# Patient Record
Sex: FEMALE | Race: WHITE | NOT HISPANIC OR LATINO | Employment: UNEMPLOYED | URBAN - METROPOLITAN AREA
[De-identification: names, ages, dates, MRNs, and addresses within clinical notes are randomized per-mention and may not be internally consistent; named-entity substitution may affect disease eponyms.]

---

## 2017-01-13 ENCOUNTER — CARE COORDINATION (OUTPATIENT)
Dept: ONCOLOGY | Facility: CLINIC | Age: 42
End: 2017-01-13

## 2017-01-13 NOTE — PROGRESS NOTES
Left message x2 to set up appt for follow up and to check in regarding follow up.   Sanjuana WANG RN, OCN  Care Coordinator   Gynecologic Cancer   Office [phone]  Pager [phone]

## 2017-03-12 ENCOUNTER — RECORDS - HEALTHEAST (OUTPATIENT)
Dept: ADMINISTRATIVE | Facility: OTHER | Age: 42
End: 2017-03-12

## 2017-04-02 ENCOUNTER — HOSPITAL ENCOUNTER (INPATIENT)
Facility: CLINIC | Age: 42
LOS: 1 days | Discharge: LEFT BEFORE DISCHARGE | DRG: 389 | End: 2017-04-03
Attending: EMERGENCY MEDICINE | Admitting: INTERNAL MEDICINE
Payer: COMMERCIAL

## 2017-04-02 DIAGNOSIS — K56.7 ILEUS OF UNSPECIFIED TYPE (H): ICD-10-CM

## 2017-04-02 DIAGNOSIS — C53.1 MALIGNANT NEOPLASM OF EXOCERVIX (H): ICD-10-CM

## 2017-04-02 DIAGNOSIS — K56.609 SBO (SMALL BOWEL OBSTRUCTION) (H): ICD-10-CM

## 2017-04-02 PROCEDURE — 96361 HYDRATE IV INFUSION ADD-ON: CPT | Performed by: EMERGENCY MEDICINE

## 2017-04-02 PROCEDURE — 99285 EMERGENCY DEPT VISIT HI MDM: CPT | Mod: Z6 | Performed by: EMERGENCY MEDICINE

## 2017-04-02 PROCEDURE — 99285 EMERGENCY DEPT VISIT HI MDM: CPT | Mod: 25 | Performed by: EMERGENCY MEDICINE

## 2017-04-02 PROCEDURE — 96376 TX/PRO/DX INJ SAME DRUG ADON: CPT | Performed by: EMERGENCY MEDICINE

## 2017-04-02 PROCEDURE — 96375 TX/PRO/DX INJ NEW DRUG ADDON: CPT | Performed by: EMERGENCY MEDICINE

## 2017-04-02 PROCEDURE — 96374 THER/PROPH/DIAG INJ IV PUSH: CPT | Performed by: EMERGENCY MEDICINE

## 2017-04-03 ENCOUNTER — APPOINTMENT (OUTPATIENT)
Dept: CT IMAGING | Facility: CLINIC | Age: 42
DRG: 389 | End: 2017-04-03
Attending: EMERGENCY MEDICINE
Payer: COMMERCIAL

## 2017-04-03 VITALS
TEMPERATURE: 98.5 F | SYSTOLIC BLOOD PRESSURE: 93 MMHG | OXYGEN SATURATION: 97 % | RESPIRATION RATE: 16 BRPM | HEART RATE: 97 BPM | WEIGHT: 155 LBS | HEIGHT: 69 IN | BODY MASS INDEX: 22.96 KG/M2 | DIASTOLIC BLOOD PRESSURE: 59 MMHG

## 2017-04-03 PROBLEM — K56.600 SMALL BOWEL OBSTRUCTION, PARTIAL (H): Status: ACTIVE | Noted: 2017-04-03

## 2017-04-03 LAB
ALBUMIN SERPL-MCNC: 2 G/DL (ref 3.4–5)
ALBUMIN UR-MCNC: 10 MG/DL
ALP SERPL-CCNC: 102 U/L (ref 40–150)
ALT SERPL W P-5'-P-CCNC: 10 U/L (ref 0–50)
ANION GAP SERPL CALCULATED.3IONS-SCNC: 7 MMOL/L (ref 3–14)
APPEARANCE UR: ABNORMAL
AST SERPL W P-5'-P-CCNC: 9 U/L (ref 0–45)
BACTERIA #/AREA URNS HPF: ABNORMAL /HPF
BASOPHILS # BLD AUTO: 0 10E9/L (ref 0–0.2)
BASOPHILS NFR BLD AUTO: 0.3 %
BILIRUB SERPL-MCNC: 0.5 MG/DL (ref 0.2–1.3)
BILIRUB UR QL STRIP: NEGATIVE
BUN SERPL-MCNC: 14 MG/DL (ref 7–30)
CALCIUM SERPL-MCNC: 7.9 MG/DL (ref 8.5–10.1)
CAOX CRY #/AREA URNS HPF: ABNORMAL /HPF
CHLORIDE SERPL-SCNC: 105 MMOL/L (ref 94–109)
CO2 SERPL-SCNC: 28 MMOL/L (ref 20–32)
COLOR UR AUTO: YELLOW
CREAT SERPL-MCNC: 0.76 MG/DL (ref 0.52–1.04)
DIFFERENTIAL METHOD BLD: NORMAL
EOSINOPHIL # BLD AUTO: 0.1 10E9/L (ref 0–0.7)
EOSINOPHIL NFR BLD AUTO: 0.8 %
ERYTHROCYTE [DISTWIDTH] IN BLOOD BY AUTOMATED COUNT: 14.3 % (ref 10–15)
GFR SERPL CREATININE-BSD FRML MDRD: 84 ML/MIN/1.7M2
GLUCOSE SERPL-MCNC: 86 MG/DL (ref 70–99)
GLUCOSE UR STRIP-MCNC: NEGATIVE MG/DL
HCT VFR BLD AUTO: 41.2 % (ref 35–47)
HGB BLD-MCNC: 13.7 G/DL (ref 11.7–15.7)
HGB UR QL STRIP: NEGATIVE
IMM GRANULOCYTES # BLD: 0 10E9/L (ref 0–0.4)
IMM GRANULOCYTES NFR BLD: 0.4 %
INR PPP: 1.07 (ref 0.86–1.14)
KETONES UR STRIP-MCNC: NEGATIVE MG/DL
LACTATE BLD-SCNC: 1.2 MMOL/L (ref 0.7–2.1)
LEUKOCYTE ESTERASE UR QL STRIP: ABNORMAL
LIPASE SERPL-CCNC: 52 U/L (ref 73–393)
LYMPHOCYTES # BLD AUTO: 1.4 10E9/L (ref 0.8–5.3)
LYMPHOCYTES NFR BLD AUTO: 13.1 %
MCH RBC QN AUTO: 32.9 PG (ref 26.5–33)
MCHC RBC AUTO-ENTMCNC: 33.3 G/DL (ref 31.5–36.5)
MCV RBC AUTO: 99 FL (ref 78–100)
MONOCYTES # BLD AUTO: 0.8 10E9/L (ref 0–1.3)
MONOCYTES NFR BLD AUTO: 7.9 %
MUCOUS THREADS #/AREA URNS LPF: PRESENT /LPF
NEUTROPHILS # BLD AUTO: 8.1 10E9/L (ref 1.6–8.3)
NEUTROPHILS NFR BLD AUTO: 77.5 %
NITRATE UR QL: NEGATIVE
NRBC # BLD AUTO: 0 10*3/UL
NRBC BLD AUTO-RTO: 0 /100
PH UR STRIP: 7 PH (ref 5–7)
PLATELET # BLD AUTO: 321 10E9/L (ref 150–450)
POTASSIUM SERPL-SCNC: 3.8 MMOL/L (ref 3.4–5.3)
PROT SERPL-MCNC: 4.9 G/DL (ref 6.8–8.8)
RBC # BLD AUTO: 4.16 10E12/L (ref 3.8–5.2)
RBC #/AREA URNS AUTO: 4 /HPF (ref 0–2)
SODIUM SERPL-SCNC: 140 MMOL/L (ref 133–144)
SP GR UR STRIP: 1.03 (ref 1–1.03)
SQUAMOUS #/AREA URNS AUTO: 2 /HPF (ref 0–1)
URN SPEC COLLECT METH UR: ABNORMAL
UROBILINOGEN UR STRIP-MCNC: 2 MG/DL (ref 0–2)
WBC # BLD AUTO: 10.4 10E9/L (ref 4–11)
WBC #/AREA URNS AUTO: 13 /HPF (ref 0–2)

## 2017-04-03 PROCEDURE — 81001 URINALYSIS AUTO W/SCOPE: CPT | Performed by: EMERGENCY MEDICINE

## 2017-04-03 PROCEDURE — 93010 ELECTROCARDIOGRAM REPORT: CPT | Performed by: INTERNAL MEDICINE

## 2017-04-03 PROCEDURE — 85025 COMPLETE CBC W/AUTO DIFF WBC: CPT | Performed by: EMERGENCY MEDICINE

## 2017-04-03 PROCEDURE — 83690 ASSAY OF LIPASE: CPT | Performed by: EMERGENCY MEDICINE

## 2017-04-03 PROCEDURE — 12000008 ZZH R&B INTERMEDIATE UMMC

## 2017-04-03 PROCEDURE — 25000128 H RX IP 250 OP 636

## 2017-04-03 PROCEDURE — 85610 PROTHROMBIN TIME: CPT | Performed by: EMERGENCY MEDICINE

## 2017-04-03 PROCEDURE — 25500064 ZZH RX 255 OP 636: Performed by: EMERGENCY MEDICINE

## 2017-04-03 PROCEDURE — 25800025 ZZH RX 258: Performed by: EMERGENCY MEDICINE

## 2017-04-03 PROCEDURE — 25000128 H RX IP 250 OP 636: Performed by: EMERGENCY MEDICINE

## 2017-04-03 PROCEDURE — 74177 CT ABD & PELVIS W/CONTRAST: CPT

## 2017-04-03 PROCEDURE — 25000128 H RX IP 250 OP 636: Performed by: INTERNAL MEDICINE

## 2017-04-03 PROCEDURE — 93005 ELECTROCARDIOGRAM TRACING: CPT

## 2017-04-03 PROCEDURE — 83605 ASSAY OF LACTIC ACID: CPT | Performed by: EMERGENCY MEDICINE

## 2017-04-03 PROCEDURE — 25000125 ZZHC RX 250: Performed by: EMERGENCY MEDICINE

## 2017-04-03 PROCEDURE — 80053 COMPREHEN METABOLIC PANEL: CPT | Performed by: EMERGENCY MEDICINE

## 2017-04-03 RX ORDER — HYDROMORPHONE HYDROCHLORIDE 1 MG/ML
.3-.5 INJECTION, SOLUTION INTRAMUSCULAR; INTRAVENOUS; SUBCUTANEOUS EVERY 4 HOURS PRN
Status: DISCONTINUED | OUTPATIENT
Start: 2017-04-03 | End: 2017-04-03 | Stop reason: HOSPADM

## 2017-04-03 RX ORDER — ONDANSETRON 4 MG/1
8 TABLET, ORALLY DISINTEGRATING ORAL EVERY 8 HOURS PRN
Status: DISCONTINUED | OUTPATIENT
Start: 2017-04-03 | End: 2017-04-03

## 2017-04-03 RX ORDER — ONDANSETRON 2 MG/ML
4 INJECTION INTRAMUSCULAR; INTRAVENOUS EVERY 6 HOURS PRN
Status: DISCONTINUED | OUTPATIENT
Start: 2017-04-03 | End: 2017-04-03 | Stop reason: HOSPADM

## 2017-04-03 RX ORDER — HYDROCODONE BITARTRATE AND ACETAMINOPHEN 5; 325 MG/1; MG/1
1 TABLET ORAL EVERY 6 HOURS PRN
Status: DISCONTINUED | OUTPATIENT
Start: 2017-04-03 | End: 2017-04-03

## 2017-04-03 RX ORDER — HYDROMORPHONE HYDROCHLORIDE 1 MG/ML
0.5 INJECTION, SOLUTION INTRAMUSCULAR; INTRAVENOUS; SUBCUTANEOUS
Status: COMPLETED | OUTPATIENT
Start: 2017-04-03 | End: 2017-04-03

## 2017-04-03 RX ORDER — ONDANSETRON 2 MG/ML
INJECTION INTRAMUSCULAR; INTRAVENOUS
Status: DISCONTINUED
Start: 2017-04-03 | End: 2017-04-03 | Stop reason: HOSPADM

## 2017-04-03 RX ORDER — ONDANSETRON 2 MG/ML
4 INJECTION INTRAMUSCULAR; INTRAVENOUS EVERY 30 MIN PRN
Status: DISCONTINUED | OUTPATIENT
Start: 2017-04-03 | End: 2017-04-03

## 2017-04-03 RX ORDER — LIDOCAINE 40 MG/G
CREAM TOPICAL
Status: DISCONTINUED | OUTPATIENT
Start: 2017-04-03 | End: 2017-04-03 | Stop reason: HOSPADM

## 2017-04-03 RX ORDER — PROMETHAZINE HYDROCHLORIDE 25 MG/ML
INJECTION, SOLUTION INTRAMUSCULAR; INTRAVENOUS
Status: COMPLETED
Start: 2017-04-03 | End: 2017-04-03

## 2017-04-03 RX ORDER — ACETAMINOPHEN 500 MG
500 TABLET ORAL EVERY 8 HOURS
Status: DISCONTINUED | OUTPATIENT
Start: 2017-04-03 | End: 2017-04-03

## 2017-04-03 RX ORDER — LIDOCAINE HYDROCHLORIDE 10 MG/ML
INJECTION, SOLUTION INFILTRATION; PERINEURAL
Status: DISCONTINUED
Start: 2017-04-03 | End: 2017-04-03 | Stop reason: HOSPADM

## 2017-04-03 RX ORDER — HYDROMORPHONE HYDROCHLORIDE 1 MG/ML
INJECTION, SOLUTION INTRAMUSCULAR; INTRAVENOUS; SUBCUTANEOUS
Status: COMPLETED
Start: 2017-04-03 | End: 2017-04-03

## 2017-04-03 RX ORDER — ALBUTEROL SULFATE 90 UG/1
2 AEROSOL, METERED RESPIRATORY (INHALATION) EVERY 6 HOURS PRN
Status: DISCONTINUED | OUTPATIENT
Start: 2017-04-03 | End: 2017-04-03 | Stop reason: HOSPADM

## 2017-04-03 RX ORDER — ONDANSETRON 2 MG/ML
4 INJECTION INTRAMUSCULAR; INTRAVENOUS ONCE
Status: COMPLETED | OUTPATIENT
Start: 2017-04-03 | End: 2017-04-03

## 2017-04-03 RX ORDER — HYDROCODONE BITARTRATE AND ACETAMINOPHEN 5; 325 MG/1; MG/1
1-2 TABLET ORAL EVERY 6 HOURS PRN
Status: DISCONTINUED | OUTPATIENT
Start: 2017-04-03 | End: 2017-04-03 | Stop reason: HOSPADM

## 2017-04-03 RX ORDER — FENTANYL CITRATE 50 UG/ML
INJECTION, SOLUTION INTRAMUSCULAR; INTRAVENOUS
Status: DISCONTINUED
Start: 2017-04-03 | End: 2017-04-03 | Stop reason: HOSPADM

## 2017-04-03 RX ORDER — SODIUM CHLORIDE, SODIUM LACTATE, POTASSIUM CHLORIDE, CALCIUM CHLORIDE 600; 310; 30; 20 MG/100ML; MG/100ML; MG/100ML; MG/100ML
1000 INJECTION, SOLUTION INTRAVENOUS CONTINUOUS
Status: DISCONTINUED | OUTPATIENT
Start: 2017-04-03 | End: 2017-04-03 | Stop reason: HOSPADM

## 2017-04-03 RX ORDER — NALOXONE HYDROCHLORIDE 0.4 MG/ML
.1-.4 INJECTION, SOLUTION INTRAMUSCULAR; INTRAVENOUS; SUBCUTANEOUS
Status: DISCONTINUED | OUTPATIENT
Start: 2017-04-03 | End: 2017-04-03

## 2017-04-03 RX ORDER — FENTANYL CITRATE 50 UG/ML
100 INJECTION, SOLUTION INTRAMUSCULAR; INTRAVENOUS
Status: DISCONTINUED | OUTPATIENT
Start: 2017-04-03 | End: 2017-04-03

## 2017-04-03 RX ORDER — NALOXONE HYDROCHLORIDE 0.4 MG/ML
.1-.4 INJECTION, SOLUTION INTRAMUSCULAR; INTRAVENOUS; SUBCUTANEOUS
Status: DISCONTINUED | OUTPATIENT
Start: 2017-04-03 | End: 2017-04-03 | Stop reason: HOSPADM

## 2017-04-03 RX ORDER — IOPAMIDOL 755 MG/ML
104 INJECTION, SOLUTION INTRAVASCULAR ONCE
Status: COMPLETED | OUTPATIENT
Start: 2017-04-03 | End: 2017-04-03

## 2017-04-03 RX ORDER — FENTANYL CITRATE 50 UG/ML
100 INJECTION, SOLUTION INTRAMUSCULAR; INTRAVENOUS ONCE
Status: COMPLETED | OUTPATIENT
Start: 2017-04-03 | End: 2017-04-03

## 2017-04-03 RX ORDER — PROMETHAZINE HYDROCHLORIDE 25 MG/ML
12.5 INJECTION, SOLUTION INTRAMUSCULAR; INTRAVENOUS ONCE
Status: COMPLETED | OUTPATIENT
Start: 2017-04-03 | End: 2017-04-03

## 2017-04-03 RX ADMIN — PROMETHAZINE HYDROCHLORIDE 12.5 MG: 25 INJECTION INTRAMUSCULAR; INTRAVENOUS at 06:39

## 2017-04-03 RX ADMIN — FENTANYL CITRATE 100 MCG: 50 INJECTION, SOLUTION INTRAMUSCULAR; INTRAVENOUS at 00:32

## 2017-04-03 RX ADMIN — HYDROMORPHONE HYDROCHLORIDE 0.5 MG: 10 INJECTION, SOLUTION INTRAMUSCULAR; INTRAVENOUS; SUBCUTANEOUS at 04:39

## 2017-04-03 RX ADMIN — HYDROMORPHONE HYDROCHLORIDE 0.5 MG: 10 INJECTION, SOLUTION INTRAMUSCULAR; INTRAVENOUS; SUBCUTANEOUS at 06:43

## 2017-04-03 RX ADMIN — SODIUM CHLORIDE 1000 ML: 900 INJECTION, SOLUTION INTRAVENOUS at 06:50

## 2017-04-03 RX ADMIN — ONDANSETRON 4 MG: 2 INJECTION INTRAMUSCULAR; INTRAVENOUS at 12:58

## 2017-04-03 RX ADMIN — SODIUM CHLORIDE, POTASSIUM CHLORIDE, SODIUM LACTATE AND CALCIUM CHLORIDE 1000 ML: 600; 310; 30; 20 INJECTION, SOLUTION INTRAVENOUS at 04:04

## 2017-04-03 RX ADMIN — SODIUM CHLORIDE, PRESERVATIVE FREE 77 ML: 5 INJECTION INTRAVENOUS at 04:52

## 2017-04-03 RX ADMIN — IOPAMIDOL 104 ML: 755 INJECTION, SOLUTION INTRAVENOUS at 03:39

## 2017-04-03 RX ADMIN — PROMETHAZINE HYDROCHLORIDE 12.5 MG: 25 INJECTION, SOLUTION INTRAMUSCULAR; INTRAVENOUS at 06:39

## 2017-04-03 RX ADMIN — ONDANSETRON 4 MG: 2 INJECTION INTRAMUSCULAR; INTRAVENOUS at 00:32

## 2017-04-03 RX ADMIN — HYDROMORPHONE HYDROCHLORIDE 0.5 MG: 10 INJECTION, SOLUTION INTRAMUSCULAR; INTRAVENOUS; SUBCUTANEOUS at 12:58

## 2017-04-03 RX ADMIN — HYDROMORPHONE HYDROCHLORIDE 0.5 MG: 10 INJECTION, SOLUTION INTRAMUSCULAR; INTRAVENOUS; SUBCUTANEOUS at 08:58

## 2017-04-03 RX ADMIN — SODIUM CHLORIDE, POTASSIUM CHLORIDE, SODIUM LACTATE AND CALCIUM CHLORIDE 1000 ML: 600; 310; 30; 20 INJECTION, SOLUTION INTRAVENOUS at 10:35

## 2017-04-03 RX ADMIN — SODIUM CHLORIDE, POTASSIUM CHLORIDE, SODIUM LACTATE AND CALCIUM CHLORIDE 1000 ML: 600; 310; 30; 20 INJECTION, SOLUTION INTRAVENOUS at 00:34

## 2017-04-03 RX ADMIN — FENTANYL CITRATE 100 MCG: 50 INJECTION, SOLUTION INTRAMUSCULAR; INTRAVENOUS at 00:28

## 2017-04-03 ASSESSMENT — ENCOUNTER SYMPTOMS
ABDOMINAL PAIN: 1
DIARRHEA: 1
BLOOD IN STOOL: 0
UNEXPECTED WEIGHT CHANGE: 1
NAUSEA: 1
CONSTIPATION: 0
VOMITING: 1

## 2017-04-03 NOTE — ED PROVIDER NOTES
History     Chief Complaint   Patient presents with     Abdominal Pain     Nausea, Vomiting, & Diarrhea     HPI  Rachel A Gerhardt is a 41 year old female with a history of stage IIB cervical cancer, status-post primary chemoradiation, chronic abdominal pain, and opioid abuse/dependence who presents with abdominal pain, nausea, vomiting and diarrhea. The patient was admitted (12/27/17-12/29/17) for similar complaints, and at that time had CT abdomen/pelvis findings consistent with small bowel obstruction versus adynamic ileus. She was managed conservatively with bowel rest with resolution of the SBO versus ileus. The patient reports that she has had ongoing diffuse abdominal pain, nausea and vomiting over the past few months since she was discharged. She states that she has tried taking a stool softener and laxative, however, she stopped this as she has had diarrhea over the past month. She notes that she tried to progress from a liquid diet to soft foods; however, her symptoms have progressively worsened over the past month, and have now gotten to the point that she cannot tolerate any PO intake, including fluids. Her last bowel movement was last night. She reports 4-5 episodes of diarrhea per day. She also reports that she had an episode of bloody stool about a week ago, but not since then. She denies hematemesis.     Past Medical History:   Diagnosis Date     Asthma      Cancer (H)     Per patient OBGYN, cerivical cancer     Cervical cancer (H)      Other chronic pain      Ovarian cancer (H)      Substance abuse     Outside records indicate past history of narcotics abuse or dependence, but patient denies.       Past Surgical History:   Procedure Laterality Date     ENT SURGERY  2009    mastoid, sinus     EXAM UNDER ANESTHESIA, INSERT ALEX SLEEVE, UTERINE PLACEMENT OF TANDEM AND RING FOR RAD, ULTRASOUND N/A 12/14/2015    Procedure: EXAM UNDER ANESTHESIA, INSERT ALEX SLEEVE, UTERINE PLACEMENT OF TANDEM AND RING  FOR RADIATION, ULTRASOUND GUIDED;  Surgeon: Abby Tony MD;  Location: [facility] OR     INSERT TANDEM AND CESIUM APPLICATOR CERVIX, ULTRASOUND GUIDED N/A 12/17/2015    Procedure: INSERT TANDEM AND CESIUM APPLICATOR CERVIX, ULTRASOUND GUIDED;  Surgeon: Kika Wood MD;  Location: [facility] OR       Family History   Problem Relation Age of Onset     DIABETES Mother      Ovarian Cancer No family hx of      Uterine Cancer No family hx of      Cervical Cancer No family hx of      Breast Cancer No family hx of        Social History   Substance Use Topics     Smoking status: Light Tobacco Smoker     Packs/day: 0.25     Years: 12.00     Types: Cigarettes     Smokeless tobacco: Never Used      Comment: 2/22/16 pt smoking daily 1 cig     Alcohol use No      Comment: None per pt     Current Facility-Administered Medications   Medication     fentaNYL Citrate (PF) (SUBLIMAZE) 100 MCG/2ML injection     fentaNYL Citrate (PF) (SUBLIMAZE) injection 100 mcg     ondansetron (ZOFRAN) 2 MG/ML injection     lidocaine 1 % 1 mL     lidocaine (LMX4) kit     sodium chloride (PF) 0.9% PF flush 3 mL     sodium chloride (PF) 0.9% PF flush 3 mL     lactated ringers infusion     ondansetron (ZOFRAN) injection 4 mg     HYDROmorphone (PF) (DILAUDID) injection 0.5 mg     0.9% sodium chloride BOLUS     phenylephrine (MORIAH-SYNEPHRINE) 0.25 % spray     lidocaine 1 % injection     Current Outpatient Prescriptions   Medication     acetaminophen (TYLENOL) 500 MG tablet     celecoxib (CELEBREX) 50 MG capsule     polyethylene glycol (MIRALAX/GLYCOLAX) Packet     senna-docusate (SENOKOT-S;PERICOLACE) 8.6-50 MG per tablet     simethicone (MYLICON) 80 MG chewable tablet     Dicyclomine HCl (BENTYL PO)     HYDROcodone-acetaminophen (NORCO) 5-325 MG per tablet     ondansetron (ZOFRAN-ODT) 8 MG disintegrating tablet     albuterol (PROVENTIL HFA: VENTOLIN HFA) 108 (90 BASE) MCG/ACT inhaler     Facility-Administered Medications Ordered in Other Encounters  "  Medication     lactated ringers infusion     ondansetron (ZOFRAN) injection        Allergies   Allergen Reactions     No Clinical Screening - See Comments Other (See Comments) and Diarrhea     headache  Carrots cause gastric upset, cramping and diarrhea.     Sulfa Drugs Hives     hives     Amoxicillin Unknown and Other (See Comments)     vomiting  vomiting     Amoxicillin-Pot Clavulanate Other (See Comments)     vomiting     Augmentin GI Disturbance, Nausea and Hives     Avelox [Moxifloxacin] Nausea and Vomiting and Unknown     Ciprofloxacin Hives     Codeine Nausea and Vomiting     Ibuprofen Nausea and Vomiting     Other reaction(s): Nausea And Vomiting     Ibuprofen Sodium Hives and GI Disturbance     Oxycodone-Acetaminophen Unknown     Quinolones      Tramadol Hives, Diarrhea, Nausea and Nausea and Vomiting      I have reviewed the Medications, Allergies, Past Medical and Surgical History, and Social History in the Epic system.    Review of Systems   Constitutional: Positive for unexpected weight change.   Gastrointestinal: Positive for abdominal pain, diarrhea, nausea and vomiting. Negative for blood in stool and constipation.   All other systems reviewed and are negative.    Physical Exam   Height: (P) 175.3 cm (5' 9\")  Weight: (P) 77.1 kg (170 lb) Vitals: BP 97/72  Ht (P) 1.753 m (5' 9\")  Wt (P) 77.1 kg (170 lb)  SpO2 100%  BMI (P) 25.1 kg/m2  BMI= Body mass index is 25.1 kg/(m^2) (pended).   102, blood pressure 130/80, oxygen saturation 97%, and respiratory rate 14  Physical Exam   Constitutional: She appears well-developed and well-nourished. She appears distressed (Secondary to pain and retching).   HENT:   Head: Normocephalic and atraumatic.   Cardiovascular: Normal rate, regular rhythm and normal heart sounds.    Pulmonary/Chest: Effort normal and breath sounds normal. No respiratory distress.   Abdominal: Soft. She exhibits distension (Slightly distended centrally but soft). She exhibits no fluid " wave and no ascites. There is generalized tenderness. There is guarding. There is no rebound and no CVA tenderness. No hernia.   Musculoskeletal: Normal range of motion.   Neurological: She is alert.   Skin: Skin is warm and dry. She is not diaphoretic. No pallor.   Psychiatric: Her mood appears anxious.   Nursing note and vitals reviewed.    ED Course     ED Course     Procedures     12:23 AM  The patient was seen and examined by Dr. Martinez in Room 12.          Critical Care time:  none        Labs Ordered and Resulted from Time of ED Arrival Up to the Time of Departure from the ED   COMPREHENSIVE METABOLIC PANEL - Abnormal; Notable for the following:        Result Value    Calcium 7.9 (*)     Albumin 2.0 (*)     Protein Total 4.9 (*)     All other components within normal limits   LIPASE - Abnormal; Notable for the following:     Lipase 52 (*)     All other components within normal limits   ROUTINE UA WITH MICROSCOPIC - Abnormal; Notable for the following:     Protein Albumin Urine 10 (*)     Leukocyte Esterase Urine Large (*)     WBC Urine 13 (*)     RBC Urine 4 (*)     Bacteria Urine Few (*)     Squamous Epithelial /HPF Urine 2 (*)     Mucous Urine Present (*)     Calcium Oxalate Many (*)     All other components within normal limits   CBC WITH PLATELETS DIFFERENTIAL   INR   LACTIC ACID WHOLE BLOOD   PULSE OXIMETRY NURSING   PERIPHERAL IV CATHETER   NASOGASTRIC TUBE DECOMPRESSION     Ct Abdomen Pelvis W Contrast    Result Date: 4/3/2017   Multiple abnormally dilated loops of small bowel are similar in appearance to 12/27/2016. There is no clear transition point and findings may represent partial small bowel obstruction versus adynamic ileus.     Assessments & Plan (with Medical Decision Making)   I was physically present and have reviewed and verified the accuracy of this note documented by (myself).     Disclaimer: This note consists of symbols derived from keyboarding, dictation, and/or voice recognition  software. As a result, there may be errors in the script that have gone undetected.  Please consider this when interpreting information found in the chart.These sections of the chart were reviewed for accuracy to the best of my knowledge and ability.    Patient was clinically assessed and consented to treatment. After assessment, medical decision making and workup were discussed with the patient. The patient was agreeable to plan for testing, workup, and treatment.  Rachel A Gerhardt is a 41 year old female who presents today for abdominal pain, distention, nausea and vomiting.  Patient with history of cervical cancer and passed radiation.  She's had chronic abdominal pain which she reports has worsened over the last 2 days with persistent nausea and vomiting.  She states that over the last month she's had intermittent vomiting that encompasses about 5 days a week along with loose stools.  Her last stool was yesterday morning and as her abdomen More distended and painful she decided to come back to the ER.  She states it feels similar to what she had in December when they thought she had a small bowel obstruction or adynamic ileus per medical record review.  Patient was distended but soft and tender over the generalized abdomen.  IV was established and labs were ordered.  Patient was given IV Dilaudid 0.5 mg and Zofran for nausea.  After the Zofran nausea did improve shortly and pain didn't improve with the Dilaudid.  Nausea returned and she was given further Zofran.  After discussion with the patient we'll proceed with CT scan with contrast to evaluate for small bowel obstruction versus this ileus.  Patient started the contrast overhead return of nausea and vomiting and was given Phenergan.  Despite the Phenergan improving some of the nausea she could not tolerate the oral contrast.  It was decided to try to give her a concentrated bolus of the contrast after the Phenergan had decreased enough of the nausea to  tolerate this.  Following this CT scan was obtained to evaluate for obstruction and transition point.  CT scan did show some dilated loops concerning for mid to distal small bowel obstruction though resident for preliminary read read as similar to previous with possible adynamic ileus.  Patient returned from CT with return of nausea and Phenergan will be given again along with further pain medication.  At this point patient be admitted to medicine for concern of small bowel stricture versus ileus.  With the continued retching she is unable tolerate oral medications and will need IV antiemetics and pain medication.  Report was given the medicine we discussed the possibility of small bowel section and will try placement of an NG tube.  Patient reports that NG tube had previously been placed by interventional radiology on past visits because of difficulty passage of the NG tube.  Multiple attempts were made with nursing and myself trying to pass the NG tube and after multiple attempts patient refused any further due to inability to tolerate to passage in the posterior pharynx to epiglottis/esophagus.  Patient again will receive some further Zofran as the NG tube placement exacerbated her nausea.  Patient will be admitted to medicine for further care.    I have reviewed the nursing notes.    I have reviewed the findings, diagnosis, plan and need for follow up with the patient.    New Prescriptions    No medications on file       Final diagnoses:   SBO (small bowel obstruction) (H)   Ileus of unspecified type (H)     I, Chiqui White, am serving as a trained medical scribe to document services personally performed by Aneesh Martinez MD, based on the provider's statements to me.   Aneesh MARCANO MD, was physically present and have reviewed and verified the accuracy of this note documented by Chiqui White.     4/2/2017   [facility], EMERGENCY DEPARTMENT     Aneesh Martinez MD  04/03/17  [id number]

## 2017-04-03 NOTE — PLAN OF CARE
"Problem: Goal Outcome Summary  Goal: Goal Outcome Summary  Outcome: No Change  Vitals:     04/03/17 0745 04/03/17 0800 04/03/17 0830 04/03/17 1001   BP:   97/72 93/69 93/59   Pulse:       97   Resp:       16   Temp:       98.5  F (36.9  C)   TempSrc:       Oral   SpO2: 100%     97%   Weight:       70.3 kg (155 lb)   Height:       1.753 m (5' 9\")   Pt has been asking for pain meds  Given as ordered some relief.   MD ordered NG placement by Radiology pt notified and agreeable.   Pt left the room to smoke several times regardless of the smoking cessation education.  Pt c/o pain at 12:50 asked for IV Dilaudid given at 12:58. Stated that she had couple loose stools not witnessed.  Resident on call notified and ordered to keep pt on Enteric  Isolation until we r/o CDiff  Pt  disconnected her IV, IV line in place  and left the room without informing staff for about 2hrs pt is not available.This writer went out to look for pt outside. None.   Dr Nikkie Mario notified verbally.  Waiting to see when she will be back to her room. Continue POC.    At 3pm  Writer looked up the contact information fro the face sheet and called   Mr Gerhardt at  he said he will give her a call and find out. MDs notifed.   Contiue to follow up.         "

## 2017-04-03 NOTE — H&P
[facility]      History and Physicial  Rachel A Gerhardt MRN: [id number]  1975  Date of Admission:4/2/2017  Primary care provider: Brandon Villa Md           Chief Complaint:   Nausea/Vomiting/Diarrhea         History of Present Illness:   Rachel Gerhardt is a 42 yo F with a history of Stage IIIB cervical cancer s/p chemoradiation, chronic abdominal pain, and opioid abuse who presented with nausea, vomiting, and diarrhea.    She was admitted from 12/27- 12/29 with complaint of abdominal pain, found to have SBO versus adynamic ileus. Managed conservatively with bowel rest. She improved and was tolerating clear liquids. However, she later left the hospital with her belongings and did not return. Discharged AMA.    Today, she presents with c/o of nausea, vomiting, abdominal pain, and loose stools that she states have been present since her last admission. She feels that it has been significantly worse over the last few days. However, states that intermittently she will have times where she is able to tolerate po without vomiting. States that over the last few days she has been having up to 3 episodes of emesis a day. Additionally with multiple loose bowel movements, 3-5 times a day and some associated bright red blood per rectum. No melena or hematemesis. Intermittent sharp/crampy abdominal pains. Not taking any medications currently for pain. Reports she did have some Norco from prior hospitalization, but has since run out. No medications currently.          Review of Systems:    10 point ROS negative except as noted above.          Past Medical History:   Medical History reviewed.   Past Medical History:   Diagnosis Date     Asthma      Cancer (H)     Per patient OBGYN, cerivical cancer     Cervical cancer (H)      Other chronic pain      Ovarian cancer (H)      Substance abuse     Outside records indicate past history of narcotics abuse or dependence, but patient denies.             Past Surgical  History:   Surgical History reviewed.   Past Surgical History:   Procedure Laterality Date     ENT SURGERY  2009    mastoid, sinus     EXAM UNDER ANESTHESIA, INSERT ALEX SLEEVE, UTERINE PLACEMENT OF TANDEM AND RING FOR RAD, ULTRASOUND N/A 12/14/2015    Procedure: EXAM UNDER ANESTHESIA, INSERT ALEX SLEEVE, UTERINE PLACEMENT OF TANDEM AND RING FOR RADIATION, ULTRASOUND GUIDED;  Surgeon: Abby Tony MD;  Location: [facility] OR     INSERT TANDEM AND CESIUM APPLICATOR CERVIX, ULTRASOUND GUIDED N/A 12/17/2015    Procedure: INSERT TANDEM AND CESIUM APPLICATOR CERVIX, ULTRASOUND GUIDED;  Surgeon: Kika Wood MD;  Location: [facility] OR             Social History:   Social History reviewed.  Social History   Substance Use Topics     Smoking status: Light Tobacco Smoker     Packs/day: 0.25     Years: 12.00     Types: Cigarettes     Smokeless tobacco: Never Used      Comment: 2/22/16 pt smoking daily 1 cig     Alcohol use No      Comment: None per pt             Family History:   Family History reviewed.   Family History   Problem Relation Age of Onset     DIABETES Mother      Ovarian Cancer No family hx of      Uterine Cancer No family hx of      Cervical Cancer No family hx of      Breast Cancer No family hx of              Allergies:     Allergies   Allergen Reactions     No Clinical Screening - See Comments Other (See Comments) and Diarrhea     headache  Carrots cause gastric upset, cramping and diarrhea.     Sulfa Drugs Hives     hives     Amoxicillin Unknown and Other (See Comments)     vomiting  vomiting     Amoxicillin-Pot Clavulanate Other (See Comments)     vomiting     Augmentin GI Disturbance, Nausea and Hives     Avelox [Moxifloxacin] Nausea and Vomiting and Unknown     Ciprofloxacin Hives     Codeine Nausea and Vomiting     Ibuprofen Nausea and Vomiting     Other reaction(s): Nausea And Vomiting     Ibuprofen Sodium Hives and GI Disturbance     Oxycodone-Acetaminophen Unknown     Quinolones      Tramadol Hives,  Diarrhea, Nausea and Nausea and Vomiting             Medications:   Medications Reviewed.   Current Facility-Administered Medications   Medication     lidocaine 1 % 1 mL     lidocaine (LMX4) kit     sodium chloride (PF) 0.9% PF flush 3 mL     sodium chloride (PF) 0.9% PF flush 3 mL     lactated ringers infusion     albuterol (PROAIR HFA/PROVENTIL HFA/VENTOLIN HFA) Inhaler 2 puff     naloxone (NARCAN) injection 0.1-0.4 mg     HYDROcodone-acetaminophen (NORCO) 5-325 MG per tablet 1-2 tablet     ondansetron (ZOFRAN) injection 4 mg     prochlorperazine (COMPAZINE) injection 5-10 mg     HYDROmorphone (PF) (DILAUDID) injection 0.3-0.5 mg     Facility-Administered Medications Ordered in Other Encounters   Medication     lactated ringers infusion     ondansetron (ZOFRAN) injection             Physical Exam:   Vitals were reviewed.  Temp:  [98.5  F (36.9  C)] 98.5  F (36.9  C)  Pulse:  [97] 97  Resp:  [16] 16  BP: ()/(59-89) 93/59  SpO2:  [97 %-100 %] 97 %    General: No acute distress, well appearing  HEENT: PERRL  CV: RRR, normal S1S2, no murmurs appreciated  Resp: Clear to auscultation bilaterally, no wheezes, rales, or rhonchi  Abd: Soft, generalized tenderness with mild palpation, no rebound or guarding, +bowel sounds  Extremities: warm and well perfused, no lower extremity edema  Neuro: No gross deficits  Skin: No rashes appreciated         Data:   I/O last 3 completed shifts:  In: 1000 [IV Piggyback:1000]  Out: -     ROUTINE LABS    Recent Results (from the past 24 hour(s))   CBC with platelets differential    Collection Time: 04/03/17 12:37 AM   Result Value Ref Range    WBC 10.4 4.0 - 11.0 10e9/L    RBC Count 4.16 3.8 - 5.2 10e12/L    Hemoglobin 13.7 11.7 - 15.7 g/dL    Hematocrit 41.2 35.0 - 47.0 %    MCV 99 78 - 100 fl    MCH 32.9 26.5 - 33.0 pg    MCHC 33.3 31.5 - 36.5 g/dL    RDW 14.3 10.0 - 15.0 %    Platelet Count 321 150 - 450 10e9/L    Diff Method Automated Method     % Neutrophils 77.5 %    %  Lymphocytes 13.1 %    % Monocytes 7.9 %    % Eosinophils 0.8 %    % Basophils 0.3 %    % Immature Granulocytes 0.4 %    Nucleated RBCs 0 0 /100    Absolute Neutrophil 8.1 1.6 - 8.3 10e9/L    Absolute Lymphocytes 1.4 0.8 - 5.3 10e9/L    Absolute Monocytes 0.8 0.0 - 1.3 10e9/L    Absolute Eosinophils 0.1 0.0 - 0.7 10e9/L    Absolute Basophils 0.0 0.0 - 0.2 10e9/L    Abs Immature Granulocytes 0.0 0 - 0.4 10e9/L    Absolute Nucleated RBC 0.0    INR    Collection Time: 04/03/17 12:37 AM   Result Value Ref Range    INR 1.07 0.86 - 1.14   Comprehensive metabolic panel    Collection Time: 04/03/17 12:37 AM   Result Value Ref Range    Sodium 140 133 - 144 mmol/L    Potassium 3.8 3.4 - 5.3 mmol/L    Chloride 105 94 - 109 mmol/L    Carbon Dioxide 28 20 - 32 mmol/L    Anion Gap 7 3 - 14 mmol/L    Glucose 86 70 - 99 mg/dL    Urea Nitrogen 14 7 - 30 mg/dL    Creatinine 0.76 0.52 - 1.04 mg/dL    GFR Estimate 84 >60 mL/min/1.7m2    GFR Estimate If Black >90   GFR Calc   >60 mL/min/1.7m2    Calcium 7.9 (L) 8.5 - 10.1 mg/dL    Bilirubin Total 0.5 0.2 - 1.3 mg/dL    Albumin 2.0 (L) 3.4 - 5.0 g/dL    Protein Total 4.9 (L) 6.8 - 8.8 g/dL    Alkaline Phosphatase 102 40 - 150 U/L    ALT 10 0 - 50 U/L    AST 9 0 - 45 U/L   Lactic acid whole blood    Collection Time: 04/03/17 12:37 AM   Result Value Ref Range    Lactic Acid 1.2 0.7 - 2.1 mmol/L   Lipase    Collection Time: 04/03/17 12:37 AM   Result Value Ref Range    Lipase 52 (L) 73 - 393 U/L   UA with Microscopic    Collection Time: 04/03/17  4:05 AM   Result Value Ref Range    Color Urine Yellow     Appearance Urine Slightly Cloudy     Glucose Urine Negative NEG mg/dL    Bilirubin Urine Negative NEG    Ketones Urine Negative NEG mg/dL    Specific Gravity Urine 1.026 1.003 - 1.035    Blood Urine Negative NEG    pH Urine 7.0 5.0 - 7.0 pH    Protein Albumin Urine 10 (A) NEG mg/dL    Urobilinogen mg/dL 2.0 0.0 - 2.0 mg/dL    Nitrite Urine Negative NEG    Leukocyte Esterase  Urine Large (A) NEG    Source Midstream Urine     WBC Urine 13 (H) 0 - 2 /HPF    RBC Urine 4 (H) 0 - 2 /HPF    Bacteria Urine Few (A) NEG /HPF    Squamous Epithelial /HPF Urine 2 (H) 0 - 1 /HPF    Mucous Urine Present (A) NEG /LPF    Calcium Oxalate Many (A) NEG /HPF   EKG 12-lead, tracing only    Collection Time: 04/03/17 12:31 PM   Result Value Ref Range    Interpretation ECG Click View Image link to view waveform and result      CT Abd/Pelvis  IMPRESSION:   1. Multiple abnormally dilated loops of small bowel are similar in  appearance to 12/27/2016. There is no clear transition point and  findings may represent partial small bowel obstruction versus adynamic  ileus.   2. Segments of narrowing and possible mucosal hyperenhancement may be  infectious or inflammatory in the appropriate clinical setting.  3. Suboptimal exam to evaluate for changes/stability of the patient's  known cervical cancer.    Assessment and Plan:     Rachel Gerhardt is a 42 yo F with a history of Stage IIIB cervical cancer s/p chemoradiation, chronic abdominal pain, and opioid abuse who presented with nausea, vomiting, and diarrhea.    # Partial SBO vs Adynamic Ileus  # Nausea/Vomiting/Abdominal Pain  Presented with N/V/D and abdominal pain. Last hospital admission in December with SBO vs. Adynamic ileus. Reported some improvement with conservative management. However, then left AMA. This admission, repeat CT again with findings of dilated bowel, partial SBO vs. Adynamic ileus. Concern that may be related to underlying malignancy or radiation. Associated mucosal enhancement may also support infection/inflammation.   - Conservative management  - NGT to be placed by XR  - Pain control: Norco 1-2 Q4H PRN and Dilaudid PRN for breakthrough  - Zofran and compazine PRN for nausea  - NPO and mIVF  - Consider further imaging to evaluate state of underlying cervical cancer  - If diarrhea, consider sending c diff     # BRBPR  By history. Declined rectal  "exam on admission.   - Will need evaluation with FOBT and consider colonoscopy if positive (after acute obstruction/ileus resolves)  - Monitor CBC    # Stage IIIB cervical cancer  S/p chemoradiation. Completed treatment 1/2016. Last PET 10/2016 with \"almost complete resolution of previously seen enlargement and hypermetabolic activity of the uterine cervix.\" Reports that she has not had recent Gyn-Onc follow up. Will need to re-establish for surveillance.    # H/o asthma  Denies any recent issues.  -Continue PTA albuterol PRN    Prophylaxis:  DVT: Lovenox   Disposition: Admit to general medicine    Code Status: FULL    ADDENDUM:  In the afternoon after arriving to the medicine floor, the patient left the room without telling nursing staff. She disconnected her IV. Was unable to be found or reached by phone.  was contacted, but states they are no longer together. Per protocol, bed is being held for 8 hours. If patient does not return, will be discharged as AMA.    Patient was  discussed with attending physician Dr. Mario, who agrees with above assessment and plan.    Frances Jeffery MD, MPH  Internal Medicine, PGY-3  Pager [phone]    Pt case was discussed with the housestaff, but I was unable to see the patient before she left AMA.  I agree with the plans documented above but will not bill for this encounter.      Nikkie Mario MD  "

## 2017-04-03 NOTE — PROVIDER NOTIFICATION
Pt have been out of room/ hospital premises since 1330 4/3/2017, days bedside RN notified MD & paged overhead to lazaro the message to the pt to return to the unit. Awaiting pt to return. Per policy 8 hrs given till pt returns to unit. Will notify team for updates.     Shelton Chapman RN

## 2017-04-03 NOTE — PROGRESS NOTES
"Vitals:    04/03/17 0745 04/03/17 0800 04/03/17 0830 04/03/17 1001   BP:  97/72 93/69 93/59   Pulse:    97   Resp:    16   Temp:    98.5  F (36.9  C)   TempSrc:    Oral   SpO2: 100%   97%   Weight:    70.3 kg (155 lb)   Height:    1.753 m (5' 9\")   Admission  A/D  Pt arrived from ED at 10:30 alert and oriented times three denied nausea c/o abd pain .   I.Pain med given as needed and ordered some relief. MIV started  admission assessment done per policy. Sikes pt to room and new environment.   Plan to follow up per plan of care.  "

## 2017-04-04 ENCOUNTER — CARE COORDINATION (OUTPATIENT)
Dept: CARDIOLOGY | Facility: CLINIC | Age: 42
End: 2017-04-04

## 2017-04-04 LAB — INTERPRETATION ECG - MUSE: NORMAL

## 2017-04-04 NOTE — PROGRESS NOTES
Patient left AMA without follow up information so no post discharge follow up call will be done by this department

## 2017-04-04 NOTE — PROGRESS NOTES
Pt has not returned to unit after 8 hours. Will now be discharged per protocol. This note was written at 2130 on 4/3/2017.

## 2017-04-04 NOTE — DISCHARGE SUMMARY
"                                                                  Internal Medicine Discharge Summary  Rachel A Gerhardt   : 1975  MRN: [id number]  Primary care provider: Brandon Villa Md          Date of Admission:  2017  Date of Discharge:  4/3/2017  Attending:   Dr. Fabiano Mckeon  Resident:   Frances Jeffery  Service:    Anthony Braswell    REASON FOR ADMISSION  Nausea/Vomiting/Diarrhea    See admission H&P for further details.    DISCHARGE DIAGNOSIS  AMA Discharge  Partial SBO vs. Adynamic Ileus  H/o cervical cancer    PROCEDURES & SIGNIFICANT FINDINGS  CT Abd/Pelvis 2017  IMPRESSION:   1. Multiple abnormally dilated loops of small bowel are similar in  appearance to 2016. There is no clear transition point and  findings may represent partial small bowel obstruction versus adynamic  ileus.   2. Segments of narrowing and possible mucosal hyperenhancement may be  infectious or inflammatory in the appropriate clinical setting.  3. Suboptimal exam to evaluate for changes/stability of the patient's  known cervical cancer.    CONSULTATIONS  None    HOSPITAL COURSE BY PROBLEM  Rachel Gerhardt is a 40 yo F with a history of Stage IIIB cervical cancer s/p chemoradiation, chronic abdominal pain, and opioid abuse who presented with c/o nausea, vomiting, and diarrhea. She underwent CT in the ED which demonstrated partial SBO vs. Adynamic ileus, similar to last hospitalization in December. She was admitted to the medicine floor for further management. The patient was to be managed conservatively with pain medications, antiemetics,and NGT placement. However, on the same day, the patient left the medicine floor without informing nursing staff. Her IV was found disconnected. The patient was unable to be reached and subsequently discharged as AMA.    PHYSICAL EXAM  Blood pressure 93/59, pulse 97, temperature 98.5  F (36.9  C), temperature source Oral, resp. rate 16, height 1.753 m (5' 9\"), weight 70.3 kg (155 lb), " SpO2 97 %, not currently breastfeeding.    Unable to perform discharge exam. Patient left AMA.   See H&P for physical.     IV ACCESS   None      PENDING RESULTS  Unresulted Labs Ordered in the Past 30 Days of this Admission     No orders found from 2/1/2017 to 4/3/2017.          DISCHARGE MEDICATIONS  Discharge Medication List as of 4/3/2017  9:31 PM      CONTINUE these medications which have NOT CHANGED    Details   acetaminophen (TYLENOL) 500 MG tablet Take 1 tablet (500 mg) by mouth every 8 hours, OTC      celecoxib (CELEBREX) 50 MG capsule Take 1 capsule (50 mg) by mouth 2 times daily for 14 days, Disp-28 capsule, R-0, E-Prescribe      polyethylene glycol (MIRALAX/GLYCOLAX) Packet Take 17 g by mouth daily as needed for constipation, Disp-60 packet, R-0, E-Prescribe      senna-docusate (SENOKOT-S;PERICOLACE) 8.6-50 MG per tablet Take 1-2 tablets by mouth 2 times daily, Disp-100 tablet, R-0, E-Prescribe      simethicone (MYLICON) 80 MG chewable tablet Take 1 tablet (80 mg) by mouth every 6 hours as needed for flatulence or cramping, Disp-180 tablet, R-0, E-Prescribe      Dicyclomine HCl (BENTYL PO) Take 10 mg by mouth 4 times daily, Historical      HYDROcodone-acetaminophen (NORCO) 5-325 MG per tablet Take 1 tablet by mouth every 6 hours as needed for moderate to severe pain, Disp-30 tablet, R-0, Local Print      ondansetron (ZOFRAN-ODT) 8 MG disintegrating tablet Take 1 tablet (8 mg) by mouth every 8 hours as needed for nausea, Disp-30 tablet, R-0, E-Prescribe      albuterol (PROVENTIL HFA: VENTOLIN HFA) 108 (90 BASE) MCG/ACT inhaler Inhale 2 puffs into the lungs every 6 hours as needed.  , Historical             DISCHARGE INSTRUCTIONS  N/A    DISCHARGE DISPOSITION  AMA Discharge    CONDITION ON DISCHARGE  Code Status: Full Code    Date of service: 4/3/2017    The patient was discussed with Dr. Fabiano Mckeon.     Frances Jeffery MD, MPH  Internal Medicine, PGY-3  Pager [phone]    Pt was seen and examined by me  on the day of discharge.  I agree with the documentation above    Nikkie Mario MD

## 2017-04-17 ENCOUNTER — OFFICE VISIT (OUTPATIENT)
Dept: INTERNAL MEDICINE | Facility: CLINIC | Age: 42
End: 2017-04-17

## 2017-04-17 VITALS
HEIGHT: 68 IN | HEART RATE: 101 BPM | DIASTOLIC BLOOD PRESSURE: 75 MMHG | RESPIRATION RATE: 18 BRPM | OXYGEN SATURATION: 98 % | SYSTOLIC BLOOD PRESSURE: 105 MMHG | BODY MASS INDEX: 23.04 KG/M2 | WEIGHT: 152 LBS | TEMPERATURE: 97.9 F

## 2017-04-17 DIAGNOSIS — R10.84 ABDOMINAL PAIN, GENERALIZED: Primary | ICD-10-CM

## 2017-04-17 DIAGNOSIS — R11.2 NON-INTRACTABLE VOMITING WITH NAUSEA, UNSPECIFIED VOMITING TYPE: ICD-10-CM

## 2017-04-17 DIAGNOSIS — C53.9 MALIGNANT NEOPLASM OF CERVIX, UNSPECIFIED SITE (H): ICD-10-CM

## 2017-04-17 DIAGNOSIS — C53.1 MALIGNANT NEOPLASM OF EXOCERVIX (H): ICD-10-CM

## 2017-04-17 DIAGNOSIS — R10.30 LOWER ABDOMINAL PAIN: ICD-10-CM

## 2017-04-17 DIAGNOSIS — F19.10 POLYSUBSTANCE ABUSE (H): ICD-10-CM

## 2017-04-17 DIAGNOSIS — R10.84 ABDOMINAL PAIN, GENERALIZED: ICD-10-CM

## 2017-04-17 DIAGNOSIS — Z79.899 ENCOUNTER FOR LONG-TERM CURRENT USE OF MEDICATION: ICD-10-CM

## 2017-04-17 DIAGNOSIS — K56.609 SBO (SMALL BOWEL OBSTRUCTION) (H): ICD-10-CM

## 2017-04-17 LAB
ALBUMIN SERPL-MCNC: 2.5 G/DL (ref 3.4–5)
ALP SERPL-CCNC: 92 U/L (ref 40–150)
ALT SERPL W P-5'-P-CCNC: 15 U/L (ref 0–50)
ANION GAP SERPL CALCULATED.3IONS-SCNC: 6 MMOL/L (ref 3–14)
AST SERPL W P-5'-P-CCNC: 10 U/L (ref 0–45)
BASOPHILS # BLD AUTO: 0 10E9/L (ref 0–0.2)
BASOPHILS NFR BLD AUTO: 0.4 %
BILIRUB SERPL-MCNC: 0.3 MG/DL (ref 0.2–1.3)
BUN SERPL-MCNC: 15 MG/DL (ref 7–30)
CALCIUM SERPL-MCNC: 7.6 MG/DL (ref 8.5–10.1)
CHLORIDE SERPL-SCNC: 108 MMOL/L (ref 94–109)
CO2 SERPL-SCNC: 27 MMOL/L (ref 20–32)
CREAT SERPL-MCNC: 0.87 MG/DL (ref 0.52–1.04)
DIFFERENTIAL METHOD BLD: ABNORMAL
EOSINOPHIL # BLD AUTO: 0.1 10E9/L (ref 0–0.7)
EOSINOPHIL NFR BLD AUTO: 1.2 %
ERYTHROCYTE [DISTWIDTH] IN BLOOD BY AUTOMATED COUNT: 14.1 % (ref 10–15)
ERYTHROCYTE [SEDIMENTATION RATE] IN BLOOD BY WESTERGREN METHOD: 6 MM/H (ref 0–20)
GFR SERPL CREATININE-BSD FRML MDRD: 71 ML/MIN/1.7M2
GLUCOSE SERPL-MCNC: 74 MG/DL (ref 70–99)
HCT VFR BLD AUTO: 39.9 % (ref 35–47)
HGB BLD-MCNC: 12.9 G/DL (ref 11.7–15.7)
IMM GRANULOCYTES # BLD: 0.1 10E9/L (ref 0–0.4)
IMM GRANULOCYTES NFR BLD: 0.8 %
LIPASE SERPL-CCNC: 187 U/L (ref 73–393)
LYMPHOCYTES # BLD AUTO: 2.2 10E9/L (ref 0.8–5.3)
LYMPHOCYTES NFR BLD AUTO: 23.5 %
MCH RBC QN AUTO: 32.9 PG (ref 26.5–33)
MCHC RBC AUTO-ENTMCNC: 32.3 G/DL (ref 31.5–36.5)
MCV RBC AUTO: 102 FL (ref 78–100)
MONOCYTES # BLD AUTO: 0.5 10E9/L (ref 0–1.3)
MONOCYTES NFR BLD AUTO: 5 %
NEUTROPHILS # BLD AUTO: 6.3 10E9/L (ref 1.6–8.3)
NEUTROPHILS NFR BLD AUTO: 69.1 %
NRBC # BLD AUTO: 0 10*3/UL
NRBC BLD AUTO-RTO: 0 /100
PLATELET # BLD AUTO: 345 10E9/L (ref 150–450)
POTASSIUM SERPL-SCNC: 3.9 MMOL/L (ref 3.4–5.3)
PREALB SERPL IA-MCNC: 18 MG/DL (ref 15–45)
PROT SERPL-MCNC: 5.6 G/DL (ref 6.8–8.8)
RBC # BLD AUTO: 3.92 10E12/L (ref 3.8–5.2)
SODIUM SERPL-SCNC: 140 MMOL/L (ref 133–144)
TSH SERPL DL<=0.005 MIU/L-ACNC: 1.07 MU/L (ref 0.4–4)
VIT B12 SERPL-MCNC: 983 PG/ML (ref 193–986)
WBC # BLD AUTO: 9.1 10E9/L (ref 4–11)

## 2017-04-17 RX ORDER — ONDANSETRON 8 MG/1
4 TABLET, ORALLY DISINTEGRATING ORAL EVERY 8 HOURS PRN
Qty: 60 TABLET | Status: CANCELLED | OUTPATIENT
Start: 2017-04-17

## 2017-04-17 RX ORDER — HYOSCYAMINE SULFATE 0.125 MG
0.125-0.25 TABLET ORAL EVERY 4 HOURS PRN
Qty: 40 TABLET | Refills: 1 | Status: SHIPPED | OUTPATIENT
Start: 2017-04-17 | End: 2017-07-17

## 2017-04-17 RX ORDER — ONDANSETRON 4 MG/1
4-8 TABLET, ORALLY DISINTEGRATING ORAL EVERY 8 HOURS PRN
Qty: 30 TABLET | Refills: 0 | Status: SHIPPED | OUTPATIENT
Start: 2017-04-17 | End: 2017-04-28

## 2017-04-17 RX ORDER — ONDANSETRON 4 MG/1
4-8 TABLET, FILM COATED ORAL EVERY 8 HOURS PRN
Qty: 30 TABLET | Refills: 0 | Status: ON HOLD | OUTPATIENT
Start: 2017-04-17 | End: 2017-05-01

## 2017-04-17 RX ORDER — HYOSCYAMINE SULFATE 0.12 MG/5ML
LIQUID ORAL
Status: CANCELLED | OUTPATIENT
Start: 2017-04-17

## 2017-04-17 ASSESSMENT — PAIN SCALES - GENERAL: PAINLEVEL: NO PAIN (0)

## 2017-04-17 NOTE — NURSING NOTE
Chief Complaint   Patient presents with     Establish Care     Here to establish care for new PCP     Nausea     Here for pain, nausea, and vomitting. Patient was seen at hospital. Patient states not getting better. Patient is always nausea, fatigue, and patient has loss weight.      Zeb Hollins CMA at 11:14 AM on 4/17/2017

## 2017-04-17 NOTE — PATIENT INSTRUCTIONS
[facility] Medication Refill Request Information:  * Please contact your pharmacy regarding ANY request for medication refills.  ** [facility] Prescription Fax = [phone]  * Please allow 3 business days for routine medication refills.  * Please allow 5 business days for controlled substance medication refills.     [facility] Test Result notification information:  *You will be notified with in 7-10 days of your appointment day regarding the results of your test.  If you are on MyChart you will be notified as soon as the provider has reviewed the results and signed off on them.    [facility] [phone]

## 2017-04-17 NOTE — MR AVS SNAPSHOT
After Visit Summary   4/17/2017    Rachel A Gerhardt    MRN: [id number]           Patient Information     Date Of Birth          1975        Visit Information        Provider Department      4/17/2017 11:00 AM Jennifer Garza MD [facility]        Today's Diagnoses     Abdominal pain, generalized    -  1    Non-intractable vomiting with nausea, unspecified vomiting type          Care Instructions    Primary Care Center Medication Refill Request Information:  * Please contact your pharmacy regarding ANY request for medication refills.  ** [facility] Prescription Fax = [phone]  * Please allow 3 business days for routine medication refills.  * Please allow 5 business days for controlled substance medication refills.     Primary Care Center Test Result notification information:  *You will be notified with in 7-10 days of your appointment day regarding the results of your test.  If you are on MyChart you will be notified as soon as the provider has reviewed the results and signed off on them.    [facility] [phone]           Follow-ups after your visit        Additional Services     GASTROENTEROLOGY ADULT REFERRAL       Preferred Location: [facility] [phone]      Please be aware that coverage of these services is subject to the terms and limitations of your health insurance plan.  Call member services at your health plan with any benefit or coverage questions.  Any procedures must be performed at a [facility] facility OR coordinated by your clinic's referral office.    Please bring the following with you to your appointment:    (1) Any X-Rays, CTs or MRIs which have been performed.  Contact the facility where they were done to arrange for  prior to your scheduled appointment.    (2) List of current medications   (3) This referral request   (4) Any documents/labs given to you for this referral                  Follow-up notes from your care team      Return in about 2 weeks (around 5/1/2017) for Routine Visit, 60 min if possible.      Your next 10 appointments already scheduled     Apr 17, 2017  1:15 PM CDT   LAB with [facility] LAB   [facility] ([facility])    [address] [place] [id number] [phone]           Patient must bring picture ID.  Patient should be prepared to give a urine specimen  Please do not eat 10-12 hours before your appointment if you are coming in fasting for labs on lipids, cholesterol, or glucose (sugar).  Pregnant women should follow their Care Team instructions. Water with medications is okay. Do not drink coffee or other fluids.   If you have concerns about taking  your medications, please ask at office or if scheduling via [employer], send a message by clicking on Secure Messaging, Message Your Care Team.            May 08, 2017  9:00 AM CDT   (Arrive by 8:45 AM)   Return Visit with Jennifer Garza MD   [facility] ([facility])    [address] [place] [id number] [phone]              Future tests that were ordered for you today     Open Future Orders        Priority Expected Expires Ordered    TSH with free T4 reflex Routine 4/17/2017 5/1/2017 4/17/2017    Vitamin D Deficiency Routine 4/17/2017 4/17/2018 4/17/2017    Vitamin B12 Routine 4/17/2017 4/17/2018 4/17/2017    Prealbumin Routine 4/17/2017 4/17/2018 4/17/2017    Comprehensive metabolic panel Routine 4/17/2017 5/1/2017 4/17/2017    CBC with platelets differential Routine 4/17/2017 5/1/2017 4/17/2017    Erythrocyte sedimentation rate Routine 4/17/2017 4/17/2018 4/17/2017    Lipase Routine 4/17/2017 5/1/2017 4/17/2017            Who to contact     Please call your clinic at [phone] to:    Ask questions about your health    Make or cancel appointments    Discuss your medicines    Learn about your test results    Speak to your doctor   If you have  "compliments or concerns about an experience at your clinic, or if you wish to file a complaint, please contact [facility] Patient Relations at [phone] or email us at [email or web address]         Additional Information About Your Visit        MyChart Information     [employer] gives you secure access to your electronic health record. If you see a primary care provider, you can also send messages to your care team and make appointments. If you have questions, please call your primary care clinic.  If you do not have a primary care provider, please call [phone] and they will assist you.      [employer] is an electronic gateway that provides easy, online access to your medical records. With [employer], you can request a clinic appointment, read your test results, renew a prescription or communicate with your care team.     To access your existing account, please contact your [facility] or call [phone] for assistance.        Care EveryWhere ID     This is your Care EveryWhere ID. This could be used by other organizations to access your [place] medical records  [id number]        Your Vitals Were     Pulse Temperature Respirations Height Pulse Oximetry Breastfeeding?    101 97.9  F (36.6  C) (Oral) 18 1.727 m (5' 8\") 98% No    BMI (Body Mass Index)                   23.11 kg/m2            Blood Pressure from Last 3 Encounters:   04/17/17 105/75   04/03/17 93/59   12/29/16 122/75    Weight from Last 3 Encounters:   04/17/17 68.9 kg (152 lb)   04/03/17 70.3 kg (155 lb)   12/28/16 78.3 kg (172 lb 9.6 oz)              We Performed the Following     GASTROENTEROLOGY ADULT REFERRAL          Today's Medication Changes          These changes are accurate as of: 4/17/17 12:46 PM.  If you have any questions, ask your nurse or doctor.               Start taking these medicines.        Dose/Directions    hyoscyamine 0.125 MG tablet   Commonly known as:  " ANASPAZ/LEVSIN   Used for:  Abdominal pain, generalized   Started by:  Jennifer Garza MD        Dose:  0.125-0.25 mg   Take 1-2 tablets (125-250 mcg) by mouth every 4 hours as needed for cramping   Quantity:  40 tablet   Refills:  1       ondansetron 4 MG tablet   Commonly known as:  ZOFRAN   Used for:  Non-intractable vomiting with nausea, unspecified vomiting type   Started by:  Jennifer Garza MD        Dose:  4-8 mg   Take 1-2 tablets (4-8 mg) by mouth every 8 hours as needed for nausea (not to exceed 24 mg per day)   Quantity:  30 tablet   Refills:  0         These medicines have changed or have updated prescriptions.        Dose/Directions    * ondansetron 8 MG ODT tab   Commonly known as:  ZOFRAN-ODT   This may have changed:  Another medication with the same name was added. Make sure you understand how and when to take each.   Used for:  Nausea   Changed by:  Gavino Novak MD        Dose:  8 mg   Take 1 tablet (8 mg) by mouth every 8 hours as needed for nausea   Quantity:  30 tablet   Refills:  0       * ondansetron 4 MG ODT tab   Commonly known as:  ZOFRAN-ODT   This may have changed:  You were already taking a medication with the same name, and this prescription was added. Make sure you understand how and when to take each.   Used for:  Non-intractable vomiting with nausea, unspecified vomiting type   Changed by:  Jennifer Garza MD        Dose:  4-8 mg   Take 1-2 tablets (4-8 mg) by mouth every 8 hours as needed for nausea (not to exceed 24 mg per day)   Quantity:  30 tablet   Refills:  0       * Notice:  This list has 2 medication(s) that are the same as other medications prescribed for you. Read the directions carefully, and ask your doctor or other care provider to review them with you.      Stop taking these medicines if you haven't already. Please contact your care team if you have questions.     acetaminophen 500 MG tablet   Commonly known as:  TYLENOL   Stopped by:  Greg  Jennifer Lincoln MD           BENTYL PO   Stopped by:  Jennifer Garza MD           celecoxib 50 MG capsule   Commonly known as:  celeBREX   Stopped by:  Jennifer Garza MD           HYDROcodone-acetaminophen 5-325 MG per tablet   Commonly known as:  NORCO   Stopped by:  Jennifer Garza MD           polyethylene glycol Packet   Commonly known as:  MIRALAX/GLYCOLAX   Stopped by:  Jennifer Garza MD           senna-docusate 8.6-50 MG per tablet   Commonly known as:  SENOKOT-S;PERICOLACE   Stopped by:  Jennifer Garza MD           simethicone 80 MG chewable tablet   Commonly known as:  MYLICON   Stopped by:  Jennifer Garza MD                Where to get your medicines      These medications were sent to [facility] [address] [place] [address] [address] [place] AT [place] [address] [address], [facility] [address]     Phone:  [phone]     hyoscyamine 0.125 MG tablet    ondansetron 4 MG ODT tab    ondansetron 4 MG tablet                Primary Care Provider Office Phone # Fax #    Jennifer Garza [phone]       [address] [place] [id number]        Thank you!     Thank you for choosing [facility]  for your care. Our goal is always to provide you with excellent care. Hearing back from our patients is one way we can continue to improve our services. Please take a few minutes to complete the written survey that you may receive in the mail after your visit with us. Thank you!             Your Updated Medication List - Protect others around you: Learn how to safely use, store and throw away your medicines at www.disposemymeds.org.          This list is accurate as of: 4/17/17 12:46 PM.  Always use your most recent med list.                   Brand Name Dispense Instructions for use    albuterol 108 (90 BASE) MCG/ACT Inhaler    PROAIR HFA/PROVENTIL HFA/VENTOLIN HFA     Inhale 2 puffs into the  lungs every 6 hours as needed.       hyoscyamine 0.125 MG tablet    ANASPAZ/LEVSIN    40 tablet    Take 1-2 tablets (125-250 mcg) by mouth every 4 hours as needed for cramping       ondansetron 4 MG tablet    ZOFRAN    30 tablet    Take 1-2 tablets (4-8 mg) by mouth every 8 hours as needed for nausea (not to exceed 24 mg per day)       * ondansetron 8 MG ODT tab    ZOFRAN-ODT    30 tablet    Take 1 tablet (8 mg) by mouth every 8 hours as needed for nausea       * ondansetron 4 MG ODT tab    ZOFRAN-ODT    30 tablet    Take 1-2 tablets (4-8 mg) by mouth every 8 hours as needed for nausea (not to exceed 24 mg per day)       * Notice:  This list has 2 medication(s) that are the same as other medications prescribed for you. Read the directions carefully, and ask your doctor or other care provider to review them with you.

## 2017-04-17 NOTE — PROGRESS NOTES
Ms. Gerhardt is a 41 year old female here to establish care, discuss chronic abdominal pain and weight loss.    History of Present Illness:  Jenni scheduled this appointment today at the urging of her mother to get an evaluation for chronic abdominal pain, weight loss, nausea and vomiting.  In brief, she was diagnosed with stage IIB cervical cancer fall 2015 with extension to pelvic nodes, possibly adnexa and upper vagina, treated with chemo radiation.  States she did okay after cancer treatment, but then developed recurrence of pain fall 2016.  PET showed no clear signs of cancer.  She had persistent pain over the winter and 2 hospitalizations winter 2016 for ileus/SBO.  Left AMA both times.  Currently, she states she had ongoing intermittent issues with abdominal pain.  Has a weeks of no symptoms, then flares up several times per month.  Lost 50 lbs in last year.  Even drinking water hurts her, has bloating and distention, feels better with emesis.  Has diarrhea daily, sometimes liquid with urgency, sometimes soft, mostly in AMs.  Vomiting starts 3 hours after ingestion.  Has nausea, pain with immediate eating.  Has a lot of gurgling.    ROS notable for no fevers, no night sweats, but weakness  No history of abd surgery.  Spends a lot of time in bed.    She wants to know about referrals today, as well as anti-emetics and pain medications.    A full 10-pt Review of Systems was performed, verified and is negative except as documented in the HPI.  All health questionnaires were reviewed, verified and relevant information documented above.    Past Medical History:  Past Medical History:   Diagnosis Date     Asthma      Cancer (H)     Per patient OBGYN, cerivical cancer     Cervical cancer (H)      Other chronic pain      Ovarian cancer (H)      Substance abuse     Outside records indicate past history of narcotics abuse or dependence, but patient denies.       Past Surgical History:  Past Surgical History:    Procedure Laterality Date     ENT SURGERY  2009    mastoid, sinus     EXAM UNDER ANESTHESIA, INSERT ALEX SLEEVE, UTERINE PLACEMENT OF TANDEM AND RING FOR RAD, ULTRASOUND N/A 12/14/2015    Procedure: EXAM UNDER ANESTHESIA, INSERT ALEX SLEEVE, UTERINE PLACEMENT OF TANDEM AND RING FOR RADIATION, ULTRASOUND GUIDED;  Surgeon: Abby Tony MD;  Location: [facility] OR     INSERT TANDEM AND CESIUM APPLICATOR CERVIX, ULTRASOUND GUIDED N/A 12/17/2015    Procedure: INSERT TANDEM AND CESIUM APPLICATOR CERVIX, ULTRASOUND GUIDED;  Surgeon: Kika Wood MD;  Location: [facility] OR     KNEE SURGERY         Active Meds:  Current Outpatient Prescriptions   Medication     hyoscyamine (ANASPAZ/LEVSIN) 0.125 MG tablet     ondansetron (ZOFRAN-ODT) 4 MG ODT tab     ondansetron (ZOFRAN) 4 MG tablet     ondansetron (ZOFRAN-ODT) 8 MG disintegrating tablet     albuterol (PROVENTIL HFA: VENTOLIN HFA) 108 (90 BASE) MCG/ACT inhaler     No current facility-administered medications for this visit.      Facility-Administered Medications Ordered in Other Visits   Medication     lactated ringers infusion     ondansetron (ZOFRAN) injection        Allergies:  No clinical screening - see comments; Sulfa drugs; Amoxicillin; Amoxicillin-pot clavulanate; Augmentin; Avelox [moxifloxacin]; Ciprofloxacin; Codeine; Ibuprofen; Ibuprofen sodium; Oxycodone-acetaminophen; Quinolones; and Tramadol    Family History:  family history includes DIABETES in her mother. There is no history of Ovarian Cancer, Uterine Cancer, Cervical Cancer, or Breast Cancer.    Social History:  Social History   Substance Use Topics     Smoking status: Light Tobacco Smoker     Packs/day: 0.25     Years: 12.00     Types: Cigarettes     Smokeless tobacco: Never Used      Comment: 2/22/16 pt smoking daily 1 cig     Alcohol use No      Comment: None per pt       Physical Exam:  Vitals: /75 (BP Location: Right arm, Patient Position: Chair, Cuff Size: Adult Regular)  Pulse 101  Temp  "97.9  F (36.6  C) (Oral)  Resp 18  Ht 1.727 m (5' 8\")  Wt 68.9 kg (152 lb)  SpO2 98%  Breastfeeding? No  BMI 23.11 kg/m2  Constitutional: Alert, oriented, pleasant, no acute distress, sitting comfortably  Head: Normocephalic, atraumatic  Eyes: Extra-ocular movements intact, pupils equally round and reactive bilaterally, no scleral icterus  ENT: Oropharynx clear, moist mucus membranes  Neck: Supple, no lymphadenopathy  Cardiovascular: Regular rate and rhythm, no murmurs, rubs or gallops, peripheral pulses full/symmetric  Respiratory: Good air movement bilaterally, lungs clear, no wheezes/rales/rhonchi  GI: Abdomen soft, scaphoid, bowel sounds normo-hyperactive, nondistended, mildly tender in all quadrants, no organomegaly or masses, no rebound/guarding  Musculoskeletal: No edema, normal muscle tone, normal gait  Neurologic: Alert and oriented, nonfocal      Recent Labs:  Lab Results   Component Value Date     04/17/2017      Lab Results   Component Value Date    POTASSIUM 3.9 04/17/2017     Lab Results   Component Value Date    CHLORIDE 108 04/17/2017     Lab Results   Component Value Date    JONATAN 7.6 04/17/2017     Lab Results   Component Value Date    CO2 27 04/17/2017     Lab Results   Component Value Date    BUN 15 04/17/2017     Lab Results   Component Value Date    CR 0.87 04/17/2017     Lab Results   Component Value Date    GLC 74 04/17/2017     Lab Results   Component Value Date    AST 10 04/17/2017     Lab Results   Component Value Date    ALT 15 04/17/2017     Lab Results   Component Value Date    BILICONJ 0.0 09/10/2013      Lab Results   Component Value Date    BILITOTAL 0.3 04/17/2017     Lab Results   Component Value Date    ALBUMIN 2.5 04/17/2017     Lab Results   Component Value Date    PROTTOTAL 5.6 04/17/2017      Lab Results   Component Value Date    ALKPHOS 92 04/17/2017       Lab Results   Component Value Date    WBC 9.1 04/17/2017     Lab Results   Component Value Date    HGB 12.9 " 04/17/2017     Lab Results   Component Value Date    HCT 39.9 04/17/2017     Lab Results   Component Value Date     04/17/2017     Lab Results   Component Value Date     04/17/2017         Assessment and Plan:    Jenni was seen today for establish care and nausea.  We had a detailed conversation about the potential diagnoses and evaluation.  Given her history of radiation therapy and associated CT findings c/w recurrent ileus/SBO, I think there is a good likelihood her symptoms do represent ongoing obstruction/motility issues 2/2 adhesions.  Other possibilities include radiation colitis, recurrent malignancy, delayed gastric emptying, malabsorption 2/2 chronic pancreatitis.  I think she needs labs for malabsorption or other clues, possible EGD/colonoscopy, GI referral and to reestablish care with Gyn Onc for ongoing surveillance since she has missed her appointments.  Perhaps she will need laparoscopy.  I would like to discuss her case with GI.  However, it does seem like she made an appt for [facility] rather than [facility].  We also had a conversation about symptoms and pain control.  I explained I do not prescribe controlled substances on initial visits and that I would need a better understanding of symptoms and would not recommend narcotics in the face of bowel/motility issues.  She may benefit from seeing Pain Clinic or other subs treatment pending her evaluation.      Diagnoses and all orders for this visit:    Abdominal pain, generalized  -     TSH with free T4 reflex; Future  -     GASTROENTEROLOGY ADULT REFERRAL  -     Vitamin D Deficiency; Future  -     Vitamin B12; Future  -     Prealbumin; Future  -     Comprehensive metabolic panel; Future  -     CBC with platelets differential; Future  -     Erythrocyte sedimentation rate; Future  -     Lipase; Future  -     hyoscyamine (ANASPAZ/LEVSIN) 0.125 MG tablet; Take 1-2 tablets (125-250 mcg) by mouth every 4 hours as needed for  cramping    Non-intractable vomiting with nausea, unspecified vomiting type  -     ondansetron (ZOFRAN-ODT) 4 MG ODT tab; Take 1-2 tablets (4-8 mg) by mouth every 8 hours as needed for nausea (not to exceed 24 mg per day)  -     ondansetron (ZOFRAN) 4 MG tablet; Take 1-2 tablets (4-8 mg) by mouth every 8 hours as needed for nausea (not to exceed 24 mg per day)    Malignant neoplasm of cervix, unspecified site (H)    SBO (small bowel obstruction) (H)        #Routine Health Maintenence: **Not discussed today  Immunizations (zoster, pneumovax, flu, Tdap, Hep A/B):   Most Recent Immunizations   Administered Date(s) Administered     Influenza (IIV3) 10/08/2015     Tdap (Adacel,Boostrix) 01/01/2008     Lipids: No results for input(s): CHOL, HDL, LDL, TRIG, CHOLHDLRATIO in the last 23183 hours.  PSA (50-75 yrs): No results found for: PSA   AAA Screening (65-75 yrs):  Lung Ca Screening (>30 pk age 55-79 or >20 py age 50-79 + RF):  Colonoscopy (50-75 yrs):  Dexa (>65W or 70M yrs):  Mammogram (40-75 yrs):  Pap (21-65 yrs):  Pelvic/Breast:  GC/Chlam (<25 yrs):  HIV/HCV if risk factors:  Safety/Lifestyle:  Tob/EtOH:  Depression:  Advanced Directive:    Return to clinic: 2-3 weeks    Jennifer Garza MD  Internal Medicine    30 min spent face to face, of which >50% time spent on counseling/coordinating care exclusive of any procedure time

## 2017-04-18 LAB — DEPRECATED CALCIDIOL+CALCIFEROL SERPL-MC: 8 UG/L (ref 20–75)

## 2017-04-19 ENCOUNTER — TELEPHONE (OUTPATIENT)
Dept: INTERNAL MEDICINE | Facility: CLINIC | Age: 42
End: 2017-04-19

## 2017-04-19 DIAGNOSIS — E55.9 VITAMIN D DEFICIENCY: Primary | ICD-10-CM

## 2017-04-19 RX ORDER — ERGOCALCIFEROL 1.25 MG/1
50000 CAPSULE, LIQUID FILLED ORAL WEEKLY
Qty: 12 CAPSULE | Refills: 0 | Status: SHIPPED | OUTPATIENT
Start: 2017-04-19 | End: 2017-07-11

## 2017-04-19 NOTE — TELEPHONE ENCOUNTER
Please call patient (recently seen for abdominal pain/weight loss):    Her labs showed low protein levels and very low vitamin D levels.  There were no other alarming findings.  I would recommend we start high dose vitamin D supplementation (will send to pharm).    Additionally, I wanted to check about her plan for GI follow up.  Is she going to see [facility]?  If so, can she follow up with me or let me know what they recommend so I can help direct her care as needed?    Thanks,  Jennifer Garza MD  Internal Medicine    Left message for pt to call back.  Deanna Degroot RN 3:36 PM on 4/19/2017.

## 2017-04-21 NOTE — TELEPHONE ENCOUNTER
Spoke with pt, informed the result and recommendations. She has an appt with KHADIJAH RAMOS on 4/24 and will f/u with Dr. Jiménez on 5/8.

## 2017-04-24 ENCOUNTER — TRANSFERRED RECORDS (OUTPATIENT)
Dept: HEALTH INFORMATION MANAGEMENT | Facility: CLINIC | Age: 42
End: 2017-04-24

## 2017-04-24 ENCOUNTER — RECORDS - HEALTHEAST (OUTPATIENT)
Dept: ADMINISTRATIVE | Facility: OTHER | Age: 42
End: 2017-04-24

## 2017-04-25 ENCOUNTER — TELEPHONE (OUTPATIENT)
Dept: INTERNAL MEDICINE | Facility: CLINIC | Age: 42
End: 2017-04-25

## 2017-04-25 DIAGNOSIS — K59.00 CONSTIPATION, UNSPECIFIED CONSTIPATION TYPE: Primary | ICD-10-CM

## 2017-04-25 NOTE — LETTER
[facility]  [address]  [address] [place] [address]          April 25, 2017    RE:  Rachel A Gerhardt                                                                                                                                                       [address] [address] [address]            To whom it may concern:    Rachel A Gerhardt is under my professional care  She is needs an extension on a court appearance. She needs to seek treatment for various medical issues.         Sincerely,        Deanna Garza M.D.

## 2017-04-25 NOTE — TELEPHONE ENCOUNTER
Patient is requesting a stooler softner and also needs a letter for court duty stating she is too sick to attend court today and would like to get an extension to not attend court until she is better.      Pt states she is usually constipated 4-5 days a month-requesting stool softner during this time. Needs extension for court appearance via letter.  Letter written. RX request sent to Dr Garza.  Deanna Degroot RN 9:10 AM on 4/25/2017.

## 2017-04-26 ENCOUNTER — HOSPITAL ENCOUNTER (OUTPATIENT)
Dept: MRI IMAGING | Facility: CLINIC | Age: 42
Discharge: HOME | End: 2017-04-26
Attending: INTERNAL MEDICINE

## 2017-04-26 DIAGNOSIS — R19.7 DIARRHEA: ICD-10-CM

## 2017-04-26 DIAGNOSIS — R10.9 ABDOMINAL PAIN: ICD-10-CM

## 2017-04-26 DIAGNOSIS — R11.2 NAUSEA & VOMITING: ICD-10-CM

## 2017-04-26 RX ORDER — AMOXICILLIN 250 MG
1 CAPSULE ORAL 2 TIMES DAILY
Qty: 60 TABLET | Refills: 1 | Status: ON HOLD | OUTPATIENT
Start: 2017-04-26 | End: 2017-05-01

## 2017-04-28 ENCOUNTER — HOSPITAL (OUTPATIENT)
Dept: SURGERY | Facility: CLINIC | Age: 42
End: 2017-04-28

## 2017-04-28 ENCOUNTER — OFFICE VISIT (OUTPATIENT)
Dept: SURGERY | Facility: CLINIC | Age: 42
End: 2017-04-28

## 2017-04-28 ENCOUNTER — TELEPHONE (OUTPATIENT)
Dept: INTERNAL MEDICINE | Facility: CLINIC | Age: 42
End: 2017-04-28

## 2017-04-28 VITALS
SYSTOLIC BLOOD PRESSURE: 108 MMHG | HEART RATE: 64 BPM | OXYGEN SATURATION: 97 % | TEMPERATURE: 98 F | WEIGHT: 150.8 LBS | BODY MASS INDEX: 22.85 KG/M2 | HEIGHT: 68 IN | DIASTOLIC BLOOD PRESSURE: 79 MMHG

## 2017-04-28 DIAGNOSIS — K56.609 SMALL BOWEL OBSTRUCTION (H): Primary | ICD-10-CM

## 2017-04-28 DIAGNOSIS — R11.0 NAUSEA: ICD-10-CM

## 2017-04-28 DIAGNOSIS — K56.609 SMALL BOWEL OBSTRUCTION (H): ICD-10-CM

## 2017-04-28 LAB
ALBUMIN SERPL-MCNC: 2.9 G/DL (ref 3.4–5)
ALP SERPL-CCNC: 102 U/L (ref 40–150)
ALT SERPL W P-5'-P-CCNC: 27 U/L (ref 0–50)
ANION GAP SERPL CALCULATED.3IONS-SCNC: 6 MMOL/L (ref 3–14)
AST SERPL W P-5'-P-CCNC: 19 U/L (ref 0–45)
BILIRUB SERPL-MCNC: 0.4 MG/DL (ref 0.2–1.3)
BUN SERPL-MCNC: 13 MG/DL (ref 7–30)
CALCIUM SERPL-MCNC: 8 MG/DL (ref 8.5–10.1)
CHLORIDE SERPL-SCNC: 105 MMOL/L (ref 94–109)
CO2 SERPL-SCNC: 27 MMOL/L (ref 20–32)
CREAT SERPL-MCNC: 0.68 MG/DL (ref 0.52–1.04)
ERYTHROCYTE [DISTWIDTH] IN BLOOD BY AUTOMATED COUNT: 13.4 % (ref 10–15)
GFR SERPL CREATININE-BSD FRML MDRD: ABNORMAL ML/MIN/1.7M2
GLUCOSE SERPL-MCNC: 94 MG/DL (ref 70–99)
HCT VFR BLD AUTO: 40.3 % (ref 35–47)
HGB BLD-MCNC: 13.3 G/DL (ref 11.7–15.7)
MAGNESIUM SERPL-MCNC: 1.6 MG/DL (ref 1.6–2.3)
MCH RBC QN AUTO: 33.2 PG (ref 26.5–33)
MCHC RBC AUTO-ENTMCNC: 33 G/DL (ref 31.5–36.5)
MCV RBC AUTO: 101 FL (ref 78–100)
PHOSPHATE SERPL-MCNC: 3.2 MG/DL (ref 2.5–4.5)
PLATELET # BLD AUTO: 363 10E9/L (ref 150–450)
POTASSIUM SERPL-SCNC: 3.9 MMOL/L (ref 3.4–5.3)
PROT SERPL-MCNC: 5.9 G/DL (ref 6.8–8.8)
RBC # BLD AUTO: 4.01 10E12/L (ref 3.8–5.2)
SODIUM SERPL-SCNC: 139 MMOL/L (ref 133–144)
WBC # BLD AUTO: 6.4 10E9/L (ref 4–11)

## 2017-04-28 RX ORDER — CALCIUM CARBONATE 500 MG/1
500 TABLET, CHEWABLE ORAL
Status: ON HOLD | COMMUNITY
End: 2018-03-29

## 2017-04-28 RX ORDER — SIMETHICONE 80 MG
80 TABLET,CHEWABLE ORAL
COMMUNITY
End: 2018-02-13

## 2017-04-28 RX ORDER — ONDANSETRON 8 MG/1
8 TABLET, ORALLY DISINTEGRATING ORAL EVERY 8 HOURS PRN
Qty: 30 TABLET | Refills: 0 | Status: ON HOLD | OUTPATIENT
Start: 2017-04-28 | End: 2017-05-01

## 2017-04-28 ASSESSMENT — PAIN SCALES - GENERAL: PAINLEVEL: SEVERE PAIN (7)

## 2017-04-28 NOTE — MR AVS SNAPSHOT
After Visit Summary   4/28/2017    Rachel A Gerhardt    MRN: [id number]           Patient Information     Date Of Birth          1975        Visit Information        Provider Department      4/28/2017 4:00 PM Jennifer Goodwin MD [facility] Colon and Rectal Surgery        Today's Diagnoses     Small bowel obstruction (H)    -  1    Nausea           Follow-ups after your visit        Your next 10 appointments already scheduled     May 08, 2017  9:00 AM CDT   (Arrive by 8:45 AM)   Return Visit with Jennifer Garza MD   [facility] ([facility])    [address] [place] [id number] [phone]              Future tests that were ordered for you today     Open Standing Orders        Priority Remaining Interval Expires Ordered    XR Chest 1 View STAT 2/2 CONDITIONAL X 2  4/29/2017    XR Chest Port 1 View STAT 2/2 CONDITIONAL X 2  4/29/2017    IR PICC Vascular STAT 1/1 CONDITIONAL X 1 STAT  4/29/2017    Potassium Routine 100/100 CONDITIONAL (SPECIFY)  4/29/2017    Magnesium Routine 100/100 CONDITIONAL (SPECIFY)  4/29/2017    Retention sutures Routine [id number] PRN  4/29/2017    Activity: Up ad dania Routine [id number] PRN  4/29/2017            Who to contact     Please call your clinic at [phone] to:    Ask questions about your health    Make or cancel appointments    Discuss your medicines    Learn about your test results    Speak to your doctor   If you have compliments or concerns about an experience at your clinic, or if you wish to file a complaint, please contact [facility] Patient Relations at [phone] or email us at [email or web address]         Additional Information About Your Visit        MyChart Information     [employer] gives you secure access to your electronic health record. If you see a primary care provider, you can also send messages to your care team and make  "appointments. If you have questions, please call your primary care clinic.  If you do not have a primary care provider, please call [phone] and they will assist you.      [employer] is an electronic gateway that provides easy, online access to your medical records. With [employer], you can request a clinic appointment, read your test results, renew a prescription or communicate with your care team.     To access your existing account, please contact your [facility] or call [phone] for assistance.        Care EveryWhere ID     This is your Care EveryWhere ID. This could be used by other organizations to access your [place] medical records  [id number]        Your Vitals Were     Pulse Temperature Height Pulse Oximetry BMI (Body Mass Index)       64 98  F (36.7  C) (Oral) 5' 8\" 97% 22.93 kg/m2        Blood Pressure from Last 3 Encounters:   04/28/17 108/79   04/17/17 105/75   04/03/17 93/59    Weight from Last 3 Encounters:   04/28/17 150 lb 12.8 oz   04/17/17 152 lb   04/03/17 155 lb                 Where to get your medicines      These medications were sent to [facility] [address] [place] [address] [address] [place] [facility] [place] [address] [facility] [address]     Phone:  [phone]     ondansetron 8 MG ODT tab          Primary Care Provider Office Phone # Fax #    Jennifer Garza [phone]       [address] [address]        Thank you!     Thank you for choosing [facility] COLON AND RECTAL SURGERY  for your care. Our goal is always to provide you with excellent care. Hearing back from our patients is one way we can continue to improve our services. Please take a few minutes to complete the written survey that you may receive in the mail after your visit with us. Thank you!             Your Updated Medication List - Protect others around you: Learn how to safely use, store and throw " away your medicines at www.disposemymeds.org.          This list is accurate as of: 4/28/17 11:59 PM.  Always use your most recent med list.                   Brand Name Dispense Instructions for use    albuterol 108 (90 BASE) MCG/ACT Inhaler    PROAIR HFA/PROVENTIL HFA/VENTOLIN HFA     Inhale 2 puffs into the lungs every 6 hours as needed.       hyoscyamine 0.125 MG tablet    ANASPAZ/LEVSIN    40 tablet    Take 1-2 tablets (125-250 mcg) by mouth every 4 hours as needed for cramping       ondansetron 4 MG tablet    ZOFRAN    30 tablet    Take 1-2 tablets (4-8 mg) by mouth every 8 hours as needed for nausea (not to exceed 24 mg per day)       ondansetron 8 MG ODT tab    ZOFRAN-ODT    30 tablet    Take 1 tablet (8 mg) by mouth every 8 hours as needed for nausea       senna-docusate 8.6-50 MG per tablet    SENOKOT-S;PERICOLACE    60 tablet    Take 1 tablet by mouth 2 times daily       simethicone 80 MG chewable tablet    MYLICON     Take 80 mg by mouth       TUMS 500 MG chewable tablet   Generic drug:  calcium carbonate      Take 500 mg by mouth       vitamin D 81233 UNIT capsule    ERGOCALCIFEROL    12 capsule    Take 1 capsule (50,000 Units) by mouth once a week

## 2017-04-28 NOTE — TELEPHONE ENCOUNTER
"Spoke with physician from [facility], reporting patient was admitted to [facility] this week for MR enterography showing \"high grade distal small bowel obstruction.\"  Apparently, pt instructed to be evaluated by [facility] however apparently insurance didn't cover hospital transfer.  She was discharged and now [facility] wondering if we can facilitate further care.    Can we please call patient to see how she is feeling?  Eating, drinking, Nausea, vomiting?  Abd pain?  BMs? Fevers?  Rec going to ER for evaluation ASAP.    Jennifer Garza MD  Internal Medicine    "

## 2017-04-28 NOTE — NURSING NOTE
"No chief complaint on file.      Vitals:    04/28/17 1616   BP: 108/79   BP Location: Left arm   Patient Position: Chair   Cuff Size: Adult Regular   Pulse: 64   Temp: 98  F (36.7  C)   TempSrc: Oral   SpO2: 97%   Weight: 68.4 kg (150 lb 12.8 oz)   Height: 1.727 m (5' 8\")       Body mass index is 22.93 kg/(m^2).      Marianela CANCHOLA LPN                          "

## 2017-04-28 NOTE — PROGRESS NOTES
"Colon and Rectal Surgery Consult Clinic Note    Chief Complaint:  Bowel obstruction    History of Present Illness:    Jenni is a 41-year-old female I am seeing today with her father at the request of Dr. Jennifer Garza for small bowel obstruction.  She has a history of cervical cancer, stage IIB.  She was treated with radiation here at our institution, completing in 02/2016.  She has never had any abdominal surgery.  She has now had 3 admissions to the hospital for partial bowel obstruction/ileus.  She states that for her cervical cancer she had no abdominal pain, no problems eating, no vomiting.  She has a bowel movement about once every 3 days.  After radiation, 4 months later, she developed significant abdominal pain, but this resolved after a week, then 1 month later in September to 10/2016, she had vomiting, pain and this slowly progressed to 01/2017 when she felt that she would have 1 good day a week and then 6 days of intermittent emesis with severe abdominal pain.  She had also had constant diarrhea.  This went on with repeated admissions to the hospital.  She eventually found herself losing 60 pounds of weight from all of these problems.  Her last \"good days\" were about 4 days after Easter (about 1.5 wks ago).  She recently was admitted on 04/03/2017 but left against medical advice.    She was seen by Dr. Garza in the clinic, who was very concerned about her status.  As the patient was not able to be seen by Gastroenterology immediately, Dr. Garza referred her to [facility] where she saw Dr. Clemons.  Dr. Clemons saw her and ordered an MR enterography.  I do not have notes from Dr. Clemons, but I do have the MRI enterography report from 04/26/2017.  This shows high-grade distal small-bowel obstruction, small bowel measuring 4 cm in diameter, transition to normal caliber decompressed distal ileum and colon in the right lower quadrant, etiology is not obvious.  This was considered to be a new " finding compared to imaging studies that she had 12/01/2016.  The patient was called that day by Dr. Clemons and asked to go immediately to the emergency room at [facility].  Patient was admitted to [facility] 2 days ago on Wednesday.  She was seen by a surgeon who thought she needed surgery, but after some discussion, they decided patient needs to have surgery here at [facility].  An in-patient transfer was not possible given, as I understand it, some insurance problems.  The patient therefore ended up discharging to home and is now here in my clinic for further management of her radiographic high-grade small-bowel obstruction.      As far as what she has been taking in this week, on Monday she had some soup and milk; this stayed down.  On Tuesday (4 days ago), she had soup and had half of a ham sandwich.  On Wednesday she did not feel good at all and she had the MRI scheduled.  She drank the oral contrast.  She got some glucagon for this and felt much better, but then right after the MRI all of the oral contrast was vomited.  On Thursday, the patient was admitted to the hospital where she did have some scramble eggs.  On Friday, today, she was at home and had emesis of all of the scrambled eggs that she ate yesterday, including further emesis of some broccoli that she ate.  She had 1 bowel movement today that was watery tan.  She has been having ongoing problem with diarrhea, no blood in the diarrhea.  She does not feel distended.  Her abdomen does intermittently feel distended.      The patient has never had a colonoscopy.      The patient never has had nasogastric tube during her obstructions, but this was attempted at some point in our hospital and, as they were not able to place a tube and they caused significant trauma, nasogastric drainage was not achieved.  She has never had TPN for her obstructions.      Narcotic use.  The patient is listed in our medical record as having narcotic  "addiction.  She indicates that after her radiation for cervical cancer, she had severe pain and did become addicted to narcotics and required chemical dependency treatment for this and is now off narcotics.  She also reports having some narcotics after knee surgery in 08/2016.      Pain.  She reports constant ongoing pain in her pelvis and then intermittent bloating and cramping which she attributes to bowel obstruction pain.  Her pain is somewhat relieved with a bowel movement.      Urine symptoms.  She has never had hydronephrosis or any urinary problems that she is aware of.  She currently has a lighter-colored urine than previously, which she attributes to the IV fluids she recently had during inpatient admission.        No fevers.     Physical Exam  /79 (BP Location: Left arm, Patient Position: Chair, Cuff Size: Adult Regular)  Pulse 64  Temp 98  F (36.7  C) (Oral)  Ht 5' 8\"  Wt 150 lb 12.8 oz  SpO2 97%  BMI 22.93 kg/m2  Awake alert, moves slowly to bedside, in some distress  Abd: soft, slightly distended, tender mildly to deep palpation, no rebound, no hernias.     IMPRESSION AND PLAN:  Jenni is a 41-year-old female who has clinical signs of at least a partial bowel obstruction and then a recent imaging study 2 days ago showing a high-grade distal small-bowel obstruction.  I recommended she be admitted to the hospital.  Patient and I had a long discussion about what the point of hospital admission right now would be, and after deliberation she feels well enough to go home and be admitted to the hospital tomorrow.  I think this is completely reasonable.  For her high-grade bowel obstruction but normal vital signs, afebrile and looking reasonably well, I do not think she needs emergency surgery.  I do think she would benefit from surgery.  I think the etiology is probably related to radiation, as she has had no prior abdominal surgery.      Urgent procedure is not necessary as her exam is benign, I " think she would benefit from some IV nutrition and I would like her to be admitted to the hospital for PICC line placement and TPN as soon as possible.  We will arrange for this to be scheduled this weekend.  Patient is aware that she can potentially have home TPN, although this would be dependent on insurance coverage.  If she is not able to have home TPN, she may need to be in facility for this.  For now, I think I would plan to do an operation on her in about 2 weeks, and I will think further about this.  She is also aware that for worsening symptoms or any acute problems such as fever, concerning exam, patient may require emergency surgery.  I will order some labs for this evening.         Medical history:  Past Medical History:   Diagnosis Date     Asthma      Cancer (H)     Per patient OBGYN, cerivical cancer     Cervical cancer (H)      Other chronic pain      Ovarian cancer (H)      Substance abuse     Outside records indicate past history of narcotics abuse or dependence, but patient denies.     Surgical history:  Past Surgical History:   Procedure Laterality Date     ENT SURGERY  2009    mastoid, sinus     EXAM UNDER ANESTHESIA, INSERT ALEX SLEEVE, UTERINE PLACEMENT OF TANDEM AND RING FOR RAD, ULTRASOUND N/A 12/14/2015    Procedure: EXAM UNDER ANESTHESIA, INSERT ALEX SLEEVE, UTERINE PLACEMENT OF TANDEM AND RING FOR RADIATION, ULTRASOUND GUIDED;  Surgeon: Abby Tony MD;  Location: [facility] OR     INSERT TANDEM AND CESIUM APPLICATOR CERVIX, ULTRASOUND GUIDED N/A 12/17/2015    Procedure: INSERT TANDEM AND CESIUM APPLICATOR CERVIX, ULTRASOUND GUIDED;  Surgeon: Kika Wood MD;  Location: [facility] OR     KNEE SURGERY       Family history:  Family History   Problem Relation Age of Onset     DIABETES Mother      Ovarian Cancer No family hx of      Uterine Cancer No family hx of      Cervical Cancer No family hx of      Breast Cancer No family hx of      Medications:  Current Outpatient Prescriptions    Medication Sig Dispense Refill     ondansetron (ZOFRAN-ODT) 8 MG ODT tab Take 1 tablet (8 mg) by mouth every 8 hours as needed for nausea 30 tablet 0     calcium carbonate (TUMS) 500 MG chewable tablet Take 500 mg by mouth       simethicone (MYLICON) 80 MG chewable tablet Take 80 mg by mouth       senna-docusate (SENOKOT-S;PERICOLACE) 8.6-50 MG per tablet Take 1 tablet by mouth 2 times daily 60 tablet 1     vitamin D (ERGOCALCIFEROL) 34878 UNIT capsule Take 1 capsule (50,000 Units) by mouth once a week 12 capsule 0     hyoscyamine (ANASPAZ/LEVSIN) 0.125 MG tablet Take 1-2 tablets (125-250 mcg) by mouth every 4 hours as needed for cramping 40 tablet 1     ondansetron (ZOFRAN) 4 MG tablet Take 1-2 tablets (4-8 mg) by mouth every 8 hours as needed for nausea (not to exceed 24 mg per day) 30 tablet 0     albuterol (PROVENTIL HFA: VENTOLIN HFA) 108 (90 BASE) MCG/ACT inhaler Inhale 2 puffs into the lungs every 6 hours as needed.         Allergies:  The patientis allergic to no clinical screening - see comments; sulfa drugs; amoxicillin; amoxicillin-pot clavulanate; augmentin; avelox [moxifloxacin]; ciprofloxacin; codeine; ibuprofen; ibuprofen sodium; oxycodone-acetaminophen; quinolones; tramadol; and daucus carota.  Social history:  Social History   Substance Use Topics     Smoking status: Light Tobacco Smoker     Packs/day: 0.25     Years: 12.00     Types: Cigarettes     Smokeless tobacco: Never Used      Comment: 2/22/16 pt smoking daily 1 cig     Alcohol use No      Comment: None per pt     Marital status: .Review of Systems:    Lab Results   Component Value Date    WBC 6.4 04/28/2017     Lab Results   Component Value Date    RBC 4.01 04/28/2017     Lab Results   Component Value Date    HGB 13.3 04/28/2017     Lab Results   Component Value Date    HCT 40.3 04/28/2017     Lab Results   Component Value Date     04/28/2017     Lab Results   Component Value Date    MCH 33.2 04/28/2017     Lab Results    Component Value Date    MCHC 33.0 04/28/2017     Lab Results   Component Value Date    RDW 13.4 04/28/2017     Lab Results   Component Value Date     04/28/2017   Last Basic Metabolic Panel:  Lab Results   Component Value Date     04/28/2017      Lab Results   Component Value Date    POTASSIUM 3.9 04/28/2017     Lab Results   Component Value Date    CHLORIDE 105 04/28/2017     Lab Results   Component Value Date    JONATAN 8.0 04/28/2017     Lab Results   Component Value Date    CO2 27 04/28/2017     Lab Results   Component Value Date    BUN 13 04/28/2017     Lab Results   Component Value Date    CR 0.68 04/28/2017     Lab Results   Component Value Date    GLC 94 04/28/2017     Liver Function Studies -   Recent Labs   Lab Test  04/28/17   1759   PROTTOTAL  5.9*   ALBUMIN  2.9*   BILITOTAL  0.4   ALKPHOS  102   AST  19   ALT  27       Time spent: 60 min  >50% spent in discussing, counseling and coordinating care    Jennifer Goodwin MD, MPH, FACS  Colon and Rectal Surgery Staff  [facility]      Referring Provider:  Jennifer Garza MD  [address] [place] [id number]     Primary Care Provider:  Jennifer Garza

## 2017-04-28 NOTE — LETTER
"4/28/2017       RE: Rachel A Gerhardt  [address] [facility] [address]     Dear Colleague,    Thank you for referring your patient, Rachel A Gerhardt, to the [facility] COLON AND RECTAL SURGERY at [facility]. Please see a copy of my visit note below.    Colon and Rectal Surgery Consult Clinic Note    Chief Complaint:  Bowel obstruction    History of Present Illness:    Jenni is a 41-year-old female I am seeing today with her father at the request of Dr. Jennifer Garza for small bowel obstruction.  She has a history of cervical cancer, stage IIB.  She was treated with radiation here at our institution, completing in 02/2016.  She has never had any abdominal surgery.  She has now had 3 admissions to the hospital for partial bowel obstruction/ileus.  She states that for her cervical cancer she had no abdominal pain, no problems eating, no vomiting.  She has a bowel movement about once every 3 days.  After radiation, 4 months later, she developed significant abdominal pain, but this resolved after a week, then 1 month later in September to 10/2016, she had vomiting, pain and this slowly progressed to 01/2017 when she felt that she would have 1 good day a week and then 6 days of intermittent emesis with severe abdominal pain.  She had also had constant diarrhea.  This went on with repeated admissions to the hospital.  She eventually found herself losing 60 pounds of weight from all of these problems.  Her last \"good days\" were about 4 days after Easter (about 1.5 wks ago).  She recently was admitted on 04/03/2017 but left against medical advice.    She was seen by Dr. Garza in the clinic, who was very concerned about her status.  As the patient was not able to be seen by Gastroenterology immediately, Dr. Garza referred her to [facility] where she saw Dr. Clemons.  Dr. Clemons saw her and ordered an MR enterography.  I do not have notes from Dr. Clemons, but I " do have the MRI enterography report from 04/26/2017.  This shows high-grade distal small-bowel obstruction, small bowel measuring 4 cm in diameter, transition to normal caliber decompressed distal ileum and colon in the right lower quadrant, etiology is not obvious.  This was considered to be a new finding compared to imaging studies that she had 12/01/2016.  The patient was called that day by Dr. Clemons and asked to go immediately to the emergency room at [facility].  Patient was admitted to [facility] 2 days ago on Wednesday.  She was seen by a surgeon who thought she needed surgery, but after some discussion, they decided patient needs to have surgery here at [facility].  An in-patient transfer was not possible given, as I understand it, some insurance problems.  The patient therefore ended up discharging to home and is now here in my clinic for further management of her radiographic high-grade small-bowel obstruction.      As far as what she has been taking in this week, on Monday she had some soup and milk; this stayed down.  On Tuesday (4 days ago), she had soup and had half of a ham sandwich.  On Wednesday she did not feel good at all and she had the MRI scheduled.  She drank the oral contrast.  She got some glucagon for this and felt much better, but then right after the MRI all of the oral contrast was vomited.  On Thursday, the patient was admitted to the hospital where she did have some scramble eggs.  On Friday, today, she was at home and had emesis of all of the scrambled eggs that she ate yesterday, including further emesis of some broccoli that she ate.  She had 1 bowel movement today that was watery tan.  She has been having ongoing problem with diarrhea, no blood in the diarrhea.  She does not feel distended.  Her abdomen does intermittently feel distended.      The patient has never had a colonoscopy.      The patient never has had nasogastric tube during her obstructions,  "but this was attempted at some point in our hospital and, as they were not able to place a tube and they caused significant trauma, nasogastric drainage was not achieved.  She has never had TPN for her obstructions.      Narcotic use.  The patient is listed in our medical record as having narcotic addiction.  She indicates that after her radiation for cervical cancer, she had severe pain and did become addicted to narcotics and required chemical dependency treatment for this and is now off narcotics.  She also reports having some narcotics after knee surgery in 08/2016.      Pain.  She reports constant ongoing pain in her pelvis and then intermittent bloating and cramping which she attributes to bowel obstruction pain.  Her pain is somewhat relieved with a bowel movement.      Urine symptoms.  She has never had hydronephrosis or any urinary problems that she is aware of.  She currently has a lighter-colored urine than previously, which she attributes to the IV fluids she recently had during inpatient admission.        No fevers.     Physical Exam  /79 (BP Location: Left arm, Patient Position: Chair, Cuff Size: Adult Regular)  Pulse 64  Temp 98  F (36.7  C) (Oral)  Ht 5' 8\"  Wt 150 lb 12.8 oz  SpO2 97%  BMI 22.93 kg/m2  Awake alert, moves slowly to bedside, in some distress  Abd: soft, slightly distended, tender mildly to deep palpation, no rebound, no hernias.     IMPRESSION AND PLAN:  Jenni is a 41-year-old female who has clinical signs of at least a partial bowel obstruction and then a recent imaging study 2 days ago showing a high-grade distal small-bowel obstruction.  I recommended she be admitted to the hospital.  Patient and I had a long discussion about what the point of hospital admission right now would be, and after deliberation she feels well enough to go home and be admitted to the hospital tomorrow.  I think this is completely reasonable.  For her high-grade bowel obstruction but normal " vital signs, afebrile and looking reasonably well, I do not think she needs emergency surgery.  I do think she would benefit from surgery.  I think the etiology is probably related to radiation, as she has had no prior abdominal surgery.      Urgent procedure is not necessary as her exam is benign, I think she would benefit from some IV nutrition and I would like her to be admitted to the hospital for PICC line placement and TPN as soon as possible.  We will arrange for this to be scheduled this weekend.  Patient is aware that she can potentially have home TPN, although this would be dependent on insurance coverage.  If she is not able to have home TPN, she may need to be in facility for this.  For now, I think I would plan to do an operation on her in about 2 weeks, and I will think further about this.  She is also aware that for worsening symptoms or any acute problems such as fever, concerning exam, patient may require emergency surgery.  I will order some labs for this evening.         Medical history:  Past Medical History:   Diagnosis Date     Asthma      Cancer (H)     Per patient OBGYN, cerivical cancer     Cervical cancer (H)      Other chronic pain      Ovarian cancer (H)      Substance abuse     Outside records indicate past history of narcotics abuse or dependence, but patient denies.     Surgical history:  Past Surgical History:   Procedure Laterality Date     ENT SURGERY  2009    mastoid, sinus     EXAM UNDER ANESTHESIA, INSERT ALEX SLEEVE, UTERINE PLACEMENT OF TANDEM AND RING FOR RAD, ULTRASOUND N/A 12/14/2015    Procedure: EXAM UNDER ANESTHESIA, INSERT ALEX SLEEVE, UTERINE PLACEMENT OF TANDEM AND RING FOR RADIATION, ULTRASOUND GUIDED;  Surgeon: Abby Tony MD;  Location: [facility] OR     INSERT TANDEM AND CESIUM APPLICATOR CERVIX, ULTRASOUND GUIDED N/A 12/17/2015    Procedure: INSERT TANDEM AND CESIUM APPLICATOR CERVIX, ULTRASOUND GUIDED;  Surgeon: Kika Wood MD;  Location: [facility] OR     KNEE  SURGERY       Family history:  Family History   Problem Relation Age of Onset     DIABETES Mother      Ovarian Cancer No family hx of      Uterine Cancer No family hx of      Cervical Cancer No family hx of      Breast Cancer No family hx of      Medications:  Current Outpatient Prescriptions   Medication Sig Dispense Refill     ondansetron (ZOFRAN-ODT) 8 MG ODT tab Take 1 tablet (8 mg) by mouth every 8 hours as needed for nausea 30 tablet 0     calcium carbonate (TUMS) 500 MG chewable tablet Take 500 mg by mouth       simethicone (MYLICON) 80 MG chewable tablet Take 80 mg by mouth       senna-docusate (SENOKOT-S;PERICOLACE) 8.6-50 MG per tablet Take 1 tablet by mouth 2 times daily 60 tablet 1     vitamin D (ERGOCALCIFEROL) 38970 UNIT capsule Take 1 capsule (50,000 Units) by mouth once a week 12 capsule 0     hyoscyamine (ANASPAZ/LEVSIN) 0.125 MG tablet Take 1-2 tablets (125-250 mcg) by mouth every 4 hours as needed for cramping 40 tablet 1     ondansetron (ZOFRAN) 4 MG tablet Take 1-2 tablets (4-8 mg) by mouth every 8 hours as needed for nausea (not to exceed 24 mg per day) 30 tablet 0     albuterol (PROVENTIL HFA: VENTOLIN HFA) 108 (90 BASE) MCG/ACT inhaler Inhale 2 puffs into the lungs every 6 hours as needed.         Allergies:  The patientis allergic to no clinical screening - see comments; sulfa drugs; amoxicillin; amoxicillin-pot clavulanate; augmentin; avelox [moxifloxacin]; ciprofloxacin; codeine; ibuprofen; ibuprofen sodium; oxycodone-acetaminophen; quinolones; tramadol; and daucus carota.  Social history:  Social History   Substance Use Topics     Smoking status: Light Tobacco Smoker     Packs/day: 0.25     Years: 12.00     Types: Cigarettes     Smokeless tobacco: Never Used      Comment: 2/22/16 pt smoking daily 1 cig     Alcohol use No      Comment: None per pt     Marital status: .Review of Systems:    Lab Results   Component Value Date    WBC 6.4 04/28/2017     Lab Results   Component Value  Date    RBC 4.01 04/28/2017     Lab Results   Component Value Date    HGB 13.3 04/28/2017     Lab Results   Component Value Date    HCT 40.3 04/28/2017     Lab Results   Component Value Date     04/28/2017     Lab Results   Component Value Date    MCH 33.2 04/28/2017     Lab Results   Component Value Date    MCHC 33.0 04/28/2017     Lab Results   Component Value Date    RDW 13.4 04/28/2017     Lab Results   Component Value Date     04/28/2017   Last Basic Metabolic Panel:  Lab Results   Component Value Date     04/28/2017      Lab Results   Component Value Date    POTASSIUM 3.9 04/28/2017     Lab Results   Component Value Date    CHLORIDE 105 04/28/2017     Lab Results   Component Value Date    JONATAN 8.0 04/28/2017     Lab Results   Component Value Date    CO2 27 04/28/2017     Lab Results   Component Value Date    BUN 13 04/28/2017     Lab Results   Component Value Date    CR 0.68 04/28/2017     Lab Results   Component Value Date    GLC 94 04/28/2017     Liver Function Studies -   Recent Labs   Lab Test  04/28/17   1759   PROTTOTAL  5.9*   ALBUMIN  2.9*   BILITOTAL  0.4   ALKPHOS  102   AST  19   ALT  27       Time spent: 60 min  >50% spent in discussing, counseling and coordinating care    Jennifer Goodwin MD, MPH, FACS  Colon and Rectal Surgery Staff  [facility]    Referring Provider:  Jennifer Garza MD  [address]  [place] [address]     Primary Care Provider:  Jennifer Garza

## 2017-04-28 NOTE — TELEPHONE ENCOUNTER
Discussed with CR surgery. Pt has clinic appt this afternoon.   ER precautions given.  We were not able to upload imaging, so asked pt to bring copies.  Jennifer Garza MD  Internal Medicine

## 2017-04-29 ENCOUNTER — HOSPITAL ENCOUNTER (INPATIENT)
Facility: CLINIC | Age: 42
LOS: 2 days | Discharge: HOME HEALTH | DRG: 389 | End: 2017-05-01
Attending: COLON & RECTAL SURGERY | Admitting: COLON & RECTAL SURGERY
Payer: COMMERCIAL

## 2017-04-29 ENCOUNTER — HOSPITAL ENCOUNTER (INPATIENT)
Dept: VASCULAR ULTRASOUND | Facility: CLINIC | Age: 42
DRG: 389 | End: 2017-04-29
Attending: COLON & RECTAL SURGERY | Admitting: COLON & RECTAL SURGERY
Payer: COMMERCIAL

## 2017-04-29 DIAGNOSIS — K56.609 SMALL INTESTINE OBSTRUCTION (H): Primary | ICD-10-CM

## 2017-04-29 DIAGNOSIS — C53.9 MALIGNANT NEOPLASM OF CERVIX, UNSPECIFIED SITE (H): ICD-10-CM

## 2017-04-29 PROCEDURE — 36569 INSJ PICC 5 YR+ W/O IMAGING: CPT

## 2017-04-29 PROCEDURE — 25000128 H RX IP 250 OP 636: Performed by: STUDENT IN AN ORGANIZED HEALTH CARE EDUCATION/TRAINING PROGRAM

## 2017-04-29 PROCEDURE — 25000128 H RX IP 250 OP 636: Performed by: COLON & RECTAL SURGERY

## 2017-04-29 PROCEDURE — C1751 CATH, INF, PER/CENT/MIDLINE: HCPCS

## 2017-04-29 PROCEDURE — 25800025 ZZH RX 258: Performed by: STUDENT IN AN ORGANIZED HEALTH CARE EDUCATION/TRAINING PROGRAM

## 2017-04-29 PROCEDURE — 12000001 ZZH R&B MED SURG/OB UMMC

## 2017-04-29 PROCEDURE — 25000125 ZZHC RX 250: Performed by: COLON & RECTAL SURGERY

## 2017-04-29 PROCEDURE — 25800025 ZZH RX 258: Performed by: COLON & RECTAL SURGERY

## 2017-04-29 PROCEDURE — 3E0436Z INTRODUCTION OF NUTRITIONAL SUBSTANCE INTO CENTRAL VEIN, PERCUTANEOUS APPROACH: ICD-10-PCS | Performed by: COLON & RECTAL SURGERY

## 2017-04-29 RX ORDER — DEXTROSE MONOHYDRATE, SODIUM CHLORIDE, AND POTASSIUM CHLORIDE 50; 1.49; 9 G/1000ML; G/1000ML; G/1000ML
INJECTION, SOLUTION INTRAVENOUS CONTINUOUS
Status: DISCONTINUED | OUTPATIENT
Start: 2017-04-29 | End: 2017-05-01

## 2017-04-29 RX ORDER — HYDROMORPHONE HYDROCHLORIDE 1 MG/ML
.3-.5 INJECTION, SOLUTION INTRAMUSCULAR; INTRAVENOUS; SUBCUTANEOUS
Status: DISCONTINUED | OUTPATIENT
Start: 2017-04-29 | End: 2017-05-01 | Stop reason: HOSPADM

## 2017-04-29 RX ORDER — ONDANSETRON 8 MG/1
8 TABLET, ORALLY DISINTEGRATING ORAL EVERY 6 HOURS PRN
Status: DISCONTINUED | OUTPATIENT
Start: 2017-04-29 | End: 2017-04-30

## 2017-04-29 RX ORDER — POTASSIUM CHLORIDE 7.45 MG/ML
10 INJECTION INTRAVENOUS
Status: DISCONTINUED | OUTPATIENT
Start: 2017-04-29 | End: 2017-05-01 | Stop reason: HOSPADM

## 2017-04-29 RX ORDER — PROCHLORPERAZINE MALEATE 5 MG
5-10 TABLET ORAL EVERY 6 HOURS PRN
Status: DISCONTINUED | OUTPATIENT
Start: 2017-04-29 | End: 2017-05-01 | Stop reason: HOSPADM

## 2017-04-29 RX ORDER — POTASSIUM CHLORIDE 1.5 G/1.58G
20-40 POWDER, FOR SOLUTION ORAL
Status: DISCONTINUED | OUTPATIENT
Start: 2017-04-29 | End: 2017-05-01 | Stop reason: HOSPADM

## 2017-04-29 RX ORDER — ONDANSETRON 2 MG/ML
4 INJECTION INTRAMUSCULAR; INTRAVENOUS EVERY 6 HOURS PRN
Status: DISCONTINUED | OUTPATIENT
Start: 2017-04-29 | End: 2017-04-30

## 2017-04-29 RX ORDER — POTASSIUM CHLORIDE 29.8 MG/ML
20 INJECTION INTRAVENOUS
Status: DISCONTINUED | OUTPATIENT
Start: 2017-04-29 | End: 2017-05-01 | Stop reason: HOSPADM

## 2017-04-29 RX ORDER — PROCHLORPERAZINE 25 MG
25 SUPPOSITORY, RECTAL RECTAL EVERY 12 HOURS PRN
Status: DISCONTINUED | OUTPATIENT
Start: 2017-04-29 | End: 2017-05-01 | Stop reason: HOSPADM

## 2017-04-29 RX ORDER — MAGNESIUM SULFATE HEPTAHYDRATE 40 MG/ML
4 INJECTION, SOLUTION INTRAVENOUS EVERY 4 HOURS PRN
Status: DISCONTINUED | OUTPATIENT
Start: 2017-04-29 | End: 2017-05-01 | Stop reason: HOSPADM

## 2017-04-29 RX ORDER — LIDOCAINE 40 MG/G
CREAM TOPICAL
Status: DISCONTINUED | OUTPATIENT
Start: 2017-04-29 | End: 2017-05-01 | Stop reason: HOSPADM

## 2017-04-29 RX ORDER — NALOXONE HYDROCHLORIDE 0.4 MG/ML
.1-.4 INJECTION, SOLUTION INTRAMUSCULAR; INTRAVENOUS; SUBCUTANEOUS
Status: DISCONTINUED | OUTPATIENT
Start: 2017-04-29 | End: 2017-05-01 | Stop reason: HOSPADM

## 2017-04-29 RX ORDER — HYDROMORPHONE HCL/0.9% NACL/PF 0.2MG/0.2
0.2 SYRINGE (ML) INTRAVENOUS
Status: DISCONTINUED | OUTPATIENT
Start: 2017-04-29 | End: 2017-04-29

## 2017-04-29 RX ORDER — POTASSIUM CL/LIDO/0.9 % NACL 10MEQ/0.1L
10 INTRAVENOUS SOLUTION, PIGGYBACK (ML) INTRAVENOUS
Status: DISCONTINUED | OUTPATIENT
Start: 2017-04-29 | End: 2017-05-01 | Stop reason: HOSPADM

## 2017-04-29 RX ORDER — HEPARIN SODIUM,PORCINE 10 UNIT/ML
2-5 VIAL (ML) INTRAVENOUS
Status: DISCONTINUED | OUTPATIENT
Start: 2017-04-29 | End: 2017-05-01 | Stop reason: HOSPADM

## 2017-04-29 RX ORDER — POTASSIUM CHLORIDE 750 MG/1
20-40 TABLET, EXTENDED RELEASE ORAL
Status: DISCONTINUED | OUTPATIENT
Start: 2017-04-29 | End: 2017-05-01 | Stop reason: HOSPADM

## 2017-04-29 RX ADMIN — ONDANSETRON 4 MG: 2 INJECTION INTRAMUSCULAR; INTRAVENOUS at 10:02

## 2017-04-29 RX ADMIN — HYDROMORPHONE HYDROCHLORIDE 0.5 MG: 1 INJECTION, SOLUTION INTRAMUSCULAR; INTRAVENOUS; SUBCUTANEOUS at 20:52

## 2017-04-29 RX ADMIN — HYDROMORPHONE HYDROCHLORIDE 0.2 MG: 10 INJECTION, SOLUTION INTRAMUSCULAR; INTRAVENOUS; SUBCUTANEOUS at 16:44

## 2017-04-29 RX ADMIN — ONDANSETRON 4 MG: 2 INJECTION INTRAMUSCULAR; INTRAVENOUS at 22:08

## 2017-04-29 RX ADMIN — HYDROMORPHONE HYDROCHLORIDE 0.2 MG: 10 INJECTION, SOLUTION INTRAMUSCULAR; INTRAVENOUS; SUBCUTANEOUS at 14:45

## 2017-04-29 RX ADMIN — POTASSIUM CHLORIDE: 2 INJECTION, SOLUTION, CONCENTRATE INTRAVENOUS at 20:25

## 2017-04-29 RX ADMIN — HYDROMORPHONE HYDROCHLORIDE 0.2 MG: 10 INJECTION, SOLUTION INTRAMUSCULAR; INTRAVENOUS; SUBCUTANEOUS at 10:02

## 2017-04-29 RX ADMIN — HYDROMORPHONE HYDROCHLORIDE 0.5 MG: 1 INJECTION, SOLUTION INTRAMUSCULAR; INTRAVENOUS; SUBCUTANEOUS at 18:44

## 2017-04-29 RX ADMIN — HYDROMORPHONE HYDROCHLORIDE 0.2 MG: 10 INJECTION, SOLUTION INTRAMUSCULAR; INTRAVENOUS; SUBCUTANEOUS at 11:57

## 2017-04-29 RX ADMIN — LIDOCAINE HYDROCHLORIDE 2 ML: 10 INJECTION, SOLUTION INFILTRATION; PERINEURAL at 14:01

## 2017-04-29 RX ADMIN — POTASSIUM CHLORIDE, DEXTROSE MONOHYDRATE AND SODIUM CHLORIDE: 150; 5; 900 INJECTION, SOLUTION INTRAVENOUS at 11:38

## 2017-04-29 RX ADMIN — ONDANSETRON 4 MG: 2 INJECTION INTRAMUSCULAR; INTRAVENOUS at 16:05

## 2017-04-29 RX ADMIN — HYDROMORPHONE HYDROCHLORIDE 0.5 MG: 1 INJECTION, SOLUTION INTRAMUSCULAR; INTRAVENOUS; SUBCUTANEOUS at 22:55

## 2017-04-29 RX ADMIN — I.V. FAT EMULSION 250 ML: 20 EMULSION INTRAVENOUS at 20:25

## 2017-04-29 RX ADMIN — POTASSIUM CHLORIDE, DEXTROSE MONOHYDRATE AND SODIUM CHLORIDE: 150; 5; 900 INJECTION, SOLUTION INTRAVENOUS at 22:13

## 2017-04-29 ASSESSMENT — PAIN DESCRIPTION - DESCRIPTORS
DESCRIPTORS: STABBING
DESCRIPTORS: ACHING
DESCRIPTORS: STABBING
DESCRIPTORS: STABBING
DESCRIPTORS: ACHING;SORE
DESCRIPTORS: ACHING
DESCRIPTORS: ACHING

## 2017-04-29 ASSESSMENT — ACTIVITIES OF DAILY LIVING (ADL)
SWALLOWING: 0-->SWALLOWS FOODS/LIQUIDS WITHOUT DIFFICULTY
BATHING: 0-->INDEPENDENT
DRESS: 0-->INDEPENDENT
COGNITION: 0 - NO COGNITION ISSUES REPORTED
RETIRED_COMMUNICATION: 0-->UNDERSTANDS/COMMUNICATES WITHOUT DIFFICULTY
TRANSFERRING: 0-->INDEPENDENT
FALL_HISTORY_WITHIN_LAST_SIX_MONTHS: NO
AMBULATION: 0-->INDEPENDENT
RETIRED_EATING: 0-->INDEPENDENT
TOILETING: 0-->INDEPENDENT

## 2017-04-29 NOTE — IP AVS SNAPSHOT
[address] [address]    Phone:  [phone]                                       After Visit Summary   4/29/2017    Rachel A Gerhardt    MRN: [id number]           After Visit Summary Signature Page     I have received my discharge instructions, and my questions have been answered. I have discussed any challenges I see with this plan with the nurse or doctor.    ..........................................................................................................................................  Patient/Patient Representative Signature      ..........................................................................................................................................  Patient Representative Print Name and Relationship to Patient    ..................................................               ................................................  Date                                            Time    ..........................................................................................................................................  Reviewed by Signature/Title    ...................................................              ..............................................  Date                                                            Time

## 2017-04-29 NOTE — IP AVS SNAPSHOT
MRN:[id number]                      After Visit Summary   4/29/2017    Rachel A Gerhardt    MRN: [id number]           Thank you!     Thank you for choosing [facility] for your care. Our goal is always to provide you with excellent care. Hearing back from our patients is one way we can continue to improve our services. Please take a few minutes to complete the written survey that you may receive in the mail after you visit with us. Thank you!        Patient Information     Date Of Birth          1975        Designated Caregiver       Most Recent Value    Caregiver    Will someone help with your care after discharge? yes    Name of designated caregiver Staci     Phone number of caregiver [phone]    Caregiver address [address] [place] [address]      About your hospital stay     You were admitted on:  April 29, 2017 You last received care in the:  [address] [place] [address]    You were discharged on:  May 1, 2017        Reason for your hospital stay       You were admitted for a partial small bowel obstruction.            Reason for your hospital stay       You were admitted for a partial small bowel obstruction requiring a PICC line and IV nutrition.                  Who to Call     For medical emergencies, please call 911.  For non-urgent questions about your medical care, please call your primary care provider or clinic, [phone]          Attending Provider     Provider Specialty    Jennifer Goodwin MD Colon and Rectal Surgery       Primary Care Provider Office Phone # Fax #    Jennifer Garza [phone]       [facility] [address]  [facility] [address]        After Care Instructions     Activity       Your activity upon discharge:   -As tolerated  -protect PICC line from getting wet.  Avoid strenuous activity with PICC line in place.            Diet       Follow this diet upon discharge:  -Clear Liquids for comfort.    -Main source of  nutrition is from IV TPN.                  Follow-up Appointments     Adult [facility] Follow-up and recommended labs and tests       A PET/CT scan has been ordered for you and is in the process of being authorized by your insurance. You will be contacted about a date and time for this test.     Dr Fatima gynecologic cancer clinic follow up on 5/18/17. Please call prior to then for questions or concerns [phone] or [phone]    Appointments on [place] and/or [facility] (with [facility] or [facility] provider or service). Call [phone] if you haven't heard regarding these appointments within 7 days of discharge.            Adult [facility] Follow-up and recommended labs and tests       DIET  -Clear Liquids for comfort.    -Main source of nutrition is from IV TPN.     ACTIVITY  -As tolerated  -protect PICC line from getting wet.  Avoid strenuous activity with PICC line in place.     NOTIFY  Please contact Kanchan Martinez RN or Yolanda Herzog RN at [phone] for problems after discharge such as:  -Temperature > 101F, chills, rigors, dizziness  -Inability to tolerate diet, nausea or vomiting  -You stop passing gas, develop significant bloating, abdominal pain  -Have blood in stools/vomit  -Have severe diarrhea/constipation  -Any other questions or concerns.  - At nights (after 4:30pm), on weekends, or if urgent, call [phone] and ask the  to speak with the on-call Colorectal Surgery resident or fellow    Medication Instructions  Some of your medications may have changed. Please take only prescribed and resumed medications     FOLLOW-UP  1.  You will need to follow-up with Umm Kaufman NP in the Colon and Rectal Surgery clinic in 1 week(s) and then with Dr. Goodwin in 2-3 weeks after.  Please contact our clinic scheduler Bianca Pavon (phone # [phone]) or Margot Woods (phone # [phone]) if you have not heard from our clinic in 3 business days afer discharge to schedule a follow-up  appointment. Please bring your log of daily ileostomy outputs to your follow up clinic appointment.   2.  Follow up with your primary care provider in 1-2 weeks after discharge from the hospital to review this hospitalization.   3.  Use minimal amounts of opiates as possible and avoid all together if possible.  Follow up with Dr. Jones as needed.  4.  You have been scheduled to see Dr. Fatima.  You will also need a PET scan to re-evaluate the status of your cervical cancer.  Both the PET scan and your appointment with Dr. Fatima is needed prior to being able to have surgery to address your small bowel obstruction.       *For other appointments on [place] and/or [facility] (with [facility] or [facility] provider or service): Call [phone] if you have not heard regarding these appointments within 7 days of discharge.[facility]      Appointments on [place] and/or [facility] (with [facility] or [facility] provider or service). Call [phone] if you haven't heard regarding these appointments within 7 days of discharge.                  Your next 10 appointments already scheduled     May 08, 2017  9:00 AM CDT   (Arrive by 8:45 AM)   Return Visit with Jennifer Garza MD   [facility] ([facility])    [address]  4th [address]  [facility] [address] [phone]            May 18, 2017  3:00 PM CDT   (Arrive by 2:45 PM)   Return Visit with Franny Fatima MD   [facility] ([facility])    [address]  [address]nd [address]  [facility] [address] [phone]              Additional Services     Home infusion referral       Please fax discharge orders to [facility]    Ph:  [phone]    Fax: [phone]    Skilled home care RN for initial home safety evaluation and to assist with management and education reinforcement with home TPN via picc line.  Picc line cares per home care agency routine.  TPN labs per home  care agency routine.    Skilled home cared RN to evaluate medication management, nutrition and hydration evaluation, endurance evaluation, and general status evaluation after discharge from the acute care hospital setting.                  Future tests that were ordered for you     PET Oncology (Eyes to Thighs)       If your scan is being scheduled at the hospital, call [phone] for Pre-authorization.    If your scan is being scheduled at the [facility], call [phone] for scheduling and prior authorization.     Patient Preparation  1.  NPO (except water and medications) for 6 hours prior to exam  2.  No physical exercise for 24 hours prior to exam (for example: running)  3.  If possible, please schedule known diabetic patients for early AM time slots.  4.  Patient should arrive 1/2 hours prior to exam scheduled start   5.  Patient should expect the exam to last 2-3 hours  6.  Patient may need to sign ABN or waiver prior to exam.      Technical Questions if scheduled at the [facility]?   [phone]  Technical Questions if scheduled at [facility]?  [phone]     Call to schedule:    Monday through Friday [facility] ([facility]) [phone]  Monday through Saturday [facility] [phone]  Monday [facility] [phone]  Tuesday [facility] (AM) [phone]  Tuesday [facility] (PM) [phone]  Wednesday [place] [phone]  Thursday [place] [phone]  Friday [facility] (AM) [phone]  Friday [facility] (PM) [phone]                      Pending Results     Date and Time Order Name Status Description    4/29/2017 1214  PICC Vascular In process             Statement of Approval     Ordered          05/01/17 1403  I have reviewed and agree with all the recommendations and orders detailed in this document.  EFFECTIVE NOW     Approved and electronically signed by:  Buddy  Jennifer Gracia MD             Admission Information     Date & Time Provider Department Dept. Phone    4/29/2017 Jennifer Goodwin MD [address] [facility] [phone]      Your Vitals Were     Blood Pressure Pulse Temperature Respirations Pulse Oximetry       99/65 (BP Location: Left arm) 79 97.9  F (36.6  C) (Oral) 16 97%       MyChart Information     [employer] gives you secure access to your electronic health record. If you see a primary care provider, you can also send messages to your care team and make appointments. If you have questions, please call your primary care clinic.  If you do not have a primary care provider, please call [phone] and they will assist you.        Care EveryWhere ID     This is your Care EveryWhere ID. This could be used by other organizations to access your [facility] medical records  [id number]           Review of your medicines      START taking        Dose / Directions    HYDROmorphone 2 MG tablet   Commonly known as:  DILAUDID        Dose:  2 mg   Take 1 tablet (2 mg) by mouth every 6 hours as needed for moderate to severe pain   Quantity:  28 tablet   Refills:  0         CONTINUE these medicines which may have CHANGED, or have new prescriptions. If we are uncertain of the size of tablets/capsules you have at home, strength may be listed as something that might have changed.        Dose / Directions    ondansetron 4 MG tablet   Commonly known as:  ZOFRAN   This may have changed:    - when to take this  - reasons to take this        Dose:  4-8 mg   Take 1-2 tablets (4-8 mg) by mouth every 6 hours as needed for nausea or vomiting   Quantity:  28 tablet   Refills:  0         CONTINUE these medicines which have NOT CHANGED        Dose / Directions    albuterol 108 (90 BASE) MCG/ACT Inhaler   Commonly known as:  PROAIR HFA/PROVENTIL HFA/VENTOLIN HFA        Dose:  2 puff   Inhale 2 puffs into the lungs every 6 hours as needed.   Refills:  0       hyoscyamine 0.125 MG tablet   Commonly  known as:  ANASPAZ/LEVSIN   Used for:  Abdominal pain, generalized        Dose:  0.125-0.25 mg   Take 1-2 tablets (125-250 mcg) by mouth every 4 hours as needed for cramping   Quantity:  40 tablet   Refills:  1       simethicone 80 MG chewable tablet   Commonly known as:  MYLICON        Dose:  80 mg   Take 80 mg by mouth   Refills:  0       TUMS 500 MG chewable tablet   Generic drug:  calcium carbonate        Dose:  500 mg   Take 500 mg by mouth   Refills:  0       vitamin D 19405 UNIT capsule   Commonly known as:  ERGOCALCIFEROL   Used for:  Vitamin D deficiency        Dose:  90663 Units   Take 1 capsule (50,000 Units) by mouth once a week   Quantity:  12 capsule   Refills:  0         STOP taking     ondansetron 8 MG ODT tab   Commonly known as:  ZOFRAN-ODT           senna-docusate 8.6-50 MG per tablet   Commonly known as:  SENOKOT-S;PERICOLACE                Where to get your medicines      These medications were sent to [facility] [address] [address]     Phone:  [phone]     ondansetron 4 MG tablet         Some of these will need a paper prescription and others can be bought over the counter. Ask your nurse if you have questions.     Bring a paper prescription for each of these medications     HYDROmorphone 2 MG tablet                Protect others around you: Learn how to safely use, store and throw away your medicines at www.disposemymeds.org.             Medication List: This is a list of all your medications and when to take them. Check marks below indicate your daily home schedule. Keep this list as a reference.      Medications           Morning Afternoon Evening Bedtime As Needed    albuterol 108 (90 BASE) MCG/ACT Inhaler   Commonly known as:  PROAIR HFA/PROVENTIL HFA/VENTOLIN HFA   Inhale 2 puffs into the lungs every 6 hours as needed.                                HYDROmorphone 2 MG tablet   Commonly known as:  DILAUDID    Take 1 tablet (2 mg) by mouth every 6 hours as needed for moderate to severe pain   Last time this was given:  2 mg on 5/1/2017  4:29 PM                                hyoscyamine 0.125 MG tablet   Commonly known as:  ANASPAZ/LEVSIN   Take 1-2 tablets (125-250 mcg) by mouth every 4 hours as needed for cramping                                ondansetron 4 MG tablet   Commonly known as:  ZOFRAN   Take 1-2 tablets (4-8 mg) by mouth every 6 hours as needed for nausea or vomiting   Last time this was given:  8 mg on 5/1/2017  3:20 PM                                simethicone 80 MG chewable tablet   Commonly known as:  MYLICON   Take 80 mg by mouth                                TUMS 500 MG chewable tablet   Take 500 mg by mouth   Generic drug:  calcium carbonate                                vitamin D 13946 UNIT capsule   Commonly known as:  ERGOCALCIFEROL   Take 1 capsule (50,000 Units) by mouth once a week

## 2017-04-29 NOTE — IP AVS SNAPSHOT
"    [address] [facility]: [phone]                                              INTERAGENCY TRANSFER FORM - PHYSICIAN ORDERS   2017                    Hospital Admission Date: 2017  RACHEL A GERHARDT   : 1975  Sex: Female        Attending Provider: Jennifer Goodwin MD     Allergies:  No Clinical Screening - See Comments, Sulfa Drugs, Amoxicillin, Amoxicillin-pot Clavulanate, Augmentin, Avelox [Moxifloxacin], Ciprofloxacin, Codeine, Ibuprofen, Ibuprofen Sodium, Quinolones, Tramadol, Daucus Carota    Infection:  None   Service:  COLORECTAL S    Ht:  1.727 m (5' 7.99\")   Wt:  --   Admission Wt:  --    BMI:  --   BSA:  --            Patient PCP Information     Provider PCP Type    Jennifer Garza MD General      ED Clinical Impression     Diagnosis Description Comment Added By Time Added    Small intestine obstruction (H) [K56.69] Small intestine obstruction (H) [K56.69]  Tracey Barnhart RN 2017  9:30 AM    Malignant neoplasm of cervix, unspecified site (H) [C53.9] Malignant neoplasm of cervix, unspecified site (H) [C53.9]  Iris Dias, APRN CNP 2017 12:48 PM      Hospital Problems as of 2017              Priority Class Noted POA    Small intestine obstruction (H) Medium  2017 Yes      Non-Hospital Problems as of 2017              Priority Class Noted    (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   2012    CARDIOVASCULAR SCREENING; LDL GOAL LESS THAN 160   2012    Pregnancy complicated by chemical dependency, antepartum (H)   2012    Opiate dependence (H)   2013    Abdominal pain Medium  9/15/2015    History of polysubstance abuse, +cocaine UDS in  Medium  2015    Cervix cancer (H) Medium  2015    Encounter for long-term current use of medication Medium  2015    SBO (small bowel obstruction) (H) Medium  2016    Small bowel obstruction, partial (H) Medium  4/3/2017      Code Status History     " Date Active Date Inactive Code Status Order ID Comments User Context    5/1/2017 12:59 PM  Full Code [id number]  Julieta Rob PA-C Outpatient    5/1/2017 12:29 PM 5/1/2017 12:59 PM Full Code [id number]  Julieta Rob PA-C Outpatient    4/29/2017  9:32 AM 5/1/2017 12:29 PM Full Code [id number]  Jennifer Goodwin MD Inpatient    4/3/2017 10:28 AM 4/3/2017 11:31 PM Full Code [id number]  Luiz Gordon MD Inpatient    12/29/2016  9:09 AM 4/3/2017 10:28 AM Full Code [id number]  Iris Dias APRN CNP Outpatient    12/27/2016 10:17 PM 12/29/2016  9:09 AM Full Code [id number]  Myah Morejon MD Inpatient    9/16/2015 10:07 AM 12/27/2016 10:17 PM Full Code [id number]  Melyssa Ram MD Outpatient    9/15/2015 10:18 PM 9/16/2015 10:07 AM Full Code [id number]  Laquita Miranda MD Inpatient    7/14/2012  6:21 PM 7/17/2012  5:03 PM Full Code [id number]  Fabricio Florence RN Inpatient         Medication Review      START taking        Dose / Directions Comments    HYDROmorphone 2 MG tablet   Commonly known as:  DILAUDID        Dose:  2 mg   Take 1 tablet (2 mg) by mouth every 6 hours as needed for moderate to severe pain   Quantity:  28 tablet   Refills:  0          CONTINUE these medications which have NOT CHANGED        Dose / Directions Comments    albuterol 108 (90 BASE) MCG/ACT Inhaler   Commonly known as:  PROAIR HFA/PROVENTIL HFA/VENTOLIN HFA        Dose:  2 puff   Inhale 2 puffs into the lungs every 6 hours as needed.   Refills:  0        hyoscyamine 0.125 MG tablet   Commonly known as:  ANASPAZ/LEVSIN   Used for:  Abdominal pain, generalized        Dose:  0.125-0.25 mg   Take 1-2 tablets (125-250 mcg) by mouth every 4 hours as needed for cramping   Quantity:  40 tablet   Refills:  1        ondansetron 8 MG ODT tab   Commonly known as:  ZOFRAN-ODT   Used for:  Nausea        Dose:  8 mg   Take 1 tablet (8 mg) by mouth every 8 hours as needed for nausea   Quantity:  30 tablet   Refills:  0         simethicone 80 MG chewable tablet   Commonly known as:  MYLICON        Dose:  80 mg   Take 80 mg by mouth   Refills:  0        TUMS 500 MG chewable tablet   Generic drug:  calcium carbonate        Dose:  500 mg   Take 500 mg by mouth   Refills:  0        vitamin D 85331 UNIT capsule   Commonly known as:  ERGOCALCIFEROL   Used for:  Vitamin D deficiency        Dose:  65225 Units   Take 1 capsule (50,000 Units) by mouth once a week   Quantity:  12 capsule   Refills:  0          STOP taking     ondansetron 4 MG tablet   Commonly known as:  ZOFRAN           senna-docusate 8.6-50 MG per tablet   Commonly known as:  SENOKOT-S;PERICOLACE                   Summary of Visit     Reason for your hospital stay       You were admitted for a partial small bowel obstruction.       Reason for your hospital stay       You were admitted for a partial small bowel obstruction requiring a PICC line and IV nutrition.             After Care     Activity       Your activity upon discharge:   -As tolerated  -protect PICC line from getting wet.  Avoid strenuous activity with PICC line in place.       Diet       Follow this diet upon discharge:  -Clear Liquids for comfort.    -Main source of nutrition is from IV TPN.             Referrals     Home infusion referral       Please fax discharge orders to [facility]    Ph:  [phone]    Fax: [phone]    Skilled home care RN for initial home safety evaluation and to assist with management and education reinforcement with home TPN via picc line.  Picc line cares per home care agency routine.  TPN labs per home care agency routine.    Skilled home cared RN to evaluate medication management, nutrition and hydration evaluation, endurance evaluation, and general status evaluation after discharge from the acute care hospital setting.             Radiology & Cardiology Orders     Future Labs/Procedures Complete By Mikala    PET Oncology (Eyes to Thighs)  As directed 5/1/2018     Comments:    If your scan is being scheduled at the [facility], call [phone] for Pre-authorization.    If your scan is being scheduled at the [facility], call [phone] for scheduling and prior authorization.     Patient Preparation  1.  NPO (except water and medications) for 6 hours prior to exam  2.  No physical exercise for 24 hours prior to exam (for example: running)  3.  If possible, please schedule known diabetic patients for early AM time slots.  4.  Patient should arrive 1/2 hours prior to exam scheduled start   5.  Patient should expect the exam to last 2-3 hours  6.  Patient may need to sign ABN or waiver prior to exam.      Technical Questions if scheduled at the [facility]?   [phone]  Technical Questions if scheduled at [facility]?  [phone]     Call to schedule:    Monday through Friday [facility] ([facility]) [phone]  Monday through Saturday [facility] [phone]  Monday [place] [phone]  Tuesday [place] (AM) [phone]  Tuesday [facility] (PM) [phone]  Wednesday [place] [phone]  Thursday [place] [phone]  Friday [facility] (AM) [phone]  Friday [facility] (PM) [phone]          Radiology & Cardiology Orders     PET Oncology (Eyes to Thighs)       If your scan is being scheduled at the [facility], call [phone] for Pre-authorization.    If your scan is being scheduled at the [facility], call [phone] for scheduling and prior authorization.     Patient Preparation  1.  NPO (except water and medications) for 6 hours prior to exam  2.  No physical exercise for 24 hours prior to exam (for example: running)  3.  If possible, please schedule known diabetic patients for early AM time slots.  4.  Patient should arrive 1/2 hours prior to exam scheduled start   5.  Patient should expect the exam to last  2-3 hours  6.  Patient may need to sign ABN or waiver prior to exam.      Technical Questions if scheduled at the hospital?   [phone]  Technical Questions if scheduled at [facility]?  [phone]     Call to schedule:    Monday through Friday [facility] ([facility]) [phone]  Monday through Saturday [facility] [phone]  Monday [place] [phone]  Tuesday [place] (AM) [phone]  Tuesday [facility] (PM) [phone]  Wednesday [place] [phone]  Thursday [place] [phone]  Friday [facility] (AM) [phone]  Friday [facility] (PM) [phone]                 Your next 10 appointments already scheduled     May 01, 2017  3:30 PM CDT   Total Parenteral Nutrition - [address] with Christine M Klisch, RN   [facility], [place] ([facility])    3rd Floor  [address]  [place] [address]              Appointment is located at [address] [place] [address]            May 08, 2017  9:00 AM CDT   (Arrive by 8:45 AM)   Return Visit with Jennifer Garza MD   [facility] ([facility] [place])    [address]  4th Floor  [place] [address] [phone]            May 18, 2017  3:00 PM CDT   (Arrive by 2:45 PM)   Return Visit with Franny Fatima MD   [facility] ([facility] [place])    [address]  2nd Floor  [place] [address] [phone]              Follow-Up Appointment Instructions     Future Labs/Procedures    Adult [facility] Follow-up and recommended labs and tests     Comments:    A PET/CT scan has been ordered for you and is in the process of being authorized by your insurance. You will be contacted about a date and time for this test.     Dr Fatima gynecologic cancer  clinic follow up on 5/18/17. Please call prior to then for questions or concerns [phone] or [phone]    Appointments on [place] and/or [facility] (with [facility] or [facility] provider or service). Call [phone] if you haven't heard regarding these appointments within 7 days of discharge.    Adult [facility] Follow-up and recommended labs and tests     Comments:    DIET  -Clear Liquids for comfort.    -Main source of nutrition is from IV TPN.     ACTIVITY  -As tolerated  -protect PICC line from getting wet.  Avoid strenuous activity with PICC line in place.     NOTIFY  Please contact Kanchan Martinez RN or Yolanda Herzog RN at [phone] for problems after discharge such as:  -Temperature > 101F, chills, rigors, dizziness  -Inability to tolerate diet, nausea or vomiting  -You stop passing gas, develop significant bloating, abdominal pain  -Have blood in stools/vomit  -Have severe diarrhea/constipation  -Any other questions or concerns.  - At nights (after 4:30pm), on weekends, or if urgent, call [phone] and ask the  to speak with the on-call Colorectal Surgery resident or fellow    Medication Instructions  Some of your medications may have changed. Please take only prescribed and resumed medications     FOLLOW-UP  1.  You will need to follow-up with Umm Kaufman NP in the Colon and Rectal Surgery clinic in 1 week(s) and then with Dr. Goodwin in 2-3 weeks after.  Please contact our clinic scheduler Bianca Pavon (phone # [phone]) or Margot Woods (phone # [phone]) if you have not heard from our clinic in 3 business days afer discharge to schedule a follow-up appointment. Please bring your log of daily ileostomy outputs to your follow up clinic appointment.   2.  Follow up with your primary care provider in 1-2 weeks after discharge from the hospital to review this hospitalization.   3.  Use minimal amounts of opiates as possible and avoid all together if possible.  Follow up  with Dr. Jones as needed.  4.  You have been scheduled to see Dr. Fatima.  You will also need a PET scan to re-evaluate the status of your cervical cancer.  Both the PET scan and your appointment with Dr. Fatima is needed prior to being able to have surgery to address your small bowel obstruction.       *For other appointments on [place] and/or [facility] (with [facility] or [facility] provider or service): Call [phone] if you have not heard regarding these appointments within 7 days of discharge.[facility]      Appointments on [place] and/or [facility] (with [facility] or [facility] provider or service). Call [phone] if you haven't heard regarding these appointments within 7 days of discharge.      Follow-Up Appointment Instructions     Adult [facility] Follow-up and recommended labs and tests       A PET/CT scan has been ordered for you and is in the process of being authorized by your insurance. You will be contacted about a date and time for this test.     Dr Fatima gynecologic cancer clinic follow up on 5/18/17. Please call prior to then for questions or concerns [phone] or [phone]    Appointments on [facility]/or [facility] (with [facility] or [facility] provider or service). Call [phone] if you haven't heard regarding these appointments within 7 days of discharge.       Adult [facility] Follow-up and recommended labs and tests       DIET  -Clear Liquids for comfort.    -Main source of nutrition is from IV TPN.     ACTIVITY  -As tolerated  -protect PICC line from getting wet.  Avoid strenuous activity with PICC line in place.     NOTIFY  Please contact Kanchan Martinez RN or Yolanda Herzog RN at [phone] for problems after discharge such as:  -Temperature > 101F, chills, rigors, dizziness  -Inability to tolerate diet, nausea or vomiting  -You stop passing gas, develop significant bloating, abdominal pain  -Have blood in stools/vomit  -Have severe diarrhea/constipation  -Any other questions or concerns.  -  At nights (after 4:30pm), on weekends, or if urgent, call [phone] and ask the  to speak with the on-call Colorectal Surgery resident or fellow    Medication Instructions  Some of your medications may have changed. Please take only prescribed and resumed medications     FOLLOW-UP  1.  You will need to follow-up with Umm Kaufman NP in the Colon and Rectal Surgery clinic in 1 week(s) and then with Dr. Goodwin in 2-3 weeks after.  Please contact our clinic scheduler Bianca Librado (phone # [phone]) or Margot Woods (phone # [phone]) if you have not heard from our clinic in 3 business days afer discharge to schedule a follow-up appointment. Please bring your log of daily ileostomy outputs to your follow up clinic appointment.   2.  Follow up with your primary care provider in 1-2 weeks after discharge from the hospital to review this hospitalization.   3.  Use minimal amounts of opiates as possible and avoid all together if possible.  Follow up with Dr. Jones as needed.  4.  You have been scheduled to see Dr. Fatima.  You will also need a PET scan to re-evaluate the status of your cervical cancer.  Both the PET scan and your appointment with Dr. Fatima is needed prior to being able to have surgery to address your small bowel obstruction.       *For other appointments on [place] and/or [facility] (with [facility] or [facility] provider or service): Call [phone] if you have not heard regarding these appointments within 7 days of discharge.[facility]      Appointments on [place] and/or [facility] (with [facility] or [facility] provider or service). Call [phone] if you haven't heard regarding these appointments within 7 days of discharge.             Statement of Approval     Ordered          05/01/17 1403  I have reviewed and agree with all the recommendations and orders detailed in this document.  EFFECTIVE NOW     Approved and electronically signed by:  Jennifer Goodwin MD

## 2017-04-29 NOTE — LETTER
Transition Communication Hand-off for Care Transitions to Next Level of Care Provider    Name: Rachel A Gerhardt  MRN #: [id number]  Primary Care Provider: Jennifer Garza     Primary Clinic: [address] [place] [id number]     Reason for Hospitalization:  SBO  malnutrition  Small intestine obstruction (H)  Small intestine obstruction (H)  Admit Date/Time: 4/29/2017  8:37 AM  Discharge Date: May 1, 2027 with home TPN via picc line.  Plan to optimize nutrition for upcoming surgery.    Discharge Needs Assessment:  Needs       Most Recent Value    Transportation Available family or friend will provide    Home Infusion Provider [facility] [phone], Fax: [phone] [Home TPN]          Follow-up plan:  Future Appointments  Date Time Provider Department Center   5/1/2017 3:30 PM Klisch, Christine M, RN [facility]   5/8/2017 9:00 AM Jennifer Garza MD [facility]       Any outstanding tests or procedures:        Referrals     Future Labs/Procedures    Home infusion referral     Comments:    Please fax discharge orders to [facility]    Ph:  [phone]    Fax: [phone]    Skilled home care RN for initial home safety evaluation and to assist with management and education reinforcement with home TPN via picc line.  Picc line cares per home care agency routine.  TPN labs per home care agency routine.    Skilled home cared RN to evaluate medication management, nutrition and hydration evaluation, endurance evaluation, and general status evaluation after discharge from the acute care hospital setting.         Tracey Barnhart, B.S.N., P.H.N.,R.N.         Pager

## 2017-04-29 NOTE — H&P
"Colon and Rectal Surgery Admission note  [facility]    Name: Rachel A Gerhardt    MRN: [id number]  YOB: 1975    Age: 41 year old  Date of admission: 4/29/2017  Primary care provider: Jennifer Garza             History of Present Illness:   Jenni is a 41-year-old female admitted directly to [facility] for small bowel obstruction. She has a history of cervical cancer, stage IIB. She was treated with radiation here at our institution, completing in 02/2016. She has never had any abdominal surgery. She has now had 3 admissions to the hospital for partial bowel obstruction/ileus.     She has been dealign with chronic partial bowel obsturction symptoms for months. She had also had constant diarrhea. She has lost about 60 pounds due to difficulty with eating. Her last \"good days\" were about 4 days after Easter (about 1.5 wks ago).    She was recently seen by [facility] -Dr. Clemons, who ordered a MR enterography, which demonstrated high-grade distal small-bowel obstruction, small bowel measuring 4 cm in diameter, transition to normal caliber decompressed distal ileum and colon in the right lower quadrant. This is worse compared to images taken in Dec 2016. The patient was admitted to [facility]  2 days ago but the surgeon she saw in consultation there felt she would be better served being treated at the [facility] given her complex history. A inpatient direct transfer could not be arranged due to insurance issues, she was seen in clinic with Dr. Goodwin yesterday and admitted to the [facility] today for ongoing care.    Currently her pain is mild, no vomiting but feeling nauseated. She attempted to eat last night and resulted in emesis of the food consumed. She is still passing gas and liquid BMs, last one this am. She says she does not vomit when she doesn't eat. No fever/chills                     Past Medical History:     Past Medical History:   Diagnosis Date     Asthma      " Cancer (H)     Per patient OBGYN, cerivical cancer     Cervical cancer (H)      Other chronic pain      Ovarian cancer (H)      Substance abuse     Outside records indicate past history of narcotics abuse or dependence, but patient denies.             Past Surgical History:     Past Surgical History:   Procedure Laterality Date     ENT SURGERY  2009    mastoid, sinus     EXAM UNDER ANESTHESIA, INSERT ALEX SLEEVE, UTERINE PLACEMENT OF TANDEM AND RING FOR RAD, ULTRASOUND N/A 12/14/2015    Procedure: EXAM UNDER ANESTHESIA, INSERT ALEX SLEEVE, UTERINE PLACEMENT OF TANDEM AND RING FOR RADIATION, ULTRASOUND GUIDED;  Surgeon: Abby Tony MD;  Location: [facility] OR     INSERT TANDEM AND CESIUM APPLICATOR CERVIX, ULTRASOUND GUIDED N/A 12/17/2015    Procedure: INSERT TANDEM AND CESIUM APPLICATOR CERVIX, ULTRASOUND GUIDED;  Surgeon: Kika Wood MD;  Location: [facility] OR     KNEE SURGERY                 Social History:     Social History   Substance Use Topics     Smoking status: Light Tobacco Smoker     Packs/day: 0.25     Years: 12.00     Types: Cigarettes     Smokeless tobacco: Never Used      Comment: 2/22/16 pt smoking daily 1 cig     Alcohol use No      Comment: None per pt             Family History:     Family History   Problem Relation Age of Onset     DIABETES Mother      Ovarian Cancer No family hx of      Uterine Cancer No family hx of      Cervical Cancer No family hx of      Breast Cancer No family hx of              Allergies:     Allergies   Allergen Reactions     No Clinical Screening - See Comments Other (See Comments) and Diarrhea     headache  Carrots cause gastric upset, cramping and diarrhea.     Sulfa Drugs Hives     hives     Amoxicillin Unknown and Other (See Comments)     vomiting  vomiting     Amoxicillin-Pot Clavulanate Other (See Comments) and Nausea     vomiting     Augmentin GI Disturbance, Nausea and Hives     Avelox [Moxifloxacin] Nausea and Vomiting, Unknown and Nausea     Ciprofloxacin  Hives and Nausea     Codeine Nausea and Vomiting and Nausea     Ibuprofen Nausea and Vomiting     Other reaction(s): Nausea And Vomiting     Ibuprofen Sodium Hives and GI Disturbance     Quinolones      Tramadol Hives, Diarrhea, Nausea and Nausea and Vomiting     Daucus Carota      Other reaction(s): GI Upset  Other reaction(s): Abdominal pain, Diarrhea  Carrots cause gastric upset, cramping and diarrhea.             Medications:   zofran  Calcium  Simethicone  Senna  Vit D  Hyoscyamine  Albuterol         Review of Systems:   A comprehensive greater than 10 system review of systems was carried out.  Pertinent positives and negatives are noted above.  Otherwise negative for contributory info.            Physical Exam:     Blood pressure 103/61, pulse 84, temperature 98.4  F (36.9  C), temperature source Oral, resp. rate 16, SpO2 95 %, not currently breastfeeding.  No intake or output data in the 24 hours ending 04/29/17 1032  Exam:  General - Awake alert and oriented, appears stated age  Pulm - Non-labored breathing with normal respiratory effort  CVS - reg rate and rhythm, no peripheral edema  Abd - soft, mildly distended, non tender. No guarding /rebound., Rectal exam was deferred  Neuro - CN II-XII grossly intact  Musculoskeletal - extremities with no clubbing, cyanosis or edema  Psych - responsive, alert, cooperative; oriented x3; appropriate mood and affect  External/skin - inspection reveals no rashes, lesions or ulcers, normal coloring             Data Reviewed:   n/a      Assessment and Plan:     Ms. Gerhardt is a 41-year-old female with ongoing small bowel obstruction secondary to radiation for cervical cancer. Her symptoms have worsened over the past few weeks and MRE 2 days ago showing a high-grade distal small-bowel obstruction.    She is currently table, normal vital signs, afebrile and looking reasonably well, with no concerning signs on exam. She does not require emergency operation, and given her 60  lb weight loss, plan is to admit for bowel obstruction, PICC with TPN, NPO (no NG unless ongoing vomiting occurs). As discussed with attending staff Dr. Jennifer Goodwin, once her nutritional status improves the plan would likely be for surgery in a few weeks. She may potentially have home TPN, although this would be dependent on insurance coverage. If she is not able to have home TPN, she may need to be in facility for this.     Admission was also reviewed with attending staff on call Dr. Soe.        Lisa Simmons MD  Fellow- Colon & Rectal Surgery        Staff Addendum:  Agree with the admission H&P as documented by the housestaff. I was personally involved with the recommendations made by our service for this patient.  Renetta Seo MD  Colon and Rectal Surgery Staff  [facility]

## 2017-04-29 NOTE — PLAN OF CARE
Problem: Goal Outcome Summary  Goal: Goal Outcome Summary  Outcome: Improving  Patient reports sub-optimal pain control with IV Dilaudid Q 2 hrs. MD notified and has increased the dose (have yet to assess efficacy). Nausea well controlled with Zofran Q6 hrs and NPO status. Reports passing flatus and having a couple of watery stools today. Voiding with adequate output. Up ad dania in halls. Plan to start TPN this evening. Continue with POC.

## 2017-04-29 NOTE — PLAN OF CARE
Problem: Goal Outcome Summary  Goal: Goal Outcome Summary  Outcome: Therapy, unable to show any progress toward functional goals  Direct admit from home arrived to 7C at 0830. A&O x4. AVSS. Abdominal pain managed with prn dilaudid 0.2mg q2h. NPO with ice chips and sips. Zofran given for nausea x1. PICC line placed for TPN to start at 2000. Loose BM x2 and passing flatus. IVF at 100 ml/hr. Up with SBA and ambulating hallway frequently. PLAN: pain management, TPN, monitor VS and electrolytes, continue plan of care.

## 2017-04-29 NOTE — PROGRESS NOTES
"CLINICAL NUTRITION SERVICES - ASSESSMENT NOTE     Nutrition Prescription    RECOMMENDATIONS FOR MDs/PROVIDERS TO ORDER:  - none additional; ADAT as soon as medically able. Consider transition to EN or oral diet once able to use gut.     Malnutrition Status:    - Unable to complete all parameters of NFPA     Recommendations already ordered by Registered Dietitian (RD):  - Recommended PN: 1440 ml (60 ml/hr) 150 gm dex, 85 gm AA with 250 ml 20% lipids daily. Advance to GOAL dex 225 gm as tolerated/pending labs.   - Total daily fluids per MD; pharmacist may adjust fluids as needed  - Total provisions provide: 1605 kcals (24 kcals/kg) and 85 gm PRO (1.3 gm/kg) per dosing weight 68 kg.      Future/Additional Recommendations:  - PN initiation/lytes (advancement)  - diet advancement?  - ongoing PN vs ability to transition to EN vs diet     REASON FOR ASSESSMENT  Rachel A Gerhardt is a/an 41 year old female assessed by the dietitian for Pharmacy/Nutrition to Start and Manage PN    Medical history: pt admit for SBO with h/o chemical dependence, abd pain, cervical CA, SBO. Consult received to initiate TPN.     NUTRITION HISTORY  Assess as able. Pt was not in room upon visit.     CURRENT NUTRITION ORDERS  Diet: NPO    LABS  Labs reviewed  Vitamin D: 8 (L) 4/17/2017    MEDICATIONS  Medications reviewed  IVF @ 100 ml/hr     ANTHROPOMETRICS  Height: 0 cm (Data Unavailable)   Ht Readings from Last 2 Encounters:   04/28/17 1.727 m (5' 8\")   04/17/17 1.727 m (5' 8\")   68\"  Most Recent Weight:   Wt Readings from Last 4 Encounters:   04/28/17 68.4 kg (150 lb 12.8 oz)   04/17/17 68.9 kg (152 lb)   04/03/17 70.3 kg (155 lb)   12/28/16 78.3 kg (172 lb 9.6 oz)   68 kg; 13% weight los over ~ 4 months   IBW: 64 kg  BMI: Normal BMI  Weight History: see above     Dosing Weight: 68 kg (most recent weight)    ASSESSED NUTRITION NEEDS  Estimated Energy Needs: 4210-9232 kcals/day (25 - 30 kcals/kg)  Justification: Maintenance  Estimated Protein " Needs:  grams protein/day (1.2 - 1.5 grams of pro/kg)  Justification: Increased needs  Estimated Fluid Needs: 4287-4796 mL/day (1 mL/kcal)   Justification: Maintenance and Per provider pending fluid status    PHYSICAL FINDINGS  See malnutrition section below.  Assess as able       MALNUTRITION  % Intake: Unable to assess  % Weight Loss: > 7.5% in 3 months (severe)  Subcutaneous Fat Loss: Unable to assess  Muscle Loss: Unable to assess  Fluid Accumulation/Edema: Unable to assess  Malnutrition Diagnosis: Unable to determine due to pt not available upon visit     NUTRITION DIAGNOSIS  Inadequate protein-energy intake related to GI status/ inability to take oral PO as evidenced by pt meeting 0% kcal/PRO needs.       INTERVENTIONS  Implementation  Nutrition Education: No education needs assessed at this time   Parenteral Nutrition/IV Fluids - Initiate: TPN change orders sent to pharmacist per MD consult.     Goals  Total avg nutritional intake to meet a minimum of 25 kcal/kg and 1.2 g PRO/kg daily (per dosing wt 68 kg).     Monitoring/Evaluation  Progress toward goals will be monitored and evaluated per protocol.     Karly Shankar RD, LD  Pager:  [phone]

## 2017-04-30 LAB
ALBUMIN SERPL-MCNC: 2.3 G/DL (ref 3.4–5)
ALP SERPL-CCNC: 83 U/L (ref 40–150)
ALT SERPL W P-5'-P-CCNC: 22 U/L (ref 0–50)
ANION GAP SERPL CALCULATED.3IONS-SCNC: 7 MMOL/L (ref 3–14)
AST SERPL W P-5'-P-CCNC: 12 U/L (ref 0–45)
BILIRUB SERPL-MCNC: 0.2 MG/DL (ref 0.2–1.3)
BUN SERPL-MCNC: 13 MG/DL (ref 7–30)
CALCIUM SERPL-MCNC: 7.5 MG/DL (ref 8.5–10.1)
CHLORIDE SERPL-SCNC: 106 MMOL/L (ref 94–109)
CO2 SERPL-SCNC: 26 MMOL/L (ref 20–32)
CREAT SERPL-MCNC: 0.58 MG/DL (ref 0.52–1.04)
GFR SERPL CREATININE-BSD FRML MDRD: ABNORMAL ML/MIN/1.7M2
GLUCOSE BLDC GLUCOMTR-MCNC: 118 MG/DL (ref 70–99)
GLUCOSE BLDC GLUCOMTR-MCNC: 122 MG/DL (ref 70–99)
GLUCOSE BLDC GLUCOMTR-MCNC: 145 MG/DL (ref 70–99)
GLUCOSE SERPL-MCNC: 98 MG/DL (ref 70–99)
INR PPP: 1.05 (ref 0.86–1.14)
MAGNESIUM SERPL-MCNC: 1.8 MG/DL (ref 1.6–2.3)
PHOSPHATE SERPL-MCNC: 3.1 MG/DL (ref 2.5–4.5)
POTASSIUM SERPL-SCNC: 4.7 MMOL/L (ref 3.4–5.3)
PROT SERPL-MCNC: 4.9 G/DL (ref 6.8–8.8)
SODIUM SERPL-SCNC: 139 MMOL/L (ref 133–144)

## 2017-04-30 PROCEDURE — 40000802 ZZH SITE CHECK

## 2017-04-30 PROCEDURE — 12000001 ZZH R&B MED SURG/OB UMMC

## 2017-04-30 PROCEDURE — 25000128 H RX IP 250 OP 636: Performed by: COLON & RECTAL SURGERY

## 2017-04-30 PROCEDURE — 84134 ASSAY OF PREALBUMIN: CPT | Performed by: COLON & RECTAL SURGERY

## 2017-04-30 PROCEDURE — 83735 ASSAY OF MAGNESIUM: CPT | Performed by: COLON & RECTAL SURGERY

## 2017-04-30 PROCEDURE — 84100 ASSAY OF PHOSPHORUS: CPT | Performed by: COLON & RECTAL SURGERY

## 2017-04-30 PROCEDURE — 25000128 H RX IP 250 OP 636: Performed by: STUDENT IN AN ORGANIZED HEALTH CARE EDUCATION/TRAINING PROGRAM

## 2017-04-30 PROCEDURE — 25000125 ZZHC RX 250: Performed by: COLON & RECTAL SURGERY

## 2017-04-30 PROCEDURE — 85610 PROTHROMBIN TIME: CPT | Performed by: COLON & RECTAL SURGERY

## 2017-04-30 PROCEDURE — 25000132 ZZH RX MED GY IP 250 OP 250 PS 637: Performed by: STUDENT IN AN ORGANIZED HEALTH CARE EDUCATION/TRAINING PROGRAM

## 2017-04-30 PROCEDURE — 80053 COMPREHEN METABOLIC PANEL: CPT | Performed by: COLON & RECTAL SURGERY

## 2017-04-30 PROCEDURE — 00000146 ZZHCL STATISTIC GLUCOSE BY METER IP

## 2017-04-30 RX ORDER — HYDROMORPHONE HYDROCHLORIDE 2 MG/1
2 TABLET ORAL EVERY 4 HOURS PRN
Status: DISCONTINUED | OUTPATIENT
Start: 2017-04-30 | End: 2017-05-01 | Stop reason: HOSPADM

## 2017-04-30 RX ORDER — HYDROMORPHONE HYDROCHLORIDE 1 MG/ML
2 SOLUTION ORAL EVERY 4 HOURS PRN
Status: DISCONTINUED | OUTPATIENT
Start: 2017-04-30 | End: 2017-04-30

## 2017-04-30 RX ORDER — ONDANSETRON 8 MG/1
8 TABLET, ORALLY DISINTEGRATING ORAL EVERY 6 HOURS PRN
Status: DISCONTINUED | OUTPATIENT
Start: 2017-04-30 | End: 2017-05-01 | Stop reason: HOSPADM

## 2017-04-30 RX ORDER — ONDANSETRON 2 MG/ML
8 INJECTION INTRAMUSCULAR; INTRAVENOUS EVERY 6 HOURS PRN
Status: DISCONTINUED | OUTPATIENT
Start: 2017-04-30 | End: 2017-05-01 | Stop reason: HOSPADM

## 2017-04-30 RX ADMIN — HYDROMORPHONE HYDROCHLORIDE 2 MG: 1 SOLUTION ORAL at 10:40

## 2017-04-30 RX ADMIN — ONDANSETRON 4 MG: 2 INJECTION INTRAMUSCULAR; INTRAVENOUS at 04:11

## 2017-04-30 RX ADMIN — HYDROMORPHONE HYDROCHLORIDE 2 MG: 2 TABLET ORAL at 22:39

## 2017-04-30 RX ADMIN — ONDANSETRON 8 MG: 2 INJECTION INTRAMUSCULAR; INTRAVENOUS at 16:59

## 2017-04-30 RX ADMIN — HYDROMORPHONE HYDROCHLORIDE 0.5 MG: 1 INJECTION, SOLUTION INTRAMUSCULAR; INTRAVENOUS; SUBCUTANEOUS at 07:00

## 2017-04-30 RX ADMIN — HYDROMORPHONE HYDROCHLORIDE 2 MG: 2 TABLET ORAL at 18:22

## 2017-04-30 RX ADMIN — HYDROMORPHONE HYDROCHLORIDE 0.5 MG: 1 INJECTION, SOLUTION INTRAMUSCULAR; INTRAVENOUS; SUBCUTANEOUS at 09:09

## 2017-04-30 RX ADMIN — HYDROMORPHONE HYDROCHLORIDE 0.5 MG: 1 INJECTION, SOLUTION INTRAMUSCULAR; INTRAVENOUS; SUBCUTANEOUS at 04:11

## 2017-04-30 RX ADMIN — HYDROMORPHONE HYDROCHLORIDE 0.5 MG: 1 INJECTION, SOLUTION INTRAMUSCULAR; INTRAVENOUS; SUBCUTANEOUS at 23:48

## 2017-04-30 RX ADMIN — HYDROMORPHONE HYDROCHLORIDE 0.5 MG: 1 INJECTION, SOLUTION INTRAMUSCULAR; INTRAVENOUS; SUBCUTANEOUS at 01:43

## 2017-04-30 RX ADMIN — POTASSIUM CHLORIDE: 2 INJECTION, SOLUTION, CONCENTRATE INTRAVENOUS at 20:40

## 2017-04-30 RX ADMIN — HYDROMORPHONE HYDROCHLORIDE 0.5 MG: 1 INJECTION, SOLUTION INTRAMUSCULAR; INTRAVENOUS; SUBCUTANEOUS at 19:43

## 2017-04-30 RX ADMIN — I.V. FAT EMULSION 250 ML: 20 EMULSION INTRAVENOUS at 20:40

## 2017-04-30 RX ADMIN — HYDROMORPHONE HYDROCHLORIDE 2 MG: 2 TABLET ORAL at 14:31

## 2017-04-30 RX ADMIN — ONDANSETRON 8 MG: 2 INJECTION INTRAMUSCULAR; INTRAVENOUS at 10:41

## 2017-04-30 ASSESSMENT — PAIN DESCRIPTION - DESCRIPTORS
DESCRIPTORS: STABBING
DESCRIPTORS: STABBING
DESCRIPTORS: ACHING;SORE;SHARP
DESCRIPTORS: SHARP
DESCRIPTORS: ACHING
DESCRIPTORS: STABBING
DESCRIPTORS: ACHING;SORE

## 2017-04-30 NOTE — PROGRESS NOTES
Care Coordinator- Discharge Planning     Admission Date/Time:  4/29/2017  Attending MD:  Jennifer Goodwin MD     Data  Date of initial CC assessment:  Day of admission and update from the interdisciplinary team about the need for home TPN  Chart reviewed, discussed with interdisciplinary team.   Patient was admitted for:   1. Small intestine obstruction (H)         Assessment    An initial insurance inquiry has been placed with [facility] for the following MD team plans of care once patient is stable for discharge:    Please fax discharge orders to [facility]     Ph:  [phone]     Fax: [phone]     Skilled home care RN for initial home safety evaluation and to assist with management and education reinforcement with home TPN via picc line.  Picc line cares per home care agency routine.  TPN labs per home care agency routine.     Skilled home cared RN to evaluate medication management, nutrition and hydration evaluation, endurance evaluation, and general status evaluation after discharge from the acute care hospital setting.     Ms. Gerhardt after a chart review has a history of chemical dependency which may be of concern with sending patient home with a picc line.  [facility] will follow up with this writer on Monday to update the MD team about policies that home care agencies follow when there is a history of chemical dependency.      Plan  Anticipated Discharge Date:  Pending insurance inquiry and stabilization for discharge by the MD team.  Also, dependent on policies of home care agency providing supplies for patient in her home setting.  Anticipated Discharge Plan:  Inpatient cares continue per MD team plans of care and the inpatient Care Management team will continue to follow to assist with transition needs at time of discharge.    Tracey Barnhart, B.S.N., P.H.N.,R.N.         Pager

## 2017-04-30 NOTE — PLAN OF CARE
"Problem: Discharge Planning  Goal: Discharge Planning (Adult, OB, Behavioral, Peds)  04/30/17 6129     Rachel Cardoso-Registered Nurse (Nursing)  Patient seen at bedside for Valved PICC teaching. States she has \"chemo brain\" and is concerned she wont remember all information. RD correctly on model with cares including saline flushing and cap change. Received written material related to all content taught. Has TPN class. 5/1 @ 3:30          "
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
## 2017-04-30 NOTE — PLAN OF CARE
Problem: Goal Outcome Summary  Goal: Goal Outcome Summary  Outcome: Improving  Pt reports feeling better today. Liquid PO Dilaudid given for pain. 45 min later pt had a moderate emesis and believes it may have been triggered by the taste of the liquids although reports much better pain control. Switched now to PO tabs and has tolerated well. Reports passing some flatus and having a watery BM this AM. Voiding with good output. TPN/Lipids via PICC. PICC teaching initiated, will have the TPN teaching in [facility] tomorrow at 3:30PM. BP 94/46 this afternoon, MD notified. Up ad dania in halls. Continue with POC.

## 2017-04-30 NOTE — PROGRESS NOTES
COLON & RECTAL SURGERY PROGRESS NOTE    April 30, 2017    SUBJECTIVE:    Feeling ok  Would like to try oral pain meds today for more steady long lasting relief  No NV when not eating  Passing gas and stools  No fever/chills    OBJECTIVE:  Temp:  [97.1  F (36.2  C)-98.4  F (36.9  C)] 97.1  F (36.2  C)  Pulse:  [77-92] 81  Resp:  [16] 16  BP: ()/(61-77) 109/77  SpO2:  [95 %-98 %] 95 %    Intake/Output Summary (Last 24 hours) at 04/30/17 0858  Last data filed at 04/30/17 0659   Gross per 24 hour   Intake          2260.46 ml   Output             1050 ml   Net          1210.46 ml       GENERAL:  Awake, alert, no acute distress.  ABDOMEN:  Soft, NT, non-distended.    LABS:  Lab Results   Component Value Date    WBC 6.4 04/28/2017     Lab Results   Component Value Date    HGB 13.3 04/28/2017     Lab Results   Component Value Date    HCT 40.3 04/28/2017     Lab Results   Component Value Date     04/28/2017     Last Basic Metabolic Panel:  Lab Results   Component Value Date     04/28/2017      Lab Results   Component Value Date    POTASSIUM 3.9 04/28/2017     Lab Results   Component Value Date    CHLORIDE 105 04/28/2017     Lab Results   Component Value Date    JONATAN 8.0 04/28/2017     Lab Results   Component Value Date    CO2 27 04/28/2017     Lab Results   Component Value Date    BUN 13 04/28/2017     Lab Results   Component Value Date    CR 0.68 04/28/2017     Lab Results   Component Value Date    GLC 94 04/28/2017       ASSESSMENT/PLAN:  Ms. Gerhardt is a 41-year-old female with ongoing small bowel obstruction secondary to radiation for cervical cancer. Her symptoms have worsened over the past few weeks and MRE 2 days ago showing a high-grade distal small-bowel obstruction. Admitted for TPN, bowel rest, and once her nutritional status improves the plan would likely be for surgery in a few weeks.     -SW consult for home TPN  - will try oral pain medications today, IV available for breakthrough  -  possible dc home with home TPN in 1-2 days if able to work this out with insurance.      Reviewed with attending staff on call Dr. Seo.    Lisa Simmons MD  Fellow- Colon & Rectal Surgery     Attestation:  This patient has been seen and evaluated by me, Renetta Seo.  Discussed with the house staff team or resident(s) and agree with the findings and plan in this note.  Renetta Seo MD  Colon and Rectal Surgery Attending  [phone]

## 2017-05-01 ENCOUNTER — TRANSFERRED RECORDS (OUTPATIENT)
Dept: HEALTH INFORMATION MANAGEMENT | Facility: CLINIC | Age: 42
End: 2017-05-01

## 2017-05-01 VITALS
HEART RATE: 79 BPM | RESPIRATION RATE: 16 BRPM | TEMPERATURE: 97.9 F | OXYGEN SATURATION: 97 % | SYSTOLIC BLOOD PRESSURE: 99 MMHG | DIASTOLIC BLOOD PRESSURE: 65 MMHG

## 2017-05-01 DIAGNOSIS — K56.609 SMALL BOWEL OBSTRUCTION (H): Primary | ICD-10-CM

## 2017-05-01 LAB
ALBUMIN SERPL-MCNC: 2.5 G/DL (ref 3.4–5)
ALP SERPL-CCNC: 89 U/L (ref 40–150)
ALT SERPL W P-5'-P-CCNC: 19 U/L (ref 0–50)
ANION GAP SERPL CALCULATED.3IONS-SCNC: 6 MMOL/L (ref 3–14)
AST SERPL W P-5'-P-CCNC: 11 U/L (ref 0–45)
BILIRUB SERPL-MCNC: 1.2 MG/DL (ref 0.2–1.3)
BUN SERPL-MCNC: 12 MG/DL (ref 7–30)
CALCIUM SERPL-MCNC: 8.2 MG/DL (ref 8.5–10.1)
CHLORIDE SERPL-SCNC: 103 MMOL/L (ref 94–109)
CO2 SERPL-SCNC: 27 MMOL/L (ref 20–32)
CREAT SERPL-MCNC: 0.54 MG/DL (ref 0.52–1.04)
GFR SERPL CREATININE-BSD FRML MDRD: ABNORMAL ML/MIN/1.7M2
GLUCOSE SERPL-MCNC: 103 MG/DL (ref 70–99)
INR PPP: 0.96 (ref 0.86–1.14)
MAGNESIUM SERPL-MCNC: 2 MG/DL (ref 1.6–2.3)
PHOSPHATE SERPL-MCNC: 3.1 MG/DL (ref 2.5–4.5)
POTASSIUM SERPL-SCNC: 4.7 MMOL/L (ref 3.4–5.3)
PREALB SERPL IA-MCNC: 15 MG/DL (ref 15–45)
PREALB SERPL IA-MCNC: 20 MG/DL (ref 15–45)
PROT SERPL-MCNC: 5.6 G/DL (ref 6.8–8.8)
SODIUM SERPL-SCNC: 136 MMOL/L (ref 133–144)

## 2017-05-01 PROCEDURE — 40000802 ZZH SITE CHECK

## 2017-05-01 PROCEDURE — 85610 PROTHROMBIN TIME: CPT | Performed by: COLON & RECTAL SURGERY

## 2017-05-01 PROCEDURE — 25000132 ZZH RX MED GY IP 250 OP 250 PS 637: Performed by: STUDENT IN AN ORGANIZED HEALTH CARE EDUCATION/TRAINING PROGRAM

## 2017-05-01 PROCEDURE — 25000125 ZZHC RX 250: Performed by: PHYSICIAN ASSISTANT

## 2017-05-01 PROCEDURE — 84100 ASSAY OF PHOSPHORUS: CPT | Performed by: COLON & RECTAL SURGERY

## 2017-05-01 PROCEDURE — 25000128 H RX IP 250 OP 636: Performed by: STUDENT IN AN ORGANIZED HEALTH CARE EDUCATION/TRAINING PROGRAM

## 2017-05-01 PROCEDURE — 80053 COMPREHEN METABOLIC PANEL: CPT | Performed by: COLON & RECTAL SURGERY

## 2017-05-01 PROCEDURE — 83735 ASSAY OF MAGNESIUM: CPT | Performed by: COLON & RECTAL SURGERY

## 2017-05-01 PROCEDURE — 36592 COLLECT BLOOD FROM PICC: CPT | Performed by: COLON & RECTAL SURGERY

## 2017-05-01 PROCEDURE — 84134 ASSAY OF PREALBUMIN: CPT | Performed by: COLON & RECTAL SURGERY

## 2017-05-01 RX ORDER — HYDROMORPHONE HYDROCHLORIDE 2 MG/1
2 TABLET ORAL EVERY 6 HOURS PRN
Qty: 28 TABLET | Refills: 0 | Status: SHIPPED | OUTPATIENT
Start: 2017-05-01 | End: 2017-05-15 | Stop reason: DRUGHIGH

## 2017-05-01 RX ORDER — HYDROMORPHONE HYDROCHLORIDE 2 MG/1
2 TABLET ORAL EVERY 4 HOURS PRN
Qty: 28 TABLET | Refills: 0 | Status: SHIPPED | OUTPATIENT
Start: 2017-05-01 | End: 2017-05-01

## 2017-05-01 RX ORDER — CEFOTETAN DISODIUM 1 G/10ML
1 INJECTION, POWDER, FOR SOLUTION INTRAMUSCULAR; INTRAVENOUS
Status: CANCELLED | OUTPATIENT
Start: 2017-05-01

## 2017-05-01 RX ORDER — CEFOTETAN DISODIUM 1 G/10ML
1 INJECTION, POWDER, FOR SOLUTION INTRAMUSCULAR; INTRAVENOUS SEE ADMIN INSTRUCTIONS
Status: CANCELLED | OUTPATIENT
Start: 2017-05-01

## 2017-05-01 RX ORDER — ONDANSETRON 4 MG/1
4-8 TABLET, FILM COATED ORAL EVERY 6 HOURS PRN
Qty: 28 TABLET | Refills: 0 | Status: SHIPPED | OUTPATIENT
Start: 2017-05-01 | End: 2017-05-15

## 2017-05-01 RX ORDER — ONDANSETRON 4 MG/1
4-8 TABLET, FILM COATED ORAL EVERY 6 HOURS PRN
Status: DISCONTINUED | OUTPATIENT
Start: 2017-05-01 | End: 2017-05-01 | Stop reason: HOSPADM

## 2017-05-01 RX ORDER — ACETAMINOPHEN 325 MG/1
975 TABLET ORAL ONCE
Status: CANCELLED | OUTPATIENT
Start: 2017-05-01 | End: 2017-05-01

## 2017-05-01 RX ORDER — HEPARIN SODIUM 5000 [USP'U]/.5ML
5000 INJECTION, SOLUTION INTRAVENOUS; SUBCUTANEOUS
Status: CANCELLED | OUTPATIENT
Start: 2017-05-01

## 2017-05-01 RX ORDER — GRANISETRON HYDROCHLORIDE 1 MG/ML
1 INJECTION INTRAVENOUS ONCE
Status: CANCELLED | OUTPATIENT
Start: 2017-05-01 | End: 2017-05-01

## 2017-05-01 RX ADMIN — HYDROMORPHONE HYDROCHLORIDE 2 MG: 2 TABLET ORAL at 12:03

## 2017-05-01 RX ADMIN — ONDANSETRON 8 MG: 2 INJECTION INTRAMUSCULAR; INTRAVENOUS at 09:00

## 2017-05-01 RX ADMIN — HYDROMORPHONE HYDROCHLORIDE 2 MG: 2 TABLET ORAL at 06:40

## 2017-05-01 RX ADMIN — HYDROMORPHONE HYDROCHLORIDE 0.5 MG: 1 INJECTION, SOLUTION INTRAMUSCULAR; INTRAVENOUS; SUBCUTANEOUS at 09:37

## 2017-05-01 RX ADMIN — ONDANSETRON HYDROCHLORIDE 8 MG: 4 TABLET, FILM COATED ORAL at 15:20

## 2017-05-01 RX ADMIN — HYDROMORPHONE HYDROCHLORIDE 2 MG: 2 TABLET ORAL at 16:29

## 2017-05-01 ASSESSMENT — PAIN DESCRIPTION - DESCRIPTORS
DESCRIPTORS: SHARP
DESCRIPTORS: STABBING
DESCRIPTORS: CRAMPING
DESCRIPTORS: SHARP;ACHING
DESCRIPTORS: SHARP;STABBING
DESCRIPTORS: SHARP

## 2017-05-01 NOTE — PLAN OF CARE
Problem: Goal Outcome Summary  Goal: Goal Outcome Summary  Outcome: Adequate for Discharge Date Met:  05/01/17  Returned from [facility] at 1615, up ad dania, continues to c/o persistent nausea and has vomited twice since ingesting some broth and juice around 1500 today.  Says she is feeling better since vomiting and was able to tolerate PO dilaudid at 1630.  Continuous TPN infusing, home infusion to follow-up tomorrow to convert to cycled TPN.  Discharge instructions, PICC cares, home TPN schedule, pain and nausea management and follow-up appointments discussed, questions answered.  Meds to discharge pharmacy.  Wheelchair to door, home with parents at 1745.

## 2017-05-01 NOTE — PLAN OF CARE
Problem: Individualization  Goal: Patient Preferences  Outcome: No Change  BP 97/65 (BP Location: Left arm)  Pulse 77  Temp 98.2  F (36.8  C) (Oral)  Resp 18  SpO2 95% on RA.  Abdominal pain treated with prn po Dilaudid x1 & prn IV Dilaudid x1. Nausea controlled with IV Zofran x1.  Voiding adequate amounts, no stools this shift.   TPN at 60cc/hour, Lipids at 20.8cc/hour, MIVF at 50cc/hour.  Pt. up ad dania in room. [facility] tomorrow at 1530 for TPN teaching.  Continue to follow POC.

## 2017-05-01 NOTE — DISCHARGE SUMMARY
[facility]  Discharge Summary  Colon and Rectal Surgery     Rachel A Gerhardt MRN# [id number]   YOB: 1975 Age: 41 year old     Date of Admission:  4/29/2017  Date of Discharge::  5/1/2017  Admitting Physician:  Jennifer Goodwin MD  Discharge Physician:  Jennifer Goodwin MD  Primary Care Physician:        Jennifer Garza          Admission Diagnoses:   malnutrition  Small intestine obstruction (H)         Discharge Diagnosis:   Malnutrition  Small intestine obstruction         Procedures:   PICC line placed this hospitalization         Consultations:   VASCULAR ACCESS ADULT IP CONSULT  PHARMACY/NUTRITION TO START AND MANAGE TPN  SOCIAL WORK IP CONSULT  PATIENT LEARNING CENTER IP CONSULT  GYNECOLOGIC ONCOLOGY IP CONSULT         Imaging Studies:     Results for orders placed or performed during the hospital encounter of 04/02/17   CT Abdomen Pelvis w Contrast    Narrative    EXAMINATION: CT ABDOMEN PELVIS W CONTRAST, 4/3/2017 5:01 AM    TECHNIQUE:  Helical CT images from the lung bases through the  symphysis pubis were obtained with IV contrast. Contrast dose: 104 ml  isovue 370     COMPARISON: CT 12/27/2016    HISTORY: h/o SBO, eval for recurrent distention.  Stage IIB cervical  cancer with minimal extension to upper vagina. She received primary  radiation and chemotherapy.    FINDINGS:    Abdomen and pelvis: Multiple abnormally dilated loops of air and  fluid-filled small bowel similar in appearance to 12/27/2016. This  primarily involves distal jejunum and proximal ileum. The proximal  small bowel and distal ileum are decompressed. There is fluid within  the colon which is not abnormally dilated. Short segment of  questionable small bowel narrowing with associated mucosal  hyperenhancement in the left midabdomen on coronal images 37-33.     The liver, spleen, pancreas, gallbladder, right kidney, and adrenals  are unremarkable. Subcentimeter hypodensity in the left kidney is  too  small to characterize with CT, unchanged. Small amount of free fluid  in the pelvis. The bladder is decompressed. Appendix not confidently  identified. Normal abdominal aorta. No abnormal retroperitoneal,  pelvic, or mesenteric lymphadenopathy.    Suboptimal examination to evaluate for changes/stability of the  patient's known cervical cancer.    Lung bases: Clear.    Bones and soft tissues: No aggressive appearing bony lesion.      Impression    IMPRESSION:   1.  Multiple abnormally dilated loops of small bowel are similar in  appearance to 12/27/2016. There is no clear transition point and  findings may represent partial small bowel obstruction versus adynamic  ileus.   2.  Segments of narrowing and possible mucosal hyperenhancement may be  infectious or inflammatory in the appropriate clinical setting.  3.  Suboptimal exam to evaluate for changes/stability of the patient's  known cervical cancer.    I have personally reviewed the examination and initial interpretation  and I agree with the findings.    VIRGILIO BECKETT MD          Medications Prior to Admission:     Prescriptions Prior to Admission   Medication Sig Dispense Refill Last Dose     calcium carbonate (TUMS) 500 MG chewable tablet Take 500 mg by mouth   Past Week at Unknown time     simethicone (MYLICON) 80 MG chewable tablet Take 80 mg by mouth   Past Week at Unknown time     vitamin D (ERGOCALCIFEROL) 42641 UNIT capsule Take 1 capsule (50,000 Units) by mouth once a week 12 capsule 0 Past Week at Unknown time     albuterol (PROVENTIL HFA: VENTOLIN HFA) 108 (90 BASE) MCG/ACT inhaler Inhale 2 puffs into the lungs every 6 hours as needed.     Past Month at Unknown time     ondansetron (ZOFRAN-ODT) 8 MG ODT tab Take 1 tablet (8 mg) by mouth every 8 hours as needed for nausea 30 tablet 0 Unknown at Unknown time     [DISCONTINUED] senna-docusate (SENOKOT-S;PERICOLACE) 8.6-50 MG per tablet Take 1 tablet by mouth 2 times daily 60 tablet 1 Unknown at Unknown  time     hyoscyamine (ANASPAZ/LEVSIN) 0.125 MG tablet Take 1-2 tablets (125-250 mcg) by mouth every 4 hours as needed for cramping 40 tablet 1 Unknown at Unknown time     [DISCONTINUED] ondansetron (ZOFRAN) 4 MG tablet Take 1-2 tablets (4-8 mg) by mouth every 8 hours as needed for nausea (not to exceed 24 mg per day) 30 tablet 0 4/29/2017 at Unknown time          Discharge Medications:     Current Discharge Medication List      START taking these medications    Details   HYDROmorphone (DILAUDID) 2 MG tablet Take 1 tablet (2 mg) by mouth every 6 hours as needed for moderate to severe pain  Qty: 28 tablet, Refills: 0    Associated Diagnoses: Small intestine obstruction (H)         CONTINUE these medications which have CHANGED    Details   ondansetron (ZOFRAN) 4 MG tablet Take 1-2 tablets (4-8 mg) by mouth every 6 hours as needed for nausea or vomiting  Qty: 28 tablet, Refills: 0    Associated Diagnoses: Small intestine obstruction (H)         CONTINUE these medications which have NOT CHANGED    Details   calcium carbonate (TUMS) 500 MG chewable tablet Take 500 mg by mouth      simethicone (MYLICON) 80 MG chewable tablet Take 80 mg by mouth      vitamin D (ERGOCALCIFEROL) 08242 UNIT capsule Take 1 capsule (50,000 Units) by mouth once a week  Qty: 12 capsule, Refills: 0    Associated Diagnoses: Vitamin D deficiency      albuterol (PROVENTIL HFA: VENTOLIN HFA) 108 (90 BASE) MCG/ACT inhaler Inhale 2 puffs into the lungs every 6 hours as needed.        hyoscyamine (ANASPAZ/LEVSIN) 0.125 MG tablet Take 1-2 tablets (125-250 mcg) by mouth every 4 hours as needed for cramping  Qty: 40 tablet, Refills: 1    Associated Diagnoses: Abdominal pain, generalized         STOP taking these medications       ondansetron (ZOFRAN-ODT) 8 MG ODT tab Comments:   Reason for Stopping:         senna-docusate (SENOKOT-S;PERICOLACE) 8.6-50 MG per tablet Comments:   Reason for Stopping:                     Brief History of Illness:   This is a 41  year old female with h/o cervical cancer 2b s/p radiation and chemo, h/o opiate dependence previously on suboxone, has had recurrent partial SBO. Admitted with continued pSBO, associated with 60 pound weight loss. Recent MRE with [facility] shows high grade distal SBO.            Hospital Course:   Pt was admitted and a PICC line was placed and pt was initiated on TPN.  It was determined that pt has coverage for home PICC and TPN.  Pt and mother attended PLC class for PICC and TPN.  Case was briefly discussed with the Gyn-Onc team and they will help expedite outpatient follow up and PET scan to assess for any potential cervical cancer recurrence.  In the mean time, TPN to improve nutrition with ultimate plan for exploratory surgery with Dr. Goodwin to address ongoing obstruction in the upcoming 2-4 weeks pending pre-operative appointments.      Patient is to follow up in the Colon and Rectal Surgery Clinic in 1 week with Umm Kaufman NP and then with Dr. Goodwni in 2-3 weeks after.          Day of Discharge Physical Exam:   Blood pressure 105/63, pulse 73, temperature 96.6  F (35.9  C), temperature source Oral, resp. rate 16, SpO2 95 %, not currently breastfeeding.    Gen: AAOx3, NAD  Pulm: Non-labored breathing  Abd: Soft, mildly tender, no guarding/rebound  Ext:  Warm and well-perfused         Discharge Instructions and Follow-Up:       Discharge Procedure Orders  PET Oncology (Eyes to Thighs)   Standing Status: Future  Standing Exp. Date: 05/01/18   Order Comments: If your scan is being scheduled at the hospital, call [phone] for Pre-authorization.    If your scan is being scheduled at the [facility], call [phone] for scheduling and prior authorization.    Patient Preparation  1.  NPO (except water and medications) for 6 hours prior to exam  2.  No physical exercise for 24 hours prior to exam (for example: running)  3.  If possible, please schedule known diabetic patients  for early AM time slots.  4.  Patient should arrive 1/2 hours prior to exam scheduled start   5.  Patient should expect the exam to last 2-3 hours  6.  Patient may need to sign ABN or waiver prior to exam.     Technical Questions if scheduled at the hospital?   [phone]  Technical Questions if scheduled at [facility]?  [phone]    Call to schedule:    Monday through Friday [facility] ([facility]) [phone]  Monday through Saturday [facility] [phone]  Monday [facility] [place] [phone]  Tuesday [facility] [place] (AM) [phone]  Tuesday [facility] (PM) [phone]  Wednesday [place] [phone]  Thursday [facility] [place] [phone]  Friday [facility] (AM) [phone]  Friday [facility] (PM) [phone]       Order Specific Question Answer Comments   Has PET Scan been pre-authorized? NO - Must be pre-authorized before ordered.  Call [phone]    Has Cancer Diagnosis been pathologically proven? YES - ICD-10 required.  Please enter in comment box . cervical cancer   Is patient pregnant? Not Pregnant    Is the patient breast feeding? No    PET/CT with Diagnostic CT? (Contrast is used, reading provided) Chest/Abd/Pelvis CT    Is patient Diabetic? Not Diabetic    Do you think the patient will require radiation? NO      Home infusion referral     Reason for your hospital stay   Order Comments: You were admitted for a partial small bowel obstruction.     Reason for your hospital stay   Order Comments: You were admitted for a partial small bowel obstruction requiring a PICC line and IV nutrition.     Activity   Order Comments: Your activity upon discharge:   -As tolerated  -protect PICC line from getting wet.  Avoid strenuous activity with PICC line in place.   Order Specific Question Answer Comments   Is discharge order? Yes      [facility] Follow-up and recommended labs and tests   Order Comments: A PET/CT scan has  been ordered for you and is in the process of being authorized by your insurance. You will be contacted about a date and time for this test.     Dr Fatima gynecologic cancer clinic follow up on 5/18/17. Please call prior to then for questions or concerns [phone] or [phone]    Appointments on [place] and/or [facility] (with [facility] or [facility] provider or service). Call [phone] if you haven't heard regarding these appointments within 7 days of discharge.     Adult [facility] Follow-up and recommended labs and tests   Order Comments: DIET  -Clear Liquids for comfort.    -Main source of nutrition is from IV TPN.     ACTIVITY  -As tolerated  -protect PICC line from getting wet.  Avoid strenuous activity with PICC line in place.     NOTIFY  Please contact Kanchan Martinez RN or Yolanda Herzog RN at [phone] for problems after discharge such as:  -Temperature > 101F, chills, rigors, dizziness  -Inability to tolerate diet, nausea or vomiting  -You stop passing gas, develop significant bloating, abdominal pain  -Have blood in stools/vomit  -Have severe diarrhea/constipation  -Any other questions or concerns.  - At nights (after 4:30pm), on weekends, or if urgent, call [phone] and ask the  to speak with the on-call Colorectal Surgery resident or fellow    Medication Instructions  Some of your medications may have changed. Please take only prescribed and resumed medications     FOLLOW-UP  1.  You will need to follow-up with Umm Kaufman NP in the Colon and Rectal Surgery clinic in 1 week(s) and then with Dr. Goodwin in 2-3 weeks after.  Please contact our clinic scheduler Bianca Pavon (phone # [phone]) or Margot Woods (phone # [phone]) if you have not heard from our clinic in 3 business days afer discharge to schedule a follow-up appointment. Please bring your log of daily ileostomy outputs to your follow up clinic appointment.   2.  Follow up with your primary care provider in  1-2 weeks after discharge from the hospital to review this hospitalization.   3.  Use minimal amounts of opiates as possible and avoid all together if possible.  Follow up with Dr. Jones as needed.  4.  You have been scheduled to see Dr. Fatima.  You will also need a PET scan to re-evaluate the status of your cervical cancer.  Both the PET scan and your appointment with Dr. Fatima is needed prior to being able to have surgery to address your small bowel obstruction.       *For other appointments on [place] and/or [facility] (with [facility] or [facility] provider or service): Call [phone] if you have not heard regarding these appointments within 7 days of discharge.[facility]      Appointments on [place] and/or [facility] (with [facility] or [facility] provider or service). Call [phone] if you haven't heard regarding these appointments within 7 days of discharge.     Full Code     Full Code     Diet   Order Comments: Follow this diet upon discharge:  -Clear Liquids for comfort.    -Main source of nutrition is from IV TPN.   Order Specific Question Answer Comments   Is discharge order? Yes               Home Health Care:   Arranged           Discharge Disposition:   Discharged to home      Condition at discharge: Stable      Julieta Rob PA-C  Colon and Rectal Surgery     Pt was seen and discussed with Dr. Goodwin on 5/1/2017.

## 2017-05-01 NOTE — PLAN OF CARE
Problem: Goal Outcome Summary  Goal: Goal Outcome Summary  Outcome: No Change  AVSS, pt states she has continuous nausea-zofran given to help manage. No emesis. Pain managed by PO dilaudid X1 and IV Dilaudid X1. Pt stated she would like to take IV dilaudid as long as she has it available here, she mine as well take it, writer explained to patient that she will be discharging, so the idea is to keep her on PO medication, she complied. Pt up ad dania. R PICC is WDL, c/d/i, infusing TPN. R PIV removed. Pt states she is passing flatus and last BM yesterday, voiding spontaneously. Awaiting mother to come and will be going to [facility] for PICC teaching, and then will be getting hooked up to home TPN, and will be discharged after that. Will continue to monitor and follow POC.

## 2017-05-01 NOTE — PROGRESS NOTES
Home Infusion  Jenni is discharging today and will be going home on continuous TPN.    She has never done home IV therapy with [facility] before however she and her mother will be attending  the [facility] for initial teaching prior to discharge.  Met with Jenni at bedside and provided her with information about [facility] services.  Explained about administration method and that [facility] will teach her or a CG to prep and self-administerher TPN over the course of the next few days.  Informed her about supplies and delivery of supplies, storage of medication, cycle schedule (currently continuous but plan for cycled in near future), plan for SNV and 24/7 availability of I staff while on IV therapy.   TPN and supplies were delivered to Jenni's hospital room and I prepped and transitioned her to her home TPN.  [facility] nurse  will contact Jenni regarding her home nursing visit for tomorrow.  Her home pump is up and running and Jenni is ready for discharge from [facility] perspective.  She has [facility] contact phone # for any questions or concerns that may come up.  Jenni verbalized understanding of all information given.   She is willing and able to learn and manage home IV therapy.  Questions answered.  Natalie PIERSON  [facility] Liaison  [phone] pager

## 2017-05-01 NOTE — PLAN OF CARE
05/01/17 0727     Klisch, Christine M-Registered Nurse (Nursing)  Patient and mom seen in [facility] for TPN. Jenni was not feeling well but paid attention as best as she could. I demonstrated TPN setup and takedown but she did not feel up to practicing. Home care nurse had already set her up and did some teaching and will she her tomorrow for additional teaching

## 2017-05-01 NOTE — PLAN OF CARE
Problem: Goal Outcome Summary  Goal: Goal Outcome Summary  Outcome: Improving  BP 96/63, OVSS. Pt declines nausea. Pain managed by IV Dilaudid. Pt up ad dania. R PICC is WDL, c/d/i, infusing. R PIV is WDL, infusing. Pt states she is passing flatus and stool, voiding spontaneously. Pt NPO. Abdomen has old burn from heating pack. Plan of care: manage pain and bowel obstruction.

## 2017-05-01 NOTE — PROGRESS NOTES
Colorectal Surgery Progress Note    Subjective:  Doing ok. Awaiting PLC training today for PICC line.  Has been passing gas and stools intermittently. None today yet but feels like something may pass thru soon.  Mild pain. States the pain meds help her bear down to pass stool.     Vitals:  Vitals:    04/30/17 1410 04/30/17 1700 04/30/17 2237 05/01/17 0733   BP: 91/46 97/65 97/63 105/63   BP Location: Right arm Left arm Left arm Left arm   Pulse: 73 77 85 73   Resp: 16 18 18 16   Temp: 98.2  F (36.8  C)  97.8  F (36.6  C) 96.6  F (35.9  C)   TempSrc: Oral  Oral Oral   SpO2: 95%  94% 95%     I/O:  I/O last 3 completed shifts:  In: 1948.34 [P.O.:60; I.V.:1200]  Out: 3000 [Urine:2700; Emesis/NG output:300]    Physical Exam:  Gen: AAOx3, NAD, ambulating without difficulties  Pulm: Non-labored breathing  Abd: Soft, non-distended, mildly tender, no guarding/rebound  Ext:  Warm and well-perfused    BMP  Recent Labs  Lab 05/01/17  0852 04/30/17  0843 04/28/17  1759    139 139   POTASSIUM 4.7 4.7 3.9   CHLORIDE 103 106 105   CO2 27 26 27   BUN 12 13 13   CR 0.54 0.58 0.68   * 98 94   MAG 2.0 1.8 1.6   PHOS 3.1 3.1 3.2     CBC  Recent Labs  Lab 04/28/17  1759   WBC 6.4   HGB 13.3   HCT 40.3            ASSESSMENT: This is a 41 year old female with h/o cervical cancer 2b s/p radiation and chemo, h/o opiate dependence previously on suboxone, has had recurrent partial SBO.  Admitted with continued pSBO, weight loss.  Recent MRE with [facility] shows high grade distal SBO.      - CLD for comfort, discussed should keep low volume  - TPN  - f/u with Gyn Onc as outpt for PET scan and f/u with Dr. Fatima. This should be done prior to surgery  - plan to improve nutritional status and then plan surgery  - DC home today with PICC line     Julieta Rob PA-C   Colon and Rectal Surgery  9409     Patient was seen and plan was discussed with Dr. Goodwin    Attending addendum;  Seen and examined. Agree with above documentation.  DC home when TPN teaching and outpatient arrangments have been secured.  Abdomen is benign- no indications for exploratory laparotomy today.

## 2017-05-02 ENCOUNTER — TELEPHONE (OUTPATIENT)
Dept: SURGERY | Facility: CLINIC | Age: 42
End: 2017-05-02

## 2017-05-02 ENCOUNTER — CARE COORDINATION (OUTPATIENT)
Dept: CARE COORDINATION | Facility: CLINIC | Age: 42
End: 2017-05-02

## 2017-05-02 ENCOUNTER — TELEPHONE (OUTPATIENT)
Dept: INTERNAL MEDICINE | Facility: CLINIC | Age: 42
End: 2017-05-02

## 2017-05-02 DIAGNOSIS — K56.609 SMALL BOWEL OBSTRUCTION (H): Primary | ICD-10-CM

## 2017-05-02 LAB
ALBUMIN SERPL-MCNC: 2.5 G/DL (ref 3.4–5)
ALP SERPL-CCNC: 74 U/L (ref 40–150)
ALT SERPL W P-5'-P-CCNC: 18 U/L (ref 0–50)
ANION GAP SERPL CALCULATED.3IONS-SCNC: 5 MMOL/L (ref 3–14)
AST SERPL W P-5'-P-CCNC: 13 U/L (ref 0–45)
BASOPHILS # BLD AUTO: 0 10E9/L (ref 0–0.2)
BASOPHILS NFR BLD AUTO: 0.2 %
BILIRUB DIRECT SERPL-MCNC: <0.1 MG/DL (ref 0–0.2)
BILIRUB SERPL-MCNC: 0.1 MG/DL (ref 0.2–1.3)
BUN SERPL-MCNC: 17 MG/DL (ref 7–30)
CALCIUM SERPL-MCNC: 8.3 MG/DL (ref 8.5–10.1)
CHLORIDE SERPL-SCNC: 109 MMOL/L (ref 94–109)
CO2 SERPL-SCNC: 29 MMOL/L (ref 20–32)
CREAT SERPL-MCNC: 0.64 MG/DL (ref 0.52–1.04)
DIFFERENTIAL METHOD BLD: ABNORMAL
EOSINOPHIL # BLD AUTO: 0.1 10E9/L (ref 0–0.7)
EOSINOPHIL NFR BLD AUTO: 1.2 %
ERYTHROCYTE [DISTWIDTH] IN BLOOD BY AUTOMATED COUNT: 13.5 % (ref 10–15)
GFR SERPL CREATININE-BSD FRML MDRD: ABNORMAL ML/MIN/1.7M2
GLUCOSE SERPL-MCNC: 68 MG/DL (ref 70–99)
HCT VFR BLD AUTO: 38 % (ref 35–47)
HGB BLD-MCNC: 12.6 G/DL (ref 11.7–15.7)
IMM GRANULOCYTES # BLD: 0.1 10E9/L (ref 0–0.4)
IMM GRANULOCYTES NFR BLD: 0.7 %
LYMPHOCYTES # BLD AUTO: 2 10E9/L (ref 0.8–5.3)
LYMPHOCYTES NFR BLD AUTO: 24.2 %
MAGNESIUM SERPL-MCNC: 1.9 MG/DL (ref 1.6–2.3)
MCH RBC QN AUTO: 33.7 PG (ref 26.5–33)
MCHC RBC AUTO-ENTMCNC: 33.2 G/DL (ref 31.5–36.5)
MCV RBC AUTO: 102 FL (ref 78–100)
MONOCYTES # BLD AUTO: 0.6 10E9/L (ref 0–1.3)
MONOCYTES NFR BLD AUTO: 6.8 %
NEUTROPHILS # BLD AUTO: 5.5 10E9/L (ref 1.6–8.3)
NEUTROPHILS NFR BLD AUTO: 66.9 %
NRBC # BLD AUTO: 0 10*3/UL
NRBC BLD AUTO-RTO: 0 /100
PHOSPHATE SERPL-MCNC: 3.8 MG/DL (ref 2.5–4.5)
PLATELET # BLD AUTO: 344 10E9/L (ref 150–450)
POTASSIUM SERPL-SCNC: 4.5 MMOL/L (ref 3.4–5.3)
PREALB SERPL IA-MCNC: 19 MG/DL (ref 15–45)
PROT SERPL-MCNC: 5.6 G/DL (ref 6.8–8.8)
RBC # BLD AUTO: 3.74 10E12/L (ref 3.8–5.2)
SODIUM SERPL-SCNC: 143 MMOL/L (ref 133–144)
TRIGL SERPL-MCNC: 119 MG/DL
WBC # BLD AUTO: 8.2 10E9/L (ref 4–11)

## 2017-05-02 PROCEDURE — 84478 ASSAY OF TRIGLYCERIDES: CPT | Performed by: COLON & RECTAL SURGERY

## 2017-05-02 PROCEDURE — 84100 ASSAY OF PHOSPHORUS: CPT | Performed by: COLON & RECTAL SURGERY

## 2017-05-02 PROCEDURE — 85025 COMPLETE CBC W/AUTO DIFF WBC: CPT | Performed by: COLON & RECTAL SURGERY

## 2017-05-02 PROCEDURE — 84134 ASSAY OF PREALBUMIN: CPT | Performed by: COLON & RECTAL SURGERY

## 2017-05-02 PROCEDURE — 83735 ASSAY OF MAGNESIUM: CPT | Performed by: COLON & RECTAL SURGERY

## 2017-05-02 PROCEDURE — 80053 COMPREHEN METABOLIC PANEL: CPT | Performed by: COLON & RECTAL SURGERY

## 2017-05-02 PROCEDURE — 82248 BILIRUBIN DIRECT: CPT | Performed by: COLON & RECTAL SURGERY

## 2017-05-02 RX ORDER — ONDANSETRON 4 MG/1
4-8 TABLET, FILM COATED ORAL EVERY 8 HOURS PRN
Qty: 60 TABLET | Refills: 0 | Status: SHIPPED | OUTPATIENT
Start: 2017-05-02 | End: 2017-05-15

## 2017-05-02 NOTE — TELEPHONE ENCOUNTER
Jenni is calling for Dr. Goodwin.  She reports she was discharged from the hospital yesterday on Zofran 4 mg 1-2 tabs every 6 hours #28.  She is needing 2 tabs Q 6.  INSURANCE WILL ONLY PAY FOR #18 IN 21 DAYS. Needs quantity override. Now only has 2 days worth.      I requested she call discharge pharmacy and have them fax the paperwork to us.  Will route to Dr. Buddy Guerrero's clinic nurse.

## 2017-05-02 NOTE — TELEPHONE ENCOUNTER
Spoke to pt--she states outpt pharmacy is doing authorization for more zofran.  She c/o pain despite dilaudid at prescribed dose.  Suggested to her she begin taking acetaminophen on a scheduled basis up to 4,000 mg/24 hrs. And hopefully spacing out the narcotic doses.  She stated she will try this.

## 2017-05-02 NOTE — TELEPHONE ENCOUNTER
Hello,  Pt recently seen in Colorectal clinic for abdominal pain, obstructions. I believe started on TPN?  Just want to check on her and see how she is doing?  What are plans for her care?  Thanks,  Jennifer Garza MD  Internal Medicine    Pt states she is doing fine. Has several appointments in the next few weeks. Somewhat anxious about what may be found and DX.  Deanna Degroot RN 8:55 AM on 5/3/2017.

## 2017-05-02 NOTE — TELEPHONE ENCOUNTER
Per discharge summary, I called Jenni and scheduled post ed appointments for her with Umm Kaufman NP and Dr. Goodwin. I confirmed times, dates, location and providers.

## 2017-05-02 NOTE — PROGRESS NOTES
Patient was Discharged from Colon and Rectal Surgery so their Care Coordinators will handle patient's Post Discharge Follow up              [facility]  Discharge Summary  Colon and Rectal Surgery      Rachel A Gerhardt MRN# [id number]   YOB: 1975 Age: 41 year old      Date of Admission:  4/29/2017  Date of Discharge::  5/1/2017  Admitting Physician:  Jennifer Goodwin MD  Discharge Physician:  Jennifer Goodwin MD  Primary Care Physician: Jennifer Garza

## 2017-05-03 ENCOUNTER — CARE COORDINATION (OUTPATIENT)
Dept: SURGERY | Facility: CLINIC | Age: 42
End: 2017-05-03

## 2017-05-03 ENCOUNTER — MEDICAL CORRESPONDENCE (OUTPATIENT)
Dept: HEALTH INFORMATION MANAGEMENT | Facility: CLINIC | Age: 42
End: 2017-05-03

## 2017-05-03 ENCOUNTER — TELEPHONE (OUTPATIENT)
Dept: SURGERY | Facility: CLINIC | Age: 42
End: 2017-05-03

## 2017-05-03 NOTE — TELEPHONE ENCOUNTER
Per task, I called Jenni and let her know that Dr. Goodwin would like to see her after her appointment with Dr. Fatima. I rescheduled that appointment and she mentioned that she would be out of dilaudid by the time the appointment with Dr. Goodwin. I told her that I would let Dr. Goodwin know that. I confirmed time, date, location and provider.

## 2017-05-03 NOTE — PROGRESS NOTES
Pt states now that f/u with Dr. COMBS has been moved to 5/22/17 she is afraid she will run out of Dilaudid.  Pt states she bought some Tylenol last night and has begun taking that on a scheduled basis and will supplement that with the Dilaudid.  Let her know this is too soon to really see a difference but to keep this regimen up.  She has f/u with PCP on 5/8 and can discuss further if this is not adequate.  Pt ok with this plan.

## 2017-05-03 NOTE — TELEPHONE ENCOUNTER
----- Message from Jennifer Goodwin MD sent at 5/2/2017 12:35 PM CDT -----  Yep, just let her know that i won't have much to say until after the visit with rossana.  If she wants to see me again in the office then I will make it happen  Otherwise she is pretty savvy and some of this stuff can be taken care of on the phone while I figure out how to plan her procedure.    Thanks,  mrk  ----- Message -----     From: Margot Woods     Sent: 5/2/2017  12:27 PM       To: Jennifer Goodwin MD    I was trying to get her in before the surgery and you didn't have anything else open that wouldn't be an overbook. I can call her back and offer another date. I wasn't given the parameters of the PET scan to go off of when I called to schedule an appt for her to see you.    ----- Message -----     From: Jennifer Goodwin MD     Sent: 5/2/2017  12:14 PM       To: Margot Woods    There won't be much to talk about at that date.  Did she request? I need to talk to her after her PET and visit with Rossana. i can do that by phone or in the office.     If it is too difficult to rearrange, that's fine.    ----- Message -----     From: Margot Woods     Sent: 5/2/2017  12:05 PM       To: Jennifer Goodwin MD    Okay. Thanks. Please let me know if you need anything else. She is coming in to see you on Friday 05/12/2017 at 3:30pm.    ----- Message -----     From: Jennifer Goodwin MD     Sent: 5/2/2017  12:02 PM       To: Margot Woods    Good information to have.  Why don't you wait on this until i figure out more about the other 2 cases that appear to be on the schedule that day and how i will juggle all of this.      ----- Message -----     From: Margot Woods     Sent: 5/2/2017  10:48 AM       To: Jennifer Goodwin MD    It sounds like Rossana is on call that week and will be around Tuesday,05/30 and  Wednesday 05/31. I talked to urology and they said [place] could do stents on Wednesday 05/31. Would you like me to schedule anything now? Or do you want  me to wait until Thursday, 05/18 since the patient is seeing Kushal that day?    Thanks,  Margot   ----- Message -----     From: Jennifer Goodwin MD     Sent: 5/1/2017   2:33 PM       To: Margot Woods    Perioperative orders are in for this patient.    Try to get it in on Memorial day 5/29, or Tuesday 5/30 or Wednesday 5/31 (for Wednesday, it would have to be a 1pm start or later).    Try to get this on when Dr. Fatima is around as she might have to do something (but probably not).  If kushal is not around on those days, that's OK, just let me know. Patient is seeing kushal on 5/18 so we might have to change plans after that visit, unfortunately for you.    I need cystoscopy and stents with any urologist who would be available.    Thanks!

## 2017-05-04 ENCOUNTER — OFFICE VISIT (OUTPATIENT)
Dept: SURGERY | Facility: CLINIC | Age: 42
End: 2017-05-04

## 2017-05-04 VITALS
SYSTOLIC BLOOD PRESSURE: 117 MMHG | DIASTOLIC BLOOD PRESSURE: 78 MMHG | HEIGHT: 68 IN | OXYGEN SATURATION: 95 % | WEIGHT: 154.1 LBS | HEART RATE: 117 BPM | BODY MASS INDEX: 23.36 KG/M2 | TEMPERATURE: 98.2 F

## 2017-05-04 DIAGNOSIS — K56.609 SMALL BOWEL OBSTRUCTION (H): Primary | ICD-10-CM

## 2017-05-04 ASSESSMENT — ENCOUNTER SYMPTOMS
HEARTBURN: 1
NAUSEA: 1
MEMORY LOSS: 0
CHILLS: 1
POLYDIPSIA: 1
POLYPHAGIA: 1
SKIN CHANGES: 0
JAUNDICE: 0
MYALGIAS: 1
LOSS OF CONSCIOUSNESS: 0
MUSCLE WEAKNESS: 1
DIZZINESS: 1
HALLUCINATIONS: 0
WEAKNESS: 1
STIFFNESS: 0
DIARRHEA: 1
BACK PAIN: 1
BLOATING: 1
CONSTIPATION: 1
DISTURBANCES IN COORDINATION: 0
HEADACHES: 1
TINGLING: 0
NIGHT SWEATS: 1
INCREASED ENERGY: 1
ARTHRALGIAS: 0
ABDOMINAL PAIN: 1
RECTAL BLEEDING: 0
RECTAL PAIN: 0
BOWEL INCONTINENCE: 1
SPEECH CHANGE: 0
NAIL CHANGES: 0
MUSCLE CRAMPS: 1
NUMBNESS: 0
FATIGUE: 1
POOR WOUND HEALING: 0
DECREASED APPETITE: 1
ALTERED TEMPERATURE REGULATION: 1
SEIZURES: 0
TREMORS: 0
VOMITING: 1
BLOOD IN STOOL: 1
WEIGHT LOSS: 1
JOINT SWELLING: 0
WEIGHT GAIN: 0
FEVER: 0
PARALYSIS: 0
NECK PAIN: 0

## 2017-05-04 ASSESSMENT — PAIN SCALES - GENERAL: PAINLEVEL: SEVERE PAIN (6)

## 2017-05-04 NOTE — PROGRESS NOTES
Colon and Rectal Surgery Follow-Up Clinic Note    RE: Rachel A Gerhardt  : 1975  JAKE: 2017    Rachel A Gerhardt is a very pleasant 41 year old female with h/o cervical cancer 2b s/p radiation and chemo, h/o opiate dependence previously on suboxone, has had recurrent partial SBO. Admitted with continued pSBO, associated with 60 pound weight loss. Recent MRE with [facility] showed high grade distal SBO. She was started on TPN and managed conservatively. She discharged to home on 17 with plan for outpatient PET scan to assess for any potential cervical cancer recurrence and follow up with Dr. Fatima and Dr. Goodwin with possible exploratory surgery once nutrition is improved.    Interval history: Jenni  has been doing well since going home. She continues to have nausea but this is controlled with Zofran. She has been tolerating some soft foods without any vomiting. She continues on Dilaudid for pain but is concerned that she will run out of pain medications before she is seen in our clinic again. She is having several loose stools a day. She denies any fevers or chills. She remains on TPN. She has gained 4 pounds and is very pleased with this.    Assessment/Plan:   Jenni is currently doing well. Her nausea is controlled with Zofran and she has been able to tolerate some soft foods. She continues on TPN to improve nutrition and has gained 4 pounds. The PET scan has not yet set up and we will try to help facilitate this with Dr. Tuttle's office. We will then have her follow up with Dr. Goodwin after the PET scan and meeting with Dr. Fatima. Advised her to meet with her primary care provider to discuss ongoing pain medications. Notify the clinic for any worsening pain, vomiting, or with any questions or concerns.   Patient's questions were answered to her stated satisfaction and she is in agreement with this plan.    Medical history:  Past Medical History:   Diagnosis Date     Asthma      Cancer (H)     Per  patient OBGYN, cerivical cancer     Cervical cancer (H)      Other chronic pain      Ovarian cancer (H)      Substance abuse     Outside records indicate past history of narcotics abuse or dependence, but patient denies.       Surgical history:  Past Surgical History:   Procedure Laterality Date     ENT SURGERY  2009    mastoid, sinus     EXAM UNDER ANESTHESIA, INSERT ALEX SLEEVE, UTERINE PLACEMENT OF TANDEM AND RING FOR RAD, ULTRASOUND N/A 12/14/2015    Procedure: EXAM UNDER ANESTHESIA, INSERT ALEX SLEEVE, UTERINE PLACEMENT OF TANDEM AND RING FOR RADIATION, ULTRASOUND GUIDED;  Surgeon: Abby Tony MD;  Location: [facility]     INSERT TANDEM AND CESIUM APPLICATOR CERVIX, ULTRASOUND GUIDED N/A 12/17/2015    Procedure: INSERT TANDEM AND CESIUM APPLICATOR CERVIX, ULTRASOUND GUIDED;  Surgeon: Kika Wood MD;  Location: [facility] OR     KNEE SURGERY       PICC INSERTION Right 04/29/2017    4fr SL BioFlo PICC, 37cm (3cm external) in the R basilic vein w/ tip in the mid SVC.       Problem list:  Patient Active Problem List    Diagnosis Date Noted     Small intestine obstruction (H) 04/29/2017     Priority: Medium     Small bowel obstruction, partial (H) 04/03/2017     Priority: Medium     SBO (small bowel obstruction) (H) 12/27/2016     Priority: Medium     Cervix cancer (H) 09/29/2015     Priority: Medium     Encounter for long-term current use of medication 09/29/2015     Priority: Medium     updating diagnosis code for icd10 cutover       History of polysubstance abuse, +cocaine UDS in 2013 09/16/2015     Priority: Medium     Abdominal pain 09/15/2015     Priority: Medium     Opiate dependence (H) 02/01/2013     Priority: Medium     CARDIOVASCULAR SCREENING; LDL GOAL LESS THAN 160 07/24/2012     Priority: Medium     Pregnancy complicated by chemical dependency, antepartum (H) 07/24/2012     Priority: Medium     (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 07/14/2012     Priority: Medium        Medications:  Current Outpatient Prescriptions   Medication Sig Dispense Refill     ondansetron (ZOFRAN) 4 MG tablet Take 1-2 tablets (4-8 mg) by mouth every 8 hours as needed for nausea 60 tablet 0     HYDROmorphone (DILAUDID) 2 MG tablet Take 1 tablet (2 mg) by mouth every 6 hours as needed for moderate to severe pain 28 tablet 0     ondansetron (ZOFRAN) 4 MG tablet Take 1-2 tablets (4-8 mg) by mouth every 6 hours as needed for nausea or vomiting 28 tablet 0     calcium carbonate (TUMS) 500 MG chewable tablet Take 500 mg by mouth       simethicone (MYLICON) 80 MG chewable tablet Take 80 mg by mouth       vitamin D (ERGOCALCIFEROL) 90734 UNIT capsule Take 1 capsule (50,000 Units) by mouth once a week 12 capsule 0     hyoscyamine (ANASPAZ/LEVSIN) 0.125 MG tablet Take 1-2 tablets (125-250 mcg) by mouth every 4 hours as needed for cramping 40 tablet 1     albuterol (PROVENTIL HFA: VENTOLIN HFA) 108 (90 BASE) MCG/ACT inhaler Inhale 2 puffs into the lungs every 6 hours as needed.           Allergies:  Allergies   Allergen Reactions     No Clinical Screening - See Comments Other (See Comments) and Diarrhea     headache  Carrots cause gastric upset, cramping and diarrhea.     Sulfa Drugs Hives     hives     Amoxicillin Unknown and Other (See Comments)     vomiting  vomiting     Amoxicillin-Pot Clavulanate Other (See Comments) and Nausea     vomiting     Augmentin GI Disturbance, Nausea and Hives     Avelox [Moxifloxacin] Nausea and Vomiting, Unknown and Nausea     Ciprofloxacin Hives and Nausea     Codeine Nausea and Vomiting and Nausea     Ibuprofen Nausea and Vomiting     Other reaction(s): Nausea And Vomiting     Ibuprofen Sodium Hives and GI Disturbance     Quinolones      Tramadol Hives, Diarrhea, Nausea and Nausea and Vomiting     Daucus Carota      Other reaction(s): GI Upset  Other reaction(s): Abdominal pain, Diarrhea  Carrots cause gastric upset, cramping and diarrhea.       Family history:  Family History  "  Problem Relation Age of Onset     DIABETES Mother      Ovarian Cancer No family hx of      Uterine Cancer No family hx of      Cervical Cancer No family hx of      Breast Cancer No family hx of        Social history:  Social History   Substance Use Topics     Smoking status: Light Tobacco Smoker     Packs/day: 0.25     Years: 12.00     Types: Cigarettes     Smokeless tobacco: Never Used      Comment: 2/22/16 pt smoking daily 1 cig     Alcohol use No      Comment: None per pt     Marital status: .    Nursing Notes:   Jennifer Moreno LPN  5/4/2017  1:30 PM  Signed  Chief Complaint   Patient presents with     Clinic Care Coordination - Initial     new       Vitals:    05/04/17 1328   BP: 117/78   Pulse: 117   Temp: 98.2  F (36.8  C)   TempSrc: Oral   SpO2: 95%   Weight: 154 lb 1.6 oz   Height: 5' 8\"       Body mass index is 23.43 kg/(m^2).      Jennifer BHATIA LPN                            Physical Examination:  /78  Pulse 117  Temp 98.2  F (36.8  C) (Oral)  Ht 5' 8\"  Wt 154 lb 1.6 oz  SpO2 95%  BMI 23.43 kg/m2  General: alert, oriented, in no acute distress, sitting comfortably  HEENT: mucous membranes moist   Extremities: PICC line in place  Remainder of the exam was deferred today.    Total face to face time was 10 minutes, >50% counseling.    Umm Kaufman, NP-C  Colon and Rectal Surgery  [facility]    "

## 2017-05-04 NOTE — MR AVS SNAPSHOT
After Visit Summary   5/4/2017    Rachel A Gerhardt    MRN: [id number]           Patient Information     Date Of Birth          1975        Visit Information        Provider Department      5/4/2017 1:00 PM Umm Hutson APRN [facility] Colon and Rectal Surgery        Today's Diagnoses     Small bowel obstruction (H)    -  1       Follow-ups after your visit        Your next 10 appointments already scheduled     May 08, 2017  9:00 AM CDT   (Arrive by 8:45 AM)   Return Visit with Jennifer Garza MD   [facility] ([facility])    [address] [place] [address]  4th [facility] [id number] [phone]            May 18, 2017  3:00 PM CDT   (Arrive by 2:45 PM)   Return Visit with Franny Fatima MD   [facility] ([facility])    [address] [place] [id number] [phone]            May 22, 2017  6:00 PM CDT   (Arrive by 5:45 PM)   Return Visit with Jennifer Goodwin MD   [facility] Colon and Rectal Surgery ([facility])    [address] [place] [id number] [phone]              Who to contact     Please call your clinic at [phone] to:    Ask questions about your health    Make or cancel appointments    Discuss your medicines    Learn about your test results    Speak to your doctor   If you have compliments or concerns about an experience at your clinic, or if you wish to file a complaint, please contact [facility] Patient Relations at [phone] or email us at [email or web address]         Additional Information About Your Visit        MyChart Information     [employer] gives you secure access to your electronic health record. If you see a primary care provider, you can also send messages to your care team and make appointments. If you have questions, please call  "your primary care clinic.  If you do not have a primary care provider, please call [phone] and they will assist you.      [employer] is an electronic gateway that provides easy, online access to your medical records. With [employer], you can request a clinic appointment, read your test results, renew a prescription or communicate with your care team.     To access your existing account, please contact your [facility] or call [phone] for assistance.        Care EveryWhere ID     This is your Care EveryWhere ID. This could be used by other organizations to access your [facility] medical records  [id number]        Your Vitals Were     Pulse Temperature Height Pulse Oximetry BMI (Body Mass Index)       117 98.2  F (36.8  C) (Oral) 5' 8\" 95% 23.43 kg/m2        Blood Pressure from Last 3 Encounters:   05/04/17 117/78   05/01/17 99/65   04/28/17 108/79    Weight from Last 3 Encounters:   05/04/17 154 lb 1.6 oz   04/28/17 150 lb 12.8 oz   04/17/17 152 lb              Today, you had the following     No orders found for display       Primary Care Provider Office Phone # Fax #    Jennifer Garza [phone]       [address] [address]        Thank you!     Thank you for choosing [facility] COLON AND RECTAL SURGERY  for your care. Our goal is always to provide you with excellent care. Hearing back from our patients is one way we can continue to improve our services. Please take a few minutes to complete the written survey that you may receive in the mail after your visit with us. Thank you!             Your Updated Medication List - Protect others around you: Learn how to safely use, store and throw away your medicines at www.disposemymeds.org.          This list is accurate as of: 5/4/17  2:40 PM.  Always use your most recent med list.                   Brand Name Dispense Instructions for use    albuterol 108 (90 BASE) MCG/ACT " Inhaler    PROAIR HFA/PROVENTIL HFA/VENTOLIN HFA     Inhale 2 puffs into the lungs every 6 hours as needed.       HYDROmorphone 2 MG tablet    DILAUDID    28 tablet    Take 1 tablet (2 mg) by mouth every 6 hours as needed for moderate to severe pain       hyoscyamine 0.125 MG tablet    ANASPAZ/LEVSIN    40 tablet    Take 1-2 tablets (125-250 mcg) by mouth every 4 hours as needed for cramping       * ondansetron 4 MG tablet    ZOFRAN    28 tablet    Take 1-2 tablets (4-8 mg) by mouth every 6 hours as needed for nausea or vomiting       * ondansetron 4 MG tablet    ZOFRAN    60 tablet    Take 1-2 tablets (4-8 mg) by mouth every 8 hours as needed for nausea       simethicone 80 MG chewable tablet    MYLICON     Take 80 mg by mouth       TUMS 500 MG chewable tablet   Generic drug:  calcium carbonate      Take 500 mg by mouth       vitamin D 26730 UNIT capsule    ERGOCALCIFEROL    12 capsule    Take 1 capsule (50,000 Units) by mouth once a week       * Notice:  This list has 2 medication(s) that are the same as other medications prescribed for you. Read the directions carefully, and ask your doctor or other care provider to review them with you.

## 2017-05-04 NOTE — LETTER
2017       RE: Rachel A Gerhardt  [address] [facility] [address]     Dear Colleague,    Thank you for referring your patient, Rachel A Gerhardt, to the [facility] COLON AND RECTAL SURGERY at [facility]. Please see a copy of my visit note below.    Colon and Rectal Surgery Follow-Up Clinic Note    RE: Rachel A Gerhardt  : 1975  JAKE: 2017    Rachel A Gerhardt is a very pleasant 41 year old female with h/o cervical cancer 2b s/p radiation and chemo, h/o opiate dependence previously on suboxone, has had recurrent partial SBO. Admitted with continued pSBO, associated with 60 pound weight loss. Recent MRE with [facility] showed high grade distal SBO. She was started on TPN and managed conservatively. She discharged to home on 17 with plan for outpatient PET scan to assess for any potential cervical cancer recurrence and follow up with Dr. Fatima and Dr. Goodwin with possible exploratory surgery once nutrition is improved.    Interval history: Jenni  has been doing well since going home. She continues to have nausea but this is controlled with Zofran. She has been tolerating some soft foods without any vomiting. She continues on Dilaudid for pain but is concerned that she will run out of pain medications before she is seen in our clinic again. She is having several loose stools a day. She denies any fevers or chills. She remains on TPN. She has gained 4 pounds and is very pleased with this.    Assessment/Plan:   Jenni is currently doing well. Her nausea is controlled with Zofran and she has been able to tolerate some soft foods. She continues on TPN to improve nutrition and has gained 4 pounds. The PET scan has not yet set up and we will try to help facilitate this with Dr. Tuttle's office. We will then have her follow up with Dr. Goodwin after the PET scan and meeting with Dr. Fatima. Advised her to meet with her primary care provider to discuss ongoing pain  medications. Notify the clinic for any worsening pain, vomiting, or with any questions or concerns.   Patient's questions were answered to her stated satisfaction and she is in agreement with this plan.    Medical history:  Past Medical History:   Diagnosis Date     Asthma      Cancer (H)     Per patient OBGYN, cerivical cancer     Cervical cancer (H)      Other chronic pain      Ovarian cancer (H)      Substance abuse     Outside records indicate past history of narcotics abuse or dependence, but patient denies.       Surgical history:  Past Surgical History:   Procedure Laterality Date     ENT SURGERY  2009    mastoid, sinus     EXAM UNDER ANESTHESIA, INSERT ALEX SLEEVE, UTERINE PLACEMENT OF TANDEM AND RING FOR RAD, ULTRASOUND N/A 12/14/2015    Procedure: EXAM UNDER ANESTHESIA, INSERT ALEX SLEEVE, UTERINE PLACEMENT OF TANDEM AND RING FOR RADIATION, ULTRASOUND GUIDED;  Surgeon: Abby Tony MD;  Location: [facility] OR     INSERT TANDEM AND CESIUM APPLICATOR CERVIX, ULTRASOUND GUIDED N/A 12/17/2015    Procedure: INSERT TANDEM AND CESIUM APPLICATOR CERVIX, ULTRASOUND GUIDED;  Surgeon: Kika Wood MD;  Location: [facility] OR     KNEE SURGERY       PICC INSERTION Right 04/29/2017    4fr SL BioFlo PICC, 37cm (3cm external) in the R basilic vein w/ tip in the mid SVC.       Problem list:  Patient Active Problem List    Diagnosis Date Noted     Small intestine obstruction (H) 04/29/2017     Priority: Medium     Small bowel obstruction, partial (H) 04/03/2017     Priority: Medium     SBO (small bowel obstruction) (H) 12/27/2016     Priority: Medium     Cervix cancer (H) 09/29/2015     Priority: Medium     Encounter for long-term current use of medication 09/29/2015     Priority: Medium     updating diagnosis code for icd10 cutover       History of polysubstance abuse, +cocaine UDS in 2013 09/16/2015     Priority: Medium     Abdominal pain 09/15/2015     Priority: Medium     Opiate dependence (H) 02/01/2013     Priority:  Medium     CARDIOVASCULAR SCREENING; LDL GOAL LESS THAN 160 07/24/2012     Priority: Medium     Pregnancy complicated by chemical dependency, antepartum (H) 07/24/2012     Priority: Medium     (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 07/14/2012     Priority: Medium       Medications:  Current Outpatient Prescriptions   Medication Sig Dispense Refill     ondansetron (ZOFRAN) 4 MG tablet Take 1-2 tablets (4-8 mg) by mouth every 8 hours as needed for nausea 60 tablet 0     HYDROmorphone (DILAUDID) 2 MG tablet Take 1 tablet (2 mg) by mouth every 6 hours as needed for moderate to severe pain 28 tablet 0     ondansetron (ZOFRAN) 4 MG tablet Take 1-2 tablets (4-8 mg) by mouth every 6 hours as needed for nausea or vomiting 28 tablet 0     calcium carbonate (TUMS) 500 MG chewable tablet Take 500 mg by mouth       simethicone (MYLICON) 80 MG chewable tablet Take 80 mg by mouth       vitamin D (ERGOCALCIFEROL) 97527 UNIT capsule Take 1 capsule (50,000 Units) by mouth once a week 12 capsule 0     hyoscyamine (ANASPAZ/LEVSIN) 0.125 MG tablet Take 1-2 tablets (125-250 mcg) by mouth every 4 hours as needed for cramping 40 tablet 1     albuterol (PROVENTIL HFA: VENTOLIN HFA) 108 (90 BASE) MCG/ACT inhaler Inhale 2 puffs into the lungs every 6 hours as needed.           Allergies:  Allergies   Allergen Reactions     No Clinical Screening - See Comments Other (See Comments) and Diarrhea     headache  Carrots cause gastric upset, cramping and diarrhea.     Sulfa Drugs Hives     hives     Amoxicillin Unknown and Other (See Comments)     vomiting  vomiting     Amoxicillin-Pot Clavulanate Other (See Comments) and Nausea     vomiting     Augmentin GI Disturbance, Nausea and Hives     Avelox [Moxifloxacin] Nausea and Vomiting, Unknown and Nausea     Ciprofloxacin Hives and Nausea     Codeine Nausea and Vomiting and Nausea     Ibuprofen Nausea and Vomiting     Other reaction(s): Nausea And Vomiting     Ibuprofen Sodium  "Hives and GI Disturbance     Quinolones      Tramadol Hives, Diarrhea, Nausea and Nausea and Vomiting     Daucus Carota      Other reaction(s): GI Upset  Other reaction(s): Abdominal pain, Diarrhea  Carrots cause gastric upset, cramping and diarrhea.       Family history:  Family History   Problem Relation Age of Onset     DIABETES Mother      Ovarian Cancer No family hx of      Uterine Cancer No family hx of      Cervical Cancer No family hx of      Breast Cancer No family hx of        Social history:  Social History   Substance Use Topics     Smoking status: Light Tobacco Smoker     Packs/day: 0.25     Years: 12.00     Types: Cigarettes     Smokeless tobacco: Never Used      Comment: 2/22/16 pt smoking daily 1 cig     Alcohol use No      Comment: None per pt     Marital status: .    Nursing Notes:   Jennifer Moreno LPN  5/4/2017  1:30 PM  Signed  Chief Complaint   Patient presents with     Clinic Care Coordination - Initial     new       Vitals:    05/04/17 1328   BP: 117/78   Pulse: 117   Temp: 98.2  F (36.8  C)   TempSrc: Oral   SpO2: 95%   Weight: 154 lb 1.6 oz   Height: 5' 8\"       Body mass index is 23.43 kg/(m^2).      Jennifer BHATIA LPN                            Physical Examination:  /78  Pulse 117  Temp 98.2  F (36.8  C) (Oral)  Ht 5' 8\"  Wt 154 lb 1.6 oz  SpO2 95%  BMI 23.43 kg/m2  General: alert, oriented, in no acute distress, sitting comfortably  HEENT: mucous membranes moist   Extremities: PICC line in place  Remainder of the exam was deferred today.    Total face to face time was 10 minutes, >50% counseling.    Umm Dorsey NP-C  Colon and Rectal Surgery  [facility]      Again, thank you for allowing me to participate in the care of your patient.      Sincerely,    Umm Dorsey, APRN CNP      "

## 2017-05-04 NOTE — NURSING NOTE
"Chief Complaint   Patient presents with     Clinic Care Coordination - Initial     new       Vitals:    05/04/17 1328   BP: 117/78   Pulse: 117   Temp: 98.2  F (36.8  C)   TempSrc: Oral   SpO2: 95%   Weight: 154 lb 1.6 oz   Height: 5' 8\"       Body mass index is 23.43 kg/(m^2).      Jennifer BHATIA LPN                          "

## 2017-05-08 ENCOUNTER — OFFICE VISIT (OUTPATIENT)
Dept: INTERNAL MEDICINE | Facility: CLINIC | Age: 42
End: 2017-05-08

## 2017-05-08 VITALS
WEIGHT: 150 LBS | SYSTOLIC BLOOD PRESSURE: 104 MMHG | TEMPERATURE: 98.1 F | OXYGEN SATURATION: 98 % | RESPIRATION RATE: 20 BRPM | DIASTOLIC BLOOD PRESSURE: 71 MMHG | HEART RATE: 105 BPM | BODY MASS INDEX: 22.81 KG/M2

## 2017-05-08 DIAGNOSIS — R10.13 ABDOMINAL PAIN, EPIGASTRIC: ICD-10-CM

## 2017-05-08 DIAGNOSIS — Z85.41 HISTORY OF CERVICAL CANCER: ICD-10-CM

## 2017-05-08 DIAGNOSIS — K56.609 SBO (SMALL BOWEL OBSTRUCTION) (H): Primary | ICD-10-CM

## 2017-05-08 RX ORDER — OXYCODONE AND ACETAMINOPHEN 5; 325 MG/1; MG/1
1 TABLET ORAL EVERY 6 HOURS PRN
Qty: 50 TABLET | Refills: 0 | Status: SHIPPED | OUTPATIENT
Start: 2017-05-08 | End: 2017-05-08

## 2017-05-08 RX ORDER — OXYCODONE AND ACETAMINOPHEN 5; 325 MG/1; MG/1
1 TABLET ORAL EVERY 6 HOURS PRN
Qty: 40 TABLET | Refills: 0 | Status: SHIPPED | OUTPATIENT
Start: 2017-05-08 | End: 2017-05-15

## 2017-05-08 ASSESSMENT — PAIN SCALES - GENERAL: PAINLEVEL: SEVERE PAIN (6)

## 2017-05-08 NOTE — PATIENT INSTRUCTIONS
[facility] Medication Refill Request Information:  * Please contact your pharmacy regarding ANY request for medication refills.  ** [facility] Prescription Fax = [phone]  * Please allow 3 business days for routine medication refills.  * Please allow 5 business days for controlled substance medication refills.     [facility] Test Result notification information:  *You will be notified with in 7-10 days of your appointment day regarding the results of your test.  If you are on MyChart you will be notified as soon as the provider has reviewed the results and signed off on them.    [facility] [phone]

## 2017-05-08 NOTE — PROGRESS NOTES
Ms. Gerhardt is a 41 year old female here to follow up after recent hospitalization.    History of Present Illness:  Jenni has a history of stage IIB cervical cancer in fall 2015 with extension to pelvic nodes, possibly adnexa and upper vagina, treated with chemo radiation.  She began to develop worsening pelvic pain in fall 2016.  Then admitted several times [id number] for apparent SBO.  Left AMA both times.  She apparently lost 50 lbs in the past year d/t abd pain, inability to eat, chronic bloating, diarrhea. She was seen in my clinic recently fort he first time and we referred her to [facility]. and then had an MR enterography which apparently showed high grade distal SBO, admitted to [facility] but they would not operate. She went home and then we had her see Colorectal Surgery here.  She was admitted to [facility] 4/29 - 5/1 for pain control, fluids and started on TPN through PICC.  She is still struggling with pain. Even drinking water is bothering her, taking dilaudid every 4-6 which helps, watery stools in AM.  She is on TPN 24 hours per day, managed by home infusion.  She is concerned today about pain control, a court date today and scheduling PET and f/u cares.      A full 10-pt Review of Systems was performed, verified and is negative except as documented in the HPI.  All health questionnaires were reviewed, verified and relevant information documented above.    Past Medical History:  Past Medical History:   Diagnosis Date     Asthma      Cancer (H)     Per patient OBGYN, cerivical cancer     Cervical cancer (H)      Other chronic pain      Ovarian cancer (H)      Substance abuse     Outside records indicate past history of narcotics abuse or dependence, but patient denies.       Past Surgical History:  Past Surgical History:   Procedure Laterality Date     ENT SURGERY  2009    mastoid, sinus     EXAM UNDER ANESTHESIA, INSERT ALEX SLEEVE, UTERINE PLACEMENT OF TANDEM AND RING FOR RAD, ULTRASOUND N/A 12/14/2015     Procedure: EXAM UNDER ANESTHESIA, INSERT ALEX SLEEVE, UTERINE PLACEMENT OF TANDEM AND RING FOR RADIATION, ULTRASOUND GUIDED;  Surgeon: Abby Tony MD;  Location: [facility] OR     INSERT TANDEM AND CESIUM APPLICATOR CERVIX, ULTRASOUND GUIDED N/A 12/17/2015    Procedure: INSERT TANDEM AND CESIUM APPLICATOR CERVIX, ULTRASOUND GUIDED;  Surgeon: Kika Wood MD;  Location: [facility] OR     KNEE SURGERY       PICC INSERTION Right 04/29/2017    4fr SL BioFlo PICC, 37cm (3cm external) in the R basilic vein w/ tip in the mid SVC.       Active Meds:  Current Outpatient Prescriptions   Medication     ondansetron (ZOFRAN) 4 MG tablet     HYDROmorphone (DILAUDID) 2 MG tablet     ondansetron (ZOFRAN) 4 MG tablet     hyoscyamine (ANASPAZ/LEVSIN) 0.125 MG tablet     albuterol (PROVENTIL HFA: VENTOLIN HFA) 108 (90 BASE) MCG/ACT inhaler     calcium carbonate (TUMS) 500 MG chewable tablet     simethicone (MYLICON) 80 MG chewable tablet     vitamin D (ERGOCALCIFEROL) 48070 UNIT capsule     No current facility-administered medications for this visit.      Facility-Administered Medications Ordered in Other Visits   Medication     lactated ringers infusion     ondansetron (ZOFRAN) injection        Allergies:  No clinical screening - see comments; Sulfa drugs; Amoxicillin; Amoxicillin-pot clavulanate; Augmentin; Avelox [moxifloxacin]; Ciprofloxacin; Codeine; Ibuprofen; Ibuprofen sodium; Quinolones; Tramadol; and Daucus carota    Family History:  family history includes DIABETES in her mother. There is no history of Ovarian Cancer, Uterine Cancer, Cervical Cancer, or Breast Cancer.    Social History:  Social History   Substance Use Topics     Smoking status: Light Tobacco Smoker     Packs/day: 0.25     Years: 12.00     Types: Cigarettes     Smokeless tobacco: Never Used      Comment: 2/22/16 pt smoking daily 1 cig     Alcohol use No      Comment: None per pt       Physical Exam:  Vitals: /71 (BP Location: Left arm, Patient Position:  Chair, Cuff Size: Adult Regular)  Pulse 105  Temp 98.1  F (36.7  C) (Oral)  Resp 20  Wt 68 kg (150 lb)  SpO2 98%  Breastfeeding? No  BMI 22.81 kg/m2  Constitutional: Alert, oriented, pleasant, teary, mildly distressed  Head: Normocephalic, atraumatic  ENT: Oropharynx clear, moist mucus membranes  Cardiovascular: Regular rate and rhythm, no murmurs, rubs or gallops, peripheral pulses full/symmetric  Respiratory: Good air movement bilaterally, lungs clear, no wheezes/rales/rhonchi  GI: Abdomen soft, scaphoid, bowel sounds normo-hyperactive, louder in RLQ, nondistended, tender in all quadrants, difficult for patient to sit still, voluntary guarding  Musculoskeletal: No edema, normal muscle tone, normal gait  Neurologic: Alert and oriented, nonfocal      Assessment and Plan:  Jenni was seen today for hospital f/u.    Jenni was seen for care coordination.  We will attempt to schedule her PET today prior to Oncology visit.  Obviously, this evaluation for recurrent malignancy needs to be completed prior to any surgery.    Will continue TPN with home infusion.  Discussed pain control and that narcotic use should be minimized given risk of worsening motility issues.  No driving on narcotics.  She has appts scheduled with Gyn Onc and Colorectal.    History of cervical cancer  SBO (small bowel obstruction) (H)  Abdominal pain, epigastric  -     oxyCODONE-acetaminophen (PERCOCET) 5-325 MG per tablet; Take 1 tablet by mouth every 6 hours as needed for moderate to severe pain (every 6-8 hours as needed, no more than 4 per day)        -      Letter given at her request      Return to clinic: 3 weeks    Jennifer Garza MD  Internal Medicine    25 min spent face to face, of which >50% time spent on counseling/coordinating care exclusive of any procedure time

## 2017-05-08 NOTE — LETTER
Rachel A Gerhardt  [address] [place] [address]  : 1975  MRN:  [id number]      May 8, 2017        To Whom It May Concern:      Ms. Gerhardt was seen in our clinic today for medical issues.  She is acutely ill at this time and not in a condition to attend a court hearing.          Sincerely,      Jennifer Garza MD  Internal Medicine

## 2017-05-08 NOTE — NURSING NOTE
Chief Complaint   Patient presents with     Hospital F/U     Here for hospital follow, [facility]     Zeb Hollins CMA at 9:27 AM on 5/8/2017

## 2017-05-08 NOTE — LETTER
"    June 1, 2017       TO: Rachel A Gerhardt  [address] [facility] [address]         Dear Ms. Gerhardt,    We missed you at your last appointment at [facility]. We have several options to help you reschedule your appointment:      Call [phone]    Visit our website at [email or web address] and click \"I Want To\" (In Upper Right) and select Request an Appointment    Request an appointment via [email or web address]    We are committed to providing you with exceptional care and service. It's important that our patients can get appointments when they need them, so we ask that you make every effort to consistently attend scheduled appointments and give us notice when you need to cancel an appointment.    If financial concerns have kept you from your appointment, we want to help. Please call a financial counselor at [phone] to assist you.      Sincerely,      [facility]  "

## 2017-05-08 NOTE — MR AVS SNAPSHOT
After Visit Summary   5/8/2017    Rachel A Gerhardt    MRN: [id number]           Patient Information     Date Of Birth          1975        Visit Information        Provider Department      5/8/2017 9:00 AM Jennifer Garza MD [facility]        Today's Diagnoses     SBO (small bowel obstruction) (H)    -  1    Abdominal pain, epigastric        History of cervical cancer          Care Instructions    Primary Care Center Medication Refill Request Information:  * Please contact your pharmacy regarding ANY request for medication refills.  ** [facility] Prescription Fax = [phone]  * Please allow 3 business days for routine medication refills.  * Please allow 5 business days for controlled substance medication refills.     Primary Care Center Test Result notification information:  *You will be notified with in 7-10 days of your appointment day regarding the results of your test.  If you are on MyChart you will be notified as soon as the provider has reviewed the results and signed off on them.    [facility] [phone]           Follow-ups after your visit        Follow-up notes from your care team     Return in about 3 weeks (around 5/29/2017) for Routine Visit.      Your next 10 appointments already scheduled     May 11, 2017 12:00 PM CDT   PE NPET ONCOLOGY (EYES TO THIGHS) with UUPET1   [facility] [place] PET CT ([facility])    [address] [address] [phone]           Tell your doctor:   If there is any chance you may be pregnant or if you are breastfeeding.   If you have problems lying in small spaces (claustrophobia). If you do, your doctor may give you medicine to help you relax. If you have diabetes:   Have your exam early in the morning. Your blood glucose will go up as the day goes by.   Your glucose level must be 180 or less at the start of the exam. Please take any medicines you need to  ensure this blood glucose level. 24 hours before your scan: Don t do any heavy exercise. (No jogging, aerobics or other workouts.) Exercise will make your pictures less accurate. 6 hours before your scan:   Stop all food and liquids (except water).   Do not chew gum or suck on mints.   If you need to take medicine with food, you may take it with a few crackers.  Please call your Imaging Department at your exam site with any questions.            May 18, 2017  3:00 PM CDT   (Arrive by 2:45 PM)   Return Visit with Franny Fatima MD   [facility] ([facility])    [address] [place] [address]  [address] [facility] [id number] [phone]            May 22, 2017  6:00 PM CDT   (Arrive by 5:45 PM)   Return Visit with Jennifer Goodwin MD   [facility] ([facility])    [address] [place] [address]  [address] [facility] [id number] [phone]            Jun 01, 2017  8:25 AM CDT   (Arrive by 8:10 AM)   Return Visit with Jennifer Garza MD   [facility] ([facility])    [address] [place] [address]  [address] [facility] [id number] [phone]              Who to contact     Please call your clinic at [phone] to:    Ask questions about your health    Make or cancel appointments    Discuss your medicines    Learn about your test results    Speak to your doctor   If you have compliments or concerns about an experience at your clinic, or if you wish to file a complaint, please contact [facility] Patient Relations at [phone] or email us at [email or web address]         Additional Information About Your Visit        MyChart Information     [employer] gives you secure access to your electronic health record. If you see a primary care provider, you can also send messages to your care team and make appointments. If you have questions, please call your  primary care clinic.  If you do not have a primary care provider, please call [phone] and they will assist you.      [email or web address] is an electronic gateway that provides easy, online access to your medical records. With [email or web address], you can request a clinic appointment, read your test results, renew a prescription or communicate with your care team.     To access your existing account, please contact your [facility] or call [phone] for assistance.        Care EveryWhere ID     This is your Care EveryWhere ID. This could be used by other organizations to access your [place] medical records  [id number]        Your Vitals Were     Pulse Temperature Respirations Pulse Oximetry Breastfeeding? BMI (Body Mass Index)    105 98.1  F (36.7  C) (Oral) 20 98% No 22.81 kg/m2       Blood Pressure from Last 3 Encounters:   05/08/17 104/71   05/04/17 117/78   05/01/17 99/65    Weight from Last 3 Encounters:   05/08/17 68 kg (150 lb)   05/04/17 69.9 kg (154 lb 1.6 oz)   04/28/17 68.4 kg (150 lb 12.8 oz)              Today, you had the following     No orders found for display         Today's Medication Changes          These changes are accurate as of: 5/8/17 10:15 AM.  If you have any questions, ask your nurse or doctor.               Start taking these medicines.        Dose/Directions    oxyCODONE-acetaminophen 5-325 MG per tablet   Commonly known as:  PERCOCET   Used for:  SBO (small bowel obstruction) (H), Abdominal pain, epigastric, History of cervical cancer   Started by:  Jennifer Garza MD        Dose:  1 tablet   Take 1 tablet by mouth every 6 hours as needed for moderate to severe pain (every 6-8 hours as needed, no more than 4 per day)   Quantity:  40 tablet   Refills:  0            Where to get your medicines      Some of these will need a paper prescription and others can be bought over the counter.  Ask your nurse if you have questions.     Bring a paper prescription for each of  these medications     oxyCODONE-acetaminophen 5-325 MG per tablet                Primary Care Provider Office Phone # Fax #    Jennifer Garza [phone]       [address]  [facility] [address]        Thank you!     Thank you for choosing [facility]  for your care. Our goal is always to provide you with excellent care. Hearing back from our patients is one way we can continue to improve our services. Please take a few minutes to complete the written survey that you may receive in the mail after your visit with us. Thank you!             Your Updated Medication List - Protect others around you: Learn how to safely use, store and throw away your medicines at www.disposemymeds.org.          This list is accurate as of: 5/8/17 10:15 AM.  Always use your most recent med list.                   Brand Name Dispense Instructions for use    albuterol 108 (90 BASE) MCG/ACT Inhaler    PROAIR HFA/PROVENTIL HFA/VENTOLIN HFA     Inhale 2 puffs into the lungs every 6 hours as needed.       HYDROmorphone 2 MG tablet    DILAUDID    28 tablet    Take 1 tablet (2 mg) by mouth every 6 hours as needed for moderate to severe pain       hyoscyamine 0.125 MG tablet    ANASPAZ/LEVSIN    40 tablet    Take 1-2 tablets (125-250 mcg) by mouth every 4 hours as needed for cramping       * ondansetron 4 MG tablet    ZOFRAN    28 tablet    Take 1-2 tablets (4-8 mg) by mouth every 6 hours as needed for nausea or vomiting       * ondansetron 4 MG tablet    ZOFRAN    60 tablet    Take 1-2 tablets (4-8 mg) by mouth every 8 hours as needed for nausea       oxyCODONE-acetaminophen 5-325 MG per tablet    PERCOCET    40 tablet    Take 1 tablet by mouth every 6 hours as needed for moderate to severe pain (every 6-8 hours as needed, no more than 4 per day)       simethicone 80 MG chewable tablet    MYLICON     Take 80 mg by mouth Reported on 5/8/2017       TUMS 500 MG chewable tablet    Generic drug:  calcium carbonate      Take 500 mg by mouth Reported on 5/8/2017       vitamin D 67804 UNIT capsule    ERGOCALCIFEROL    12 capsule    Take 1 capsule (50,000 Units) by mouth once a week       * Notice:  This list has 2 medication(s) that are the same as other medications prescribed for you. Read the directions carefully, and ask your doctor or other care provider to review them with you.

## 2017-05-10 LAB
ALBUMIN SERPL-MCNC: 3 G/DL (ref 3.4–5)
ALP SERPL-CCNC: 70 U/L (ref 40–150)
ALT SERPL W P-5'-P-CCNC: 19 U/L (ref 0–50)
ANION GAP SERPL CALCULATED.3IONS-SCNC: 7 MMOL/L (ref 3–14)
AST SERPL W P-5'-P-CCNC: 11 U/L (ref 0–45)
BASOPHILS # BLD AUTO: 0 10E9/L (ref 0–0.2)
BASOPHILS NFR BLD AUTO: 0.4 %
BILIRUB DIRECT SERPL-MCNC: <0.1 MG/DL (ref 0–0.2)
BILIRUB SERPL-MCNC: 0.2 MG/DL (ref 0.2–1.3)
BUN SERPL-MCNC: 21 MG/DL (ref 7–30)
CALCIUM SERPL-MCNC: 8.5 MG/DL (ref 8.5–10.1)
CHLORIDE SERPL-SCNC: 109 MMOL/L (ref 94–109)
CO2 SERPL-SCNC: 27 MMOL/L (ref 20–32)
CREAT SERPL-MCNC: 0.56 MG/DL (ref 0.52–1.04)
DIFFERENTIAL METHOD BLD: ABNORMAL
EOSINOPHIL # BLD AUTO: 0.1 10E9/L (ref 0–0.7)
EOSINOPHIL NFR BLD AUTO: 1.2 %
ERYTHROCYTE [DISTWIDTH] IN BLOOD BY AUTOMATED COUNT: 13 % (ref 10–15)
GFR SERPL CREATININE-BSD FRML MDRD: ABNORMAL ML/MIN/1.7M2
GLUCOSE SERPL-MCNC: 90 MG/DL (ref 70–99)
HCT VFR BLD AUTO: 35.5 % (ref 35–47)
HGB BLD-MCNC: 11.8 G/DL (ref 11.7–15.7)
IMM GRANULOCYTES # BLD: 0.1 10E9/L (ref 0–0.4)
IMM GRANULOCYTES NFR BLD: 0.7 %
LYMPHOCYTES # BLD AUTO: 1.9 10E9/L (ref 0.8–5.3)
LYMPHOCYTES NFR BLD AUTO: 26.6 %
MAGNESIUM SERPL-MCNC: 1.8 MG/DL (ref 1.6–2.3)
MCH RBC QN AUTO: 33.5 PG (ref 26.5–33)
MCHC RBC AUTO-ENTMCNC: 33.2 G/DL (ref 31.5–36.5)
MCV RBC AUTO: 101 FL (ref 78–100)
MONOCYTES # BLD AUTO: 0.7 10E9/L (ref 0–1.3)
MONOCYTES NFR BLD AUTO: 9.8 %
NEUTROPHILS # BLD AUTO: 4.4 10E9/L (ref 1.6–8.3)
NEUTROPHILS NFR BLD AUTO: 61.3 %
NRBC # BLD AUTO: 0 10*3/UL
NRBC BLD AUTO-RTO: 0 /100
PHOSPHATE SERPL-MCNC: 3.6 MG/DL (ref 2.5–4.5)
PLATELET # BLD AUTO: 339 10E9/L (ref 150–450)
POTASSIUM SERPL-SCNC: 3.6 MMOL/L (ref 3.4–5.3)
PREALB SERPL IA-MCNC: 25 MG/DL (ref 15–45)
PROT SERPL-MCNC: 6.5 G/DL (ref 6.8–8.8)
RBC # BLD AUTO: 3.52 10E12/L (ref 3.8–5.2)
SODIUM SERPL-SCNC: 143 MMOL/L (ref 133–144)
TRIGL SERPL-MCNC: 96 MG/DL
WBC # BLD AUTO: 7.2 10E9/L (ref 4–11)

## 2017-05-10 PROCEDURE — 84100 ASSAY OF PHOSPHORUS: CPT | Performed by: COLON & RECTAL SURGERY

## 2017-05-10 PROCEDURE — 84478 ASSAY OF TRIGLYCERIDES: CPT | Performed by: COLON & RECTAL SURGERY

## 2017-05-10 PROCEDURE — 80053 COMPREHEN METABOLIC PANEL: CPT | Performed by: COLON & RECTAL SURGERY

## 2017-05-10 PROCEDURE — 85025 COMPLETE CBC W/AUTO DIFF WBC: CPT | Performed by: COLON & RECTAL SURGERY

## 2017-05-10 PROCEDURE — 83735 ASSAY OF MAGNESIUM: CPT | Performed by: COLON & RECTAL SURGERY

## 2017-05-10 PROCEDURE — 82248 BILIRUBIN DIRECT: CPT | Performed by: COLON & RECTAL SURGERY

## 2017-05-10 PROCEDURE — 84134 ASSAY OF PREALBUMIN: CPT | Performed by: COLON & RECTAL SURGERY

## 2017-05-11 ENCOUNTER — TELEPHONE (OUTPATIENT)
Dept: INTERNAL MEDICINE | Facility: CLINIC | Age: 42
End: 2017-05-11

## 2017-05-11 DIAGNOSIS — K56.609 SBO (SMALL BOWEL OBSTRUCTION) (H): ICD-10-CM

## 2017-05-11 DIAGNOSIS — R10.13 ABDOMINAL PAIN, EPIGASTRIC: ICD-10-CM

## 2017-05-11 DIAGNOSIS — K56.609 SMALL BOWEL OBSTRUCTION (H): ICD-10-CM

## 2017-05-11 DIAGNOSIS — K56.609 SMALL INTESTINE OBSTRUCTION (H): ICD-10-CM

## 2017-05-11 DIAGNOSIS — Z85.41 HISTORY OF CERVICAL CANCER: ICD-10-CM

## 2017-05-11 NOTE — TELEPHONE ENCOUNTER
Please call patient:  I will authorize a maximum of 5 tablets per day. The dose is already an increase from the dilaudid she was on before.  The narcotics are not good for her colon function and can make her obstruction/ileus worse.  If this is not satisfactory, I recommend she schedule an office appointment to review worsening pain.  Thanks,  Jennifer Garza MD  Internal Medicine

## 2017-05-11 NOTE — TELEPHONE ENCOUNTER
Pt taking Percocet every 6 hours. It is not holding her pain-requesting to take every 4 hours.  Will need new RX  Deanna Degroot RN 3:05 PM on 5/11/2017.

## 2017-05-11 NOTE — TELEPHONE ENCOUNTER
----- Message from Rita Bolanos RN sent at 5/11/2017 12:34 PM CDT -----  Pt was about to have PET scan today, but the radiology could not perform due to the TPN. Pt should fast for 6 hours prior to PET scan.  Pt needs to reschedule and wondering what to do with TPN sonbenson she is having TPN for 24 hours.      Pt called and informed that she can stop TPN for 6 hours in order to have the PET scan done. Clinic and imaging numbers given to reschedule.  Deanna Degroot RN 12:54 PM on 5/11/2017.

## 2017-05-12 ENCOUNTER — HOSPITAL ENCOUNTER (OUTPATIENT)
Dept: PET IMAGING | Facility: CLINIC | Age: 42
Discharge: HOME | End: 2017-05-12
Attending: NURSE PRACTITIONER | Admitting: NURSE PRACTITIONER
Payer: COMMERCIAL

## 2017-05-12 DIAGNOSIS — C53.9 MALIGNANT NEOPLASM OF CERVIX, UNSPECIFIED SITE (H): ICD-10-CM

## 2017-05-12 LAB — GLUCOSE BLDC GLUCOMTR-MCNC: 190 MG/DL (ref 70–99)

## 2017-05-12 PROCEDURE — 71260 CT THORAX DX C+: CPT

## 2017-05-12 PROCEDURE — A9552 F18 FDG: HCPCS | Performed by: NURSE PRACTITIONER

## 2017-05-12 PROCEDURE — 25500064 ZZH RX 255 OP 636: Performed by: NURSE PRACTITIONER

## 2017-05-12 PROCEDURE — 74177 CT ABD & PELVIS W/CONTRAST: CPT

## 2017-05-12 PROCEDURE — 34300033 ZZH RX 343: Performed by: NURSE PRACTITIONER

## 2017-05-12 PROCEDURE — 82962 GLUCOSE BLOOD TEST: CPT

## 2017-05-12 RX ORDER — IOPAMIDOL 755 MG/ML
70-135 INJECTION, SOLUTION INTRAVASCULAR ONCE
Status: COMPLETED | OUTPATIENT
Start: 2017-05-12 | End: 2017-05-12

## 2017-05-12 RX ADMIN — FLUDEOXYGLUCOSE F-18 10.2 MCI.: 500 INJECTION, SOLUTION INTRAVENOUS at 12:45

## 2017-05-12 RX ADMIN — IOPAMIDOL 81 ML: 755 INJECTION, SOLUTION INTRAVENOUS at 13:36

## 2017-05-15 ENCOUNTER — CARE COORDINATION (OUTPATIENT)
Dept: SURGERY | Facility: CLINIC | Age: 42
End: 2017-05-15

## 2017-05-15 RX ORDER — ONDANSETRON 4 MG/1
4-8 TABLET, FILM COATED ORAL EVERY 6 HOURS PRN
Qty: 28 TABLET | Refills: 2 | Status: ON HOLD | OUTPATIENT
Start: 2017-05-15 | End: 2017-05-24

## 2017-05-15 RX ORDER — ONDANSETRON 4 MG/1
4-8 TABLET, FILM COATED ORAL EVERY 8 HOURS PRN
Qty: 60 TABLET | Refills: 0 | Status: ON HOLD | OUTPATIENT
Start: 2017-05-15 | End: 2017-05-24

## 2017-05-15 RX ORDER — OXYCODONE AND ACETAMINOPHEN 5; 325 MG/1; MG/1
1 TABLET ORAL EVERY 4 HOURS PRN
Qty: 20 TABLET | Refills: 0 | Status: ON HOLD | OUTPATIENT
Start: 2017-05-16 | End: 2017-05-24

## 2017-05-15 NOTE — PROGRESS NOTES
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  Discussed with Dr. Goodwin and she recommends patient be scheduled as soon as possible.  Contacted patient and she agrees.  Informed that perioperative  will call her to schedule.

## 2017-05-15 NOTE — TELEPHONE ENCOUNTER
----- Message from Deisy Mullins sent at 5/15/2017  7:43 AM CDT -----  Regarding: Sooner appt- Dr. Garza  Contact: [phone]  Pt is requesting a call back to discuss being seen sooner by Dr. Garza. She stated she is having some medication issues and feels like she needs to be seen sooner than 06/01/2017. Please call back to discuss. Thanks.      Pt calling and wanting to see DR Garza today. Pain is 5-10 lower abd. Last BM this morning. Wants to discuss her pain medication.

## 2017-05-15 NOTE — TELEPHONE ENCOUNTER
Please call patient:  Refill of pain medication provided. She should have enough to last until tomorrow.  She is to follow instructions on prescription.  No early refills.  Dr. Tinoco is trying to get her scheduled for surgery earlier--possibly this week.  I advise her to call Colorectal Surgery if she doesn't hear back from them soon.  Thanks,  Jennifer Garza MD  Internal Medicine  Pt called and RX for Percocet to lock box for .  RX for Zofran sent to pt's pharmacy. Pt aware  Deanna Degroot RN 9:11 AM on 5/15/2017.

## 2017-05-16 ENCOUNTER — CARE COORDINATION (OUTPATIENT)
Dept: SURGERY | Facility: CLINIC | Age: 42
End: 2017-05-16

## 2017-05-16 ENCOUNTER — TELEPHONE (OUTPATIENT)
Dept: SURGERY | Facility: CLINIC | Age: 42
End: 2017-05-16

## 2017-05-16 LAB
ALBUMIN SERPL-MCNC: 3.3 G/DL (ref 3.4–5)
ALP SERPL-CCNC: 80 U/L (ref 40–150)
ALT SERPL W P-5'-P-CCNC: 14 U/L (ref 0–50)
ANION GAP SERPL CALCULATED.3IONS-SCNC: 7 MMOL/L (ref 3–14)
AST SERPL W P-5'-P-CCNC: 11 U/L (ref 0–45)
BASOPHILS # BLD AUTO: 0 10E9/L (ref 0–0.2)
BASOPHILS NFR BLD AUTO: 0.1 %
BILIRUB DIRECT SERPL-MCNC: <0.1 MG/DL (ref 0–0.2)
BILIRUB SERPL-MCNC: 0.3 MG/DL (ref 0.2–1.3)
BUN SERPL-MCNC: 9 MG/DL (ref 7–30)
CALCIUM SERPL-MCNC: 9 MG/DL (ref 8.5–10.1)
CHLORIDE SERPL-SCNC: 110 MMOL/L (ref 94–109)
CO2 SERPL-SCNC: 25 MMOL/L (ref 20–32)
CREAT SERPL-MCNC: 0.62 MG/DL (ref 0.52–1.04)
DIFFERENTIAL METHOD BLD: ABNORMAL
EOSINOPHIL # BLD AUTO: 0.1 10E9/L (ref 0–0.7)
EOSINOPHIL NFR BLD AUTO: 1.2 %
ERYTHROCYTE [DISTWIDTH] IN BLOOD BY AUTOMATED COUNT: 12.7 % (ref 10–15)
GFR SERPL CREATININE-BSD FRML MDRD: ABNORMAL ML/MIN/1.7M2
GLUCOSE SERPL-MCNC: 93 MG/DL (ref 70–99)
HCT VFR BLD AUTO: 38.5 % (ref 35–47)
HGB BLD-MCNC: 12.9 G/DL (ref 11.7–15.7)
IMM GRANULOCYTES # BLD: 0.1 10E9/L (ref 0–0.4)
IMM GRANULOCYTES NFR BLD: 0.5 %
LYMPHOCYTES # BLD AUTO: 2 10E9/L (ref 0.8–5.3)
LYMPHOCYTES NFR BLD AUTO: 19.8 %
MAGNESIUM SERPL-MCNC: 1.7 MG/DL (ref 1.6–2.3)
MCH RBC QN AUTO: 33.6 PG (ref 26.5–33)
MCHC RBC AUTO-ENTMCNC: 33.5 G/DL (ref 31.5–36.5)
MCV RBC AUTO: 100 FL (ref 78–100)
MONOCYTES # BLD AUTO: 0.6 10E9/L (ref 0–1.3)
MONOCYTES NFR BLD AUTO: 6.1 %
NEUTROPHILS # BLD AUTO: 7.5 10E9/L (ref 1.6–8.3)
NEUTROPHILS NFR BLD AUTO: 72.3 %
NRBC # BLD AUTO: 0 10*3/UL
NRBC BLD AUTO-RTO: 0 /100
PHOSPHATE SERPL-MCNC: 3.8 MG/DL (ref 2.5–4.5)
PLATELET # BLD AUTO: 393 10E9/L (ref 150–450)
POTASSIUM SERPL-SCNC: 3.8 MMOL/L (ref 3.4–5.3)
PREALB SERPL IA-MCNC: 24 MG/DL (ref 15–45)
PROT SERPL-MCNC: 6.9 G/DL (ref 6.8–8.8)
RBC # BLD AUTO: 3.84 10E12/L (ref 3.8–5.2)
SODIUM SERPL-SCNC: 142 MMOL/L (ref 133–144)
TRIGL SERPL-MCNC: 105 MG/DL
WBC # BLD AUTO: 10.3 10E9/L (ref 4–11)

## 2017-05-16 PROCEDURE — 84134 ASSAY OF PREALBUMIN: CPT | Performed by: COLON & RECTAL SURGERY

## 2017-05-16 PROCEDURE — 82248 BILIRUBIN DIRECT: CPT | Performed by: COLON & RECTAL SURGERY

## 2017-05-16 PROCEDURE — 80053 COMPREHEN METABOLIC PANEL: CPT | Performed by: COLON & RECTAL SURGERY

## 2017-05-16 PROCEDURE — 85025 COMPLETE CBC W/AUTO DIFF WBC: CPT | Performed by: COLON & RECTAL SURGERY

## 2017-05-16 PROCEDURE — 84100 ASSAY OF PHOSPHORUS: CPT | Performed by: COLON & RECTAL SURGERY

## 2017-05-16 PROCEDURE — 83735 ASSAY OF MAGNESIUM: CPT | Performed by: COLON & RECTAL SURGERY

## 2017-05-16 PROCEDURE — 84478 ASSAY OF TRIGLYCERIDES: CPT | Performed by: COLON & RECTAL SURGERY

## 2017-05-16 NOTE — TELEPHONE ENCOUNTER
I called Jenni to schedule a PAC appointment discuss moving her surgery up a day. I confirmed dates and times for both appointment and surgery. I told her that I would ask PAC to include a UA tomorrow as part of their visit. I will find her tomorrow to give her the surgery packet.

## 2017-05-16 NOTE — PROGRESS NOTES
Informed pt that her surgery is this Fri 5/19/17 at noon, arrive at 10 AM.  She requests that preop be done here at our PAC--let her know I would ask Margot to call her with that information.  Pt also asks if she needs f/u appt with Dr. Fatima as she already has results of PET.  Let her know I would ask Dr. Goodwin & get back to her.

## 2017-05-16 NOTE — PROGRESS NOTES
Per Dr. Goodwin, f/u appt with Dr. Fatima not necessary at this point.  Spoke with pt and relayed this information.

## 2017-05-17 ENCOUNTER — ANESTHESIA EVENT (OUTPATIENT)
Dept: SURGERY | Facility: CLINIC | Age: 42
DRG: 331 | End: 2017-05-17
Payer: COMMERCIAL

## 2017-05-17 ENCOUNTER — HOSPITAL ENCOUNTER (INPATIENT)
Facility: CLINIC | Age: 42
LOS: 7 days | Discharge: HOME | DRG: 331 | End: 2017-05-24
Attending: COLON & RECTAL SURGERY | Admitting: COLON & RECTAL SURGERY
Payer: COMMERCIAL

## 2017-05-17 ENCOUNTER — CARE COORDINATION (OUTPATIENT)
Dept: SURGERY | Facility: CLINIC | Age: 42
End: 2017-05-17

## 2017-05-17 ENCOUNTER — TELEPHONE (OUTPATIENT)
Dept: SURGERY | Facility: CLINIC | Age: 42
End: 2017-05-17

## 2017-05-17 DIAGNOSIS — K56.609 SMALL BOWEL OBSTRUCTION (H): ICD-10-CM

## 2017-05-17 DIAGNOSIS — E83.42 HYPOMAGNESEMIA: ICD-10-CM

## 2017-05-17 DIAGNOSIS — K56.609 SMALL INTESTINE OBSTRUCTION (H): Primary | ICD-10-CM

## 2017-05-17 LAB
ANION GAP SERPL CALCULATED.3IONS-SCNC: 6 MMOL/L (ref 3–14)
APTT PPP: 27 SEC (ref 22–37)
BASOPHILS # BLD AUTO: 0 10E9/L (ref 0–0.2)
BASOPHILS NFR BLD AUTO: 0.3 %
BUN SERPL-MCNC: 10 MG/DL (ref 7–30)
CALCIUM SERPL-MCNC: 8.8 MG/DL (ref 8.5–10.1)
CHLORIDE SERPL-SCNC: 107 MMOL/L (ref 94–109)
CO2 SERPL-SCNC: 30 MMOL/L (ref 20–32)
CREAT SERPL-MCNC: 0.78 MG/DL (ref 0.52–1.04)
DIFFERENTIAL METHOD BLD: ABNORMAL
EOSINOPHIL # BLD AUTO: 0.3 10E9/L (ref 0–0.7)
EOSINOPHIL NFR BLD AUTO: 4.3 %
ERYTHROCYTE [DISTWIDTH] IN BLOOD BY AUTOMATED COUNT: 12.9 % (ref 10–15)
GFR SERPL CREATININE-BSD FRML MDRD: 80 ML/MIN/1.7M2
GLUCOSE SERPL-MCNC: 98 MG/DL (ref 70–99)
HCT VFR BLD AUTO: 37.4 % (ref 35–47)
HGB BLD-MCNC: 12 G/DL (ref 11.7–15.7)
IMM GRANULOCYTES # BLD: 0 10E9/L (ref 0–0.4)
IMM GRANULOCYTES NFR BLD: 0.4 %
INR PPP: 1.06 (ref 0.86–1.14)
LYMPHOCYTES # BLD AUTO: 1.8 10E9/L (ref 0.8–5.3)
LYMPHOCYTES NFR BLD AUTO: 27.3 %
MAGNESIUM SERPL-MCNC: 1.8 MG/DL (ref 1.6–2.3)
MCH RBC QN AUTO: 32.7 PG (ref 26.5–33)
MCHC RBC AUTO-ENTMCNC: 32.1 G/DL (ref 31.5–36.5)
MCV RBC AUTO: 102 FL (ref 78–100)
MONOCYTES # BLD AUTO: 0.7 10E9/L (ref 0–1.3)
MONOCYTES NFR BLD AUTO: 9.9 %
NEUTROPHILS # BLD AUTO: 3.9 10E9/L (ref 1.6–8.3)
NEUTROPHILS NFR BLD AUTO: 57.8 %
NRBC # BLD AUTO: 0 10*3/UL
NRBC BLD AUTO-RTO: 0 /100
PHOSPHATE SERPL-MCNC: 4.8 MG/DL (ref 2.5–4.5)
PLATELET # BLD AUTO: 326 10E9/L (ref 150–450)
POTASSIUM SERPL-SCNC: 3.7 MMOL/L (ref 3.4–5.3)
RBC # BLD AUTO: 3.67 10E12/L (ref 3.8–5.2)
SODIUM SERPL-SCNC: 143 MMOL/L (ref 133–144)
WBC # BLD AUTO: 6.7 10E9/L (ref 4–11)

## 2017-05-17 PROCEDURE — S5010 5% DEXTROSE AND 0.45% SALINE: HCPCS | Performed by: STUDENT IN AN ORGANIZED HEALTH CARE EDUCATION/TRAINING PROGRAM

## 2017-05-17 PROCEDURE — 84100 ASSAY OF PHOSPHORUS: CPT | Performed by: STUDENT IN AN ORGANIZED HEALTH CARE EDUCATION/TRAINING PROGRAM

## 2017-05-17 PROCEDURE — 83735 ASSAY OF MAGNESIUM: CPT | Performed by: STUDENT IN AN ORGANIZED HEALTH CARE EDUCATION/TRAINING PROGRAM

## 2017-05-17 PROCEDURE — 85610 PROTHROMBIN TIME: CPT | Performed by: STUDENT IN AN ORGANIZED HEALTH CARE EDUCATION/TRAINING PROGRAM

## 2017-05-17 PROCEDURE — 3E0336Z INTRODUCTION OF NUTRITIONAL SUBSTANCE INTO PERIPHERAL VEIN, PERCUTANEOUS APPROACH: ICD-10-PCS | Performed by: COLON & RECTAL SURGERY

## 2017-05-17 PROCEDURE — 25000128 H RX IP 250 OP 636: Performed by: STUDENT IN AN ORGANIZED HEALTH CARE EDUCATION/TRAINING PROGRAM

## 2017-05-17 PROCEDURE — 25000128 H RX IP 250 OP 636

## 2017-05-17 PROCEDURE — 12000008 ZZH R&B INTERMEDIATE UMMC

## 2017-05-17 PROCEDURE — 25800025 ZZH RX 258: Performed by: STUDENT IN AN ORGANIZED HEALTH CARE EDUCATION/TRAINING PROGRAM

## 2017-05-17 PROCEDURE — 86900 BLOOD TYPING SEROLOGIC ABO: CPT | Performed by: STUDENT IN AN ORGANIZED HEALTH CARE EDUCATION/TRAINING PROGRAM

## 2017-05-17 PROCEDURE — 85730 THROMBOPLASTIN TIME PARTIAL: CPT | Performed by: STUDENT IN AN ORGANIZED HEALTH CARE EDUCATION/TRAINING PROGRAM

## 2017-05-17 PROCEDURE — 85025 COMPLETE CBC W/AUTO DIFF WBC: CPT | Performed by: STUDENT IN AN ORGANIZED HEALTH CARE EDUCATION/TRAINING PROGRAM

## 2017-05-17 PROCEDURE — 86850 RBC ANTIBODY SCREEN: CPT | Performed by: STUDENT IN AN ORGANIZED HEALTH CARE EDUCATION/TRAINING PROGRAM

## 2017-05-17 PROCEDURE — 86901 BLOOD TYPING SEROLOGIC RH(D): CPT | Performed by: STUDENT IN AN ORGANIZED HEALTH CARE EDUCATION/TRAINING PROGRAM

## 2017-05-17 PROCEDURE — 80048 BASIC METABOLIC PNL TOTAL CA: CPT | Performed by: STUDENT IN AN ORGANIZED HEALTH CARE EDUCATION/TRAINING PROGRAM

## 2017-05-17 RX ORDER — ONDANSETRON 2 MG/ML
4-8 INJECTION INTRAMUSCULAR; INTRAVENOUS EVERY 6 HOURS PRN
Status: DISCONTINUED | OUTPATIENT
Start: 2017-05-17 | End: 2017-05-24 | Stop reason: HOSPADM

## 2017-05-17 RX ORDER — HYDROMORPHONE HYDROCHLORIDE 1 MG/ML
INJECTION, SOLUTION INTRAMUSCULAR; INTRAVENOUS; SUBCUTANEOUS
Status: COMPLETED
Start: 2017-05-17 | End: 2017-05-17

## 2017-05-17 RX ORDER — ONDANSETRON 4 MG/1
4-8 TABLET, ORALLY DISINTEGRATING ORAL EVERY 6 HOURS PRN
Status: DISCONTINUED | OUTPATIENT
Start: 2017-05-17 | End: 2017-05-20

## 2017-05-17 RX ORDER — HYDROMORPHONE HYDROCHLORIDE 1 MG/ML
.3-.5 INJECTION, SOLUTION INTRAMUSCULAR; INTRAVENOUS; SUBCUTANEOUS
Status: DISCONTINUED | OUTPATIENT
Start: 2017-05-17 | End: 2017-05-19

## 2017-05-17 RX ORDER — ONDANSETRON 4 MG/1
4 TABLET, ORALLY DISINTEGRATING ORAL EVERY 6 HOURS PRN
Status: DISCONTINUED | OUTPATIENT
Start: 2017-05-17 | End: 2017-05-17

## 2017-05-17 RX ORDER — HYDROMORPHONE HYDROCHLORIDE 1 MG/ML
0.5 INJECTION, SOLUTION INTRAMUSCULAR; INTRAVENOUS; SUBCUTANEOUS ONCE
Status: COMPLETED | OUTPATIENT
Start: 2017-05-17 | End: 2017-05-17

## 2017-05-17 RX ORDER — ACETAMINOPHEN 325 MG/1
650 TABLET ORAL EVERY 4 HOURS PRN
Status: DISCONTINUED | OUTPATIENT
Start: 2017-05-17 | End: 2017-05-19

## 2017-05-17 RX ORDER — HEPARIN SODIUM 5000 [USP'U]/.5ML
5000 INJECTION, SOLUTION INTRAVENOUS; SUBCUTANEOUS
Status: DISCONTINUED | OUTPATIENT
Start: 2017-05-18 | End: 2017-05-18 | Stop reason: HOSPADM

## 2017-05-17 RX ORDER — OXYCODONE AND ACETAMINOPHEN 5; 325 MG/1; MG/1
1 TABLET ORAL EVERY 4 HOURS PRN
Status: DISCONTINUED | OUTPATIENT
Start: 2017-05-17 | End: 2017-05-18

## 2017-05-17 RX ORDER — ONDANSETRON 2 MG/ML
4 INJECTION INTRAMUSCULAR; INTRAVENOUS EVERY 6 HOURS PRN
Status: DISCONTINUED | OUTPATIENT
Start: 2017-05-17 | End: 2017-05-17

## 2017-05-17 RX ORDER — LIDOCAINE 40 MG/G
CREAM TOPICAL
Status: DISCONTINUED | OUTPATIENT
Start: 2017-05-17 | End: 2017-05-21

## 2017-05-17 RX ORDER — NALOXONE HYDROCHLORIDE 0.4 MG/ML
.1-.4 INJECTION, SOLUTION INTRAMUSCULAR; INTRAVENOUS; SUBCUTANEOUS
Status: DISCONTINUED | OUTPATIENT
Start: 2017-05-17 | End: 2017-05-24 | Stop reason: HOSPADM

## 2017-05-17 RX ADMIN — DEXTROSE AND SODIUM CHLORIDE: 5; 450 INJECTION, SOLUTION INTRAVENOUS at 22:19

## 2017-05-17 RX ADMIN — HYDROMORPHONE HYDROCHLORIDE 0.5 MG: 1 INJECTION, SOLUTION INTRAMUSCULAR; INTRAVENOUS; SUBCUTANEOUS at 21:49

## 2017-05-17 RX ADMIN — HYDROMORPHONE HYDROCHLORIDE 0.5 MG: 1 INJECTION, SOLUTION INTRAMUSCULAR; INTRAVENOUS; SUBCUTANEOUS at 22:18

## 2017-05-17 RX ADMIN — ONDANSETRON 4 MG: 2 INJECTION INTRAMUSCULAR; INTRAVENOUS at 22:18

## 2017-05-17 ASSESSMENT — ACTIVITIES OF DAILY LIVING (ADL)
AMBULATION: 0-->INDEPENDENT
TRANSFERRING: 0-->INDEPENDENT
DRESS: 0-->INDEPENDENT
TOILETING: 0-->INDEPENDENT
RETIRED_EATING: 1-->ASSISTIVE EQUIPMENT
FALL_HISTORY_WITHIN_LAST_SIX_MONTHS: NO
SWALLOWING: 2-->DIFFICULTY SWALLOWING LIQUIDS/FOODS
RETIRED_COMMUNICATION: 0-->UNDERSTANDS/COMMUNICATES WITHOUT DIFFICULTY
BATHING: 0-->INDEPENDENT
COGNITION: 0 - NO COGNITION ISSUES REPORTED

## 2017-05-17 NOTE — TELEPHONE ENCOUNTER
PAC clinic told me that Jenni did not arrive for her 8:30 am appointment. I called her to see if she could reschedule. She told me that she was very sick this morning and did not have a ride to come to her appointment. She told me she had been vomiting all morning. I asked if she would like a call from a nurse and she declined. I confirmed new time with her and let PAC know that she would be in and had been sick.

## 2017-05-17 NOTE — PROGRESS NOTES
Have contacted patient numerous times over the past two hours to inform her of admission to  at [facility].   I have left a message for her to go to  for admission to hospital and to call back to confirm.  Have not heard back from patient.

## 2017-05-17 NOTE — IP AVS SNAPSHOT
MRN:[id number]                      After Visit Summary   5/17/2017    Rachel A Gerhardt    MRN: [id number]           Thank you!     Thank you for choosing [facility] for your care. Our goal is always to provide you with excellent care. Hearing back from our patients is one way we can continue to improve our services. Please take a few minutes to complete the written survey that you may receive in the mail after you visit with us. Thank you!        Patient Information     Date Of Birth          1975        Designated Caregiver       Most Recent Value    Caregiver    Will someone help with your care after discharge? yes    Name of designated caregiver josue    Phone number of caregiver [phone]    Caregiver address [facility]      About your hospital stay     You were admitted on:  May 17, 2017 You last received care in the:  [address] [facility]    You were discharged on:  May 24, 2017        Reason for your hospital stay       You had a small bowel resection                  Who to Call     For medical emergencies, please call 911.  For non-urgent questions about your medical care, please call your primary care provider or clinic, [phone]  For questions related to your surgery, please call your surgery clinic        Attending Provider     Provider Specialty    Jennifer Goodwin MD Colon and Rectal Surgery       Primary Care Provider Office Phone # Fax #    Jennifer Garza [phone]       [address] [place] [address]        After Care Instructions     Diet       Follow this diet upon discharge:   Low Residue Diet with Olmitz Breakfast shakes                  Follow-up Appointments     Adult [facility] Follow-up and recommended labs and tests       DIET  -Low Residue Diet for at least 4-6 weeks unless cleared by Colorectal surgery.  No raw vegetables, fruit skins, fibrous foods that require a lot of chewing, nuts, seeds,  corn, popcorn.   -We recommend eating slowly, chewing thoroughly, eating small frequent meals throughout the day  -Stay well hydrated.    - recommend nutritional supplements such as Ensure, or Ketchum Breakfast shakes     ACTIVITY  -No lifting, pushing, pulling greater than 10 lbs and no strenuous exercise for 6 weeks   -No driving while on narcotic analgesics (i.e. Percocet, oxycodone, Vicodin)  -No driving until you are able to fully twist to both sides or slam on brakes quickly and without any pain  -wear abdominal binder with activity    WOUND CLINIC  -Inspect your wounds daily for signs of infection (increased redness, drainage, pain)  -Keep your wound clean and dry  -You may shower, but do not soak in tub or pool    NOTIFY  Please contact Kanchan Martinez RN or Yolanda Herzog RN at [phone] for problems after discharge such as:  -Temperature > 101F, chills, rigors, dizziness  -Redness around or purulent drainage from wound  -Inability to tolerate diet, nausea or vomiting  -You stop passing gas, develop significant bloating, abdominal pain  -Have blood in stools/vomit  -Have severe diarrhea/constipation  -Any other questions or concerns.  - At nights (after 4:30pm), on weekends, or if urgent, call [phone] and ask the  to speak with the on-call Colorectal Surgery resident or fellow    Medication Instructions  Some of your medications may have changed. Please take only prescribed and resumed medications     FOLLOW-UP  1.  You will need to follow-up with Umm Kaufman NP in the Colon and Rectal Surgery clinic in 1 week(s) and then with Dr. Goodwin in 2-3 weeks after.  Please contact our clinic scheduler Bianca Pavon (phone # [phone]) or Margot Woods (phone # [phone]) if you have not heard from our clinic in 3 business days afer discharge to schedule a follow-up appointment.   2.  Follow up with your primary care provider in 1-2 weeks after discharge from the hospital to review  "this hospitalization.         *For other appointments on [place] and/or [facility] (with [facility] or [facility] provider or service): Call [phone] if you have not heard regarding these appointments within 7 days of discharge.[email or web address]      Appointments on [place] and/or [facility] (with [facility] or [facility] provider or service). Call [phone] if you haven't heard regarding these appointments within 7 days of discharge.                  Your next 10 appointments already scheduled     Jun 01, 2017  8:25 AM CDT   (Arrive by 8:10 AM)   Return Visit with Jennifer Garza MD   [facility] ([facility])    [address] [place] [address] [phone]            Jun 22, 2017  7:40 AM CDT   (Arrive by 7:25 AM)   New Patient Visit with Duane Mcmahan MD   [facility] ([facility])    [address] [place] [address] [phone]              Additional Services     Home infusion referral       No home care services indicated at this time of discharge. Home TPN discontinued.                  Pending Results     Date and Time Order Name Status Description    5/24/2017 0100 Prealbumin In process             Statement of Approval     Ordered          05/24/17 1231  I have reviewed and agree with all the recommendations and orders detailed in this document.  EFFECTIVE NOW     Approved and electronically signed by:  Julieta Rob PA-C             Admission Information     Date & Time Provider Department Dept. Phone    5/17/2017 Jennifer Goodwin MD [address] [facility] [place] [phone]      Your Vitals Were     Blood Pressure Pulse Temperature Respirations Height Weight    106/64 (BP Location: Right arm) 110 98.2  F (36.8  C) (Oral) 18 1.727 m (5' 8\") 69.7 kg (153 lb 9.6 oz)    Pulse Oximetry BMI (Body Mass Index)                95% 23.35 kg/m2        "   [employer] Information     [employer] gives you secure access to your electronic health record. If you see a primary care provider, you can also send messages to your care team and make appointments. If you have questions, please call your primary care clinic.  If you do not have a primary care provider, please call [phone] and they will assist you.        Care EveryWhere ID     This is your Care EveryWhere ID. This could be used by other organizations to access your [facility] medical records  [id number]           Review of your medicines      START taking        Dose / Directions    acetaminophen 500 MG tablet   Commonly known as:  TYLENOL   Used for:  Small intestine obstruction (H)        Dose:  1000 mg   Take 2 tablets (1,000 mg) by mouth 4 times daily   Quantity:  80 tablet   Refills:  0       cholecalciferol 1000 UNITS Tabs   Used for:  Small intestine obstruction (H)        Dose:  1000 Units   Take 1,000 Units by mouth daily   Quantity:  30 tablet   Refills:  0       magnesium oxide 400 MG tablet   Commonly known as:  MAG-OX   Used for:  Hypomagnesemia        Dose:  400 mg   Take 1 tablet (400 mg) by mouth daily for 14 days   Quantity:  14 tablet   Refills:  0       methocarbamol 500 MG tablet   Commonly known as:  ROBAXIN   Used for:  Small intestine obstruction (H)        Dose:  500 mg   Take 1 tablet (500 mg) by mouth 4 times daily for 14 days   Quantity:  56 tablet   Refills:  0       oxyCODONE 5 MG IR tablet   Commonly known as:  ROXICODONE   Used for:  Small intestine obstruction (H)        Dose:  10-15 mg   Take 2-3 tablets (10-15 mg) by mouth every 4 hours as needed for pain Take 2-3 tablets every 4 hours as needed for 1-2 days, then 2 tablets every 4 hrs as needed for 1-2 days, then 2 tablets every 6 hrs as needed for 1-2 days, then 2 tablets every 8 hrs as needed for 1-2 days, then 1 tablet every 8 hrs as need for 1-2 days,  1 tablet every 12 hrs as need for 1-2 days,  1 tablet daily as need for  1-2 days, Off   Quantity:  70 tablet   Refills:  0       polyethylene glycol Packet   Commonly known as:  MIRALAX/GLYCOLAX   Used for:  Small intestine obstruction (H)        Dose:  17 g   Take 17 g by mouth daily   Quantity:  30 packet   Refills:  0       saccharomyces boulardii 250 MG capsule   Commonly known as:  FLORASTOR   Used for:  Small intestine obstruction (H)        Dose:  250 mg   Take 1 capsule (250 mg) by mouth 2 times daily   Quantity:  60 capsule   Refills:  0         CONTINUE these medicines which may have CHANGED, or have new prescriptions. If we are uncertain of the size of tablets/capsules you have at home, strength may be listed as something that might have changed.        Dose / Directions    ondansetron 4 MG tablet   Commonly known as:  ZOFRAN   This may have changed:  Another medication with the same name was removed. Continue taking this medication, and follow the directions you see here.        Dose:  4-8 mg   Take 1-2 tablets (4-8 mg) by mouth every 8 hours as needed for nausea   Quantity:  30 tablet   Refills:  0         CONTINUE these medicines which have NOT CHANGED        Dose / Directions    albuterol 108 (90 BASE) MCG/ACT Inhaler   Commonly known as:  PROAIR HFA/PROVENTIL HFA/VENTOLIN HFA        Dose:  2 puff   Inhale 2 puffs into the lungs every 6 hours as needed.   Refills:  0       hyoscyamine 0.125 MG tablet   Commonly known as:  ANASPAZ/LEVSIN   Used for:  Abdominal pain, generalized        Dose:  0.125-0.25 mg   Take 1-2 tablets (125-250 mcg) by mouth every 4 hours as needed for cramping   Quantity:  40 tablet   Refills:  1       simethicone 80 MG chewable tablet   Commonly known as:  MYLICON        Dose:  80 mg   Take 80 mg by mouth Reported on 5/8/2017   Refills:  0       TUMS 500 MG chewable tablet   Generic drug:  calcium carbonate        Dose:  500 mg   Take 500 mg by mouth Reported on 5/8/2017   Refills:  0       vitamin D 33030 UNIT capsule   Commonly known as:   ERGOCALCIFEROL   Used for:  Vitamin D deficiency        Dose:  99869 Units   Take 1 capsule (50,000 Units) by mouth once a week   Quantity:  12 capsule   Refills:  0         STOP taking     oxyCODONE-acetaminophen 5-325 MG per tablet   Commonly known as:  PERCOCET                Where to get your medicines      These medications were sent to [facility] - [place], [place] - [address]  [address] [address]     Phone:  [phone]     acetaminophen 500 MG tablet    cholecalciferol 1000 UNITS Tabs    magnesium oxide 400 MG tablet    methocarbamol 500 MG tablet    ondansetron 4 MG tablet    polyethylene glycol Packet    saccharomyces boulardii 250 MG capsule         Some of these will need a paper prescription and others can be bought over the counter. Ask your nurse if you have questions.     Bring a paper prescription for each of these medications     oxyCODONE 5 MG IR tablet                Protect others around you: Learn how to safely use, store and throw away your medicines at www.disposemymeds.org.             Medication List: This is a list of all your medications and when to take them. Check marks below indicate your daily home schedule. Keep this list as a reference.      Medications           Morning Afternoon Evening Bedtime As Needed    acetaminophen 500 MG tablet   Commonly known as:  TYLENOL   Take 2 tablets (1,000 mg) by mouth 4 times daily   Last time this was given:  1,000 mg on 5/24/2017  8:45 AM                                albuterol 108 (90 BASE) MCG/ACT Inhaler   Commonly known as:  PROAIR HFA/PROVENTIL HFA/VENTOLIN HFA   Inhale 2 puffs into the lungs every 6 hours as needed.                                cholecalciferol 1000 UNITS Tabs   Take 1,000 Units by mouth daily   Last time this was given:  1,000 Units on 5/24/2017  8:45 AM                                hyoscyamine 0.125 MG tablet   Commonly known as:  ANASPAZ/LEVSIN   Take 1-2 tablets  (125-250 mcg) by mouth every 4 hours as needed for cramping                                magnesium oxide 400 MG tablet   Commonly known as:  MAG-OX   Take 1 tablet (400 mg) by mouth daily for 14 days   Last time this was given:  400 mg on 5/24/2017  8:45 AM                                methocarbamol 500 MG tablet   Commonly known as:  ROBAXIN   Take 1 tablet (500 mg) by mouth 4 times daily for 14 days   Last time this was given:  500 mg on 5/24/2017  8:45 AM                                ondansetron 4 MG tablet   Commonly known as:  ZOFRAN   Take 1-2 tablets (4-8 mg) by mouth every 8 hours as needed for nausea                                oxyCODONE 5 MG IR tablet   Commonly known as:  ROXICODONE   Take 2-3 tablets (10-15 mg) by mouth every 4 hours as needed for pain Take 2-3 tablets every 4 hours as needed for 1-2 days, then 2 tablets every 4 hrs as needed for 1-2 days, then 2 tablets every 6 hrs as needed for 1-2 days, then 2 tablets every 8 hrs as needed for 1-2 days, then 1 tablet every 8 hrs as need for 1-2 days,  1 tablet every 12 hrs as need for 1-2 days,  1 tablet daily as need for 1-2 days, Off   Last time this was given:  15 mg on 5/24/2017  9:33 AM                                polyethylene glycol Packet   Commonly known as:  MIRALAX/GLYCOLAX   Take 17 g by mouth daily   Last time this was given:  17 g on 5/22/2017  7:59 AM                                saccharomyces boulardii 250 MG capsule   Commonly known as:  FLORASTOR   Take 1 capsule (250 mg) by mouth 2 times daily   Last time this was given:  250 mg on 5/24/2017  8:45 AM                                simethicone 80 MG chewable tablet   Commonly known as:  MYLICON   Take 80 mg by mouth Reported on 5/8/2017   Last time this was given:  80 mg on 5/21/2017  2:38 AM                                TUMS 500 MG chewable tablet   Take 500 mg by mouth Reported on 5/8/2017   Generic drug:  calcium carbonate                                vitamin D  67272 UNIT capsule   Commonly known as:  ERGOCALCIFEROL   Take 1 capsule (50,000 Units) by mouth once a week   Last time this was given:  50,000 Units on 5/22/2017  7:58 AM

## 2017-05-17 NOTE — IP AVS SNAPSHOT
[address] [address]    Phone:  [phone]                                       After Visit Summary   5/17/2017    Rachel A Gerhardt    MRN: [id number]           After Visit Summary Signature Page     I have received my discharge instructions, and my questions have been answered. I have discussed any challenges I see with this plan with the nurse or doctor.    ..........................................................................................................................................  Patient/Patient Representative Signature      ..........................................................................................................................................  Patient Representative Print Name and Relationship to Patient    ..................................................               ................................................  Date                                            Time    ..........................................................................................................................................  Reviewed by Signature/Title    ...................................................              ..............................................  Date                                                            Time

## 2017-05-17 NOTE — PROGRESS NOTES
Rec'd msg that pt missed PAC appt d/t vomiting at home (surgery is currently scheduled for tomorrow).  Per Dr. Goodwin, if pt ok with being admitted today, she should come in.  Spoke to pt who agrees to being admitted today.  Let her know we would call Admitting and then call her with the time.

## 2017-05-18 ENCOUNTER — ANESTHESIA (OUTPATIENT)
Dept: SURGERY | Facility: CLINIC | Age: 42
DRG: 331 | End: 2017-05-18
Payer: COMMERCIAL

## 2017-05-18 LAB
ABO + RH BLD: NORMAL
ABO + RH BLD: NORMAL
ANION GAP SERPL CALCULATED.3IONS-SCNC: 6 MMOL/L (ref 3–14)
BASOPHILS # BLD AUTO: 0 10E9/L (ref 0–0.2)
BASOPHILS NFR BLD AUTO: 0.1 %
BLD GP AB SCN SERPL QL: NORMAL
BLOOD BANK CMNT PATIENT-IMP: NORMAL
BUN SERPL-MCNC: 9 MG/DL (ref 7–30)
CALCIUM SERPL-MCNC: 8.7 MG/DL (ref 8.5–10.1)
CHLORIDE SERPL-SCNC: 105 MMOL/L (ref 94–109)
CO2 SERPL-SCNC: 26 MMOL/L (ref 20–32)
CREAT SERPL-MCNC: 0.6 MG/DL (ref 0.52–1.04)
DIFFERENTIAL METHOD BLD: ABNORMAL
EOSINOPHIL # BLD AUTO: 0 10E9/L (ref 0–0.7)
EOSINOPHIL NFR BLD AUTO: 0 %
ERYTHROCYTE [DISTWIDTH] IN BLOOD BY AUTOMATED COUNT: 12.9 % (ref 10–15)
GFR SERPL CREATININE-BSD FRML MDRD: ABNORMAL ML/MIN/1.7M2
GLUCOSE SERPL-MCNC: 101 MG/DL (ref 70–99)
HCG UR QL: NEGATIVE
HCT VFR BLD AUTO: 39.3 % (ref 35–47)
HGB BLD-MCNC: 12.7 G/DL (ref 11.7–15.7)
IMM GRANULOCYTES # BLD: 0.1 10E9/L (ref 0–0.4)
IMM GRANULOCYTES NFR BLD: 0.3 %
LYMPHOCYTES # BLD AUTO: 0.7 10E9/L (ref 0.8–5.3)
LYMPHOCYTES NFR BLD AUTO: 3.6 %
MAGNESIUM SERPL-MCNC: 1.6 MG/DL (ref 1.6–2.3)
MCH RBC QN AUTO: 32.7 PG (ref 26.5–33)
MCHC RBC AUTO-ENTMCNC: 32.3 G/DL (ref 31.5–36.5)
MCV RBC AUTO: 101 FL (ref 78–100)
MONOCYTES # BLD AUTO: 0.6 10E9/L (ref 0–1.3)
MONOCYTES NFR BLD AUTO: 3.4 %
NEUTROPHILS # BLD AUTO: 17.2 10E9/L (ref 1.6–8.3)
NEUTROPHILS NFR BLD AUTO: 92.6 %
NRBC # BLD AUTO: 0 10*3/UL
NRBC BLD AUTO-RTO: 0 /100
PHOSPHATE SERPL-MCNC: 3.6 MG/DL (ref 2.5–4.5)
PLATELET # BLD AUTO: 303 10E9/L (ref 150–450)
POTASSIUM SERPL-SCNC: 4.5 MMOL/L (ref 3.4–5.3)
RBC # BLD AUTO: 3.88 10E12/L (ref 3.8–5.2)
SODIUM SERPL-SCNC: 137 MMOL/L (ref 133–144)
SPECIMEN EXP DATE BLD: NORMAL
WBC # BLD AUTO: 18.5 10E9/L (ref 4–11)

## 2017-05-18 PROCEDURE — P9041 ALBUMIN (HUMAN),5%, 50ML: HCPCS | Performed by: ANESTHESIOLOGY

## 2017-05-18 PROCEDURE — 25000565 ZZH ISOFLURANE, EA 15 MIN: Performed by: UROLOGY

## 2017-05-18 PROCEDURE — C1769 GUIDE WIRE: HCPCS | Performed by: UROLOGY

## 2017-05-18 PROCEDURE — 25000128 H RX IP 250 OP 636: Performed by: STUDENT IN AN ORGANIZED HEALTH CARE EDUCATION/TRAINING PROGRAM

## 2017-05-18 PROCEDURE — 84100 ASSAY OF PHOSPHORUS: CPT | Performed by: STUDENT IN AN ORGANIZED HEALTH CARE EDUCATION/TRAINING PROGRAM

## 2017-05-18 PROCEDURE — 85025 COMPLETE CBC W/AUTO DIFF WBC: CPT | Performed by: STUDENT IN AN ORGANIZED HEALTH CARE EDUCATION/TRAINING PROGRAM

## 2017-05-18 PROCEDURE — 25000125 ZZHC RX 250: Performed by: ANESTHESIOLOGY

## 2017-05-18 PROCEDURE — 71000015 ZZH RECOVERY PHASE 1 LEVEL 2 EA ADDTL HR: Performed by: UROLOGY

## 2017-05-18 PROCEDURE — 25000128 H RX IP 250 OP 636: Performed by: COLON & RECTAL SURGERY

## 2017-05-18 PROCEDURE — 25000128 H RX IP 250 OP 636: Performed by: NURSE ANESTHETIST, CERTIFIED REGISTERED

## 2017-05-18 PROCEDURE — 25000132 ZZH RX MED GY IP 250 OP 250 PS 637: Performed by: SURGERY

## 2017-05-18 PROCEDURE — 71000014 ZZH RECOVERY PHASE 1 LEVEL 2 FIRST HR: Performed by: UROLOGY

## 2017-05-18 PROCEDURE — 25000128 H RX IP 250 OP 636: Performed by: ANESTHESIOLOGY

## 2017-05-18 PROCEDURE — S0020 INJECTION, BUPIVICAINE HYDRO: HCPCS | Performed by: ANESTHESIOLOGY

## 2017-05-18 PROCEDURE — 37000009 ZZH ANESTHESIA TECHNICAL FEE, EACH ADDTL 15 MIN: Performed by: UROLOGY

## 2017-05-18 PROCEDURE — 40000171 ZZH STATISTIC PRE-PROCEDURE ASSESSMENT III: Performed by: UROLOGY

## 2017-05-18 PROCEDURE — 81025 URINE PREGNANCY TEST: CPT | Performed by: ANESTHESIOLOGY

## 2017-05-18 PROCEDURE — 36592 COLLECT BLOOD FROM PICC: CPT | Performed by: STUDENT IN AN ORGANIZED HEALTH CARE EDUCATION/TRAINING PROGRAM

## 2017-05-18 PROCEDURE — S0074 INJECTION, CEFOTETAN DISODIU: HCPCS | Performed by: COLON & RECTAL SURGERY

## 2017-05-18 PROCEDURE — 88307 TISSUE EXAM BY PATHOLOGIST: CPT | Performed by: COLON & RECTAL SURGERY

## 2017-05-18 PROCEDURE — 37000008 ZZH ANESTHESIA TECHNICAL FEE, 1ST 30 MIN: Performed by: UROLOGY

## 2017-05-18 PROCEDURE — 25000125 ZZHC RX 250: Performed by: STUDENT IN AN ORGANIZED HEALTH CARE EDUCATION/TRAINING PROGRAM

## 2017-05-18 PROCEDURE — 0DTB0ZZ RESECTION OF ILEUM, OPEN APPROACH: ICD-10-PCS | Performed by: COLON & RECTAL SURGERY

## 2017-05-18 PROCEDURE — 12000001 ZZH R&B MED SURG/OB UMMC

## 2017-05-18 PROCEDURE — 80048 BASIC METABOLIC PNL TOTAL CA: CPT | Performed by: STUDENT IN AN ORGANIZED HEALTH CARE EDUCATION/TRAINING PROGRAM

## 2017-05-18 PROCEDURE — 25000125 ZZHC RX 250: Performed by: COLON & RECTAL SURGERY

## 2017-05-18 PROCEDURE — 27210794 ZZH OR GENERAL SUPPLY STERILE: Performed by: UROLOGY

## 2017-05-18 PROCEDURE — 0DJD8ZZ INSPECTION OF LOWER INTESTINAL TRACT, VIA NATURAL OR ARTIFICIAL OPENING ENDOSCOPIC: ICD-10-PCS | Performed by: COLON & RECTAL SURGERY

## 2017-05-18 PROCEDURE — 0T788DZ DILATION OF BILATERAL URETERS WITH INTRALUMINAL DEVICE, VIA NATURAL OR ARTIFICIAL OPENING ENDOSCOPIC: ICD-10-PCS | Performed by: UROLOGY

## 2017-05-18 PROCEDURE — 83735 ASSAY OF MAGNESIUM: CPT | Performed by: STUDENT IN AN ORGANIZED HEALTH CARE EDUCATION/TRAINING PROGRAM

## 2017-05-18 PROCEDURE — 36000064 ZZH SURGERY LEVEL 4 EA 15 ADDTL MIN - UMMC: Performed by: UROLOGY

## 2017-05-18 PROCEDURE — 25000132 ZZH RX MED GY IP 250 OP 250 PS 637: Performed by: COLON & RECTAL SURGERY

## 2017-05-18 PROCEDURE — 36000066 ZZH SURGERY LEVEL 4 W FLUORO 1ST 30 MIN - UMMC: Performed by: UROLOGY

## 2017-05-18 RX ORDER — POTASSIUM CHLORIDE 7.45 MG/ML
10 INJECTION INTRAVENOUS
Status: DISCONTINUED | OUTPATIENT
Start: 2017-05-18 | End: 2017-05-20

## 2017-05-18 RX ORDER — NALOXONE HYDROCHLORIDE 0.4 MG/ML
.1-.4 INJECTION, SOLUTION INTRAMUSCULAR; INTRAVENOUS; SUBCUTANEOUS
Status: DISCONTINUED | OUTPATIENT
Start: 2017-05-18 | End: 2017-05-20

## 2017-05-18 RX ORDER — NALOXONE HYDROCHLORIDE 0.4 MG/ML
.1-.4 INJECTION, SOLUTION INTRAMUSCULAR; INTRAVENOUS; SUBCUTANEOUS
Status: DISCONTINUED | OUTPATIENT
Start: 2017-05-18 | End: 2017-05-18 | Stop reason: HOSPADM

## 2017-05-18 RX ORDER — ONDANSETRON 4 MG/1
4 TABLET, ORALLY DISINTEGRATING ORAL EVERY 30 MIN PRN
Status: DISCONTINUED | OUTPATIENT
Start: 2017-05-18 | End: 2017-05-18 | Stop reason: HOSPADM

## 2017-05-18 RX ORDER — SODIUM CHLORIDE, SODIUM LACTATE, POTASSIUM CHLORIDE, CALCIUM CHLORIDE 600; 310; 30; 20 MG/100ML; MG/100ML; MG/100ML; MG/100ML
INJECTION, SOLUTION INTRAVENOUS CONTINUOUS
Status: DISCONTINUED | OUTPATIENT
Start: 2017-05-18 | End: 2017-05-18 | Stop reason: HOSPADM

## 2017-05-18 RX ORDER — FENTANYL CITRATE 50 UG/ML
25-50 INJECTION, SOLUTION INTRAMUSCULAR; INTRAVENOUS
Status: DISCONTINUED | OUTPATIENT
Start: 2017-05-18 | End: 2017-05-18 | Stop reason: HOSPADM

## 2017-05-18 RX ORDER — CEFOTETAN DISODIUM 1 G/10ML
1 INJECTION, POWDER, FOR SOLUTION INTRAMUSCULAR; INTRAVENOUS SEE ADMIN INSTRUCTIONS
Status: DISCONTINUED | OUTPATIENT
Start: 2017-05-18 | End: 2017-05-18 | Stop reason: HOSPADM

## 2017-05-18 RX ORDER — METHOCARBAMOL 100 MG/ML
1000 INJECTION, SOLUTION INTRAMUSCULAR; INTRAVENOUS ONCE
Status: DISCONTINUED | OUTPATIENT
Start: 2017-05-18 | End: 2017-05-18

## 2017-05-18 RX ORDER — GRANISETRON HYDROCHLORIDE 1 MG/ML
1 INJECTION INTRAVENOUS ONCE
Status: COMPLETED | OUTPATIENT
Start: 2017-05-18 | End: 2017-05-18

## 2017-05-18 RX ORDER — ACETAMINOPHEN 10 MG/ML
1000 INJECTION, SOLUTION INTRAVENOUS EVERY 6 HOURS
Status: DISCONTINUED | OUTPATIENT
Start: 2017-05-18 | End: 2017-05-19

## 2017-05-18 RX ORDER — FENTANYL CITRATE 50 UG/ML
INJECTION, SOLUTION INTRAMUSCULAR; INTRAVENOUS PRN
Status: DISCONTINUED | OUTPATIENT
Start: 2017-05-18 | End: 2017-05-18

## 2017-05-18 RX ORDER — POTASSIUM CHLORIDE 1.5 G/1.58G
20-40 POWDER, FOR SOLUTION ORAL
Status: DISCONTINUED | OUTPATIENT
Start: 2017-05-18 | End: 2017-05-20

## 2017-05-18 RX ORDER — LIDOCAINE HYDROCHLORIDE 20 MG/ML
INJECTION, SOLUTION INFILTRATION; PERINEURAL PRN
Status: DISCONTINUED | OUTPATIENT
Start: 2017-05-18 | End: 2017-05-18

## 2017-05-18 RX ORDER — DEXAMETHASONE SODIUM PHOSPHATE 4 MG/ML
INJECTION, SOLUTION INTRA-ARTICULAR; INTRALESIONAL; INTRAMUSCULAR; INTRAVENOUS; SOFT TISSUE PRN
Status: DISCONTINUED | OUTPATIENT
Start: 2017-05-18 | End: 2017-05-18

## 2017-05-18 RX ORDER — HYDROMORPHONE HYDROCHLORIDE 1 MG/ML
.3-.5 INJECTION, SOLUTION INTRAMUSCULAR; INTRAVENOUS; SUBCUTANEOUS EVERY 5 MIN PRN
Status: DISCONTINUED | OUTPATIENT
Start: 2017-05-18 | End: 2017-05-18 | Stop reason: HOSPADM

## 2017-05-18 RX ORDER — LIDOCAINE 40 MG/G
CREAM TOPICAL
Status: DISCONTINUED | OUTPATIENT
Start: 2017-05-18 | End: 2017-05-24 | Stop reason: HOSPADM

## 2017-05-18 RX ORDER — FLUMAZENIL 0.1 MG/ML
0.2 INJECTION, SOLUTION INTRAVENOUS
Status: DISCONTINUED | OUTPATIENT
Start: 2017-05-18 | End: 2017-05-18 | Stop reason: HOSPADM

## 2017-05-18 RX ORDER — LIDOCAINE HYDROCHLORIDE AND EPINEPHRINE 15; 5 MG/ML; UG/ML
INJECTION, SOLUTION EPIDURAL PRN
Status: DISCONTINUED | OUTPATIENT
Start: 2017-05-18 | End: 2017-05-18

## 2017-05-18 RX ORDER — CEFOTETAN DISODIUM 1 G/10ML
1 INJECTION, POWDER, FOR SOLUTION INTRAMUSCULAR; INTRAVENOUS
Status: COMPLETED | OUTPATIENT
Start: 2017-05-18 | End: 2017-05-18

## 2017-05-18 RX ORDER — NALBUPHINE HYDROCHLORIDE 10 MG/ML
2.5-5 INJECTION, SOLUTION INTRAMUSCULAR; INTRAVENOUS; SUBCUTANEOUS EVERY 6 HOURS PRN
Status: DISCONTINUED | OUTPATIENT
Start: 2017-05-18 | End: 2017-05-18 | Stop reason: HOSPADM

## 2017-05-18 RX ORDER — DIPHENHYDRAMINE HYDROCHLORIDE 50 MG/ML
25 INJECTION INTRAMUSCULAR; INTRAVENOUS EVERY 6 HOURS PRN
Status: DISCONTINUED | OUTPATIENT
Start: 2017-05-18 | End: 2017-05-24 | Stop reason: HOSPADM

## 2017-05-18 RX ORDER — NALBUPHINE HYDROCHLORIDE 10 MG/ML
2.5-5 INJECTION, SOLUTION INTRAMUSCULAR; INTRAVENOUS; SUBCUTANEOUS EVERY 6 HOURS PRN
Status: DISCONTINUED | OUTPATIENT
Start: 2017-05-18 | End: 2017-05-19

## 2017-05-18 RX ORDER — MAGNESIUM SULFATE HEPTAHYDRATE 40 MG/ML
4 INJECTION, SOLUTION INTRAVENOUS EVERY 4 HOURS PRN
Status: DISCONTINUED | OUTPATIENT
Start: 2017-05-18 | End: 2017-05-24 | Stop reason: HOSPADM

## 2017-05-18 RX ORDER — NALBUPHINE HYDROCHLORIDE 10 MG/ML
2.5-5 INJECTION, SOLUTION INTRAMUSCULAR; INTRAVENOUS; SUBCUTANEOUS EVERY 6 HOURS PRN
Status: DISCONTINUED | OUTPATIENT
Start: 2017-05-18 | End: 2017-05-18

## 2017-05-18 RX ORDER — HYDRALAZINE HYDROCHLORIDE 20 MG/ML
10 INJECTION INTRAMUSCULAR; INTRAVENOUS EVERY 4 HOURS PRN
Status: DISCONTINUED | OUTPATIENT
Start: 2017-05-18 | End: 2017-05-24 | Stop reason: HOSPADM

## 2017-05-18 RX ORDER — ONDANSETRON 2 MG/ML
4 INJECTION INTRAMUSCULAR; INTRAVENOUS EVERY 30 MIN PRN
Status: DISCONTINUED | OUTPATIENT
Start: 2017-05-18 | End: 2017-05-18 | Stop reason: HOSPADM

## 2017-05-18 RX ORDER — ACETAMINOPHEN 325 MG/1
975 TABLET ORAL ONCE
Status: COMPLETED | OUTPATIENT
Start: 2017-05-18 | End: 2017-05-18

## 2017-05-18 RX ORDER — LIDOCAINE 40 MG/G
CREAM TOPICAL
Status: DISCONTINUED | OUTPATIENT
Start: 2017-05-18 | End: 2017-05-18 | Stop reason: HOSPADM

## 2017-05-18 RX ORDER — LABETALOL HYDROCHLORIDE 5 MG/ML
10 INJECTION, SOLUTION INTRAVENOUS
Status: DISCONTINUED | OUTPATIENT
Start: 2017-05-18 | End: 2017-05-18 | Stop reason: HOSPADM

## 2017-05-18 RX ORDER — ALBUMIN, HUMAN INJ 5% 5 %
SOLUTION INTRAVENOUS CONTINUOUS PRN
Status: DISCONTINUED | OUTPATIENT
Start: 2017-05-18 | End: 2017-05-18

## 2017-05-18 RX ORDER — OXYCODONE HYDROCHLORIDE 5 MG/1
5-10 TABLET ORAL EVERY 4 HOURS PRN
Status: DISCONTINUED | OUTPATIENT
Start: 2017-05-18 | End: 2017-05-19

## 2017-05-18 RX ORDER — POTASSIUM CL/LIDO/0.9 % NACL 10MEQ/0.1L
10 INTRAVENOUS SOLUTION, PIGGYBACK (ML) INTRAVENOUS
Status: DISCONTINUED | OUTPATIENT
Start: 2017-05-18 | End: 2017-05-20

## 2017-05-18 RX ORDER — SODIUM CHLORIDE, SODIUM LACTATE, POTASSIUM CHLORIDE, CALCIUM CHLORIDE 600; 310; 30; 20 MG/100ML; MG/100ML; MG/100ML; MG/100ML
INJECTION, SOLUTION INTRAVENOUS CONTINUOUS
Status: DISCONTINUED | OUTPATIENT
Start: 2017-05-18 | End: 2017-05-20

## 2017-05-18 RX ORDER — POTASSIUM CHLORIDE 29.8 MG/ML
20 INJECTION INTRAVENOUS
Status: DISCONTINUED | OUTPATIENT
Start: 2017-05-18 | End: 2017-05-20

## 2017-05-18 RX ORDER — POTASSIUM CHLORIDE 750 MG/1
20-40 TABLET, EXTENDED RELEASE ORAL
Status: DISCONTINUED | OUTPATIENT
Start: 2017-05-18 | End: 2017-05-20

## 2017-05-18 RX ORDER — ONDANSETRON 2 MG/ML
4 INJECTION INTRAMUSCULAR; INTRAVENOUS EVERY 6 HOURS PRN
Status: DISCONTINUED | OUTPATIENT
Start: 2017-05-18 | End: 2017-05-20

## 2017-05-18 RX ORDER — PROPOFOL 10 MG/ML
INJECTION, EMULSION INTRAVENOUS PRN
Status: DISCONTINUED | OUTPATIENT
Start: 2017-05-18 | End: 2017-05-18

## 2017-05-18 RX ADMIN — FENTANYL CITRATE 50 MCG: 50 INJECTION, SOLUTION INTRAMUSCULAR; INTRAVENOUS at 09:18

## 2017-05-18 RX ADMIN — FENTANYL CITRATE 50 MCG: 50 INJECTION, SOLUTION INTRAMUSCULAR; INTRAVENOUS at 10:06

## 2017-05-18 RX ADMIN — HYDROMORPHONE HYDROCHLORIDE 0.5 MG: 1 INJECTION, SOLUTION INTRAMUSCULAR; INTRAVENOUS; SUBCUTANEOUS at 06:17

## 2017-05-18 RX ADMIN — HYDROMORPHONE HYDROCHLORIDE 0.5 MG: 1 INJECTION, SOLUTION INTRAMUSCULAR; INTRAVENOUS; SUBCUTANEOUS at 10:37

## 2017-05-18 RX ADMIN — FENTANYL CITRATE 50 MCG: 50 INJECTION, SOLUTION INTRAMUSCULAR; INTRAVENOUS at 10:03

## 2017-05-18 RX ADMIN — LIDOCAINE HYDROCHLORIDE 100 MG: 20 INJECTION, SOLUTION INFILTRATION; PERINEURAL at 07:50

## 2017-05-18 RX ADMIN — ACETAMINOPHEN 1000 MG: 10 INJECTION, SOLUTION INTRAVENOUS at 15:25

## 2017-05-18 RX ADMIN — ROCURONIUM BROMIDE 50 MG: 10 INJECTION INTRAVENOUS at 07:59

## 2017-05-18 RX ADMIN — FENTANYL CITRATE 50 MCG: 50 INJECTION INTRAMUSCULAR; INTRAVENOUS at 11:18

## 2017-05-18 RX ADMIN — HYDROMORPHONE HYDROCHLORIDE 0.5 MG: 1 INJECTION, SOLUTION INTRAMUSCULAR; INTRAVENOUS; SUBCUTANEOUS at 18:10

## 2017-05-18 RX ADMIN — HYDROMORPHONE HYDROCHLORIDE 0.3 MG: 1 INJECTION, SOLUTION INTRAMUSCULAR; INTRAVENOUS; SUBCUTANEOUS at 14:08

## 2017-05-18 RX ADMIN — FENTANYL CITRATE 50 MCG: 50 INJECTION, SOLUTION INTRAMUSCULAR; INTRAVENOUS at 08:38

## 2017-05-18 RX ADMIN — FENTANYL CITRATE 25 MCG: 50 INJECTION INTRAMUSCULAR; INTRAVENOUS at 11:10

## 2017-05-18 RX ADMIN — HYDROMORPHONE HYDROCHLORIDE 0.5 MG: 1 INJECTION, SOLUTION INTRAMUSCULAR; INTRAVENOUS; SUBCUTANEOUS at 12:02

## 2017-05-18 RX ADMIN — OXYCODONE HYDROCHLORIDE 5 MG: 5 TABLET ORAL at 21:20

## 2017-05-18 RX ADMIN — MIDAZOLAM HYDROCHLORIDE 1 MG: 1 INJECTION, SOLUTION INTRAMUSCULAR; INTRAVENOUS at 07:07

## 2017-05-18 RX ADMIN — PHENYLEPHRINE HYDROCHLORIDE 100 MCG: 10 INJECTION, SOLUTION INTRAMUSCULAR; INTRAVENOUS; SUBCUTANEOUS at 08:25

## 2017-05-18 RX ADMIN — ALBUMIN (HUMAN): 12.5 SOLUTION INTRAVENOUS at 08:27

## 2017-05-18 RX ADMIN — SODIUM CHLORIDE, POTASSIUM CHLORIDE, SODIUM LACTATE AND CALCIUM CHLORIDE: 600; 310; 30; 20 INJECTION, SOLUTION INTRAVENOUS at 07:34

## 2017-05-18 RX ADMIN — FENTANYL CITRATE 150 MCG: 50 INJECTION, SOLUTION INTRAMUSCULAR; INTRAVENOUS at 07:50

## 2017-05-18 RX ADMIN — PROPOFOL 170 MG: 10 INJECTION, EMULSION INTRAVENOUS at 07:50

## 2017-05-18 RX ADMIN — HYDROMORPHONE HYDROCHLORIDE 0.4 MG: 1 INJECTION, SOLUTION INTRAMUSCULAR; INTRAVENOUS; SUBCUTANEOUS at 11:37

## 2017-05-18 RX ADMIN — SUCCINYLCHOLINE CHLORIDE 100 MG: 20 INJECTION, SOLUTION INTRAMUSCULAR; INTRAVENOUS at 07:50

## 2017-05-18 RX ADMIN — HYDROMORPHONE HYDROCHLORIDE 0.5 MG: 1 INJECTION, SOLUTION INTRAMUSCULAR; INTRAVENOUS; SUBCUTANEOUS at 04:35

## 2017-05-18 RX ADMIN — FENTANYL CITRATE 50 MCG: 50 INJECTION, SOLUTION INTRAMUSCULAR; INTRAVENOUS at 10:18

## 2017-05-18 RX ADMIN — FENTANYL CITRATE 50 MCG: 50 INJECTION, SOLUTION INTRAMUSCULAR; INTRAVENOUS at 08:49

## 2017-05-18 RX ADMIN — METHOCARBAMOL 1000 MG: 1000 INJECTION, SOLUTION INTRAMUSCULAR; INTRAVENOUS at 12:41

## 2017-05-18 RX ADMIN — POTASSIUM CHLORIDE 10 MEQ: 7.46 INJECTION, SOLUTION INTRAVENOUS at 04:35

## 2017-05-18 RX ADMIN — HYDROMORPHONE HYDROCHLORIDE 0.5 MG: 1 INJECTION, SOLUTION INTRAMUSCULAR; INTRAVENOUS; SUBCUTANEOUS at 15:39

## 2017-05-18 RX ADMIN — PHENYLEPHRINE HYDROCHLORIDE 100 MCG: 10 INJECTION, SOLUTION INTRAMUSCULAR; INTRAVENOUS; SUBCUTANEOUS at 07:59

## 2017-05-18 RX ADMIN — CEFOTETAN DISODIUM 1 G: 1 INJECTION, POWDER, FOR SOLUTION INTRAMUSCULAR; INTRAVENOUS at 08:00

## 2017-05-18 RX ADMIN — FENTANYL CITRATE 50 MCG: 50 INJECTION, SOLUTION INTRAMUSCULAR; INTRAVENOUS at 08:42

## 2017-05-18 RX ADMIN — FENTANYL CITRATE 50 MCG: 50 INJECTION, SOLUTION INTRAMUSCULAR; INTRAVENOUS at 07:07

## 2017-05-18 RX ADMIN — LIDOCAINE HYDROCHLORIDE,EPINEPHRINE BITARTRATE 3 ML: 15; .005 INJECTION, SOLUTION EPIDURAL; INFILTRATION; INTRACAUDAL; PERINEURAL at 07:20

## 2017-05-18 RX ADMIN — ACETAMINOPHEN 975 MG: 325 TABLET ORAL at 06:51

## 2017-05-18 RX ADMIN — HYDROMORPHONE HYDROCHLORIDE 0.5 MG: 1 INJECTION, SOLUTION INTRAMUSCULAR; INTRAVENOUS; SUBCUTANEOUS at 22:29

## 2017-05-18 RX ADMIN — DEXAMETHASONE SODIUM PHOSPHATE 4 MG: 4 INJECTION, SOLUTION INTRA-ARTICULAR; INTRALESIONAL; INTRAMUSCULAR; INTRAVENOUS; SOFT TISSUE at 08:15

## 2017-05-18 RX ADMIN — FENTANYL CITRATE 50 MCG: 50 INJECTION, SOLUTION INTRAMUSCULAR; INTRAVENOUS at 09:09

## 2017-05-18 RX ADMIN — Medication 2 G: at 18:10

## 2017-05-18 RX ADMIN — PHENYLEPHRINE HYDROCHLORIDE 100 MCG: 10 INJECTION, SOLUTION INTRAMUSCULAR; INTRAVENOUS; SUBCUTANEOUS at 08:04

## 2017-05-18 RX ADMIN — MIDAZOLAM HYDROCHLORIDE 2 MG: 1 INJECTION, SOLUTION INTRAMUSCULAR; INTRAVENOUS at 07:34

## 2017-05-18 RX ADMIN — FENTANYL CITRATE 100 MCG: 50 INJECTION, SOLUTION INTRAMUSCULAR; INTRAVENOUS at 08:34

## 2017-05-18 RX ADMIN — HYDROMORPHONE HYDROCHLORIDE 0.5 MG: 1 INJECTION, SOLUTION INTRAMUSCULAR; INTRAVENOUS; SUBCUTANEOUS at 20:12

## 2017-05-18 RX ADMIN — ROCURONIUM BROMIDE 10 MG: 10 INJECTION INTRAVENOUS at 09:25

## 2017-05-18 RX ADMIN — BUPIVACAINE HYDROCHLORIDE 8 ML/HR: 7.5 INJECTION, SOLUTION EPIDURAL; RETROBULBAR at 09:40

## 2017-05-18 RX ADMIN — ROCURONIUM BROMIDE 20 MG: 10 INJECTION INTRAVENOUS at 08:32

## 2017-05-18 RX ADMIN — HYDROMORPHONE HYDROCHLORIDE 0.3 MG: 1 INJECTION, SOLUTION INTRAMUSCULAR; INTRAVENOUS; SUBCUTANEOUS at 11:50

## 2017-05-18 RX ADMIN — HYDROMORPHONE HYDROCHLORIDE 0.3 MG: 1 INJECTION, SOLUTION INTRAMUSCULAR; INTRAVENOUS; SUBCUTANEOUS at 11:26

## 2017-05-18 RX ADMIN — ACETAMINOPHEN 1000 MG: 10 INJECTION, SOLUTION INTRAVENOUS at 21:16

## 2017-05-18 RX ADMIN — POTASSIUM CHLORIDE 10 MEQ: 7.46 INJECTION, SOLUTION INTRAVENOUS at 05:56

## 2017-05-18 RX ADMIN — ROCURONIUM BROMIDE 20 MG: 10 INJECTION INTRAVENOUS at 08:29

## 2017-05-18 RX ADMIN — SODIUM CHLORIDE, POTASSIUM CHLORIDE, SODIUM LACTATE AND CALCIUM CHLORIDE: 600; 310; 30; 20 INJECTION, SOLUTION INTRAVENOUS at 11:33

## 2017-05-18 RX ADMIN — GRANISETRON HYDROCHLORIDE 1 MG: 1 INJECTION INTRAVENOUS at 06:52

## 2017-05-18 RX ADMIN — FENTANYL CITRATE 25 MCG: 50 INJECTION INTRAMUSCULAR; INTRAVENOUS at 11:05

## 2017-05-18 RX ADMIN — HYDROMORPHONE HYDROCHLORIDE 0.5 MG: 1 INJECTION, SOLUTION INTRAMUSCULAR; INTRAVENOUS; SUBCUTANEOUS at 00:00

## 2017-05-18 RX ADMIN — HYDROMORPHONE HYDROCHLORIDE 0.5 MG: 1 INJECTION, SOLUTION INTRAMUSCULAR; INTRAVENOUS; SUBCUTANEOUS at 01:59

## 2017-05-18 RX ADMIN — HYDROMORPHONE HYDROCHLORIDE 0.5 MG: 1 INJECTION, SOLUTION INTRAMUSCULAR; INTRAVENOUS; SUBCUTANEOUS at 10:41

## 2017-05-18 RX ADMIN — HYDROMORPHONE HYDROCHLORIDE 0.5 MG: 1 INJECTION, SOLUTION INTRAMUSCULAR; INTRAVENOUS; SUBCUTANEOUS at 12:27

## 2017-05-18 RX ADMIN — LIDOCAINE HYDROCHLORIDE 3 ML: 20 INJECTION, SOLUTION INFILTRATION; PERINEURAL at 10:50

## 2017-05-18 ASSESSMENT — PAIN DESCRIPTION - DESCRIPTORS: DESCRIPTORS: CONSTANT;ACHING;SORE

## 2017-05-18 ASSESSMENT — LIFESTYLE VARIABLES: TOBACCO_USE: 1

## 2017-05-18 NOTE — PROGRESS NOTES
Anesthesiology Cross Cover Note      Called to bedside to assess epidural level. Noted to have better right sided coverage with sensation of cold noted at T10-11 on the right and T8-9. Epidural catheter at 10mL/hr 0.125% bupivacaine. Given bolus of 3mL 2% lidocaine per epidural with some noted improvement of pain. Will continue to monitor for pain throughout time in PACU.    Jorge Ulloa MD  05/18/17

## 2017-05-18 NOTE — PLAN OF CARE
"Vitals:    05/18/17 1330 05/18/17 1359 05/18/17 1418 05/18/17 1445   BP: 115/83 (!) 128/94 (!) 125/95 128/87   BP Location:  Left arm Left arm Left arm   Cuff Size:       Pulse:       Resp:  14 14 14   Temp:  98.6  F (37  C) 97.4  F (36.3  C) 99.1  F (37.3  C)   TempSrc:  Axillary Axillary Axillary   SpO2:  96% 100% 96%   Weight:       Height:         Pt arrived from PACU at 1400. Afebrile, % on 2 L NC, other VSS. EtCO2 39, IPI 10. Pt c/o of abdominal pain. 0.3mg dilaudid administered. Pt still c/o pain, MD paged & came to see pt, awaiting new orders for pain meds. Epidural running 10ml/hr from PACU transfer, pt states \"epidural is not doing anything\". Anesthesia paged to come assess effectiveness of epidural. Upper LS clear. Godwin intact with light pink urine. Midline incision CDI. Unable to complete head to toe, pt refused to be moved or touched. PICC infusing LR 75ml/hr. Continue to monitor.       "

## 2017-05-18 NOTE — ANESTHESIA POST-OP FOLLOW-UP NOTE
Called to bedside to eval epidural.   Patient stated she is in the worst pain in her life and would like something to fix it.   After eval of the epidural, it appeared that the epidural was covering ~60% of the superior portion of the incision.   This was explained to the patient and how a bolus of the epidural may help and the machine was checked.   I left to room to talk to nursing and came back to the patient doing much better and that her pain was almost gone.   I then proceeded to bolus the epidural, because I do feel like the incision could be cover better with more volume.   No s/s of LAST.  Can bolus again later if the patient needs more control.   John Perez, DO  Regional and Acute pain service

## 2017-05-18 NOTE — OP NOTE
DATE OF SERVICE: 05/18/17     PREOPERATIVE DIAGNOSIS: History of cervical cancer s/p chemoradiation and recurrent small bowel obstructions    POSTOPERATIVE DIAGNOSIS: History of cervical cancer s/p chemoradiation and recurrent small bowel obstructions    PROCEDURES PERFORMED:   1. Cystourethroscopy.   2. Intraoperative placement of bilateral ureteral stents     STAFF SURGEON: Rene Calero MD, present for all critical portions of the case.     ASSISTANT SURGEON: Maximino Barnes (Fellow) and Cortez Alfred (PGY-4)    ANESTHESIA: GETA    ESTIMATED BLOOD LOSS: 0 mL.     SPECIMENS:  None    DRAINS:   1.  Bilateral 5-English open ended ureteral catheters, to urethral catheter  2.  16-English urethral catheter    SIGNIFICANT FINDINGS:  1.  Normal cystourethroscopy    BRIEF OPERATIVE INDICATIONS: Rachel A Gerhardt is a 41 year old female with a history of cervical cancer s/p chemoradiation and recurrent small bowel obstructions.  She is scheduled to undergo bowel resection and exploratory laparotomy with Dr. Goodwin. We are asked to perform intra-operative ureteral stent placement for identification of the ureters. The patient understands the risks, benefits, and alternatives and elects to proceed.    DESCRIPTION OF PROCEDURE:  After full informed voluntary consent was obtained, the patient was transported to the operating room, placed supine on the table. After adequate anesthesia was induced, the patient was repositioned in dorsal lithotomy position and prepped and draped in the usual sterile fashion. A timeout was taken to confirm correct patient, procedure and laterality.     A 22-English rigid cystoscope with a 30 degree lens was inserted into a well-lubricated urethra.  The urethra and bladder were surveyed and appeared normal without evidence of lesions or tumors. The right ureteral orifice was identified and cannulated with a 5-English open-ended catheter.  A Sensor guidewire was advanced through the open-ended catheter until  resistance was felt.  The 5-Yakut open-ended catheter was advanced over the guidewire until resistance was felt, and the guidewire removed. The left ureter was catheterized in a similar fashion. The cystoscope was then removed and a 16 Fr kemp catheter was placed in the bladder and the balloon inflated with 10cc of sterile water. The base of the urethral catheter was punctured with a 14-gauge angiocatheter and each ureteral stent was passed through the kemp. Each stent was secured to the catheter with 2-0 silk ties. The kemp bag was then placed to gravity drainage with the ureteral catheters within.    PLAN:  The ureteral catheters can be removed at the end of the procedure if no ureteral injuries identified.  This can be done by cutting the silk ties and pulling the catheters.  Management of the urethral catheter at the discretion of  the primary service. The patient will remain in the operating room to undergo the scheduled procedure by Dr Goodwin.    As the attending surgeon I, Rene Calero, was present for ortiz portions of the procedure and available throughout the procedure.

## 2017-05-18 NOTE — PLAN OF CARE
Problem: Goal Outcome Summary  Goal: Goal Outcome Summary  Outcome: No Change  Direct admission with abdominal pain & vomiting. Pt reported to have scheduled surgery figueroa. Pre- op orders in. IV dilaudid given twice for pain & zofran for nausea. TPN at home. Came in with PICC. IV infusing. Continue POC. Pre- op check list done. Consent is in chart.

## 2017-05-18 NOTE — OP NOTE
DATE OF SERVICE:  05/18/2017.      PREOPERATIVE DIAGNOSIS:  Small bowel obstruction, history of cervical cancer, status post pelvic radiation.      POSTOPERATIVE DIAGNOSIS:  Small bowel obstruction, history of cervical cancer, status post pelvic radiation with small bowel strictures in the ilium.      SURGEON:  Jennifer Goodwin MD      ASSISTANT:  Horace Maloney MD, [facility] Colorectal Surgery fellow.      SECOND ASSISTANT:  Derick Morales, medical student.      ANESTHESIA:  General endotracheal anesthesia with epidural.      INDICATIONS:  Rachel Gerhardt is a 41-year-old female with a recent history of cervical cancer status post pelvic radiation.  She recently had a PET CT that showed no evidence of recurrent disease.  She has had multiple admissions for small-bowel obstruction over the past several months.  She has had an MR enterography demonstrating high-grade obstruction.  She has been on TPN for a couple of weeks and presents here for elective exploratory laparotomy.     FINDINGS: radiation injury to ileum in a 60 cm segment that contained 8 focal short strictures, the narrowest of which was the transition point for her bowel obstruction.  290 cm of small intestine remains  Flex sig with poor prep, no strictures or stenoses.     DESCRIPTION OF PROCEDURE:  She was brought to the operating room, placed in lithotomy position.  I did request that the urologist place bilateral ureteral stents given her history of radiation and my concern about dissection in the pelvic sidewall as it was not clear to me what the etiology of her obstruction was.  Please see their note for details.      After they concluded, we prepped her with chlorhexidine and draped.  We performed a timeout confirming the name of the patient and planned procedure.  We entered the abdominal cavity via a 7 cm incision in the periumbilical area and were able to get in without any injury to underlying structures.  There was a small  The Rx has been placed up front for .   amount of ascites.  Palpation in the pelvis did not reveal any evidence of a mass lesion.  There were no adhesions in the pelvis.  We placed a medium Javad wound protector and proceeded to gently eviscerate the entire small intestine.  In the distal ileum, there were changes on the serosa showing pearly and pale look to the serosa as well as some of the mesentery nearby.  This had the appearance of postradiation intestine and did not look healthy; however, there was no evidence of any ischemia.  On careful evaluation of the terminal and distal ileum, we saw that the terminal ileum seemed spared of this process for 60 cm from the ileocecal valve proximally.  We carefully examined over the time span of about half an hour the entire small intestine.  The ileum showed multiple strictured areas.  Some of these were subtle and not completely obstructing.  There was one that seemed more dense and it appeared to be the transition point.  We marked all of the perceived strictures with a pen and carefully evaluated these.  They were associated with some telangiectasias over the serosa.  In total, we marked out 8 strictures.  All of these strictures were very short segment no more than 0.5 cm in length.  Of note, the proximal small intestine was dilated.      Because of the amount of small intestine that appeared to be just grossly abnormal and my concern about the long-term prognosis from presumed radiation injury to the small intestine diffusely, I then turned my attention to the proximal jejunum.  The jejunum was normal in appearance.  We then ran this distally from the ligament of Treitz to an area 230 cm from the ligament of Treitz where we noted that the small bowel began to take on the serosal changes that included a pale color glistening appearance and also slightly thickened texture.  It was in this area that our first stricture was also identified.  While this stricture did not appear to be completely tight, after  close observation this was clearly an abnormality.  The options included multiple segmental resections versus some stricturoplasties associated with resection, but because of the patient's young age and my expectation she will have ongoing problems with this area of injury I elected for an aggressive single small bowel resection to remove all 8 strictures.  This encompassed approximately 60 cm of small intestine.  We defined the proximal and distal areas, which again were 230 cm from the ligament of Treitz proximally and then 60 cm from the ileocecal valve distally.  We then scored out the peritoneum, used the LigaSure impact device to dissect the mesentery, and then we lined up the small intestine in a side-to-side fashion and fired a ELMER 80 stapler across this.  We then examined the lumen with empty ring forceps.  The small intestinal mucosa in this area did appear to be healthy and perfused as well as pink and was without any lesions.  We saw no active bleeding at our ELMER staple line.  We used a Godwin catheter and inserted 2 mL in the balloon to demonstrate this was 1 cm diameter.  We then passed the catheter proximally and found that there was no evidence of any strictures as far as the catheter could go.  We passed the catheter into the specimen to demonstrate the magnitude of her stricturing disease, and we found that one of these strictures that we had identified as the likely transition point did not let the 1 cm balloon through.      We then used a TA 60 stapler to complete our small bowel anastomosis and the specimen was delivered off the field for open and return.  There was no evidence of any suspicion for malignancy on the open specimen, but stricturing was noted.  We proceeded to oversew the TA line of our anastomosis with interrupted Lembert stitches.  We then proceeded to close the apex of the anastomosis with Lembert stitches and 4 of these were placed.  We carefully examined the anastomosis.  It  was completely tension free and well perfused.  I was very satisfied with it.  We then closed the mesenteric defect with a running 3-0 Vicryl and this looked excellent without any evidence of bleeding.  We proceeded to irrigate the abdomen and pelvis with 1 liter of saline.  I did palpate in the pelvis once again to ensure there were no adhesions or masses felt.  I could feel the uterus and the sigmoid colon which appeared normal.      Because the patient had a history of pelvic radiation with stricturing disease, I thought it would be prudent to perform at least a flexible sigmoidoscopy.  This was performed with ease and advanced to the descending colon by manual palpation.  The prep really was inadequate to consider this high yield for mucosal inspection; however, we did see that there was no evidence of any stricturing lesions within the entire sigmoid colon, rectum and rectosigmoid.      We then changed our gowns and gloves and removed the Javad wound protector and brought the closing tray into the field.  The fascia was closed with a running #1 PDS.  Subcutaneous fat was copiously irrigated.  Skin was closed with Monocryl and Dermabond.      ESTIMATED BLOOD LOSS:  10 mL.         TARAH COLBERT MD             D: 2017 10:07   T: 2017 11:12   MT: REBECCA      Name:     GERHARDT, RACHEL   MRN:      [id number]        Account:        [id number]   :      1975           Procedure Date: 2017      Document: [id number]

## 2017-05-18 NOTE — ANESTHESIA PREPROCEDURE EVALUATION
Anesthesia Evaluation     . Pt has had prior anesthetic. Type: General    No history of anesthetic complications          ROS/MED HX    ENT/Pulmonary:     (+)tobacco use, Current use 1/4 to 1/2 packs/day  Mild Persistent asthma Treatment: Inhaler daily,  , . Other pulmonary disease Hx of + PPD, But Negative for TB.    Neurologic:     (+)neuropathy - Hx of Shingles, Post-Herpetic Neuralgia,     Cardiovascular:  - neg cardiovascular ROS   (+) ----. : . . . :. . No previous cardiac testing       METS/Exercise Tolerance:     Hematologic: Comments: Hgb= 11.7    (+) Anemia, -      Musculoskeletal:         GI/Hepatic:  - neg GI/hepatic ROS       Renal/Genitourinary:  - ROS Renal section negative       Endo:  - neg endo ROS   (+) Obesity, .      Psychiatric: Comment: Hx of Polysubstance Abuse    (+) psychiatric history other (comment)      Infectious Disease:  - neg infectious disease ROS       Malignancy:   (+) Malignancy History of Other  Other CA Cervical and Ovarian Cancer status post         Other:    (+) No chance of pregnancy C-spine cleared: N/A, H/O Chronic Pain,H/O chronic opiod use ,                    Physical Exam  Normal systems: dental    Airway   Mallampati: III  TM distance: >3 FB  Neck ROM: full    Dental     Cardiovascular   Rhythm and rate: regular and normal      Pulmonary    breath sounds clear to auscultation                    Anesthesia Plan      History & Physical Review  History and physical reviewed and following examination; no interval change.    ASA Status:  2 .    NPO Status:  > 8 hours    Plan for General, RSI and ETT with Intravenous induction. Maintenance will be Balanced.    PONV prophylaxis:  Ondansetron (or other 5HT-3) and Dexamethasone or Solumedrol  Additional equipment: 2nd IV      Postoperative Care  Postoperative pain management:  IV analgesics and Oral pain medications.      Consents  Anesthetic plan, risks, benefits and alternatives discussed with:  Patient.  Use of blood  products discussed: Yes.   Use of blood products discussed with Patient.  Consented to blood products.  .                          .

## 2017-05-18 NOTE — ANESTHESIA POSTPROCEDURE EVALUATION
Patient: Rachel A Gerhardt    Procedure(s):  Cystoscopy with Bilateral Stent, - Wound Class: II-Clean Contaminated   Exploratry Laparotomy, Small Bowel Resection with anastomosis, Flexable Sigmoidoscopy - Wound Class: I-Clean   - Wound Class: II-Clean Contaminated   - Wound Class: II-Clean Contaminated    Diagnosis:Bowel Obstruction, Cervical cancer   Diagnosis Additional Information: No value filed.    Anesthesia Type:  General, RSI, Epidural    Note:  Anesthesia Post Evaluation    Patient location during evaluation: PACU  Patient participation: Able to participate in evaluation but full recovery from regional anesthesia has not yet occurred and is not anticipated to occur within 48 hours  Level of consciousness: sleepy but conscious  Pain management: adequate  Airway patency: patent  Cardiovascular status: acceptable  Respiratory status: acceptable  Hydration status: acceptable  PONV: none     Anesthetic complications: None          Last vitals:  Vitals:    05/18/17 1100 05/18/17 1115 05/18/17 1130   BP: 121/84 118/85 121/88   Pulse:      Resp:  16 12   Temp: 36.3  C (97.4  F)  36.6  C (97.9  F)   SpO2: 98% 98% 98%         Electronically Signed By: Adarsh Cross DO  May 18, 2017  11:40 AM
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
## 2017-05-18 NOTE — H&P
COLORECTAL SURGERY HISTORY & PHYSICAL    Patient: Rachel A Gerhardt   MRN: [id number]     Date of Admission: 5/17/2017  Attending Physician: Dr. Goodwin    CC: Abdominal pain    HPI: Rachel A Gerhardt is a 41 year old female with a history of cervical cancer stage IIB (08/2015) s/p chemo-radiation therapy c/b recurrent small bowel obstruction who presents for pre-op admission prior to surgery tomorrow. Patient states that she finished chemo-radiation therapy in late 2015 and was declared cancer free in January 2016. She felt well for several months afterwards but developed worsening pelvic pain in late 2016. She had several hospital admissions for nausea and vomiting during that time, and she has lost 60 pounds secondary to poor oral intake. She was recently started on TPN after hospital admission 4/29/17. Patient says she is hardly consuming any PO diet secondary to nausea and vomiting.    Abdominal CT on 4/2/17 was significant for multiple abnormally dilated loops of small bowel with no clear transition point, possibly representing partial small bowel obstruction vs adynamic ileus. Patient was scheduled for small bowel resection with Dr. Goodwin, and was to have her pre-op anesthesia clinic appointment today. She felt nauseated and had several episodes of emesis this morning after having drank too much water, so she missed her appointment. It was decided that she should be admitted for pre-op control of pain, nausea, and vomiting.    Of note, PET scan on 5/12/17 showed no evidence of recurrent or metastatic disease in the chest, abdomen, or pelvis. Patient has a history of opiate dependence and was previously on suboxone.      PMH:  Past Medical History:   Diagnosis Date     Asthma      Cancer (H)     Per patient OBGYN, cerivical cancer     Cervical cancer (H)      Other chronic pain      Ovarian cancer (H)      Substance abuse     Outside records indicate past history of narcotics abuse or dependence, but patient  denies.       PSH:  Past Surgical History:   Procedure Laterality Date     ENT SURGERY  2009    mastoid, sinus     EXAM UNDER ANESTHESIA, INSERT ALEX SLEEVE, UTERINE PLACEMENT OF TANDEM AND RING FOR RAD, ULTRASOUND N/A 12/14/2015    Procedure: EXAM UNDER ANESTHESIA, INSERT ALEX SLEEVE, UTERINE PLACEMENT OF TANDEM AND RING FOR RADIATION, ULTRASOUND GUIDED;  Surgeon: Abby Tony MD;  Location: [facility] OR     INSERT TANDEM AND CESIUM APPLICATOR CERVIX, ULTRASOUND GUIDED N/A 12/17/2015    Procedure: INSERT TANDEM AND CESIUM APPLICATOR CERVIX, ULTRASOUND GUIDED;  Surgeon: Kika Wood MD;  Location: [facility] OR     KNEE SURGERY       PICC INSERTION Right 04/29/2017    4fr SL BioFlo PICC, 37cm (3cm external) in the R basilic vein w/ tip in the mid SVC.       FH:  family history includes DIABETES in her mother. There is no history of Ovarian Cancer, Uterine Cancer, Cervical Cancer, or Breast Cancer.    SH:  Tobacco use: ~5 cigarettes per day  EtOH use: no  Illicit drug use: no  - Currently living with mother in [place] for assistance with ADL  - Two young children living with their father (patient's ) and his mother    Allergies:  Allergies   Allergen Reactions     No Clinical Screening - See Comments Other (See Comments) and Diarrhea     headache  Carrots cause gastric upset, cramping and diarrhea.     Sulfa Drugs Hives     hives     Amoxicillin Unknown and Other (See Comments)     vomiting  vomiting     Amoxicillin-Pot Clavulanate Other (See Comments) and Nausea     vomiting     Augmentin GI Disturbance, Nausea and Hives     Avelox [Moxifloxacin] Nausea and Vomiting, Unknown and Nausea     Ciprofloxacin Hives and Nausea     Codeine Nausea and Vomiting and Nausea     Ibuprofen Nausea and Vomiting     Other reaction(s): Nausea And Vomiting     Ibuprofen Sodium Hives and GI Disturbance     Quinolones      Tramadol Hives, Diarrhea, Nausea and Nausea and Vomiting     Daucus Carota      Other reaction(s): GI  Upset  Other reaction(s): Abdominal pain, Diarrhea  Carrots cause gastric upset, cramping and diarrhea.       Home Meds:  Prescriptions Prior to Admission   Medication Sig Dispense Refill Last Dose     oxyCODONE-acetaminophen (PERCOCET) 5-325 MG per tablet Take 1 tablet by mouth every 4 hours as needed for moderate to severe pain (no more than 5 per day, no early refills) 20 tablet 0 5/17/2017 at Unknown time     ondansetron (ZOFRAN) 4 MG tablet Take 1-2 tablets (4-8 mg) by mouth every 6 hours as needed for nausea or vomiting 28 tablet 2 5/17/2017 at 1900     ondansetron (ZOFRAN) 4 MG tablet Take 1-2 tablets (4-8 mg) by mouth every 8 hours as needed for nausea 60 tablet 0 5/17/2017 at 1900     calcium carbonate (TUMS) 500 MG chewable tablet Take 500 mg by mouth Reported on 5/8/2017   Not Taking     simethicone (MYLICON) 80 MG chewable tablet Take 80 mg by mouth Reported on 5/8/2017   Not Taking     vitamin D (ERGOCALCIFEROL) 16028 UNIT capsule Take 1 capsule (50,000 Units) by mouth once a week (Patient not taking: Reported on 5/8/2017) 12 capsule 0 Not Taking     hyoscyamine (ANASPAZ/LEVSIN) 0.125 MG tablet Take 1-2 tablets (125-250 mcg) by mouth every 4 hours as needed for cramping 40 tablet 1 Taking     albuterol (PROVENTIL HFA: VENTOLIN HFA) 108 (90 BASE) MCG/ACT inhaler Inhale 2 puffs into the lungs every 6 hours as needed.     Taking       ROS:   A 10-point review of systems was performed and otherwise negative except as reported in HPI.    Physical Exam:  Temp:  [97.5  F (36.4  C)-97.7  F (36.5  C)] 97.5  F (36.4  C)  Pulse:  [71-95] 71  Resp:  [18-20] 18  BP: (102-105)/(72-79) 102/72  SpO2:  [96 %-98 %] 96 %    General: AAOx4, NAD, lying uncomfortably in bed  CV: regular rate, warm, well-perfused  Pulm: no dyspnea, satting well on RA  Abd: soft, non-distended, diffusely tender; non-peritonitic  Extremities: no edema  Skin: no rashes  Neuro: moving all extremities spontaneously without apparent  deficit    Labs:  CBC     Recent Labs  Lab 05/17/17 2217 05/16/17  1040   WBC 6.7 10.3   HGB 12.0 12.9    393     BMP     Recent Labs  Lab 05/17/17 2217 05/16/17  1040    142   POTASSIUM 3.7 3.8   CHLORIDE 107 110*   CO2 30 25   BUN 10 9   CR 0.78 0.62   GLC 98 93     LFTs     Recent Labs  Lab 05/17/17 2217 05/16/17  1040   AST  --  11   ALT  --  14   ALKPHOS  --  80   BILITOTAL  --  0.3   ALBUMIN  --  3.3*   INR 1.06  --      Imaging:  No orders to display       ASSESSMENT/PLAN:  Rachel A Gerhardt is a 41 year old female with a h/o cervical cancer s/p chemo-rads therapy c/b recurrent SBO, here as pre-op admit for small bowel resection in the morning with Dr. Goodwin.    Neuro:      - Pain: PRN dilaudid and home Percocet  CV: HDS, no issues  Pulm: satting on well on RA  FEN/GI:      - IVF: d5 + 1/2 NS @ 100 ml/hr     - Diet: NPO     - Nausea control: PRN Zofran     - Consider NGT if vomiting tonight     - Labs: BMP, Ph, Mg     - Replace K > 4, Ph > 3, Mg > 2  Renal: UOPA, voiding spontaneously  ID: afebrile, no leukocytosis     - Georgette-op antibiotics  Heme: CBC, type and screen, INR  PPx: OOB, IS, SCDs    Dispo: Admit to colorectal surgery; OR in the morning, consent and pre-op orders completed    Patient and plan discussed with fellow Dr. Maloney and staff Dr. Goodwin.      - - - - - - - - - - - -  David Reid MD  PGY-1, General Surgery  [facility]  Pager: [phone]  Please contact primary team after 6am.    Physician Attestation   I, Jennifer Goodwin, personally examined and evaluated this patient.  I discussed the patient with the resident and care team, and agree with the assessment and plan of care as documented in the resident s note of today [date].      I personally reviewed vital signs, medications and labs.    Key findings: agree with above plan  Josseline Kwaan  Date of Service (when I saw the patient): 05/18/17

## 2017-05-18 NOTE — PROVIDER NOTIFICATION
Pt reported to be in a lot of pain post surgery since arrival & have been crying/ sobbing. Writer encountered this at 1525. Tylenol & dilaudid given prn for abdominal pain. Anesthesia arrived & gave epidural bolus at 1540 & pt feeling much better. Epidural infusing at 10 ml/hr. Continue with capnography monitoring & pain management.     Shelton Chapman RN

## 2017-05-18 NOTE — ANESTHESIA PROCEDURE NOTES
Epidural Procedure Note    Staff:     Anesthesiologist:  RAULITO VALADEZ    Resident/CRNA:  DOTTIE BELL    Procedure performed by resident/CRNA in the presence of a teaching physician    Location: Pre-op     Procedure start time:  5/18/2017 6:40 AM     Procedure end time:  5/18/2017 7:25 AM   Pre-procedure checklist:   patient identified, IV checked, site marked, risks and benefits discussed, informed consent, monitors and equipment checked, pre-op evaluation, at physician/surgeon's request and post-op pain management      Correct Patient: Yes      Correct Position: Yes      Correct Site: Yes      Correct Procedure: Yes      Correct Laterality:  Yes    Site Marked:  Yes  Procedure:     Procedure:  Epidural catheter    ASA:  2    Diagnosis:  Post op pain    Position:  Sitting    Sterile Prep: chloraprep, mask, sterile gloves and patient draped      Insertion site:  T8-9    Local skin infiltration:  1% lidocaine    amount (mL):  5    Approach:  Right paramedian    Needle gauge (G):  17    Block Needle Type:  Touhy    Injection Technique:  LORT saline    VLADIMIR at (cm):  5    Attempts:  2    Redirects:  1    Catheter gauge (G):  19    Catheter threaded easily: Yes      Threaded to cm at skin:  10    Threaded in epidural space (cm):  5    Paresthesias:  No    Aspiration negative for Heme or CSF: Yes      Test dose (mL):  3     Local anesthetic:  Lidocaine 1.5% w/ 1:200,000 epinephrine    Test dose time:  07:22    Test dose negative for signs of intravascular, subdural or intrathecal injection: Yes    Assessment/Narrative:      Informed consent obtained.  All risks and benefits of the nerve block discussed with the patient.  All questions answered and all parties agreed with the plan.

## 2017-05-18 NOTE — ANESTHESIA CARE TRANSFER NOTE
Patient: Rachel A Gerhardt    Procedure(s):  Cystoscopy with Bilateral Stent, - Wound Class: II-Clean Contaminated   Exploratry Laparotomy, Small Bowel Resection with anastomosis, Flexable Sigmoidoscopy - Wound Class: I-Clean   - Wound Class: II-Clean Contaminated   - Wound Class: II-Clean Contaminated    Diagnosis: Bowel Obstruction, Cervical cancer   Diagnosis Additional Information: No value filed.    Anesthesia Type:   General, RSI, Epidural     Note:  Airway :Face Mask  Patient transferred to:PACU  Comments: Anesthesia Care Transfer Note      Transfer to:  PACU    Patient Vital Signs:  Stable    Airway:  None    Patient transported to PACU with supplemental O2.  Patient alert and breathing comfortably.  VSS.  Care transferred with report to PACU RN.    Kahlil Amado CRNA      Vitals: (Last set prior to Anesthesia Care Transfer)    CRNA VITALS  5/18/2017 1004 - 5/18/2017 1039      5/18/2017             Pulse: 106    SpO2: 100 %    Resp Rate (observed): 23    Resp Rate (set): 8                Electronically Signed By: CECY Aguila CRNA  May 18, 2017  10:39 AM

## 2017-05-18 NOTE — BRIEF OP NOTE
General acute hospital, [place]    Brief Operative Note    Pre-operative diagnosis: 1) Recurrent Small Bowel Obstruction, 2) History of Cervical Cancer S/P Radiation  Post-operative diagnosis Same    Procedure: Procedure(s):  Cystoscopy with Bilateral Stent, - Wound Class: II-Clean Contaminated   Exploratry Laparotomy, Small Bowel Resection with anastomosis, Flexable Sigmoidoscopy - Wound Class: I-Clean   - Wound Class: II-Clean Contaminated   - Wound Class: II-Clean Contaminated     Surgeon: Surgeon(s) and Role:  Panel 1:     * Rene Calero MD - Primary     * Cortez Alfred - Resident - Assisting     * Maximino Barnes DO - Assisting    Panel 2:     * Jennifer Goodwin MD - Primary     * Horace Maloney MD - Assisting    Anesthesia: Combined General with Block     Estimated blood loss: 10mL    Drains: None    Specimens:   ID Type Source Tests Collected by Time Destination   A : Small Bowel Resection Tissue Small Intestine, Ileum SURGICAL PATHOLOGY EXAM Jennifer Goodwin MD 5/18/2017  9:20 AM      Findings:  ~350cm of small bowel from ligament of Treitz to ileocecal valve, multiple strictures noted in distal ileum, ~60cm of ileum removed, remaining bowel included 230cm proximal and 60cm distal including ileocecal valve, stapled side to side functional end to end small bowel anastomosis, no lesions or strictures noted on flexible sigmoidoscopy    Complications: None.    Implants: None.

## 2017-05-18 NOTE — PROGRESS NOTES
"POST OP CHECK    Pt seen at bedside. Had some increased pain that improved after epidural bolus. No n/v. Kemp in place       /79  Pulse 71  Temp 97.7  F (36.5  C) (Axillary)  Resp 18  Ht 1.727 m (5' 8\")  Wt 66.2 kg (145 lb 15.1 oz)  SpO2 96%  BMI 22.19 kg/m2  A&Ox3, NAD  Breathing non-labored  Soft, ND, diffuse ttp, incision c/d/i    Distal extremities warm.     Kemp with dark yellow output     A/P: Rachel A Gerhardt is a 41 year old female with hx of cervical cancer s/p radiation with recurrent SBOs and opiate dependency now POD# 1 s/p Exploratry Laparotomy, Small Bowel Resection with anastomosis, Flexable Sigmoidoscopy   Neuro:  Epidural, oxycodone and dilaudid IV PRN for pain. Pain team to see patient in the morning.   Resp: Aggressive pulmonary toilet. Wean of O2 as able   CV:  No issues continue monitoring  GI/FEN: CLD, IVF   Renal: Monitor UOP, kemp in place. Will remove catheter in the morning   Endo: No issues  ID: no further abx  Heme: monitor AM Hgb    Prophylaxis: SCD, lovenox      Abel Ignacio MD  PGY-1, General Surgery  [phone]      "

## 2017-05-19 LAB
ANION GAP SERPL CALCULATED.3IONS-SCNC: 9 MMOL/L (ref 3–14)
BASOPHILS # BLD AUTO: 0 10E9/L (ref 0–0.2)
BASOPHILS NFR BLD AUTO: 0.1 %
BUN SERPL-MCNC: 11 MG/DL (ref 7–30)
C DIFF TOX B STL QL: NORMAL
CALCIUM SERPL-MCNC: 8.5 MG/DL (ref 8.5–10.1)
CHLORIDE SERPL-SCNC: 100 MMOL/L (ref 94–109)
CO2 SERPL-SCNC: 26 MMOL/L (ref 20–32)
CREAT SERPL-MCNC: 0.59 MG/DL (ref 0.52–1.04)
DIFFERENTIAL METHOD BLD: ABNORMAL
EOSINOPHIL # BLD AUTO: 0.1 10E9/L (ref 0–0.7)
EOSINOPHIL NFR BLD AUTO: 0.5 %
ERYTHROCYTE [DISTWIDTH] IN BLOOD BY AUTOMATED COUNT: 12.8 % (ref 10–15)
GFR SERPL CREATININE-BSD FRML MDRD: NORMAL ML/MIN/1.7M2
GLUCOSE SERPL-MCNC: 95 MG/DL (ref 70–99)
HCT VFR BLD AUTO: 35.5 % (ref 35–47)
HGB BLD-MCNC: 11.6 G/DL (ref 11.7–15.7)
IMM GRANULOCYTES # BLD: 0.1 10E9/L (ref 0–0.4)
IMM GRANULOCYTES NFR BLD: 0.4 %
LACTATE BLD-SCNC: 1 MMOL/L (ref 0.7–2.1)
LYMPHOCYTES # BLD AUTO: 1.6 10E9/L (ref 0.8–5.3)
LYMPHOCYTES NFR BLD AUTO: 11.5 %
MAGNESIUM SERPL-MCNC: 1.9 MG/DL (ref 1.6–2.3)
MCH RBC QN AUTO: 32.3 PG (ref 26.5–33)
MCHC RBC AUTO-ENTMCNC: 32.7 G/DL (ref 31.5–36.5)
MCV RBC AUTO: 99 FL (ref 78–100)
MONOCYTES # BLD AUTO: 0.9 10E9/L (ref 0–1.3)
MONOCYTES NFR BLD AUTO: 6.9 %
NEUTROPHILS # BLD AUTO: 11.1 10E9/L (ref 1.6–8.3)
NEUTROPHILS NFR BLD AUTO: 80.6 %
NRBC # BLD AUTO: 0 10*3/UL
NRBC BLD AUTO-RTO: 0 /100
PHOSPHATE SERPL-MCNC: 3.2 MG/DL (ref 2.5–4.5)
PLATELET # BLD AUTO: 292 10E9/L (ref 150–450)
POTASSIUM SERPL-SCNC: 4.2 MMOL/L (ref 3.4–5.3)
RBC # BLD AUTO: 3.59 10E12/L (ref 3.8–5.2)
SODIUM SERPL-SCNC: 135 MMOL/L (ref 133–144)
SPECIMEN SOURCE: NORMAL
WBC # BLD AUTO: 13.7 10E9/L (ref 4–11)

## 2017-05-19 PROCEDURE — S0020 INJECTION, BUPIVICAINE HYDRO: HCPCS | Performed by: ANESTHESIOLOGY

## 2017-05-19 PROCEDURE — 40000802 ZZH SITE CHECK

## 2017-05-19 PROCEDURE — 99252 IP/OBS CONSLTJ NEW/EST SF 35: CPT | Performed by: NURSE PRACTITIONER

## 2017-05-19 PROCEDURE — 83735 ASSAY OF MAGNESIUM: CPT | Performed by: COLON & RECTAL SURGERY

## 2017-05-19 PROCEDURE — 25000132 ZZH RX MED GY IP 250 OP 250 PS 637: Performed by: SURGERY

## 2017-05-19 PROCEDURE — 25000128 H RX IP 250 OP 636: Performed by: SURGERY

## 2017-05-19 PROCEDURE — 25000125 ZZHC RX 250: Performed by: COLON & RECTAL SURGERY

## 2017-05-19 PROCEDURE — 25000132 ZZH RX MED GY IP 250 OP 250 PS 637: Performed by: COLON & RECTAL SURGERY

## 2017-05-19 PROCEDURE — 25000128 H RX IP 250 OP 636: Performed by: STUDENT IN AN ORGANIZED HEALTH CARE EDUCATION/TRAINING PROGRAM

## 2017-05-19 PROCEDURE — 25000125 ZZHC RX 250: Performed by: ANESTHESIOLOGY

## 2017-05-19 PROCEDURE — 25000128 H RX IP 250 OP 636: Performed by: ANESTHESIOLOGY

## 2017-05-19 PROCEDURE — 25000128 H RX IP 250 OP 636: Performed by: COLON & RECTAL SURGERY

## 2017-05-19 PROCEDURE — 36592 COLLECT BLOOD FROM PICC: CPT | Performed by: COLON & RECTAL SURGERY

## 2017-05-19 PROCEDURE — 84100 ASSAY OF PHOSPHORUS: CPT | Performed by: COLON & RECTAL SURGERY

## 2017-05-19 PROCEDURE — 80048 BASIC METABOLIC PNL TOTAL CA: CPT | Performed by: COLON & RECTAL SURGERY

## 2017-05-19 PROCEDURE — 12000008 ZZH R&B INTERMEDIATE UMMC

## 2017-05-19 PROCEDURE — 85025 COMPLETE CBC W/AUTO DIFF WBC: CPT | Performed by: COLON & RECTAL SURGERY

## 2017-05-19 PROCEDURE — 25000125 ZZHC RX 250: Performed by: STUDENT IN AN ORGANIZED HEALTH CARE EDUCATION/TRAINING PROGRAM

## 2017-05-19 PROCEDURE — 83605 ASSAY OF LACTIC ACID: CPT | Performed by: COLON & RECTAL SURGERY

## 2017-05-19 PROCEDURE — 87493 C DIFF AMPLIFIED PROBE: CPT | Performed by: COLON & RECTAL SURGERY

## 2017-05-19 PROCEDURE — 99207 ZZC CDG-CODE INCORRECT PER BILLING BASED ON TIME: CPT | Performed by: NURSE PRACTITIONER

## 2017-05-19 RX ORDER — HYDROMORPHONE HYDROCHLORIDE 1 MG/ML
.3-.5 INJECTION, SOLUTION INTRAMUSCULAR; INTRAVENOUS; SUBCUTANEOUS
Status: DISCONTINUED | OUTPATIENT
Start: 2017-05-19 | End: 2017-05-21

## 2017-05-19 RX ORDER — ERGOCALCIFEROL 1.25 MG/1
50000 CAPSULE, LIQUID FILLED ORAL DAILY
Status: COMPLETED | OUTPATIENT
Start: 2017-05-20 | End: 2017-05-22

## 2017-05-19 RX ORDER — OXYCODONE HYDROCHLORIDE 5 MG/1
5-10 TABLET ORAL
Status: DISCONTINUED | OUTPATIENT
Start: 2017-05-19 | End: 2017-05-21

## 2017-05-19 RX ORDER — ACETAMINOPHEN 325 MG/1
975 TABLET ORAL EVERY 8 HOURS
Status: DISCONTINUED | OUTPATIENT
Start: 2017-05-19 | End: 2017-05-20

## 2017-05-19 RX ORDER — NICOTINE 21 MG/24HR
1 PATCH, TRANSDERMAL 24 HOURS TRANSDERMAL DAILY
Status: DISCONTINUED | OUTPATIENT
Start: 2017-05-19 | End: 2017-05-24 | Stop reason: HOSPADM

## 2017-05-19 RX ADMIN — SODIUM CHLORIDE 500 ML: 9 INJECTION, SOLUTION INTRAVENOUS at 14:00

## 2017-05-19 RX ADMIN — OXYCODONE HYDROCHLORIDE 10 MG: 5 TABLET ORAL at 22:55

## 2017-05-19 RX ADMIN — ACETAMINOPHEN 1000 MG: 10 INJECTION, SOLUTION INTRAVENOUS at 09:35

## 2017-05-19 RX ADMIN — HYDROMORPHONE HYDROCHLORIDE 0.3 MG: 1 INJECTION, SOLUTION INTRAMUSCULAR; INTRAVENOUS; SUBCUTANEOUS at 21:44

## 2017-05-19 RX ADMIN — HYDROMORPHONE HYDROCHLORIDE 0.5 MG: 1 INJECTION, SOLUTION INTRAMUSCULAR; INTRAVENOUS; SUBCUTANEOUS at 15:18

## 2017-05-19 RX ADMIN — HYDROMORPHONE HYDROCHLORIDE 0.5 MG: 1 INJECTION, SOLUTION INTRAMUSCULAR; INTRAVENOUS; SUBCUTANEOUS at 08:17

## 2017-05-19 RX ADMIN — ACETAMINOPHEN 1000 MG: 10 INJECTION, SOLUTION INTRAVENOUS at 03:09

## 2017-05-19 RX ADMIN — HYDROMORPHONE HYDROCHLORIDE 0.5 MG: 1 INJECTION, SOLUTION INTRAMUSCULAR; INTRAVENOUS; SUBCUTANEOUS at 12:02

## 2017-05-19 RX ADMIN — ENOXAPARIN SODIUM 40 MG: 40 INJECTION SUBCUTANEOUS at 09:35

## 2017-05-19 RX ADMIN — SODIUM CHLORIDE, POTASSIUM CHLORIDE, SODIUM LACTATE AND CALCIUM CHLORIDE 500 ML: 600; 310; 30; 20 INJECTION, SOLUTION INTRAVENOUS at 22:17

## 2017-05-19 RX ADMIN — OXYCODONE HYDROCHLORIDE 10 MG: 5 TABLET ORAL at 01:39

## 2017-05-19 RX ADMIN — OXYCODONE HYDROCHLORIDE 10 MG: 5 TABLET ORAL at 13:38

## 2017-05-19 RX ADMIN — BUPIVACAINE HYDROCHLORIDE: 7.5 INJECTION, SOLUTION EPIDURAL; RETROBULBAR at 06:45

## 2017-05-19 RX ADMIN — OXYCODONE HYDROCHLORIDE 10 MG: 5 TABLET ORAL at 16:51

## 2017-05-19 RX ADMIN — HYDROMORPHONE HYDROCHLORIDE 0.5 MG: 1 INJECTION, SOLUTION INTRAMUSCULAR; INTRAVENOUS; SUBCUTANEOUS at 23:44

## 2017-05-19 RX ADMIN — HYDROMORPHONE HYDROCHLORIDE 0.5 MG: 1 INJECTION, SOLUTION INTRAMUSCULAR; INTRAVENOUS; SUBCUTANEOUS at 03:09

## 2017-05-19 RX ADMIN — SODIUM CHLORIDE, POTASSIUM CHLORIDE, SODIUM LACTATE AND CALCIUM CHLORIDE 500 ML: 600; 310; 30; 20 INJECTION, SOLUTION INTRAVENOUS at 08:06

## 2017-05-19 RX ADMIN — HYDROMORPHONE HYDROCHLORIDE 0.5 MG: 1 INJECTION, SOLUTION INTRAMUSCULAR; INTRAVENOUS; SUBCUTANEOUS at 00:49

## 2017-05-19 RX ADMIN — NICOTINE 1 PATCH: 14 PATCH, EXTENDED RELEASE TRANSDERMAL at 15:07

## 2017-05-19 RX ADMIN — ACETAMINOPHEN 975 MG: 325 TABLET, FILM COATED ORAL at 16:51

## 2017-05-19 RX ADMIN — SODIUM CHLORIDE, POTASSIUM CHLORIDE, SODIUM LACTATE AND CALCIUM CHLORIDE: 600; 310; 30; 20 INJECTION, SOLUTION INTRAVENOUS at 00:52

## 2017-05-19 RX ADMIN — OXYCODONE HYDROCHLORIDE 10 MG: 5 TABLET ORAL at 10:18

## 2017-05-19 RX ADMIN — ACETAMINOPHEN 975 MG: 325 TABLET, FILM COATED ORAL at 23:46

## 2017-05-19 RX ADMIN — Medication 2 G: at 13:32

## 2017-05-19 RX ADMIN — OXYCODONE HYDROCHLORIDE 10 MG: 5 TABLET ORAL at 20:03

## 2017-05-19 RX ADMIN — HYDROMORPHONE HYDROCHLORIDE 0.3 MG: 1 INJECTION, SOLUTION INTRAMUSCULAR; INTRAVENOUS; SUBCUTANEOUS at 18:31

## 2017-05-19 RX ADMIN — OXYCODONE HYDROCHLORIDE 10 MG: 5 TABLET ORAL at 06:21

## 2017-05-19 RX ADMIN — SODIUM CHLORIDE, POTASSIUM CHLORIDE, SODIUM LACTATE AND CALCIUM CHLORIDE: 600; 310; 30; 20 INJECTION, SOLUTION INTRAVENOUS at 16:53

## 2017-05-19 ASSESSMENT — PAIN DESCRIPTION - DESCRIPTORS: DESCRIPTORS: ACHING

## 2017-05-19 NOTE — PROVIDER NOTIFICATION
Spoke with Abel Ignacio regarding low blood pressures, 89/57 at 0700. Pt. Reports weakness/dizziness and had second episode of stool incontinence. Received order for 500 cc bolus Lactated Ringers.

## 2017-05-19 NOTE — CONSULTS
Inpatient Pain Management Service: Consultation      DATE OF CONSULT: May 19, 2017      REASON FOR PAIN CONSULTATION:  Rachel A Gerhardt is a 41 year old female I am seeing in consultation at the request of Abel Ignacio MD for evaluation and recommendations for her acute postoperative pain.       CHIEF PAIN COMPLAINT: abdominal pain      ASSESSMENT:   1. Acute postoperative pain status post cystoscopy with bilateral stent placement, exploratory laparotomy small bowel resection with anastomosis and flex sigmoidoscopy on 05/18/17 for treatment of small bowel obstruction.  2. History of stage II ovarian and cervical cancer diagnosed in 08/19/2015 status post chemo and pelvic radiation. There was concern for recurrence in December of 2016, but per patient workup negative for recurrence.   3. History of poor compliance with follow-up visits regarding treatment of her cervical cancer.  4. History of chronic abdominal pain. Patient was being seen by Dr. Clovis De León, from October 2015- October 2016 ([facility]), patient had signed an opioid agreement with Dr. De León, However, patient has missed follow-up appointments with him and has not been seen since 10/1/16. Patient reports after her last visit with Dr. De León (10/11/16) She was feeling much better with regards to her abdominal pain, and decided she did not need to follow up with him.  5. History of headaches  6. History of left knee pain underwent laparoscopic knee surgery in August of 2016 that failed to alleviate her pain.  7. History of substance use disorder: Percocet and Vicodin, patient underwent chemical dependency treatment in 2012. See Psychiatry notes from 07/14/2012 admission for details.    Outpatient medications related to pain prior to admission:   --oxycodone-acetaminophen 5-325mg (maximum of 5 tablets per day)  --per review of the [place] Prescription Monitoring Program report the patient was not receiving  opioids from December 2016 till May 2017 (patient reports during this period she did not have any chronic pain). Patient started receiving opioid prescriptions from Jennifer Garza MD at the beginning of May 2017 prior to this admission.     Outpatient opioids prescribed by: Jennifer Garza MD  PRIMARY CARE PROVIDER: Jennifer Garza  PAIN SPECIALIST: NONE    [place] [facility] database reviewed: reviewed,       TREATMENT RECOMMENDATIONS/PLAN:   1. Change oxycodone to 5-10 mg PO Q 3 hours PRN, Use orals first.    Upon discharge would recommend a quick taper over 3-5 days, only prescribe enough opioid for what is typical for this type of procedure.     Patient to follow up with Dr. Jennifer Garza. Per my discussion with the patient today, the patient hopes to taper off all opioids with postoperative healing, and does not want to be on chronic opioids as she reports she does not have persistent daily chronic pain.  2. Continue hydromorphone 0.3-0.5mg IV Q 2 hours PRN for rescue only x 24 hours     then change to hydromorphone 0.3-0.5mg IV TID PRN, link with therapy x 24 hours     Then discontinue  3. If ok with Surgery consider starting ketorolac 15 mg IV Q 6 hours x 24-48 hours    Patient has received ketorolac before during admission to [facility] 12/22/15, patient denies today remembering any adverse reaction to this medication.     Patients with NSAID allergies have been shown to tolerate other NSAIDs of dissimilar molecular structure. https://www.[employer].com/contents/nsaids-including-aspirin-allergic-and-pseudoallergic-reactions?source=search_result&search=NSAID%20allergy&selectedTitle=1~11  4. Continue acetaminophen 1,000mg PO Q 6 hours  5. Discontinue PRN acetaminophen  6. Patient denies muscle spasms at this time, if spasms become an issue can start methocarbamol 500mg PO Q 6 hours PRN  7. Patient declines gabapentin, and reports it causes dizziness after taking a 300mg one time dose at bedtime.  8. Patient may benefit from  pregabalin, however it may not be covered by her insurance. IF pregablain is covered by her insurance start pregabalin 50 mg Q 12 hours  9. If having gas pain start simethicone 40 mg PO Q 6 hours PRN  10. Start menthol patch, 1 patch TD Q 8 hours, apply around incision not over incision.   11. Avoid lidocaine patches while on epidural catheter infusion.  12. Epidural per [facility]  13. Bowel regimen per primary team.     ASSESSMENT AND RECOMMENDATIONS DISCUSSED WITH: Abel Ignacio MD, plan discussed with Dr. Clovis De León MD from the pain service.       Thank you for consulting the Inpatient Pain Management Service.   The above recommendations are to be acted upon at the primary team s discretion.    To reach us:  Mon - Friday 8 AM - 3 PM: Pager [phone]   After hours, weekends and holidays: Primary service should call [phone] for the on-call pain specialist    HISTORY OF PRESENT ILLNESS: Rachel A Gerhardt is a 41 year old female with history of   of stage II cervical cancer in 2015, with chemoradiation treatment, asthma, multiple missed appointments with providers regarding her pain management and cancer treatment, opioid dependence, chemical dependency treatment 2012. Patient is status post cystoscopy with bilateral stent placement, exploratory laparotomy small bowel resection with anastomosis and flex sigmoidoscopy on 05/18/17 for treatment of small bowel obstruction.   Today patient is resting comfortably in bed, she reports her pain is tolerable with discomfort. Located in her abdomen and radiates up to her right thoracic back. She reports the pain is sharp, stabbing, constant, worse with coughing, deep breathing, and movement. She reports positioning helps and she gets relief from laying on her left side. She reports pain medications are helping, and that the epidural is helping. She reports sleeping poorly last night due to pain and being too hot, and reports sleeping off and on  for 30 minutes at any given time. She reports ambulating in the room to the bathroom. She is tolerating a liquid diet with oral medications. She reports her last bowel movement was on 05/18/17. She reports smoking on average 1/2-1 pack per day prior to admission. She denies ETOH abuse and denies illicit drug use. She denies overuse of her opioid pain medication, and admits to undergoing chemical dependency treatment in 2012 for opioids.     PAIN HISTORY:   Location: abdomen, right thoracic back pain.  Duration: constant  Quality of the pain: sharp, stabbing  Aggravating factors: activity  Relieving factors : positioning, pain medication, epidural    CAPA (Clinically Aligned Pain Assessment)  Comfort (How is your pain?): Tolerable with discomfort  Change in Pain (Since your last medication/intervention?): Getting better  Pain Control (How are your pain treatments working?): Partially effective pain control  Functioning (Are you able to do activities to get better?) : Pain keeps me from doing most of what I need to do  Sleep (Does your pain management allow you to sleep or rest?): Awake with pain most of the night       FUNCTIONAL STATUS:  Change:      improving  Oral intake:     Liquid diet  Bowel function:    No bowel movements since 05/18/17  Activity level:     Up with assistance ambulating in room  Sleep:      Reports poor sleep last night 30 minutes at any given time.   Mood:      adjusting to situation      REVIEW OF 10 BODY SYSTEMS: 10 point ROS of systems including Constitutional, Eyes, Respiratory, Cardiovascular, Gastroenterology, Genitourinary, Integumentary, Musculoskeletal, Psychiatric were all negative except for pertinent positives noted in my HPI.       INPATIENT MEDICATIONS PERTINENT TO PAIN CONSULT:   --oxycodone 5-10 mg PO Q 4 hours PRN (used 25 mg fro s3NR-2AK)  --hydromorphone 0.3-0.5mg IV Q 2 hours PRN (used 3.3mg from 7AM-7AM)  --acetaminophen 1,000mg PO Q 6hours  --acetaminophen 650 mg Po Q 4  hours PRN  --epidural infusion  bupivacaine 0.125%      PAST PAIN TREATMENTS:   --gabapentin- drowsy  --ibuprofen- nausea/vomiting  --tramadol-hives  --codeine- nausea/vomiting      CURRENT MEDICATIONS:   Current Facility-Administered Medications Ordered in Epic   Medication Dose Route Frequency Provider Last Rate Last Dose     potassium chloride SA (K-DUR/KLOR-CON M) CR tablet 20-40 mEq  20-40 mEq Oral Q2H PRDavid Nunn MD         potassium chloride (KLOR-CON) Packet 20-40 mEq  20-40 mEq Oral or Feeding Tube Q2H PRDavid Nunn MD         potassium chloride 10 mEq in 100 mL intermittent infusion  10 mEq Intravenous Q1H PRDavid Nunn  mL/hr at 05/18/17 0556 10 mEq at 05/18/17 0556     potassium chloride 10 mEq in 100 mL intermittent infusion with 10 mg lidocaine  10 mEq Intravenous Q1H PRDavid Nunn MD         potassium chloride 20 mEq in 50 mL intermittent infusion  20 mEq Intravenous Q1H PRDavid Nunn MD         magnesium sulfate 2 g in NS intermittent infusion (PharMEDium or FV Cmpd)  2 g Intravenous Daily PRN David Reid MD   2 g at 05/18/17 1810     magnesium sulfate 4 g in 100 mL sterile water (premade)  4 g Intravenous Q4H PRDavid Nunn MD         sodium phosphate 10 mmol in D5W intermittent infusion  10 mmol Intravenous Daily PRDavid Nunn MD         sodium phosphate 15 mmol in D5W intermittent infusion  15 mmol Intravenous Daily PRDavid Nunn MD         sodium phosphate 20 mmol in D5W intermittent infusion  20 mmol Intravenous Q6H PRDavid Nunn MD         sodium phosphate 25 mmol in D5W intermittent infusion  25 mmol Intravenous Q8H PRDavid Nunn MD         lidocaine 1 % 1 mL  1 mL Other Q1H PRN Horace Maloney MD         lidocaine (LMX4) kit   Topical Q1H PRN Horace Maloney MD         sodium chloride (PF) 0.9% PF  flush 3 mL  3 mL Intracatheter Q1H PRN Horace Maloney MD   20 mL at 05/19/17 0752     sodium chloride (PF) 0.9% PF flush 3 mL  3 mL Intracatheter Q8H Horace Maloney MD   3 mL at 05/19/17 0557     lactated ringers BOLUS 500 mL  500 mL Intravenous Q4H PRN Horace Maloney MD         naloxone (NARCAN) injection 0.1-0.4 mg  0.1-0.4 mg Intravenous Q2 Min PRN Horace Maloney MD         enoxaparin (LOVENOX) injection 40 mg  40 mg Subcutaneous Q24H Horace Maloney MD         lactated ringers infusion   Intravenous Continuous Abel Ignacio MD 50 mL/hr at 05/19/17 0624       acetaminophen (OFIRMEV) infusion 1,000 mg  1,000 mg Intravenous Q6H Horace Maloney  mL/hr at 05/19/17 0309 1,000 mg at 05/19/17 0309     diphenhydrAMINE (BENADRYL) injection 25 mg  25 mg Intravenous Q6H PRN Horace Maloney MD         ondansetron (ZOFRAN) injection 4 mg  4 mg Intravenous Q6H PRN Horace Maloney MD         hydrALAZINE (APRESOLINE) injection 10 mg  10 mg Intravenous Q4H PRN Horace Maloney MD         medication instruction   Does not apply Continuous PRN John Perez,          naloxone (NARCAN) injection 0.1-0.4 mg  0.1-0.4 mg Intravenous Q2 Min PRN John Perez DO         medication instruction   Does not apply Continuous PRN John Perez DO         nalbuphine (NUBAIN) injection 2.5-5 mg  2.5-5 mg Intravenous Q6H PRN John Perez DO         naloxone (NARCAN) injection 0.1-0.4 mg  0.1-0.4 mg Intravenous Q2 Min PRN John Perez DO         bupivacaine (MARCAINE) 0.125 % in NaCl 0.9 % 250 mL EPIDURAL Drip   EPIDURAL Continuous John Perez DO 10 mL/hr at 05/19/17 0645       oxyCODONE (ROXICODONE) IR tablet 5-10 mg  5-10 mg Oral Q4H PRN Abel Ignacio MD   10 mg at 05/19/17 0621     lidocaine 1 % 1 mL  1 mL Other Q1H PRN David Reid MD         lidocaine (LMX4) kit   Topical Q1H PRN David Reid MD         sodium  chloride (PF) 0.9% PF flush 3 mL  3 mL Intracatheter Q1H PRN David Reid MD   20 mL at 05/18/17 1459     sodium chloride (PF) 0.9% PF flush 3 mL  3 mL Intracatheter Q8H David Reid MD   3 mL at 05/19/17 0557     naloxone (NARCAN) injection 0.1-0.4 mg  0.1-0.4 mg Intravenous Q2 Min PRN David Reid MD         acetaminophen (TYLENOL) tablet 650 mg  650 mg Oral Q4H PRN David Reid MD         HYDROmorphone (PF) (DILAUDID) injection 0.3-0.5 mg  0.3-0.5 mg Intravenous Q2H PRN David Reid MD   0.5 mg at 05/19/17 0817     ondansetron (ZOFRAN-ODT) ODT tab 4-8 mg  4-8 mg Oral Q6H PRN David Reid MD        Or     ondansetron (ZOFRAN) injection 4-8 mg  4-8 mg Intravenous Q6H PRN David Reid MD         lactated ringers infusion   Intravenous Continuous PRN Nelda Sena APRN CRNA 0 mL/hr at 12/22/15 0907       ondansetron (ZOFRAN) injection   Intravenous PRN Nelda Sena APRN CRNA   4 mg at 12/22/15 0856     No current [facility]-ordered outpatient prescriptions on file.           HOME/PREVIOUS MEDICATIONS:   Prior to Admission medications    Medication Sig Start Date End Date Taking? Authorizing Provider   oxyCODONE-acetaminophen (PERCOCET) 5-325 MG per tablet Take 1 tablet by mouth every 4 hours as needed for moderate to severe pain (no more than 5 per day, no early refills) 5/16/17  Yes Jennifer Garza MD   ondansetron (ZOFRAN) 4 MG tablet Take 1-2 tablets (4-8 mg) by mouth every 6 hours as needed for nausea or vomiting 5/15/17  Yes Jennifer Garza MD   ondansetron (ZOFRAN) 4 MG tablet Take 1-2 tablets (4-8 mg) by mouth every 8 hours as needed for nausea 5/15/17  Yes Jennifer Garza MD   calcium carbonate (TUMS) 500 MG chewable tablet Take 500 mg by mouth Reported on 5/8/2017    Reported, Patient   simethicone (MYLICON) 80 MG chewable tablet Take 80 mg by mouth Reported on 5/8/2017    Reported, Patient    vitamin D (ERGOCALCIFEROL) 39547 UNIT capsule Take 1 capsule (50,000 Units) by mouth once a week  Patient not taking: Reported on 5/8/2017 4/19/17   Jennifer Garza MD   hyoscyamine (ANASPAZ/LEVSIN) 0.125 MG tablet Take 1-2 tablets (125-250 mcg) by mouth every 4 hours as needed for cramping 4/17/17   Jennifer Garza MD   albuterol (PROVENTIL HFA: VENTOLIN HFA) 108 (90 BASE) MCG/ACT inhaler Inhale 2 puffs into the lungs every 6 hours as needed.      Reported, Patient         ALLERGIES:    Allergies   Allergen Reactions     No Clinical Screening - See Comments Other (See Comments) and Diarrhea     headache  Carrots cause gastric upset, cramping and diarrhea.     Sulfa Drugs Hives     hives     Amoxicillin Unknown and Other (See Comments)     vomiting  vomiting     Amoxicillin-Pot Clavulanate Other (See Comments) and Nausea     vomiting     Augmentin GI Disturbance, Nausea and Hives     Avelox [Moxifloxacin] Nausea and Vomiting, Unknown and Nausea     Ciprofloxacin Hives and Nausea     Codeine Nausea and Vomiting and Nausea     Ibuprofen Nausea and Vomiting     Other reaction(s): Nausea And Vomiting     Ibuprofen Sodium Hives and GI Disturbance     Quinolones      Tramadol Hives, Diarrhea, Nausea and Nausea and Vomiting     Daucus Carota      Other reaction(s): GI Upset  Other reaction(s): Abdominal pain, Diarrhea  Carrots cause gastric upset, cramping and diarrhea.            PAST MEDICAL AND PSYCHIATRIC HISTORY:    Past Medical History:   Diagnosis Date     Asthma      Cancer (H)     Per patient OBGYN, cerivical cancer     Cervical cancer (H)      Other chronic pain      Ovarian cancer (H)      Substance abuse     Outside records indicate past history of narcotics abuse or dependence, but patient denies.           PAST SURGICAL HISTORY:   Past Surgical History:   Procedure Laterality Date     ENT SURGERY  2009    mastoid, sinus     EXAM UNDER ANESTHESIA, INSERT ALEX SLEEVE, UTERINE PLACEMENT OF TANDEM  AND RING FOR RAD, ULTRASOUND N/A 12/14/2015    Procedure: EXAM UNDER ANESTHESIA, INSERT ALEX SLEEVE, UTERINE PLACEMENT OF TANDEM AND RING FOR RADIATION, ULTRASOUND GUIDED;  Surgeon: Abby Tony MD;  Location: [facility] OR     INSERT TANDEM AND CESIUM APPLICATOR CERVIX, ULTRASOUND GUIDED N/A 12/17/2015    Procedure: INSERT TANDEM AND CESIUM APPLICATOR CERVIX, ULTRASOUND GUIDED;  Surgeon: Kika Wood MD;  Location: [facility] OR     KNEE SURGERY       PICC INSERTION Right 04/29/2017    4fr SL BioFlo PICC, 37cm (3cm external) in the R basilic vein w/ tip in the mid SVC.           FAMILY HISTORY: family history includes DIABETES in her mother. There is no history of Ovarian Cancer, Uterine Cancer, Cervical Cancer, or Breast Cancer.      HEALTH & LIFESTYLE PRACTICES:   Tobacco:  reports that she has been smoking Cigarettes.  She has a 3.00 pack-year smoking history. She has never used smokeless tobacco.  Alcohol:  reports that she does not drink alcohol.  Illicit drugs:  reports that she does not use illicit drugs.       SOCIAL HISTORY: Lives in [place], [place]      LABORATORY VALUES:   Last Basic Metabolic Panel:  Lab Results   Component Value Date     05/19/2017      Lab Results   Component Value Date    POTASSIUM 4.2 05/19/2017     Lab Results   Component Value Date    CHLORIDE 100 05/19/2017     Lab Results   Component Value Date    JONATAN 8.5 05/19/2017     Lab Results   Component Value Date    CO2 26 05/19/2017     Lab Results   Component Value Date    BUN 11 05/19/2017     Lab Results   Component Value Date    CR 0.59 05/19/2017     Lab Results   Component Value Date    GLC 95 05/19/2017       CBC results:  Lab Results   Component Value Date    WBC 13.7 05/19/2017     Lab Results   Component Value Date    HGB 11.6 05/19/2017     Lab Results   Component Value Date    HCT 35.5 05/19/2017     Lab Results   Component Value Date     05/19/2017       LFT results:  Lab Results   Component Value Date    AST 11  05/16/2017     Lab Results   Component Value Date    ALT 14 05/16/2017     Lab Results   Component Value Date    ALKPHOS 80 05/16/2017         Lab Results   Component Value Date    ALBUMIN 3.3 05/16/2017         Lab Results   Component Value Date    INR 1.06 05/17/2017       Labs above reviewed as well as additional relevant diagnostic studies from the EPIC record.       PHYSICAL EXAMINATION:  VITAL SIGNS:  B/P: 84/54, T: 98.5, P: 76, R: 16    CONSTITUTIONAL/GENERAL APPEARANCE: Alert and Oriented x3  HEAD:Normocephalic  NECK: free range of motion  ENT: moist mucous membranes  EYES: PERRLA and Pupils 4mm  PULMONARY:non-labored and clear to auscultation  CHEST WALL:symetrical chest wall expansion and chest wall non-tender to palpation  CARDIOVASCULAR:Regular rate and rhythm, acyanotic and peripheral pulses +2 all extremities  ABDOMINAL:bowel hypoactive all quadrants and round, tender to light palpation around anterior midline incision.   MUSCULOSKELETAL/BACK/SPINE/EXTREMITIES: gross motor function intact all extremities. Able to perform straight leg raises with minimal pain increase   GAIT: not assessed, patient resting in bed  NEURO:  No allodynia and SILT all extremities, patient with baseline paraesthesias (decreased sensation to light touch, patient describes as numbness to bilateral hands), pressure and touch sensation intact.  SKIN:  normal, warm, dry, except baseline abdominal burn scar from heating pad.   PSYCHIATRIC/BEHAVIORAL/OBSERVATIONS:     Judgment/Insight - intact   Orientation - oriented x 3   Memory - intact   Mood and affect - appropriate      TIME SPENT: 50 minutes including 35 minutes of face-to-face time counseling her  about her pain management treatment options, and coordinating care with the primary team.    Basim Tatum CNP  May 19, 2017, 9:29 AM  Inpatient Pain Management Service

## 2017-05-19 NOTE — PLAN OF CARE
Problem: Goal Outcome Summary  Goal: Goal Outcome Summary  Outcome: Improving  Afebrile, VSS, abdominal pain better with prn dilaudid & epidural at 10cc/hr. Pt denies any nausea & trying to order some clear liquid for this evening. Log rolled & pt reported pain with change in position. Godwin with cloudy/reddish output/ adequate. Midline incision with derma bonded, keira & cdi. A&O times 4. Continue poc.

## 2017-05-19 NOTE — PROGRESS NOTES
Colorectal Surgery Progress Note  POD#2      Subjective:    Patient feeling well. Pain controlled with PCA and IV Tylenol in addition to epidural at 10ml/hr. Patient had pain last evening at post-op check and required bolus, no further boluses needed. Positive flatulence. Positive loos BM. Has ambulated. Tolerating clear liquids. Godwin removed last night by night float due to discomfort which she says has improved. Patient denies any fever, chills, chest pain, shortness of breath, leg pain/swelling, vomiting. Patient has no other concerns.     Vitals:  Vitals:    05/19/17 0654 05/19/17 0750 05/19/17 0800 05/19/17 0920   BP: (!) 89/61 (!) 89/57  (!) 84/54   BP Location: Left arm Left arm     Cuff Size:       Pulse:  76     Resp:  16     Temp:   98.5  F (36.9  C)    TempSrc:       SpO2:       Weight:       Height:         I/O:  I/O last 3 completed shifts:  In: 1862.92 [I.V.:1612.92]  Out: 2610 [Urine:2600; Blood:10]    Physical Exam:  Gen: AAOx3, NAD  Pulm: Non-labored breathing  Abd: Soft, non-distended, appropriately tender, no guarding   Incision C/D/I with dissolvable sutures in place  Ext:  Warm and well-perfused    BMP  Recent Labs  Lab 05/19/17  0802 05/18/17  1459 05/17/17 2217 05/16/17  1040    137 143 142   POTASSIUM 4.2 4.5 3.7 3.8   CHLORIDE 100 105 107 110*   CO2 26 26 30 25   BUN 11 9 10 9   CR 0.59 0.60 0.78 0.62   GLC 95 101* 98 93   MAG 1.9 1.6 1.8 1.7   PHOS 3.2 3.6 4.8* 3.8     CBC  Recent Labs  Lab 05/19/17  0802 05/18/17  1459 05/17/17 2217 05/16/17  1040   WBC 13.7* 18.5* 6.7 10.3   HGB 11.6* 12.7 12.0 12.9   HCT 35.5 39.3 37.4 38.5    303 326 393         A/P: Rachel A Gerhardt is a 41 year old female with hx of cervical cancer s/p radiation with recurrent SBOs and opiate dependency now s/p Exploratry Laparotomy, Small Bowel Resection with anastomosis, Flexable Sigmoidoscopy on 5/18.  Neuro: Epidural, oxycodone and dilaudid IV PRN for pain. Pain consult in place, awaiting recs.    Resp: Aggressive pulmonary toilet. Wean of O2 as able   CV: Patient has borderline hypotension as low as 88/63, this will be monitored closely and may have to decrease epidural catheter if persistently hypotensive.  GI/FEN: Advance to full liquids, MIVF will be lowered to 50cc/hr.  Renal: Adequate UOP. Godwin out, is urinating freely without difficulty.  Endo: No issues  ID: no further abx  Heme: monitor AM Hgb    Prophylaxis: SCD, lovenox        Hi Morales, MS4    Patient was seen and discussed with fellow Dr. Aquiles Maloney      Addendum    Patient seen at bedside resting comfortably. Pain well controlled. Had issues with pain and discomfort. Godwin removed. Patient had one incontinent BM and passing flatus. BP low most likely due to epidural. Received 500cc LR bolus.     NLB   Abd soft, ND, miguel ttp, incision c/d/i  Ext wwp    Agree with plan as above.   Monitor UOP and BP.   Pain consult to assist with pain management     Abel Ignacio MD  PGY-1, General Surgery  [phone]    Attending addendum:  Seen and examined, agree with above documentaiton. Incontinent of loose stools will start low fiber diet. IVF Bolus for hypotension. Check C. Diff. Nicotine patch/

## 2017-05-19 NOTE — PLAN OF CARE
Problem: Goal Outcome Summary  Goal: Goal Outcome Summary  Outcome: No Change  Blood pressures low, 84/54, 89/51, two 500 cc fluid boluses given. Triggered sepsis protocol, LA 1.0. All other VSS on 2L NC. Epidural at 10 ml/hr. Pain managed with tylenol, epidural, oxycodone and IV dilaudid for breakthrough.  Incontinent of stool x 6. Enteric Iso to r/o C.diff. Stool sample needed. Low urine volumes.Tolerating full liquid diet. Magnesium replaced. Ambulated halls x 2, became dizzy and had to return to room in wheelchair. Abdominal incision open to air, CDI.

## 2017-05-19 NOTE — PROGRESS NOTES
CLINICAL NUTRITION SERVICES  Reason for Assessment:  Nutrition education regarding low fiber diet education  Diet History:  Patient has no history of low fiber diet education.  Nutrition Diagnosis:  Food- and nutrition-related knowledge deficit r/t no previous knowledge of low fiber diet as evidenced by patient report  Interventions:  Provided instruction on low fiber diet. Discussed each food group and foods to eat and avoid. Answered patient's questions regarding diet.   Provided handouts : Low Fiber Nutrition Therapy and Low Fiber Diet Hospital Menu  Goals:   Patient will verbalize at least 5 low fiber foods acceptable on diet and 5 high fiber foods to avoid.  Follow-up:    Patient to ask any further nutrition-related questions before discharge.  In addition, pt may request outpatient RD appointment.      Nemo Ann RD, MS, LD  6B- Pager: [phone]

## 2017-05-19 NOTE — PROGRESS NOTES
CLINICAL NUTRITION SERVICES - ASSESSMENT NOTE     Nutrition Prescription    RECOMMENDATIONS FOR MDs/PROVIDERS TO ORDER:  None at this time    Recommendations already ordered by Registered Dietitian (RD):  Scheduled apple Ensure Clear at HS  Kcal counts    Future/Additional Recommendations:  1. Continue diet as per team. Encourage oral intake and nutrition adequacy. If consuming less than 1319 kcals and 53 g protein daily, then consider nutrition support. RD available for enteral recs if needed.  2. If parenteral nutrition (PN) is needed this admission, see nutrition history below to start home regimen and modify lipids to 250 mL of 20% IV lipids daily. Consider initiating dextrose at 150 g/day and advance dextrose by 70 g/day until goal dextrose of 275 g/day is reached. Adjust IVFs if PN is started.  3. Consider checking folate lab.  4. When appropriate, rec vitamin D supplementation as pt's vitamin D deficiency lab was 8 on 4/17/17.   5. Order a multivitamin with minerals if inadequate nutrition intake this admission.  6. Consider scheduling antiemetics/antinauseants ~20 minutes prior to meals to help optimize nausea control.        REASON FOR ASSESSMENT  Rachel A Gerhardt is a/an 41 year old female assessed by the dietitian for Admission Nutrition Risk Screen for unintentional loss of 10# or more in the past two months and tube feeding or parenteral nutrition    NUTRITION HISTORY  Per H & P, pt with history of cervical cancer stage IIB (08/2015) s/p chemo-radiation therapy c/b recurrent small bowel obstruction who presents for surgery (small bowel resection). Patient finished chemo-radiation therapy in late 2015 and was declared cancer free in January 2016 per pt. Chart indicates carrots cause gastric upset, cramping and diarrhea. Also, chart indicates she has had several hospital admissions for N/V in 2627-5015 and she lost 60 pounds due to poor oral intake. EMR indicates pt was ordered to take calcium and  "vitamin D PTA. Pt was started on parenteral nutrition after admission on 4/29/2017 (SBO noted per RD note) and was hardly consuming oral intake.  Per [facility], home parenteral nutrition regimen was the following: Wt  68 kg; Formula: aa TrAVAsol 10% 102 gm/day, dex 275 gm/day, Volume 2000 ml.  Lipid 20 % 280 ml. Lytes per DAY: na: 50 meq\d, k 45 meq\d, ca 15 meq\d, magnesium 12 meq\d, po4 20 mm\d, Cl to oac = 50% Cl. Infuvite 10 ml/day,  MTE-5  3 ml/day, zinc 5 mg/day. 24 hours, 200 ml overfill. If lipids are provided daily and if dosing wt of 68 kg is used, this provides 1903 kcals (28 kcals/kg), 102 g protein (1.5 g protein/kg), 275 g CHO, GIR 2.8, and 29% of kcals from fat on average daily.    Per RD earlier today, pt has not received nutrition education on low fiber diet in the past. Attempts x2 to see pt this afternoon regarding nutrition hx and assessment. Pt was in the restroom during first attempt and then busy with RN. Unclear when she last received parenteral nutrition but suspect this was stopped just PTA.      CURRENT NUTRITION ORDERS  Diet: Low Fiber diet as of 5/19 pm. Diet progressed from NPO on 5/17 to clear liquids on 5/18, and then to full liquids 5/19. She has a prn supplement order as well.   Intake/Tolerance: No flowsheet nutrition intake documented so far this admission. Per RN on 5/19, tolerated full liquid diet. Note, chart indicates pt did have some intermittent nausea and abdominal pain. Per [facility], no meals ordered this admission until today. She requested breakfast and lunch which totaled 760 kcals and 8 g protein so far today (5/19).    LABS  Labs reviewed    MEDICATIONS  Medications reviewed  IVFs: Note, LR at 50 mL/hr.  D5: Received 12 kcals from D5 on 5/17    ANTHROPOMETRICS  Height: 172.7 cm (5' 8\")  Most Recent Weight: 68.9 kg (151 lb 12.8 oz)    IBW: 63.6 kg   BMI: Normal BMI  Weight History: 92 kg (2/22/16), 87.9 kg (7/28/16), 82.7 kg (10/11/16), 78.3 kg " (12/28/16), 68.9 kg (4/17/17) - She has lost 22% of her body wt in less than the past 11 months.  Dosing Weight: 66 kg (based on admission wt of 66.2 kg on 5/17)    ASSESSED NUTRITION NEEDS  Estimated Energy Needs: 4167-4113 kcals/day (30 - 35 kcals/kg)  Justification: Increased needs with wt loss PTA and post-op status  *if needing parenteral nutrition, then rec 25-30 kcals/kg or 6844-6018 kcals/day  Estimated Protein Needs: 79-99+ grams protein/day (1.2 - 1.5+ grams of pro/kg)  Justification: Increased needs with wt loss PTA and post-op status  Estimated Fluid Needs: 2759-1497 mL/day (30 - 35 mL/kg)   Justification: Maintenance needs or per team, pending fluid status    PHYSICAL FINDINGS  See malnutrition section below.  5/18: Exploratory Laparotomy, Small Bowel Resection with anastomosis, Flexable Sigmoidoscopy. Radiation injury to ileum in a 60 cm segment that contained 8 focal short strictures, the narrowest of which was the transition point for her bowel obstruction. 290 cm of small intestine remains.     MALNUTRITION  % Intake: Decreased intake does not meet criteria  % Weight Loss: > 20% in 1 year (severe)  Subcutaneous Fat Loss: Unable to assess. Pt in the restroom and then busy with RN.  Muscle Loss: Unable to assess. Pt in the restroom and then busy with RN.  Fluid Accumulation/Edema: None noted  Malnutrition Diagnosis: Patient does not meet two of the above criteria necessary for diagnosing malnutrition but is at risk for malnutrition    NUTRITION DIAGNOSIS  Inadequate oral intake related to recent diet advancement, intermittent nausea, and abdominal pain as evidenced by no nutrition intake 5/17-5/18 except received 12 kcals from D5 on 5/17.    INTERVENTIONS  Implementation  1. Nutrition education for nutrition relationship to health/disease: Pt received low fiber nutrition education 5/19.  2. Collaboration with other providers: Discussed pt with MD. Per MD, no plans at this time to start PN. MD aware of  recs in nutrition note if this is started. Notified PharmD of recs.   3. Medical food supplement therapy: Ordered apple Ensure Clear at HS.    4. Kcal counts 5/20-5/22.    Goals  Patient to consume % of nutritionally adequate meal trays TID, or the equivalent with supplements/snacks.     Monitoring/Evaluation  Progress toward goals will be monitored and evaluated per protocol.     Amy Walters, MS, RD, LD, [facility]  Pgr: [phone]     Nutrition will continue to follow. 7C/5A RD Pgr:  [phone]

## 2017-05-19 NOTE — PROGRESS NOTES
"Surgery Cross Cover Brief Note  3:53 AM 5/19/2017     I was paged by nursing regarding abdominal discomfort. Went to see patient at bedside.    Subjective  Ms. Gerhardt is a 41 year-old female POD #0-1 after small bowel resection for chronic obstruction. She complains of lower abdominal discomfort tonight, exacerbated by changing position and coughing. States she has had moderate discomfort from Godwin catheters in the past and that she thinks this is the source of her pain tonight. No nausea, vomiting, chest pain, shortness of breath.      Objective  /59 (BP Location: Left arm)  Pulse 92  Temp 99.8  F (37.7  C) (Oral)  Resp 18  Ht 1.727 m (5' 8\")  Wt 66.2 kg (145 lb 15.1 oz)  SpO2 98%  BMI 22.19 kg/m2    Exam:  Gen: NAD, A&O x3  Cardiac: RRR  Resp: NLB on 2 LPM  Abd: soft, ATTP, ND; bruising around incision, dermabond in place    UOP: ~350 cc/hr    Assessment / Plan  41F POD #0-1 after SBR for chronic SBO. Abdominal discomfort tonight consistent with previous experiences with Godwin catheter. VSS and WNL. Abdomen is non-toxic on exam.      Per sign-out, colorectal team to remove Godwin POD #1. Will remove now.    Monitor for continued pain; consider discussing increasing epidural rate with anesthesiolgy      - - - - - - - - - - - -  David Reid MD  PGY-1, General Surgery  [facility]  Pager: [phone]  Please contact primary team after 6am.  "

## 2017-05-19 NOTE — PROGRESS NOTES
REGIONAL ANESTHESIA PAIN SERVICE EPIDURAL NOTE  SUBJECTIVE:   Interval History: Pt reports adequate pain control via epidural.  Denies any weakness, paresthesias, circumoral numbness, metallic taste or tinnitus.  Pt is ambulating with assistance.  Patient is currently without nausea; without pruritis.  Currently tolerating a FL diet.       Clinically Aligned Pain Assessment (CAPA):  Comfort (How is your pain?): Tolerable with discomfort  Change in Pain (Since your last medication/intervention?): About the same  Pain Control (How are your pain treatments working?):  Partially effective pain control  Functioning (Are you able to do activities to get better?) : Can do most things, but pain gets in the way of some   Sleep (Does your pain management allow you to sleep or rest?): Awake with occasional pain     Numerical Rating Scale:    Reports pain 4/10 at rest and 8/10 with movement.        Anticoagulation:    enoxaparin (LOVENOX) injection 40 mg 40 mg, SC, Q24H Ordered           OBJECTIVE:  Diagnostics:  Lab Results   Component Value Date    WBC 18.5 05/18/2017     Lab Results   Component Value Date    RBC 3.88 05/18/2017     Lab Results   Component Value Date    HGB 12.7 05/18/2017     Lab Results   Component Value Date    HCT 39.3 05/18/2017     Lab Results   Component Value Date     05/18/2017       Lab Results   Component Value Date    INR 1.06 05/17/2017    INR 0.96 05/01/2017    INR 1.05 04/30/2017       Vitals:    Temp:  [96.9  F (36.1  C)-100  F (37.8  C)] 97.3  F (36.3  C)  Pulse:  [] 88  Heart Rate:  [] 87  Resp:  [12-20] 17  BP: ()/(55-97) 89/61  SpO2:  [93 %-100 %] 98 %    Exam:    Strength 5/5 and symmetric grossly in bilateral LE      Epidural site with dressing c/d/i, no tenderness, erythema, heme, edema      ASSESSMENT/PLAN:    Rachel A Gerhardt is a 41 year old female POD #1 s/p COMBINED CYSTOSCOPY, INSERT STENT URETER(S) COMBINED LAPAROTOMY, LYSIS ADHESIONS RESECT SMALL BOWEL  WITHOUT OSTOMY COLONOSCOPY WITH CO2 INSUFFLATION and placement of T8-9 epidural for analgesia.  Pt receiving adequate analgesia with epidural infusion Bupivacaine 0.125% at 10mL/hour.  Pt is ambulating without difficulty.  No weakness or paresthesias.  No evidence of adverse side effects related to local anesthetic.      - continue current epidural infusion at 10mL/hour  - will continue to follow and adjust as needed  - discussed plan with attending anesthesiologist        CECY Monteiro CNP  Regional Anesthesia Pain Service  5/19/2017 7:34 AM    24 hour Job Code Pager.  For in-house use only.     Dial * * [phone] and  [place]:  [phone]  [place]: [phone]  Peds:  [phone]  Then enter call-back number  May text page using [employer], but NOT [employer].

## 2017-05-19 NOTE — PLAN OF CARE
Problem: Goal Outcome Summary  Goal: Goal Outcome Summary  Outcome: No Change  VSS. C/o increasing pain throughout the beginning of the shift. MD paged, orders to remove kemp. Kemp removed @0400, pain has since improved. Pain now managed with scheduled Tylenol, PRN Oxy, Dilaudid and Epidural @10cc/hr. Cartridge changed. Incision CDI. Incontinent of BM x1. Due to void by 1000. Pt somewhat sleepy but easy to arouse. Clear liquid diet. Continue to monitor.     BP 84/55 with recheck of 89/61. Paged Abel Ignacio, waiting response.

## 2017-05-19 NOTE — PLAN OF CARE
Problem: Goal Outcome Summary  Goal: Goal Outcome Summary  Outcome: No Change  Transfer from  at 2030, POD # 0 s/p Exploratry Laparotomy, Small Bowel Resection with anastomosis, Flexable Sigmoidoscopy. Epidural at 10 cc/ hour, pt denies LAST symptoms, kemp patent, report intermittent nausea, declined intervention.Patient very somnolent since admission, resting comfortable in bed. However, patient reports poor pain control, reports no relief after Oxycodone, Tylenol or IV Dilaudid. MD and Anesthesia notified via page, waiting for response, please follow up. Pt on continuous Capnography monitoring, PCDs in place, needs a lot of encouragement to use the IS, call light in reach.     Epidural cartridge  tomorrow  at 1530. MD called back and will assess the patient..

## 2017-05-20 LAB
ANION GAP SERPL CALCULATED.3IONS-SCNC: 7 MMOL/L (ref 3–14)
BASOPHILS # BLD AUTO: 0 10E9/L (ref 0–0.2)
BASOPHILS NFR BLD AUTO: 0.2 %
BUN SERPL-MCNC: 6 MG/DL (ref 7–30)
CALCIUM SERPL-MCNC: 8.5 MG/DL (ref 8.5–10.1)
CHLORIDE SERPL-SCNC: 105 MMOL/L (ref 94–109)
CO2 SERPL-SCNC: 28 MMOL/L (ref 20–32)
CREAT SERPL-MCNC: 0.55 MG/DL (ref 0.52–1.04)
DIFFERENTIAL METHOD BLD: ABNORMAL
EOSINOPHIL # BLD AUTO: 0.3 10E9/L (ref 0–0.7)
EOSINOPHIL NFR BLD AUTO: 3.7 %
ERYTHROCYTE [DISTWIDTH] IN BLOOD BY AUTOMATED COUNT: 12.8 % (ref 10–15)
GFR SERPL CREATININE-BSD FRML MDRD: ABNORMAL ML/MIN/1.7M2
GLUCOSE SERPL-MCNC: 94 MG/DL (ref 70–99)
HCT VFR BLD AUTO: 32.5 % (ref 35–47)
HGB BLD-MCNC: 10.4 G/DL (ref 11.7–15.7)
IMM GRANULOCYTES # BLD: 0 10E9/L (ref 0–0.4)
IMM GRANULOCYTES NFR BLD: 0.4 %
LACTATE BLD-SCNC: 1.1 MMOL/L (ref 0.7–2.1)
LYMPHOCYTES # BLD AUTO: 1.4 10E9/L (ref 0.8–5.3)
LYMPHOCYTES NFR BLD AUTO: 15.6 %
MAGNESIUM SERPL-MCNC: 1.9 MG/DL (ref 1.6–2.3)
MCH RBC QN AUTO: 32.4 PG (ref 26.5–33)
MCHC RBC AUTO-ENTMCNC: 32 G/DL (ref 31.5–36.5)
MCV RBC AUTO: 101 FL (ref 78–100)
MONOCYTES # BLD AUTO: 1 10E9/L (ref 0–1.3)
MONOCYTES NFR BLD AUTO: 10.9 %
NEUTROPHILS # BLD AUTO: 6.2 10E9/L (ref 1.6–8.3)
NEUTROPHILS NFR BLD AUTO: 69.2 %
NRBC # BLD AUTO: 0 10*3/UL
NRBC BLD AUTO-RTO: 0 /100
PHOSPHATE SERPL-MCNC: 2.8 MG/DL (ref 2.5–4.5)
PLATELET # BLD AUTO: 273 10E9/L (ref 150–450)
POTASSIUM SERPL-SCNC: 3.7 MMOL/L (ref 3.4–5.3)
RBC # BLD AUTO: 3.21 10E12/L (ref 3.8–5.2)
SODIUM SERPL-SCNC: 139 MMOL/L (ref 133–144)
WBC # BLD AUTO: 9 10E9/L (ref 4–11)

## 2017-05-20 PROCEDURE — 25000128 H RX IP 250 OP 636: Performed by: SURGERY

## 2017-05-20 PROCEDURE — 80048 BASIC METABOLIC PNL TOTAL CA: CPT | Performed by: COLON & RECTAL SURGERY

## 2017-05-20 PROCEDURE — 83605 ASSAY OF LACTIC ACID: CPT | Performed by: COLON & RECTAL SURGERY

## 2017-05-20 PROCEDURE — 25000128 H RX IP 250 OP 636: Performed by: COLON & RECTAL SURGERY

## 2017-05-20 PROCEDURE — 25000132 ZZH RX MED GY IP 250 OP 250 PS 637: Performed by: COLON & RECTAL SURGERY

## 2017-05-20 PROCEDURE — S0020 INJECTION, BUPIVICAINE HYDRO: HCPCS

## 2017-05-20 PROCEDURE — 25000128 H RX IP 250 OP 636

## 2017-05-20 PROCEDURE — 25000125 ZZHC RX 250

## 2017-05-20 PROCEDURE — 36592 COLLECT BLOOD FROM PICC: CPT | Performed by: COLON & RECTAL SURGERY

## 2017-05-20 PROCEDURE — 25000132 ZZH RX MED GY IP 250 OP 250 PS 637: Performed by: SURGERY

## 2017-05-20 PROCEDURE — 25000132 ZZH RX MED GY IP 250 OP 250 PS 637: Performed by: STUDENT IN AN ORGANIZED HEALTH CARE EDUCATION/TRAINING PROGRAM

## 2017-05-20 PROCEDURE — 25000125 ZZHC RX 250: Performed by: STUDENT IN AN ORGANIZED HEALTH CARE EDUCATION/TRAINING PROGRAM

## 2017-05-20 PROCEDURE — 85025 COMPLETE CBC W/AUTO DIFF WBC: CPT | Performed by: COLON & RECTAL SURGERY

## 2017-05-20 PROCEDURE — 84100 ASSAY OF PHOSPHORUS: CPT | Performed by: COLON & RECTAL SURGERY

## 2017-05-20 PROCEDURE — 12000008 ZZH R&B INTERMEDIATE UMMC

## 2017-05-20 PROCEDURE — 83735 ASSAY OF MAGNESIUM: CPT | Performed by: COLON & RECTAL SURGERY

## 2017-05-20 RX ORDER — ACETAMINOPHEN 500 MG
1000 TABLET ORAL 4 TIMES DAILY
Status: DISCONTINUED | OUTPATIENT
Start: 2017-05-20 | End: 2017-05-24 | Stop reason: HOSPADM

## 2017-05-20 RX ORDER — POTASSIUM CHLORIDE 7.45 MG/ML
10 INJECTION INTRAVENOUS
Status: DISCONTINUED | OUTPATIENT
Start: 2017-05-20 | End: 2017-05-24 | Stop reason: HOSPADM

## 2017-05-20 RX ORDER — POTASSIUM CHLORIDE 1.5 G/1.58G
20-40 POWDER, FOR SOLUTION ORAL
Status: DISCONTINUED | OUTPATIENT
Start: 2017-05-20 | End: 2017-05-24 | Stop reason: HOSPADM

## 2017-05-20 RX ORDER — SODIUM CHLORIDE, SODIUM LACTATE, POTASSIUM CHLORIDE, CALCIUM CHLORIDE 600; 310; 30; 20 MG/100ML; MG/100ML; MG/100ML; MG/100ML
INJECTION, SOLUTION INTRAVENOUS CONTINUOUS
Status: DISCONTINUED | OUTPATIENT
Start: 2017-05-20 | End: 2017-05-24 | Stop reason: HOSPADM

## 2017-05-20 RX ORDER — POTASSIUM CHLORIDE 29.8 MG/ML
20 INJECTION INTRAVENOUS
Status: DISCONTINUED | OUTPATIENT
Start: 2017-05-20 | End: 2017-05-24 | Stop reason: HOSPADM

## 2017-05-20 RX ORDER — POTASSIUM CL/LIDO/0.9 % NACL 10MEQ/0.1L
10 INTRAVENOUS SOLUTION, PIGGYBACK (ML) INTRAVENOUS
Status: DISCONTINUED | OUTPATIENT
Start: 2017-05-20 | End: 2017-05-20 | Stop reason: CLARIF

## 2017-05-20 RX ORDER — POTASSIUM CHLORIDE 750 MG/1
20-40 TABLET, EXTENDED RELEASE ORAL
Status: DISCONTINUED | OUTPATIENT
Start: 2017-05-20 | End: 2017-05-24 | Stop reason: HOSPADM

## 2017-05-20 RX ORDER — SACCHAROMYCES BOULARDII 250 MG
250 CAPSULE ORAL 2 TIMES DAILY
Status: DISCONTINUED | OUTPATIENT
Start: 2017-05-20 | End: 2017-05-24 | Stop reason: HOSPADM

## 2017-05-20 RX ADMIN — OXYCODONE HYDROCHLORIDE 10 MG: 5 TABLET ORAL at 22:47

## 2017-05-20 RX ADMIN — ENOXAPARIN SODIUM 40 MG: 40 INJECTION SUBCUTANEOUS at 07:54

## 2017-05-20 RX ADMIN — ACETAMINOPHEN 1000 MG: 500 TABLET, FILM COATED ORAL at 19:03

## 2017-05-20 RX ADMIN — OXYCODONE HYDROCHLORIDE 10 MG: 5 TABLET ORAL at 02:30

## 2017-05-20 RX ADMIN — HYDROMORPHONE HYDROCHLORIDE 0.5 MG: 1 INJECTION, SOLUTION INTRAMUSCULAR; INTRAVENOUS; SUBCUTANEOUS at 14:23

## 2017-05-20 RX ADMIN — Medication 250 MG: at 19:03

## 2017-05-20 RX ADMIN — HYDROMORPHONE HYDROCHLORIDE 0.5 MG: 1 INJECTION, SOLUTION INTRAMUSCULAR; INTRAVENOUS; SUBCUTANEOUS at 10:58

## 2017-05-20 RX ADMIN — OXYCODONE HYDROCHLORIDE 10 MG: 5 TABLET ORAL at 16:01

## 2017-05-20 RX ADMIN — Medication 2 G: at 10:17

## 2017-05-20 RX ADMIN — BUPIVACAINE HYDROCHLORIDE: 7.5 INJECTION, SOLUTION EPIDURAL; RETROBULBAR at 00:03

## 2017-05-20 RX ADMIN — POTASSIUM CHLORIDE 20 MEQ: 750 TABLET, EXTENDED RELEASE ORAL at 10:17

## 2017-05-20 RX ADMIN — HYDROMORPHONE HYDROCHLORIDE 0.5 MG: 1 INJECTION, SOLUTION INTRAMUSCULAR; INTRAVENOUS; SUBCUTANEOUS at 21:59

## 2017-05-20 RX ADMIN — OXYCODONE HYDROCHLORIDE 10 MG: 5 TABLET ORAL at 10:01

## 2017-05-20 RX ADMIN — ACETAMINOPHEN 1000 MG: 500 TABLET, FILM COATED ORAL at 13:06

## 2017-05-20 RX ADMIN — HYDROMORPHONE HYDROCHLORIDE 0.5 MG: 1 INJECTION, SOLUTION INTRAMUSCULAR; INTRAVENOUS; SUBCUTANEOUS at 07:54

## 2017-05-20 RX ADMIN — OXYCODONE HYDROCHLORIDE 10 MG: 5 TABLET ORAL at 06:53

## 2017-05-20 RX ADMIN — HYDROMORPHONE HYDROCHLORIDE 0.5 MG: 1 INJECTION, SOLUTION INTRAMUSCULAR; INTRAVENOUS; SUBCUTANEOUS at 19:53

## 2017-05-20 RX ADMIN — OXYCODONE HYDROCHLORIDE 10 MG: 5 TABLET ORAL at 19:03

## 2017-05-20 RX ADMIN — OXYCODONE HYDROCHLORIDE 10 MG: 5 TABLET ORAL at 13:06

## 2017-05-20 RX ADMIN — HYDROMORPHONE HYDROCHLORIDE 0.5 MG: 1 INJECTION, SOLUTION INTRAMUSCULAR; INTRAVENOUS; SUBCUTANEOUS at 04:21

## 2017-05-20 RX ADMIN — Medication 250 MG: at 10:58

## 2017-05-20 RX ADMIN — NICOTINE 1 PATCH: 14 PATCH, EXTENDED RELEASE TRANSDERMAL at 07:55

## 2017-05-20 RX ADMIN — ACETAMINOPHEN 975 MG: 325 TABLET, FILM COATED ORAL at 07:55

## 2017-05-20 RX ADMIN — HYDROMORPHONE HYDROCHLORIDE 0.5 MG: 1 INJECTION, SOLUTION INTRAMUSCULAR; INTRAVENOUS; SUBCUTANEOUS at 16:43

## 2017-05-20 RX ADMIN — ERGOCALCIFEROL 50000 UNITS: 1.25 CAPSULE, LIQUID FILLED ORAL at 07:55

## 2017-05-20 RX ADMIN — SODIUM CHLORIDE, POTASSIUM CHLORIDE, SODIUM LACTATE AND CALCIUM CHLORIDE 500 ML: 600; 310; 30; 20 INJECTION, SOLUTION INTRAVENOUS at 13:25

## 2017-05-20 RX ADMIN — BUPIVACAINE HYDROCHLORIDE: 7.5 INJECTION, SOLUTION EPIDURAL; RETROBULBAR at 19:09

## 2017-05-20 ASSESSMENT — PAIN DESCRIPTION - DESCRIPTORS
DESCRIPTORS: ACHING;SHARP;SORE
DESCRIPTORS: ACHING;SORE

## 2017-05-20 NOTE — PROGRESS NOTES
I came to evaluate patient 2/2 pain in her belly. I bolused her epidural catheter with .125% bupivacaine PF 5mL. SBP is chronically below 90, however I recommended to team to start  mL bolus to avoid any hypotension from hypovolemia combined with a bolus from the epidural. I also increased the rate of the epidural to 12mL/hr. Patient denies sx of LAST. VS are stable.      Sofya Phillips MD  CA-2/PGY-3  5/19/2017  9:56 PM      24 hour Job Code Pager.  For in-house use only.     [place]:  * * [phone]  Enter call-back number and #      May text page using [employer], but NOT [employer].

## 2017-05-20 NOTE — PROVIDER NOTIFICATION
Notified Resident at 2015 regarding elevated temperature and urine output.      Spoke with: Kvng Reid    Orders were not obtained.    Comments: Notified cross-cover of temp 100.3F and 2 occurrences of blood-tinged urine this evening. Pt had kemp catheter removed at 0600 this AM. Continue to monitor temp and urine per MD.

## 2017-05-20 NOTE — PLAN OF CARE
Pt with soft BP's 90's/50's. LR infusing into PICC @ 50 mL/hr. Tolerating low fiber diet - start andi count tomorrow. Tried giving 10 mg oxycodone when available and giving 0.3 mg IV dilaudid for breakthrough pain. Pt c/o more abdominal pain at HS - Anesthesia gave epidural bolus and increased rate to 12mL/hr. Denies LAST symptoms. BP decreased to 80s/50s after getting epidural bolus - given 500 mL IV fluid bolus. Reports no dizziness when ambulating. Enteric precautions - stool sample sent to test for C. Diff. Had 2-3 watery BM's. Adequate UOP - urine blood-tinged, MD aware. On room air, lung sounds diminished. T max 100.3F, taking scheduled tylenol. Incision KILEY. Continue with POC.

## 2017-05-20 NOTE — PROGRESS NOTES
Colorectal Surgery Progress Note  POD#2    Subjective:    Increased pain this AM - good pain relief on the right, lots of pain in the left abdomen.  Two 500cc IVF boluses over the last 24 hours.  BMx8, c diff negative.  Tolerating low fiber diet.    Vitals:  Vitals:    05/19/17 2324 05/20/17 0556 05/20/17 0953 05/20/17 1000   BP: 90/53 90/64 105/69 101/63   BP Location: Left arm Left arm Left arm Left arm   Cuff Size:       Pulse:       Resp: 18 18     Temp: 100  F (37.8  C) 97.9  F (36.6  C)     TempSrc: Oral Oral     SpO2: 94% 94%     Weight:       Height:         I/O:  I/O last 3 completed shifts:  In: 2780 [P.O.:590; I.V.:1190; IV Piggyback:1000]  Out: 2550 [Urine:2475; Stool:75]   Bowel movement x8    Physical Exam:  Gen: AAOx3, NAD  Pulm: Non-labored breathing  Abd: Soft, non-distended, appropriately tender, no guarding   Incision C/D/I, no erythema  Ext:  Warm and well-perfused    BMP    Recent Labs  Lab 05/20/17  0808 05/19/17  0802 05/18/17  1459 05/17/17  2217    135 137 143   POTASSIUM 3.7 4.2 4.5 3.7   CHLORIDE 105 100 105 107   CO2 28 26 26 30   BUN 6* 11 9 10   CR 0.55 0.59 0.60 0.78   GLC 94 95 101* 98   MAG 1.9 1.9 1.6 1.8   PHOS 2.8 3.2 3.6 4.8*     CBC    Recent Labs  Lab 05/20/17  0808 05/19/17  0802 05/18/17  1459 05/17/17  2217   WBC 9.0 13.7* 18.5* 6.7   HGB 10.4* 11.6* 12.7 12.0   HCT 32.5* 35.5 39.3 37.4    292 303 326         A/P: Rachel A Gerhardt is a 41 year old female with hx of cervical cancer s/p radiation with recurrent SBOs and opiate dependency now POD#2 S/P exploratory laparotomy, small bowel resection, flexible sigmoidoscopy for radiation induced small bowel strictures  Neuro: Epidural, oxycodone and dilaudid IV PRN for pain, appreciate anesthesia pain service  Resp: Aggressive pulmonary toilet. Wean of O2 as able   CV: Soft BP, likely related to epidural, monitor Hgb - dilutional vs slow ooze, will recheck tomorrow  GI/FEN: Continue low residual diet, calorie counts  to determine whether she requires TPN (was on this preop), check prealbumin, hep lock IV, bolus PRN  Renal: Adequate UOP. Godwin out, is urinating freely without difficulty.  Endo: No issues  ID: no further abx  Heme: monitor Hgb    Prophylaxis: THEA, kyara Maloney MD  Colon and Rectal Surgery Fellow  [phone]

## 2017-05-20 NOTE — PLAN OF CARE
Problem: Goal Outcome Summary  Goal: Goal Outcome Summary  Outcome: No Change  BP remains low, AOVSS on 2L NC. Pain controlled with scheduled Tylenol and PRN Oxy and Dilaudid. Epidural infusing at 12cc/hr, cartridge replaced last night. Abd incision CDI. Denies nausea. Low fiber diet. CDiff negative. Adequate urine output. Continue to monitor.

## 2017-05-20 NOTE — PLAN OF CARE
Problem: Goal Outcome Summary  Goal: Goal Outcome Summary  Outcome: Improving  Hypotensive, LR bolus given x1 with mild increase, AOVSS on RA. A&O x4, able to make needs known. Up with SBA. Ambulated in hallway twice. Abdominal pain controlled with bupivicane epidural at 12 mL/hr, oxycodone and dilaudid, see MAR. Denies nausea or emesis. Tolerating low fiber diet, andi count initiated today, pt fills out sheet herself. Voiding adequate amounts ella urine with small amounts of blood. Passing gas, denies BM today. Single lumen R PICC infusing IVMF. Potassium and magnesium replaced, re-check ordered for AM. Plan of care discussed with pt, continue plan of care.

## 2017-05-21 LAB
ANION GAP SERPL CALCULATED.3IONS-SCNC: 8 MMOL/L (ref 3–14)
BUN SERPL-MCNC: 4 MG/DL (ref 7–30)
CALCIUM SERPL-MCNC: 8.9 MG/DL (ref 8.5–10.1)
CHLORIDE SERPL-SCNC: 103 MMOL/L (ref 94–109)
CO2 SERPL-SCNC: 27 MMOL/L (ref 20–32)
CREAT SERPL-MCNC: 0.53 MG/DL (ref 0.52–1.04)
ERYTHROCYTE [DISTWIDTH] IN BLOOD BY AUTOMATED COUNT: 12.6 % (ref 10–15)
GFR SERPL CREATININE-BSD FRML MDRD: ABNORMAL ML/MIN/1.7M2
GLUCOSE SERPL-MCNC: 87 MG/DL (ref 70–99)
HCT VFR BLD AUTO: 33.9 % (ref 35–47)
HGB BLD-MCNC: 11 G/DL (ref 11.7–15.7)
MAGNESIUM SERPL-MCNC: 1.6 MG/DL (ref 1.6–2.3)
MCH RBC QN AUTO: 32.5 PG (ref 26.5–33)
MCHC RBC AUTO-ENTMCNC: 32.4 G/DL (ref 31.5–36.5)
MCV RBC AUTO: 100 FL (ref 78–100)
PLATELET # BLD AUTO: 256 10E9/L (ref 150–450)
POTASSIUM SERPL-SCNC: 3.7 MMOL/L (ref 3.4–5.3)
RBC # BLD AUTO: 3.38 10E12/L (ref 3.8–5.2)
SODIUM SERPL-SCNC: 139 MMOL/L (ref 133–144)
WBC # BLD AUTO: 7.8 10E9/L (ref 4–11)

## 2017-05-21 PROCEDURE — 84134 ASSAY OF PREALBUMIN: CPT | Performed by: STUDENT IN AN ORGANIZED HEALTH CARE EDUCATION/TRAINING PROGRAM

## 2017-05-21 PROCEDURE — 83735 ASSAY OF MAGNESIUM: CPT | Performed by: STUDENT IN AN ORGANIZED HEALTH CARE EDUCATION/TRAINING PROGRAM

## 2017-05-21 PROCEDURE — 12000008 ZZH R&B INTERMEDIATE UMMC

## 2017-05-21 PROCEDURE — S0020 INJECTION, BUPIVICAINE HYDRO: HCPCS | Performed by: ANESTHESIOLOGY

## 2017-05-21 PROCEDURE — 25000132 ZZH RX MED GY IP 250 OP 250 PS 637: Performed by: COLON & RECTAL SURGERY

## 2017-05-21 PROCEDURE — 25000128 H RX IP 250 OP 636: Performed by: SURGERY

## 2017-05-21 PROCEDURE — 25000128 H RX IP 250 OP 636: Performed by: ANESTHESIOLOGY

## 2017-05-21 PROCEDURE — 80048 BASIC METABOLIC PNL TOTAL CA: CPT | Performed by: STUDENT IN AN ORGANIZED HEALTH CARE EDUCATION/TRAINING PROGRAM

## 2017-05-21 PROCEDURE — 25000132 ZZH RX MED GY IP 250 OP 250 PS 637: Performed by: STUDENT IN AN ORGANIZED HEALTH CARE EDUCATION/TRAINING PROGRAM

## 2017-05-21 PROCEDURE — 85027 COMPLETE CBC AUTOMATED: CPT | Performed by: STUDENT IN AN ORGANIZED HEALTH CARE EDUCATION/TRAINING PROGRAM

## 2017-05-21 PROCEDURE — 25000125 ZZHC RX 250: Performed by: STUDENT IN AN ORGANIZED HEALTH CARE EDUCATION/TRAINING PROGRAM

## 2017-05-21 PROCEDURE — 25000132 ZZH RX MED GY IP 250 OP 250 PS 637: Performed by: SURGERY

## 2017-05-21 PROCEDURE — 36592 COLLECT BLOOD FROM PICC: CPT | Performed by: STUDENT IN AN ORGANIZED HEALTH CARE EDUCATION/TRAINING PROGRAM

## 2017-05-21 PROCEDURE — 25000125 ZZHC RX 250: Performed by: ANESTHESIOLOGY

## 2017-05-21 PROCEDURE — 25000128 H RX IP 250 OP 636: Performed by: COLON & RECTAL SURGERY

## 2017-05-21 RX ORDER — CYCLOBENZAPRINE HCL 10 MG
10 TABLET ORAL 3 TIMES DAILY
Status: DISCONTINUED | OUTPATIENT
Start: 2017-05-21 | End: 2017-05-23

## 2017-05-21 RX ORDER — HYDROMORPHONE HYDROCHLORIDE 1 MG/ML
.3-.5 INJECTION, SOLUTION INTRAMUSCULAR; INTRAVENOUS; SUBCUTANEOUS 3 TIMES DAILY PRN
Status: DISCONTINUED | OUTPATIENT
Start: 2017-05-21 | End: 2017-05-22

## 2017-05-21 RX ORDER — POLYETHYLENE GLYCOL 3350 17 G/17G
17 POWDER, FOR SOLUTION ORAL DAILY
Status: DISCONTINUED | OUTPATIENT
Start: 2017-05-21 | End: 2017-05-24 | Stop reason: HOSPADM

## 2017-05-21 RX ORDER — SIMETHICONE 80 MG
80 TABLET,CHEWABLE ORAL EVERY 6 HOURS PRN
Status: DISCONTINUED | OUTPATIENT
Start: 2017-05-21 | End: 2017-05-24 | Stop reason: HOSPADM

## 2017-05-21 RX ORDER — OXYCODONE HYDROCHLORIDE 10 MG/1
10 TABLET ORAL EVERY 4 HOURS PRN
Status: DISCONTINUED | OUTPATIENT
Start: 2017-05-21 | End: 2017-05-24 | Stop reason: HOSPADM

## 2017-05-21 RX ADMIN — HYDROMORPHONE HYDROCHLORIDE 0.5 MG: 1 INJECTION, SOLUTION INTRAMUSCULAR; INTRAVENOUS; SUBCUTANEOUS at 00:06

## 2017-05-21 RX ADMIN — HYDROMORPHONE HYDROCHLORIDE 0.5 MG: 1 INJECTION, SOLUTION INTRAMUSCULAR; INTRAVENOUS; SUBCUTANEOUS at 06:23

## 2017-05-21 RX ADMIN — OXYCODONE HYDROCHLORIDE 15 MG: 10 TABLET ORAL at 12:19

## 2017-05-21 RX ADMIN — Medication 250 MG: at 08:25

## 2017-05-21 RX ADMIN — ACETAMINOPHEN 1000 MG: 500 TABLET, FILM COATED ORAL at 08:25

## 2017-05-21 RX ADMIN — HYDROMORPHONE HYDROCHLORIDE 0.5 MG: 1 INJECTION, SOLUTION INTRAMUSCULAR; INTRAVENOUS; SUBCUTANEOUS at 04:20

## 2017-05-21 RX ADMIN — HYDROMORPHONE HYDROCHLORIDE 0.5 MG: 1 INJECTION, SOLUTION INTRAMUSCULAR; INTRAVENOUS; SUBCUTANEOUS at 14:00

## 2017-05-21 RX ADMIN — CYCLOBENZAPRINE HYDROCHLORIDE 10 MG: 10 TABLET, FILM COATED ORAL at 20:57

## 2017-05-21 RX ADMIN — OXYCODONE HYDROCHLORIDE 15 MG: 10 TABLET ORAL at 16:26

## 2017-05-21 RX ADMIN — HYDROMORPHONE HYDROCHLORIDE 0.5 MG: 1 INJECTION, SOLUTION INTRAMUSCULAR; INTRAVENOUS; SUBCUTANEOUS at 02:27

## 2017-05-21 RX ADMIN — OXYCODONE HYDROCHLORIDE 15 MG: 10 TABLET ORAL at 20:57

## 2017-05-21 RX ADMIN — OXYCODONE HYDROCHLORIDE 10 MG: 5 TABLET ORAL at 01:48

## 2017-05-21 RX ADMIN — SIMETHICONE CHEW TAB 80 MG 80 MG: 80 TABLET ORAL at 02:38

## 2017-05-21 RX ADMIN — HYDROMORPHONE HYDROCHLORIDE 0.5 MG: 1 INJECTION, SOLUTION INTRAMUSCULAR; INTRAVENOUS; SUBCUTANEOUS at 10:23

## 2017-05-21 RX ADMIN — ACETAMINOPHEN 1000 MG: 500 TABLET, FILM COATED ORAL at 22:06

## 2017-05-21 RX ADMIN — CYCLOBENZAPRINE HYDROCHLORIDE 10 MG: 10 TABLET, FILM COATED ORAL at 14:32

## 2017-05-21 RX ADMIN — POLYETHYLENE GLYCOL 3350 17 G: 17 POWDER, FOR SOLUTION ORAL at 14:31

## 2017-05-21 RX ADMIN — OXYCODONE HYDROCHLORIDE 10 MG: 5 TABLET ORAL at 04:55

## 2017-05-21 RX ADMIN — ERGOCALCIFEROL 50000 UNITS: 1.25 CAPSULE, LIQUID FILLED ORAL at 08:25

## 2017-05-21 RX ADMIN — ACETAMINOPHEN 1000 MG: 500 TABLET, FILM COATED ORAL at 12:19

## 2017-05-21 RX ADMIN — Medication 250 MG: at 20:57

## 2017-05-21 RX ADMIN — Medication 2 G: at 11:08

## 2017-05-21 RX ADMIN — OXYCODONE HYDROCHLORIDE 10 MG: 5 TABLET ORAL at 08:25

## 2017-05-21 RX ADMIN — ENOXAPARIN SODIUM 40 MG: 40 INJECTION SUBCUTANEOUS at 08:25

## 2017-05-21 RX ADMIN — BUPIVACAINE HYDROCHLORIDE: 7.5 INJECTION, SOLUTION EPIDURAL; RETROBULBAR at 18:10

## 2017-05-21 RX ADMIN — NICOTINE 1 PATCH: 14 PATCH, EXTENDED RELEASE TRANSDERMAL at 08:25

## 2017-05-21 RX ADMIN — ACETAMINOPHEN 1000 MG: 500 TABLET, FILM COATED ORAL at 18:03

## 2017-05-21 ASSESSMENT — PAIN DESCRIPTION - DESCRIPTORS
DESCRIPTORS: ACHING;SORE
DESCRIPTORS: CRAMPING
DESCRIPTORS: ACHING;SORE
DESCRIPTORS: ACHING;SORE
DESCRIPTORS: CRAMPING
DESCRIPTORS: CRAMPING
DESCRIPTORS: ACHING;CRAMPING;DISCOMFORT
DESCRIPTORS: CRAMPING

## 2017-05-21 NOTE — PROGRESS NOTES
"Regional Anesthesia Note 5/21/17 1:45 PM    S:  Epidural rate was decreased from 12 mL/hr to 10 mL/hr at around 11 AM per request per primary team to try an wean epidural; upon follow up patient with inadequate pain control and in discomfort    O:    Vital signs:  Temp: 37.2  C (98.9  F) Temp src: Oral BP: 94/64 Pulse: 101 Heart Rate: 101 Resp: 16 SpO2: 92 % O2 Device: None (Room air) Oxygen Delivery: 2 LPM Height: 172.7 cm (5' 8\") Weight: 69.9 kg (154 lb 3.2 oz)  Estimated body mass index is 23.45 kg/(m^2) as calculated from the following:    Height as of this encounter: 1.727 m (5' 8\").    Weight as of this encounter: 69.9 kg (154 lb 3.2 oz).      A&P:  Patient is a 41 year old Female with a T8-9 epidural with a Bupivacaine infusion that was decreased from 12 mL/hr to 10 mL/hr this am at around 11 AM now with inadequate pain control.     - 5 cc bolus of 0.0125% Bupivacaine for inadequate pain control  - will continue infusion rate at 10 mL/hr  - primary team made aware of inadequate pain control upon trying to wean epidural down; recommend adjusting of oral pain medications to increase the frequency or to increase the dosage in order to achieve adequate pain control in order to wean down epidural  - will reevalute patient regarding pain in PM and adjust rate as needed  - call regarding any questions or concerns    Tara Simpson MD   CA 2 Anesthesia Resident  Pager [phone]    "

## 2017-05-21 NOTE — PLAN OF CARE
Problem: Goal Outcome Summary  Goal: Goal Outcome Summary  Outcome: Declining  VSS. Pt c/o intolerable pain since last evening not relieved by current analgesics PRN or scheduled.Epidural continues at 12/hr and pt bolused last night around 2045 which was not effective.  Was a cramping pain so Simethicone started overnight with little relief. Pain seems to be moving in abdomen so pt encouraged to walk to stimulate bowels. Pt did ambulate in halls x1 during 30 minute window of pain relief provided by IV Dilaudid. Pt up ad dania.PICC running MIVF at 30 mL/hr. UOP adequate. PLAN: pain and symptom management.

## 2017-05-21 NOTE — PROGRESS NOTES
"REGIONAL ANESTHESIA PAIN SERVICE EPIDURAL NOTE  SUBJECTIVE:   Interval History: Pt reports adequate pain control via epidural (4/10 this am).  However, she reports bowel/gas pain overnight that was 10/10.  Denies any weakness, paresthesias, circumoral numbness, metallic taste or tinnitus. Pt is ambulating with assistance. Patient is currently without nausea; without pruritis.      Clinically Aligned Pain Assessment (CAPA):  Comfort (How is your pain?): Tolerable with discomfort  Change in Pain (Since your last medication/intervention?): About the same  Pain Control (How are your pain treatments working?): Partially effective pain control  Functioning (Are you able to do activities to get better?) : Can do most things, but pain gets in the way of some   Sleep (Does your pain management allow you to sleep or rest?): Awake with occasional pain     Numerical Rating Scale:   Reports pain 4/10 at rest        Anticoagulation:   Lovenox 40 mg q 24 hours     OBJECTIVE:     Vital signs:  Temp: 36.7  C (98.1  F) Temp src: Oral BP: 101/70 Pulse: 96 Heart Rate: 88 Resp: 16 SpO2: 94 % O2 Device: None (Room air) Oxygen Delivery: 2 LPM Height: 172.7 cm (5' 8\") Weight: 69.9 kg (154 lb 3.2 oz)  Estimated body mass index is 23.45 kg/(m^2) as calculated from the following:    Height as of this encounter: 1.727 m (5' 8\").    Weight as of this encounter: 69.9 kg (154 lb 3.2 oz).       Exam:   Strength 5/5 and symmetric grossly in bilateral LE      Epidural site with dressing c/d/i, no tenderness, erythema, heme, edema         Lab Results   Component Value Date    WBC 7.8 05/21/2017    WBC 9.0 05/20/2017    WBC 13.7 (H) 05/19/2017    HGB 11.0 (L) 05/21/2017    HGB 10.4 (L) 05/20/2017    HGB 11.6 (L) 05/19/2017    HCT 33.9 (L) 05/21/2017    HCT 32.5 (L) 05/20/2017    HCT 35.5 05/19/2017     05/21/2017     05/20/2017     05/19/2017     05/21/2017     05/20/2017     05/19/2017    POTASSIUM 3.7 " 05/21/2017    POTASSIUM 3.7 05/20/2017    POTASSIUM 4.2 05/19/2017    CHLORIDE 103 05/21/2017    CHLORIDE 105 05/20/2017    CHLORIDE 100 05/19/2017    CO2 27 05/21/2017    CO2 28 05/20/2017    CO2 26 05/19/2017    BUN 4 (L) 05/21/2017    BUN 6 (L) 05/20/2017    BUN 11 05/19/2017    CR 0.53 05/21/2017    CR 0.55 05/20/2017    CR 0.59 05/19/2017    GLC 87 05/21/2017    GLC 94 05/20/2017    GLC 95 05/19/2017    SED 6 04/17/2017    AST 11 05/16/2017    AST 11 05/10/2017    AST 13 05/02/2017    ALT 14 05/16/2017    ALT 19 05/10/2017    ALT 18 05/02/2017    ALKPHOS 80 05/16/2017    ALKPHOS 70 05/10/2017    ALKPHOS 74 05/02/2017    BILITOTAL 0.3 05/16/2017    BILITOTAL 0.2 05/10/2017    BILITOTAL 0.1 (L) 05/02/2017    INR 1.06 05/17/2017    INR 0.96 05/01/2017    INR 1.05 04/30/2017            ASSESSMENT/PLAN:   Rachel A Gerhardt is a 41 year old female POD #3 s/p COMBINED CYSTOSCOPY, INSERT STENT URETER(S) COMBINED LAPAROTOMY, LYSIS ADHESIONS RESECT SMALL BOWEL WITHOUT OSTOMY COLONOSCOPY WITH CO2 INSUFFLATION and placement of T8-9 epidural for analgesia. Pt receiving adequate analgesia with epidural infusion Bupivacaine 0.125% at 12mL/hour.Pt is ambulating without difficulty. No weakness or paresthesias. No evidence of adverse side effects related to local anesthetic.     - decrease to 10 mL/hour; primary team wants to wean off epidural; primary team increased pain meds today; 4/10 pain at rest so rate decreased to 10 mL/hour; likely will decrease further this afternoon; plan discussed with patient and she agrees      - will continue to follow and adjust as needed     Tara Simpson MD  [facility]  5/21/17 11:00 AM      24 hour Job Code Pager. For in-house use only.   Dial * * [phone] and  [place]: [phone]  [place]: [phone]  Peds: [phone]  Then enter call-back number  May text page using [employer], but NOT [employer].                     Admission (Current) on 5/17/2017              Revision History          I have personally seen and evaluated patient.  She was not tolerating a decrease in her epidural rate from 12mL/hr to 10mL/hr today.  She was having an increase in her pain and was not able to get up out of bed.  We discussed a need for an increase in her PO pain medications because without the epidural running at a high rate she had a significant increase in pain.  She did receive a bolus due to this acute increase in pain. Will wean epidural as tolerated and adjust PO pain medications as necessary.     Maria Antonia Grove MD  May 21, 2017

## 2017-05-21 NOTE — PROGRESS NOTES
Calorie Counts    Intake recorded for: 5/20/17  Kcals: 1332  Protein: 70g    # Meals recorded: 100% applesauce, crumbed cod, grilled cheese, mashed potato with gravy, apple juice, 75% 2 scrambled eggs, 1 piece white toast, chocolate milk, 50% brownie, chocolate pudding    # Supplements recorded: 0

## 2017-05-21 NOTE — PLAN OF CARE
BP's improved this evening and no longer tachycardic, temp max 100F. Ambulated in young with SBA. LR infusing @ 30mL/hr into PICC. Low fiber diet/calorie count. Pt asking for pain meds when available. Epidural inusing @ 12 mL/hr. Denies LAST symptoms. Pt reported increased pain/cramping after eating meal. Refuses to try icy hot patches or heat/cold packs. Bolus given through epidural by Anesthesia at 2045, BP checks WNL. Pt rated pain 9/10 an hour and a half after receiving epidural bolus - sitting at edge of bed and moaning. Paged cross-cover regarding pain control, have not heard back from MD yet. Abdominal incision KILEY. Voiding adequately - urine ella. Denies having a BM today. Continue with POC.    2230 - patient now lying in bed, seems more comfortable

## 2017-05-21 NOTE — PLAN OF CARE
Problem: Goal Outcome Summary  Goal: Goal Outcome Summary  Outcome: Improving  Hypotensive and Tachy, AOVSS on RA. Up ad dania, ambulated in hallway x3 this shift. Abdominal pain mildly controlled with bupivicane at 10 mL/hr, dilaudid x2 and oxycodone x3, scheduled tylenol and flexeril, see MAR. Denies nausea or emesis. Not tolerating regular diet d/t increased abdominal pain with oral intake. Calorie count continued through 5/23, pt filling out sheet herself. Mag replaced, re-check in AM. Abdominal incision CDI, open to air. Plan of care discussed with pt, continue plan of care.  Monitor and treat pain, monitor VS, and increase activity and PO intake.

## 2017-05-21 NOTE — PROGRESS NOTES
[facility]    Pt c/o pain and requesting repeat bolus. Epidural catheter bolused with 0.125% bupi 5mL at 2045. Pt to remain with head of bed up for 20-30min to try to have local anesthesia reach lower dermatomes and cover incision site more effectively. BP being checked q 5min for 30 min. Pt denying sx of LAST. RN to page if BPs drop and pt symptomatic.     Jennifer Ribeiro MD  Job Code * * * [phone]    May 20, 2017  9:55 PM

## 2017-05-21 NOTE — PROGRESS NOTES
"Colon & Rectal Surgery Progress Note    Subjective:   Major pain overnight. Felt like she ate too much. No n/v. +BM's. Very difficult night because of pain. Ambulating. Good UOP.    Objective: /70 (BP Location: Left arm)  Pulse 96  Temp 98.1  F (36.7  C) (Oral)  Resp 16  Ht 5' 8\"  Wt 154 lb 3.2 oz  SpO2 94%  BMI 23.45 kg/m2     Recent Labs   Lab Test  05/21/17   0947   WBC  7.8   RBC  3.38*   HGB  11.0*   HCT  33.9*   MCV  100   MCH  32.5   MCHC  32.4   RDW  12.6   PLT  256       Intake/Output Summary (Last 24 hours) at 05/21/17 1037  Last data filed at 05/21/17 1009   Gross per 24 hour   Intake             3125 ml   Output             1775 ml   Net             1350 ml       Abdomen: soft, appr tender. Incision clean.    Assessment:  POD#3.    Plan:  - LR diet + suppl. CC are adequate.  - Wean epidural as able but increase PO Oxy [id number] PRN. Continue scheduled Tylenol. Allergic to Advil so holding off on Toradol.  - Up ad dania.  - DVT prophylaxis.  - Wound cares.    Sukhdev Aguilar M.D., M.Sc.    Colon and Rectal Surgery  Pager: [phone].    "

## 2017-05-22 ENCOUNTER — APPOINTMENT (OUTPATIENT)
Dept: GENERAL RADIOLOGY | Facility: CLINIC | Age: 42
DRG: 331 | End: 2017-05-22
Attending: PHYSICIAN ASSISTANT
Payer: COMMERCIAL

## 2017-05-22 LAB — PREALB SERPL IA-MCNC: 11 MG/DL (ref 15–45)

## 2017-05-22 PROCEDURE — 99207 ZZC NO CHARGE FIRST FOLLOW UP PS: CPT

## 2017-05-22 PROCEDURE — 25000132 ZZH RX MED GY IP 250 OP 250 PS 637: Performed by: COLON & RECTAL SURGERY

## 2017-05-22 PROCEDURE — 25000132 ZZH RX MED GY IP 250 OP 250 PS 637: Performed by: SURGERY

## 2017-05-22 PROCEDURE — 25000128 H RX IP 250 OP 636: Performed by: STUDENT IN AN ORGANIZED HEALTH CARE EDUCATION/TRAINING PROGRAM

## 2017-05-22 PROCEDURE — S0020 INJECTION, BUPIVICAINE HYDRO: HCPCS | Performed by: ANESTHESIOLOGY

## 2017-05-22 PROCEDURE — 12000008 ZZH R&B INTERMEDIATE UMMC

## 2017-05-22 PROCEDURE — 25000128 H RX IP 250 OP 636: Performed by: COLON & RECTAL SURGERY

## 2017-05-22 PROCEDURE — 25000128 H RX IP 250 OP 636: Performed by: SURGERY

## 2017-05-22 PROCEDURE — 25000125 ZZHC RX 250: Performed by: ANESTHESIOLOGY

## 2017-05-22 PROCEDURE — 74000 XR ABDOMEN 1 VW: CPT

## 2017-05-22 PROCEDURE — 25000128 H RX IP 250 OP 636: Performed by: ANESTHESIOLOGY

## 2017-05-22 RX ORDER — HYDROXYZINE HYDROCHLORIDE 10 MG/1
10 TABLET, FILM COATED ORAL 3 TIMES DAILY PRN
Status: DISCONTINUED | OUTPATIENT
Start: 2017-05-22 | End: 2017-05-23

## 2017-05-22 RX ORDER — KETOROLAC TROMETHAMINE 30 MG/ML
15 INJECTION, SOLUTION INTRAMUSCULAR; INTRAVENOUS EVERY 8 HOURS
Status: DISCONTINUED | OUTPATIENT
Start: 2017-05-22 | End: 2017-05-22

## 2017-05-22 RX ORDER — HYDROMORPHONE HYDROCHLORIDE 1 MG/ML
.3-.5 INJECTION, SOLUTION INTRAMUSCULAR; INTRAVENOUS; SUBCUTANEOUS
Status: COMPLETED | OUTPATIENT
Start: 2017-05-22 | End: 2017-05-22

## 2017-05-22 RX ADMIN — OXYCODONE HYDROCHLORIDE 15 MG: 10 TABLET ORAL at 21:59

## 2017-05-22 RX ADMIN — HYDROMORPHONE HYDROCHLORIDE 0.5 MG: 1 INJECTION, SOLUTION INTRAMUSCULAR; INTRAVENOUS; SUBCUTANEOUS at 15:12

## 2017-05-22 RX ADMIN — NICOTINE 1 PATCH: 14 PATCH, EXTENDED RELEASE TRANSDERMAL at 08:00

## 2017-05-22 RX ADMIN — HYDROMORPHONE HYDROCHLORIDE 0.5 MG: 1 INJECTION, SOLUTION INTRAMUSCULAR; INTRAVENOUS; SUBCUTANEOUS at 00:19

## 2017-05-22 RX ADMIN — BUPIVACAINE HYDROCHLORIDE: 7.5 INJECTION, SOLUTION EPIDURAL; RETROBULBAR at 15:08

## 2017-05-22 RX ADMIN — ACETAMINOPHEN 1000 MG: 500 TABLET, FILM COATED ORAL at 09:06

## 2017-05-22 RX ADMIN — OXYCODONE HYDROCHLORIDE 15 MG: 10 TABLET ORAL at 17:37

## 2017-05-22 RX ADMIN — ACETAMINOPHEN 1000 MG: 500 TABLET, FILM COATED ORAL at 22:03

## 2017-05-22 RX ADMIN — POLYETHYLENE GLYCOL 3350 17 G: 17 POWDER, FOR SOLUTION ORAL at 07:59

## 2017-05-22 RX ADMIN — HYDROMORPHONE HYDROCHLORIDE 0.5 MG: 1 INJECTION, SOLUTION INTRAMUSCULAR; INTRAVENOUS; SUBCUTANEOUS at 20:13

## 2017-05-22 RX ADMIN — ACETAMINOPHEN 1000 MG: 500 TABLET, FILM COATED ORAL at 13:31

## 2017-05-22 RX ADMIN — ACETAMINOPHEN 1000 MG: 500 TABLET, FILM COATED ORAL at 18:13

## 2017-05-22 RX ADMIN — OXYCODONE HYDROCHLORIDE 15 MG: 10 TABLET ORAL at 01:02

## 2017-05-22 RX ADMIN — CYCLOBENZAPRINE HYDROCHLORIDE 10 MG: 10 TABLET, FILM COATED ORAL at 09:06

## 2017-05-22 RX ADMIN — CYCLOBENZAPRINE HYDROCHLORIDE 10 MG: 10 TABLET, FILM COATED ORAL at 19:54

## 2017-05-22 RX ADMIN — HYDROMORPHONE HYDROCHLORIDE 0.5 MG: 1 INJECTION, SOLUTION INTRAMUSCULAR; INTRAVENOUS; SUBCUTANEOUS at 07:55

## 2017-05-22 RX ADMIN — CYCLOBENZAPRINE HYDROCHLORIDE 10 MG: 10 TABLET, FILM COATED ORAL at 16:14

## 2017-05-22 RX ADMIN — ERGOCALCIFEROL 50000 UNITS: 1.25 CAPSULE, LIQUID FILLED ORAL at 07:58

## 2017-05-22 RX ADMIN — OXYCODONE HYDROCHLORIDE 15 MG: 10 TABLET ORAL at 13:31

## 2017-05-22 RX ADMIN — Medication 250 MG: at 19:54

## 2017-05-22 RX ADMIN — ENOXAPARIN SODIUM 40 MG: 40 INJECTION SUBCUTANEOUS at 09:36

## 2017-05-22 RX ADMIN — OXYCODONE HYDROCHLORIDE 15 MG: 10 TABLET ORAL at 09:06

## 2017-05-22 RX ADMIN — OXYCODONE HYDROCHLORIDE 15 MG: 10 TABLET ORAL at 05:04

## 2017-05-22 RX ADMIN — SODIUM CHLORIDE, POTASSIUM CHLORIDE, SODIUM LACTATE AND CALCIUM CHLORIDE 500 ML: 600; 310; 30; 20 INJECTION, SOLUTION INTRAVENOUS at 19:47

## 2017-05-22 RX ADMIN — Medication 250 MG: at 07:58

## 2017-05-22 ASSESSMENT — PAIN DESCRIPTION - DESCRIPTORS
DESCRIPTORS: SORE;SHARP
DESCRIPTORS: ACHING;SORE
DESCRIPTORS: ACHING
DESCRIPTORS: ACHING
DESCRIPTORS: ACHING;SORE
DESCRIPTORS: ACHING
DESCRIPTORS: ACHING;SORE

## 2017-05-22 NOTE — PROGRESS NOTES
"REGIONAL ANESTHESIA PAIN SERVICE EPIDURAL NOTE  Subjective and Interval History: Pt reports much better pain control since bolus at 0315 and increase in epidural rate.  Patient state she was concerned her pain wasn't well controlled because although oxycodone-immediate release dose was increased, the time interval was changed and her Dilaudid IV was discontinued so she wasn't getting adequate pain relief.  Denies any weakness, paresthesias, circumoral numbness, metallic taste or tinnitus.  Pt ambulating with assistance.  Patient tolerating clear liquids, states she didn't advance her diet \"because eating hurts\"         Clinically Aligned Pain Assessment (CAPA):  Comfort (How is your pain?): Comfortably manageable  Change in Pain (Since your last medication/intervention?): Getting better  Pain Control (How are your pain treatments working?):  Partially effective pain control  Functioning (Are you able to do activities to get better?) : Can do most things, but pain gets in the way of some      Numerical Rating Scale:    Reports pain 5/10 at rest          Anticoagulation:    enoxaparin (LOVENOX) injection 40 mg 40 mg, SC, Q24H Given: 05/22 0936       OBJECTIVE:  Diagnostics:  CBC RESULTS:   Recent Labs   Lab Test  05/21/17   0947   WBC  7.8   RBC  3.38*   HGB  11.0*   HCT  33.9*   MCV  100   MCH  32.5   MCHC  32.4   RDW  12.6   PLT  256       Lab Results   Component Value Date    INR 1.06 05/17/2017    INR 0.96 05/01/2017    INR 1.05 04/30/2017       Vitals:    Temp:  [97.7  F (36.5  C)-99.7  F (37.6  C)] 99.7  F (37.6  C)  Pulse:  [] 113  Heart Rate:  [] 94  Resp:  [16-20] 20  BP: ()/(60-84) 93/62  SpO2:  [91 %-93 %] 91 %    Exam:    Strength 5/5 and symmetric grossly in bilateral LE   Epidural site with dressing c/d/i, no tenderness, erythema, heme, edema      ASSESSMENT/PLAN:    Rachel A Gerhardt is a 41 year old female POD #4 s/p COMBINED CYSTOSCOPY, INSERT STENT URETER(S)  COMBINED LAPAROTOMY, " LYSIS ADHESIONS RESECT SMALL BOWEL WITHOUT OSTOMY COLONOSCOPY WITH CO2 INSUFFLATION and placement of T8-9 epidural for analgesia.  Pt receiving adequate analgesia with epidural infusion Bupivacaine 0.125% at 12mL/hour and local anesthetic boluses Q 12 hrs.  Pt ambulating without difficulty.  No weakness or paresthesias, adequate sensory block.  No evidence of adverse side effects related to local anesthetic.    - continue current epidural infusion Bupivacaine 0.125% at 12mL/hour  - plan to remove epidural POD #5 tomorrow (5/23).  Once oral medications adjusted by Colorectal Surgery Service, will titrate rate down epidural rate to 10 mL/hr, then 8 mL/hr tomorrow at 0400, then remove epidural catheter at 0800, - wait at least 12 hrs after last dose of enoxaparin to remove epidural catheter  - okay to resume enoxaparin 2 hrs after epidural catheter removed  - will continue to follow and adjust as needed  - discussed plan with attending anesthesiologist        [facility]  Regional Anesthesia Pain Service  5/22/2017 11:06 AM    24 hour Job Code Pager.  For in-house use only.     Dial * * *[phone] and  [place]:  [phone]  [place]: [phone]  Peds:  [phone]  Then enter call-back number  May text page using [employer], but NOT [employer].

## 2017-05-22 NOTE — PROGRESS NOTES
Calorie Counts  Intake recorded for: 5/21  Kcals: 477  Protein: 25g  # Meals Recorded: 100% cream cheese, scrambled eggs, 50% hamburger vinod  # Supplements Recorded: 0

## 2017-05-22 NOTE — PLAN OF CARE
Problem: Goal Outcome Summary  Goal: Goal Outcome Summary  Outcome: No Change  VSS. Pt c/o poor pain control overnight. Epidural increased back to 12/hr and pt bolused last night around 0325 which was effective. Pain is now in lower abdomen and pt thinks may be a UTI; also small amount of blood noted in urine on PM's. Tolerating low fiber diet but did not eat much today because eating makes her pain increase and pain is already not well controlled; denies N/V. Pt did ambulate in halls x1. Pt up ad dania. PICC running MIVF at 10 mL/hr. UOP adequate. PLAN: pain and symptom management.

## 2017-05-22 NOTE — PROGRESS NOTES
Inpatient Pain Management Service: Follow Up Note    The inpatient pain service is continuing to follow Rachel A Gerhardt for her post op pain at the request of her hospital team.    ASSESSMENT:   1. Acute postoperative pain status post cystoscopy with bilateral stent placement, exploratory laparotomy small bowel resection with anastomosis and flex sigmoidoscopy on 05/18/17 for treatment of small bowel obstruction.  2. History of stage II ovarian and cervical cancer diagnosed in 08/19/2015 status post chemo and pelvic radiation. There was concern for recurrence in December of 2016, but per patient workup negative for recurrence.   3. History of poor compliance with follow-up visits regarding treatment of her cervical cancer.  4. History of chronic abdominal pain. Patient was being seen by Dr. Clovis De León, from October 2015- October 2016 ([facility]), patient had signed an opioid agreement with Dr. De León, However, patient has missed follow-up appointments with him and has not been seen since 10/1/16. Patient reports after her last visit with Dr. De León (10/11/16) She was feeling much better with regards to her abdominal pain, and decided she did not need to follow up with him.  5. History of headaches  6. History of left knee pain underwent laparoscopic knee surgery in August of 2016 that failed to alleviate her pain.  7. History of substance use disorder: Percocet and Vicodin, patient underwent chemical dependency treatment in 2012. See Psychiatry notes from 07/14/2012 admission for details.    -- Outpatient opioid requirements prior to admission:  --oxycodone-acetaminophen 5-325mg (maximum of 5 tablets per day)  --per review of the [place] Prescription Monitoring Program report the patient was not receiving opioids from December 2016 till May 2017 (patient reports during this period she did not have any chronic pain). Patient started receiving opioid prescriptions from  Jennifer Garza MD at the beginning of May 2017 prior to this admission.       RECOMMENDATIONS/PLAN:   1. Continue oxycodone to 10-15mg po q4h prn moderate to severe pain.  2. Continue hydromorphone 0.3-0.5mg IV tid prn, link to activity such as PT, walking.  3. When epidural is discontinued, defer to primary team for ketorolac 15mg IV q6h scheduled for 24-48hrs.    Could use ibuprofen 800mg po tid with meals if no IV access.  4. Continue acetaminophen 1000mg po q6h.  5. Continue cyclobenzaprine 10mg po tid.  6. Continue nicotine patch 14mg daily.  7. Continue simethicone 80mg po q6h prn.  8. Bupivacaine 0.125% epidural infusion at 12 ml/hr.  Appears plan from the [facility] ([facility]) is to discontinue tomorrow AM.  9. Bowel regimen per primary team to prevent opioid induced constipation.       Recommendations were discussed with Andria Rob  Plan was reviewed by the Pain Service consisting of Dr. Adarsh Ge    Thank you for consulting the Inpatient Pain Management Service.   The above recommendations are to be acted upon at the primary team s discretion.     To reach us:  Mon - Friday 8 AM - 3 PM: Pager [phone]   After hours, weekends and holidays: Primary service should call [phone] for the on-call pain specialist    CHIEF PAIN COMPLAINT:  Lower abdominal pain    CAPA (Clinically Aligned Pain Assessment):    Comfort (How is your pain?): Tolerable with discomfort  Change in Pain (Since your last medication/intervention?): About the same  Pain Control (How are your pain treatments working?):  Partially effective control  Functioning (Are you able to do activities to get better?) : Can do most things, but pain gets in the way of some   Sleep (Does your pain management allow you to sleep or rest?): Awake with pain most of night         INTERVAL HISTORY: Patient sitting up in bed eating breakfast.  A.O x 4, appears comfortable.  No N/V.  Feels the epidural and the bolus doses help with  "pain relief, can \"feel it\" when the rate is decreased.  States she had a bad night Saturday night due to changes in her oxycodone and IV hydromorphone regimen.  Feels more comfortable today.  Has been up with therapies, walking in young.  Reports pain is constant, feels like muscular pain.  Has not had a BM since 5/18/17, reports not getting great sleep.      FUNCTIONAL STATUS:  Change:      Improving  Oral intake:     Regular  Bowel function:    No bowel movements since the day after surgery  Activity level:     Ambulating in young  Sleep:      Awake with pain overnight.  Mood:      Stable        CURRENT MEDICATIONS:   Current Facility-Administered Medications Ordered in Epic   Medication Dose Route Frequency Provider Last Rate Last Dose     simethicone (MYLICON) chewable tablet 80 mg  80 mg Oral Q6H PRN Rene Almanza MD   80 mg at 05/21/17 0238     HYDROmorphone (PF) (DILAUDID) injection 0.3-0.5 mg  0.3-0.5 mg Intravenous TID PRN Abel Ignacio MD   0.5 mg at 05/22/17 0755     oxyCODONE (ROXICODONE) IR tablet 10 mg  10 mg Oral Q4H PRN Abel Ignacio MD        Or     oxyCODONE (ROXICODONE) IR tablet 15 mg  15 mg Oral Q4H PRN Abel Ignacio MD   15 mg at 05/22/17 0504     cyclobenzaprine (FLEXERIL) tablet 10 mg  10 mg Oral TID Abel Ignacio MD   10 mg at 05/21/17 2057     polyethylene glycol (MIRALAX/GLYCOLAX) Packet 17 g  17 g Oral Daily Abel Ignacio MD   17 g at 05/22/17 0759     acetaminophen (TYLENOL) tablet 1,000 mg  1,000 mg Oral 4x Daily Sukhdev Aguilar MD   1,000 mg at 05/21/17 2206     saccharomyces boulardii (FLORASTOR) capsule 250 mg  250 mg Oral BID Sukhdev Aguilar MD   250 mg at 05/22/17 0758     lactated ringers infusion   Intravenous Continuous Sukhdev Aguilar MD 10 mL/hr at 05/21/17 1056       potassium chloride SA (K-DUR/KLOR-CON M) CR tablet 20-40 mEq  20-40 mEq Oral Q2H PRN Horace Maloney MD         potassium chloride (KLOR-CON) Packet 20-40 mEq  20-40 mEq Oral or " Feeding Tube Q2H PRN Horace Maloney MD         potassium chloride 10 mEq in 100 mL intermittent infusion  10 mEq Intravenous Q1H PRHorace Macias MD         potassium chloride 20 mEq in 50 mL intermittent infusion  20 mEq Intravenous Q1H PRN Horace Maloney MD         nicotine Patch in Place   Transdermal Q8H Jennifer Goodwin MD         nicotine patch REMOVAL   Transdermal Daily Jennifer Goodwin MD         nicotine (NICODERM CQ) 14 MG/24HR 24 hr patch 1 patch  1 patch Transdermal Daily Jennifer Goodwin MD   1 patch at 05/21/17 0825     magnesium sulfate 2 g in NS intermittent infusion ([employer] or [facility] Cmp[facility])  2 g Intravenous Daily PRN David Reid MD 50 mL/hr at 05/19/17 1332 2 g at 05/21/17 1108     magnesium sulfate 4 g in 100 mL sterile water (premade)  4 g Intravenous Q4H PRDavid Nunn MD         sodium phosphate 10 mmol in D5W intermittent infusion  10 mmol Intravenous Daily PRDavid Nunn MD         sodium phosphate 15 mmol in D5W intermittent infusion  15 mmol Intravenous Daily PRDavid Nunn MD         sodium phosphate 20 mmol in D5W intermittent infusion  20 mmol Intravenous Q6H PRDavid Nunn MD         sodium phosphate 25 mmol in D5W intermittent infusion  25 mmol Intravenous Q8H PRDavid Nunn MD         lidocaine 1 % 1 mL  1 mL Other Q1H PRN Horace Maloney MD         lidocaine (LMX4) kit   Topical Q1H PRN Horace Maloney MD         sodium chloride (PF) 0.9% PF flush 3 mL  3 mL Intracatheter Q1H PRN Hroace Maloney MD   20 mL at 05/20/17 1438     sodium chloride (PF) 0.9% PF flush 3 mL  3 mL Intracatheter Q8H Horace Maloney MD   3 mL at 05/22/17 0020     lactated ringers BOLUS 500 mL  500 mL Intravenous Q4H PRN Horace Maloney MD   500 mL at 05/20/17 1325     enoxaparin (LOVENOX) injection 40 mg  40 mg Subcutaneous Q24H Horace Maloney MD   40 mg  at 05/21/17 0825     diphenhydrAMINE (BENADRYL) injection 25 mg  25 mg Intravenous Q6H PRN Horace Maloney MD         hydrALAZINE (APRESOLINE) injection 10 mg  10 mg Intravenous Q4H PRN Horace Maloney MD         medication instruction   Does not apply Continuous PRN John Perez DO         bupivacaine (MARCAINE) 0.125 % in NaCl 0.9 % 250 mL EPIDURAL Drip   EPIDURAL Continuous Tara Simpson MD 12 mL/hr at 05/22/17 0105       naloxone (NARCAN) injection 0.1-0.4 mg  0.1-0.4 mg Intravenous Q2 Min PRN David Reid MD         ondansetron (ZOFRAN) injection 4-8 mg  4-8 mg Intravenous Q6H PRN David Reid MD         No current Epic-ordered outpatient prescriptions on file.         PHYSICAL EXAM:   Vitals:   Temp:  [97.7  F (36.5  C)-99.7  F (37.6  C)] 99.7  F (37.6  C)  Pulse:  [] 113  Heart Rate:  [] 94  Resp:  [16-20] 20  BP: ()/(60-84) 93/62  SpO2:  [91 %-93 %] 91 %  Patient Vitals for the past 8 hrs:   BP Temp Temp src Pulse Resp SpO2   05/22/17 0707 93/62 99.7  F (37.6  C) Oral 113 20 91 %   05/22/17 0420 99/60 - - 104 18 92 %   05/22/17 0340 99/66 - - 103 - -   05/22/17 0335 90/62 - - 91 - -   05/22/17 0330 (!) 89/63 - - 99 - -   05/22/17 0325 (!) 89/64 - - 87 - -   05/22/17 0322 94/63 - - 90 - -         LAB DATA:  Results for orders placed or performed during the hospital encounter of 05/17/17 (from the past 24 hour(s))   Basic metabolic panel   Result Value Ref Range    Sodium 139 133 - 144 mmol/L    Potassium 3.7 3.4 - 5.3 mmol/L    Chloride 103 94 - 109 mmol/L    Carbon Dioxide 27 20 - 32 mmol/L    Anion Gap 8 3 - 14 mmol/L    Glucose 87 70 - 99 mg/dL    Urea Nitrogen 4 (L) 7 - 30 mg/dL    Creatinine 0.53 0.52 - 1.04 mg/dL    GFR Estimate >90  Non  GFR Calc   >60 mL/min/1.7m2    GFR Estimate If Black >90   GFR Calc   >60 mL/min/1.7m2    Calcium 8.9 8.5 - 10.1 mg/dL   CBC with platelets   Result Value Ref Range     WBC 7.8 4.0 - 11.0 10e9/L    RBC Count 3.38 (L) 3.8 - 5.2 10e12/L    Hemoglobin 11.0 (L) 11.7 - 15.7 g/dL    Hematocrit 33.9 (L) 35.0 - 47.0 %     78 - 100 fl    MCH 32.5 26.5 - 33.0 pg    MCHC 32.4 31.5 - 36.5 g/dL    RDW 12.6 10.0 - 15.0 %    Platelet Count 256 150 - 450 10e9/L   Magnesium   Result Value Ref Range    Magnesium 1.6 1.6 - 2.3 mg/dL     Ariadna Heath, PharmD, MS  May 22, 2017 8:00 AM

## 2017-05-22 NOTE — PLAN OF CARE
Problem: Goal Outcome Summary  Goal: Goal Outcome Summary  Outcome: Improving  Assumed care of pt at 1900. POD #4. Slightly hypotensive. Pain controlled with PRN oxycodone, PRN IV dilaudid and scheduled tylenol as well as flexeril for muscle spasms. Epidural intact and infusing 10mL/hr and site c/d/i. Denies nausea. Voiding spontaneously with good urine outputs. Midline dermabond. PICC infusing IVMF. On low fiber diet but not tolerating well due to increased discomfort and pain with intake. Pt on andi counts on the 22nd and pt filling out sheet herself. Monitor and treat pain, monitor VS, and increase activity and PO intake.

## 2017-05-22 NOTE — PROGRESS NOTES
Colorectal Surgery Progress Note  POD#2    Subjective:    Increased pain this AM - good pain relief on the right, lots of pain in the left abdomen.  Two 500cc IVF boluses over the last 24 hours.  BMx8, c diff negative.  Tolerating low fiber diet.    Vitals:  Vitals:    05/22/17 0335 05/22/17 0340 05/22/17 0420 05/22/17 0707   BP: 90/62 99/66 99/60 93/62   BP Location:  Right arm  Left arm   Cuff Size:       Pulse: 91 103 104 113   Resp:   18 20   Temp:    99.7  F (37.6  C)   TempSrc:    Oral   SpO2:   92% 91%   Weight:       Height:         I/O:  I/O last 3 completed shifts:  In: 1619 [P.O.:1340; I.V.:279]  Out: 1850 [Urine:1850]   Bowel movement x8    Physical Exam:  Gen: AAOx3, NAD  Pulm: Non-labored breathing  Abd: Soft, non-distended, appropriately tender, no guarding   Incision C/D/I, no erythema  Ext:  Warm and well-perfused    BMP    Recent Labs  Lab 05/21/17  0947 05/20/17  0808 05/19/17  0802 05/18/17  1459 05/17/17  2217    139 135 137 143   POTASSIUM 3.7 3.7 4.2 4.5 3.7   CHLORIDE 103 105 100 105 107   CO2 27 28 26 26 30   BUN 4* 6* 11 9 10   CR 0.53 0.55 0.59 0.60 0.78   GLC 87 94 95 101* 98   MAG 1.6 1.9 1.9 1.6 1.8   PHOS  --  2.8 3.2 3.6 4.8*     CBC    Recent Labs  Lab 05/21/17  0947 05/20/17  0808 05/19/17  0802 05/18/17  1459   WBC 7.8 9.0 13.7* 18.5*   HGB 11.0* 10.4* 11.6* 12.7   HCT 33.9* 32.5* 35.5 39.3    273 292 303         A/P: Rachel A Gerhardt is a 41 year old female with hx of cervical cancer s/p radiation with recurrent SBOs and opiate dependency now POD#4 S/P exploratory laparotomy, small bowel resection, flexible sigmoidoscopy for radiation induced small bowel strictures  Neuro: Epidural, oxycodone and dilaudid IV PRN for pain, appreciate anesthesia pain service. Difficulty with pain control and weaning the epidural. Patient reporting severe pain. Pain consult placed with recs but patient continuing to have pain. May need to contact pain again for further recs. Consider  starting Toradol but history of reaction to Advil so holding off at this time. Per chart has tolerated Toradol in past but patient does not recall this. Also on Flexeril 10 mg TID.  Resp: Aggressive pulmonary toilet. Wean of O2 as able   CV: Soft BP, likely related to epidural, trying to wean epidural. LR Bolus PRN.  GI/FEN: Continue low fiber diet, calorie counts to determine whether she requires TPN (was on this preop),  Renal: Adequate UOP. Godwin out, is urinating freely without difficulty.  Endo: No issues  ID: no further abx  Heme:  Hgb stable  Prophylaxis: SCD, lovenox      Patient seen and discussed with fellow Luis Figueroa and colorectal team.  Hi Morales, MS4           Addendum:   Patient was seen and examined independently.  Pt sleeping.  Resting comfortably. Having difficulties with pain control.  Having pain with eating and therefore is trying to not eat as much.  Feels that pain is diffuse and denies any localization.  Last BM was the day after surgery and has not had any stool since.  Has been passing flatus daily.  Denies significant bloating.  Denies nausea and emesis.  Tried some scrambled eggs this morning.     NAD, resting comfortably  abd soft.  Mildly distended.  Moderately tender to touch diffusely.  Evolving bruising around incision sites  Walking in the halls    A/P: 42 y/o female, with h/o cervical cancer s/p radiation, h/o opiate dependence and prior suboxone use, more recently with with recurrent SBOs.  Now POD# 5 exploratory laparotomy, small bowel resection with anastomosis for recurrent SBO and small bowel strictures.       - continue LRD as liberty  - apprec pain team and anesthesia team recs.  Plan to remove epidural tomorrow.  Then can start toradol after epidural removal.   - AXR today for continued abd pain with eating  - miralax ordered  - ambulate  - lovenox ppx    Julieta Rob PA-C  Colon and Rectal Surgery   Seen and discussed with Fellow, Dr. Figueroa.     Attending  addendum:  Agree with above. Abdomen is benign. I reviewed the AXR- unremarkable.  Plan:  i adjusted her narcotics. Continue the same Oral Oxycodone dose- discussed with her.  Abdominal binder  Check UA  IV bolus of fluids now for symptoms of dark urine.  She will likely need to continue TPN when she discharges home.

## 2017-05-22 NOTE — PROGRESS NOTES
Regional Anesthesia Note 5/22/17     -Inadequate pain control  -5 cc bolus of 0.125% Bupivacaine via epidural at 3:15 AM  -No signs / symptoms of LAST  -Nurse to monitor vitals q 5 minutes for 30 minutes    Tara Simpson MD  [place] 2 Anesthesia Resident  Pager [phone]

## 2017-05-22 NOTE — PLAN OF CARE
Problem: Goal Outcome Summary  Goal: Goal Outcome Summary  Outcome: Therapy, progress toward functional goals is gradual  Slightly hypotensive, OVSS. Pain manageable with Epidural 12/hr, PRN oxycodone, scheduled tylenol, flexeril and PRN dilaudid second of 3; dose was given. On low fiber diet but taking in minimal PO intake due to increased pain with eating and denies nausea/vomiting; pt also on andi counts until (5/23). PICC infusing MIV. Pt up ambulating in hallway x2 over shift. Continue with POC.

## 2017-05-22 NOTE — PROGRESS NOTES
Anesthesia Regional Note 5/22/17:    -Inadequate pain control; decreased epidural rate from 12 to 10 mL/hr this am  -Increase rate back to 12 mL/hr due to inadequate pain control  -Continue PRN bolus every 12 hours if needed for adequate pain control  -Primary wanted to try and wean off epidural; however, pain still uncontrolled with IV/oral pain medications; to readdress with primary team in am    Tara Simpson MD  [place] 2 Anesthesia Resident  Pager [phone]

## 2017-05-23 DIAGNOSIS — R10.84 ABDOMINAL PAIN, GENERALIZED: Primary | ICD-10-CM

## 2017-05-23 DIAGNOSIS — T66.XXXA: ICD-10-CM

## 2017-05-23 LAB
ALBUMIN UR-MCNC: NEGATIVE MG/DL
ANION GAP SERPL CALCULATED.3IONS-SCNC: 7 MMOL/L (ref 3–14)
APPEARANCE UR: CLEAR
BILIRUB UR QL STRIP: NEGATIVE
BUN SERPL-MCNC: 8 MG/DL (ref 7–30)
CALCIUM SERPL-MCNC: 9 MG/DL (ref 8.5–10.1)
CHLORIDE SERPL-SCNC: 103 MMOL/L (ref 94–109)
CO2 SERPL-SCNC: 28 MMOL/L (ref 20–32)
COLOR UR AUTO: YELLOW
COPATH REPORT: NORMAL
CREAT SERPL-MCNC: 0.49 MG/DL (ref 0.52–1.04)
GFR SERPL CREATININE-BSD FRML MDRD: ABNORMAL ML/MIN/1.7M2
GLUCOSE SERPL-MCNC: 101 MG/DL (ref 70–99)
GLUCOSE UR STRIP-MCNC: NEGATIVE MG/DL
HGB UR QL STRIP: ABNORMAL
KETONES UR STRIP-MCNC: NEGATIVE MG/DL
LACTATE BLD-SCNC: 0.8 MMOL/L (ref 0.7–2.1)
LEUKOCYTE ESTERASE UR QL STRIP: ABNORMAL
MAGNESIUM SERPL-MCNC: 1.3 MG/DL (ref 1.6–2.3)
MAGNESIUM SERPL-MCNC: 2.6 MG/DL (ref 1.6–2.3)
MUCOUS THREADS #/AREA URNS LPF: PRESENT /LPF
NITRATE UR QL: NEGATIVE
PH UR STRIP: 6.5 PH (ref 5–7)
PHOSPHATE SERPL-MCNC: 4.1 MG/DL (ref 2.5–4.5)
POTASSIUM SERPL-SCNC: 4 MMOL/L (ref 3.4–5.3)
RBC #/AREA URNS AUTO: 54 /HPF (ref 0–2)
SODIUM SERPL-SCNC: 138 MMOL/L (ref 133–144)
SP GR UR STRIP: 1.02 (ref 1–1.03)
SQUAMOUS #/AREA URNS AUTO: 1 /HPF (ref 0–1)
URN SPEC COLLECT METH UR: ABNORMAL
UROBILINOGEN UR STRIP-MCNC: 4 MG/DL (ref 0–2)
WBC #/AREA URNS AUTO: 3 /HPF (ref 0–2)

## 2017-05-23 PROCEDURE — 81001 URINALYSIS AUTO W/SCOPE: CPT | Performed by: STUDENT IN AN ORGANIZED HEALTH CARE EDUCATION/TRAINING PROGRAM

## 2017-05-23 PROCEDURE — 25000128 H RX IP 250 OP 636: Performed by: COLON & RECTAL SURGERY

## 2017-05-23 PROCEDURE — 25000132 ZZH RX MED GY IP 250 OP 250 PS 637: Performed by: COLON & RECTAL SURGERY

## 2017-05-23 PROCEDURE — 12000008 ZZH R&B INTERMEDIATE UMMC

## 2017-05-23 PROCEDURE — 83735 ASSAY OF MAGNESIUM: CPT | Performed by: COLON & RECTAL SURGERY

## 2017-05-23 PROCEDURE — 80048 BASIC METABOLIC PNL TOTAL CA: CPT | Performed by: COLON & RECTAL SURGERY

## 2017-05-23 PROCEDURE — 40000558 ZZH STATISTIC CVC DRESSING CHANGE

## 2017-05-23 PROCEDURE — 36592 COLLECT BLOOD FROM PICC: CPT | Performed by: COLON & RECTAL SURGERY

## 2017-05-23 PROCEDURE — 25000132 ZZH RX MED GY IP 250 OP 250 PS 637: Performed by: STUDENT IN AN ORGANIZED HEALTH CARE EDUCATION/TRAINING PROGRAM

## 2017-05-23 PROCEDURE — 25000132 ZZH RX MED GY IP 250 OP 250 PS 637: Performed by: SURGERY

## 2017-05-23 PROCEDURE — 84100 ASSAY OF PHOSPHORUS: CPT | Performed by: COLON & RECTAL SURGERY

## 2017-05-23 PROCEDURE — 25000128 H RX IP 250 OP 636: Performed by: STUDENT IN AN ORGANIZED HEALTH CARE EDUCATION/TRAINING PROGRAM

## 2017-05-23 PROCEDURE — 83605 ASSAY OF LACTIC ACID: CPT | Performed by: COLON & RECTAL SURGERY

## 2017-05-23 PROCEDURE — 99207 ZZC NO CHARGE FOLLOW UP PS: CPT

## 2017-05-23 PROCEDURE — 87086 URINE CULTURE/COLONY COUNT: CPT | Performed by: STUDENT IN AN ORGANIZED HEALTH CARE EDUCATION/TRAINING PROGRAM

## 2017-05-23 PROCEDURE — 25000128 H RX IP 250 OP 636: Performed by: PHYSICIAN ASSISTANT

## 2017-05-23 PROCEDURE — 25000125 ZZHC RX 250: Performed by: COLON & RECTAL SURGERY

## 2017-05-23 RX ORDER — METHOCARBAMOL 500 MG/1
500 TABLET, FILM COATED ORAL 4 TIMES DAILY
Status: DISCONTINUED | OUTPATIENT
Start: 2017-05-23 | End: 2017-05-24 | Stop reason: HOSPADM

## 2017-05-23 RX ORDER — HYOSCYAMINE SULFATE 0.125 MG
0.12 TABLET,DISINTEGRATING ORAL EVERY 4 HOURS PRN
Status: DISCONTINUED | OUTPATIENT
Start: 2017-05-23 | End: 2017-05-24 | Stop reason: HOSPADM

## 2017-05-23 RX ORDER — HYDROMORPHONE HYDROCHLORIDE 1 MG/ML
.3-.5 INJECTION, SOLUTION INTRAMUSCULAR; INTRAVENOUS; SUBCUTANEOUS
Status: DISCONTINUED | OUTPATIENT
Start: 2017-05-23 | End: 2017-05-24 | Stop reason: HOSPADM

## 2017-05-23 RX ORDER — KETOROLAC TROMETHAMINE 30 MG/ML
15-30 INJECTION, SOLUTION INTRAMUSCULAR; INTRAVENOUS EVERY 6 HOURS PRN
Status: DISCONTINUED | OUTPATIENT
Start: 2017-05-23 | End: 2017-05-24 | Stop reason: HOSPADM

## 2017-05-23 RX ADMIN — POTASSIUM CHLORIDE: 2 INJECTION, SOLUTION, CONCENTRATE INTRAVENOUS at 21:51

## 2017-05-23 RX ADMIN — HYDROXYZINE HYDROCHLORIDE 10 MG: 10 TABLET ORAL at 09:03

## 2017-05-23 RX ADMIN — HYDROMORPHONE HYDROCHLORIDE 0.5 MG: 1 INJECTION, SOLUTION INTRAMUSCULAR; INTRAVENOUS; SUBCUTANEOUS at 22:24

## 2017-05-23 RX ADMIN — CYCLOBENZAPRINE HYDROCHLORIDE 10 MG: 10 TABLET, FILM COATED ORAL at 08:27

## 2017-05-23 RX ADMIN — HYDROMORPHONE HYDROCHLORIDE 0.5 MG: 1 INJECTION, SOLUTION INTRAMUSCULAR; INTRAVENOUS; SUBCUTANEOUS at 13:36

## 2017-05-23 RX ADMIN — HYDROMORPHONE HYDROCHLORIDE 0.5 MG: 1 INJECTION, SOLUTION INTRAMUSCULAR; INTRAVENOUS; SUBCUTANEOUS at 17:47

## 2017-05-23 RX ADMIN — ENOXAPARIN SODIUM 40 MG: 40 INJECTION SUBCUTANEOUS at 15:05

## 2017-05-23 RX ADMIN — OXYCODONE HYDROCHLORIDE 15 MG: 10 TABLET ORAL at 12:31

## 2017-05-23 RX ADMIN — NICOTINE 1 PATCH: 14 PATCH, EXTENDED RELEASE TRANSDERMAL at 08:27

## 2017-05-23 RX ADMIN — I.V. FAT EMULSION 250 ML: 20 EMULSION INTRAVENOUS at 21:50

## 2017-05-23 RX ADMIN — Medication 250 MG: at 08:27

## 2017-05-23 RX ADMIN — ACETAMINOPHEN 1000 MG: 500 TABLET, FILM COATED ORAL at 15:46

## 2017-05-23 RX ADMIN — KETOROLAC TROMETHAMINE 30 MG: 30 INJECTION, SOLUTION INTRAMUSCULAR at 10:34

## 2017-05-23 RX ADMIN — ACETAMINOPHEN 1000 MG: 500 TABLET, FILM COATED ORAL at 10:34

## 2017-05-23 RX ADMIN — OXYCODONE HYDROCHLORIDE 15 MG: 10 TABLET ORAL at 03:55

## 2017-05-23 RX ADMIN — HYDROXYZINE HYDROCHLORIDE 10 MG: 10 TABLET ORAL at 01:29

## 2017-05-23 RX ADMIN — Medication 250 MG: at 21:55

## 2017-05-23 RX ADMIN — METHOCARBAMOL 500 MG: 500 TABLET ORAL at 16:45

## 2017-05-23 RX ADMIN — MAGNESIUM SULFATE HEPTAHYDRATE 4 G: 40 INJECTION, SOLUTION INTRAVENOUS at 11:40

## 2017-05-23 RX ADMIN — ONDANSETRON 4 MG: 2 INJECTION INTRAMUSCULAR; INTRAVENOUS at 10:04

## 2017-05-23 RX ADMIN — OXYCODONE HYDROCHLORIDE 15 MG: 10 TABLET ORAL at 08:27

## 2017-05-23 RX ADMIN — METHOCARBAMOL 500 MG: 500 TABLET ORAL at 21:55

## 2017-05-23 RX ADMIN — OXYCODONE HYDROCHLORIDE 15 MG: 10 TABLET ORAL at 20:59

## 2017-05-23 RX ADMIN — OXYCODONE HYDROCHLORIDE 15 MG: 10 TABLET ORAL at 16:45

## 2017-05-23 ASSESSMENT — PAIN DESCRIPTION - DESCRIPTORS
DESCRIPTORS: ACHING;SORE

## 2017-05-23 NOTE — PROGRESS NOTES
CLINICAL NUTRITION SERVICES - Nutrition Note Brief     Nutrition Prescription    RECOMMENDATIONS FOR MDs/PROVIDERS TO ORDER:  - None at this time    Recommendations already ordered by Registered Dietitian (RD):  - Per consult received will initiate change PN request order for pharmacy per following recs:  Per DW: 66 kg, initiate PN with 2000 ml volume (further adjustments per MD/Pharmacy pending pt's fluid status) with 150 gm Dextrose (GIR=1.6 mg/kg/min), 102 gm AA and 20% 250 ml lipids x 5 days a week (Mon-Fri). If pt is tolerating this initial volumes without any signs or symptoms of refeeding (K+, Mg++ wnl and Phos >1.9), can increase Dextrose 30 -50 gm a day until final goal of 275 gm (GIR=  2.9 mg/kg/min). Goal PN to provide: 1700 kcal/day (26 kcal/kg), 1.5 gm/kg, 29% of total kcal from fat on days when lipids are infusing.      Future/Additional Recommendations:  - Monitor PO intake and adjust PN as appropriate.       EVALUATION OF THE PROGRESS TOWARD GOALS   Diet: Low Fiber diet with Ensure Plus TID in between meals  Nutrition Support:None at this time. Pt was on PN support PTA (PN received via [facility]). Pt's Home Regimen: Wt  68 kg; Formula: aa TrAVAsol 10% 102 gm/day, dex 275 gm/day, Volume 2000 ml.  Lipid 20 % 280 ml. Lytes per DAY: na: 50 meq\d, k 45 meq\d, ca 15 meq\d, magnesium 12 meq\d, po4 20 mm\d, Cl to oac = 50% Cl. Infuvite 10 ml/day,  MTE-5  3 ml/day, zinc 5 mg/day. 24 hours, 200 ml overfill. If lipids are provided daily and if dosing wt of 68 kg is used, this provides 1903 kcals (28 kcals/kg), 102 g protein (1.5 g protein/kg), 275 g CHO, GIR 2.8, and 29% of kcals from fat on average daily.    Intake: Pt on 3 day Calorie Counts and results as follows:  5/20/17: 1332 kcal with 70 gm protein  5/21/17: 477 kcal with 25 gm protein  5/22/17: 224 kcal with 14 gm protein    Per above data, Pt consumed on average: 678 kcal (10 kcal/kg) with  36 gm protein (0.55 gm protein/kg) per day. Pt's PO is declining  due to increased abdominal pain especially with eating. PN to start today per Home Regimen. Will adjust PN to reflect the new DW and to avoid overfeeding with PN.     INTERVENTIONS  Implementation  Parenteral Nutrition/IV Fluids - Initiate    Monitoring/Evaluation  Progress toward goals will be monitored and evaluated per protocol.    Aleksandra Feliciano RD LD  Weekday pager: [phone]  Weekend pager - [phone]

## 2017-05-23 NOTE — PLAN OF CARE
Problem: Goal Outcome Summary  Goal: Goal Outcome Summary  Outcome: Improving  Hypotensive, tachy, AOVSS on RA. A&O x4, able to make needs known. Up ad dania, ambulated in hallway x2, abdominal binder on for walking. Bupiviciane epidural removed this AM. Pain poorly controlled after removal but better controlled after pt had a soft formed BM and IV dilaudid. Pain now controlled with prn oxycodone and dilaudid and scheduled tylenol, see MAR. Pt didn't like how she felt after the atarax and doesn't want any more. Midline CDI, open to air. Voiding adequate amounts clear yellow urine, passing gas, soft formed brown BM x1 this shift. R arm PICC infusing IVMF, brisk blood return noted. Mag replaced re-check adequate, re-check again tomorrow AM. Plan of care discussed with pt, continue plan of care.

## 2017-05-23 NOTE — PROVIDER NOTIFICATION
Notified Resident at 0530 AM regarding low urine output.      Spoke with: Han Lomas    Orders were not obtained.    Comments: Pt has voided 200cc since 8pm-4am. Pt refusing bladder scan. No orders obtained. Continue to monitor.      Pt voided 400cc @ 0645.

## 2017-05-23 NOTE — PROGRESS NOTES
Calorie Counts  Intake recorded for: 5/22 Kcals: 224  Protein: 14g  # Meals Recorded: 100% apple juice, scrambled eggs, less than 25% peaches   # Supplements Recorded: 0

## 2017-05-23 NOTE — PROGRESS NOTES
REGIONAL ANESTHESIA PAIN SERVICE EPIDURAL NOTE  SUBJECTIVE:   Interval History: Pt reports postop pain was well controlled via epidural, and she is upset that it has to be removed today because her oral medications have not been reordered for more frequent dosing.  Denies any weakness, paresthesias, circumoral numbness, metallic taste or tinnitus.  Pt ambulating using IV pole for support.  Patient is currently without nausea and tolerating diet as ordered.     Clinically Aligned Pain Assessment (CAPA):  Comfort (How is your pain?): Tolerable with discomfort  Change in Pain (Since your last medication/intervention?): About the same  Pain Control (How are your pain treatments working?):  Inadequate pain control  Functioning (Are you able to do activities to get better?) : Can do most things, but pain gets in the way of some   Sleep (Does your pain management allow you to sleep or rest?): Normal sleep        Anticoagulation:     enoxaparin (LOVENOX) injection 40 mg 40 mg, SC, Q24H Given: 05/22 0936       OBJECTIVE:  Diagnostics:  CBC RESULTS:   Recent Labs   Lab Test  05/21/17   0947   WBC  7.8   RBC  3.38*   HGB  11.0*   HCT  33.9*   MCV  100   MCH  32.5   MCHC  32.4   RDW  12.6   PLT  256         Lab Results   Component Value Date    INR 1.06 05/17/2017    INR 0.96 05/01/2017    INR 1.05 04/30/2017       Vitals:    Temp:  [96  F (35.6  C)-98.8  F (37.1  C)] 98.8  F (37.1  C)  Pulse:  [] 112  Resp:  [16-22] 22  BP: ()/(63-90) 91/63  SpO2:  [90 %-99 %] 93 %    Exam:    Strength 5/5 and symmetric grossly in bilateral LE   Epidural catheter removed at 0930 without complication, dark tip intact.  Insertion site c/d/i, no tenderness, erythema, heme, edema. Bandaid applied      ASSESSMENT/PLAN:    Rachel A Gerhardt is a 41 year old female POD #5 s/p COMBINED CYSTOSCOPY, INSERT STENT URETER(S)  COMBINED LAPAROTOMY, LYSIS ADHESIONS RESECT SMALL BOWEL WITHOUT OSTOMY COLONOSCOPY WITH CO2 INSUFFLATION and placement  of T8-9 epidural for analgesia.  Pt receiving adequate analgesia with epidural infusion Bupivacaine 0.125% at 12 mL/hour.  Today is POD #% so epidural was removed.  Pt ambulating without difficulty.  No weakness or paresthesias.  No evidence of adverse side effects related to local anesthetic.    - epidural catheter removed at 0930  - may resume enoxaparin in 2 hrs - bedside nurse aware  - will sign off  - discussed plan with attending anesthesiologist        CECY [facility]  Regional Anesthesia Pain Service  5/23/2017 10:21 AM    24 hour Job Code Pager.  For in-house use only.     Dial * * [phone] and  [place]:  [phone]  [place]: [phone]  Peds:  [phone]  Then enter call-back number  May text page using [employer], but NOT [employer].

## 2017-05-23 NOTE — PLAN OF CARE
"Problem: Goal Outcome Summary  Goal: Goal Outcome Summary  Outcome: Therapy, progress toward functional goals as expected  /90 (BP Location: Left arm)  Pulse 80  Temp 97.7  F (36.5  C) (Oral)  Resp 16  Ht 1.727 m (5' 8\")  Wt 69.1 kg (152 lb 6.4 oz)  SpO2 90%  BMI 23.17 kg/m2     Pain managed with prn oxycodone and scheduled tylenol. Pt stated that Buddy saw her this evening and said she could get a higher dose of dilaudid but then ended up discontinuing dilaudid. Resident ordered a one time dose of dilaudid for abdominal pain and added atrax. Pt and nurse agreed to stager oxycodone and atrax. Epidural running at 12cc/hr. No LAST symptoms. PICC infusing TKO. Received a 500 cc bolus. Abd inc intact. Low fiber diet but didn't eat dinner. Faustino counts. States she has more pain with eating. Up ad dania.     Orders for UA, waiting for patient to void       "

## 2017-05-23 NOTE — PROGRESS NOTES
Inpatient Pain Management Service: Follow Up Note    The inpatient pain service is continuing to follow Rachel A Gerhardt for her post op pain at the request of her hospital team.    ASSESSMENT:   1. Acute postoperative pain status post cystoscopy with bilateral stent placement, exploratory laparotomy small bowel resection with anastomosis and flex sigmoidoscopy on 05/18/17 for treatment of small bowel obstruction.  2. History of stage II ovarian and cervical cancer diagnosed in 08/19/2015 status post chemo and pelvic radiation. There was concern for recurrence in December of 2016, but per patient workup negative for recurrence.   3. History of poor compliance with follow-up visits regarding treatment of her cervical cancer.  4. History of chronic abdominal pain. Patient was being seen by Dr. Clovis De León, from October 2015- October 2016 ([facility] patient had signed an opioid agreement with Dr. De León, However, patient has missed follow-up appointments with him and has not been seen since 10/1/16. Patient reports after her last visit with Dr. De León (10/11/16) She was feeling much better with regards to her abdominal pain, and decided she did not need to follow up with him.  5. History of headaches  6. History of left knee pain underwent laparoscopic knee surgery in August of 2016 that failed to alleviate her pain.  7. History of substance use disorder: Percocet and Vicodin, patient underwent chemical dependency treatment in 2012. See Psychiatry notes from 07/14/2012 admission for details.    -- Outpatient opioid requirements prior to admission:   --oxycodone-acetaminophen 5-325mg (maximum of 5 tablets per day)  --per review of the [place] Prescription Monitoring Program report the patient was not receiving opioids from December 2016 till May 2017 (patient reports during this period she did not have any chronic pain). Patient started receiving opioid prescriptions from  Jennifer Garza MD at the beginning of May 2017 prior to this admission.     RECOMMENDATIONS/PLAN:   1. Continue oxycodone 10-15mg po q4h prn moderate to severe pain.  2. Start ketorolac 15mg IV q6h x 24 hours.  3. Discontinue cyclobenzaprine.  4. Start methocarbamol 500mg po q6h for muscle spasm.  5. Start hyoscyamine 0.125mg SL q4h prn GI spasm.  6. Continue acetaminophen 1000mg po q6h.  7. Continue menthol patch 5% 1 patch topically q8h prn.  8. Continue nicotine patch 14mg daily.  9. Continue simethicone 80mg qid prn.  10. Bowel regimen per primary team to prevent opioid induced constipation.      Pain Service will sign off at this time.     Recommendations were discussed with Andria Rob PA-C (Colorectal)  Plan was reviewed by the Pain Service consisting of Dr. Adarsh Ge.    Thank you for consulting the Inpatient Pain Management Service.   The above recommendations are to be acted upon at the primary team s discretion.     To reach us:  Mon - Friday 8 AM - 3 PM: Pager [phone]   After hours, weekends and holidays: Primary service should call [phone] for the on-call pain specialist    CHIEF PAIN COMPLAINT: Abdominal pain    CAPA (Clinically Aligned Pain Assessment):    Comfort (How is your pain?): Tolerable with discomfort  Change in Pain (Since your last medication/intervention?): About the same  Pain Control (How are your pain treatments working?):  Partially effective control  Functioning (Are you able to do activities to get better?) : Pain keeps me from doing most of what I need to do  Sleep (Does your pain management allow you to sleep or rest?): Awake with pain most of night         INTERVAL HISTORY: Patient sitting up in bed with eyes closed, easily arousable.  Reports epidural was removed a short while ago, having abdominal pain, spasm.  Tried to ask patient if pain was musculoskeletal or GI, patient unable to differentiate but states food / eating makes the pain worse, feels like labor  pains.  Even on the pain medications, still feels the pain.  States she won't be getting out of bed or walking any more today due to pain.  Does not feel cyclobenzaprine provides any relief for muscle spasm.  Did not sleep well last night due to pain.      FUNCTIONAL STATUS:  Change:      Getting worse  Oral intake:     Regular  Bowel function:    No bowel movements since the day after surgery  Activity level:     Ambulating in young, states she is not getting up today.  Sleep:      Awake with pain  Mood:      Tired, poor energy        CURRENT MEDICATIONS:   Current Facility-Administered Medications Ordered in Epic   Medication Dose Route Frequency Provider Last Rate Last Dose     ketorolac (TORADOL) injection 15-30 mg  15-30 mg Intravenous Q6H PRN Julieta Rob PA-C   30 mg at 05/23/17 1034     menthol (ICY HOT) 5 % patch 1 patch  1 patch Topical Q8H PRN Jennifer Goodwin MD        And     menthol (ICY HOT) Patch in Place   Transdermal Q8H Jennifer Goodwin MD        And     menthol (ICY HOT) patch REMOVAL   Transdermal Q8H PRN Jennifer Goodwin MD         hydrOXYzine (ATARAX) tablet 10 mg  10 mg Oral TID PRN Rene Almanza MD   10 mg at 05/23/17 0903     simethicone (MYLICON) chewable tablet 80 mg  80 mg Oral Q6H PRN Rene Almanza MD   80 mg at 05/21/17 0238     oxyCODONE (ROXICODONE) IR tablet 10 mg  10 mg Oral Q4H PRN Abel Ignacio MD        Or     oxyCODONE (ROXICODONE) IR tablet 15 mg  15 mg Oral Q4H PRN Abel Ignacio MD   15 mg at 05/23/17 0827     cyclobenzaprine (FLEXERIL) tablet 10 mg  10 mg Oral TID Abel Ignacio MD   10 mg at 05/23/17 0827     polyethylene glycol (MIRALAX/GLYCOLAX) Packet 17 g  17 g Oral Daily Abel Ignacio MD   17 g at 05/22/17 0759     acetaminophen (TYLENOL) tablet 1,000 mg  1,000 mg Oral 4x Daily Sukhdev Aguilar MD   1,000 mg at 05/23/17 1034     saccharomyces boulardii (FLORASTOR) capsule 250 mg  250 mg Oral BID Sukhdev Aguilar MD   250 mg at  05/23/17 0827     lactated ringers infusion   Intravenous Continuous Sukhdev Aguilar MD 10 mL/hr at 05/21/17 1056       potassium chloride SA (K-DUR/KLOR-CON M) CR tablet 20-40 mEq  20-40 mEq Oral Q2H PRN Horace Maloney MD         potassium chloride (KLOR-CON) Packet 20-40 mEq  20-40 mEq Oral or Feeding Tube Q2H PRN Horace Maloney MD         potassium chloride 10 mEq in 100 mL intermittent infusion  10 mEq Intravenous Q1H PRN Horace Maloney MD         potassium chloride 20 mEq in 50 mL intermittent infusion  20 mEq Intravenous Q1H PRN Horace Maloney MD         nicotine Patch in Place   Transdermal Q8H Jennifer Goodwin MD         nicotine patch REMOVAL   Transdermal Daily Jennifer Goodwin MD         nicotine (NICODERM CQ) 14 MG/24HR 24 hr patch 1 patch  1 patch Transdermal Daily Jennifer Goodwin MD   1 patch at 05/23/17 0827     magnesium sulfate 2 g in NS intermittent infusion ([employer]ar[employer] or [facility] Cmpd)  2 g Intravenous Daily PRN David Reid MD 50 mL/hr at 05/19/17 1332 2 g at 05/21/17 1108     magnesium sulfate 4 g in 100 mL sterile water (premade)  4 g Intravenous Q4H PRDavid Nunn MD         sodium phosphate 10 mmol in D5W intermittent infusion  10 mmol Intravenous Daily PRDavid Nunn MD         sodium phosphate 15 mmol in D5W intermittent infusion  15 mmol Intravenous Daily PRDavid Nunn MD         sodium phosphate 20 mmol in D5W intermittent infusion  20 mmol Intravenous Q6H PRDavid Nunn MD         sodium phosphate 25 mmol in D5W intermittent infusion  25 mmol Intravenous Q8H PRDavid Nunn MD         lidocaine 1 % 1 mL  1 mL Other Q1H PRHorace Macias MD         lidocaine (LMX4) kit   Topical Q1H PRN Horace Maloney MD         sodium chloride (PF) 0.9% PF flush 3 mL  3 mL Intracatheter Q1H PRN Horace Maloney MD   20 mL at 05/23/17 1044     sodium  chloride (PF) 0.9% PF flush 3 mL  3 mL Intracatheter Q8H Horace Maloney MD   3 mL at 05/23/17 0828     lactated ringers BOLUS 500 mL  500 mL Intravenous Q4H PRN Horace Maloney MD   500 mL at 05/20/17 1325     enoxaparin (LOVENOX) injection 40 mg  40 mg Subcutaneous Q24H Horace Maloney MD   40 mg at 05/22/17 0936     diphenhydrAMINE (BENADRYL) injection 25 mg  25 mg Intravenous Q6H PRN Horace Maloney MD         hydrALAZINE (APRESOLINE) injection 10 mg  10 mg Intravenous Q4H PRN Horace Maloney MD         naloxone (NARCAN) injection 0.1-0.4 mg  0.1-0.4 mg Intravenous Q2 Min PRN David Reid MD         ondansetron (ZOFRAN) injection 4-8 mg  4-8 mg Intravenous Q6H PRN David Reid MD   4 mg at 05/23/17 1004     No current [place]-ordered outpatient prescriptions on file.       PHYSICAL EXAM:   Vitals:   Temp:  [96  F (35.6  C)-98.8  F (37.1  C)] 98.8  F (37.1  C)  Pulse:  [] 112  Resp:  [16-22] 22  BP: ()/(63-90) 91/63  SpO2:  [90 %-99 %] 93 %  Patient Vitals for the past 8 hrs:   BP Temp Temp src Pulse Resp SpO2   05/23/17 0759 91/63 98.8  F (37.1  C) Oral 112 22 93 %   05/23/17 0717 106/64 96  F (35.6  C) Axillary 113 22 94 %       LAB DATA:  Results for orders placed or performed during the hospital encounter of 05/17/17 (from the past 24 hour(s))   XR Abdomen 1 View    Narrative    Exam: XR ABDOMEN 1 VW, 5/22/2017 11:42 AM    Indication: abdominal pain, s/p recent Small bowel resection on  5/18/2017.    Comparison: PET/CT of 5/12/2017.    Findings:   Nondistended air-filled loops of colon and rectum. Loops of bowel in  the midabdomen are felt to represent small bowel. The amount of  distention of the small bowel appears less than 5/12/2017. Evaluation  for free air is limited given supine technique. No abnormal  calcifications. Osseous structures are unremarkable.      Impression    Impression: Prominent small bowel loops with air have been  present  prior CTs dating back to 2016. Slightly less distended on today's  study. Recent laparotomy with small bowel resection on 5/18/2017.    I have personally reviewed the examination and initial interpretation  and I agree with the findings.    BHARGAV ESQUIVEL MD   UA with Microscopic reflex to Culture   Result Value Ref Range    Color Urine Yellow     Appearance Urine Clear     Glucose Urine Negative NEG mg/dL    Bilirubin Urine Negative NEG    Ketones Urine Negative NEG mg/dL    Specific Gravity Urine 1.019 1.003 - 1.035    Blood Urine Small (A) NEG    pH Urine 6.5 5.0 - 7.0 pH    Protein Albumin Urine Negative NEG mg/dL    Urobilinogen mg/dL 4.0 (H) 0.0 - 2.0 mg/dL    Nitrite Urine Negative NEG    Leukocyte Esterase Urine Moderate (A) NEG    Source Midstream Urine     WBC Urine 3 (H) 0 - 2 /HPF    RBC Urine 54 (H) 0 - 2 /HPF    Squamous Epithelial /HPF Urine 1 0 - 1 /HPF    Mucous Urine Present (A) NEG /LPF   Urine Culture Aerobic Bacterial   Result Value Ref Range    Specimen Description Midstream Urine     Special Requests Specimen received in preservative     Culture Micro Pending     Micro Report Status Pending    Lactic acid level STAT   Result Value Ref Range    Lactic Acid 0.8 0.7 - 2.1 mmol/L       Ariadna Heath PharmD, MS  May 23, 2017 11:14 AM

## 2017-05-23 NOTE — PLAN OF CARE
Problem: Goal Outcome Summary  Goal: Goal Outcome Summary  Outcome: No Change  VSS. Pain controlled with PRN Atarax, Oxy and Epidural @12cc/hr. Denies LAST symptoms. Denies nausea. Low fiber diet, calorie counts. UA sent. Up ad dania. Midline CDI. Continue to monitor.     New Epidural cartridge ordered, needs to be replaced ~0930.

## 2017-05-23 NOTE — PROGRESS NOTES
Colorectal Surgery Progress Note  POD#6      Subjective:  Nauseated this AM.  Last time taken PO was yesterday morning with breakfast.  Having a lot of stomach cramps. Atarax is not doing anything for her.     Vitals:  Vitals:    05/22/17 1201 05/22/17 1421 05/22/17 2253 05/23/17 0717   BP:  107/90 99/64 106/64   BP Location:  Left arm Left arm Left arm   Cuff Size:       Pulse:  80 100 113   Resp:  16 16 22   Temp:  97.7  F (36.5  C) 98.1  F (36.7  C) 96  F (35.6  C)   TempSrc:  Oral Oral Axillary   SpO2:  90% 99% 94%   Weight: 69.1 kg (152 lb 6.4 oz)      Height:         I/O:  I/O last 3 completed shifts:  In: 1690 [P.O.:810; I.V.:380; IV Piggyback:500]  Out: 1200 [Urine:1200]    Physical Exam:  Gen: AAOx3, NAD  Pulm: Non-labored breathing  Abd: Soft, mildly distended.  Moderately tender to palpation diffusely.  Abdominal binder in place.    Incision C/D/I with evolving bruising  Ext:  Warm and well-perfused    BMP  Recent Labs  Lab 05/21/17  0947 05/20/17  0808 05/19/17  0802 05/18/17  1459 05/17/17  2217    139 135 137 143   POTASSIUM 3.7 3.7 4.2 4.5 3.7   CHLORIDE 103 105 100 105 107   CO2 27 28 26 26 30   BUN 4* 6* 11 9 10   CR 0.53 0.55 0.59 0.60 0.78   GLC 87 94 95 101* 98   MAG 1.6 1.9 1.9 1.6 1.8   PHOS  --  2.8 3.2 3.6 4.8*     CBC  Recent Labs  Lab 05/21/17  0947 05/20/17  0808 05/19/17  0802 05/18/17  1459   WBC 7.8 9.0 13.7* 18.5*   HGB 11.0* 10.4* 11.6* 12.7   HCT 33.9* 32.5* 35.5 39.3    273 292 303         ASSESSMENT: 42 y/o female, with h/o cervical cancer s/p radiation, h/o opiate dependence and prior suboxone use, more recently with with recurrent SBOs. Now POD# 6 exploratory laparotomy, small bowel resection with anastomosis for recurrent SBO and small bowel strictures.     - appreciate Pain and Anesthesia recs.  Plan to remove epidural today.   - start toradol after epidural removed  - continue LRD as tolerated  - restart home TPN and will need to discharge home with TPN   - IVF  "prn  - UC pending  - miralax continued  - is to wear abdominal binder when up with activity  - ambulate  - lovenox ppx    Julieta Rob PA-C   Colon and Rectal Surgery  [id number]     Patient was seen and discussed with Fellow, Dr. Figueroa.     Attending addendum:  Patient was very disappointed because i did not successfully order IV dilaudid last night as I had anticipated.  She took a dose of atarax which gave her nausea.  Formed BM today with ++ flatus. Feels better.  BP 93/62 (BP Location: Left arm, Cuff Size: Adult Regular)  Pulse 102  Temp 96.4  F (35.8  C) (Oral)  Resp 18  Ht 5' 8\"  Wt 154 lb 4.8 oz  SpO2 97%  BMI 23.46 kg/m2    Abd: benign, binder in place    A/P: post op doing well. Pain control is what keeps her here.  We discussed smoking cessation and caution with narcotics. Patient may benefit from outpatient pain management given her difficulty with narcotics in the past.  I doubt she will do well with her diet and I think that she would benefit from continue TPN as an outpatient.  "

## 2017-05-23 NOTE — PROGRESS NOTES
Care Coordinator- Discharge Planning     Admission Date/Time:  5/17/2017  Attending MD:  Jennifer Goodwin MD     Data  Date of initial CC assessment:  Daily after surgical procedure/in interdisciplinary rounds.  Chart reviewed, discussed with interdisciplinary team.   Patient was admitted for:   1. Small intestine obstruction (H)         Assessment  The following home care services have been resumed on behalf of Ms. Gerhardt per MD team plans of care:    Please fax discharge orders to [facility]     Ph:  [phone]     Fax: [phone]     Skilled home care RN for resumption of home care services at time of discharge and to assist with the MD team updated discharge plans as outlined in discharge orders.     Skilled home care RN to assist with resumption of home TPN via picc line.  [facility] line cares per home care agency routine.     Skilled home care RN to evaluate medication management, nutrition and hydration evaluation, endurance evaluation, and general status evaluation after discharge from the acute care hospital setting.     Coordination of Care and Referrals: Provided patient/family with options for home care agency of choice that can assist with post op needs.  Plan  Anticipated Discharge Date:  To be determined.  Anticipated Discharge Plan:  As above and per updated MD team plans of care.  Inpatient Care Mgmt.Team will continue to follow for updated transition needs prn.    CTS Handoff completed: (Clinic Letter)  To be sent.    Tracey Barnhart, ALCIRA.S.N., P.H.N.,R.N.         Pager

## 2017-05-24 ENCOUNTER — CARE COORDINATION (OUTPATIENT)
Dept: CARE COORDINATION | Facility: CLINIC | Age: 42
End: 2017-05-24

## 2017-05-24 VITALS
SYSTOLIC BLOOD PRESSURE: 106 MMHG | HEART RATE: 110 BPM | DIASTOLIC BLOOD PRESSURE: 64 MMHG | WEIGHT: 153.6 LBS | BODY MASS INDEX: 23.28 KG/M2 | HEIGHT: 68 IN | RESPIRATION RATE: 18 BRPM | TEMPERATURE: 98.2 F | OXYGEN SATURATION: 95 %

## 2017-05-24 LAB
ALBUMIN SERPL-MCNC: 2.5 G/DL (ref 3.4–5)
ALP SERPL-CCNC: 53 U/L (ref 40–150)
ALT SERPL W P-5'-P-CCNC: 10 U/L (ref 0–50)
ANION GAP SERPL CALCULATED.3IONS-SCNC: 7 MMOL/L (ref 3–14)
AST SERPL W P-5'-P-CCNC: 6 U/L (ref 0–45)
BACTERIA SPEC CULT: NO GROWTH
BILIRUB SERPL-MCNC: 0.9 MG/DL (ref 0.2–1.3)
BUN SERPL-MCNC: 13 MG/DL (ref 7–30)
CALCIUM SERPL-MCNC: 8.9 MG/DL (ref 8.5–10.1)
CHLORIDE SERPL-SCNC: 102 MMOL/L (ref 94–109)
CO2 SERPL-SCNC: 28 MMOL/L (ref 20–32)
CREAT SERPL-MCNC: 0.54 MG/DL (ref 0.52–1.04)
GFR SERPL CREATININE-BSD FRML MDRD: ABNORMAL ML/MIN/1.7M2
GLUCOSE SERPL-MCNC: 94 MG/DL (ref 70–99)
INR PPP: 1.01 (ref 0.86–1.14)
Lab: NORMAL
MAGNESIUM SERPL-MCNC: 1.8 MG/DL (ref 1.6–2.3)
MICRO REPORT STATUS: NORMAL
PHOSPHATE SERPL-MCNC: 4.1 MG/DL (ref 2.5–4.5)
PLATELET # BLD AUTO: 313 10E9/L (ref 150–450)
POTASSIUM SERPL-SCNC: 3.9 MMOL/L (ref 3.4–5.3)
PREALB SERPL IA-MCNC: 14 MG/DL (ref 15–45)
PROT SERPL-MCNC: 6.6 G/DL (ref 6.8–8.8)
SODIUM SERPL-SCNC: 137 MMOL/L (ref 133–144)
SPECIMEN SOURCE: NORMAL

## 2017-05-24 PROCEDURE — 25000128 H RX IP 250 OP 636: Performed by: PHYSICIAN ASSISTANT

## 2017-05-24 PROCEDURE — 80048 BASIC METABOLIC PNL TOTAL CA: CPT | Performed by: COLON & RECTAL SURGERY

## 2017-05-24 PROCEDURE — 80053 COMPREHEN METABOLIC PANEL: CPT | Performed by: COLON & RECTAL SURGERY

## 2017-05-24 PROCEDURE — 85610 PROTHROMBIN TIME: CPT | Performed by: COLON & RECTAL SURGERY

## 2017-05-24 PROCEDURE — 83735 ASSAY OF MAGNESIUM: CPT | Performed by: COLON & RECTAL SURGERY

## 2017-05-24 PROCEDURE — 25000132 ZZH RX MED GY IP 250 OP 250 PS 637: Performed by: PHYSICIAN ASSISTANT

## 2017-05-24 PROCEDURE — 25000132 ZZH RX MED GY IP 250 OP 250 PS 637: Performed by: STUDENT IN AN ORGANIZED HEALTH CARE EDUCATION/TRAINING PROGRAM

## 2017-05-24 PROCEDURE — 99207 ZZC NO CHARGE SIGN-OFF PS: CPT

## 2017-05-24 PROCEDURE — 40000802 ZZH SITE CHECK

## 2017-05-24 PROCEDURE — 85049 AUTOMATED PLATELET COUNT: CPT | Performed by: COLON & RECTAL SURGERY

## 2017-05-24 PROCEDURE — 25000132 ZZH RX MED GY IP 250 OP 250 PS 637: Performed by: COLON & RECTAL SURGERY

## 2017-05-24 PROCEDURE — 25000132 ZZH RX MED GY IP 250 OP 250 PS 637: Performed by: SURGERY

## 2017-05-24 PROCEDURE — 84100 ASSAY OF PHOSPHORUS: CPT | Performed by: COLON & RECTAL SURGERY

## 2017-05-24 PROCEDURE — 84134 ASSAY OF PREALBUMIN: CPT | Performed by: COLON & RECTAL SURGERY

## 2017-05-24 RX ORDER — OXYCODONE HYDROCHLORIDE 5 MG/1
10-15 TABLET ORAL EVERY 4 HOURS PRN
Qty: 70 TABLET | Refills: 0 | Status: SHIPPED | OUTPATIENT
Start: 2017-05-24 | End: 2017-06-06

## 2017-05-24 RX ORDER — MAGNESIUM OXIDE 400 MG/1
400 TABLET ORAL DAILY
Status: DISCONTINUED | OUTPATIENT
Start: 2017-05-24 | End: 2017-05-24 | Stop reason: HOSPADM

## 2017-05-24 RX ORDER — METHOCARBAMOL 500 MG/1
500 TABLET, FILM COATED ORAL 4 TIMES DAILY
Qty: 56 TABLET | Refills: 0 | Status: SHIPPED | OUTPATIENT
Start: 2017-05-24 | End: 2017-06-07

## 2017-05-24 RX ORDER — ONDANSETRON 4 MG/1
4-8 TABLET, FILM COATED ORAL EVERY 8 HOURS PRN
Qty: 30 TABLET | Refills: 0 | Status: SHIPPED | OUTPATIENT
Start: 2017-05-24 | End: 2017-07-17

## 2017-05-24 RX ORDER — MAGNESIUM OXIDE 400 MG/1
400 TABLET ORAL DAILY
Qty: 14 TABLET | Refills: 0 | Status: SHIPPED | OUTPATIENT
Start: 2017-05-24 | End: 2017-06-07

## 2017-05-24 RX ORDER — SACCHAROMYCES BOULARDII 250 MG
250 CAPSULE ORAL 2 TIMES DAILY
Qty: 60 CAPSULE | Refills: 0 | Status: SHIPPED | OUTPATIENT
Start: 2017-05-24 | End: 2018-02-13

## 2017-05-24 RX ORDER — ACETAMINOPHEN 500 MG
1000 TABLET ORAL 4 TIMES DAILY
Qty: 80 TABLET | Refills: 0 | Status: SHIPPED | OUTPATIENT
Start: 2017-05-24 | End: 2018-05-11

## 2017-05-24 RX ORDER — POLYETHYLENE GLYCOL 3350 17 G/17G
17 POWDER, FOR SOLUTION ORAL DAILY
Qty: 30 PACKET | Refills: 0 | Status: SHIPPED | OUTPATIENT
Start: 2017-05-24 | End: 2018-02-13

## 2017-05-24 RX ORDER — OXYCODONE HYDROCHLORIDE 5 MG/1
10-15 TABLET ORAL EVERY 4 HOURS PRN
Qty: 70 TABLET | Refills: 0 | Status: SHIPPED | OUTPATIENT
Start: 2017-05-24 | End: 2017-05-24

## 2017-05-24 RX ADMIN — Medication 250 MG: at 08:45

## 2017-05-24 RX ADMIN — OXYCODONE HYDROCHLORIDE 15 MG: 10 TABLET ORAL at 05:20

## 2017-05-24 RX ADMIN — VITAMIN D, TAB 1000IU (100/BT) 1000 UNITS: 25 TAB at 08:45

## 2017-05-24 RX ADMIN — HYDROMORPHONE HYDROCHLORIDE 0.5 MG: 1 INJECTION, SOLUTION INTRAMUSCULAR; INTRAVENOUS; SUBCUTANEOUS at 03:56

## 2017-05-24 RX ADMIN — OXYCODONE HYDROCHLORIDE 15 MG: 10 TABLET ORAL at 09:33

## 2017-05-24 RX ADMIN — HYDROMORPHONE HYDROCHLORIDE 0.5 MG: 1 INJECTION, SOLUTION INTRAMUSCULAR; INTRAVENOUS; SUBCUTANEOUS at 07:06

## 2017-05-24 RX ADMIN — ACETAMINOPHEN 1000 MG: 500 TABLET, FILM COATED ORAL at 00:54

## 2017-05-24 RX ADMIN — OXYCODONE HYDROCHLORIDE 15 MG: 10 TABLET ORAL at 00:53

## 2017-05-24 RX ADMIN — METHOCARBAMOL 500 MG: 500 TABLET ORAL at 08:45

## 2017-05-24 RX ADMIN — MAGNESIUM OXIDE TAB 400 MG (241.3 MG ELEMENTAL MG) 400 MG: 400 (241.3 MG) TAB at 08:45

## 2017-05-24 RX ADMIN — ACETAMINOPHEN 1000 MG: 500 TABLET, FILM COATED ORAL at 08:45

## 2017-05-24 RX ADMIN — NICOTINE 1 PATCH: 14 PATCH, EXTENDED RELEASE TRANSDERMAL at 08:54

## 2017-05-24 ASSESSMENT — PAIN DESCRIPTION - DESCRIPTORS: DESCRIPTORS: ACHING;SORE

## 2017-05-24 NOTE — DOWNTIME EVENT NOTE
The EMR was down for 3H 40min on 5/24/2017.    Rachel HENDRICKSON was responsible for completing the paper charting during this time period.     The following information was re-entered into the system by Rachel Webster: Intake and output and MAR    Rachel Webster  5/24/2017

## 2017-05-24 NOTE — PROGRESS NOTES
Patient was Discharged from Colon and Rectal Surgery so their Care Coordinators will handle patient's Post Discharge Follow up          [facility]  Discharge Summary  Colon and Rectal Surgery      Rachel A Gerhardt MRN# [id number]   YOB: 1975 Age: 41 year old      Date of Admission:                                          5/17/2017  Date of Discharge::                                          5/24/2017  Admitting Physician:                                        Jennifer Goodwin MD  Discharge Physician:                                       Jennifer Goodwin MD  Primary Care Physician:        Jennifer Garza

## 2017-05-24 NOTE — DISCHARGE SUMMARY
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  Small bowel stricture         Procedures:   5/18/2017:  Exploratory Laparotomy, Small Bowel Resection with Anastomosis, Cystoscopy with bilateral stent placement and removal          Consultations:   NUTRITION SERVICES ADULT IP CONSULT  PAIN MANAGEMENT IP CONSULT  PHARMACY/NUTRITION TO START AND MANAGE TPN  PHARMACY IP CONSULT  VASCULAR ACCESS CARE ADULT IP CONSULT         Imaging Studies:     Results for orders placed or performed during the hospital encounter of 05/17/17   XR Abdomen 1 View    Narrative    Exam: XR ABDOMEN 1 VW, 5/22/2017 11:42 AM    Indication: abdominal pain, s/p recent Small bowel resection on  5/18/2017.    Comparison: PET/CT of 5/12/2017.    Findings:   Nondistended air-filled loops of colon and rectum. Loops of bowel in  the midabdomen are felt to represent small bowel. The amount of  distention of the small bowel appears less than 5/12/2017. Evaluation  for free air is limited given supine technique. No abnormal  calcifications. Osseous structures are unremarkable.      Impression    Impression: Prominent small bowel loops with air have been present  prior CTs dating back to 2016. Slightly less distended on today's  study. Recent laparotomy with small bowel resection on 5/18/2017.    I have personally reviewed the examination and initial interpretation  and I agree with  the findings.    BHARGAV ESQUIVEL MD          Medications Prior to Admission:     Prescriptions Prior to Admission   Medication Sig Dispense Refill Last Dose     [DISCONTINUED] oxyCODONE-acetaminophen (PERCOCET) 5-325 MG per tablet Take 1 tablet by mouth every 4 hours as needed for moderate to severe pain (no more than 5 per day, no early refills) 20 tablet 0 5/17/2017 at Unknown time     [DISCONTINUED] ondansetron (ZOFRAN) 4 MG tablet Take 1-2 tablets (4-8 mg) by mouth every 6 hours as needed for nausea or vomiting 28 tablet 2 5/17/2017 at 1900     calcium carbonate (TUMS) 500 MG chewable tablet Take 500 mg by mouth Reported on 5/8/2017   Not Taking     simethicone (MYLICON) 80 MG chewable tablet Take 80 mg by mouth Reported on 5/8/2017   Not Taking     vitamin D (ERGOCALCIFEROL) 90231 UNIT capsule Take 1 capsule (50,000 Units) by mouth once a week (Patient not taking: Reported on 5/8/2017) 12 capsule 0 Not Taking     hyoscyamine (ANASPAZ/LEVSIN) 0.125 MG tablet Take 1-2 tablets (125-250 mcg) by mouth every 4 hours as needed for cramping 40 tablet 1 Taking     albuterol (PROVENTIL HFA: VENTOLIN HFA) 108 (90 BASE) MCG/ACT inhaler Inhale 2 puffs into the lungs every 6 hours as needed.     Taking          Discharge Medications:     Current Discharge Medication List      START taking these medications    Details   acetaminophen (TYLENOL) 500 MG tablet Take 2 tablets (1,000 mg) by mouth 4 times daily  Qty: 80 tablet, Refills: 0    Associated Diagnoses: Small intestine obstruction (H)      magnesium oxide (MAG-OX) 400 MG tablet Take 1 tablet (400 mg) by mouth daily for 14 days  Qty: 14 tablet, Refills: 0    Associated Diagnoses: Hypomagnesemia      saccharomyces boulardii (FLORASTOR) 250 MG capsule Take 1 capsule (250 mg) by mouth 2 times daily  Qty: 60 capsule, Refills: 0    Associated Diagnoses: Small intestine obstruction (H)      polyethylene glycol (MIRALAX/GLYCOLAX) Packet Take 17 g by mouth daily  Qty: 30 packet,  Refills: 0    Associated Diagnoses: Small intestine obstruction (H)      methocarbamol (ROBAXIN) 500 MG tablet Take 1 tablet (500 mg) by mouth 4 times daily for 14 days  Qty: 56 tablet, Refills: 0    Associated Diagnoses: Small intestine obstruction (H)      cholecalciferol 1000 UNITS TABS Take 1,000 Units by mouth daily  Qty: 30 tablet, Refills: 0    Associated Diagnoses: Small intestine obstruction (H)      oxyCODONE (ROXICODONE) 5 MG IR tablet Take 2-3 tablets (10-15 mg) by mouth every 4 hours as needed for pain Take 2-3 tablets every 4 hours as needed for 1-2 days, then  2 tablets every 4 hrs as needed for 1-2 days, then  2 tablets every 6 hrs as needed for 1-2 days, then  2 tablets every 8 hrs as needed for 1-2 days, then  1 tablet every 8 hrs as need for 1-2 days,   1 tablet every 12 hrs as need for 1-2 days,   1 tablet daily as need for 1-2 days,  Off  Qty: 70 tablet, Refills: 0    Associated Diagnoses: Small intestine obstruction (H)         CONTINUE these medications which have CHANGED    Details   ondansetron (ZOFRAN) 4 MG tablet Take 1-2 tablets (4-8 mg) by mouth every 8 hours as needed for nausea  Qty: 30 tablet, Refills: 0    Associated Diagnoses: Small bowel obstruction (H)         CONTINUE these medications which have NOT CHANGED    Details   calcium carbonate (TUMS) 500 MG chewable tablet Take 500 mg by mouth Reported on 5/8/2017      simethicone (MYLICON) 80 MG chewable tablet Take 80 mg by mouth Reported on 5/8/2017      vitamin D (ERGOCALCIFEROL) 97957 UNIT capsule Take 1 capsule (50,000 Units) by mouth once a week  Qty: 12 capsule, Refills: 0    Associated Diagnoses: Vitamin D deficiency      hyoscyamine (ANASPAZ/LEVSIN) 0.125 MG tablet Take 1-2 tablets (125-250 mcg) by mouth every 4 hours as needed for cramping  Qty: 40 tablet, Refills: 1    Associated Diagnoses: Abdominal pain, generalized      albuterol (PROVENTIL HFA: VENTOLIN HFA) 108 (90 BASE) MCG/ACT inhaler Inhale 2 puffs into the lungs  "every 6 hours as needed.           STOP taking these medications       oxyCODONE-acetaminophen (PERCOCET) 5-325 MG per tablet Comments:   Reason for Stopping:                     Brief History of Illness:   40 y/o female, with h/o cervical cancer s/p radiation, h/o opiate dependence and prior suboxone use, more recently with with recurrent SBOs, malnutrition due to recurrent SBO on TPN via a PICC line. Pt was admitted on 5/18/2017 for an exploratory laparotomy, small bowel resection with anastomosis for recurrent SBO and small bowel strictures.            Hospital Course:   Post-operatively pt was fluid resuscitated.  Pain team was consulted regarding pain control, pt was placed on a dilaudid IV prn, oxycodone prn, with various adjunctives and an epidural.  Pt's pain was difficult to control and modifications were made to the recommended pain medication plan.  Pt had slow return of bowel function but subsequently did pass large amounts of stool.  Was consistently passing flatus.  Intermittently tolerated small amounts of food.  Pt was able to ambulate and void independently.  Pt was started on miralax daily.  Pt was given seventy tablets of oxycodone 5mg tabs and given an tapering plan.  It was recommended that pt consume nutritional supplements such as Ensure or [place] Breakfast shakes. TPN was stopped and PICC line was removed prior to discharge.     Patient is to follow up in the Colon and Rectal Surgery Clinic in 1 week with Umm Kaufman NP and then with Dr. Goodwin in 2-3 weeks after.          Day of Discharge Physical Exam:   Blood pressure 106/64, pulse 110, temperature 98.2  F (36.8  C), temperature source Oral, resp. rate 18, height 1.727 m (5' 8\"), weight 69.7 kg (153 lb 9.6 oz), SpO2 95 %, not currently breastfeeding.    Gen: AAOx3, NAD  Pulm: Non-labored breathing  Abd: Soft, appropriately tender, no guarding/rebound   Incision C/D/I   Ext:  Warm and well-perfused         Final Pathology " Result:   Patient Name: GERHARDT, RACHEL A   MR#: [id number]   Specimen #: [id number]   Collected: 5/18/2017   Received: 5/19/2017   Reported: 5/23/2017 09:13   Ordering Phy(s): TARAH COLBERT     For improved result formatting, select 'View Enhanced Report Format'   under Linked Documents section.     SPECIMEN(S):   Small bowel resection     FINAL DIAGNOSIS:   SMALL INTESTINE, ILEUM, RESECTION:   - Benign small intestinal mucosa with focal mucosal ulcerations   - Viable resection margins   - Serosal adhesions, luminal stenosis secondary to fibrosing stricture   formation     I have personally reviewed all specimens and or slides, including the   listed special stains, and used them with my medical judgement to   determine the final diagnosis.     Electronically signed out by:     Francis Bryant M.D            Discharge Instructions and Follow-Up:       Discharge Procedure Orders  Home infusion referral     Reason for your hospital stay   Order Comments: You had a small bowel resection     [facility] Follow-up and recommended labs and tests   Order Comments: DIET  -Low Residue Diet for at least 4-6 weeks unless cleared by Colorectal surgery.  No raw vegetables, fruit skins, fibrous foods that require a lot of chewing, nuts, seeds, corn, popcorn.   -We recommend eating slowly, chewing thoroughly, eating small frequent meals throughout the day  -Stay well hydrated.    - recommend nutritional supplements such as Ensure, or [place] Breakfast shakes     ACTIVITY  -No lifting, pushing, pulling greater than 10 lbs and no strenuous exercise for 6 weeks   -No driving while on narcotic analgesics (i.e. Percocet, oxycodone, Vicodin)  -No driving until you are able to fully twist to both sides or slam on brakes quickly and without any pain  -wear abdominal binder with activity    WOUND CLINIC  -Inspect your wounds daily for signs of infection (increased redness, drainage, pain)  -Keep your wound clean and dry  -You may  shower, but do not soak in tub or pool    NOTIFY  Please contact Kanchan Martinez RN or Yolanda Herzog RN at [phone] for problems after discharge such as:  -Temperature > 101F, chills, rigors, dizziness  -Redness around or purulent drainage from wound  -Inability to tolerate diet, nausea or vomiting  -You stop passing gas, develop significant bloating, abdominal pain  -Have blood in stools/vomit  -Have severe diarrhea/constipation  -Any other questions or concerns.  - At nights (after 4:30pm), on weekends, or if urgent, call [phone] and ask the  to speak with the on-call Colorectal Surgery resident or fellow    Medication Instructions  Some of your medications may have changed. Please take only prescribed and resumed medications     FOLLOW-UP  1.  You will need to follow-up with Umm Kaufman NP in the Colon and Rectal Surgery clinic in 1 week(s) and then with Dr. Goodwin in 2-3 weeks after.  Please contact our clinic scheduler Bianca Pavon (phone # [phone]) or Margot Woods (phone # [phone]) if you have not heard from our clinic in 3 business days afer discharge to schedule a follow-up appointment.   2.  Follow up with your primary care provider in 1-2 weeks after discharge from the hospital to review this hospitalization.         *For other appointments on [place] and/or [facility] (with [facility] or [facility] provider or service): Call [phone] if you have not heard regarding these appointments within 7 days of discharge.[facility]      Appointments on [place] and/or [facility] (with [facility] or [facility] provider or service). Call [phone] if you haven't heard regarding these appointments within 7 days of discharge.     Full Code     Diet   Order Comments: Follow this diet upon discharge:   Low Residue Diet with [place] Breakfast shakes   Order Specific Question Answer Comments   Is discharge order? Yes               Home Health Care:   Not needed           Discharge  Disposition:   Discharged to home      Condition at discharge: Stable    Julieta Rob PA-C  Colon and Rectal Surgery     Pt was seen and discussed with Dr. Figueroa on 5/24/2017.    Attending addendum:  Seen and examined. Agree with above documentation.

## 2017-05-24 NOTE — PROGRESS NOTES
Transition Planning Update    No home care services indicated at this time of discharge. Home TPN discontinued per report in rounds.  Patient will follow up in clinic per MD team.  [facility] was updated.    ALCIRA Brooke.S.EDELMIRA., P.H.N.,R.N.         Pager

## 2017-05-24 NOTE — PROGRESS NOTES
"Colorectal Surgery Progress Note  POD#7      Subjective:  Feel better this AM.  Had a very large bowel movement x2.  \"Felt like I pooped for an hour.\"  And is feeling better since this.  Was going to ask for more pain meds but since passing stool does think she needs pain meds right now.  In good spirits this morning.  Pt ate 100% of her meal yesterday and is getting ready to order more food.     Vitals:  Vitals:    05/23/17 1415 05/23/17 1506 05/23/17 2320 05/24/17 0707   BP: 102/72 93/62 100/75 106/64   BP Location: Left arm Left arm Left arm Right arm   Cuff Size:  Adult Regular     Pulse: 82 102  110   Resp: 20 18 18 18   Temp: 98  F (36.7  C) 96.4  F (35.8  C) 97.5  F (36.4  C) 98.2  F (36.8  C)   TempSrc: Oral Oral Oral Oral   SpO2: 96% 97% 96% 95%   Weight: 70 kg (154 lb 4.8 oz)      Height:         I/O:  I/O last 3 completed shifts:  In: 2771.4 [P.O.:1680; I.V.:140]  Out: 1950 [Urine:1950]    Physical Exam:  Gen: AAOx3, NAD  Pulm: Non-labored breathing  Abd: Soft, mildly distended.  Moderately tender to palpation diffusely.  Abdominal binder in place.    Incision C/D/I with evolving bruising  Ext:  Warm and well-perfused    BMP    Recent Labs  Lab 05/23/17  1602 05/23/17  1048 05/21/17  0947 05/20/17  0808 05/19/17  0802 05/18/17  1459   NA  --  138 139 139 135 137   POTASSIUM  --  4.0 3.7 3.7 4.2 4.5   CHLORIDE  --  103 103 105 100 105   CO2  --  28 27 28 26 26   BUN  --  8 4* 6* 11 9   CR  --  0.49* 0.53 0.55 0.59 0.60   GLC  --  101* 87 94 95 101*   MAG 2.6* 1.3* 1.6 1.9 1.9 1.6   PHOS  --  4.1  --  2.8 3.2 3.6     CBC    Recent Labs  Lab 05/21/17  0947 05/20/17  0808 05/19/17  0802 05/18/17  1459   WBC 7.8 9.0 13.7* 18.5*   HGB 11.0* 10.4* 11.6* 12.7   HCT 33.9* 32.5* 35.5 39.3    273 292 303         ASSESSMENT: 40 y/o female, with h/o cervical cancer s/p radiation, h/o opiate dependence and prior suboxone use, more recently with with recurrent SBOs. Now POD# 7 exploratory laparotomy, small bowel " resection with anastomosis for recurrent SBO and small bowel strictures.     - continue LRD as tolerated  - will stop TPN and remove PICC line  - UC prelim, but  No growth  - miralax daily  - is to wear abdominal binder when up with activity  - ambulate  - lovenox ppx  - DC home later today    Julieta Rob PA-C   Colon and Rectal Surgery  [id number]     Patient was seen and discussed with Fellow, Dr. Figueroa.

## 2017-05-24 NOTE — PLAN OF CARE
Problem: Goal Outcome Summary  Goal: Goal Outcome Summary  Outcome: Adequate for Discharge Date Met:  05/24/17  Discharge  D: Orders for discharge and outpatient medications written.   I: Home medications and return to clinic schedule reviewed with patient. Discharge instructions and parameters for calling Health Care Provider reviewed with teach back; this was done over the phone while the pt was in d/c pharmacy waiting for her prescription medication. Patient left around 1:15PM accompanied by her family and did not return to the unit after picking up the medication from the pharmacy; therefore she did not sign the d/c papers.  A: Patient/family verbalized understanding and was ready for discharge.   P: Follow up as scheduled. Discharge papers will be mailed to the patient.

## 2017-05-24 NOTE — PLAN OF CARE
Problem: Goal Outcome Summary  Goal: Goal Outcome Summary  Outcome: No Change  VSS. Pain controlled with scheduled Tylenol PRN Oxy and Dilaudid. Denies nausea. PICC infusing TPN/lipids. Midline CDI. Up ad dania. Adequate urine output. Passing gas. Low fiber diet. Possible DC today. Continue to monitor.   Pt states she has had 3 BMs this AM that are accompanied by increased pain.

## 2017-05-28 ENCOUNTER — HOSPITAL ENCOUNTER (EMERGENCY)
Facility: CLINIC | Age: 42
Discharge: HOME | End: 2017-05-28
Attending: EMERGENCY MEDICINE | Admitting: EMERGENCY MEDICINE
Payer: COMMERCIAL

## 2017-05-28 ENCOUNTER — TELEPHONE (OUTPATIENT)
Dept: SURGERY | Facility: CLINIC | Age: 42
End: 2017-05-28

## 2017-05-28 VITALS
HEART RATE: 93 BPM | BODY MASS INDEX: 23.1 KG/M2 | SYSTOLIC BLOOD PRESSURE: 97 MMHG | WEIGHT: 151.9 LBS | OXYGEN SATURATION: 100 % | DIASTOLIC BLOOD PRESSURE: 79 MMHG | RESPIRATION RATE: 16 BRPM | TEMPERATURE: 98.1 F

## 2017-05-28 DIAGNOSIS — G89.18 ACUTE POST-OPERATIVE PAIN: ICD-10-CM

## 2017-05-28 LAB
ALBUMIN SERPL-MCNC: 2.7 G/DL (ref 3.4–5)
ALP SERPL-CCNC: 61 U/L (ref 40–150)
ALT SERPL W P-5'-P-CCNC: 11 U/L (ref 0–50)
ANION GAP SERPL CALCULATED.3IONS-SCNC: 4 MMOL/L (ref 3–14)
AST SERPL W P-5'-P-CCNC: 8 U/L (ref 0–45)
BASOPHILS # BLD AUTO: 0 10E9/L (ref 0–0.2)
BASOPHILS NFR BLD AUTO: 0.3 %
BILIRUB SERPL-MCNC: 0.1 MG/DL (ref 0.2–1.3)
BUN SERPL-MCNC: 11 MG/DL (ref 7–30)
CALCIUM SERPL-MCNC: 8.9 MG/DL (ref 8.5–10.1)
CHLORIDE SERPL-SCNC: 105 MMOL/L (ref 94–109)
CO2 SERPL-SCNC: 29 MMOL/L (ref 20–32)
CREAT SERPL-MCNC: 0.6 MG/DL (ref 0.52–1.04)
DIFFERENTIAL METHOD BLD: ABNORMAL
EOSINOPHIL # BLD AUTO: 0.2 10E9/L (ref 0–0.7)
EOSINOPHIL NFR BLD AUTO: 2.6 %
ERYTHROCYTE [DISTWIDTH] IN BLOOD BY AUTOMATED COUNT: 12.8 % (ref 10–15)
GFR SERPL CREATININE-BSD FRML MDRD: ABNORMAL ML/MIN/1.7M2
GLUCOSE SERPL-MCNC: 101 MG/DL (ref 70–99)
HCT VFR BLD AUTO: 35.9 % (ref 35–47)
HGB BLD-MCNC: 11.7 G/DL (ref 11.7–15.7)
IMM GRANULOCYTES # BLD: 0.1 10E9/L (ref 0–0.4)
IMM GRANULOCYTES NFR BLD: 0.6 %
LYMPHOCYTES # BLD AUTO: 1.7 10E9/L (ref 0.8–5.3)
LYMPHOCYTES NFR BLD AUTO: 19.6 %
MCH RBC QN AUTO: 32.4 PG (ref 26.5–33)
MCHC RBC AUTO-ENTMCNC: 32.6 G/DL (ref 31.5–36.5)
MCV RBC AUTO: 99 FL (ref 78–100)
MONOCYTES # BLD AUTO: 0.5 10E9/L (ref 0–1.3)
MONOCYTES NFR BLD AUTO: 6 %
NEUTROPHILS # BLD AUTO: 6.1 10E9/L (ref 1.6–8.3)
NEUTROPHILS NFR BLD AUTO: 70.9 %
NRBC # BLD AUTO: 0 10*3/UL
NRBC BLD AUTO-RTO: 0 /100
PLATELET # BLD AUTO: 397 10E9/L (ref 150–450)
POTASSIUM SERPL-SCNC: 3.8 MMOL/L (ref 3.4–5.3)
PROT SERPL-MCNC: 6.6 G/DL (ref 6.8–8.8)
RBC # BLD AUTO: 3.61 10E12/L (ref 3.8–5.2)
SODIUM SERPL-SCNC: 139 MMOL/L (ref 133–144)
WBC # BLD AUTO: 8.6 10E9/L (ref 4–11)

## 2017-05-28 PROCEDURE — 99283 EMERGENCY DEPT VISIT LOW MDM: CPT | Performed by: EMERGENCY MEDICINE

## 2017-05-28 PROCEDURE — 36415 COLL VENOUS BLD VENIPUNCTURE: CPT | Performed by: EMERGENCY MEDICINE

## 2017-05-28 PROCEDURE — 85025 COMPLETE CBC W/AUTO DIFF WBC: CPT | Performed by: EMERGENCY MEDICINE

## 2017-05-28 PROCEDURE — 80053 COMPREHEN METABOLIC PANEL: CPT | Performed by: EMERGENCY MEDICINE

## 2017-05-28 PROCEDURE — 99284 EMERGENCY DEPT VISIT MOD MDM: CPT | Mod: Z6 | Performed by: EMERGENCY MEDICINE

## 2017-05-28 RX ORDER — OXYCODONE HYDROCHLORIDE 5 MG/1
TABLET ORAL
Qty: 32 TABLET | Refills: 0 | Status: SHIPPED | OUTPATIENT
Start: 2017-05-28 | End: 2017-06-06

## 2017-05-28 ASSESSMENT — ENCOUNTER SYMPTOMS
NAUSEA: 1
APPETITE CHANGE: 1
SORE THROAT: 0
VOMITING: 0
ABDOMINAL PAIN: 1
SHORTNESS OF BREATH: 0
DIARRHEA: 1
FEVER: 0

## 2017-05-28 NOTE — ED NOTES
abdominal pain, recent surgery removing 2 feet of small bowel, has history of cervical and ovarian cancer, was not given enough pain medication

## 2017-05-28 NOTE — DISCHARGE INSTRUCTIONS
Continue your current post-surgical cares. Continue your oxycodone taper. Follow up as previously planned. Return with worsening or concerns.

## 2017-05-28 NOTE — ED AVS SNAPSHOT
[facility], Emergency Department    [address] [facility] [address]    Phone:  [phone]                                       Rachel A Gerhardt   MRN: [id number]    Department:  [facility], Emergency Department   Date of Visit:  5/28/2017           Patient Information     Date Of Birth          1975        Your diagnoses for this visit were:     Acute post-operative pain        You were seen by Franny Jordan MD.        Discharge Instructions       Continue your current post-surgical cares. Continue your oxycodone taper. Follow up as previously planned. Return with worsening or concerns.    Future Appointments        Provider Department Dept Phone Center    6/1/2017 8:25 AM Jennifer Garza MD [facility] [phone] [facility]    6/22/2017 7:40 AM Duane Mcmahan MD [facility] [phone] [facility]      24 Hour Appointment Hotline       To make an appointment at any [facility], call [phone] ([phone]). If you don't have a family doctor or clinic, we will help you find one. [facility] clinics are conveniently located to serve the needs of you and your family.             Review of your medicines      CONTINUE these medicines which may have CHANGED, or have new prescriptions. If we are uncertain of the size of tablets/capsules you have at home, strength may be listed as something that might have changed.        Dose / Directions Last dose taken    * oxyCODONE 5 MG IR tablet   Commonly known as:  ROXICODONE   Dose:  10-15 mg   What changed:  Another medication with the same name was added. Make sure you understand how and when to take each.   Quantity:  70 tablet        Take 2-3 tablets (10-15 mg) by mouth every 4 hours as needed for pain Take 2-3 tablets every 4 hours as needed for 1-2 days, then 2 tablets every 4 hrs as needed for 1-2 days, then 2 tablets every 6 hrs as needed for 1-2 days, then 2 tablets every 8  hrs as needed for 1-2 days, then 1 tablet every 8 hrs as need for 1-2 days,  1 tablet every 12 hrs as need for 1-2 days,  1 tablet daily as need for 1-2 days, Off   Refills:  0        * oxyCODONE 5 MG IR tablet   Commonly known as:  ROXICODONE   What changed:  You were already taking a medication with the same name, and this prescription was added. Make sure you understand how and when to take each.   Quantity:  32 tablet        Use per your previously directed taper schedule   Refills:  0        * Notice:  This list has 2 medication(s) that are the same as other medications prescribed for you. Read the directions carefully, and ask your doctor or other care provider to review them with you.      Our records show that you are taking the medicines listed below. If these are incorrect, please call your family doctor or clinic.        Dose / Directions Last dose taken    acetaminophen 500 MG tablet   Commonly known as:  TYLENOL   Dose:  1000 mg   Quantity:  80 tablet        Take 2 tablets (1,000 mg) by mouth 4 times daily   Refills:  0        albuterol 108 (90 BASE) MCG/ACT Inhaler   Commonly known as:  PROAIR HFA/PROVENTIL HFA/VENTOLIN HFA   Dose:  2 puff        Inhale 2 puffs into the lungs every 6 hours as needed.   Refills:  0        cholecalciferol 1000 UNITS Tabs   Dose:  1000 Units   Quantity:  30 tablet        Take 1,000 Units by mouth daily   Refills:  0        hyoscyamine 0.125 MG tablet   Commonly known as:  ANASPAZ/LEVSIN   Dose:  0.125-0.25 mg   Quantity:  40 tablet        Take 1-2 tablets (125-250 mcg) by mouth every 4 hours as needed for cramping   Refills:  1        magnesium oxide 400 MG tablet   Commonly known as:  MAG-OX   Dose:  400 mg   Quantity:  14 tablet        Take 1 tablet (400 mg) by mouth daily for 14 days   Refills:  0        methocarbamol 500 MG tablet   Commonly known as:  ROBAXIN   Dose:  500 mg   Quantity:  56 tablet        Take 1 tablet (500 mg) by mouth 4 times daily for 14 days    Refills:  0        ondansetron 4 MG tablet   Commonly known as:  ZOFRAN   Dose:  4-8 mg   Quantity:  30 tablet        Take 1-2 tablets (4-8 mg) by mouth every 8 hours as needed for nausea   Refills:  0        polyethylene glycol Packet   Commonly known as:  MIRALAX/GLYCOLAX   Dose:  17 g   Quantity:  30 packet        Take 17 g by mouth daily   Refills:  0        saccharomyces boulardii 250 MG capsule   Commonly known as:  FLORASTOR   Dose:  250 mg   Quantity:  60 capsule        Take 1 capsule (250 mg) by mouth 2 times daily   Refills:  0        simethicone 80 MG chewable tablet   Commonly known as:  MYLICON   Dose:  80 mg        Take 80 mg by mouth Reported on 5/8/2017   Refills:  0        TUMS 500 MG chewable tablet   Dose:  500 mg   Generic drug:  calcium carbonate        Take 500 mg by mouth Reported on 5/8/2017   Refills:  0        vitamin D 32813 UNIT capsule   Commonly known as:  ERGOCALCIFEROL   Dose:  42292 Units   Quantity:  12 capsule        Take 1 capsule (50,000 Units) by mouth once a week   Refills:  0                Prescriptions were sent or printed at these locations (1 Prescription)                   Other Prescriptions                Printed at Department/Unit printer (1 of 1)         oxyCODONE (ROXICODONE) 5 MG IR tablet                Procedures and tests performed during your visit     CBC with platelets differential    Comprehensive metabolic panel      Orders Needing Specimen Collection     None      Pending Results     No orders found from 5/26/2017 to 5/29/2017.            Pending Culture Results     No orders found from 5/26/2017 to 5/29/2017.            Pending Results Instructions     If you had any lab results that were not finalized at the time of your Discharge, you can call the ED Lab Result RN at [phone]. You will be contacted by this team for any positive Lab results or changes in treatment. The nurses are available 7 days a week from 10A to 6:30P.  You can leave a message 24  hours per day and they will return your call.        Thank you for choosing [facility]       Thank you for choosing [facility] for your care. Our goal is always to provide you with excellent care. Hearing back from our patients is one way we can continue to improve our services. Please take a few minutes to complete the written survey that you may receive in the mail after you visit with us. Thank you!        [facility] Information     [facility] gives you secure access to your electronic health record. If you see a primary care provider, you can also send messages to your care team and make appointments. If you have questions, please call your primary care clinic.  If you do not have a primary care provider, please call [phone] and they will assist you.        Care EveryWhere ID     This is your Care EveryWhere ID. This could be used by other organizations to access your [facility] medical records  [id number]        After Visit Summary       This is your record. Keep this with you and show to your community pharmacist(s) and doctor(s) at your next visit.

## 2017-05-28 NOTE — ED AVS SNAPSHOT
[facility], [place], Emergency Department    [address] [place] [address]    Phone:  [phone]                                       Rachel A Gerhardt   MRN: [id number]    Department:  [facility], Emergency Department   Date of Visit:  5/28/2017           After Visit Summary Signature Page     I have received my discharge instructions, and my questions have been answered. I have discussed any challenges I see with this plan with the nurse or doctor.    ..........................................................................................................................................  Patient/Patient Representative Signature      ..........................................................................................................................................  Patient Representative Print Name and Relationship to Patient    ..................................................               ................................................  Date                                            Time    ..........................................................................................................................................  Reviewed by Signature/Title    ...................................................              ..............................................  Date                                                            Time

## 2017-05-28 NOTE — TELEPHONE ENCOUNTER
Pt POD10 from ex lap and SBR for obstruction. She is upset and reports that she followed taper instructions for oxycodone, but is now running out. Her pain is not acutely worse than before. Apart from pain management, she has been doing ok post discharge. She has been taking acetaminophen as well.   Explained that narcotics cannot be prescribed over the phone, and if she is running out, she would have to be seen either by primary care doctor or another provider for a prescription.

## 2017-05-28 NOTE — ED PROVIDER NOTES
History     Chief Complaint   Patient presents with     Post-op Problem     HPI  Rachel A Gerhardt is a 41 year old female with a history of cervical cancer status post radiation, history of opiate dependence and prior Suboxone use, more recently with recurrent SBOs, malnutrition due to recurrent SBOs previously on TPN via PICC line. The patient was admitted 5/17-5/24/2017 (discharged 4 days ago) for an exploratory laparotomy, small bowel resection with anastomosis for recurrent SBO and small bowel strictures. Her PICC line was removed at that time with the hope that she'd be able to maintain nutrition following this recent procedure. The patient was placed on an oxycodone taper but was not prescribed enough tablets to complete the taper. She reports she has been tapering as prescribed but will run out of tablets tonight in the middle of her taper. She called clinic and reports that they acknowledged the mistake but referred her to the Emergency Department for further management. She is currently taking 2 tablets every 8 hours and will need 24 tablets to complete her taper.     The patient does report she is still having abdominal pain around the surgical site as well as in her right lower abdomen. This pain is provoked by eating or drinking anything and she has not been able to return to eating as she had hoped. She is using an abdominal binder which does provide some relief. She's had an ongoing cough which worsens her abdominal pain but her cough is unchanged. She has ongoing diarrhea as well which is rather uncomfortable for her but also unchanged. She reports she's had nausea without vomiting. No other complaints at this time.       I have reviewed the Medications, Allergies, Past Medical and Surgical History, and Social History in the [facility] system.  Past Medical History:   Diagnosis Date     Asthma      Cancer (H)     Per patient OBGYN, cerivical cancer     Cervical cancer (H)      Other chronic pain       Ovarian cancer (H)      Substance abuse     Outside records indicate past history of narcotics abuse or dependence, but patient denies.       Past Surgical History:   Procedure Laterality Date     COMBINED CYSTOSCOPY, INSERT STENT URETER(S) Bilateral 5/18/2017    Procedure: COMBINED CYSTOSCOPY, INSERT STENT URETER(S);  Cystoscopy with Bilateral Stent,;  Surgeon: Rene Calero MD;  Location: [facility] OR     ENT SURGERY  2009    mastoid, sinus     EXAM UNDER ANESTHESIA, INSERT ALEX SLEEVE, UTERINE PLACEMENT OF TANDEM AND RING FOR RAD, ULTRASOUND N/A 12/14/2015    Procedure: EXAM UNDER ANESTHESIA, INSERT ALEX SLEEVE, UTERINE PLACEMENT OF TANDEM AND RING FOR RADIATION, ULTRASOUND GUIDED;  Surgeon: Abby Tony MD;  Location: [facility] OR     INSERT TANDEM AND CESIUM APPLICATOR CERVIX, ULTRASOUND GUIDED N/A 12/17/2015    Procedure: INSERT TANDEM AND CESIUM APPLICATOR CERVIX, ULTRASOUND GUIDED;  Surgeon: Kika Wood MD;  Location: [facility] OR     KNEE SURGERY       LAPAROTOMY EXPLORATORY N/A 5/18/2017    Procedure: LAPAROTOMY EXPLORATORY;   Exploratry Laparotomy, Small Bowel Resection with anastomosis, Flexible Sigmoidoscopy;  Surgeon: Jennifer Goodwin MD;  Location: [facility] OR     PICC INSERTION Right 04/29/2017    4fr SL BioFlo PICC, 37cm (3cm external) in the R basilic vein w/ tip in the mid SVC.     RESECT SMALL BOWEL WITHOUT OSTOMY N/A 5/18/2017    Procedure: RESECT SMALL BOWEL WITHOUT OSTOMY;;  Surgeon: Jennifer Goodwin MD;  Location: [facility] OR     SIGMOIDOSCOPY FLEXIBLE N/A 5/18/2017    Procedure: SIGMOIDOSCOPY FLEXIBLE;;  Surgeon: Jennifer Goodwin MD;  Location: [facility] OR       Family History   Problem Relation Age of Onset     DIABETES Mother      Ovarian Cancer No family hx of      Uterine Cancer No family hx of      Cervical Cancer No family hx of      Breast Cancer No family hx of        Social History   Substance Use Topics     Smoking status: Light Tobacco Smoker     Packs/day: 0.25     Years: 12.00     Types:  Cigarettes     Smokeless tobacco: Never Used      Comment: 2/22/16 pt smoking daily 1 cig     Alcohol use No      Comment: None per pt       No current facility-administered medications for this encounter.      Current Outpatient Prescriptions   Medication     oxyCODONE (ROXICODONE) 5 MG IR tablet     acetaminophen (TYLENOL) 500 MG tablet     magnesium oxide (MAG-OX) 400 MG tablet     saccharomyces boulardii (FLORASTOR) 250 MG capsule     ondansetron (ZOFRAN) 4 MG tablet     polyethylene glycol (MIRALAX/GLYCOLAX) Packet     methocarbamol (ROBAXIN) 500 MG tablet     cholecalciferol 1000 UNITS TABS     oxyCODONE (ROXICODONE) 5 MG IR tablet     calcium carbonate (TUMS) 500 MG chewable tablet     simethicone (MYLICON) 80 MG chewable tablet     vitamin D (ERGOCALCIFEROL) 42056 UNIT capsule     hyoscyamine (ANASPAZ/LEVSIN) 0.125 MG tablet     albuterol (PROVENTIL HFA: VENTOLIN HFA) 108 (90 BASE) MCG/ACT inhaler          Allergies   Allergen Reactions     No Clinical Screening - See Comments Other (See Comments) and Diarrhea     headache  Carrots cause gastric upset, cramping and diarrhea.     Sulfa Drugs Hives     hives     Amoxicillin Unknown and Other (See Comments)     vomiting  vomiting     Amoxicillin-Pot Clavulanate Other (See Comments) and Nausea     vomiting     Augmentin GI Disturbance, Nausea and Hives     Avelox [Moxifloxacin] Nausea and Vomiting, Unknown and Nausea     Ciprofloxacin Hives and Nausea     Codeine Nausea and Vomiting and Nausea     Ibuprofen Nausea and Vomiting     Other reaction(s): Nausea And Vomiting     Ibuprofen Sodium Hives and GI Disturbance     Quinolones      Tramadol Hives, Diarrhea, Nausea and Nausea and Vomiting     Daucus Carota      Other reaction(s): GI Upset  Other reaction(s): Abdominal pain, Diarrhea  Carrots cause gastric upset, cramping and diarrhea.      Review of Systems   Constitutional: Positive for appetite change (decreased). Negative for fever.   HENT: Negative for sore  throat.    Respiratory: Negative for shortness of breath.    Cardiovascular: Negative for chest pain.   Gastrointestinal: Positive for abdominal pain, diarrhea and nausea. Negative for vomiting.   All other systems reviewed and are negative.      Physical Exam   BP: 99/74  Pulse: 103  Heart Rate: 103  Temp: 98.7  F (37.1  C)  Resp: 18  Weight: 68.9 kg (151 lb 14.4 oz)  SpO2: 96 %  Physical Exam   Constitutional: No distress.   HENT:   Head: Atraumatic.   Mouth/Throat: Oropharynx is clear and moist. No oropharyngeal exudate.   Eyes: Pupils are equal, round, and reactive to light. No scleral icterus.   Cardiovascular: Normal heart sounds and intact distal pulses.    Pulmonary/Chest: Breath sounds normal. No respiratory distress.   Abdominal: Soft. There is tenderness.       Musculoskeletal: She exhibits no edema or tenderness.   Skin: Skin is warm. No rash noted. She is not diaphoretic.       ED Course     ED Course     Procedures             Critical Care time:  none               Labs Ordered and Resulted from Time of ED Arrival Up to the Time of Departure from the ED   CBC WITH PLATELETS DIFFERENTIAL - Abnormal; Notable for the following:        Result Value    RBC Count 3.61 (*)     All other components within normal limits   COMPREHENSIVE METABOLIC PANEL - Abnormal; Notable for the following:     Glucose 101 (*)     Bilirubin Total 0.1 (*)     Albumin 2.7 (*)     Protein Total 6.6 (*)     All other components within normal limits            Assessments & Plan (with Medical Decision Making)   I did review the patient s recommended taper schedule.  I did add up all of the pills that she was recommended to take based on the taper schedule.  It does look like she was given fewer tablets then would be consistent with her following her taper schedule.  I think part of the problem is that she was given variables in the taper schedule, such as, take X number of pills for 1-2 days.  She has always taken the higher number  of pills for the longer period of days.  As such, she should have run out today, as she will this evening.  Doesn t appear that she has been overtaking, per the taper scheduled.  To complete the medications per her taper schedule, she is 32 tablets short.  She does not have a follow-up visit scheduled for the next several days, per her report.  I am comfortable prescribing this medication, given that she has been using her medications appropriately, per the taper schedule.  However, I did ask colorectal surgery to see her, as I was concerned of her reported that she has increased pain with any PO intake.  They did see her, did review her labs (which were not notably remarkable), I do feel she can be discharged home with the appropriate number of Oxycodone to fill her on her taper schedule.  She is instructed to continue with her normal post-operative cares, and to follow-up this week as previously planned.    I have reviewed the nursing notes.  I have reviewed the findings, diagnosis, plan and need for follow up with the patient.  This part of the document was transcribed by Yocasta Aguilera, Medical Scribe.  New Prescriptions    OXYCODONE (ROXICODONE) 5 MG IR TABLET    Use per your previously directed taper schedule       Final diagnoses:   Acute post-operative pain     Deb MARCANO, am serving as a trained medical scribe to document services personally performed by Franny Jordan MD, based on the provider's statements to me. This document has been checked and approved by the attending provider.    Franny MARCANO MD was physically present and have reviewed and verified the accuracy of this note documented by Deb Santamaria.    5/28/2017   [facility], [place], EMERGENCY DEPARTMENT     Franny Jordan MD  05/28/17 [id number]

## 2017-05-29 NOTE — CONSULTS
SURGERY HISTORY AND PHYSICAL  5/29/2017    HISTORY PRESENTING ILLNESS:   This is a 41 year old female with a past history of cervical cancer s/p radiation, chronic pain, and SBO s/p ex lap, SBR with primary anastomosis on 5/18 returns to the ED with poor pain control. She was discharge 4 days ago and reports that the number of oxycodone tabs that were prescribed are not going to last through the entire taper schedule. She reports that once she takes her pain meds she is able to tolerate food and stay active and out of bed. She reports adequate PO intake of food and fluids. She has been having normal bowel movements. There is not acute increase in level of pain. No fevers, N/V.    Review of systems:   A 10 point review of systems was performed and was negative except for the items in the HPI.     PAST MEDICAL HISTORY:  Past Medical History:   Diagnosis Date     Asthma      Cancer (H)     Per patient OBGYN, cerivical cancer     Cervical cancer (H)      Other chronic pain      Ovarian cancer (H)      Substance abuse     Outside records indicate past history of narcotics abuse or dependence, but patient denies.       PAST SURGICAL HISTORY:  Past Surgical History:   Procedure Laterality Date     COMBINED CYSTOSCOPY, INSERT STENT URETER(S) Bilateral 5/18/2017    Procedure: COMBINED CYSTOSCOPY, INSERT STENT URETER(S);  Cystoscopy with Bilateral Stent,;  Surgeon: Rene Calero MD;  Location: [facility]     ENT SURGERY  2009    mastoid, sinus     EXAM UNDER ANESTHESIA, INSERT ALEX SLEEVE, UTERINE PLACEMENT OF TANDEM AND RING FOR RAD, ULTRASOUND N/A 12/14/2015    Procedure: EXAM UNDER ANESTHESIA, INSERT ALEX SLEEVE, UTERINE PLACEMENT OF TANDEM AND RING FOR RADIATION, ULTRASOUND GUIDED;  Surgeon: Abby Tony MD;  Location: [facility] OR     INSERT TANDEM AND CESIUM APPLICATOR CERVIX, ULTRASOUND GUIDED N/A 12/17/2015    Procedure: INSERT TANDEM AND CESIUM APPLICATOR CERVIX, ULTRASOUND GUIDED;  Surgeon: Kika Wood  MD;  Location: [facility] OR     KNEE SURGERY       LAPAROTOMY EXPLORATORY N/A 5/18/2017    Procedure: LAPAROTOMY EXPLORATORY;   Exploratry Laparotomy, Small Bowel Resection with anastomosis, Flexible Sigmoidoscopy;  Surgeon: Jennifer Goodwin MD;  Location: [facility]     PICC INSERTION Right 04/29/2017    4fr SL BioFlo PICC, 37cm (3cm external) in the R basilic vein w/ tip in the mid SVC.     RESECT SMALL BOWEL WITHOUT OSTOMY N/A 5/18/2017    Procedure: RESECT SMALL BOWEL WITHOUT OSTOMY;;  Surgeon: Jennifer Goodwin MD;  Location: [facility]     SIGMOIDOSCOPY FLEXIBLE N/A 5/18/2017    Procedure: SIGMOIDOSCOPY FLEXIBLE;;  Surgeon: Jennifer Goodwin MD;  Location: [facility]       FAMILY HISTORY:  family history includes DIABETES in her mother. There is no history of Ovarian Cancer, Uterine Cancer, Cervical Cancer, or Breast Cancer.    SOCIAL HISTORY:   reports that she has been smoking Cigarettes.  She has a 3.00 pack-year smoking history. She has never used smokeless tobacco. She reports that she does not drink alcohol or use illicit drugs.    ALLERGIES:  Allergies   Allergen Reactions     No Clinical Screening - See Comments Other (See Comments) and Diarrhea     headache  Carrots cause gastric upset, cramping and diarrhea.     Sulfa Drugs Hives     hives     Amoxicillin Unknown and Other (See Comments)     vomiting  vomiting     Amoxicillin-Pot Clavulanate Other (See Comments) and Nausea     vomiting     Augmentin GI Disturbance, Nausea and Hives     Avelox [Moxifloxacin] Nausea and Vomiting, Unknown and Nausea     Ciprofloxacin Hives and Nausea     Codeine Nausea and Vomiting and Nausea     Ibuprofen Nausea and Vomiting     Other reaction(s): Nausea And Vomiting     Ibuprofen Sodium Hives and GI Disturbance     Quinolones      Tramadol Hives, Diarrhea, Nausea and Nausea and Vomiting     Daucus Carota      Other reaction(s): GI Upset  Other reaction(s): Abdominal pain, Diarrhea  Carrots cause gastric upset, cramping and diarrhea.        MEDICATIONS:  Reviewed in the chart    PHYSICAL EXAMINATION:  Temp:  [98.1  F (36.7  C)-98.7  F (37.1  C)] 98.1  F (36.7  C)  Pulse:  [] 93  Heart Rate:  [] 94  Resp:  [16-18] 16  BP: (97-99)/(74-79) 97/79  SpO2:  [96 %-100 %] 100 %    General appearance: in mild distress from abd pain  Neuro: No gross deficits noted, moving extremities spontaneously.  Pulm: Non-labored breathing  CV: hemodynamically stable  Abd: soft; non-distended, midline incision intact, no signs of wound infection, abd TTP, especially in RLQ.   Extremities: no edema  Skin: warm and well-perfused.     LABS:  Lab Results   Component Value Date    WBC 8.6 05/28/2017    HGB 11.7 05/28/2017    HCT 35.9 05/28/2017     05/28/2017     05/28/2017    POTASSIUM 3.8 05/28/2017    CHLORIDE 105 05/28/2017    CO2 29 05/28/2017    BUN 11 05/28/2017    CR 0.60 05/28/2017     (H) 05/28/2017    SED 6 04/17/2017    AST 8 05/28/2017    ALT 11 05/28/2017    ALKPHOS 61 05/28/2017    BILITOTAL 0.1 (L) 05/28/2017    INR 1.01 05/24/2017       ASSESSMENT/PLAN:   Rachel A Gerhardt is a 41 year old female with a past history of cervical cancer s/p radiation, chronic pain, and SBO s/p ex lap, SBR with primary anastomosis on 5/18 returns to the ED with poor pain control. She has been doing ok otherwise, tolerating PO intake and having normal BMs. No lab abnormalities detected. On exam, she has abdominal tenderness. She reports that the number of pain meds prescribed is not adequate to complete her taper. There has not been an acute increase in level of pain.     - Ok to discharge pt home on an extra pain med prescription to complete her taper.  - Return to clinic for post op follow up this week. .     Pt discussed with Colorectal fellow on call.     Adma See, PGY2  [phone]

## 2017-05-30 ENCOUNTER — TELEPHONE (OUTPATIENT)
Dept: SURGERY | Facility: CLINIC | Age: 42
End: 2017-05-30

## 2017-05-30 NOTE — TELEPHONE ENCOUNTER
----- Message from Julieta Rob PA-C sent at 5/24/2017 12:55 PM CDT -----  Regarding: discharge home 5/24  Hi Everyone,    Please schedule Jenni A Gerhardt for follow-up in clinic with Umm Kaufman NP in 1 week(s) and then [facility] in 2-3 weeks after.     Date of discharge:  5/24.  S/p SB resection for stricture    Discharged to:  home   Home Cares:  none    -Antibiotics:  none  -Pain Medications:  Oxycodone - seventy tablets with tapering plan.   -Ostomy:  none  -Special Concerns:    1.  Pt able to consume adequate calories.  Recommended Brag[employer] Breakfast shakes.  Didn't like taste of Ensures.      Thanks!    Andria  Pgr: [phone]

## 2017-05-31 ENCOUNTER — OFFICE VISIT (OUTPATIENT)
Dept: SURGERY | Facility: CLINIC | Age: 42
End: 2017-05-31

## 2017-05-31 ENCOUNTER — TELEPHONE (OUTPATIENT)
Dept: SURGERY | Facility: CLINIC | Age: 42
End: 2017-05-31

## 2017-05-31 VITALS
HEIGHT: 68 IN | WEIGHT: 150.4 LBS | BODY MASS INDEX: 22.79 KG/M2 | HEART RATE: 107 BPM | SYSTOLIC BLOOD PRESSURE: 112 MMHG | DIASTOLIC BLOOD PRESSURE: 83 MMHG | TEMPERATURE: 97.6 F | OXYGEN SATURATION: 96 %

## 2017-05-31 DIAGNOSIS — Z09 FOLLOW-UP EXAMINATION FOLLOWING SURGERY: Primary | ICD-10-CM

## 2017-05-31 DIAGNOSIS — R00.0 TACHYCARDIA: ICD-10-CM

## 2017-05-31 DIAGNOSIS — K56.609 SMALL BOWEL OBSTRUCTION (H): ICD-10-CM

## 2017-05-31 LAB
ANION GAP SERPL CALCULATED.3IONS-SCNC: 7 MMOL/L (ref 3–14)
BUN SERPL-MCNC: 11 MG/DL (ref 7–30)
CALCIUM SERPL-MCNC: 9 MG/DL (ref 8.5–10.1)
CHLORIDE SERPL-SCNC: 101 MMOL/L (ref 94–109)
CO2 SERPL-SCNC: 31 MMOL/L (ref 20–32)
CREAT SERPL-MCNC: 0.6 MG/DL (ref 0.52–1.04)
ERYTHROCYTE [DISTWIDTH] IN BLOOD BY AUTOMATED COUNT: 12.6 % (ref 10–15)
GFR SERPL CREATININE-BSD FRML MDRD: ABNORMAL ML/MIN/1.7M2
GLUCOSE SERPL-MCNC: 112 MG/DL (ref 70–99)
HCT VFR BLD AUTO: 39.4 % (ref 35–47)
HGB BLD-MCNC: 12.6 G/DL (ref 11.7–15.7)
MCH RBC QN AUTO: 32.1 PG (ref 26.5–33)
MCHC RBC AUTO-ENTMCNC: 32 G/DL (ref 31.5–36.5)
MCV RBC AUTO: 100 FL (ref 78–100)
PLATELET # BLD AUTO: 553 10E9/L (ref 150–450)
POTASSIUM SERPL-SCNC: 3.6 MMOL/L (ref 3.4–5.3)
RBC # BLD AUTO: 3.93 10E12/L (ref 3.8–5.2)
SODIUM SERPL-SCNC: 139 MMOL/L (ref 133–144)
WBC # BLD AUTO: 8.2 10E9/L (ref 4–11)

## 2017-05-31 ASSESSMENT — PAIN SCALES - GENERAL: PAINLEVEL: SEVERE PAIN (6)

## 2017-05-31 NOTE — PROGRESS NOTES
"Colon and Rectal Surgery Postoperative Clinic Note    RE: Rachel A Gerhardt  : 1975  JAKE: 2017    Rachel A Gerhardt is a very pleasant 41 year old female with h/o cervical cancer s/p radiation, h/o opiate dependence and prior suboxone use, more recently with with recurrent SBOs, malnutrition due to recurrent SBO on TPN via a PICC line. She is now status post exploratory laparotomy, small bowel resection with anastomosis, cystoscopy with bilateral stent placement and removal on 2017 with Dr. Goodwin.   Postoperative course was significant for difficulty with pain control and inpatient pain management was consulted with oxycodone taper. She was discharged to home on 17.  She was seen in the ER on 17 for pain control and was given an additional prescription for 32 tablets of oxycodone.  She presents today for follow up.    Interval history: Jenni reports that she is having continued pain and is concerned that the taper will not be enough pain control. She reports taking one oxycodone every 12 hours in addition to scheduled Tylenol. She reports that she is on her last day of the taper. However, when we reviewed her taper together, she should have at least 3 days more and should have approximately 16 tablets of the recent 32 tablets left. She denies any nausea or vomiting. She has a decreased appetite and just started taking nutrition supplements today. She has not lost weight but also has not gained any weight. She is eating small, frequent meals. She denies any fevers or chills. She had a small amount of drainage from her wound yesterday but this has since stopped. She's been wearing her abdominal binder and has been avoiding any heavy lifting. She had some diarrhea when she initially went home a but since has become constipated. She is now taking \"3 forceps of a dose of MiraLAX\" a day and this is keeping her bowel movements soft.     Assessment/Plan:  41 year old female status post " exploratory laparotomy, small bowel resection with anastomosis, cystoscopy with bilateral stent placement and removal on 5/18/2017 with Dr. Goodwin. She is recovering well. Continued pain that is mostly with movement. Advised her to continue on the taper as previously advised. She drove today and I advised her that she should not be driving while on narcotics. Recommended nutrition supplements. She is eating and drinking without difficulty and reports that her urine is light in color and she is voiding normal amounts. Tachycardia today and denies any chest pain or shortness of breath, dizziness or lightheadedness. Will check labs today to ensure she is not dehydrated. Recommended continued Miralax use to keep stools soft. Incision healing well without signs of infection. Continue to use binder. No lifting more than 10 pounds for a full 6 weeks from surgery.   Follow up with pain service on 6/22 and with Dr. Goodwin on 6/26 as planned. She additionally has a follow up visit with he PCP on 6/1/17.   Encouraged the patient to contact the clinic in the meantime with any questions or concerns.      Medical history:  Past Medical History:   Diagnosis Date     Asthma      Cancer (H)     Per patient OBGYN, cerivical cancer     Cervical cancer (H)      Other chronic pain      Ovarian cancer (H)      Substance abuse     Outside records indicate past history of narcotics abuse or dependence, but patient denies.       Surgical history:  Past Surgical History:   Procedure Laterality Date     COMBINED CYSTOSCOPY, INSERT STENT URETER(S) Bilateral 5/18/2017    Procedure: COMBINED CYSTOSCOPY, INSERT STENT URETER(S);  Cystoscopy with Bilateral Stent,;  Surgeon: Rene Calero MD;  Location: [facility]     ENT SURGERY  2009    mastoid, sinus     EXAM UNDER ANESTHESIA, INSERT ALEX SLEEVE, UTERINE PLACEMENT OF TANDEM AND RING FOR RAD, ULTRASOUND N/A 12/14/2015    Procedure: EXAM UNDER ANESTHESIA, INSERT ALEX SLEEVE, UTERINE PLACEMENT OF  TANDEM AND RING FOR RADIATION, ULTRASOUND GUIDED;  Surgeon: Abby Tony MD;  Location: [facility]     INSERT TANDEM AND CESIUM APPLICATOR CERVIX, ULTRASOUND GUIDED N/A 12/17/2015    Procedure: INSERT TANDEM AND CESIUM APPLICATOR CERVIX, ULTRASOUND GUIDED;  Surgeon: Kika Wood MD;  Location: [facility]     KNEE SURGERY       LAPAROTOMY EXPLORATORY N/A 5/18/2017    Procedure: LAPAROTOMY EXPLORATORY;   Exploratry Laparotomy, Small Bowel Resection with anastomosis, Flexible Sigmoidoscopy;  Surgeon: Jennifer Goodwin MD;  Location: [facility]     PICC INSERTION Right 04/29/2017    4fr SL BioFlo PICC, 37cm (3cm external) in the R basilic vein w/ tip in the mid SVC.     RESECT SMALL BOWEL WITHOUT OSTOMY N/A 5/18/2017    Procedure: RESECT SMALL BOWEL WITHOUT OSTOMY;;  Surgeon: Jennifer Goodwin MD;  Location: [facility]     SIGMOIDOSCOPY FLEXIBLE N/A 5/18/2017    Procedure: SIGMOIDOSCOPY FLEXIBLE;;  Surgeon: Jennifer Goodwin MD;  Location: [facility]       Problem list:  Patient Active Problem List    Diagnosis Date Noted     Small bowel obstruction (H) 05/17/2017     Priority: Medium     Small intestine obstruction (H) 04/29/2017     Priority: Medium     Small bowel obstruction, partial (H) 04/03/2017     Priority: Medium     SBO (small bowel obstruction) (H) 12/27/2016     Priority: Medium     Cervix cancer (H) 09/29/2015     Priority: Medium     Encounter for long-term current use of medication 09/29/2015     Priority: Medium     updating diagnosis code for icd10 cutover       History of polysubstance abuse, +cocaine UDS in 2013 09/16/2015     Priority: Medium     Abdominal pain 09/15/2015     Priority: Medium     Opiate dependence (H) 02/01/2013     Priority: Medium     CARDIOVASCULAR SCREENING; LDL GOAL LESS THAN 160 07/24/2012     Priority: Medium     Pregnancy complicated by chemical dependency, antepartum (H) 07/24/2012     Priority: Medium     (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 07/14/2012      Priority: Medium       Medications:  Current Outpatient Prescriptions   Medication Sig Dispense Refill     oxyCODONE (ROXICODONE) 5 MG IR tablet Use per your previously directed taper schedule 32 tablet 0     acetaminophen (TYLENOL) 500 MG tablet Take 2 tablets (1,000 mg) by mouth 4 times daily 80 tablet 0     magnesium oxide (MAG-OX) 400 MG tablet Take 1 tablet (400 mg) by mouth daily for 14 days 14 tablet 0     saccharomyces boulardii (FLORASTOR) 250 MG capsule Take 1 capsule (250 mg) by mouth 2 times daily 60 capsule 0     ondansetron (ZOFRAN) 4 MG tablet Take 1-2 tablets (4-8 mg) by mouth every 8 hours as needed for nausea 30 tablet 0     polyethylene glycol (MIRALAX/GLYCOLAX) Packet Take 17 g by mouth daily 30 packet 0     methocarbamol (ROBAXIN) 500 MG tablet Take 1 tablet (500 mg) by mouth 4 times daily for 14 days 56 tablet 0     cholecalciferol 1000 UNITS TABS Take 1,000 Units by mouth daily 30 tablet 0     oxyCODONE (ROXICODONE) 5 MG IR tablet Take 2-3 tablets (10-15 mg) by mouth every 4 hours as needed for pain Take 2-3 tablets every 4 hours as needed for 1-2 days, then  2 tablets every 4 hrs as needed for 1-2 days, then  2 tablets every 6 hrs as needed for 1-2 days, then  2 tablets every 8 hrs as needed for 1-2 days, then  1 tablet every 8 hrs as need for 1-2 days,   1 tablet every 12 hrs as need for 1-2 days,   1 tablet daily as need for 1-2 days,  Off 70 tablet 0     calcium carbonate (TUMS) 500 MG chewable tablet Take 500 mg by mouth Reported on 5/8/2017       simethicone (MYLICON) 80 MG chewable tablet Take 80 mg by mouth Reported on 5/8/2017       vitamin D (ERGOCALCIFEROL) 48240 UNIT capsule Take 1 capsule (50,000 Units) by mouth once a week 12 capsule 0     hyoscyamine (ANASPAZ/LEVSIN) 0.125 MG tablet Take 1-2 tablets (125-250 mcg) by mouth every 4 hours as needed for cramping 40 tablet 1     albuterol (PROVENTIL HFA: VENTOLIN HFA) 108 (90 BASE) MCG/ACT inhaler Inhale 2 puffs into the lungs every  "6 hours as needed.           Allergies:  Allergies   Allergen Reactions     No Clinical Screening - See Comments Other (See Comments) and Diarrhea     headache  Carrots cause gastric upset, cramping and diarrhea.     Sulfa Drugs Hives     hives     Amoxicillin Unknown and Other (See Comments)     vomiting  vomiting     Amoxicillin-Pot Clavulanate Other (See Comments) and Nausea     vomiting     Augmentin GI Disturbance, Nausea and Hives     Avelox [Moxifloxacin] Nausea and Vomiting, Unknown and Nausea     Ciprofloxacin Hives and Nausea     Codeine Nausea and Vomiting and Nausea     Ibuprofen Nausea and Vomiting     Other reaction(s): Nausea And Vomiting     Ibuprofen Sodium Hives and GI Disturbance     Quinolones      Tramadol Hives, Diarrhea, Nausea and Nausea and Vomiting     Daucus Carota      Other reaction(s): GI Upset  Other reaction(s): Abdominal pain, Diarrhea  Carrots cause gastric upset, cramping and diarrhea.       Family history:  Family History   Problem Relation Age of Onset     DIABETES Mother      Ovarian Cancer No family hx of      Uterine Cancer No family hx of      Cervical Cancer No family hx of      Breast Cancer No family hx of        Social history:  Social History   Substance Use Topics     Smoking status: Light Tobacco Smoker     Packs/day: 0.25     Years: 12.00     Types: Cigarettes     Smokeless tobacco: Never Used      Comment: 2/22/16 pt smoking daily 1 cig     Alcohol use No      Comment: None per pt     Marital status: .    Nursing Notes:   Jennifer Moreno LPN  5/31/2017  2:30 PM  Signed  Chief Complaint   Patient presents with     Surgical Followup     post op       Vitals:    05/31/17 1428   BP: 112/83   Pulse: 128   Temp: 97.6  F (36.4  C)   TempSrc: Oral   SpO2: 96%   Weight: 150 lb 6.4 oz   Height: 5' 8\"       Body mass index is 22.87 kg/(m^2).      Jennifer BHATIA LPN                             Physical Examination:  /83  Pulse 107  Temp 97.6  F (36.4  C) (Oral)  Ht 5' " "8\"  Wt 150 lb 6.4 oz  SpO2 96%  BMI 22.87 kg/m2  General: alert, oriented, in no acute distress, sitting comfortably  HEENT: mucous membranes moist  Abdomen: soft, non distended, non tender on palpation; incision well approximated with surgical glue in place. No erythema, drainage, or induration  Extremities: warm, dry, no edema    Total face to face time was 20 minutes, >50% counseling.  This is a postop visit.    CECY Lunsford, NP-C  Colon and Rectal Surgery  [facility]    This note was created using speech recognition software and may contain unintended word substitutions.    "

## 2017-05-31 NOTE — LETTER
"2017      RE: Rachel A Gerhardt  [id number] [facility] [address]       Colon and Rectal Surgery Postoperative Clinic Note    RE: Rachel A Gerhardt  : 1975  JAKE: 2017    Rachel A Gerhardt is a very pleasant 41 year old female with h/o cervical cancer s/p radiation, h/o opiate dependence and prior suboxone use, more recently with with recurrent SBOs, malnutrition due to recurrent SBO on TPN via a PICC line. She is now status post exploratory laparotomy, small bowel resection with anastomosis, cystoscopy with bilateral stent placement and removal on 2017 with Dr. Goodwin.   Postoperative course was significant for difficulty with pain control and inpatient pain management was consulted with oxycodone taper. She was discharged to home on 17.  She was seen in the ER on 17 for pain control and was given an additional prescription for 32 tablets of oxycodone.  She presents today for follow up.    Interval history: Jenni reports that she is having continued pain and is concerned that the taper will not be enough pain control. She reports taking one oxycodone every 12 hours in addition to scheduled Tylenol. She reports that she is on her last day of the taper. However, when we reviewed her taper together, she should have at least 3 days more and should have approximately 16 tablets of the recent 32 tablets left. She denies any nausea or vomiting. She has a decreased appetite and just started taking nutrition supplements today. She has not lost weight but also has not gained any weight. She is eating small, frequent meals. She denies any fevers or chills. She had a small amount of drainage from her wound yesterday but this has since stopped. She's been wearing her abdominal binder and has been avoiding any heavy lifting. She had some diarrhea when she initially went home a but since has become constipated. She is now taking \"3 forceps of a dose of MiraLAX\" a day and this is keeping " her bowel movements soft.     Assessment/Plan:  41 year old female status post exploratory laparotomy, small bowel resection with anastomosis, cystoscopy with bilateral stent placement and removal on 5/18/2017 with Dr. Goodwin. She is recovering well. Continued pain that is mostly with movement. Advised her to continue on the taper as previously advised. She drove today and I advised her that she should not be driving while on narcotics. Recommended nutrition supplements. She is eating and drinking without difficulty and reports that her urine is light in color and she is voiding normal amounts. Tachycardia today and denies any chest pain or shortness of breath, dizziness or lightheadedness. Will check labs today to ensure she is not dehydrated. Recommended continued Miralax use to keep stools soft. Incision healing well without signs of infection. Continue to use binder. No lifting more than 10 pounds for a full 6 weeks from surgery.   Follow up with pain service on 6/22 and with Dr. Goodwin on 6/26 as planned. She additionally has a follow up visit with he PCP on 6/1/17.   Encouraged the patient to contact the clinic in the meantime with any questions or concerns.      Medical history:  Past Medical History:   Diagnosis Date     Asthma      Cancer (H)     Per patient OBGYN, cerivical cancer     Cervical cancer (H)      Other chronic pain      Ovarian cancer (H)      Substance abuse     Outside records indicate past history of narcotics abuse or dependence, but patient denies.       Surgical history:  Past Surgical History:   Procedure Laterality Date     COMBINED CYSTOSCOPY, INSERT STENT URETER(S) Bilateral 5/18/2017    Procedure: COMBINED CYSTOSCOPY, INSERT STENT URETER(S);  Cystoscopy with Bilateral Stent,;  Surgeon: Rene Calero MD;  Location: [facility] OR     ENT SURGERY  2009    mastoid, sinus     EXAM UNDER ANESTHESIA, INSERT ALEX SLEEVE, UTERINE PLACEMENT OF TANDEM AND RING FOR RAD, ULTRASOUND N/A 12/14/2015     Procedure: EXAM UNDER ANESTHESIA, INSERT ALEX SLEEVE, UTERINE PLACEMENT OF TANDEM AND RING FOR RADIATION, ULTRASOUND GUIDED;  Surgeon: Abby Tony MD;  Location: [facility] OR     INSERT TANDEM AND CESIUM APPLICATOR CERVIX, ULTRASOUND GUIDED N/A 12/17/2015    Procedure: INSERT TANDEM AND CESIUM APPLICATOR CERVIX, ULTRASOUND GUIDED;  Surgeon: Kika Wood MD;  Location: [facility] OR     KNEE SURGERY       LAPAROTOMY EXPLORATORY N/A 5/18/2017    Procedure: LAPAROTOMY EXPLORATORY;   Exploratry Laparotomy, Small Bowel Resection with anastomosis, Flexible Sigmoidoscopy;  Surgeon: Jennifer Goodwni MD;  Location: [facility] OR     PICC INSERTION Right 04/29/2017    4fr SL BioFlo PICC, 37cm (3cm external) in the R basilic vein w/ tip in the mid SVC.     RESECT SMALL BOWEL WITHOUT OSTOMY N/A 5/18/2017    Procedure: RESECT SMALL BOWEL WITHOUT OSTOMY;;  Surgeon: Jennifer Goodwin MD;  Location: [facility] OR     SIGMOIDOSCOPY FLEXIBLE N/A 5/18/2017    Procedure: SIGMOIDOSCOPY FLEXIBLE;;  Surgeon: Jennifer Goodwin MD;  Location: [facility] OR       Problem list:  Patient Active Problem List    Diagnosis Date Noted     Small bowel obstruction (H) 05/17/2017     Priority: Medium     Small intestine obstruction (H) 04/29/2017     Priority: Medium     Small bowel obstruction, partial (H) 04/03/2017     Priority: Medium     SBO (small bowel obstruction) (H) 12/27/2016     Priority: Medium     Cervix cancer (H) 09/29/2015     Priority: Medium     Encounter for long-term current use of medication 09/29/2015     Priority: Medium     updating diagnosis code for icd10 cutover       History of polysubstance abuse, +cocaine UDS in 2013 09/16/2015     Priority: Medium     Abdominal pain 09/15/2015     Priority: Medium     Opiate dependence (H) 02/01/2013     Priority: Medium     CARDIOVASCULAR SCREENING; LDL GOAL LESS THAN 160 07/24/2012     Priority: Medium     Pregnancy complicated by chemical dependency, antepartum (H) 07/24/2012     Priority: Medium      (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 07/14/2012     Priority: Medium       Medications:  Current Outpatient Prescriptions   Medication Sig Dispense Refill     oxyCODONE (ROXICODONE) 5 MG IR tablet Use per your previously directed taper schedule 32 tablet 0     acetaminophen (TYLENOL) 500 MG tablet Take 2 tablets (1,000 mg) by mouth 4 times daily 80 tablet 0     magnesium oxide (MAG-OX) 400 MG tablet Take 1 tablet (400 mg) by mouth daily for 14 days 14 tablet 0     saccharomyces boulardii (FLORASTOR) 250 MG capsule Take 1 capsule (250 mg) by mouth 2 times daily 60 capsule 0     ondansetron (ZOFRAN) 4 MG tablet Take 1-2 tablets (4-8 mg) by mouth every 8 hours as needed for nausea 30 tablet 0     polyethylene glycol (MIRALAX/GLYCOLAX) Packet Take 17 g by mouth daily 30 packet 0     methocarbamol (ROBAXIN) 500 MG tablet Take 1 tablet (500 mg) by mouth 4 times daily for 14 days 56 tablet 0     cholecalciferol 1000 UNITS TABS Take 1,000 Units by mouth daily 30 tablet 0     oxyCODONE (ROXICODONE) 5 MG IR tablet Take 2-3 tablets (10-15 mg) by mouth every 4 hours as needed for pain Take 2-3 tablets every 4 hours as needed for 1-2 days, then  2 tablets every 4 hrs as needed for 1-2 days, then  2 tablets every 6 hrs as needed for 1-2 days, then  2 tablets every 8 hrs as needed for 1-2 days, then  1 tablet every 8 hrs as need for 1-2 days,   1 tablet every 12 hrs as need for 1-2 days,   1 tablet daily as need for 1-2 days,  Off 70 tablet 0     calcium carbonate (TUMS) 500 MG chewable tablet Take 500 mg by mouth Reported on 5/8/2017       simethicone (MYLICON) 80 MG chewable tablet Take 80 mg by mouth Reported on 5/8/2017       vitamin D (ERGOCALCIFEROL) 41769 UNIT capsule Take 1 capsule (50,000 Units) by mouth once a week 12 capsule 0     hyoscyamine (ANASPAZ/LEVSIN) 0.125 MG tablet Take 1-2 tablets (125-250 mcg) by mouth every 4 hours as needed for cramping 40 tablet 1     albuterol (PROVENTIL HFA:  "VENTOLIN HFA) 108 (90 BASE) MCG/ACT inhaler Inhale 2 puffs into the lungs every 6 hours as needed.           Allergies:  Allergies   Allergen Reactions     No Clinical Screening - See Comments Other (See Comments) and Diarrhea     headache  Carrots cause gastric upset, cramping and diarrhea.     Sulfa Drugs Hives     hives     Amoxicillin Unknown and Other (See Comments)     vomiting  vomiting     Amoxicillin-Pot Clavulanate Other (See Comments) and Nausea     vomiting     Augmentin GI Disturbance, Nausea and Hives     Avelox [Moxifloxacin] Nausea and Vomiting, Unknown and Nausea     Ciprofloxacin Hives and Nausea     Codeine Nausea and Vomiting and Nausea     Ibuprofen Nausea and Vomiting     Other reaction(s): Nausea And Vomiting     Ibuprofen Sodium Hives and GI Disturbance     Quinolones      Tramadol Hives, Diarrhea, Nausea and Nausea and Vomiting     Daucus Carota      Other reaction(s): GI Upset  Other reaction(s): Abdominal pain, Diarrhea  Carrots cause gastric upset, cramping and diarrhea.       Family history:  Family History   Problem Relation Age of Onset     DIABETES Mother      Ovarian Cancer No family hx of      Uterine Cancer No family hx of      Cervical Cancer No family hx of      Breast Cancer No family hx of        Social history:  Social History   Substance Use Topics     Smoking status: Light Tobacco Smoker     Packs/day: 0.25     Years: 12.00     Types: Cigarettes     Smokeless tobacco: Never Used      Comment: 2/22/16 pt smoking daily 1 cig     Alcohol use No      Comment: None per pt     Marital status: .    Nursing Notes:   Jennifer Moreno LPN  5/31/2017  2:30 PM  Signed  Chief Complaint   Patient presents with     Surgical Followup     post op       Vitals:    05/31/17 1428   BP: 112/83   Pulse: 128   Temp: 97.6  F (36.4  C)   TempSrc: Oral   SpO2: 96%   Weight: 150 lb 6.4 oz   Height: 5' 8\"       Body mass index is 22.87 kg/(m^2).      Jennifer BHATIA LPN                         " "    Physical Examination:  /83  Pulse 107  Temp 97.6  F (36.4  C) (Oral)  Ht 5' 8\"  Wt 150 lb 6.4 oz  SpO2 96%  BMI 22.87 kg/m2  General: alert, oriented, in no acute distress, sitting comfortably  HEENT: mucous membranes moist  Abdomen: soft, non distended, non tender on palpation; incision well approximated with surgical glue in place. No erythema, drainage, or induration  Extremities: warm, dry, no edema    Total face to face time was 20 minutes, >50% counseling.  This is a postop visit.    CECY Lunsford, NP-C  Colon and Rectal Surgery  [facility]    This note was created using speech recognition software and may contain unintended word substitutions.      CECY Lunsford CNP      "
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
## 2017-05-31 NOTE — MR AVS SNAPSHOT
After Visit Summary   5/31/2017    Rachel A Gerhardt    MRN: [id number]           Patient Information     Date Of Birth          1975        Visit Information        Provider Department      5/31/2017 2:15 PM Umm Hutson APRN CNP [facility] Colon and Rectal Surgery        Today's Diagnoses     Follow-up examination following surgery    -  1    Tachycardia        Small bowel obstruction (H)           Follow-ups after your visit        Your next 10 appointments already scheduled     Jun 01, 2017  8:25 AM CDT   (Arrive by 8:10 AM)   Return Visit with Jennifer Garza MD   [facility] ([facility])    [address] [place] [address] [id number] [phone]            Jun 22, 2017  7:40 AM CDT   (Arrive by 7:25 AM)   New Patient Visit with Duane Mcmahan MD   [facility] ([facility])    [address] [place] [address] [id number] [phone]            Jun 26, 2017  4:45 PM CDT   (Arrive by 4:30 PM)   Post-Op with Jennifer Goodwin MD   [facility] Colon and Rectal Surgery ([facility])    [address] [place] [id number] [phone]              Who to contact     Please call your clinic at [phone] to:    Ask questions about your health    Make or cancel appointments    Discuss your medicines    Learn about your test results    Speak to your doctor   If you have compliments or concerns about an experience at your clinic, or if you wish to file a complaint, please contact [facility] Patient Relations at [phone] or email us at [email or web address]         Additional Information About Your Visit        MyChart Information     [employer] gives you secure access to your electronic health record. If you see a primary care provider, you can also send  "messages to your care team and make appointments. If you have questions, please call your primary care clinic.  If you do not have a primary care provider, please call [phone] and they will assist you.      [employer] is an electronic gateway that provides easy, online access to your medical records. With [employer], you can request a clinic appointment, read your test results, renew a prescription or communicate with your care team.     To access your existing account, please contact your [facility] or call [phone] for assistance.        Care EveryWhere ID     This is your Care EveryWhere ID. This could be used by other organizations to access your [place] medical records  [id number]        Your Vitals Were     Pulse Temperature Height Pulse Oximetry BMI (Body Mass Index)       107 97.6  F (36.4  C) (Oral) 5' 8\" 96% 22.87 kg/m2        Blood Pressure from Last 3 Encounters:   05/31/17 112/83   05/28/17 97/79   05/24/17 106/64    Weight from Last 3 Encounters:   05/31/17 150 lb 6.4 oz   05/28/17 151 lb 14.4 oz   05/24/17 153 lb 9.6 oz               Primary Care Provider Office Phone # Fax #    Jennifer Garza [phone]       [address]  [facility] [address]        Thank you!     Thank you for choosing [facility] COLON AND RECTAL SURGERY  for your care. Our goal is always to provide you with excellent care. Hearing back from our patients is one way we can continue to improve our services. Please take a few minutes to complete the written survey that you may receive in the mail after your visit with us. Thank you!             Your Updated Medication List - Protect others around you: Learn how to safely use, store and throw away your medicines at www.disposemymeds.org.          This list is accurate as of: 5/31/17  3:37 PM.  Always use your most recent med list.                   Brand Name Dispense Instructions for use    " acetaminophen 500 MG tablet    TYLENOL    80 tablet    Take 2 tablets (1,000 mg) by mouth 4 times daily       albuterol 108 (90 BASE) MCG/ACT Inhaler    PROAIR HFA/PROVENTIL HFA/VENTOLIN HFA     Inhale 2 puffs into the lungs every 6 hours as needed.       cholecalciferol 1000 UNITS Tabs     30 tablet    Take 1,000 Units by mouth daily       hyoscyamine 0.125 MG tablet    ANASPAZ/LEVSIN    40 tablet    Take 1-2 tablets (125-250 mcg) by mouth every 4 hours as needed for cramping       magnesium oxide 400 MG tablet    MAG-OX    14 tablet    Take 1 tablet (400 mg) by mouth daily for 14 days       methocarbamol 500 MG tablet    ROBAXIN    56 tablet    Take 1 tablet (500 mg) by mouth 4 times daily for 14 days       ondansetron 4 MG tablet    ZOFRAN    30 tablet    Take 1-2 tablets (4-8 mg) by mouth every 8 hours as needed for nausea       * oxyCODONE 5 MG IR tablet    ROXICODONE    70 tablet    Take 2-3 tablets (10-15 mg) by mouth every 4 hours as needed for pain Take 2-3 tablets every 4 hours as needed for 1-2 days, then 2 tablets every 4 hrs as needed for 1-2 days, then 2 tablets every 6 hrs as needed for 1-2 days, then 2 tablets every 8 hrs as needed for 1-2 days, then 1 tablet every 8 hrs as need for 1-2 days,  1 tablet every 12 hrs as need for 1-2 days,  1 tablet daily as need for 1-2 days, Off       * oxyCODONE 5 MG IR tablet    ROXICODONE    32 tablet    Use per your previously directed taper schedule       polyethylene glycol Packet    MIRALAX/GLYCOLAX    30 packet    Take 17 g by mouth daily       saccharomyces boulardii 250 MG capsule    FLORASTOR    60 capsule    Take 1 capsule (250 mg) by mouth 2 times daily       simethicone 80 MG chewable tablet    MYLICON     Take 80 mg by mouth Reported on 5/8/2017       TUMS 500 MG chewable tablet   Generic drug:  calcium carbonate      Take 500 mg by mouth Reported on 5/8/2017       vitamin D 63501 UNIT capsule    ERGOCALCIFEROL    12 capsule    Take 1 capsule (50,000  Units) by mouth once a week       * Notice:  This list has 2 medication(s) that are the same as other medications prescribed for you. Read the directions carefully, and ask your doctor or other care provider to review them with you.

## 2017-05-31 NOTE — NURSING NOTE
"Chief Complaint   Patient presents with     Surgical Followup     post op       Vitals:    05/31/17 1428   BP: 112/83   Pulse: 128   Temp: 97.6  F (36.4  C)   TempSrc: Oral   SpO2: 96%   Weight: 150 lb 6.4 oz   Height: 5' 8\"       Body mass index is 22.87 kg/(m^2).      Jennifer BHATIA LPN                          "

## 2017-06-06 ENCOUNTER — OFFICE VISIT (OUTPATIENT)
Dept: INTERNAL MEDICINE | Facility: CLINIC | Age: 42
End: 2017-06-06

## 2017-06-06 VITALS
HEART RATE: 109 BPM | DIASTOLIC BLOOD PRESSURE: 84 MMHG | SYSTOLIC BLOOD PRESSURE: 121 MMHG | WEIGHT: 147.9 LBS | OXYGEN SATURATION: 97 % | BODY MASS INDEX: 22.49 KG/M2 | RESPIRATION RATE: 20 BRPM

## 2017-06-06 DIAGNOSIS — R10.31 ABDOMINAL PAIN, RIGHT LOWER QUADRANT: Primary | ICD-10-CM

## 2017-06-06 RX ORDER — OXYCODONE HYDROCHLORIDE 5 MG/1
TABLET ORAL
Qty: 24 TABLET | Refills: 0 | Status: SHIPPED | OUTPATIENT
Start: 2017-06-06 | End: 2017-06-22

## 2017-06-06 ASSESSMENT — PAIN SCALES - GENERAL: PAINLEVEL: MODERATE PAIN (5)

## 2017-06-06 NOTE — PROGRESS NOTES
PRIMARY CARE CENTER      Rachel A Gerhardt MRN# [id number]   YOB: 1975 Age: 41 year old     Date of Visit: 06/06/2017  Reason for Visit: Post-operative pain issues.    SUBJECTIVE:  History of Present Illness:   Rachel A Gerhardt is a 41 year old woman with history of cervical cancer (s/p radiation), prior opiate dependence (with previous suboxone use), and more recently recurrent SBOs and subsequent malnutrition with TPN dependence via PICC line. She is now s/p exploratory laparotomy, small bowel resection with anastamosis, cystoscopy with bilateral stent placement and removal on 5/18/17 with Dr. Goodwin. She was admitted to the hospital from 5/18/17-5/24/17. Her hospital course was complicated by post-operative pain control issues, and she was discharged to home with an oxycodone taper, but then seen again in the ED for pain on 5/28/17 and prescribed more oxycodone tablets to complete the taper. She was then seen by colorectal surgery on 5/31/17, again with post-operative pain issues, and was instructed to follow-up with the pain service on 6/22/17 and Dr. Goodwin on 6/26/17 as previously planned. She is here in P[facility] clinic today for evaluation of continued post-operative pain. She describes the same stabbing pain in the muscles of the RLQ, which has been present since surgery, and is now most bothersome at night. States she lies in the fetal position, in tears at night. It can wake her from sleep. She is following the recommended low fiber diet and is having regular soft BMs daily in the AM. Pain is really bad during a BM (especially with the straining), though she specifically denies any constipation. Pain does seem to be better after the BM. Working really hard to stay hydrated. She has been taking oxycodone and tylenol for pain.    Her intended oxycodone taper with 5 mg tablets (per discharge summary from colorectal surgery):    Take 2-3 tablets (10-15 mg) by mouth every 4 hours  as needed for pain, then     2 tablets every 4 hrs as needed for 1-2 days, then     2 tablets every 6 hrs as needed for 1-2 days, then     2 tablets every 8 hrs as needed for 1-2 days, then     1 tablet every 8 hrs as need for 1-2 days,     1 tablet every 12 hrs as need for 1-2 days,     1 tablet daily as need for 1-2 days,     Then stop.    She started to notice the pain becoming out of control about half-way through the taper (at 1-2 tabs every 8 hours). Now has been out of oxycodone for the last 2 days. States she can tolerate the pain during the daytime with tylenol, though needs something stronger in order to get some sleep.    Review of Systems:   A four point review of systems was performed with the patient and was found to be negative or non-contributory with the exception of that noted in the HPI above.    PAST MEDICAL HISTORY:  Past Medical History:   Diagnosis Date     Asthma      Cancer (H)     Per patient OBGYN, cerivical cancer     Cervical cancer (H)      Other chronic pain      Ovarian cancer (H)      Substance abuse     Outside records indicate past history of narcotics abuse or dependence, but patient denies.     Past Surgical History:   Procedure Laterality Date     COMBINED CYSTOSCOPY, INSERT STENT URETER(S) Bilateral 5/18/2017    Procedure: COMBINED CYSTOSCOPY, INSERT STENT URETER(S);  Cystoscopy with Bilateral Stent,;  Surgeon: Rene Calero MD;  Location: [facility]     ENT SURGERY  2009    mastoid, sinus     EXAM UNDER ANESTHESIA, INSERT ALEX SLEEVE, UTERINE PLACEMENT OF TANDEM AND RING FOR RAD, ULTRASOUND N/A 12/14/2015    Procedure: EXAM UNDER ANESTHESIA, INSERT ALEX SLEEVE, UTERINE PLACEMENT OF TANDEM AND RING FOR RADIATION, ULTRASOUND GUIDED;  Surgeon: Abby Tony MD;  Location: [facility] OR     INSERT TANDEM AND CESIUM APPLICATOR CERVIX, ULTRASOUND GUIDED N/A 12/17/2015    Procedure: INSERT TANDEM AND CESIUM APPLICATOR CERVIX, ULTRASOUND GUIDED;  Surgeon: Kika Wood MD;   Location: [facility]     KNEE SURGERY       LAPAROTOMY EXPLORATORY N/A 5/18/2017    Procedure: LAPAROTOMY EXPLORATORY;   Exploratry Laparotomy, Small Bowel Resection with anastomosis, Flexible Sigmoidoscopy;  Surgeon: Jennifer Goodwin MD;  Location: [facility]     PICC INSERTION Right 04/29/2017    4fr SL BioFlo PICC, 37cm (3cm external) in the R basilic vein w/ tip in the mid SVC.     RESECT SMALL BOWEL WITHOUT OSTOMY N/A 5/18/2017    Procedure: RESECT SMALL BOWEL WITHOUT OSTOMY;;  Surgeon: Jennifer Goodwin MD;  Location: [facility]     SIGMOIDOSCOPY FLEXIBLE N/A 5/18/2017    Procedure: SIGMOIDOSCOPY FLEXIBLE;;  Surgeon: Jennifer Goodwin MD;  Location: [facility]     Family History   Problem Relation Age of Onset     DIABETES Mother      Ovarian Cancer No family hx of      Uterine Cancer No family hx of      Cervical Cancer No family hx of      Breast Cancer No family hx of      Social History     Social History     Marital status:      Spouse name: N/A     Number of children: N/A     Years of education: N/A     Occupational History     Not on file.     Social History Main Topics     Smoking status: Light Tobacco Smoker     Packs/day: 0.25     Years: 12.00     Types: Cigarettes     Smokeless tobacco: Never Used      Comment: 2/22/16 pt smoking daily 1 cig     Alcohol use No      Comment: None per pt     Drug use: No      Comment: Patient denies.  Outside records indicate possible Opiate abuse.     Sexual activity: Yes     Partners: Male     Birth control/ protection: None     Other Topics Concern     Parent/Sibling W/ Cabg, Mi Or Angioplasty Before 65f 55m? No     Social History Narrative    Lives alone       CURRENT MEDICATIONS & ALLERGIES:  Current Outpatient Prescriptions   Medication Sig Dispense Refill     acetaminophen (TYLENOL) 500 MG tablet Take 2 tablets (1,000 mg) by mouth 4 times daily 80 tablet 0     magnesium oxide (MAG-OX) 400 MG tablet Take 1 tablet (400 mg) by mouth daily for 14 days 14 tablet 0      saccharomyces boulardii (FLORASTOR) 250 MG capsule Take 1 capsule (250 mg) by mouth 2 times daily 60 capsule 0     ondansetron (ZOFRAN) 4 MG tablet Take 1-2 tablets (4-8 mg) by mouth every 8 hours as needed for nausea 30 tablet 0     polyethylene glycol (MIRALAX/GLYCOLAX) Packet Take 17 g by mouth daily 30 packet 0     methocarbamol (ROBAXIN) 500 MG tablet Take 1 tablet (500 mg) by mouth 4 times daily for 14 days 56 tablet 0     cholecalciferol 1000 UNITS TABS Take 1,000 Units by mouth daily 30 tablet 0     calcium carbonate (TUMS) 500 MG chewable tablet Take 500 mg by mouth Reported on 5/8/2017       simethicone (MYLICON) 80 MG chewable tablet Take 80 mg by mouth Reported on 5/8/2017       vitamin D (ERGOCALCIFEROL) 11678 UNIT capsule Take 1 capsule (50,000 Units) by mouth once a week 12 capsule 0     hyoscyamine (ANASPAZ/LEVSIN) 0.125 MG tablet Take 1-2 tablets (125-250 mcg) by mouth every 4 hours as needed for cramping 40 tablet 1     albuterol (PROVENTIL HFA: VENTOLIN HFA) 108 (90 BASE) MCG/ACT inhaler Inhale 2 puffs into the lungs every 6 hours as needed.            Allergies   Allergen Reactions     No Clinical Screening - See Comments Other (See Comments) and Diarrhea     headache  Carrots cause gastric upset, cramping and diarrhea.     Sulfa Drugs Hives     hives     Amoxicillin Unknown and Other (See Comments)     vomiting  vomiting     Amoxicillin-Pot Clavulanate Other (See Comments) and Nausea     vomiting     Augmentin GI Disturbance, Nausea and Hives     Avelox [Moxifloxacin] Nausea and Vomiting, Unknown and Nausea     Ciprofloxacin Hives and Nausea     Codeine Nausea and Vomiting and Nausea     Ibuprofen Nausea and Vomiting     Other reaction(s): Nausea And Vomiting     Ibuprofen Sodium Hives and GI Disturbance     Quinolones      Tramadol Hives, Diarrhea, Nausea and Nausea and Vomiting     Daucus Carota      Other reaction(s): GI Upset  Other reaction(s): Abdominal pain, Diarrhea  Carrots cause  gastric upset, cramping and diarrhea.       OBJECTIVE:  /84  Pulse 109  Resp 20  Wt 67.1 kg (147 lb 14.4 oz)  SpO2 97%  Breastfeeding? No  BMI 22.49 kg/m2     Physical Exam   Constitutional: She is oriented to person, place, and time and well-developed, well-nourished, and in no distress.   HENT:   Head: Normocephalic and atraumatic.   Nose: Nose normal.   Eyes: EOM are normal. No scleral icterus.   Neck: Normal range of motion. Neck supple.   Cardiovascular: Normal rate, regular rhythm and normal heart sounds.    No murmur heard.  Pulmonary/Chest: Effort normal and breath sounds normal.   Abdominal: Soft. Bowel sounds are normal. She exhibits no distension and no mass. There is tenderness. There is no rebound and no guarding.   Tender to deep palpation in the RLQ. Midline vertical surgical scar around the umbilicus, currently undressed and appears to be healing appropriately.   Musculoskeletal: Normal range of motion. She exhibits no edema.   Neurological: She is alert and oriented to person, place, and time. Gait normal.   Skin: Skin is warm and dry. No rash noted.   Psychiatric: Mood and affect normal.   Vitals reviewed.      ASSESSMENT/PLAN:  Rachel A Gerhardt is a 41 year old woman with history of cervical cancer (s/p chemo/radiation), prior opiate dependence (with previous suboxone use), and more recently recurrent SBOs and subsequent malnutrition with TPN dependence via PICC line. She is now s/p exploratory laparotomy, small bowel resection with anastamosis, cystoscopy with bilateral stent placement and removal on 5/18/17. She is here in clinic today for evaluation of continued post-operative pain, which is not controlled now that the oxycodone taper has been completed.    Abdominal pain, right lower quadrant (suspected post-operative pain).  Exam reveals some tenderness to deep palpation in the right lower quadrant, though no masses or hernias are appreciated. Suspect this is post-operative pain  related to the recent exploratory laparotomy, not currently well-controlled in this patient with history of chronic pain and prior opiate dependence. No red flags to warrant imaging or labs at this time, so will give her a few more days of an oxycodone taper to get her through to her pain clinic appointment on 6/22/17.  -     Refilled oxyCODONE (ROXICODONE) 5 MG IR tablet; Take 3 tablets per night for 3 nights,  Then 2 tablets per night for 5 nights, then 1 tablet per night for 5 nights, then stop.  - Follow-up in pain clinic as scheduled.      Patient was seen and plan was discussed with Dr. Erick Darden.      Riya Allen MD/PhD  Internal Medicine Resident, PGY3  Dr Allen and I reviewed Ms Gerhardt past and recent histories including her tendency to use opiates for pain. After this discussion we decided to continue her gradual reduction with the schedule describe in the plan of care.I agree ,The patient was made aware that additional opiate would not be provided and understood this contract .  Erick rodriguez MD

## 2017-06-06 NOTE — NURSING NOTE
Chief Complaint   Patient presents with     Pain     pt is here to discuss pain since having surgery 3 weeks ago.        Kristy Alejandro CMA at 9:53 AM on 6/6/2017

## 2017-06-06 NOTE — MR AVS SNAPSHOT
After Visit Summary   6/6/2017    Rachel A Gerhardt    MRN: [id number]           Patient Information     Date Of Birth          1975        Visit Information        Provider Department      6/6/2017 9:35 AM Riya Allen MD [facility]        Today's Diagnoses     Abdominal pain, right lower quadrant    -  1      Care Instructions    Primary Care Center Phone Number [phone]  Primary Care Center Medication Refill Request Information:  * Please contact your pharmacy regarding ANY request for medication refills.  ** [facility] Prescription Fax = [phone]  * Please allow 3 business days for routine medication refills.  * Please allow 5 business days for controlled substance medication refills.     Primary Care Center Test Result notification information:  *You will be notified with in 7-10 days of your appointment day regarding the results of your test.  If you are on MyChart you will be notified as soon as the provider has reviewed the results and signed off on them.        HOW TO QUIT SMOKING  Smoking is one of the hardest habits to break. About half of all those who have ever smoked have been able to quit, and most of those (about 70%) who still smoke want to quit. Here are some of the best ways to stop smoking.     KEEP TRYING:  It takes most smokers about 8 tries before they are finally able to fully quit. So, the more often you try and fail, the better your chance of quitting the next time! So, don't give up!    GO COLD TURKEY:  Most ex-smokers quit cold turkey. Trying to cut back gradually doesn't seem to work as well, perhaps because it continues the smoking habit. Also, it is possible to fool yourself by inhaling more while smoking fewer cigarettes. This results in the same amount of nicotine in your body!    GET SUPPORT:  Support programs can make an important difference, especially for the heavy smoker. These groups offer lectures, methods to change your  behavior and peer support. Call the free national Quitline for more information. 800-QUIT-NOW (233-696-4095). Low-cost or free programs are offered by many hospitals, local chapters of the American Lung Association (700-904-8145) and the American Cancer Society (780-420-1182). Support at home is important too. Non-smokers can help by offering praise and encouragement. If the smoker fails to quit, encourage them to try again!  OVER-THE-COUNTER MEDICINES:  For those who can't quit on their own, Nicotine Replacement Therapy (NRT) may make quitting much easier. Certain aids such as the nicotine patch, gum and lozenge are available without a prescription. However, it is best to use these under the guidance of your doctor. The skin patch provides a steady supply of nicotine to the body. Nicotine gum and lozenge gives temporary bursts of low levels of nicotine. Both methods take the edge off the craving for cigarettes. WARNING: If you feel symptoms of nicotine overdose, such as nausea, vomiting, dizziness, weakness, or fast heartbeat, stop using these and see your doctor.    PRESCRIPTION MEDICINES:  After evaluating your smoking patterns and prior attempts at quitting, your doctor may offer a prescription medicine such as bupropion (Zyban, Wellbutrin), varenicline (Chantix, Champix), a niocotine inhaler or nasal spray. Each has its unique advantage and side effects which your doctor can review with you.    HEALTH BENEFITS OF QUITTING:  The benefits of quitting start right away and keep improving the longer you go without smokin minutes: blood pressure and pulse return to normal    8 hours: oxygen levels return to normal    2 days: ability to smell and taste begins to improve as damaged nerves start to regrow    2-3 weeks: circulation and lung function improves    1-9 months: decreased cough, congestion and shortness of breath; less tired    1 year: risk of heart attack decreases by half    5 years: risk of lung  cancer decreases by half; risk of stroke becomes the same as a non-smoker  For information about how to quit smoking, visit the following links:    National Cancer [place] ,   Clearing the Air, Quit Smoking Today   - an online booklet. http://www.smokefree.gov/pubs/clearing_the_air.pdf    Smokefree.gov http://smokefree.gov/    QuitNet http://www.quitnet.com/    8964-6192 [employer], [address] [place] [address], [place] [address]. All rights reserved. This information is not intended as a substitute for professional medical care. Always follow your healthcare professional's instructions.               Follow-ups after your visit        Your next 10 appointments already scheduled     Jun 22, 2017  7:40 AM CDT   (Arrive by 7:25 AM)   New Patient Visit with Duane Mcmahan MD   [facility] ([facility])    [address] [place] [id number] [phone]            Jun 26, 2017  4:45 PM CDT   (Arrive by 4:30 PM)   Post-Op with Jennifer Goodwin MD   [facility] ([facility])    [address] [place] [id number] [phone]              Who to contact     Please call your clinic at [phone] to:    Ask questions about your health    Make or cancel appointments    Discuss your medicines    Learn about your test results    Speak to your doctor   If you have compliments or concerns about an experience at your clinic, or if you wish to file a complaint, please contact [facility] Patient Relations at [phone] or email us at [email or web address]         Additional Information About Your Visit        MyChart Information     [employer] gives you secure access to your electronic health record. If you see a primary care provider, you can also send messages to your care team and make appointments. If you have questions, please  call your primary care clinic.  If you do not have a primary care provider, please call [phone] and they will assist you.      [employer] is an electronic gateway that provides easy, online access to your medical records. With [employer], you can request a clinic appointment, read your test results, renew a prescription or communicate with your care team.     To access your existing account, please contact your [facility] or call [phone] for assistance.        Care EveryWhere ID     This is your Care EveryWhere ID. This could be used by other organizations to access your [place] medical records  [id number]        Your Vitals Were     Pulse Respirations Pulse Oximetry Breastfeeding? BMI (Body Mass Index)       109 20 97% No 22.49 kg/m2        Blood Pressure from Last 3 Encounters:   06/06/17 121/84   05/31/17 112/83   05/28/17 97/79    Weight from Last 3 Encounters:   06/06/17 67.1 kg (147 lb 14.4 oz)   05/31/17 68.2 kg (150 lb 6.4 oz)   05/28/17 68.9 kg (151 lb 14.4 oz)              Today, you had the following     No orders found for display         Today's Medication Changes          These changes are accurate as of: 6/6/17 10:28 AM.  If you have any questions, ask your nurse or doctor.               These medicines have changed or have updated prescriptions.        Dose/Directions    oxyCODONE 5 MG IR tablet   Commonly known as:  ROXICODONE   This may have changed:    - how much to take  - how to take this  - when to take this  - reasons to take this  - additional instructions  - Another medication with the same name was removed. Continue taking this medication, and follow the directions you see here.   Used for:  Abdominal pain, right lower quadrant   Changed by:  Riya Allen MD        Take 3 tablets per night for 3 nights, Then 2 tablets per night for 5 nights, Then 1 tablet per night for 5 nights, Then stop.   Quantity:  24 tablet   Refills:  0            Where  to get your medicines      Some of these will need a paper prescription and others can be bought over the counter.  Ask your nurse if you have questions.     Bring a paper prescription for each of these medications     oxyCODONE 5 MG IR tablet                Primary Care Provider Office Phone # Fax #    Jennifer Garza [phone]       [address]  [facility] [address]        Thank you!     Thank you for choosing [facility]  for your care. Our goal is always to provide you with excellent care. Hearing back from our patients is one way we can continue to improve our services. Please take a few minutes to complete the written survey that you may receive in the mail after your visit with us. Thank you!             Your Updated Medication List - Protect others around you: Learn how to safely use, store and throw away your medicines at www.disposemymeds.org.          This list is accurate as of: 6/6/17 10:28 AM.  Always use your most recent med list.                   Brand Name Dispense Instructions for use    acetaminophen 500 MG tablet    TYLENOL    80 tablet    Take 2 tablets (1,000 mg) by mouth 4 times daily       albuterol 108 (90 BASE) MCG/ACT Inhaler    PROAIR HFA/PROVENTIL HFA/VENTOLIN HFA     Inhale 2 puffs into the lungs every 6 hours as needed.       cholecalciferol 1000 UNITS Tabs     30 tablet    Take 1,000 Units by mouth daily       hyoscyamine 0.125 MG tablet    ANASPAZ/LEVSIN    40 tablet    Take 1-2 tablets (125-250 mcg) by mouth every 4 hours as needed for cramping       magnesium oxide 400 MG tablet    MAG-OX    14 tablet    Take 1 tablet (400 mg) by mouth daily for 14 days       methocarbamol 500 MG tablet    ROBAXIN    56 tablet    Take 1 tablet (500 mg) by mouth 4 times daily for 14 days       ondansetron 4 MG tablet    ZOFRAN    30 tablet    Take 1-2 tablets (4-8 mg) by mouth every 8 hours as needed for nausea       oxyCODONE 5 MG  IR tablet    ROXICODONE    24 tablet    Take 3 tablets per night for 3 nights, Then 2 tablets per night for 5 nights, Then 1 tablet per night for 5 nights, Then stop.       polyethylene glycol Packet    MIRALAX/GLYCOLAX    30 packet    Take 17 g by mouth daily       saccharomyces boulardii 250 MG capsule    FLORASTOR    60 capsule    Take 1 capsule (250 mg) by mouth 2 times daily       simethicone 80 MG chewable tablet    MYLICON     Take 80 mg by mouth Reported on 5/8/2017       TUMS 500 MG chewable tablet   Generic drug:  calcium carbonate      Take 500 mg by mouth Reported on 5/8/2017       vitamin D 86042 UNIT capsule    ERGOCALCIFEROL    12 capsule    Take 1 capsule (50,000 Units) by mouth once a week

## 2017-06-06 NOTE — PATIENT INSTRUCTIONS
Primary Care Center Phone Number [phone]  Primary Care Center Medication Refill Request Information:  * Please contact your pharmacy regarding ANY request for medication refills.  ** [facility] Prescription Fax = [phone]  * Please allow 3 business days for routine medication refills.  * Please allow 5 business days for controlled substance medication refills.     Primary Care Center Test Result notification information:  *You will be notified with in 7-10 days of your appointment day regarding the results of your test.  If you are on MyChart you will be notified as soon as the provider has reviewed the results and signed off on them.        HOW TO QUIT SMOKING  Smoking is one of the hardest habits to break. About half of all those who have ever smoked have been able to quit, and most of those (about 70%) who still smoke want to quit. Here are some of the best ways to stop smoking.     KEEP TRYING:  It takes most smokers about 8 tries before they are finally able to fully quit. So, the more often you try and fail, the better your chance of quitting the next time! So, don't give up!    GO COLD TURKEY:  Most ex-smokers quit cold turkey. Trying to cut back gradually doesn't seem to work as well, perhaps because it continues the smoking habit. Also, it is possible to fool yourself by inhaling more while smoking fewer cigarettes. This results in the same amount of nicotine in your body!    GET SUPPORT:  Support programs can make an important difference, especially for the heavy smoker. These groups offer lectures, methods to change your behavior and peer support. Call the free national Quitline for more information. 800-QUIT-NOW (531-008-9923). Low-cost or free programs are offered by many hospitals, local chapters of the American Lung Association (547-038-0751) and the American Cancer Society (165-588-5367). Support at home is important too. Non-smokers can help by offering praise and encouragement. If the smoker  fails to quit, encourage them to try again!  OVER-THE-COUNTER MEDICINES:  For those who can't quit on their own, Nicotine Replacement Therapy (NRT) may make quitting much easier. Certain aids such as the nicotine patch, gum and lozenge are available without a prescription. However, it is best to use these under the guidance of your doctor. The skin patch provides a steady supply of nicotine to the body. Nicotine gum and lozenge gives temporary bursts of low levels of nicotine. Both methods take the edge off the craving for cigarettes. WARNING: If you feel symptoms of nicotine overdose, such as nausea, vomiting, dizziness, weakness, or fast heartbeat, stop using these and see your doctor.    PRESCRIPTION MEDICINES:  After evaluating your smoking patterns and prior attempts at quitting, your doctor may offer a prescription medicine such as bupropion (Zyban, Wellbutrin), varenicline (Chantix, Champix), a niocotine inhaler or nasal spray. Each has its unique advantage and side effects which your doctor can review with you.    HEALTH BENEFITS OF QUITTING:  The benefits of quitting start right away and keep improving the longer you go without smokin minutes: blood pressure and pulse return to normal    8 hours: oxygen levels return to normal    2 days: ability to smell and taste begins to improve as damaged nerves start to regrow    2-3 weeks: circulation and lung function improves    1-9 months: decreased cough, congestion and shortness of breath; less tired    1 year: risk of heart attack decreases by half    5 years: risk of lung cancer decreases by half; risk of stroke becomes the same as a non-smoker  For information about how to quit smoking, visit the following links:    [employer] [place] ,   Clearing the Air, Quit Smoking Today   - an online booklet. http://www.smokefree.gov/pubs/clearing_the_air.pdf    Smokefree.gov http://smokefree.gov/    QuitNet http://www.quitnet.com/    1289-4536 [employer]  [employer], [address] [place] [address], [place] [address]. All rights reserved. This information is not intended as a substitute for professional medical care. Always follow your healthcare professional's instructions.

## 2017-06-21 ENCOUNTER — TELEPHONE (OUTPATIENT)
Dept: ANESTHESIOLOGY | Facility: CLINIC | Age: 42
End: 2017-06-21

## 2017-06-21 NOTE — TELEPHONE ENCOUNTER
Reminder call placed to pt.   Pt reminded of Provider, date and time of the appointment.   Pt was asked to arrive 15 minutes early to fill out the questionnaires.   Clinic phone number provided if pt needed to reschedule.     Mirela Deleon, CMA

## 2017-06-22 ENCOUNTER — OFFICE VISIT (OUTPATIENT)
Dept: ANESTHESIOLOGY | Facility: CLINIC | Age: 42
End: 2017-06-22
Attending: COLON & RECTAL SURGERY

## 2017-06-22 VITALS
DIASTOLIC BLOOD PRESSURE: 85 MMHG | HEART RATE: 88 BPM | BODY MASS INDEX: 23.02 KG/M2 | HEIGHT: 69 IN | SYSTOLIC BLOOD PRESSURE: 110 MMHG | WEIGHT: 155.4 LBS | OXYGEN SATURATION: 95 %

## 2017-06-22 DIAGNOSIS — G89.18 POSTOPERATIVE PAIN: Primary | ICD-10-CM

## 2017-06-22 DIAGNOSIS — R10.31 ABDOMINAL PAIN, RIGHT LOWER QUADRANT: ICD-10-CM

## 2017-06-22 DIAGNOSIS — G89.3 CANCER ASSOCIATED PAIN: ICD-10-CM

## 2017-06-22 RX ORDER — OXYCODONE HYDROCHLORIDE 5 MG/1
5 TABLET ORAL DAILY PRN
Qty: 15 TABLET | Refills: 0 | Status: SHIPPED | OUTPATIENT
Start: 2017-06-22 | End: 2017-07-16

## 2017-06-22 ASSESSMENT — ENCOUNTER SYMPTOMS
RECTAL BLEEDING: 0
FEVER: 0
POLYPHAGIA: 0
HALLUCINATIONS: 0
CHILLS: 0
NECK PAIN: 1
BLOATING: 0
ARTHRALGIAS: 1
STIFFNESS: 1
PANIC: 0
DIFFICULTY URINATING: 0
WEIGHT LOSS: 1
DYSURIA: 0
INCREASED ENERGY: 1
JAUNDICE: 0
POLYDIPSIA: 1
ABDOMINAL PAIN: 1
DECREASED LIBIDO: 0
DIARRHEA: 1
HOT FLASHES: 0
JOINT SWELLING: 1
BACK PAIN: 1
BOWEL INCONTINENCE: 0
DEPRESSION: 0
VOMITING: 1
HEARTBURN: 0
FATIGUE: 1
DECREASED APPETITE: 1
MUSCLE WEAKNESS: 1
BLOOD IN STOOL: 0
NERVOUS/ANXIOUS: 0
FLANK PAIN: 1
MYALGIAS: 1
WEIGHT GAIN: 0
NAUSEA: 1
MUSCLE CRAMPS: 0
DECREASED CONCENTRATION: 1
RECTAL PAIN: 1
CONSTIPATION: 0
HEMATURIA: 0
NIGHT SWEATS: 0
ALTERED TEMPERATURE REGULATION: 0
INSOMNIA: 1

## 2017-06-22 ASSESSMENT — ANXIETY QUESTIONNAIRES
GAD7 TOTAL SCORE: 2
5. BEING SO RESTLESS THAT IT IS HARD TO SIT STILL: NOT AT ALL
GAD7 TOTAL SCORE: 2
7. FEELING AFRAID AS IF SOMETHING AWFUL MIGHT HAPPEN: NOT AT ALL
2. NOT BEING ABLE TO STOP OR CONTROL WORRYING: NOT AT ALL
3. WORRYING TOO MUCH ABOUT DIFFERENT THINGS: NOT AT ALL
4. TROUBLE RELAXING: SEVERAL DAYS
GAD7 TOTAL SCORE: 2
7. FEELING AFRAID AS IF SOMETHING AWFUL MIGHT HAPPEN: NOT AT ALL
1. FEELING NERVOUS, ANXIOUS, OR ON EDGE: NOT AT ALL
6. BECOMING EASILY ANNOYED OR IRRITABLE: SEVERAL DAYS

## 2017-06-22 ASSESSMENT — PATIENT HEALTH QUESTIONNAIRE - PHQ9
SUM OF ALL RESPONSES TO PHQ QUESTIONS 1-9: 11
SUM OF ALL RESPONSES TO PHQ QUESTIONS 1-9: 11

## 2017-06-22 ASSESSMENT — PAIN SCALES - GENERAL: PAINLEVEL: SEVERE PAIN (6)

## 2017-06-22 NOTE — PROGRESS NOTES
"[facility] : Progress Note    Date of visit: 6/22/2017    Interval history:  Rachel A Gerhardt is a 41 year old female  is known to partner Dr. De León, who recently left our practice. She last saw Dr. De León on 10/11/16.  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 She was placed on opioids. She was taking oxycodone 10mg q4hrs, alternating with 2 tabs 5/325 percocet for every dose.  She had chemo/radiation therapy for cance; he was then deemed to be cancer free. She had a very good summer, but then started to notice increased pain.   her pain at the posterior pelvis around her \"tailbone\" and describes it as if she has to defecate but can't.  When she does defecate, she experiences excruciating pain, although stools have been soft. Since then she has been having abdominal pain which aggravated during defecation;  exploratory laparotomy, small bowel resection with anastomosis, cystoscopy with bilateral stent placement and removal on 5/18/2017 with Dr. Goodwin. She is now recovering well. Continued pain that is mostly with movement. She is currently taking oxycodone 5 mg anywhere between 1-3 tablets a day. She has prior history opioid dependence, h/o suboxone use. She states that oxycodone has been relatively ineffective - it \"takes the edge off\" but does not significantly decrease her pain.  Not taking any other current medications for pain.  Has had a poor appetite due to early satiety but she states she lost significant weight since January 2017.      Most recent PET scan show No evidence of recurrent disease or metastatic disease in the chest, abdomen, or pelvis. Previously seen hypermetabolism in the cervix, left pelvis, and ovaries is completely resolved. Prominent nondilated small bowel in the upper abdomen significantly  decreased in comparison with " "4/3/2017.       Recommendations/plan at the last visit included:  1. Patient education: I went over the above diagnoses and treatment plan with her and answered all of her questions. At this point, we do not know whether she has had recurrence of her cancer so we will await results from CT PET before we make any further decisions on how to manage her pain.   2. Medications: Has a history of opoid pain medication dependence, although has been using opoids appropriately over the last year.  She has been given a prescription for oxycodone 5 mg to be taken Q4H PRN and gabapentin 300 mg to be titrated up to TID. - titration schedule given to patient.  Also given a prescription for Pyridium for bladder urgency, as this has been helping. Opioid pain medication contract signed again by patient and urine drug screen obtained today.  Will re-visit pain management options once Oncology has weighed in on current diagnosis.  She is in agreement with this plan.    3. Follow up: Follow-up in clinic once CT PET results. Call with concerns.    Since the last visit, Rachel A Gerhardt reports:  - abdominal pain:  the patient feels she is not getting adequate pain medication.   - medication response: The patient had been taking oxycodone. The patient states that the medications are helpful.         [place] Prescription Monitoring Program:   Reviewed. No concerns    Review of Systems:  The 14 system ROS was reviewed and was negative except what is documented above and as follows.  Any bowel or bladder problems: none  Mood: okay    Physical Exam:  Vitals:    06/22/17 0720   BP: 110/85   Pulse: 88   SpO2: 95%   Weight: 70.5 kg (155 lb 6.4 oz)   Height: 1.753 m (5' 9\")       General: Awake in no apparent distress. This patient is unaccompanied in the office.  Eyes: Sclerae are anicteric. PERRLA, EOMI   Neck: supple, no masses.   Lungs: unlabored.   Heart: regular rate and rhythm   Abdomen: soft, tender to palpation. Scar well healed. "   Extremities: Pulses are well palpable, no peripheral edema.   Musculoskeletal: All muscle groups are normal in bulk and tone. The patient changes position without pain behavior. The patient walks with a normal gait. Posture is normal. Muscle strength was rated at 5/5 in all groups in the extremities. Examination of the joints reveals preserved range of motion.  Neurologic exam:Strength 5/5 for bilateral grasp, finger abduction, wrist extension, elbow flexion, elbow extension, shoulder abduction.  Strength 5/5 for bilateral dorsiflexion, plantarflexion, great toe extension, knee extension, hip flexion. Sensation intact throughout all dermatomes bilateral upper extremities and lower extremities  Psychiatric; Normal affect.   Skin: Warm and Dry.    Medications:  Current Outpatient Prescriptions   Medication Sig Dispense Refill     oxyCODONE (ROXICODONE) 5 MG IR tablet Take 3 tablets per night for 3 nights,  Then 2 tablets per night for 5 nights,  Then 1 tablet per night for 5 nights,  Then stop. 24 tablet 0     acetaminophen (TYLENOL) 500 MG tablet Take 2 tablets (1,000 mg) by mouth 4 times daily 80 tablet 0     saccharomyces boulardii (FLORASTOR) 250 MG capsule Take 1 capsule (250 mg) by mouth 2 times daily 60 capsule 0     ondansetron (ZOFRAN) 4 MG tablet Take 1-2 tablets (4-8 mg) by mouth every 8 hours as needed for nausea 30 tablet 0     cholecalciferol 1000 UNITS TABS Take 1,000 Units by mouth daily 30 tablet 0     vitamin D (ERGOCALCIFEROL) 16091 UNIT capsule Take 1 capsule (50,000 Units) by mouth once a week 12 capsule 0     polyethylene glycol (MIRALAX/GLYCOLAX) Packet Take 17 g by mouth daily 30 packet 0     calcium carbonate (TUMS) 500 MG chewable tablet Take 500 mg by mouth Reported on 5/8/2017       simethicone (MYLICON) 80 MG chewable tablet Take 80 mg by mouth Reported on 5/8/2017       hyoscyamine (ANASPAZ/LEVSIN) 0.125 MG tablet Take 1-2 tablets (125-250 mcg) by mouth every 4 hours as needed for  cramping (Patient not taking: Reported on 6/22/2017) 40 tablet 1     albuterol (PROVENTIL HFA: VENTOLIN HFA) 108 (90 BASE) MCG/ACT inhaler Inhale 2 puffs into the lungs every 6 hours as needed.             LABORATORY VALUES:   Recent Labs   Lab Test  05/31/17   1525  05/28/17   1409   NA  139  139   POTASSIUM  3.6  3.8   CHLORIDE  101  105   CO2  31  29   ANIONGAP  7  4   GLC  112*  101*   BUN  11  11   CR  0.60  0.60   JONATAN  9.0  8.9       CBC RESULTS:   Recent Labs   Lab Test  05/31/17   1525   WBC  8.2   RBC  3.93   HGB  12.6   HCT  39.4   MCV  100   MCH  32.1   MCHC  32.0   RDW  12.6   PLT  553*       Most Recent 3 INR's:  Recent Labs   Lab Test  05/24/17   0840  05/17/17   2217  05/01/17   0852   INR  1.01  1.06  0.96       Diagnostic tests:    No images are attached to the encounter.      ASSESSMENT:       - Postoperative pain  - Cancer associated pain      PLAN:    1. Medications.     a. Tylenol 1 gm po qid  B. Oxycodone 5 mg po qd. # 15 tablet dispensed. The patient is advised to completely off of opioid during this time.     2. Interventional procedures:     None      3. Labs and imaging: None needed for pain management.     4. Rehab: None indicated. The patient is encouraged to stay active and to perform exercise as tolerated.      5. Psychology: No current needs.    6. Integrated medicine: We discussed acupuncture therapy, but the patient is not interested. We will refer the patient for acupuncture therapy    7. Opioid Safety. The patient received full information regarding chronic pain management guidelines, appropriate opioid and adjuvant pain medication usage, potential side effects, adverse reactions, and risks of possible withdrawal and dependency. The patient has signed opioid contract and agreed to follow the established guidelines and the goals of treatment plan.    8. Disposition:  The patient will follow up in our outpatient clinic in  weeks or earlier if clinically indicated.       Assessment  will be ongoing with changes in treatment as indicated.  Benefits/risks/alternatives to treatment have been reviewed and the patient has been instructed to contact this office if they have any questions or concerns.  This plan of care has been discussed with the patient and the patient is in agreement.     Pamela Guevara MD, PHD

## 2017-06-22 NOTE — MR AVS SNAPSHOT
After Visit Summary   6/22/2017    Rachel A Gerhardt    MRN: [id number]           Patient Information     Date Of Birth          1975        Visit Information        Provider Department      6/22/2017 7:20 AM Pamela Guevara MD [facility]        Today's Diagnoses     Postoperative pain    -  1    Cancer associated pain        Abdominal pain, right lower quadrant          Care Instructions    1. One time prescription given for Oxycodone 5 mg- take 1 tablet by mouth daily as needed for moderate to severe pain.   - Please follow up with you Primary care doctor for further refills.     To speak with a nurse, schedule/reschedule/cancel a clinic appointment, or request a medication refill call: [phone]     You can also reach us by [employer]: [email or web address]    For refills, please call on Monday, 1 week before your medication runs out. The doctors are not always in clinic, so this gives us time to get your prescriptions ready.  Please let us know the name of the medication you are requesting a refill of.                                     Follow-ups after your visit        Your next 10 appointments already scheduled     Jun 26, 2017  4:45 PM CDT   (Arrive by 4:30 PM)   Post-Op with Jennifer Goodwin MD   [facility] ([facility])    [address] [place] [id number] [phone]              Who to contact     Please call your clinic at [phone] to:    Ask questions about your health    Make or cancel appointments    Discuss your medicines    Learn about your test results    Speak to your doctor   If you have compliments or concerns about an experience at your clinic, or if you wish to file a complaint, please contact [facility] Patient Relations at [phone] or email us at [email or web address]         Additional  "Information About Your Visit        [employer] Information     [employer] gives you secure access to your electronic health record. If you see a primary care provider, you can also send messages to your care team and make appointments. If you have questions, please call your primary care clinic.  If you do not have a primary care provider, please call [phone] and they will assist you.      [employer] is an electronic gateway that provides easy, online access to your medical records. With [employer], you can request a clinic appointment, read your test results, renew a prescription or communicate with your care team.     To access your existing account, please contact your [facility] or call [phone] for assistance.        Care EveryWhere ID     This is your Care EveryWhere ID. This could be used by other organizations to access your [facility] medical records  [id number]        Your Vitals Were     Pulse Height Pulse Oximetry BMI (Body Mass Index)          88 1.753 m (5' 9\") 95% 22.95 kg/m2         Blood Pressure from Last 3 Encounters:   06/22/17 110/85   06/06/17 121/84   05/31/17 112/83    Weight from Last 3 Encounters:   06/22/17 70.5 kg (155 lb 6.4 oz)   06/06/17 67.1 kg (147 lb 14.4 oz)   05/31/17 68.2 kg (150 lb 6.4 oz)              Today, you had the following     No orders found for display         Today's Medication Changes          These changes are accurate as of: 6/22/17  8:29 AM.  If you have any questions, ask your nurse or doctor.               These medicines have changed or have updated prescriptions.        Dose/Directions    oxyCODONE 5 MG IR tablet   Commonly known as:  ROXICODONE   This may have changed:    - how much to take  - how to take this  - when to take this  - reasons to take this  - additional instructions   Used for:  Abdominal pain, right lower quadrant   Changed by:  Pamela Guevara MD        Dose:  5 mg   Take 1 tablet (5 mg) by mouth daily as needed " for moderate to severe pain   Quantity:  15 tablet   Refills:  0            Where to get your medicines      Some of these will need a paper prescription and others can be bought over the counter.  Ask your nurse if you have questions.     Bring a paper prescription for each of these medications     oxyCODONE 5 MG IR tablet                Primary Care Provider Office Phone # Fax #    RowenaLatonia Garza [phone]       [address] [place] [address]        Equal Access to Services     MARISOL GAN : Hadii aad ku hadasho Soomaali, waaxda luqadaha, qaybta kaalmada adeegyada, waxay idiin hayaan adeeg bora atkins. So [facility] [phone].    ATENCIÓN: Si habla español, tiene a epps disposición servicios gratuitos de asistencia lingüística. [facility] [phone].    We comply with applicable federal civil rights laws and [place] laws. We do not discriminate on the basis of race, color, national origin, age, disability sex, sexual orientation or gender identity.            Thank you!     Thank you for choosing [facility]  for your care. Our goal is always to provide you with excellent care. Hearing back from our patients is one way we can continue to improve our services. Please take a few minutes to complete the written survey that you may receive in the mail after your visit with us. Thank you!             Your Updated Medication List - Protect others around you: Learn how to safely use, store and throw away your medicines at www.disposemymeds.org.          This list is accurate as of: 6/22/17  8:29 AM.  Always use your most recent med list.                   Brand Name Dispense Instructions for use Diagnosis    acetaminophen 500 MG tablet    TYLENOL    80 tablet    Take 2 tablets (1,000 mg) by mouth 4 times daily    Small intestine obstruction (H)       albuterol 108 (90 BASE) MCG/ACT Inhaler    PROAIR HFA/PROVENTIL HFA/VENTOLIN  HFA     Inhale 2 puffs into the lungs every 6 hours as needed.        cholecalciferol 1000 UNITS Tabs     30 tablet    Take 1,000 Units by mouth daily    Small intestine obstruction (H)       hyoscyamine 0.125 MG tablet    ANASPAZ/LEVSIN    40 tablet    Take 1-2 tablets (125-250 mcg) by mouth every 4 hours as needed for cramping    Abdominal pain, generalized       ondansetron 4 MG tablet    ZOFRAN    30 tablet    Take 1-2 tablets (4-8 mg) by mouth every 8 hours as needed for nausea    Small bowel obstruction (H)       oxyCODONE 5 MG IR tablet    ROXICODONE    15 tablet    Take 1 tablet (5 mg) by mouth daily as needed for moderate to severe pain    Abdominal pain, right lower quadrant       polyethylene glycol Packet    MIRALAX/GLYCOLAX    30 packet    Take 17 g by mouth daily    Small intestine obstruction (H)       saccharomyces boulardii 250 MG capsule    FLORASTOR    60 capsule    Take 1 capsule (250 mg) by mouth 2 times daily    Small intestine obstruction (H)       simethicone 80 MG chewable tablet    MYLICON     Take 80 mg by mouth Reported on 5/8/2017        TUMS 500 MG chewable tablet   Generic drug:  calcium carbonate      Take 500 mg by mouth Reported on 5/8/2017        vitamin D 34890 UNIT capsule    ERGOCALCIFEROL    12 capsule    Take 1 capsule (50,000 Units) by mouth once a week    Vitamin D deficiency

## 2017-06-22 NOTE — NURSING NOTE
LPN reviewed AVS with Pt includin. One time prescription given for Oxycodone 5 mg- take 1 tablet by mouth daily as needed for moderate to severe pain.   - Please follow up with you Primary care doctor for further refills.     Pt verbalized an understanding of information, and was asked to contact clinic with questions.    Christin Jeronimo LPN

## 2017-06-22 NOTE — PATIENT INSTRUCTIONS
1. One time prescription given for Oxycodone 5 mg- take 1 tablet by mouth daily as needed for moderate to severe pain.   - Please follow up with you Primary care doctor for further refills.     To speak with a nurse, schedule/reschedule/cancel a clinic appointment, or request a medication refill call: [phone]     You can also reach us by [employer]: [email or web address]    For refills, please call on Monday, 1 week before your medication runs out. The doctors are not always in clinic, so this gives us time to get your prescriptions ready.  Please let us know the name of the medication you are requesting a refill of.

## 2017-06-22 NOTE — LETTER
"6/22/2017       RE: Rachel A Gerhardt  [id number] \[facility] [place] [facility]\""[address]ZZI [facility] [address]     Dear Colleague,    Thank you for referring your patient, Rachel A Gerhardt, to the [facility] at [facility]. Please see a copy of my visit note below.    [facility] for Comprehensive Chronic Pain Management : Progress Note    Date of visit: 6/22/2017    Interval history:  Rachel A Gerhardt is a 41 year old female  is known to partner Dr. De León, who recently left our practice. She last saw Dr. De León on 10/11/16.  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 She was placed on opioids. She was taking oxycodone 10mg q4hrs, alternating with 2 tabs 5/325 percocet for every dose.  She had chemo/radiation therapy for cance; he was then deemed to be cancer free. She had a very good summer, but then started to notice increased pain.   her pain at the posterior pelvis around her \"tailbone\" and describes it as if she has to defecate but can't.  When she does defecate, she experiences excruciating pain, although stools have been soft. Since then she has been having abdominal pain which aggravated during defecation;  exploratory laparotomy, small bowel resection with anastomosis, cystoscopy with bilateral stent placement and removal on 5/18/2017 with Dr. Goodwin. She is now recovering well. Continued pain that is mostly with movement. She is currently taking oxycodone 5 mg anywhere between 1-3 tablets a day. She has prior history opioid dependence, h/o suboxone use. She states that oxycodone has been relatively ineffective - it \"takes the edge off\" but does not significantly decrease her pain.  Not taking any other current medications for pain.  Has had a poor appetite due to early satiety but she states she lost significant weight since January 2017.      Most recent PET scan " show No evidence of recurrent disease or metastatic disease in the chest, abdomen, or pelvis. Previously seen hypermetabolism in the cervix, left pelvis, and ovaries is completely resolved. Prominent nondilated small bowel in the upper abdomen significantly  decreased in comparison with 4/3/2017.    Recommendations/plan at the last visit included:  1. Patient education: I went over the above diagnoses and treatment plan with her and answered all of her questions. At this point, we do not know whether she has had recurrence of her cancer so we will await results from CT PET before we make any further decisions on how to manage her pain.   2. Medications: Has a history of opoid pain medication dependence, although has been using opoids appropriately over the last year.  She has been given a prescription for oxycodone 5 mg to be taken Q4H PRN and gabapentin 300 mg to be titrated up to TID. - titration schedule given to patient.  Also given a prescription for Pyridium for bladder urgency, as this has been helping. Opioid pain medication contract signed again by patient and urine drug screen obtained today.  Will re-visit pain management options once Oncology has weighed in on current diagnosis.  She is in agreement with this plan.    3. Follow up: Follow-up in clinic once CT PET results. Call with concerns.    Since the last visit, Rachel A Gerhardt reports:  - abdominal pain:  the patient feels she is not getting adequate pain medication.   - medication response: The patient had been taking oxycodone. The patient states that the medications are helpful.       [place] Prescription Monitoring Program:   Reviewed. No concerns    Review of Systems:  The 14 system ROS was reviewed and was negative except what is documented above and as follows.  Any bowel or bladder problems: none  Mood: okay    Physical Exam:  Vitals:    06/22/17 0720   BP: 110/85   Pulse: 88   SpO2: 95%   Weight: 70.5 kg (155 lb 6.4 oz)   Height: 1.753  "m (5' 9\")     General: Awake in no apparent distress. This patient is unaccompanied in the office.  Eyes: Sclerae are anicteric. PERRLA, EOMI   Neck: supple, no masses.   Lungs: unlabored.   Heart: regular rate and rhythm   Abdomen: soft, tender to palpation. Scar well healed.   Extremities: Pulses are well palpable, no peripheral edema.   Musculoskeletal: All muscle groups are normal in bulk and tone. The patient changes position without pain behavior. The patient walks with a normal gait. Posture is normal. Muscle strength was rated at 5/5 in all groups in the extremities. Examination of the joints reveals preserved range of motion.  Neurologic exam:Strength 5/5 for bilateral grasp, finger abduction, wrist extension, elbow flexion, elbow extension, shoulder abduction.  Strength 5/5 for bilateral dorsiflexion, plantarflexion, great toe extension, knee extension, hip flexion. Sensation intact throughout all dermatomes bilateral upper extremities and lower extremities  Psychiatric; Normal affect.   Skin: Warm and Dry.    Medications:  Current Outpatient Prescriptions   Medication Sig Dispense Refill     oxyCODONE (ROXICODONE) 5 MG IR tablet Take 3 tablets per night for 3 nights,  Then 2 tablets per night for 5 nights,  Then 1 tablet per night for 5 nights,  Then stop. 24 tablet 0     acetaminophen (TYLENOL) 500 MG tablet Take 2 tablets (1,000 mg) by mouth 4 times daily 80 tablet 0     saccharomyces boulardii (FLORASTOR) 250 MG capsule Take 1 capsule (250 mg) by mouth 2 times daily 60 capsule 0     ondansetron (ZOFRAN) 4 MG tablet Take 1-2 tablets (4-8 mg) by mouth every 8 hours as needed for nausea 30 tablet 0     cholecalciferol 1000 UNITS TABS Take 1,000 Units by mouth daily 30 tablet 0     vitamin D (ERGOCALCIFEROL) 26389 UNIT capsule Take 1 capsule (50,000 Units) by mouth once a week 12 capsule 0     polyethylene glycol (MIRALAX/GLYCOLAX) Packet Take 17 g by mouth daily 30 packet 0     calcium carbonate (TUMS) " 500 MG chewable tablet Take 500 mg by mouth Reported on 5/8/2017       simethicone (MYLICON) 80 MG chewable tablet Take 80 mg by mouth Reported on 5/8/2017       hyoscyamine (ANASPAZ/LEVSIN) 0.125 MG tablet Take 1-2 tablets (125-250 mcg) by mouth every 4 hours as needed for cramping (Patient not taking: Reported on 6/22/2017) 40 tablet 1     albuterol (PROVENTIL HFA: VENTOLIN HFA) 108 (90 BASE) MCG/ACT inhaler Inhale 2 puffs into the lungs every 6 hours as needed.           LABORATORY VALUES:   Recent Labs   Lab Test  05/31/17   1525  05/28/17   1409   NA  139  139   POTASSIUM  3.6  3.8   CHLORIDE  101  105   CO2  31  29   ANIONGAP  7  4   GLC  112*  101*   BUN  11  11   CR  0.60  0.60   JONAATN  9.0  8.9       CBC RESULTS:   Recent Labs   Lab Test  05/31/17   1525   WBC  8.2   RBC  3.93   HGB  12.6   HCT  39.4   MCV  100   MCH  32.1   MCHC  32.0   RDW  12.6   PLT  553*       Most Recent 3 INR's:  Recent Labs   Lab Test  05/24/17   0840  05/17/17   2217  05/01/17   0852   INR  1.01  1.06  0.96   Diagnostic tests:  No images are attached to the encounter.      ASSESSMENT:  - Postoperative pain  - Cancer associated pain      PLAN:  1. Medications.     a. Tylenol 1 gm po qid  B. Oxycodone 5 mg po qd. # 15 tablet dispensed. The patient is advised to completely off of opioid during this time.     2. Interventional procedures:     None      3. Labs and imaging: None needed for pain management.     4. Rehab: None indicated. The patient is encouraged to stay active and to perform exercise as tolerated.      5. Psychology: No current needs.    6. Integrated medicine: We discussed acupuncture therapy, but the patient is not interested. We will refer the patient for acupuncture therapy    7. Opioid Safety. The patient received full information regarding chronic pain management guidelines, appropriate opioid and adjuvant pain medication usage, potential side effects, adverse reactions, and risks of possible withdrawal and  dependency. The patient has signed opioid contract and agreed to follow the established guidelines and the goals of treatment plan.    8. Disposition:  The patient will follow up in our outpatient clinic in  weeks or earlier if clinically indicated.     Assessment will be ongoing with changes in treatment as indicated.  Benefits/risks/alternatives to treatment have been reviewed and the patient has been instructed to contact this office if they have any questions or concerns.  This plan of care has been discussed with the patient and the patient is in agreement.     Again, thank you for allowing me to participate in the care of your patient.    Sincerely,    Pamela Guevara MD

## 2017-06-23 ASSESSMENT — PATIENT HEALTH QUESTIONNAIRE - PHQ9: SUM OF ALL RESPONSES TO PHQ QUESTIONS 1-9: 11

## 2017-06-23 ASSESSMENT — ANXIETY QUESTIONNAIRES: GAD7 TOTAL SCORE: 2

## 2017-07-01 ENCOUNTER — HEALTH MAINTENANCE LETTER (OUTPATIENT)
Age: 42
End: 2017-07-01

## 2017-07-03 DIAGNOSIS — R06.2 WHEEZING: Primary | ICD-10-CM

## 2017-07-03 RX ORDER — ALBUTEROL SULFATE 90 UG/1
2 AEROSOL, METERED RESPIRATORY (INHALATION) EVERY 6 HOURS PRN
Qty: 1 INHALER | Refills: 0 | Status: SHIPPED | OUTPATIENT
Start: 2017-07-03 | End: 2018-05-11

## 2017-07-03 NOTE — TELEPHONE ENCOUNTER
I received a call from [facility]. [employer] stated that pt has been calling to receive albuterol inhaler, tech wondered if refill request was received from them. I informed them we have not received a refill request. She stated they sent the request over a week ago, she is hoping we are able to sign off on inhaler today if possible. Will route to provider to see if inhaler refill possible.    Joellen Flores RN

## 2017-07-10 ENCOUNTER — TELEPHONE (OUTPATIENT)
Dept: SURGERY | Facility: CLINIC | Age: 42
End: 2017-07-10

## 2017-07-10 ENCOUNTER — HOSPITAL ENCOUNTER (EMERGENCY)
Facility: CLINIC | Age: 42
Discharge: LEFT BEFORE DISCHARGE | End: 2017-07-10
Attending: EMERGENCY MEDICINE | Admitting: EMERGENCY MEDICINE
Payer: COMMERCIAL

## 2017-07-10 ENCOUNTER — APPOINTMENT (OUTPATIENT)
Dept: CT IMAGING | Facility: CLINIC | Age: 42
End: 2017-07-10
Attending: EMERGENCY MEDICINE
Payer: COMMERCIAL

## 2017-07-10 ENCOUNTER — NURSE TRIAGE (OUTPATIENT)
Dept: NURSING | Facility: CLINIC | Age: 42
End: 2017-07-10

## 2017-07-10 VITALS
DIASTOLIC BLOOD PRESSURE: 75 MMHG | RESPIRATION RATE: 16 BRPM | OXYGEN SATURATION: 98 % | TEMPERATURE: 98.6 F | SYSTOLIC BLOOD PRESSURE: 105 MMHG | HEART RATE: 85 BPM

## 2017-07-10 DIAGNOSIS — R10.9 ABDOMINAL PAIN, UNSPECIFIED LOCATION: ICD-10-CM

## 2017-07-10 DIAGNOSIS — R10.9 ABDOMINAL PAIN: ICD-10-CM

## 2017-07-10 LAB
ALBUMIN SERPL-MCNC: 3.2 G/DL (ref 3.4–5)
ALP SERPL-CCNC: 74 U/L (ref 40–150)
ALT SERPL W P-5'-P-CCNC: 12 U/L (ref 0–50)
ANION GAP SERPL CALCULATED.3IONS-SCNC: 7 MMOL/L (ref 3–14)
AST SERPL W P-5'-P-CCNC: 10 U/L (ref 0–45)
BASOPHILS # BLD AUTO: 0 10E9/L (ref 0–0.2)
BASOPHILS NFR BLD AUTO: 0.3 %
BILIRUB SERPL-MCNC: 0.4 MG/DL (ref 0.2–1.3)
BUN SERPL-MCNC: 10 MG/DL (ref 7–30)
CALCIUM SERPL-MCNC: 9 MG/DL (ref 8.5–10.1)
CHLORIDE SERPL-SCNC: 101 MMOL/L (ref 94–109)
CO2 SERPL-SCNC: 30 MMOL/L (ref 20–32)
CREAT SERPL-MCNC: 0.74 MG/DL (ref 0.52–1.04)
DIFFERENTIAL METHOD BLD: NORMAL
EOSINOPHIL # BLD AUTO: 0.1 10E9/L (ref 0–0.7)
EOSINOPHIL NFR BLD AUTO: 0.9 %
ERYTHROCYTE [DISTWIDTH] IN BLOOD BY AUTOMATED COUNT: 13.8 % (ref 10–15)
GFR SERPL CREATININE-BSD FRML MDRD: 86 ML/MIN/1.7M2
GLUCOSE SERPL-MCNC: 94 MG/DL (ref 70–99)
HCT VFR BLD AUTO: 41.1 % (ref 35–47)
HGB BLD-MCNC: 13.1 G/DL (ref 11.7–15.7)
IMM GRANULOCYTES # BLD: 0 10E9/L (ref 0–0.4)
IMM GRANULOCYTES NFR BLD: 0.3 %
LACTATE BLD-SCNC: 0.8 MMOL/L (ref 0.7–2.1)
LIPASE SERPL-CCNC: 98 U/L (ref 73–393)
LYMPHOCYTES # BLD AUTO: 2.2 10E9/L (ref 0.8–5.3)
LYMPHOCYTES NFR BLD AUTO: 29.4 %
MCH RBC QN AUTO: 30.3 PG (ref 26.5–33)
MCHC RBC AUTO-ENTMCNC: 31.9 G/DL (ref 31.5–36.5)
MCV RBC AUTO: 95 FL (ref 78–100)
MONOCYTES # BLD AUTO: 0.8 10E9/L (ref 0–1.3)
MONOCYTES NFR BLD AUTO: 10 %
NEUTROPHILS # BLD AUTO: 4.5 10E9/L (ref 1.6–8.3)
NEUTROPHILS NFR BLD AUTO: 59.1 %
NRBC # BLD AUTO: 0 10*3/UL
NRBC BLD AUTO-RTO: 0 /100
PLATELET # BLD AUTO: 401 10E9/L (ref 150–450)
POTASSIUM SERPL-SCNC: 3.3 MMOL/L (ref 3.4–5.3)
PROT SERPL-MCNC: 6.8 G/DL (ref 6.8–8.8)
RBC # BLD AUTO: 4.32 10E12/L (ref 3.8–5.2)
SODIUM SERPL-SCNC: 138 MMOL/L (ref 133–144)
WBC # BLD AUTO: 7.6 10E9/L (ref 4–11)

## 2017-07-10 PROCEDURE — 96374 THER/PROPH/DIAG INJ IV PUSH: CPT | Performed by: EMERGENCY MEDICINE

## 2017-07-10 PROCEDURE — 96361 HYDRATE IV INFUSION ADD-ON: CPT | Performed by: EMERGENCY MEDICINE

## 2017-07-10 PROCEDURE — 74177 CT ABD & PELVIS W/CONTRAST: CPT

## 2017-07-10 PROCEDURE — 25000125 ZZHC RX 250

## 2017-07-10 PROCEDURE — 83605 ASSAY OF LACTIC ACID: CPT | Performed by: EMERGENCY MEDICINE

## 2017-07-10 PROCEDURE — 96375 TX/PRO/DX INJ NEW DRUG ADDON: CPT | Performed by: EMERGENCY MEDICINE

## 2017-07-10 PROCEDURE — 85025 COMPLETE CBC W/AUTO DIFF WBC: CPT | Performed by: EMERGENCY MEDICINE

## 2017-07-10 PROCEDURE — 83690 ASSAY OF LIPASE: CPT | Performed by: EMERGENCY MEDICINE

## 2017-07-10 PROCEDURE — 25000128 H RX IP 250 OP 636

## 2017-07-10 PROCEDURE — 80053 COMPREHEN METABOLIC PANEL: CPT | Performed by: EMERGENCY MEDICINE

## 2017-07-10 PROCEDURE — 25000132 ZZH RX MED GY IP 250 OP 250 PS 637: Performed by: EMERGENCY MEDICINE

## 2017-07-10 PROCEDURE — 25000128 H RX IP 250 OP 636: Performed by: EMERGENCY MEDICINE

## 2017-07-10 PROCEDURE — 96376 TX/PRO/DX INJ SAME DRUG ADON: CPT | Performed by: EMERGENCY MEDICINE

## 2017-07-10 PROCEDURE — 99285 EMERGENCY DEPT VISIT HI MDM: CPT | Mod: 25 | Performed by: EMERGENCY MEDICINE

## 2017-07-10 PROCEDURE — 99285 EMERGENCY DEPT VISIT HI MDM: CPT | Mod: Z6 | Performed by: EMERGENCY MEDICINE

## 2017-07-10 RX ORDER — OXYCODONE HYDROCHLORIDE 5 MG/1
5 TABLET ORAL ONCE
Status: COMPLETED | OUTPATIENT
Start: 2017-07-10 | End: 2017-07-10

## 2017-07-10 RX ORDER — POLYETHYLENE GLYCOL 3350 17 G/17G
17 POWDER, FOR SOLUTION ORAL DAILY
Status: CANCELLED | OUTPATIENT
Start: 2017-07-10

## 2017-07-10 RX ORDER — ONDANSETRON 4 MG/1
4-8 TABLET, FILM COATED ORAL EVERY 8 HOURS PRN
Status: CANCELLED | OUTPATIENT
Start: 2017-07-10

## 2017-07-10 RX ORDER — SACCHAROMYCES BOULARDII 250 MG
250 CAPSULE ORAL 2 TIMES DAILY
Status: CANCELLED | OUTPATIENT
Start: 2017-07-10

## 2017-07-10 RX ORDER — NALOXONE HYDROCHLORIDE 0.4 MG/ML
.1-.4 INJECTION, SOLUTION INTRAMUSCULAR; INTRAVENOUS; SUBCUTANEOUS
Status: CANCELLED | OUTPATIENT
Start: 2017-07-10

## 2017-07-10 RX ORDER — CALCIUM CARBONATE 500 MG/1
500 TABLET, CHEWABLE ORAL 2 TIMES DAILY PRN
Status: CANCELLED | OUTPATIENT
Start: 2017-07-10

## 2017-07-10 RX ORDER — ACETAMINOPHEN 10 MG/ML
650 INJECTION, SOLUTION INTRAVENOUS EVERY 6 HOURS
Status: CANCELLED | OUTPATIENT
Start: 2017-07-10

## 2017-07-10 RX ORDER — OXYCODONE HYDROCHLORIDE 5 MG/1
5 TABLET ORAL DAILY PRN
Status: CANCELLED | OUTPATIENT
Start: 2017-07-10

## 2017-07-10 RX ORDER — MORPHINE SULFATE 4 MG/ML
4 INJECTION, SOLUTION INTRAMUSCULAR; INTRAVENOUS
Status: COMPLETED | OUTPATIENT
Start: 2017-07-10 | End: 2017-07-10

## 2017-07-10 RX ORDER — IOPAMIDOL 755 MG/ML
95 INJECTION, SOLUTION INTRAVASCULAR ONCE
Status: COMPLETED | OUTPATIENT
Start: 2017-07-10 | End: 2017-07-10

## 2017-07-10 RX ORDER — ONDANSETRON 2 MG/ML
4 INJECTION INTRAMUSCULAR; INTRAVENOUS EVERY 30 MIN PRN
Status: DISCONTINUED | OUTPATIENT
Start: 2017-07-10 | End: 2017-07-10 | Stop reason: HOSPADM

## 2017-07-10 RX ORDER — ALBUTEROL SULFATE 90 UG/1
2 AEROSOL, METERED RESPIRATORY (INHALATION) EVERY 6 HOURS PRN
Status: CANCELLED | OUTPATIENT
Start: 2017-07-10

## 2017-07-10 RX ORDER — SODIUM CHLORIDE 9 MG/ML
INJECTION, SOLUTION INTRAVENOUS CONTINUOUS
Status: DISCONTINUED | OUTPATIENT
Start: 2017-07-10 | End: 2017-07-10 | Stop reason: HOSPADM

## 2017-07-10 RX ADMIN — MORPHINE SULFATE 4 MG: 4 INJECTION, SOLUTION INTRAMUSCULAR; INTRAVENOUS at 15:12

## 2017-07-10 RX ADMIN — OXYCODONE HYDROCHLORIDE 5 MG: 5 TABLET ORAL at 16:56

## 2017-07-10 RX ADMIN — MORPHINE SULFATE 4 MG: 4 INJECTION, SOLUTION INTRAMUSCULAR; INTRAVENOUS at 12:45

## 2017-07-10 RX ADMIN — ONDANSETRON 4 MG: 2 INJECTION INTRAMUSCULAR; INTRAVENOUS at 12:45

## 2017-07-10 RX ADMIN — SODIUM CHLORIDE: 9 INJECTION, SOLUTION INTRAVENOUS at 14:00

## 2017-07-10 RX ADMIN — SODIUM CHLORIDE, PRESERVATIVE FREE 74 ML: 5 INJECTION INTRAVENOUS at 13:51

## 2017-07-10 RX ADMIN — MORPHINE SULFATE 4 MG: 4 INJECTION, SOLUTION INTRAMUSCULAR; INTRAVENOUS at 14:10

## 2017-07-10 RX ADMIN — IOPAMIDOL 95 ML: 755 INJECTION, SOLUTION INTRAVENOUS at 13:51

## 2017-07-10 ASSESSMENT — ENCOUNTER SYMPTOMS
FEVER: 0
NAUSEA: 1
CARDIOVASCULAR NEGATIVE: 1
RESPIRATORY NEGATIVE: 1
ACTIVITY CHANGE: 1
APPETITE CHANGE: 1
ABDOMINAL PAIN: 1
VOMITING: 1

## 2017-07-10 NOTE — TELEPHONE ENCOUNTER
Jenni is calling for Dr. Goodwin.  She had surgery 5/18 (exploratory lap and small bowel resection), she reports she was doing OK until this weekend.  Saturday night pain started under her incision and her lower back.  Sunday night started throwing up which continued thru the night. Last emesis 5 AM.  No PO since then. Pain worse now.  Pain under her old incision is described as stabbing and rated at =9/10.  Relief 30 seconds out of 5 minutes. Had a BM yesterday and today.  Ate more over the weekend than her usual, unsure it that's related or not?  Unable to stand up straight.  Denies fever or chills.    Spoke with Dr. Buddy Guerrero's clinic nurse.  Advise ED evaluation.  I gave this information to Jenni she states she can get a  or may call an ambulance.  She is very frustrated with need to go to the ED. She states they will probably admit her.

## 2017-07-10 NOTE — DISCHARGE INSTRUCTIONS
The Colorectal Services recommends that you be admitted to the hospital  You are declining this and will be discharged against medical advice  You are welcome to return to the Emergency Department anytime

## 2017-07-10 NOTE — TELEPHONE ENCOUNTER
"  Reason for Disposition    [1] SEVERE pain (e.g., excruciating) AND [2] present > 1 hour    Additional Information    Negative: Shock suspected (e.g., cold/pale/clammy skin, too weak to stand, low BP, rapid pulse)    Negative: Difficult to awaken or acting confused  (e.g., disoriented, slurred speech)    Negative: Passed out (i.e., lost consciousness, collapsed and was not responding)    Negative: Sounds like a life-threatening emergency to the triager    Negative: Chest pain    Negative: Pain is mainly in upper abdomen  (if needed ask: \"is it mainly above the belly button?\")    Negative: Followed an abdomen (stomach) injury    Negative: [1] Abdominal pain AND [2] pregnant < 20 weeks    Negative: [1] Abdominal pain AND [2] pregnant > 20 weeks    Negative: [1] Abdominal pain AND [2] postpartum < 1 month since delivery    Protocols used: ABDOMINAL PAIN - FEMALE-ADULT-  Patient complaining of severe abdominal pain at navel and vomiting for 2 days.  Advised to go to ED and have someone drive her per guidelines.  "

## 2017-07-10 NOTE — ED AVS SNAPSHOT
[facility], Emergency Department    [address] [address]    Phone:  [phone]                                       Rachel A Gerhardt   MRN: [id number]    Department:  [facility], Emergency Department   Date of Visit:  7/10/2017           Patient Information     Date Of Birth          1975        Your diagnoses for this visit were:     Abdominal pain        You were seen by Herman Patel MD.        Discharge Instructions       The Colorectal Services recommends that you be admitted to the hospital  You are declining this and will be discharged against medical advice  You are welcome to return to the Emergency Department anytime    24 Hour Appointment Hotline       To make an appointment at any [facility] clinic, call [phone] ([phone]). If you don't have a family doctor or clinic, we will help you find one. [facility] clinics are conveniently located to serve the needs of you and your family.             Review of your medicines      Our records show that you are taking the medicines listed below. If these are incorrect, please call your family doctor or clinic.        Dose / Directions Last dose taken    acetaminophen 500 MG tablet   Commonly known as:  TYLENOL   Dose:  1000 mg   Quantity:  80 tablet        Take 2 tablets (1,000 mg) by mouth 4 times daily   Refills:  0        albuterol 108 (90 BASE) MCG/ACT Inhaler   Commonly known as:  PROAIR HFA/PROVENTIL HFA/VENTOLIN HFA   Dose:  2 puff   Quantity:  1 Inhaler        Inhale 2 puffs into the lungs every 6 hours as needed   Refills:  0        cholecalciferol 1000 UNITS Tabs   Dose:  1000 Units   Quantity:  30 tablet        Take 1,000 Units by mouth daily   Refills:  0        hyoscyamine 0.125 MG tablet   Commonly known as:  ANASPAZ/LEVSIN   Dose:  0.125-0.25 mg   Quantity:  40 tablet        Take 1-2 tablets (125-250 mcg) by mouth every 4 hours as needed for cramping   Refills:  1        ondansetron 4 MG tablet    Commonly known as:  ZOFRAN   Dose:  4-8 mg   Quantity:  30 tablet        Take 1-2 tablets (4-8 mg) by mouth every 8 hours as needed for nausea   Refills:  0        oxyCODONE 5 MG IR tablet   Commonly known as:  ROXICODONE   Dose:  5 mg   Quantity:  15 tablet        Take 1 tablet (5 mg) by mouth daily as needed for moderate to severe pain   Refills:  0        polyethylene glycol Packet   Commonly known as:  MIRALAX/GLYCOLAX   Dose:  17 g   Quantity:  30 packet        Take 17 g by mouth daily   Refills:  0        saccharomyces boulardii 250 MG capsule   Commonly known as:  FLORASTOR   Dose:  250 mg   Quantity:  60 capsule        Take 1 capsule (250 mg) by mouth 2 times daily   Refills:  0        simethicone 80 MG chewable tablet   Commonly known as:  MYLICON   Dose:  80 mg        Take 80 mg by mouth Reported on 5/8/2017   Refills:  0        TUMS 500 MG chewable tablet   Dose:  500 mg   Generic drug:  calcium carbonate        Take 500 mg by mouth Reported on 5/8/2017   Refills:  0        vitamin D 10780 UNIT capsule   Commonly known as:  ERGOCALCIFEROL   Dose:  63090 Units   Quantity:  12 capsule        Take 1 capsule (50,000 Units) by mouth once a week   Refills:  0                Procedures and tests performed during your visit     CBC with platelets differential    CT Abdomen Pelvis w Contrast    Comprehensive metabolic panel    ED Bed Request    Lactic acid whole blood    Lipase      Orders Needing Specimen Collection     None      Pending Results     No orders found from 7/8/2017 to 7/11/2017.            Pending Culture Results     No orders found from 7/8/2017 to 7/11/2017.            Pending Results Instructions     If you had any lab results that were not finalized at the time of your Discharge, you can call the ED Lab Result RN at [phone]. You will be contacted by this team for any positive Lab results or changes in treatment. The nurses are available 7 days a week from 10A to 6:30P.  You can leave a  message 24 hours per day and they will return your call.        Thank you for choosing [facility]       Thank you for choosing [facility] for your care. Our goal is always to provide you with excellent care. Hearing back from our patients is one way we can continue to improve our services. Please take a few minutes to complete the written survey that you may receive in the mail after you visit with us. Thank you!        [employer] Information     [employer] gives you secure access to your electronic health record. If you see a primary care provider, you can also send messages to your care team and make appointments. If you have questions, please call your primary care clinic.  If you do not have a primary care provider, please call [phone] and they will assist you.        Care EveryWhere ID     This is your Care EveryWhere ID. This could be used by other organizations to access your [facility] medical records  [id number]        Equal Access to Services     MARISOL GAN : Yifan Gan, mariah lynch, wendy dewitt. So [facility] [phone].    ATENCIÓN: Si habla español, tiene a epps disposición servicios gratuitos de asistencia lingüística. Llmirella al [phone].    We comply with applicable federal civil rights laws and [place] laws. We do not discriminate on the basis of race, color, national origin, age, disability sex, sexual orientation or gender identity.            After Visit Summary       This is your record. Keep this with you and show to your community pharmacist(s) and doctor(s) at your next visit.

## 2017-07-10 NOTE — ED AVS SNAPSHOT
[facility], [place], Emergency Department    [address] [place] [address]    Phone:  [phone]                                       Rachel A Gerhardt   MRN: [id number]    Department:  [facility], Emergency Department   Date of Visit:  7/10/2017           After Visit Summary Signature Page     I have received my discharge instructions, and my questions have been answered. I have discussed any challenges I see with this plan with the nurse or doctor.    ..........................................................................................................................................  Patient/Patient Representative Signature      ..........................................................................................................................................  Patient Representative Print Name and Relationship to Patient    ..................................................               ................................................  Date                                            Time    ..........................................................................................................................................  Reviewed by Signature/Title    ...................................................              ..............................................  Date                                                            Time

## 2017-07-10 NOTE — ED PROVIDER NOTES
[facility] ([facility])  July 10, 2017  Replaced by [facility]   History     Chief Complaint   Patient presents with     Abdominal Pain     The history is provided by the patient.     Rachel A Gerhardt is a 41 year old female with a history of cervical cancer status post radiation and history of recurrent small bowel obstructions and malnutrition because of above. She s been on TPN and she was admitted to the hospital less than a month ago for exploratory laparotomy by Colorectal Surgery. She had small bowel resection with anastomosis for recurrent SBO and small bowel strictures.  Patient apparently was doing well until Saturday when she developed mid abdominal discomfort that has been progressively worse. She has had some emesis. She still having bowel movements.  She is here now reporting a 10 out of 10 abdominal pain.  Colorectal is aware of her. They asked her to go to the Emergency Department to be evaluated. She s here in considerable distress.    This part of the document was transcribed by Remberto Ramirez Scribenmanuel.      I have reviewed the Medications, Allergies, Past Medical and Surgical History, and Social History in the [employer] system.  PAST MEDICAL HISTORY:   Past Medical History:   Diagnosis Date     Asthma      Cancer (H)     Per patient OBGYN, cerivical cancer     Cervical cancer (H)      Other chronic pain      Ovarian cancer (H)      Substance abuse     Outside records indicate past history of narcotics abuse or dependence, but patient denies.       PAST SURGICAL HISTORY:   Past Surgical History:   Procedure Laterality Date     COMBINED CYSTOSCOPY, INSERT STENT URETER(S) Bilateral 5/18/2017    Procedure: COMBINED CYSTOSCOPY, INSERT STENT URETER(S);  Cystoscopy with Bilateral Stent,;  Surgeon: Rene Calero MD;  Location: [facility] OR     ENT SURGERY  2009    mastoid, sinus     EXAM UNDER ANESTHESIA, INSERT ALEX SLEEVE, UTERINE PLACEMENT OF TANDEM AND RING FOR RAD, ULTRASOUND N/A  12/14/2015    Procedure: EXAM UNDER ANESTHESIA, INSERT ALEX SLEEVE, UTERINE PLACEMENT OF TANDEM AND RING FOR RADIATION, ULTRASOUND GUIDED;  Surgeon: Abby Tony MD;  Location: [facility] OR     INSERT TANDEM AND CESIUM APPLICATOR CERVIX, ULTRASOUND GUIDED N/A 12/17/2015    Procedure: INSERT TANDEM AND CESIUM APPLICATOR CERVIX, ULTRASOUND GUIDED;  Surgeon: Kika Wood MD;  Location: [facility] OR     KNEE SURGERY       LAPAROTOMY EXPLORATORY N/A 5/18/2017    Procedure: LAPAROTOMY EXPLORATORY;   Exploratry Laparotomy, Small Bowel Resection with anastomosis, Flexible Sigmoidoscopy;  Surgeon: Jennifer Goodwin MD;  Location: [facility] OR     PICC INSERTION Right 04/29/2017    4fr SL BioFlo PICC, 37cm (3cm external) in the R basilic vein w/ tip in the mid SVC.     RESECT SMALL BOWEL WITHOUT OSTOMY N/A 5/18/2017    Procedure: RESECT SMALL BOWEL WITHOUT OSTOMY;;  Surgeon: Jennifer Goodwin MD;  Location: [facility] OR     SIGMOIDOSCOPY FLEXIBLE N/A 5/18/2017    Procedure: SIGMOIDOSCOPY FLEXIBLE;;  Surgeon: Jennifer Goodwin MD;  Location: [facility] OR       FAMILY HISTORY:   Family History   Problem Relation Age of Onset     DIABETES Mother      Ovarian Cancer No family hx of      Uterine Cancer No family hx of      Cervical Cancer No family hx of      Breast Cancer No family hx of        SOCIAL HISTORY:   Social History   Substance Use Topics     Smoking status: Light Tobacco Smoker     Packs/day: 0.25     Years: 12.00     Types: Cigarettes     Smokeless tobacco: Never Used      Comment: 2/22/16 pt smoking daily 1 cig     Alcohol use No      Comment: None per pt       Patient's Medications   New Prescriptions    No medications on file   Previous Medications    ACETAMINOPHEN (TYLENOL) 500 MG TABLET    Take 2 tablets (1,000 mg) by mouth 4 times daily    ALBUTEROL (PROAIR HFA/PROVENTIL HFA/VENTOLIN HFA) 108 (90 BASE) MCG/ACT INHALER    Inhale 2 puffs into the lungs every 6 hours as needed    CALCIUM CARBONATE (TUMS) 500 MG CHEWABLE TABLET    Take  500 mg by mouth Reported on 5/8/2017    CHOLECALCIFEROL 1000 UNITS TABS    Take 1,000 Units by mouth daily    HYOSCYAMINE (ANASPAZ/LEVSIN) 0.125 MG TABLET    Take 1-2 tablets (125-250 mcg) by mouth every 4 hours as needed for cramping    ONDANSETRON (ZOFRAN) 4 MG TABLET    Take 1-2 tablets (4-8 mg) by mouth every 8 hours as needed for nausea    OXYCODONE (ROXICODONE) 5 MG IR TABLET    Take 1 tablet (5 mg) by mouth daily as needed for moderate to severe pain    POLYETHYLENE GLYCOL (MIRALAX/GLYCOLAX) PACKET    Take 17 g by mouth daily    SACCHAROMYCES BOULARDII (FLORASTOR) 250 MG CAPSULE    Take 1 capsule (250 mg) by mouth 2 times daily    SIMETHICONE (MYLICON) 80 MG CHEWABLE TABLET    Take 80 mg by mouth Reported on 5/8/2017    VITAMIN D (ERGOCALCIFEROL) 53273 UNIT CAPSULE    Take 1 capsule (50,000 Units) by mouth once a week   Modified Medications    No medications on file   Discontinued Medications    No medications on file          Allergies   Allergen Reactions     No Clinical Screening - See Comments Other (See Comments) and Diarrhea     headache  Carrots cause gastric upset, cramping and diarrhea.     Sulfa Drugs Hives     hives     Amoxicillin Unknown and Other (See Comments)     vomiting  vomiting     Amoxicillin-Pot Clavulanate Other (See Comments) and Nausea     vomiting     Augmentin GI Disturbance, Nausea and Hives     Avelox [Moxifloxacin] Nausea and Vomiting, Unknown and Nausea     Ciprofloxacin Hives and Nausea     Codeine Nausea and Vomiting and Nausea     Ibuprofen Nausea and Vomiting     Other reaction(s): Nausea And Vomiting     Ibuprofen Sodium Hives and GI Disturbance     Quinolones      Tramadol Hives, Diarrhea, Nausea and Nausea and Vomiting     Daucus Carota      Other reaction(s): GI Upset  Other reaction(s): Abdominal pain, Diarrhea  Carrots cause gastric upset, cramping and diarrhea.      Review of Systems   Constitutional: Positive for activity change and appetite change. Negative for  fever.   Respiratory: Negative.    Cardiovascular: Negative.    Gastrointestinal: Positive for abdominal pain, nausea and vomiting.   Genitourinary: Negative.    All other systems reviewed and are negative.      Physical Exam    /71  Pulse 88  Temp 98.6  F (37  C) (Oral)  Resp 18  SpO2 98%   Physical Exam   Constitutional: She is oriented to person, place, and time. She appears well-developed and well-nourished. She appears distressed.   HENT:   Head: Normocephalic and atraumatic.   Mouth/Throat: Oropharynx is clear and moist.   Eyes: Conjunctivae and EOM are normal. Pupils are equal, round, and reactive to light.   Neck: Normal range of motion. Neck supple. No JVD present.   Cardiovascular: Normal rate, regular rhythm, normal heart sounds and intact distal pulses.    Pulmonary/Chest: Effort normal and breath sounds normal. No respiratory distress. She has no wheezes. She has no rales.   Abdominal: Soft. She exhibits no mass. There is tenderness. There is no guarding.   Musculoskeletal: Normal range of motion.        Right lower leg: Normal.        Left lower leg: Normal.   Neurological: She is alert and oriented to person, place, and time. No cranial nerve deficit.   Skin: Skin is warm and dry.   Psychiatric: Her mood appears anxious.   Nursing note and vitals reviewed.      ED Course     ED Course     Procedures    Results for orders placed or performed during the hospital encounter of 07/10/17 (from the past 24 hour(s))   CBC with platelets differential   Result Value Ref Range    WBC 7.6 4.0 - 11.0 10e9/L    RBC Count 4.32 3.8 - 5.2 10e12/L    Hemoglobin 13.1 11.7 - 15.7 g/dL    Hematocrit 41.1 35.0 - 47.0 %    MCV 95 78 - 100 fl    MCH 30.3 26.5 - 33.0 pg    MCHC 31.9 31.5 - 36.5 g/dL    RDW 13.8 10.0 - 15.0 %    Platelet Count 401 150 - 450 10e9/L    Diff Method Automated Method     % Neutrophils 59.1 %    % Lymphocytes 29.4 %    % Monocytes 10.0 %    % Eosinophils 0.9 %    % Basophils 0.3 %    %  Immature Granulocytes 0.3 %    Nucleated RBCs 0 0 /100    Absolute Neutrophil 4.5 1.6 - 8.3 10e9/L    Absolute Lymphocytes 2.2 0.8 - 5.3 10e9/L    Absolute Monocytes 0.8 0.0 - 1.3 10e9/L    Absolute Eosinophils 0.1 0.0 - 0.7 10e9/L    Absolute Basophils 0.0 0.0 - 0.2 10e9/L    Abs Immature Granulocytes 0.0 0 - 0.4 10e9/L    Absolute Nucleated RBC 0.0    Comprehensive metabolic panel   Result Value Ref Range    Sodium 138 133 - 144 mmol/L    Potassium 3.3 (L) 3.4 - 5.3 mmol/L    Chloride 101 94 - 109 mmol/L    Carbon Dioxide 30 20 - 32 mmol/L    Anion Gap 7 3 - 14 mmol/L    Glucose 94 70 - 99 mg/dL    Urea Nitrogen 10 7 - 30 mg/dL    Creatinine 0.74 0.52 - 1.04 mg/dL    GFR Estimate 86 >60 mL/min/1.7m2    GFR Estimate If Black >90   GFR Calc   >60 mL/min/1.7m2    Calcium 9.0 8.5 - 10.1 mg/dL    Bilirubin Total 0.4 0.2 - 1.3 mg/dL    Albumin 3.2 (L) 3.4 - 5.0 g/dL    Protein Total 6.8 6.8 - 8.8 g/dL    Alkaline Phosphatase 74 40 - 150 U/L    ALT 12 0 - 50 U/L    AST 10 0 - 45 U/L   Lipase   Result Value Ref Range    Lipase 98 73 - 393 U/L   CT Abdomen Pelvis w Contrast    Narrative    EXAMINATION: CT ABDOMEN PELVIS W CONTRAST, 7/10/2017 2:17 PM    TECHNIQUE:  Helical CT images from the lung bases through the  symphysis pubis were obtained with IV contrast. Contrast dose:  iopamidol (ISOVUE-370) solution 95 mL    COMPARISON: PET/CT 5/12/2017.     HISTORY: abd pain. Cervical cancer, status post pelvic radiation.  Small bowel obstruction status post resection with anastomosis on  5/18/2017.    FINDINGS:    Abdomen and pelvis:   New postoperative changes of small bowel resection and re-anastomosis  in the left lower quadrant of the abdomen. There is a short segment of  distal jejunum/proximal ileum in the midabdomen (series 5 image 245),  which demonstrates moderate wall thickening. Distal to this, there are  a few mildly distended loops leading up to the left lower quadrant  small bowel anastomosis.  Distal to the anastomosis, ileum is  decompressed, although appears to demonstrate mild wall thickening and  hyperenhancement. No pneumatosis, portal venous gas or free air in the  abdomen.    The liver, gallbladder, pancreas, spleen, and adrenals are within  normal limits. Small hypoattenuating focus in the left kidney is too  small to characterize. No hydronephrosis or hydroureter. Mild wall  thickening of the urinary bladder is not significantly changed,  presumably related to prior pelvic radiation.  No adnexal mass. The  colon is within normal limits. Mild layering free fluid in the abdomen  and pelvis. Major vasculature is patent. Aorta is normal in caliber.    Lung bases: Clear.    Bones and soft tissues: No acute fracture or suspicious bone lesion.      Impression    IMPRESSION:   1. Postoperative changes of small bowel resection with anastomosis.  Nonspecific segment of distal jejunum/proximal ileum with moderate  wall may represent infectious or inflammatory etiology, although bowel  ischemia is in the differential. Of note, the major mesenteric  vasculature appears patent.  2. Mildly distended small bowel loops immediately upstream from the  anastomosis without wall thickening may represent partial small bowel  obstruction. Distal small bowel loops are decompressed.   3. No extraluminal air or portal venous gas.  4. Mild free fluid in the abdomen and pelvis.    I have personally reviewed the examination and initial interpretation  and I agree with the findings.    VIRGILIO BECKETT MD   Lactic acid whole blood   Result Value Ref Range    Lactic Acid 0.8 0.7 - 2.1 mmol/L     Medications   0.9% sodium chloride infusion ( Intravenous Stopped 7/10/17 1530)   ondansetron (ZOFRAN) injection 4 mg (4 mg Intravenous Given 7/10/17 1245)   morphine (PF) injection 4 mg (4 mg Intravenous Given 7/10/17 1512)   iopamidol (ISOVUE-370) solution 95 mL (95 mLs Intravenous Given 7/10/17 1351)   sodium chloride 0.9 % for CT scan  "flush dose 74 mL (74 mLs Intravenous Given 7/10/17 0895)        2:58 PM Colorectal fellow Ericka here to see patient. Patient's pain is managed by pain clinic.     Assessments & Plan (with Medical Decision Making)   45-year-old female with complex history with recent exploratory laparotomy with small bowel resection and anastomosis secondary to bowel obstruction from adhesions.  She presents now with 2-3 days of what she describes as severe mid abdominal discomfort.  Colorectal was aware of the patient. They saw her and CT scan was done, please see the final report. Ultimately she does not seem to be obstructed. She does have some thickened loop of small bowel in the mid abdomen. Her lactate is normal and I doubt this is ischemic. I will admit her to their service as an observation patient for IV fluids and continuation of her chronic pain medications.    5:18 PM The patient is requesting to be discharged. I spoke to her and the CR Fellow, she will be signed out AMA, she says she \"can't spend another day in the hospital\"  She was told this could be dangerous for her and that she can return to the ED at anytime.      This part of the document was transcribed by Clari Miramontes Medical Scribe.      I have reviewed the nursing notes.    I have reviewed the findings, diagnosis, plan and need for follow up with the patient.    New Prescriptions    No medications on file       Final diagnoses:   Abdominal pain       7/10/2017   [facility], [place], EMERGENCY DEPARTMENT     Herman Patel MD  07/10/17 9638       Herman Patel MD  07/10/17 4798    "

## 2017-07-11 ENCOUNTER — CARE COORDINATION (OUTPATIENT)
Dept: SURGERY | Facility: CLINIC | Age: 42
End: 2017-07-11

## 2017-07-11 DIAGNOSIS — E55.9 VITAMIN D DEFICIENCY: ICD-10-CM

## 2017-07-11 NOTE — PROGRESS NOTES
"(see ED note of yesterday--pt is aware she left AMA, states \"I know it wasn't the smartest thing in the world but I've been in that hospital for the better part of the last 3 years & I don't know if I can stay another day\").    She states she is better today--had a large BM this AM; She says when she has pain it is by her belly button.  She staying with LRD.  Advised her to eat deliberately--chew well, eat smaller amts more frequently, stay well hydrated.  She is scheduled for next available postop appt with [facility] on 8/7/17 & she is aware of this appt.  Pt is aware that if she develops severe pain and/or vomiting to return to ED  "

## 2017-07-13 RX ORDER — ERGOCALCIFEROL 1.25 MG/1
50000 CAPSULE, LIQUID FILLED ORAL WEEKLY
Qty: 8 CAPSULE | Refills: 0 | Status: SHIPPED | OUTPATIENT
Start: 2017-07-13 | End: 2018-05-11

## 2017-07-13 NOTE — TELEPHONE ENCOUNTER
vitamin D 50,000 U       Last Written Prescription Date:  4/19/17  Last Fill Quantity: 12,   # refills: 0  Last Office Visit : 6/6/17  Future Office visit:  7/13/17  *ROUTE TO MD

## 2017-07-14 ENCOUNTER — HOSPITAL ENCOUNTER (OUTPATIENT)
Facility: CLINIC | Age: 42
Setting detail: SPECIMEN
Discharge: HOME | End: 2017-07-14
Attending: PHYSICIAN ASSISTANT | Admitting: PHYSICIAN ASSISTANT
Payer: COMMERCIAL

## 2017-07-14 ENCOUNTER — TELEPHONE (OUTPATIENT)
Dept: SURGERY | Facility: CLINIC | Age: 42
End: 2017-07-14

## 2017-07-14 ENCOUNTER — TELEPHONE (OUTPATIENT)
Dept: ANESTHESIOLOGY | Facility: CLINIC | Age: 42
End: 2017-07-14

## 2017-07-14 ENCOUNTER — CARE COORDINATION (OUTPATIENT)
Dept: SURGERY | Facility: CLINIC | Age: 42
End: 2017-07-14

## 2017-07-14 DIAGNOSIS — Z01.818 PRE-OP EXAM: ICD-10-CM

## 2017-07-14 DIAGNOSIS — R10.32 ABDOMINAL PAIN, LEFT LOWER QUADRANT: Primary | ICD-10-CM

## 2017-07-14 DIAGNOSIS — R10.31 ABDOMINAL PAIN, RIGHT LOWER QUADRANT: ICD-10-CM

## 2017-07-14 LAB
ABO + RH BLD: NORMAL
ABO + RH BLD: NORMAL
ANION GAP SERPL CALCULATED.3IONS-SCNC: 5 MMOL/L (ref 3–14)
BLD GP AB SCN SERPL QL: NORMAL
BLOOD BANK CMNT PATIENT-IMP: NORMAL
BUN SERPL-MCNC: 7 MG/DL (ref 7–30)
CALCIUM SERPL-MCNC: 9.4 MG/DL (ref 8.5–10.1)
CHLORIDE SERPL-SCNC: 101 MMOL/L (ref 94–109)
CO2 SERPL-SCNC: 31 MMOL/L (ref 20–32)
CREAT SERPL-MCNC: 0.68 MG/DL (ref 0.52–1.04)
ERYTHROCYTE [DISTWIDTH] IN BLOOD BY AUTOMATED COUNT: 13.7 % (ref 10–15)
GFR SERPL CREATININE-BSD FRML MDRD: NORMAL ML/MIN/1.7M2
GLUCOSE SERPL-MCNC: 88 MG/DL (ref 70–99)
HCT VFR BLD AUTO: 43.6 % (ref 35–47)
HGB BLD-MCNC: 14.1 G/DL (ref 11.7–15.7)
INR PPP: 1.06 (ref 0.86–1.14)
MAGNESIUM SERPL-MCNC: 2 MG/DL (ref 1.6–2.3)
MCH RBC QN AUTO: 30.7 PG (ref 26.5–33)
MCHC RBC AUTO-ENTMCNC: 32.3 G/DL (ref 31.5–36.5)
MCV RBC AUTO: 95 FL (ref 78–100)
PLATELET # BLD AUTO: 428 10E9/L (ref 150–450)
POTASSIUM SERPL-SCNC: 4.2 MMOL/L (ref 3.4–5.3)
RBC # BLD AUTO: 4.59 10E12/L (ref 3.8–5.2)
SODIUM SERPL-SCNC: 137 MMOL/L (ref 133–144)
SPECIMEN EXP DATE BLD: NORMAL
WBC # BLD AUTO: 8.9 10E9/L (ref 4–11)

## 2017-07-14 PROCEDURE — 83735 ASSAY OF MAGNESIUM: CPT | Performed by: PHYSICIAN ASSISTANT

## 2017-07-14 PROCEDURE — 80048 BASIC METABOLIC PNL TOTAL CA: CPT | Performed by: PHYSICIAN ASSISTANT

## 2017-07-14 PROCEDURE — 36415 COLL VENOUS BLD VENIPUNCTURE: CPT | Performed by: PHYSICIAN ASSISTANT

## 2017-07-14 NOTE — PROGRESS NOTES
Per Dr Harris who spoke with Dr. Goodwin re today's labs and xr, pt should take Miralax daily for stool burden seen on xr.  Relayed this to pt who states she already takes Miralax.  Advised her to take BID.  She became upset and said she's not sure whose patient she is, what is she to do about the pain?    Explained to pt that after recent examinations, imaging, labs that the recommendation is for her to move her bowel more regularly.  Suggested to her she speak to her pain mgmt provider--she states they ask her to talk to PCP.  I explained to pt that I can not get between those two providers, and our MD is recommending Miralax.    Instructed her to come to ED if she is experiencing severe pain, nausea, vomiting, inability to pass stool, or fever.  She hung up before I was finished talking.

## 2017-07-14 NOTE — TELEPHONE ENCOUNTER
Attempted to call MsDaniela Gerhardt to follow up after on call surgery resident received a call from PA during an urgent care visit yesterday.  There was no answer.

## 2017-07-16 ENCOUNTER — HOSPITAL ENCOUNTER (EMERGENCY)
Facility: CLINIC | Age: 42
Discharge: HOME | End: 2017-07-16
Attending: EMERGENCY MEDICINE | Admitting: EMERGENCY MEDICINE
Payer: COMMERCIAL

## 2017-07-16 ENCOUNTER — APPOINTMENT (OUTPATIENT)
Dept: GENERAL RADIOLOGY | Facility: CLINIC | Age: 42
End: 2017-07-16
Attending: EMERGENCY MEDICINE
Payer: COMMERCIAL

## 2017-07-16 VITALS
TEMPERATURE: 98.5 F | SYSTOLIC BLOOD PRESSURE: 100 MMHG | DIASTOLIC BLOOD PRESSURE: 68 MMHG | BODY MASS INDEX: 22.92 KG/M2 | WEIGHT: 154.76 LBS | OXYGEN SATURATION: 98 % | RESPIRATION RATE: 16 BRPM | HEIGHT: 69 IN | HEART RATE: 94 BPM

## 2017-07-16 VITALS
HEART RATE: 99 BPM | OXYGEN SATURATION: 98 % | DIASTOLIC BLOOD PRESSURE: 68 MMHG | TEMPERATURE: 98.5 F | BODY MASS INDEX: 22.92 KG/M2 | WEIGHT: 154.76 LBS | RESPIRATION RATE: 16 BRPM | HEIGHT: 69 IN | SYSTOLIC BLOOD PRESSURE: 100 MMHG

## 2017-07-16 DIAGNOSIS — R19.7 DIARRHEA, UNSPECIFIED TYPE: ICD-10-CM

## 2017-07-16 DIAGNOSIS — R10.9 ABDOMINAL PAIN, UNSPECIFIED LOCATION: ICD-10-CM

## 2017-07-16 DIAGNOSIS — G89.29 CHRONIC ABDOMINAL PAIN: ICD-10-CM

## 2017-07-16 DIAGNOSIS — R10.9 CHRONIC ABDOMINAL PAIN: ICD-10-CM

## 2017-07-16 LAB
ALBUMIN UR-MCNC: NEGATIVE MG/DL
ALBUMIN UR-MCNC: NEGATIVE MG/DL
APPEARANCE UR: CLEAR
APPEARANCE UR: CLEAR
BACTERIA #/AREA URNS HPF: ABNORMAL /HPF
BASOPHILS # BLD AUTO: 0 10E9/L (ref 0–0.2)
BASOPHILS NFR BLD AUTO: 0.4 %
BILIRUB UR QL STRIP: NEGATIVE
BILIRUB UR QL STRIP: NEGATIVE
COLOR UR AUTO: ABNORMAL
COLOR UR AUTO: ABNORMAL
DIFFERENTIAL METHOD BLD: NORMAL
EOSINOPHIL # BLD AUTO: 0.3 10E9/L (ref 0–0.7)
EOSINOPHIL NFR BLD AUTO: 3.5 %
ERYTHROCYTE [DISTWIDTH] IN BLOOD BY AUTOMATED COUNT: 13.9 % (ref 10–15)
GLUCOSE UR STRIP-MCNC: NEGATIVE MG/DL
GLUCOSE UR STRIP-MCNC: NEGATIVE MG/DL
HCT VFR BLD AUTO: 40 % (ref 35–47)
HGB BLD-MCNC: 12.6 G/DL (ref 11.7–15.7)
HGB UR QL STRIP: NEGATIVE
HGB UR QL STRIP: NEGATIVE
IMM GRANULOCYTES # BLD: 0 10E9/L (ref 0–0.4)
IMM GRANULOCYTES NFR BLD: 0.2 %
KETONES UR STRIP-MCNC: NEGATIVE MG/DL
KETONES UR STRIP-MCNC: NEGATIVE MG/DL
LACTATE BLD-SCNC: 0.8 MMOL/L (ref 0.7–2.1)
LEUKOCYTE ESTERASE UR QL STRIP: ABNORMAL
LEUKOCYTE ESTERASE UR QL STRIP: ABNORMAL
LYMPHOCYTES # BLD AUTO: 1.8 10E9/L (ref 0.8–5.3)
LYMPHOCYTES NFR BLD AUTO: 21.5 %
MCH RBC QN AUTO: 30.4 PG (ref 26.5–33)
MCHC RBC AUTO-ENTMCNC: 31.5 G/DL (ref 31.5–36.5)
MCV RBC AUTO: 97 FL (ref 78–100)
MONOCYTES # BLD AUTO: 0.5 10E9/L (ref 0–1.3)
MONOCYTES NFR BLD AUTO: 5.9 %
MUCOUS THREADS #/AREA URNS LPF: PRESENT /LPF
MUCOUS THREADS #/AREA URNS LPF: PRESENT /LPF
NEUTROPHILS # BLD AUTO: 5.7 10E9/L (ref 1.6–8.3)
NEUTROPHILS NFR BLD AUTO: 68.5 %
NITRATE UR QL: NEGATIVE
NITRATE UR QL: NEGATIVE
NRBC # BLD AUTO: 0 10*3/UL
NRBC BLD AUTO-RTO: 0 /100
PH UR STRIP: 5 PH (ref 5–7)
PH UR STRIP: 5 PH (ref 5–7)
PLATELET # BLD AUTO: 353 10E9/L (ref 150–450)
RBC # BLD AUTO: 4.14 10E12/L (ref 3.8–5.2)
RBC #/AREA URNS AUTO: 2 /HPF (ref 0–2)
RBC #/AREA URNS AUTO: <1 /HPF (ref 0–2)
SP GR UR STRIP: 1.01 (ref 1–1.03)
SP GR UR STRIP: 1.01 (ref 1–1.03)
SQUAMOUS #/AREA URNS AUTO: 1 /HPF (ref 0–1)
SQUAMOUS #/AREA URNS AUTO: 3 /HPF (ref 0–1)
TRANS CELLS #/AREA URNS HPF: <1 /HPF (ref 0–1)
URN SPEC COLLECT METH UR: ABNORMAL
URN SPEC COLLECT METH UR: ABNORMAL
UROBILINOGEN UR STRIP-MCNC: NORMAL MG/DL (ref 0–2)
UROBILINOGEN UR STRIP-MCNC: NORMAL MG/DL (ref 0–2)
WBC # BLD AUTO: 8.3 10E9/L (ref 4–11)
WBC #/AREA URNS AUTO: 1 /HPF (ref 0–2)
WBC #/AREA URNS AUTO: 13 /HPF (ref 0–2)

## 2017-07-16 PROCEDURE — 83605 ASSAY OF LACTIC ACID: CPT | Performed by: EMERGENCY MEDICINE

## 2017-07-16 PROCEDURE — 81001 URINALYSIS AUTO W/SCOPE: CPT | Performed by: EMERGENCY MEDICINE

## 2017-07-16 PROCEDURE — 25000128 H RX IP 250 OP 636: Performed by: EMERGENCY MEDICINE

## 2017-07-16 PROCEDURE — 99284 EMERGENCY DEPT VISIT MOD MDM: CPT | Performed by: EMERGENCY MEDICINE

## 2017-07-16 PROCEDURE — 74020 XR ABDOMEN 2 VW: CPT

## 2017-07-16 PROCEDURE — 99284 EMERGENCY DEPT VISIT MOD MDM: CPT | Mod: Z6 | Performed by: EMERGENCY MEDICINE

## 2017-07-16 PROCEDURE — 85025 COMPLETE CBC W/AUTO DIFF WBC: CPT | Performed by: EMERGENCY MEDICINE

## 2017-07-16 PROCEDURE — 87086 URINE CULTURE/COLONY COUNT: CPT | Performed by: EMERGENCY MEDICINE

## 2017-07-16 PROCEDURE — 99283 EMERGENCY DEPT VISIT LOW MDM: CPT

## 2017-07-16 PROCEDURE — 25000132 ZZH RX MED GY IP 250 OP 250 PS 637: Performed by: EMERGENCY MEDICINE

## 2017-07-16 RX ORDER — OXYCODONE HYDROCHLORIDE 5 MG/1
5 TABLET ORAL ONCE
Status: COMPLETED | OUTPATIENT
Start: 2017-07-16 | End: 2017-07-16

## 2017-07-16 RX ADMIN — SODIUM CHLORIDE 1000 ML: 9 INJECTION, SOLUTION INTRAVENOUS at 08:42

## 2017-07-16 RX ADMIN — OXYCODONE HYDROCHLORIDE 5 MG: 5 TABLET ORAL at 09:03

## 2017-07-16 ASSESSMENT — ENCOUNTER SYMPTOMS
FLANK PAIN: 0
VOMITING: 0
DIARRHEA: 0
SHORTNESS OF BREATH: 0
SHORTNESS OF BREATH: 0
NAUSEA: 0
HEADACHES: 0
FEVER: 0
COUGH: 0
ABDOMINAL PAIN: 1
FEVER: 0
VOMITING: 0
ABDOMINAL PAIN: 1
NAUSEA: 0

## 2017-07-16 NOTE — ED AVS SNAPSHOT
[facility], Emergency Department    [address] [facility] [address]    Phone:  [phone]                                       Rachel A Gerhardt   MRN: [id number]    Department:  [facility], Emergency Department   Date of Visit:  7/16/2017           Patient Information     Date Of Birth          1975        Your diagnoses for this visit were:     Abdominal pain, unspecified location        You were seen by Ray Morrison MD.        Discharge Instructions         *Abdominal Pain, Unknown Cause (Female)    The exact cause of your abdominal (stomach) pain is not certain. This does not mean that this is something to worry about, or the right tests were not done. Everyone likes to know the exact cause of the problem, but sometimes with abdominal pain, there is no clear-cut cause, and this could be a good thing. The good news is that your symptoms can be treated, and you will feel better.   Your condition does not seem serious now; however, sometimes the signs of a serious problem may take more time to appear. For this reason, it is important for you to watch for any new symptoms, problems, or worsening of your condition.  Over the next few days, the abdominal pain may come and go, or be continuous. Other common symptoms can include nausea and vomiting. Sometimes it can be difficult to tell if you feel nauseous, you may just feel bad and not associate that feeling with nausea. Constipation, diarrhea, and a fever may go along with the pain.  The pain may continue even if treated correctly over the following days. Depending on how things go, sometimes the cause can become clear and may require further or different treatment. Additional evaluations, medications, or tests may be needed.  Home care  Your health care provider may prescribe medications for pain, symptoms, or an infection.  Follow the health care provider's instructions for taking these medications.  General care    Rest until your  next exam. No strenuous activities.    Try to find positions that ease discomfort. A small pillow placed on the abdomen may help relieve pain.    Something warm on your abdomen (such as a heating pad) may help, but be careful not to burn yourself.  Diet    Do not force yourself to eat, especially if having cramps, vomiting, or diarrhea.    Water is important so you do not get dehydrated. Soup may also be good. Sports drinks may also help, especially if they are not too acidic. Make sure you don't drink sugary drinks as this can make things worse. Take liquids in small amounts. Do not guzzle them.    Caffeine sometimes makes the pain and cramping worse.    Avoid dairy products if you have vomiting or diarrhea.    Don't eat large amounts at a time. Wait a few minutes between bites.    Eat a diet low in fiber (called a low-residue diet). Foods allowed include refined breads, white rice, fruit and vegetable juices without pulp, tender meats. These foods will pass more easily through the intestine.    Avoid fried or fatty foods, dairy, alcohol and spicy foods until your symptoms go away.  Follow-up care  Follow up with your health care provider as instructed, or if your pain does not begin to improve in the next 24 hours.  When to seek medical care  Seek prompt medical care if any of the following occur:    Pain gets worse or moves to the right lower abdomen    New or worsening vomiting or diarrhea    Swelling of the abdomen    Unable to pass stool for more than three days    New fever over 101  F (38.3 C), or rising fever    Blood in vomit or bowel movements (dark red or black color)    Jaundice (yellow color of eyes and skin)    Weakness, dizziness    Chest, arm, back, neck or jaw pain    Unexpected vaginal bleeding or missed period  Call 911  Call emergency services if any of the following occur:    Trouble breathing    Confusion    Fainting or loss of consciousness    Rapid heart rate    Seizure    [id number] [facility], [address] [place] [address]. All rights reserved. This information is not intended as a substitute for professional medical care. Always follow your healthcare professional's instructions.          Future Appointments        Provider Department Dept Phone Center    8/7/2017 6:15 PM Jennifer Goodwin MD [facility] Colon and Rectal Surgery [phone] [facility]      24 Hour Appointment Hotline       To make an appointment at any [facility], call [phone] ([phone]). If you don't have a family doctor or clinic, we will help you find one. [place] clinics are conveniently located to serve the needs of you and your family.             Review of your medicines      Our records show that you are taking the medicines listed below. If these are incorrect, please call your family doctor or clinic.        Dose / Directions Last dose taken    acetaminophen 500 MG tablet   Commonly known as:  TYLENOL   Dose:  1000 mg   Quantity:  80 tablet        Take 2 tablets (1,000 mg) by mouth 4 times daily   Refills:  0        albuterol 108 (90 BASE) MCG/ACT Inhaler   Commonly known as:  PROAIR HFA/PROVENTIL HFA/VENTOLIN HFA   Dose:  2 puff   Quantity:  1 Inhaler        Inhale 2 puffs into the lungs every 6 hours as needed   Refills:  0        cholecalciferol 1000 UNITS Tabs   Dose:  1000 Units   Quantity:  30 tablet        Take 1,000 Units by mouth daily   Refills:  0        hyoscyamine 0.125 MG tablet   Commonly known as:  ANASPAZ/LEVSIN   Dose:  0.125-0.25 mg   Quantity:  40 tablet        Take 1-2 tablets (125-250 mcg) by mouth every 4 hours as needed for cramping   Refills:  1        ondansetron 4 MG tablet   Commonly known as:  ZOFRAN   Dose:  4-8 mg   Quantity:  30 tablet        Take 1-2 tablets (4-8 mg) by mouth every 8 hours as needed for nausea   Refills:  0        polyethylene glycol Packet   Commonly known as:  MIRALAX/GLYCOLAX   Dose:  17 g   Quantity:  30 packet        Take 17 g by mouth  daily   Refills:  0        saccharomyces boulardii 250 MG capsule   Commonly known as:  FLORASTOR   Dose:  250 mg   Quantity:  60 capsule        Take 1 capsule (250 mg) by mouth 2 times daily   Refills:  0        simethicone 80 MG chewable tablet   Commonly known as:  MYLICON   Dose:  80 mg        Take 80 mg by mouth Reported on 5/8/2017   Refills:  0        TUMS 500 MG chewable tablet   Dose:  500 mg   Generic drug:  calcium carbonate        Take 500 mg by mouth Reported on 5/8/2017   Refills:  0        vitamin D 10761 UNIT capsule   Commonly known as:  ERGOCALCIFEROL   Dose:  80986 Units   Quantity:  8 capsule        Take 1 capsule (50,000 Units) by mouth once a week Needs labs checked prior to additional refills   Refills:  0                Procedures and tests performed during your visit     UA reflex to Microscopic and Culture      Orders Needing Specimen Collection     None      Pending Results     Date and Time Order Name Status Description    7/16/2017 0843 Urine Culture Aerobic Bacterial Preliminary             Pending Culture Results     Date and Time Order Name Status Description    7/16/2017 0843 Urine Culture Aerobic Bacterial Preliminary             Pending Results Instructions     If you had any lab results that were not finalized at the time of your Discharge, you can call the ED Lab Result RN at [phone]. You will be contacted by this team for any positive Lab results or changes in treatment. The nurses are available 7 days a week from 10A to 6:30P.  You can leave a message 24 hours per day and they will return your call.        Thank you for choosing [facility]       Thank you for choosing [facility] for your care. Our goal is always to provide you with excellent care. Hearing back from our patients is one way we can continue to improve our services. Please take a few minutes to complete the written survey that you may receive in the mail after you visit with us. Thank you!        [facility]  Information     [facility] gives you secure access to your electronic health record. If you see a primary care provider, you can also send messages to your care team and make appointments. If you have questions, please call your primary care clinic.  If you do not have a primary care provider, please call [phone] and they will assist you.        Care EveryWhere ID     This is your Care EveryWhere ID. This could be used by other organizations to access your [place] medical records  [id number]        Equal Access to Services     MARISOL GAN : Hadii aad ku hadasho Soomaali, waaxda luqadaha, qaybta kaalmada adeegyada, wendy alonso . So [facility] [phone].    ATENCIÓN: Si habla español, tiene a epps disposición servicios gratuitos de asistencia lingüística. Llame al [phone].    We comply with applicable federal civil rights laws and [place] laws. We do not discriminate on the basis of race, color, national origin, age, disability sex, sexual orientation or gender identity.            After Visit Summary       This is your record. Keep this with you and show to your community pharmacist(s) and doctor(s) at your next visit.

## 2017-07-16 NOTE — ED PROVIDER NOTES
"  History     Chief Complaint   Patient presents with     Abdominal Pain     HPI  Rachel A Gerhardt is a 41 year old female with a history of cervical cancer s/p radiation, history of opiate dependence, more recently with recurrent SBOs c/b malnutrition (previously on TPN via PICC) who presents for evaluation of abdominal pain. The patient was was hospitalized here recently (5/17/17-5/24/17) for laparoscopic small bowel resection with anastomosis for recurrent SBO and small bowel strictures. She was seen in this ED on 7/10 (6 days ago) with complaint of acute on chronic abdominal pain. CT was obtained (see below) with normal labs. Plan was to admit the patient to observation, but she declined. Patient returned earlier today describing progressively worsening abdominal pain since then. She had abdominal x-ray showing \"mild distension of single left-sided small bowel loops without air-fluid levels.\" She left AMA due to a family emergency prior to complete workup. She returns now stating her abdominal pain persists. She denies fevers, nausea, vomiting, chest pain, or shortness of breath.     No current facility-administered medications for this encounter.      Current Outpatient Prescriptions   Medication     cholecalciferol 1000 UNITS TABS     ondansetron (ZOFRAN) 4 MG tablet     HYDROcodone-acetaminophen (NORCO) 5-325 MG per tablet     vitamin D (ERGOCALCIFEROL) 00626 UNIT capsule     albuterol (PROAIR HFA/PROVENTIL HFA/VENTOLIN HFA) 108 (90 BASE) MCG/ACT Inhaler     acetaminophen (TYLENOL) 500 MG tablet     saccharomyces boulardii (FLORASTOR) 250 MG capsule     polyethylene glycol (MIRALAX/GLYCOLAX) Packet     calcium carbonate (TUMS) 500 MG chewable tablet     simethicone (MYLICON) 80 MG chewable tablet     Past Medical History:   Diagnosis Date     Asthma      Cancer (H)     Per patient OBGYN, cerivical cancer     Cervical cancer (H)      Other chronic pain      Ovarian cancer (H)      Substance abuse     Outside " records indicate past history of narcotics abuse or dependence, but patient denies.       Past Surgical History:   Procedure Laterality Date     COMBINED CYSTOSCOPY, INSERT STENT URETER(S) Bilateral 5/18/2017    Procedure: COMBINED CYSTOSCOPY, INSERT STENT URETER(S);  Cystoscopy with Bilateral Stent,;  Surgeon: Rene Calero MD;  Location: [facility] OR     ENT SURGERY  2009    mastoid, sinus     EXAM UNDER ANESTHESIA, INSERT ALEX SLEEVE, UTERINE PLACEMENT OF TANDEM AND RING FOR RAD, ULTRASOUND N/A 12/14/2015    Procedure: EXAM UNDER ANESTHESIA, INSERT ALEX SLEEVE, UTERINE PLACEMENT OF TANDEM AND RING FOR RADIATION, ULTRASOUND GUIDED;  Surgeon: Abby Tony MD;  Location: [facility] OR     INSERT TANDEM AND CESIUM APPLICATOR CERVIX, ULTRASOUND GUIDED N/A 12/17/2015    Procedure: INSERT TANDEM AND CESIUM APPLICATOR CERVIX, ULTRASOUND GUIDED;  Surgeon: Kika Wood MD;  Location: [facility] OR     KNEE SURGERY       LAPAROTOMY EXPLORATORY N/A 5/18/2017    Procedure: LAPAROTOMY EXPLORATORY;   Exploratry Laparotomy, Small Bowel Resection with anastomosis, Flexible Sigmoidoscopy;  Surgeon: Jennifer Goodwin MD;  Location: [facility] OR     PICC INSERTION Right 04/29/2017    4fr SL BioFlo PICC, 37cm (3cm external) in the R basilic vein w/ tip in the mid SVC.     RESECT SMALL BOWEL WITHOUT OSTOMY N/A 5/18/2017    Procedure: RESECT SMALL BOWEL WITHOUT OSTOMY;;  Surgeon: Jennifer Goodwin MD;  Location: [facility] OR     SIGMOIDOSCOPY FLEXIBLE N/A 5/18/2017    Procedure: SIGMOIDOSCOPY FLEXIBLE;;  Surgeon: Jennifer Goodwin MD;  Location: [facility] OR       Family History   Problem Relation Age of Onset     DIABETES Mother      Ovarian Cancer No family hx of      Uterine Cancer No family hx of      Cervical Cancer No family hx of      Breast Cancer No family hx of        Social History   Substance Use Topics     Smoking status: Light Tobacco Smoker     Packs/day: 0.25     Years: 12.00     Types: Cigarettes     Smokeless tobacco: Never Used       "Comment: 2/22/16 pt smoking daily 1 cig     Alcohol use No      Comment: None per pt        Allergies   Allergen Reactions     No Clinical Screening - See Comments Other (See Comments) and Diarrhea     headache  Carrots cause gastric upset, cramping and diarrhea.     Sulfa Drugs Hives     hives     Amoxicillin Unknown and Other (See Comments)     vomiting  vomiting     Amoxicillin-Pot Clavulanate Other (See Comments) and Nausea     vomiting     Augmentin GI Disturbance, Nausea and Hives     Avelox [Moxifloxacin] Nausea and Vomiting, Unknown and Nausea     Ciprofloxacin Hives and Nausea     Codeine Nausea and Vomiting and Nausea     Ibuprofen Nausea and Vomiting     Other reaction(s): Nausea And Vomiting     Ibuprofen Sodium Hives and GI Disturbance     Quinolones      Tramadol Hives, Diarrhea, Nausea and Nausea and Vomiting     Daucus Carota      Other reaction(s): GI Upset  Other reaction(s): Abdominal pain, Diarrhea  Carrots cause gastric upset, cramping and diarrhea.       I have reviewed the Medications, Allergies, Past Medical and Surgical History, and Social History in the Epic system.    Review of Systems   Constitutional: Negative for fever.   Respiratory: Negative for cough and shortness of breath.    Cardiovascular: Negative for chest pain.   Gastrointestinal: Positive for abdominal pain. Negative for diarrhea, nausea and vomiting.   All other systems reviewed and are negative.      Physical Exam   BP: 100/68  Pulse: 99  Temp: 98.5  F (36.9  C)  Resp: 18  Height: 175.3 cm (5' 9\")  Weight: 70.2 kg (154 lb 12.2 oz)  SpO2: 96 %  Physical Exam Exam:  Constitutional: healthy, alert and no distress  Head: Normocephalic. No masses, lesions, tenderness or abnormalities  Neck: Neck supple. No adenopathy. Thyroid symmetric, normal size,, Carotids without bruits.  ENT: ENT exam normal, no neck nodes or sinus tenderness  Cardiovascular: negative, PMI normal. No lifts, heaves, or thrills. RRR. No murmurs, clicks " gallops or rub  Respiratory: negative, Percussion normal. Good diaphragmatic excursion. Lungs clear  Gastrointestinal: Abdomen diffusely tender  : Deferred  Musculoskeletal: extremities normal- no gross deformities noted, gait normal and normal muscle tone  Skin: no suspicious lesions or rashes  Neurologic: Gait normal. Reflexes normal and symmetric. Sensation grossly WNL.  Psychiatric: mentation appears normal and affect normal/bright  Hematologic/Lymphatic/Immunologic: Normal cervical lymph nodes      ED Course     ED Course     CT Abdomen/Pelvis, 7/10/17:  1. Postoperative changes of small bowel resection with anastomosis. Nonspecific segment of distal jejunum/proximal ileum with moderate wall may represent infectious or inflammatory etiology, although bowel  ischemia is in the differential. Of note, the major mesenteric vasculature appears patent.  2. Mildly distended small bowel loops immediately upstream from the anastomosis without wall thickening may represent partial small bowel obstruction. Distal small bowel loops are decompressed.   3. No extraluminal air or portal venous gas.  4. Mild free fluid in the abdomen and pelvis.       Procedures                 Labs Ordered and Resulted from Time of ED Arrival Up to the Time of Departure from the ED   UA MACROSCOPIC WITH REFLEX TO MICRO AND CULTURE - Abnormal; Notable for the following:        Result Value    Leukocyte Esterase Urine Trace (*)     Bacteria Urine Few (*)     Mucous Urine Present (*)     All other components within normal limits            Assessments & Plan (with Medical Decision Making)   This is a 41-year-old female who was seen here recently this morning for abdominal pain that is chronic.  Patient states that she's having more of an acute on chronic type of abdominal pain.  Patient had plain film x-ray as well as labs done which was basically normal.  Patient was awaiting colorectal surgery involvement this morning when she stated that  she had a family emergency and had to leave.  Patient is back since her family emergency is over and would like a further evaluation, colorectal surgery consultation.    The lab work was reviewed from this morning as well as plain film x-ray.  Urinalysis showed possible UTI but it was also contaminated.  We will reorder a urinalysis with a super clean-catch this time.  I have also spoken with colorectal surgery team who some point will come down and see her.    No evidence of acute abd and seen by colorectal surgery who agreed that the patient can f/uwith them as outpatient.    I have reviewed the nursing notes.    I have reviewed the findings, diagnosis, plan and need for follow up with the patient.    Discharge Medication List as of 7/16/2017  3:51 PM          Final diagnoses:   Abdominal pain, unspecified location     IRomel, am serving as a trained medical scribe to document services personally performed by Ray Morrison MD, based on the provider's statements to me.      Ray MARCANO MD, was physically present and have reviewed and verified the accuracy of this note documented by Romel Reid.    7/16/2017   [facility], [place], EMERGENCY DEPARTMENT     Ray Morrison MD  07/24/17 4685

## 2017-07-16 NOTE — ED AVS SNAPSHOT
[facility], [place], Emergency Department    [address] [place] [address]    Phone:  [phone]                                       Rachel A Gerhardt   MRN: [id number]    Department:  [facility], Emergency Department   Date of Visit:  7/16/2017           After Visit Summary Signature Page     I have received my discharge instructions, and my questions have been answered. I have discussed any challenges I see with this plan with the nurse or doctor.    ..........................................................................................................................................  Patient/Patient Representative Signature      ..........................................................................................................................................  Patient Representative Print Name and Relationship to Patient    ..................................................               ................................................  Date                                            Time    ..........................................................................................................................................  Reviewed by Signature/Title    ...................................................              ..............................................  Date                                                            Time

## 2017-07-16 NOTE — DISCHARGE INSTRUCTIONS
*Abdominal Pain, Unknown Cause (Female)    The exact cause of your abdominal (stomach) pain is not certain. This does not mean that this is something to worry about, or the right tests were not done. Everyone likes to know the exact cause of the problem, but sometimes with abdominal pain, there is no clear-cut cause, and this could be a good thing. The good news is that your symptoms can be treated, and you will feel better.   Your condition does not seem serious now; however, sometimes the signs of a serious problem may take more time to appear. For this reason, it is important for you to watch for any new symptoms, problems, or worsening of your condition.  Over the next few days, the abdominal pain may come and go, or be continuous. Other common symptoms can include nausea and vomiting. Sometimes it can be difficult to tell if you feel nauseous, you may just feel bad and not associate that feeling with nausea. Constipation, diarrhea, and a fever may go along with the pain.  The pain may continue even if treated correctly over the following days. Depending on how things go, sometimes the cause can become clear and may require further or different treatment. Additional evaluations, medications, or tests may be needed.  Home care  Your health care provider may prescribe medications for pain, symptoms, or an infection.  Follow the health care provider's instructions for taking these medications.  General care    Rest until your next exam. No strenuous activities.    Try to find positions that ease discomfort. A small pillow placed on the abdomen may help relieve pain.    Something warm on your abdomen (such as a heating pad) may help, but be careful not to burn yourself.  Diet    Do not force yourself to eat, especially if having cramps, vomiting, or diarrhea.    Water is important so you do not get dehydrated. Soup may also be good. Sports drinks may also help, especially if they are not too acidic. Make sure you  don't drink sugary drinks as this can make things worse. Take liquids in small amounts. Do not guzzle them.    Caffeine sometimes makes the pain and cramping worse.    Avoid dairy products if you have vomiting or diarrhea.    Don't eat large amounts at a time. Wait a few minutes between bites.    Eat a diet low in fiber (called a low-residue diet). Foods allowed include refined breads, white rice, fruit and vegetable juices without pulp, tender meats. These foods will pass more easily through the intestine.    Avoid fried or fatty foods, dairy, alcohol and spicy foods until your symptoms go away.  Follow-up care  Follow up with your health care provider as instructed, or if your pain does not begin to improve in the next 24 hours.  When to seek medical care  Seek prompt medical care if any of the following occur:    Pain gets worse or moves to the right lower abdomen    New or worsening vomiting or diarrhea    Swelling of the abdomen    Unable to pass stool for more than three days    New fever over 101  F (38.3 C), or rising fever    Blood in vomit or bowel movements (dark red or black color)    Jaundice (yellow color of eyes and skin)    Weakness, dizziness    Chest, arm, back, neck or jaw pain    Unexpected vaginal bleeding or missed period  Call 911  Call emergency services if any of the following occur:    Trouble breathing    Confusion    Fainting or loss of consciousness    Rapid heart rate    Seizure    [id number] [facility], [address] [place] [address], [place] [address]. All rights reserved. This information is not intended as a substitute for professional medical care. Always follow your healthcare professional's instructions.

## 2017-07-16 NOTE — ED NOTES
Pt presents ambulatory to triage from home via [employer]. Pt states for past week has had increasing abd pain. Denies nausea, emesis, fevers and changes in urination. Was seen here last week advised to be admitted but declined. Pt has hx colon cancer. Has intestinal surgery in may. Pain near incision site.

## 2017-07-16 NOTE — ED NOTES
"Patient was found in the lobby in street clothes. When asked to return to her room patient stated \" I am just waiting for my mom.\" Charge nurse present during this interaction and per charge nurse pt reported she was not a patient when asked prior to leaving the unit. Pt was standing in the lobby ands was asked to not leave the lobby. Security notified. Other staff members then witnessed pt walk out to an empty tan SUV and digging through the front passenger seat. Patient was watched by security and charge nurse during this time. She then returned to the ED  crying and stated \"my mom came and told me my 5 year old son is having a seizure\" pt then stated \"they would not tell me on the phone what was happening to my son.\" Patient then signed the AMA paperwork and the IV was removed. Patient was then witnessed walking out to the empty SUV and sat in the passenger seat. The HUC then witnessed the patient waited a few minutes and then moved from the passenger seat to the drivers seat and drove the car out of the parking lot through the ambulance entrance instead of the car entrance alone.   "

## 2017-07-16 NOTE — ED PROVIDER NOTES
"  History     Chief Complaint   Patient presents with     Abdominal Pain     HPI  Rachel A Gerhardt is a 41 year old female with a history of cervical cancer status post radiation, history of opiate dependence and prior Suboxone use, more recently with recurrent SBOs, malnutrition due to recurrent SBOs previously on TPN via PICC line. Hospitalized (5/17/17-5/24/17) for exploratory laparotomy, small bowel resection with anastomosis for recurrent SBO and small bowel strictures. She was seen in the ED on 7/10 (6 days ago) due to worsening abdominal pain. CT was obtained (see below) with normal labs. Plan was to admit the patient to observation, which she declined. Since discharge 6 days ago, the patient states her abdominal pain has progressively worsened. She was seen Friday (2 days ago) for follow up. Abdominal x-ray showed significant stool burden and was instructed to take Miralax daily. The patient states she has been taking Miralax with significant results stating, \"my stools are so watery it almost falls out of me when I walk\". Regardless of moving her bowels, she continues to experience abdominal pain.  She reports she is not eating secondary to the pain. She denies chest pain, nausea, vomiting, fevers , or shortness of breath.     The patient does note that she feels like she is \"nobody's patient.\"  Her primary care physician and referred her to surgery, surgery referred her to the pain clinic, and the pain clinic referred her back to her primary care clinic.  She feels very frustrated and does not know with whom she can follow up.    CT Abdomen Pelvis (7/10/17):  1. Postoperative changes of small bowel resection with anastomosis. Nonspecific segment of distal jejunum/proximal ileum with moderate wall may represent infectious or inflammatory etiology, although bowel  ischemia is in the differential. Of note, the major mesenteric vasculature appears patent.  2. Mildly distended small bowel loops immediately " upstream from the anastomosis without wall thickening may represent partial small bowel obstruction. Distal small bowel loops are decompressed.   3. No extraluminal air or portal venous gas.  4. Mild free fluid in the abdomen and pelvis.    Past Medical History:   Diagnosis Date     Asthma      Cancer (H)     Per patient OBGYN, cerivical cancer     Cervical cancer (H)      Other chronic pain      Ovarian cancer (H)      Substance abuse     Outside records indicate past history of narcotics abuse or dependence, but patient denies.       Past Surgical History:   Procedure Laterality Date     COMBINED CYSTOSCOPY, INSERT STENT URETER(S) Bilateral 5/18/2017    Procedure: COMBINED CYSTOSCOPY, INSERT STENT URETER(S);  Cystoscopy with Bilateral Stent,;  Surgeon: Rene Calero MD;  Location: [facility] OR     ENT SURGERY  2009    mastoid, sinus     EXAM UNDER ANESTHESIA, INSERT ALEX SLEEVE, UTERINE PLACEMENT OF TANDEM AND RING FOR RAD, ULTRASOUND N/A 12/14/2015    Procedure: EXAM UNDER ANESTHESIA, INSERT ALEX SLEEVE, UTERINE PLACEMENT OF TANDEM AND RING FOR RADIATION, ULTRASOUND GUIDED;  Surgeon: Abby Tony MD;  Location: [facility] OR     INSERT TANDEM AND CESIUM APPLICATOR CERVIX, ULTRASOUND GUIDED N/A 12/17/2015    Procedure: INSERT TANDEM AND CESIUM APPLICATOR CERVIX, ULTRASOUND GUIDED;  Surgeon: Kika Wood MD;  Location: [facility] OR     KNEE SURGERY       LAPAROTOMY EXPLORATORY N/A 5/18/2017    Procedure: LAPAROTOMY EXPLORATORY;   Exploratry Laparotomy, Small Bowel Resection with anastomosis, Flexible Sigmoidoscopy;  Surgeon: Jennifer Goodwin MD;  Location: [facility] OR     PICC INSERTION Right 04/29/2017    4fr SL BioFlo PICC, 37cm (3cm external) in the R basilic vein w/ tip in the mid SVC.     RESECT SMALL BOWEL WITHOUT OSTOMY N/A 5/18/2017    Procedure: RESECT SMALL BOWEL WITHOUT OSTOMY;;  Surgeon: Jennifer Goodwin MD;  Location: [facility] OR     SIGMOIDOSCOPY FLEXIBLE N/A 5/18/2017    Procedure: SIGMOIDOSCOPY FLEXIBLE;;   Surgeon: Jennifer Goodwin MD;  Location:  OR       Family History   Problem Relation Age of Onset     DIABETES Mother      Ovarian Cancer No family hx of      Uterine Cancer No family hx of      Cervical Cancer No family hx of      Breast Cancer No family hx of        Social History   Substance Use Topics     Smoking status: Light Tobacco Smoker     Packs/day: 0.25     Years: 12.00     Types: Cigarettes     Smokeless tobacco: Never Used      Comment: 2/22/16 pt smoking daily 1 cig     Alcohol use No      Comment: None per pt       No current facility-administered medications for this encounter.      Current Outpatient Prescriptions   Medication     vitamin D (ERGOCALCIFEROL) 25547 UNIT capsule     acetaminophen (TYLENOL) 500 MG tablet     saccharomyces boulardii (FLORASTOR) 250 MG capsule     polyethylene glycol (MIRALAX/GLYCOLAX) Packet     cholecalciferol 1000 UNITS TABS     simethicone (MYLICON) 80 MG chewable tablet     albuterol (PROAIR HFA/PROVENTIL HFA/VENTOLIN HFA) 108 (90 BASE) MCG/ACT Inhaler     ondansetron (ZOFRAN) 4 MG tablet     calcium carbonate (TUMS) 500 MG chewable tablet     hyoscyamine (ANASPAZ/LEVSIN) 0.125 MG tablet        Allergies   Allergen Reactions     No Clinical Screening - See Comments Other (See Comments) and Diarrhea     headache  Carrots cause gastric upset, cramping and diarrhea.     Sulfa Drugs Hives     hives     Amoxicillin Unknown and Other (See Comments)     vomiting  vomiting     Amoxicillin-Pot Clavulanate Other (See Comments) and Nausea     vomiting     Augmentin GI Disturbance, Nausea and Hives     Avelox [Moxifloxacin] Nausea and Vomiting, Unknown and Nausea     Ciprofloxacin Hives and Nausea     Codeine Nausea and Vomiting and Nausea     Ibuprofen Nausea and Vomiting     Other reaction(s): Nausea And Vomiting     Ibuprofen Sodium Hives and GI Disturbance     Quinolones      Tramadol Hives, Diarrhea, Nausea and Nausea and Vomiting     Daucus Carota      Other  "reaction(s): GI Upset  Other reaction(s): Abdominal pain, Diarrhea  Carrots cause gastric upset, cramping and diarrhea.     I have reviewed the Medications, Allergies, Past Medical and Surgical History, and Social History in the Epic system.    Review of Systems   Constitutional: Negative for fever.   Respiratory: Negative for shortness of breath.    Cardiovascular: Negative for chest pain.   Gastrointestinal: Positive for abdominal pain. Negative for nausea and vomiting.   Genitourinary: Negative for flank pain.   Neurological: Negative for headaches.   All other systems reviewed and are negative.      Physical Exam   BP: 99/88  Pulse: 94  Temp: 98.5  F (36.9  C)  Resp: 16  Height: 175.3 cm (5' 9\")  Weight: 70.2 kg (154 lb 12.2 oz)  Physical Exam  Physical Exam   Constitutional:   well nourished, well developed, appears uncomfortable  HENT:   Head: Normocephalic and atraumatic.   Eyes: Conjunctivae are normal. Pupils are equal, round, and reactive to light.    pharynx has no erythema or exudate, mucous membranes are dry  Neck:   no adenopathy, no bony tenderness  Cardiovascular: regular rate and rhythm without murmurs or gallops  Pulmonary/Chest: Clear to auscultation bilaterally, with no wheezes or retractions. No respiratory distress.  GI: Soft with good bowel sounds.  Tender below surgical scar (below umbilicus) , non-distended, with no guarding, no rebound, no peritoneal signs.   Back:  No bony or CVA tenderness   Musculoskeletal:  no edema or clubbing   Skin: Skin is warm and dry. No rash noted.   Neurological: alert and oriented to person, place, and time. Nonfocal exam  Psychiatric:  flat mood and affect.   ED Course     8:12 AM  The patient was seen and examined by Dr. Laurent in Room 11.     ED Course     Procedures             Critical Care time:  none            Results for GERHARDT, RACHEL A (MRN [id number]) as of 7/16/2017 08:19   Ref. Range 7/14/2017 12:50 7/14/2017 12:52   Sodium Latest Ref Range: " 133 - 144 mmol/L 137    Potassium Latest Ref Range: 3.4 - 5.3 mmol/L 4.2    Chloride Latest Ref Range: 94 - 109 mmol/L 101    Carbon Dioxide Latest Ref Range: 20 - 32 mmol/L 31    Urea Nitrogen Latest Ref Range: 7 - 30 mg/dL 7    Creatinine Latest Ref Range: 0.52 - 1.04 mg/dL 0.68    GFR Estimate Latest Ref Range: >60 mL/min/1.7m2 >90...    GFR Estimate If Black Latest Ref Range: >60 mL/min/1.7m2 >90...    Calcium Latest Ref Range: 8.5 - 10.1 mg/dL 9.4    Anion Gap Latest Ref Range: 3 - 14 mmol/L 5    Magnesium Latest Ref Range: 1.6 - 2.3 mg/dL 2.0    Glucose Latest Ref Range: 70 - 99 mg/dL 88    WBC Latest Ref Range: 4.0 - 11.0 10e9/L  8.9   Hemoglobin Latest Ref Range: 11.7 - 15.7 g/dL  14.1   Hematocrit Latest Ref Range: 35.0 - 47.0 %  43.6   Platelet Count Latest Ref Range: 150 - 450 10e9/L  428   RBC Count Latest Ref Range: 3.8 - 5.2 10e12/L  4.59   MCV Latest Ref Range: 78 - 100 fl  95   MCH Latest Ref Range: 26.5 - 33.0 pg  30.7   MCHC Latest Ref Range: 31.5 - 36.5 g/dL  32.3   RDW Latest Ref Range: 10.0 - 15.0 %  13.7   INR Latest Ref Range: 0.86 - 1.14   1.06   ABO Unknown  A   RH(D) Unknown  Pos   Antibody Screen Unknown  Neg     Results for orders placed or performed during the hospital encounter of 07/16/17 (from the past 24 hour(s))   CBC with platelets differential   Result Value Ref Range    WBC 8.3 4.0 - 11.0 10e9/L    RBC Count 4.14 3.8 - 5.2 10e12/L    Hemoglobin 12.6 11.7 - 15.7 g/dL    Hematocrit 40.0 35.0 - 47.0 %    MCV 97 78 - 100 fl    MCH 30.4 26.5 - 33.0 pg    MCHC 31.5 31.5 - 36.5 g/dL    RDW 13.9 10.0 - 15.0 %    Platelet Count 353 150 - 450 10e9/L    Diff Method Automated Method     % Neutrophils 68.5 %    % Lymphocytes 21.5 %    % Monocytes 5.9 %    % Eosinophils 3.5 %    % Basophils 0.4 %    % Immature Granulocytes 0.2 %    Nucleated RBCs 0 0 /100    Absolute Neutrophil 5.7 1.6 - 8.3 10e9/L    Absolute Lymphocytes 1.8 0.8 - 5.3 10e9/L    Absolute Monocytes 0.5 0.0 - 1.3 10e9/L     Absolute Eosinophils 0.3 0.0 - 0.7 10e9/L    Absolute Basophils 0.0 0.0 - 0.2 10e9/L    Abs Immature Granulocytes 0.0 0 - 0.4 10e9/L    Absolute Nucleated RBC 0.0    Lactic acid whole blood   Result Value Ref Range    Lactic Acid 0.8 0.7 - 2.1 mmol/L   UA reflex to Microscopic and Culture   Result Value Ref Range    Color Urine Light Yellow     Appearance Urine Clear     Glucose Urine Negative NEG mg/dL    Bilirubin Urine Negative NEG    Ketones Urine Negative NEG mg/dL    Specific Gravity Urine 1.011 1.003 - 1.035    Blood Urine Negative NEG    pH Urine 5.0 5.0 - 7.0 pH    Protein Albumin Urine Negative NEG mg/dL    Urobilinogen mg/dL Normal 0.0 - 2.0 mg/dL    Nitrite Urine Negative NEG    Leukocyte Esterase Urine Large (A) NEG    Source Clean catch urine     RBC Urine 2 0 - 2 /HPF    WBC Urine 13 (H) 0 - 2 /HPF    Squamous Epithelial /HPF Urine 3 (H) 0 - 1 /HPF    Transitional Epi <1 0 - 1 /HPF    Mucous Urine Present (A) NEG /LPF   XR Abdomen 2 Views    Narrative    EXAM: Abdominal radiograph  7/16/2017 8:53 AM     HISTORY:  History of SBO. Abdominal pain    COMPARISON: 7/14/2017       FINDINGS: Upright and supine abdominal radiograph. Prominent gaseous  left sided small bowel loop, to 3.5 cm. No air-fluid levels in upright  projection. Gaseous nondilated colon. No free air in upright  projection.      Impression    IMPRESSION: Mild distention of single left-sided small bowel loops  without air-fluid levels.         Labs Ordered and Resulted from Time of ED Arrival Up to the Time of Departure from the ED   UA MACROSCOPIC WITH REFLEX TO MICRO AND CULTURE - Abnormal; Notable for the following:        Result Value    Leukocyte Esterase Urine Large (*)     WBC Urine 13 (*)     Squamous Epithelial /HPF Urine 3 (*)     Mucous Urine Present (*)     All other components within normal limits   CBC WITH PLATELETS DIFFERENTIAL   LACTIC ACID WHOLE BLOOD   CLOSTRIDIUM DIFFICILE TOXIN B   ENTERIC BACTERIA AND VIRUS PANEL BY  MIRANDA STOOL       Consults  Surgery: Responded (colon Retal) (07/16/17 2857)    Assessments & Plan (with Medical Decision Making)       I have reviewed the nursing notes.  Emergency Department course:  The patient was seen and examined at 0811 am.   I treated her with a normal saline bolus IV as well as her home dose of Roxicodone po  Chart review shows laboratory studies done on 07/14/2017, 2 days ago, are as noted above and are essentially unremarkable.    Abdominal x-ray done on 7/14 shows IMPRESSION: Mild prominence of small bowel loops, improved from the  prior studies. Nonobstructive bowel gas pattern. Moderate amount of  stool throughout the colon, particularly in the ascending colon.     Abdominal CT scan done on July 10, 6 days ago,  shows IMPRESSION:   1. Postoperative changes of small bowel resection with anastomosis.  Nonspecific segment of distal jejunum/proximal ileum with moderate  wall may represent infectious or inflammatory etiology, although bowel  ischemia is in the differential. Of note, the major mesenteric  vasculature appears patent.  2. Mildly distended small bowel loops immediately upstream from the  anastomosis without wall thickening may represent partial small bowel  obstruction. Distal small bowel loops are decompressed.   3. No extraluminal air or portal venous gas.  4. Mild free fluid in the abdomen and pelvis.  Laboratory studies show an unremarkable CBC, with a WBC of 8.3.  Lactate is within normal limits at 0.8.  UA shows large LCE. but is contaminated with squamous epithelial cells and is likely not a urinary tract infection.  I had ordered C. diff and enteric stool studies.  Apparently the patient left prior to giving us a stool sample.   Abdominal x-rays today showIMPRESSION: Mild distention of single left-sided small bowel loops  without air-fluid levels.  I consulted with colorectal surgery for recommendations, as they had apparently planned to admit her on an observation basis  6 days ago for IV fluids, and she left AMA.  Her pain had worsened since that time.  At about 0950 am, I was informed by the nurses that the patient left without being finished.  She did not tell me she was leaving.  She apparently dressed herself and told the ED staff that she was not a patient and left the ED.  In the lobby, she was told by the nurse and security that she could not leave with an IV in place.  She came back into the ED and her IV was removed.  She told the nurse that her son was having a seizure and her mom was coming to pick her up and she had to leave.  (She had initially told the triage nurse that she came by [employer]).  She was seen by our HUC getting into the passenger side of a tan SUV.  She was then witnessed moving over to the  side and driving away from the ED around the ambulance.  She was not seen by the colorectal service.  The patient is a 41-year-old female with a history of cervical cancer, strictures and small bowel obstructions and small bowel resection, with chronic abdominal pain and opiate dependence.  This is the 2nd time in 6 days at the patient left without being finished.  This time the patient appeared to have lied to the staff .  I am unclear as to what the patient wanted when she came to the ED this Sunday morning.     I have reviewed the findings, diagnosis, plan and need for follow up with the patient.    New Prescriptions    No medications on file       Final diagnoses:   Chronic abdominal pain   Diarrhea, unspecified type   ELOPED  Yocasta MARCANO, am serving as a trained medical scribe to document services personally performed by Thi Laurent MD, based on the provider's statements to me.      Thi MARCANO MD, was physically present and have reviewed and verified the accuracy of this note documented by Yocasta Aguilera.   This note was created in part by the use of Dragon voice recognition dictation system. Inadvertent grammatical errors and typographical errors  may still exist.  Thi Laurent MD      7/16/2017   [facility], [place], EMERGENCY DEPARTMENT     Thi Laurent MD  07/16/17 1013

## 2017-07-16 NOTE — ED NOTES
Pt presents ambulatory to triage from home by driving herself in. PT states has had abd pain for past 1 week that has gotten worse past 2 days. Has had diarrhea, no fevers. Hx colon resection in may. Hx colon cancer. Seen here earlier today and left AMA.

## 2017-07-17 ENCOUNTER — TELEPHONE (OUTPATIENT)
Dept: GASTROENTEROLOGY | Facility: OUTPATIENT CENTER | Age: 42
End: 2017-07-17

## 2017-07-17 ENCOUNTER — OFFICE VISIT (OUTPATIENT)
Dept: INTERNAL MEDICINE | Facility: CLINIC | Age: 42
End: 2017-07-17

## 2017-07-17 VITALS
DIASTOLIC BLOOD PRESSURE: 78 MMHG | OXYGEN SATURATION: 98 % | WEIGHT: 153.2 LBS | BODY MASS INDEX: 22.62 KG/M2 | TEMPERATURE: 98 F | SYSTOLIC BLOOD PRESSURE: 109 MMHG | HEART RATE: 91 BPM

## 2017-07-17 DIAGNOSIS — R19.7 NOCTURNAL DIARRHEA: Primary | ICD-10-CM

## 2017-07-17 DIAGNOSIS — K56.609 SMALL BOWEL OBSTRUCTION (H): ICD-10-CM

## 2017-07-17 DIAGNOSIS — R10.84 ABDOMINAL PAIN, GENERALIZED: ICD-10-CM

## 2017-07-17 LAB
BACTERIA SPEC CULT: NO GROWTH
Lab: NORMAL
MICRO REPORT STATUS: NORMAL
SPECIMEN SOURCE: NORMAL

## 2017-07-17 RX ORDER — ONDANSETRON 4 MG/1
4-8 TABLET, FILM COATED ORAL EVERY 8 HOURS PRN
Qty: 30 TABLET | Refills: 0 | Status: ON HOLD | OUTPATIENT
Start: 2017-07-17 | End: 2018-05-23

## 2017-07-17 RX ORDER — HYDROCODONE BITARTRATE AND ACETAMINOPHEN 5; 325 MG/1; MG/1
1 TABLET ORAL EVERY 4 HOURS PRN
Qty: 30 TABLET | Refills: 0 | Status: ON HOLD | OUTPATIENT
Start: 2017-07-17 | End: 2018-04-01

## 2017-07-17 ASSESSMENT — PAIN SCALES - GENERAL: PAINLEVEL: SEVERE PAIN (6)

## 2017-07-17 NOTE — MR AVS SNAPSHOT
After Visit Summary   7/17/2017    Rachel A Gerhardt    MRN: [id number]           Patient Information     Date Of Birth          1975        Visit Information        Provider Department      7/17/2017 9:00 AM Negro Daley MD [facility]        Today's Diagnoses     Nocturnal diarrhea    -  1    Small bowel obstruction (H)        Abdominal pain, generalized           Follow-ups after your visit        Additional Services     GI Procedure Referral       Last Lab Result: Creatinine (mg/dL)       Date                     Value                 07/14/2017               0.68             ----------  Body mass index is 22.62 kg/(m^2).     Needed:  No  Language:  English    Patient will be contacted to schedule procedure.     Please be aware that coverage of these services is subject to the terms and limitations of your health insurance plan.  Call member services at your health plan with any benefit or coverage questions.  Any procedures must be performed at a [place] facility OR coordinated by your clinic's referral office.    Please bring the following with you to your appointment:    (1) Any X-Rays, CTs or MRIs which have been performed.  Contact the facility where they were done to arrange for  prior to your scheduled appointment.    (2) List of current medications   (3) This referral request   (4) Any documents/labs given to you for this referral                  Your next 10 appointments already scheduled     Jul 17, 2017 10:15 AM CDT   LAB with [facility] ([facility])    [address] [place] [id number] [phone]           Patient must bring picture ID.  Patient should be prepared to give a urine specimen  Please do not eat 10-12 hours before your appointment if you are coming in fasting for labs on lipids, cholesterol, or glucose (sugar).  Pregnant women should follow their Care Team  instructions. Water with medications is okay. Do not drink coffee or other fluids.   If you have concerns about taking  your medications, please ask at office or if scheduling via ScriptPad, send a message by clicking on Secure Messaging, Message Your Care Team.            Jul 31, 2017 12:40 PM CDT   (Arrive by 12:25 PM)   Return Visit with Jennifer Garza MD   [facility] ([facility])    [address] [place] [id number] [phone]            Aug 07, 2017  6:15 PM CDT   (Arrive by 6:00 PM)   Post-Op with Jennifer Goodwin MD   [facility] ([facility])    [address] [place] [id number] [phone]              Future tests that were ordered for you today     Open Future Orders        Priority Expected Expires Ordered    Ova and Parasite Exam Routine Routine  7/17/2018 7/17/2017    Enteric Bacteria and Virus Panel by MIRANDA Stool Routine  7/17/2018 7/17/2017    Fecal colorectal cancer screen FIT Routine 8/7/2017 10/9/2017 7/17/2017    Fecal Lactoferrin Routine  7/17/2018 7/17/2017            Who to contact     Please call your clinic at [phone] to:    Ask questions about your health    Make or cancel appointments    Discuss your medicines    Learn about your test results    Speak to your doctor   If you have compliments or concerns about an experience at your clinic, or if you wish to file a complaint, please contact [facility] Patient Relations at [phone] or email us at [email or web address]         Additional Information About Your Visit        [employer] Information     ScriptPad gives you secure access to your electronic health record. If you see a primary care provider, you can also send messages to your care team and make appointments. If you have questions, please call your primary care clinic.  If you do not have a primary  care provider, please call [phone] and they will assist you.      [facility] is an electronic gateway that provides easy, online access to your medical records. With [facility] [facility], you can request a clinic appointment, read your test results, renew a prescription or communicate with your care team.     To access your existing account, please contact your [facility] or call [phone] for assistance.        Care EveryWhere ID     This is your Care EveryWhere ID. This could be used by other organizations to access your [place] medical records  [id number]        Your Vitals Were     Pulse Temperature Last Period Pulse Oximetry Breastfeeding? BMI (Body Mass Index)    91 98  F (36.7  C) (Oral) 09/09/2015 98% No 22.62 kg/m2       Blood Pressure from Last 3 Encounters:   07/17/17 109/78   07/16/17 100/68   07/16/17 100/68    Weight from Last 3 Encounters:   07/17/17 69.5 kg (153 lb 3.2 oz)   07/16/17 70.2 kg (154 lb 12.2 oz)   07/16/17 70.2 kg (154 lb 12.2 oz)              We Performed the Following     GI Procedure Referral     IBD 7: Laboratory Miscellaneous Order          Today's Medication Changes          These changes are accurate as of: 7/17/17 10:11 AM.  If you have any questions, ask your nurse or doctor.               Start taking these medicines.        Dose/Directions    HYDROcodone-acetaminophen 5-325 MG per tablet   Commonly known as:  NORCO   Used for:  Nocturnal diarrhea, Small bowel obstruction (H), Abdominal pain, generalized   Started by:  Negro Daley MD        Dose:  1 tablet   Take 1 tablet by mouth every 4 hours as needed for pain maximum 1 tablet(s) per day   Quantity:  30 tablet   Refills:  0         Stop taking these medicines if you haven't already. Please contact your care team if you have questions.     hyoscyamine 0.125 MG tablet   Commonly known as:  ANASPAZ/LEVSIN   Stopped by:  Negro Daley MD                Where to get your  medicines      These medications were sent to [facility] [address] [place] [address] [address] AT [facility]  [address], [facility] [address]     Phone:  [phone]     ondansetron 4 MG tablet         Some of these will need a paper prescription and others can be bought over the counter.  Ask your nurse if you have questions.     Bring a paper prescription for each of these medications     HYDROcodone-acetaminophen 5-325 MG per tablet                Primary Care Provider Office Phone # Fax #    Jennifer Garza [phone]       [address] [place] [address]        Equal Access to Services     MARISOL GAN : Hadii reynold Gan, waaxda luqadaha, qaybta kaalmada adeegyada, wendy atkins. So [facility] [phone].    ATENCIÓN: Si habla español, tiene a epps disposición servicios gratuitos de asistencia lingüística. [facility] [phone].    We comply with applicable federal civil rights laws and [place] laws. We do not discriminate on the basis of race, color, national origin, age, disability sex, sexual orientation or gender identity.            Thank you!     Thank you for choosing [facility]  for your care. Our goal is always to provide you with excellent care. Hearing back from our patients is one way we can continue to improve our services. Please take a few minutes to complete the written survey that you may receive in the mail after your visit with us. Thank you!             Your Updated Medication List - Protect others around you: Learn how to safely use, store and throw away your medicines at www.disposemymeds.org.          This list is accurate as of: 7/17/17 10:11 AM.  Always use your most recent med list.                   Brand Name Dispense Instructions for use Diagnosis    acetaminophen 500 MG tablet    TYLENOL    80 tablet    Take 2 tablets (1,000 mg) by mouth  4 times daily    Small intestine obstruction (H)       albuterol 108 (90 BASE) MCG/ACT Inhaler    PROAIR HFA/PROVENTIL HFA/VENTOLIN HFA    1 Inhaler    Inhale 2 puffs into the lungs every 6 hours as needed    Wheezing       cholecalciferol 1000 UNITS Tabs     30 tablet    Take 1,000 Units by mouth daily    Small intestine obstruction (H)       HYDROcodone-acetaminophen 5-325 MG per tablet    NORCO    30 tablet    Take 1 tablet by mouth every 4 hours as needed for pain maximum 1 tablet(s) per day    Nocturnal diarrhea, Small bowel obstruction (H), Abdominal pain, generalized       ondansetron 4 MG tablet    ZOFRAN    30 tablet    Take 1-2 tablets (4-8 mg) by mouth every 8 hours as needed for nausea    Small bowel obstruction (H), Nocturnal diarrhea, Abdominal pain, generalized       polyethylene glycol Packet    MIRALAX/GLYCOLAX    30 packet    Take 17 g by mouth daily    Small intestine obstruction (H)       saccharomyces boulardii 250 MG capsule    FLORASTOR    60 capsule    Take 1 capsule (250 mg) by mouth 2 times daily    Small intestine obstruction (H)       simethicone 80 MG chewable tablet    MYLICON     Take 80 mg by mouth Reported on 5/8/2017        TUMS 500 MG chewable tablet   Generic drug:  calcium carbonate      Take 500 mg by mouth Reported on 5/8/2017        vitamin D 27669 UNIT capsule    ERGOCALCIFEROL    8 capsule    Take 1 capsule (50,000 Units) by mouth once a week Needs labs checked prior to additional refills    Vitamin D deficiency

## 2017-07-17 NOTE — PROGRESS NOTES
HPI  41-year-old woman with a significant past medical history opioid abuse cervical and ovarian cancer treated with chemotherapy bowel obstruction status post recent resection of 2 feet of small intestine by her report presents with increasing abdominal pain over the last 2 weeks. She reports that she had extensive period of abdominal pain which was initially incorrectly labeled as ileus subsequently found to have the area obstruction related to adhesions and scarring requiring the surgical resection of her small bowel. Initially she did well over the ensuing month to 6 weeks but then subsequently developed increasing pain and discomfort over the last 2 weeks this is typically waking her up at night typically between 4 and 7 in the morning and is associated with loose stools and diarrhea type stools for several hours this resolves and the pain resolves she's been using oxycodone at bedtime to help with this. She's had 3 emergency room visits in the past 2 weeks a CT scan and several x-rays which have not shown any evidence of obstruction or other abnormality. She denies any blood in the stool she's not had any fever chills or sweats she's not had any recent travel she's not had diarrhea or stool issues during the day. She has been taking MiraLAX because of her history of obstruction in the past. She is here today looking for both an explanation of her symptoms and requesting pain medication. She indicates that in the past she had no issues with the pain medication related to her problem.  Past Medical History:   Diagnosis Date     Asthma      Cancer (H)     Per patient OBGYN, cerivical cancer     Cervical cancer (H)      Other chronic pain      Ovarian cancer (H)      Substance abuse     Outside records indicate past history of narcotics abuse or dependence, but patient denies.     Past Surgical History:   Procedure Laterality Date     COMBINED CYSTOSCOPY, INSERT STENT URETER(S) Bilateral 5/18/2017    Procedure:  COMBINED CYSTOSCOPY, INSERT STENT URETER(S);  Cystoscopy with Bilateral Stent,;  Surgeon: Rene Calero MD;  Location: [facility] OR     ENT SURGERY  2009    mastoid, sinus     EXAM UNDER ANESTHESIA, INSERT ALEX SLEEVE, UTERINE PLACEMENT OF TANDEM AND RING FOR RAD, ULTRASOUND N/A 12/14/2015    Procedure: EXAM UNDER ANESTHESIA, INSERT ALEX SLEEVE, UTERINE PLACEMENT OF TANDEM AND RING FOR RADIATION, ULTRASOUND GUIDED;  Surgeon: Abby Tony MD;  Location: [facility] OR     INSERT TANDEM AND CESIUM APPLICATOR CERVIX, ULTRASOUND GUIDED N/A 12/17/2015    Procedure: INSERT TANDEM AND CESIUM APPLICATOR CERVIX, ULTRASOUND GUIDED;  Surgeon: Kika Wood MD;  Location: [facility] OR     KNEE SURGERY       LAPAROTOMY EXPLORATORY N/A 5/18/2017    Procedure: LAPAROTOMY EXPLORATORY;   Exploratry Laparotomy, Small Bowel Resection with anastomosis, Flexible Sigmoidoscopy;  Surgeon: Jennifer Goodwin MD;  Location: [facility]     PICC INSERTION Right 04/29/2017    4fr SL BioFlo PICC, 37cm (3cm external) in the R basilic vein w/ tip in the mid SVC.     RESECT SMALL BOWEL WITHOUT OSTOMY N/A 5/18/2017    Procedure: RESECT SMALL BOWEL WITHOUT OSTOMY;;  Surgeon: Jennifer Goodwin MD;  Location: [facility] OR     SIGMOIDOSCOPY FLEXIBLE N/A 5/18/2017    Procedure: SIGMOIDOSCOPY FLEXIBLE;;  Surgeon: Jennifer Goodwin MD;  Location: [facility] OR     Family History   Problem Relation Age of Onset     DIABETES Mother      Ovarian Cancer No family hx of      Uterine Cancer No family hx of      Cervical Cancer No family hx of      Breast Cancer No family hx of      Social History     Social History     Marital status:      Spouse name: N/A     Number of children: N/A     Years of education: N/A     Social History Main Topics     Smoking status: Light Tobacco Smoker     Packs/day: 0.25     Years: 12.00     Types: Cigarettes     Smokeless tobacco: Never Used      Comment: 2/22/16 pt smoking daily 1 cig     Alcohol use No      Comment: None per pt     Drug use: No       Comment: Patient denies.  Outside records indicate possible Opiate abuse.     Sexual activity: Yes     Partners: Male     Birth control/ protection: None     Other Topics Concern     Parent/Sibling W/ Cabg, Mi Or Angioplasty Before 65f 55m? No     Social History Narrative    Lives alone       Exam:  /78  Pulse 91  Temp 98  F (36.7  C) (Oral)  Wt 69.5 kg (153 lb 3.2 oz)  LMP 09/09/2015  SpO2 98%  Breastfeeding? No  BMI 22.62 kg/m2  153 lbs 3.2 oz  PHYSICAL EXAMINATION:   The patient is alert, oriented with a clear sensorium.   Skin shows large raised mole on back, no other lesions or rashes and good turgor.   Head is normocephalic and atraumatic.   Neck shows no nodes, thyromegaly or bruits.   Back is nontender.   Lungs are clear to percussion and auscultation.   Heart shows normal S1 and S2 without murmur or gallop.   Abdomen is soft, diffuse mild tenderness, well healed dry incision without masses or organomegaly.   Extremities show no edema and no evidence of active synovitis.   Neurologic examination non-focal    ASSESSMENT  1 nocturnal stools and abdominal pain of uncertain etiology  2 history of chronic pain  3 history of opiate abuse  4 status post surgical resection partial small bowel    Plan I reviewed the state monitoring website and she has been given small amounts of oxycodone plan number providers over the last several months she has been bounced between primary care the pain clinic and her surgeon. I told her I would give her 30 hydrocodone to last for a month and during that time she needs to be further evaluated with stool studies and IBD 7 and a colonoscopy. She is instructed to follow-up with her primary care doctor for future medications and refills  Over 25 minutes spent with patient the majority in counseling and coordinating care.      Negro Daley MD  General Internal Medicine  Primary Care Center  [phone]

## 2017-07-17 NOTE — NURSING NOTE
Chief Complaint   Patient presents with     Pain     Patient here for pain under incision site and nausea.     Amarilis Acosta LPN at 9:20 AM on 7/17/2017.

## 2017-07-18 LAB — MISCELLANEOUS TEST: NORMAL

## 2017-07-19 ENCOUNTER — TELEPHONE (OUTPATIENT)
Dept: GASTROENTEROLOGY | Facility: OUTPATIENT CENTER | Age: 42
End: 2017-07-19

## 2017-07-19 ENCOUNTER — TELEPHONE (OUTPATIENT)
Dept: SURGERY | Facility: CLINIC | Age: 42
End: 2017-07-19

## 2017-07-19 NOTE — TELEPHONE ENCOUNTER
Per charles Newsome to supply letter.  This is done & fax'd to Judge Marino's office today, ATTN:  Celia.  Fax'd to:  [phone].  Pt is aware that we are faxing this letter.

## 2017-07-19 NOTE — TELEPHONE ENCOUNTER
"Jenni is calling for Dr. Goodwin.  She reports she had surgery in May, recently has been having issues.  She reports last week she had a court date and missed it.  She is requesting a note regarding her recent illness and request to reschedule court date because she was too sick to appear last week.  She states she \"forgot and was too sick\". She reports all last week had issues related to surgery. Was in the ED 7/16. Having a colonoscopy tomorrow. Needs letter sent directly to the court from the physicians office.  Fax [phone] ATT: Celia.  With  Jamila's office.    ALSO: legal name is Jenni Hamlin (her maiden name) Please use both names in letter or they won't know it's her.    Will route to Dr. Buddy Guerrero's clinic nurse.  "

## 2017-07-20 LAB
RESULT: NORMAL
SEND OUTS MISC TEST CODE: NORMAL
SEND OUTS MISC TEST SPECIMEN: NORMAL
TEST NAME: NORMAL

## 2017-07-24 ENCOUNTER — TELEPHONE (OUTPATIENT)
Dept: GASTROENTEROLOGY | Facility: OUTPATIENT CENTER | Age: 42
End: 2017-07-24

## 2017-11-14 NOTE — PROGRESS NOTES
REGIONAL ANESTHESIA PAIN SERVICE EPIDURAL NOTE  SUBJECTIVE:   Interval History: Pt reports adequate pain control via epidural. Fell asleep on right side and now has noticeable sensory block on right with inadequate pain control on left. Denies any weakness, paresthesias, circumoral numbness, metallic taste or tinnitus.  Pt is ambulating with assistance.  Patient is currently without nausea; without pruritis.       Clinically Aligned Pain Assessment (CAPA):  Comfort (How is your pain?): Tolerable with discomfort  Change in Pain (Since your last medication/intervention?): About the same  Pain Control (How are your pain treatments working?):  Partially effective pain control  Functioning (Are you able to do activities to get better?) : Can do most things, but pain gets in the way of some   Sleep (Does your pain management allow you to sleep or rest?): Awake with occasional pain     Numerical Rating Scale:    Reports pain 0/10 on right, 8/10 on left      Anticoagulation:    Lovenox q day    OBJECTIVE:    Vitals:    Temp:  [36.6  C (97.9  F)-37.9  C (100.3  F)] 37  C (98.6  F)  Pulse:  [] 111  Heart Rate:  [] 103  Resp:  [18-20] 18  BP: ()/(46-72) 87/63  SpO2:  [94 %-96 %] 95 %    Exam:    Strength 5/5 and symmetric grossly in bilateral LE      Epidural site with dressing c/d/i, no tenderness, erythema, heme, edema    Lab Results   Component Value Date    WBC 9.0 05/20/2017     Lab Results   Component Value Date    RBC 3.21 05/20/2017     Lab Results   Component Value Date    HGB 10.4 05/20/2017     Lab Results   Component Value Date    HCT 32.5 05/20/2017     No components found for: MCT  Lab Results   Component Value Date     05/20/2017     Lab Results   Component Value Date    MCH 32.4 05/20/2017     Lab Results   Component Value Date    MCHC 32.0 05/20/2017     Lab Results   Component Value Date    RDW 12.8 05/20/2017     Lab Results   Component Value Date     05/20/2017          ASSESSMENT/PLAN:    Rachel A Gerhardt is a 41 year old female POD #2 s/p COMBINED CYSTOSCOPY, INSERT STENT URETER(S) COMBINED LAPAROTOMY, LYSIS ADHESIONS RESECT SMALL BOWEL WITHOUT OSTOMY COLONOSCOPY WITH CO2 INSUFFLATION and placement of T8-9 epidural for analgesia.  Pt receiving adequate analgesia with epidural infusion Bupivacaine 0.125% at 12mL/hour with boluses. Noticing laterality of block when she remains lying on one side for too long. Pt is ambulating without difficulty.  No weakness or paresthesias.  No evidence of adverse side effects related to local anesthetic.  Catheter to be bolused to improve pain control.    - catheter bolused with 5mL 0.125% bupi at 1000 while receiving IV LR 500mL bolus given her lower pressures SBP 80-90; tolerated bolus well last night with concomitant IV fluid bolus    -- BP being checked q 5min for 30 min    -- improvement in pain control  - continue current epidural infusion at 12mL/hour  - will continue to follow and adjust as needed  - discussed plan with attending anesthesiologist        Jennifer Ribeiro MD  Regional Anesthesia Pain Service  May 20, 2017  1:50 PM      24 hour Job Code Pager.  For in-house use only.     Dial * * *[phone] and  [place]:  [phone]  [place]: [phone]  Peds:  [phone]  Then enter call-back number  May text page using [employer], but NOT [employer].     I have personally seen and evaluated patient.  She reports the the combination of the epidural bolus and the Dilaudid worked well for her pain. She was going to get up to walk in the hallway.  She was having an increase in pain in her left side this am, she thinks because she was lying on her right side throughout the night.  It is improved now.  She is tolerating a low fiber diet, has had a BM, and is tolerating PO pain medication.  Will continue epidural infusion and continue to follow.     Maria Antonia Grove MD  May 20, 2017     no

## 2018-01-31 ENCOUNTER — TELEPHONE (OUTPATIENT)
Dept: SURGERY | Facility: CLINIC | Age: 43
End: 2018-01-31

## 2018-01-31 NOTE — TELEPHONE ENCOUNTER
Jenni is calling for Dr. Moore.  She reports she is a previous Dr. Goodwin patient, but now Buddy is no longer here at the .  She's been getting care at [facility].  She reports she's lost 24# since Dec 23rd.  Throws up everything she eats and pain =8/10 currently.  She reports tests done at [facility] showed small bowel obstruction in same area where previous surgery done. They advised she return to the [facility].  She reports Dr. Goodwin had alerted her they could reoccur after all the radiation therapy she had gotten.  She just didn't expect it to be so soon.    First available clinic appointment a few weeks out. Discussed with colorectal nurses. Advised ED evaluation here at the [facility].  Caller is in agreement with the plan.

## 2018-02-04 ENCOUNTER — HOSPITAL ENCOUNTER (EMERGENCY)
Facility: CLINIC | Age: 43
Discharge: HOME | End: 2018-02-04
Attending: EMERGENCY MEDICINE | Admitting: EMERGENCY MEDICINE
Payer: COMMERCIAL

## 2018-02-04 VITALS
WEIGHT: 148 LBS | TEMPERATURE: 97.9 F | HEART RATE: 99 BPM | RESPIRATION RATE: 18 BRPM | OXYGEN SATURATION: 97 % | DIASTOLIC BLOOD PRESSURE: 80 MMHG | HEIGHT: 69 IN | BODY MASS INDEX: 21.92 KG/M2 | SYSTOLIC BLOOD PRESSURE: 112 MMHG

## 2018-02-04 DIAGNOSIS — R10.84 ABDOMINAL PAIN, GENERALIZED: ICD-10-CM

## 2018-02-04 LAB
ABO + RH BLD: NORMAL
ABO + RH BLD: NORMAL
ALBUMIN UR-MCNC: 10 MG/DL
ANION GAP SERPL CALCULATED.3IONS-SCNC: 9 MMOL/L (ref 3–14)
APPEARANCE UR: ABNORMAL
APTT PPP: 28 SEC (ref 22–37)
BASOPHILS # BLD AUTO: 0 10E9/L (ref 0–0.2)
BASOPHILS NFR BLD AUTO: 0.2 %
BILIRUB UR QL STRIP: NEGATIVE
BLD GP AB SCN SERPL QL: NORMAL
BLOOD BANK CMNT PATIENT-IMP: NORMAL
BUN SERPL-MCNC: 16 MG/DL (ref 7–30)
CALCIUM SERPL-MCNC: 8.2 MG/DL (ref 8.5–10.1)
CHLORIDE SERPL-SCNC: 108 MMOL/L (ref 94–109)
CO2 BLDCOV-SCNC: 24 MMOL/L (ref 21–28)
CO2 SERPL-SCNC: 26 MMOL/L (ref 20–32)
COLOR UR AUTO: YELLOW
CREAT SERPL-MCNC: 0.66 MG/DL (ref 0.52–1.04)
DIFFERENTIAL METHOD BLD: NORMAL
EOSINOPHIL # BLD AUTO: 0.1 10E9/L (ref 0–0.7)
EOSINOPHIL NFR BLD AUTO: 0.7 %
ERYTHROCYTE [DISTWIDTH] IN BLOOD BY AUTOMATED COUNT: 13.7 % (ref 10–15)
GFR SERPL CREATININE-BSD FRML MDRD: >90 ML/MIN/1.7M2
GLUCOSE SERPL-MCNC: 101 MG/DL (ref 70–99)
GLUCOSE UR STRIP-MCNC: NEGATIVE MG/DL
HCG UR QL: NEGATIVE
HCT VFR BLD AUTO: 45.3 % (ref 35–47)
HGB BLD-MCNC: 14.4 G/DL (ref 11.7–15.7)
HGB UR QL STRIP: NEGATIVE
IMM GRANULOCYTES # BLD: 0 10E9/L (ref 0–0.4)
IMM GRANULOCYTES NFR BLD: 0.1 %
INR PPP: 1.09 (ref 0.86–1.14)
INTERNAL QC OK POCT: YES
KETONES UR STRIP-MCNC: NEGATIVE MG/DL
LACTATE BLD-SCNC: 0.5 MMOL/L (ref 0.7–2.1)
LEUKOCYTE ESTERASE UR QL STRIP: ABNORMAL
LYMPHOCYTES # BLD AUTO: 1.3 10E9/L (ref 0.8–5.3)
LYMPHOCYTES NFR BLD AUTO: 15.7 %
MCH RBC QN AUTO: 31.4 PG (ref 26.5–33)
MCHC RBC AUTO-ENTMCNC: 31.8 G/DL (ref 31.5–36.5)
MCV RBC AUTO: 99 FL (ref 78–100)
MONOCYTES # BLD AUTO: 0.6 10E9/L (ref 0–1.3)
MONOCYTES NFR BLD AUTO: 6.6 %
MUCOUS THREADS #/AREA URNS LPF: PRESENT /LPF
NEUTROPHILS # BLD AUTO: 6.4 10E9/L (ref 1.6–8.3)
NEUTROPHILS NFR BLD AUTO: 76.7 %
NITRATE UR QL: NEGATIVE
NRBC # BLD AUTO: 0 10*3/UL
NRBC BLD AUTO-RTO: 0 /100
PCO2 BLDV: 40 MM HG (ref 40–50)
PH BLDV: 7.38 PH (ref 7.32–7.43)
PH UR STRIP: 6 PH (ref 5–7)
PLATELET # BLD AUTO: 288 10E9/L (ref 150–450)
PO2 BLDV: 32 MM HG (ref 25–47)
POTASSIUM SERPL-SCNC: 3.5 MMOL/L (ref 3.4–5.3)
RBC # BLD AUTO: 4.59 10E12/L (ref 3.8–5.2)
RBC #/AREA URNS AUTO: 5 /HPF (ref 0–2)
SAO2 % BLDV FROM PO2: 61 %
SODIUM SERPL-SCNC: 142 MMOL/L (ref 133–144)
SOURCE: ABNORMAL
SP GR UR STRIP: 1.02 (ref 1–1.03)
SPECIMEN EXP DATE BLD: NORMAL
SQUAMOUS #/AREA URNS AUTO: 9 /HPF (ref 0–1)
UROBILINOGEN UR STRIP-MCNC: 2 MG/DL (ref 0–2)
WBC # BLD AUTO: 8.4 10E9/L (ref 4–11)
WBC #/AREA URNS AUTO: 15 /HPF (ref 0–2)

## 2018-02-04 PROCEDURE — 25000128 H RX IP 250 OP 636: Performed by: EMERGENCY MEDICINE

## 2018-02-04 PROCEDURE — 86900 BLOOD TYPING SEROLOGIC ABO: CPT | Performed by: EMERGENCY MEDICINE

## 2018-02-04 PROCEDURE — 96374 THER/PROPH/DIAG INJ IV PUSH: CPT | Performed by: EMERGENCY MEDICINE

## 2018-02-04 PROCEDURE — 99285 EMERGENCY DEPT VISIT HI MDM: CPT | Mod: Z6 | Performed by: EMERGENCY MEDICINE

## 2018-02-04 PROCEDURE — 83605 ASSAY OF LACTIC ACID: CPT

## 2018-02-04 PROCEDURE — 85730 THROMBOPLASTIN TIME PARTIAL: CPT | Performed by: EMERGENCY MEDICINE

## 2018-02-04 PROCEDURE — 86850 RBC ANTIBODY SCREEN: CPT | Performed by: EMERGENCY MEDICINE

## 2018-02-04 PROCEDURE — 85610 PROTHROMBIN TIME: CPT | Performed by: EMERGENCY MEDICINE

## 2018-02-04 PROCEDURE — 86901 BLOOD TYPING SEROLOGIC RH(D): CPT | Performed by: EMERGENCY MEDICINE

## 2018-02-04 PROCEDURE — 85025 COMPLETE CBC W/AUTO DIFF WBC: CPT | Performed by: EMERGENCY MEDICINE

## 2018-02-04 PROCEDURE — 25000132 ZZH RX MED GY IP 250 OP 250 PS 637: Performed by: EMERGENCY MEDICINE

## 2018-02-04 PROCEDURE — 80048 BASIC METABOLIC PNL TOTAL CA: CPT | Performed by: EMERGENCY MEDICINE

## 2018-02-04 PROCEDURE — 81001 URINALYSIS AUTO W/SCOPE: CPT | Performed by: EMERGENCY MEDICINE

## 2018-02-04 PROCEDURE — 25000125 ZZHC RX 250: Performed by: EMERGENCY MEDICINE

## 2018-02-04 PROCEDURE — 99285 EMERGENCY DEPT VISIT HI MDM: CPT | Mod: 25 | Performed by: EMERGENCY MEDICINE

## 2018-02-04 PROCEDURE — 82803 BLOOD GASES ANY COMBINATION: CPT

## 2018-02-04 PROCEDURE — 96361 HYDRATE IV INFUSION ADD-ON: CPT | Performed by: EMERGENCY MEDICINE

## 2018-02-04 PROCEDURE — 96376 TX/PRO/DX INJ SAME DRUG ADON: CPT | Performed by: EMERGENCY MEDICINE

## 2018-02-04 PROCEDURE — 96375 TX/PRO/DX INJ NEW DRUG ADDON: CPT | Performed by: EMERGENCY MEDICINE

## 2018-02-04 PROCEDURE — 81025 URINE PREGNANCY TEST: CPT | Performed by: EMERGENCY MEDICINE

## 2018-02-04 RX ORDER — ALUMINA, MAGNESIA, AND SIMETHICONE 2400; 2400; 240 MG/30ML; MG/30ML; MG/30ML
30 SUSPENSION ORAL ONCE
Status: COMPLETED | OUTPATIENT
Start: 2018-02-04 | End: 2018-02-04

## 2018-02-04 RX ORDER — HYDROMORPHONE HYDROCHLORIDE 1 MG/ML
0.5 INJECTION, SOLUTION INTRAMUSCULAR; INTRAVENOUS; SUBCUTANEOUS ONCE
Status: COMPLETED | OUTPATIENT
Start: 2018-02-04 | End: 2018-02-04

## 2018-02-04 RX ORDER — LORAZEPAM 2 MG/ML
1 INJECTION INTRAMUSCULAR ONCE
Status: DISCONTINUED | OUTPATIENT
Start: 2018-02-04 | End: 2018-02-04 | Stop reason: HOSPADM

## 2018-02-04 RX ADMIN — SODIUM CHLORIDE 1000 ML: 9 INJECTION, SOLUTION INTRAVENOUS at 11:37

## 2018-02-04 RX ADMIN — FAMOTIDINE 20 MG: 10 INJECTION, SOLUTION INTRAVENOUS at 11:41

## 2018-02-04 RX ADMIN — Medication 0.5 MG: at 13:06

## 2018-02-04 RX ADMIN — Medication 0.5 MG: at 11:37

## 2018-02-04 RX ADMIN — SODIUM CHLORIDE 1000 ML: 9 INJECTION, SOLUTION INTRAVENOUS at 13:06

## 2018-02-04 RX ADMIN — ALUMINUM HYDROXIDE, MAGNESIUM HYDROXIDE, AND DIMETHICONE 30 ML: 400; 400; 40 SUSPENSION ORAL at 11:39

## 2018-02-04 ASSESSMENT — ENCOUNTER SYMPTOMS
ABDOMINAL PAIN: 1
NAUSEA: 1
CONSTIPATION: 0
VOMITING: 1
DIARRHEA: 0

## 2018-02-04 NOTE — ED NOTES
Pt left against medical advice, risks and benefits discussed with MD. IV ativan offered, pt declined.

## 2018-02-04 NOTE — ED PROVIDER NOTES
History     Chief Complaint   Patient presents with     Abdominal Pain     Nausea, Vomiting, & Diarrhea     HPI  Rachel A Gerhardt is a 42 year old female with a history of cervical cancer, status-post radiation, previous opioid dependence, and recurrent SBOs complicated by malnutrition (previously on TPN via PICC) status-post laparoscopic small bowel resection with anastomosis (5/2017) who presents with abdominal pain nausea and vomiting.  Patient reports that she has had ongoing abdominal pain, nausea and vomiting since 11/2017 and reports that she was diagnosed with a partial small bowel obstruction in 12/2017 which was seen again on a CT at [facility] last month.  Patient states that she underwent an endoscopy and colonoscopy on 1/16/2018 and notes that these did not reveal any etiology to explain her symptoms; patient reports she does have post radiation changes to her intestine.  She notes that she saw her gastroenterologist for follow-up on 1/29/2018 (6 days ago) and was referred to [facility] to see Dr. Moore in order to determine if she needs another bowel resection.  However, she notes that she cannot get an appointment until 2/20/2018.  She reports that she contacted her primary care physician and was recommended to come to the ED on Friday; however, the patient had to make arrangements for her kids before she could come in so she waited until today.  Currently, the patient complains of continued abdominal pain.  She complains of associated nausea.  Patient reports that she has had vomiting least once per day past week.  She last vomited yesterday.  She reports that she has not eaten since yesterday.  She complains of some weight loss recently.  She denies any changes in her bowel movements, last bowel movement was this morning.  Denies diarrhea or constipation.    Review of patient's [facility] records via [facility] revealed that patient was found to have a partial small bowel  obstruction on CT abdomen/pelvis on 1/22/2018 at which time she had an NG tube placed and was admitted to .  However, patient left the hospital AMA due to lack of childcare.  She was seen by her primary physician on 1/23/2018 at which time she was recommended to return to the ED for management of her partial small bowel obstruction.  She did not go to the ED at that time.  She has since seen her GI physician on 1/29/2018 and has been referred to [facility] with Dr. Moore for evaluation for potential repeat small bowel resection.    Results the patient's recent imaging and procedural studies done at [facility] are as follows:   CT abdomen/pelvis (1/22/2018)  CONCLUSION:  1.  Small loops are dilated proximally consistent with small bowel   obstruction. Transition point is seen in the left abdomen.    Endoscopy (1/16/18)  Impression:            - Duodenal deformity.  - Normal mucosa was found in the second portion of the duodenum. Biopsied.  - Chronic gastritis. Biopsied.  - Normal esophagus.  - The examination was otherwise normal.    Colonoscopy (1/16/18)  Impresssion:  - Congested mucosa in the terminal ileum. Biopsied.  - Congested mucosa in the entire examined colon. Biopsied.  - Internal hemorrhoids.                   - The examination was otherwise normal.  - The quality of the bowel preparation was 40 percent obscured.    Past Medical History:   Diagnosis Date     Asthma      Cancer (H)     Per patient OBGYN, cerivical cancer     Cervical cancer (H)      Other chronic pain      Ovarian cancer (H)      Substance abuse     Outside records indicate past history of narcotics abuse or dependence, but patient denies.       Past Surgical History:   Procedure Laterality Date     COMBINED CYSTOSCOPY, INSERT STENT URETER(S) Bilateral 5/18/2017    Procedure: COMBINED CYSTOSCOPY, INSERT STENT URETER(S);  Cystoscopy with Bilateral Stent,;  Surgeon: Rene Calero MD;  Location:  OR      ENT SURGERY  2009    mastoid, sinus     EXAM UNDER ANESTHESIA, INSERT ALEX SLEEVE, UTERINE PLACEMENT OF TANDEM AND RING FOR RAD, ULTRASOUND N/A 12/14/2015    Procedure: EXAM UNDER ANESTHESIA, INSERT ALEX SLEEVE, UTERINE PLACEMENT OF TANDEM AND RING FOR RADIATION, ULTRASOUND GUIDED;  Surgeon: Abby Tony MD;  Location[facility] OR     INSERT TANDEM AND CESIUM APPLICATOR CERVIX, ULTRASOUND GUIDED N/A 12/17/2015    Procedure: INSERT TANDEM AND CESIUM APPLICATOR CERVIX, ULTRASOUND GUIDED;  Surgeon: Kika Wood MD;  Location: [facility] OR     KNEE SURGERY       LAPAROTOMY EXPLORATORY N/A 5/18/2017    Procedure: LAPAROTOMY EXPLORATORY;   Exploratry Laparotomy, Small Bowel Resection with anastomosis, Flexible Sigmoidoscopy;  Surgeon: Jennifer Goodwin MD;  Location: [facility] OR     PICC INSERTION Right 04/29/2017    4fr SL BioFlo PICC, 37cm (3cm external) in the R basilic vein w/ tip in the mid SVC.     RESECT SMALL BOWEL WITHOUT OSTOMY N/A 5/18/2017    Procedure: RESECT SMALL BOWEL WITHOUT OSTOMY;;  Surgeon: Jennifer Goodwin MD;  [facility] OR     SIGMOIDOSCOPY FLEXIBLE N/A 5/18/2017    Procedure: SIGMOIDOSCOPY FLEXIBLE;;  Surgeon: Jennifer Goodwin MD;  [facility] OR       Family History   Problem Relation Age of Onset     DIABETES Mother      Ovarian Cancer No family hx of      Uterine Cancer No family hx of      Cervical Cancer No family hx of      Breast Cancer No family hx of        Social History   Substance Use Topics     Smoking status: Light Tobacco Smoker     Packs/day: 0.25     Years: 12.00     Types: Cigarettes     Smokeless tobacco: Never Used      Comment: pt smoking about 4 cigs daily     Alcohol use No      Comment: None per pt     Current Facility-Administered Medications   Medication     0.9% sodium chloride BOLUS     Current Outpatient Prescriptions   Medication     ondansetron (ZOFRAN) 4 MG tablet     HYDROcodone-acetaminophen (NORCO) 5-325 MG per tablet     vitamin D (ERGOCALCIFEROL) 36464 UNIT  "capsule     albuterol (PROAIR HFA/PROVENTIL HFA/VENTOLIN HFA) 108 (90 BASE) MCG/ACT Inhaler     acetaminophen (TYLENOL) 500 MG tablet     saccharomyces boulardii (FLORASTOR) 250 MG capsule     polyethylene glycol (MIRALAX/GLYCOLAX) Packet     cholecalciferol 1000 UNITS TABS     calcium carbonate (TUMS) 500 MG chewable tablet     simethicone (MYLICON) 80 MG chewable tablet        Allergies   Allergen Reactions     No Clinical Screening - See Comments Other (See Comments) and Diarrhea     headache  Carrots cause gastric upset, cramping and diarrhea.     Sulfa Drugs Hives     hives     Amoxicillin Unknown and Other (See Comments)     vomiting  vomiting     Amoxicillin-Pot Clavulanate Other (See Comments) and Nausea     vomiting     Augmentin GI Disturbance, Nausea and Hives     Avelox [Moxifloxacin] Nausea and Vomiting, Unknown and Nausea     Ciprofloxacin Hives and Nausea     Codeine Nausea and Vomiting and Nausea     Ibuprofen Nausea and Vomiting     Other reaction(s): Nausea And Vomiting     Ibuprofen Sodium Hives and GI Disturbance     Quinolones      Tramadol Hives, Diarrhea, Nausea and Nausea and Vomiting     Daucus Carota      Other reaction(s): GI Upset  Other reaction(s): Abdominal pain, Diarrhea  Carrots cause gastric upset, cramping and diarrhea.        I have reviewed the Medications, Allergies, Past Medical and Surgical History, and Social History in the Epic system.    Review of Systems   Constitutional: Negative for fever.   Respiratory: Negative for shortness of breath.    Cardiovascular: Negative for chest pain.   Gastrointestinal: Positive for abdominal pain, nausea and vomiting. Negative for constipation and diarrhea.   All other systems reviewed and are negative.      Physical Exam   BP: 105/81  Pulse: 99  Temp: 97.9  F (36.6  C)  Resp: 18  Height: 175.3 cm (5' 9\")  Weight: 67.1 kg (148 lb)  SpO2: 98 %      Physical Exam   Constitutional: No distress.   HENT:   Head: Atraumatic.   Mouth/Throat: " Oropharynx is clear and moist. No oropharyngeal exudate.   Eyes: Pupils are equal, round, and reactive to light. No scleral icterus.   Cardiovascular: Normal heart sounds and intact distal pulses.    Pulmonary/Chest: Breath sounds normal. No respiratory distress.   Abdominal: Soft. Bowel sounds are normal. There is no tenderness.   Musculoskeletal: She exhibits no edema or tenderness.   Skin: Skin is warm. No rash noted. She is not diaphoretic.   Nursing note and vitals reviewed.      ED Course     ED Course     Procedures     10:58 AM  The patient was seen and examined by Dr. Rios in Room 12.               Critical Care time:  none             Labs Ordered and Resulted from Time of ED Arrival Up to the Time of Departure from the ED   CBC WITH PLATELETS DIFFERENTIAL   BASIC METABOLIC PANEL   INR   PARTIAL THROMBOPLASTIN TIME   ROUTINE UA WITH MICROSCOPIC   PERIPHERAL IV CATHETER   ISTAT CG4 GASES LACTATE ANGEL LUIS NURSING POCT   HCG QUAL URINE POCT   ABO/RH TYPE AND SCREEN            Assessments & Plan (with Medical Decision Making)     42 year old female with a history of cervical cancer, status-post radiation, previous opioid dependence, and recurrent SBOs complicated by malnutrition (previously on TPN via PICC) status-post laparoscopic small bowel resection with anastomosis (5/2017) who presents with abdominal pain nausea and vomiting. Evaluation at outside hospitals suggestive of partial SBOs vs radiation induced GI tract changes. IV established and labs sent unremarkable. Patient medicated with maalox, pepcid and dilaudid 0.5 mg IV x 2. 2 L IV NSS boluses given. Plan for outpatient follow up with Dr Moore as planned.      I have reviewed the nursing notes.    I have reviewed the findings, diagnosis, plan and need for follow up with the patient.    New Prescriptions    No medications on file       Final diagnoses:   Abdominal pain, generalized     IChiqui, shiva serving as a trained medical scribe to  document services personally performed by Des Rios MD, based on the provider's statements to me.   I, Des Rios MD, was physically present and have reviewed and verified the accuracy of this note documented by Chiqui White.     2/4/2018   [facility], [place], EMERGENCY DEPARTMENT     Des Rios MD  02/11/18 194

## 2018-02-04 NOTE — ED NOTES
Pt presents with c/o abdominal pain, N/V/D. She states she was told that she had a SBO by her PCP and she needed to come check herself in to the ED for treatment on Fri but d/t  issues she had to wait until today. Pt has hx of SBO and bowel resection. Pt claims to have lost over 25lbs in 30 days d/t decreased appetite.

## 2018-02-05 ENCOUNTER — TELEPHONE (OUTPATIENT)
Dept: SURGERY | Facility: CLINIC | Age: 43
End: 2018-02-05

## 2018-02-05 NOTE — TELEPHONE ENCOUNTER
----- Message from Cherrie Donaldson sent at 2/5/2018  9:34 AM CST -----  Regarding: Teresa/Buddy pt, req appt-bowel obstruction  Good morning,    I had Farhana call in from the [facility] GI clinic, stating their Dr. Simmons and Teresa had been personally discussing this pt. Pt has a possible bowel obstruction, and the providers had been discussing a possible bowel resection, per Farhana. Dr. Simmons had been recommending pt see Teresa within a week, and that Teresa had wanted additional labs to be drawn.   Farhana states the pt had informed them she was at the ER yesterday, and was there for 5 hours without being seen. She states pt was informed the on call surgeon was to be paged, but never was, and pt gave up and went home. Pt had contacted Iris's office this morning to update them of this and to ask what was to be done next.   Would you be able to f/u on this? I don't have any documentation aside from pt calling in on 1/31. Their hotline, per Farhana, is [phone].     Thank you!    Cherrie    Please DO NOT send this message and/or reply back to sender. Call Center Representatives DO NOT respond to messages.

## 2018-02-05 NOTE — TELEPHONE ENCOUNTER
Per task, discussed with Dr. Moore regarding patient.  Dr. Moore states he does not recall instructing patient to come to clinic or have labs this week.  Dr. Moore recommends patient come through the ED, as a bowel obstruction needs to be treated on an inpatient basis.    Discussed Dr. Moore's recommendation with Farhana at [facility] GI clinic.  Farhana states that patient left the ED after 5 hours of waiting without being seen, and that patient was upset she was not able to get pain medication.   Informed Farhana per our ED notes, that patient left A.M.A. after being offered I.V. Ativan.  Informed Farhana that from a scheduling perspective, I cannot get this patient scheduled within a timely fashion, and that this patient has multiple cancellations/no-shows to our clinic.  I re-inerated Dr. Moore's recommendation of coming to our ED, and that I can discuss further with Dr. Moore in clinic tomorrow.  Farhana verbalized understanding, and will contact the patient directly.

## 2018-02-08 ENCOUNTER — TELEPHONE (OUTPATIENT)
Dept: SURGERY | Facility: CLINIC | Age: 43
End: 2018-02-08

## 2018-02-08 DIAGNOSIS — K56.699 COLONIC STRICTURE (H): Primary | ICD-10-CM

## 2018-02-08 NOTE — PROGRESS NOTES
After Dr. Moore spoke with a Dr. Bianca Esteban it was determined that the patient appointment should be moved up to Tuesday 2/13 at 12:00 with a lab appointment at 11:15. Patient was called with the changed in appointment times and patient verbalized understanding.

## 2018-02-08 NOTE — TELEPHONE ENCOUNTER
Farhana called back requesting to schedule clinic appointment for patient.  Reinerated Dr. Moore's recommendation of going to the ED for a bowel obstruction.  Farhana states that patient is no longer having an active bowel obstruction, and would like to schedule patient for a stricture (which is causing her obstructions).  Patient is scheduled 2/27/18 at 3:30 pm.  Informed Farhana if there is a cancellation, I can move patient up.  Farhana states she will contact patient with appointment details.

## 2018-02-11 ASSESSMENT — ENCOUNTER SYMPTOMS
SHORTNESS OF BREATH: 0
FEVER: 0

## 2018-02-12 NOTE — TELEPHONE ENCOUNTER
APPT INFO    Date /Time: 2/13/18   Reason for Appt: Bowel Obstruction-Stricture; Surgery Consult   Ref Provider/Clinic: [facility]   Are there internal records? Yes/No?  IF YES, list clinic names: Yes  Previous Dr Goodwin patient  [facility] ED   Are there outside records? Yes/No? Yes  [facility]   Patient Contact (Y/N) & Call Details: No referred   Action: Reviewed records; requested imaging be pushed     OUTSIDE RECORDS CHECKLIST     CLINIC NAME COMMENTS REC (x) IMG (x)   [facility] OFFICE NOTES: 12/1/17, 12/4/17, 12/7/17, 1/29/18, 1/31/18  RADIOLOGY:   8/26/17 xray Abd,   10/6/17 CT Abd/Pelv,   12/2/17 CT Abd/Pelv,   1/22/18 CT Abd/Pelv,& xray Abd,   1/23/18 xray Abd  ER/HOSP: 12/01/17, 12/02/17, 1/22/18, 1/23/18   PROCEDURES: 1/16/18 Colonoscopy & EGD  PATH/CULTURE: 1/16/18  LABS: 2017-18     x

## 2018-02-13 ENCOUNTER — OFFICE VISIT (OUTPATIENT)
Dept: SURGERY | Facility: CLINIC | Age: 43
End: 2018-02-13
Payer: COMMERCIAL

## 2018-02-13 ENCOUNTER — PRE VISIT (OUTPATIENT)
Dept: SURGERY | Facility: CLINIC | Age: 43
End: 2018-02-13

## 2018-02-13 VITALS
TEMPERATURE: 98.4 F | HEART RATE: 92 BPM | HEIGHT: 69 IN | WEIGHT: 143 LBS | DIASTOLIC BLOOD PRESSURE: 73 MMHG | BODY MASS INDEX: 21.18 KG/M2 | SYSTOLIC BLOOD PRESSURE: 105 MMHG | OXYGEN SATURATION: 100 %

## 2018-02-13 DIAGNOSIS — R10.84 ABDOMINAL PAIN, GENERALIZED: ICD-10-CM

## 2018-02-13 DIAGNOSIS — K56.699 COLON STRICTURE (H): ICD-10-CM

## 2018-02-13 DIAGNOSIS — K56.699 COLON STRICTURE (H): Primary | ICD-10-CM

## 2018-02-13 DIAGNOSIS — K56.699 COLONIC STRICTURE (H): ICD-10-CM

## 2018-02-13 LAB
ALBUMIN SERPL-MCNC: 2.9 G/DL (ref 3.4–5)
ALP SERPL-CCNC: 118 U/L (ref 40–150)
ALT SERPL W P-5'-P-CCNC: 14 U/L (ref 0–50)
ANION GAP SERPL CALCULATED.3IONS-SCNC: 5 MMOL/L (ref 3–14)
AST SERPL W P-5'-P-CCNC: 7 U/L (ref 0–45)
BASOPHILS # BLD AUTO: 0 10E9/L (ref 0–0.2)
BASOPHILS NFR BLD AUTO: 0.3 %
BILIRUB SERPL-MCNC: 0.3 MG/DL (ref 0.2–1.3)
BUN SERPL-MCNC: 8 MG/DL (ref 7–30)
CALCIUM SERPL-MCNC: 8.3 MG/DL (ref 8.5–10.1)
CHLORIDE SERPL-SCNC: 103 MMOL/L (ref 94–109)
CO2 SERPL-SCNC: 30 MMOL/L (ref 20–32)
CREAT SERPL-MCNC: 0.6 MG/DL (ref 0.52–1.04)
CRP SERPL-MCNC: 62.7 MG/L (ref 0–8)
DIFFERENTIAL METHOD BLD: ABNORMAL
EOSINOPHIL # BLD AUTO: 0.1 10E9/L (ref 0–0.7)
EOSINOPHIL NFR BLD AUTO: 0.5 %
ERYTHROCYTE [DISTWIDTH] IN BLOOD BY AUTOMATED COUNT: 13.3 % (ref 10–15)
ERYTHROCYTE [SEDIMENTATION RATE] IN BLOOD BY WESTERGREN METHOD: 18 MM/H (ref 0–20)
GFR SERPL CREATININE-BSD FRML MDRD: >90 ML/MIN/1.7M2
GLUCOSE SERPL-MCNC: 101 MG/DL (ref 70–99)
HCT VFR BLD AUTO: 41.6 % (ref 35–47)
HGB BLD-MCNC: 13.6 G/DL (ref 11.7–15.7)
IMM GRANULOCYTES # BLD: 0.1 10E9/L (ref 0–0.4)
IMM GRANULOCYTES NFR BLD: 0.7 %
INR PPP: 1.03 (ref 0.86–1.14)
LYMPHOCYTES # BLD AUTO: 1.5 10E9/L (ref 0.8–5.3)
LYMPHOCYTES NFR BLD AUTO: 13.8 %
MCH RBC QN AUTO: 31.2 PG (ref 26.5–33)
MCHC RBC AUTO-ENTMCNC: 32.7 G/DL (ref 31.5–36.5)
MCV RBC AUTO: 95 FL (ref 78–100)
MONOCYTES # BLD AUTO: 0.7 10E9/L (ref 0–1.3)
MONOCYTES NFR BLD AUTO: 6.6 %
NEUTROPHILS # BLD AUTO: 8.7 10E9/L (ref 1.6–8.3)
NEUTROPHILS NFR BLD AUTO: 78.1 %
NRBC # BLD AUTO: 0 10*3/UL
NRBC BLD AUTO-RTO: 0 /100
PLATELET # BLD AUTO: 375 10E9/L (ref 150–450)
POTASSIUM SERPL-SCNC: 3.3 MMOL/L (ref 3.4–5.3)
PREALB SERPL IA-MCNC: 10 MG/DL (ref 15–45)
PROT SERPL-MCNC: 7.2 G/DL (ref 6.8–8.8)
RBC # BLD AUTO: 4.36 10E12/L (ref 3.8–5.2)
SODIUM SERPL-SCNC: 139 MMOL/L (ref 133–144)
WBC # BLD AUTO: 11.1 10E9/L (ref 4–11)

## 2018-02-13 ASSESSMENT — ENCOUNTER SYMPTOMS
DISTURBANCES IN COORDINATION: 0
INCREASED ENERGY: 1
POLYPHAGIA: 1
ABDOMINAL PAIN: 1
NIGHT SWEATS: 1
NAUSEA: 1
HALLUCINATIONS: 0
RECTAL PAIN: 0
DECREASED APPETITE: 1
DIZZINESS: 1
PALPITATIONS: 0
EXERCISE INTOLERANCE: 1
BLOATING: 0
SLEEP DISTURBANCES DUE TO BREATHING: 0
BLOOD IN STOOL: 0
FATIGUE: 1
ORTHOPNEA: 0
TREMORS: 0
NERVOUS/ANXIOUS: 1
PANIC: 0
CONSTIPATION: 0
DOUBLE VISION: 0
SPEECH CHANGE: 0
HYPERTENSION: 0
MEMORY LOSS: 0
WEAKNESS: 1
HEARTBURN: 1
EYE PAIN: 0
LOSS OF CONSCIOUSNESS: 0
WEIGHT LOSS: 1
NUMBNESS: 0
HEADACHES: 1
EYE WATERING: 0
DECREASED CONCENTRATION: 1
DEPRESSION: 1
ALTERED TEMPERATURE REGULATION: 1
FEVER: 0
POLYDIPSIA: 1
PARALYSIS: 0
JAUNDICE: 0
INSOMNIA: 1
WEIGHT GAIN: 0
VOMITING: 1
BOWEL INCONTINENCE: 1
CHILLS: 1
SYNCOPE: 0
EYE IRRITATION: 0
LEG PAIN: 1
LIGHT-HEADEDNESS: 1
HYPOTENSION: 0
SEIZURES: 0
TINGLING: 0
DIARRHEA: 1
EYE REDNESS: 0

## 2018-02-13 ASSESSMENT — PAIN SCALES - GENERAL: PAINLEVEL: MILD PAIN (3)

## 2018-02-13 NOTE — NURSING NOTE
"Chief Complaint   Patient presents with     Clinic Care Coordination - Initial     New pt consult, stricture.        Vitals:    02/13/18 1201   BP: 105/73   BP Location: Left arm   Patient Position: Chair   Cuff Size: Adult Regular   Pulse: 92   Temp: 98.4  F (36.9  C)   TempSrc: Oral   SpO2: 100%   Weight: 143 lb   Height: 5' 9\"       Body mass index is 21.12 kg/(m^2).    Marianela CANCHOLA, MARIA ANTONIA                  "

## 2018-02-13 NOTE — LETTER
"2018       RE: Rachel A Gerhardt  [address] [address] [facility] [id number]     Dear Colleague,    Thank you for referring your patient, Rachel A Gerhardt, to the [facility] COLON AND RECTAL SURGERY at [facility]. Please see a copy of my visit note below.    Colon and Rectal Surgery Clinic Note      RE: Rachel A Gerhardt  : 1975  JAKE: 2018    Consult of recurrent abdominal pain      41 year old woman with a history of cervical cancer s/p chemotherapy and radiation who had recurrent small bowel obstructions thought to be due to radiation induced changes in the pelvis, who underwent ex lap with Dr Goodwin on 17 where multiple small bowel strictures were noted and a 60cm small bowel resection was performed with 290cm of small bowel left behind.  Path showed fibrosing stricture.     Only 5 months after her surgery, she started having same symptoms again;  Since then she complains that her symptoms have gotten worse  She continues to loose weight, has lost about 30lbs in the last few months  She states that she eats 2-3 times a week as she worries to overwhelm her body and it takes her a day or two to digest food. She starts feeling epigastric pain as well as generalized pain and it takes her a day where she feels the food \"  She also has multiple vomiting episodes which give her some relief  And she has daily diarrhea and has incontinence to stool when she sleeps.     She recently had a colonoscopy which showed terminal ileitis  Upper endoscopy did not show any abnormalies    For details of past medical history, surgical history, family history, medications, allergies, and review of systems, please see details below.    Medical history:  Past Medical History:   Diagnosis Date     Asthma      Cancer (H)     Per patient OBGYN, cerivical cancer     Cervical cancer (H)      Other chronic pain      Ovarian cancer (H)      Substance abuse     Outside records indicate " past history of narcotics abuse or dependence, but patient denies.       Surgical history:  Past Surgical History:   Procedure Laterality Date     COMBINED CYSTOSCOPY, INSERT STENT URETER(S) Bilateral 5/18/2017    Procedure: COMBINED CYSTOSCOPY, INSERT STENT URETER(S);  Cystoscopy with Bilateral Stent,;  Surgeon: Rene Calero MD;  [facility] OR     ENT SURGERY  2009    mastoid, sinus     EXAM UNDER ANESTHESIA, INSERT ALEX SLEEVE, UTERINE PLACEMENT OF TANDEM AND RING FOR RAD, ULTRASOUND N/A 12/14/2015    Procedure: EXAM UNDER ANESTHESIA, INSERT ALEX SLEEVE, UTERINE PLACEMENT OF TANDEM AND RING FOR RADIATION, ULTRASOUND GUIDED;  Surgeon: Abby Tony MD;  [facility] OR     INSERT TANDEM AND CESIUM APPLICATOR CERVIX, ULTRASOUND GUIDED N/A 12/17/2015    Procedure: INSERT TANDEM AND CESIUM APPLICATOR CERVIX, ULTRASOUND GUIDED;  Surgeon: Kika Wood MD;  [facility] OR     KNEE SURGERY       LAPAROTOMY EXPLORATORY N/A 5/18/2017    Procedure: LAPAROTOMY EXPLORATORY;   Exploratry Laparotomy, Small Bowel Resection with anastomosis, Flexible Sigmoidoscopy;  Surgeon: Jennifer Goodwin MD;  [facility]     PICC INSERTION Right 04/29/2017    4fr SL BioFlo PICC, 37cm (3cm external) in the R basilic vein w/ tip in the mid SVC.     RESECT SMALL BOWEL WITHOUT OSTOMY N/A 5/18/2017    Procedure: RESECT SMALL BOWEL WITHOUT OSTOMY;;  Surgeon: Jennifer Goodwin MD;  [facility]     SIGMOIDOSCOPY FLEXIBLE N/A 5/18/2017    Procedure: SIGMOIDOSCOPY FLEXIBLE;;  Surgeon: Jennifer Goodwin MD;  [facility] OR       Family history:  Family History   Problem Relation Age of Onset     DIABETES Mother      Ovarian Cancer No family hx of      Uterine Cancer No family hx of      Cervical Cancer No family hx of      Breast Cancer No family hx of        Medications:  Current Outpatient Prescriptions   Medication Sig Dispense Refill     ondansetron (ZOFRAN) 4 MG tablet Take 1-2 tablets (4-8 mg) by mouth every 8  "hours as needed for nausea 30 tablet 0     HYDROcodone-acetaminophen (NORCO) 5-325 MG per tablet Take 1 tablet by mouth every 4 hours as needed for pain maximum 1 tablet(s) per day 30 tablet 0     vitamin D (ERGOCALCIFEROL) 52365 UNIT capsule Take 1 capsule (50,000 Units) by mouth once a week Needs labs checked prior to additional refills 8 capsule 0     albuterol (PROAIR HFA/PROVENTIL HFA/VENTOLIN HFA) 108 (90 BASE) MCG/ACT Inhaler Inhale 2 puffs into the lungs every 6 hours as needed 1 Inhaler 0     acetaminophen (TYLENOL) 500 MG tablet Take 2 tablets (1,000 mg) by mouth 4 times daily 80 tablet 0     cholecalciferol 1000 UNITS TABS Take 1,000 Units by mouth daily 30 tablet 0     calcium carbonate (TUMS) 500 MG chewable tablet Take 500 mg by mouth Reported on 5/8/2017       Allergies:  The patientis allergic to no clinical screening - see comments; sulfa drugs; amoxicillin; amoxicillin-pot clavulanate; augmentin; avelox [moxifloxacin]; ciprofloxacin; codeine; ibuprofen; ibuprofen sodium; quinolones; tramadol; and daucus carota.    Social history:  Social History   Substance Use Topics     Smoking status: Light Tobacco Smoker     Packs/day: 0.25     Years: 12.00     Types: Cigarettes     Smokeless tobacco: Never Used      Comment: pt smoking about 4 cigs daily     Alcohol use No      Comment: None per pt     Marital status: .    Review of Systems:  Nursing Notes:   Darryl Diaz LPN  2/13/2018 12:05 PM  Signed  Chief Complaint   Patient presents with     Clinic Care Coordination - Initial     New pt consult, stricture.        Vitals:    02/13/18 1201   BP: 105/73   BP Location: Left arm   Patient Position: Chair   Cuff Size: Adult Regular   Pulse: 92   Temp: 98.4  F (36.9  C)   TempSrc: Oral   SpO2: 100%   Weight: 143 lb   Height: 5' 9\"       Body mass index is 21.12 kg/(m^2).    Marianela CANCHOLA LPN                 PE:  General: feels uncomfortable, in tears  Chest: unlabored breathing  Abdomen: soft, but tender on " palpation.  She is minimally if at all distended.    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  It could be that she has vasculitis.  We will order a vasculitis panel.  We will also order MRE to take a better look at her bowels.  We will get nutrition labs since she has lost significant weight and we might need to consider tpn if she is not tolerating oral intake.    Once this workup is performed we will follow up with her on the plan of how to proceed.    Isaias Kirkland MD  CRS fellow  [phone]    I saw and examined the patient, led the discussion and edited the resident note.  I agree with the assessment and plan as outlined.   The patient has marked abdominal pain that occurs almost immediately following eating, even quite small quantities of food or drink.  Nausea is not a prominent feature but she does have occasional vomiting.  Overall I am struck that the symptoms did not sound like our patients with typical recurrent obstruction from strictures, and to me sounds more reminiscent of a vascular insufficiency problem.  I reviewed her CT imaging from July 2017 with Dr. Emery.  There is clearly an abnormal segment of mid jejunum that appears inflamed but does not have the typical appearance of Crohn's disease or radiation stricture.  The ileocolic anastomosis is remote from this area and looks quite normal.  The large vessels to the intestine all look normal and Dr. Emery does not think that a CT angiogram would add considerable information.  We will go ahead and assess nutritional parameters and order an MR angiogram as well as a vasculitis panel.  At the moment, there is no clear evidence that  this is a surgical problem.  I reviewed all of the foregoing in detail with the patient.  She is symptomatic and frustrated, but she is expressed her understanding and I answered all of her questions to her stated satisfaction.    Total time 45 minutes.  Greater than half the time was spent counseling.    Referring Provider:  Bianca Simmons MD   [facility]  [address] [place] [address]     Primary Care Provider:  Reji Avery      Again, thank you for allowing me to participate in the care of your patient.      Sincerely,    Herman Moore MD

## 2018-02-13 NOTE — MR AVS SNAPSHOT
"              After Visit Summary   2/13/2018    Rachel A Gerhardt    MRN: [id number]           Patient Information     Date Of Birth          1975        Visit Information        Provider Department      2/13/2018 12:00 PM Herman Moore MD [facility] Colon and Rectal Surgery        Today's Diagnoses     Colon stricture    -  1       Follow-ups after your visit        Future tests that were ordered for you today     Open Future Orders        Priority Expected Expires Ordered    MR Enterography Routine  2/13/2019 2/13/2018    Vasculitis panel Routine  2/14/2019 2/13/2018            Who to contact     Please call your clinic at [phone] to:    Ask questions about your health    Make or cancel appointments    Discuss your medicines    Learn about your test results    Speak to your doctor            Additional Information About Your Visit        [employer] Information     [employer] gives you secure access to your electronic health record. If you see a primary care provider, you can also send messages to your care team and make appointments. If you have questions, please call your primary care clinic.  If you do not have a primary care provider, please call [phone] and they will assist you.      Seer is an electronic gateway that provides easy, online access to your medical records. With [employer]er, you can request a clinic appointment, read your test results, renew a prescription or communicate with your care team.     To access your existing account, please contact your [facility] or call [phone] for assistance.        Care EveryWhere ID     This is your Care EveryWhere ID. This could be used by other organizations to access your [facility] medical records  [id number]        Your Vitals Were     Pulse Temperature Height Pulse Oximetry BMI (Body Mass Index)       92 98.4  F (36.9  C) (Oral) 1.753 m (5' 9\") 100% 21.12 kg/m2        Blood Pressure from Last 3 Encounters: "   02/13/18 105/73   02/04/18 112/80   07/17/17 109/78    Weight from Last 3 Encounters:   02/13/18 64.9 kg (143 lb)   02/04/18 67.1 kg (148 lb)   07/17/17 69.5 kg (153 lb 3.2 oz)               Primary Care Provider Office Phone # Fax #    Reji Avery [phone]       [facility] [address] [place] [address]        Equal Access to Services     MARISOL GAN : Hadii aad ku hadasho Soomaali, waaxda luqadaha, qaybta kaalmada adeegyada, waxay idiin hayaan johnathan alonso . So [facility] [phone].    ATENCIÓN: Si habla español, tiene a epps disposición servicios gratuitos de asistencia lingüística. [facility] [phone].    We comply with applicable federal civil rights laws and [place] laws. We do not discriminate on the basis of race, color, national origin, age, disability, sex, sexual orientation, or gender identity.            Thank you!     Thank you for choosing [facility] COLON AND RECTAL SURGERY  for your care. Our goal is always to provide you with excellent care. Hearing back from our patients is one way we can continue to improve our services. Please take a few minutes to complete the written survey that you may receive in the mail after your visit with us. Thank you!             Your Updated Medication List - Protect others around you: Learn how to safely use, store and throw away your medicines at www.disposemymeds.org.          This list is accurate as of 2/13/18  1:27 PM.  Always use your most recent med list.                   Brand Name Dispense Instructions for use Diagnosis    acetaminophen 500 MG tablet    TYLENOL    80 tablet    Take 2 tablets (1,000 mg) by mouth 4 times daily    Small intestine obstruction       albuterol 108 (90 BASE) MCG/ACT Inhaler    PROAIR HFA/PROVENTIL HFA/VENTOLIN HFA    1 Inhaler    Inhale 2 puffs into the lungs every 6 hours as needed    Wheezing       cholecalciferol 1000 UNITS Tabs     30 tablet    Take 1,000 Units by mouth  daily    Small intestine obstruction       HYDROcodone-acetaminophen 5-325 MG per tablet    NORCO    30 tablet    Take 1 tablet by mouth every 4 hours as needed for pain maximum 1 tablet(s) per day    Nocturnal diarrhea, Small bowel obstruction, Abdominal pain, generalized       ondansetron 4 MG tablet    ZOFRAN    30 tablet    Take 1-2 tablets (4-8 mg) by mouth every 8 hours as needed for nausea    Small bowel obstruction, Nocturnal diarrhea, Abdominal pain, generalized       TUMS 500 MG chewable tablet   Generic drug:  calcium carbonate      Take 500 mg by mouth Reported on 5/8/2017        vitamin D 41028 UNIT capsule    ERGOCALCIFEROL    8 capsule    Take 1 capsule (50,000 Units) by mouth once a week Needs labs checked prior to additional refills    Vitamin D deficiency

## 2018-02-13 NOTE — PROGRESS NOTES
"Colon and Rectal Surgery Clinic Note      RE: Rachel A Gerhardt  : 1975  JAKE: 2018    Consult of recurrent abdominal pain      41 year old woman with a history of cervical cancer s/p chemotherapy and radiation who had recurrent small bowel obstructions thought to be due to radiation induced changes in the pelvis, who underwent ex lap with Dr Goodwin on 17 where multiple small bowel strictures were noted and a 60cm small bowel resection was performed with 290cm of small bowel left behind.  Path showed fibrosing stricture.     Only 5 months after her surgery, she started having same symptoms again;  Since then she complains that her symptoms have gotten worse  She continues to loose weight, has lost about 30lbs in the last few months  She states that she eats 2-3 times a week as she worries to overwhelm her body and it takes her a day or two to digest food. She starts feeling epigastric pain as well as generalized pain and it takes her a day where she feels the food \"  She also has multiple vomiting episodes which give her some relief  And she has daily diarrhea and has incontinence to stool when she sleeps.     She recently had a colonoscopy which showed terminal ileitis  Upper endoscopy did not show any abnormalies    For details of past medical history, surgical history, family history, medications, allergies, and review of systems, please see details below.    Medical history:  Past Medical History:   Diagnosis Date     Asthma      Cancer (H)     Per patient OBGYN, cerivical cancer     Cervical cancer (H)      Other chronic pain      Ovarian cancer (H)      Substance abuse     Outside records indicate past history of narcotics abuse or dependence, but patient denies.       Surgical history:  Past Surgical History:   Procedure Laterality Date     COMBINED CYSTOSCOPY, INSERT STENT URETER(S) Bilateral 2017    Procedure: COMBINED CYSTOSCOPY, INSERT STENT URETER(S);  Cystoscopy with Bilateral " Stent,;  Surgeon: Rene Calero MD;  Location: [facility] OR     ENT SURGERY  2009    mastoid, sinus     EXAM UNDER ANESTHESIA, INSERT ALEX SLEEVE, UTERINE PLACEMENT OF TANDEM AND RING FOR RAD, ULTRASOUND N/A 12/14/2015    Procedure: EXAM UNDER ANESTHESIA, INSERT ALEX SLEEVE, UTERINE PLACEMENT OF TANDEM AND RING FOR RADIATION, ULTRASOUND GUIDED;  Surgeon: Abby Tony MD;  Location: [facility] OR     INSERT TANDEM AND CESIUM APPLICATOR CERVIX, ULTRASOUND GUIDED N/A 12/17/2015    Procedure: INSERT TANDEM AND CESIUM APPLICATOR CERVIX, ULTRASOUND GUIDED;  Surgeon: Kika Wood MD;  Location: [facility] OR     KNEE SURGERY       LAPAROTOMY EXPLORATORY N/A 5/18/2017    Procedure: LAPAROTOMY EXPLORATORY;   Exploratry Laparotomy, Small Bowel Resection with anastomosis, Flexible Sigmoidoscopy;  Surgeon: Jennifer Goodwin MD;  Location: [facility] OR     PICC INSERTION Right 04/29/2017    4fr SL BioFlo PICC, 37cm (3cm external) in the R basilic vein w/ tip in the mid SVC.     RESECT SMALL BOWEL WITHOUT OSTOMY N/A 5/18/2017    Procedure: RESECT SMALL BOWEL WITHOUT OSTOMY;;  Surgeon: Jennifer Goodwin MD;  Location: [facility] OR     SIGMOIDOSCOPY FLEXIBLE N/A 5/18/2017    Procedure: SIGMOIDOSCOPY FLEXIBLE;;  Surgeon: Jennifer Goodwin MD;  Location: [facility] OR       Family history:  Family History   Problem Relation Age of Onset     DIABETES Mother      Ovarian Cancer No family hx of      Uterine Cancer No family hx of      Cervical Cancer No family hx of      Breast Cancer No family hx of        Medications:  Current Outpatient Prescriptions   Medication Sig Dispense Refill     ondansetron (ZOFRAN) 4 MG tablet Take 1-2 tablets (4-8 mg) by mouth every 8 hours as needed for nausea 30 tablet 0     HYDROcodone-acetaminophen (NORCO) 5-325 MG per tablet Take 1 tablet by mouth every 4 hours as needed for pain maximum 1 tablet(s) per day 30 tablet 0     vitamin D (ERGOCALCIFEROL) 29946 UNIT capsule Take 1 capsule (50,000 Units) by mouth once a week Needs  "labs checked prior to additional refills 8 capsule 0     albuterol (PROAIR HFA/PROVENTIL HFA/VENTOLIN HFA) 108 (90 BASE) MCG/ACT Inhaler Inhale 2 puffs into the lungs every 6 hours as needed 1 Inhaler 0     acetaminophen (TYLENOL) 500 MG tablet Take 2 tablets (1,000 mg) by mouth 4 times daily 80 tablet 0     cholecalciferol 1000 UNITS TABS Take 1,000 Units by mouth daily 30 tablet 0     calcium carbonate (TUMS) 500 MG chewable tablet Take 500 mg by mouth Reported on 5/8/2017       Allergies:  The patientis allergic to no clinical screening - see comments; sulfa drugs; amoxicillin; amoxicillin-pot clavulanate; augmentin; avelox [moxifloxacin]; ciprofloxacin; codeine; ibuprofen; ibuprofen sodium; quinolones; tramadol; and daucus carota.    Social history:  Social History   Substance Use Topics     Smoking status: Light Tobacco Smoker     Packs/day: 0.25     Years: 12.00     Types: Cigarettes     Smokeless tobacco: Never Used      Comment: pt smoking about 4 cigs daily     Alcohol use No      Comment: None per pt     Marital status: .    Review of Systems:  Nursing Notes:   Darryl Diaz LPN  2/13/2018 12:05 PM  Signed  Chief Complaint   Patient presents with     Clinic Care Coordination - Initial     New pt consult, stricture.        Vitals:    02/13/18 1201   BP: 105/73   BP Location: Left arm   Patient Position: Chair   Cuff Size: Adult Regular   Pulse: 92   Temp: 98.4  F (36.9  C)   TempSrc: Oral   SpO2: 100%   Weight: 143 lb   Height: 5' 9\"       Body mass index is 21.12 kg/(m^2).    Marianela ACNCHOLA LPN                 PE:  General: feels uncomfortable, in tears  Chest: unlabored breathing  Abdomen: soft, but tender on palpation.  She is minimally if at all distended.    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  It could be that she has vasculitis.  We will order a vasculitis panel.  We will also order MRE to take a better look at her bowels.  We will get nutrition labs since she has lost significant weight and we might need to consider tpn if she is not tolerating oral intake.    Once this workup is performed we will follow up with her on the plan of how to proceed.    Isaias Kirkland MD  CRS fellow  [phone]    I saw and examined the patient, led the discussion and edited the resident note.  I agree with the assessment and plan as outlined.   The patient has marked abdominal pain that occurs almost immediately following eating, even quite small quantities of food or drink.  Nausea is not a prominent feature but she does have occasional vomiting.  Overall I am struck that the symptoms did not sound like our patients with typical recurrent obstruction from strictures, and to me sounds more reminiscent of a vascular insufficiency problem.  I reviewed her CT imaging from July 2017 with Dr. Emery.  There is clearly an abnormal segment of mid jejunum that appears inflamed but does not have the typical appearance of Crohn's disease or radiation stricture.  The ileocolic anastomosis is remote from this area and looks quite normal.  The large vessels to the intestine all look normal and Dr. Emery does not think that a CT angiogram would add considerable information.  We will go ahead and assess nutritional parameters and order an MR angiogram as well as a vasculitis panel.  At the moment, there is no clear evidence that this is a surgical problem.  I reviewed all of the foregoing in detail with the patient.  She is symptomatic and frustrated, but she is expressed her understanding and I answered all of her questions to her stated satisfaction.    Total time 45 minutes.  Greater than half the time was spent  counseling.      Herman Moore MD   Professor and Chief  Division of Colon and Rectal Surgery  [facility]      Referring Provider:  Bianca Simmons MD   [facility]  [address] [place] [address]     Primary Care Provider:  Reji Avery  Answers for HPI/ROS submitted by the patient on 2/13/2018   General Symptoms: Yes  Skin Symptoms: No  HENT Symptoms: No  EYE SYMPTOMS: Yes  HEART SYMPTOMS: Yes  LUNG SYMPTOMS: No  INTESTINAL SYMPTOMS: Yes  URINARY SYMPTOMS: No  GYNECOLOGIC SYMPTOMS: No  BREAST SYMPTOMS: No  SKELETAL SYMPTOMS: No  BLOOD SYMPTOMS: No  NERVOUS SYSTEM SYMPTOMS: Yes  MENTAL HEALTH SYMPTOMS: Yes  Fever: No  Loss of appetite: Yes  Weight loss: Yes  Weight gain: No  Fatigue: Yes  Night sweats: Yes  Chills: Yes  Increased stress: Yes  Excessive hunger: Yes  Excessive thirst: Yes  Feeling hot or cold when others believe the temperature is normal: Yes  Loss of height: No  Post-operative complications: No  Surgical site pain: Yes  Hallucinations: No  Change in or Loss of Energy: Yes  Hyperactivity: No  Confusion: No  Eye pain: No  Vision loss: No  Dry eyes: No  Watery eyes: No  Eye bulging: No  Double vision: No  Flashing of lights: No  Spots: No  Floaters: No  Redness: No  Crossed eyes: No  Tunnel Vision: No  Yellowing of eyes: No  Eye irritation: No  Chest pain or pressure: No  Fast or irregular heartbeat: No  Pain in legs with walking: Yes  Trouble breathing while lying down: No  Fingers or toes appear blue: No  High blood pressure: No  Low blood pressure: No  Fainting: No  Murmurs: No  Pacemaker: No  Varicose veins: No  Edema or swelling: No  Wake up at night with shortness of breath: No  Light-headedness: Yes  Exercise intolerance: Yes  Heart burn or indigestion: Yes  Nausea: Yes  Vomiting: Yes  Abdominal pain: Yes  Bloating: No  Constipation: No  Diarrhea: Yes  Blood in stool: No  Black stools: No  Rectal or Anal pain: No  Fecal incontinence:  Yes  Yellowing of skin or eyes: No  Vomit with blood: No  Change in stools: Yes  Trouble with coordination: No  Dizziness or trouble with balance: Yes  Fainting or black-out spells: No  Memory loss: No  Headache: Yes  Seizures: No  Speech problems: No  Tingling: No  Tremor: No  Weakness: Yes  Difficulty walking: Yes  Paralysis: No  Numbness: No  Nervous or Anxious: Yes  Depression: Yes  Trouble sleeping: Yes  Trouble thinking or concentrating: Yes  Mood changes: Yes  Panic attacks: No

## 2018-02-14 LAB
MYELOPEROXIDASE AB SER-ACNC: <0.2 AI (ref 0–0.9)
PROTEINASE3 IGG SER-ACNC: <0.2 AI (ref 0–0.9)

## 2018-02-15 ENCOUNTER — TELEPHONE (OUTPATIENT)
Dept: SURGERY | Facility: CLINIC | Age: 43
End: 2018-02-15

## 2018-02-15 NOTE — TELEPHONE ENCOUNTER
This RNCC returned the patient's phone call in regard to the below message. Patient states her primary care provider in the past prescribed her Norco 5-325 mg taking one tab every six hours which was helping her manage her pain at the time. However, her primary care was only comfortable prescribing two months worth of this, where he then referred the patient to a pain clinic. Patient states the pain clinic was unable to prescribe her anything because they did not feel comfortable treating her pain given it's cause. This RNCC stated that the patient should again return to her primary care for management of her pain. This RNCC also encouraged the patient to follow up with the [facility] where her primary care provider referred her to. Patient was also advised to advocate for herself at this clinic to get on a wait list for cancellations. Patient was also informed our team was trying to get to the bottom of what was causing her pain to help better understand her pain management needs. This RNCC again advised the patient to get her primary care to manage her pain until she can see a pain specialist as we do not treat chronic pain. This RNCC gave the patient the surgery triage line if the patient's primary care needed to discuss her situation with someone from our team.

## 2018-02-15 NOTE — TELEPHONE ENCOUNTER
----- Message from Mini García RN sent at 2/15/2018 10:51 AM CST -----  Regarding: Triage call  Hi,    Patient called triage. Patient of Dr. Moore with abdominal pain. She called to discuss pain management concerns. Per patient, pain is severe- followed up with PCP for pain medication and they referred back to CR.     I discussed we typically refer to pain management as pain medication is usually only prescribed acutely for surgical pain but would send a message to the CR team.     Patient can be reached at [phone]

## 2018-02-16 ENCOUNTER — TELEPHONE (OUTPATIENT)
Dept: SURGERY | Facility: CLINIC | Age: 43
End: 2018-02-16

## 2018-02-16 NOTE — TELEPHONE ENCOUNTER
Patient PCP provider  Dr. Avery, nurse JELENA,  called this RN through triage  And gave a return number of  [phone].   The PCP provider would like to speak with Dr. Moore and develop a plan of care for the patient. This RN explained that Dr. Moore is unavailable until Monday but This RN will have him call on Monday when her returns. Nurse verbalized understanding.

## 2018-03-20 ENCOUNTER — TELEPHONE (OUTPATIENT)
Dept: SURGERY | Facility: CLINIC | Age: 43
End: 2018-03-20

## 2018-03-20 NOTE — TELEPHONE ENCOUNTER
Jenni is calling for Dr. Moore.  She reports she's been waiting 9 weeks for MRE which is scheduled for TOMORROW 3/21.  She reports she's been loosing 5 pound per week and throwing up many times per day.  Radiology just called her and instructed her to come early to drink contrast.  She's certain she will throw it up.  They wanted to cancel the test and she refused.  She plans to come.    Spoke with Joellen, colorectal clinic nurse.  She will call her back.  I let her know she should try to drink as much as she can and if she throws up, at least she tried.  Caller verbalizes last time she threw up at the test.    Will route to Joellen.

## 2018-03-20 NOTE — TELEPHONE ENCOUNTER
This RNCC called patient back in regard to the below message. This RNCC again advised the patient to drink as much as she can. Patient states she has lost another forty pounds since last seeing Dr. Moore. Patient also states she didn't think she would be alive for this appointment today because she has been throwing up and loosing so much weight. This RNCC advised the patient to go to the ER if she was feeling this sick and like she wouldn't live much longer. Patient states she has been in and out of the ER and all they tell her is that she should follow up with a GI specialist which she is currently doing.

## 2018-03-21 ENCOUNTER — RADIANT APPOINTMENT (OUTPATIENT)
Dept: MRI IMAGING | Facility: CLINIC | Age: 43
End: 2018-03-21
Attending: COLON & RECTAL SURGERY
Payer: COMMERCIAL

## 2018-03-21 DIAGNOSIS — K56.699 COLON STRICTURE (H): ICD-10-CM

## 2018-03-21 RX ORDER — GADOBUTROL 604.72 MG/ML
7.5 INJECTION INTRAVENOUS ONCE
Status: COMPLETED | OUTPATIENT
Start: 2018-03-21 | End: 2018-03-21

## 2018-03-21 RX ADMIN — GADOBUTROL 6.5 ML: 604.72 INJECTION INTRAVENOUS at 14:06

## 2018-03-21 NOTE — DISCHARGE INSTRUCTIONS

## 2018-03-27 ENCOUNTER — TELEPHONE (OUTPATIENT)
Dept: SURGERY | Facility: CLINIC | Age: 43
End: 2018-03-27

## 2018-03-27 DIAGNOSIS — K56.699 COLONIC STRICTURE (H): Primary | ICD-10-CM

## 2018-03-27 NOTE — TELEPHONE ENCOUNTER
Per Maria L, they already discussed that if she feels she can not wait until clinic appointment she should be seen in our ED.  Otherwise next week at appointment they will check labs and if appropriate set her up for outpatient line placement and TPN.    Called Jenni back at [phone] her mother's land line.  She is concerned her cell phone may be loosing charge.    I called Jenni back and gave her the above information.  She verbalizes understanding and agreement.  Encouraged her to call and let us know if she is going to the ED so we can alert Dr. Moore's team.

## 2018-03-27 NOTE — TELEPHONE ENCOUNTER
Jenni is calling back for Dr. Moore.  She is wondering if it would be possible to be admitted to the hospital briefly and start TPN while she is awaiting clinic appointment next week?  She recalls a year ago TPN helped her situation immensely.    Will route to [facility] and call her back.

## 2018-03-27 NOTE — TELEPHONE ENCOUNTER
"This RN called Jenni and discussed her MRE results per Dr. Moore request. This RN informed patient that there was a stricture shown on the scan and Dr. Moore would like her to be seen in clinic next week and have labs drawn before clinic to make and discuss the plan of care for the patient. Patient was given a scheduled time for a clinic appointment of 12:00 with a lab appointment at 11:30. Patient verbalizes understanding of plan of care.    Patient states that she feels as though her PCP and and GI physician are not doing \"anything to help her\", and she states she has lost over 40 lbs since she has last been in clinic. Patient states that she has not been able to eat without experiencing nausea and vomiting. She states that she is even vomiting with water. Patient states she may not be able to make it to her appointment time on Tuesday. Patient was informed that if she feels that her condition requires to be seen sooner than Tuesday she can always report to [facility] ED for evaluation. Patient verbalizes understanding and has agreed to call this RN if she decides to report to the ED.   "

## 2018-03-27 NOTE — TELEPHONE ENCOUNTER
Patient is calling Dr. Moore again this AM for MRE results from 3/21.  She is crying on the phone, states she's lost 55 pounds.  Weighs as much as she did in 5th grade. Throwing up even water.  Suffering while waiting several weeks to get the test and now for the results. Her Mother got on the phone and yelled at me for her delay in care. Had a list of places she was going to report us to. Jenni got back on phone and apologized for Mom's behavior.  Will await Dr. Moore's call.  Offered patient relations phone number to Jenni, she states she already has it.    Called back later to see if any room in clinic today to be seen.  Unfortunately no.  Will await Dr. Moore's call.    Discussed with Dr. Teresa Lisa's clinic nurse.  They are in clinic today.  Will discuss with Dr. Moore and have him call her.

## 2018-03-28 ENCOUNTER — HOSPITAL ENCOUNTER (INPATIENT)
Facility: CLINIC | Age: 43
LOS: 3 days | Discharge: HOME | DRG: 393 | End: 2018-04-01
Attending: EMERGENCY MEDICINE | Admitting: COLON & RECTAL SURGERY
Payer: COMMERCIAL

## 2018-03-28 ENCOUNTER — APPOINTMENT (OUTPATIENT)
Dept: CT IMAGING | Facility: CLINIC | Age: 43
DRG: 393 | End: 2018-03-28
Attending: EMERGENCY MEDICINE
Payer: COMMERCIAL

## 2018-03-28 ENCOUNTER — APPOINTMENT (OUTPATIENT)
Dept: GENERAL RADIOLOGY | Facility: CLINIC | Age: 43
DRG: 393 | End: 2018-03-28
Attending: EMERGENCY MEDICINE
Payer: COMMERCIAL

## 2018-03-28 DIAGNOSIS — K56.609 SMALL INTESTINE OBSTRUCTION (H): Primary | ICD-10-CM

## 2018-03-28 DIAGNOSIS — R10.84 ABDOMINAL PAIN, GENERALIZED: ICD-10-CM

## 2018-03-28 DIAGNOSIS — R62.7 FAILURE TO THRIVE IN ADULT: ICD-10-CM

## 2018-03-28 LAB
ALBUMIN SERPL-MCNC: 2.7 G/DL (ref 3.4–5)
ALP SERPL-CCNC: 94 U/L (ref 40–150)
ALT SERPL W P-5'-P-CCNC: 22 U/L (ref 0–50)
ANION GAP SERPL CALCULATED.3IONS-SCNC: 8 MMOL/L (ref 3–14)
AST SERPL W P-5'-P-CCNC: 19 U/L (ref 0–45)
BASOPHILS # BLD AUTO: 0 10E9/L (ref 0–0.2)
BASOPHILS NFR BLD AUTO: 0.3 %
BILIRUB SERPL-MCNC: 0.6 MG/DL (ref 0.2–1.3)
BUN SERPL-MCNC: 18 MG/DL (ref 7–30)
CALCIUM SERPL-MCNC: 8.2 MG/DL (ref 8.5–10.1)
CHLORIDE SERPL-SCNC: 102 MMOL/L (ref 94–109)
CO2 SERPL-SCNC: 32 MMOL/L (ref 20–32)
CREAT SERPL-MCNC: 0.93 MG/DL (ref 0.52–1.04)
DIFFERENTIAL METHOD BLD: ABNORMAL
EOSINOPHIL # BLD AUTO: 0.1 10E9/L (ref 0–0.7)
EOSINOPHIL NFR BLD AUTO: 0.9 %
ERYTHROCYTE [DISTWIDTH] IN BLOOD BY AUTOMATED COUNT: 16 % (ref 10–15)
GFR SERPL CREATININE-BSD FRML MDRD: 66 ML/MIN/1.7M2
GLUCOSE SERPL-MCNC: 111 MG/DL (ref 70–99)
HCG SERPL QL: NEGATIVE
HCT VFR BLD AUTO: 42.4 % (ref 35–47)
HGB BLD-MCNC: 13.9 G/DL (ref 11.7–15.7)
IMM GRANULOCYTES # BLD: 0 10E9/L (ref 0–0.4)
IMM GRANULOCYTES NFR BLD: 0.3 %
INR PPP: 1.27 (ref 0.86–1.14)
LACTATE BLD-SCNC: 1.6 MMOL/L (ref 0.7–2)
LIPASE SERPL-CCNC: 64 U/L (ref 73–393)
LYMPHOCYTES # BLD AUTO: 2.3 10E9/L (ref 0.8–5.3)
LYMPHOCYTES NFR BLD AUTO: 27 %
MCH RBC QN AUTO: 32.7 PG (ref 26.5–33)
MCHC RBC AUTO-ENTMCNC: 32.8 G/DL (ref 31.5–36.5)
MCV RBC AUTO: 100 FL (ref 78–100)
MONOCYTES # BLD AUTO: 0.7 10E9/L (ref 0–1.3)
MONOCYTES NFR BLD AUTO: 8.2 %
NEUTROPHILS # BLD AUTO: 5.5 10E9/L (ref 1.6–8.3)
NEUTROPHILS NFR BLD AUTO: 63.3 %
NRBC # BLD AUTO: 0 10*3/UL
NRBC BLD AUTO-RTO: 0 /100
PLATELET # BLD AUTO: 291 10E9/L (ref 150–450)
PLATELET # BLD EST: ABNORMAL 10*3/UL
POTASSIUM SERPL-SCNC: 3.4 MMOL/L (ref 3.4–5.3)
PROT SERPL-MCNC: 5.7 G/DL (ref 6.8–8.8)
RBC # BLD AUTO: 4.25 10E12/L (ref 3.8–5.2)
SODIUM SERPL-SCNC: 142 MMOL/L (ref 133–144)
WBC # BLD AUTO: 8.6 10E9/L (ref 4–11)

## 2018-03-28 PROCEDURE — 83690 ASSAY OF LIPASE: CPT | Performed by: EMERGENCY MEDICINE

## 2018-03-28 PROCEDURE — 96361 HYDRATE IV INFUSION ADD-ON: CPT | Mod: 59

## 2018-03-28 PROCEDURE — 85025 COMPLETE CBC W/AUTO DIFF WBC: CPT | Performed by: EMERGENCY MEDICINE

## 2018-03-28 PROCEDURE — 96376 TX/PRO/DX INJ SAME DRUG ADON: CPT

## 2018-03-28 PROCEDURE — 74177 CT ABD & PELVIS W/CONTRAST: CPT

## 2018-03-28 PROCEDURE — 99285 EMERGENCY DEPT VISIT HI MDM: CPT | Mod: 25

## 2018-03-28 PROCEDURE — 84134 ASSAY OF PREALBUMIN: CPT | Performed by: EMERGENCY MEDICINE

## 2018-03-28 PROCEDURE — 84703 CHORIONIC GONADOTROPIN ASSAY: CPT | Performed by: EMERGENCY MEDICINE

## 2018-03-28 PROCEDURE — 96375 TX/PRO/DX INJ NEW DRUG ADDON: CPT

## 2018-03-28 PROCEDURE — 74019 RADEX ABDOMEN 2 VIEWS: CPT

## 2018-03-28 PROCEDURE — 85610 PROTHROMBIN TIME: CPT | Performed by: EMERGENCY MEDICINE

## 2018-03-28 PROCEDURE — 99285 EMERGENCY DEPT VISIT HI MDM: CPT | Mod: Z6 | Performed by: EMERGENCY MEDICINE

## 2018-03-28 PROCEDURE — 25000128 H RX IP 250 OP 636: Performed by: EMERGENCY MEDICINE

## 2018-03-28 PROCEDURE — 80053 COMPREHEN METABOLIC PANEL: CPT | Performed by: EMERGENCY MEDICINE

## 2018-03-28 PROCEDURE — 96374 THER/PROPH/DIAG INJ IV PUSH: CPT

## 2018-03-28 PROCEDURE — 83605 ASSAY OF LACTIC ACID: CPT | Performed by: EMERGENCY MEDICINE

## 2018-03-28 RX ORDER — HYDROMORPHONE HYDROCHLORIDE 1 MG/ML
0.5 INJECTION, SOLUTION INTRAMUSCULAR; INTRAVENOUS; SUBCUTANEOUS
Status: COMPLETED | OUTPATIENT
Start: 2018-03-28 | End: 2018-03-28

## 2018-03-28 RX ORDER — ONDANSETRON 2 MG/ML
4 INJECTION INTRAMUSCULAR; INTRAVENOUS ONCE
Status: COMPLETED | OUTPATIENT
Start: 2018-03-28 | End: 2018-03-28

## 2018-03-28 RX ORDER — SODIUM CHLORIDE 9 MG/ML
1000 INJECTION, SOLUTION INTRAVENOUS CONTINUOUS
Status: DISCONTINUED | OUTPATIENT
Start: 2018-03-28 | End: 2018-04-01 | Stop reason: HOSPADM

## 2018-03-28 RX ORDER — IOPAMIDOL 755 MG/ML
77 INJECTION, SOLUTION INTRAVASCULAR ONCE
Status: COMPLETED | OUTPATIENT
Start: 2018-03-28 | End: 2018-03-29

## 2018-03-28 RX ADMIN — HYDROMORPHONE HYDROCHLORIDE 0.5 MG: 1 INJECTION, SOLUTION INTRAMUSCULAR; INTRAVENOUS; SUBCUTANEOUS at 23:58

## 2018-03-28 RX ADMIN — ONDANSETRON 4 MG: 2 INJECTION INTRAMUSCULAR; INTRAVENOUS at 21:34

## 2018-03-28 RX ADMIN — HYDROMORPHONE HYDROCHLORIDE 0.5 MG: 1 INJECTION, SOLUTION INTRAMUSCULAR; INTRAVENOUS; SUBCUTANEOUS at 20:57

## 2018-03-28 RX ADMIN — SODIUM CHLORIDE 1000 ML: 9 INJECTION, SOLUTION INTRAVENOUS at 20:57

## 2018-03-28 RX ADMIN — SODIUM CHLORIDE 1000 ML: 9 INJECTION, SOLUTION INTRAVENOUS at 21:34

## 2018-03-28 RX ADMIN — HYDROMORPHONE HYDROCHLORIDE 0.5 MG: 1 INJECTION, SOLUTION INTRAMUSCULAR; INTRAVENOUS; SUBCUTANEOUS at 22:44

## 2018-03-28 ASSESSMENT — ENCOUNTER SYMPTOMS
BRUISES/BLEEDS EASILY: 0
HEADACHES: 0
CONSTIPATION: 0
FEVER: 0
NERVOUS/ANXIOUS: 0
UNEXPECTED WEIGHT CHANGE: 1
TROUBLE SWALLOWING: 0
ABDOMINAL PAIN: 1
DIARRHEA: 0
FREQUENCY: 0
FATIGUE: 1
NECK PAIN: 0
COUGH: 0
DYSPHORIC MOOD: 0
VOICE CHANGE: 0
BLOOD IN STOOL: 0
PALPITATIONS: 0
NUMBNESS: 0
COLOR CHANGE: 0
WEAKNESS: 0
LIGHT-HEADEDNESS: 0
BACK PAIN: 0
VOMITING: 1
SHORTNESS OF BREATH: 0
DIZZINESS: 0
DYSURIA: 0
EYE PAIN: 0
CHILLS: 0
DIAPHORESIS: 0
HEMATURIA: 0
ADENOPATHY: 0
POLYDIPSIA: 0
NAUSEA: 1
SORE THROAT: 0

## 2018-03-28 NOTE — IP AVS SNAPSHOT
MRN:[id number]                      After Visit Summary   3/28/2018    Rachel A Gerhardt    MRN: [id number]           Thank you!     Thank you for choosing [facility] for your care. Our goal is always to provide you with excellent care. Hearing back from our patients is one way we can continue to improve our services. Please take a few minutes to complete the written survey that you may receive in the mail after you visit with us. Thank you!        Patient Information     Date Of Birth          1975        Designated Caregiver       Most Recent Value    Caregiver    Will someone help with your care after discharge? no      About your hospital stay     You were admitted on:  March 29, 2018 You last received care in the:  [address] [place] [address]    You were discharged on:  April 1, 2018        Reason for your hospital stay       You were admitted for abdominal pain and malnutrition.  You were started on TPN.                  Who to Call     For medical emergencies, please call 911.  For non-urgent questions about your medical care, please call your primary care provider or clinic, [phone]          Attending Provider     Provider Specialty    Brit Brown MD Emergency Medicine    [facility]       Primary Care Provider Office Phone # Fax #    Reji Avery [phone]      After Care Instructions     Activity       Your activity upon discharge:   -Activity as tolerated  -No driving while on narcotic analgesics (i.e. Percocet, oxycodone, Vicodin)            Diet       Follow this diet upon discharge:  -Liquid diet as tolerated  -TPN as main source of nutrition  -We recommend eating slowly, chewing thoroughly, eating small frequent meals throughout the day  -Stay well hydrated.                  Follow-up Appointments     Adult [facility] [[email or web address] Follow-up and recommended labs and tests       DIET  -Liquid diet as tolerated  -TPN as main source of  nutrition  -We recommend eating slowly, chewing thoroughly, eating small frequent meals throughout the day  -Stay well hydrated.      ACTIVITY  -Activity as tolerated  -No driving while on narcotic analgesics (i.e. Percocet, oxycodone, Vicodin)    NOTIFY  Please contact Marianela Diaz LPN, Maria L Sidhu, RN or Joellen Parish RN at [phone] for problems after discharge such as:  -Temperature > 101F, chills, rigors, dizziness  -Inability to tolerate diet, nausea or vomiting  -You stop passing gas, develop significant bloating, abdominal pain  -Have blood in stools/vomit  -Have severe diarrhea/constipation  -Any other questions or concerns.  - At nights (after 4:30pm), on weekends, or if urgent, call [phone] and ask the  to speak with the on-call Colorectal Surgery resident or fellow      Medication Instructions  Some of your medications may have changed. Please take only prescribed and resumed medications     FOLLOW-UP  1.  You can follow up with CRS Staff: Dr. Moore in 3-4 weeks after.  Please contact our clinic scheduler Bianca Pavon (phone # [phone]) if you have not heard from our clinic in 3 business days afer discharge to schedule a follow-up appointment.     2.  Follow up with your primary care provider in 1-2 weeks after discharge from the hospital to review this hospitalization.     3.  Continue TPN blood work as per Home Infusion.       Appointments on [place] and/or [facility] (with [facility] or [facility] provider or service). Call [phone] if you haven't heard regarding these appointments within 7 days of discharge.                  Your next 10 appointments already scheduled     Apr 03, 2018 12:00 PM CDT   (Arrive by 11:45 AM)   Return Visit with Herman Moore MD   [facility]al [facility] ([facility])    [address] [facility] [address] [phone]              Additional Services     Home infusion referral       Your  "provider has referred you to:  [facility]  Phone  [phone]  Fax  [phone]  ______________________________      Local Address (if different from home address):    Patient will be discharging to:  [address]  [place] [address]  Mothers phone #: [phone]  Patients cell: [phone]    Skilled home care RN for initial home safety evaluation and to assist with management and education reinforcement with home TPN via picc line.  Picc line cares per home care agency routine.  TPN labs per home care agency routine.      Skilled home cared RN to evaluate medication management, nutrition and hydration evaluation, endurance evaluation, and general status evaluation after discharge from the acute care hospital setting.      Anticipated Length of Therapy: Per MD orders    Access Device Management:  IV Access Type: PICC  Flush with Heparin and Normal Saline IVP PRN and routine site care (per agency protocol) to maintain access device? Yes                  Pending Results     No orders found from 3/26/2018 to 3/29/2018.            Statement of Approval     Ordered          04/01/18 0851  I have reviewed and agree with all the recommendations and orders detailed in this document.  EFFECTIVE NOW     Approved and electronically signed by:  Maryam Hernandez MD             Admission Information     Date & Time Provider Department Dept. Phone    3/28/2018 Herman Moore MD [address] [facility] [phone]      Your Vitals Were     Blood Pressure Pulse Temperature Respirations Height Weight    95/69 (BP Location: Left arm) 72 96.8  F (36  C) (Oral) 16 1.753 m (5' 9\") 60.3 kg (132 lb 14.4 oz)    Pulse Oximetry BMI (Body Mass Index)                96% 19.63 kg/m2          MyChart Information     IfOnly gives you secure access to your electronic health record. If you see a primary care provider, you can also send messages to your care team and make appointments. If you have questions, please " call your primary care clinic.  If you do not have a primary care provider, please call [phone] and they will assist you.        Care EveryWhere ID     This is your Care EveryWhere ID. This could be used by other organizations to access your [facility] medical records  [id number]        Equal Access to Services     MARISOL GAN : Hadii aad ku hadmarklaurie Soteresaali, waaxda luqadaha, qaybta kaalmada adelelandvega, wendy fariasloredvin atkins. So [facility] [phone].    ATENCIÓN: Si habla español, tiene a epps disposición servicios gratuitos de asistencia lingüística. Minna al [phone].    We comply with applicable federal civil rights laws and [place] laws. We do not discriminate on the basis of race, color, national origin, age, disability, sex, sexual orientation, or gender identity.               Review of your medicines      START taking        Dose / Directions    hydrOXYzine 25 MG tablet   Commonly known as:  ATARAX   Used for:  Abdominal pain, generalized        Dose:  25 mg   Take 1 tablet (25 mg) by mouth every 6 hours as needed for other (adjuvant pain)   Quantity:  8 tablet   Refills:  0       oxyCODONE IR 5 MG tablet   Commonly known as:  ROXICODONE   Used for:  Abdominal pain, generalized        Dose:  5-10 mg   Take 1-2 tablets (5-10 mg) by mouth every 4 hours as needed for moderate to severe pain   Quantity:  20 tablet   Refills:  0       parenteral nutrition - PTA/DISCHARGE ORDER        The TPN formula will print on the After Visit Summary Report.   Quantity:  1 each   Refills:  0         CONTINUE these medicines which may have CHANGED, or have new prescriptions. If we are uncertain of the size of tablets/capsules you have at home, strength may be listed as something that might have changed.        Dose / Directions    acetaminophen 500 MG tablet   Commonly known as:  TYLENOL   This may have changed:    - when to take this  - reasons to take this   Used for:  Small intestine obstruction         Dose:  1000 mg   Take 2 tablets (1,000 mg) by mouth 4 times daily   Quantity:  80 tablet   Refills:  0       vitamin D 76102 UNIT capsule   Commonly known as:  ERGOCALCIFEROL   This may have changed:  additional instructions   Used for:  Vitamin D deficiency        Dose:  04944 Units   Take 1 capsule (50,000 Units) by mouth once a week Needs labs checked prior to additional refills   Quantity:  8 capsule   Refills:  0         CONTINUE these medicines which have NOT CHANGED        Dose / Directions    albuterol 108 (90 BASE) MCG/ACT Inhaler   Commonly known as:  PROAIR HFA/PROVENTIL HFA/VENTOLIN HFA   Used for:  Wheezing        Dose:  2 puff   Inhale 2 puffs into the lungs every 6 hours as needed   Quantity:  1 Inhaler   Refills:  0       ondansetron 4 MG tablet   Commonly known as:  ZOFRAN   Used for:  Small bowel obstruction, Nocturnal diarrhea, Abdominal pain, generalized        Dose:  4-8 mg   Take 1-2 tablets (4-8 mg) by mouth every 8 hours as needed for nausea   Quantity:  30 tablet   Refills:  0         STOP taking     HYDROcodone-acetaminophen 5-325 MG per tablet   Commonly known as:  NORCO                Where to get your medicines      These medications were sent to [facility] [place], [place] - [address] [facility] [address]     Phone:  [phone]     hydrOXYzine 25 MG tablet         Some of these will need a paper prescription and others can be bought over the counter. Ask your nurse if you have questions.     Bring a paper prescription for each of these medications     oxyCODONE IR 5 MG tablet    parenteral nutrition - PTA/DISCHARGE ORDER                Protect others around you: Learn how to safely use, store and throw away your medicines at www.disposemymeds.org.        Information about OPIOIDS     PRESCRIPTION OPIOIDS: WHAT YOU NEED TO KNOW    Prescription opioids can be used to help relieve moderate to severe pain and are often prescribed  following a surgery or injury, or for certain health conditions. These medications can be an important part of treatment but also come with serious risks. It is important to work with your health care provider to make sure you are getting the safest, most effective care.    WHAT ARE THE RISKS AND SIDE EFFECTS OF OPIOID USE?  Prescription opioids carry serious risks of addiction and overdose, especially with prolonged use. An opioid overdose, often marked by slowed breathing can cause sudden death. The use of prescription opioids can have a number of side effects as well, even when taken as directed:      Tolerance - meaning you might need to take more of a medication for the same pain relief    Physical dependence - meaning you have symptoms of withdrawal when a medication is stopped    Increased sensitivity to pain    Constipation    Nausea, vomiting, and dry mouth    Sleepiness and dizziness    Confusion    Depression    Low levels of testosterone that can result in lower sex drive, energy, and strength    Itching and sweating    RISKS ARE GREATER WITH:    History of drug misuse, substance use disorder, or overdose    Mental health conditions (such as depression or anxiety)    Sleep apnea    Older age (65 years or older)    Pregnancy    Avoid alcohol while taking prescription opioids.   Also, unless specifically advised by your health care provider, medications to avoid include:    Benzodiazepines (such as Xanax or Valium)    Muscle relaxants (such as Soma or Flexeril)    Hypnotics (such as Ambien or Lunesta)    Other prescription opioids    KNOW YOUR OPTIONS:  Talk to your health care provider about ways to manage your pain that do not involve prescription opioids. Some of these options may actually work better and have fewer risks and side effects:    Pain relievers such as acetaminophen, ibuprofen, and naproxen    Some medications that are also used for depression or seizures    Physical therapy and  exercise    Cognitive behavioral therapy, a psychological, goal-directed approach, in which patients learn how to modify physical, behavioral, and emotional triggers of pain and stress    IF YOU ARE PRESCRIBED OPIOIDS FOR PAIN:    Never take opioids in greater amounts or more often than prescribed    Follow up with your primary health care provider and work together to create a plan on how to manage your pain.    Talk about ways to help manage your pain that do not involve prescription opioids    Talk about all concerns and side effects    Help prevent misuse and abuse    Never sell or share prescription opioids    Never use another person's prescription opioids    Store prescription opioids in a secure place and out of reach of others (this may include visitors, children, friends, and family)    Visit www.cdc.gov/drugoverdose to learn about risks of opioid abuse and overdose    If you believe you may be struggling with addiction, tell your health care provider and ask for guidance or call [facility]'s National Helpline at 0-477-951-HELP    LEARN MORE / www.cdc.gov/drugoverdose/prescribing/guideline.html    Safely dispose of unused prescription opioids: Find your local drug take-back programs and more information about the importance of safe disposal at www.doseofreality.[place].gov        PARENTERAL NUTRITION FORMULA      (Show up to 1 orders; newest on the left.)     Start date and time   03/31/2018 2000      parenteral nutrition - ADULT compounded formula [[id number]]    Order Status  Active    Last Given  03/31/2018 2100       Macro Ingredients    TRAVASOL 10 %  90 g    dextrose  235 g       Electrolytes    sodium chloride  10 mEq    sodium phosphate  30 mmol    potassium chloride  46.6 mEq    potassium acetate  13.4 mEq    calcium gluconate  2.15 g    magnesium sulfate  2.46 g       Additives    INFUVITE  10 mL    trace elements (Multitrace-5 Conc)  1 mL       QS Base    sterile water  484.35 mL       Calorie  Contribution    Proteins  360 kcal    Dextrose  805.71 kcal    Lipids  --    Total  1,165.71 kcal       Electrolyte Ion Ordered Amount    Sodium  50 mEq    Potassium  60 mEq    Calcium  10 mEq    Magnesium  20 mEq    Phosphate  30 mmol    Acetate  92.6 mEq       Electrolyte Ion Calculated Amount    Sodium  50 mEq    Potassium  60 mEq    Calcium  10 mEq    Magnesium  20 mEq    Aluminum  --    Phosphate  30 mmol    Chloride  92.6 mEq    Acetate  92.6 mEq       Other    Total Protein  90 g    Total Protein/kg  1.5 g/kg    Glucose Infusion Rate  2.72 mg/kg/min    Osmolarity  1,308.33    Volume  1,800 mL    Rate  75 mL/hr    Dosing Weight  60 kg (Order-Specific)    Infusion Site  [facility]       Admin Instructions       Infuse using a 0.22 micron filter.  Nurse Instructions: When TPN is discontinued, decrease rate to   of current rate for 1 hour. May continue at   rate until bag runs out or for 24h.                     Medication List: This is a list of all your medications and when to take them. Check marks below indicate your daily home schedule. Keep this list as a reference.      Medications           Morning Afternoon Evening Bedtime As Needed    acetaminophen 500 MG tablet   Commonly known as:  TYLENOL   Take 2 tablets (1,000 mg) by mouth 4 times daily   Last time this was given:  650 mg on 4/1/2018  8:08 AM                                albuterol 108 (90 BASE) MCG/ACT Inhaler   Commonly known as:  PROAIR HFA/PROVENTIL HFA/VENTOLIN HFA   Inhale 2 puffs into the lungs every 6 hours as needed                                hydrOXYzine 25 MG tablet   Commonly known as:  ATARAX   Take 1 tablet (25 mg) by mouth every 6 hours as needed for other (adjuvant pain)   Last time this was given:  50 mg on 4/1/2018 12:03 AM                                ondansetron 4 MG tablet   Commonly known as:  ZOFRAN   Take 1-2 tablets (4-8 mg) by mouth every 8 hours as needed for nausea                                oxyCODONE IR 5 MG  tablet   Commonly known as:  ROXICODONE   Take 1-2 tablets (5-10 mg) by mouth every 4 hours as needed for moderate to severe pain   Last time this was given:  10 mg on 4/1/2018  8:08 AM                                parenteral nutrition - PTA/DISCHARGE ORDER   The TPN formula will print on the After Visit Summary Report.                                vitamin D 73399 UNIT capsule   Commonly known as:  ERGOCALCIFEROL   Take 1 capsule (50,000 Units) by mouth once a week Needs labs checked prior to additional refills

## 2018-03-28 NOTE — IP AVS SNAPSHOT
[address] [address]    Phone:  [phone]                                       After Visit Summary   3/28/2018    Rachel A Gerhardt    MRN: [id number]           After Visit Summary Signature Page     I have received my discharge instructions, and my questions have been answered. I have discussed any challenges I see with this plan with the nurse or doctor.    ..........................................................................................................................................  Patient/Patient Representative Signature      ..........................................................................................................................................  Patient Representative Print Name and Relationship to Patient    ..................................................               ................................................  Date                                            Time    ..........................................................................................................................................  Reviewed by Signature/Title    ...................................................              ..............................................  Date                                                            Time

## 2018-03-29 ENCOUNTER — APPOINTMENT (OUTPATIENT)
Dept: GENERAL RADIOLOGY | Facility: CLINIC | Age: 43
DRG: 393 | End: 2018-03-29
Attending: PHYSICIAN ASSISTANT
Payer: COMMERCIAL

## 2018-03-29 ENCOUNTER — HOME INFUSION (PRE-WILLOW HOME INFUSION) (OUTPATIENT)
Dept: PHARMACY | Facility: CLINIC | Age: 43
End: 2018-03-29

## 2018-03-29 PROBLEM — R62.7 FAILURE TO THRIVE IN ADULT: Status: ACTIVE | Noted: 2018-03-29

## 2018-03-29 LAB
ALBUMIN UR-MCNC: 30 MG/DL
APPEARANCE UR: CLEAR
BILIRUB UR QL STRIP: ABNORMAL
COLOR UR AUTO: YELLOW
GLUCOSE UR STRIP-MCNC: NEGATIVE MG/DL
HGB UR QL STRIP: NEGATIVE
KETONES UR STRIP-MCNC: 5 MG/DL
LEUKOCYTE ESTERASE UR QL STRIP: ABNORMAL
MAGNESIUM SERPL-MCNC: 1.7 MG/DL (ref 1.6–2.3)
MUCOUS THREADS #/AREA URNS LPF: PRESENT /LPF
NITRATE UR QL: NEGATIVE
PH UR STRIP: 7 PH (ref 5–7)
PHOSPHATE SERPL-MCNC: 3.4 MG/DL (ref 2.5–4.5)
PREALB SERPL IA-MCNC: 11 MG/DL (ref 15–45)
RADIOLOGIST FLAGS: ABNORMAL
RBC #/AREA URNS AUTO: 2 /HPF (ref 0–2)
SOURCE: ABNORMAL
SP GR UR STRIP: 1.01 (ref 1–1.03)
SQUAMOUS #/AREA URNS AUTO: 3 /HPF (ref 0–1)
TRIGL SERPL-MCNC: 83 MG/DL
UROBILINOGEN UR STRIP-MCNC: 8 MG/DL (ref 0–2)
WBC #/AREA URNS AUTO: 8 /HPF (ref 0–5)

## 2018-03-29 PROCEDURE — 25000125 ZZHC RX 250: Performed by: PHYSICIAN ASSISTANT

## 2018-03-29 PROCEDURE — 40000986 XR CHEST 1 VW

## 2018-03-29 PROCEDURE — 25000128 H RX IP 250 OP 636: Performed by: EMERGENCY MEDICINE

## 2018-03-29 PROCEDURE — 36569 INSJ PICC 5 YR+ W/O IMAGING: CPT

## 2018-03-29 PROCEDURE — 25000132 ZZH RX MED GY IP 250 OP 250 PS 637: Performed by: PHYSICIAN ASSISTANT

## 2018-03-29 PROCEDURE — 25000128 H RX IP 250 OP 636: Performed by: STUDENT IN AN ORGANIZED HEALTH CARE EDUCATION/TRAINING PROGRAM

## 2018-03-29 PROCEDURE — 25000125 ZZHC RX 250: Performed by: EMERGENCY MEDICINE

## 2018-03-29 PROCEDURE — 25000128 H RX IP 250 OP 636: Performed by: SURGERY

## 2018-03-29 PROCEDURE — 12000008 ZZH R&B INTERMEDIATE UMMC

## 2018-03-29 PROCEDURE — 84478 ASSAY OF TRIGLYCERIDES: CPT | Performed by: PHYSICIAN ASSISTANT

## 2018-03-29 PROCEDURE — 25000125 ZZHC RX 250: Performed by: COLON & RECTAL SURGERY

## 2018-03-29 PROCEDURE — 25000132 ZZH RX MED GY IP 250 OP 250 PS 637: Performed by: STUDENT IN AN ORGANIZED HEALTH CARE EDUCATION/TRAINING PROGRAM

## 2018-03-29 PROCEDURE — C1751 CATH, INF, PER/CENT/MIDLINE: HCPCS

## 2018-03-29 PROCEDURE — 36415 COLL VENOUS BLD VENIPUNCTURE: CPT | Performed by: PHYSICIAN ASSISTANT

## 2018-03-29 PROCEDURE — 81001 URINALYSIS AUTO W/SCOPE: CPT | Performed by: EMERGENCY MEDICINE

## 2018-03-29 PROCEDURE — 84100 ASSAY OF PHOSPHORUS: CPT | Performed by: PHYSICIAN ASSISTANT

## 2018-03-29 PROCEDURE — 25000132 ZZH RX MED GY IP 250 OP 250 PS 637: Performed by: SURGERY

## 2018-03-29 PROCEDURE — 00000146 ZZHCL STATISTIC GLUCOSE BY METER IP

## 2018-03-29 PROCEDURE — 3E0436Z INTRODUCTION OF NUTRITIONAL SUBSTANCE INTO CENTRAL VEIN, PERCUTANEOUS APPROACH: ICD-10-PCS | Performed by: COLON & RECTAL SURGERY

## 2018-03-29 PROCEDURE — 83735 ASSAY OF MAGNESIUM: CPT | Performed by: PHYSICIAN ASSISTANT

## 2018-03-29 RX ORDER — OXYCODONE HYDROCHLORIDE 5 MG/1
5-10 TABLET ORAL
Status: DISCONTINUED | OUTPATIENT
Start: 2018-03-29 | End: 2018-03-29

## 2018-03-29 RX ORDER — LIDOCAINE 40 MG/G
CREAM TOPICAL
Status: DISCONTINUED | OUTPATIENT
Start: 2018-03-29 | End: 2018-04-01 | Stop reason: HOSPADM

## 2018-03-29 RX ORDER — NALOXONE HYDROCHLORIDE 0.4 MG/ML
.1-.4 INJECTION, SOLUTION INTRAMUSCULAR; INTRAVENOUS; SUBCUTANEOUS
Status: DISCONTINUED | OUTPATIENT
Start: 2018-03-29 | End: 2018-04-01 | Stop reason: HOSPADM

## 2018-03-29 RX ORDER — SODIUM CHLORIDE, SODIUM LACTATE, POTASSIUM CHLORIDE, CALCIUM CHLORIDE 600; 310; 30; 20 MG/100ML; MG/100ML; MG/100ML; MG/100ML
1000 INJECTION, SOLUTION INTRAVENOUS CONTINUOUS
Status: DISCONTINUED | OUTPATIENT
Start: 2018-03-29 | End: 2018-04-01 | Stop reason: HOSPADM

## 2018-03-29 RX ORDER — PROCHLORPERAZINE 25 MG
25 SUPPOSITORY, RECTAL RECTAL EVERY 12 HOURS PRN
Status: DISCONTINUED | OUTPATIENT
Start: 2018-03-29 | End: 2018-04-01 | Stop reason: HOSPADM

## 2018-03-29 RX ORDER — ACETAMINOPHEN 325 MG/1
650 TABLET ORAL EVERY 4 HOURS PRN
Status: DISCONTINUED | OUTPATIENT
Start: 2018-03-29 | End: 2018-03-29

## 2018-03-29 RX ORDER — PROCHLORPERAZINE MALEATE 5 MG
10 TABLET ORAL EVERY 6 HOURS PRN
Status: DISCONTINUED | OUTPATIENT
Start: 2018-03-29 | End: 2018-04-01 | Stop reason: HOSPADM

## 2018-03-29 RX ORDER — HEPARIN SODIUM,PORCINE 10 UNIT/ML
2-5 VIAL (ML) INTRAVENOUS
Status: DISCONTINUED | OUTPATIENT
Start: 2018-03-29 | End: 2018-04-01 | Stop reason: HOSPADM

## 2018-03-29 RX ORDER — OXYCODONE HYDROCHLORIDE 5 MG/1
5 TABLET ORAL
Status: DISCONTINUED | OUTPATIENT
Start: 2018-03-29 | End: 2018-03-31

## 2018-03-29 RX ORDER — ACETAMINOPHEN 10 MG/ML
1000 INJECTION, SOLUTION INTRAVENOUS EVERY 8 HOURS
Status: DISCONTINUED | OUTPATIENT
Start: 2018-03-29 | End: 2018-03-31 | Stop reason: ALTCHOICE

## 2018-03-29 RX ORDER — ONDANSETRON 2 MG/ML
4 INJECTION INTRAMUSCULAR; INTRAVENOUS EVERY 6 HOURS PRN
Status: DISCONTINUED | OUTPATIENT
Start: 2018-03-29 | End: 2018-04-01 | Stop reason: HOSPADM

## 2018-03-29 RX ORDER — ONDANSETRON 4 MG/1
4 TABLET, ORALLY DISINTEGRATING ORAL EVERY 6 HOURS PRN
Status: DISCONTINUED | OUTPATIENT
Start: 2018-03-29 | End: 2018-04-01 | Stop reason: HOSPADM

## 2018-03-29 RX ORDER — ALBUTEROL SULFATE 90 UG/1
2 AEROSOL, METERED RESPIRATORY (INHALATION) EVERY 6 HOURS PRN
Status: DISCONTINUED | OUTPATIENT
Start: 2018-03-29 | End: 2018-04-01 | Stop reason: HOSPADM

## 2018-03-29 RX ORDER — HYDROMORPHONE HCL/0.9% NACL/PF 0.2MG/0.2
0.2 SYRINGE (ML) INTRAVENOUS
Status: DISCONTINUED | OUTPATIENT
Start: 2018-03-29 | End: 2018-03-31 | Stop reason: ALTCHOICE

## 2018-03-29 RX ADMIN — Medication 0.2 MG: at 20:39

## 2018-03-29 RX ADMIN — POTASSIUM CHLORIDE: 2 INJECTION, SOLUTION, CONCENTRATE INTRAVENOUS at 20:44

## 2018-03-29 RX ADMIN — LIDOCAINE HYDROCHLORIDE 2 ML: 10 INJECTION, SOLUTION INFILTRATION; PERINEURAL at 14:51

## 2018-03-29 RX ADMIN — ACETAMINOPHEN 1000 MG: 10 INJECTION, SOLUTION INTRAVENOUS at 20:39

## 2018-03-29 RX ADMIN — IOPAMIDOL 77 ML: 755 INJECTION, SOLUTION INTRAVENOUS at 00:15

## 2018-03-29 RX ADMIN — Medication 0.2 MG: at 10:39

## 2018-03-29 RX ADMIN — Medication 0.2 MG: at 01:25

## 2018-03-29 RX ADMIN — Medication 0.2 MG: at 05:46

## 2018-03-29 RX ADMIN — Medication 0.2 MG: at 18:11

## 2018-03-29 RX ADMIN — Medication 0.2 MG: at 23:19

## 2018-03-29 RX ADMIN — OXYCODONE HYDROCHLORIDE 5 MG: 5 TABLET ORAL at 11:34

## 2018-03-29 RX ADMIN — ONDANSETRON 4 MG: 2 INJECTION INTRAMUSCULAR; INTRAVENOUS at 16:20

## 2018-03-29 RX ADMIN — OXYCODONE HYDROCHLORIDE 5 MG: 5 TABLET ORAL at 19:35

## 2018-03-29 RX ADMIN — ACETAMINOPHEN 650 MG: 325 TABLET, FILM COATED ORAL at 15:19

## 2018-03-29 RX ADMIN — SODIUM CHLORIDE, POTASSIUM CHLORIDE, SODIUM LACTATE AND CALCIUM CHLORIDE 1000 ML: 600; 310; 30; 20 INJECTION, SOLUTION INTRAVENOUS at 01:52

## 2018-03-29 RX ADMIN — I.V. FAT EMULSION 250 ML: 20 EMULSION INTRAVENOUS at 20:45

## 2018-03-29 RX ADMIN — PROCHLORPERAZINE EDISYLATE 10 MG: 5 INJECTION INTRAMUSCULAR; INTRAVENOUS at 01:36

## 2018-03-29 RX ADMIN — Medication 0.2 MG: at 13:51

## 2018-03-29 RX ADMIN — ONDANSETRON 4 MG: 2 INJECTION INTRAMUSCULAR; INTRAVENOUS at 23:09

## 2018-03-29 RX ADMIN — Medication 0.2 MG: at 08:31

## 2018-03-29 RX ADMIN — OXYCODONE HYDROCHLORIDE 5 MG: 5 TABLET ORAL at 15:19

## 2018-03-29 RX ADMIN — Medication 0.2 MG: at 16:20

## 2018-03-29 RX ADMIN — HYDROMORPHONE HYDROCHLORIDE 3 MG: 2 TABLET ORAL at 21:57

## 2018-03-29 RX ADMIN — SODIUM CHLORIDE, POTASSIUM CHLORIDE, SODIUM LACTATE AND CALCIUM CHLORIDE 1000 ML: 600; 310; 30; 20 INJECTION, SOLUTION INTRAVENOUS at 11:38

## 2018-03-29 RX ADMIN — SODIUM CHLORIDE, PRESERVATIVE FREE 69 ML: 5 INJECTION INTRAVENOUS at 00:15

## 2018-03-29 RX ADMIN — ACETAMINOPHEN 650 MG: 325 TABLET, FILM COATED ORAL at 11:34

## 2018-03-29 ASSESSMENT — PAIN DESCRIPTION - DESCRIPTORS
DESCRIPTORS: ACHING
DESCRIPTORS: ACHING
DESCRIPTORS: CRAMPING;SHARP
DESCRIPTORS: ACHING

## 2018-03-29 ASSESSMENT — ACTIVITIES OF DAILY LIVING (ADL)
RETIRED_EATING: 0-->INDEPENDENT
COGNITION: 0 - NO COGNITION ISSUES REPORTED
AMBULATION: 0-->INDEPENDENT
TOILETING: 0-->INDEPENDENT
TRANSFERRING: 0-->INDEPENDENT
BATHING: 0-->INDEPENDENT
SWALLOWING: 0-->SWALLOWS FOODS/LIQUIDS WITHOUT DIFFICULTY
FALL_HISTORY_WITHIN_LAST_SIX_MONTHS: NO
RETIRED_COMMUNICATION: 0-->UNDERSTANDS/COMMUNICATES WITHOUT DIFFICULTY
DRESS: 0-->INDEPENDENT

## 2018-03-29 NOTE — ED NOTES
[facility] [place]   ED Nurse to Floor Handoff     Rachel A Gerhardt is a 42 year old female who speaks English and lives with family members,  in a home  They arrived in the ED by car from home    ED Chief Complaint: Nausea, Vomiting, & Diarrhea and Abdominal Pain    ED Dx;   Final diagnoses:   Abdominal pain, generalized         Needed?: No    Allergies:   Allergies   Allergen Reactions     No Clinical Screening - See Comments Other (See Comments) and Diarrhea     headache  Carrots cause gastric upset, cramping and diarrhea.     Sulfa Drugs Hives     hives     Amoxicillin Unknown and Other (See Comments)     vomiting  vomiting     Amoxicillin-Pot Clavulanate Other (See Comments) and Nausea     vomiting     Augmentin GI Disturbance, Nausea and Hives     Avelox [Moxifloxacin] Nausea and Vomiting, Unknown and Nausea     Ciprofloxacin Hives and Nausea     Codeine Nausea and Vomiting and Nausea     Ibuprofen Nausea and Vomiting     Other reaction(s): Nausea And Vomiting     Ibuprofen Sodium Hives and GI Disturbance     Quinolones      Tramadol Hives, Diarrhea, Nausea and Nausea and Vomiting     Daucus Carota      Other reaction(s): GI Upset  Other reaction(s): Abdominal pain, Diarrhea  Carrots cause gastric upset, cramping and diarrhea.   .  Past Medical Hx:   Past Medical History:   Diagnosis Date     Asthma      Cancer (H)     Per patient OBGYN, cerivical cancer     Cervical cancer (H)      Other chronic pain      Ovarian cancer (H)      Substance abuse     Outside records indicate past history of narcotics abuse or dependence, but patient denies.      Baseline Mental status: WDL  Current Mental Status changes: WDL    Infection present or suspected this encounter: no  Sepsis suspected: No  Isolation type: No active isolations     Activity level - Baseline/Home:  Independent  Activity Level - Current:   Independent    Bariatric equipment needed?: No    In the ED these meds were  given:   Medications   0.9% sodium chloride BOLUS (0 mLs Intravenous Stopped 3/28/18 2133)     Followed by   sodium chloride 0.9% infusion (1,000 mLs Intravenous New Bag 3/28/18 2134)   iopamidol (ISOVUE-370) solution 77 mL (not administered)   sodium chloride 0.9 % bag 500mL for CT scan flush use (not administered)   HYDROmorphone (PF) (DILAUDID) injection 0.5 mg (0.5 mg Intravenous Given 3/28/18 2057)   ondansetron (ZOFRAN) injection 4 mg (4 mg Intravenous Given 3/28/18 2134)   HYDROmorphone (PF) (DILAUDID) injection 0.5 mg (0.5 mg Intravenous Given 3/28/18 2244)       Drips running?  Yes  Fluids  Home pump  No    Current LDAs  Peripheral IV 02/04/18 Right (Active)   Number of days:52       Peripheral IV 03/21/18 Right Upper forearm (Active)   Number of days:7       Peripheral IV 03/28/18 Right Upper forearm (Active)   Number of days:0       Incision/Surgical Site 05/18/17 Abdomen (Active)   Number of days:314       Labs results:   Labs Ordered and Resulted from Time of ED Arrival Up to the Time of Departure from the ED   CBC WITH PLATELETS DIFFERENTIAL - Abnormal; Notable for the following:        Result Value    RDW 16.0 (*)     All other components within normal limits   INR - Abnormal; Notable for the following:     INR 1.27 (*)     All other components within normal limits   COMPREHENSIVE METABOLIC PANEL - Abnormal; Notable for the following:     Glucose 111 (*)     Calcium 8.2 (*)     Albumin 2.7 (*)     Protein Total 5.7 (*)     All other components within normal limits   LIPASE - Abnormal; Notable for the following:     Lipase 64 (*)     All other components within normal limits   LACTIC ACID WHOLE BLOOD   HCG QUALITATIVE   PREALBUMIN   ROUTINE UA WITH MICROSCOPIC       Imaging Studies:   Recent Results (from the past 24 hour(s))   XR Abdomen 2 Views    Impression    IMPRESSION:   1. Nonspecific bowel gas pattern with significantly, diffusely dilated  loops of small bowel with air throughout the colon. No  "pneumatosis or  portal venous gas. Adynamic ileus versus partial small bowel  obstruction. If clinically concerned, recommend CT abdomen/pelvis with  IV contrast.  2. Possible thumbprinting in the rectum, which could represent  colitis.       Recent vital signs:   /87  Temp 99.1  F (37.3  C) (Oral)  Resp 16  Ht 1.753 m (5' 9\")  Wt 56.7 kg (125 lb)  SpO2 99%  BMI 18.46 kg/m2    Cardiac Rhythm: Other  Pt needs tele? No  Skin/wound Issues: None    Code Status: Full Code    Pain control: fair    Nausea control: fair    Abnormal labs/tests/findings requiring intervention: GI Strictures    Family present during ED course? No   Family Comments/Social Situation comments: Lives with mother currently because she was unable to care for herself    Tasks needing completion: /    Joellen Ray RN  ascom-- [phone] [facility]  [phone] [facility] ED      "

## 2018-03-29 NOTE — PROGRESS NOTES
Therapy: TPN  Insurance: [employer]   Ded: $3000  Met: $3000    Co-Insurance: 100%    In reference to admission on 3/28/18 to check TPN coverage    Please contact Intake with any questions, [phone] or In Basket pool, [facility] ([phone]).

## 2018-03-29 NOTE — ED PROVIDER NOTES
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  The patient reports that her abdominal pain is been ongoing for some time now, but it has now been gradually worsening over the past 2 weeks to the point where it is \"unbearable\". With this, the patient states that she has had a considerable amount of nausea/vomiting, and she is not able to keep food or fluid down. She estimates that the last time she was able to keep anything down was about one week ago, and she is now feeling nauseated and lightheaded. She has been attempting to use Vicodin and Zofran at home, and though this will slightly alleviate her symptoms, she will eventually throw them up after about an hour.  She states that the abdominal pain radiates in waves from her upper abdomen to her lower abdomen and feels \"like labor\".  She states that she is still having bowel movements and passing gas, no melena or hematochezia in her bowel movement this morning.  She denies any dysuria or hematuria, but the patient states that she has been urinating less and noted her urine to be particularly dark this morning.  She denies any syncope or falls.  No shortness of breath, cough, or skin changes.  No palpitations.  Otherwise, the patient is feeling generally fatigued and has been feeling such for the past few weeks.  She states that over the last year, she has lost about 100 pounds, 60 of which she lost after undergoing the aforementioned small bowel resection.  No other new symptoms or complaints at this time.  " Please see ROS for further details.    Of note, the patient was seen on 2/13/18 by her Colorectal Surgeon, Dr. Moore, who recommended a MRE. This was performed on 3/21/18, 7 days ago, and the results can be viewed below. Prior to this on 2/4/2018, the patient was seen here in the ED with similar complaints after an evaluation at outside hospital was suggestive of partial SBO versus radiation-induced GI tract changes.  Labs were unremarkable, patient was treated with Maalox, Pepcid, and 0.5 mg IV Dilaudid ×2 with 2 L IVF.        ----------------------------------------------------------------------------------------------------------------------------------------  MR Enterography, 3/21/18:    IMPRESSION:   1. Dilated mid abdominal small bowel with apparent transition near the  small bowel anastomosis in the left lower quadrant. This is suspicious  for anastomotic or perianastomotic stricture.  2. Small amount of free fluid in pelvis.  3. Twisting vasculature in the mid abdominal mesentery. This is a  nonspecific finding though it can be seen with internal hernia or  volvulus. Correlate with clinical symptoms and if there is high  concern for internal hernia or volvulus follow-up CT could be  considered.  I have personally reviewed the examination and initial interpretation  and I agree with the findings.    ----------------------------------------------------------------------------------------------------------------------------------------      I have reviewed the Medications, Allergies, Past Medical and Surgical History, and Social History in the [employer] system.  Past Medical History:   Diagnosis Date     Asthma      Cancer (H)     Per patient OBGYN, cerivical cancer     Cervical cancer (H)      Other chronic pain      Ovarian cancer (H)      Substance abuse     Outside records indicate past history of narcotics abuse or dependence, but patient denies.       Past Surgical History:   Procedure Laterality Date      COMBINED CYSTOSCOPY, INSERT STENT URETER(S) Bilateral 5/18/2017    Procedure: COMBINED CYSTOSCOPY, INSERT STENT URETER(S);  Cystoscopy with Bilateral Stent,;  Surgeon: Rene Calero MD;  Location: [facility] OR     ENT SURGERY  2009    mastoid, sinus     EXAM UNDER ANESTHESIA, INSERT ALEX SLEEVE, UTERINE PLACEMENT OF TANDEM AND RING FOR RAD, ULTRASOUND N/A 12/14/2015    Procedure: EXAM UNDER ANESTHESIA, INSERT ALEX SLEEVE, UTERINE PLACEMENT OF TANDEM AND RING FOR RADIATION, ULTRASOUND GUIDED;  Surgeon: Abby Tony MD;  Location: [facility] OR     INSERT TANDEM AND CESIUM APPLICATOR CERVIX, ULTRASOUND GUIDED N/A 12/17/2015    Procedure: INSERT TANDEM AND CESIUM APPLICATOR CERVIX, ULTRASOUND GUIDED;  Surgeon: Kika Wood MD;  Location: [facility] OR     KNEE SURGERY       LAPAROTOMY EXPLORATORY N/A 5/18/2017    Procedure: LAPAROTOMY EXPLORATORY;   Exploratry Laparotomy, Small Bowel Resection with anastomosis, Flexible Sigmoidoscopy;  Surgeon: Jennifer Goodwin MD;  Location: [facility] OR     PICC INSERTION Right 04/29/2017    4fr SL BioFlo PICC, 37cm (3cm external) in the R basilic vein w/ tip in the mid SVC.     RESECT SMALL BOWEL WITHOUT OSTOMY N/A 5/18/2017    Procedure: RESECT SMALL BOWEL WITHOUT OSTOMY;;  Surgeon: Jennifer Goodwin MD;  Location: [facility] OR     SIGMOIDOSCOPY FLEXIBLE N/A 5/18/2017    Procedure: SIGMOIDOSCOPY FLEXIBLE;;  Surgeon: Jennifer Goodwin MD;  Location: [facility] OR       Family History   Problem Relation Age of Onset     DIABETES Mother      Ovarian Cancer No family hx of      Uterine Cancer No family hx of      Cervical Cancer No family hx of      Breast Cancer No family hx of        Social History   Substance Use Topics     Smoking status: Light Tobacco Smoker     Packs/day: 0.25     Years: 12.00     Types: Cigarettes     Smokeless tobacco: Never Used      Comment: pt smoking about 4 cigs daily     Alcohol use No      Comment: None per pt       No current facility-administered medications for this  encounter.      Current Outpatient Prescriptions   Medication     ondansetron (ZOFRAN) 4 MG tablet     HYDROcodone-acetaminophen (NORCO) 5-325 MG per tablet     vitamin D (ERGOCALCIFEROL) 49466 UNIT capsule     albuterol (PROAIR HFA/PROVENTIL HFA/VENTOLIN HFA) 108 (90 BASE) MCG/ACT Inhaler     acetaminophen (TYLENOL) 500 MG tablet     cholecalciferol 1000 UNITS TABS     calcium carbonate (TUMS) 500 MG chewable tablet        Allergies   Allergen Reactions     No Clinical Screening - See Comments Other (See Comments) and Diarrhea     headache  Carrots cause gastric upset, cramping and diarrhea.     Sulfa Drugs Hives     hives     Amoxicillin Unknown and Other (See Comments)     vomiting  vomiting     Amoxicillin-Pot Clavulanate Other (See Comments) and Nausea     vomiting     Augmentin GI Disturbance, Nausea and Hives     Avelox [Moxifloxacin] Nausea and Vomiting, Unknown and Nausea     Ciprofloxacin Hives and Nausea     Codeine Nausea and Vomiting and Nausea     Ibuprofen Nausea and Vomiting     Other reaction(s): Nausea And Vomiting     Ibuprofen Sodium Hives and GI Disturbance     Quinolones      Tramadol Hives, Diarrhea, Nausea and Nausea and Vomiting     Daucus Carota      Other reaction(s): GI Upset  Other reaction(s): Abdominal pain, Diarrhea  Carrots cause gastric upset, cramping and diarrhea.       Review of Systems   Constitutional: Positive for fatigue and unexpected weight change (100 pounds over last year). Negative for chills, diaphoresis and fever.   HENT: Negative for ear pain, sore throat, tinnitus, trouble swallowing and voice change.    Eyes: Negative for pain and visual disturbance.   Respiratory: Negative for cough and shortness of breath.    Cardiovascular: Negative for chest pain, palpitations and leg swelling.   Gastrointestinal: Positive for abdominal pain, nausea and vomiting. Negative for blood in stool, constipation and diarrhea.   Endocrine: Negative for polydipsia and polyuria.  "  Genitourinary: Positive for decreased urine volume. Negative for dysuria, frequency, hematuria and urgency.   Musculoskeletal: Negative for back pain and neck pain.   Skin: Negative for color change and rash.   Allergic/Immunologic: Negative for immunocompromised state.   Neurological: Negative for dizziness, syncope, weakness, light-headedness, numbness and headaches.   Hematological: Negative for adenopathy. Does not bruise/bleed easily.   Psychiatric/Behavioral: Negative for dysphoric mood. The patient is not nervous/anxious.        Physical Exam   BP: 112/87  Heart Rate: 98  Temp: 99.1  F (37.3  C)  Resp: 16  Height: 175.3 cm (5' 9\")  Weight: 56.7 kg (125 lb)  SpO2: 97 %      Physical Exam  CONSTITUTIONAL: Well-developed and well-nourished. Awake and alert. Non-toxic appearance. No acute distress.   HENT:   - Head: Normocephalic and atraumatic.   - Ears: Hearing and external ear grossly normal.   - Nose: Nose normal. No rhinorrhea. No epistaxis.   - Mouth/Throat: MMM  EYES: Conjunctivae and lids are normal. No scleral icterus.   NECK: Normal range of motion and phonation normal. Neck supple.  No tracheal deviation, no stridor. No edema or erythema noted.  CARDIOVASCULAR: Normal rate, regular rhythm and no appreciable abnormal heart sounds.  PULMONARY/CHEST: Normal work of breathing. No accessory muscle usage or stridor. No respiratory distress.  No appreciable abnormal breath sounds.  ABDOMEN: Soft, somewhat distended but not frankly peritonitic. Diffusely tender to palpation.  Hyperactive bowel sounds. No rigidity, rebound or guarding.   MUSCULOSKELETAL: Extremities warm and seemingly well perfused. No edema or calf tenderness.  NEUROLOGIC: Awake, alert. Not disoriented.  Normal tone. No seizure activity. GCS 15  SKIN: Skin is warm and dry. No rash noted. No diaphoresis. No pallor.   PSYCHIATRIC: Normal mood and affect. Speech and behavior normal. Thought processes linear. Cognition and memory are normal. "     ED Course     ED Course   Comment Time   Discussed case with Surgery.  We are holding off on ordering the imaging until they evaluated the patient help was better decide which imaging modality to use. 03/28 2058     Procedures               Labs Ordered and Resulted from Time of ED Arrival Up to the Time of Departure from the ED - No data to display         Assessments & Plan (with Medical Decision Making)   IMPRESSION: 42-year-old female with PMH notable for previous bowel obstructions, postoperative pain, opiate dependence and a history of polysubstance abuse (positive cocaine on UDS in 2013), previous cervical cancer, who is undergone previous cystoscopy with ureteral stents, exploratory laparotomy is, bowel resections without ostomy with anastomoses who follows with Dr. Moore in the [facility] group, presenting now with acute on chronic abdominal pain significantly worsening in severity to the point that the patient is not able to keep any food or drink down no her usual medications with MR SESAY recently showing either an anastomotic or alex-anastomotic stricture as described further above in the HPI/ROS.  Clinically, patient appears nontoxic though she is quite uncomfortable and crying in discomfort.  She has quite hyperactive bowel sounds, though the bili is still relatively soft.  There are no palpable masses or latoya peritoneal findings.  No other obvious acute findings on exam.  Does not look significantly clinically dehydrated.    DDX includes but is not limited to acute on chronic pain, worsening obstructive process with known stricture, latoya bowel obstruction, gastritis/PUD, other hepatobiliary cause, pancreatitis, etc.    PLAN: Laboratory studies, urine studies.  Will discuss with the Surgical team to see if we should get a new acute CT given her recent MRE findings or perhaps an abdominal plain film.  Will hold off on ordering imaging until we have a chance for the Surgical team to see the  patient.    RESULTS:  See ED Course section above for particular pertinent findings and comments  - Labs: No leukocytosis, Hgb up from previous, no significant electrolyte abnormalities for does have an elevated BUN to creatinine ratio and well CR is within normal limits, it is up from 0.6-0.93.  Albumin and total protein levels are a bit low and slightly lower than previous, though the LFTs are normal.  Lipase normal.  Lactate normal.  Negative UPT.  (Pre-albumin pending)  - Urine: No sample yet provided  - Imaging: AXR pending at time of admission discussion    INTERVENTIONS:   - IVF  - IV Dilaudid  - IV Zofran  - Surgical Consult    RE-EVALUATION:  See ED Course section above for particular pertinent findings and comments  - The patient's symptoms were somewhat improved here during the ED stay.  - Pt continues to do relatively well here in the ED, no acute issues or apparent concerning changes in vitals or clinical appearance.     DISCUSSIONS:  - w/ Surgical Team: The Surgical team promptly presented to the ED to evaluate the patient and our question about imaging modalities.  After their assessment in talking with the team, they would like a plain film of the abdomen to look for any obstructive pattern.  If this looks concerning for such, then we will escalate to CT but we are hoping to hold off from full CT given her recent imaging and gradual worsening of symptoms and the patient still having the ability to have BMs, etc.  They would like to admit to their service under Dr. Moore for further evaluation and management.  No additional requests were made here in the ED.  - w/ Patient: I have reviewed the available findings, plan with the patient. She expressed understanding and agreement with this plan. All questions answered to the best of our ability at this time.     DISPOSITION/PLANNING:  - IMPRESSION: Abdominal pain with recent finding of alex-anastomotic/anastomotic stricture  - DISPOSITION: Admit to [facility]  (Dr. Moore service) for further evaluation and management  --- Pending: AXR, CT if needed    ______________________________________________________________________________    - I have reviewed the available nursing notes.      UPDATE:  - Xray was concerning for SBO, so CT ordered, which was consistent w/ such.  - Patient adamantly refused placement of NGT. Surgery team updated and acknowledged.         New Prescriptions    No medications on file       Final diagnoses:   None   I, Gregg Gray, am serving as a trained medical scribe to document services personally performed by Brit Brown MD, based on the provider's statements to me.      IBrit MD, was physically present and have reviewed and verified the accuracy of this note documented by Gregg Gray.        3/28/2018   [facility], [place], EMERGENCY DEPARTMENT     Brit Brown MD  03/30/18 [id number]

## 2018-03-29 NOTE — ED NOTES
Pt. Presents to ED with c/o N/V/D and abdominal pain that has worsened today. Unable to keep anything down. Pt. Has been dealing with this for several months. AVSS on RA. A & O x 4, ambulatory. Pt. Reports that Dr. Caceres with GI surgery is expecting her.

## 2018-03-29 NOTE — CONSULTS
GASTROENTEROLOGY CONSULTATION      Date of Admission:  3/28/2018          ASSESSMENT AND RECOMMENDATIONS:   Assessment:  42 year old female with a history of cervical cancer treated with chemotherapy and radiation with subsequent recurrent SBO s/p ex lap in May 2017 with 60cm of small bowel resection with side to side functional end to end small bowel anastomosis (remaining bowel included 230cm proximal and 60cm distal including ileocecal valve) who now presents with obstructive symptoms. CT 3/29 showed transition point at the anastomosis in the distal jejunum, proximal ileum with findings suspicious of small bowel obstruction. Patient passing gas and having bowel movements. GI consulted for possible endoscopic dilation, which we will arrange.      Recommendations  --Continue ongoing supportive cares   --Consider NG if ongoing nausea and vomiting   --Pain management per primary team   --Optimize electrolytes   --Endoscopic dilation will be done potentially after reviewing the case with Dr. Vigil on Monday.    Gastroenterology follow up recommendations: TBD    Thank you for involving us in this patient's care. Please do not hesitate to contact the GI service with any questions or concerns.     Pt care plan discussed with Dr. Woodard, GI staff physician.    Ace Blue  -------------------------------------------------------------------------------------------------------------------          Chief Complaint:   We were asked by Dr. Moore of Colorectal to evaluate this patient with SBO anastomotic stricture    History is obtained from the patient and the medical record.          History of Present Illness:   Rachel A Gerhardt is a 42 year old female with a history of cervical cancer treated with chemotherapy and radiation with subsequent recurrent SBO s/p ex lap in May 2017 with 60cm of small bowel resection with side to side functional end to end small bowel anastomosis (remaining bowel included 230cm  proximal and 60cm distal including ileocecal valve) who now presents with obstructive symptoms. CT 3/29 showed transition point at the anastomosis in the distal jejunum, proximal ileum with findings suspicious of small bowel obstruction. Patient passing gas and having bowel movements. She does reports worsening symptoms for months although in the last days it has been progressing. Patient with no fevers, chills or other complaints. She does feel very nauseated. She is passing gas and having BM.            Past Medical History:   Reviewed and edited as appropriate  Past Medical History:   Diagnosis Date     Asthma      Cancer (H)     Per patient OBGYN, cerivical cancer     Cervical cancer (H)      Other chronic pain      Ovarian cancer (H)      Substance abuse     Outside records indicate past history of narcotics abuse or dependence, but patient denies.            Past Surgical History:   Reviewed and edited as appropriate   Past Surgical History:   Procedure Laterality Date     COMBINED CYSTOSCOPY, INSERT STENT URETER(S) Bilateral 5/18/2017    Procedure: COMBINED CYSTOSCOPY, INSERT STENT URETER(S);  Cystoscopy with Bilateral Stent,;  Surgeon: Rene Calero MD;  Location: [facility] OR     ENT SURGERY  2009    mastoid, sinus     EXAM UNDER ANESTHESIA, INSERT ALEX SLEEVE, UTERINE PLACEMENT OF TANDEM AND RING FOR RAD, ULTRASOUND N/A 12/14/2015    Procedure: EXAM UNDER ANESTHESIA, INSERT ALEX SLEEVE, UTERINE PLACEMENT OF TANDEM AND RING FOR RADIATION, ULTRASOUND GUIDED;  Surgeon: Abby Tony MD;  Location: [facility] OR     INSERT TANDEM AND CESIUM APPLICATOR CERVIX, ULTRASOUND GUIDED N/A 12/17/2015    Procedure: INSERT TANDEM AND CESIUM APPLICATOR CERVIX, ULTRASOUND GUIDED;  Surgeon: Kika Wood MD;  Location: [facility] OR     KNEE SURGERY       LAPAROTOMY EXPLORATORY N/A 5/18/2017    Procedure: LAPAROTOMY EXPLORATORY;   Exploratry Laparotomy, Small Bowel Resection with anastomosis, Flexible Sigmoidoscopy;  Surgeon:  Jennifer Goodwin MD;  Location: [facility] OR     PICC INSERTION Right 04/29/2017    4fr SL BioFlo PICC, 37cm (3cm external) in the R basilic vein w/ tip in the mid SVC.     RESECT SMALL BOWEL WITHOUT OSTOMY N/A 5/18/2017    Procedure: RESECT SMALL BOWEL WITHOUT OSTOMY;;  Surgeon: Jennifer Goodwin MD;  Location: [facility] OR     SIGMOIDOSCOPY FLEXIBLE N/A 5/18/2017    Procedure: SIGMOIDOSCOPY FLEXIBLE;;  Surgeon: Jennifer Goodwin MD;  Location: [facility] OR            Previous Endoscopy:   No results found for this or any previous visit.         Social History:   Reviewed and edited as appropriate  Social History     Social History     Marital status:      Spouse name: N/A     Number of children: N/A     Years of education: N/A     Occupational History     Not on file.     Social History Main Topics     Smoking status: Light Tobacco Smoker     Packs/day: 0.25     Years: 12.00     Types: Cigarettes     Smokeless tobacco: Never Used      Comment: pt smoking about 4 cigs daily     Alcohol use No      Comment: None per pt     Drug use: No      Comment: Patient denies.  Outside records indicate possible Opiate abuse.     Sexual activity: Yes     Partners: Male     Birth control/ protection: None     Other Topics Concern     Parent/Sibling W/ Cabg, Mi Or Angioplasty Before 65f 55m? No     Social History Narrative    Lives alone            Family History:   Reviewed and edited as appropriate  No known history of gastrointestinal/liver disease or  gastrointestinal malignancies       Allergies:   Reviewed and edited as appropriate     Allergies   Allergen Reactions     No Clinical Screening - See Comments Other (See Comments) and Diarrhea     headache  Carrots cause gastric upset, cramping and diarrhea.     Sulfa Drugs Hives     hives     Amoxicillin Unknown and Other (See Comments)     vomiting  vomiting     Amoxicillin-Pot Clavulanate Other (See Comments) and Nausea     vomiting     Augmentin GI Disturbance, Nausea and Hives      "Avelox [Moxifloxacin] Nausea and Vomiting, Unknown and Nausea     Ciprofloxacin Hives and Nausea     Codeine Nausea and Vomiting and Nausea     Ibuprofen Nausea and Vomiting     Other reaction(s): Nausea And Vomiting     Ibuprofen Sodium Hives and GI Disturbance     Quinolones      Tramadol Hives, Diarrhea, Nausea and Nausea and Vomiting     Daucus Carota      Other reaction(s): GI Upset  Other reaction(s): Abdominal pain, Diarrhea  Carrots cause gastric upset, cramping and diarrhea.            Medications:     Current Facility-Administered Medications   Medication     albuterol (PROAIR HFA/PROVENTIL HFA/VENTOLIN HFA) Inhaler 2 puff     naloxone (NARCAN) injection 0.1-0.4 mg     lidocaine 1 % 1 mL     lidocaine (LMX4) kit     sodium chloride (PF) 0.9% PF flush 3 mL     sodium chloride (PF) 0.9% PF flush 3 mL     acetaminophen (TYLENOL) tablet 650 mg     HYDROmorphone (DILAUDID) injection 0.2 mg     ondansetron (ZOFRAN-ODT) ODT tab 4 mg    Or     ondansetron (ZOFRAN) injection 4 mg     prochlorperazine (COMPAZINE) injection 10 mg    Or     prochlorperazine (COMPAZINE) tablet 10 mg    Or     prochlorperazine (COMPAZINE) Suppository 25 mg     lactated ringers infusion     oxyCODONE IR (ROXICODONE) tablet 5 mg     lidocaine (LMX4) kit     heparin lock flush 10 UNIT/ML injection 2-5 mL     lipids (INTRALIPID) 20 % infusion 250 mL     dextrose 10 % 1,000 mL infusion     parenteral nutrition - ADULT compounded formula     sodium chloride (PF) 0.9% PF flush 10-20 mL     sodium chloride (PF) 0.9% PF flush 10 mL     sodium chloride 0.9% infusion             Review of Systems:   A complete review of systems was performed and is negative except as noted in the HPI           Physical Exam:   BP 98/67 (BP Location: Left arm)  Pulse 59  Temp 97.2  F (36.2  C) (Oral)  Resp 18  Ht 1.753 m (5' 9\")  Wt 60.3 kg (132 lb 14.4 oz)  SpO2 97%  BMI 19.63 kg/m2  Wt:   Wt Readings from Last 2 Encounters:   03/29/18 60.3 kg (132 lb 14.4 " oz)   02/13/18 64.9 kg (143 lb)      Constitutional: cooperative, pleasant, not dyspneic/diaphoretic, no acute distress  Eyes: Sclera anicteric/injected  Ears/nose/mouth/throat: Normal oropharynx without ulcers or exudate, mucus membranes moist, hearing intact  Neck: supple, thyroid normal size  CV: No edema  Respiratory: Unlabored breathing  Lymph: No axillary, submandibular, supraclavicular or inguinal lymphadenopathy  Abd: distended, +bs, no hepatosplenomegaly, tender diffusely, no peritoneal signs  Skin: warm, perfused, no jaundice  Neuro: AAO x 3, No asterixis  Psych: Normal affect  MSK: No gross deformities         Data:   Labs and imaging below were independently reviewed and interpreted    BMP  Recent Labs  Lab 03/28/18 2031      POTASSIUM 3.4   CHLORIDE 102   JONATAN 8.2*   CO2 32   BUN 18   CR 0.93   *     CBC  Recent Labs  Lab 03/28/18 2031   WBC 8.6   RBC 4.25   HGB 13.9   HCT 42.4      MCH 32.7   MCHC 32.8   RDW 16.0*        INR  Recent Labs  Lab 03/28/18 2031   INR 1.27*     LFTs  Recent Labs  Lab 03/28/18 2031   ALKPHOS 94   AST 19   ALT 22   BILITOTAL 0.6   PROTTOTAL 5.7*   ALBUMIN 2.7*      PANC  Recent Labs  Lab 03/28/18 2031   LIPASE 64*       Imaging:  CT A/P 3/29/18  IMPRESSION:   Dilated loops of small bowel measuring up to 4.8 cm with transition  point at the anastomosis in the distal jejunum, proximal ileum, with  fecalization of the small bowel immediately upstream, and  decompression of the ileum. Findings are concerning for small bowel  obstruction. No pneumatosis or free air.    MRE 3/21/18  IMPRESSION:   1. Dilated mid abdominal small bowel with apparent transition near the  small bowel anastomosis in the left lower quadrant. This is suspicious  for anastomotic or perianastomotic stricture.  2. Small amount of free fluid in pelvis.  3. Twisting vasculature in the mid abdominal mesentery. This is a  nonspecific finding though it can be seen with internal hernia  or  volvulus. Correlate with clinical symptoms and if there is high  concern for internal hernia or volvulus follow-up CT could be  considered.

## 2018-03-29 NOTE — PHARMACY-ADMISSION MEDICATION HISTORY
Admission medication history interview status for the 3/28/2018 admission is complete. See Epic admission navigator for allergy information, pharmacy, prior to admission medications and immunization status.     Medication history interview sources:  patient    Changes made to PTA medication list (reason)  Added:   -added the day of the week patient takes vitamin D 50,000 units (Thursday)    Deleted:   -calcium carbonate (TUMS) 500 mg chewable tablet (patient reports she does not take these)  -cholecalciferol 1000 units daily (patient now taking the 50,000 unit capsule weekly)    Changed:  -hydrocodone-acetaminophen (changed from every 4 hours to every 6 hours per patient)      Additional medication history information (including reliability of information, actions taken by pharmacist):  -patient reports getting flu shot  -patient reports she does not take acetaminophen 500mg tablets when she takes her norco.    Prior to Admission medications    Medication Sig Last Dose Taking? Auth Provider   ondansetron (ZOFRAN) 4 MG tablet Take 1-2 tablets (4-8 mg) by mouth every 8 hours as needed for nausea Past Week at Unknown time Yes Negro Daley MD   HYDROcodone-acetaminophen (NORCO) 5-325 MG per tablet Take 1 tablet by mouth every 4 hours as needed for pain maximum 1 tablet(s) per day  Patient taking differently: Take 1 tablet by mouth every 6 hours as needed for pain maximum 1 tablet(s) per day 3/28/2018 at Unknown time Yes Negro Daley MD   vitamin D (ERGOCALCIFEROL) 76298 UNIT capsule Take 1 capsule (50,000 Units) by mouth once a week Needs labs checked prior to additional refills  Patient taking differently: Take 50,000 Units by mouth once a week Every Thursday. Needs labs checked prior to additional refills 3/22/2018 at Unknown time Yes Jennifer Garza MD   albuterol (PROAIR HFA/PROVENTIL HFA/VENTOLIN HFA) 108 (90 BASE) MCG/ACT Inhaler Inhale 2 puffs into the lungs every 6 hours as needed  Past Month at Unknown time Yes Nasra Phillips MD   acetaminophen (TYLENOL) 500 MG tablet Take 2 tablets (1,000 mg) by mouth 4 times daily  Patient taking differently: Take 1,000 mg by mouth every 6 hours as needed  More than a month at Unknown time  Julieta Rob PA-C       Medication history completed by: Meghan Reynolds, P4 Student Pharmacist

## 2018-03-29 NOTE — PROGRESS NOTES
Care Coordinator Progress Note     Admission Date/Time:  3/28/2018  Attending MD:  Herman Moore MD     Data  Chart reviewed, discussed with interdisciplinary team.   Patient was admitted for: Abdominal pain, generalized.    Concerns with insurance coverage for discharge needs: None.  Current Living Situation: Patient lives with spouse.  Support System: Supportive  Services Involved: No services in place prior to this admission  Transportation: Family or Friend will provide  Barriers to Discharge: medical Clearance    Coordination of Care and Referrals: Provided patient/family with options for Home Infusion.       Current Plan:  Poor nutritional intake so not a candidate for surgical intervention at this time. Will start TPN and discharge to home and bring back for surgery when medically stable.     Assessment  This RNCC met with patient to discuss discharge planning; patient reports that she has used [facility] previously and that they were wonderful and would like to use then again. Patient reports that she resides with her spouse in [place] (address on face sheet is for mail only) however she will go to stay with her mother at time of discharge until she is stronger as she states she has been so ill. Patient states that her spouse or father will provide transport when discharge is imminent.    Insurance inquiry has been placed with [facility] to verify patient continues to be covered under current insurance plan for the following MD team plans of care once patient is stable for discharge:     Please fax discharge orders to [facility]     Ph:  [phone]   Fax: [phone]     Patient will be discharging to:  [address] [place] [address]  Mothers phone #: [phone]    Skilled home care RN for initial home safety evaluation and to assist with management and education reinforcement with home TPN via picc line.  Picc line cares per home care agency routine.  TPN labs per home  care agency routine.     Skilled home cared RN to evaluate medication management, nutrition and hydration evaluation, endurance evaluation, and general status evaluation after discharge from the acute care hospital setting.     Plan  Anticipated Discharge Date:  TBD  Anticipated Discharge Plan:  Home with TPN    Raiza Florence RN BSN CCM  Float RN Care Coordinator covering 7C  Email: [email or web address]  Pager   Phone: [phone]

## 2018-03-29 NOTE — PROGRESS NOTES
"Colorectal Surgery Progress Note      Subjective:  Continue with abdominal discomfort.      Vitals:  Vitals:    03/29/18 0100 03/29/18 0300 03/29/18 0755 03/29/18 0759   BP: 108/72 100/62 (!) 89/63 98/67   BP Location: Left arm Left arm Left arm Left arm   Pulse:  56 59    Resp: 18 16 18    Temp: 97.9  F (36.6  C) 96.5  F (35.8  C) 97.2  F (36.2  C)    TempSrc: Oral Axillary Oral    SpO2: 99% 97% 97%    Weight: 60.3 kg (132 lb 14.4 oz)      Height: 1.753 m (5' 9\")        I/O:  I/O last 3 completed shifts:  In: 511.67 [I.V.:511.67]  Out: -     Physical Exam:  Gen: AAOx3, NAD  Pulm: Non-labored breathing  Abd: Soft, non-distended, moderately tender to palpation, no guarding/rebound  Ext:  Warm and well-perfused    BMP  Recent Labs  Lab 03/28/18 2031      POTASSIUM 3.4   CHLORIDE 102   CO2 32   BUN 18   CR 0.93   *     CBC  Recent Labs  Lab 03/28/18 2031   WBC 8.6   HGB 13.9   HCT 42.4            ASSESSMENT: This is a 42 year old female with PMH cervical cancer s/p chemo-rads, h/o opiate dependence with prior suboxone use.  Subsequently developed SBO and underwent ex-lap with 60cm SB resected in 5/2017 for small bowel strictures.  Previously required TPN for malnutrition.  Admitted 3/28 for abdominal pain and inability to tolerate orals.  CT shows dilated loops of SB with transition point at the anastomosis with fecalization proximal which is consistent with MRE in 3/2018.  Albumun 2.7.  Last prealbumin in 2/2018 was 10.0    Neuro/Pain: dilaudid IV, oxy prn, tylenol prn.   CV: KULDIP  PULM: encourage IS.  GI/FEN:   - CLD as tolerated  - PICC and TPN for nutrition  - prealbumin pending   - LR @ 100  - apprec GI to eval.  Possible to scope and dilate anastomotic stricture as temporizing measure?  : KULDIP  Heme: KULDIP  ID: [id number]  Endocrine: KULDIP    Activity: as tolerated.  Ppx: hold off on chemo ppx until GI evaluates.  ambulate  Dispo: TBD.  Will dc with TPN and PICC line      ROMAN Stevens " and Rectal Surgery  1545     Patient was seen and discussed with Dr. Moore

## 2018-03-29 NOTE — PROGRESS NOTES
CLINICAL NUTRITION SERVICES - ASSESSMENT NOTE     Nutrition Prescription    RECOMMENDATIONS FOR MDs/PROVIDERS TO ORDER:  - Fluids per MD discretion, Rec decreasing LR, TPN to provide 1800mL/day-100% of pt's estimated fluid needs    Malnutrition Status:    Severe malnutrition in the context of chronic illness    Recommendations already ordered by Registered Dietitian (RD):  - Ordered Mg & Phos daily + to be taken prior to the start of TPN   - Ordered triglyceride lab to be taken prior to the start of TPN     - Per consult received for PN initiation, ordered custom PN (using 15% AA solution) per DW 60 kg @ 75 mL/hr (goal volume 1800 ml/day) with initial 130 g Dex daily (442 kcal/day. GIR: 1.5), 90 g AA daily (360 kcals, 1.5 g/kg/day), and 250 ml 20% IV lipids daily (500 kcal/day) + MVI/trace elements  - If pt is tolerating this initial volume without any signs or symptoms of refeeding (K+, Mg++ wnl and Phos >1.9), can increase Dextrose by 50-60 gm a day until final goal of 235 gm (GIR= 2.7 mg/kg/min). Goal PN to provide: 1659 kcal/day (28 kcal/kg), 1.5 gm PRO/kg, 30% of total kcal from fat on days when lipids are infusing.  Dosing wt 60kg      Future/Additional Recommendations:  - Monitor lytes, LFT's, BGM and advance PN as appropriate  - After meeting goal, cycle as able (patient will likely be DC'ing with TPN).  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  She will likely need a revision of her anastomosis; however, her nutrition status at this time precludes any elective  "operative intervention at this time.     NUTRITION HISTORY  - Per patient, intake ability has progressively gotten worse over the past few months.  Estimates to over recent weeks, only being able to consume 500 kcals a week.   - Per H&P: Patient presents with abdominal pain and vomiting - reports to not being able to keep anything down. \"She is only able to eat a few bites of food each week.  She has a liquid bowel movement every day.\"    CURRENT NUTRITION ORDERS  Diet: Clear Liquid  Intake/Tolerance:   - Plan for PICC placement today. Patient has not had any intake since admission - is currently receiving 0% of assessed energy and protein needs.    LABS  Labs reviewed  - No phos, Mg, TG labs this admission  - Na (142), K+ (3.4): WNL  - LFT's WNL (Bili: 0.6, Alk Phos: 94, ALT: 22, AST: 19)  - 3/28 B    MEDICATIONS  Medications reviewed  - IV Fluids: 100 mL/hr  - Zofran PRN (PO or IV)    ANTHROPOMETRICS  Height: 175.3 cm (5' 9\")  Most Recent Weight: 60.3 kg (132 lb 14.4 oz)    IBW: 66 kg +/- 10%  BMI: 19.63 Normal BMI  Weight History: Patient has lost 16% in the past ~6 months    Wt Readings from Last 20 Encounters:   18 60.3 kg (132 lb 14.4 oz) - Scale   19 56.7 (125 lb) - Pt stated weight   18 64.9 kg (143 lb)   18 67.1 kg (148 lb)   10/07/17 71.7 kg (158 lb)   17 70.2 kg (154 lb 12.8 oz) -  OSH   17 69.5 kg (153 lb 3.2 oz)   17 70.2 kg (154 lb 12.2 oz)   17 70.2 kg (154 lb 12.2 oz)   17 70.5 kg (155 lb 6.4 oz)   17 67.1 kg (147 lb 14.4 oz)   17 68.2 kg (150 lb 6.4 oz)   17 68.9 kg (151 lb 14.4 oz)   17 69.7 kg (153 lb 9.6 oz)   17 68 kg (150 lb)   17 69.9 kg (154 lb 1.6 oz)   17 68.4 kg (150 lb 12.8 oz)   17 68.9 kg (152 lb)   17 70.3 kg (155 lb)     Dosing Weight: 60 kg - Admit wt     ASSESSED NUTRITION NEEDS  Estimated Energy Needs: 9682-6350+ kcals/day (30 - 35+ kcals/kg )  Justification: Increased " needs, Repletion, and Pre-op needs/Underweight for surgery (per team rounds)  Estimated Energy Needs for PN: 3296-6627 kcals/day (25 - 30 kcals/kg )  Justification: Modest needs with PN  Estimated Protein Needs:  grams protein/day (1.5 - 2 grams of pro/kg)  Justification: Hypercatabolism with acute illness, Repletion, and Pre-op needs/Underweight for surgery (per team rounds)  Estimated Fluid Needs: 1 mL/kcal or per provider order   Justification: Maintenance    PHYSICAL FINDINGS  See malnutrition section below.  Per Chart Review - Mild Abdomen distention    MALNUTRITION  % Intake: </=50% for >/= 1 month (severe)  % Weight Loss: > 10% in 6 months (severe)  Subcutaneous Fat Loss: Facial region, and Upper arm:  Mild-Moderate (difficult to assess as patient was wearing layers)  Muscle Loss: Temporal, Thoracic region (clavicle), Upper arm (bicep, tricep), and Patellar region: Moderate   Fluid Accumulation/Edema: None noted  Malnutrition Diagnosis: Severe malnutrition in the context of chronic illness    NUTRITION DIAGNOSIS  Inadequate oral intake related to experiencing recurrent obstructive symptoms (nausea, vomiting) as evidenced by pt report of consuming 500 kcals/week over the past diego, wt loss of 16% in the past ~6 months, and currently receiving 0% of assessed energy and protein needs.     INTERVENTIONS  Implementation  Collaboration with other providers: discussed at am rounds: Cannot tolerate oral intake.  Patient needs to be more nutritionally stable before doing surgery.  Likely to DC w/TPN & PICC.  Parenteral Nutrition/IV Fluids - Initiate     Goals  - Total avg nutritional intake to meet a minimum of 25 kcal/kg and 1.5 g PRO/kg daily (per dosing wt 60 kg).     Monitoring/Evaluation  Progress toward goals will be monitored and evaluated per protocol.    Jenni Wang, Dietetic Intern    I agree with the assessment, interventions, and recommendations.    Janelle Horta RD, LD  Unit pager: [phone]

## 2018-03-29 NOTE — PLAN OF CARE
Problem: Pain, Acute (Adult)  Goal: Identify Related Risk Factors and Signs and Symptoms  Related risk factors and signs and symptoms are identified upon initiation of Human Response Clinical Practice Guideline (CPG).   Outcome: No Change  Slightly bradycardic this am,other vss for pt.UAL,ambulating in halls independently.Voiding,UA/UC sent.C/O abdominal pain,decreased with oxycodone,tylenol and IV dilaudid.Currently having PICC line placed,will start TPN tonight.Passing flatus.Taking small amounts of water,has not ordered any clear liquids today.Denies nausea.

## 2018-03-29 NOTE — H&P
History and Physical     Rachel A Gerhardt MRN# [id number]   YOB: 1975 Age: 42 year old      Date of Admission:  3/28/2018        Chief Complaint:   Abdominal pain, nausea, vomiting         History of Present Illness:   42 year old woman with several month history of failure to thrive presenting with ongoing symptoms of abdominal pain and vomiting.  Patient had cervical cancer and was treated with chemo/radiation a few years ago.  She eventually developed recurrent SBO and Dr. Goodwin performed an ex lap and 60 cm small bowel resection last May.  She did well for about 5 months but since that time, her symptoms have slowly returned.  She saw Dr. Moore back in February for further evaluation and an MRE was ordered which was only just performed 1 week ago.  MRE revealed an anastomotic stricture.  She has continued to have persistent symptoms and despite having an appointment next week, she felt that she needed more immediate attention as she is not able to keep anything down.  She is only able to eat a few bites of food each week.  She has a liquid bowel movement every day.  She has been urinating but has noticed that it has been more concentrated and infrequent over the last few days.  She denies any fevers or chills.     Past Medical History:  Past Medical History:   Diagnosis Date     Asthma      Cancer (H)     Per patient OBGYN, cerivical cancer     Cervical cancer (H)      Other chronic pain      Ovarian cancer (H)      Substance abuse     Outside records indicate past history of narcotics abuse or dependence, but patient denies.       Past Surgical History:  Past Surgical History:   Procedure Laterality Date     COMBINED CYSTOSCOPY, INSERT STENT URETER(S) Bilateral 5/18/2017    Procedure: COMBINED CYSTOSCOPY, INSERT STENT URETER(S);  Cystoscopy with Bilateral Stent,;  Surgeon: Rene Calero MD;  Location: [facility] OR     ENT SURGERY  2009    mastoid, sinus     EXAM  UNDER ANESTHESIA, INSERT ALEX SLEEVE, UTERINE PLACEMENT OF TANDEM AND RING FOR RAD, ULTRASOUND N/A 12/14/2015    Procedure: EXAM UNDER ANESTHESIA, INSERT ALEX SLEEVE, UTERINE PLACEMENT OF TANDEM AND RING FOR RADIATION, ULTRASOUND GUIDED;  Surgeon: Abby Tony MD;  Location: [facility] OR     INSERT TANDEM AND CESIUM APPLICATOR CERVIX, ULTRASOUND GUIDED N/A 12/17/2015    Procedure: INSERT TANDEM AND CESIUM APPLICATOR CERVIX, ULTRASOUND GUIDED;  Surgeon: Kika Wood MD;  Location: [facility] OR     KNEE SURGERY       LAPAROTOMY EXPLORATORY N/A 5/18/2017    Procedure: LAPAROTOMY EXPLORATORY;   Exploratry Laparotomy, Small Bowel Resection with anastomosis, Flexible Sigmoidoscopy;  Surgeon: Jennifer Goodwin MD;  Location: [facility]     PICC INSERTION Right 04/29/2017    4fr SL BioFlo PICC, 37cm (3cm external) in the R basilic vein w/ tip in the mid SVC.     RESECT SMALL BOWEL WITHOUT OSTOMY N/A 5/18/2017    Procedure: RESECT SMALL BOWEL WITHOUT OSTOMY;;  Surgeon: Jennifer Goodwin MD;  Location: [facility]     SIGMOIDOSCOPY FLEXIBLE N/A 5/18/2017    Procedure: SIGMOIDOSCOPY FLEXIBLE;;  Surgeon: Jennifer Goodwin MD;  Location: [facility]       Allergies:     Allergies   Allergen Reactions     No Clinical Screening - See Comments Other (See Comments) and Diarrhea     headache  Carrots cause gastric upset, cramping and diarrhea.     Sulfa Drugs Hives     hives     Amoxicillin Unknown and Other (See Comments)     vomiting  vomiting     Amoxicillin-Pot Clavulanate Other (See Comments) and Nausea     vomiting     Augmentin GI Disturbance, Nausea and Hives     Avelox [Moxifloxacin] Nausea and Vomiting, Unknown and Nausea     Ciprofloxacin Hives and Nausea     Codeine Nausea and Vomiting and Nausea     Ibuprofen Nausea and Vomiting     Other reaction(s): Nausea And Vomiting     Ibuprofen Sodium Hives and GI Disturbance     Quinolones      Tramadol Hives, Diarrhea, Nausea and Nausea and Vomiting     Daucus Carota      Other reaction(s): GI  "Upset  Other reaction(s): Abdominal pain, Diarrhea  Carrots cause gastric upset, cramping and diarrhea.       Medications:  Zofran  Vicoden  Vitamin D  Occasional albuterol    Social History:  Current smoker (1 pack every 4 days or so)  No alcohol or other drugs  Lives alone with her kids (15 and 7)  Has not worked in 3 years.     Family History:  Family History   Problem Relation Age of Onset     DIABETES Mother      Ovarian Cancer No family hx of      Uterine Cancer No family hx of      Cervical Cancer No family hx of      Breast Cancer No family hx of        ROS:  The remainder of the complete ROS was negative unless noted in the HPI.    Exam:  /87  Temp 99.1  F (37.3  C) (Oral)  Resp 16  Ht 1.753 m (5' 9\")  Wt 56.7 kg (125 lb)  SpO2 98%  BMI 18.46 kg/m2  General: Tired and weak appearing woman laying in bed with emesis bag in NAD  Resp: non-labored breathing on RA  Cardiac: regular rate and rhythm  Abdomen: Mild to moderate tenderness diffusely.  No peritoneal signs.  Mild distention. Borborygmi audible     Labs:  WBC: 8.6  Cr: 0.9 from 0.6  Albumin: 2.7  Prealbumin pending  Lactate: 1.6  INR: 1.27    Imaging:  MRE from 3/21  IMPRESSION:   1. Dilated mid abdominal small bowel with apparent transition near the  small bowel anastomosis in the left lower quadrant. This is suspicious  for anastomotic or perianastomotic stricture.  2. Small amount of free fluid in pelvis.  3. Twisting vasculature in the mid abdominal mesentery. This is a  nonspecific finding though it can be seen with internal hernia or  volvulus. Correlate with clinical symptoms and if there is high  concern for internal hernia or volvulus follow-up CT could be  considered.    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  She will likely " need a revision of her anastomosis; however, her nutrition status at this time precludes any elective operative intervention at this time.     - Admit to colorectal surgery service  - IV fluids tonight  - Plan for PICC and TPN tomorrow  - Clear liquid diet as tolerated  - Zofran and pain meds for symptom control   - Low concern for new intraabdominal pathology and consequently do not think CT is needed.  Will order abdominal XR simply to rule out more urgent pathology.     Discussed with Dr. Jett Tolliver  General Surgery PGY-3  [phone]

## 2018-03-30 LAB
ANION GAP SERPL CALCULATED.3IONS-SCNC: 6 MMOL/L (ref 3–14)
BUN SERPL-MCNC: 12 MG/DL (ref 7–30)
CALCIUM SERPL-MCNC: 7.5 MG/DL (ref 8.5–10.1)
CHLORIDE SERPL-SCNC: 110 MMOL/L (ref 94–109)
CO2 SERPL-SCNC: 25 MMOL/L (ref 20–32)
CREAT SERPL-MCNC: 0.66 MG/DL (ref 0.52–1.04)
GFR SERPL CREATININE-BSD FRML MDRD: >90 ML/MIN/1.7M2
GLUCOSE BLDC GLUCOMTR-MCNC: 111 MG/DL (ref 70–99)
GLUCOSE BLDC GLUCOMTR-MCNC: 125 MG/DL (ref 70–99)
GLUCOSE BLDC GLUCOMTR-MCNC: 87 MG/DL (ref 70–99)
GLUCOSE SERPL-MCNC: 104 MG/DL (ref 70–99)
MAGNESIUM SERPL-MCNC: 1.9 MG/DL (ref 1.6–2.3)
PHOSPHATE SERPL-MCNC: 2.6 MG/DL (ref 2.5–4.5)
POTASSIUM SERPL-SCNC: 3.8 MMOL/L (ref 3.4–5.3)
SODIUM SERPL-SCNC: 141 MMOL/L (ref 133–144)

## 2018-03-30 PROCEDURE — 25000132 ZZH RX MED GY IP 250 OP 250 PS 637: Performed by: PHYSICIAN ASSISTANT

## 2018-03-30 PROCEDURE — 83735 ASSAY OF MAGNESIUM: CPT | Performed by: COLON & RECTAL SURGERY

## 2018-03-30 PROCEDURE — 25000128 H RX IP 250 OP 636: Performed by: SURGERY

## 2018-03-30 PROCEDURE — 84100 ASSAY OF PHOSPHORUS: CPT | Performed by: COLON & RECTAL SURGERY

## 2018-03-30 PROCEDURE — 25000128 H RX IP 250 OP 636: Performed by: COLON & RECTAL SURGERY

## 2018-03-30 PROCEDURE — 25000132 ZZH RX MED GY IP 250 OP 250 PS 637: Performed by: STUDENT IN AN ORGANIZED HEALTH CARE EDUCATION/TRAINING PROGRAM

## 2018-03-30 PROCEDURE — 80048 BASIC METABOLIC PNL TOTAL CA: CPT | Performed by: COLON & RECTAL SURGERY

## 2018-03-30 PROCEDURE — 12000008 ZZH R&B INTERMEDIATE UMMC

## 2018-03-30 PROCEDURE — 00000146 ZZHCL STATISTIC GLUCOSE BY METER IP

## 2018-03-30 PROCEDURE — 25000128 H RX IP 250 OP 636: Performed by: STUDENT IN AN ORGANIZED HEALTH CARE EDUCATION/TRAINING PROGRAM

## 2018-03-30 PROCEDURE — 40000556 ZZH STATISTIC PERIPHERAL IV START W US GUIDANCE

## 2018-03-30 PROCEDURE — 36592 COLLECT BLOOD FROM PICC: CPT | Performed by: COLON & RECTAL SURGERY

## 2018-03-30 PROCEDURE — 25000125 ZZHC RX 250: Performed by: COLON & RECTAL SURGERY

## 2018-03-30 RX ADMIN — HYDROMORPHONE HYDROCHLORIDE 3 MG: 2 TABLET ORAL at 11:44

## 2018-03-30 RX ADMIN — I.V. FAT EMULSION 250 ML: 20 EMULSION INTRAVENOUS at 20:42

## 2018-03-30 RX ADMIN — Medication 0.2 MG: at 03:49

## 2018-03-30 RX ADMIN — Medication 0.2 MG: at 16:41

## 2018-03-30 RX ADMIN — Medication 0.2 MG: at 12:29

## 2018-03-30 RX ADMIN — Medication 0.2 MG: at 10:19

## 2018-03-30 RX ADMIN — Medication 0.2 MG: at 07:59

## 2018-03-30 RX ADMIN — HYDROMORPHONE HYDROCHLORIDE 3 MG: 2 TABLET ORAL at 02:37

## 2018-03-30 RX ADMIN — OXYCODONE HYDROCHLORIDE 5 MG: 5 TABLET ORAL at 08:45

## 2018-03-30 RX ADMIN — HYDROMORPHONE HYDROCHLORIDE 3 MG: 2 TABLET ORAL at 09:03

## 2018-03-30 RX ADMIN — Medication 0.2 MG: at 14:28

## 2018-03-30 RX ADMIN — ACETAMINOPHEN 1000 MG: 10 INJECTION, SOLUTION INTRAVENOUS at 06:46

## 2018-03-30 RX ADMIN — HYDROMORPHONE HYDROCHLORIDE 6 MG: 2 TABLET ORAL at 15:25

## 2018-03-30 RX ADMIN — OXYCODONE HYDROCHLORIDE 5 MG: 5 TABLET ORAL at 20:30

## 2018-03-30 RX ADMIN — SODIUM CHLORIDE, POTASSIUM CHLORIDE, SODIUM LACTATE AND CALCIUM CHLORIDE 1000 ML: 600; 310; 30; 20 INJECTION, SOLUTION INTRAVENOUS at 01:37

## 2018-03-30 RX ADMIN — ACETAMINOPHEN 1000 MG: 10 INJECTION, SOLUTION INTRAVENOUS at 22:48

## 2018-03-30 RX ADMIN — ONDANSETRON 4 MG: 2 INJECTION INTRAMUSCULAR; INTRAVENOUS at 09:03

## 2018-03-30 RX ADMIN — Medication 0.2 MG: at 06:07

## 2018-03-30 RX ADMIN — Medication 0.2 MG: at 01:37

## 2018-03-30 RX ADMIN — Medication 0.2 MG: at 22:44

## 2018-03-30 RX ADMIN — ACETAMINOPHEN 1000 MG: 10 INJECTION, SOLUTION INTRAVENOUS at 15:25

## 2018-03-30 RX ADMIN — POTASSIUM CHLORIDE: 2 INJECTION, SOLUTION, CONCENTRATE INTRAVENOUS at 20:43

## 2018-03-30 RX ADMIN — HYDROMORPHONE HYDROCHLORIDE 5 MG: 2 TABLET ORAL at 21:31

## 2018-03-30 RX ADMIN — Medication 0.2 MG: at 19:59

## 2018-03-30 RX ADMIN — HYDROMORPHONE HYDROCHLORIDE 6 MG: 2 TABLET ORAL at 18:25

## 2018-03-30 ASSESSMENT — PAIN DESCRIPTION - DESCRIPTORS
DESCRIPTORS: CRAMPING

## 2018-03-30 NOTE — PLAN OF CARE
Problem: Patient Care Overview  Goal: Plan of Care/Patient Progress Review  Outcome: No Change  VSS. Ambulating frequently in the young.  Passing flatus.  No BM. Abdomen soft, non distended.  NPO with TPN.  Pain controlled with IV/PO dilaudid.  No nausea or emesis today. Seen by GI.  PLAN: monitor for nausea, work towards pain control with only PO meds.

## 2018-03-30 NOTE — DISCHARGE SUMMARY
[facility]  Discharge Summary  Colon and Rectal Surgery     Rachel A Gerhardt MRN# [id number]   YOB: 1975 Age: 42 year old     Date of Admission:  3/28/2018  Date of Discharge::  4/1/2018   Admitting Physician:  Herman Moore MD  Discharge Physician:  Herman Moore MD  Primary Care Physician:        Reji Avery          Admission Diagnoses:   Abdominal pain, generalized [R10.84]  Nausea  Vomiting  Inability to tolerate oral nutrition  Partial small bowel obstruction          Discharge Diagnosis:   Abdominal pain, generalized [R10.84]  Nausea  Vomiting  Inability to tolerate oral nutrition  Partial small bowel obstruction  Severe malnutrition in the context of chronic illness         Procedures:   PICC line placement 3/29/2018.         Consultations:   VASCULAR ACCESS ADULT IP CONSULT  PHARMACY/NUTRITION TO START AND MANAGE TPN  GI LUMINAL ADULT IP CONSULT  PHARMACY IP CONSULT  MEDICATION HISTORY IP PHARMACY CONSULT  VASCULAR ACCESS CARE ADULT IP CONSULT         Imaging Studies:     Results for orders placed or performed during the hospital encounter of 03/28/18   XR Abdomen 2 Views    Narrative    XR ABDOMEN 2 VW  3/28/2018 11:16 PM      HISTORY: ? obstructive pattern;     COMPARISON: X-ray 7/16/2017, CT 7/10/2017    FINDINGS: Significantly, diffusely dilated loops of small bowel  measuring up to 6.1 cm. There is air throughout the colon. This bowel  gas pattern is nonspecific. There is no pneumatosis or portal venous  gas. There is possible thumbprinting in the rectum which could  represent colitis.      Impression    IMPRESSION:   1. Nonspecific bowel gas pattern with significantly, diffusely dilated  loops of small bowel with air throughout the colon. No pneumatosis or  portal venous gas. Adynamic ileus versus partial small bowel  obstruction. If clinically concerned, recommend CT abdomen/pelvis with  IV contrast.  2. Possible thumbprinting in the rectum, which could  represent  colitis.    I have personally reviewed the examination and initial interpretation  and I agree with the findings.    BHARGAV ESQUIVEL MD   CT Abdomen Pelvis w Contrast     Value    Radiologist flags Small bowel obstruction (Urgent)    Narrative    EXAMINATION: CT abdomen pelvis with contrast, 3/29/2018 12:16 AM    TECHNIQUE:  Helical CT images from the lung bases through the  symphysis pubis were obtained with contrast.  Coronal reformatted  images were generated at a workstation for further assessment.    CONTRAST:  77 cc Isovue 370 IV.    COMPARISON: 7/10/2017.    HISTORY: Small bowel obstruction or ileus    FINDINGS:    Abdomen and pelvis:   Liver: No suspicious liver lesions. Portal veins appear patent.  Gallbladder: No gallstones. No evidence of acute cholecystitis.  Spleen: Normal size.  Pancreas: No suspicious pancreatic lesions. The pancreatic duct is not  dilated.  Adrenal glands: No adrenal nodules.  Kidneys: Bilateral subcentimeter hypodensities, too small to  characterize. No kidney masses. No hydronephrosis or obstructing renal  stones.  Bladder / Pelvic organs: Unremarkable.  Bowel: Postsurgical changes of small bowel resection with  re-anastomosis in the distal jejunum, proximal ileum. Dilated loops of  small bowel measuring up to 4.8 cm with transition point at the  anastomosis, with fecalization of the small bowel immediately  upstream, and decompression of the ileum. Findings are concerning for  small bowel obstruction. No pneumatosis or free air.  Lymph nodes: No retroperitoneal, mesenteric, or pelvic  lymphadenopathy.  Fluid: Small amount of free fluid within the pelvis.  Vessels: No infrarenal aortic aneurysm. Patent major abdominal  vasculature.    Lung bases: No consolidation or pleural effusion.    Bones and soft tissues: No suspicious osseous lesions.      Impression    IMPRESSION:   Dilated loops of small bowel measuring up to 4.8 cm with transition  point at the anastomosis in the  distal jejunum, proximal ileum, with  fecalization of the small bowel immediately upstream, and  decompression of the ileum. Findings are concerning for small bowel  obstruction. No pneumatosis or free air.    [Urgent Result: Small bowel obstruction]    Finding was identified on 3/29/2018 12:16 AM.     Dr. Brown was contacted by Dr. Verdin at 3/29/2018 12:32 AM and  verbalized understanding of the urgent finding.     I have personally reviewed the examination and initial interpretation  and I agree with the findings.    BHARGAV ESQUIVEL MD   XR Chest 1 View    Narrative    Study: XR CHEST 1 VW 3/29/2018 3:00 PM    Comparison: 3/20/2018 CT.    History: RN placed PICC - verify tip placement;     Findings:   PA chest radiograph. Right upper extremity PICC with tip at the low  SVC. No pneumothorax. No pleural effusions. Bibasilar streaky  opacities, right greater than left, likely atelectasis. Cardiac and  mediastinal silhouettes are within normal limits. Redemonstration of  dilated loops of small bowel. No acute osseous abnormalities.      Impression    Impression:   1. Right upper extremity PICC with tip at the low SVC. No acute  cardiopulmonary abnormalities.  2. Redemonstration of dilated loops of small bowel.    I have personally reviewed the examination and initial interpretation  and I agree with the findings.    BHARGAV ESQUIVEL MD          Medications Prior to Admission:     Prescriptions Prior to Admission   Medication Sig Dispense Refill Last Dose     ondansetron (ZOFRAN) 4 MG tablet Take 1-2 tablets (4-8 mg) by mouth every 8 hours as needed for nausea 30 tablet 0 Past Week at Unknown time     vitamin D (ERGOCALCIFEROL) 53976 UNIT capsule Take 1 capsule (50,000 Units) by mouth once a week Needs labs checked prior to additional refills (Patient taking differently: Take 50,000 Units by mouth once a week Every Thursday. Needs labs checked prior to additional refills) 8 capsule 0 3/22/2018 at Unknown time      albuterol (PROAIR HFA/PROVENTIL HFA/VENTOLIN HFA) 108 (90 BASE) MCG/ACT Inhaler Inhale 2 puffs into the lungs every 6 hours as needed 1 Inhaler 0 Past Month at Unknown time     [DISCONTINUED] HYDROcodone-acetaminophen (NORCO) 5-325 MG per tablet Take 1 tablet by mouth every 4 hours as needed for pain maximum 1 tablet(s) per day (Patient taking differently: Take 1 tablet by mouth every 6 hours as needed for pain maximum 1 tablet(s) per day) 30 tablet 0 3/28/2018 at Unknown time     acetaminophen (TYLENOL) 500 MG tablet Take 2 tablets (1,000 mg) by mouth 4 times daily (Patient taking differently: Take 1,000 mg by mouth every 6 hours as needed ) 80 tablet 0 More than a month at Unknown time          Discharge Medications:     Current Discharge Medication List      START taking these medications    Details   hydrOXYzine (ATARAX) 25 MG tablet Take 1 tablet (25 mg) by mouth every 6 hours as needed for other (adjuvant pain)  Qty: 8 tablet, Refills: 0    Associated Diagnoses: Abdominal pain, generalized      oxyCODONE IR (ROXICODONE) 5 MG tablet Take 1-2 tablets (5-10 mg) by mouth every 4 hours as needed for moderate to severe pain  Qty: 20 tablet, Refills: 0    Comments: Patient will be sent home with a short-term supply of pain medication until she follows up with her pain provider ([facility]) on 4/2/2018 (tomorrow).  Associated Diagnoses: Abdominal pain, generalized      parenteral nutrition - PTA/DISCHARGE ORDER The TPN formula will print on the After Visit Summary Report.  Qty: 1 each, Refills: 0    Associated Diagnoses: Failure to thrive in adult         CONTINUE these medications which have NOT CHANGED    Details   ondansetron (ZOFRAN) 4 MG tablet Take 1-2 tablets (4-8 mg) by mouth every 8 hours as needed for nausea  Qty: 30 tablet, Refills: 0    Associated Diagnoses: Small bowel obstruction; Nocturnal diarrhea; Abdominal pain, generalized      vitamin D (ERGOCALCIFEROL) 83761 UNIT capsule Take 1  capsule (50,000 Units) by mouth once a week Needs labs checked prior to additional refills  Qty: 8 capsule, Refills: 0    Associated Diagnoses: Vitamin D deficiency      albuterol (PROAIR HFA/PROVENTIL HFA/VENTOLIN HFA) 108 (90 BASE) MCG/ACT Inhaler Inhale 2 puffs into the lungs every 6 hours as needed  Qty: 1 Inhaler, Refills: 0    Associated Diagnoses: Wheezing      acetaminophen (TYLENOL) 500 MG tablet Take 2 tablets (1,000 mg) by mouth 4 times daily  Qty: 80 tablet, Refills: 0    Associated Diagnoses: Small intestine obstruction         STOP taking these medications       HYDROcodone-acetaminophen (NORCO) 5-325 MG per tablet Comments:   Reason for Stopping:                     Brief History of Illness:   This is a 42 year old female with PMH cervical cancer s/p chemo-rads, h/o opiate dependence with prior suboxone use.  Subsequently developed SBO and underwent ex-lap with 60cm SB resected in 5/2017 for small bowel strictures.  Previously required TPN for malnutrition.  Admitted 3/28 for acute on chronic abdominal pain and inability to tolerate orals.  CT shows dilated loops of SB with transition point at the anastomosis with fecalization proximal which is consistent with MRE in 3/2018.  Albumun 2.7, prealbumin 11.           Hospital Course:   Pt was admitted and had PICC line placed on 3/29/2018 and TPN was initiated.  Gastroenterology was consulted to evaluate for possibility of endoscopic dilatation of the anastomotic site.  This is currently being discussed.  TPN was brought to goal.  Electrolytes were repleated.  Pt tolerated this well.  Pt was discharge with Home Infusion services.  Pt declined additional inpatient teaching as she remembers how to manage TPN from prior use.  Pt is to f/u in the [facility] clinic in 3-4 weeks with Dr. Moore to re-evaluate.  We will be in contact regarding final Gastroenterology recommendations.   Patient will be sent home with a short-term supply of narcotic pain medication until  "she follows up with her pain provider ([facility]) on 4/2/2018 (tomorrow).           Day of Discharge Physical Exam:   Blood pressure 95/69, pulse 72, temperature 96.8  F (36  C), temperature source Oral, resp. rate 16, height 1.753 m (5' 9\"), weight 60.3 kg (132 lb 14.4 oz), SpO2 96 %, not currently breastfeeding.    Gen: AAOx3, NAD.  Ambulated a lot thru out the hospital  Pulm: Non-labored breathing  Abd: Soft, non-distended, mild to moderately tender, no guarding/rebound  Ext:  Warm and well-perfused         Discharge Instructions and Follow-Up:       Discharge Procedure Orders  Home infusion referral     MD face to face encounter   Order Comments: Documentation of Face to Face and Certification for Home Health Services    I certify that patient: Rachel A Gerhardt is under my care and that I, or a nurse practitioner or physician's assistant working with me, had a face-to-face encounter that meets the physician face-to-face encounter requirements with this patient on: 3/29/2018.    This encounter with the patient was in whole, or in part, for the following medical condition, which is the primary reason for home health care: s/p acute hospital stay for anamostic stricture.    I certify that, based on my findings, the following services are medically necessary home health services: Nursing.    My clinical findings support the need for the above services because: Nurse is needed: To provide assessment and oversight required in the home to assure adherence to the medical plan due to: need for TPN..    Further, I certify that my clinical findings support that this patient is homebound (i.e. absences from home require considerable and taxing effort and are for medical reasons or [facility] services or infrequently or of short duration when for other reasons) because: Leaving home is medically contraindicated for the following reason(s): Infection risk / immunocompromised state where it is safer for them " to receive services in the home...    Based on the above findings. I certify that this patient is confined to the home and needs intermittent skilled nursing care, physical therapy and/or speech therapy.  The patient is under my care, and I have initiated the establishment of the plan of care.  This patient will be followed by a physician who will periodically review the plan of care.  Physician/Provider to provide follow up care: Reji Avery    Attending [facility] physician (the Medicare certified [place] provider): Herman Moore MD  Physician Signature: See electronic signature associated with these discharge orders.  Date: 3/29/2018     Reason for your hospital stay   Order Comments: You were admitted for abdominal pain and malnutrition.  You were started on TPN.     Adult [facility] Follow-up and recommended labs and tests   Order Comments: DIET  -Liquid diet as tolerated  -TPN as main source of nutrition  -We recommend eating slowly, chewing thoroughly, eating small frequent meals throughout the day  -Stay well hydrated.      ACTIVITY  -Activity as tolerated  -No driving while on narcotic analgesics (i.e. Percocet, oxycodone, Vicodin)    NOTIFY  Please contact Marianela Diaz LPN, Maria L Sidhu RN or Joellen Parish RN at [phone] for problems after discharge such as:  -Temperature > 101F, chills, rigors, dizziness  -Inability to tolerate diet, nausea or vomiting  -You stop passing gas, develop significant bloating, abdominal pain  -Have blood in stools/vomit  -Have severe diarrhea/constipation  -Any other questions or concerns.  - At nights (after 4:30pm), on weekends, or if urgent, call [phone] and ask the  to speak with the on-call Colorectal Surgery resident or fellow      Medication Instructions  Some of your medications may have changed. Please take only prescribed and resumed medications     FOLLOW-UP  1.  You can follow up with CRS Staff: Dr. Moore in 3-4 weeks after.  Please contact  our clinic scheduler Bianca Pavon (phone # [phone]) if you have not heard from our clinic in 3 business days afer discharge to schedule a follow-up appointment.     2.  Follow up with your primary care provider in 1-2 weeks after discharge from the hospital to review this hospitalization.     3.  Continue TPN blood work as per Home Infusion.       Appointments on [place] and/or [facility] (with [facility] or [facility] provider or service). Call [phone] if you haven't heard regarding these appointments within 7 days of discharge.     Activity   Order Comments: Your activity upon discharge:   -Activity as tolerated  -No driving while on narcotic analgesics (i.e. Percocet, oxycodone, Vicodin)   Order Specific Question Answer Comments   Is discharge order? Yes      Full Code     Diet   Order Comments: Follow this diet upon discharge:  -Liquid diet as tolerated  -TPN as main source of nutrition  -We recommend eating slowly, chewing thoroughly, eating small frequent meals throughout the day  -Stay well hydrated.   Order Specific Question Answer Comments   Is discharge order? Yes               Home Health Care:   Arranged           Discharge Disposition:   Discharged to home      Condition at discharge: Stable

## 2018-03-30 NOTE — PLAN OF CARE
Problem: Patient Care Overview  Goal: Plan of Care/Patient Progress Review  Temp max 99.5,other vitals stable.Patient had intermittent nausea and vomiting,had 800 ml of emesis,complained of severe abdominal pain,Prn Zofran 4mg IV given x 2,prn dilaudid .2mg and oxycodone 5mg x 1 given,pt reported pain not adequately controlled,said prn dilaudid very 2 hrs helped her pain but not for long and oxycodone not helping.On call MD notified,pt was kept npo except for med,oral dilaudid was ordered,scheduled IV tylenol was ordered,scheduled tylenol given at 2040,po prn oral dilaudid 3 mg given  at 2200 ,pt getting prn IV dilaudid every 2 hrs in between oral pain med,IV dilaudid was last given at 2319.Difficulty controlling patient's pain this shift .Pt not restful,pt said she could feel fluid moving in her abdomen and this causes her discomfort,bowel sound hyperactive all quadrants,has smear of greenish liquid stool.Report given to relieving night nurse,night nurse would call MD  for pain med review and  possible NGT placement for comfort.Patient started on TPN and lipid infusion tonight.Continue per plan of care.

## 2018-03-30 NOTE — PLAN OF CARE
Problem: Patient Care Overview  Goal: Plan of Care/Patient Progress Review  Outcome: No Change  Pt AVSS. Pt sleeping between cares. Pt c/o abd pain rated 6-7. Pain managed with dilaudid po and iv as well as IV tylenol. Pt abd dist, SF with hyper bs, +gas. No stool noted tonite. Pt denieed any nausea. Lungs clear. TPN/IL infusing via R picc. BS checks q 6hr WNL. Cont to maintain pain control. Monitor bowel fxn closely.

## 2018-03-30 NOTE — PROGRESS NOTES
Surgery Progress Note  3/30/2018     Subjective:  - SAURABH overnight.  - Up walking in hallway.    Objective:  Temp:  [97.8  F (36.6  C)-99.5  F (37.5  C)] 97.8  F (36.6  C)  Pulse:  [67-73] 67  Heart Rate:  [74-77] 74  Resp:  [16-18] 16  BP: ()/(66-81) 108/74  SpO2:  [95 %-96 %] 96 %  I/O last 3 completed shifts:  In: 1953.87 [I.V.:901.67]  Out: 900 [Urine:100; Emesis/NG output:800]    Gen: Awake, alert, NAD; walking in hallway  Resp: NLB on RA  Ext: WWP      A/P: Rachel A Gerhardt is a 42 year old female with PMH cervical cancer s/p chemo-rads, h/o opiate dependence with prior suboxone use. Subsequently developed SBO and underwent ex-lap with 60cm SB resected in 5/2017 for small bowel strictures. Previously required TPN for malnutrition. Admitted 3/28 for abdominal pain and inability to tolerate orals. CT shows dilated loops of SB with transition point at the anastomosis with fecalization proximal which is consistent with MRE in 3/2018. Albumun 2.7 (3/28). Last prealbumin in 2/2018 was 10.0.  - Continue NPO for now - will need NGT if continues to c/o N/V  - Appreciate GI cares - will discuss endoscopic dilation on Monday  - Continue TPN  - Dispo: will need home infusion for TPN      D/w chief resident +/- staff.    Maryam Hernandez MD/MPH  Plastic Surgery PGY1

## 2018-03-30 NOTE — PROGRESS NOTES
GASTROENTEROLOGY PROGRESS NOTE    ASSESSMENT:  42 year old female with a history of cervical cancer treated with chemotherapy and radiation with subsequent recurrent SBO s/p ex lap in May 2017 with 60cm of small bowel resection with side to side functional end to end small bowel anastomosis (remaining bowel included 230cm proximal and 60cm distal including ileocecal valve) who now presents with obstructive symptoms. CT 3/29 showed transition point at the anastomosis in the distal jejunum, proximal ileum with findings suspicious of small bowel obstruction. Patient passing gas and having bowel movements. GI consulted for possible endoscopic dilation, which we will arrange once discussed with Dr. Vigil next week. Patient currently on TPN.        Recommendations  --Continue ongoing supportive cares   --Consider NG if ongoing nausea and vomiting   --Pain management per primary team   --Optimize electrolytes  --Please encourage patient to ambulate   --Endoscopic dilation will be done potentially after reviewing the case with Dr. Vigil on Monday, discussed with patient and she is in agreement.   --If discharged home we will discuss and call patient if dilation still needed.      The patient was discussed and plan agreed upon with GI staff.    Ace Blue MD  GI Fellow    ATTENDING ATTESTATION:    REFERRING PROVIDER: Teresa  DATE SEEN: 3/30/18    Patient was discussed, seen, and examined by me, Negro Akins. The plan of care and pertinent data/imaging were also reviewed with the GI Consult team and GI Fellow as well. Agree with the joint assessment and plan as delineated above.    Please contact me with any further questions.    Negro Akins MD    [facility] - Department of Medicine  Division of Gastroenterology  [phone]    _______________________________________________________________  S: Having BM and passing gas. Pain improving also     O:  Blood pressure  "108/74, pulse 67, temperature 97.8  F (36.6  C), temperature source Oral, resp. rate 16, height 1.753 m (5' 9\"), weight 60.3 kg (132 lb 14.4 oz), SpO2 96 %, not currently breastfeeding.    Gen:Alert ad oriented  HEENT: no scleral icterus  CV: RRR  Lungs: Clear  Abd: increased bowel sounds, tender diffusely to palpation   Skin: No jaundice  MS: Moving all 4 ext   Neuro: No focal deficits      LABS:  BMP  Recent Labs  Lab 03/30/18  1042 03/28/18 2031    142   POTASSIUM 3.8 3.4   CHLORIDE 110* 102   JONATAN 7.5* 8.2*   CO2 25 32   BUN 12 18   CR 0.66 0.93   * 111*     CBC  Recent Labs  Lab 03/28/18 2031   WBC 8.6   RBC 4.25   HGB 13.9   HCT 42.4      MCH 32.7   MCHC 32.8   RDW 16.0*        INR  Recent Labs  Lab 03/28/18 2031   INR 1.27*     LFTs  Recent Labs  Lab 03/28/18 2031   ALKPHOS 94   AST 19   ALT 22   BILITOTAL 0.6   PROTTOTAL 5.7*   ALBUMIN 2.7*      PANC  Recent Labs  Lab 03/28/18 2031   LIPASE 64*       "

## 2018-03-31 LAB
ANION GAP SERPL CALCULATED.3IONS-SCNC: 6 MMOL/L (ref 3–14)
BUN SERPL-MCNC: 13 MG/DL (ref 7–30)
CALCIUM SERPL-MCNC: 7.6 MG/DL (ref 8.5–10.1)
CHLORIDE SERPL-SCNC: 110 MMOL/L (ref 94–109)
CO2 SERPL-SCNC: 23 MMOL/L (ref 20–32)
CREAT SERPL-MCNC: 0.57 MG/DL (ref 0.52–1.04)
GFR SERPL CREATININE-BSD FRML MDRD: >90 ML/MIN/1.7M2
GLUCOSE BLDC GLUCOMTR-MCNC: 116 MG/DL (ref 70–99)
GLUCOSE BLDC GLUCOMTR-MCNC: 120 MG/DL (ref 70–99)
GLUCOSE BLDC GLUCOMTR-MCNC: 123 MG/DL (ref 70–99)
GLUCOSE SERPL-MCNC: 113 MG/DL (ref 70–99)
MAGNESIUM SERPL-MCNC: 2 MG/DL (ref 1.6–2.3)
PHOSPHATE SERPL-MCNC: 2.7 MG/DL (ref 2.5–4.5)
POTASSIUM SERPL-SCNC: 4.1 MMOL/L (ref 3.4–5.3)
SODIUM SERPL-SCNC: 139 MMOL/L (ref 133–144)

## 2018-03-31 PROCEDURE — 25000132 ZZH RX MED GY IP 250 OP 250 PS 637: Performed by: PHYSICIAN ASSISTANT

## 2018-03-31 PROCEDURE — 25000128 H RX IP 250 OP 636: Performed by: STUDENT IN AN ORGANIZED HEALTH CARE EDUCATION/TRAINING PROGRAM

## 2018-03-31 PROCEDURE — 25000132 ZZH RX MED GY IP 250 OP 250 PS 637: Performed by: COLON & RECTAL SURGERY

## 2018-03-31 PROCEDURE — 25000125 ZZHC RX 250: Performed by: COLON & RECTAL SURGERY

## 2018-03-31 PROCEDURE — 36592 COLLECT BLOOD FROM PICC: CPT | Performed by: COLON & RECTAL SURGERY

## 2018-03-31 PROCEDURE — 84100 ASSAY OF PHOSPHORUS: CPT | Performed by: COLON & RECTAL SURGERY

## 2018-03-31 PROCEDURE — 80048 BASIC METABOLIC PNL TOTAL CA: CPT | Performed by: COLON & RECTAL SURGERY

## 2018-03-31 PROCEDURE — 25000132 ZZH RX MED GY IP 250 OP 250 PS 637: Performed by: STUDENT IN AN ORGANIZED HEALTH CARE EDUCATION/TRAINING PROGRAM

## 2018-03-31 PROCEDURE — 83735 ASSAY OF MAGNESIUM: CPT | Performed by: COLON & RECTAL SURGERY

## 2018-03-31 PROCEDURE — 12000008 ZZH R&B INTERMEDIATE UMMC

## 2018-03-31 PROCEDURE — 00000146 ZZHCL STATISTIC GLUCOSE BY METER IP

## 2018-03-31 PROCEDURE — 25000128 H RX IP 250 OP 636: Performed by: COLON & RECTAL SURGERY

## 2018-03-31 PROCEDURE — 25000128 H RX IP 250 OP 636: Performed by: SURGERY

## 2018-03-31 RX ORDER — HYDROXYZINE HYDROCHLORIDE 25 MG/1
50 TABLET, FILM COATED ORAL EVERY 6 HOURS PRN
Status: DISCONTINUED | OUTPATIENT
Start: 2018-03-31 | End: 2018-04-01 | Stop reason: HOSPADM

## 2018-03-31 RX ORDER — GABAPENTIN 100 MG/1
100 CAPSULE ORAL 3 TIMES DAILY
Status: DISCONTINUED | OUTPATIENT
Start: 2018-03-31 | End: 2018-04-01 | Stop reason: HOSPADM

## 2018-03-31 RX ORDER — HYDROXYZINE HYDROCHLORIDE 25 MG/1
25 TABLET, FILM COATED ORAL EVERY 6 HOURS PRN
Status: DISCONTINUED | OUTPATIENT
Start: 2018-03-31 | End: 2018-04-01 | Stop reason: HOSPADM

## 2018-03-31 RX ORDER — ACETAMINOPHEN 325 MG/1
650 TABLET ORAL 4 TIMES DAILY
Status: DISCONTINUED | OUTPATIENT
Start: 2018-03-31 | End: 2018-04-01 | Stop reason: HOSPADM

## 2018-03-31 RX ORDER — OXYCODONE HYDROCHLORIDE 5 MG/1
5-10 TABLET ORAL
Status: DISCONTINUED | OUTPATIENT
Start: 2018-03-31 | End: 2018-04-01

## 2018-03-31 RX ADMIN — OXYCODONE HYDROCHLORIDE 5 MG: 5 TABLET ORAL at 08:19

## 2018-03-31 RX ADMIN — Medication 0.2 MG: at 06:59

## 2018-03-31 RX ADMIN — OXYCODONE HYDROCHLORIDE 5 MG: 5 TABLET ORAL at 18:37

## 2018-03-31 RX ADMIN — ACETAMINOPHEN 650 MG: 325 TABLET, FILM COATED ORAL at 17:16

## 2018-03-31 RX ADMIN — OXYCODONE HYDROCHLORIDE 5 MG: 5 TABLET ORAL at 15:37

## 2018-03-31 RX ADMIN — ONDANSETRON 4 MG: 2 INJECTION INTRAMUSCULAR; INTRAVENOUS at 17:21

## 2018-03-31 RX ADMIN — OXYCODONE HYDROCHLORIDE 5 MG: 5 TABLET ORAL at 12:32

## 2018-03-31 RX ADMIN — POTASSIUM CHLORIDE: 2 INJECTION, SOLUTION, CONCENTRATE INTRAVENOUS at 21:00

## 2018-03-31 RX ADMIN — OXYCODONE HYDROCHLORIDE 10 MG: 5 TABLET ORAL at 21:07

## 2018-03-31 RX ADMIN — ACETAMINOPHEN 1000 MG: 10 INJECTION, SOLUTION INTRAVENOUS at 06:59

## 2018-03-31 RX ADMIN — ONDANSETRON 4 MG: 2 INJECTION INTRAMUSCULAR; INTRAVENOUS at 01:19

## 2018-03-31 RX ADMIN — HYDROXYZINE HYDROCHLORIDE 25 MG: 25 TABLET ORAL at 20:56

## 2018-03-31 RX ADMIN — OXYCODONE HYDROCHLORIDE 5 MG: 5 TABLET ORAL at 04:34

## 2018-03-31 RX ADMIN — Medication 0.2 MG: at 10:52

## 2018-03-31 RX ADMIN — HYDROMORPHONE HYDROCHLORIDE 6 MG: 2 TABLET ORAL at 09:18

## 2018-03-31 RX ADMIN — Medication 0.2 MG: at 01:13

## 2018-03-31 RX ADMIN — SODIUM CHLORIDE, POTASSIUM CHLORIDE, SODIUM LACTATE AND CALCIUM CHLORIDE 1000 ML: 600; 310; 30; 20 INJECTION, SOLUTION INTRAVENOUS at 12:35

## 2018-03-31 RX ADMIN — I.V. FAT EMULSION 250 ML: 20 EMULSION INTRAVENOUS at 21:00

## 2018-03-31 RX ADMIN — ACETAMINOPHEN 650 MG: 325 TABLET, FILM COATED ORAL at 12:32

## 2018-03-31 RX ADMIN — ACETAMINOPHEN 650 MG: 325 TABLET, FILM COATED ORAL at 20:56

## 2018-03-31 RX ADMIN — HYDROMORPHONE HYDROCHLORIDE 6 MG: 2 TABLET ORAL at 05:42

## 2018-03-31 RX ADMIN — HYDROXYZINE HYDROCHLORIDE 25 MG: 25 TABLET ORAL at 17:16

## 2018-03-31 ASSESSMENT — PAIN DESCRIPTION - DESCRIPTORS
DESCRIPTORS: CRAMPING
DESCRIPTORS: CRAMPING
DESCRIPTORS: DISCOMFORT
DESCRIPTORS: DISCOMFORT
DESCRIPTORS: CRAMPING
DESCRIPTORS: DISCOMFORT

## 2018-03-31 NOTE — PLAN OF CARE
Problem: Patient Care Overview  Goal: Plan of Care/Patient Progress Review  Admitted 3/28 for abdominal pain and inability to tolerate orals. Currently managing pain is PO dilaudid 3-6mg q3h, Oxycodone 5mg q3h, Dilaudid IV .2mg q2h and aqua k pad. Attempting to wean pt off of IV dilaudid. NPO. Up ad dania. Ambulating frequently. Voiding but not saving. +PG/+BM (diarrhea, chronic). TPN running @ 75 mL/hr continuous and Lipids @ 20.8 mL/hr. PIV infusing TKO. Denies nausea currently. Plan---- monitor nausea, pain, NPO status, possible dilation on Monday.    Pt would NOT like to be awoken for bedside shift report.

## 2018-03-31 NOTE — PROGRESS NOTES
Surgery Progress Note  3/31/2018    Subjective:  - SAURABH overnight.  - Pain under control but is worried about how to manage this at home. Pt states that she goes to [facility] for her pain mgmt - next appointment is for 4/2. Denies N/V. Ambulating w/o issues.    Objective:  Temp:  [97.2  F (36.2  C)-98  F (36.7  C)] (P) 97.8  F (36.6  C)  Pulse:  [62-72] (P) 72  Heart Rate:  [62-72] 62  Resp:  [15-18] (P) 18  BP: ()/(59-72) (P) 101/80  SpO2:  [93 %-99 %] (P) 99 %  I/O last 3 completed shifts:  In: 2350.84 [I.V.:295]  Out: 1400 [Urine:1400]    Gen: Awake, alert, NAD, conversant  Resp: NLB on RA  Abd: Soft, ND, similar TTP to prior exams  Ext: WWP    BMP  Recent Labs  Lab 03/30/18  1042 03/29/18  1228 03/28/18  2031     --  142   POTASSIUM 3.8  --  3.4   CHLORIDE 110*  --  102   JONATAN 7.5*  --  8.2*   CO2 25  --  32   BUN 12  --  18   CR 0.66  --  0.93   *  --  111*   MAG 1.9 1.7  --    PHOS 2.6 3.4  --        A/P: Rachel A Gerhardt is a 42 year old female with PMH cervical cancer s/p chemo-rads, h/o opiate dependence with prior suboxone use. Subsequently developed SBO and underwent ex-lap with 60cm SB resected in 5/2017 for small bowel strictures. Previously required TPN for malnutrition. Admitted 3/28 for abdominal pain and inability to tolerate orals. CT shows dilated loops of SB with transition point at the anastomosis with fecalization proximal which is consistent with MRE in 3/2018. Albumun 2.7 (3/28). Last prealbumin in 2/2018 was 10.0.  - OK to d/c once at goal for TPN (will reach goal at 8pm)  - D/c all Dilaudid - oxy & Tylenol for pain control  - Continue NPO for now - will need NGT if continues to c/o N/V  - Appreciate GI - will discuss endoscopic dilation on Monday  - Continue TPN  - Dispo: will need home infusion for TPN      D/w chief resident +/- staff.    Maryam Hernandez MD/MPH  Plastic Surgery PGY1

## 2018-03-31 NOTE — PLAN OF CARE
Problem: Patient Care Overview  Goal: Plan of Care/Patient Progress Review  Outcome: No Change  VSS. Up ad dania. Pain managed with po oxycodone and scheduled tylenol. NPO.  Denies nausea. Patient reports having chronic diarrhea. Patient stated she has had several bm's and this is her baseline. Voiding spontaneously. Ambulating halls frequently. TPN infusing via midline. TPN + MIVF= 100 mL/hr. Resting in-between cares. Will continue with current plan of care.

## 2018-03-31 NOTE — PLAN OF CARE
Problem: Patient Care Overview  Goal: Plan of Care/Patient Progress Review  Outcome: No Change  AVSS.  Pt with adequate pain control, sleeping between cares.    0115 - Pt refused PO pain meds (was due anytime), would only take dilaudid IV, even when educated about taking oxycodone first and dilaudid PO for breakthru.  0430 oxycodone x1, 0540 dilaudid PO x1 then patient up in halls.    Aqua-K pad also with relief.  Some nausea, zofran x1.  NPO x meds.  PICC infusing TPN/Lip, PIV at TKO for meds.  Up independently.  Voiding good vols, still with loose stools (chronic diarrhea).  Skin intact.  Cont with POC.  Clarify pain med orders - wean dilaudid IV?  Possible dilation on Monday.  Plan for bedside report.

## 2018-04-01 ENCOUNTER — HOME INFUSION (PRE-WILLOW HOME INFUSION) (OUTPATIENT)
Dept: PHARMACY | Facility: CLINIC | Age: 43
End: 2018-04-01

## 2018-04-01 VITALS
HEIGHT: 69 IN | BODY MASS INDEX: 19.68 KG/M2 | SYSTOLIC BLOOD PRESSURE: 95 MMHG | RESPIRATION RATE: 16 BRPM | WEIGHT: 132.9 LBS | HEART RATE: 72 BPM | TEMPERATURE: 96.8 F | OXYGEN SATURATION: 96 % | DIASTOLIC BLOOD PRESSURE: 69 MMHG

## 2018-04-01 LAB
ANION GAP SERPL CALCULATED.3IONS-SCNC: 9 MMOL/L (ref 3–14)
BUN SERPL-MCNC: 12 MG/DL (ref 7–30)
CALCIUM SERPL-MCNC: 7.9 MG/DL (ref 8.5–10.1)
CHLORIDE SERPL-SCNC: 111 MMOL/L (ref 94–109)
CO2 SERPL-SCNC: 23 MMOL/L (ref 20–32)
CREAT SERPL-MCNC: 0.58 MG/DL (ref 0.52–1.04)
GFR SERPL CREATININE-BSD FRML MDRD: >90 ML/MIN/1.7M2
GLUCOSE BLDC GLUCOMTR-MCNC: 110 MG/DL (ref 70–99)
GLUCOSE SERPL-MCNC: 117 MG/DL (ref 70–99)
MAGNESIUM SERPL-MCNC: 1.8 MG/DL (ref 1.6–2.3)
PHOSPHATE SERPL-MCNC: 2.4 MG/DL (ref 2.5–4.5)
POTASSIUM SERPL-SCNC: 4.2 MMOL/L (ref 3.4–5.3)
SODIUM SERPL-SCNC: 143 MMOL/L (ref 133–144)

## 2018-04-01 PROCEDURE — 84100 ASSAY OF PHOSPHORUS: CPT | Performed by: COLON & RECTAL SURGERY

## 2018-04-01 PROCEDURE — 00000146 ZZHCL STATISTIC GLUCOSE BY METER IP

## 2018-04-01 PROCEDURE — 80048 BASIC METABOLIC PNL TOTAL CA: CPT | Performed by: COLON & RECTAL SURGERY

## 2018-04-01 PROCEDURE — 36592 COLLECT BLOOD FROM PICC: CPT | Performed by: COLON & RECTAL SURGERY

## 2018-04-01 PROCEDURE — 83735 ASSAY OF MAGNESIUM: CPT | Performed by: COLON & RECTAL SURGERY

## 2018-04-01 PROCEDURE — 25000132 ZZH RX MED GY IP 250 OP 250 PS 637: Performed by: STUDENT IN AN ORGANIZED HEALTH CARE EDUCATION/TRAINING PROGRAM

## 2018-04-01 PROCEDURE — 25000132 ZZH RX MED GY IP 250 OP 250 PS 637: Performed by: COLON & RECTAL SURGERY

## 2018-04-01 RX ORDER — OXYCODONE HYDROCHLORIDE 5 MG/1
5-10 TABLET ORAL EVERY 4 HOURS PRN
Qty: 20 TABLET | Refills: 0 | Status: SHIPPED | OUTPATIENT
Start: 2018-04-01 | End: 2018-05-11

## 2018-04-01 RX ORDER — HYDROXYZINE HYDROCHLORIDE 25 MG/1
25 TABLET, FILM COATED ORAL EVERY 6 HOURS PRN
Qty: 8 TABLET | Refills: 0 | Status: SHIPPED | OUTPATIENT
Start: 2018-04-01 | End: 2018-04-03

## 2018-04-01 RX ORDER — OXYCODONE HYDROCHLORIDE 5 MG/1
5-10 TABLET ORAL EVERY 4 HOURS PRN
Status: DISCONTINUED | OUTPATIENT
Start: 2018-04-01 | End: 2018-04-01 | Stop reason: HOSPADM

## 2018-04-01 RX ADMIN — OXYCODONE HYDROCHLORIDE 10 MG: 5 TABLET ORAL at 00:03

## 2018-04-01 RX ADMIN — OXYCODONE HYDROCHLORIDE 10 MG: 5 TABLET ORAL at 08:08

## 2018-04-01 RX ADMIN — OXYCODONE HYDROCHLORIDE 10 MG: 5 TABLET ORAL at 03:50

## 2018-04-01 RX ADMIN — ACETAMINOPHEN 650 MG: 325 TABLET, FILM COATED ORAL at 08:08

## 2018-04-01 RX ADMIN — HYDROXYZINE HYDROCHLORIDE 50 MG: 25 TABLET ORAL at 00:03

## 2018-04-01 ASSESSMENT — PAIN DESCRIPTION - DESCRIPTORS
DESCRIPTORS: CRAMPING
DESCRIPTORS: CRAMPING

## 2018-04-01 NOTE — PLAN OF CARE
Problem: Patient Care Overview  Goal: Plan of Care/Patient Progress Review  Outcome: Adequate for Discharge Date Met: 04/01/18  All Vitals stable. Pain managed with prn oxycodone with desired effect. TPN and lipids infusing. Confirmed with care coordinator on call that all [facility] home services have been set up. According to patient jannie home infusion is scheduled to come to her home this afternoon after discharge.  Required lab draws obtained before discharge. Results in epic. Discharge instructions reviewed and signed with no questions or concerns. Pt Discharged at 1010 hours, and went down to pharmacy to  medications. Father picked her up as her patient. Adequate for discharge.

## 2018-04-01 NOTE — PLAN OF CARE
"Problem: Patient Care Overview  Goal: Plan of Care/Patient Progress Review  Outcome: No Change  VSS. Pain medication initially changed today to Oxycodone 5mg q3h. Due to intolerable pain dose increased to 5-10mg q3h. Refused Gabapentin due to \"room spinning\" in the past with administration. Aqua k pad in use and Tylenol scheduled.  Nausea x 1, relived with Zofran. Up ind. Voiding but not saving. Chronic diarrhea from strictures. NPO. TPN and lipids are infusing into the port. PIV infusing TKO.   Continue POC.      Pt would NOT like to be awoken for bedside shift report.      "

## 2018-04-01 NOTE — PLAN OF CARE
Problem: Patient Care Overview  Goal: Plan of Care/Patient Progress Review  Outcome: No Change  VSS. Pain controlled with PRN Oxy and Atarax. TPN/lipids infusing via PICC. BG checks q6h, last at 0330. NPO. Denies nausea. Up ad dania. Continue to monitor.

## 2018-04-02 ENCOUNTER — HOME INFUSION (PRE-WILLOW HOME INFUSION) (OUTPATIENT)
Dept: PHARMACY | Facility: CLINIC | Age: 43
End: 2018-04-02

## 2018-04-02 LAB
ALBUMIN SERPL-MCNC: 2.8 G/DL (ref 3.4–5)
ALP SERPL-CCNC: 94 U/L (ref 40–150)
ALT SERPL W P-5'-P-CCNC: 43 U/L (ref 0–50)
ANION GAP SERPL CALCULATED.3IONS-SCNC: 11 MMOL/L (ref 3–14)
AST SERPL W P-5'-P-CCNC: 65 U/L (ref 0–45)
BASOPHILS # BLD AUTO: 0 10E9/L (ref 0–0.2)
BASOPHILS NFR BLD AUTO: 0.1 %
BILIRUB DIRECT SERPL-MCNC: <0.1 MG/DL (ref 0–0.2)
BILIRUB SERPL-MCNC: 0.3 MG/DL (ref 0.2–1.3)
BUN SERPL-MCNC: 19 MG/DL (ref 7–30)
CALCIUM SERPL-MCNC: 8.7 MG/DL (ref 8.5–10.1)
CHLORIDE SERPL-SCNC: 104 MMOL/L (ref 94–109)
CO2 SERPL-SCNC: 25 MMOL/L (ref 20–32)
CREAT SERPL-MCNC: 0.56 MG/DL (ref 0.52–1.04)
DIFFERENTIAL METHOD BLD: ABNORMAL
EOSINOPHIL # BLD AUTO: 0 10E9/L (ref 0–0.7)
EOSINOPHIL NFR BLD AUTO: 0.4 %
ERYTHROCYTE [DISTWIDTH] IN BLOOD BY AUTOMATED COUNT: 15.9 % (ref 10–15)
GFR SERPL CREATININE-BSD FRML MDRD: >90 ML/MIN/1.7M2
GLUCOSE SERPL-MCNC: 129 MG/DL (ref 70–99)
HCT VFR BLD AUTO: 40.3 % (ref 35–47)
HGB BLD-MCNC: 13.1 G/DL (ref 11.7–15.7)
IMM GRANULOCYTES # BLD: 0 10E9/L (ref 0–0.4)
IMM GRANULOCYTES NFR BLD: 0.6 %
LYMPHOCYTES # BLD AUTO: 1.6 10E9/L (ref 0.8–5.3)
LYMPHOCYTES NFR BLD AUTO: 22.9 %
MAGNESIUM SERPL-MCNC: 2.2 MG/DL (ref 1.6–2.3)
MCH RBC QN AUTO: 32.2 PG (ref 26.5–33)
MCHC RBC AUTO-ENTMCNC: 32.5 G/DL (ref 31.5–36.5)
MCV RBC AUTO: 99 FL (ref 78–100)
MONOCYTES # BLD AUTO: 0.5 10E9/L (ref 0–1.3)
MONOCYTES NFR BLD AUTO: 7.6 %
NEUTROPHILS # BLD AUTO: 4.7 10E9/L (ref 1.6–8.3)
NEUTROPHILS NFR BLD AUTO: 68.4 %
NRBC # BLD AUTO: 0 10*3/UL
NRBC BLD AUTO-RTO: 0 /100
PHOSPHATE SERPL-MCNC: 3.8 MG/DL (ref 2.5–4.5)
PLATELET # BLD AUTO: 302 10E9/L (ref 150–450)
POTASSIUM SERPL-SCNC: 4.7 MMOL/L (ref 3.4–5.3)
PREALB SERPL IA-MCNC: 19 MG/DL (ref 15–45)
PROT SERPL-MCNC: 6.8 G/DL (ref 6.8–8.8)
RBC # BLD AUTO: 4.07 10E12/L (ref 3.8–5.2)
SODIUM SERPL-SCNC: 140 MMOL/L (ref 133–144)
TRIGL SERPL-MCNC: 161 MG/DL
WBC # BLD AUTO: 6.8 10E9/L (ref 4–11)

## 2018-04-02 PROCEDURE — 83735 ASSAY OF MAGNESIUM: CPT | Performed by: COLON & RECTAL SURGERY

## 2018-04-02 PROCEDURE — 85025 COMPLETE CBC W/AUTO DIFF WBC: CPT | Performed by: COLON & RECTAL SURGERY

## 2018-04-02 PROCEDURE — 82248 BILIRUBIN DIRECT: CPT | Performed by: COLON & RECTAL SURGERY

## 2018-04-02 PROCEDURE — 80053 COMPREHEN METABOLIC PANEL: CPT | Performed by: COLON & RECTAL SURGERY

## 2018-04-02 PROCEDURE — 84478 ASSAY OF TRIGLYCERIDES: CPT | Performed by: COLON & RECTAL SURGERY

## 2018-04-02 PROCEDURE — 84100 ASSAY OF PHOSPHORUS: CPT | Performed by: COLON & RECTAL SURGERY

## 2018-04-02 PROCEDURE — 84134 ASSAY OF PREALBUMIN: CPT | Performed by: COLON & RECTAL SURGERY

## 2018-04-02 NOTE — PROGRESS NOTES
This is a recent snapshot of the patient's [facility] medical record.  For current drug dose and complete information and questions, call [phone] or In Basket pool, fv home infusion ([id number])  CSN Number:  [id number]

## 2018-04-03 ENCOUNTER — HOME INFUSION (PRE-WILLOW HOME INFUSION) (OUTPATIENT)
Dept: PHARMACY | Facility: CLINIC | Age: 43
End: 2018-04-03

## 2018-04-03 NOTE — PROGRESS NOTES
This is a recent snapshot of the patient's [facility] medical record.  For current drug dose and complete information and questions, call [phone] or In Basket pool, fv home infusion ([id number])  CSN Number:  [id number]

## 2018-04-04 ENCOUNTER — HOME INFUSION (PRE-WILLOW HOME INFUSION) (OUTPATIENT)
Dept: PHARMACY | Facility: CLINIC | Age: 43
End: 2018-04-04

## 2018-04-04 ENCOUNTER — TELEPHONE (OUTPATIENT)
Dept: GASTROENTEROLOGY | Facility: CLINIC | Age: 43
End: 2018-04-04

## 2018-04-04 NOTE — TELEPHONE ENCOUNTER
eJnni called Surgery clinic for Dr. Moore.  She reports she thought she was going to hear from Dr. Moore earlier this week regarding dilation procedure.  She reports Dr. Nix just spoke with her and Jenni reports she informed Dr. Nix that she still believes she needs the dilation procedure.  Her symptoms were occurring prior to hospitalization and narcotic use.  She believes Dr. Nix with present this to the team and get back to her regarding procedure.

## 2018-04-04 NOTE — PROGRESS NOTES
This is a recent snapshot of the patient's [facility] medical record.  For current drug dose and complete information and questions, call [phone] or In Basket pool, fv home infusion ([id number])  CSN Number:  [id number]

## 2018-04-04 NOTE — TELEPHONE ENCOUNTER
Called patient back at [phone] and talked to patient.     Patient reports continue having abdominal pain with nausea/vomting and PO intolerance. Symptoms has unchanged since discharge.     Discussed patient's case with Dr. Vigil,  recommend to proceed with dilation.     Message was sent to GI staffs to schedule dilation with Dr. Vigil as soon as possible.    Called patient back again and left VM to update her that she will expect a call from GI clinic regarding outpatient procedure scheduling.     Sandro Nix  GI fellow  p [phone]

## 2018-04-04 NOTE — TELEPHONE ENCOUNTER
Called patient at [phone]      Patient is a 43 yo F with h/o cervical cancer s/p chemo-radiation, h/o opiate dependence with prior suboxone use, developed SBP s/p ex lap with 60 cm SB resected in 5/2017 for small bowel stricture, previously required TPN for malnutrition, admitted 3/28/18 for acute on chronic abdominal pain and PO intolerance. CT showed dilated loops of SB with transition point at the anastomosis with fecalization proximal which is consistent with MRE in 3/2018.  Patient improved with supportive care and NG suction and discharged home.     Discussed patient's case with Dr. Vigil for possible dilation, and recommended to monitor patient's symptoms, as patient's symptoms could relate to narcotic use if improve.     Called patient today at  [phone]. Patient did not answer the phone. Message and call back number left to voice message.     Sandro Nix  GI fellow  p [phone]

## 2018-04-05 ENCOUNTER — HOME INFUSION (PRE-WILLOW HOME INFUSION) (OUTPATIENT)
Dept: PHARMACY | Facility: CLINIC | Age: 43
End: 2018-04-05

## 2018-04-05 NOTE — PROGRESS NOTES
This is a recent snapshot of the patient's [facility] medical record.  For current drug dose and complete information and questions, call [phone] or In Basket pool, fv home infusion ([id number])  CSN Number:  [id number]

## 2018-04-06 DIAGNOSIS — K56.699 COLONIC STRICTURE (H): Primary | ICD-10-CM

## 2018-04-06 NOTE — PROGRESS NOTES
This is a recent snapshot of the patient's [facility] medical record.  For current drug dose and complete information and questions, call [phone] or In Basket pool, fv home infusion ([id number])  CSN Number:  [id number]

## 2018-04-09 ENCOUNTER — HOME INFUSION (PRE-WILLOW HOME INFUSION) (OUTPATIENT)
Dept: PHARMACY | Facility: CLINIC | Age: 43
End: 2018-04-09

## 2018-04-10 ENCOUNTER — HOME INFUSION (PRE-WILLOW HOME INFUSION) (OUTPATIENT)
Dept: PHARMACY | Facility: CLINIC | Age: 43
End: 2018-04-10

## 2018-04-10 NOTE — PROGRESS NOTES
This is a recent snapshot of the patient's [facility] medical record.  For current drug dose and complete information and questions, call [phone] or In [facility] pool, fv home infusion ([id number])  CSN Number:  [id number]

## 2018-04-11 NOTE — PROGRESS NOTES
This is a recent snapshot of the patient's [facility] medical record.  For current drug dose and complete information and questions, call [phone] or In [facility] pool, fv home infusion ([id number])  CSN Number:  [id number]

## 2018-04-12 ENCOUNTER — HOME INFUSION (PRE-WILLOW HOME INFUSION) (OUTPATIENT)
Dept: PHARMACY | Facility: CLINIC | Age: 43
End: 2018-04-12

## 2018-04-13 ENCOUNTER — TELEPHONE (OUTPATIENT)
Dept: SURGERY | Facility: CLINIC | Age: 43
End: 2018-04-13

## 2018-04-13 ENCOUNTER — HOME INFUSION (PRE-WILLOW HOME INFUSION) (OUTPATIENT)
Dept: PHARMACY | Facility: CLINIC | Age: 43
End: 2018-04-13

## 2018-04-13 NOTE — TELEPHONE ENCOUNTER
This RN received a call from Jennifer Reagan, a pharmacist from [facility] concerning this patient. [facility] has been trying to contact the patient for follow up labs to continue the home TPN and the patient has not returned any of the calls. Jennifer reports that messages have been left on the patients phone, the mothers phone and the fathers phone stating that she needs to have follow up labs otherwise the TPN will not continue. There has been no response from the patient nor the emergency family contacts. Jennifer Reagan was calling to inform the clinic that the patient has enough TPN for tonight and tomorrow but if [facility] does not receive contact from the patient today she will no longer be able to to receive TPN. This writer will inform Dr. Moore.    fall precautions

## 2018-04-13 NOTE — PROGRESS NOTES
This is a recent snapshot of the patient's [facility] medical record.  For current drug dose and complete information and questions, call [phone] or In Basket pool, fv home infusion ([id number])  CSN Number:  [id number]

## 2018-04-16 ENCOUNTER — HOME INFUSION (PRE-WILLOW HOME INFUSION) (OUTPATIENT)
Dept: PHARMACY | Facility: CLINIC | Age: 43
End: 2018-04-16

## 2018-04-16 LAB
ALBUMIN SERPL-MCNC: 3.3 G/DL (ref 3.4–5)
ALP SERPL-CCNC: 77 U/L (ref 40–150)
ALT SERPL W P-5'-P-CCNC: 17 U/L (ref 0–50)
ANION GAP SERPL CALCULATED.3IONS-SCNC: 11 MMOL/L (ref 3–14)
AST SERPL W P-5'-P-CCNC: 13 U/L (ref 0–45)
BASOPHILS # BLD AUTO: 0 10E9/L (ref 0–0.2)
BASOPHILS NFR BLD AUTO: 0.3 %
BILIRUB DIRECT SERPL-MCNC: 0.1 MG/DL (ref 0–0.2)
BILIRUB SERPL-MCNC: 0.5 MG/DL (ref 0.2–1.3)
BUN SERPL-MCNC: 20 MG/DL (ref 7–30)
CALCIUM SERPL-MCNC: 8.6 MG/DL (ref 8.5–10.1)
CHLORIDE SERPL-SCNC: 96 MMOL/L (ref 94–109)
CO2 SERPL-SCNC: 33 MMOL/L (ref 20–32)
CREAT SERPL-MCNC: 0.77 MG/DL (ref 0.52–1.04)
DIFFERENTIAL METHOD BLD: ABNORMAL
EOSINOPHIL # BLD AUTO: 0.1 10E9/L (ref 0–0.7)
EOSINOPHIL NFR BLD AUTO: 1.2 %
ERYTHROCYTE [DISTWIDTH] IN BLOOD BY AUTOMATED COUNT: 13.6 % (ref 10–15)
GFR SERPL CREATININE-BSD FRML MDRD: 81 ML/MIN/1.7M2
GLUCOSE SERPL-MCNC: 96 MG/DL (ref 70–99)
HCT VFR BLD AUTO: 37.2 % (ref 35–47)
HGB BLD-MCNC: 12.5 G/DL (ref 11.7–15.7)
IMM GRANULOCYTES # BLD: 0.1 10E9/L (ref 0–0.4)
IMM GRANULOCYTES NFR BLD: 0.9 %
LYMPHOCYTES # BLD AUTO: 1.9 10E9/L (ref 0.8–5.3)
LYMPHOCYTES NFR BLD AUTO: 29 %
MAGNESIUM SERPL-MCNC: 1.7 MG/DL (ref 1.6–2.3)
MCH RBC QN AUTO: 33.3 PG (ref 26.5–33)
MCHC RBC AUTO-ENTMCNC: 33.6 G/DL (ref 31.5–36.5)
MCV RBC AUTO: 99 FL (ref 78–100)
MONOCYTES # BLD AUTO: 0.6 10E9/L (ref 0–1.3)
MONOCYTES NFR BLD AUTO: 9.2 %
NEUTROPHILS # BLD AUTO: 3.8 10E9/L (ref 1.6–8.3)
NEUTROPHILS NFR BLD AUTO: 59.4 %
NRBC # BLD AUTO: 0 10*3/UL
NRBC BLD AUTO-RTO: 0 /100
PHOSPHATE SERPL-MCNC: 3.8 MG/DL (ref 2.5–4.5)
PLATELET # BLD AUTO: 321 10E9/L (ref 150–450)
POTASSIUM SERPL-SCNC: 3.2 MMOL/L (ref 3.4–5.3)
PREALB SERPL IA-MCNC: 16 MG/DL (ref 15–45)
PROT SERPL-MCNC: 7.1 G/DL (ref 6.8–8.8)
RBC # BLD AUTO: 3.75 10E12/L (ref 3.8–5.2)
SODIUM SERPL-SCNC: 140 MMOL/L (ref 133–144)
TRIGL SERPL-MCNC: 137 MG/DL
WBC # BLD AUTO: 6.4 10E9/L (ref 4–11)

## 2018-04-16 PROCEDURE — 82248 BILIRUBIN DIRECT: CPT | Performed by: COLON & RECTAL SURGERY

## 2018-04-16 PROCEDURE — 84478 ASSAY OF TRIGLYCERIDES: CPT | Performed by: COLON & RECTAL SURGERY

## 2018-04-16 PROCEDURE — 83735 ASSAY OF MAGNESIUM: CPT | Performed by: COLON & RECTAL SURGERY

## 2018-04-16 PROCEDURE — 84134 ASSAY OF PREALBUMIN: CPT | Performed by: COLON & RECTAL SURGERY

## 2018-04-16 PROCEDURE — 84100 ASSAY OF PHOSPHORUS: CPT | Performed by: COLON & RECTAL SURGERY

## 2018-04-16 PROCEDURE — 85025 COMPLETE CBC W/AUTO DIFF WBC: CPT | Performed by: COLON & RECTAL SURGERY

## 2018-04-16 PROCEDURE — 80053 COMPREHEN METABOLIC PANEL: CPT | Performed by: COLON & RECTAL SURGERY

## 2018-04-17 ENCOUNTER — HOME INFUSION (PRE-WILLOW HOME INFUSION) (OUTPATIENT)
Dept: PHARMACY | Facility: CLINIC | Age: 43
End: 2018-04-17

## 2018-04-17 NOTE — PROGRESS NOTES
This is a recent snapshot of the patient's [facility] medical record.  For current drug dose and complete information and questions, call [phone] or In Basket pool, fv home infusion ([id number])  CSN Number:  [id number]

## 2018-04-18 ENCOUNTER — HOME INFUSION (PRE-WILLOW HOME INFUSION) (OUTPATIENT)
Dept: PHARMACY | Facility: CLINIC | Age: 43
End: 2018-04-18

## 2018-04-18 NOTE — PROGRESS NOTES
This is a recent snapshot of the patient's [facility] medical record.  For current drug dose and complete information and questions, call [phone] or In Basket pool, fv home infusion ([id number])  CSN Number:  [id number]

## 2018-04-19 ENCOUNTER — HOME INFUSION (PRE-WILLOW HOME INFUSION) (OUTPATIENT)
Dept: PHARMACY | Facility: CLINIC | Age: 43
End: 2018-04-19

## 2018-04-19 NOTE — PROGRESS NOTES
This is a recent snapshot of the patient's [facility] medical record.  For current drug dose and complete information and questions, call [phone] or In [facility] pool, fv home infusion ([id number])  CSN Number:  [id number]

## 2018-04-19 NOTE — PROGRESS NOTES
This is a recent snapshot of the patient's [facility] medical record.  For current drug dose and complete information and questions, call [phone] or In Basket pool, fv home infusion ([id number])  CSN Number:  [id number]

## 2018-04-20 NOTE — PROGRESS NOTES
This is a recent snapshot of the patient's [facility] medical record.  For current drug dose and complete information and questions, call [phone] or In Basket pool, fv home infusion ([id number])  CSN Number:  [id number]

## 2018-04-23 ENCOUNTER — HOME INFUSION (PRE-WILLOW HOME INFUSION) (OUTPATIENT)
Dept: PHARMACY | Facility: CLINIC | Age: 43
End: 2018-04-23

## 2018-04-23 LAB
ALBUMIN SERPL-MCNC: 2.9 G/DL (ref 3.4–5)
ALP SERPL-CCNC: 87 U/L (ref 40–150)
ALT SERPL W P-5'-P-CCNC: 26 U/L (ref 0–50)
ANION GAP SERPL CALCULATED.3IONS-SCNC: 12 MMOL/L (ref 3–14)
AST SERPL W P-5'-P-CCNC: 19 U/L (ref 0–45)
BASOPHILS # BLD AUTO: 0 10E9/L (ref 0–0.2)
BASOPHILS NFR BLD AUTO: 0.6 %
BILIRUB DIRECT SERPL-MCNC: 0.2 MG/DL (ref 0–0.2)
BILIRUB SERPL-MCNC: 0.7 MG/DL (ref 0.2–1.3)
BUN SERPL-MCNC: 16 MG/DL (ref 7–30)
CALCIUM SERPL-MCNC: 8.7 MG/DL (ref 8.5–10.1)
CHLORIDE SERPL-SCNC: 94 MMOL/L (ref 94–109)
CO2 SERPL-SCNC: 32 MMOL/L (ref 20–32)
CREAT SERPL-MCNC: 0.71 MG/DL (ref 0.52–1.04)
DIFFERENTIAL METHOD BLD: NORMAL
EOSINOPHIL # BLD AUTO: 0 10E9/L (ref 0–0.7)
EOSINOPHIL NFR BLD AUTO: 0.8 %
ERYTHROCYTE [DISTWIDTH] IN BLOOD BY AUTOMATED COUNT: 13.1 % (ref 10–15)
GFR SERPL CREATININE-BSD FRML MDRD: 90 ML/MIN/1.7M2
GLUCOSE SERPL-MCNC: 67 MG/DL (ref 70–99)
HCT VFR BLD AUTO: 40.9 % (ref 35–47)
HGB BLD-MCNC: 13.5 G/DL (ref 11.7–15.7)
IMM GRANULOCYTES # BLD: 0 10E9/L (ref 0–0.4)
IMM GRANULOCYTES NFR BLD: 0.4 %
LYMPHOCYTES # BLD AUTO: 2.1 10E9/L (ref 0.8–5.3)
LYMPHOCYTES NFR BLD AUTO: 41.6 %
MAGNESIUM SERPL-MCNC: 2.1 MG/DL (ref 1.6–2.3)
MCH RBC QN AUTO: 32.3 PG (ref 26.5–33)
MCHC RBC AUTO-ENTMCNC: 33 G/DL (ref 31.5–36.5)
MCV RBC AUTO: 98 FL (ref 78–100)
MONOCYTES # BLD AUTO: 0.5 10E9/L (ref 0–1.3)
MONOCYTES NFR BLD AUTO: 11 %
NEUTROPHILS # BLD AUTO: 2.3 10E9/L (ref 1.6–8.3)
NEUTROPHILS NFR BLD AUTO: 45.6 %
NRBC # BLD AUTO: 0 10*3/UL
NRBC BLD AUTO-RTO: 0 /100
PHOSPHATE SERPL-MCNC: 3.1 MG/DL (ref 2.5–4.5)
PLATELET # BLD AUTO: 346 10E9/L (ref 150–450)
POTASSIUM SERPL-SCNC: 3.2 MMOL/L (ref 3.4–5.3)
PREALB SERPL IA-MCNC: 14 MG/DL (ref 15–45)
PROT SERPL-MCNC: 6.7 G/DL (ref 6.8–8.8)
RBC # BLD AUTO: 4.18 10E12/L (ref 3.8–5.2)
SODIUM SERPL-SCNC: 138 MMOL/L (ref 133–144)
TRIGL SERPL-MCNC: 135 MG/DL
WBC # BLD AUTO: 4.9 10E9/L (ref 4–11)

## 2018-04-23 PROCEDURE — 85025 COMPLETE CBC W/AUTO DIFF WBC: CPT | Performed by: COLON & RECTAL SURGERY

## 2018-04-23 PROCEDURE — 80053 COMPREHEN METABOLIC PANEL: CPT | Performed by: COLON & RECTAL SURGERY

## 2018-04-23 PROCEDURE — 83735 ASSAY OF MAGNESIUM: CPT | Performed by: COLON & RECTAL SURGERY

## 2018-04-23 PROCEDURE — 82248 BILIRUBIN DIRECT: CPT | Performed by: COLON & RECTAL SURGERY

## 2018-04-23 PROCEDURE — 84134 ASSAY OF PREALBUMIN: CPT | Performed by: COLON & RECTAL SURGERY

## 2018-04-23 PROCEDURE — 84100 ASSAY OF PHOSPHORUS: CPT | Performed by: COLON & RECTAL SURGERY

## 2018-04-23 PROCEDURE — 84478 ASSAY OF TRIGLYCERIDES: CPT | Performed by: COLON & RECTAL SURGERY

## 2018-04-24 ENCOUNTER — HOSPITAL ENCOUNTER (OUTPATIENT)
Facility: CLINIC | Age: 43
Discharge: HOME | End: 2018-04-24
Attending: INTERNAL MEDICINE | Admitting: INTERNAL MEDICINE
Payer: COMMERCIAL

## 2018-04-24 ENCOUNTER — SURGERY (OUTPATIENT)
Age: 43
End: 2018-04-24

## 2018-04-24 ENCOUNTER — ANESTHESIA EVENT (OUTPATIENT)
Dept: SURGERY | Facility: CLINIC | Age: 43
End: 2018-04-24
Payer: COMMERCIAL

## 2018-04-24 ENCOUNTER — ANESTHESIA (OUTPATIENT)
Dept: SURGERY | Facility: CLINIC | Age: 43
End: 2018-04-24
Payer: COMMERCIAL

## 2018-04-24 ENCOUNTER — HOME INFUSION (PRE-WILLOW HOME INFUSION) (OUTPATIENT)
Dept: PHARMACY | Facility: CLINIC | Age: 43
End: 2018-04-24

## 2018-04-24 VITALS
WEIGHT: 122.8 LBS | SYSTOLIC BLOOD PRESSURE: 99 MMHG | OXYGEN SATURATION: 97 % | DIASTOLIC BLOOD PRESSURE: 78 MMHG | BODY MASS INDEX: 18.19 KG/M2 | TEMPERATURE: 98.3 F | HEIGHT: 69 IN

## 2018-04-24 LAB
GLUCOSE BLDC GLUCOMTR-MCNC: 89 MG/DL (ref 70–99)
HCG UR QL: NEGATIVE

## 2018-04-24 PROCEDURE — 40000882 ZZH CANCELLED SURGERY UP TO 46-60 MINS: Performed by: INTERNAL MEDICINE

## 2018-04-24 PROCEDURE — 81025 URINE PREGNANCY TEST: CPT | Performed by: ANESTHESIOLOGY

## 2018-04-24 PROCEDURE — 82962 GLUCOSE BLOOD TEST: CPT

## 2018-04-24 RX ORDER — SODIUM CHLORIDE, SODIUM LACTATE, POTASSIUM CHLORIDE, CALCIUM CHLORIDE 600; 310; 30; 20 MG/100ML; MG/100ML; MG/100ML; MG/100ML
INJECTION, SOLUTION INTRAVENOUS CONTINUOUS
Status: CANCELLED | OUTPATIENT
Start: 2018-04-24

## 2018-04-24 RX ORDER — ONDANSETRON 4 MG/1
4 TABLET, ORALLY DISINTEGRATING ORAL EVERY 30 MIN PRN
Status: CANCELLED | OUTPATIENT
Start: 2018-04-24

## 2018-04-24 RX ORDER — LIDOCAINE 40 MG/G
CREAM TOPICAL
Status: DISCONTINUED | OUTPATIENT
Start: 2018-04-24 | End: 2018-04-24 | Stop reason: HOSPADM

## 2018-04-24 RX ORDER — MEPERIDINE HYDROCHLORIDE 25 MG/ML
12.5 INJECTION INTRAMUSCULAR; INTRAVENOUS; SUBCUTANEOUS
Status: CANCELLED | OUTPATIENT
Start: 2018-04-24

## 2018-04-24 RX ORDER — ONDANSETRON 2 MG/ML
4 INJECTION INTRAMUSCULAR; INTRAVENOUS EVERY 30 MIN PRN
Status: CANCELLED | OUTPATIENT
Start: 2018-04-24

## 2018-04-24 RX ORDER — FENTANYL CITRATE 50 UG/ML
25-50 INJECTION, SOLUTION INTRAMUSCULAR; INTRAVENOUS
Status: CANCELLED | OUTPATIENT
Start: 2018-04-24

## 2018-04-24 RX ORDER — ONDANSETRON 2 MG/ML
4 INJECTION INTRAMUSCULAR; INTRAVENOUS
Status: DISCONTINUED | OUTPATIENT
Start: 2018-04-24 | End: 2018-04-24 | Stop reason: HOSPADM

## 2018-04-24 RX ORDER — NALOXONE HYDROCHLORIDE 0.4 MG/ML
.1-.4 INJECTION, SOLUTION INTRAMUSCULAR; INTRAVENOUS; SUBCUTANEOUS
Status: CANCELLED | OUTPATIENT
Start: 2018-04-24 | End: 2018-04-25

## 2018-04-24 ASSESSMENT — LIFESTYLE VARIABLES: TOBACCO_USE: 1

## 2018-04-24 NOTE — OR NURSING
Dr. CHAITANYA Vigil here to see Pt. Informed that Pt has reported she did not have any instructions to do a bowel prep.

## 2018-04-24 NOTE — ANESTHESIA PREPROCEDURE EVALUATION
Anesthesia Evaluation     . Pt has had prior anesthetic. Type: General and MAC    No history of anesthetic complications          ROS/MED HX    ENT/Pulmonary:     (+)tobacco use, Current use Intermittent asthma , . .    Neurologic:  - neg neurologic ROS     Cardiovascular:  - neg cardiovascular ROS   (+) ----. : . . . :. . Previous cardiac testing date:results:date: results:ECG reviewed date: results:NSR date: results:          METS/Exercise Tolerance:  >4 METS   Hematologic:  - neg hematologic  ROS       Musculoskeletal:  - neg musculoskeletal ROS       GI/Hepatic:     (+) Other GI/Hepatic Hx abd XRT for cerv ca & subsequent SBO s/p rsx now w/ n/v FTT d/t recurrent anastamotic stricture      Renal/Genitourinary:  - ROS Renal section negative       Endo:  - neg endo ROS       Psychiatric:     (+) psychiatric history other (comment) (History of drug abuse)      Infectious Disease:  - neg infectious disease ROS       Malignancy:   (+) Malignancy History of Other  Other CA cervical ca Remission status post Surgery, Chemo and Radiation         Other:                     Physical Exam  Normal systems: cardiovascular, pulmonary and dental    Airway   Mallampati: II  TM distance: >3 FB  Neck ROM: full    Dental     Cardiovascular   Rhythm and rate: regular and normal      Pulmonary    breath sounds clear to auscultation(-) no wheezes and no rales                    Anesthesia Plan      History & Physical Review  History and physical reviewed and following examination; no interval change.    ASA Status:  3 .    NPO Status:  > 8 hours    Plan for MAC (GA as back up) with Intravenous induction. Maintenance will be TIVA.  Reason for MAC:  Deep or markedly invasive procedure (G8), Extreme anxiety (QS) and Difficulty with conscious sedation (QS)  PONV prophylaxis:  Ondansetron (or other 5HT-3), Dexamethasone or Solumedrol, Scopolamine patch and Other (See comment) (propofol on induction. Opioid-sparing technique.)  No emesis  in the past 24 hrs and s/p bowel prep. Okay for MAC with GA as back up.       Postoperative Care  Postoperative pain management:  Multi-modal analgesia, IV analgesics and Oral pain medications.      Consents  Anesthetic plan, risks, benefits and alternatives discussed with:  Patient..                          .

## 2018-04-24 NOTE — OR NURSING
OR procedure cancelled by Dr. Vigil.  Dr. Vigil's schedule will be contacting Pt. Later today.  Face to face time 60 minutes.

## 2018-04-24 NOTE — PROGRESS NOTES
This is a recent snapshot of the patient's [facility] medical record.  For current drug dose and complete information and questions, call [phone] or In Basket pool, fv home infusion ([id number])  CSN Number:  [id number]

## 2018-04-26 ENCOUNTER — TELEPHONE (OUTPATIENT)
Dept: SURGERY | Facility: CLINIC | Age: 43
End: 2018-04-26

## 2018-04-26 ENCOUNTER — TELEPHONE (OUTPATIENT)
Dept: GASTROENTEROLOGY | Facility: CLINIC | Age: 43
End: 2018-04-26

## 2018-04-26 NOTE — PROGRESS NOTES
This is a recent snapshot of the patient's [facility] medical record.  For current drug dose and complete information and questions, call [phone] or In Basket pool, fv home infusion ([id number])  CSN Number:  [id number]

## 2018-04-26 NOTE — TELEPHONE ENCOUNTER
Patient called upset about her procedure having to be canceled and rescheduled. She is very concerned that she has to wait at least one month to get on the schedule again because this was not her mistake, That she did not know about the fleets enemas. This RN spoke to the endoscopy  and she informed this RN that she was going to call the patient later today and inform her that she will be able to have her procedure next week. A date and time will be given to the patient.  Later today. This RN did go over her bowel preporation for the procedure. Patient is suppose to give herself two fleets enemas  two hours before the procedure. Instructions were given to the patient on the proper technique to give a fleets enema. Patient verbalized understanding.

## 2018-04-26 NOTE — TELEPHONE ENCOUNTER
Jenni is informed that she is scheduled on 04/30/2018 at 3 PM with an arrival time of 1 PM for a Colonoscopy procedure.   Patient went over her prep with me, advised that I am not a nurse but I consulted with one of the nurses from the Colon-Rectal team, Joellen and she confirmed that pt had the correct prep.   Location of procedure confirmed with pt.    SR 04/26/2018 @ 332p

## 2018-04-28 ENCOUNTER — HOME INFUSION (PRE-WILLOW HOME INFUSION) (OUTPATIENT)
Dept: PHARMACY | Facility: CLINIC | Age: 43
End: 2018-04-28

## 2018-04-30 ENCOUNTER — SURGERY (OUTPATIENT)
Age: 43
End: 2018-04-30

## 2018-04-30 ENCOUNTER — HOSPITAL ENCOUNTER (OUTPATIENT)
Facility: CLINIC | Age: 43
Discharge: HOME | End: 2018-04-30
Attending: INTERNAL MEDICINE | Admitting: INTERNAL MEDICINE
Payer: COMMERCIAL

## 2018-04-30 ENCOUNTER — APPOINTMENT (OUTPATIENT)
Dept: GENERAL RADIOLOGY | Facility: CLINIC | Age: 43
End: 2018-04-30
Attending: INTERNAL MEDICINE
Payer: COMMERCIAL

## 2018-04-30 VITALS
RESPIRATION RATE: 16 BRPM | TEMPERATURE: 97.8 F | HEIGHT: 69 IN | SYSTOLIC BLOOD PRESSURE: 95 MMHG | WEIGHT: 122.14 LBS | BODY MASS INDEX: 18.09 KG/M2 | OXYGEN SATURATION: 100 % | DIASTOLIC BLOOD PRESSURE: 78 MMHG

## 2018-04-30 DIAGNOSIS — K56.699 SMALL BOWEL STRICTURE (H): Primary | ICD-10-CM

## 2018-04-30 LAB
COLONOSCOPY: NORMAL
GLUCOSE BLDC GLUCOMTR-MCNC: 112 MG/DL (ref 70–99)
HCG UR QL: NEGATIVE

## 2018-04-30 PROCEDURE — 25000132 ZZH RX MED GY IP 250 OP 250 PS 637: Performed by: ANESTHESIOLOGY

## 2018-04-30 PROCEDURE — 25000125 ZZHC RX 250: Performed by: INTERNAL MEDICINE

## 2018-04-30 PROCEDURE — 81025 URINE PREGNANCY TEST: CPT | Performed by: ANESTHESIOLOGY

## 2018-04-30 PROCEDURE — 25000128 H RX IP 250 OP 636: Performed by: ANESTHESIOLOGY

## 2018-04-30 PROCEDURE — 71000027 ZZH RECOVERY PHASE 2 EACH 15 MINS: Performed by: INTERNAL MEDICINE

## 2018-04-30 PROCEDURE — 40000277 XR SURGERY CARM FLUORO LESS THAN 5 MIN W STILLS: Mod: TC

## 2018-04-30 PROCEDURE — 37000009 ZZH ANESTHESIA TECHNICAL FEE, EACH ADDTL 15 MIN: Performed by: INTERNAL MEDICINE

## 2018-04-30 PROCEDURE — 36000059 ZZH SURGERY LEVEL 3 EA 15 ADDTL MIN UMMC: Performed by: INTERNAL MEDICINE

## 2018-04-30 PROCEDURE — 27210794 ZZH OR GENERAL SUPPLY STERILE: Performed by: INTERNAL MEDICINE

## 2018-04-30 PROCEDURE — 25000125 ZZHC RX 250: Performed by: NURSE ANESTHETIST, CERTIFIED REGISTERED

## 2018-04-30 PROCEDURE — 37000008 ZZH ANESTHESIA TECHNICAL FEE, 1ST 30 MIN: Performed by: INTERNAL MEDICINE

## 2018-04-30 PROCEDURE — 36000057 ZZH SURGERY LEVEL 3 1ST 30 MIN - UMMC: Performed by: INTERNAL MEDICINE

## 2018-04-30 PROCEDURE — 40000170 ZZH STATISTIC PRE-PROCEDURE ASSESSMENT II: Performed by: INTERNAL MEDICINE

## 2018-04-30 PROCEDURE — 25000125 ZZHC RX 250: Performed by: ANESTHESIOLOGY

## 2018-04-30 PROCEDURE — 27210995 ZZH RX 272: Performed by: INTERNAL MEDICINE

## 2018-04-30 PROCEDURE — 25000128 H RX IP 250 OP 636: Performed by: NURSE ANESTHETIST, CERTIFIED REGISTERED

## 2018-04-30 PROCEDURE — 82962 GLUCOSE BLOOD TEST: CPT

## 2018-04-30 RX ORDER — ONDANSETRON 2 MG/ML
4 INJECTION INTRAMUSCULAR; INTRAVENOUS EVERY 30 MIN PRN
Status: DISCONTINUED | OUTPATIENT
Start: 2018-04-30 | End: 2018-04-30 | Stop reason: HOSPADM

## 2018-04-30 RX ORDER — ACETAMINOPHEN 650 MG/1
650 SUPPOSITORY RECTAL EVERY 4 HOURS PRN
Status: DISCONTINUED | OUTPATIENT
Start: 2018-04-30 | End: 2018-04-30 | Stop reason: HOSPADM

## 2018-04-30 RX ORDER — GABAPENTIN 300 MG/1
300 CAPSULE ORAL ONCE
Status: DISCONTINUED | OUTPATIENT
Start: 2018-04-30 | End: 2018-04-30 | Stop reason: HOSPADM

## 2018-04-30 RX ORDER — SODIUM CHLORIDE, SODIUM LACTATE, POTASSIUM CHLORIDE, CALCIUM CHLORIDE 600; 310; 30; 20 MG/100ML; MG/100ML; MG/100ML; MG/100ML
INJECTION, SOLUTION INTRAVENOUS CONTINUOUS PRN
Status: DISCONTINUED | OUTPATIENT
Start: 2018-04-30 | End: 2018-04-30

## 2018-04-30 RX ORDER — NALOXONE HYDROCHLORIDE 0.4 MG/ML
.1-.4 INJECTION, SOLUTION INTRAMUSCULAR; INTRAVENOUS; SUBCUTANEOUS
Status: DISCONTINUED | OUTPATIENT
Start: 2018-04-30 | End: 2018-04-30 | Stop reason: HOSPADM

## 2018-04-30 RX ORDER — FENTANYL CITRATE 50 UG/ML
25-50 INJECTION, SOLUTION INTRAMUSCULAR; INTRAVENOUS EVERY 5 MIN PRN
Status: DISCONTINUED | OUTPATIENT
Start: 2018-04-30 | End: 2018-04-30 | Stop reason: HOSPADM

## 2018-04-30 RX ORDER — LIDOCAINE 40 MG/G
CREAM TOPICAL
Status: DISCONTINUED | OUTPATIENT
Start: 2018-04-30 | End: 2018-04-30 | Stop reason: HOSPADM

## 2018-04-30 RX ORDER — FLUMAZENIL 0.1 MG/ML
0.2 INJECTION, SOLUTION INTRAVENOUS
Status: DISCONTINUED | OUTPATIENT
Start: 2018-04-30 | End: 2018-04-30 | Stop reason: HOSPADM

## 2018-04-30 RX ORDER — ONDANSETRON 2 MG/ML
4 INJECTION INTRAMUSCULAR; INTRAVENOUS EVERY 6 HOURS PRN
Status: DISCONTINUED | OUTPATIENT
Start: 2018-04-30 | End: 2018-04-30 | Stop reason: HOSPADM

## 2018-04-30 RX ORDER — SODIUM CHLORIDE, SODIUM LACTATE, POTASSIUM CHLORIDE, CALCIUM CHLORIDE 600; 310; 30; 20 MG/100ML; MG/100ML; MG/100ML; MG/100ML
INJECTION, SOLUTION INTRAVENOUS CONTINUOUS
Status: DISCONTINUED | OUTPATIENT
Start: 2018-04-30 | End: 2018-04-30 | Stop reason: HOSPADM

## 2018-04-30 RX ORDER — HYDRALAZINE HYDROCHLORIDE 20 MG/ML
2.5-5 INJECTION INTRAMUSCULAR; INTRAVENOUS EVERY 10 MIN PRN
Status: DISCONTINUED | OUTPATIENT
Start: 2018-04-30 | End: 2018-04-30 | Stop reason: HOSPADM

## 2018-04-30 RX ORDER — SODIUM CHLORIDE, SODIUM LACTATE, POTASSIUM CHLORIDE, CALCIUM CHLORIDE 600; 310; 30; 20 MG/100ML; MG/100ML; MG/100ML; MG/100ML
INJECTION, SOLUTION INTRAVENOUS CONTINUOUS
Status: DISCONTINUED | OUTPATIENT
Start: 2018-04-30 | End: 2018-04-30

## 2018-04-30 RX ORDER — ONDANSETRON 4 MG/1
4 TABLET, ORALLY DISINTEGRATING ORAL EVERY 30 MIN PRN
Status: DISCONTINUED | OUTPATIENT
Start: 2018-04-30 | End: 2018-04-30 | Stop reason: HOSPADM

## 2018-04-30 RX ORDER — FENTANYL CITRATE 50 UG/ML
INJECTION, SOLUTION INTRAMUSCULAR; INTRAVENOUS PRN
Status: DISCONTINUED | OUTPATIENT
Start: 2018-04-30 | End: 2018-04-30

## 2018-04-30 RX ORDER — FENTANYL CITRATE 50 UG/ML
25-50 INJECTION, SOLUTION INTRAMUSCULAR; INTRAVENOUS
Status: DISCONTINUED | OUTPATIENT
Start: 2018-04-30 | End: 2018-04-30 | Stop reason: HOSPADM

## 2018-04-30 RX ORDER — ONDANSETRON 4 MG/1
4 TABLET, ORALLY DISINTEGRATING ORAL EVERY 6 HOURS PRN
Status: DISCONTINUED | OUTPATIENT
Start: 2018-04-30 | End: 2018-04-30 | Stop reason: HOSPADM

## 2018-04-30 RX ORDER — PROPOFOL 10 MG/ML
INJECTION, EMULSION INTRAVENOUS CONTINUOUS PRN
Status: DISCONTINUED | OUTPATIENT
Start: 2018-04-30 | End: 2018-04-30

## 2018-04-30 RX ORDER — ACETAMINOPHEN 325 MG/1
650 TABLET ORAL ONCE
Status: COMPLETED | OUTPATIENT
Start: 2018-04-30 | End: 2018-04-30

## 2018-04-30 RX ORDER — SCOLOPAMINE TRANSDERMAL SYSTEM 1 MG/1
1 PATCH, EXTENDED RELEASE TRANSDERMAL ONCE
Status: COMPLETED | OUTPATIENT
Start: 2018-04-30 | End: 2018-04-30

## 2018-04-30 RX ORDER — BISACODYL 5 MG/1
15 TABLET, DELAYED RELEASE ORAL ONCE
Qty: 4 TABLET | Refills: 0 | Status: SHIPPED | OUTPATIENT
Start: 2018-04-30 | End: 2018-05-03

## 2018-04-30 RX ORDER — ONDANSETRON 2 MG/ML
4 INJECTION INTRAMUSCULAR; INTRAVENOUS
Status: DISCONTINUED | OUTPATIENT
Start: 2018-04-30 | End: 2018-04-30 | Stop reason: HOSPADM

## 2018-04-30 RX ADMIN — FENTANYL CITRATE 25 MCG: 50 INJECTION INTRAMUSCULAR; INTRAVENOUS at 16:52

## 2018-04-30 RX ADMIN — FENTANYL CITRATE 25 MCG: 50 INJECTION, SOLUTION INTRAMUSCULAR; INTRAVENOUS at 16:09

## 2018-04-30 RX ADMIN — MIDAZOLAM 2 MG: 1 INJECTION INTRAMUSCULAR; INTRAVENOUS at 15:45

## 2018-04-30 RX ADMIN — FENTANYL CITRATE 25 MCG: 50 INJECTION INTRAMUSCULAR; INTRAVENOUS at 17:00

## 2018-04-30 RX ADMIN — ACETAMINOPHEN 650 MG: 325 TABLET, FILM COATED ORAL at 14:54

## 2018-04-30 RX ADMIN — SCOPALAMINE 1 PATCH: 1 PATCH, EXTENDED RELEASE TRANSDERMAL at 14:54

## 2018-04-30 RX ADMIN — FENTANYL CITRATE 50 MCG: 50 INJECTION, SOLUTION INTRAMUSCULAR; INTRAVENOUS at 15:52

## 2018-04-30 RX ADMIN — PROPOFOL 120 MCG/KG/MIN: 10 INJECTION, EMULSION INTRAVENOUS at 15:52

## 2018-04-30 RX ADMIN — SIMETHICONE 2 ML: 63.3; 3.7 SOLUTION/ DROPS ORAL at 15:38

## 2018-04-30 RX ADMIN — SODIUM CHLORIDE, POTASSIUM CHLORIDE, SODIUM LACTATE AND CALCIUM CHLORIDE: 600; 310; 30; 20 INJECTION, SOLUTION INTRAVENOUS at 15:35

## 2018-04-30 RX ADMIN — WATER 300 ML: 100 IRRIGANT IRRIGATION at 16:25

## 2018-04-30 NOTE — IP AVS SNAPSHOT
Same Day Surgery [address] [address]    Phone:  [phone]                                       After Visit Summary   4/30/2018    Rachel A Gerhardt    MRN: [id number]           After Visit Summary Signature Page     I have received my discharge instructions, and my questions have been answered. I have discussed any challenges I see with this plan with the nurse or doctor.    ..........................................................................................................................................  Patient/Patient Representative Signature      ..........................................................................................................................................  Patient Representative Print Name and Relationship to Patient    ..................................................               ................................................  Date                                            Time    ..........................................................................................................................................  Reviewed by Signature/Title    ...................................................              ..............................................  Date                                                            Time

## 2018-04-30 NOTE — ANESTHESIA CARE TRANSFER NOTE
Patient: Rachel A Gerhardt    Procedure(s):  Colonoscopy - Wound Class: II-Clean Contaminated    Diagnosis: Anastomotic Stricture   Diagnosis Additional Information: No value filed.    Anesthesia Type:   General, RSI, ETT     Note:  Airway :Room Air  Patient transferred to:Phase II  Comments: Patient transferred to PACU spontaneously breathing.  VSS.  Report to RN. Handoff Report: Identifed the Patient, Identified the Reponsible Provider, Reviewed the pertinent medical history, Discussed the surgical course, Reviewed Intra-OP anesthesia mangement and issues during anesthesia, Set expectations for post-procedure period and Allowed opportunity for questions and acknowledgement of understanding      Vitals: (Last set prior to Anesthesia Care Transfer)    CRNA VITALS  4/30/2018 1603 - 4/30/2018 1637      4/30/2018             Pulse: 86    SpO2: 98 %                Electronically Signed By: CECY Collins CRNA  April 30, 2018  4:37 PM

## 2018-04-30 NOTE — DISCHARGE INSTRUCTIONS
[facility]  Same-Day Surgery   Adult Discharge Orders & Instructions     For 24 hours after surgery    1. Get plenty of rest.  A responsible adult must stay with you for at least 24 hours after you leave the hospital.   2. Do not drive or use heavy equipment.  If you have weakness or tingling, don't drive or use heavy equipment until this feeling goes away.  3. Do not drink alcohol.  4. Avoid strenuous or risky activities.  Ask for help when climbing stairs.   5. You may feel lightheaded.  IF so, sit for a few minutes before standing.  Have someone help you get up.   6. If you have nausea (feel sick to your stomach): Drink only clear liquids such as apple juice, ginger ale, broth or 7-Up.  Rest may also help.  Be sure to drink enough fluids.  Move to a regular diet as you feel able.  7. You may have a slight fever. Call the doctor if your fever is over 100 F (37.7 C) (taken under the tongue) or lasts longer than 24 hours.  8. You may have a dry mouth, a sore throat, muscle aches or trouble sleeping.  These should go away after 24 hours.  9. Do not make important or legal decisions.   Call your doctor for any of the followin.  Signs of infection (fever, growing tenderness at the surgery site, a large amount of drainage or bleeding, severe pain, foul-smelling drainage, redness, swelling).    2. It has been over 8 to 10 hours since surgery and you are still not able to urinate (pass water).    3.  Headache for over 24 hours.    To contact a doctor, call Dr. Vigil's clinic at #[phone] or:        [phone] and ask for the resident on call for surgery/gastroenterology (answered 24 hours a day)      Emergency Department:    [facility]: [phone]       (TTY for hearing impaired: [phone])

## 2018-04-30 NOTE — IP AVS SNAPSHOT
MRN:[id number]                      After Visit Summary   4/30/2018    Rachel A Gerhardt    MRN: [id number]           Thank you!     Thank you for choosing [facility] for your care. Our goal is always to provide you with excellent care. Hearing back from our patients is one way we can continue to improve our services. Please take a few minutes to complete the written survey that you may receive in the mail after you visit with us. Thank you!        Patient Information     Date Of Birth          1975        About your hospital stay     You were admitted on:  April 30, 2018 You last received care in the:  Same Day Surgery [facility]    You were discharged on:  April 30, 2018       Who to Call     For medical emergencies, please call 911.  For non-urgent questions about your medical care, please call your primary care provider or clinic, [phone]  For questions related to your surgery, please call your surgery clinic        Attending Provider     Provider Omero Baker MD Gastroenterology       Primary Care Provider Office Phone # Fax #    Reji Avery [phone]      Further instructions from your care team       [facility]  Same-Day Surgery   Adult Discharge Orders & Instructions     For 24 hours after surgery    1. Get plenty of rest.  A responsible adult must stay with you for at least 24 hours after you leave the hospital.   2. Do not drive or use heavy equipment.  If you have weakness or tingling, don't drive or use heavy equipment until this feeling goes away.  3. Do not drink alcohol.  4. Avoid strenuous or risky activities.  Ask for help when climbing stairs.   5. You may feel lightheaded.  IF so, sit for a few minutes before standing.  Have someone help you get up.   6. If you have nausea (feel sick to your stomach): Drink only clear liquids such as apple juice, ginger ale, broth or 7-Up.  Rest  "may also help.  Be sure to drink enough fluids.  Move to a regular diet as you feel able.  7. You may have a slight fever. Call the doctor if your fever is over 100 F (37.7 C) (taken under the tongue) or lasts longer than 24 hours.  8. You may have a dry mouth, a sore throat, muscle aches or trouble sleeping.  These should go away after 24 hours.  9. Do not make important or legal decisions.   Call your doctor for any of the followin.  Signs of infection (fever, growing tenderness at the surgery site, a large amount of drainage or bleeding, severe pain, foul-smelling drainage, redness, swelling).    2. It has been over 8 to 10 hours since surgery and you are still not able to urinate (pass water).    3.  Headache for over 24 hours.    To contact a doctor, call Dr. Vigil's clinic at #[phone] or:        [phone] and ask for the resident on call for surgery/gastroenterology (answered 24 hours a day)      Emergency Department:    [facility]: [phone]       (TTY for hearing impaired: [phone])            Pending Results     No orders found from 2018 to 2018.            Admission Information     Date & Time Provider Department Dept. Phone    2018 Omero Vigil MD Same Day Surgery [facility] [phone]      Your Vitals Were     Blood Pressure Temperature Respirations Height Weight Pulse Oximetry    95/78 97.8  F (36.6  C) (Oral) 16 1.753 m (5' 9\") 55.4 kg (122 lb 2.2 oz) 100%    BMI (Body Mass Index)                   18.04 kg/m2           MyChart Information     [facility] gives you secure access to your electronic health record. If you see a primary care provider, you can also send messages to your care team and make appointments. If you have questions, please call your primary care clinic.  If you do not have a primary care provider, please call [phone] and they will assist you.        Care EveryWhere ID     This is your Care EveryWhere ID. This could be " "used by other organizations to access your [place] medical records  [id number]        Equal Access to Services     [facility] ELVIA : Hadii reynold Gan, waaylinda lusamanthaadaha, qaybta kaalcides zenlelandvega, wendy krishnamurthy ivetterose zaldivarkeke dinoraloredvin atkins. So [facility] [place] [phone].    ATENCIÓN: Si habla español, tiene a epps disposición servicios gratuitos de asistencia lingüística. Llame al [phone].    We comply with applicable federal civil rights laws and [place] laws. We do not discriminate on the basis of race, color, national origin, age, disability, sex, sexual orientation, or gender identity.               Review of your medicines      UNREVIEWED medicines. Ask your doctor about these medicines        Dose / Directions    bisacodyl 5 MG EC tablet   Commonly known as:  DULCOLAX   Used for:  Small bowel stricture        Dose:  15 mg   Take 3 tablets (15 mg) by mouth once for 1 dose Refer to \"Getting Ready for a Colonoscopy\" instruction handout   Quantity:  4 tablet   Refills:  0       polyethylene glycol 236 g suspension   Commonly known as:  GoLYTELY/NuLYTELY   Used for:  Small bowel stricture        Dose:  4 L   Take 4,000 mLs (4 L) by mouth once for 1 dose Refer to \"Getting Ready for a Colonoscopy\" instruction handout   Quantity:  4000 mL   Refills:  0         CONTINUE these medicines which may have CHANGED, or have new prescriptions. If we are uncertain of the size of tablets/capsules you have at home, strength may be listed as something that might have changed.        Dose / Directions    acetaminophen 500 MG tablet   Commonly known as:  TYLENOL   This may have changed:    - when to take this  - reasons to take this   Used for:  Small intestine obstruction        Dose:  1000 mg   Take 2 tablets (1,000 mg) by mouth 4 times daily   Quantity:  80 tablet   Refills:  0       vitamin D 23408 UNIT capsule   Commonly known as:  ERGOCALCIFEROL   This may have changed:  additional instructions   Used for:  Vitamin D " deficiency        Dose:  34731 Units   Take 1 capsule (50,000 Units) by mouth once a week Needs labs checked prior to additional refills   Quantity:  8 capsule   Refills:  0         CONTINUE these medicines which have NOT CHANGED        Dose / Directions    albuterol 108 (90 Base) MCG/ACT Inhaler   Commonly known as:  PROAIR HFA/PROVENTIL HFA/VENTOLIN HFA   Used for:  Wheezing        Dose:  2 puff   Inhale 2 puffs into the lungs every 6 hours as needed   Quantity:  1 Inhaler   Refills:  0       fentaNYL   Commonly known as:  DURAGESIC        Place onto the skin every 8 hours   Refills:  0       ondansetron 4 MG tablet   Commonly known as:  ZOFRAN   Used for:  Small bowel obstruction, Nocturnal diarrhea, Abdominal pain, generalized        Dose:  4-8 mg   Take 1-2 tablets (4-8 mg) by mouth every 8 hours as needed for nausea   Quantity:  30 tablet   Refills:  0       oxyCODONE IR 5 MG tablet   Commonly known as:  ROXICODONE   Used for:  Abdominal pain, generalized        Dose:  5-10 mg   Take 1-2 tablets (5-10 mg) by mouth every 4 hours as needed for moderate to severe pain   Quantity:  20 tablet   Refills:  0       parenteral nutrition - PTA/DISCHARGE ORDER   Used for:  Failure to thrive in adult        The TPN formula will print on the After Visit Summary Report.   Quantity:  1 each   Refills:  0            Where to get your medicines      [facility] [address] [place] [address] [address] AT [place] [address] [address], [facility] [id number]     Phone:  [phone]     bisacodyl 5 MG EC tablet         Some of these will need a paper prescription and others can be bought over the counter. Ask your nurse if you have questions.     Bring a paper prescription for each of these medications     polyethylene glycol 236 g suspension                Protect others around you: Learn how to safely use, store and throw away your medicines at www.disposemymeds.org.    "          Medication List: This is a list of all your medications and when to take them. Check marks below indicate your daily home schedule. Keep this list as a reference.      Medications           Morning Afternoon Evening Bedtime As Needed    acetaminophen 500 MG tablet   Commonly known as:  TYLENOL   Take 2 tablets (1,000 mg) by mouth 4 times daily   Last time this was given:  650 mg on 4/30/2018  2:54 PM                                albuterol 108 (90 Base) MCG/ACT Inhaler   Commonly known as:  PROAIR HFA/PROVENTIL HFA/VENTOLIN HFA   Inhale 2 puffs into the lungs every 6 hours as needed                                bisacodyl 5 MG EC tablet   Commonly known as:  DULCOLAX   Take 3 tablets (15 mg) by mouth once for 1 dose Refer to \"Getting Ready for a Colonoscopy\" instruction handout                                fentaNYL   Commonly known as:  DURAGESIC   Place onto the skin every 8 hours                                ondansetron 4 MG tablet   Commonly known as:  ZOFRAN   Take 1-2 tablets (4-8 mg) by mouth every 8 hours as needed for nausea                                oxyCODONE IR 5 MG tablet   Commonly known as:  ROXICODONE   Take 1-2 tablets (5-10 mg) by mouth every 4 hours as needed for moderate to severe pain                                parenteral nutrition - PTA/DISCHARGE ORDER   The TPN formula will print on the After Visit Summary Report.                                polyethylene glycol 236 g suspension   Commonly known as:  GoLYTELY/NuLYTELY   Take 4,000 mLs (4 L) by mouth once for 1 dose Refer to \"Getting Ready for a Colonoscopy\" instruction handout                                vitamin D 89943 UNIT capsule   Commonly known as:  ERGOCALCIFEROL   Take 1 capsule (50,000 Units) by mouth once a week Needs labs checked prior to additional refills                                  "

## 2018-04-30 NOTE — ANESTHESIA POSTPROCEDURE EVALUATION
Patient: Rachel A Gerhardt    Procedure(s):  Colonoscopy - Wound Class: II-Clean Contaminated    Diagnosis:Anastomotic Stricture   Diagnosis Additional Information: No value filed.    Anesthesia Type:  General, RSI, ETT    Note:  Anesthesia Post Evaluation    Patient location during evaluation: PACU  Patient participation: Able to fully participate in evaluation  Level of consciousness: awake and alert  Pain management: adequate  Airway patency: patent  Cardiovascular status: blood pressure returned to baseline  Respiratory status: acceptable  Hydration status: acceptable  PONV: none             Last vitals:  Vitals:    04/30/18 1636 04/30/18 1652 04/30/18 1700   BP: 94/64 92/72 95/78   Resp: 16 16 16   Temp:      SpO2: 91% 95% 100%         Electronically Signed By: Pamela Guevara MD  April 30, 2018  6:37 PM

## 2018-04-30 NOTE — PROGRESS NOTES
This is a recent snapshot of the patient's [facility] medical record.  For current drug dose and complete information and questions, call [phone] or In Basket pool, [facility] home infusion ([id number])  CSN Number:  [id number]

## 2018-04-30 NOTE — OR NURSING
Dr. Guevara notified that Jenni was leaving and he was OK with this. Dr. Vigil in ph II and saw Jenni. He reordered bowel cleansers for a near future procedure. She was upset and has now left. She plans to fill the Golytely and Bisacodyl at our pharmacy before she departs. Her father is accompanying her. BUBBA

## 2018-05-01 ENCOUNTER — APPOINTMENT (OUTPATIENT)
Dept: GENERAL RADIOLOGY | Facility: CLINIC | Age: 43
DRG: 640 | End: 2018-05-01
Attending: EMERGENCY MEDICINE
Payer: COMMERCIAL

## 2018-05-01 ENCOUNTER — APPOINTMENT (OUTPATIENT)
Dept: LAB | Facility: CLINIC | Age: 43
End: 2018-05-01
Attending: COLON & RECTAL SURGERY
Payer: COMMERCIAL

## 2018-05-01 ENCOUNTER — TELEPHONE (OUTPATIENT)
Dept: GASTROENTEROLOGY | Facility: CLINIC | Age: 43
End: 2018-05-01

## 2018-05-01 ENCOUNTER — HOSPITAL ENCOUNTER (INPATIENT)
Facility: CLINIC | Age: 43
LOS: 1 days | Discharge: LEFT BEFORE DISCHARGE | DRG: 640 | End: 2018-05-03
Attending: EMERGENCY MEDICINE | Admitting: INTERNAL MEDICINE
Payer: COMMERCIAL

## 2018-05-01 ENCOUNTER — CARE COORDINATION (OUTPATIENT)
Dept: GASTROENTEROLOGY | Facility: CLINIC | Age: 43
End: 2018-05-01

## 2018-05-01 ENCOUNTER — HOME INFUSION (PRE-WILLOW HOME INFUSION) (OUTPATIENT)
Dept: PHARMACY | Facility: CLINIC | Age: 43
End: 2018-05-01

## 2018-05-01 DIAGNOSIS — E87.6 HYPOKALEMIA: ICD-10-CM

## 2018-05-01 DIAGNOSIS — R62.7 FAILURE TO THRIVE IN ADULT: Primary | ICD-10-CM

## 2018-05-01 DIAGNOSIS — K56.609 SMALL BOWEL OBSTRUCTION (H): Primary | ICD-10-CM

## 2018-05-01 DIAGNOSIS — M62.81 GENERALIZED MUSCLE WEAKNESS: ICD-10-CM

## 2018-05-01 LAB
ALBUMIN SERPL-MCNC: 3.3 G/DL (ref 3.4–5)
ALBUMIN SERPL-MCNC: 3.3 G/DL (ref 3.4–5)
ALP SERPL-CCNC: 77 U/L (ref 40–150)
ALP SERPL-CCNC: 83 U/L (ref 40–150)
ALT SERPL W P-5'-P-CCNC: 18 U/L (ref 0–50)
ALT SERPL W P-5'-P-CCNC: 24 U/L (ref 0–50)
ANION GAP SERPL CALCULATED.3IONS-SCNC: 8 MMOL/L (ref 3–14)
ANION GAP SERPL CALCULATED.3IONS-SCNC: 9 MMOL/L (ref 3–14)
APTT PPP: 29 SEC (ref 22–37)
AST SERPL W P-5'-P-CCNC: 15 U/L (ref 0–45)
AST SERPL W P-5'-P-CCNC: 31 U/L (ref 0–45)
BASOPHILS # BLD AUTO: 0 10E9/L (ref 0–0.2)
BASOPHILS # BLD AUTO: 0 10E9/L (ref 0–0.2)
BASOPHILS NFR BLD AUTO: 0.3 %
BASOPHILS NFR BLD AUTO: 0.4 %
BILIRUB DIRECT SERPL-MCNC: 0.2 MG/DL (ref 0–0.2)
BILIRUB SERPL-MCNC: 0.6 MG/DL (ref 0.2–1.3)
BILIRUB SERPL-MCNC: 0.7 MG/DL (ref 0.2–1.3)
BUN SERPL-MCNC: 25 MG/DL (ref 7–30)
BUN SERPL-MCNC: 27 MG/DL (ref 7–30)
CA-I BLD-SCNC: 4.2 MG/DL (ref 4.4–5.2)
CALCIUM SERPL-MCNC: 8.5 MG/DL (ref 8.5–10.1)
CALCIUM SERPL-MCNC: 8.9 MG/DL (ref 8.5–10.1)
CHLORIDE SERPL-SCNC: 86 MMOL/L (ref 94–109)
CHLORIDE SERPL-SCNC: 90 MMOL/L (ref 94–109)
CO2 BLDCOV-SCNC: 43 MMOL/L (ref 21–28)
CO2 SERPL-SCNC: 35 MMOL/L (ref 20–32)
CO2 SERPL-SCNC: 38 MMOL/L (ref 20–32)
CREAT SERPL-MCNC: 0.85 MG/DL (ref 0.52–1.04)
CREAT SERPL-MCNC: 0.89 MG/DL (ref 0.52–1.04)
DIFFERENTIAL METHOD BLD: ABNORMAL
DIFFERENTIAL METHOD BLD: NORMAL
EOSINOPHIL # BLD AUTO: 0 10E9/L (ref 0–0.7)
EOSINOPHIL # BLD AUTO: 0.1 10E9/L (ref 0–0.7)
EOSINOPHIL NFR BLD AUTO: 0.6 %
EOSINOPHIL NFR BLD AUTO: 1.3 %
ERYTHROCYTE [DISTWIDTH] IN BLOOD BY AUTOMATED COUNT: 13.4 % (ref 10–15)
ERYTHROCYTE [DISTWIDTH] IN BLOOD BY AUTOMATED COUNT: 13.5 % (ref 10–15)
GFR SERPL CREATININE-BSD FRML MDRD: 69 ML/MIN/1.7M2
GFR SERPL CREATININE-BSD FRML MDRD: 73 ML/MIN/1.7M2
GLUCOSE BLD-MCNC: 106 MG/DL (ref 70–99)
GLUCOSE SERPL-MCNC: 89 MG/DL (ref 70–99)
GLUCOSE SERPL-MCNC: 98 MG/DL (ref 70–99)
HCT VFR BLD AUTO: 40.2 % (ref 35–47)
HCT VFR BLD AUTO: 41.6 % (ref 35–47)
HCT VFR BLD CALC: 44 %PCV (ref 35–47)
HGB BLD CALC-MCNC: 15 G/DL (ref 11.7–15.7)
HGB BLD-MCNC: 13.7 G/DL (ref 11.7–15.7)
HGB BLD-MCNC: 13.9 G/DL (ref 11.7–15.7)
IMM GRANULOCYTES # BLD: 0 10E9/L (ref 0–0.4)
IMM GRANULOCYTES # BLD: 0 10E9/L (ref 0–0.4)
IMM GRANULOCYTES NFR BLD: 0.4 %
IMM GRANULOCYTES NFR BLD: 0.6 %
INR PPP: 1.03 (ref 0.86–1.14)
LYMPHOCYTES # BLD AUTO: 2.2 10E9/L (ref 0.8–5.3)
LYMPHOCYTES # BLD AUTO: 2.5 10E9/L (ref 0.8–5.3)
LYMPHOCYTES NFR BLD AUTO: 31.7 %
LYMPHOCYTES NFR BLD AUTO: 33.8 %
MAGNESIUM SERPL-MCNC: 2 MG/DL (ref 1.6–2.3)
MAGNESIUM SERPL-MCNC: 2.3 MG/DL (ref 1.6–2.3)
MCH RBC QN AUTO: 32.6 PG (ref 26.5–33)
MCH RBC QN AUTO: 33.5 PG (ref 26.5–33)
MCHC RBC AUTO-ENTMCNC: 33.4 G/DL (ref 31.5–36.5)
MCHC RBC AUTO-ENTMCNC: 34.1 G/DL (ref 31.5–36.5)
MCV RBC AUTO: 97 FL (ref 78–100)
MCV RBC AUTO: 98 FL (ref 78–100)
MONOCYTES # BLD AUTO: 0.6 10E9/L (ref 0–1.3)
MONOCYTES # BLD AUTO: 0.6 10E9/L (ref 0–1.3)
MONOCYTES NFR BLD AUTO: 8.4 %
MONOCYTES NFR BLD AUTO: 8.5 %
NEUTROPHILS # BLD AUTO: 4.1 10E9/L (ref 1.6–8.3)
NEUTROPHILS # BLD AUTO: 4.2 10E9/L (ref 1.6–8.3)
NEUTROPHILS NFR BLD AUTO: 55.6 %
NEUTROPHILS NFR BLD AUTO: 58.4 %
NRBC # BLD AUTO: 0 10*3/UL
NRBC # BLD AUTO: 0 10*3/UL
NRBC BLD AUTO-RTO: 0 /100
NRBC BLD AUTO-RTO: 0 /100
PCO2 BLDV: 50 MM HG (ref 40–50)
PH BLDV: 7.54 PH (ref 7.32–7.43)
PHOSPHATE SERPL-MCNC: 3.3 MG/DL (ref 2.5–4.5)
PLATELET # BLD AUTO: 336 10E9/L (ref 150–450)
PLATELET # BLD AUTO: 373 10E9/L (ref 150–450)
PLATELET # BLD EST: ABNORMAL 10*3/UL
PO2 BLDV: 23 MM HG (ref 25–47)
POTASSIUM BLD-SCNC: 2.9 MMOL/L (ref 3.4–5.3)
POTASSIUM SERPL-SCNC: 2.5 MMOL/L (ref 3.4–5.3)
POTASSIUM SERPL-SCNC: 2.8 MMOL/L (ref 3.4–5.3)
PREALB SERPL IA-MCNC: 20 MG/DL (ref 15–45)
PROT SERPL-MCNC: 7 G/DL (ref 6.8–8.8)
PROT SERPL-MCNC: 7.2 G/DL (ref 6.8–8.8)
RBC # BLD AUTO: 4.09 10E12/L (ref 3.8–5.2)
RBC # BLD AUTO: 4.27 10E12/L (ref 3.8–5.2)
SAO2 % BLDV FROM PO2: 47 %
SODIUM BLD-SCNC: 130 MMOL/L (ref 133–144)
SODIUM SERPL-SCNC: 131 MMOL/L (ref 133–144)
SODIUM SERPL-SCNC: 134 MMOL/L (ref 133–144)
TRIGL SERPL-MCNC: 126 MG/DL
TROPONIN I BLD-MCNC: 0 UG/L (ref 0–0.1)
TROPONIN I SERPL-MCNC: <0.015 UG/L (ref 0–0.04)
WBC # BLD AUTO: 6.9 10E9/L (ref 4–11)
WBC # BLD AUTO: 7.5 10E9/L (ref 4–11)

## 2018-05-01 PROCEDURE — 3E0436Z INTRODUCTION OF NUTRITIONAL SUBSTANCE INTO CENTRAL VEIN, PERCUTANEOUS APPROACH: ICD-10-PCS | Performed by: INTERNAL MEDICINE

## 2018-05-01 PROCEDURE — 93005 ELECTROCARDIOGRAM TRACING: CPT

## 2018-05-01 PROCEDURE — 99285 EMERGENCY DEPT VISIT HI MDM: CPT | Mod: Z6 | Performed by: EMERGENCY MEDICINE

## 2018-05-01 PROCEDURE — 82803 BLOOD GASES ANY COMBINATION: CPT

## 2018-05-01 PROCEDURE — 40000497 ZZHCL STATISTIC SODIUM ED POCT

## 2018-05-01 PROCEDURE — 96361 HYDRATE IV INFUSION ADD-ON: CPT | Mod: 59

## 2018-05-01 PROCEDURE — 40000502 ZZHCL STATISTIC GLUCOSE ED POCT

## 2018-05-01 PROCEDURE — 96375 TX/PRO/DX INJ NEW DRUG ADDON: CPT

## 2018-05-01 PROCEDURE — 99285 EMERGENCY DEPT VISIT HI MDM: CPT | Mod: 25

## 2018-05-01 PROCEDURE — 71045 X-RAY EXAM CHEST 1 VIEW: CPT

## 2018-05-01 PROCEDURE — 85025 COMPLETE CBC W/AUTO DIFF WBC: CPT | Performed by: EMERGENCY MEDICINE

## 2018-05-01 PROCEDURE — 83735 ASSAY OF MAGNESIUM: CPT | Performed by: COLON & RECTAL SURGERY

## 2018-05-01 PROCEDURE — 83735 ASSAY OF MAGNESIUM: CPT | Performed by: EMERGENCY MEDICINE

## 2018-05-01 PROCEDURE — 85610 PROTHROMBIN TIME: CPT | Performed by: EMERGENCY MEDICINE

## 2018-05-01 PROCEDURE — 82248 BILIRUBIN DIRECT: CPT | Performed by: COLON & RECTAL SURGERY

## 2018-05-01 PROCEDURE — 80053 COMPREHEN METABOLIC PANEL: CPT | Performed by: COLON & RECTAL SURGERY

## 2018-05-01 PROCEDURE — 25000132 ZZH RX MED GY IP 250 OP 250 PS 637: Performed by: EMERGENCY MEDICINE

## 2018-05-01 PROCEDURE — 85025 COMPLETE CBC W/AUTO DIFF WBC: CPT | Performed by: COLON & RECTAL SURGERY

## 2018-05-01 PROCEDURE — 84134 ASSAY OF PREALBUMIN: CPT | Performed by: COLON & RECTAL SURGERY

## 2018-05-01 PROCEDURE — 84484 ASSAY OF TROPONIN QUANT: CPT | Performed by: EMERGENCY MEDICINE

## 2018-05-01 PROCEDURE — 96376 TX/PRO/DX INJ SAME DRUG ADON: CPT

## 2018-05-01 PROCEDURE — 82330 ASSAY OF CALCIUM: CPT

## 2018-05-01 PROCEDURE — 96374 THER/PROPH/DIAG INJ IV PUSH: CPT

## 2018-05-01 PROCEDURE — 84478 ASSAY OF TRIGLYCERIDES: CPT | Performed by: COLON & RECTAL SURGERY

## 2018-05-01 PROCEDURE — 85730 THROMBOPLASTIN TIME PARTIAL: CPT | Performed by: EMERGENCY MEDICINE

## 2018-05-01 PROCEDURE — 40000501 ZZHCL STATISTIC HEMATOCRIT ED POCT

## 2018-05-01 PROCEDURE — 25000128 H RX IP 250 OP 636: Performed by: EMERGENCY MEDICINE

## 2018-05-01 PROCEDURE — 84484 ASSAY OF TROPONIN QUANT: CPT

## 2018-05-01 PROCEDURE — 84100 ASSAY OF PHOSPHORUS: CPT | Performed by: COLON & RECTAL SURGERY

## 2018-05-01 PROCEDURE — 40000498 ZZHCL STATISTIC POTASSIUM ED POCT

## 2018-05-01 PROCEDURE — 99219 ZZC INITIAL OBSERVATION CARE,LEVL II: CPT | Mod: AI | Performed by: INTERNAL MEDICINE

## 2018-05-01 PROCEDURE — 74019 RADEX ABDOMEN 2 VIEWS: CPT

## 2018-05-01 PROCEDURE — 80053 COMPREHEN METABOLIC PANEL: CPT | Performed by: EMERGENCY MEDICINE

## 2018-05-01 RX ORDER — FENTANYL CITRATE 50 UG/ML
50 INJECTION, SOLUTION INTRAMUSCULAR; INTRAVENOUS ONCE
Status: COMPLETED | OUTPATIENT
Start: 2018-05-01 | End: 2018-05-01

## 2018-05-01 RX ORDER — ONDANSETRON 2 MG/ML
4 INJECTION INTRAMUSCULAR; INTRAVENOUS ONCE
Status: COMPLETED | OUTPATIENT
Start: 2018-05-01 | End: 2018-05-01

## 2018-05-01 RX ORDER — POTASSIUM CHLORIDE 750 MG/1
40 TABLET, EXTENDED RELEASE ORAL ONCE
Status: COMPLETED | OUTPATIENT
Start: 2018-05-01 | End: 2018-05-01

## 2018-05-01 RX ORDER — POTASSIUM CHLORIDE 7.45 MG/ML
10 INJECTION INTRAVENOUS CONTINUOUS
Status: DISCONTINUED | OUTPATIENT
Start: 2018-05-01 | End: 2018-05-02

## 2018-05-01 RX ADMIN — ONDANSETRON 4 MG: 2 INJECTION INTRAMUSCULAR; INTRAVENOUS at 23:48

## 2018-05-01 RX ADMIN — SODIUM CHLORIDE 1000 ML: 9 INJECTION, SOLUTION INTRAVENOUS at 22:22

## 2018-05-01 RX ADMIN — FENTANYL CITRATE 50 MCG: 50 INJECTION INTRAMUSCULAR; INTRAVENOUS at 21:13

## 2018-05-01 RX ADMIN — FENTANYL CITRATE 50 MCG: 50 INJECTION INTRAMUSCULAR; INTRAVENOUS at 23:29

## 2018-05-01 RX ADMIN — POTASSIUM CHLORIDE 10 MEQ: 10 INJECTION, SOLUTION INTRAVENOUS at 21:13

## 2018-05-01 RX ADMIN — POTASSIUM CHLORIDE 40 MEQ: 750 TABLET, EXTENDED RELEASE ORAL at 21:13

## 2018-05-01 NOTE — TELEPHONE ENCOUNTER
Jenni is informed that she is scheduled on 05/03/2018 at 11 AM with an arrival time of 9 AM for a Colonoscopy procedure with Dr. Vigil.  Patient instructed to arrange for a  to pick her up post procedure and also to arrange for someone to monitor her post procedure.  My direct line was given to pt for any questions or concerns.    SR 0501/2018 @ 1147 A

## 2018-05-01 NOTE — PROGRESS NOTES
5/1/2018 1043: Message left for Jenni to call back to go over her prep instructions. Per Dr. Vigil's message she was instructed to be on clear liquids today and tomorrow for procedure on Thursday.     Contact information left for her to call back to discuss.     11:32 Jneni called back and reviewed the prep instructions given to her. She was advised of the two day prep instructions which is our standard prep, she states she was instructed by Dr. Vigil to take drink Wednesday and drink all of it. She states she will take it the exact way Dr. Vigil wrote on the prescription so there is no misunderstanding that she did not follow his instructions. She states she has no other questions at this time and call was transferred to scheduling.     Aisha YE RN Coordinator  Dr. Prescott, Dr. Vigil & Dr. Benedict   Advanced Endoscopy  [phone]

## 2018-05-02 ENCOUNTER — HOME INFUSION (PRE-WILLOW HOME INFUSION) (OUTPATIENT)
Dept: PHARMACY | Facility: CLINIC | Age: 43
End: 2018-05-02

## 2018-05-02 PROBLEM — E87.6 HYPOKALEMIA: Status: ACTIVE | Noted: 2018-05-02

## 2018-05-02 LAB
ANION GAP SERPL CALCULATED.3IONS-SCNC: 6 MMOL/L (ref 3–14)
BUN SERPL-MCNC: 17 MG/DL (ref 7–30)
CALCIUM SERPL-MCNC: 8.5 MG/DL (ref 8.5–10.1)
CHLORIDE SERPL-SCNC: 95 MMOL/L (ref 94–109)
CO2 SERPL-SCNC: 35 MMOL/L (ref 20–32)
CREAT SERPL-MCNC: 0.73 MG/DL (ref 0.52–1.04)
GFR SERPL CREATININE-BSD FRML MDRD: 87 ML/MIN/1.7M2
GLUCOSE SERPL-MCNC: 82 MG/DL (ref 70–99)
INTERPRETATION ECG - MUSE: NORMAL
POTASSIUM SERPL-SCNC: 3.2 MMOL/L (ref 3.4–5.3)
POTASSIUM SERPL-SCNC: 3.6 MMOL/L (ref 3.4–5.3)
POTASSIUM SERPL-SCNC: 3.8 MMOL/L (ref 3.4–5.3)
SODIUM SERPL-SCNC: 136 MMOL/L (ref 133–144)

## 2018-05-02 PROCEDURE — 25000128 H RX IP 250 OP 636: Performed by: PHYSICIAN ASSISTANT

## 2018-05-02 PROCEDURE — 25000128 H RX IP 250 OP 636: Performed by: INTERNAL MEDICINE

## 2018-05-02 PROCEDURE — 84132 ASSAY OF SERUM POTASSIUM: CPT | Performed by: STUDENT IN AN ORGANIZED HEALTH CARE EDUCATION/TRAINING PROGRAM

## 2018-05-02 PROCEDURE — 25000125 ZZHC RX 250: Performed by: INTERNAL MEDICINE

## 2018-05-02 PROCEDURE — 25000128 H RX IP 250 OP 636: Performed by: EMERGENCY MEDICINE

## 2018-05-02 PROCEDURE — 25000132 ZZH RX MED GY IP 250 OP 250 PS 637: Performed by: INTERNAL MEDICINE

## 2018-05-02 PROCEDURE — 84132 ASSAY OF SERUM POTASSIUM: CPT | Performed by: PHYSICIAN ASSISTANT

## 2018-05-02 PROCEDURE — 99207 ZZC APP CREDIT; MD BILLING SHARED VISIT: CPT | Performed by: PHYSICIAN ASSISTANT

## 2018-05-02 PROCEDURE — 80048 BASIC METABOLIC PNL TOTAL CA: CPT | Performed by: INTERNAL MEDICINE

## 2018-05-02 PROCEDURE — 20000002 ZZH R&B BMT INTERMEDIATE

## 2018-05-02 PROCEDURE — 99232 SBSQ HOSP IP/OBS MODERATE 35: CPT | Performed by: INTERNAL MEDICINE

## 2018-05-02 RX ORDER — POTASSIUM CL/LIDO/0.9 % NACL 10MEQ/0.1L
10 INTRAVENOUS SOLUTION, PIGGYBACK (ML) INTRAVENOUS
Status: DISCONTINUED | OUTPATIENT
Start: 2018-05-02 | End: 2018-05-02 | Stop reason: RX

## 2018-05-02 RX ORDER — POTASSIUM CHLORIDE 1.5 G/1.58G
20-40 POWDER, FOR SOLUTION ORAL
Status: DISCONTINUED | OUTPATIENT
Start: 2018-05-02 | End: 2018-05-04 | Stop reason: HOSPADM

## 2018-05-02 RX ORDER — PROCHLORPERAZINE 25 MG
25 SUPPOSITORY, RECTAL RECTAL EVERY 12 HOURS PRN
Status: DISCONTINUED | OUTPATIENT
Start: 2018-05-02 | End: 2018-05-04 | Stop reason: HOSPADM

## 2018-05-02 RX ORDER — SODIUM CHLORIDE 9 MG/ML
INJECTION, SOLUTION INTRAVENOUS CONTINUOUS
Status: ACTIVE | OUTPATIENT
Start: 2018-05-02 | End: 2018-05-02

## 2018-05-02 RX ORDER — POTASSIUM CHLORIDE 7.45 MG/ML
10 INJECTION INTRAVENOUS ONCE
Status: COMPLETED | OUTPATIENT
Start: 2018-05-02 | End: 2018-05-02

## 2018-05-02 RX ORDER — HYDROMORPHONE HYDROCHLORIDE 1 MG/ML
.3-.5 INJECTION, SOLUTION INTRAMUSCULAR; INTRAVENOUS; SUBCUTANEOUS
Status: DISCONTINUED | OUTPATIENT
Start: 2018-05-02 | End: 2018-05-04 | Stop reason: HOSPADM

## 2018-05-02 RX ORDER — ACETAMINOPHEN 325 MG/1
650 TABLET ORAL EVERY 4 HOURS PRN
Status: DISCONTINUED | OUTPATIENT
Start: 2018-05-02 | End: 2018-05-04 | Stop reason: HOSPADM

## 2018-05-02 RX ORDER — POTASSIUM CHLORIDE 750 MG/1
40 TABLET, EXTENDED RELEASE ORAL DAILY
Status: DISCONTINUED | OUTPATIENT
Start: 2018-05-02 | End: 2018-05-04 | Stop reason: HOSPADM

## 2018-05-02 RX ORDER — ONDANSETRON 2 MG/ML
4 INJECTION INTRAMUSCULAR; INTRAVENOUS EVERY 6 HOURS PRN
Status: DISCONTINUED | OUTPATIENT
Start: 2018-05-02 | End: 2018-05-04 | Stop reason: HOSPADM

## 2018-05-02 RX ORDER — PROCHLORPERAZINE MALEATE 5 MG
10 TABLET ORAL EVERY 6 HOURS PRN
Status: DISCONTINUED | OUTPATIENT
Start: 2018-05-02 | End: 2018-05-04 | Stop reason: HOSPADM

## 2018-05-02 RX ORDER — ONDANSETRON 4 MG/1
4 TABLET, ORALLY DISINTEGRATING ORAL EVERY 6 HOURS PRN
Status: DISCONTINUED | OUTPATIENT
Start: 2018-05-02 | End: 2018-05-04 | Stop reason: HOSPADM

## 2018-05-02 RX ORDER — NALOXONE HYDROCHLORIDE 0.4 MG/ML
.1-.4 INJECTION, SOLUTION INTRAMUSCULAR; INTRAVENOUS; SUBCUTANEOUS
Status: DISCONTINUED | OUTPATIENT
Start: 2018-05-02 | End: 2018-05-03

## 2018-05-02 RX ORDER — POTASSIUM CHLORIDE 7.45 MG/ML
10 INJECTION INTRAVENOUS
Status: DISCONTINUED | OUTPATIENT
Start: 2018-05-02 | End: 2018-05-04 | Stop reason: HOSPADM

## 2018-05-02 RX ORDER — POTASSIUM CHLORIDE 750 MG/1
20-40 TABLET, EXTENDED RELEASE ORAL
Status: DISCONTINUED | OUTPATIENT
Start: 2018-05-02 | End: 2018-05-04 | Stop reason: HOSPADM

## 2018-05-02 RX ORDER — POTASSIUM CHLORIDE 29.8 MG/ML
20 INJECTION INTRAVENOUS
Status: DISCONTINUED | OUTPATIENT
Start: 2018-05-02 | End: 2018-05-04 | Stop reason: HOSPADM

## 2018-05-02 RX ADMIN — HYDROMORPHONE HYDROCHLORIDE 0.5 MG: 1 INJECTION, SOLUTION INTRAMUSCULAR; INTRAVENOUS; SUBCUTANEOUS at 11:29

## 2018-05-02 RX ADMIN — PROCHLORPERAZINE EDISYLATE 5 MG: 5 INJECTION INTRAMUSCULAR; INTRAVENOUS at 22:45

## 2018-05-02 RX ADMIN — I.V. FAT EMULSION 250 ML: 20 EMULSION INTRAVENOUS at 20:45

## 2018-05-02 RX ADMIN — POTASSIUM CHLORIDE 10 MEQ: 7.46 INJECTION, SOLUTION INTRAVENOUS at 02:34

## 2018-05-02 RX ADMIN — POTASSIUM CHLORIDE 10 MEQ: 7.46 INJECTION, SOLUTION INTRAVENOUS at 01:32

## 2018-05-02 RX ADMIN — HYDROMORPHONE HYDROCHLORIDE 0.5 MG: 1 INJECTION, SOLUTION INTRAMUSCULAR; INTRAVENOUS; SUBCUTANEOUS at 18:33

## 2018-05-02 RX ADMIN — HYDROMORPHONE HYDROCHLORIDE 0.5 MG: 1 INJECTION, SOLUTION INTRAMUSCULAR; INTRAVENOUS; SUBCUTANEOUS at 20:46

## 2018-05-02 RX ADMIN — POLYETHYLENE GLYCOL 3350, SODIUM SULFATE ANHYDROUS, SODIUM BICARBONATE, SODIUM CHLORIDE, POTASSIUM CHLORIDE 4000 ML: 236; 22.74; 6.74; 5.86; 2.97 POWDER, FOR SOLUTION ORAL at 09:36

## 2018-05-02 RX ADMIN — HYDROMORPHONE HYDROCHLORIDE 0.5 MG: 1 INJECTION, SOLUTION INTRAMUSCULAR; INTRAVENOUS; SUBCUTANEOUS at 13:30

## 2018-05-02 RX ADMIN — HYDROMORPHONE HYDROCHLORIDE 0.5 MG: 1 INJECTION, SOLUTION INTRAMUSCULAR; INTRAVENOUS; SUBCUTANEOUS at 22:45

## 2018-05-02 RX ADMIN — ONDANSETRON 4 MG: 2 INJECTION INTRAMUSCULAR; INTRAVENOUS at 11:27

## 2018-05-02 RX ADMIN — SODIUM CHLORIDE: 234 INJECTION INTRAMUSCULAR; INTRAVENOUS; SUBCUTANEOUS at 20:45

## 2018-05-02 RX ADMIN — ONDANSETRON 4 MG: 2 INJECTION INTRAMUSCULAR; INTRAVENOUS at 18:33

## 2018-05-02 RX ADMIN — POTASSIUM CHLORIDE 10 MEQ: 7.46 INJECTION, SOLUTION INTRAVENOUS at 04:08

## 2018-05-02 RX ADMIN — POTASSIUM CHLORIDE 40 MEQ: 750 TABLET, EXTENDED RELEASE ORAL at 09:36

## 2018-05-02 RX ADMIN — HYDROMORPHONE HYDROCHLORIDE 0.5 MG: 1 INJECTION, SOLUTION INTRAMUSCULAR; INTRAVENOUS; SUBCUTANEOUS at 15:49

## 2018-05-02 ASSESSMENT — ENCOUNTER SYMPTOMS
FEVER: 0
DIARRHEA: 0
ABDOMINAL PAIN: 1
CHILLS: 0
COUGH: 0
VOMITING: 0
ARTHRALGIAS: 0
ABDOMINAL DISTENTION: 0
NAUSEA: 0
SHORTNESS OF BREATH: 0

## 2018-05-02 ASSESSMENT — PAIN DESCRIPTION - DESCRIPTORS: DESCRIPTORS: ACHING

## 2018-05-02 NOTE — PROGRESS NOTES
[facility], [place]    Internal Medicine Progress Note - Gold Service      Assessment & Plan   Rachel A Gerhardt is a 42 year old female admitted on 5/1/2018. 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    Hypokalemia. Initial potassium significantly low at 2.5 in setting of bowel prep for colonoscopy. Patient with mild chest pain as well. EKG revealed ST/T-wave abnormalities. Received aggressive replacement with IV and PO potassium, normalized this AM at 3.8. Suspect hypokalemia related to GI losses; poor PO intake at baseline and is TPN dependent. Currently no chest pain, myalgias.  - Continue potassium chloride 40 mEq PO QD  - Potassium replacement protocol  - Continuous telemetry  - Trend K q6h    Hx of Recurrent SBO. Hx of recurrent SBO's s/p 60-cm small bowel resection 5/2017. Initially did well for ~5 months, although symptoms gradually returned. Underwent MRE 3/21/18 which revealed anastomotic stricture. Admitted to [facility] 3/28-4/1/18 with progressive symptoms, discussed possibility of endoscopic dilatation of anastomotic site. Planned for colonoscopy with Dr. Vigil for attempted dilatation; unfortunately due to miscommunication on prep instructions, this has been unsuccessful x 2. Now presenting with hypokalemia and severe abdominal pain while attempting 3rd prep. Discussed with GI, plan for GoLytely prep today and colonoscopy tomorrow. Recommended NG placement although patient adamantly refuses at this time; would like to trial oral prep first. Colorectal surgery consulted as uncertain if GI will be able to successfully dilate.  - GoLytely prep today - will need to place NG tube if not tolerating PO prep  - IV NS @ 75 cc/hour  - Colonoscopy tomorrow  - CRS consulted, appreciate  assistance    Acute on Chronic Abdominal Pain. Related to recurrent SBO's. Maintained on oxycodone PTA. Abdominal pain acutely worsened on presentation, received IV fentanyl x 2 in ED. Pain much improved today after having several BM's. Likely related to constipation/SBO's.  - IV dilaudid PRN while NPO, plan to transition back to PTA regimen prior to discharge  - Would recommend establishing with pain clinic as OP if has not already done so    TPN Dependence. Progressive symptoms related to SBO's despite small bowel resection. Largely unable to tolerate oral nutrition, started on TPN 3/2018. Has PICC line in place. Potassium low on admission (see above), Mg and Phos WNL.  - Nutrition consulted to restart home TPN  - Trend Mg, Phos    # Pain Assessment:  Current Pain Score 5/2/2018   Patient currently in pain? yes   Pain score (0-10) -   Pain location Abdomen   Pain descriptors Aching   Some encounter information is confidential and restricted. Go to Review Flowsheets activity to see all data.   - Jenni is experiencing acute on chronic abdominal pain. Pain management was discussed and the plan was created in a collaborative fashion.  Jenni's response to the current recommendations: engaged  - Please see the plan for pain management as documented above    Diet: Clear Liquid Diet  Fluids: IV NS @ 75 cc/hour  DVT Prophylaxis: Pneumatic Compression Devices  Code Status: Full Code    Disposition Plan   Expected discharge: 1-2 days, recommended to prior living arrangement once colonoscopy completed, pain controlled on PO medications.     Entered: Sangeetha Bravo 05/02/2018, 8:05 AM   Information in the above section will display in the discharge planner report.      The patient's care was discussed with the Attending Physician, Dr. Caraballo.    Sangeetha Bravo  Internal Medicine Staff Hospitalist Service  [facility]  Pager: [phone]  Please see sticky note for cross cover  information    Interval History   Jenni is feeling much better this morning. Reports having 2 BM's overnight with significant improvement in her abdominal pain. Currently has no nausea. Notes she does not tolerate much PO intake at baseline and may have a scoop of mashed potatoes 1-2 days of the week. Anxious to have her colonoscopy completed as she notes 2 prior failed attempts related to inaccurate prep instructions.    Data reviewed today: I reviewed all medications, new labs and imaging results over the last 24 hours. I personally reviewed no images or EKG's today.    Physical Exam   Vital Signs: Temp: 97.9  F (36.6  C) Temp src: Axillary BP: 97/77 Pulse: 101 Heart Rate: 77 Resp: 16 SpO2: 100 % O2 Device: None (Room air)    Weight: 121 lbs 3.2 oz  General Appearance: Well-appearing  female resting comfortably in bed, NAD.  Respiratory: Respiratory effort normal on RA. Lungs CTAB without rales, rhonchi, or wheezing.  Cardiovascular: RRR, S1/S2. No murmurs, rubs, or gallops.  GI: Abdomen soft, non-distended, mild epigastric tenderness. Bowel sounds normoactive.  Skin: No jaundice, rashes, or lesions evident on exposed skin.  Extremities: No peripheral edema.    Data   Data     Recent Labs  Lab 05/02/18  0614 05/01/18  2057 05/01/18  1530   WBC  --  7.5 6.9   HGB  --  13.7  15.0 13.9   MCV  --  98 97   PLT  --  336 373   INR  --  1.03  --     131*  130* 134   POTASSIUM 3.8 2.8*  2.9* 2.5*   CHLORIDE 95 86* 90*   CO2 35* 38* 35*   BUN 17 25 27   CR 0.73 0.85 0.89   ANIONGAP 6 8 9   JONATAN 8.5 8.5 8.9   GLC 82 98  106* 89   ALBUMIN  --  3.3* 3.3*   PROTTOTAL  --  7.0 7.2   BILITOTAL  --  0.7 0.6   ALKPHOS  --  77 83   ALT  --  24 18   AST  --  31 15   TROPI  --  <0.015  --    TROPONIN  --  0.00  --

## 2018-05-02 NOTE — CONSULTS
COLORECTAL SURGERY HISTORY AND PHYSICAL    ASSESSMENT/PLAN: Rachel A Gerhardt is a 42-year-old female with history of small bowel resection for radiation stricture (s/p radiation for cervical cancer) and anastomotic stricture and secondary severe malnutrition now TPN dependent who presents with hypokalemia. She does not have any increasing abdominal pain, nausea, vomiting, or other obstructive symptoms and is non-toxic appearing. At this time GI is planning to do an inpatient prep and colonoscopy with another attempt at dilation. Nutritional parameters are improving on TPN.     -- No indication for acute surgical intervention  -- Agree with GI consult and attempt at stricture dilation as previously planned  -- Continue TPN, NPO except for bowel prep  -- Replete electrolytes PRN  -- Colorectal surgery is available if dilation fails; patient should follow up in Dr. Moore's clinic as an outpatient for further discussion  -- Colorectal surgery will sign off, please call with questions    Discussed with Dr. Moore.    Juan Luis Washington MD  PGY-3 General Surgery  Pager: [phone]    - - - - - - - - - -    REASON FOR CONSULT: Patient with recurrent SBO    CHIEF COMPLAINT: hypokalemia    HISTORY OF PRESENT ILLNESS: Rachel A Gerhardt is a 42-year-old female with PMH significant for cervical cancer s/p radiation, polysubstance abuse, recurrent small bowel obstruction secondary to stricture, and TPN dependence who is admitted with hypokalemia. Ms. Gerhardt previously underwent exploratory laparotomy with Dr. Goodwin on 5/18/2017 which identified radiation injury to the ileum and 8 focal strictures in a 60 cm segment which was resected, leaving ~290 cm of small intestine. Starting in September 2017 she developed progressive intolerance of oral intake with nausea, vomiting, and intermittent obstruction. MR enterography on 3/21/2018 demonstrated an apparent anastomotic stricture in the distal small bowel. She was admitted to the  hospital on 3/29/2018 for weakness and severe malnutrition (albumin 2.7, prealbumin 11) and was placed on TPN which was continued at discharge. She has since been TPN dependent and taking small amounts of PO with variable tolerance. She was scheduled to undergo colonoscopy with attempt at stricture dilation with Dr. Vigil on 4/24 and 4/30; however due to miscommunication she did not undergo the correct prep. On 5/1/2018 she was noted to have hypokalemia on her TPN labs and was admitted to the hospital. She denies any symptoms beyond baseline abdominal pain. At this time she is planning to drink a GoLytely prep in the hospital and undergo colonoscopy with Dr. Vigil as an inpatient tomorrow.     REVIEW OF SYSTEMS: As noted above. No fever, chills. No chest pain or shortness of breath. No current nausea, vomiting. No bowel or bladder difficulties. No joint pain or muscle weakness. No headache or dizziness.     PAST MEDICAL HISTORY:   Past Medical History:   Diagnosis Date     Asthma      Cancer (H)     Per patient OBGYN, cerivical cancer     Cervical cancer (H)      Other chronic pain      Ovarian cancer (H)      Substance abuse     Outside records indicate past history of narcotics abuse or dependence, but patient denies.       SURGICAL HISTORY:   Past Surgical History:   Procedure Laterality Date     COLONOSCOPY WITH CO2 INSUFFLATION N/A 4/30/2018    Procedure: COLONOSCOPY WITH CO2 INSUFFLATION;  Colonoscopy;  Surgeon: Omero Vigil MD;  Location: [facility]     COMBINED CYSTOSCOPY, INSERT STENT URETER(S) Bilateral 5/18/2017    Procedure: COMBINED CYSTOSCOPY, INSERT STENT URETER(S);  Cystoscopy with Bilateral Stent,;  Surgeon: Rene Calero MD;  Location: [facility] OR     ENT SURGERY  2009    mastoid, sinus     EXAM UNDER ANESTHESIA, INSERT ALEX SLEEVE, UTERINE PLACEMENT OF TANDEM AND RING FOR RAD, ULTRASOUND N/A 12/14/2015    Procedure: EXAM UNDER ANESTHESIA, INSERT ALEX SLEEVE, UTERINE PLACEMENT OF TANDEM  AND RING FOR RADIATION, ULTRASOUND GUIDED;  Surgeon: Abby Tony MD;  Location: [facility] OR     INSERT TANDEM AND CESIUM APPLICATOR CERVIX, ULTRASOUND GUIDED N/A 12/17/2015    Procedure: INSERT TANDEM AND CESIUM APPLICATOR CERVIX, ULTRASOUND GUIDED;  Surgeon: Kika Wood MD;  Location: [facility] OR     KNEE SURGERY       LAPAROTOMY EXPLORATORY N/A 5/18/2017    Procedure: LAPAROTOMY EXPLORATORY;   Exploratry Laparotomy, Small Bowel Resection with anastomosis, Flexible Sigmoidoscopy;  Surgeon: Jennifer Goodwin MD;  Location: [facility]     PICC INSERTION Right 04/29/2017    4fr SL BioFlo PICC, 37cm (3cm external) in the R basilic vein w/ tip in the mid SVC.     PICC INSERTION Right 03/29/2018    4Fr - 40cm (4cm external), R lateral brachial vein, Low SVC     RESECT SMALL BOWEL WITHOUT OSTOMY N/A 5/18/2017    Procedure: RESECT SMALL BOWEL WITHOUT OSTOMY;;  Surgeon: Jennifer Goodwin MD;  [facility]     SIGMOIDOSCOPY FLEXIBLE N/A 5/18/2017    Procedure: SIGMOIDOSCOPY FLEXIBLE;;  Surgeon: Jennifer Goodwin MD;  Location: [facility]       SOCIAL HISTORY:   Social History     Social History     Marital status:      Spouse name: N/A     Number of children: N/A     Years of education: N/A     Social History Main Topics     Smoking status: Light Tobacco Smoker     Packs/day: 0.25     Years: 12.00     Types: Cigarettes     Smokeless tobacco: Never Used      Comment: pt smoking about 4 cigs daily     Alcohol use No      Comment: None per pt     Drug use: No      Comment: Patient denies.  Outside records indicate possible Opiate abuse.     Sexual activity: Yes     Partners: Male     Birth control/ protection: None     Other Topics Concern     Parent/Sibling W/ Cabg, Mi Or Angioplasty Before 65f 55m? No     Social History Narrative    Lives alone     FAMILY HISTORY:   Family History   Problem Relation Age of Onset     DIABETES Mother      Ovarian Cancer No family hx of      Uterine Cancer No family hx of      Cervical Cancer  No family hx of      Breast Cancer No family hx of        ALLERGIES:      Allergies   Allergen Reactions     No Clinical Screening - See Comments Other (See Comments) and Diarrhea     headache  Carrots cause gastric upset, cramping and diarrhea.     Sulfa Drugs Hives     hives     Amoxicillin Unknown and Other (See Comments)     vomiting  vomiting     Amoxicillin-Pot Clavulanate Other (See Comments) and Nausea     vomiting     Augmentin GI Disturbance, Nausea and Hives     Avelox [Moxifloxacin] Nausea and Vomiting, Unknown and Nausea     Ciprofloxacin Hives and Nausea     Codeine Nausea and Vomiting and Nausea     Ibuprofen Nausea and Vomiting     Other reaction(s): Nausea And Vomiting     Ibuprofen Sodium Hives and GI Disturbance     Quinolones      Tramadol Hives, Diarrhea, Nausea and Nausea and Vomiting     Daucus Carota      Other reaction(s): GI Upset  Other reaction(s): Abdominal pain, Diarrhea  Carrots cause gastric upset, cramping and diarrhea.       MEDICATIONS:    No current facility-administered medications on file prior to encounter.   Current Outpatient Prescriptions on File Prior to Encounter:  acetaminophen (TYLENOL) 500 MG tablet Take 2 tablets (1,000 mg) by mouth 4 times daily (Patient taking differently: Take 1,000 mg by mouth every 6 hours as needed )   ondansetron (ZOFRAN) 4 MG tablet Take 1-2 tablets (4-8 mg) by mouth every 8 hours as needed for nausea   oxyCODONE IR (ROXICODONE) 5 MG tablet Take 1-2 tablets (5-10 mg) by mouth every 4 hours as needed for moderate to severe pain   parenteral nutrition - PTA/DISCHARGE ORDER The TPN formula will print on the After Visit Summary Report.   albuterol (PROAIR HFA/PROVENTIL HFA/VENTOLIN HFA) 108 (90 BASE) MCG/ACT Inhaler Inhale 2 puffs into the lungs every 6 hours as needed   vitamin D (ERGOCALCIFEROL) 80907 UNIT capsule Take 1 capsule (50,000 Units) by mouth once a week Needs labs checked prior to additional refills (Patient taking differently: Take  "50,000 Units by mouth once a week Every Thursday. Needs labs checked prior to additional refills)       PHYSICAL EXAM:   BP (!) 78/64 (BP Location: Left arm)  Pulse 101  Temp 98.6  F (37  C) (Oral)  Resp 18  Ht 1.753 m (5' 9\")  Wt 55.5 kg (122 lb 4.8 oz)  SpO2 97%  BMI 18.06 kg/m2  General: Alert female in no acute distress.  HEENT: Normocephalic, atraumatic. Patent nares. EOMI. PERRLA. No scleral icterus.  Chest wall: Symmetric thorax.   Respiratory: Non-labored breathing on room air.  Cardiovascular: Regular rate and rhythm.  Gastrointestinal: Abdomen soft, non-distended, mild diffuse tenderness to palpation. No rebound or guarding.  Extremities: Moving all four extremities. No limb deformities. No pedal edema. Peripheral pulses palpable in all distal extremities.  Skin: No rashes or lesions appreciated.    LAB DATA: Reviewed.   Na 136  K 3.8 (2.8)  Cl 95  CO2 35  BUN 17  Cr 0.73  Ca 8.5  Mg 2.0  Albumin 3.3  Prealbumin 20  WBC 7.5  Hgb 13.7  Plt 336  INR 1.03    IMAGING:   XR abdomen 5/2/2018:  Findings:  Supine and upright radiographs of the abdomen.  Redemonstration of multiple loops of predominantly air-filled small  bowel throughout the abdomen. No pneumatosis or portal venous gas. The  colon is decompressed. Visualized lung bases are clear.     Impression:  Interval increase in the distention of the multiple loops  of dilated and air-filled small bowel consistent with known small  bowel obstruction and less likely an adynamic ileus. Findings are not  significantly changed compared to CT 3/20/2018, given differences in   modality. No pneumatosis or free air.    "

## 2018-05-02 NOTE — PLAN OF CARE
Problem: Patient Care Overview  Goal: Plan of Care/Patient Progress Review  AVSS. NSR. Pt started go-lytely at 1000. Mixed with Gatorade. Pt tolerating well. Has drank half of jug. Intermittent nausea and pain noted. Dilaudid given x 2, along with zofran x 1. Pt up ad dania. Voiding spont. Loose bm x 1. Will have colonoscopy tomorrow. K 3.6 at 1330. Next draw scheduled for 1800. Continue with poc.

## 2018-05-02 NOTE — PROGRESS NOTES
Pt admitted to  room 5410. All belongings with patient. Walked into room. VSS. Telemetry placed. Pt declined need for pain medication or nausea meds. Emesis bag within reach. MD updated. Will monitor.

## 2018-05-02 NOTE — H&P
[facility], [place]    Internal Medicine History and Physical - [facility] Service       Date of Admission:  5/1/2018    Chief Complaint   hypokalemia    History is obtained from the patient    History of Present Illness   Rachel A Gerhardt is a 42-year-old F with a history of cervical cancer s/p chemotherapy and XRT complicated by recurrent SBOs requiring exploratory laparotomy (5/18/17) and small bowel resection, chronic TPN needs (since 3/2018), polysubstance abuse who presents after preop labs showed hypokalemia to 2.5 and the patient reported some midsternal chest discomfort radiating to the back when on the phone with RN line. On arrival, patient did not have chest pain, was afebrile and alert. Troponin was negative, CBC was unremarkable, and CMP was remarkable for a potassium of 2.8. Patient received 40mEq of KCl PO in the ED, which she says that she was able to tolerate very little of due to chronic epigastric pain, nausea/vomiting resulting from her SBO. Ms. Gerhardt was subsequently admitted to medicine for observation over night to ensure positive trajectory of potassium.     Of note, Ms. Gerhardt did start a bowel prep yesterday in anticipation for a routine colonoscopy that is scheduled with Dr. Vigil on 5/2.     Review of Systems    The 10 point Review of Systems is negative other than noted in the HPI or here.   CONSTITUTIONAL: NEGATIVE for fever, chills,  RESP: NEGATIVE for significant cough or SOB  CV: NEGATIVE for chest pain, palpitations  GI: Positive for chronic abdominal pain, nausea, vomiting, diarrhea  : negative for and dysuria    Past Medical History    I have reviewed this patient's medical history and updated it with pertinent information if needed.   Past Medical History:   Diagnosis Date     Asthma      Cancer (H)     Per patient OBGYN, cerivical cancer     Cervical cancer (H)      Other chronic pain      Ovarian cancer (H)      Substance abuse     Outside  records indicate past history of narcotics abuse or dependence, but patient denies.      Past Surgical History   I have reviewed this patient's surgical history and updated it with pertinent information if needed.  Past Surgical History:   Procedure Laterality Date     COLONOSCOPY WITH CO2 INSUFFLATION N/A 4/30/2018    Procedure: COLONOSCOPY WITH CO2 INSUFFLATION;  Colonoscopy;  Surgeon: Omero Vigil MD;  [facility]     COMBINED CYSTOSCOPY, INSERT STENT URETER(S) Bilateral 5/18/2017    Procedure: COMBINED CYSTOSCOPY, INSERT STENT URETER(S);  Cystoscopy with Bilateral Stent,;  Surgeon: Rene Calero MD;  Location: [facility]     ENT SURGERY  2009    mastoid, sinus     EXAM UNDER ANESTHESIA, INSERT ALEX SLEEVE, UTERINE PLACEMENT OF TANDEM AND RING FOR RAD, ULTRASOUND N/A 12/14/2015    Procedure: EXAM UNDER ANESTHESIA, INSERT ALEX SLEEVE, UTERINE PLACEMENT OF TANDEM AND RING FOR RADIATION, ULTRASOUND GUIDED;  Surgeon: Abby Tony MD;  [facility]     INSERT TANDEM AND CESIUM APPLICATOR CERVIX, ULTRASOUND GUIDED N/A 12/17/2015    Procedure: INSERT TANDEM AND CESIUM APPLICATOR CERVIX, ULTRASOUND GUIDED;  Surgeon: Kika Wood MD;  Location: [facility] OR     KNEE SURGERY       LAPAROTOMY EXPLORATORY N/A 5/18/2017    Procedure: LAPAROTOMY EXPLORATORY;   Exploratry Laparotomy, Small Bowel Resection with anastomosis, Flexible Sigmoidoscopy;  Surgeon: Jennifer Goodwin MD;  [facility]     PICC INSERTION Right 04/29/2017    4fr SL BioFlo PICC, 37cm (3cm external) in the R basilic vein w/ tip in the mid SVC.     PICC INSERTION Right 03/29/2018    4Fr - 40cm (4cm external), R lateral brachial vein, Low SVC     RESECT SMALL BOWEL WITHOUT OSTOMY N/A 5/18/2017    Procedure: RESECT SMALL BOWEL WITHOUT OSTOMY;;  Surgeon: Jennifer Goodwin MD;  [facility]     SIGMOIDOSCOPY FLEXIBLE N/A 5/18/2017    Procedure: SIGMOIDOSCOPY FLEXIBLE;;  Surgeon: Jennifer Goodwin MD;  [facility]      Social  "History   Social History   Substance Use Topics     Smoking status: Light Tobacco Smoker     Packs/day: 0.25     Years: 12.00     Types: Cigarettes     Smokeless tobacco: Never Used      Comment: pt smoking about 4 cigs daily     Alcohol use No      Comment: None per pt     Family History   I have reviewed this patient's family history and updated it with pertinent information if needed.   Family History   Problem Relation Age of Onset     DIABETES Mother      Ovarian Cancer No family hx of      Uterine Cancer No family hx of      Cervical Cancer No family hx of      Breast Cancer No family hx of      Prior to Admission Medications   Prior to Admission Medications   Prescriptions Last Dose Informant Patient Reported? Taking?   acetaminophen (TYLENOL) 500 MG tablet Past Month at Unknown time  No Yes   Sig: Take 2 tablets (1,000 mg) by mouth 4 times daily   Patient taking differently: Take 1,000 mg by mouth every 6 hours as needed    albuterol (PROAIR HFA/PROVENTIL HFA/VENTOLIN HFA) 108 (90 BASE) MCG/ACT Inhaler More than a month at Unknown time  No No   Sig: Inhale 2 puffs into the lungs every 6 hours as needed   bisacodyl (DULCOLAX) 5 MG EC tablet   No No   Sig: Take 3 tablets (15 mg) by mouth once for 1 dose Refer to \"Getting Ready for a Colonoscopy\" instruction handout   ondansetron (ZOFRAN) 4 MG tablet 5/1/2018 at Unknown time  No Yes   Sig: Take 1-2 tablets (4-8 mg) by mouth every 8 hours as needed for nausea   oxyCODONE IR (ROXICODONE) 5 MG tablet 5/1/2018 at Unknown time  No Yes   Sig: Take 1-2 tablets (5-10 mg) by mouth every 4 hours as needed for moderate to severe pain   parenteral nutrition - PTA/DISCHARGE ORDER 5/1/2018 at Unknown time  No Yes   Sig: The TPN formula will print on the After Visit Summary Report.   vitamin D (ERGOCALCIFEROL) 38493 UNIT capsule Unknown at Unknown time  No No   Sig: Take 1 capsule (50,000 Units) by mouth once a week Needs labs checked prior to additional refills   Patient " taking differently: Take 50,000 Units by mouth once a week Every Thursday. Needs labs checked prior to additional refills      Facility-Administered Medications: None     Allergies   Allergies   Allergen Reactions     No Clinical Screening - See Comments Other (See Comments) and Diarrhea     headache  Carrots cause gastric upset, cramping and diarrhea.     Sulfa Drugs Hives     hives     Amoxicillin Unknown and Other (See Comments)     vomiting  vomiting     Amoxicillin-Pot Clavulanate Other (See Comments) and Nausea     vomiting     Augmentin GI Disturbance, Nausea and Hives     Avelox [Moxifloxacin] Nausea and Vomiting, Unknown and Nausea     Ciprofloxacin Hives and Nausea     Codeine Nausea and Vomiting and Nausea     Ibuprofen Nausea and Vomiting     Other reaction(s): Nausea And Vomiting     Ibuprofen Sodium Hives and GI Disturbance     Quinolones      Tramadol Hives, Diarrhea, Nausea and Nausea and Vomiting     Daucus Carota      Other reaction(s): GI Upset  Other reaction(s): Abdominal pain, Diarrhea  Carrots cause gastric upset, cramping and diarrhea.       Physical Exam   Vital Signs: Temp: 99.3  F (37.4  C) Temp src: Oral BP: 98/68 Pulse: 101 Heart Rate: 91 Resp: 16 SpO2: 95 % O2 Device: None (Room air)    Weight: 121 lbs 3.2 oz    GENERAL: Patient lies on the L side, groggy and sleeping during encounter. Well kempt, pleasant, appropriate affect.  HEAD: Atraumatic, normocephalic.  NOSE: Without deformity, bleeding or discharge.   ORAL CAVITY: No swelling or abnormality to the lip or teeth. Moist mucus membranes.  CHEST: Symmetrical with equal breath sounds. Equal excursion. There is no tenderness on palpation of the chest.  LUNGS: Clear to auscultation bilaterally. No rales, rhonchi or wheezes are appreciated. Good air movement is auscultated in all 4 lung fields.  HEART: Regular rate and rhythm. No murmur, rubs, or gallops noted. No S3, S4 or rub is auscultated.   ABDOMEN: Soft, nontender and  nondistended. Normal bowel sounds are auscultated.  EXTREMITIES: No clubbing, cyanosis or edema.   SKIN: No rashes. No jaundice. Pink and warm with good turgor.   PSYCHIATRIC: The patient is oriented x4. Mood and affect are appropriate. No dysarthria is noted.     Assessment & Plan   Rachel A Gerhardt is a 42-year-old F with a history of cervical cancer s/p chemotherapy and XRT complicated by recurrent SBOs requiring exploratory laparotomy (5/18/17) and small bowel resection, chronic TPN needs (since 3/2018), polysubstance abuse who presents after colonoscopy preop labs found a potassium of 2.5, likely secondary to bowel prep in the setting of bowel structure.    # Hypokalemia  Likely secondary to bowel prep that the patient started taking yesterday in anticipation of colonoscopy scheduled with Dr. Vigil on 5/2-- she has expensive history of poor PO and requires chronic TPN.  - KCl 30mEq IV  - KCl tabs 40mEq PO (with the understanding that patient may have poor absorption)  - Repeat BMP in AM, after KCl has finished running  - Telemetry    # History of small bowel obstruction  # Intestinal stricture  # Chronic abdominal pain  # Failed outpatient colonoscopy due  to inadequate preparation   - Hydromorphone 0.3-0.5mg q2h PRN for pain  - Ondansetron PRN for nausea  - Will prep patient for colonoscopy.  NPO     # Poor PO intake  # Chronic nausea/vomiting  # TPN dependence  - Nutrition consulted, appreciate recs    # Pain Assessment:  Current Pain Score 5/2/2018   Patient currently in pain? yes   Pain score (0-10) -   Pain location Abdomen   Pain descriptors -   Some encounter information is confidential and restricted. Go to Review Flowsheets activity to see all data.   - Jenni is experiencing pain due to intestinal strictures. Pain management was discussed and the plan was created in a collaborative fashion.  Jenni's response to the current recommendations: compliant  - Please see the plan for pain management as  documented above      Diet: NPO for Medical/Clinical Reasons Except for: NPO but receiving PN, No Exceptions  Fluids: 75mL/hr of NS (expires after 12 hours, will have to d/c or renew)  DVT Prophylaxis: Anti-embolisim stockings (TEDs)  Code Status: Full Code    Disposition Plan   Expected discharge: Tomorrow; recommended to prior living arrangement once adequate pain management/ tolerating PO medications and electrolytes are stable.     Entered: Cait Simmons 05/02/2018, 3:57 AM   Information in the above section will display in the discharge planner report.    The patient was discussed with Dr. Fleming.    Cait Simmons  [facility]   Pager: [phone]  Please see sticky note for cross cover information    Physician Attestation   Gene MARCANO saw this patient with the resident and agree with the resident s findings and plan of care as documented in the resident s note    I personally reviewed vital signs, medications, labs and imaging.    Gene Fleming  Date of Service (when I saw the patient): 5/1/18

## 2018-05-02 NOTE — PROGRESS NOTES
CLINICAL NUTRITION SERVICES - ASSESSMENT NOTE     Nutrition Prescription    RECOMMENDATIONS FOR MDs/PROVIDERS TO ORDER:  CPN recommendation (resume home CPN regimen): use dosing wt 55 kg, 1800 mL x 12 hrs with 235g Dex, 90g AA, and lipids daily = 1659 kcals (30 kcal/kg), 1.6 g PRO/kg, peak GIR 7.1, 30% kcals from fat.    Malnutrition Status:    Severe malnutrition in the context of chronic illness    Recommendations already ordered by Registered Dietitian (RD):  Johnny.     Future/Additional Recommendations:  Diet resumption per providers pending GI status and appropriate timing to restart. If eating/tolerating po intake well, offer oral nutrition supplements and start calorie counts.    Adjust CPN pending lab/wt trends, changes in PO intake, and/or medical course. If continues to lose wt, then may need to consider increasing CPN calories towards ~35 kcal/kg.         REASON FOR ASSESSMENT  Jenni Rodrigueshardt is a/an 42 year old female assessed by the dietitian for Admission Nutrition Risk Screen for tube feeding or parenteral nutrition and Provider Orders - patient with chronic abdominal pain/nausea/vomiting from intestinal strictures and SBO, is normally TPN dependent and Pharmacy/Nutrition to start & manage TPN    NUTRITION HISTORY  Pt is chronically on TPN d/t hx of SBO and limited po intakes associated with abdominal pain. Per Jenni, only been able to consume 1/2 cup mashed potatoes or ice on a good day. Otherwise, really eating minimally per her report. Stated that TPN has been going well at home.     Per [facility], home parenteral nutrition regimen is the following: Wt 60 kg; Formula: AA Travasol 10% 90 gm/day, Dex 235 gm/day, Volume 1800 mL, and Lipids 20 % 250 mL over 12 hours = 1659 kcals (28 kcals/kg), 1.5 g protein/kg, peak GIR 6.5, and 30% of kcals from fat on average daily.       CURRENT NUTRITION ORDERS  Diet: Clear Liquid  Intake/Tolerance: drinking golytely prep.     LABS  Labs  "reviewed  LFTs and bilirubin WNL  TGs 126 (5/1/18)    MEDICATIONS  Medications reviewed    ANTHROPOMETRICS  Height: 175.3 cm (5' 9\")  Most Recent Weight: 55.5 kg (122 lb 4.8 oz)    IBW: 65.9 kg  BMI: Underweight BMI <18.5  Weight History: down 26 lbs (18%) x 3 months.   Wt Readings from Last 15 Encounters:   05/02/18 55.5 kg (122 lb 4.8 oz)   04/30/18 55.4 kg (122 lb 2.2 oz)   04/24/18 55.7 kg (122 lb 12.7 oz)   03/29/18 60.3 kg (132 lb 14.4 oz)   02/13/18 64.9 kg (143 lb)   02/04/18 67.1 kg (148 lb)   07/17/17 69.5 kg (153 lb 3.2 oz)   07/16/17 70.2 kg (154 lb 12.2 oz)   07/16/17 70.2 kg (154 lb 12.2 oz)   06/22/17 70.5 kg (155 lb 6.4 oz)   06/06/17 67.1 kg (147 lb 14.4 oz)   05/31/17 68.2 kg (150 lb 6.4 oz)   05/28/17 68.9 kg (151 lb 14.4 oz)   05/24/17 69.7 kg (153 lb 9.6 oz)   05/08/17 68 kg (150 lb)       Dosing Weight: 55 kg (actual) - driest wt this admission of 55 kg (5/2/18)    ASSESSED NUTRITION NEEDS  Estimated Energy Needs: 0094-0381 kcals/day (30 - 35 kcals/kg)  Justification: Repletion (min 25 kcal/kg for CPN)  Estimated Protein Needs:  grams protein/day (1.5 - 2 grams of pro/kg)  Justification: Repletion  Estimated Fluid Needs: 0557-7663 mL/day (1 mL/kcal)   Justification: Maintenance    PHYSICAL FINDINGS  See malnutrition section below.  +abdominal distension   +reduced subcutaneous and muscle mass throughout most of body    MALNUTRITION  % Intake: Decreased intake does not meet criteria  % Weight Loss: > 7.5% in 3 months (severe)  Subcutaneous Fat Loss: Facial region, Upper arm, Lower arm, and Thoracic/intercostal:  severe  Muscle Loss: Temporal, Facial & jaw region, Thoracic region (clavicle, acromium bone, deltoid, trapezius, pectoral), Upper arm (bicep, tricep), and Lower arm  (forearm):  Severe  Fluid Accumulation/Edema: None noted  Malnutrition Diagnosis: Severe malnutrition in the context of chronic illness    NUTRITION DIAGNOSIS  Inadequate oral intake related to decreased intake d/t " abdominal pain as evidenced by per verbal report from pt, has been eating <300 kcal/day for many week (i.e. 1/2 cup mashed potatoes) and reliant on CPN to support majority of nutrition      INTERVENTIONS  Implementation  Nutrition Education: RD role and CPN restart   Collaboration with other providers: PharmD on POC to resume CPN. Discussed above recommendations and also emailed [facility] to obtain nutrition support regimen.    Goals  Total avg nutritional intake to meet a minimum of 25 kcal/kg and 1.5 g PRO/kg daily (per dosing wt 55 kg).     Monitoring/Evaluation  Progress toward goals will be monitored and evaluated per protocol.    Adarsh Guo RD, LD  5C/BMT Dietitian  Pager: [phone]

## 2018-05-02 NOTE — PROGRESS NOTES
This is a recent snapshot of the patient's [facility] medical record.  For current drug dose and complete information and questions, call [phone] or In Basket pool, [facility] ([id number])  CSN Number:  [id number]

## 2018-05-02 NOTE — ED PROVIDER NOTES
"  History     Chief Complaint   Patient presents with     Abnormal Labs     hypokalemia     HPI  42-year-old female with complex past history significant for colonic stenosis, recurrent partial bowel obstruction, severe malnutrition requiring TPN.  The patient states she has ongoing nausea and has a hard time tolerating p.o.  Patient does add that she had a colonoscopy yesterday which went well.  She does report some persistent upper epigastric abdominal discomfort which is been unchanged for 24 hours.  She reports no trauma to this region.  She reports no associated chest pain or shortness of breath.  She denies similar symptoms in the past.  Patient denies fever or chills.  The patient states he is followed by outpatient nutrition for assistance with TPN.  Today she discussed her case with her operative surgeon with recommendation to proceed to the ER.    I have reviewed the Medications, Allergies, Past Medical and Surgical History, and Social History in the Epic system.    Review of Systems   Constitutional: Negative for chills and fever.   Respiratory: Negative for cough and shortness of breath.    Cardiovascular: Negative for chest pain.   Gastrointestinal: Positive for abdominal pain. Negative for abdominal distention, diarrhea, nausea and vomiting.   Musculoskeletal: Negative for arthralgias.   All other systems reviewed and are negative.      Physical Exam   BP: 109/76  Pulse: 101  Heart Rate: 91  Temp: 98.5  F (36.9  C)  Resp: 16  Height: 175.3 cm (5' 9\")  Weight: 55.3 kg (122 lb)  SpO2: 96 %      Physical Exam   Constitutional: She appears well-developed. No distress.   HENT:   Head: Normocephalic and atraumatic.   Mouth/Throat: Oropharynx is clear and moist.   Eyes: Pupils are equal, round, and reactive to light.   Cardiovascular: Tachycardia present.    Pulmonary/Chest: Effort normal. No respiratory distress. She has no wheezes.   Abdominal: Soft. She exhibits no distension. There is no tenderness. There " is no rebound and no guarding.   Musculoskeletal: She exhibits no edema or tenderness.   Neurological: She is alert. No cranial nerve deficit.   Skin: Skin is warm. No rash noted.   Psychiatric: She has a normal mood and affect.       ED Course     ED Course     Procedures       Labs Ordered and Resulted from Time of ED Arrival Up to the Time of Departure from the ED   CBC WITH PLATELETS DIFFERENTIAL - Abnormal; Notable for the following:        Result Value    MCH 33.5 (*)     All other components within normal limits   COMPREHENSIVE METABOLIC PANEL - Abnormal; Notable for the following:     Sodium 131 (*)     Potassium 2.8 (*)     Chloride 86 (*)     Carbon Dioxide 38 (*)     Albumin 3.3 (*)     All other components within normal limits   ISTAT GASES ELEC ICA GLUC ANGEL LUIS POCT - Abnormal; Notable for the following:     Ph Venous 7.54 (*)     PO2 Venous 23 (*)     Bicarbonate Venous 43 (*)     Sodium 130 (*)     Potassium 2.9 (*)     Glucose 106 (*)     Calcium Ionized 4.2 (*)     All other components within normal limits   INR   PARTIAL THROMBOPLASTIN TIME   TROPONIN I   MAGNESIUM   ISTAT TROPONIN NURSING POCT   ISTAT CG8 GAS ELEC ICA GLUC ANGEL LUIS NURSE POCT   TROPONIN POCT            Assessments & Plan (with Medical Decision Making)     42-year-old female complex past medical history arriving to the emergency department primarily for evaluation of hypokalemia.  Upon arrival this patient is noted to be alert and afebrile.  She is hemodynamically stable with soft blood pressures in mild tachycardia.  The patient did have upper epigastric abdominal discomfort and upon arrival did have a EKG and troponin with auto read of ST and T-wave abnormality.  The patient at this time is no chest pain or shortness of breath.  Would favor close observation and do not believe she would benefit from emergent cardiac workup at this time.  A low suspicion for aortic pathology or pulmonary embolus.  Patient is speaking in full sentences  without evidence of increased work of breathing or cardiovascular compromise.  Potassium confirmed to be low in the emergency department.  We will plan to replete via central access and oral route.  The patient does have stricture in removal of bowel segment in the past do believe she benefit from observation overnight to ensure positive trajectory of potassium.  Etiology of loss is unclear however possible with bowel prep preceding colonoscopy.  Case discussed with internal medicine with plan for observation overnight and continued management of hypokalemia while monitored on telemetry.    I have reviewed the nursing notes.    I have reviewed the findings, diagnosis, plan and need for follow up with the patient.    New Prescriptions    No medications on file       Final diagnoses:   Hypokalemia   Generalized muscle weakness       5/1/2018   [facility], [place], EMERGENCY DEPARTMENT     Brandt Powell MD  05/02/18 0005

## 2018-05-02 NOTE — PLAN OF CARE
"Problem: Patient Care Overview  Goal: Plan of Care/Patient Progress Review  Outcome: No Change   05/02/18 0555   OTHER   Plan Of Care Reviewed With patient   Plan of Care Review   Progress no change     AVSS. A/O x4. Independent. Voiding normally. Patient admitted due to c/o of \"chest pain\" and low potassium. Placed on tele for monitoring. Replaced potassium 10ml in 100 x3. Patient is NPO, self administers TPN in evenings at home. Nutrition consult placed for TPN. Denies pain and nausea since arrival. Will draw am labs for K+ recheck. Continue to monitor.       "

## 2018-05-02 NOTE — PROGRESS NOTES
Care Coordinator Progress Note    Admission Date/Time:  5/1/2018  Attending MD:  Gene Fleming MD    Data  Chart reviewed, discussed with interdisciplinary team.   Patient was admitted for:    Hypokalemia  Generalized muscle weakness  Failure to thrive in adult.    Concerns with insurance coverage for discharge needs: .  Current Living Situation: Patient lives with spouse.  Support System: Supportive  Services Involved: Home Infusion  Transportation at Discharge:   Transportation to Medical Appointments:    Barriers to Discharge: chronically ill and pending medical clearance    Coordination of Care and Referrals: Provided patient/family with options for Home Infusion.        Assessment  Rachel A Gerhardt is a 42-year-old F with a history of cervical cancer s/p chemotherapy and XRT complicated by recurrent SBOs requiring exploratory laparotomy (5/18/17) and small bowel resection, chronic TPN needs (since 3/2018), polysubstance abuse who presents after preop labs showed hypokalemia to 2.5 and the patient reported some midsternal chest discomfort radiating to the back.    Attempted to see patient, but she was not in room.  Chart reviewed.  Patient lives with her .  She has stayed at her mother's in the past immediately after discharge.  [facility] for [facility] TPN.  Resumption order placed.  She has a SL PICC.  Patient is currently up independently.  Will continue to follow for discharge needs.    [facility]  Phone  [phone]  Fax  [phone]     Plan  Anticipated Discharge Date:  Couple days  Anticipated Discharge Plan:  Home with TPN    Seema Gaspar RN, BSN  Care Coordinator [facility] [address]  Pager: [phone]  Phone: [phone]

## 2018-05-02 NOTE — ED TRIAGE NOTES
Pt was told by Dr Moore to come to the ED d/t low K+ 2.5. Pt is having some midsternal chest discomfort that radiates to her back.

## 2018-05-02 NOTE — ED NOTES
[facility], [place]   ED Nurse to Floor Handoff     Rachel A Gerhardt is a 42 year old female who speaks English and lives alone,  in a home  They arrived in the ED by car from home    ED Chief Complaint: Abnormal Labs (hypokalemia)    ED Dx;   Final diagnoses:   Hypokalemia   Generalized muscle weakness         Needed?: No    Allergies:   Allergies   Allergen Reactions     No Clinical Screening - See Comments Other (See Comments) and Diarrhea     headache  Carrots cause gastric upset, cramping and diarrhea.     Sulfa Drugs Hives     hives     Amoxicillin Unknown and Other (See Comments)     vomiting  vomiting     Amoxicillin-Pot Clavulanate Other (See Comments) and Nausea     vomiting     Augmentin GI Disturbance, Nausea and Hives     Avelox [Moxifloxacin] Nausea and Vomiting, Unknown and Nausea     Ciprofloxacin Hives and Nausea     Codeine Nausea and Vomiting and Nausea     Ibuprofen Nausea and Vomiting     Other reaction(s): Nausea And Vomiting     Ibuprofen Sodium Hives and GI Disturbance     Quinolones      Tramadol Hives, Diarrhea, Nausea and Nausea and Vomiting     Daucus Carota      Other reaction(s): GI Upset  Other reaction(s): Abdominal pain, Diarrhea  Carrots cause gastric upset, cramping and diarrhea.   .  Past Medical Hx:   Past Medical History:   Diagnosis Date     Asthma      Cancer (H)     Per patient OBGYN, cerivical cancer     Cervical cancer (H)      Other chronic pain      Ovarian cancer (H)      Substance abuse     Outside records indicate past history of narcotics abuse or dependence, but patient denies.      Baseline Mental status: WDL  Current Mental Status changes: at basesline    Infection present or suspected this encounter: no  Sepsis suspected: No  Isolation type: No active isolations     Activity level - Baseline/Home:  Independent  Activity Level - Current:   Independent    Bariatric equipment needed?: No    In the ED these meds were given:    Medications   potassium chloride 10 mEq in 100 mL sterile water intermittent infusion (premix) (0 mEq Intravenous Stopped 5/1/18 2215)   0.9% sodium chloride BOLUS (1,000 mLs Intravenous New Bag 5/1/18 2222)   fentaNYL (PF) (SUBLIMAZE) injection 50 mcg (50 mcg Intravenous Given 5/1/18 2113)   potassium chloride SA (K-DUR/KLOR-CON M) CR tablet 40 mEq (40 mEq Oral Given 5/1/18 2113)       Drips running?  yes    Home pump  No    Current LDAs  Peripheral IV 03/21/18 Right Upper forearm (Active)   Number of days:41       PICC Single Lumen 03/29/18 Right Brachial vein medial (Active)   Number of days:33       Incision/Surgical Site 05/18/17 Abdomen (Active)   Number of days:348       Labs results:   Labs Ordered and Resulted from Time of ED Arrival Up to the Time of Departure from the ED   CBC WITH PLATELETS DIFFERENTIAL - Abnormal; Notable for the following:        Result Value    MCH 33.5 (*)     All other components within normal limits   COMPREHENSIVE METABOLIC PANEL - Abnormal; Notable for the following:     Sodium 131 (*)     Potassium 2.8 (*)     Chloride 86 (*)     Carbon Dioxide 38 (*)     Albumin 3.3 (*)     All other components within normal limits   ISTAT GASES ELEC ICA GLUC ANGEL LUIS POCT - Abnormal; Notable for the following:     Ph Venous 7.54 (*)     PO2 Venous 23 (*)     Bicarbonate Venous 43 (*)     Sodium 130 (*)     Potassium 2.9 (*)     Glucose 106 (*)     Calcium Ionized 4.2 (*)     All other components within normal limits   INR   PARTIAL THROMBOPLASTIN TIME   TROPONIN I   MAGNESIUM   ISTAT TROPONIN NURSING POCT   ISTAT CG8 GAS ELEC ICA GLUC ANGEL LUIS NURSE POCT   TROPONIN POCT       Imaging Studies:   Recent Results (from the past 24 hour(s))   XR Chest Port 1 View    Narrative    Examination: XR CHEST PORT 1 VW, 5/1/2018 9:08 PM    Comparison: 3/29/2018    History: chest pain;     Findings: Single AP view of the chest. Right upper history PICC tip  projects over the atrial caval junction. Cardiac  "mediastinal  silhouette and pulmonary vascular structures are within normal limits.  Lungs are somewhat hyperexpanded. No focal pulmonary opacity, pleural  effusion, or pneumothorax. There are a few prominent loops of  gas-filled small bowel in the visualized portions of the abdomen. No  acute bony normality or displaced rib fracture.      Impression    Impression:   1. No acute airspace opacity.  2. Mildly prominent loops of gas-filled small bowel, partially  visualized. Abdominal plain film could be considered if indicated.    I have personally reviewed the examination and initial interpretation  and I agree with the findings.    GELY WASHINGTON MD       Recent vital signs:   /71  Pulse 101  Temp 98.5  F (36.9  C) (Oral)  Resp 19  Ht 1.753 m (5' 9\")  Wt 55.3 kg (122 lb)  SpO2 97%  BMI 18.02 kg/m2    Cardiac Rhythm: Normal Sinus  Pt needs tele? Yes  Skin/wound Issues: None    Code Status: Full Code    Pain control: good    Nausea control: pt had none    Abnormal labs/tests/findings requiring intervention: K 2.8    Family present during ED course? No   Family Comments/Social Situation comments: n/a    Tasks needing completion: None      3-2330 [facility] ED      "

## 2018-05-03 ENCOUNTER — APPOINTMENT (OUTPATIENT)
Dept: GENERAL RADIOLOGY | Facility: CLINIC | Age: 43
DRG: 640 | End: 2018-05-03
Attending: INTERNAL MEDICINE
Payer: COMMERCIAL

## 2018-05-03 ENCOUNTER — HOME INFUSION (PRE-WILLOW HOME INFUSION) (OUTPATIENT)
Dept: PHARMACY | Facility: CLINIC | Age: 43
End: 2018-05-03

## 2018-05-03 ENCOUNTER — ANESTHESIA (OUTPATIENT)
Dept: SURGERY | Facility: CLINIC | Age: 43
DRG: 640 | End: 2018-05-03
Payer: COMMERCIAL

## 2018-05-03 ENCOUNTER — ANESTHESIA EVENT (OUTPATIENT)
Dept: SURGERY | Facility: CLINIC | Age: 43
DRG: 640 | End: 2018-05-03
Payer: COMMERCIAL

## 2018-05-03 VITALS
HEIGHT: 69 IN | TEMPERATURE: 98.1 F | HEART RATE: 80 BPM | SYSTOLIC BLOOD PRESSURE: 89 MMHG | WEIGHT: 122.3 LBS | OXYGEN SATURATION: 97 % | BODY MASS INDEX: 18.11 KG/M2 | DIASTOLIC BLOOD PRESSURE: 67 MMHG | RESPIRATION RATE: 14 BRPM

## 2018-05-03 LAB
ANION GAP SERPL CALCULATED.3IONS-SCNC: 5 MMOL/L (ref 3–14)
BUN SERPL-MCNC: 20 MG/DL (ref 7–30)
CALCIUM SERPL-MCNC: 8 MG/DL (ref 8.5–10.1)
CHLORIDE SERPL-SCNC: 100 MMOL/L (ref 94–109)
CO2 SERPL-SCNC: 34 MMOL/L (ref 20–32)
CREAT SERPL-MCNC: 0.71 MG/DL (ref 0.52–1.04)
ERYTHROCYTE [DISTWIDTH] IN BLOOD BY AUTOMATED COUNT: 13.6 % (ref 10–15)
FLEXIBLE SIGMOIDOSCOPY: NORMAL
GFR SERPL CREATININE-BSD FRML MDRD: 90 ML/MIN/1.7M2
GLUCOSE BLDC GLUCOMTR-MCNC: 132 MG/DL (ref 70–99)
GLUCOSE BLDC GLUCOMTR-MCNC: 89 MG/DL (ref 70–99)
GLUCOSE SERPL-MCNC: 117 MG/DL (ref 70–99)
HCT VFR BLD AUTO: 34.8 % (ref 35–47)
HGB BLD-MCNC: 11.2 G/DL (ref 11.7–15.7)
MAGNESIUM SERPL-MCNC: 2.2 MG/DL (ref 1.6–2.3)
MCH RBC QN AUTO: 32.7 PG (ref 26.5–33)
MCHC RBC AUTO-ENTMCNC: 32.2 G/DL (ref 31.5–36.5)
MCV RBC AUTO: 102 FL (ref 78–100)
PHOSPHATE SERPL-MCNC: 3.2 MG/DL (ref 2.5–4.5)
PLATELET # BLD AUTO: 288 10E9/L (ref 150–450)
POTASSIUM SERPL-SCNC: 4.5 MMOL/L (ref 3.4–5.3)
RBC # BLD AUTO: 3.43 10E12/L (ref 3.8–5.2)
SODIUM SERPL-SCNC: 139 MMOL/L (ref 133–144)
WBC # BLD AUTO: 8.6 10E9/L (ref 4–11)

## 2018-05-03 PROCEDURE — 71000014 ZZH RECOVERY PHASE 1 LEVEL 2 FIRST HR: Performed by: INTERNAL MEDICINE

## 2018-05-03 PROCEDURE — 25000125 ZZHC RX 250: Performed by: ANESTHESIOLOGY

## 2018-05-03 PROCEDURE — 37000008 ZZH ANESTHESIA TECHNICAL FEE, 1ST 30 MIN: Performed by: INTERNAL MEDICINE

## 2018-05-03 PROCEDURE — 83735 ASSAY OF MAGNESIUM: CPT | Performed by: PHYSICIAN ASSISTANT

## 2018-05-03 PROCEDURE — 25000128 H RX IP 250 OP 636: Performed by: EMERGENCY MEDICINE

## 2018-05-03 PROCEDURE — 80048 BASIC METABOLIC PNL TOTAL CA: CPT | Performed by: PHYSICIAN ASSISTANT

## 2018-05-03 PROCEDURE — 84100 ASSAY OF PHOSPHORUS: CPT | Performed by: PHYSICIAN ASSISTANT

## 2018-05-03 PROCEDURE — 36000057 ZZH SURGERY LEVEL 3 1ST 30 MIN - UMMC: Performed by: INTERNAL MEDICINE

## 2018-05-03 PROCEDURE — 99233 SBSQ HOSP IP/OBS HIGH 50: CPT | Performed by: PHYSICIAN ASSISTANT

## 2018-05-03 PROCEDURE — 40000277 XR SURGERY CARM FLUORO LESS THAN 5 MIN W STILLS: Mod: TC

## 2018-05-03 PROCEDURE — 25000128 H RX IP 250 OP 636: Performed by: PHYSICIAN ASSISTANT

## 2018-05-03 PROCEDURE — 00000146 ZZHCL STATISTIC GLUCOSE BY METER IP

## 2018-05-03 PROCEDURE — 20000002 ZZH R&B BMT INTERMEDIATE

## 2018-05-03 PROCEDURE — 25000125 ZZHC RX 250: Performed by: INTERNAL MEDICINE

## 2018-05-03 PROCEDURE — 27210995 ZZH RX 272: Performed by: INTERNAL MEDICINE

## 2018-05-03 PROCEDURE — 27210794 ZZH OR GENERAL SUPPLY STERILE: Performed by: INTERNAL MEDICINE

## 2018-05-03 PROCEDURE — 36000059 ZZH SURGERY LEVEL 3 EA 15 ADDTL MIN UMMC: Performed by: INTERNAL MEDICINE

## 2018-05-03 PROCEDURE — 25000128 H RX IP 250 OP 636: Performed by: NURSE ANESTHETIST, CERTIFIED REGISTERED

## 2018-05-03 PROCEDURE — 37000009 ZZH ANESTHESIA TECHNICAL FEE, EACH ADDTL 15 MIN: Performed by: INTERNAL MEDICINE

## 2018-05-03 PROCEDURE — 40000170 ZZH STATISTIC PRE-PROCEDURE ASSESSMENT II: Performed by: INTERNAL MEDICINE

## 2018-05-03 PROCEDURE — 25000128 H RX IP 250 OP 636: Performed by: INTERNAL MEDICINE

## 2018-05-03 PROCEDURE — 85027 COMPLETE CBC AUTOMATED: CPT | Performed by: PHYSICIAN ASSISTANT

## 2018-05-03 PROCEDURE — 99207 ZZC CDG-MDM COMPONENT: MEETS HIGH - UP CODED: CPT | Performed by: PHYSICIAN ASSISTANT

## 2018-05-03 PROCEDURE — 25000565 ZZH ISOFLURANE, EA 15 MIN: Performed by: INTERNAL MEDICINE

## 2018-05-03 PROCEDURE — 25000128 H RX IP 250 OP 636: Performed by: ANESTHESIOLOGY

## 2018-05-03 PROCEDURE — 0DJD8ZZ INSPECTION OF LOWER INTESTINAL TRACT, VIA NATURAL OR ARTIFICIAL OPENING ENDOSCOPIC: ICD-10-PCS | Performed by: INTERNAL MEDICINE

## 2018-05-03 RX ORDER — SCOLOPAMINE TRANSDERMAL SYSTEM 1 MG/1
1 PATCH, EXTENDED RELEASE TRANSDERMAL ONCE
Status: COMPLETED | OUTPATIENT
Start: 2018-05-03 | End: 2018-05-03

## 2018-05-03 RX ORDER — MEPERIDINE HYDROCHLORIDE 50 MG/ML
12.5 INJECTION INTRAMUSCULAR; INTRAVENOUS; SUBCUTANEOUS EVERY 5 MIN PRN
Status: DISCONTINUED | OUTPATIENT
Start: 2018-05-03 | End: 2018-05-03 | Stop reason: HOSPADM

## 2018-05-03 RX ORDER — LIDOCAINE 40 MG/G
CREAM TOPICAL
Status: CANCELLED | OUTPATIENT
Start: 2018-05-03

## 2018-05-03 RX ORDER — FLUMAZENIL 0.1 MG/ML
0.2 INJECTION, SOLUTION INTRAVENOUS
Status: DISCONTINUED | OUTPATIENT
Start: 2018-05-03 | End: 2018-05-04 | Stop reason: HOSPADM

## 2018-05-03 RX ORDER — LIDOCAINE 40 MG/G
CREAM TOPICAL
Status: DISCONTINUED | OUTPATIENT
Start: 2018-05-03 | End: 2018-05-04 | Stop reason: HOSPADM

## 2018-05-03 RX ORDER — NALOXONE HYDROCHLORIDE 0.4 MG/ML
.1-.4 INJECTION, SOLUTION INTRAMUSCULAR; INTRAVENOUS; SUBCUTANEOUS
Status: DISCONTINUED | OUTPATIENT
Start: 2018-05-03 | End: 2018-05-03

## 2018-05-03 RX ORDER — LORAZEPAM 2 MG/ML
.5-1 INJECTION INTRAMUSCULAR EVERY 4 HOURS PRN
Status: DISCONTINUED | OUTPATIENT
Start: 2018-05-03 | End: 2018-05-04 | Stop reason: HOSPADM

## 2018-05-03 RX ORDER — SODIUM CHLORIDE, SODIUM LACTATE, POTASSIUM CHLORIDE, CALCIUM CHLORIDE 600; 310; 30; 20 MG/100ML; MG/100ML; MG/100ML; MG/100ML
INJECTION, SOLUTION INTRAVENOUS CONTINUOUS
Status: DISCONTINUED | OUTPATIENT
Start: 2018-05-03 | End: 2018-05-04 | Stop reason: HOSPADM

## 2018-05-03 RX ORDER — FENTANYL CITRATE 50 UG/ML
INJECTION, SOLUTION INTRAMUSCULAR; INTRAVENOUS PRN
Status: DISCONTINUED | OUTPATIENT
Start: 2018-05-03 | End: 2018-05-03

## 2018-05-03 RX ORDER — HYDROXYZINE HYDROCHLORIDE 25 MG/1
25 TABLET, FILM COATED ORAL 3 TIMES DAILY PRN
Status: DISCONTINUED | OUTPATIENT
Start: 2018-05-03 | End: 2018-05-04 | Stop reason: HOSPADM

## 2018-05-03 RX ORDER — NALOXONE HYDROCHLORIDE 0.4 MG/ML
.1-.4 INJECTION, SOLUTION INTRAMUSCULAR; INTRAVENOUS; SUBCUTANEOUS
Status: DISCONTINUED | OUTPATIENT
Start: 2018-05-03 | End: 2018-05-04 | Stop reason: HOSPADM

## 2018-05-03 RX ORDER — FENTANYL CITRATE 50 UG/ML
25-50 INJECTION, SOLUTION INTRAMUSCULAR; INTRAVENOUS EVERY 5 MIN PRN
Status: DISCONTINUED | OUTPATIENT
Start: 2018-05-03 | End: 2018-05-03 | Stop reason: HOSPADM

## 2018-05-03 RX ORDER — ONDANSETRON 2 MG/ML
4 INJECTION INTRAMUSCULAR; INTRAVENOUS EVERY 30 MIN PRN
Status: DISCONTINUED | OUTPATIENT
Start: 2018-05-03 | End: 2018-05-03 | Stop reason: HOSPADM

## 2018-05-03 RX ORDER — HYDROMORPHONE HYDROCHLORIDE 1 MG/ML
.3-.5 INJECTION, SOLUTION INTRAMUSCULAR; INTRAVENOUS; SUBCUTANEOUS EVERY 10 MIN PRN
Status: DISCONTINUED | OUTPATIENT
Start: 2018-05-03 | End: 2018-05-03 | Stop reason: HOSPADM

## 2018-05-03 RX ORDER — ONDANSETRON 2 MG/ML
4 INJECTION INTRAMUSCULAR; INTRAVENOUS
Status: DISCONTINUED | OUTPATIENT
Start: 2018-05-03 | End: 2018-05-03

## 2018-05-03 RX ORDER — PROPOFOL 10 MG/ML
INJECTION, EMULSION INTRAVENOUS PRN
Status: DISCONTINUED | OUTPATIENT
Start: 2018-05-03 | End: 2018-05-03

## 2018-05-03 RX ORDER — LORAZEPAM 0.5 MG/1
.5-1 TABLET ORAL EVERY 4 HOURS PRN
Status: DISCONTINUED | OUTPATIENT
Start: 2018-05-03 | End: 2018-05-03

## 2018-05-03 RX ORDER — ONDANSETRON 4 MG/1
4 TABLET, ORALLY DISINTEGRATING ORAL EVERY 30 MIN PRN
Status: DISCONTINUED | OUTPATIENT
Start: 2018-05-03 | End: 2018-05-03 | Stop reason: HOSPADM

## 2018-05-03 RX ADMIN — Medication 80 MG: at 12:20

## 2018-05-03 RX ADMIN — POTASSIUM CHLORIDE 20 MEQ: 29.8 INJECTION, SOLUTION INTRAVENOUS at 04:31

## 2018-05-03 RX ADMIN — POTASSIUM CHLORIDE 20 MEQ: 29.8 INJECTION, SOLUTION INTRAVENOUS at 00:44

## 2018-05-03 RX ADMIN — HYDROMORPHONE HYDROCHLORIDE 0.5 MG: 1 INJECTION, SOLUTION INTRAMUSCULAR; INTRAVENOUS; SUBCUTANEOUS at 11:35

## 2018-05-03 RX ADMIN — FENTANYL CITRATE 50 MCG: 50 INJECTION, SOLUTION INTRAMUSCULAR; INTRAVENOUS at 12:20

## 2018-05-03 RX ADMIN — ONDANSETRON 4 MG: 2 INJECTION INTRAMUSCULAR; INTRAVENOUS at 06:23

## 2018-05-03 RX ADMIN — ONDANSETRON 4 MG: 2 INJECTION INTRAMUSCULAR; INTRAVENOUS at 12:46

## 2018-05-03 RX ADMIN — FENTANYL CITRATE 50 MCG: 50 INJECTION INTRAMUSCULAR; INTRAVENOUS at 13:20

## 2018-05-03 RX ADMIN — HYDROMORPHONE HYDROCHLORIDE 0.5 MG: 1 INJECTION, SOLUTION INTRAMUSCULAR; INTRAVENOUS; SUBCUTANEOUS at 05:13

## 2018-05-03 RX ADMIN — PROPOFOL 50 MG: 10 INJECTION, EMULSION INTRAVENOUS at 12:30

## 2018-05-03 RX ADMIN — MIDAZOLAM 2 MG: 1 INJECTION INTRAMUSCULAR; INTRAVENOUS at 12:04

## 2018-05-03 RX ADMIN — FENTANYL CITRATE 50 MCG: 50 INJECTION, SOLUTION INTRAMUSCULAR; INTRAVENOUS at 12:19

## 2018-05-03 RX ADMIN — SCOPALAMINE 1 PATCH: 1 PATCH, EXTENDED RELEASE TRANSDERMAL at 10:13

## 2018-05-03 RX ADMIN — SODIUM CHLORIDE, POTASSIUM CHLORIDE, SODIUM LACTATE AND CALCIUM CHLORIDE: 600; 310; 30; 20 INJECTION, SOLUTION INTRAVENOUS at 11:55

## 2018-05-03 RX ADMIN — PROPOFOL 100 MG: 10 INJECTION, EMULSION INTRAVENOUS at 12:20

## 2018-05-03 ASSESSMENT — COPD QUESTIONNAIRES: COPD: 0

## 2018-05-03 ASSESSMENT — PAIN DESCRIPTION - DESCRIPTORS: DESCRIPTORS: ACHING

## 2018-05-03 ASSESSMENT — LIFESTYLE VARIABLES: TOBACCO_USE: 1

## 2018-05-03 ASSESSMENT — ENCOUNTER SYMPTOMS
DYSRHYTHMIAS: 0
SEIZURES: 0

## 2018-05-03 NOTE — PROGRESS NOTES
[facility] Cover Note    Called by bedside RN.  Per RN, patient told staff she was walking her dad out of the hospital around 1500 and has not yet returned despite RNs calling her cell phone and over-head paging the patient.  RN subsequently called  who states he has been in touch with the patient and that the patient was frustrated with the plan of care and left the hospital.      Day team will be notified tomorrow AM.     Kiesha Stallworth, [facility]  Hospitalist Service  Pager [phone]

## 2018-05-03 NOTE — PROGRESS NOTES
"BP (!) 89/58 (BP Location: Left arm)  Pulse 101  Temp 98.2  F (36.8  C) (Oral)  Resp 18  Ht 1.753 m (5' 9\")  Wt 55.5 kg (122 lb 4.8 oz)  SpO2 96%  BMI 18.06 kg/m2    VSS on RA, soft BP @ baseline. C/o abdominal pain, managed with PRN dilaudid x1. C/o nausea, Zofran given x1. Pt finishing up the golytely prep for the colonoscopy today. K+ 3.2 and replaced. TPN and lipids infusing. Up ad dania. A/Ox4. Will cont to monitor and follow POC.   "

## 2018-05-03 NOTE — PLAN OF CARE
Problem: Patient Care Overview  Goal: Plan of Care/Patient Progress Review   05/02/18 2315   OTHER   Plan Of Care Reviewed With patient   Plan of Care Review   Progress no change   AVSS, pt's BP low but is baseline for pt. Pt requesting pain medication q2h for abdominal pain. Has had intermittent nausea all evening; received zofran x1 and compazine x1. Had 2 emesis totaling 1500 ml. Still trying to drink golytely prep for colonoscopy tomorrow. K+ recheck 3.2; will need K+ replaced. TPN and lipids started tonight. No other concerns, continue plan of care.

## 2018-05-03 NOTE — ANESTHESIA CARE TRANSFER NOTE
Patient: Rachel A Gerhardt    Procedure(s):  sigmoidoscopy - Wound Class: II-Clean Contaminated    Diagnosis: Anastomotic Stricture   Diagnosis Additional Information: No value filed.    Anesthesia Type:   ETT, General, RSI     Note:  Airway :Face Mask  Patient transferred to:PACU  Comments: VSS, report to RN  Handoff Report: Identifed the Patient, Identified the Reponsible Provider, Reviewed the pertinent medical history, Discussed the surgical course, Reviewed Intra-OP anesthesia mangement and issues during anesthesia, Set expectations for post-procedure period and Allowed opportunity for questions and acknowledgement of understanding      Vitals: (Last set prior to Anesthesia Care Transfer)    CRNA VITALS  5/3/2018 1228 - 5/3/2018 1305      5/3/2018             Pulse: 92    Ht Rate: 91    SpO2: 93 %    Resp Rate (observed): 8                Electronically Signed By: CECY Ramsay CRNA  May 3, 2018  1:05 PM

## 2018-05-03 NOTE — PROGRESS NOTES
Pt off unit at shift change when report given and continues to be off unit.  Attempted to reach pt via cell phone and overhead page asking pt to return to unit.  Will continue attempts to contact patient.

## 2018-05-03 NOTE — DISCHARGE SUMMARY
[facility], [place]    Internal Medicine Discharge Summary- Gold Service    Date of Admission:  5/1/2018  Date of Discharge:  5/3/2018  6:00 PM  Discharging Provider: Sangeetha Bravo PA-C/Dr. Joe Caraballo  Discharge Team: Gold 2    Discharge Diagnoses    Hypokalemia  Hx of Recurrent SBO  Acute on Chronic Abdominal Pain  TPN Dependence  Follow-ups Needed After Discharge   Follow up with colorectal surgery in clinic.  Hospital Course   Rachel A Gerhardt was admitted on 5/1/2018 for hypokalemia in setting of colonoscopy prepping. She was prepped for another colonoscopy which was unsuccessful and ultimately left the hospital AMA. The following problems were addressed during her hospitalization:    Hypokalemia. Initial potassium significantly low at 2.5 in setting of bowel prep for colonoscopy. Patient with mild chest pain as well. EKG revealed ST/T-wave abnormalities. Received aggressive replacement with IV and PO potassium, subsequently normalized at 4.5. Suspect hypokalemia related to GI losses; poor PO intake at baseline and is TPN dependent. Had started on daily potassium supplementation although no prescription given on discharge as patient left AMA.     Hx of Recurrent SBO. Hx of recurrent SBO's s/p 60-cm small bowel resection 5/2017. Initially did well for ~5 months, although symptoms gradually returned. Underwent MRE 3/21/18 which revealed anastomotic stricture. Admitted to [facility]S 3/28-4/1/18 with progressive symptoms, discussed possibility of endoscopic dilatation of anastomotic site. Planned for colonoscopy with Dr. Vigil for attempted dilatation; unfortunately due to miscommunication on prep instructions, this has been unsuccessful x 2. Presented with hypokalemia and severe abdominal pain while attempting 3rd prep. Completed GoLytely prep orally (adamantly refused NG tube placement), colonoscopy 5/3 unsuccessful due to inadequate prep. GI recommended NG placement with  "GoLytely administration vs OP follow up with colorectal surgery; patient elected for repeat attempt at colonoscopy although ultimately left the hospital 5/3 PM and was unable to be located. Patient's  contacted the hospital and stated patient had left because \"they screwed up again and I freaked out and left\".      Acute on Chronic Abdominal Pain. Related to recurrent SBO's. Maintained on oxycodone PTA. Abdominal pain acutely worsened on presentation, received IV fentanyl x 2 in ED. Pain much improved after having regular BM's. Likely related to constipation/SBO's. No opioids given on discharge as patient left AMA.    TPN Dependence. Progressive symptoms related to SBO's despite small bowel resection. Largely unable to tolerate oral nutrition, started on TPN 3/2018. Has PICC line in place. Potassium low on admission (see above), Mg and Phos remained WNL. Resumed home TPN.    # Discharge Pain Plan:  - Patient left without discussion of pain regimen.    Consultations This Hospital Stay   NUTRITION SERVICES ADULT IP CONSULT  GI LUMINAL ADULT IP CONSULT  COLORECTAL SURGERY ADULT IP CONSULT  PHARMACY/NUTRITION TO START AND MANAGE TPN    Code Status   Full Code    Time Spent on this Encounter   I, Sangeetha Bravo, discharged this patient today but I did not personally see the patient today and will not be billing for the patient's discharge.       Sangeetha Bravo  Internal Medicine Staff Hospitalist Service  [facility]  Pager: [phone]  ______________________________________________________________________    Physical Exam   Vital Signs: Temp: 98.1  F (36.7  C) Temp src: Oral BP: (!) 89/67 Pulse: 80 Heart Rate: 81 Resp: 14 SpO2: 97 % O2 Device: None (Room air)    Weight: 122 lbs 4.8 oz    No physical exam obtained at time of discharge as patient left the hospital without formal discharge.    Significant Results and Procedures   Most Recent 3 CBC's:  Recent Labs   Lab Test  " 05/03/18   0433  05/01/18 2057 05/01/18   1530   WBC  8.6  7.5  6.9   HGB  11.2*  13.7  15.0  13.9   MCV  102*  98  97   PLT  288  336  373     Most Recent 3 BMP's:  Recent Labs   Lab Test  05/03/18   0433  05/02/18   2225  05/02/18   1325  05/02/18   0614  05/01/18 2057   NA  139   --    --   136  131*  130*   POTASSIUM  4.5  3.2*  3.6  3.8  2.8*  2.9*   CHLORIDE  100   --    --   95  86*   CO2  34*   --    --   35*  38*   BUN  20   --    --   17  25   CR  0.71   --    --   0.73  0.85   ANIONGAP  5   --    --   6  8   JONATAN  8.0*   --    --   8.5  8.5   GLC  117*   --    --   82  98  106*   ,   Results for orders placed or performed during the hospital encounter of 05/01/18   XR Chest Port 1 View    Narrative    Examination: XR CHEST PORT 1 VW, 5/1/2018 9:08 PM    Comparison: 3/29/2018    History: chest pain;     Findings: Single AP view of the chest. Right upper history PICC tip  projects over the atrial caval junction. Cardiac mediastinal  silhouette and pulmonary vascular structures are within normal limits.  Lungs are somewhat hyperexpanded. No focal pulmonary opacity, pleural  effusion, or pneumothorax. There are a few prominent loops of  gas-filled small bowel in the visualized portions of the abdomen. No  acute bony normality or displaced rib fracture.      Impression    Impression:   1. No acute airspace opacity.  2. Mildly prominent loops of gas-filled small bowel, partially  visualized. Abdominal plain film could be considered if indicated.    I have personally reviewed the examination and initial interpretation  and I agree with the findings.    GELY WASHINGTON MD   Abdomen XR, 2 vw, flat and upright    Narrative    Exam:  XR ABDOMEN 2 VW, 5/2/2018 12:19 AM    History: abd distention;     Comparison:  CT abdomen/pelvis 3/28/2018    Findings:  Supine and upright radiographs of the abdomen.  Redemonstration of multiple loops of predominantly air-filled small  bowel throughout the abdomen. No  pneumatosis or portal venous gas. The  colon is decompressed. Visualized lung bases are clear.      Impression    Impression:  Interval increase in the distention of the multiple loops  of dilated and air-filled small bowel consistent with known small  bowel obstruction and less likely an adynamic ileus. Findings are not  significantly changed compared to CT 3/20/2018, given differences in  modality. No pneumatosis or free air.    I have personally reviewed the examination and initial interpretation  and I agree with the findings.    CARLIN ALCALA MD   XR Surgery MIKEL Fluoro L/T 5 Min w Stills    Narrative    This exam was marked as non-reportable because it will not be read by a   radiologist or a [place] non-radiologist provider.                   Pending Results   N/A       Primary Care Physician   Reji Avery    Discharge Disposition   Discharged to home  Condition at discharge: Stable    Discharge Orders     Home infusion referral     Reason for your hospital stay   You were hospitalized with low potassium while prepping for your colonoscopy. You also had severe abdominal pain/nausea which was treated with anti-emetics and pain medication and significantly improved at time of discharge. You had your colonoscopy prep done while IP and underwent the procedure without issue.     Adult [facility] Follow-up and recommended labs and tests   Follow up with primary care provider, Reji Avery, within 7 days for hospital follow- up.  The following labs/tests are recommended: CBC, BMP.      Appointments on [place] and/or [facility] (with [facility] or [facility] provider or service). Call [phone] if you haven't heard regarding these appointments within 7 days of discharge.     Activity   Your activity upon discharge: activity as tolerated     When to contact your care team   Call your primary doctor if you have any of the following: temperature > 101F, chest pain, increasing SOB, intractable  nausea/vomiting, severe abdominal pain, vomiting up blood, blood in your stool, or lower extremity edema.     IV access   **Ordering Provider MUST call/page Care Coordinator/ to discuss arranging this service**    You are going home with the following vascular access device: PICC.     Full Code     Diet   Follow this diet upon discharge: Orders Placed This Encounter     Regular diet       Discharge Medications   Discharge Medication List as of 5/4/2018 12:36 AM      CONTINUE these medications which have NOT CHANGED    Details   acetaminophen (TYLENOL) 500 MG tablet Take 2 tablets (1,000 mg) by mouth 4 times daily, Disp-80 tablet, R-0, E-Prescribe      ondansetron (ZOFRAN) 4 MG tablet Take 1-2 tablets (4-8 mg) by mouth every 8 hours as needed for nausea, Disp-30 tablet, R-0, E-Prescribe      oxyCODONE IR (ROXICODONE) 5 MG tablet Take 1-2 tablets (5-10 mg) by mouth every 4 hours as needed for moderate to severe pain, Disp-20 tablet, R-0, Local PrintPatient will be sent home with a short-term supply of pain medication until she follows up with her pain provider ([facility]) on 4/2/2018 (tomorrow).      parenteral nutrition - PTA/DISCHARGE ORDER The TPN formula will print on the After Visit Summary Report., Disp-1 each, R-0, Local Print      albuterol (PROAIR HFA/PROVENTIL HFA/VENTOLIN HFA) 108 (90 BASE) MCG/ACT Inhaler Inhale 2 puffs into the lungs every 6 hours as needed, Disp-1 Inhaler, R-0, E-Prescribe      vitamin D (ERGOCALCIFEROL) 99895 UNIT capsule Take 1 capsule (50,000 Units) by mouth once a week Needs labs checked prior to additional refills, Disp-8 capsule, R-0, E-Prescribe         STOP taking these medications       bisacodyl (DULCOLAX) 5 MG EC tablet Comments:   Reason for Stopping:             Allergies   Allergies   Allergen Reactions     No Clinical Screening - See Comments Other (See Comments) and Diarrhea     headache  Carrots cause gastric upset, cramping and diarrhea.      Sulfa Drugs Hives     hives     Amoxicillin Unknown and Other (See Comments)     vomiting  vomiting     Amoxicillin-Pot Clavulanate Other (See Comments) and Nausea     vomiting     Augmentin GI Disturbance, Nausea and Hives     Avelox [Moxifloxacin] Nausea and Vomiting, Unknown and Nausea     Ciprofloxacin Hives and Nausea     Codeine Nausea and Vomiting and Nausea     Ibuprofen Nausea and Vomiting     Other reaction(s): Nausea And Vomiting     Ibuprofen Sodium Hives and GI Disturbance     Quinolones      Tramadol Hives, Diarrhea, Nausea and Nausea and Vomiting     Daucus Carota      Other reaction(s): GI Upset  Other reaction(s): Abdominal pain, Diarrhea  Carrots cause gastric upset, cramping and diarrhea.

## 2018-05-03 NOTE — OP NOTE
Flex Sig 05/03/2018 12:10 PM [facility], [address] [place] [address] [phone]     Endoscopy Department   _______________________________________________________________________________   Patient Name: Rachel Gerhardt         Procedure Date: 5/3/2018 12:10 PM   MRN: [id number]                       Account Number: [id number]   YOB: 1975             Admit Type: Inpatient   Age: 42                               Room: OR   Gender: Female                        Note Status: Finalized   Attending MD: Omero Vigil MD  Pause for the Cause: pause for cause    completed   Total Sedation Time:                     _______________________________________________________________________________       Procedure:           Flexible Sigmoidoscopy   Indications:         Abnormal CT of the GI tract, Failure to thrive   Providers:           Omero Vigil MD, Alla Salazar   Patient Profile:     Ms Gerhardt is a 43yo woman with a history of small                        bowel resection in the setting of a radiation stricture                        used for therapy of cervical cancer who returns for                        repeat attempt at endoscopic managament of a suspected                        small bowel anastomotic stenosis by enteroscopy. She was                        cancelled once before due to complete lack of bowel                        preparation and again due to inadequate bowel                        preparation.   Referring MD:        Herman Moore MD   Requesting Provider: Jennifer Goodwin MD, Negro Akins MD   Medicines:           General Anesthesia   Complications:       No immediate complications.   _______________________________________________________________________________   Procedure:           Pre-Anesthesia Assessment:                        - Prior to the procedure, a History and Physical was                        performed, and  patient medications and allergies were                        reviewed. The patient is competent. The risks and                        benefits of the procedure and the sedation options and                        risks were discussed with the patient. All questions                        were answered and informed consent was obtained. Patient                        identification and proposed procedure were verified by                        the nurse in the pre-procedure area. Mental Status                        Examination: alert and oriented. Airway Examination:                        Mallampati Class II (the uvula but not tonsillar pillars                        visualized). Respiratory Examination: clear to                        auscultation. CV Examination: normal. ASA Grade                        Assessment: II - A patient with mild systemic disease.                        After reviewing the risks and benefits, the patient was                        deemed in satisfactory condition to undergo the                        procedure. The anesthesia plan was to use general                        anesthesia. Immediately prior to administration of                        medications, the patient was re-assessed for adequacy to                        receive sedatives. The heart rate, respiratory rate,                        oxygen saturations, blood pressure, adequacy of                        pulmonary ventilation, and response to care were                        monitored throughout the procedure. The physical status                        of the patient was re-assessed after the procedure.                        After obtaining informed consent, the scope was passed                        under direct vision. The enteroscope was introduced                        through the anus and advanced to the sigmoid colon.                        After obtaining informed consent, the scope was passed                         under direct vision.The flexible sigmoidoscopy was                        accomplished without difficulty. The patient tolerated                        the procedure well. The quality of the bowel preparation                        was inadequate.                                                                                     Findings:        Digital examination demonstrated a large amount of solid stool in the        rectum. The endoscopic was advanced around the stool to the sigmoid        which stool was found occluding the lumen. Irrigation was not successful        in clearing the stool. Further advancement was deemd unsafe and the        procedure was aborted.                                                                                     Impression:          - Inadequate bowel preparation to allow safe advancement                        of the enteroscope beyond the sigmoid   Recommendation:      - Standard general anesthesia recovery with return to                        the floor when appropriate                        - This represents the third prematurely aborted attempt                        at enteroscopy due to poor bowel preparation; it remains                        unclear why an NG tube was not utilized when the patient                        became nauseous with bowel preparation; our options                        include repeat bowel preparation at a slow rate per NG                        tube as an inpatient or consideration for surgical                        manipulation                        - Repeat attempt at enterosocpy will not occur until                        there is documented clear output per anus                        - The findings and recommendations were discussed with                        the patient and their family                                                                                       electronically signed by CHAITANYA Vigil

## 2018-05-03 NOTE — PROGRESS NOTES
"Pt off unit from 1500 to 1800.  Nursing staff attempted to reach pt via cell phone and overhead paging.  Nursing staff also walked on and off unit and in hospital lobby to look for patient.  Contacted pt's emergency contacts- father and spouse, spouse answered phone call and was unaware of patients whereabouts.   Nurse asked spouse to let staff know if he is able to reach pt.  Spouse called back stating that he received a text from pt, and pt stated in her text to him that \"they screwed up again and I freaked out and left\".  Spouse did not have further information on where patient was going.  No patient belongings noted in room.  Kiesha Clay from [facility] Team called back and acknowledged the above info verbally from nurse.  Charge nurse notified.  Nursing supervisor notified, nurse spoke to supervisor who stated they will come to the unit to discuss next steps further.    "

## 2018-05-03 NOTE — ANESTHESIA POSTPROCEDURE EVALUATION
Patient: Rachel A Gerhardt    Procedure(s):  sigmoidoscopy - Wound Class: II-Clean Contaminated    Diagnosis:Anastomotic Stricture   Diagnosis Additional Information: No value filed.    Anesthesia Type:  ETT, General, RSI    Note:  Anesthesia Post Evaluation    Patient location during evaluation: PACU  Patient participation: Able to fully participate in evaluation  Level of consciousness: awake and alert  Pain management: adequate  Airway patency: patent  Cardiovascular status: acceptable  Respiratory status: acceptable  Hydration status: acceptable  PONV: none     Anesthetic complications: None          Last vitals:  Vitals:    05/03/18 1345 05/03/18 1354 05/03/18 1413   BP: 96/74 96/75 (!) 89/67   Pulse:   80   Resp: 14 14    Temp:  37.1  C (98.7  F) 36.7  C (98.1  F)   SpO2: 95% 95% 97%         Electronically Signed By: Lauro Patton MD  May 3, 2018  2:45 PM

## 2018-05-03 NOTE — PROGRESS NOTES
[facility], [place]    Internal Medicine Progress Note - Gold Service      Assessment & Plan   Rachel A Gerhardt is a 42 year old female admitted on 5/1/2018. 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    Hypokalemia. Initial potassium significantly low at 2.5 in setting of bowel prep for colonoscopy. Patient with mild chest pain as well. EKG revealed ST/T-wave abnormalities. Received aggressive replacement with IV and PO potassium, subsequently normalized and stable at 4.5 this AM. Suspect hypokalemia related to GI losses; poor PO intake at baseline and is TPN dependent. Currently no chest pain, myalgias.  - Continue potassium chloride 40 mEq PO QD  - Potassium replacement protocol  - Continuous telemetry  - Trend BMP    Hx of Recurrent SBO. Hx of recurrent SBO's s/p 60-cm small bowel resection 5/2017. Initially did well for ~5 months, although symptoms gradually returned. Underwent MRE 3/21/18 which revealed anastomotic stricture. Admitted to CRS 3/28-4/1/18 with progressive symptoms, discussed possibility of endoscopic dilatation of anastomotic site. Planned for colonoscopy with Dr. Vigil for attempted dilatation; unfortunately due to miscommunication on prep instructions, this has been unsuccessful x 2. Presented with hypokalemia and severe abdominal pain while attempting 3rd prep. Completed GoLytely prep orally (adamantly refused NG tube placement), colonoscopy today unsuccessful due to inadequate prep. GI recommending NG placement with GoLytely administration, plan to re-attempt colonoscopy tomorrow pending clear output per anus documented.  - NPO  - NG placement with slow GoLytely Prep infusion  - Plan to re-attempt colonoscopy tomorrow, 5/4  - PRN hydroxyzine, ativan for anxiety  (avoid concurrent administration of ativan/dilaudid)  - Scopolamine patch for nausea, PRN Zofran/compazine  - Re-consult CRS vs OP follow up if colonoscopy unsuccessful    Acute on Chronic Abdominal Pain. Related to recurrent SBO's. Maintained on oxycodone PTA. Abdominal pain acutely worsened on presentation, received IV fentanyl x 2 in ED. Pain much improved today after having several BM's. Likely related to constipation/SBO's.  - IV dilaudid PRN while NPO, plan to transition back to PTA regimen prior to discharge  - Would recommend establishing with pain clinic as OP if has not already done so    TPN Dependence. Progressive symptoms related to SBO's despite small bowel resection. Largely unable to tolerate oral nutrition, started on TPN 3/2018. Has PICC line in place. Potassium low on admission (see above), Mg and Phos WNL.  - Nutrition consulted to for home TPN  - Trend Mg, Phos    # Pain Assessment:  Current Pain Score 5/3/2018   Patient currently in pain? yes   Pain score (0-10) -   Pain location Abdomen   Pain descriptors Aching   Some encounter information is confidential and restricted. Go to Review Flowsheets activity to see all data.   - Jenni is experiencing acute on chronic abdominal pain. Pain management was discussed and the plan was created in a collaborative fashion.  Jenni's response to the current recommendations: engaged  - Please see the plan for pain management as documented above    Diet: parenteral nutrition - ADULT compounded formula CYCLE  NPO per Anesthesia Guidelines for Procedure/Surgery Except for: Meds  Diet  Fluids: IV NS @ 25 cc/hour  DVT Prophylaxis: Pneumatic Compression Devices  Code Status: Prior    Disposition Plan   Expected discharge: 1-2 days, recommended to prior living arrangement once colonoscopy vs surgical intervention completed.     Entered: Sangeetha Bravo 05/03/2018, 1:28 PM   Information in the above section will display in the discharge planner report.       The patient's care was discussed with the Attending Physician, Dr. Caraballo.    Sangeetha SantanaFormerly [facility], [facility]  Internal Medicine Staff Hospitalist Service  [facility]  Pager: [phone]  Please see sticky note for cross cover information    Interval History   Jenni is feeling okay this afternoon. She is disappointed that her colonoscopy was unsuccessful. Notes she had several clear bowel movements this morning. Endorses persistent abdominal pain which has largely been tolerable. Agreeable to placing an NG tube for golytely administration although notes she does not tolerate NG's and will often pull them out. Requesting something to assist in anxiety. No chest pain, SOB, fevers/chills.    Data reviewed today: I reviewed all medications, new labs and imaging results over the last 24 hours. I personally reviewed no images or EKG's today.    Physical Exam   Vital Signs: Temp: 98.3  F (36.8  C) Temp src: Oral BP: 90/45   Heart Rate: 80 Resp: 18 SpO2: 98 % O2 Device: None (Room air)    Weight: 122 lbs 4.8 oz  General Appearance: Well-appearing  female ambulating around room, NAD.  Respiratory: Respiratory effort normal on RA. Lungs CTAB without rales, rhonchi, or wheezing.  Cardiovascular: RRR, S1/S2. No murmurs, rubs, or gallops.  GI: Abdomen soft, non-distended, non-tender. Bowel sounds normoactive.  Skin: No jaundice, rashes, or lesions evident on exposed skin.  Extremities: No peripheral edema.    Data   Data     Recent Labs  Lab 05/03/18  0433 05/02/18  2225 05/02/18  1325 05/02/18  0614 05/01/18  2057 05/01/18  1530   WBC 8.6  --   --   --  7.5 6.9   HGB 11.2*  --   --   --  13.7  15.0 13.9   *  --   --   --  98 97     --   --   --  336 373   INR  --   --   --   --  1.03  --      --   --  136 131*  130* 134   POTASSIUM 4.5 3.2* 3.6 3.8 2.8*  2.9* 2.5*   CHLORIDE 100  --   --  95 86* 90*   CO2 34*  --   --  35* 38* 35*   BUN 20  --   --  17 25 27   CR 0.71  --   --   0.73 0.85 0.89   ANIONGAP 5  --   --  6 8 9   JONATAN 8.0*  --   --  8.5 8.5 8.9   *  --   --  82 98  106* 89   ALBUMIN  --   --   --   --  3.3* 3.3*   PROTTOTAL  --   --   --   --  7.0 7.2   BILITOTAL  --   --   --   --  0.7 0.6   ALKPHOS  --   --   --   --  77 83   ALT  --   --   --   --  24 18   AST  --   --   --   --  31 15   TROPI  --   --   --   --  <0.015  --    TROPONIN  --   --   --   --  0.00  --

## 2018-05-03 NOTE — PLAN OF CARE
Problem: Patient Care Overview  Goal: Plan of Care/Patient Progress Review  Outcome: Declining  Pt A&Ox4.  AVSS other than soft BP, but pt denies dizziness or lightheadedness.  Pt taken down for colonoscopy right away this morning and was gone from room before being assessed by RN.  Pt returned from colonoscopy around 2:30pm and was up walking in room and hallway.  Plan is to place an NG tube tonight and slowly give Golytely through NG for the rest of the evening/night until 2 hr before planned colonoscopy tomorrow.  PRN Ativan and Atarax available for anxiety.  RN staff to check residual through the Golytely prep to help reduce pt getting nauseous and throwing up.  Pt aware and agreeable to plan.       05/03/18 1511   OTHER   Plan Of Care Reviewed With patient   Plan of Care Review   Progress declining       Problem: Bowel Obstruction (Adult)  Goal: Signs and Symptoms of Listed Potential Problems Will be Absent, Minimized or Managed (Bowel Obstruction)  Signs and symptoms of listed potential problems will be absent, minimized or managed by discharge/transition of care (reference Bowel Obstruction (Adult) CPG).  Outcome: Declining   05/03/18 1511   Bowel Obstruction   Problems Assessed (Bowel Obstruction) all   Problems Present (Bowel Obstruction) pain;situational response;undernutrition

## 2018-05-05 ENCOUNTER — HOME INFUSION (PRE-WILLOW HOME INFUSION) (OUTPATIENT)
Dept: PHARMACY | Facility: CLINIC | Age: 43
End: 2018-05-05

## 2018-05-06 ENCOUNTER — HOME INFUSION (PRE-WILLOW HOME INFUSION) (OUTPATIENT)
Dept: PHARMACY | Facility: CLINIC | Age: 43
End: 2018-05-06

## 2018-05-07 ENCOUNTER — TELEPHONE (OUTPATIENT)
Dept: SURGERY | Facility: CLINIC | Age: 43
End: 2018-05-07

## 2018-05-07 ENCOUNTER — HOME INFUSION (PRE-WILLOW HOME INFUSION) (OUTPATIENT)
Dept: PHARMACY | Facility: CLINIC | Age: 43
End: 2018-05-07

## 2018-05-07 NOTE — PROGRESS NOTES
This is a recent snapshot of the patient's [facility] medical record.  For current drug dose and complete information and questions, call [phone] or In Basket pool, [facility] home infusion ([id number])  CSN Number:  [id number]

## 2018-05-07 NOTE — PROGRESS NOTES
This is a recent snapshot of the patient's [facility] medical record.  For current drug dose and complete information and questions, call [phone] or In Basket pool, [facility]v home infusion ([id number])  CSN Number:  [id number]

## 2018-05-07 NOTE — TELEPHONE ENCOUNTER
Health Call Center    Phone Message    May a detailed message be left on voicemail: yes    Reason for Call: Other: Lisandra from  [facility] is calling about orders and getting them signed since pt left AMA, Lisandra would like a call back at [phone], pt of Dr. Moore, Dr. Aguilar signed over the weekend but will not sign any longer, Lisandra did say if they di not get orders today patient will not get her TPN for today. please call her back ASAP      Action Taken: Message routed to:  [facility] Colon Rectal

## 2018-05-08 ENCOUNTER — HOME INFUSION (PRE-WILLOW HOME INFUSION) (OUTPATIENT)
Dept: PHARMACY | Facility: CLINIC | Age: 43
End: 2018-05-08

## 2018-05-09 ENCOUNTER — HOSPITAL ENCOUNTER (EMERGENCY)
Facility: CLINIC | Age: 43
Discharge: HOME | End: 2018-05-09
Attending: EMERGENCY MEDICINE
Payer: COMMERCIAL

## 2018-05-09 VITALS
BODY MASS INDEX: 18.07 KG/M2 | RESPIRATION RATE: 16 BRPM | OXYGEN SATURATION: 92 % | TEMPERATURE: 96.8 F | DIASTOLIC BLOOD PRESSURE: 63 MMHG | HEIGHT: 69 IN | SYSTOLIC BLOOD PRESSURE: 85 MMHG | WEIGHT: 122 LBS

## 2018-05-09 NOTE — ED NOTES
"Patient observed by ERT attempting to leave the ED.  Patient is requesting to leave.  Escorted back to Room #10.  Attempted to explore reason patient requesting to leave.  \"I have a taxi waiting so I need to leave.\"  AMA form signed.  Patient provided paper scrubs.  Ambulatory upon discharge with all belongings.  "

## 2018-05-09 NOTE — ED NOTES
Bed: ED10  Expected date: 5/9/18  Expected time: 9:28 AM  Means of arrival: Ambulance  Comments:  [id number]  42 female  abd pain, hx of abd issues  Weak  Hypotensive SBP 70's  Getting fluids  A+O  Yellow

## 2018-05-09 NOTE — PROGRESS NOTES
This is a recent snapshot of the patient's [facility] medical record.  For current drug dose and complete information and questions, call [phone] or In Basket pool, [facility] home infusion ([id number])  CSN Number:  [id number]

## 2018-05-09 NOTE — ED TRIAGE NOTES
Jenni was BIBA today for evaluation of abdominal pain.  She initially experienced nausea and went to the store.  While sitting in her truck she experienced severe abdominal pain with fecal incontinence.  Upon EMS arrival patient was noted to be anxious and hyperventilating.  Patient states she feels weak.  Discharged on Friday after an admission for similar complaints.

## 2018-05-10 ENCOUNTER — TELEPHONE (OUTPATIENT)
Dept: SURGERY | Facility: CLINIC | Age: 43
End: 2018-05-10

## 2018-05-10 NOTE — PROGRESS NOTES
This is a recent snapshot of the patient's [facility] medical record.  For current drug dose and complete information and questions, call [phone] or In Basket pool, [facility] home infusion ([id number])  CSN Number:  [id number]

## 2018-05-10 NOTE — TELEPHONE ENCOUNTER
[facility] Call Center    Phone Message    May a detailed message be left on voicemail: yes    Reason for Call: Other: Magi with [facility] called and stated that they have an order for pick line removal however they are having difficulty with reaching the patient please call [phone] to advise.       Action Taken: Message routed to:  [facility] Colon Rectal

## 2018-05-11 ENCOUNTER — APPOINTMENT (OUTPATIENT)
Dept: GENERAL RADIOLOGY | Facility: CLINIC | Age: 43
DRG: 388 | End: 2018-05-11
Attending: EMERGENCY MEDICINE
Payer: COMMERCIAL

## 2018-05-11 ENCOUNTER — HOME INFUSION (PRE-WILLOW HOME INFUSION) (OUTPATIENT)
Dept: PHARMACY | Facility: CLINIC | Age: 43
End: 2018-05-11

## 2018-05-11 ENCOUNTER — TELEPHONE (OUTPATIENT)
Dept: SURGERY | Facility: CLINIC | Age: 43
End: 2018-05-11

## 2018-05-11 ENCOUNTER — APPOINTMENT (OUTPATIENT)
Dept: CT IMAGING | Facility: CLINIC | Age: 43
DRG: 388 | End: 2018-05-11
Attending: EMERGENCY MEDICINE
Payer: COMMERCIAL

## 2018-05-11 ENCOUNTER — HOSPITAL ENCOUNTER (INPATIENT)
Facility: CLINIC | Age: 43
LOS: 12 days | Discharge: HOME HEALTH | DRG: 388 | End: 2018-05-23
Attending: EMERGENCY MEDICINE | Admitting: INTERNAL MEDICINE
Payer: COMMERCIAL

## 2018-05-11 DIAGNOSIS — R62.7 FAILURE TO THRIVE IN ADULT: Primary | ICD-10-CM

## 2018-05-11 DIAGNOSIS — K56.609 SMALL BOWEL OBSTRUCTION (H): ICD-10-CM

## 2018-05-11 DIAGNOSIS — E87.6 HYPOKALEMIA: ICD-10-CM

## 2018-05-11 DIAGNOSIS — R10.84 ABDOMINAL PAIN, GENERALIZED: ICD-10-CM

## 2018-05-11 LAB
ALBUMIN SERPL-MCNC: 3.2 G/DL (ref 3.4–5)
ALBUMIN UR-MCNC: NEGATIVE MG/DL
ALP SERPL-CCNC: 90 U/L (ref 40–150)
ALT SERPL W P-5'-P-CCNC: 20 U/L (ref 0–50)
ANION GAP SERPL CALCULATED.3IONS-SCNC: 11 MMOL/L (ref 3–14)
APPEARANCE UR: CLEAR
AST SERPL W P-5'-P-CCNC: 26 U/L (ref 0–45)
BASE EXCESS BLDV CALC-SCNC: 12 MMOL/L
BASOPHILS # BLD AUTO: 0 10E9/L (ref 0–0.2)
BASOPHILS NFR BLD AUTO: 0.1 %
BILIRUB SERPL-MCNC: 0.7 MG/DL (ref 0.2–1.3)
BILIRUB UR QL STRIP: NEGATIVE
BUN SERPL-MCNC: 25 MG/DL (ref 7–30)
CALCIUM SERPL-MCNC: 8.4 MG/DL (ref 8.5–10.1)
CHLORIDE SERPL-SCNC: 81 MMOL/L (ref 94–109)
CO2 BLDCOV-SCNC: 48 MMOL/L (ref 21–28)
CO2 SERPL-SCNC: 38 MMOL/L (ref 20–32)
COLOR UR AUTO: YELLOW
CREAT SERPL-MCNC: 0.74 MG/DL (ref 0.52–1.04)
DIFFERENTIAL METHOD BLD: ABNORMAL
EOSINOPHIL # BLD AUTO: 0 10E9/L (ref 0–0.7)
EOSINOPHIL NFR BLD AUTO: 0.3 %
ERYTHROCYTE [DISTWIDTH] IN BLOOD BY AUTOMATED COUNT: 14 % (ref 10–15)
GFR SERPL CREATININE-BSD FRML MDRD: 85 ML/MIN/1.7M2
GLUCOSE SERPL-MCNC: 85 MG/DL (ref 70–99)
GLUCOSE UR STRIP-MCNC: NEGATIVE MG/DL
HCO3 BLDV-SCNC: 39 MMOL/L (ref 21–28)
HCT VFR BLD AUTO: 44 % (ref 35–47)
HGB BLD-MCNC: 15.2 G/DL (ref 11.7–15.7)
HGB UR QL STRIP: NEGATIVE
IMM GRANULOCYTES # BLD: 0 10E9/L (ref 0–0.4)
IMM GRANULOCYTES NFR BLD: 0.4 %
INR PPP: 0.95 (ref 0.86–1.14)
KETONES UR STRIP-MCNC: 10 MG/DL
LACTATE BLD-SCNC: 1 MMOL/L (ref 0.7–2.1)
LEUKOCYTE ESTERASE UR QL STRIP: ABNORMAL
LIPASE SERPL-CCNC: 54 U/L (ref 73–393)
LYMPHOCYTES # BLD AUTO: 1.5 10E9/L (ref 0.8–5.3)
LYMPHOCYTES NFR BLD AUTO: 18.7 %
MAGNESIUM SERPL-MCNC: 2.1 MG/DL (ref 1.6–2.3)
MCH RBC QN AUTO: 33.7 PG (ref 26.5–33)
MCHC RBC AUTO-ENTMCNC: 34.5 G/DL (ref 31.5–36.5)
MCV RBC AUTO: 98 FL (ref 78–100)
MONOCYTES # BLD AUTO: 0.6 10E9/L (ref 0–1.3)
MONOCYTES NFR BLD AUTO: 8 %
MUCOUS THREADS #/AREA URNS LPF: PRESENT /LPF
NEUTROPHILS # BLD AUTO: 5.7 10E9/L (ref 1.6–8.3)
NEUTROPHILS NFR BLD AUTO: 72.5 %
NITRATE UR QL: NEGATIVE
NRBC # BLD AUTO: 0 10*3/UL
NRBC BLD AUTO-RTO: 0 /100
O2/TOTAL GAS SETTING VFR VENT: 21 %
PCO2 BLDV: 49 MM HG (ref 40–50)
PCO2 BLDV: 58 MM HG (ref 40–50)
PH BLDV: 7.43 PH (ref 7.32–7.43)
PH BLDV: 7.6 PH (ref 7.32–7.43)
PH UR STRIP: 5.5 PH (ref 5–7)
PHOSPHATE SERPL-MCNC: 2.2 MG/DL (ref 2.5–4.5)
PLATELET # BLD AUTO: 218 10E9/L (ref 150–450)
PO2 BLDV: 22 MM HG (ref 25–47)
PO2 BLDV: 32 MM HG (ref 25–47)
POTASSIUM SERPL-SCNC: 2.6 MMOL/L (ref 3.4–5.3)
POTASSIUM SERPL-SCNC: 3 MMOL/L (ref 3.4–5.3)
PROT SERPL-MCNC: 6.6 G/DL (ref 6.8–8.8)
RADIOLOGIST FLAGS: ABNORMAL
RBC # BLD AUTO: 4.51 10E12/L (ref 3.8–5.2)
RBC #/AREA URNS AUTO: 1 /HPF (ref 0–2)
SAO2 % BLDV FROM PO2: 46 %
SODIUM SERPL-SCNC: 130 MMOL/L (ref 133–144)
SOURCE: ABNORMAL
SP GR UR STRIP: 1.01 (ref 1–1.03)
SQUAMOUS #/AREA URNS AUTO: 1 /HPF (ref 0–1)
TRANS CELLS #/AREA URNS HPF: <1 /HPF (ref 0–1)
UROBILINOGEN UR STRIP-MCNC: 2 MG/DL (ref 0–2)
WBC # BLD AUTO: 7.9 10E9/L (ref 4–11)
WBC #/AREA URNS AUTO: 4 /HPF (ref 0–5)

## 2018-05-11 PROCEDURE — 82803 BLOOD GASES ANY COMBINATION: CPT | Performed by: PHYSICIAN ASSISTANT

## 2018-05-11 PROCEDURE — 96376 TX/PRO/DX INJ SAME DRUG ADON: CPT

## 2018-05-11 PROCEDURE — 99223 1ST HOSP IP/OBS HIGH 75: CPT | Mod: AI | Performed by: INTERNAL MEDICINE

## 2018-05-11 PROCEDURE — 93010 ELECTROCARDIOGRAM REPORT: CPT | Performed by: INTERNAL MEDICINE

## 2018-05-11 PROCEDURE — 96374 THER/PROPH/DIAG INJ IV PUSH: CPT

## 2018-05-11 PROCEDURE — 99285 EMERGENCY DEPT VISIT HI MDM: CPT | Mod: 25

## 2018-05-11 PROCEDURE — 82803 BLOOD GASES ANY COMBINATION: CPT

## 2018-05-11 PROCEDURE — 84132 ASSAY OF SERUM POTASSIUM: CPT | Performed by: PHYSICIAN ASSISTANT

## 2018-05-11 PROCEDURE — 36592 COLLECT BLOOD FROM PICC: CPT | Performed by: PHYSICIAN ASSISTANT

## 2018-05-11 PROCEDURE — 12000025 ZZH R&B TRANSPLANT INTERMEDIATE

## 2018-05-11 PROCEDURE — 96361 HYDRATE IV INFUSION ADD-ON: CPT | Mod: 59

## 2018-05-11 PROCEDURE — 25000132 ZZH RX MED GY IP 250 OP 250 PS 637: Performed by: PHYSICIAN ASSISTANT

## 2018-05-11 PROCEDURE — 25000128 H RX IP 250 OP 636: Performed by: EMERGENCY MEDICINE

## 2018-05-11 PROCEDURE — 3E0436Z INTRODUCTION OF NUTRITIONAL SUBSTANCE INTO CENTRAL VEIN, PERCUTANEOUS APPROACH: ICD-10-PCS | Performed by: INTERNAL MEDICINE

## 2018-05-11 PROCEDURE — 96375 TX/PRO/DX INJ NEW DRUG ADDON: CPT

## 2018-05-11 PROCEDURE — 93005 ELECTROCARDIOGRAM TRACING: CPT

## 2018-05-11 PROCEDURE — 74177 CT ABD & PELVIS W/CONTRAST: CPT

## 2018-05-11 PROCEDURE — 80053 COMPREHEN METABOLIC PANEL: CPT | Performed by: EMERGENCY MEDICINE

## 2018-05-11 PROCEDURE — 83735 ASSAY OF MAGNESIUM: CPT | Performed by: EMERGENCY MEDICINE

## 2018-05-11 PROCEDURE — 25000125 ZZHC RX 250: Performed by: EMERGENCY MEDICINE

## 2018-05-11 PROCEDURE — 85025 COMPLETE CBC W/AUTO DIFF WBC: CPT | Performed by: EMERGENCY MEDICINE

## 2018-05-11 PROCEDURE — 25000128 H RX IP 250 OP 636: Performed by: PHYSICIAN ASSISTANT

## 2018-05-11 PROCEDURE — 74019 RADEX ABDOMEN 2 VIEWS: CPT

## 2018-05-11 PROCEDURE — 99285 EMERGENCY DEPT VISIT HI MDM: CPT | Mod: Z6 | Performed by: EMERGENCY MEDICINE

## 2018-05-11 PROCEDURE — 83605 ASSAY OF LACTIC ACID: CPT

## 2018-05-11 PROCEDURE — 84100 ASSAY OF PHOSPHORUS: CPT | Performed by: PHYSICIAN ASSISTANT

## 2018-05-11 PROCEDURE — 85610 PROTHROMBIN TIME: CPT | Performed by: EMERGENCY MEDICINE

## 2018-05-11 PROCEDURE — 81001 URINALYSIS AUTO W/SCOPE: CPT | Performed by: EMERGENCY MEDICINE

## 2018-05-11 PROCEDURE — 83690 ASSAY OF LIPASE: CPT | Performed by: EMERGENCY MEDICINE

## 2018-05-11 PROCEDURE — 99207 ZZC APP CREDIT; MD BILLING SHARED VISIT: CPT | Performed by: PHYSICIAN ASSISTANT

## 2018-05-11 PROCEDURE — 25000128 H RX IP 250 OP 636: Performed by: RADIOLOGY

## 2018-05-11 RX ORDER — NALOXONE HYDROCHLORIDE 0.4 MG/ML
.1-.4 INJECTION, SOLUTION INTRAMUSCULAR; INTRAVENOUS; SUBCUTANEOUS
Status: DISCONTINUED | OUTPATIENT
Start: 2018-05-11 | End: 2018-05-23 | Stop reason: HOSPADM

## 2018-05-11 RX ORDER — HYDROMORPHONE HYDROCHLORIDE 1 MG/ML
0.5 INJECTION, SOLUTION INTRAMUSCULAR; INTRAVENOUS; SUBCUTANEOUS
Status: COMPLETED | OUTPATIENT
Start: 2018-05-11 | End: 2018-05-11

## 2018-05-11 RX ORDER — POTASSIUM CL/LIDO/0.9 % NACL 10MEQ/0.1L
10 INTRAVENOUS SOLUTION, PIGGYBACK (ML) INTRAVENOUS
Status: DISCONTINUED | OUTPATIENT
Start: 2018-05-11 | End: 2018-05-23 | Stop reason: HOSPADM

## 2018-05-11 RX ORDER — HYDROMORPHONE HYDROCHLORIDE 1 MG/ML
2-4 SOLUTION ORAL EVERY 4 HOURS PRN
Status: DISCONTINUED | OUTPATIENT
Start: 2018-05-11 | End: 2018-05-11

## 2018-05-11 RX ORDER — PROCHLORPERAZINE MALEATE 5 MG
10 TABLET ORAL EVERY 6 HOURS PRN
Status: DISCONTINUED | OUTPATIENT
Start: 2018-05-11 | End: 2018-05-23 | Stop reason: HOSPADM

## 2018-05-11 RX ORDER — LIDOCAINE HYDROCHLORIDE 40 MG/ML
5 SOLUTION TOPICAL
Status: DISCONTINUED | OUTPATIENT
Start: 2018-05-11 | End: 2018-05-23 | Stop reason: HOSPADM

## 2018-05-11 RX ORDER — POTASSIUM CHLORIDE 750 MG/1
40 TABLET, EXTENDED RELEASE ORAL ONCE
Status: DISCONTINUED | OUTPATIENT
Start: 2018-05-11 | End: 2018-05-11

## 2018-05-11 RX ORDER — OXYMETAZOLINE HYDROCHLORIDE 0.05 G/100ML
2 SPRAY NASAL
Status: DISCONTINUED | OUTPATIENT
Start: 2018-05-11 | End: 2018-05-23 | Stop reason: HOSPADM

## 2018-05-11 RX ORDER — ACETAMINOPHEN 325 MG/1
650 TABLET ORAL EVERY 4 HOURS PRN
Status: DISCONTINUED | OUTPATIENT
Start: 2018-05-11 | End: 2018-05-23 | Stop reason: HOSPADM

## 2018-05-11 RX ORDER — KETOROLAC TROMETHAMINE 15 MG/ML
15 INJECTION, SOLUTION INTRAMUSCULAR; INTRAVENOUS EVERY 6 HOURS PRN
Status: DISPENSED | OUTPATIENT
Start: 2018-05-11 | End: 2018-05-16

## 2018-05-11 RX ORDER — POTASSIUM CHLORIDE 14.9 MG/ML
20 INJECTION INTRAVENOUS
Status: DISCONTINUED | OUTPATIENT
Start: 2018-05-11 | End: 2018-05-23 | Stop reason: HOSPADM

## 2018-05-11 RX ORDER — POLYETHYLENE GLYCOL 3350 17 G/17G
17 POWDER, FOR SOLUTION ORAL DAILY PRN
Status: DISCONTINUED | OUTPATIENT
Start: 2018-05-11 | End: 2018-05-23 | Stop reason: HOSPADM

## 2018-05-11 RX ORDER — OXYCODONE AND ACETAMINOPHEN 5; 325 MG/1; MG/1
1 TABLET ORAL EVERY 4 HOURS PRN
Status: ON HOLD | COMMUNITY
End: 2018-05-23

## 2018-05-11 RX ORDER — ONDANSETRON 2 MG/ML
4 INJECTION INTRAMUSCULAR; INTRAVENOUS EVERY 30 MIN PRN
Status: DISCONTINUED | OUTPATIENT
Start: 2018-05-11 | End: 2018-05-11

## 2018-05-11 RX ORDER — POTASSIUM CHLORIDE 7.45 MG/ML
10 INJECTION INTRAVENOUS
Status: DISCONTINUED | OUTPATIENT
Start: 2018-05-11 | End: 2018-05-23 | Stop reason: HOSPADM

## 2018-05-11 RX ORDER — HYDRALAZINE HYDROCHLORIDE 20 MG/ML
10 INJECTION INTRAMUSCULAR; INTRAVENOUS EVERY 6 HOURS PRN
Status: DISCONTINUED | OUTPATIENT
Start: 2018-05-11 | End: 2018-05-23 | Stop reason: HOSPADM

## 2018-05-11 RX ORDER — SODIUM CHLORIDE 9 MG/ML
1000 INJECTION, SOLUTION INTRAVENOUS CONTINUOUS
Status: DISCONTINUED | OUTPATIENT
Start: 2018-05-11 | End: 2018-05-11

## 2018-05-11 RX ORDER — POTASSIUM CHLORIDE 29.8 MG/ML
20 INJECTION INTRAVENOUS
Status: DISCONTINUED | OUTPATIENT
Start: 2018-05-11 | End: 2018-05-11 | Stop reason: CLARIF

## 2018-05-11 RX ORDER — POTASSIUM CL/LIDO/0.9 % NACL 10MEQ/0.1L
10 INTRAVENOUS SOLUTION, PIGGYBACK (ML) INTRAVENOUS
Status: DISPENSED | OUTPATIENT
Start: 2018-05-11 | End: 2018-05-11

## 2018-05-11 RX ORDER — SODIUM CHLORIDE 9 MG/ML
INJECTION, SOLUTION INTRAVENOUS CONTINUOUS
Status: DISCONTINUED | OUTPATIENT
Start: 2018-05-11 | End: 2018-05-12

## 2018-05-11 RX ORDER — POTASSIUM CHLORIDE 750 MG/1
20-40 TABLET, EXTENDED RELEASE ORAL
Status: DISCONTINUED | OUTPATIENT
Start: 2018-05-11 | End: 2018-05-23 | Stop reason: HOSPADM

## 2018-05-11 RX ORDER — METOCLOPRAMIDE 5 MG/1
10 TABLET ORAL EVERY 6 HOURS PRN
Status: DISCONTINUED | OUTPATIENT
Start: 2018-05-11 | End: 2018-05-23 | Stop reason: HOSPADM

## 2018-05-11 RX ORDER — POTASSIUM CHLORIDE 1.5 G/1.58G
20-40 POWDER, FOR SOLUTION ORAL
Status: DISCONTINUED | OUTPATIENT
Start: 2018-05-11 | End: 2018-05-23 | Stop reason: HOSPADM

## 2018-05-11 RX ORDER — HYDROMORPHONE HYDROCHLORIDE 5 MG/5ML
2-4 SOLUTION ORAL EVERY 4 HOURS PRN
Status: DISCONTINUED | OUTPATIENT
Start: 2018-05-11 | End: 2018-05-11

## 2018-05-11 RX ORDER — PROCHLORPERAZINE 25 MG
25 SUPPOSITORY, RECTAL RECTAL EVERY 12 HOURS PRN
Status: DISCONTINUED | OUTPATIENT
Start: 2018-05-11 | End: 2018-05-23 | Stop reason: HOSPADM

## 2018-05-11 RX ORDER — HYDROMORPHONE HYDROCHLORIDE 1 MG/ML
2-4 SOLUTION ORAL
Status: DISCONTINUED | OUTPATIENT
Start: 2018-05-11 | End: 2018-05-15

## 2018-05-11 RX ORDER — IOPAMIDOL 755 MG/ML
68 INJECTION, SOLUTION INTRAVASCULAR ONCE
Status: COMPLETED | OUTPATIENT
Start: 2018-05-11 | End: 2018-05-11

## 2018-05-11 RX ORDER — METOCLOPRAMIDE HYDROCHLORIDE 5 MG/ML
10 INJECTION INTRAMUSCULAR; INTRAVENOUS EVERY 6 HOURS PRN
Status: DISCONTINUED | OUTPATIENT
Start: 2018-05-11 | End: 2018-05-23 | Stop reason: HOSPADM

## 2018-05-11 RX ADMIN — Medication 2 MG: at 23:38

## 2018-05-11 RX ADMIN — KETOROLAC TROMETHAMINE 15 MG: 15 INJECTION, SOLUTION INTRAMUSCULAR; INTRAVENOUS at 22:21

## 2018-05-11 RX ADMIN — Medication 10 MEQ: at 16:07

## 2018-05-11 RX ADMIN — SODIUM CHLORIDE 1000 ML: 9 INJECTION, SOLUTION INTRAVENOUS at 14:10

## 2018-05-11 RX ADMIN — Medication 10 MEQ: at 23:37

## 2018-05-11 RX ADMIN — HYDROMORPHONE HYDROCHLORIDE 0.5 MG: 1 INJECTION, SOLUTION INTRAMUSCULAR; INTRAVENOUS; SUBCUTANEOUS at 10:28

## 2018-05-11 RX ADMIN — Medication 10 MEQ: at 22:28

## 2018-05-11 RX ADMIN — PROCHLORPERAZINE EDISYLATE 10 MG: 5 INJECTION INTRAMUSCULAR; INTRAVENOUS at 17:24

## 2018-05-11 RX ADMIN — ONDANSETRON 4 MG: 2 INJECTION INTRAMUSCULAR; INTRAVENOUS at 10:28

## 2018-05-11 RX ADMIN — IOPAMIDOL 68 ML: 755 INJECTION, SOLUTION INTRAVENOUS at 13:32

## 2018-05-11 RX ADMIN — HYDROMORPHONE HYDROCHLORIDE 0.5 MG: 1 INJECTION, SOLUTION INTRAMUSCULAR; INTRAVENOUS; SUBCUTANEOUS at 12:58

## 2018-05-11 RX ADMIN — SODIUM CHLORIDE: 9 INJECTION, SOLUTION INTRAVENOUS at 18:41

## 2018-05-11 RX ADMIN — Medication 2 MG: at 22:17

## 2018-05-11 RX ADMIN — PANTOPRAZOLE SODIUM 40 MG: 40 INJECTION, POWDER, FOR SOLUTION INTRAVENOUS at 10:29

## 2018-05-11 RX ADMIN — HYDROMORPHONE HYDROCHLORIDE 0.5 MG: 1 INJECTION, SOLUTION INTRAMUSCULAR; INTRAVENOUS; SUBCUTANEOUS at 16:07

## 2018-05-11 RX ADMIN — Medication 4 MG: at 17:22

## 2018-05-11 RX ADMIN — HYDROMORPHONE HYDROCHLORIDE 0.5 MG: 1 INJECTION, SOLUTION INTRAMUSCULAR; INTRAVENOUS; SUBCUTANEOUS at 14:10

## 2018-05-11 RX ADMIN — Medication 10 MEQ: at 21:18

## 2018-05-11 RX ADMIN — SODIUM CHLORIDE 1000 ML: 9 INJECTION, SOLUTION INTRAVENOUS at 10:28

## 2018-05-11 RX ADMIN — HYDROMORPHONE HYDROCHLORIDE 0.5 MG: 1 INJECTION, SOLUTION INTRAMUSCULAR; INTRAVENOUS; SUBCUTANEOUS at 14:59

## 2018-05-11 RX ADMIN — HYDROMORPHONE HYDROCHLORIDE 0.5 MG: 1 INJECTION, SOLUTION INTRAMUSCULAR; INTRAVENOUS; SUBCUTANEOUS at 12:03

## 2018-05-11 ASSESSMENT — ENCOUNTER SYMPTOMS
ABDOMINAL PAIN: 1
DIARRHEA: 0
VOMITING: 1
FEVER: 0
BLOOD IN STOOL: 0
ANAL BLEEDING: 0

## 2018-05-11 NOTE — ED PROVIDER NOTES
History     Chief Complaint   Patient presents with     Abdominal Pain     Vomiting     HPI  Rachel A Gerhardt is a 42-year-old female with a history of ovarian cancer 3 years ago that was resected and for which she received radiation treatment as well.  Patient states that the radiation screwed up her intestinal tract and caused her to have intestinal strictures.  Patient states that she had to undergo intestinal resection for a stricture and since that time has developed another one.  Patient states she was a patient of Teresa at the time.  Patient states that she was recently hospitalized for her additional stricture which apparently the surgeons here tried to stretch but were unsuccessful.  The patient was discharged from the hospital approximately a week ago with a PICC line in on TPN.  Patient states that she has not been able to keep herself hydrated orally.  Patient states that she tried to get a hold of Teresa's office to follow-up with him but has been unable to.  Patient states that over the last 4 days she has had recurrent symptoms with her current stricture including pain and vomiting.  She denies any melena, diarrhea, or bright blood per rectum.  Patient denies any fevers.    I have reviewed the Medications, Allergies, Past Medical and Surgical History, and Social History in the [employer] system.    Past Medical History:   Diagnosis Date     Asthma      Cancer (H)     Per patient OBGYN, cerivical cancer     Cervical cancer (H)      Other chronic pain      Ovarian cancer (H)      Substance abuse     Outside records indicate past history of narcotics abuse or dependence, but patient denies.       Past Surgical History:   Procedure Laterality Date     COLONOSCOPY N/A 5/3/2018    Procedure: COLONOSCOPY;  sigmoidoscopy;  Surgeon: Omero Vigil MD;  Location: [facility] OR     COLONOSCOPY WITH CO2 INSUFFLATION N/A 4/30/2018    Procedure: COLONOSCOPY WITH CO2 INSUFFLATION;  Colonoscopy;  Surgeon: Musa  Omero Restrepo MD;  Location: [facility] OR     COMBINED CYSTOSCOPY, INSERT STENT URETER(S) Bilateral 5/18/2017    Procedure: COMBINED CYSTOSCOPY, INSERT STENT URETER(S);  Cystoscopy with Bilateral Stent,;  Surgeon: Rene Calero MD;  Location: [facility] OR     ENT SURGERY  2009    mastoid, sinus     EXAM UNDER ANESTHESIA, INSERT ALEX SLEEVE, UTERINE PLACEMENT OF TANDEM AND RING FOR RAD, ULTRASOUND N/A 12/14/2015    Procedure: EXAM UNDER ANESTHESIA, INSERT ALEX SLEEVE, UTERINE PLACEMENT OF TANDEM AND RING FOR RADIATION, ULTRASOUND GUIDED;  Surgeon: Abby Tony MD;  Location: [facility] OR     INSERT TANDEM AND CESIUM APPLICATOR CERVIX, ULTRASOUND GUIDED N/A 12/17/2015    Procedure: INSERT TANDEM AND CESIUM APPLICATOR CERVIX, ULTRASOUND GUIDED;  Surgeon: Kika Wood MD;  Location: [facility] OR     KNEE SURGERY       LAPAROTOMY EXPLORATORY N/A 5/18/2017    Procedure: LAPAROTOMY EXPLORATORY;   Exploratry Laparotomy, Small Bowel Resection with anastomosis, Flexible Sigmoidoscopy;  Surgeon: Jennifer Goodwin MD;  Location: [facility] OR     PICC INSERTION Right 04/29/2017    4fr SL BioFlo PICC, 37cm (3cm external) in the R basilic vein w/ tip in the mid SVC.     PICC INSERTION Right 03/29/2018    4Fr - 40cm (4cm external), R lateral brachial vein, Low SVC     RESECT SMALL BOWEL WITHOUT OSTOMY N/A 5/18/2017    Procedure: RESECT SMALL BOWEL WITHOUT OSTOMY;;  Surgeon: Jennifer Goodwin MD;  Location: [facility] OR     SIGMOIDOSCOPY FLEXIBLE N/A 5/18/2017    Procedure: SIGMOIDOSCOPY FLEXIBLE;;  Surgeon: Jennifer Goodwin MD;  Location: [facility] OR       Family History   Problem Relation Age of Onset     DIABETES Mother      Ovarian Cancer No family hx of      Uterine Cancer No family hx of      Cervical Cancer No family hx of      Breast Cancer No family hx of        Social History   Substance Use Topics     Smoking status: Light Tobacco Smoker     Packs/day: 0.25     Years: 12.00     Types: Cigarettes     Smokeless tobacco: Never Used      Comment: pt  smoking about 4 cigs daily     Alcohol use No      Comment: None per pt       Previous Medications    ACETAMINOPHEN (TYLENOL) 500 MG TABLET    Take 2 tablets (1,000 mg) by mouth 4 times daily    ALBUTEROL (PROAIR HFA/PROVENTIL HFA/VENTOLIN HFA) 108 (90 BASE) MCG/ACT INHALER    Inhale 2 puffs into the lungs every 6 hours as needed    ONDANSETRON (ZOFRAN) 4 MG TABLET    Take 1-2 tablets (4-8 mg) by mouth every 8 hours as needed for nausea    OXYCODONE IR (ROXICODONE) 5 MG TABLET    Take 1-2 tablets (5-10 mg) by mouth every 4 hours as needed for moderate to severe pain    PARENTERAL NUTRITION - PTA/DISCHARGE ORDER    The TPN formula will print on the After Visit Summary Report.    VITAMIN D (ERGOCALCIFEROL) 25762 UNIT CAPSULE    Take 1 capsule (50,000 Units) by mouth once a week Needs labs checked prior to additional refills        Allergies   Allergen Reactions     No Clinical Screening - See Comments Other (See Comments) and Diarrhea     headache  Carrots cause gastric upset, cramping and diarrhea.     Sulfa Drugs Hives     hives     Amoxicillin Unknown and Other (See Comments)     vomiting  vomiting     Amoxicillin-Pot Clavulanate Other (See Comments) and Nausea     vomiting     Augmentin GI Disturbance, Nausea and Hives     Avelox [Moxifloxacin] Nausea and Vomiting, Unknown and Nausea     Ciprofloxacin Hives and Nausea     Codeine Nausea and Vomiting and Nausea     Ibuprofen Nausea and Vomiting     Other reaction(s): Nausea And Vomiting     Ibuprofen Sodium Hives and GI Disturbance     Quinolones      Tramadol Hives, Diarrhea, Nausea and Nausea and Vomiting     Daucus Carota      Other reaction(s): GI Upset  Other reaction(s): Abdominal pain, Diarrhea  Carrots cause gastric upset, cramping and diarrhea.      Review of Systems   Constitutional: Negative for fever.   Gastrointestinal: Positive for abdominal pain and vomiting. Negative for anal bleeding, blood in stool and diarrhea.   All other systems reviewed and  are negative.    Physical Exam   BP: 107/43  Heart Rate: 78  Temp: 98.2  F (36.8  C)  Resp: 16  Weight: 49.9 kg (110 lb)  SpO2: 99 %      Physical Exam   Constitutional: She is oriented to person, place, and time.   Alert conversant and in some moderate distress secondary to her abdominal pain   HENT:   Head: Atraumatic.   Eyes: EOM are normal. Pupils are equal, round, and reactive to light.   Neck: Neck supple.   Cardiovascular: Normal heart sounds.    Pulmonary/Chest: Breath sounds normal.   Abdominal:   Diffusely tender with increased bowel sounds   Musculoskeletal: She exhibits no edema or tenderness.   Neurological: She is alert and oriented to person, place, and time.   Grossly intact and symmetric   Skin: Skin is warm.   Psychiatric:   Appropriate for clinical condition       ED Course     ED Course     Procedures          Results for orders placed or performed during the hospital encounter of 05/11/18   XR Abdomen 2 Views    Narrative    Exam: Abdominal x-ray, 2 views, 5/11/2018.    COMPARISON: 5/1/2018.    HISTORY: Abdominal pain.    FINDINGS: Supine and upright views of the abdomen were obtained. Again  noted multiple air distended loops of small bowel in the mid abdomen,  not significantly changed from the prior study. No pneumatosis. No  portal venous gas. No free air beneath the diaphragm. Partially  visualized lungs are clear. Right upper extremity PICC line with its  tip projecting over the low SVC. Decompressed colon.      Impression    IMPRESSION: Multiple air distended loops of small bowel particularly  in the mid abdomen, not significantly changed from the prior studies.  Findings are concerning for small bowel obstruction. No pneumatosis or  free air.    QUINN ALMAGUER MD   CBC with platelets differential   Result Value Ref Range    WBC 7.9 4.0 - 11.0 10e9/L    RBC Count 4.51 3.8 - 5.2 10e12/L    Hemoglobin 15.2 11.7 - 15.7 g/dL    Hematocrit 44.0 35.0 - 47.0 %    MCV 98 78 - 100 fl    MCH  33.7 (H) 26.5 - 33.0 pg    MCHC 34.5 31.5 - 36.5 g/dL    RDW 14.0 10.0 - 15.0 %    Platelet Count 218 150 - 450 10e9/L    Diff Method Automated Method     % Neutrophils 72.5 %    % Lymphocytes 18.7 %    % Monocytes 8.0 %    % Eosinophils 0.3 %    % Basophils 0.1 %    % Immature Granulocytes 0.4 %    Nucleated RBCs 0 0 /100    Absolute Neutrophil 5.7 1.6 - 8.3 10e9/L    Absolute Lymphocytes 1.5 0.8 - 5.3 10e9/L    Absolute Monocytes 0.6 0.0 - 1.3 10e9/L    Absolute Eosinophils 0.0 0.0 - 0.7 10e9/L    Absolute Basophils 0.0 0.0 - 0.2 10e9/L    Abs Immature Granulocytes 0.0 0 - 0.4 10e9/L    Absolute Nucleated RBC 0.0    INR   Result Value Ref Range    INR 0.95 0.86 - 1.14   Comprehensive metabolic panel   Result Value Ref Range    Sodium 130 (L) 133 - 144 mmol/L    Potassium 3.0 (L) 3.4 - 5.3 mmol/L    Chloride 81 (L) 94 - 109 mmol/L    Carbon Dioxide 38 (H) 20 - 32 mmol/L    Anion Gap 11 3 - 14 mmol/L    Glucose 85 70 - 99 mg/dL    Urea Nitrogen 25 7 - 30 mg/dL    Creatinine 0.74 0.52 - 1.04 mg/dL    GFR Estimate 85 >60 mL/min/1.7m2    GFR Estimate If Black >90 >60 mL/min/1.7m2    Calcium 8.4 (L) 8.5 - 10.1 mg/dL    Bilirubin Total 0.7 0.2 - 1.3 mg/dL    Albumin 3.2 (L) 3.4 - 5.0 g/dL    Protein Total 6.6 (L) 6.8 - 8.8 g/dL    Alkaline Phosphatase 90 40 - 150 U/L    ALT 20 0 - 50 U/L    AST 26 0 - 45 U/L   Lipase   Result Value Ref Range    Lipase 54 (L) 73 - 393 U/L   ISTAT gases lactate enoch POCT   Result Value Ref Range    Ph Venous 7.60 (H) 7.32 - 7.43 pH    PCO2 Venous 49 40 - 50 mm Hg    PO2 Venous 22 (L) 25 - 47 mm Hg    Bicarbonate Venous 48 (HH) 21 - 28 mmol/L    O2 Sat Venous 46 %    Lactic Acid 1.0 0.7 - 2.1 mmol/L       Labs Ordered and Resulted from Time of ED Arrival Up to the Time of Departure from the ED   CBC WITH PLATELETS DIFFERENTIAL - Abnormal; Notable for the following:        Result Value    MCH 33.7 (*)     All other components within normal limits   COMPREHENSIVE METABOLIC PANEL - Abnormal;  Notable for the following:     Sodium 130 (*)     Potassium 3.0 (*)     Chloride 81 (*)     Carbon Dioxide 38 (*)     Calcium 8.4 (*)     Albumin 3.2 (*)     Protein Total 6.6 (*)     All other components within normal limits   LIPASE - Abnormal; Notable for the following:     Lipase 54 (*)     All other components within normal limits   ISTAT  GASES LACTATE ANGEL LUIS POCT - Abnormal; Notable for the following:     Ph Venous 7.60 (*)     PO2 Venous 22 (*)     Bicarbonate Venous 48 (*)     All other components within normal limits   INR   UA MACROSCOPIC WITH REFLEX TO MICRO AND CULTURE   ISTAT CG4 GASES LACTATE ANGEL LUIS NURSING POCT   PERIPHERAL IV CATHETER        Assessments & Plan (with Medical Decision Making)     I have reviewed the nursing notes.    Medications   HYDROmorphone (PF) (DILAUDID) injection 0.5 mg (0.5 mg Intravenous Given 5/11/18 1203)   ondansetron (ZOFRAN) injection 4 mg (4 mg Intravenous Given 5/11/18 1028)   0.9% sodium chloride BOLUS (1,000 mLs Intravenous New Bag 5/11/18 1028)     Followed by   sodium chloride 0.9% infusion (not administered)   iohexol (OMNIPAQUE) solution 25 mL (not administered)   pantoprazole (PROTONIX) 40 mg IV push injection (40 mg Intravenous Given 5/11/18 1029)     With the above abdominal x-ray and the fact the patient cannot take anything orally and is on TPN at home, the patient will undergo CT scanning and be admitted to the medicine service for her ongoing abdominal pain and possible bowel obstruction.  Patient may need colon rectal surgery consultation again during this hospitalization.    I have reviewed the findings, diagnosis, and plan with the patient.    Final diagnoses:   Abdominal pain, generalized - possible SBO     Abdominal CT pending.    Jovan Sam MD    IAneesh, am serving as a trained medical scribe to document services personally performed by Jovan Sam MD, based on the provider's statements to me.   I, Jovan Sam MD, was  physically present and have reviewed and verified the accuracy of this note documented by Aneesh Sosa.     5/11/2018   [facility], [place], EMERGENCY DEPARTMENT     Jovan Sam MD  05/11/18 7423

## 2018-05-11 NOTE — TELEPHONE ENCOUNTER
Called I and they reported the continued inability to get in touch with the patient to set up a time with the patient to remove the PICC line. I will continue to try to contact the patient.

## 2018-05-11 NOTE — IP AVS SNAPSHOT
MRN:[id number]                      After Visit Summary   5/11/2018    Rachel A Gerhardt    MRN: [id number]           Thank you!     Thank you for choosing [facility] for your care. Our goal is always to provide you with excellent care. Hearing back from our patients is one way we can continue to improve our services. Please take a few minutes to complete the written survey that you may receive in the mail after you visit with us. Thank you!        Patient Information     Date Of Birth          1975        Designated Caregiver       Most Recent Value    Caregiver    Will someone help with your care after discharge? yes    Name of designated caregiver chris    Phone number of caregiver [phone]    Caregiver address same as patient      About your hospital stay     You were admitted on:  May 11, 2018 You last received care in the:  [address] [facility]    You were discharged on:  May 23, 2018        Reason for your hospital stay       Admitted with partial small bowel obstruction. Endoscopic intervention recommended but not achieved due to inadequate prep.                  Who to Call     For medical emergencies, please call 911.  For non-urgent questions about your medical care, please call your primary care provider or clinic, [phone]          Attending Provider     Provider Specialty    Jovan Sam MD Emergency Medicine    Marya Hendrix DO Internal Medicine    Mel Ng MD Internal Medicine    Kelsea Hauser MD Internal Medicine    Whitney Fitzpatrick MD Internal Medicine       Primary Care Provider Office Phone # Fax #    Reji Avery [phone]       When to contact your care team       Notify PCP if worsening/changing abdominal pain, vomiting, inability to have BM.                  After Care Instructions     Activity       Your activity upon discharge: activity as tolerated            Diet       Follow this diet upon discharge: clear  liquid diet, TPN/lipids            IV access       **Ordering Provider MUST call/page Care Coordinator/ to discuss arranging this service**    You are going home with the following vascular access device: PICC.  This will be managed by [facility].            IV access       You are going home with the following vascular access device: PICC.                  Follow-up Appointments     Follow Up and recommended labs and tests       See PCP next 5-7 days for hospital follow up. See Dr. Vigil in GI clinic for endoscopic intervention if willing to prep via NG/NJ.                  Additional Services     Home infusion referral       Your provider has referred you to: OTHER PROVIDERS:  Pierre Dodson  Ph: [phone]  Fax: [phone]      Local Address (if different from home address):   [address] [place] [address]. [address]  [phone] (M)    Anticipated Length of Therapy: Indefinate    Home Infusion Pharmacist to adjust therapy based on labs and clinical assessments: Yes    Labs:  May draw labs from Venous Catheter: Yes  Home Infusion Pharmacist to order labs based on therapy type and clinical assessments: Yes  Call/Fax Lab Results to: Reji Avery fax; [phone]     Agency Staff to assess nursing needs for Infusion Therapy.  Pt Dcing with TPN    Access Device Management:  IV Access Type: PICC  Flush with Heparin and Normal Saline IVP PRN and routine site care (per agency protocol) to maintain access device? Yes                  Pending Results     Date and Time Order Name Status Description    5/19/2018 1555 Blood culture Preliminary     5/19/2018 1555 Blood culture Preliminary     5/18/2018 2019 Blood culture Preliminary     5/18/2018 2019 Blood culture Preliminary             Statement of Approval     Ordered          05/23/18 1556  I have reviewed and agree with all the recommendations and orders detailed in this document.  EFFECTIVE NOW     Approved and  electronically signed by:  Arleen Santiago PA             Admission Information     Date & Time Provider Department Dept. Phone    5/11/2018 Whitney Fitzpatrick MD [address] [facility] [phone]      Your Vitals Were     Blood Pressure Pulse Temperature Respirations Weight Pulse Oximetry    107/90 (BP Location: Left arm) 94 98.1  F (36.7  C) (Oral) 16 56.9 kg (125 lb 8 oz) 96%    BMI (Body Mass Index)                   18.53 kg/m2           MyChart Information     [employer] gives you secure access to your electronic health record. If you see a primary care provider, you can also send messages to your care team and make appointments. If you have questions, please call your primary care clinic.  If you do not have a primary care provider, please call [phone] and they will assist you.        Care EveryWhere ID     This is your Care EveryWhere ID. This could be used by other organizations to access your [facility] medical records  [id number]        Equal Access to Services     MARISOL GAN : Hadii reynold connero Sonigel, waaxda luqadaha, qaybta kaalmada adeegyavega, wendy alonso . So [facility] [phone].    ATENCIÓN: Si habla español, tiene a epps disposición servicios gratuitos de asistencia lingüística. Llame al [phone].    We comply with applicable federal civil rights laws and [place] laws. We do not discriminate on the basis of race, color, national origin, age, disability, sex, sexual orientation, or gender identity.               Review of your medicines      START taking        Dose / Directions    HYDROmorphone (STANDARD CONC) 1 MG/ML Liqd liquid   Commonly known as:  DILAUDID   Used for:  Small bowel obstruction        Take 2 mg PO Q4hrs PRN x 2 days, then 2 mg Q8hrs PRN x 2 days, then 2 mg BID PRN x 2 days, then 2 mg daily PRN x 2 days, then stop.   Quantity:  48 mL   Refills:  0       lipids 20 % infusion   Commonly known as:  INTRALIPID   Used for:  Failure to  thrive in adult        Dose:  250 mL   Inject 250 mLs into the vein every 24 hours Administer through a 1.2 micron filter Requires container change every 12 hours and tubing change every 24 hours.   Refills:  0       ondansetron 4 MG ODT tab   Commonly known as:  ZOFRAN ODT   Used for:  Small bowel obstruction        Dose:  4 mg   Take 1 tablet (4 mg) by mouth every 6 hours as needed for nausea   Quantity:  20 tablet   Refills:  0       polyethylene glycol powder   Commonly known as:  MIRALAX   Used for:  Small bowel obstruction        Dose:  1 capful   Take 17 g (1 capful) by mouth 2 times daily as needed for constipation   Quantity:  255 g   Refills:  0         STOP taking     ondansetron 4 MG tablet   Commonly known as:  ZOFRAN           oxyCODONE-acetaminophen 5-325 MG per tablet   Commonly known as:  PERCOCET                Where to get your medicines      These medications were sent to [facility] [place], [place] - [address] [address]     Phone:  [phone]     ondansetron 4 MG ODT tab    polyethylene glycol powder         Some of these will need a paper prescription and others can be bought over the counter. Ask your nurse if you have questions.     Bring a paper prescription for each of these medications     HYDROmorphone (STANDARD CONC) 1 MG/ML Liqd liquid       You don't need a prescription for these medications     lipids 20 % infusion                Protect others around you: Learn how to safely use, store and throw away your medicines at www.disposemymeds.org.        Information about OPIOIDS     PRESCRIPTION OPIOIDS: WHAT YOU NEED TO KNOW   You have a prescription for an opioid (narcotic) pain medicine. Opioids can cause addiction. If you have a history of chemical dependency of any type, you are at a higher risk of becoming addicted to opioids. Only take this medicine after all other options have been tried. Take it for as short a time  and as few doses as possible.     Do not:    Drive. If you drive while taking these medicines, you could be arrested for driving under the influence (DUI).    Operate heavy machinery    Do any other dangerous activities while taking these medicines.     Drink any alcohol while taking these medicines.      Take with any other medicines that contain acetaminophen. Read all labels carefully. Look for the word  acetaminophen  or  Tylenol.  Ask your pharmacist if you have questions or are unsure.    Store your pills in a secure place, locked if possible. We will not replace any lost or stolen medicine. If you don t finish your medicine, please throw away (dispose) as directed by your pharmacist. The [place] [employer]ution [employer] has more information about safe disposal: https://www.pca.[facility].[place].us/living-green/managing-unwanted-medications    All opioids tend to cause constipation. Drink plenty of water and eat foods that have a lot of fiber, such as fruits, vegetables, prune juice, apple juice and high-fiber cereal. Take a laxative (Miralax, milk of magnesia, Colace, Senna) if you don t move your bowels at least every other day.              Medication List: This is a list of all your medications and when to take them. Check marks below indicate your daily home schedule. Keep this list as a reference.      Medications           Morning Afternoon Evening Bedtime As Needed    HYDROmorphone (STANDARD CONC) 1 MG/ML Liqd liquid   Commonly known as:  DILAUDID   Take 2 mg PO Q4hrs PRN x 2 days, then 2 mg Q8hrs PRN x 2 days, then 2 mg BID PRN x 2 days, then 2 mg daily PRN x 2 days, then stop.                                lipids 20 % infusion   Commonly known as:  INTRALIPID   Inject 250 mLs into the vein every 24 hours Administer through a 1.2 micron filter Requires container change every 12 hours and tubing change every 24 hours.   Last time this was given:  250 mLs on 5/22/2018  7:57 PM                                 ondansetron 4 MG ODT tab   Commonly known as:  ZOFRAN ODT   Take 1 tablet (4 mg) by mouth every 6 hours as needed for nausea                                polyethylene glycol powder   Commonly known as:  MIRALAX   Take 17 g (1 capful) by mouth 2 times daily as needed for constipation

## 2018-05-11 NOTE — H&P
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 Of note, patient recent admitted and left AMA with hypokalemia, need for colonoscopy, see note 5/3 for details.     Recurrent SBO: Admitted 5/11 with N/V, increased abdominal pain since 5/7. S/p small bowel resection 5/2017 (2/2 radiation in the setting of ovarian cancer). CT AP 5/11 long segment dilated loops of proximal small bowel, transition point at small bowel anastomosis in pelvis. Increased extensive small bowel and pancolonic wall thickening. No evidence of bowel perforation. Lipase and lactic acid normal. Treated with IV dilaudid and zofran in the ED.   - Surgery consulted   - NPO, NS at 150 cc/hr  - Refusing NG due to prior issues, see HPI  - SL dilaudid prn, IV Toradol prn  - QTc questionable at 451 5/1, will treat with compazine and reglan for nausea     Known bowel stricture: MRE 3/21/18 revealed anastomotic stricture. Admitted to [facility] 3/28 to 4/1 with progressive symptoms, discussed endoscopic dilatation of anastomotic site. Admitted 5/1 to 5/3 (left AMA) to medicine with plans for colonoscopy with GI, but did bowel prep inadequate. Not willing to have NG placed at the time, see d/c note 5/3 for details.   - Consider GI consult, possible colonoscopy this admission if NG is placed    Metabolic alkalosis: In the setting of GI losses. VBG 7.6/49/22/48  - Replace lytes, trend    Hyponatremia, hypokalemia: Likely hypovolemic 2/2 GI losses. K 3.0, Na 130 on admission  - NS at 150 cc/hr  - K replacement protocol   ** Addendum: K recheck 2.6  - Tele  - EKG  - IV  replacement, not tolerating PO    Acute on chronic abdominal pain: 2/2 SBOs. PTA on oxycodone. Treated with IV dilaudid in the ED  - Holding oral oxycodone  - SL dilaudid prn     Panic attacks: Reports panic attack, sense of impending doom, and concern for dying over the week. Has h/o panic attacks and this was c/w prior. No PTA meds  - Would like to consider psych consult, but will think about it overnight     Chronic malnutrition with TPN dependence: Recurrent SBO as above with prior small bowel resection. TPN initiated 3/2018. PICC in place. Has been off TPN since leaving A 5/3 as she discharged to her mother's home and was unable to get supplies from her home. K as above  - Check Mg and phos  - Nutrition consult to resume TPN    Elevated BPs: /112 in the setting of acute pain, obstruction  - Hydralazine prn for SBP>175 or DBP>110      Pain assessment:  Current Pain Score 5/3/2018   Patient currently in pain? yes   Pain score (0-10) -   Pain location Abdomen   Pain descriptors Aching   Some encounter information is confidential and restricted. Go to Review Flowsheets activity to see all data.   - Jenni is experiencing pain due to SBO as above. Pain management was discussed and the plan was created in a collaborative fashion.  Jenni's response to the current recommendations: ambivalent  - Please see the plan for pain management as documented above        Diet: NPO for Medical/Clinical Reasons Except for: Meds, Ice Chips  Fluids:  cc/hr   DVT Prophylaxis: Pneumatic Compression Devices  Code Status: Prior    Disposition Plan   Expected discharge: 2 - 3 days; recommended to prior living arrangement once adequate pain management/ tolerating PO medications.     Entered: Dina Stanley 05/11/2018, 3:13 PM   Information in the above section will display in the discharge planner report.    The patient's care was discussed with the patient and attending physician, Dr. 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 Of note, patient recent admitted and left AMA with hypokalemia, need for colonoscopy, see note 5/3 for details.     Patient reports chronic abdominal pain has been presented for several months but worsened on Monday. She is having diarrhea, last formed stool 2-3 days ago. Usually has one formed stool per week. She reports she left the hospital about a week ago with the plan to follow up with Dr. Teresa MCCOY. She was unable to get ahold of him, but when she was contacted she was instructed to go to her PCP. She reports she has been vomiting over the past few days. She cannot keep anything down. Denies fever or chills, confusion, chest pain, dyspnea, URI symptoms.     Patient reports she has been unable tolerate NGT in the past. She has pulled it out on several occasions and this has been quite bloody. States the only way she can tolerate NG is with \"heavy sedation the whole time.\" She is not willing to try the NG and would like surgery.    Review of Systems   The 10 point Review of Systems is negative other than noted in the HPI or here.     Past Medical History    I have reviewed this patient's medical history and updated it with pertinent information if needed.   Past Medical History:   Diagnosis Date     Asthma      Cancer (H)     Per patient OBGYN, cerivical cancer     Cervical " cancer (H)      Other chronic pain      Ovarian cancer (H)      Substance abuse     Outside records indicate past history of narcotics abuse or dependence, but patient denies.        Past Surgical History   I have reviewed this patient's surgical history and updated it with pertinent information if needed.  Past Surgical History:   Procedure Laterality Date     COLONOSCOPY N/A 5/3/2018    Procedure: COLONOSCOPY;  sigmoidoscopy;  Surgeon: Omero Vigil MD;  Location: [facility] OR     COLONOSCOPY WITH CO2 INSUFFLATION N/A 4/30/2018    Procedure: COLONOSCOPY WITH CO2 INSUFFLATION;  Colonoscopy;  Surgeon: Omero Vigil MD;  Location: [facility] OR     COMBINED CYSTOSCOPY, INSERT STENT URETER(S) Bilateral 5/18/2017    Procedure: COMBINED CYSTOSCOPY, INSERT STENT URETER(S);  Cystoscopy with Bilateral Stent,;  Surgeon: Rene Calero MD;  Location: [facility] OR     ENT SURGERY  2009    mastoid, sinus     EXAM UNDER ANESTHESIA, INSERT ALEX SLEEVE, UTERINE PLACEMENT OF TANDEM AND RING FOR RAD, ULTRASOUND N/A 12/14/2015    Procedure: EXAM UNDER ANESTHESIA, INSERT ALEX SLEEVE, UTERINE PLACEMENT OF TANDEM AND RING FOR RADIATION, ULTRASOUND GUIDED;  Surgeon: Abby Tony MD;  Location: [facility] OR     INSERT TANDEM AND CESIUM APPLICATOR CERVIX, ULTRASOUND GUIDED N/A 12/17/2015    Procedure: INSERT TANDEM AND CESIUM APPLICATOR CERVIX, ULTRASOUND GUIDED;  Surgeon: Kika Wood MD;  Location: [facility] OR     KNEE SURGERY       LAPAROTOMY EXPLORATORY N/A 5/18/2017    Procedure: LAPAROTOMY EXPLORATORY;   Exploratry Laparotomy, Small Bowel Resection with anastomosis, Flexible Sigmoidoscopy;  Surgeon: Jennifer Goodwin MD;  Location: [facility] OR     PICC INSERTION Right 04/29/2017    4fr SL BioFlo PICC, 37cm (3cm external) in the R basilic vein w/ tip in the mid SVC.     PICC INSERTION Right 03/29/2018    4Fr - 40cm (4cm external), R lateral brachial vein, Low SVC     RESECT SMALL BOWEL WITHOUT OSTOMY N/A 5/18/2017    Procedure: RESECT  SMALL BOWEL WITHOUT OSTOMY;;  Surgeon: Jennifer Goodwin MD;  Location: [facility] OR     SIGMOIDOSCOPY FLEXIBLE N/A 5/18/2017    Procedure: SIGMOIDOSCOPY FLEXIBLE;;  Surgeon: Jennifer Goodwin MD;  Location: [facility] OR        Social History   Social History   Substance Use Topics     Smoking status: Light Tobacco Smoker     Packs/day: 0.25     Years: 12.00     Types: Cigarettes     Smokeless tobacco: Never Used      Comment: pt smoking about 4 cigs daily     Alcohol use No      Comment: None per pt       Family History   I have reviewed this patient's family history and updated it with pertinent information if needed.   Family History   Problem Relation Age of Onset     DIABETES Mother      Ovarian Cancer No family hx of      Uterine Cancer No family hx of      Cervical Cancer No family hx of      Breast Cancer No family hx of        Prior to Admission Medications   Prior to Admission Medications   Prescriptions Last Dose Informant Patient Reported? Taking?   ondansetron (ZOFRAN) 4 MG tablet 5/11/2018 at Unknown time  No Yes   Sig: Take 1-2 tablets (4-8 mg) by mouth every 8 hours as needed for nausea   oxyCODONE-acetaminophen (PERCOCET) 5-325 MG per tablet 5/11/2018 at Unknown time  Yes Yes   Sig: Take 1 tablet by mouth every 4 hours as needed for severe pain      Facility-Administered Medications: None     Allergies   Allergies   Allergen Reactions     No Clinical Screening - See Comments Other (See Comments) and Diarrhea     headache  Carrots cause gastric upset, cramping and diarrhea.     Sulfa Drugs Hives     hives     Amoxicillin Unknown and Other (See Comments)     vomiting  vomiting     Amoxicillin-Pot Clavulanate Other (See Comments) and Nausea     vomiting     Augmentin GI Disturbance, Nausea and Hives     Avelox [Moxifloxacin] Nausea and Vomiting, Unknown and Nausea     Ciprofloxacin Hives and Nausea     Codeine Nausea and Vomiting and Nausea     Ibuprofen Nausea and Vomiting     Other reaction(s): Nausea And Vomiting      Ibuprofen Sodium Hives and GI Disturbance     Quinolones      Tramadol Hives, Diarrhea, Nausea and Nausea and Vomiting     Daucus Carota      Other reaction(s): GI Upset  Other reaction(s): Abdominal pain, Diarrhea  Carrots cause gastric upset, cramping and diarrhea.       Physical Exam   Vital Signs: Temp: 98.2  F (36.8  C) Temp src: Oral BP: (!) 118/112   Heart Rate: 78 Resp: 16 SpO2: 97 % O2 Device: None (Room air)    Weight: 110 lbs 0 oz    General Appearance: Alert and oriented x3, lying in bed. Tearful  HEENT: Membranes slightly dry  Respiratory: CTAB without wheezing or rales  Cardiovascular: RRR, S1, S2. No murmur noted  GI: Abdomen soft, bowel sounds present. Allows for only very light palpation which is tender  Lymph/Hematologic: Distal pulses palpable. No peripheral edema  Skin: No rash or jaundice     Data   Data reviewed today: I reviewed all medications, new labs and imaging results over the last 24 hours.     Data     Recent Labs  Lab 05/11/18  1031   WBC 7.9   HGB 15.2   MCV 98      INR 0.95   *   POTASSIUM 3.0*   CHLORIDE 81*   CO2 38*   BUN 25   CR 0.74   ANIONGAP 11   JONATAN 8.4*   GLC 85   ALBUMIN 3.2*   PROTTOTAL 6.6*   BILITOTAL 0.7   ALKPHOS 90   ALT 20   AST 26   LIPASE 54*     Recent Results (from the past 24 hour(s))   XR Abdomen 2 Views    Narrative    Exam: Abdominal x-ray, 2 views, 5/11/2018.    COMPARISON: 5/1/2018.    HISTORY: Abdominal pain.    FINDINGS: Supine and upright views of the abdomen were obtained. Again  noted multiple air distended loops of small bowel in the mid abdomen,  not significantly changed from the prior study. No pneumatosis. No  portal venous gas. No free air beneath the diaphragm. Partially  visualized lungs are clear. Right upper extremity PICC line with its  tip projecting over the low SVC. Decompressed colon.      Impression    IMPRESSION: Multiple air distended loops of small bowel particularly  in the mid abdomen, not significantly changed  from the prior studies.  Findings are concerning for small bowel obstruction. No pneumatosis or  free air.    QUINN ALMAGUER MD   CT Abdomen Pelvis w Contrast   Result Value    Radiologist flags Small bowel obstruction (Urgent)    Narrative    EXAMINATION: CT ABDOMEN PELVIS W CONTRAST, 5/11/2018 2:05 PM    TECHNIQUE:  Helical CT images from the lung bases through the  symphysis pubis were obtained with IV contrast. Contrast dose: 68CC  iSOVUE 370    COMPARISON: CT 3/28/2018    HISTORY: Possible small bowel obstruction, pain with dilated loops of  bowel    FINDINGS:    Abdomen and pelvis: Dilated loops of proximal small bowel, with a  similar degree of distention compared with 3/28/2018. There remains a  transition point at the small bowel anastomosis in the right lower  quadrant (series 3 image [id number], series 4 image 32, series 5 image  64-65). Upstream to this however there are moderately thickened small  bowel loops, increased from 3/28/2018, with relative decompression  proximal to the anastomosis. There is also moderate wall thickening in  the distal ileum and throughout the entire colon, which is increased  from 3/28/2018. No pneumatosis, portal venous gas or free air.    The liver, pancreas, spleen, and adrenals are within normal limits.  Mild-moderate gallbladder wall thickening beyond what would be  expected from this degree of decompression. Hypoattenuating foci in  the kidney are too small to characterize. Bladder and reproductive  organs are within normal limits. Aorta is normal in caliber. Mildly  enlarged mesenteric lymph nodes, presumably reactive. Small amount of  layering free fluid in the pelvis.    Lung bases: Clear.    Bones and soft tissues: No acute fracture or suspicious bone lesion.      Impression    IMPRESSION:   1. Long segment dilated loops of proximal small bowel, similar in  their degree of distention compared with 3/28/2018, with transition  point again seen at the small bowel  anastomosis in the pelvis.  2. Increased extensive moderate small bowel and pancolonic wall  thickening. This may be related to venous congestion in the setting of  bowel obstruction or possibly infectious process. Superimposed bowel  ischemia cannot be excluded, but thought less likely. No evidence of  bowel perforation.    [Urgent Result: Small bowel obstruction]    Finding was identified on 5/11/2018 1:52 PM.     Dr. Sam was contacted by Dr. Corrales at 5/11/2018 2:01 PM and  verbalized understanding of the urgent finding.      I have personally reviewed the examination and initial interpretation  and I agree with the findings.    CECY WIN MD

## 2018-05-11 NOTE — PHARMACY-ADMISSION MEDICATION HISTORY
Admission medication history interview status for the 5/11/2018 admission is complete. See Epic admission navigator for allergy information, pharmacy, prior to admission medications and immunization status.     Medication history interview sources: patient    Changes made to PTA medication list (reason)  Added: percocet (per patient report)  Deleted: per patient report  -Tylenol 1,000 mg by mouth 4 times daily  -Albuterol 80 mcg/act inhale 2 puffs into the lungs every 6 hours as needed  -Roxicodone 5 -10 mg by mouth ever 4 hours as needed  -Parenteral nutrition  -Vitamin D 50,000 units by mouth once a week  Changed: none    Additional medication history information:  -The patient was a good historian and able to verify the dose, strength, and administration frequency of most medications.   -The patient has not received her influenza vaccine and denied any additional over-the-counter/prescription products.    Prior to Admission medications    Medication Sig Last Dose Taking? Auth Provider   ondansetron (ZOFRAN) 4 MG tablet Take 1-2 tablets (4-8 mg) by mouth every 8 hours as needed for nausea 5/11/2018 at Unknown time Yes Negro Daley MD   oxyCODONE-acetaminophen (PERCOCET) 5-325 MG per tablet Take 1 tablet by mouth every 4 hours as needed for severe pain 5/11/2018 at Unknown time Yes Unknown, Entered By History     Medication history completed by: Cee Kwong, Pharm.D.

## 2018-05-11 NOTE — IP AVS SNAPSHOT
[address] [address]    Phone:  [phone]                                       After Visit Summary   5/11/2018    Rachel A Gerhardt    MRN: [id number]           After Visit Summary Signature Page     I have received my discharge instructions, and my questions have been answered. I have discussed any challenges I see with this plan with the nurse or doctor.    ..........................................................................................................................................  Patient/Patient Representative Signature      ..........................................................................................................................................  Patient Representative Print Name and Relationship to Patient    ..................................................               ................................................  Date                                            Time    ..........................................................................................................................................  Reviewed by Signature/Title    ...................................................              ..............................................  Date                                                            Time

## 2018-05-11 NOTE — ED NOTES
[facility], [place]   ED Nurse to Floor Handoff     Rachel A Gerhardt is a 42 year old female who speaks English and lives with family members,  in a home  They arrived in the ED by car from home    ED Chief Complaint: Abdominal Pain and Vomiting    ED Dx;   Final diagnoses:   Abdominal pain, generalized - possible SBO         Needed?: No    Allergies:   Allergies   Allergen Reactions     No Clinical Screening - See Comments Other (See Comments) and Diarrhea     headache  Carrots cause gastric upset, cramping and diarrhea.     Sulfa Drugs Hives     hives     Amoxicillin Unknown and Other (See Comments)     vomiting  vomiting     Amoxicillin-Pot Clavulanate Other (See Comments) and Nausea     vomiting     Augmentin GI Disturbance, Nausea and Hives     Avelox [Moxifloxacin] Nausea and Vomiting, Unknown and Nausea     Ciprofloxacin Hives and Nausea     Codeine Nausea and Vomiting and Nausea     Ibuprofen Nausea and Vomiting     Other reaction(s): Nausea And Vomiting     Ibuprofen Sodium Hives and GI Disturbance     Quinolones      Tramadol Hives, Diarrhea, Nausea and Nausea and Vomiting     Daucus Carota      Other reaction(s): GI Upset  Other reaction(s): Abdominal pain, Diarrhea  Carrots cause gastric upset, cramping and diarrhea.   .  Past Medical Hx:   Past Medical History:   Diagnosis Date     Asthma      Cancer (H)     Per patient OBGYN, cerivical cancer     Cervical cancer (H)      Other chronic pain      Ovarian cancer (H)      Substance abuse     Outside records indicate past history of narcotics abuse or dependence, but patient denies.      Baseline Mental status: WDL  Current Mental Status changes: at basesline    Infection present or suspected this encounter: no  Sepsis suspected: No  Isolation type: No active isolations     Activity level - Baseline/Home:  Independent  Activity Level - Current:   Independent    Bariatric equipment needed?: No    In the ED these meds  were given:   Medications   ondansetron (ZOFRAN) injection 4 mg (4 mg Intravenous Given 5/11/18 1028)   0.9% sodium chloride BOLUS (1,000 mLs Intravenous New Bag 5/11/18 1028)     Followed by   sodium chloride 0.9% infusion (not administered)   iohexol (OMNIPAQUE) solution 25 mL (25 mLs Oral Not Given 5/11/18 1304)   sodium chloride (PF) 0.9% PF flush 67 mL (not administered)   iopamidol (ISOVUE-370) solution 68 mL (not administered)   HYDROmorphone (PF) (DILAUDID) injection 0.5 mg (0.5 mg Intravenous Given 5/11/18 1258)   pantoprazole (PROTONIX) 40 mg IV push injection (40 mg Intravenous Given 5/11/18 1029)       Drips running?  Yes    Home pump  No    Current LDAs  PICC Single Lumen 03/29/18 Right Brachial vein medial (Active)   Number of days:43       Incision/Surgical Site 05/18/17 Abdomen (Active)   Number of days:358       Labs results:   Labs Ordered and Resulted from Time of ED Arrival Up to the Time of Departure from the ED   CBC WITH PLATELETS DIFFERENTIAL - Abnormal; Notable for the following:        Result Value    MCH 33.7 (*)     All other components within normal limits   COMPREHENSIVE METABOLIC PANEL - Abnormal; Notable for the following:     Sodium 130 (*)     Potassium 3.0 (*)     Chloride 81 (*)     Carbon Dioxide 38 (*)     Calcium 8.4 (*)     Albumin 3.2 (*)     Protein Total 6.6 (*)     All other components within normal limits   LIPASE - Abnormal; Notable for the following:     Lipase 54 (*)     All other components within normal limits   ISTAT  GASES LACTATE ANGEL LUIS POCT - Abnormal; Notable for the following:     Ph Venous 7.60 (*)     PO2 Venous 22 (*)     Bicarbonate Venous 48 (*)     All other components within normal limits   INR   UA MACROSCOPIC WITH REFLEX TO MICRO AND CULTURE   ISTAT CG4 GASES LACTATE ANGEL LUIS NURSING POCT   PERIPHERAL IV CATHETER       Imaging Studies:   Recent Results (from the past 24 hour(s))   XR Abdomen 2 Views    Narrative    Exam: Abdominal x-ray, 2 views,  5/11/2018.    COMPARISON: 5/1/2018.    HISTORY: Abdominal pain.    FINDINGS: Supine and upright views of the abdomen were obtained. Again  noted multiple air distended loops of small bowel in the mid abdomen,  not significantly changed from the prior study. No pneumatosis. No  portal venous gas. No free air beneath the diaphragm. Partially  visualized lungs are clear. Right upper extremity PICC line with its  tip projecting over the low SVC. Decompressed colon.      Impression    IMPRESSION: Multiple air distended loops of small bowel particularly  in the mid abdomen, not significantly changed from the prior studies.  Findings are concerning for small bowel obstruction. No pneumatosis or  free air.    QUINN ALMAGUER MD       Recent vital signs:   BP (!) 118/112  Temp 98.2  F (36.8  C) (Oral)  Resp 16  Wt 49.9 kg (110 lb)  SpO2 97%  BMI 16.24 kg/m2    Cardiac Rhythm: Normal Sinus  Pt needs tele? No  Skin/wound Issues: None    Code Status: Full Code    Pain control: good    Nausea control: good    Abnormal labs/tests/findings requiring intervention: See Epic    Family present during ED course? Yes   Family Comments/Social Situation comments: Sig other at bedside    Tasks needing completion: K+ replacement x 3. 1st dose infusing upon transfer.    Seema Rojas, RN  ascom-- [phone]  [phone] [facility]  [phone] [facility]

## 2018-05-11 NOTE — TELEPHONE ENCOUNTER
----- Message from Maria L Sidhu RN sent at 5/10/2018  5:04 PM CDT -----  Kenyetta Woods 1 hour ago (2:25 PM)               [facility] Call Center       Phone Message       May a detailed message be left on voicemail: yes       Reason for Call: Other: Magi with [facility] called and stated that they have an order for pick line removal however they are having difficulty with reaching the patient please call [phone] to advise.         Action Taken: Message routed to:  [facility] ([facility] Colon Rectal     ----- Message -----     From: Maria L Sidhu RN     Sent: 5/10/2018  11:25 AM       To: CINDY Horn

## 2018-05-12 ENCOUNTER — HOME INFUSION (PRE-WILLOW HOME INFUSION) (OUTPATIENT)
Dept: PHARMACY | Facility: CLINIC | Age: 43
End: 2018-05-12

## 2018-05-12 LAB
ANION GAP SERPL CALCULATED.3IONS-SCNC: 8 MMOL/L (ref 3–14)
BUN SERPL-MCNC: 16 MG/DL (ref 7–30)
CALCIUM SERPL-MCNC: 8 MG/DL (ref 8.5–10.1)
CHLORIDE SERPL-SCNC: 101 MMOL/L (ref 94–109)
CO2 SERPL-SCNC: 29 MMOL/L (ref 20–32)
CREAT SERPL-MCNC: 0.65 MG/DL (ref 0.52–1.04)
ERYTHROCYTE [DISTWIDTH] IN BLOOD BY AUTOMATED COUNT: 14.2 % (ref 10–15)
GFR SERPL CREATININE-BSD FRML MDRD: >90 ML/MIN/1.7M2
GLUCOSE SERPL-MCNC: 79 MG/DL (ref 70–99)
HCT VFR BLD AUTO: 40 % (ref 35–47)
HGB BLD-MCNC: 13.6 G/DL (ref 11.7–15.7)
MAGNESIUM SERPL-MCNC: 2 MG/DL (ref 1.6–2.3)
MCH RBC QN AUTO: 33.8 PG (ref 26.5–33)
MCHC RBC AUTO-ENTMCNC: 34 G/DL (ref 31.5–36.5)
MCV RBC AUTO: 100 FL (ref 78–100)
PHOSPHATE SERPL-MCNC: 2 MG/DL (ref 2.5–4.5)
PLATELET # BLD AUTO: 353 10E9/L (ref 150–450)
POTASSIUM SERPL-SCNC: 3.5 MMOL/L (ref 3.4–5.3)
RBC # BLD AUTO: 4.02 10E12/L (ref 3.8–5.2)
SODIUM SERPL-SCNC: 139 MMOL/L (ref 133–144)
TRIGL SERPL-MCNC: 116 MG/DL
WBC # BLD AUTO: 6.4 10E9/L (ref 4–11)

## 2018-05-12 PROCEDURE — 84100 ASSAY OF PHOSPHORUS: CPT | Performed by: PHYSICIAN ASSISTANT

## 2018-05-12 PROCEDURE — 12000026 ZZH R&B TRANSPLANT

## 2018-05-12 PROCEDURE — 84478 ASSAY OF TRIGLYCERIDES: CPT | Performed by: INTERNAL MEDICINE

## 2018-05-12 PROCEDURE — 99207 ZZC APP CREDIT; MD BILLING SHARED VISIT: CPT | Performed by: NURSE PRACTITIONER

## 2018-05-12 PROCEDURE — 25000128 H RX IP 250 OP 636: Performed by: PHYSICIAN ASSISTANT

## 2018-05-12 PROCEDURE — 36592 COLLECT BLOOD FROM PICC: CPT | Performed by: PHYSICIAN ASSISTANT

## 2018-05-12 PROCEDURE — 25000125 ZZHC RX 250: Performed by: INTERNAL MEDICINE

## 2018-05-12 PROCEDURE — 25000125 ZZHC RX 250: Performed by: PHYSICIAN ASSISTANT

## 2018-05-12 PROCEDURE — 99233 SBSQ HOSP IP/OBS HIGH 50: CPT | Performed by: INTERNAL MEDICINE

## 2018-05-12 PROCEDURE — 80048 BASIC METABOLIC PNL TOTAL CA: CPT | Performed by: PHYSICIAN ASSISTANT

## 2018-05-12 PROCEDURE — 36592 COLLECT BLOOD FROM PICC: CPT | Performed by: INTERNAL MEDICINE

## 2018-05-12 PROCEDURE — 83735 ASSAY OF MAGNESIUM: CPT | Performed by: PHYSICIAN ASSISTANT

## 2018-05-12 PROCEDURE — 25000128 H RX IP 250 OP 636: Performed by: INTERNAL MEDICINE

## 2018-05-12 PROCEDURE — 25000132 ZZH RX MED GY IP 250 OP 250 PS 637: Performed by: PHYSICIAN ASSISTANT

## 2018-05-12 PROCEDURE — 85027 COMPLETE CBC AUTOMATED: CPT | Performed by: PHYSICIAN ASSISTANT

## 2018-05-12 PROCEDURE — 25000132 ZZH RX MED GY IP 250 OP 250 PS 637: Performed by: NURSE PRACTITIONER

## 2018-05-12 RX ADMIN — Medication 10 MEQ: at 03:50

## 2018-05-12 RX ADMIN — Medication 2 MG: at 15:52

## 2018-05-12 RX ADMIN — Medication 10 MEQ: at 02:09

## 2018-05-12 RX ADMIN — PROCHLORPERAZINE EDISYLATE 10 MG: 5 INJECTION INTRAMUSCULAR; INTRAVENOUS at 01:17

## 2018-05-12 RX ADMIN — Medication 2 MG: at 18:57

## 2018-05-12 RX ADMIN — SODIUM CHLORIDE: 9 INJECTION, SOLUTION INTRAVENOUS at 15:10

## 2018-05-12 RX ADMIN — KETOROLAC TROMETHAMINE 15 MG: 15 INJECTION, SOLUTION INTRAMUSCULAR; INTRAVENOUS at 10:28

## 2018-05-12 RX ADMIN — POTASSIUM CHLORIDE: 2 INJECTION, SOLUTION, CONCENTRATE INTRAVENOUS at 20:56

## 2018-05-12 RX ADMIN — Medication 2 MG: at 02:09

## 2018-05-12 RX ADMIN — KETOROLAC TROMETHAMINE 15 MG: 15 INJECTION, SOLUTION INTRAMUSCULAR; INTRAVENOUS at 18:04

## 2018-05-12 RX ADMIN — Medication 2 MG: at 10:21

## 2018-05-12 RX ADMIN — Medication 2 MG: at 18:04

## 2018-05-12 RX ADMIN — POLYETHYLENE GLYCOL 3350, SODIUM SULFATE ANHYDROUS, SODIUM BICARBONATE, SODIUM CHLORIDE, POTASSIUM CHLORIDE 4000 ML: 236; 22.74; 6.74; 5.86; 2.97 POWDER, FOR SOLUTION ORAL at 17:34

## 2018-05-12 RX ADMIN — Medication 2 MG: at 07:55

## 2018-05-12 RX ADMIN — Medication 2 MG: at 05:28

## 2018-05-12 RX ADMIN — Medication 10 MEQ: at 00:55

## 2018-05-12 RX ADMIN — SODIUM PHOSPHATE, MONOBASIC, MONOHYDRATE AND SODIUM PHOSPHATE, DIBASIC, ANHYDROUS 15 MMOL: 276; 142 INJECTION, SOLUTION INTRAVENOUS at 05:37

## 2018-05-12 RX ADMIN — PROCHLORPERAZINE EDISYLATE 10 MG: 5 INJECTION INTRAMUSCULAR; INTRAVENOUS at 19:39

## 2018-05-12 RX ADMIN — I.V. FAT EMULSION 250 ML: 20 EMULSION INTRAVENOUS at 20:56

## 2018-05-12 RX ADMIN — POTASSIUM CHLORIDE 20 MEQ: 14.9 INJECTION, SOLUTION INTRAVENOUS at 15:13

## 2018-05-12 RX ADMIN — Medication 2 MG: at 13:50

## 2018-05-12 NOTE — PLAN OF CARE
Problem: Patient Care Overview  Goal: Plan of Care/Patient Progress Review  Outcome: No Change  BP 95/58  Pulse 78  Temp 98.8  F (37.1  C) (Oral)  Resp 16  Wt 53.8 kg (118 lb 11.2 oz)  SpO2 98%  BMI 17.53 kg/m2    Afebrile, BP continues to be soft. OVSS on RA. Pt complains of abdominal pain - dilaudid given x3 with some relief. Compazine given x1 for nausea - no emesis.  L PICC with NS @ 150ml/hr. K 2.6 - 60mEg overnight - recheck pending. Phos 2.2 - currently replacing with 15mol of sodium phos. Pt voiding - not saving. X1 large incontinent BM. NPO. Up ad dania. Will continue to monitor. Call light in reach, continue with POC.

## 2018-05-12 NOTE — PLAN OF CARE
Problem: Patient Care Overview  Goal: Individualization & Mutuality  Outcome: No Change  Pt returned to floor at 1340.

## 2018-05-12 NOTE — PROGRESS NOTES
COLORECTAL SURGERY Progress Note    Patient reports doing OK, pain improved. Frustrated with care. No vomiting.     BP 95/58  Pulse 78  Temp 98.8  F (37.1  C) (Oral)  Resp 16  Wt 53.8 kg (118 lb 11.2 oz)  SpO2 98%  BMI 17.53 kg/m2    Abdomen: Soft, non-tender, moderately distended     ASSESSMENT/PLAN: 42 year old female with PMH of SBR for radiation stricture for cervical CA and anastomotic stricture, TPN dependence, here with increased abdominal pain, nausea/vomiting. Clinically with benign exam. WBC 7.9, vitals WNL. CT with dilated loops of small bowel similar to prior concerning for small bowel obstruction. Still passing flatus and having BMs.     -- No indication for acute surgical intervention  -- Continue TPN  -- NPO  -- Replete electrolytes PRN  -- Recommend GI consult  -- Given that she is refusing NGT, would recommend self-decompression for 2-3 days as she is having antegrade function. After which time, would recommend a gentle bowel prep and colonoscopy.      Discussed with Colorectal team.     Jasbir Viera MD  PGY-1 Surgery  pager [phone]  (6AM-5PM weekdays please page primary team, nights/wknds/holidays, page job code [phone])     Attestation:  This patient has been seen and evaluated by me.  Discussed with the house staff team or resident(s) and agree with the findings and plan in this note.  See my consultation attestation note from today for details.  Herman Moore MD  Professor of Surgery  Chief, Division of Colon and Rectal Surgery  [phone]

## 2018-05-12 NOTE — CONSULTS
GASTROENTEROLOGY CONSULTATION      Date of Admission:  5/11/2018  Date of Service:   5/12/2018          ASSESSMENT AND RECOMMENDATIONS:   Assessment:  Rachel A Gerhardt is a 42 year old female with h/o cervical cancer s/p radiation therapy with development of small bowel strictures that have led to recurrent SBO, now s/p ex lap 5/18/17 with resection of 60cm of small bowel and resultant anastomotic stricture.  Anastomotic stricture has led to partial SBO, and plans were made to treat with endoscopic dilation.  However, pt has been unable to adequately prep for this procedure repeatedly (three prior attempts).  She says she has had issues with n/v limiting prep in the past.  She refuses NG, saying this has been tried before and she will pull it out within a few min.  She says she is willing to prep slowly, drinking as much prep as needed for likely several days.  She expressed understanding that drinking prep slowly is less effective, and she would likely need more than the typical 4L of golytely to get clear.  She wants to try to prep.     Recommendations  - Note continued TPN for nutrition  - CLD as tolerated  - Start golytely prep, as tolerated  - Monitor and document stool output  - Continue golytely until BM are clear, yellow    Gastroenterology follow up recommendations: Will continue to follow    Thank you for involving us in this patient's care. Please do not hesitate to contact the GI service with any questions or concerns.     Pt care plan discussed with Dr. Epstein, GI staff physician.    Natalie Garcias MD  GI Fellow  [phone]  -------------------------------------------------------------------------------------------------------------------          Reason for Consult:   We were asked by Dr. Stallworth to evaluate this patient with SBO    History is obtained from the patient and the medical record.          History of Present Illness:     Rachel A Gerhardt is a 42 year old female with h/o cervical cancer  s/p radiation therapy with development of small bowel strictures that have led to recurrent SBO, now s/p ex lap 5/18/17 with resection of 60cm of small bowel and resultant anastomotic stricture.  Anastomotic stricture has led to partial SBO, and plans were made to treat with endoscopic dilation.  However, pt has been unable to adequately prep for this procedure repeatedly (three prior attempts).  She currently presents with increased severity of her chronic abd pain, n/v.  She says she has been having constant abdominal pain since September that increases in severity with PO intake and improves with BM/flatus.  Reports 100lb weight loss (documented 30lb weight loss).  Has nausea daily with intermittent emesis.  Denies any h/o hematemesis.  Denies melena or BRBPR.  She reports ongoing stools and flatus.  Says she had a significant amount of stool output overnight, with 8-10 loose stools.  She typically has 3-4 stools in a short period of time in the early morning.  Denies fever, SOB, CP, rash, and dysuria.  Continued tobaacco use.  No NSAIDS.         Review of Systems:   A 10 point review of systems was performed and is negative except as noted in the HPI           Past Medical History:   Reviewed and edited as appropriate  Past Medical History:   Diagnosis Date     Asthma      Cancer (H)     Per patient OBGYN, cerivical cancer     Cervical cancer (H)      Other chronic pain      Ovarian cancer (H)      Substance abuse     Outside records indicate past history of narcotics abuse or dependence, but patient denies.            Past Surgical History:   Reviewed and edited as appropriate   Past Surgical History:   Procedure Laterality Date     COLONOSCOPY N/A 5/3/2018    Procedure: COLONOSCOPY;  sigmoidoscopy;  Surgeon: Omero Vigil MD;  Location: [facility] OR     COLONOSCOPY WITH CO2 INSUFFLATION N/A 4/30/2018    Procedure: COLONOSCOPY WITH CO2 INSUFFLATION;  Colonoscopy;  Surgeon: Omero Vigil MD;   Location: [facility]     COMBINED CYSTOSCOPY, INSERT STENT URETER(S) Bilateral 5/18/2017    Procedure: COMBINED CYSTOSCOPY, INSERT STENT URETER(S);  Cystoscopy with Bilateral Stent,;  Surgeon: Rene Calero MD;  Location: [facility] OR     ENT SURGERY  2009    mastoid, sinus     EXAM UNDER ANESTHESIA, INSERT ALEX SLEEVE, UTERINE PLACEMENT OF TANDEM AND RING FOR RAD, ULTRASOUND N/A 12/14/2015    Procedure: EXAM UNDER ANESTHESIA, INSERT ALEX SLEEVE, UTERINE PLACEMENT OF TANDEM AND RING FOR RADIATION, ULTRASOUND GUIDED;  Surgeon: Abby Tony MD;  Location: [facility] OR     INSERT TANDEM AND CESIUM APPLICATOR CERVIX, ULTRASOUND GUIDED N/A 12/17/2015    Procedure: INSERT TANDEM AND CESIUM APPLICATOR CERVIX, ULTRASOUND GUIDED;  Surgeon: Kika Wood MD;  Location: [facility] OR     KNEE SURGERY       LAPAROTOMY EXPLORATORY N/A 5/18/2017    Procedure: LAPAROTOMY EXPLORATORY;   Exploratry Laparotomy, Small Bowel Resection with anastomosis, Flexible Sigmoidoscopy;  Surgeon: Jennifer Goodwin MD;  Location: [facility]     PICC INSERTION Right 04/29/2017    4fr SL BioFlo PICC, 37cm (3cm external) in the R basilic vein w/ tip in the mid SVC.     PICC INSERTION Right 03/29/2018    4Fr - 40cm (4cm external), R lateral brachial vein, Low SVC     RESECT SMALL BOWEL WITHOUT OSTOMY N/A 5/18/2017    Procedure: RESECT SMALL BOWEL WITHOUT OSTOMY;;  Surgeon: Jennifer Goodwin MD;  Location: [facility]     SIGMOIDOSCOPY FLEXIBLE N/A 5/18/2017    Procedure: SIGMOIDOSCOPY FLEXIBLE;;  Surgeon: Jennifer Goodwin MD;  Location: [facility]            Previous Endoscopy:     Results for orders placed or performed during the hospital encounter of 04/30/18   COLONOSCOPY   Result Value Ref Range    COLONOSCOPY       [address] [place] [address] [phone]     Endoscopy Department  _______________________________________________________________________________  Patient Name: Jenninaomi RodriguesGerhardt         Procedure Date: 4/30/2018 3:50  PM  MRN: [id number]                       Account Number: [id number]  YOB: 1975             Admit Type: Outpatient  Age: 42                               Room: OR  Gender: Female                        Note Status: Finalized  Attending MD: Omero Vigil MD   Total Sedation Time:   _______________________________________________________________________________     Procedure:           Colonoscopy  Indications:         Therapeutic procedure  Providers:           Omero Vigil MD  Patient Profile:     Ms Gerhardt is a 41yo woman with cancer status post                        radiation therapy complicated by small bowel obstruction                        status post small bowel resection. She has evidence of                         anastomotic stenosis and now proceeds to retrograde                        enteroscopy. Of note she only received instructions for                        two enemas.  Referring MD:        Negro Akins MD, Jennifer Goodwin MD  Medicines:           Deep sedation was administered  Complications:       No immediate complications.  _______________________________________________________________________________  Procedure:           Pre-Anesthesia Assessment:                       - Prior to the procedure, a History and Physical was                        performed, and patient medications and allergies were                        reviewed. The patient is competent. The risks and                        benefits of the procedure and the sedation options and                        risks were discussed with the patient. All questions                        were answered and informed consent was obtained. Patient                        identification and proposed procedure were  verified by                        the nurse in the pre-procedure area. Mental Status                        Examination: alert and oriented. Airway Examination:                        Mallampati Class  II (the uvula but not tonsillar pillars                        visualized). Respiratory Examination: clear to                        auscultation. CV Examination: normal. ASA Grade                        Assessment: II - A patient with mild systemic disease.                        After reviewing the risks and benefits, the patient was                        deemed in satisfactory condition to undergo the                        procedure. The anesthesia plan was to use deep sedation                        / analgesia. Immediately prior to administration of                        medications, the patient was re-assessed for adequacy to                        receive sedatives. The heart rate, respiratory rate,                        oxygen saturations, blood pressure, adequacy of                         pulmonary ventilation, and response to care were                        monitored throughout the procedure. The physical status                        of the patient was re-assessed after the procedure.                        After obtaining informed consent, the colonoscope was                        passed under direct vision. Throughout the procedure,                        the patient's blood pressure, pulse, and oxygen                        saturations were monitored continuously. The enteroscope                        was introduced through the anus and advanced to the                        cecum, identified by appendiceal orifice and ileocecal                        valve. The colonoscopy was performed without difficulty.                        The patient tolerated the procedure well. The quality of                        the bowel preparation was inadequate.                                                                                   Findings:       Digi nael exam demonstrated intact tone with liquid contents. An        eneteroscope and single ballooned overtube were advanced in concert to        the  cecum. This was done with care as a moderate amount of liquid and        sold stool were found throughout the colon. However the solid stool        burden could not be cleared from the cecum precluding valve intubation.        The procedure was then aborted.                                                                                   Impression:          - Inadequate bowel preparation to allow visualization of                        the ileocecal valve  Recommendation:      - Standard outpatient deep sedation recovery with                        probable discharge home this afternoon                       - As this is the second event of inadequate bowel                        preparation due to errors in communication with our                        staff; I would understand if the patient did not wish to                        return  for the procedure, however if she does wish to                        return for repeat attempt at retrograde enteroscopy she                        will require at least a stanard colonoscopy bowel                        preparation (such as 4L Golytely) with continued bowel                        preparation as needed until completly clear                       - The findings and recommendations were discussed with                        the patient and their family                                                                                     electronically signed by CHAITANYA Vigil  _____________________  Omero Vigil MD  4/30/2018 4:43:24 PM  I was physically present for the entire viewing portion of the exam.  __________________________  Signature of teaching physician  Jose/Violetta Vigil MD  Number of Addenda: 0    Note Initiated On: 4/30/2018 3:50 PM  Scope In:  Scope Out:              Social History:   Reviewed and edited as appropriate  Social History     Social History     Marital status:      Spouse name: N/A     Number of children: N/A     Years of  education: N/A     Occupational History     Not on file.     Social History Main Topics     Smoking status: Light Tobacco Smoker     Packs/day: 0.25     Years: 12.00     Types: Cigarettes     Smokeless tobacco: Never Used      Comment: pt smoking about 4 cigs daily     Alcohol use No      Comment: None per pt     Drug use: No      Comment: Patient denies.  Outside records indicate possible Opiate abuse.     Sexual activity: Yes     Partners: Male     Birth control/ protection: None     Other Topics Concern     Parent/Sibling W/ Cabg, Mi Or Angioplasty Before 65f 55m? No     Social History Narrative    Lives alone            Family History:   Reviewed and edited as appropriate  Family History   Problem Relation Age of Onset     DIABETES Mother      Ovarian Cancer No family hx of      Uterine Cancer No family hx of      Cervical Cancer No family hx of      Breast Cancer No family hx of            Allergies:   Reviewed and edited as appropriate     Allergies   Allergen Reactions     No Clinical Screening - See Comments Other (See Comments) and Diarrhea     headache  Carrots cause gastric upset, cramping and diarrhea.     Sulfa Drugs Hives     hives     Amoxicillin Unknown and Other (See Comments)     vomiting  vomiting     Amoxicillin-Pot Clavulanate Other (See Comments) and Nausea     vomiting     Augmentin GI Disturbance, Nausea and Hives     Avelox [Moxifloxacin] Nausea and Vomiting, Unknown and Nausea     Ciprofloxacin Hives and Nausea     Codeine Nausea and Vomiting and Nausea     Ibuprofen Nausea and Vomiting     Other reaction(s): Nausea And Vomiting     Ibuprofen Sodium Hives and GI Disturbance     Quinolones      Tramadol Hives, Diarrhea, Nausea and Nausea and Vomiting     Daucus Carota      Other reaction(s): GI Upset  Other reaction(s): Abdominal pain, Diarrhea  Carrots cause gastric upset, cramping and diarrhea.            Medications:     Prescriptions Prior to Admission   Medication Sig Dispense Refill  Last Dose     ondansetron (ZOFRAN) 4 MG tablet Take 1-2 tablets (4-8 mg) by mouth every 8 hours as needed for nausea 30 tablet 0 5/11/2018 at Unknown time     oxyCODONE-acetaminophen (PERCOCET) 5-325 MG per tablet Take 1 tablet by mouth every 4 hours as needed for severe pain   5/11/2018 at Unknown time             Physical Exam:   /85 (BP Location: Left arm)  Pulse 88  Temp 98.7  F (37.1  C) (Oral)  Resp 16  Wt 53.8 kg (118 lb 11.2 oz)  SpO2 97%  BMI 17.53 kg/m2  Wt:   Wt Readings from Last 2 Encounters:   05/12/18 53.8 kg (118 lb 11.2 oz)   05/09/18 55.3 kg (122 lb)      Constitutional: Cooperative, not dyspneic/diaphoretic, no acute distress  Eyes: Sclera anicteric  Ears/nose/mouth/throat: Mucus membranes moist, hearing intact  Neck: Supple  CV: RRR, no edema  Respiratory: Unlabored breathing  Abd: Soft, diffusely tender to palpation, nondistended, +bs, no peritoneal signs  Skin: Warm, perfused, no jaundice  Neuro: AAO  Psych: Normal affect  MSK: No gross deformities           Data:   All available labs, imaging, and procedure notes were independently reviewed and interpreted, pertinent values are as follow:    BMP  Recent Labs  Lab 05/12/18  0606 05/11/18 2000 05/11/18  1031     --  130*   POTASSIUM 3.5 2.6* 3.0*   CHLORIDE 101  --  81*   JONATAN 8.0*  --  8.4*   CO2 29  --  38*   BUN 16  --  25   CR 0.65  --  0.74   GLC 79  --  85     CBC  Recent Labs  Lab 05/12/18  0606 05/11/18  1031   WBC 6.4 7.9   RBC 4.02 4.51   HGB 13.6 15.2   HCT 40.0 44.0    98   MCH 33.8* 33.7*   MCHC 34.0 34.5   RDW 14.2 14.0    218     INR  Recent Labs  Lab 05/11/18  1031   INR 0.95     LFTs  Recent Labs  Lab 05/11/18  1031   ALKPHOS 90   AST 26   ALT 20   BILITOTAL 0.7   PROTTOTAL 6.6*   ALBUMIN 3.2*      PANC  Recent Labs  Lab 05/11/18  1031   LIPASE 54*       Imaging:  EXAMINATION: CT ABDOMEN PELVIS W CONTRAST, 5/11/2018 2:05 PM     TECHNIQUE:  Helical CT images from the lung bases through  the  symphysis pubis were obtained with IV contrast. Contrast dose: 68CC  iSOVUE 370     COMPARISON: CT 3/28/2018     HISTORY: Possible small bowel obstruction, pain with dilated loops of  bowel     FINDINGS:     Abdomen and pelvis: Dilated loops of proximal small bowel, with a  similar degree of distention compared with 3/28/2018. There remains a  transition point at the small bowel anastomosis in the right lower  quadrant (series 3 image [id number], series 4 image 32, series 5 image  [id number]). Upstream to this however there are moderately thickened small  bowel loops, increased from 3/28/2018, with relative decompression  proximal to the anastomosis. There is also moderate wall thickening in  the distal ileum and throughout the entire colon, which is increased  from 3/28/2018. No pneumatosis, portal venous gas or free air.     The liver, pancreas, spleen, and adrenals are within normal limits.  Mild-moderate gallbladder wall thickening beyond what would be  expected from this degree of decompression. Hypoattenuating foci in  the kidney are too small to characterize. Bladder and reproductive  organs are within normal limits. Aorta is normal in caliber. Mildly  enlarged mesenteric lymph nodes, presumably reactive. Small amount of  layering free fluid in the pelvis.     Lung bases: Clear.     Bones and soft tissues: No acute fracture or suspicious bone lesion.         IMPRESSION:   1. Long segment dilated loops of proximal small bowel, similar in  their degree of distention compared with 3/28/2018, with transition  point again seen at the small bowel anastomosis in the pelvis.  2. Increased extensive moderate small bowel and pancolonic wall  thickening. This may be related to venous congestion in the setting of  bowel obstruction or possibly infectious process. Superimposed bowel  ischemia cannot be excluded, but thought less likely. No evidence of  bowel perforation.

## 2018-05-12 NOTE — PROGRESS NOTES
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 Of note, patient recent admitted and left AMA with hypokalemia, need for colonoscopy, see note 5/3 for details.    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  DId well for ~5 mos but then symptoms returned.  MRE 3/21/18 revealed anastomotic stricture.  Admitted to [facility] 3/28-4/1 with progressive symptoms, plan for possible endoscopic dilatation of anastomotic site.  Plan for colonoscopy with Dr. Vigil for attempted dilatation, unfortunately failed attempts x 3 (most recently 5/3) due to inadequate oral prep.  Plan for for NGT placement for administration of prep at the time, however, pt left AMA before this was able to be completed.  CT AP 5/11 long segment dilated loops of proximal small bowel, transition point at small bowel anastomosis in pelvis. Increased extensive small bowel and pancolonic wall thickening. No evidence of bowel perforation. Lipase and lactic acid normal. Treated with IV dilaudid and zofran in the ED.  Surgery consulted, no indication for acute surgical intervention.  Patient refusing NGT.   - Given that she is refusing NGT, surgery recc self-decompression for 2-3 days as she is having antegrade function  - GI consult for possible re-attempt at colonoscopy this week  - NPO  - IVF NS at 150 mL/hour  - Continue TPN, pharmD and nutrition consulted  - Pain control: SL Dilaudid PRN, IV toradol PRN   - Compazine and Reglan PRN nausea as QTc 451 on 5/1    Hyponatremia; Hypokalemia -  Likely hypovolemia 2/2 GI losses. Placed on repletion protocol overnight.  K currently 3.5, mag 2.0, Phos 2.0.    - Replete and repeat per protocol, maintain K >4 and mag >2  - IVF hydration   - BMP in AM tomorrow     History of Panic Attacks - Reports panic attack, sense of impending doom and concern for dying last week.  Has h/o panic attacks and this was c/w prior.  No PTA meds.   - Psychiatry consult offered, declined currently    Chronic Protein-Calorie Malnutrition - 2/2 recurrent SBO as noted above.  TPN initiated 3/2018.  PICC in [facility].  Has been off TPN since leaving AMA 5/3 as she d/cd to her mother's home and was unable to get supplies from her home.    - PharmD and nutrition consult placed for TPN ordering   - Monitor elytes as above      # Pain Assessment:  Current Pain Score 5/12/2018   Patient currently in pain? yes   Pain score (0-10) -   Pain location Abdomen   Pain descriptors Aching;Constant;Discomfort   Some encounter information is confidential and restricted. Go to Review Flowsheets activity to see all data.   Jenni holliday pain level was assessed and she currently denies pain.      Diet: NPO for Medical/Clinical Reasons Except for: Meds  Fluids: NS at 150mL/hour   DVT Prophylaxis: Low Risk/Ambulatory with no VTE prophylaxis indicated  Code Status: Full Code    Disposition Plan   Expected discharge: 2 - 3 days, recommended to prior living arrangement once adequate pain management/ tolerating PO medications and GI consult complete, return of bowel function without nausea/vomiting. .     Entered: Kiesha Stallworth 05/12/2018, 8:19 AM   Information in the above section will display in the discharge planner report.      The patient's care was discussed with the Attending Physician, Dr. Ng, Bedside Nurse, Patient and Gastroenterology Consultant.    Kiesha Stallworth, TYSHAWN   Internal Medicine Staff Hospitalist Service  [facility]  Pager: [phone]  Please see sticky note for cross  cover information    Interval History     Admitted overnight for nausea, vomiting, abdominal pain.  Imaging consistent with SBO.  Currently she states she feels better than last night but has ongoing nausea and abdominal pain.  Also notes diarrhea and had episode of fecal incontinence overnight.  Denies fever, chills, chest pain, SOB, lower extremity edema.     Data reviewed today: I reviewed all medications, new labs and imaging results over the last 24 hours. I personally reviewed the abdominal CT image(s) showing CT AP 5/11 long segment dilated loops of proximal small bowel, transition point at small bowel anastomosis in pelvis. Increased extensive small bowel and pancolonic wall thickening. No evidence of bowel perforation..    Physical Exam   Vital Signs: Temp: 99  F (37.2  C) Temp src: Oral BP: 100/74 Pulse: 85 Heart Rate: 85 Resp: 16 SpO2: 97 % O2 Device: None (Room air) Oxygen Delivery: 1 LPM  Weight: 118 lbs 11.2 oz    General: NAD, middle aged female, pleasant and conversational    HEENT: No scleral icterus, PERRLA, MMM, no nasal discharge.  NECK: No adenopathy, no asymmetry, masses, or scars, JVP not elevated  CARDIOVASCULAR: RRR. S1/S2 normal, no murmurs, rubs or gallops   RESPIRATORY: CTAB, no rales, rhonchi or wheezes, no use of accessory muscles, no retractions, respirations unlabored   ABDOMEN: Soft, hyperactive bowel sounds.  Tenderness to palpation throughout on light palpation.   EXTREMITIES: Peripheral pulses normal, no peripheral edema, warm  Skin: No ecchymoses, no rashes  NEURO: A+O x 3  CN II-XII Grossly intact, moves all 4 extremities   PSYCH: Affect appropriate     Data   Medications     sodium chloride 150 mL/hr at 05/11/18 1841       sodium chloride (PF)  3 mL Intracatheter Q8H     Data     Recent Labs  Lab 05/12/18  0606 05/11/18 2000 05/11/18  1031   WBC 6.4  --  7.9   HGB 13.6  --  15.2     --  98     --  218   INR  --   --  0.95     --  130*   POTASSIUM 3.5 2.6*  3.0*   CHLORIDE 101  --  81*   CO2 29  --  38*   BUN 16  --  25   CR 0.65  --  0.74   ANIONGAP 8  --  11   JONATAN 8.0*  --  8.4*   GLC 79  --  85   ALBUMIN  --   --  3.2*   PROTTOTAL  --   --  6.6*   BILITOTAL  --   --  0.7   ALKPHOS  --   --  90   ALT  --   --  20   AST  --   --  26   LIPASE  --   --  54*     Recent Results (from the past 24 hour(s))   CT Abdomen Pelvis w Contrast   Result Value    Radiologist flags Small bowel obstruction (Urgent)    Narrative    EXAMINATION: CT ABDOMEN PELVIS W CONTRAST, 5/11/2018 2:05 PM    TECHNIQUE:  Helical CT images from the lung bases through the  symphysis pubis were obtained with IV contrast. Contrast dose: 68CC  iSOVUE 370    COMPARISON: CT 3/28/2018    HISTORY: Possible small bowel obstruction, pain with dilated loops of  bowel    FINDINGS:    Abdomen and pelvis: Dilated loops of proximal small bowel, with a  similar degree of distention compared with 3/28/2018. There remains a  transition point at the small bowel anastomosis in the right lower  quadrant (series 3 image [id number], series 4 image 32, series 5 image  64-65). Upstream to this however there are moderately thickened small  bowel loops, increased from 3/28/2018, with relative decompression  proximal to the anastomosis. There is also moderate wall thickening in  the distal ileum and throughout the entire colon, which is increased  from 3/28/2018. No pneumatosis, portal venous gas or free air.    The liver, pancreas, spleen, and adrenals are within normal limits.  Mild-moderate gallbladder wall thickening beyond what would be  expected from this degree of decompression. Hypoattenuating foci in  the kidney are too small to characterize. Bladder and reproductive  organs are within normal limits. Aorta is normal in caliber. Mildly  enlarged mesenteric lymph nodes, presumably reactive. Small amount of  layering free fluid in the pelvis.    Lung bases: Clear.    Bones and soft tissues: No acute fracture or suspicious  bone lesion.      Impression    IMPRESSION:   1. Long segment dilated loops of proximal small bowel, similar in  their degree of distention compared with 3/28/2018, with transition  point again seen at the small bowel anastomosis in the pelvis.  2. Increased extensive moderate small bowel and pancolonic wall  thickening. This may be related to venous congestion in the setting of  bowel obstruction or possibly infectious process. Superimposed bowel  ischemia cannot be excluded, but thought less likely. No evidence of  bowel perforation.    [Urgent Result: Small bowel obstruction]    Finding was identified on 5/11/2018 1:52 PM.     Dr. Sam was contacted by Dr. Corrales at 5/11/2018 2:01 PM and  verbalized understanding of the urgent finding.      I have personally reviewed the examination and initial interpretation  and I agree with the findings.    CECY WIN MD

## 2018-05-12 NOTE — PLAN OF CARE
Problem: Patient Care Overview  Goal: Individualization & Mutuality  Outcome: Change based on patient need/priority Date Met: 05/12/18  Last time pt was seen was around 10:30 after administration on PRN toradol. This writer noted that pt was not in her room around 1115 when Tele monitor started showing some abnormalities and pt wasn't found anywhere, checked entrance area and 8th floor around 12:30, called pt cellphone but not picking up. Called and spoke with pt's , Weston at 1325 and updated him about pt not being at the floor. Charge nurse and nursing supervisor notified.

## 2018-05-12 NOTE — CONSULTS
Colorectal Surgery Consult    Rachel A Gerhardt MRN# [id number]     Date of Admission: 5/11/2018    CC: Nausea/vomiting/abdominal pain    Assessment: 42 year old female with PMH of SBR for radiation stricture for cervical CA and anastomotic stricture, TPN dependence, here with increased abdominal pain, nausea/vomiting. Clinically with benign exam. WBC 7.9, vitals WNL. CT with dilated loops of small bowel similar to prior concerning for small bowel obstruction. Still passing flatus and having BMs.     Plan:   - No acute surgical indication at this time  - Would ideally recommend NG placement for decompression and Go-Lytely for colonoscopy    Discussed with fellow, Dr. Roland.    HPI: Rachel A Gerhardt is a 42-year-old female with PMH significant for cervical cancer s/p radiation, polysubstance abuse, recurrent small bowel obstruction secondary to stricture, and TPN dependence, recently left AMA and now back for increased abdominal pain, nausea/vomiting. She previously underwent exploratory laparotomy with Dr. Goodwin on 5/18/2017 which identified radiation injury to the ileum and 8 focal strictures in a 60 cm segment which was resected, leaving ~290 cm of small intestine. Starting in September 2017 she developed progressive intolerance of oral intake with nausea, vomiting, and intermittent obstruction. MR enterography on 3/21/2018 demonstrated an apparent anastomotic stricture in the distal small bowel. She was admitted to the hospital on 3/29/2018 for weakness and severe malnutrition (albumin 2.7, prealbumin 11) and was placed on TPN which was continued at discharge. She has since been TPN dependent and taking small amounts of PO with variable tolerance.     She was scheduled to undergo colonoscopy with attempt at stricture dilation with Dr. Vigil on 4/24 and 4/30; however due to miscommunication she did not undergo the correct prep. Recently admitted for hypokalemia and was planning on colonoscopy for stricture  "dilation but was aborted multiple times due to inadequate prep. She left AMA due to \"freaking out\" few days ago, but presented again due to increased abdominal pain, nausea/vomiting. No fever/chills. Still having BM and passing flatus. Patient adamantly refuses NG tube due to history of anxiety and panic attacks, and states she \"freak out\" if they try to place it or remove it as soon as it's placed.     Past Medical History:  Past Medical History:   Diagnosis Date     Asthma      Cancer (H)     Per patient OBGYN, cerivical cancer     Cervical cancer (H)      Other chronic pain      Ovarian cancer (H)      Substance abuse     Outside records indicate past history of narcotics abuse or dependence, but patient denies.       Past Surgical History:  Past Surgical History:   Procedure Laterality Date     COLONOSCOPY N/A 5/3/2018    Procedure: COLONOSCOPY;  sigmoidoscopy;  Surgeon: Omero Vigil MD;  Location: [facility] OR     COLONOSCOPY WITH CO2 INSUFFLATION N/A 4/30/2018    Procedure: COLONOSCOPY WITH CO2 INSUFFLATION;  Colonoscopy;  Surgeon: Omero Vigil MD;  Location: [facility] OR     COMBINED CYSTOSCOPY, INSERT STENT URETER(S) Bilateral 5/18/2017    Procedure: COMBINED CYSTOSCOPY, INSERT STENT URETER(S);  Cystoscopy with Bilateral Stent,;  Surgeon: Rene Calero MD;  Location: [facility] OR     ENT SURGERY  2009    mastoid, sinus     EXAM UNDER ANESTHESIA, INSERT ALEX SLEEVE, UTERINE PLACEMENT OF TANDEM AND RING FOR RAD, ULTRASOUND N/A 12/14/2015    Procedure: EXAM UNDER ANESTHESIA, INSERT ALEX SLEEVE, UTERINE PLACEMENT OF TANDEM AND RING FOR RADIATION, ULTRASOUND GUIDED;  Surgeon: Abby Tony MD;  Location: [facility] OR     INSERT TANDEM AND CESIUM APPLICATOR CERVIX, ULTRASOUND GUIDED N/A 12/17/2015    Procedure: INSERT TANDEM AND CESIUM APPLICATOR CERVIX, ULTRASOUND GUIDED;  Surgeon: Kika Wood MD;  Location: [facility] OR     KNEE SURGERY       LAPAROTOMY EXPLORATORY N/A 5/18/2017    Procedure: LAPAROTOMY " EXPLORATORY;   Exploratry Laparotomy, Small Bowel Resection with anastomosis, Flexible Sigmoidoscopy;  Surgeon: Jennifer Goodwin MD;  Location: [facility] OR     PICC INSERTION Right 04/29/2017    4fr SL BioFlo PICC, 37cm (3cm external) in the R basilic vein w/ tip in the mid SVC.     PICC INSERTION Right 03/29/2018    4Fr - 40cm (4cm external), R lateral brachial vein, Low SVC     RESECT SMALL BOWEL WITHOUT OSTOMY N/A 5/18/2017    Procedure: RESECT SMALL BOWEL WITHOUT OSTOMY;;  Surgeon: Jennifer Goodwin MD;  Location: [facility] OR     SIGMOIDOSCOPY FLEXIBLE N/A 5/18/2017    Procedure: SIGMOIDOSCOPY FLEXIBLE;;  Surgeon: Jennifer Goodwin MD;  Location: [facility] OR       Allergies:     Allergies   Allergen Reactions     No Clinical Screening - See Comments Other (See Comments) and Diarrhea     headache  Carrots cause gastric upset, cramping and diarrhea.     Sulfa Drugs Hives     hives     Amoxicillin Unknown and Other (See Comments)     vomiting  vomiting     Amoxicillin-Pot Clavulanate Other (See Comments) and Nausea     vomiting     Augmentin GI Disturbance, Nausea and Hives     Avelox [Moxifloxacin] Nausea and Vomiting, Unknown and Nausea     Ciprofloxacin Hives and Nausea     Codeine Nausea and Vomiting and Nausea     Ibuprofen Nausea and Vomiting     Other reaction(s): Nausea And Vomiting     Ibuprofen Sodium Hives and GI Disturbance     Quinolones      Tramadol Hives, Diarrhea, Nausea and Nausea and Vomiting     Daucus Carota      Other reaction(s): GI Upset  Other reaction(s): Abdominal pain, Diarrhea  Carrots cause gastric upset, cramping and diarrhea.       Medications:    No current facility-administered medications on file prior to encounter.   Current Outpatient Prescriptions on File Prior to Encounter:  ondansetron (ZOFRAN) 4 MG tablet Take 1-2 tablets (4-8 mg) by mouth every 8 hours as needed for nausea       Social History:  Social History     Social History     Marital status:      Spouse name: N/A     Number of  children: N/A     Years of education: N/A     Occupational History     Not on file.     Social History Main Topics     Smoking status: Light Tobacco Smoker     Packs/day: 0.25     Years: 12.00     Types: Cigarettes     Smokeless tobacco: Never Used      Comment: pt smoking about 4 cigs daily     Alcohol use No      Comment: None per pt     Drug use: No      Comment: Patient denies.  Outside records indicate possible Opiate abuse.     Sexual activity: Yes     Partners: Male     Birth control/ protection: None     Other Topics Concern     Parent/Sibling W/ Cabg, Mi Or Angioplasty Before 65f 55m? No     Social History Narrative    Lives alone       Family History:  Family History   Problem Relation Age of Onset     DIABETES Mother      Ovarian Cancer No family hx of      Uterine Cancer No family hx of      Cervical Cancer No family hx of      Breast Cancer No family hx of        ROS:  Otherwise negative    Exam:  BP 95/58  Pulse 78  Temp 98.8  F (37.1  C) (Oral)  Resp 16  Wt 49.9 kg (110 lb)  SpO2 98%  BMI 16.24 kg/m2  General: Alert, interactive, NAD  Resp: CTAB, no crackles or wheezes  Cardiac: RRR, normal S1,S2, peripheral pulses 2+  Abdomen: Soft, mild distension, diffuse mild to moderate tenderness, worse in upper quadrants. No focal or diffuse peritonitis.   Extremities: No LE edema or obvious joint abnormalities  Skin: Warm and dry, no jaundice or rash    Labs:   BMP  Recent Labs  Lab 05/11/18  2000 05/11/18  1031   NA  --  130*   POTASSIUM 2.6* 3.0*   CHLORIDE  --  81*   CO2  --  38*   BUN  --  25   CR  --  0.74   GLC  --  85     CBC  Recent Labs  Lab 05/11/18  1031   WBC 7.9   HGB 15.2        LFT  Recent Labs  Lab 05/11/18  1031   AST 26   ALT 20   ALKPHOS 90   BILITOTAL 0.7   ALBUMIN 3.2*   INR 0.95       Recent Labs  Lab 05/11/18  1031   GLC 85       Imaging:   Recent Results (from the past 24 hour(s))   XR Abdomen 2 Views    Narrative    Exam: Abdominal x-ray, 2 views, 5/11/2018.    COMPARISON:  5/1/2018.    HISTORY: Abdominal pain.    FINDINGS: Supine and upright views of the abdomen were obtained. Again  noted multiple air distended loops of small bowel in the mid abdomen,  not significantly changed from the prior study. No pneumatosis. No  portal venous gas. No free air beneath the diaphragm. Partially  visualized lungs are clear. Right upper extremity PICC line with its  tip projecting over the low SVC. Decompressed colon.      Impression    IMPRESSION: Multiple air distended loops of small bowel particularly  in the mid abdomen, not significantly changed from the prior studies.  Findings are concerning for small bowel obstruction. No pneumatosis or  free air.    QUINN ALMAGUER MD   CT Abdomen Pelvis w Contrast   Result Value    Radiologist flags Small bowel obstruction (Urgent)    Narrative    EXAMINATION: CT ABDOMEN PELVIS W CONTRAST, 5/11/2018 2:05 PM    TECHNIQUE:  Helical CT images from the lung bases through the  symphysis pubis were obtained with IV contrast. Contrast dose: 68CC  iSOVUE 370    COMPARISON: CT 3/28/2018    HISTORY: Possible small bowel obstruction, pain with dilated loops of  bowel    FINDINGS:    Abdomen and pelvis: Dilated loops of proximal small bowel, with a  similar degree of distention compared with 3/28/2018. There remains a  transition point at the small bowel anastomosis in the right lower  quadrant (series 3 image 315-330, series 4 image 32, series 5 image  64-65). Upstream to this however there are moderately thickened small  bowel loops, increased from 3/28/2018, with relative decompression  proximal to the anastomosis. There is also moderate wall thickening in  the distal ileum and throughout the entire colon, which is increased  from 3/28/2018. No pneumatosis, portal venous gas or free air.    The liver, pancreas, spleen, and adrenals are within normal limits.  Mild-moderate gallbladder wall thickening beyond what would be  expected from this degree of  decompression. Hypoattenuating foci in  the kidney are too small to characterize. Bladder and reproductive  organs are within normal limits. Aorta is normal in caliber. Mildly  enlarged mesenteric lymph nodes, presumably reactive. Small amount of  layering free fluid in the pelvis.    Lung bases: Clear.    Bones and soft tissues: No acute fracture or suspicious bone lesion.      Impression    IMPRESSION:   1. Long segment dilated loops of proximal small bowel, similar in  their degree of distention compared with 3/28/2018, with transition  point again seen at the small bowel anastomosis in the pelvis.  2. Increased extensive moderate small bowel and pancolonic wall  thickening. This may be related to venous congestion in the setting of  bowel obstruction or possibly infectious process. Superimposed bowel  ischemia cannot be excluded, but thought less likely. No evidence of  bowel perforation.    [Urgent Result: Small bowel obstruction]    Finding was identified on 5/11/2018 1:52 PM.     Dr. Sam was contacted by Dr. Corrales at 5/11/2018 2:01 PM and  verbalized understanding of the urgent finding.      I have personally reviewed the examination and initial interpretation  and I agree with the findings.    MD Ananda DANGELO MD   Surgery PGY-2

## 2018-05-12 NOTE — PLAN OF CARE
Problem: Patient Care Overview  Goal: Individualization & Mutuality  Outcome: No Change  /85 (BP Location: Left arm)  Pulse 88  Temp 98.7  F (37.1  C) (Oral)  Resp 16  Wt 53.8 kg (118 lb 11.2 oz)  SpO2 97%  BMI 17.53 kg/m2   Pt A/Ox4. VSS on RA. Patient c/o abd pain and receiving dilaudid sublingual 2mg Q2h with some relief. Patient c/o nausea and declined intervention. Urine Output - voiding in toilette. Bowel Function - no BM this shift. Nutrition - NPO. Drains - single lumen PICC infusing NS at 150ml/h. Phos 2.0, completed phos replacement and will recheck phos tomorrow morning. K 3.5 and will replace K when pt is back in her room. Activity - independent. Pt likes to be off unit mostly in family lounge. Plan is to start TPN this evening. Continue with POC

## 2018-05-12 NOTE — PROGRESS NOTES
CLINICAL NUTRITION SERVICES - ASSESSMENT NOTE     Nutrition Prescription    RECOMMENDATIONS FOR MDs/PROVIDERS TO ORDER:  - Pt is at high risk for refeeding, monitor lytes such as K+, Phos, and Mg++ and replete aggressively    Malnutrition Status:    - Unable to assess 2/2 pt not in room and RD unable to meet pt but anticipate pt is severely malnourished given no nutrition in the past week and wt loss of 11% in 6 weeks or so    Recommendations already ordered by Registered Dietitian (RD):  - Will initiate Change TPN order for Pharmacy to initiate CPN (pt has PICC in place):  -- Use dosing weight 50 kg  -- Begin CPN, goal volume 1800 ml/day (further adjustments to fluids per MD/pharmacy pending pt's fluid status) with initial 100 gm Dex daily (340 kcal, GIR=1.4 mg/kg/min), 90 gm AA daily (360 kcal), and 250 ml 20% IV lipids daily.  Micro/Rx: Infuvite+trace elements  -- ONLY when pt tolerate this ~100% of initial continuous PN volume with K+/Mg++/Phos WNL, advance PN dex by 30 gm every 1-2 days (pending lytes/Glu and Pharm D/RD discretion) to goal of 175 gm Dex (595 kcal) to increase provisions to 1455 kcals (29 kcal/kg/day - modest needs 2/2 significant wt loss and poor PO PTA), 1.8 gm PRO/kg/day, GIR =2.4 mg/kg/min with 34% kcals from Fat.  - Discussed with Pharm D and encouraged a very slow advancement of PN to goal pending lytes 2/2 very high risk for refeeding and getting a baseline TG level before PN start.     Future/Additional Recommendations:  - Monitor ability to cycle PN after Goal PN is reached and pt tolerating the regimen well.      REASON FOR ASSESSMENT  Rachel A Gerhardt is a/an 42 year old female assessed by the dietitian for Pharmacy/Nutrition to Start and Manage PN. 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 Pt is PN dependent via PICC. Pt does have hx of leaving  [facility] at last admission with hypokalemia  "and need for colonoscopy. Pt currently admitted for N/V and abdominal pain. Noted pt is refusing NGT for decompression    NUTRITION HISTORY  Attempted to meet pt but pt not in room. Noted per chart pt was unable to keep hydration.  Pt was initiated on PN in March 2018. Pt was discharged on 5/3/18. Pt has been off TPN since 5/3 (9 days) due to pt d/cing to her mother's home and not being abl;e to get PN supplies to her mother's home. Pt appears to have her PN supplied by [facility]. Pt was discharged on cyclic PN x 12 hours  per last RD note from 5/2/18 on following regimen:  Using dosing wt 55 kg, 1800 mL x 12 hrs with 235g Dex, 90g AA, and lipids daily = 1659 kcals (30 kcal/kg), 1.6 g PRO/kg, peak GIR 7.1, 30% kcals from fat.     CURRENT NUTRITION ORDERS  Diet: NPO with continued PN support via PICC.   Intake/Tolerance: Unable to assess 2/2 NPO status    LABS  Labs reviewed  Pt is at High refeeding risk on electrolyte replacement protocol at present  Dates 5/11-5/12  K+: 3.0 --> 2.6 --> 3.5   Na+: 130--> 139    Mg++: 2.1 --> 2.0  Phosphorus: 2.2--> 2.0    TG level from 5/1/18: 126    MEDICATIONS  Medications reviewed    ANTHROPOMETRICS  Height: 5' 9\"  Most Recent Weight: 53.8 kg (118 lb 11.2 oz)    IBW:66 kg  BMI: Underweight BMI <18.5  Weight History:   Wt Readings from Last 20 Encounters:   05/12/18 53.8 kg (118 lb 11.2 oz)   05/09/18 55.3 kg (122 lb)   05/02/18 55.5 kg (122 lb 4.8 oz)   04/30/18 55.4 kg (122 lb 2.2 oz)   04/24/18 55.7 kg (122 lb 12.7 oz)   03/29/18 60.3 kg (132 lb 14.4 oz)   02/13/18 64.9 kg (143 lb)   02/04/18 67.1 kg (148 lb)   07/17/17 69.5 kg (153 lb 3.2 oz)   07/16/17 70.2 kg (154 lb 12.2 oz)   07/16/17 70.2 kg (154 lb 12.2 oz)   06/22/17 70.5 kg (155 lb 6.4 oz)   06/06/17 67.1 kg (147 lb 14.4 oz)   05/31/17 68.2 kg (150 lb 6.4 oz)   05/28/17 68.9 kg (151 lb 14.4 oz)   05/24/17 69.7 kg (153 lb 9.6 oz)   05/08/17 68 kg (150 lb)   05/04/17 69.9 kg (154 lb 1.6 oz)   04/28/17 68.4 kg (150 lb 12.8 oz) "   04/17/17 68.9 kg (152 lb)   34 lb wt loss in the past year (22%) and 14 lb wt loss since end of March 2018 (~ 6 weeks) - 11%  Dosing Weight: 50 kg (admit wt from ED at this admission and lowest wt thus far)    ASSESSED NUTRITION NEEDS  Estimated Energy Needs: 1250 - 1500 kcals+/day (25 - 30 kcals+/kg)  Justification: Repletion with modest needs for CPN ~ 28 -30 kcal/kg given poor oral intake and significant wt loss since 3/29/18 - 22 lbs   Estimated Protein Needs: 75- 100 grams protein/day (1.5 - 2 grams of pro/kg)  Justification: Repletion  Estimated Fluid Needs: 1500 -1750 mL/day (30 - 35 mL/kg)   Justification: Maintenance and Per provider pending fluid status    PHYSICAL FINDINGS  See malnutrition section below.      MALNUTRITION  % Intake: </= 50% for >/= 5 days (severe)  % Weight Loss: > 5% in 1 month (severe)  Subcutaneous Fat Loss: Unable to assess  Muscle Loss: Unable to assess  Fluid Accumulation/Edema: Unable to assess  Malnutrition Diagnosis: Unable to determine due to not being able to meet pt 2/2 pt not in room, however based on poor nutrition (both oral as well as NS) and wt loss anticipate pt is severely malnourished     NUTRITION DIAGNOSIS  Inadequate protein-energy intake related to chronic abdominal pain, nausea/vomiting associated with GI stricture and relying on PN support but not had PN for past 6 days and with extremely poor PO as evidenced by BMI of 17.53 and 11% wt loss in the past 6 weeks, pt showing some signs of refeeding with decreased K+ and Phos labs and now on replacement protocol    INTERVENTIONS  Implementation  Nutrition Education: Unable to complete due to pt not in room at the time of RD visit.      Collaboration with other providers - Discussed with Pharm D regarding the plan for initiating and progression of PN to goal.   Parenteral Nutrition/IV Fluids - Initiate     Goals  Total avg nutritional intake to meet a minimum of 25-28 kcal/kg and 1.5 g PRO/kg daily (per dosing wt  50 kg).     Monitoring/Evaluation  Progress toward goals will be monitored and evaluated per protocol.    Aleksandra Feliciano RD LD  Weekend pager - [phone]

## 2018-05-12 NOTE — PLAN OF CARE
Problem: Patient Care Overview  Goal: Plan of Care/Patient Progress Review  Outcome: No Change  Afebrile, sats 89-90% when sleeping recommended 2 L while sleeping, all other VSS. Complains of severe abdominal pain, SL dilaudid given x 2 and torodol given x1. Had nausea when first arrived on the unit which was relieved with compazine IV x1. Currently NPO and refusing any oral medications. Potassium was critically low at 2.6, began replacement protocol for 60 mEq total. Recheck 2 hrs after last dose. Phos also low at 2.2, will need replacement after potassium. Stat EKG was ordered due to potassium level and showed normal sinus rhythm. Team ordered telemetry monitoring which was initiated. Up independently, likes to walk the unit as she says it helps with pain. Plan for surgery consult in the morning. Please continue to monitor and update team with changes.

## 2018-05-13 LAB
ALBUMIN SERPL-MCNC: 2.2 G/DL (ref 3.4–5)
ALP SERPL-CCNC: 58 U/L (ref 40–150)
ALT SERPL W P-5'-P-CCNC: 33 U/L (ref 0–50)
ANION GAP SERPL CALCULATED.3IONS-SCNC: 12 MMOL/L (ref 3–14)
AST SERPL W P-5'-P-CCNC: 42 U/L (ref 0–45)
BILIRUB SERPL-MCNC: 1 MG/DL (ref 0.2–1.3)
BUN SERPL-MCNC: 19 MG/DL (ref 7–30)
CALCIUM SERPL-MCNC: 7.6 MG/DL (ref 8.5–10.1)
CHLORIDE SERPL-SCNC: 101 MMOL/L (ref 94–109)
CO2 SERPL-SCNC: 25 MMOL/L (ref 20–32)
CREAT SERPL-MCNC: 0.55 MG/DL (ref 0.52–1.04)
GFR SERPL CREATININE-BSD FRML MDRD: >90 ML/MIN/1.7M2
GLUCOSE SERPL-MCNC: 92 MG/DL (ref 70–99)
INR PPP: 1.29 (ref 0.86–1.14)
LACTATE BLD-SCNC: 1.1 MMOL/L (ref 0.7–2)
MAGNESIUM SERPL-MCNC: 1.6 MG/DL (ref 1.6–2.3)
PHOSPHATE SERPL-MCNC: 2 MG/DL (ref 2.5–4.5)
POTASSIUM SERPL-SCNC: 3.4 MMOL/L (ref 3.4–5.3)
PROT SERPL-MCNC: 4.5 G/DL (ref 6.8–8.8)
SODIUM SERPL-SCNC: 138 MMOL/L (ref 133–144)

## 2018-05-13 PROCEDURE — 25000125 ZZHC RX 250: Performed by: NURSE PRACTITIONER

## 2018-05-13 PROCEDURE — 99207 ZZC APP CREDIT; MD BILLING SHARED VISIT: CPT | Performed by: NURSE PRACTITIONER

## 2018-05-13 PROCEDURE — 85610 PROTHROMBIN TIME: CPT | Performed by: INTERNAL MEDICINE

## 2018-05-13 PROCEDURE — 84134 ASSAY OF PREALBUMIN: CPT | Performed by: INTERNAL MEDICINE

## 2018-05-13 PROCEDURE — 99233 SBSQ HOSP IP/OBS HIGH 50: CPT | Performed by: INTERNAL MEDICINE

## 2018-05-13 PROCEDURE — 25000128 H RX IP 250 OP 636: Performed by: PHYSICIAN ASSISTANT

## 2018-05-13 PROCEDURE — 83735 ASSAY OF MAGNESIUM: CPT | Performed by: INTERNAL MEDICINE

## 2018-05-13 PROCEDURE — 36592 COLLECT BLOOD FROM PICC: CPT | Performed by: INTERNAL MEDICINE

## 2018-05-13 PROCEDURE — 80053 COMPREHEN METABOLIC PANEL: CPT | Performed by: INTERNAL MEDICINE

## 2018-05-13 PROCEDURE — 40000141 ZZH STATISTIC PERIPHERAL IV START W/O US GUIDANCE

## 2018-05-13 PROCEDURE — 40000558 ZZH STATISTIC CVC DRESSING CHANGE

## 2018-05-13 PROCEDURE — 25000132 ZZH RX MED GY IP 250 OP 250 PS 637: Performed by: PHYSICIAN ASSISTANT

## 2018-05-13 PROCEDURE — 84100 ASSAY OF PHOSPHORUS: CPT | Performed by: INTERNAL MEDICINE

## 2018-05-13 PROCEDURE — 12000026 ZZH R&B TRANSPLANT

## 2018-05-13 PROCEDURE — 25000132 ZZH RX MED GY IP 250 OP 250 PS 637: Performed by: NURSE PRACTITIONER

## 2018-05-13 PROCEDURE — 80048 BASIC METABOLIC PNL TOTAL CA: CPT | Performed by: INTERNAL MEDICINE

## 2018-05-13 PROCEDURE — 25000128 H RX IP 250 OP 636: Performed by: NURSE PRACTITIONER

## 2018-05-13 PROCEDURE — 36415 COLL VENOUS BLD VENIPUNCTURE: CPT | Performed by: NURSE PRACTITIONER

## 2018-05-13 PROCEDURE — 25000125 ZZHC RX 250: Performed by: INTERNAL MEDICINE

## 2018-05-13 PROCEDURE — 83605 ASSAY OF LACTIC ACID: CPT | Performed by: NURSE PRACTITIONER

## 2018-05-13 RX ORDER — MAGNESIUM SULFATE HEPTAHYDRATE 40 MG/ML
4 INJECTION, SOLUTION INTRAVENOUS EVERY 4 HOURS PRN
Status: DISCONTINUED | OUTPATIENT
Start: 2018-05-13 | End: 2018-05-23 | Stop reason: HOSPADM

## 2018-05-13 RX ORDER — HYDROMORPHONE HYDROCHLORIDE 1 MG/ML
0.5 INJECTION, SOLUTION INTRAMUSCULAR; INTRAVENOUS; SUBCUTANEOUS ONCE
Status: DISCONTINUED | OUTPATIENT
Start: 2018-05-13 | End: 2018-05-13

## 2018-05-13 RX ADMIN — DOCUSATE SODIUM 286 ML: 50 LIQUID ORAL at 10:30

## 2018-05-13 RX ADMIN — Medication 4 MG: at 09:50

## 2018-05-13 RX ADMIN — Medication 10 MEQ: at 11:31

## 2018-05-13 RX ADMIN — I.V. FAT EMULSION 250 ML: 20 EMULSION INTRAVENOUS at 19:19

## 2018-05-13 RX ADMIN — Medication 4 MG: at 22:15

## 2018-05-13 RX ADMIN — KETOROLAC TROMETHAMINE 15 MG: 15 INJECTION, SOLUTION INTRAMUSCULAR; INTRAVENOUS at 15:50

## 2018-05-13 RX ADMIN — Medication 4 MG: at 06:38

## 2018-05-13 RX ADMIN — SODIUM CHLORIDE: 234 INJECTION INTRAMUSCULAR; INTRAVENOUS; SUBCUTANEOUS at 19:18

## 2018-05-13 RX ADMIN — Medication 10 MEQ: at 10:30

## 2018-05-13 RX ADMIN — Medication 4 MG: at 15:59

## 2018-05-13 RX ADMIN — PROCHLORPERAZINE EDISYLATE 10 MG: 5 INJECTION INTRAMUSCULAR; INTRAVENOUS at 14:10

## 2018-05-13 RX ADMIN — POTASSIUM PHOSPHATE, MONOBASIC AND POTASSIUM PHOSPHATE, DIBASIC 15 MMOL: 224; 236 INJECTION, SOLUTION INTRAVENOUS at 12:36

## 2018-05-13 RX ADMIN — Medication 4 MG: at 13:10

## 2018-05-13 RX ADMIN — Medication 4 MG: at 19:18

## 2018-05-13 RX ADMIN — Medication 2 MG: at 00:33

## 2018-05-13 NOTE — PROGRESS NOTES
[facility], [place]    Internal Medicine Progress Note - Gold Service    Assessment & Plan   Rachel A Gerhardt is a 42 year old female with history of recurrent SBO (2/2 radiation now s/p partial small bowel resection), chronic abdominal pain, ovarian cancer (3 years ago), and severe malnutrition on TPN who was admitted 5/11/2018 with N/V, and abdominal pain, found to have SBO. Of note, patient recent admitted and left AMA with hypokalemia, need for colonoscopy, see note 5/3 for details.    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  DId well for ~5 mos but then symptoms returned.  MRE 3/21/18 revealed anastomotic stricture.  Admitted to CRS 3/28-4/1 with progressive symptoms, plan for possible endoscopic dilatation of anastomotic site.  Plan for colonoscopy with Dr. Vigil for attempted dilatation, unfortunately failed attempts x 3 (most recently 5/3) due to inadequate oral prep.  Plan for for NGT placement for administration of prep at the time, however, pt left AMA before this was able to be completed.  CT AP 5/11 long segment dilated loops of proximal small bowel, transition point at small bowel anastomosis in pelvis. Increased extensive small bowel and pancolonic wall thickening. No evidence of bowel perforation. Lipase and lactic acid normal. Treated with IV dilaudid and zofran in the ED.  Surgery consulted, no indication for acute surgical intervention.  Patient refusing NGT daily despite recommendation from IM, GI and CRS.  Increased abd pain with N/V overnight and this AM.  Lactate 1.1.  - Given that she is refusing NGT, surgery recc self-decompression for 2-3 days as she is having antegrade function  - GI following, have recc starting Go-lytely prep, pt has agreed to sip it only until stool clear  - NPO  - Receiving IVF in TPN  - Continue TPN, pharmD and nutrition  consulted  - Pain control: SL Dilaudid PRN, IV toradol PRN   - Compazine and Reglan PRN nausea as QTc 451 on 5/1    Hypokalemia; Hypophosphatemia - Likely hypovolemia 2/2 GI losses. Placed on repletion protocol.  K currently 3.4, mag 1.6, Phos 2.0.    - Replete and repeat per protocol, maintain K >4 and mag >2  - IVF hydration   - BMP in AM tomorrow     History of Panic Attacks - Reports panic attack, sense of impending doom and concern for dying last week.  Has h/o panic attacks and this was c/w prior.  No PTA meds.   - Psychiatry consult offered, declined    Chronic Protein-Calorie Malnutrition - 2/2 recurrent SBO as noted above.  TPN initiated 3/2018.  [facility]ICC in [facility].  Has been off TPN since leaving AMA 5/3 as she d/cd to her mother's home and was unable to get supplies from her home.    - PharmD and nutrition consult placed for TPN ordering   - Monitor elytes as above     Coagulopathy - INR slightly elevated (1.29) today in setting of severe malnutrition which is likely culprit.  No s/s of bleeding on exam.      # Pain Assessment:  Current Pain Score 5/13/2018   Patient currently in pain? yes   Pain score (0-10) -   Pain location Abdomen   Pain descriptors Aching;Discomfort   Some encounter information is confidential and restricted. Go to Review Flowsheets activity to see all data.   - Jenni is experiencing pain due to small bowel obstruction. Pain management was discussed and the plan was created in a collaborative fashion.  Jenni's response to the current recommendations: disinterested  - Please see the plan for pain management as documented above    Diet: NPO for Medical/Clinical Reasons Except for: Meds  parenteral nutrition - ADULT compounded formula  parenteral nutrition - ADULT compounded formula  Fluids: IVF in TPN  DVT Prophylaxis: Low Risk/Ambulatory with no VTE prophylaxis indicated  Code Status: Full Code    Disposition Plan   Expected discharge: 4 - 7 days, recommended to prior living arrangement  once adequate pain management/ tolerating PO medications and GI consult complete, return of bowel function without nausea/vomiting. .     Entered: Kiesha Stallworth 05/13/2018, 8:15 AM   Information in the above section will display in the discharge planner report.      The patient's care was discussed with the Attending Physician, Dr. Ng, Bedside Nurse, Patient and Gastroenterology Consultant.    Kiesha Stallworth, TYSHAWN   Internal Medicine Staff Hospitalist Service  [facility]  Pager: [phone]  Please see sticky note for cross cover information    Interval History     Worsening abdominal pain overnight into this AM.  Feels nauseated and had episode of emesis.  Denies fever, chills, chest pain, shortness of breath. Refusing NGT stating she will always pull them out. Has taken only a few sips of bowel prep.     Data reviewed today: I reviewed all medications, new labs and imaging results over the last 24 hours. I personally reviewed no images or EKG's today.    Physical Exam   Vital Signs: Temp: 98.4  F (36.9  C) Temp src: Oral BP: 97/61 Pulse: 108 Heart Rate: 84 Resp: 16 SpO2: 97 % O2 Device: None (Room air)    Weight: 118 lbs 11.2 oz    General: NAD, middle aged female, laying in bed on her side in acute distress from abdominal pain.   HEENT: No scleral icterus, PERRLA, MMM, no nasal discharge.  NECK: No adenopathy, no asymmetry, masses, or scars, JVP not elevated  CARDIOVASCULAR: RRR. S1/S2 normal, no murmurs, rubs or gallops   RESPIRATORY: CTAB, no rales, rhonchi or wheezes, no use of accessory muscles, no retractions, respirations unlabored   ABDOMEN: Soft, hyperactive bowel sounds.  Pt allowed light palpation x 1 but then refused abdominal examination.   EXTREMITIES: Peripheral pulses normal, no peripheral edema, warm  Skin: No ecchymoses, no rashes  NEURO: A+O x 3  CN II-XII Grossly intact, moves all 4 extremities   PSYCH: Affect appropriate     Data   Medications     IV fluid  REPLACEMENT ONLY       parenteral nutrition - ADULT compounded formula 75 mL/hr at 05/13/18 0023       lipids  250 mL Intravenous Q24H     sodium chloride (PF)  3 mL Intracatheter Q8H     Data     Recent Labs  Lab 05/13/18  0628 05/12/18  0606 05/11/18 2000 05/11/18  1031   WBC  --  6.4  --  7.9   HGB  --  13.6  --  15.2   MCV  --  100  --  98   PLT  --  353  --  218   INR 1.29*  --   --  0.95    139  --  130*   POTASSIUM 3.4 3.5 2.6* 3.0*   CHLORIDE 101 101  --  81*   CO2 25 29  --  38*   BUN 19 16  --  25   CR 0.55 0.65  --  0.74   ANIONGAP 12 8  --  11   JONATAN 7.6* 8.0*  --  8.4*   GLC 92 79  --  85   ALBUMIN 2.2*  --   --  3.2*   PROTTOTAL 4.5*  --   --  6.6*   BILITOTAL 1.0  --   --  0.7   ALKPHOS 58  --   --  90   ALT 33  --   --  20   AST 42  --   --  26   LIPASE  --   --   --  54*     No results found for this or any previous visit (from the past 24 hour(s)).

## 2018-05-13 NOTE — PROGRESS NOTES
Patient had heat pad on. Temp went up but then came down quickly without doing anything. She does not feel sick. We will monitor closely for recurrence. We will start work up if she has persistent fever or tachycardia

## 2018-05-13 NOTE — PLAN OF CARE
Problem: Patient Care Overview  Goal: Plan of Care/Patient Progress Review  Outcome: No Change  Afebrile, VSS on RA. Patient complained of severe abdominal pain that worsened at 1800. Given 2mg sublingual dilaudid with no relief. At 1900, patient vomited from pain and was then given additional 2mg of dilaudid SL and compazine with some relief. Provider was notified of patient's pain, emesis, and BM which was soft and starr colored. Plan was to monitor for next 30 minutes and call back if pain not improved. Patient stated decrease in pain and slept rest of shift. Voiding in hat but sometimes forgets to save. Pt reported diarrhea x2 prior to starr colored BM. GoLytely provided per orders however pt has not been able to drink any of it. TPN started at 75 ml/hr with lipids. Potassium and phos were replaced and should be rechecked in the morning. Pt ambulated in the young frequently as it helps with the pain. Please continue to monitor and update team with any changes.

## 2018-05-13 NOTE — PROGRESS NOTES
COLORECTAL SURGERY Progress Note    Patient reports doing worse, pain worse this AM. Started go-lytely prep. Had emesis, likely due to prep.     /78  Pulse 103  Temp 99.1  F (37.3  C)  Resp 16  Wt 53.8 kg (118 lb 11.2 oz)  SpO2 98%  BMI 17.53 kg/m2    Abdomen: Soft, moderately tender in LUQ, moderately distended     ASSESSMENT/PLAN: 42 year old female with PMH of SBR for radiation stricture for cervical CA and anastomotic stricture, TPN dependence, here with increased abdominal pain, nausea/vomiting. Clinically with benign exam. WBC 7.9, vitals WNL. CT with dilated loops of small bowel similar to prior concerning for small bowel obstruction. Still passing flatus and having BMs.     -- No indication for acute surgical intervention  -- Continue TPN  -- NPO  -- Replete electrolytes PRN  -- Appreciate GI consult recs  -- Given how much dilation she had on imaging and persistent vomiting and abdominal pain, would need to benefit from decompression of bowels prior to prep and her colonoscopy.   -- She does not have any acute surgical needs, and we would need to have a much better picture of the stricture in question. Colorectal surgery will sign off at this time. Discussed with GI.      Discussed with Colorectal team.     Jasbir Viera MD  PGY-1 Surgery  pager [phone]  (6AM-5PM weekdays please page primary team, nights/wknds/holidays, page job code [phone])

## 2018-05-13 NOTE — PLAN OF CARE
Problem: Pain, Chronic (Adult)  Goal: Identify Related Risk Factors and Signs and Symptoms  Related risk factors and signs and symptoms are identified upon initiation of Human Response Clinical Practice Guideline (CPG).   Outcome: No Change  Complained of pain in the abdomen, and dilaudid 2 mg was given x 1. No complaint of nausea. Up to the BR independently and voided. TPN infusing at 75 ml/hr. T-max was 100.5 but patient was using heating pad and did not feel feverish. HR was 132 after she used the BR. Temp went down to 99.1 and . MD was notified. Patient has not been able to drink or sip the Golytely due to pain.

## 2018-05-13 NOTE — PLAN OF CARE
Problem: Patient Care Overview  Goal: Individualization & Mutuality  Outcome: No Change  BP 97/61  Pulse 108  Temp 98.4  F (36.9  C)  Resp 16  Wt 53.8 kg (118 lb 11.2 oz)  SpO2 97%  BMI 17.53 kg/m2   Pt A/Ox4 but lethargic compared to yesterday. Around 1420, pt had brown emesis with fecal odor and appearance of 525ml. Compazine IV given and provider was notified. Pt continues to refuse NG tube. VSS on RA. Patient c/o abd pain and received dilaudid 4mg Q3h sublingual without good effect, pt refused Tylenol and Toradol. Patient denies nausea but had 1x emesis. Urine Output - voiding in toilette. Bowel Function - Lg BM after pink lady enema. Nutrition - NPO. Drains - R arm PICC single lumen infusing TPN, PIV infusing potassium phosphorous, K replaced and recheck K and phos tomorrow morning. Activity - independent, remains in bed today due to abdominal pain. Pt's family at bedside and supporting. Continue with POC

## 2018-05-14 ENCOUNTER — HOME INFUSION (PRE-WILLOW HOME INFUSION) (OUTPATIENT)
Dept: PHARMACY | Facility: CLINIC | Age: 43
End: 2018-05-14

## 2018-05-14 LAB
ANION GAP SERPL CALCULATED.3IONS-SCNC: 7 MMOL/L (ref 3–14)
BUN SERPL-MCNC: 12 MG/DL (ref 7–30)
CALCIUM SERPL-MCNC: 7.9 MG/DL (ref 8.5–10.1)
CHLORIDE SERPL-SCNC: 104 MMOL/L (ref 94–109)
CO2 SERPL-SCNC: 25 MMOL/L (ref 20–32)
CREAT SERPL-MCNC: 0.54 MG/DL (ref 0.52–1.04)
ERYTHROCYTE [DISTWIDTH] IN BLOOD BY AUTOMATED COUNT: 14.3 % (ref 10–15)
GFR SERPL CREATININE-BSD FRML MDRD: >90 ML/MIN/1.7M2
GLUCOSE SERPL-MCNC: 115 MG/DL (ref 70–99)
HCT VFR BLD AUTO: 33.3 % (ref 35–47)
HGB BLD-MCNC: 11.2 G/DL (ref 11.7–15.7)
INTERPRETATION ECG - MUSE: NORMAL
LACTATE BLD-SCNC: 0.9 MMOL/L (ref 0.7–2)
MAGNESIUM SERPL-MCNC: 1.8 MG/DL (ref 1.6–2.3)
MCH RBC QN AUTO: 33.4 PG (ref 26.5–33)
MCHC RBC AUTO-ENTMCNC: 33.6 G/DL (ref 31.5–36.5)
MCV RBC AUTO: 99 FL (ref 78–100)
PHOSPHATE SERPL-MCNC: 2.6 MG/DL (ref 2.5–4.5)
PLATELET # BLD AUTO: 246 10E9/L (ref 150–450)
POTASSIUM SERPL-SCNC: 3.3 MMOL/L (ref 3.4–5.3)
POTASSIUM SERPL-SCNC: 3.7 MMOL/L (ref 3.4–5.3)
PREALB SERPL IA-MCNC: 10 MG/DL (ref 15–45)
RBC # BLD AUTO: 3.35 10E12/L (ref 3.8–5.2)
SODIUM SERPL-SCNC: 137 MMOL/L (ref 133–144)
WBC # BLD AUTO: 16 10E9/L (ref 4–11)

## 2018-05-14 PROCEDURE — 83605 ASSAY OF LACTIC ACID: CPT | Performed by: PHYSICIAN ASSISTANT

## 2018-05-14 PROCEDURE — 36415 COLL VENOUS BLD VENIPUNCTURE: CPT | Performed by: INTERNAL MEDICINE

## 2018-05-14 PROCEDURE — 36415 COLL VENOUS BLD VENIPUNCTURE: CPT | Performed by: PHYSICIAN ASSISTANT

## 2018-05-14 PROCEDURE — 25000125 ZZHC RX 250

## 2018-05-14 PROCEDURE — 25000128 H RX IP 250 OP 636: Performed by: PHYSICIAN ASSISTANT

## 2018-05-14 PROCEDURE — 99207 ZZC APP CREDIT; MD BILLING SHARED VISIT: CPT | Performed by: PHYSICIAN ASSISTANT

## 2018-05-14 PROCEDURE — 84132 ASSAY OF SERUM POTASSIUM: CPT | Performed by: INTERNAL MEDICINE

## 2018-05-14 PROCEDURE — 12000026 ZZH R&B TRANSPLANT

## 2018-05-14 PROCEDURE — 25000125 ZZHC RX 250: Performed by: INTERNAL MEDICINE

## 2018-05-14 PROCEDURE — 85027 COMPLETE CBC AUTOMATED: CPT | Performed by: INTERNAL MEDICINE

## 2018-05-14 PROCEDURE — 25000132 ZZH RX MED GY IP 250 OP 250 PS 637: Performed by: PHYSICIAN ASSISTANT

## 2018-05-14 PROCEDURE — 80048 BASIC METABOLIC PNL TOTAL CA: CPT | Performed by: INTERNAL MEDICINE

## 2018-05-14 PROCEDURE — 83735 ASSAY OF MAGNESIUM: CPT | Performed by: INTERNAL MEDICINE

## 2018-05-14 PROCEDURE — 84100 ASSAY OF PHOSPHORUS: CPT | Performed by: INTERNAL MEDICINE

## 2018-05-14 RX ORDER — HYDROMORPHONE HYDROCHLORIDE 1 MG/ML
2-4 SOLUTION ORAL ONCE
Status: COMPLETED | OUTPATIENT
Start: 2018-05-14 | End: 2018-05-14

## 2018-05-14 RX ADMIN — Medication 4 MG: at 06:21

## 2018-05-14 RX ADMIN — Medication 10 MEQ: at 12:26

## 2018-05-14 RX ADMIN — Medication 4 MG: at 15:59

## 2018-05-14 RX ADMIN — I.V. FAT EMULSION 250 ML: 20 EMULSION INTRAVENOUS at 20:49

## 2018-05-14 RX ADMIN — Medication 4 MG: at 12:26

## 2018-05-14 RX ADMIN — SODIUM CHLORIDE: 234 INJECTION INTRAMUSCULAR; INTRAVENOUS; SUBCUTANEOUS at 20:49

## 2018-05-14 RX ADMIN — Medication 10 MEQ: at 09:30

## 2018-05-14 RX ADMIN — Medication 4 MG: at 21:55

## 2018-05-14 RX ADMIN — Medication 4 MG: at 02:07

## 2018-05-14 RX ADMIN — Medication 4 MG: at 19:50

## 2018-05-14 RX ADMIN — Medication 10 MEQ: at 13:37

## 2018-05-14 RX ADMIN — Medication 10 MEQ: at 10:40

## 2018-05-14 RX ADMIN — PROCHLORPERAZINE EDISYLATE 10 MG: 5 INJECTION INTRAMUSCULAR; INTRAVENOUS at 16:56

## 2018-05-14 RX ADMIN — KETOROLAC TROMETHAMINE 15 MG: 15 INJECTION, SOLUTION INTRAMUSCULAR; INTRAVENOUS at 09:30

## 2018-05-14 RX ADMIN — KETOROLAC TROMETHAMINE 15 MG: 15 INJECTION, SOLUTION INTRAMUSCULAR; INTRAVENOUS at 20:49

## 2018-05-14 RX ADMIN — Medication 4 MG: at 09:29

## 2018-05-14 NOTE — PROGRESS NOTES
This is a recent snapshot of the patient's [facility] medical record.  For current drug dose and complete information and questions, call [phone] or In Basket pool, [facility] home infusion ([id number])  CSN Number:  [id number]

## 2018-05-14 NOTE — PLAN OF CARE
Problem: Patient Care Overview  Goal: Plan of Care/Patient Progress Review  Outcome: No Change  BP 91/61  Pulse 106  Temp 99.1  F (37.3  C) (Oral)  Resp 16  Wt 53.8 kg (118 lb 11.2 oz)  SpO2 98%  BMI 17.53 kg/m2    [id number]: AVSS and afebrile. Potassium phos finished infusing this shift and recheck in the AM. Pt denied any nausea, but complained of pain and received dil sublingual 4mg x2 this shift. NPO, pt is on TPN at 75ml/hr continuous and lipids every 24 hours. PICC SL infusing. PIV SL'd. Pt is voiding and not saving. No BM this shift, but patient did have a BM earlier today after pink lady enema given. Skin intact. Alert and oriented x4. Pt has been awake, talking, and in a pleasant mood. Up ad dania. Will continue to monitor and follow POC.

## 2018-05-14 NOTE — PROGRESS NOTES
CLINICAL NUTRITION SERVICES - BRIEF NOTE     Nutrition Prescription      Recommendations already ordered by Registered Dietitian (RD):  Increasing dextrose to 135 grams today given improvement in electrolytes this morning.  Eventual goal dextrose is 175 grams pending ongoing electrolyte and BG stability.          NEW FINDINGS  Lab Results   Component Value Date/Time    PHOS 2.6 05/14/2018 06:54 AM    PHOS 2.0 (L) 05/13/2018 06:28 AM    PHOS 2.0 (L) 05/12/2018 06:06 AM     Monitoring/Evaluation  Progress toward goals will be monitored and evaluated per protocol.    Hawa Wayne MS, RD, LD  Pager [phone]

## 2018-05-14 NOTE — PROGRESS NOTES
This is a recent snapshot of the patient's [facility] medical record.  For current drug dose and complete information and questions, call [phone] or In Basket pool, [facility] home infusion ([id number])  CSN Number:  [id number]

## 2018-05-14 NOTE — PLAN OF CARE
Problem: Patient Care Overview  Goal: Plan of Care/Patient Progress Review  Outcome: No Change  /77 (BP Location: Left arm)  Pulse 106  Temp 99  F (37.2  C) (Oral)  Resp 12  Wt 53.8 kg (118 lb 11.2 oz)  SpO2 97%  BMI 17.53 kg/m2     Tmax 99. OVSS on RA. Alert and oriented. Up independently, ambulating halls. Abdominal pain controlled with 4mg sublingual dilaudid x2. Denies nausea. PICC with TPN + lipids infusing. PIV saline locked. Voiding, large brown BM x1 per pt report. Resting between cares. Will continue to monitor and notify of any changes.

## 2018-05-14 NOTE — PROGRESS NOTES
BRIEF GASTROENTEROLOGY PROGRESS NOTE    Rachel Gerhardt is a 42 year old woman with a history of cervical CA s/p radiation therapy, small bowel stricture c/b recurrent SBO, s/p 60 cm small bowel resection c/b anastomotic stricture, presenting with SBO.     On chart review, it was noted that patient continues to adamantly refuse NG tube placement, as well as GoLytely prep. Per RN, on 5/13 the patient had what appeared to be feculent emesis. Vital signs were stable. Did not examine patient today. Labs notable for hypokalemia, new leukocytosis and 2.4 g Hgb drop since 5/12. No current changes to recommendations, as patient continues to refuse treatment for SBO. No endoscopic interventions until patient has completed prep.     The patient was discussed with Dr. Dima Epstein.    Maciel Patricio MD PhD  Internal Medicine PGY-1  Pager [phone]

## 2018-05-14 NOTE — PROGRESS NOTES
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 Of note, recently admitted and left AMA with hypokalemia, need for colonoscopy, see note 5/3 for details    Recurrent SBO 2/2 anastomotic stricture; acute on chronic abdominal pain: Recurrent SBOs s/p SB resection 5/2017 (2/2 radiation in setting of ovarian cancer). MRE 3/21/18 noted anastomotic stricture. Admitted to CRS 3/28-4/1 with progressive symptoms, plan for possible endoscopic dilatation of anastomotic site. Admitted and left AMA 5/3, see d/c summary for details. CT AP 5/11 long segment dilated loops of proximal small bowel, transition point at small bowel anastomosis in pelvis. Increased extensive small bowel and pancolonic wall thickening. No evidence of bowel perforation. Surgery consulted, no indication for acute surgical intervention. Adamantly refusing NG and ruses to discuss, aware of risks/benefits. Has anterograde function. Emesis x2 over 24 hours. WBC spike to 16 (6.4), afebrile. Pain stable to slightly improved   - Given NGT refusal, surgery recc self-decompression for 2-3 days  - NPO, TPN, Golytely prep until stool clear  - Stat lactic acid   - Pain control: SL Dilaudid prn, IV toradol prn  - Compazine and Reglan prn nausea as QTc 451 on 5/1     Electrolyte disturbances: Likely hypovolemia 2/2 GI losses. On repletion protocol.  K 3.3, mag 1.8, Phos 2.6.    - Replete and repeat per protocol, maintain K >4 and mag >2  - IVF hydration        Stable Medical Conditions and Resolved Hospital Problems:  History of panic attacks: Reports panic attack, sense of impending doom and concern for dying last week.  Has  h/o panic attacks and this was c/w prior.  No PTA meds. Psychiatry consult offered, declined  Coagulopathy - INR slightly elevated (1.29) in setting of severe malnutrition which is likely culprit.  No s/s of bleeding on exam   Chronic protein-calorie malnutrition: 2/2 recurrent SBO as noted above. TPN initiated 3/2018. PICC in [facility]. Was off TPN from last discharge to this admission. PharmD and nutrition consulted for TPN     Pain assessment:  Current Pain Score 5/14/2018   Patient currently in pain? yes   Pain score (0-10) -   Pain location Abdomen   Pain descriptors Other (comment)   Some encounter information is confidential and restricted. Go to Review Flowsheets activity to see all data.   - Jenni is experiencing pain due to recurrent SBO. Pain management was discussed and the plan was created in a collaborative fashion.  Jenni's response to the current recommendations: compliant  - Please see the plan for pain management as documented above    Diet: NPO for Medical/Clinical Reasons Except for: Meds  parenteral nutrition - ADULT compounded formula  parenteral nutrition - ADULT compounded formula  Fluids: Via TPN  DVT Prophylaxis: Pneumatic Compression Devices  Code Status: Full Code    Disposition Plan   Expected discharge: 2-5 days, recommended to prior living arrangement once able to tolerate Golytely prep, colonoscopy complete, SBO resolved.     Entered: Dina Stanley 05/14/2018, 9:55 AM   Information in the above section will display in the discharge planner report.      The patient's care was discussed with the patient, GS, nursing, and attending physician, Dr. Hernandez Stanley  Internal Medicine Staff Hospitalist Service  [facility]  Pager: [phone]  Please see sticky note for cross cover information    Interval History   Emesis x2 over the past 24 hours. Reports it looked like stool. Loose BM this am. Abdominal pain stable to slightly improved from yesterday,  worse than Saturday. No nausea. Motivated to have bowel prep completed to have colonoscopy and eventually go home. No chest pain or dyspnea, denies fever or chills. No confusion    Data reviewed today: I reviewed all medications, new labs and imaging results over the last 24 hours.     Physical Exam   Vital Signs: Temp: 99  F (37.2  C) Temp src: Oral BP: 111/79 Pulse: 95 Heart Rate: 92 Resp: 14 SpO2: 97 % O2 Device: None (Room air)    Weight: 118 lbs 11.2 oz  General Appearance: Alert and oriented x3, lying in bed. Overall comfortable  Respiratory: CTAB without wheezing or rales  Cardiovascular: RRR, S1, S2. No murmur noted  GI: Abdomen soft, diffusely tender with light palpation. No rebound. Bowel sounds active  Skin: No rash or jaundice  Other: Mood stable. Distal pulses palpable. No peripheral edema     Data   Data     Recent Labs  Lab 05/14/18  0654 05/13/18  0628 05/12/18  0606  05/11/18  1031   WBC 16.0*  --  6.4  --  7.9   HGB 11.2*  --  13.6  --  15.2   MCV 99  --  100  --  98     --  353  --  218   INR  --  1.29*  --   --  0.95    138 139  --  130*   POTASSIUM 3.3* 3.4 3.5  < > 3.0*   CHLORIDE 104 101 101  --  81*   CO2 25 25 29  --  38*   BUN 12 19 16  --  25   CR 0.54 0.55 0.65  --  0.74   ANIONGAP 7 12 8  --  11   JONATAN 7.9* 7.6* 8.0*  --  8.4*   * 92 79  --  85   ALBUMIN  --  2.2*  --   --  3.2*   PROTTOTAL  --  4.5*  --   --  6.6*   BILITOTAL  --  1.0  --   --  0.7   ALKPHOS  --  58  --   --  90   ALT  --  33  --   --  20   AST  --  42  --   --  26   LIPASE  --   --   --   --  54*   < > = values in this interval not displayed.

## 2018-05-14 NOTE — PROGRESS NOTES
Care Coordinator Progress Note    Admission Date/Time:  5/11/2018  Attending MD:  Mel Ng,*    Data  Chart reviewed, discussed with interdisciplinary team.   Patient was admitted for: Abdominal pain, generalized.    Assessment  Per discussion in interdisciplinary rounds, the primary team states that the patient continues to require hospitalization for the following reasons: -per team pt was refusing NG for SBO and decompression, dc pending improvement in bowel status.  Team states that the patient will require hospitalization for several days.  RNCC was contacted by [facility] liaison who stated that pt left AMA last admission with PICC line in.  [facility] was to start home TPN and attempted many times to reach pt at home, called her father and her mother.  Finally reached her mother who stated that pt was on her way to the hospital on this pts admission date.  So pt did not have TPN at home from last dc date of 5/3 to admission date of 5/11/2018, nor did she have picc cares at home.  [facility] is unsure if they will accept pt back to their service.  MD team updated, dc plan is still unclear.  RNCC will continue following for this pts dc needs.        If pt wants to leave AMA, md should be paged and possible removal of PICC line for pt safety.    If pt is dcing with IV abx we need mothers address and phone number to place on [place] referral.       Kathe Rogers, CINDYCC, BSN    [facility]    Medicine [facility]  [address] [place] [address]    [email or web address]  [email or web address]    Office: [phone] Pager: [phone]

## 2018-05-14 NOTE — PLAN OF CARE
Problem: Patient Care Overview  Goal: Individualization & Mutuality  Outcome: No Change  Pt A/Ox4. VSS at RA. C/o abd pain and received dilaudid sublingual 4mg Q3h with some relief + Toradol IV Q6h. PICC infusing TPN. PIV infusing K replacement, to recheck K level at 1645. NPO, pt continue to refuse to take polyethylene solution. 1x loose brown BM continent today. Independent. Continue with POC

## 2018-05-15 ENCOUNTER — APPOINTMENT (OUTPATIENT)
Dept: GENERAL RADIOLOGY | Facility: CLINIC | Age: 43
DRG: 388 | End: 2018-05-15
Attending: PHYSICIAN ASSISTANT
Payer: COMMERCIAL

## 2018-05-15 LAB
ANION GAP SERPL CALCULATED.3IONS-SCNC: 7 MMOL/L (ref 3–14)
BUN SERPL-MCNC: 12 MG/DL (ref 7–30)
CALCIUM SERPL-MCNC: 7.7 MG/DL (ref 8.5–10.1)
CHLORIDE SERPL-SCNC: 112 MMOL/L (ref 94–109)
CO2 SERPL-SCNC: 22 MMOL/L (ref 20–32)
CREAT SERPL-MCNC: 0.51 MG/DL (ref 0.52–1.04)
ERYTHROCYTE [DISTWIDTH] IN BLOOD BY AUTOMATED COUNT: 14.3 % (ref 10–15)
GFR SERPL CREATININE-BSD FRML MDRD: >90 ML/MIN/1.7M2
GLUCOSE SERPL-MCNC: 90 MG/DL (ref 70–99)
HCT VFR BLD AUTO: 30.4 % (ref 35–47)
HGB BLD-MCNC: 9.8 G/DL (ref 11.7–15.7)
MAGNESIUM SERPL-MCNC: 1.8 MG/DL (ref 1.6–2.3)
MCH RBC QN AUTO: 32.6 PG (ref 26.5–33)
MCHC RBC AUTO-ENTMCNC: 32.2 G/DL (ref 31.5–36.5)
MCV RBC AUTO: 101 FL (ref 78–100)
PHOSPHATE SERPL-MCNC: 3.6 MG/DL (ref 2.5–4.5)
PLATELET # BLD AUTO: 222 10E9/L (ref 150–450)
POTASSIUM SERPL-SCNC: 4.3 MMOL/L (ref 3.4–5.3)
RBC # BLD AUTO: 3.01 10E12/L (ref 3.8–5.2)
SODIUM SERPL-SCNC: 141 MMOL/L (ref 133–144)
WBC # BLD AUTO: 8.3 10E9/L (ref 4–11)

## 2018-05-15 PROCEDURE — 40000141 ZZH STATISTIC PERIPHERAL IV START W/O US GUIDANCE

## 2018-05-15 PROCEDURE — 25000132 ZZH RX MED GY IP 250 OP 250 PS 637: Performed by: PHYSICIAN ASSISTANT

## 2018-05-15 PROCEDURE — 84100 ASSAY OF PHOSPHORUS: CPT | Performed by: INTERNAL MEDICINE

## 2018-05-15 PROCEDURE — 25000128 H RX IP 250 OP 636: Performed by: PHYSICIAN ASSISTANT

## 2018-05-15 PROCEDURE — 99207 ZZC CDG-MDM COMPONENT: MEETS LOW - DOWN CODED: CPT | Performed by: PHYSICIAN ASSISTANT

## 2018-05-15 PROCEDURE — 80048 BASIC METABOLIC PNL TOTAL CA: CPT | Performed by: INTERNAL MEDICINE

## 2018-05-15 PROCEDURE — 85027 COMPLETE CBC AUTOMATED: CPT | Performed by: INTERNAL MEDICINE

## 2018-05-15 PROCEDURE — 36415 COLL VENOUS BLD VENIPUNCTURE: CPT | Performed by: INTERNAL MEDICINE

## 2018-05-15 PROCEDURE — 83735 ASSAY OF MAGNESIUM: CPT | Performed by: INTERNAL MEDICINE

## 2018-05-15 PROCEDURE — 74018 RADEX ABDOMEN 1 VIEW: CPT

## 2018-05-15 PROCEDURE — 40000802 ZZH SITE CHECK

## 2018-05-15 PROCEDURE — 25000125 ZZHC RX 250: Performed by: INTERNAL MEDICINE

## 2018-05-15 PROCEDURE — 12000026 ZZH R&B TRANSPLANT

## 2018-05-15 PROCEDURE — 25000125 ZZHC RX 250

## 2018-05-15 PROCEDURE — 99232 SBSQ HOSP IP/OBS MODERATE 35: CPT | Performed by: PHYSICIAN ASSISTANT

## 2018-05-15 RX ORDER — HYDROMORPHONE HYDROCHLORIDE 1 MG/ML
2-4 SOLUTION ORAL EVERY 4 HOURS PRN
Status: DISCONTINUED | OUTPATIENT
Start: 2018-05-15 | End: 2018-05-15

## 2018-05-15 RX ORDER — HYDROMORPHONE HYDROCHLORIDE 1 MG/ML
2-4 SOLUTION ORAL
Status: DISCONTINUED | OUTPATIENT
Start: 2018-05-15 | End: 2018-05-18

## 2018-05-15 RX ADMIN — Medication 4 MG: at 00:02

## 2018-05-15 RX ADMIN — Medication 4 MG: at 18:38

## 2018-05-15 RX ADMIN — Medication 4 MG: at 02:54

## 2018-05-15 RX ADMIN — Medication 4 MG: at 06:05

## 2018-05-15 RX ADMIN — POTASSIUM CHLORIDE: 2 INJECTION, SOLUTION, CONCENTRATE INTRAVENOUS at 16:36

## 2018-05-15 RX ADMIN — PROCHLORPERAZINE EDISYLATE 10 MG: 5 INJECTION INTRAMUSCULAR; INTRAVENOUS at 16:48

## 2018-05-15 RX ADMIN — I.V. FAT EMULSION 250 ML: 20 EMULSION INTRAVENOUS at 20:15

## 2018-05-15 NOTE — PROGRESS NOTES
BRIEF GASTROENTEROLOGY PROGRESS NOTE    Rachel Gerhardt is a 42 year old woman with a history of cervical CA s/p radiation therapy, small bowel stricture c/b recurrent SBO s/p 60 cm small bowel resection c/b anastomotic stricture, presenting with SBO.    Attempted to see patient this morning, per RN patient was out walking around. Continues to refuse NG tube placement or drink GoLytely prep. Continues to have feculent emesis; per RN, had emesis with stool odor all weekend and continued to have it this morning. Patient had one well-formed stool, 2 well formed stools yesterday. Continues tolerating clear liquid diet. Spoke with primary team, patient may be willing to try prep. Pain appears to be improving, decreased PRN needs. Vital signs stable. Leukocytosis has resolved, hemoglobin down from 11.2 to 9.8, other labs stable. KUB with improving abdominal distension, still consistent with obstruction, no pneumatosis. Colorectal Surgery has signed off given no acute indication for surgery. No changes to recommendations from yesterday. To summarize:    Recommendations  - Would continue serial abdominal exams  - Can continue prep if continues to pass flatus and stool  - Low threshold to reengage Colorectal Surgery if not passing flatus/stool, continued feculent emesis and worsening distension  - Agree with cancelling C. Diff PCR given resolved leukocytosis, well-formed stools and improving abdominal pain  - Continued concern regarding high readmission risk, would not discharge patient with a plan for outpatient endoscopy given multiple failed preps    The patient was discussed with Dr. Dima Epstein.    Maciel Patricio MD PhD  Internal Medicine PGY-1  Pager [phone]

## 2018-05-15 NOTE — PLAN OF CARE
Problem: Patient Care Overview  Goal: Plan of Care/Patient Progress Review  Outcome: Improving  VSS on RA. Pain well controlled with PRN Dilaudid sublingual q3hr. Denies nausea so far this shift. Tolerating a clear liquid diet. PICC infusing TPN at 75mL/hr with lipids. PIV saline locked. Voiding on demand, not saving. No BM reported so far this shift. Able to ambulate independently.

## 2018-05-15 NOTE — PLAN OF CARE
Problem: Patient Care Overview  Goal: Plan of Care/Patient Progress Review  Outcome: Improving  Blood pressure 94/77, pulse 88, temperature 97.8  F (36.6  C), temperature source Oral, resp. rate 16, weight 57.7 kg (127 lb 1.6 oz), SpO2 97 %, not currently breastfeeding.  Pt. Reports feeling better. Thinks her pain is finally going away. No request for pain meds since 0600.   Had one, small formed stool. Says she usually has a BM only every 3 days.  Walked outside.Showered.  Pt. Said she would try to drink her [id number].  Plan: further GI W/U depending on success with prep.  Pt. Is in good spirits.

## 2018-05-15 NOTE — PROVIDER NOTIFICATION
Pt complaining of increased pain. Provider paged. Talked with provider. Dil 2mg-4mg sublingual one time dose ordered. Next dil 2mg-4mg dose must be given 2 hours after One time dose.

## 2018-05-15 NOTE — PLAN OF CARE
Problem: Patient Care Overview  Goal: Plan of Care/Patient Progress Review  Outcome: No Change  /75  Pulse 88  Temp 99  F (37.2  C) (Oral)  Resp 16  Wt 56.3 kg (124 lb 3.2 oz)  SpO2 98%  BMI 18.34 kg/m2    2518-0577: AVSS and afebrile. Pt had an episode of tan emesis, and received compazine with relief. Received dil 4mg sublingual x3 this shift. Last dose given was a one time dose of 4mg of dil sublingual. Provider said next PRN dose can be given 2 hours after that dose was given. Dose given at 2150. PICC with TPN at 75ml and lipids running. PIV SL'd. Voiding and not saving. Small BM this shift. Skin intact. Up ad dania. Pt did threaten to leave AMA this evening due to pain levels. Received one time dose and will stay till AM and wants day team to reassess her pain management plan. Will continue to monitor and notify MD of any changes.

## 2018-05-15 NOTE — PROGRESS NOTES
CLINICAL NUTRITION SERVICES - BRIEF NOTE     Nutrition Prescription    Recommendations already ordered by Registered Dietitian (RD):  Discussed with Pharmacy - will increase dextrose to 175 grams (goal) to have TPN meet full nutritional needs.         NEW FINDINGS   Electrolytes, BG stable    Monitoring/Evaluation  Progress toward goals will be monitored and evaluated per protocol.    Hawa Wayne MS, RD, LD  Pager [phone]

## 2018-05-15 NOTE — PROGRESS NOTES
[facility], [place]    Internal Medicine Progress Note - Gold Service      Assessment & Plan   Rachel A Gerhardt is a 42 year old female recurrent SBO (2/2 radiation now s/p partial small bowel resection), chronic abdominal pain, ovarian cancer (3 years ago), and severe malnutrition on TPN who was admitted 5/11/2018 with N/V, and abdominal pain, found to have SBO. Of note, recently admitted and left AMA with hypokalemia, need for colonoscopy, see note 5/3 for details    Recurrent SBO 2/2 anastomotic stricture; acute on chronic abdominal pain: Recurrent SBOs s/p SB resection 5/2017 (2/2 radiation in setting of ovarian cancer). MRE 3/21 noted anastomotic stricture. Admitted and left AMA 5/3, see d/c summary for details. CT AP 5/11 long segment dilated loops of proximal small bowel, transition point at small bowel anastomosis in pelvis. CRS consulted, no indication for acute surgical intervention. Adamantly refusing NG and ruses to discuss, aware of risks/benefits. Has anterograde function. Emesis x1 over 24 hours. KUB 5/15 improving SB dilated loops c/w obstruction WBC normalized (16), afebrile. Lactic acid normal 5/14. Pain stable  - CRS signed off, GI following  - Continue self-decompression given NGT refusal   - SL dilaudid q4h prn  - Continue to encourage Golytely prep. Patient will mix with [place] and attempt to complete overnight   - Continue prn Toradol through today   - Compazine and Reglan prn nausea as QTc 451 on 5/1     Electrolyte disturbances: Likely hypovolemia 2/2 GI losses. K, Mg and phos normal 5/15  - Replete and repeat per protocol  - IVF hydration     Stable Medical Conditions and Resolved Hospital Problems:  History of panic attacks: Reports panic attack, sense of impending doom and concern for dying last week.  Has h/o panic attacks and this was c/w prior.  No PTA meds. Psychiatry consult offered, declined  Coagulopathy - INR slightly elevated (1.29) in setting of  severe malnutrition which is likely culprit. No s/s of bleeding on exam   Chronic protein-calorie malnutrition: 2/2 recurrent SBO as noted above. TPN initiated 3/2018. PICC in [facility]. Was off TPN from last discharge to this admission. PharmD and nutrition consulted for TPN     Pain assessment:  Current Pain Score 5/15/2018   Patient currently in pain? yes   Pain score (0-10) -   Pain location Abdomen   Pain descriptors Cramping   Some encounter information is confidential and restricted. Go to Review Flowsheets activity to see all data.   - Jenni is experiencing pain due to recurrent SBO. Pain management was discussed and the plan was created in a collaborative fashion.  Jenni's response to the current recommendations: compliant  - Please see the plan for pain management as documented above    Diet: parenteral nutrition - ADULT compounded formula  Clear Liquid Diet  parenteral nutrition - ADULT compounded formula  Fluids: Via TPN  DVT Prophylaxis: Pneumatic Compression Devices  Code Status: Full Code    Disposition Plan   Expected discharge: 2-5 days, recommended to prior living arrangement once able to tolerate Golytely prep, colonoscopy complete, SBO resolved.     Entered: Dina Stanley 05/15/2018, 11:05 AM   Information in the above section will display in the discharge planner report.      The patient's care was discussed with the patient, nursing, and attending physician, Dr. Murtaza Stanley  Internal Medicine Staff Hospitalist Service  [facility]  Pager: [phone]  Please see sticky note for cross cover information    Interval History   Feeling slightly better today. Symptoms are more consistent with home pain. Emesis x1 yesterday. Reports it looked less like fecal matter than before. Tolerating minimal sips of water. Has not started bowel prep yet. No fever or chills. Denies chest pain or dyspnea. Reports formed BM this am. No urinary symptoms     Data reviewed today:  I reviewed all medications, new labs and imaging results over the last 24 hours.     Physical Exam   Vital Signs: Temp: 97.8  F (36.6  C) Temp src: Oral BP: 110/77 Pulse: 88 Heart Rate: 54 Resp: 16 SpO2: 98 % O2 Device: None (Room air)    Weight: 127 lbs 1.6 oz  General Appearance: Alert and oriented x3, lying in bed. Quite comfortable  Respiratory: CTAB without wheezing or rales  Cardiovascular: RRR, S1, S2. No murmur noted  GI: Abdomen soft, tender in the lower quadrants bilaterally and in the epigastrium.  No rebound. Bowel sounds active  Skin: No rash or jaundice  Other: Mood stable. Distal pulses palpable. No peripheral edema     Data   Data     Recent Labs  Lab 05/15/18  0726 05/14/18  1749 05/14/18  0654 05/13/18  0628 05/12/18  0606  05/11/18  1031   WBC 8.3  --  16.0*  --  6.4  --  7.9   HGB 9.8*  --  11.2*  --  13.6  --  15.2   *  --  99  --  100  --  98     --  246  --  353  --  218   INR  --   --   --  1.29*  --   --  0.95     --  137 138 139  --  130*   POTASSIUM 4.3 3.7 3.3* 3.4 3.5  < > 3.0*   CHLORIDE 112*  --  104 101 101  --  81*   CO2 22  --  25 25 29  --  38*   BUN 12  --  12 19 16  --  25   CR 0.51*  --  0.54 0.55 0.65  --  0.74   ANIONGAP 7  --  7 12 8  --  11   JONATAN 7.7*  --  7.9* 7.6* 8.0*  --  8.4*   GLC 90  --  115* 92 79  --  85   ALBUMIN  --   --   --  2.2*  --   --  3.2*   PROTTOTAL  --   --   --  4.5*  --   --  6.6*   BILITOTAL  --   --   --  1.0  --   --  0.7   ALKPHOS  --   --   --  58  --   --  90   ALT  --   --   --  33  --   --  20   AST  --   --   --  42  --   --  26   LIPASE  --   --   --   --   --   --  54*   < > = values in this interval not displayed.

## 2018-05-16 ENCOUNTER — APPOINTMENT (OUTPATIENT)
Dept: GENERAL RADIOLOGY | Facility: CLINIC | Age: 43
DRG: 388 | End: 2018-05-16
Attending: INTERNAL MEDICINE
Payer: COMMERCIAL

## 2018-05-16 PROCEDURE — 99207 ZZC CDG-MDM COMPONENT: MEETS LOW - DOWN CODED: CPT | Performed by: PHYSICIAN ASSISTANT

## 2018-05-16 PROCEDURE — 99232 SBSQ HOSP IP/OBS MODERATE 35: CPT | Performed by: PHYSICIAN ASSISTANT

## 2018-05-16 PROCEDURE — 25000125 ZZHC RX 250: Performed by: INTERNAL MEDICINE

## 2018-05-16 PROCEDURE — 25000128 H RX IP 250 OP 636: Performed by: PHYSICIAN ASSISTANT

## 2018-05-16 PROCEDURE — 12000025 ZZH R&B TRANSPLANT INTERMEDIATE

## 2018-05-16 PROCEDURE — 25000132 ZZH RX MED GY IP 250 OP 250 PS 637: Performed by: PHYSICIAN ASSISTANT

## 2018-05-16 PROCEDURE — 25000125 ZZHC RX 250

## 2018-05-16 PROCEDURE — 40000802 ZZH SITE CHECK

## 2018-05-16 PROCEDURE — 74018 RADEX ABDOMEN 1 VIEW: CPT

## 2018-05-16 RX ADMIN — DIATRIZOATE MEGLUMINE AND DIATRIZOATE SODIUM 120 ML: 660; 100 SOLUTION ORAL; RECTAL at 14:45

## 2018-05-16 RX ADMIN — POTASSIUM CHLORIDE: 2 INJECTION, SOLUTION, CONCENTRATE INTRAVENOUS at 17:15

## 2018-05-16 RX ADMIN — Medication 4 MG: at 10:55

## 2018-05-16 RX ADMIN — Medication 4 MG: at 20:22

## 2018-05-16 RX ADMIN — Medication 4 MG: at 13:53

## 2018-05-16 RX ADMIN — I.V. FAT EMULSION 250 ML: 20 EMULSION INTRAVENOUS at 20:22

## 2018-05-16 RX ADMIN — Medication 4 MG: at 17:24

## 2018-05-16 RX ADMIN — PROCHLORPERAZINE EDISYLATE 10 MG: 5 INJECTION INTRAMUSCULAR; INTRAVENOUS at 20:22

## 2018-05-16 RX ADMIN — PROCHLORPERAZINE EDISYLATE 10 MG: 5 INJECTION INTRAMUSCULAR; INTRAVENOUS at 02:22

## 2018-05-16 RX ADMIN — Medication 4 MG: at 02:22

## 2018-05-16 NOTE — PLAN OF CARE
Problem: Patient Care Overview  Goal: Plan of Care/Patient Progress Review  Outcome: No Change  BP 99/79 (BP Location: Left arm)  Pulse 91  Temp 97.8  F (36.6  C) (Oral)  Resp 18  Wt 57.7 kg (127 lb 1.6 oz)  SpO2 97%  BMI 18.77 kg/m2     BP's soft. OVSS on RA. Alert and oriented. Up independently, ambulating halls and outside. Received 4mg SL dilaudid x1 for abdominal pain with good relief. Received 10mg IV compazine x1 for nausea, 200ml brown emesis x1. PICC with TPN + lipids infusing. New bag of TPN started early d/t patient unhooking herself from TPN without telling nursing and going outside. Pt instructed to tell RN if pump is beeping or IV is leaking or painful so proper care and intervention can be taken. PIV very painful and removed. New PIV placed, saline locked. Voiding, not saving. No BM's reported. Clear liquid diet, pt states she can't drink very much of anything or she gets very nauseous. Resting between cares. Will continue to monitor and notify of any changes.

## 2018-05-16 NOTE — PROGRESS NOTES
[facility], [place]    Internal Medicine Progress Note - Gold Service      Assessment & Plan   Rachel A Gerhardt is a 42 year old female recurrent SBO (2/2 radiation now s/p partial small bowel resection), chronic abdominal pain, ovarian cancer (3 years ago), and severe malnutrition on TPN who was admitted 5/11/2018 with N/V, and abdominal pain, found to have SBO. Of note, recently admitted and left AMA with hypokalemia, need for colonoscopy, see note 5/3 for details    Recurrent SBO 2/2 anastomotic stricture; chronic abdominal pain: Recurrent SBOs s/p SB resection 5/2017  (radiation in setting of ovarian cancer). MRE 3/21 anastomotic stricture. CT AP 5/11 long segment dilated loops of proximal small bowel, transition point at small bowel anastomosis in pelvis. CRS consulted, no surgical intervention. Refused NGT. Has anterograde function.  KUB 5/15 improving SB dilated loops c/w obstruction WBC normal, afebrile. Emesis x1 over 24 hours. Pain stable  - CRS signed off, GI following  - Continue self-decompression given NGT refusal   - SL dilaudid q4h prn  - Tap water enema x2 today. If still unable to have adequately Golytely prep by this afternoon will order for GGC  - Compazine and Reglan prn nausea  ** Addendum: Per nursing, patient 1/3 of way through first Golytely jug. Will order for Gastrogaffin followed by XR in 8 hours    Stable Medical Conditions and Resolved Hospital Problems:  Electrolyte disturbances: Likely hypovolemia 2/2 GI losses. K, Mg and phos normal 5/15. Repleted  per protocol  History of panic attacks: Reports panic attack, sense of impending doom and concern for dying last week.  Has h/o panic attacks and this was c/w prior.  No PTA meds. Psychiatry consult offered, declined  Coagulopathy - INR slightly elevated (1.29) in setting of severe malnutrition which is likely culprit. No s/s of bleeding on exam   Chronic protein-calorie malnutrition: 2/2 recurrent SBO as  noted above. TPN initiated 3/2018. PICC in [facility]. Was off TPN from last discharge to this admission. PharmD and nutrition consulted for TPN     Pain assessment:  Current Pain Score 5/16/2018   Patient currently in pain? yes   Pain score (0-10) -   Pain location -   Pain descriptors -   Some encounter information is confidential and restricted. Go to Review Flowsheets activity to see all data.   - Jenni is experiencing pain due to recurrent SBO. Pain management was discussed and the plan was created in a collaborative fashion.  Jenni's response to the current recommendations: compliant  - Please see the plan for pain management as documented above    Diet: Clear Liquid Diet  parenteral nutrition - ADULT compounded formula  Fluids: Via TPN  DVT Prophylaxis: Pneumatic Compression Devices  Code Status: Full Code    Disposition Plan   Expected discharge: 2-5 days, recommended to prior living arrangement once able to tolerate Golytely prep, colonoscopy complete, SBO resolved.     Entered: Dina Stanley 05/16/2018, 10:17 AM   Information in the above section will display in the discharge planner report.      The patient's care was discussed with the patient, nursing, GI, and attending physician, Dr. Murtaza Stanley  Internal Medicine Staff Hospitalist Service  [facility]  Pager: [phone]  Please see sticky note for cross cover information    Interval History   Feels well today. Is nearly to BL. Emesis last night had some clear streaks present. Having BMs. Denies nausea. Was able to eat some Jello today. Plans to try to drink as much prep as possible today. Was moved rooms overnight 2 nights ago and slept hard. Reports this is why she was unable to continue prep overnight. No fever or chills. Denies chest pain or dyspnea. No urinary symptoms     Data reviewed today: I reviewed all medications, new labs and imaging results over the last 24 hours.     Physical Exam   Vital Signs:  Temp: 98  F (36.7  C) Temp src: Oral BP: (!) 123/106 Pulse: 89 Heart Rate: 91 Resp: 16 SpO2: 98 % O2 Device: None (Room air)    Weight: 127 lbs 1.6 oz  General Appearance: Alert and oriented x3, wearing street clothing. Appears comfortable  Respiratory: CTAB with minimal upper airway wheezing. No crackles or rales  Cardiovascular: RRR, S1, S2. No murmur noted  GI: Abdomen soft, tender in the lower quadrants bilaterally and in the epigastrium.  No rebound. Bowel sounds active  Skin: No rash or jaundice  Other: Mood stable. Distal pulses palpable. No peripheral edema     Data   Data     Recent Labs  Lab 05/15/18  0726 05/14/18  1749 05/14/18  0654 05/13/18  0628 05/12/18  0606  05/11/18  1031   WBC 8.3  --  16.0*  --  6.4  --  7.9   HGB 9.8*  --  11.2*  --  13.6  --  15.2   *  --  99  --  100  --  98     --  246  --  353  --  218   INR  --   --   --  1.29*  --   --  0.95     --  137 138 139  --  130*   POTASSIUM 4.3 3.7 3.3* 3.4 3.5  < > 3.0*   CHLORIDE 112*  --  104 101 101  --  81*   CO2 22  --  25 25 29  --  38*   BUN 12  --  12 19 16  --  25   CR 0.51*  --  0.54 0.55 0.65  --  0.74   ANIONGAP 7  --  7 12 8  --  11   JONATAN 7.7*  --  7.9* 7.6* 8.0*  --  8.4*   GLC 90  --  115* 92 79  --  85   ALBUMIN  --   --   --  2.2*  --   --  3.2*   PROTTOTAL  --   --   --  4.5*  --   --  6.6*   BILITOTAL  --   --   --  1.0  --   --  0.7   ALKPHOS  --   --   --  58  --   --  90   ALT  --   --   --  33  --   --  20   AST  --   --   --  42  --   --  26   LIPASE  --   --   --   --   --   --  54*   < > = values in this interval not displayed.

## 2018-05-16 NOTE — PROGRESS NOTES
GASTROENTEROLOGY PROGRESS NOTE    Assessment:  Rachel Gerhardt is a 42-year-old woman with a history of cervical CA s/p radiation therapy, small bowel stricture c/b recurrent SBO s/p 60 cm small bowel resection c/b anastomotic stricture, presenting with SBO most likely due to her anastomotic stricture.  She has had multiple failed colonoscopy present outpatient setting.  Has been refusing NG tube placement and colonoscopy prep for the past several days.  Today, she agreed to take the colonoscopy prep.  Agree with primary team plan for tapwater enemas, and encouraged the patient to finish her prep today.  Would be amenable to colonoscopy if the patient has documented clear stools, though with her history of refusing GoLytely preps, there is diminished confidence that she will be able to proceed to colonoscopy. Recommendations are unchanged.     Recommendations  - Continue serial abdominal exams to ensure there is no worsening  - If continues passing flatus and stool, would continue prep  - Low threshold to reengage colorectal surgery, passing flatus/stool, continued feculent emesis and worsening distention  - Continued concern regarding high readmission risk, would not discharge patient with a plan for outpatient endoscopy given multiple failed preps    Gastroenterology outpatient follow up recommendations: Should not be discharged with plan for outpatient colonoscopy given multiple failed preps.    Thank you for involving us in this patient's care. Please do not hesitate to contact the GI service with any questions or concerns.     The patient was discussed with Dr. Dima Epstein, who agrees with the assessment and plan.    Maciel Patricio MD PhD  Internal Medicine PGY-1  Pager [phone]  -------------------------------------------------------------------------------------------------------------------  Subjective:   NAEON.  ambulating independently, decreasing need for pain medication.  Has had recurrent  feculent emesis.  The patient endorsed clear emesis this morning.  Notes that she has an apparently uncontrollable urge to pull an NG tube, and does not feel confident that if she had one placed but it would stay in for very long.  Agrees to finish prep today, as long she is able to mix it with some broth.  Notes persistent abdominal pain.  Describes anxiety regarding her persistent symptoms, and would like to have a colonoscopy procedure for dilation.    /81 (BP Location: Left arm)  Pulse 87  Temp 98.6  F (37  C) (Oral)  Resp 18  Wt 57.7 kg (127 lb 1.6 oz)  SpO2 97%  BMI 18.77 kg/m2  Wt:   Wt Readings from Last 2 Encounters:   05/15/18 57.7 kg (127 lb 1.6 oz)   05/09/18 55.3 kg (122 lb)      General: Pleasant middle-aged woman in NAD  HEENT: AT/NC, conjunctivae without injection, anicteric sclerae, benign oropharynx, MMM  Neck: Supple  Cardiovascular: RRR, normal S1-S2, no M/R/G, 2+ peripheral pulses, no peripheral edema  Respiratory: LCTAB, no W/R/R, normal respiratory effort  Abdominal: Slightly rigid, nondistended, diffusely tender, hypoactive bowel sounds, unable to test for hepatosplenomegaly given patient discomfort  Musculoskeletal: Thin bulk, no appreciable joint deformities  Neurologic: CN II through XII grossly intact, no focal deficits, AAOx4         Data:   Labs and imaging below were independently reviewed and interpreted    BMP  Recent Labs  Lab 05/15/18  0726 05/14/18  1749 05/14/18  0654 05/13/18  0628 05/12/18  0606     --  137 138 139   POTASSIUM 4.3 3.7 3.3* 3.4 3.5   CHLORIDE 112*  --  104 101 101   JONATAN 7.7*  --  7.9* 7.6* 8.0*   CO2 22  --  25 25 29   BUN 12  --  12 19 16   CR 0.51*  --  0.54 0.55 0.65   GLC 90  --  115* 92 79     CBC  Recent Labs  Lab 05/15/18  0726 05/14/18  0654 05/12/18  0606 05/11/18  1031   WBC 8.3 16.0* 6.4 7.9   RBC 3.01* 3.35* 4.02 4.51   HGB 9.8* 11.2* 13.6 15.2   HCT 30.4* 33.3* 40.0 44.0   * 99 100 98   MCH 32.6 33.4* 33.8* 33.7*   MCHC 32.2  33.6 34.0 34.5   RDW 14.3 14.3 14.2 14.0    246 353 218     INR  Recent Labs  Lab 05/13/18  0628 05/11/18  1031   INR 1.29* 0.95     LFTs  Recent Labs  Lab 05/13/18  0628 05/11/18  1031   ALKPHOS 58 90   AST 42 26   ALT 33 20   BILITOTAL 1.0 0.7   PROTTOTAL 4.5* 6.6*   ALBUMIN 2.2* 3.2*      PANC  Recent Labs  Lab 05/11/18  1031   LIPASE 54*

## 2018-05-16 NOTE — PLAN OF CARE
Problem: Patient Care Overview  Goal: Plan of Care/Patient Progress Review  2034-4978: Pt moved to a different room, 7-211-2, around 0030. AVSS on RA. Denied pain and nausea at initital assessment, though requested pain and nausea meds at 0220 receiving dilaudid 4mg sublingual and compazine 10mg IVP at that time. Pt had no further complaints overnight and appeared to sleep well. Clear liquid diet with no intake overnight. TPN running at 75ml through R single lumen PICC. R PIV saline locked. Not saving output, no reported BMs overnight. Will continue to monitor and follow POC.

## 2018-05-16 NOTE — PLAN OF CARE
Problem: Patient Care Overview  Goal: Plan of Care/Patient Progress Review  Making mild improvement. Able to tolerate a popsicle and some of her oral contrast.Pt. Did report having some watery diarrhea.  Given two tap water enema with watery results, per pt.   Will need to try Gastroprafin orally, as prep for a colonoscopy.    Will notify radiology when pt. Has finished drinking the prep and they plan to do an xray 8 hours post.  Pt. Aware that she needs to finish the gastroprafin; thinks she can do so.  Did c/o of abdominal pain and received pain meds x 2 this shift.

## 2018-05-17 LAB
ANION GAP SERPL CALCULATED.3IONS-SCNC: 6 MMOL/L (ref 3–14)
BUN SERPL-MCNC: 11 MG/DL (ref 7–30)
CALCIUM SERPL-MCNC: 8.3 MG/DL (ref 8.5–10.1)
CHLORIDE SERPL-SCNC: 106 MMOL/L (ref 94–109)
CO2 SERPL-SCNC: 25 MMOL/L (ref 20–32)
CREAT SERPL-MCNC: 0.47 MG/DL (ref 0.52–1.04)
ERYTHROCYTE [DISTWIDTH] IN BLOOD BY AUTOMATED COUNT: 13.8 % (ref 10–15)
GFR SERPL CREATININE-BSD FRML MDRD: >90 ML/MIN/1.7M2
GLUCOSE SERPL-MCNC: 102 MG/DL (ref 70–99)
HCT VFR BLD AUTO: 34.3 % (ref 35–47)
HGB BLD-MCNC: 11.1 G/DL (ref 11.7–15.7)
MAGNESIUM SERPL-MCNC: 1.9 MG/DL (ref 1.6–2.3)
MCH RBC QN AUTO: 32.7 PG (ref 26.5–33)
MCHC RBC AUTO-ENTMCNC: 32.4 G/DL (ref 31.5–36.5)
MCV RBC AUTO: 101 FL (ref 78–100)
PHOSPHATE SERPL-MCNC: 4.1 MG/DL (ref 2.5–4.5)
PLATELET # BLD AUTO: 279 10E9/L (ref 150–450)
POTASSIUM SERPL-SCNC: 5 MMOL/L (ref 3.4–5.3)
RBC # BLD AUTO: 3.39 10E12/L (ref 3.8–5.2)
SODIUM SERPL-SCNC: 137 MMOL/L (ref 133–144)
WBC # BLD AUTO: 6.5 10E9/L (ref 4–11)

## 2018-05-17 PROCEDURE — 99233 SBSQ HOSP IP/OBS HIGH 50: CPT | Performed by: INTERNAL MEDICINE

## 2018-05-17 PROCEDURE — 36415 COLL VENOUS BLD VENIPUNCTURE: CPT | Performed by: PHYSICIAN ASSISTANT

## 2018-05-17 PROCEDURE — 25000125 ZZHC RX 250: Performed by: NURSE PRACTITIONER

## 2018-05-17 PROCEDURE — 83735 ASSAY OF MAGNESIUM: CPT | Performed by: PHYSICIAN ASSISTANT

## 2018-05-17 PROCEDURE — 25000128 H RX IP 250 OP 636: Performed by: PHYSICIAN ASSISTANT

## 2018-05-17 PROCEDURE — 85027 COMPLETE CBC AUTOMATED: CPT | Performed by: PHYSICIAN ASSISTANT

## 2018-05-17 PROCEDURE — 84100 ASSAY OF PHOSPHORUS: CPT | Performed by: PHYSICIAN ASSISTANT

## 2018-05-17 PROCEDURE — 25000125 ZZHC RX 250

## 2018-05-17 PROCEDURE — 25000125 ZZHC RX 250: Performed by: INTERNAL MEDICINE

## 2018-05-17 PROCEDURE — 12000025 ZZH R&B TRANSPLANT INTERMEDIATE

## 2018-05-17 PROCEDURE — 99207 ZZC APP CREDIT; MD BILLING SHARED VISIT: CPT | Performed by: PHYSICIAN ASSISTANT

## 2018-05-17 PROCEDURE — 40000802 ZZH SITE CHECK

## 2018-05-17 PROCEDURE — 25000132 ZZH RX MED GY IP 250 OP 250 PS 637: Performed by: PHYSICIAN ASSISTANT

## 2018-05-17 PROCEDURE — 80048 BASIC METABOLIC PNL TOTAL CA: CPT | Performed by: PHYSICIAN ASSISTANT

## 2018-05-17 RX ORDER — CALCIUM CARBONATE 500 MG/1
500 TABLET, CHEWABLE ORAL DAILY PRN
Status: DISCONTINUED | OUTPATIENT
Start: 2018-05-17 | End: 2018-05-23 | Stop reason: HOSPADM

## 2018-05-17 RX ADMIN — Medication 4 MG: at 14:57

## 2018-05-17 RX ADMIN — Medication 4 MG: at 17:53

## 2018-05-17 RX ADMIN — POTASSIUM CHLORIDE: 2 INJECTION, SOLUTION, CONCENTRATE INTRAVENOUS at 20:47

## 2018-05-17 RX ADMIN — Medication 4 MG: at 03:51

## 2018-05-17 RX ADMIN — Medication 4 MG: at 21:01

## 2018-05-17 RX ADMIN — Medication 4 MG: at 07:13

## 2018-05-17 RX ADMIN — PROCHLORPERAZINE EDISYLATE 10 MG: 5 INJECTION INTRAMUSCULAR; INTRAVENOUS at 16:17

## 2018-05-17 RX ADMIN — Medication 2 G: at 07:44

## 2018-05-17 RX ADMIN — Medication 4 MG: at 00:34

## 2018-05-17 RX ADMIN — I.V. FAT EMULSION 250 ML: 20 EMULSION INTRAVENOUS at 20:47

## 2018-05-17 RX ADMIN — METOCLOPRAMIDE 10 MG: 5 INJECTION, SOLUTION INTRAMUSCULAR; INTRAVENOUS at 21:01

## 2018-05-17 RX ADMIN — Medication 4 MG: at 11:38

## 2018-05-17 NOTE — PLAN OF CARE
Problem: Patient Care Overview  Goal: Plan of Care/Patient Progress Review  Outcome: Improving  /76 (BP Location: Left arm)  Pulse 87  Temp 97.9  F (36.6  C) (Oral)  Resp 18  Wt 56.6 kg (124 lb 12.8 oz)  SpO2 95%  BMI 18.43 kg/m2    4911-6609: AVSS and afebrile. Mg 1.9 was replaced. AM recheck. Denied nausea, complained of pain dil 4mg x3 given with effective relief. Clear liquid diet, NPO at 0000 5/18/18. PIV SL'd. PICC with TPN running at 75ml/hr continuous. Voiding and not saving. Pt has been walking frequently this shift. Showered. Alert and oriented x4. Finished bottle of golytely. Will continue to monitor and notify MD of any changes.

## 2018-05-17 NOTE — PROGRESS NOTES
This is a recent snapshot of the patient's [facility] medical record.  For current drug dose and complete information and questions, call [phone] or In Basket pool, [facility] home infusion ([id number])  CSN Number:  [id number]

## 2018-05-17 NOTE — PLAN OF CARE
Problem: Patient Care Overview  Goal: Plan of Care/Patient Progress Review  Outcome: No Change  /81 (BP Location: Left arm)  Pulse 87  Temp 98.6  F (37  C) (Oral)  Resp 18  Wt 57.7 kg (127 lb 1.6 oz)  SpO2 97%  BMI 18.77 kg/m2    VSS on RA. Alert and oriented. Up independently, ambulating outside frequently. Received 4mg SL dilaudid x2 for abdominal pain and 10mg IV compazine x1 for nausea. PICC with TPN + lipids. PIV saline locked. Voiding, not saving. Did not report any stools this shift. Pt still about 1/3 of the way through golytely. Plan for abdominal xray at 2300. Resting between cares. Will continue to monitor and notify of any changes.

## 2018-05-17 NOTE — PLAN OF CARE
Problem: Patient Care Overview  Goal: Plan of Care/Patient Progress Review  Jeri slept between cares throughout the night. VSS. Noted majority of her Golytely was still at bedside. Told her that she needed to continue to drink it. She very adamantly stated that she was told that she did not need to drink it since she drank her gastrograffin. Told her I was not aware of any orders stating to discontinue her golytely. Anthony team did call this morning and stated she needed to complete her golytely. I did pass this on to the day nurse. She did stool at 3x liquid tan stool about 300-400cc each time. Medicated x2 for c/o abdominal pain and appeared to have adequate relief. Will continue to monitor and report any significant changes.

## 2018-05-17 NOTE — PROGRESS NOTES
Care Coordinator Progress Note    Admission Date/Time:  5/11/2018  Attending MD:  Kelsea Hauser MD    Data  Chart reviewed, discussed with interdisciplinary team.   Patient was admitted for: Abdominal pain, generalized.    Concerns with insurance coverage for discharge needs: None.  Current Living Situation: Patient lives alone. But has been staying with her mother due to illness  Support System: Supportive  Services Involved: Home Infusion  Transportation at Discharge: Family or friend will provide  Transportation to Medical Appointments:   - Name of caregiver: self, mother,  pts dad will bring her to MD appointments  Barriers to Discharge: prep for colonoscopy going very slowly.      Coordination of Care and Referrals: Provided patient/family with options for Home Infusion.        Assessment  Pt previously dcd with [facility], they attempted to contact pt and were unsuccessful for over a week.  They will not resume her [facility] cares for PICC and TPN.  RNCC made referral to [facility] today for TPN at home.  Once pt is is approved RNCC will discuss more details with pt.  RNCC spoke with pt on the phone, she agreed with changing to [facility] instead of [facility].  She states that all her supplies were at her home and she kept thinking that the next day she would feel better and would drive home to get her supplies and she just did not improve.  Pt states next time she could use her father to go to her home and get the TPN.      [facility]  Enteral refills/pump questions/supplies  Ph: [phone]  Fx: [phone]- face sheet faxed/thad Arreola is the liaison, Rachel will update Elba and pt will be seen tomorrow.    Pt does not need TPN teaching, [facility] liaison to meet with pt tomorrow to establish contact information with pt to ensure better follow up after this dc.  May have pt to sign a contract for services.  Referral started.       Pt will be staying at her mothers home while she is feeling sick.    [address]  [facility]. [id number]  [phone] (M)    Plan  Anticipated Discharge Date:  5/19/18   Anticipated Discharge Plan: dc home with TPN change of providers.     Kathe Rogers, RNCC, BSN    [facility]  [address] [place] [id number]    [email or web address]    Office: [phone] Pager: [phone]

## 2018-05-17 NOTE — PROGRESS NOTES
This is a recent snapshot of the patient's [facility] medical record.  For current drug dose and complete information and questions, call [phone] or In Basket pool, [facility] home infusion ([id number])  CSN Number:  [id number]

## 2018-05-17 NOTE — PROGRESS NOTES
Brief GI Note  05/17/18    Patient has now been admitted six days without adequate preparation for colonoscopy despite numerous attempts.     GI will sign-off at this time. Please contact our service once her stools are clear and this is documented by nursing staff. Only at that time will we attempt to schedule endoscopic anastomotic dilation/enteroscopy. Patient has failed to prep causing unnecessary general anesthesia and cancellation of OR procedure time numerous times before.       - 4/24 - patient reported she did not know she needed to prep; case cancelled day of procedure after seeing Dr. Vigil      - 4/30  - underwent general anesthesia; inadequate bowel prep to allow safe insertion of enteroscope beyond sigmoid colon      - 5/3 - underwent general anesthesia; inadequate bowel prep to allow safe insertion of enteroscope beyond sigmoid colon      - 5/4 - patient left hospitalization AMA without prepping; leaving scheduled OR time to be cancelled same day    Once on the schedule, she will need to have ongoing additional bowel prep to ensure stools remain clear for procedure which may take days given required procedure time.     Recommend consideration of surgical management at this junction given patient has failed numerous attempts at medical management of suspected small bowel anastomotic stenosis/stricture. Given this, recommend ongoing discussion with surgical team.     If patient discharges, she should not be arranged for outpatient GI follow-up until she has documented clear stools following bowel preparation.    Meghan Slade MD  GI Fellow

## 2018-05-18 ENCOUNTER — HOME INFUSION (PRE-WILLOW HOME INFUSION) (OUTPATIENT)
Dept: PHARMACY | Facility: CLINIC | Age: 43
End: 2018-05-18

## 2018-05-18 LAB
ALBUMIN SERPL-MCNC: 2.7 G/DL (ref 3.4–5)
ALP SERPL-CCNC: 78 U/L (ref 40–150)
ALT SERPL W P-5'-P-CCNC: 34 U/L (ref 0–50)
ANION GAP SERPL CALCULATED.3IONS-SCNC: 9 MMOL/L (ref 3–14)
AST SERPL W P-5'-P-CCNC: 29 U/L (ref 0–45)
BASOPHILS # BLD AUTO: 0 10E9/L (ref 0–0.2)
BASOPHILS NFR BLD AUTO: 0.7 %
BILIRUB SERPL-MCNC: 0.5 MG/DL (ref 0.2–1.3)
BUN SERPL-MCNC: 8 MG/DL (ref 7–30)
CALCIUM SERPL-MCNC: 8.4 MG/DL (ref 8.5–10.1)
CHLORIDE SERPL-SCNC: 103 MMOL/L (ref 94–109)
CO2 SERPL-SCNC: 21 MMOL/L (ref 20–32)
CREAT SERPL-MCNC: 0.45 MG/DL (ref 0.52–1.04)
CRP SERPL-MCNC: 41 MG/L (ref 0–8)
DIFFERENTIAL METHOD BLD: ABNORMAL
EOSINOPHIL # BLD AUTO: 0.1 10E9/L (ref 0–0.7)
EOSINOPHIL NFR BLD AUTO: 1.2 %
ERYTHROCYTE [DISTWIDTH] IN BLOOD BY AUTOMATED COUNT: 13.9 % (ref 10–15)
GFR SERPL CREATININE-BSD FRML MDRD: >90 ML/MIN/1.7M2
GLUCOSE SERPL-MCNC: 94 MG/DL (ref 70–99)
HCT VFR BLD AUTO: 37.8 % (ref 35–47)
HGB BLD-MCNC: 12.3 G/DL (ref 11.7–15.7)
IMM GRANULOCYTES # BLD: 0.2 10E9/L (ref 0–0.4)
IMM GRANULOCYTES NFR BLD: 2.6 %
LACTATE SERPL-SCNC: 2 MMOL/L (ref 0.4–2)
LYMPHOCYTES # BLD AUTO: 0.6 10E9/L (ref 0.8–5.3)
LYMPHOCYTES NFR BLD AUTO: 9.6 %
MCH RBC QN AUTO: 33.6 PG (ref 26.5–33)
MCHC RBC AUTO-ENTMCNC: 32.5 G/DL (ref 31.5–36.5)
MCV RBC AUTO: 103 FL (ref 78–100)
MONOCYTES # BLD AUTO: 0.7 10E9/L (ref 0–1.3)
MONOCYTES NFR BLD AUTO: 12.4 %
NEUTROPHILS # BLD AUTO: 4.3 10E9/L (ref 1.6–8.3)
NEUTROPHILS NFR BLD AUTO: 73.5 %
NRBC # BLD AUTO: 0 10*3/UL
NRBC BLD AUTO-RTO: 0 /100
PLATELET # BLD AUTO: 236 10E9/L (ref 150–450)
POTASSIUM SERPL-SCNC: 4.4 MMOL/L (ref 3.4–5.3)
PROCALCITONIN SERPL-MCNC: NORMAL NG/ML
PROT SERPL-MCNC: 6.9 G/DL (ref 6.8–8.8)
RBC # BLD AUTO: 3.66 10E12/L (ref 3.8–5.2)
SODIUM SERPL-SCNC: 134 MMOL/L (ref 133–144)
WBC # BLD AUTO: 5.8 10E9/L (ref 4–11)

## 2018-05-18 PROCEDURE — 25000132 ZZH RX MED GY IP 250 OP 250 PS 637: Performed by: PHYSICIAN ASSISTANT

## 2018-05-18 PROCEDURE — 83605 ASSAY OF LACTIC ACID: CPT | Performed by: PHYSICIAN ASSISTANT

## 2018-05-18 PROCEDURE — 25000125 ZZHC RX 250: Performed by: INTERNAL MEDICINE

## 2018-05-18 PROCEDURE — 99207 ZZC APP CREDIT; MD BILLING SHARED VISIT: CPT | Performed by: PHYSICIAN ASSISTANT

## 2018-05-18 PROCEDURE — 86140 C-REACTIVE PROTEIN: CPT | Performed by: PHYSICIAN ASSISTANT

## 2018-05-18 PROCEDURE — 25800025 ZZH RX 258: Performed by: INTERNAL MEDICINE

## 2018-05-18 PROCEDURE — 25000125 ZZHC RX 250: Performed by: NURSE PRACTITIONER

## 2018-05-18 PROCEDURE — 25000125 ZZHC RX 250

## 2018-05-18 PROCEDURE — 25000128 H RX IP 250 OP 636: Performed by: INTERNAL MEDICINE

## 2018-05-18 PROCEDURE — 25000128 H RX IP 250 OP 636: Performed by: PHYSICIAN ASSISTANT

## 2018-05-18 PROCEDURE — 84145 PROCALCITONIN (PCT): CPT | Performed by: PHYSICIAN ASSISTANT

## 2018-05-18 PROCEDURE — 80053 COMPREHEN METABOLIC PANEL: CPT | Performed by: PHYSICIAN ASSISTANT

## 2018-05-18 PROCEDURE — 87040 BLOOD CULTURE FOR BACTERIA: CPT | Performed by: PHYSICIAN ASSISTANT

## 2018-05-18 PROCEDURE — 85025 COMPLETE CBC W/AUTO DIFF WBC: CPT | Performed by: PHYSICIAN ASSISTANT

## 2018-05-18 PROCEDURE — 80048 BASIC METABOLIC PNL TOTAL CA: CPT | Performed by: PHYSICIAN ASSISTANT

## 2018-05-18 PROCEDURE — 36415 COLL VENOUS BLD VENIPUNCTURE: CPT | Performed by: PHYSICIAN ASSISTANT

## 2018-05-18 PROCEDURE — 99232 SBSQ HOSP IP/OBS MODERATE 35: CPT | Performed by: PHYSICIAN ASSISTANT

## 2018-05-18 PROCEDURE — 12000025 ZZH R&B TRANSPLANT INTERMEDIATE

## 2018-05-18 RX ORDER — VANCOMYCIN HYDROCHLORIDE 1 G/200ML
1000 INJECTION, SOLUTION INTRAVENOUS EVERY 12 HOURS
Status: DISCONTINUED | OUTPATIENT
Start: 2018-05-18 | End: 2018-05-20

## 2018-05-18 RX ORDER — HYDROMORPHONE HYDROCHLORIDE 1 MG/ML
2-4 SOLUTION ORAL EVERY 4 HOURS PRN
Status: DISCONTINUED | OUTPATIENT
Start: 2018-05-18 | End: 2018-05-19

## 2018-05-18 RX ORDER — POLYETHYLENE GLYCOL 3350 17 G/17G
17 POWDER, FOR SOLUTION ORAL 2 TIMES DAILY
Status: COMPLETED | OUTPATIENT
Start: 2018-05-18 | End: 2018-05-18

## 2018-05-18 RX ADMIN — Medication 4 MG: at 07:06

## 2018-05-18 RX ADMIN — PROCHLORPERAZINE EDISYLATE 10 MG: 5 INJECTION INTRAMUSCULAR; INTRAVENOUS at 02:59

## 2018-05-18 RX ADMIN — I.V. FAT EMULSION 250 ML: 20 EMULSION INTRAVENOUS at 20:29

## 2018-05-18 RX ADMIN — Medication 4 MG: at 00:29

## 2018-05-18 RX ADMIN — SODIUM CHLORIDE, POTASSIUM CHLORIDE, SODIUM LACTATE AND CALCIUM CHLORIDE 1000 ML: 600; 310; 30; 20 INJECTION, SOLUTION INTRAVENOUS at 21:16

## 2018-05-18 RX ADMIN — DEXTROSE MONOHYDRATE: 100 INJECTION, SOLUTION INTRAVENOUS at 14:20

## 2018-05-18 RX ADMIN — CEFEPIME HYDROCHLORIDE 2 G: 2 INJECTION, POWDER, FOR SOLUTION INTRAVENOUS at 22:25

## 2018-05-18 RX ADMIN — POLYETHYLENE GLYCOL 3350 17 G: 17 POWDER, FOR SOLUTION ORAL at 21:17

## 2018-05-18 RX ADMIN — Medication 4 MG: at 22:36

## 2018-05-18 RX ADMIN — Medication 4 MG: at 18:34

## 2018-05-18 RX ADMIN — VANCOMYCIN HYDROCHLORIDE 1000 MG: 1 INJECTION, SOLUTION INTRAVENOUS at 21:12

## 2018-05-18 RX ADMIN — Medication 4 MG: at 14:31

## 2018-05-18 RX ADMIN — ACETAMINOPHEN 650 MG: 325 TABLET, FILM COATED ORAL at 21:16

## 2018-05-18 RX ADMIN — Medication 4 MG: at 10:21

## 2018-05-18 RX ADMIN — POLYETHYLENE GLYCOL 3350 17 G: 17 POWDER, FOR SOLUTION ORAL at 14:21

## 2018-05-18 RX ADMIN — POTASSIUM CHLORIDE: 2 INJECTION, SOLUTION, CONCENTRATE INTRAVENOUS at 20:29

## 2018-05-18 ASSESSMENT — PAIN DESCRIPTION - DESCRIPTORS: DESCRIPTORS: CRAMPING

## 2018-05-18 NOTE — PROGRESS NOTES
This is a recent snapshot of the patient's [facility] medical record.  For current drug dose and complete information and questions, call [phone] or In Basket pool, [facility] home infusion ([id number])  CSN Number:  [id number]

## 2018-05-18 NOTE — PROGRESS NOTES
5/18/2018 2:45 PM message received from management at [facility].  Liaison, Elba, attempted to see pt x2 today.  Due to pt disconnecting her TPN and leaving it in the visitor lounge on the unit and leaving the unit without telling staff, [facility] will not accept pt to their care for TPN.  RNCC discussed with care team, Lula stated that pt will not dc over the weekend.  RNCC will follow up with pt Monday to discuss options.      Kathe Rogers, CINDYCC, BSN    [facility]    Medicine Group  [address] [place] [id number]    [email or web address]  [email or web address]    Office: [phone] Pager: [phone]

## 2018-05-18 NOTE — PROGRESS NOTES
"Advised by RN that pt PIV is infiltrated but pt believes it is not and declines another PIV.  On arrival at bedside mild phlebitis and swelling noted, blanching above site with attempted flush.  Pt states, \"It doesn't feel great\" when asked about pain.  Pt agrees with my assessment that PIV is not patent but states x2 \"I am not getting another PIV and I'm waiting to talk to the head nurse.\"  Order removed and RN notified.  "

## 2018-05-18 NOTE — PROGRESS NOTES
[facility], [place]    Internal Medicine Progress Note - Gold Service      Assessment & Plan   Rachel A Gerhardt is a 42 year old female recurrent SBO (2/2 radiation now s/p partial small bowel resection), chronic abdominal pain, ovarian cancer (3 years ago), and severe malnutrition on TPN who was admitted 5/11/2018 with N/V, and abdominal pain, found to have SBO. Of note, recently admitted and left AMA with hypokalemia, need for colonoscopy, see note 5/3 for details    Recurrent SBO 2/2 anastomotic stricture; chronic abdominal pain: S/p SB resection 5/2017 (radiation in setting of ovarian cancer). MRE 3/21 anastomotic stricture. CT AP 5/11 long segment dilated loops of proximal small bowel, transition point at small bowel anastomosis in pelvis. CRS consulted, no surgical intervention. Refused NGT. Has anterograde function. Tap water enema x2 5/16. GCC 5/16 with contrast in stomach and SB, some inspissated, concerning for obstruction. WBC normal, afebrile. Vomited Golytely overnight.   - CRS reconsulted  - GI signed off but will plan to perform colonoscopy once clear  - Hold Golytely for now, Miralax x2 today, will discuss further plans with them 5/19  - Decrease SL dilaudid to q4h prn   - Compazine and Reglan prn nausea    Stable Medical Conditions and Resolved Hospital Problems:  Electrolyte disturbances: Likely hypovolemia 2/2 GI losses. K, Mg and phos normal 5/15. Repleted  per protocol  History of panic attacks: Reports panic attack, sense of impending doom and concern for dying last week.  Has h/o panic attacks and this was c/w prior.  No PTA meds. Psychiatry consult offered, declined  Coagulopathy: INR slightly elevated (1.29) in setting of severe malnutrition which is likely culprit. No s/s of bleeding on exam   Chronic protein-calorie malnutrition: 2/2 recurrent SBO as noted above. TPN initiated 3/2018. PICC in [facility]. Was off TPN from last discharge to this admission. PharmD and  nutrition consulted for TPN     Pain assessment:  Current Pain Score 5/18/2018   Patient currently in pain? yes   Pain score (0-10) -   Pain location Abdomen   Pain descriptors Cramping   Some encounter information is confidential and restricted. Go to Review Flowsheets activity to see all data.   - Jenni is experiencing pain due to recurrent SBO. Pain management was discussed and the plan was created in a collaborative fashion.  Jenni's response to the current recommendations: compliant  - Please see the plan for pain management as documented above    Diet: parenteral nutrition - ADULT compounded formula  NPO for Medical/Clinical Reasons Except for: Meds  parenteral nutrition - ADULT compounded formula  Fluids: Via TPN  DVT Prophylaxis: Pneumatic Compression Devices  Code Status: Full Code    Disposition Plan   Expected discharge: 3-5 days recommended to prior living arrangement once able to tolerate Golytely prep, colonoscopy complete, SBO resolved.     Entered: Dina Stanley 05/18/2018, 11:45 AM   Information in the above section will display in the discharge planner report.      The patient's care was discussed with the patient, nursing, GI, and attending physician, Dr. Murtaza Stanley  Internal Medicine Staff Hospitalist Service  [facility]  Pager: [phone]  Please see sticky note for cross cover information    Interval History   High volume emesis of Golytely overnight. Reports throwing most of it up. Emesis greenish, no fecal matter noted. BM x2 overnight, also green color, loose. No urinary symptoms. Abdominal pain stable today. Denies confusion. No fever or chills. Denies confusion     Data reviewed today: I reviewed all medications, new labs and imaging results over the last 24 hours.     Physical Exam   Vital Signs: Temp: 98.7  F (37.1  C) Temp src: Oral BP: 103/83 Pulse: 89 Heart Rate: 101 Resp: 18 SpO2: 100 % O2 Device: None (Room air)    Weight: 124 lbs 12.8  oz  General Appearance: Alert and oriented x3, wearing street clothing. Appears comfortable  Respiratory: CTAB without wheezing, crackles or rales  Cardiovascular: RRR, S1, S2. No murmur noted  GI: Abdomen soft, with mild tenderness in the lower quadrants bilaterally and in the epigastrium.  No rebound. Bowel sounds active  Skin: No rash or jaundice  Other: Mood stable. Distal pulses palpable. No peripheral edema     Data   Data     Recent Labs  Lab 05/17/18  0524 05/17/18  0031 05/15/18  0726 05/14/18  1749 05/14/18  0654 05/13/18  0628   WBC 6.5  --  8.3  --  16.0*  --    HGB 11.1*  --  9.8*  --  11.2*  --    *  --  101*  --  99  --      --  222  --  246  --    INR  --   --   --   --   --  1.29*   NA  --  137 141  --  137 138   POTASSIUM  --  5.0 4.3 3.7 3.3* 3.4   CHLORIDE  --  106 112*  --  104 101   CO2  --  25 22  --  25 25   BUN  --  11 12  --  12 19   CR  --  0.47* 0.51*  --  0.54 0.55   ANIONGAP  --  6 7  --  7 12   JONATAN  --  8.3* 7.7*  --  7.9* 7.6*   GLC  --  102* 90  --  115* 92   ALBUMIN  --   --   --   --   --  2.2*   PROTTOTAL  --   --   --   --   --  4.5*   BILITOTAL  --   --   --   --   --  1.0   ALKPHOS  --   --   --   --   --  58   ALT  --   --   --   --   --  33   AST  --   --   --   --   --  42

## 2018-05-18 NOTE — PROVIDER NOTIFICATION
Pt left unit at around 1030 to go for a walk, still had not returned around 1300, went looking, found pt IV pole with TPN bag in family visitor room on 7th floor. Told charge nurse, nurse manager and team. Got ahold of patient. TPN bag not longer viable due to unclean tubing. Pharmacy aware. Talked about cycled tube feeds with pharmacist. Will started D10 when patient returns. Pt returned. Provider paged with FYI about now starting D10 on patient.

## 2018-05-18 NOTE — PLAN OF CARE
Problem: Patient Care Overview  Goal: Plan of Care/Patient Progress Review  Outcome: No Change  /80 (BP Location: Left arm)  Pulse 87  Temp 98  F (36.7  C) (Oral)  Resp 18  Wt 56.6 kg (124 lb 12.8 oz)  SpO2 98%  BMI 18.43 kg/m2    VSS on RA. Alert and oriented. Up independently. Received 4mg SL dilaudid x2 for abdominal pain with good relief. Received 10mg IV compazine x1 and 10mg IV reglan x1 with minimal relief. Had a large unmeasured emesis x1 (pt was in lobby), states it looked like the golytely prep she was drinking. Pt stated she would keep working on the prep at her pace but has not had any prep this shift despite continued encouragement and re-assurance for anti-nausea meds if needed. Pt has been sleeping most of shift and woken up multiple times to be reminded to work on prep. PICC with TPN + lipids infusing. PIV infiltrated but pt is refusing new PIV placement. Voiding, not saving. Loose tan BM x1. Will continue to monitor and notify of any changes.

## 2018-05-18 NOTE — PLAN OF CARE
Problem: Patient Care Overview  Goal: Plan of Care/Patient Progress Review  Outcome: No Change  Jenni continues to struggle with her golytely. She continues to have a baseline nausea and just now she had a 300cc emesis of thick green. She continues on the first jug of golytely and had been drinking it with chicken broth. States otherwise she is not able to consume at all. I did give her 10mg of compazine IV. Will f/u and continue to incourage her to drink. Her stools have been tan and liquid.

## 2018-05-18 NOTE — PROGRESS NOTES
CLINICAL NUTRITION SERVICES - REASSESSMENT NOTE     Nutrition Prescription    RECOMMENDATIONS FOR MDs/PROVIDERS TO ORDER:  None at this time    Malnutrition Status:    Unable to determine due to patient was off floor when this writer attempted to visit    Recommendations already ordered by Registered Dietitian (RD):  None at this time     Future/Additional Recommendations:  1. TPN toelrance/lytes  2. Ability to advance diet (PO intake)     EVALUATION OF THE PROGRESS TOWARD GOALS   Diet: NPO (previous diet clear liquid)  Nutrition Support: TPN: (1800 ml): dextrose 175 g, AA 90 g, 20% Lipids 250 ml daily to provide 1455 kcals (29 kcals/kg), 1.8 g/kg PRO, GIR 2.43 mg/kg/min, 34% kcals from fat    Patient was not available in room when this writer attempted to visit.     NEW FINDINGS   GI: N/V with golytely prep for GI endoscopic intervention   Labs: reviewed  Meds: reviewed  Wt: 56.6 kg (5/17) trending up since admit wt 49.9 kg (5/11)    MALNUTRITION  % Intake: Decreased intake does not meet criteria  % Weight Loss: > 5% in 1 month (severe)  Subcutaneous Fat Loss: Unable to assess  Muscle Loss: Unable to assess  Fluid Accumulation/Edema: Does not meet criteria  Malnutrition Diagnosis: Unable to determine due to patient was off floor when this writer attempted to visit    Previous Goals   Total avg nutritional intake to meet a minimum of 25-28 kcal/kg and 1.5 g PRO/kg daily (per dosing wt 50 kg).  Evaluation: Met    Previous Nutrition Diagnosis  Inadequate protein-energy intake  Evaluation: Improving    CURRENT NUTRITION DIAGNOSIS  Altered GI function related to known recurrent SBO as evidenced by dependent on TPN for 100% of estimated nutritional needs      INTERVENTIONS  Implementation  Collaboration with other providers (PharmD) continue current TPN and cycle over 14 hrs     Goals  Total avg nutritional intake to meet a minimum of 25 kcal/kg and 1.5 g PRO/kg daily (per dosing wt 50  kg).    Monitoring/Evaluation  Progress toward goals will be monitored and evaluated per protocol.    Pastora Velez MS/RD/LD/CNSC  [address] RD Pager: [phone]

## 2018-05-18 NOTE — PROGRESS NOTES
COLORECTALSURGERY PROGRESS NOTE  05/18/2018     Subjective  Patient complaining of nausea and vomiting this morning while on GoLytely prep explaining that she felt she was encouraged to drink the prep fluid too quickly. She denies abdominal pain at this time. Still having bowel function, BMs are thinner but not yet clear per RN.     Objective  Temp:  [97.9  F (36.6  C)-98.2  F (36.8  C)] 98.2  F (36.8  C)  Pulse:  [89] 89  Heart Rate:  [93-94] 93  Resp:  [16-18] 16  BP: (105-114)/(76-83) 114/83  SpO2:  [95 %-98 %] 97 %    I/O last 3 completed shifts:  In: 768   Out: 300 [Emesis/NG output:300]   Stool output is 1000cc yesterday with one unmeasured output, emesis occurred multiple times for a total of 300cc out today    Physical exam:  General - patient is comfortable, well appearing and is laying in bed   Abd - old midline abdominal incision healing well, abdomen is soft and non-tender to palpation. No guarding or rebound.     Labs: Reviewed.      Imaging: Reviewed.      Assessment   42 F well known to CRS with h/o small bowel resection for radiation stricture related to cervical cancer. She is TPN dependent and admitted with obstructive symptoms related to her known stricture. Her exam remains benign. Would continue to recommend against surgery due to high risk of morbidity with previous surgical and radiation history. Patient is tolerating her bowel prep but requires a slow intake to avoid emesis.      Recommendations:   - Slow bowel prep over 3 days until stool clear to facilitate GI endoscopic intervention  - Continue TPN and monitor lytes   - Please call with questions     Discussed with Dr. Moore.    Reji Ba MD  General Surgery PGY-3  Pager [phone]

## 2018-05-18 NOTE — PLAN OF CARE
Problem: Patient Care Overview  Goal: Plan of Care/Patient Progress Review  Outcome: No Change  Jenni was medicated for pain x2 with SL Dilaudid and x1 with IV compazine. She was not able to consume anymore of her golytely and she stated that she had only 1 tan liquid stool this shift. She remains NPO and continues to c/o feeling so poorly. Will continue to monitor and report any significant changes.

## 2018-05-18 NOTE — PLAN OF CARE
Problem: Patient Care Overview  Goal: Plan of Care/Patient Progress Review  Outcome: No Change  BP 92/69 (BP Location: Left arm)  Pulse 89  Temp 99.6  F (37.6  C) (Oral)  Resp 16  Wt 56.6 kg (124 lb 12.8 oz)  SpO2 96%  BMI 18.43 kg/m2    6832-4495: AVSS and afebrile. Pt still tachycardic, pt baseline. Denies any nausea, complained of pain and received dil 4mg x2 with some relief. Clear liquid diet with TPN at 75ml now cycled, pt left unit and was gone for four hours, found IV pole with TPN in patient visitor lounge detatched from patient, charge nurse, manager and team aware. TPN bag tossed and D10 started at 75ml till next TPN bag due. Pt was informed she cannot unhook herself from pole and needs to alert nurses of where she is going and how long she will be gone. PICC infusing, PIV SL'd. Voiding adequate amounts no BM this shift. Golytely was stopped and Miralax was started. Pt aware. Up independently. Will continue to monitor closely and follow plan of care.

## 2018-05-19 ENCOUNTER — APPOINTMENT (OUTPATIENT)
Dept: GENERAL RADIOLOGY | Facility: CLINIC | Age: 43
DRG: 388 | End: 2018-05-19
Attending: PHYSICIAN ASSISTANT
Payer: COMMERCIAL

## 2018-05-19 LAB
ALBUMIN UR-MCNC: NEGATIVE MG/DL
AMPHETAMINES UR QL SCN: NEGATIVE
ANION GAP SERPL CALCULATED.3IONS-SCNC: 6 MMOL/L (ref 3–14)
ANION GAP SERPL CALCULATED.3IONS-SCNC: 8 MMOL/L (ref 3–14)
APPEARANCE UR: CLEAR
BARBITURATES UR QL: NEGATIVE
BENZODIAZ UR QL: NEGATIVE
BILIRUB UR QL STRIP: NEGATIVE
BUN SERPL-MCNC: 10 MG/DL (ref 7–30)
BUN SERPL-MCNC: 8 MG/DL (ref 7–30)
CALCIUM SERPL-MCNC: 8.3 MG/DL (ref 8.5–10.1)
CALCIUM SERPL-MCNC: 8.4 MG/DL (ref 8.5–10.1)
CANNABINOIDS UR QL SCN: NEGATIVE
CHLORIDE SERPL-SCNC: 102 MMOL/L (ref 94–109)
CHLORIDE SERPL-SCNC: 103 MMOL/L (ref 94–109)
CO2 SERPL-SCNC: 24 MMOL/L (ref 20–32)
CO2 SERPL-SCNC: 27 MMOL/L (ref 20–32)
COCAINE UR QL: NEGATIVE
COLOR UR AUTO: YELLOW
CREAT SERPL-MCNC: 0.43 MG/DL (ref 0.52–1.04)
CREAT SERPL-MCNC: 0.46 MG/DL (ref 0.52–1.04)
ERYTHROCYTE [DISTWIDTH] IN BLOOD BY AUTOMATED COUNT: 13.8 % (ref 10–15)
ETHANOL UR QL SCN: NEGATIVE
GFR SERPL CREATININE-BSD FRML MDRD: >90 ML/MIN/1.7M2
GFR SERPL CREATININE-BSD FRML MDRD: >90 ML/MIN/1.7M2
GLUCOSE SERPL-MCNC: 103 MG/DL (ref 70–99)
GLUCOSE SERPL-MCNC: 109 MG/DL (ref 70–99)
GLUCOSE UR STRIP-MCNC: NEGATIVE MG/DL
HCT VFR BLD AUTO: 31.2 % (ref 35–47)
HGB BLD-MCNC: 10.3 G/DL (ref 11.7–15.7)
HGB UR QL STRIP: NEGATIVE
KETONES UR STRIP-MCNC: NEGATIVE MG/DL
LEUKOCYTE ESTERASE UR QL STRIP: NEGATIVE
MAGNESIUM SERPL-MCNC: 1.9 MG/DL (ref 1.6–2.3)
MCH RBC QN AUTO: 33.1 PG (ref 26.5–33)
MCHC RBC AUTO-ENTMCNC: 33 G/DL (ref 31.5–36.5)
MCV RBC AUTO: 100 FL (ref 78–100)
NITRATE UR QL: NEGATIVE
OPIATES UR QL SCN: POSITIVE
PH UR STRIP: 8 PH (ref 5–7)
PLATELET # BLD AUTO: 276 10E9/L (ref 150–450)
POTASSIUM SERPL-SCNC: 3.8 MMOL/L (ref 3.4–5.3)
POTASSIUM SERPL-SCNC: 4.1 MMOL/L (ref 3.4–5.3)
PROCALCITONIN SERPL-MCNC: 0.91 NG/ML
RBC # BLD AUTO: 3.11 10E12/L (ref 3.8–5.2)
RBC #/AREA URNS AUTO: 1 /HPF (ref 0–2)
SODIUM SERPL-SCNC: 134 MMOL/L (ref 133–144)
SODIUM SERPL-SCNC: 136 MMOL/L (ref 133–144)
SOURCE: ABNORMAL
SP GR UR STRIP: 1.01 (ref 1–1.03)
SQUAMOUS #/AREA URNS AUTO: 1 /HPF (ref 0–1)
TRANS CELLS #/AREA URNS HPF: <1 /HPF (ref 0–1)
UROBILINOGEN UR STRIP-MCNC: NORMAL MG/DL (ref 0–2)
VANCOMYCIN SERPL-MCNC: 6.3 MG/L
WBC # BLD AUTO: 8 10E9/L (ref 4–11)
WBC #/AREA URNS AUTO: 1 /HPF (ref 0–5)

## 2018-05-19 PROCEDURE — 40000802 ZZH SITE CHECK

## 2018-05-19 PROCEDURE — 36592 COLLECT BLOOD FROM PICC: CPT | Performed by: PHYSICIAN ASSISTANT

## 2018-05-19 PROCEDURE — 25000132 ZZH RX MED GY IP 250 OP 250 PS 637: Performed by: PHYSICIAN ASSISTANT

## 2018-05-19 PROCEDURE — 99207 ZZC APP CREDIT; MD BILLING SHARED VISIT: CPT | Performed by: PHYSICIAN ASSISTANT

## 2018-05-19 PROCEDURE — 80307 DRUG TEST PRSMV CHEM ANLYZR: CPT | Performed by: PHYSICIAN ASSISTANT

## 2018-05-19 PROCEDURE — 84145 PROCALCITONIN (PCT): CPT | Performed by: PHYSICIAN ASSISTANT

## 2018-05-19 PROCEDURE — 80048 BASIC METABOLIC PNL TOTAL CA: CPT | Performed by: PHYSICIAN ASSISTANT

## 2018-05-19 PROCEDURE — 36415 COLL VENOUS BLD VENIPUNCTURE: CPT | Performed by: PHYSICIAN ASSISTANT

## 2018-05-19 PROCEDURE — 85027 COMPLETE CBC AUTOMATED: CPT | Performed by: PHYSICIAN ASSISTANT

## 2018-05-19 PROCEDURE — 80202 ASSAY OF VANCOMYCIN: CPT | Performed by: INTERNAL MEDICINE

## 2018-05-19 PROCEDURE — 74019 RADEX ABDOMEN 2 VIEWS: CPT

## 2018-05-19 PROCEDURE — 25000125 ZZHC RX 250: Performed by: NURSE PRACTITIONER

## 2018-05-19 PROCEDURE — 36592 COLLECT BLOOD FROM PICC: CPT | Performed by: INTERNAL MEDICINE

## 2018-05-19 PROCEDURE — 25000128 H RX IP 250 OP 636: Performed by: INTERNAL MEDICINE

## 2018-05-19 PROCEDURE — 25000128 H RX IP 250 OP 636: Performed by: PHYSICIAN ASSISTANT

## 2018-05-19 PROCEDURE — 71046 X-RAY EXAM CHEST 2 VIEWS: CPT

## 2018-05-19 PROCEDURE — 25000125 ZZHC RX 250: Performed by: INTERNAL MEDICINE

## 2018-05-19 PROCEDURE — 12000025 ZZH R&B TRANSPLANT INTERMEDIATE

## 2018-05-19 PROCEDURE — 25000125 ZZHC RX 250

## 2018-05-19 PROCEDURE — 99233 SBSQ HOSP IP/OBS HIGH 50: CPT | Performed by: INTERNAL MEDICINE

## 2018-05-19 PROCEDURE — 87040 BLOOD CULTURE FOR BACTERIA: CPT | Performed by: PHYSICIAN ASSISTANT

## 2018-05-19 PROCEDURE — 81001 URINALYSIS AUTO W/SCOPE: CPT | Performed by: PHYSICIAN ASSISTANT

## 2018-05-19 PROCEDURE — 80320 DRUG SCREEN QUANTALCOHOLS: CPT | Performed by: PHYSICIAN ASSISTANT

## 2018-05-19 PROCEDURE — 83735 ASSAY OF MAGNESIUM: CPT | Performed by: PHYSICIAN ASSISTANT

## 2018-05-19 RX ORDER — HYDROMORPHONE HYDROCHLORIDE 1 MG/ML
2 SOLUTION ORAL EVERY 4 HOURS PRN
Status: DISCONTINUED | OUTPATIENT
Start: 2018-05-19 | End: 2018-05-21

## 2018-05-19 RX ORDER — POLYETHYLENE GLYCOL 3350 17 G/17G
17 POWDER, FOR SOLUTION ORAL 2 TIMES DAILY
Status: COMPLETED | OUTPATIENT
Start: 2018-05-19 | End: 2018-05-20

## 2018-05-19 RX ORDER — HYDROMORPHONE HYDROCHLORIDE 1 MG/ML
2 SOLUTION ORAL EVERY 4 HOURS PRN
Status: COMPLETED | OUTPATIENT
Start: 2018-05-19 | End: 2018-05-20

## 2018-05-19 RX ORDER — LIDOCAINE 4 G/G
1-3 PATCH TOPICAL
Status: DISCONTINUED | OUTPATIENT
Start: 2018-05-19 | End: 2018-05-23 | Stop reason: HOSPADM

## 2018-05-19 RX ORDER — DIPHENHYDRAMINE HCL 25 MG
25 CAPSULE ORAL EVERY 6 HOURS PRN
Status: DISCONTINUED | OUTPATIENT
Start: 2018-05-19 | End: 2018-05-23 | Stop reason: HOSPADM

## 2018-05-19 RX ORDER — PIPERACILLIN SODIUM, TAZOBACTAM SODIUM 3; .375 G/15ML; G/15ML
3.38 INJECTION, POWDER, LYOPHILIZED, FOR SOLUTION INTRAVENOUS EVERY 6 HOURS
Status: DISCONTINUED | OUTPATIENT
Start: 2018-05-19 | End: 2018-05-22

## 2018-05-19 RX ORDER — HYDROMORPHONE HYDROCHLORIDE 1 MG/ML
2 SOLUTION ORAL ONCE
Status: COMPLETED | OUTPATIENT
Start: 2018-05-19 | End: 2018-05-19

## 2018-05-19 RX ADMIN — LIDOCAINE 2 PATCH: 560 PATCH PERCUTANEOUS; TOPICAL; TRANSDERMAL at 20:30

## 2018-05-19 RX ADMIN — I.V. FAT EMULSION 250 ML: 20 EMULSION INTRAVENOUS at 20:35

## 2018-05-19 RX ADMIN — Medication 2 MG: at 23:15

## 2018-05-19 RX ADMIN — ACETAMINOPHEN 650 MG: 325 TABLET, FILM COATED ORAL at 01:51

## 2018-05-19 RX ADMIN — POLYETHYLENE GLYCOL 3350 17 G: 17 POWDER, FOR SOLUTION ORAL at 20:33

## 2018-05-19 RX ADMIN — Medication 2 MG: at 18:07

## 2018-05-19 RX ADMIN — POTASSIUM CHLORIDE 20 MEQ: 750 TABLET, EXTENDED RELEASE ORAL at 16:45

## 2018-05-19 RX ADMIN — VANCOMYCIN HYDROCHLORIDE 1000 MG: 1 INJECTION, SOLUTION INTRAVENOUS at 20:48

## 2018-05-19 RX ADMIN — Medication 4 MG: at 02:42

## 2018-05-19 RX ADMIN — POTASSIUM CHLORIDE: 2 INJECTION, SOLUTION, CONCENTRATE INTRAVENOUS at 20:35

## 2018-05-19 RX ADMIN — CEFEPIME HYDROCHLORIDE 2 G: 2 INJECTION, POWDER, FOR SOLUTION INTRAVENOUS at 08:32

## 2018-05-19 RX ADMIN — Medication 2 MG: at 19:06

## 2018-05-19 RX ADMIN — Medication 4 MG: at 11:15

## 2018-05-19 RX ADMIN — Medication 4 MG: at 07:39

## 2018-05-19 RX ADMIN — Medication 2 G: at 18:33

## 2018-05-19 RX ADMIN — DIPHENHYDRAMINE HYDROCHLORIDE 25 MG: 25 CAPSULE ORAL at 11:48

## 2018-05-19 RX ADMIN — DIPHENHYDRAMINE HYDROCHLORIDE 25 MG: 25 CAPSULE ORAL at 16:44

## 2018-05-19 RX ADMIN — PIPERACILLIN SODIUM AND TAZOBACTAM SODIUM 3.38 G: 3; .375 INJECTION, POWDER, LYOPHILIZED, FOR SOLUTION INTRAVENOUS at 17:14

## 2018-05-19 RX ADMIN — ACETAMINOPHEN 650 MG: 325 TABLET, FILM COATED ORAL at 11:15

## 2018-05-19 RX ADMIN — Medication 2 MG: at 15:17

## 2018-05-19 RX ADMIN — ACETAMINOPHEN 650 MG: 325 TABLET, FILM COATED ORAL at 15:34

## 2018-05-19 RX ADMIN — DIPHENHYDRAMINE HYDROCHLORIDE 25 MG: 25 CAPSULE ORAL at 23:15

## 2018-05-19 RX ADMIN — ALTEPLASE 2 MG: 2.2 INJECTION, POWDER, LYOPHILIZED, FOR SOLUTION INTRAVENOUS at 02:36

## 2018-05-19 RX ADMIN — VANCOMYCIN HYDROCHLORIDE 1000 MG: 1 INJECTION, SOLUTION INTRAVENOUS at 09:38

## 2018-05-19 RX ADMIN — POLYETHYLENE GLYCOL 3350 17 G: 17 POWDER, FOR SOLUTION ORAL at 10:00

## 2018-05-19 RX ADMIN — SODIUM CHLORIDE, POTASSIUM CHLORIDE, SODIUM LACTATE AND CALCIUM CHLORIDE 1000 ML: 600; 310; 30; 20 INJECTION, SOLUTION INTRAVENOUS at 03:56

## 2018-05-19 RX ADMIN — PIPERACILLIN SODIUM AND TAZOBACTAM SODIUM 3.38 G: 3; .375 INJECTION, POWDER, LYOPHILIZED, FOR SOLUTION INTRAVENOUS at 23:27

## 2018-05-19 NOTE — PLAN OF CARE
Problem: Patient Care Overview  Goal: Plan of Care/Patient Progress Review  Outcome: No Change  Temp max 99.4, OVSS. RA. Reports scalp itching and burning after she received IV Vancomycin. Scalp checked and no bugs or skin abrasion noted. Benadryl given x1 and Dilaudid x2 for abdomen pain. Refused Golytely. Voiding, not saving. No bm this shift. Cycle TPN/ lipids infusing. Up ad dania    Problem: Pain, Chronic (Adult)  Goal: Acceptable Pain Control/Comfort Level  Patient will demonstrate the desired outcomes by discharge/transition of care.    05/19/18 0000   Pain, Chronic (Adult)   Acceptable Pain Control/Comfort Level making progress toward outcome

## 2018-05-19 NOTE — PROGRESS NOTES
Brief Medicine Note    Contacted by RN regarding patient febrile to 100.5 --> 102.4 with shaking chills. Patient with SBO with persistent N/V of feculent material. Refused NG. Assessed patient at bedside. Patient with reports of increased abdominal pain and is vomiting large amounts. Discussed that we would like to have additional PIV placed for IVF and abx. Patient adamantly refused and would likl PICC used, though only one lumen. Would recommend a Psychiatry consult in am. Will obtain BCx2, UA, Utox. Start Vancomycin, Cefepime. GIve 1L bolus of LR. CBC, CMP, LA, CRP, procal.         /78 (BP Location: Left arm)  Pulse 112  Temp 100.5  F (38.1  C) (Oral)  Resp 16  Wt 56.6 kg (124 lb 12.8 oz)  SpO2 97%  BMI 18.43 kg/m2     Rachel Pleitez PA-C  Internal Medicine Hospitalist Service  [facility]  Pager: [phone]

## 2018-05-19 NOTE — PROGRESS NOTES
Temp at start of shift 102.7.  Gave tylenol dose.  MD updated.  To start on Zosyn despite allergy history.  Gave PO Benadryl prior.  No issues so far.  Dosage on pain meds decreased today due to lethargy and patient leaving unit frequently for long periods of time per MD.  Got 2 mg. Dilaudid elixer at 1515.  By 1715 patient crying out writhing in pain.  Very angry about decrease in pain med without being told.  On-call MD updated and patient got 1x extra dose of dilaudid.  Continue to monitor, support.

## 2018-05-19 NOTE — PLAN OF CARE
Problem: Patient Care Overview  Goal: Plan of Care/Patient Progress Review  Outcome: No Change  VS: Febrile, Tmax 103.1.   Mental Status: A&Ox4, lethargic   Cardiac: hypotension (systolic 90's), denies chest pain  Respiratory: LS clear, diminished  GI/: voiding adequate amount urine, see I&O's. Abdomen rounded, active and audible bowel tones x4. Denies nausea, PRN antiemetics available.   Pain: + abdominal pain, PRN pain medication given, see MAR  Diet: tolerating clear liquid diet, continues on TPN and lipids   Mobility: Up independent   Treatment: Continue miralax for colonoscopy in future, infection workup and treatment, pain management   DC plan: TBD

## 2018-05-19 NOTE — PROGRESS NOTES
COLORECTALSURGERY PROGRESS NOTE  05/19/2018     Subjective  Had fever overnight to 104, work up ensued by primary team largely unremarkable.  Fever broke, but concern for possibly CLABSI.   Having bowel function, going better with miralax vs golytely.     Objective  Temp:  [98.7  F (37.1  C)-103.1  F (39.5  C)] 98.7  F (37.1  C)  Pulse:  [101-113] 101  Heart Rate:  [101-112] 101  Resp:  [16-20] 16  BP: ()/(69-84) 96/71  SpO2:  [96 %-99 %] 97 %    I/O last 3 completed shifts:  In: -   Out: 1500 [Urine:700; Emesis/NG output:800]   Stool output is 1000cc yesterday with one unmeasured output, emesis occurred multiple times for a total of 300cc out today    Physical exam:  General - patient is comfortable, well appearing and is sitting up  in bed   Abd - old midline abdominal incision healing well, abdomen is soft and non-tender to palpation. No guarding or rebound.     Labs: Reviewed. WBC normal.     Imaging: Reviewed.    Assessment   42 F well known to CRS with h/o small bowel resection for radiation stricture related to cervical cancer. She is TPN dependent and admitted with obstructive symptoms related to her known stricture. Her exam remains benign. Would continue to recommend against surgery due to high risk of morbidity with previous surgical and radiation history. Patient is tolerating her bowel prep but requires a slow intake to avoid emesis - almost clear this AM per the patient.      Recommendations:   - Continue slow bowel prep until stool clear to facilitate GI endoscopic intervention  - Continue TPN and monitor lytes   - Please call with questions     Seen with Dr. Aguilar.     Reji Ba MD  General Surgery PGY-3  Pager [phone]

## 2018-05-19 NOTE — PROGRESS NOTES
[facility], [place]    Internal Medicine Progress Note - Gold Service      Assessment & Plan   Rachel A Gerhardt is a 42 year old female recurrent SBO (2/2 radiation now s/p partial small bowel resection), chronic abdominal pain, ovarian cancer (3 years ago), and severe malnutrition on TPN who was admitted 5/11/2018 with N/V, and abdominal pain, found to have SBO. Of note, recently admitted and left AMA with hypokalemia, need for colonoscopy, see note 5/3 for details    Fever of unclear source: Febrile to 103.1 overnight. CXR negative. UA bland. C diff negative. Abdominal film stable. No new rashes. Normal WBC and lactic acid. Started on empiric cefepime and Vanc with fever normalization. BCx NGTD. Etiology includes PICC associated bacteremia vs viral etiology vs other. Currently afebrile  - Procal, CBC, BMP this am  - Follow cultures  - Continue Cefepime and Vanc for now, will deescalate pending BCx over the next day    Recurrent SBO 2/2 anastomotic stricture; chronic abdominal pain: S/p SB resection 5/2017 (radiation in setting of ovarian cancer). MRE 3/21 anastomotic stricture. CT AP 5/11 long segment dilated loops of proximal small bowel, transition point at small bowel anastomosis in pelvis. CRS consulted, no surgical intervention. Refused NGT. Has anterograde function. Had emesis with large volume bowel prep. AXR 5/19 dilated colon and small bowel with air-fluid levels. WBC normal, afebrile. BMs starting to become clear per patient reports  - CRS following, GI signed off but will plan to perform colonoscopy once clear  - 2 cups Golytely for now  - Decrease SL dilaudid to 2 mg q6h prn  - Compazine and Reglan prn nausea  ** Addendum: Refusing Golytely, will order for Miralax as well    Stable Medical Conditions and Resolved Hospital Problems:  Electrolyte disturbances: Likely hypovolemia 2/2 GI losses. K, Mg and phos normal 5/15. Repleted  per protocol  History of panic attacks:  Reports panic attack, sense of impending doom and concern for dying last week.  Has h/o panic attacks and this was c/w prior.  No PTA meds. Psychiatry consult offered, declined  Coagulopathy: INR slightly elevated (1.29) in setting of severe malnutrition which is likely culprit. No s/s of bleeding on exam   Chronic protein-calorie malnutrition: 2/2 recurrent SBO as noted above. TPN initiated 3/2018. PICC in [facility]. Was off TPN from last discharge to this admission. PharmD and nutrition consulted for TPN     Pain assessment:  Current Pain Score 5/19/2018   Patient currently in pain? yes   Pain score (0-10) -   Pain location Abdomen   Pain descriptors Aching;Cramping   Some encounter information is confidential and restricted. Go to Review Flowsheets activity to see all data.   - Jenni is experiencing pain due to recurrent SBO. Pain management was discussed and the plan was created in a collaborative fashion.  Jenni's response to the current recommendations: compliant  - Please see the plan for pain management as documented above    Diet: parenteral nutrition - ADULT compounded formula CYCLE  parenteral nutrition - ADULT compounded formula CYCLE  Full Liquid Diet  Fluids: Via TPN  DVT Prophylaxis: Pneumatic Compression Devices  Code Status: Full Code    Disposition Plan   Expected discharge: 2-4 days recommended to prior living arrangement once able to tolerate Golytely prep, colonoscopy complete, SBO resolved.     Entered: Dina Stanley 05/19/2018, 12:14 PM   Information in the above section will display in the discharge planner report.      The patient's care was discussed with the patient, nursing, GI, and attending physician, Dr. Murtaza Stanley  Internal Medicine Staff Hospitalist Service  [facility]  Pager: [phone]  Please see sticky note for cross cover information    Interval History   Reports fever overnight. She did not notice a fever but had cold sweats. Feeling  better now. Denies chest pain, dyspnea, cough. No urinary symptoms or changes in BMs. Denies new rash. No confusion. Abdominal pain stable. No recent emesis. Reports tingling/burning in the scalp with Vanc administration, but willing to continue with prn benadryl     Data reviewed today: I reviewed all medications, new labs and imaging results over the last 24 hours.     Physical Exam   Vital Signs: Temp: 99.4  F (37.4  C) Temp src: Oral BP: 114/77 Pulse: 98 Heart Rate: 101 Resp: 16 SpO2: 97 % O2 Device: None (Room air)    Weight: 121 lbs 11.2 oz  General Appearance: Alert and oriented x3, wearing street clothing. Appears comfortable  Respiratory: CTAB without wheezing, crackles or rales  Cardiovascular: RRR, S1, S2. No murmur noted  GI: Abdomen soft, with minimal tenderness in the lower quadrants bilaterally and in the epigastrium. No rebound. Bowel sounds active  Skin: No rash or jaundice  Other: Mood stable. Distal pulses palpable. No peripheral edema     Data   Data     Recent Labs  Lab 05/18/18  2348 05/18/18  2053 05/17/18  0524 05/17/18  0031 05/15/18  0726  05/13/18  0628   WBC  --  5.8 6.5  --  8.3  < >  --    HGB  --  12.3 11.1*  --  9.8*  < >  --    MCV  --  103* 101*  --  101*  < >  --    PLT  --  236 279  --  222  < >  --    INR  --   --   --   --   --   --  1.29*    134  --  137 141  < > 138   POTASSIUM 4.1 4.4  --  5.0 4.3  < > 3.4   CHLORIDE 102 103  --  106 112*  < > 101   CO2 24 21  --  25 22  < > 25   BUN 8 8  --  11 12  < > 19   CR 0.46* 0.45*  --  0.47* 0.51*  < > 0.55   ANIONGAP 8 9  --  6 7  < > 12   JONATAN 8.4* 8.4*  --  8.3* 7.7*  < > 7.6*   * 94  --  102* 90  < > 92   ALBUMIN  --  2.7*  --   --   --   --  2.2*   PROTTOTAL  --  6.9  --   --   --   --  4.5*   BILITOTAL  --  0.5  --   --   --   --  1.0   ALKPHOS  --  78  --   --   --   --  58   ALT  --  34  --   --   --   --  33   AST  --  29  --   --   --   --  42   < > = values in this interval not displayed.

## 2018-05-19 NOTE — PLAN OF CARE
Problem: Patient Care Overview  Goal: Plan of Care/Patient Progress Review  /84 (BP Location: Left arm)  Pulse 113  Temp 102.5  F (39.2  C) (Oral)  Resp 16  Wt 56.6 kg (124 lb 12.8 oz)  SpO2 98%  BMI 18.43 kg/m2    Febrile; T max 102.4F, AOVSS on RA. Pt c/o mid-abdominal pain and cramping exacerbated by PO intake. Pt given PRN dilaudid 4 mg x 2 with relief. Pt had one 800cc emesis at ~ 2145. Pt spiked 100.5F fever at 2000 with chills. Notified cross-cover. PIV access lost due to infiltration earlier in the shift, pt refused PIV replacement and insisted IV AB be given via her PICC. PA came to speak with pt and pt still refused PIV placement. TPN stopped to give IV vanco, cefepime and LR bolus. Pt continued to have elevated temps after PRN tylenol 650 mg admin at 2115. UA and drug panel ordered but yet to send. Lactic and blood cultures collected. Continue to monitor.

## 2018-05-19 NOTE — PROVIDER NOTIFICATION
Temp 103.0, . Other VSS.   Last had acetaminophen 650 mg @ 2100 5/18.  Also, PICC line NOT drawing back blood. Pt reports no blood return from PICC is baseline (PICC was placed prior to admission to hospital).  Gold team cross cover text paged NOW regarding increased temp and PICC line/no blood return.   Awaiting orders.     Update: Gold Team cross cover MD called writer.   Gave PRN tylenol, see MAR administration.   Alteplase ordered for PICC line.   1L LR bolus - to be given after alteplase administration, verify BR

## 2018-05-19 NOTE — PHARMACY-VANCOMYCIN DOSING SERVICE
Pharmacy Vancomycin Initial Note  Date of Service May 18, 2018  Patient's  1975  42 year old, female    Indication: Bacteremia    Current estimated CrCl = Estimated Creatinine Clearance: 137.9 mL/min (based on Cr of 0.47).    Creatinine for last 3 days  2018: 12:31 AM Creatinine 0.47 mg/dL    Recent Vancomycin Level(s) for last 3 days  No results found for requested labs within last 72 hours.      Vancomycin IV Administrations (past 72 hours)      No vancomycin orders with administrations in past 72 hours.                Nephrotoxins and other renal medications     None          Contrast Orders - past 72 hours (72h ago through future)    Start     Dose/Rate Route Frequency Ordered Stop    18 1400  diatrizoate meglumine-sodium (GASTROGRAFIN; GASTROVIEW) 66-10 % solution 120 mL      120 mL Oral ONCE 18 1350 18 1445                Plan:  1.  Start vancomycin 1000 mg IV q12h.   2.  Goal Trough Level: 10-15 mg/L   3.  Pharmacy will check trough levels as appropriate in 1-3 Days.    4. Serum creatinine levels will be ordered a minimum of twice weekly.    5. [place] method utilized to dose vancomycin therapy: Method 1    Adeola Villanueva

## 2018-05-20 LAB
ANION GAP SERPL CALCULATED.3IONS-SCNC: 8 MMOL/L (ref 3–14)
BUN SERPL-MCNC: 14 MG/DL (ref 7–30)
CALCIUM SERPL-MCNC: 8.6 MG/DL (ref 8.5–10.1)
CHLORIDE SERPL-SCNC: 103 MMOL/L (ref 94–109)
CO2 SERPL-SCNC: 23 MMOL/L (ref 20–32)
CREAT SERPL-MCNC: 0.49 MG/DL (ref 0.52–1.04)
ERYTHROCYTE [DISTWIDTH] IN BLOOD BY AUTOMATED COUNT: 13.6 % (ref 10–15)
GFR SERPL CREATININE-BSD FRML MDRD: >90 ML/MIN/1.7M2
GLUCOSE SERPL-MCNC: 103 MG/DL (ref 70–99)
HCT VFR BLD AUTO: 31.9 % (ref 35–47)
HGB BLD-MCNC: 10.5 G/DL (ref 11.7–15.7)
LACTATE BLD-SCNC: 1.1 MMOL/L (ref 0.4–1.9)
MAGNESIUM SERPL-MCNC: 2.4 MG/DL (ref 1.6–2.3)
MCH RBC QN AUTO: 32.6 PG (ref 26.5–33)
MCHC RBC AUTO-ENTMCNC: 32.9 G/DL (ref 31.5–36.5)
MCV RBC AUTO: 99 FL (ref 78–100)
PHOSPHATE SERPL-MCNC: 3.7 MG/DL (ref 2.5–4.5)
PLATELET # BLD AUTO: 303 10E9/L (ref 150–450)
POTASSIUM SERPL-SCNC: 5 MMOL/L (ref 3.4–5.3)
PROCALCITONIN SERPL-MCNC: 0.66 NG/ML
RBC # BLD AUTO: 3.22 10E12/L (ref 3.8–5.2)
SODIUM SERPL-SCNC: 133 MMOL/L (ref 133–144)
WBC # BLD AUTO: 10.9 10E9/L (ref 4–11)

## 2018-05-20 PROCEDURE — 12000025 ZZH R&B TRANSPLANT INTERMEDIATE

## 2018-05-20 PROCEDURE — 99233 SBSQ HOSP IP/OBS HIGH 50: CPT | Performed by: INTERNAL MEDICINE

## 2018-05-20 PROCEDURE — 40000558 ZZH STATISTIC CVC DRESSING CHANGE

## 2018-05-20 PROCEDURE — 80307 DRUG TEST PRSMV CHEM ANLYZR: CPT | Performed by: INTERNAL MEDICINE

## 2018-05-20 PROCEDURE — 25000132 ZZH RX MED GY IP 250 OP 250 PS 637: Performed by: PHYSICIAN ASSISTANT

## 2018-05-20 PROCEDURE — 83735 ASSAY OF MAGNESIUM: CPT | Performed by: INTERNAL MEDICINE

## 2018-05-20 PROCEDURE — 25000128 H RX IP 250 OP 636: Performed by: PHYSICIAN ASSISTANT

## 2018-05-20 PROCEDURE — 25000128 H RX IP 250 OP 636

## 2018-05-20 PROCEDURE — 84100 ASSAY OF PHOSPHORUS: CPT | Performed by: INTERNAL MEDICINE

## 2018-05-20 PROCEDURE — 99207 ZZC APP CREDIT; MD BILLING SHARED VISIT: CPT | Performed by: PHYSICIAN ASSISTANT

## 2018-05-20 PROCEDURE — 84145 PROCALCITONIN (PCT): CPT | Performed by: INTERNAL MEDICINE

## 2018-05-20 PROCEDURE — 40000802 ZZH SITE CHECK

## 2018-05-20 PROCEDURE — 36592 COLLECT BLOOD FROM PICC: CPT | Performed by: INTERNAL MEDICINE

## 2018-05-20 PROCEDURE — 85027 COMPLETE CBC AUTOMATED: CPT | Performed by: INTERNAL MEDICINE

## 2018-05-20 PROCEDURE — 25000125 ZZHC RX 250: Performed by: INTERNAL MEDICINE

## 2018-05-20 PROCEDURE — 25000125 ZZHC RX 250

## 2018-05-20 PROCEDURE — 80048 BASIC METABOLIC PNL TOTAL CA: CPT | Performed by: INTERNAL MEDICINE

## 2018-05-20 PROCEDURE — 83605 ASSAY OF LACTIC ACID: CPT | Performed by: INTERNAL MEDICINE

## 2018-05-20 RX ORDER — HYDROMORPHONE HYDROCHLORIDE 1 MG/ML
2 SOLUTION ORAL EVERY 4 HOURS PRN
Status: DISCONTINUED | OUTPATIENT
Start: 2018-05-20 | End: 2018-05-20

## 2018-05-20 RX ORDER — HYDROMORPHONE HYDROCHLORIDE 1 MG/ML
2-4 SOLUTION ORAL EVERY 4 HOURS PRN
Status: DISCONTINUED | OUTPATIENT
Start: 2018-05-20 | End: 2018-05-23 | Stop reason: DRUGHIGH

## 2018-05-20 RX ORDER — HYDROMORPHONE HYDROCHLORIDE 1 MG/ML
4 SOLUTION ORAL ONCE
Status: COMPLETED | OUTPATIENT
Start: 2018-05-20 | End: 2018-05-20

## 2018-05-20 RX ORDER — VANCOMYCIN HYDROCHLORIDE 1 G/200ML
1000 INJECTION, SOLUTION INTRAVENOUS EVERY 8 HOURS
Status: DISCONTINUED | OUTPATIENT
Start: 2018-05-20 | End: 2018-05-21

## 2018-05-20 RX ADMIN — PIPERACILLIN SODIUM AND TAZOBACTAM SODIUM 3.38 G: 3; .375 INJECTION, POWDER, LYOPHILIZED, FOR SOLUTION INTRAVENOUS at 17:19

## 2018-05-20 RX ADMIN — Medication 2 MG: at 16:11

## 2018-05-20 RX ADMIN — DIPHENHYDRAMINE HYDROCHLORIDE 25 MG: 25 CAPSULE ORAL at 16:11

## 2018-05-20 RX ADMIN — Medication 2 MG: at 03:14

## 2018-05-20 RX ADMIN — POLYETHYLENE GLYCOL 3350 17 G: 17 POWDER, FOR SOLUTION ORAL at 08:55

## 2018-05-20 RX ADMIN — Medication 2 MG: at 03:15

## 2018-05-20 RX ADMIN — PIPERACILLIN SODIUM AND TAZOBACTAM SODIUM 3.38 G: 3; .375 INJECTION, POWDER, LYOPHILIZED, FOR SOLUTION INTRAVENOUS at 22:56

## 2018-05-20 RX ADMIN — POTASSIUM CHLORIDE: 2 INJECTION, SOLUTION, CONCENTRATE INTRAVENOUS at 20:40

## 2018-05-20 RX ADMIN — PIPERACILLIN SODIUM AND TAZOBACTAM SODIUM 3.38 G: 3; .375 INJECTION, POWDER, LYOPHILIZED, FOR SOLUTION INTRAVENOUS at 05:10

## 2018-05-20 RX ADMIN — I.V. FAT EMULSION 250 ML: 20 EMULSION INTRAVENOUS at 20:43

## 2018-05-20 RX ADMIN — Medication 2 MG: at 13:57

## 2018-05-20 RX ADMIN — VANCOMYCIN HYDROCHLORIDE 1000 MG: 1 INJECTION, SOLUTION INTRAVENOUS at 16:03

## 2018-05-20 RX ADMIN — Medication 2 MG: at 20:49

## 2018-05-20 RX ADMIN — DIPHENHYDRAMINE HYDROCHLORIDE 25 MG: 25 CAPSULE ORAL at 08:57

## 2018-05-20 RX ADMIN — Medication 2 MG: at 07:25

## 2018-05-20 RX ADMIN — Medication 2 MG: at 18:32

## 2018-05-20 RX ADMIN — Medication 4 MG: at 10:09

## 2018-05-20 RX ADMIN — VANCOMYCIN HYDROCHLORIDE 1000 MG: 1 INJECTION, SOLUTION INTRAVENOUS at 08:54

## 2018-05-20 RX ADMIN — Medication 2 MG: at 12:05

## 2018-05-20 RX ADMIN — PIPERACILLIN SODIUM AND TAZOBACTAM SODIUM 3.38 G: 3; .375 INJECTION, POWDER, LYOPHILIZED, FOR SOLUTION INTRAVENOUS at 11:51

## 2018-05-20 RX ADMIN — Medication 2 MG: at 22:03

## 2018-05-20 ASSESSMENT — PAIN DESCRIPTION - DESCRIPTORS: DESCRIPTORS: ACHING;CRAMPING

## 2018-05-20 NOTE — PLAN OF CARE
Problem: Patient Care Overview  Goal: Plan of Care/Patient Progress Review  Outcome: No Change  1900-0730: A&O x 4, frustrated; VSS on RA; No significant labs overnight; c/o worsening abdominal pain, pain is not adequately controlled, Team was notified, came up to see the patient and agreed to two additional one time doses of dilaudid to be administered with her concurrent pain regimen, an appropriate pain plan needs to be agreed upon with the day team and the patient; denies nausea and SOB; Full liquid diet; SL-PICC infusing TPN, Lipids, and TKO for antibiotics; Adequate voids into hat; No BM, passing gas; Up ad dania; Will continue to monitor and update Gold service with any significant changes.

## 2018-05-20 NOTE — PLAN OF CARE
Problem: Patient Care Overview  Goal: Plan of Care/Patient Progress Review  Outcome: Improving  T.max 99.1.  Rest VSS.  Patient very upset this AM about poor pain control but improved after discussing with MDs.  Pain regimen slightly tweaked.  Able to get extra dose PO dilaudid if needed.  IV ATBs per order.  NPO with no bowel prep ordered at this time.  Patient up independently.  Voids well.  No BM so far today per patient.  Patient has been in room most of shift with a few walks in hallway.  Continue to monitor, support.

## 2018-05-20 NOTE — PROGRESS NOTES
[facility], [place]    Internal Medicine Progress Note - Gold Service      Assessment & Plan   Rachel A Gerhardt is a 42 year old female recurrent SBO (2/2 radiation now s/p partial small bowel resection), chronic abdominal pain, ovarian cancer (3 years ago), and severe malnutrition on TPN who was admitted 5/11/2018 with N/V, and abdominal pain, found to have SBO. Of note, recently admitted and left AMA with hypokalemia, need for colonoscopy, see note 5/3 for details    Fever of unclear source: Tmax 102.7 over past 24 hours, spiked while on Cefepime and Vanc. CXR negative. UA bland. C diff negative. Abdominal film stable. No new rashes. Normal WBC and lactic acid. Procal 0.91. Treated with Cefepime 5/18 and 5/19, IV Vanc 5/18 to present, and Zosyn 5/19 to present. BCx NGTD. Etiology includes PICC associated bacteremia vs viral etiology vs other. Of note, has disconnected TPN and left unit, so higher concern for PICC associated infection. Currently afebrile  - Repeat procal today  - Continue Vanc and Zosyn  - Would obtain yeast culture and start Meropenem, Micafungin if she spikes again   - Follow cultures    Recurrent SBO 2/2 anastomotic stricture; chronic abdominal pain: S/p SB resection 5/2017 (radiation in setting of ovarian cancer). MRE 3/21 anastomotic stricture. CT AP 5/11 long segment dilated loops of proximal small bowel, transition point at small bowel anastomosis in pelvis. CRS consulted, no surgical intervention. Refused NGT. Has anterograde function. AXR 5/19 dilated colon and small bowel with air-fluid levels. WBC normal, afebrile. Increased pain overnight, received 14 mg SL dilaudid without help, VSS  - CRS following, GI signed off but will plan to perform colonoscopy once clear  - Hold bowel prep given worsening pain  - NPO until after colonoscopy   - After long discussion with team, will resume SL dilaudid at 2-4 mg q4h prn   - Deferred psych consult, however, might  revisit early next week  - Compazine and Reglan prn nausea    Stable Medical Conditions and Resolved Hospital Problems:  Electrolyte disturbances: Likely hypovolemia 2/2 GI losses. K, Mg and phos normal 5/15. Repleted  per protocol  History of panic attacks: Reports panic attack, sense of impending doom and concern for dying last week.  Has h/o panic attacks and this was c/w prior.  No PTA meds. Psychiatry consult offered, declined  Coagulopathy: INR slightly elevated (1.29) in setting of severe malnutrition which is likely culprit. No s/s of bleeding on exam   Chronic protein-calorie malnutrition: 2/2 recurrent SBO as noted above. TPN initiated 3/2018. PICC in [facility]. Was off TPN from last discharge to this admission. PharmD and nutrition consulted for TPN     Pain assessment:  Current Pain Score 5/20/2018   Patient currently in pain? yes   Pain score (0-10) -   Pain location Abdomen   Pain descriptors Aching;Cramping   Some encounter information is confidential and restricted. Go to Review Flowsheets activity to see all data.   - Jenni is experiencing pain due to recurrent SBO. Pain management was discussed and the plan was created in a collaborative fashion.  Jenni's response to the current recommendations: compliant  - Please see the plan for pain management as documented above    Diet: parenteral nutrition - ADULT compounded formula CYCLE  NPO for Medical/Clinical Reasons Except for: Meds  Fluids: Via TPN  DVT Prophylaxis: Pneumatic Compression Devices  Code Status: Full Code    Disposition Plan   Expected discharge: 2-4 days recommended to prior living arrangement once able to tolerate Golytely prep, colonoscopy complete, SBO resolved.     Entered: Dina Stanley 05/20/2018, 10:29 AM   Information in the above section will display in the discharge planner report.      The patient's care was discussed with the patient, nursing, GI, and attending physician, Dr. Murtaza Stanley  Internal Medicine  Staff Hospitalist Service  [facility]  Pager: [phone]  Please see sticky note for cross cover information    Interval History   Increased pain significantly overnight, which is thinks was under treated. Per chart review, patient received 14 mg SL overnight, of which 4 mg were given within 1 hour period and another 4 mg were given within 1 a one minute period. Patient reports inability to tolerate food. No emesis. Per patient, BMs are nearly clear and that nursing saw one. Per overnight nurse, no BM overnight. Denies fever or chills. No localizing s/s of infection     Data reviewed today: I reviewed all medications, new labs and imaging results over the last 24 hours.     Physical Exam   Vital Signs: Temp: 98.7  F (37.1  C) Temp src: Oral BP: 96/64 Pulse: 94 Heart Rate: 94 Resp: 18 SpO2: 98 % O2 Device: None (Room air)    Weight: 121 lbs 11.2 oz  General Appearance: Alert and oriented x3, wearing street clothing  Respiratory: CTAB without wheezing, crackles or rales  Cardiovascular: RRR, S1, S2. No murmur noted  GI: Abdomen soft, with distractible tenderness to minimal palpation. No rebound. Bowel sounds active  Skin: No rash or jaundice  Other: Mood stable. Distal pulses palpable. No peripheral edema     Data   Data     Recent Labs  Lab 05/20/18  0707 05/19/18  1256 05/18/18  2348 05/18/18  2053   WBC 10.9 8.0  --  5.8   HGB 10.5* 10.3*  --  12.3   MCV 99 100  --  103*    276  --  236    136 134 134   POTASSIUM 5.0 3.8 4.1 4.4   CHLORIDE 103 103 102 103   CO2 23 27 24 21   BUN 14 10 8 8   CR 0.49* 0.43* 0.46* 0.45*   ANIONGAP 8 6 8 9   JONATAN 8.6 8.3* 8.4* 8.4*   * 109* 103* 94   ALBUMIN  --   --   --  2.7*   PROTTOTAL  --   --   --  6.9   BILITOTAL  --   --   --  0.5   ALKPHOS  --   --   --  78   ALT  --   --   --  34   AST  --   --   --  29

## 2018-05-21 LAB
ACETAMINOPHEN QUAL: NEGATIVE
AMOBARBITAL QUAL: NEGATIVE
ANION GAP SERPL CALCULATED.3IONS-SCNC: 7 MMOL/L (ref 3–14)
BARBITAL QUAL: NEGATIVE
BUN SERPL-MCNC: 17 MG/DL (ref 7–30)
BUTABARBITAL QUAL: NEGATIVE
BUTALBITAL QUAL: NEGATIVE
CAFFEINE QUAL: NEGATIVE
CALCIUM SERPL-MCNC: 8.4 MG/DL (ref 8.5–10.1)
CARBAMAZEPINE QUAL: NEGATIVE
CARISOPRODOL QUAL: NEGATIVE
CHLORIDE SERPL-SCNC: 108 MMOL/L (ref 94–109)
CHLORPROPAMIDE UR-MCNC: NEGATIVE UG/ML
CO2 SERPL-SCNC: 22 MMOL/L (ref 20–32)
CREAT SERPL-MCNC: 0.47 MG/DL (ref 0.52–1.04)
DRUGS SERPL SCN: NEGATIVE
ERYTHROCYTE [DISTWIDTH] IN BLOOD BY AUTOMATED COUNT: 13.7 % (ref 10–15)
ETHCLORVYNOL QUAL: NEGATIVE
ETHINAMATE QUAL: NEGATIVE
ETHOSUXIMIDE QUAL: NEGATIVE
ETHOTOIN QUAL: NEGATIVE
GFR SERPL CREATININE-BSD FRML MDRD: >90 ML/MIN/1.7M2
GLUCOSE SERPL-MCNC: 90 MG/DL (ref 70–99)
GLUTETHIMIDE QUAL: NEGATIVE
HCT VFR BLD AUTO: 31.7 % (ref 35–47)
HGB BLD-MCNC: 10.4 G/DL (ref 11.7–15.7)
IBUPROFEN QUAL: NEGATIVE
INR PPP: 1.03 (ref 0.86–1.14)
MCH RBC QN AUTO: 32.8 PG (ref 26.5–33)
MCHC RBC AUTO-ENTMCNC: 32.8 G/DL (ref 31.5–36.5)
MCV RBC AUTO: 100 FL (ref 78–100)
MEPHENYTOIN QUAL: NEGATIVE
MEPHOBARBITAL QUAL: NEGATIVE
MEPROBAMATE QUAL: NEGATIVE
METHAQUALONE QUAL: NEGATIVE
METHARBITAL QUAL: NEGATIVE
METHSUXIMIDE QUAL: NEGATIVE
METHYPRYLON QUAL: NEGATIVE
PENTOBARBITAL QUAL: NEGATIVE
PHENACETIN QUAL: NEGATIVE
PHENOBARBITAL QUAL: NEGATIVE
PHENSUXIMIDE QUAL: NEGATIVE
PHENYTOIN QUAL: NEGATIVE
PLATELET # BLD AUTO: 318 10E9/L (ref 150–450)
POTASSIUM SERPL-SCNC: 4.8 MMOL/L (ref 3.4–5.3)
PREALB SERPL IA-MCNC: 15 MG/DL (ref 15–45)
PRIMIDONE QUAL: NEGATIVE
RBC # BLD AUTO: 3.17 10E12/L (ref 3.8–5.2)
SALICYLATE QUAL: NEGATIVE
SECOBARBITAL QUAL: NEGATIVE
SODIUM SERPL-SCNC: 138 MMOL/L (ref 133–144)
TALBUTAL QUAL: NEGATIVE
THEOPHYLLINE QUAL: NEGATIVE
THIOPENTAL QUAL: NEGATIVE
TRIGL SERPL-MCNC: 136 MG/DL
TYBAMATE QUAL: NEGATIVE
VALPROIC ACID QUAL: NEGATIVE
WBC # BLD AUTO: 12.4 10E9/L (ref 4–11)

## 2018-05-21 PROCEDURE — 84478 ASSAY OF TRIGLYCERIDES: CPT | Performed by: INTERNAL MEDICINE

## 2018-05-21 PROCEDURE — 12000025 ZZH R&B TRANSPLANT INTERMEDIATE

## 2018-05-21 PROCEDURE — 25000125 ZZHC RX 250: Performed by: INTERNAL MEDICINE

## 2018-05-21 PROCEDURE — 85027 COMPLETE CBC AUTOMATED: CPT | Performed by: INTERNAL MEDICINE

## 2018-05-21 PROCEDURE — 84134 ASSAY OF PREALBUMIN: CPT | Performed by: INTERNAL MEDICINE

## 2018-05-21 PROCEDURE — 25000128 H RX IP 250 OP 636

## 2018-05-21 PROCEDURE — 25000132 ZZH RX MED GY IP 250 OP 250 PS 637: Performed by: PHYSICIAN ASSISTANT

## 2018-05-21 PROCEDURE — 25000128 H RX IP 250 OP 636: Performed by: PHYSICIAN ASSISTANT

## 2018-05-21 PROCEDURE — 36592 COLLECT BLOOD FROM PICC: CPT | Performed by: INTERNAL MEDICINE

## 2018-05-21 PROCEDURE — 99207 ZZC APP CREDIT; MD BILLING SHARED VISIT: CPT | Performed by: PHYSICIAN ASSISTANT

## 2018-05-21 PROCEDURE — 99233 SBSQ HOSP IP/OBS HIGH 50: CPT | Performed by: INTERNAL MEDICINE

## 2018-05-21 PROCEDURE — 80048 BASIC METABOLIC PNL TOTAL CA: CPT | Performed by: INTERNAL MEDICINE

## 2018-05-21 PROCEDURE — 40000802 ZZH SITE CHECK

## 2018-05-21 PROCEDURE — 85610 PROTHROMBIN TIME: CPT | Performed by: INTERNAL MEDICINE

## 2018-05-21 RX ORDER — HYDROMORPHONE HYDROCHLORIDE 1 MG/ML
4 SOLUTION ORAL ONCE
Status: COMPLETED | OUTPATIENT
Start: 2018-05-21 | End: 2018-05-21

## 2018-05-21 RX ORDER — POLYETHYLENE GLYCOL 3350 17 G/17G
17 POWDER, FOR SOLUTION ORAL 3 TIMES DAILY
Status: COMPLETED | OUTPATIENT
Start: 2018-05-21 | End: 2018-05-21

## 2018-05-21 RX ORDER — HYDROMORPHONE HYDROCHLORIDE 1 MG/ML
2 SOLUTION ORAL
Status: DISCONTINUED | OUTPATIENT
Start: 2018-05-21 | End: 2018-05-23 | Stop reason: HOSPADM

## 2018-05-21 RX ADMIN — PIPERACILLIN SODIUM AND TAZOBACTAM SODIUM 3.38 G: 3; .375 INJECTION, POWDER, LYOPHILIZED, FOR SOLUTION INTRAVENOUS at 22:53

## 2018-05-21 RX ADMIN — Medication 2 MG: at 22:06

## 2018-05-21 RX ADMIN — Medication 2 MG: at 18:08

## 2018-05-21 RX ADMIN — Medication 4 MG: at 01:52

## 2018-05-21 RX ADMIN — DIPHENHYDRAMINE HYDROCHLORIDE 25 MG: 25 CAPSULE ORAL at 08:16

## 2018-05-21 RX ADMIN — Medication 4 MG: at 10:07

## 2018-05-21 RX ADMIN — POLYETHYLENE GLYCOL 3350 17 G: 17 POWDER, FOR SOLUTION ORAL at 10:07

## 2018-05-21 RX ADMIN — VANCOMYCIN HYDROCHLORIDE 1000 MG: 1 INJECTION, SOLUTION INTRAVENOUS at 00:37

## 2018-05-21 RX ADMIN — Medication 2 MG: at 14:03

## 2018-05-21 RX ADMIN — PIPERACILLIN SODIUM AND TAZOBACTAM SODIUM 3.38 G: 3; .375 INJECTION, POWDER, LYOPHILIZED, FOR SOLUTION INTRAVENOUS at 11:40

## 2018-05-21 RX ADMIN — PIPERACILLIN SODIUM AND TAZOBACTAM SODIUM 3.38 G: 3; .375 INJECTION, POWDER, LYOPHILIZED, FOR SOLUTION INTRAVENOUS at 05:37

## 2018-05-21 RX ADMIN — Medication 4 MG: at 05:58

## 2018-05-21 RX ADMIN — VANCOMYCIN HYDROCHLORIDE 1000 MG: 1 INJECTION, SOLUTION INTRAVENOUS at 08:16

## 2018-05-21 RX ADMIN — Medication 2 MG: at 11:47

## 2018-05-21 RX ADMIN — POLYETHYLENE GLYCOL 3350 17 G: 17 POWDER, FOR SOLUTION ORAL at 20:10

## 2018-05-21 RX ADMIN — POTASSIUM CHLORIDE: 2 INJECTION, SOLUTION, CONCENTRATE INTRAVENOUS at 20:10

## 2018-05-21 RX ADMIN — I.V. FAT EMULSION 250 ML: 20 EMULSION INTRAVENOUS at 20:10

## 2018-05-21 RX ADMIN — Medication 2 MG: at 20:09

## 2018-05-21 RX ADMIN — PIPERACILLIN SODIUM AND TAZOBACTAM SODIUM 3.38 G: 3; .375 INJECTION, POWDER, LYOPHILIZED, FOR SOLUTION INTRAVENOUS at 17:06

## 2018-05-21 RX ADMIN — POLYETHYLENE GLYCOL 3350 17 G: 17 POWDER, FOR SOLUTION ORAL at 14:07

## 2018-05-21 RX ADMIN — Medication 2 MG: at 16:07

## 2018-05-21 ASSESSMENT — PAIN DESCRIPTION - DESCRIPTORS
DESCRIPTORS: ACHING;CRAMPING
DESCRIPTORS: ACHING;CRAMPING

## 2018-05-21 NOTE — PROGRESS NOTES
COLORECTALSURGERY PROGRESS NOTE  05/21/2018     Seen at bedside, having clear bilious bowel movements. Going to try miralax again today. Afebrile, abd benign.     Recs:  - discuss with GI re: colonoscopy now that stools are clear  - CRS following peripherally     Reji Ba MD  General Surgery PGY-3  Pager [phone]

## 2018-05-21 NOTE — PLAN OF CARE
Problem: Patient Care Overview  Goal: Plan of Care/Patient Progress Review  Outcome: Improving  6765-4708: A&O x 4; Afebrile, VSS on RA; c/o pain administered dilaudid x 4; denies nausea and SOB; NPO; SL-PICC infusing cycled TPN and TKO; Voiding adequately; Large green loose BM overnight; Skin unremarkable; Up independently; Will continue to monitor and update Gold team with any significant changes.

## 2018-05-21 NOTE — PLAN OF CARE
Problem: Patient Care Overview  Goal: Plan of Care/Patient Progress Review  Outcome: Improving  BP 97/66 (BP Location: Left arm)  Pulse 99  Temp 97.7  F (36.5  C) (Oral)  Resp 16  Wt 55.9 kg (123 lb 4.8 oz)  SpO2 100%  BMI 18.21 kg/m2    1181-6067: T max of 99.1. Tachycardic, pt baseline. Other VSS. Pt denied any nausea, but complained of pain and received dil 4mg x1 and dil 2mg x2. Clear liquid diet and tolerating well. PICC SL'd. Goal is to keep patient unhooked as much as possible when able to be. Voiding adequate amounts. BMx2 this shift. Green and loose/watery. Stool left in bathroom as provider wants to see it. Goal for colonoscopy maybe tomorrow. Up independently. Alert and oriented x4. Will continue to monitor and notify MD of any changes.

## 2018-05-21 NOTE — PROGRESS NOTES
5/21/2018 11:08 AM RNCC sent referral to [facility] for TPN needs.  MD to speak to pt about her needs and behaviors with the [employer].  May need to make further referrals for this pt pending [facility] decision.  Ed- [facility], [address] [place] [address]  Ph: [phone]  Fax: [phone]       5/18/2018 2:45 PM message received from management at [facility].  Liaison, Elba, attempted to see pt x2 today.  Due to pt disconnecting her TPN and leaving it in the visitor lounge on the unit and leaving the unit without telling staff, [facility] will not accept pt to their care for TPN.  RNCC discussed with care team, Lula stated that pt will not dc over the weekend.  RNCC will follow up with pt Monday to discuss options.      Kathe Rogers, CINDYCC, BSN    [facility]    Medicine Group  [address] [place] [id number]    [email or web address]  [email or web address]    Office: [phone] Pager: [phone]

## 2018-05-21 NOTE — PROGRESS NOTES
Case again reviewed with luminal team as she is well known to me from our inability to traverse the ileocecal valve with the enteroscope on two occasions due to poor preparation. As bowel preparation is critical to success, I suspect she will not prepare sufficiently without a nasogastric tube and at least 4L Golytely at a relatively rapid rate. Therefore, we will happily proceed with retrograde enteroscopy only after NG tube preparation.    Omero Vigil MD PhD  Director of Endoscopy  Associate Professor of Medicine, Surgery and Pediatrics  Interventional and Therapeutic Endoscopy    [facility]  Division of Gastroenterology and Hepatology  [facility] [address] [place] [address]    New Consultations  [phone]  Procedure Scheduling [phone]  Clinical Nurse Coordinator [phone]  Clinical Fax   [phone]  Administrative   [phone]  Administrative Fax  [phone]

## 2018-05-21 NOTE — PROGRESS NOTES
[facility], [place]    Internal Medicine Progress Note - Gold Service      Assessment & Plan   Rachel Gerhardt is a 42 year old woman with a history of ovarian CA s/p chemo-rads, recurrent SBO s/p ex lap w/ 60 cm small bowel resected in 5/2017 for strictures, opioid dependence w/ prior suboxone use, and malnutrition requiring TPN who was admitted on 5/11 w/ recurrent SBO.     #Recurrent SBO. 2/2 anastomotic stricture per 3/21 MRE. Had small bowel resection in 5/2017 as above. Abd CT 5/11 w/ long segment dilated loops of proximal small bowel, transition point at small bowel anastomosis in pelvis. CRS did not recommend surgical intervention. Patient refuses NG. Is having antegrade bowel function but worsened pain when she advances PO intake.   - GI and CRS have both signed off (following peripherally).   - Discussed w/ GI today. See detailed note from 5/17 w/ attempts at endoscopic anastomotic dilation/enteroscopy that have failed d/t poor prep. Dr. Vigil will not move forward unless patient does NG tube preparation w/ at least 4L Go Lytely at a relatively rapid rate given that adequate bowel prep will be critical to success. Today patient is agreeable to Miralax but not NG/Go Lytely. Will need to continue re-addressing.   - Clear liquid diet. Do not advance further at this time.   - Continue SL Dilaudid 2 mg Q2hrs PRN per patient preference rather than 4 mg Q4hrs PRN. Will need clear taper plan at d/c given prior opioid abuse.     #Fever. Developed fevers evening of 5/18 w/ T max 103 on 5/19 and 99.2 so far today. Started IV vanco on 5/18, as well as cefepime on 5/18 changed to Zosyn on 5/19. Blood cx 5/18 and 5/19 neg to date. UA neg. CXR neg. C diff neg. Procal 0.66. WBC 12.4. Lactate wnl. Concern for bacteremia or fungemia on TPN w/ PICC and frequently disconnecting/leaving unit. No localizing infectious complaints at this time.   - Stop vancomycin.   - Continue Zosyn for now,  reassess tomorrow.   - If recurrent fever overnight needs fungal blood cx drawn. Would also trend procal and consider empiric micafungin, broaden to meropenem.     #Chronic Protein Calorie Malnutrition. Was not regularly getting TPN PTA. Was 132 lb on 4/1 d/c and 122 lb on 5/3 d/c --> 110 lb on admit here --> 123 lb currently.     Consulting Services: GI/CRS following peripherally     Diet: clears, TPN/lipids  DVT Prophylaxis: ambulatory   Code Status: Full     #Pain Assessment:  Current Pain Score 5/21/2018   Patient currently in pain? yes   Pain score (0-10) -   Pain location Abdomen   Pain descriptors Aching;Cramping   Some encounter information is confidential and restricted. Go to Review Flowsheets activity to see all data.   - Jenni is experiencing pain due to PSBO. Pain management was discussed and the plan was created in a collaborative fashion.  Jenni's response to the current recommendations: engaged  - Opioid regimen: liquid Dilaudid   - Response to opioid medications: Reduction of symptoms   - Bowel regimen: miralax    Disposition Plan   Expected discharge: 2 - 3 days; recommended to prior living arrangement once procedure completed, nutrition plan in place (no [facility] agency has accepted for TPN at this time d/t prior compliance concerns)     The patient's care was discussed with bedside CINDY Bal, CRS resident, GI fellow, care coordinator Kathe.     Arleen Santiago PA-C  Hospitalist Service  [facility]  Pager: [phone]    Interval History   Had increased abdominal pain after eating soup and pudding yesterday, and then not waking up overnight to take pain meds. Requests 2 mg Q2hr instead of 4 mg Q4hr. No N/V. No further fevers. Last BM seen by RN was bilious in appearance. Patient willing to take Miralax but not Go Lytely.     A 4 point ROS was performed (including CV, Resp, GI, ) and negative unless otherwise noted in HPI.     Physical Exam   BP 91/65 (BP Location: Left arm)   Pulse 99  Temp 99.2  F (37.3  C) (Oral)  Resp 16  Wt 55.9 kg (123 lb 4.8 oz)  SpO2 98%  BMI 18.21 kg/m2     GENERAL: Alert and oriented x 3. Sitting up in bed, appears comfortable. Conversant.    HEENT: Anicteric sclera. Mucous membranes moist.   CV: RRR. S1, S2. No murmurs appreciated.   RESPIRATORY: Effort normal on room air. Lungs CTAB with no wheezing, rales, rhonchi.   GI: Abdomen soft and non distended, bowel sounds present. Mild generalized tenderness. No rebound or guarding.   NEUROLOGICAL: No focal deficits. Moves all extremities.    EXTREMITIES: No peripheral edema. Intact bilateral pedal pulses.   SKIN: No jaundice. No rashes.     Lines/Tubes/Drains  PICC    Data reviewed today: I reviewed all medications, new labs and imaging results over the last 24 hours.

## 2018-05-21 NOTE — PROVIDER NOTIFICATION
Was told by provider to notify her when pt had another bowel movement. Pt called out saying she had another bowel movement and provider was paged.

## 2018-05-22 LAB
BASOPHILS # BLD AUTO: 0 10E9/L (ref 0–0.2)
BASOPHILS NFR BLD AUTO: 0.4 %
DIFFERENTIAL METHOD BLD: ABNORMAL
EOSINOPHIL # BLD AUTO: 0.2 10E9/L (ref 0–0.7)
EOSINOPHIL NFR BLD AUTO: 2.2 %
ERYTHROCYTE [DISTWIDTH] IN BLOOD BY AUTOMATED COUNT: 13.9 % (ref 10–15)
HCT VFR BLD AUTO: 30.2 % (ref 35–47)
HGB BLD-MCNC: 9.5 G/DL (ref 11.7–15.7)
IMM GRANULOCYTES # BLD: 0.3 10E9/L (ref 0–0.4)
IMM GRANULOCYTES NFR BLD: 3.6 %
LACTATE BLD-SCNC: 0.8 MMOL/L (ref 0.4–1.9)
LYMPHOCYTES # BLD AUTO: 2.2 10E9/L (ref 0.8–5.3)
LYMPHOCYTES NFR BLD AUTO: 24.7 %
MCH RBC QN AUTO: 32.3 PG (ref 26.5–33)
MCHC RBC AUTO-ENTMCNC: 31.5 G/DL (ref 31.5–36.5)
MCV RBC AUTO: 103 FL (ref 78–100)
MONOCYTES # BLD AUTO: 0.9 10E9/L (ref 0–1.3)
MONOCYTES NFR BLD AUTO: 9.6 %
NEUTROPHILS # BLD AUTO: 5.4 10E9/L (ref 1.6–8.3)
NEUTROPHILS NFR BLD AUTO: 59.5 %
NRBC # BLD AUTO: 0 10*3/UL
NRBC BLD AUTO-RTO: 0 /100
PLATELET # BLD AUTO: 307 10E9/L (ref 150–450)
RBC # BLD AUTO: 2.94 10E12/L (ref 3.8–5.2)
WBC # BLD AUTO: 9.1 10E9/L (ref 4–11)

## 2018-05-22 PROCEDURE — 36592 COLLECT BLOOD FROM PICC: CPT | Performed by: INTERNAL MEDICINE

## 2018-05-22 PROCEDURE — 25000125 ZZHC RX 250: Performed by: INTERNAL MEDICINE

## 2018-05-22 PROCEDURE — 36592 COLLECT BLOOD FROM PICC: CPT | Performed by: PHYSICIAN ASSISTANT

## 2018-05-22 PROCEDURE — 85025 COMPLETE CBC W/AUTO DIFF WBC: CPT | Performed by: PHYSICIAN ASSISTANT

## 2018-05-22 PROCEDURE — 12000026 ZZH R&B TRANSPLANT

## 2018-05-22 PROCEDURE — 25000132 ZZH RX MED GY IP 250 OP 250 PS 637: Performed by: PHYSICIAN ASSISTANT

## 2018-05-22 PROCEDURE — 25000128 H RX IP 250 OP 636: Performed by: PHYSICIAN ASSISTANT

## 2018-05-22 PROCEDURE — 40000558 ZZH STATISTIC CVC DRESSING CHANGE

## 2018-05-22 PROCEDURE — 99233 SBSQ HOSP IP/OBS HIGH 50: CPT | Performed by: INTERNAL MEDICINE

## 2018-05-22 PROCEDURE — 83605 ASSAY OF LACTIC ACID: CPT | Performed by: INTERNAL MEDICINE

## 2018-05-22 PROCEDURE — 99207 ZZC APP CREDIT; MD BILLING SHARED VISIT: CPT | Performed by: PHYSICIAN ASSISTANT

## 2018-05-22 RX ORDER — POLYETHYLENE GLYCOL 3350 17 G/17G
17 POWDER, FOR SOLUTION ORAL EVERY 6 HOURS
Status: DISCONTINUED | OUTPATIENT
Start: 2018-05-22 | End: 2018-05-23

## 2018-05-22 RX ADMIN — Medication 2 MG: at 00:06

## 2018-05-22 RX ADMIN — Medication 2 MG: at 09:18

## 2018-05-22 RX ADMIN — Medication 2 MG: at 02:07

## 2018-05-22 RX ADMIN — Medication 2 MG: at 14:19

## 2018-05-22 RX ADMIN — Medication 2 MG: at 11:21

## 2018-05-22 RX ADMIN — Medication 2 MG: at 04:02

## 2018-05-22 RX ADMIN — POTASSIUM CHLORIDE: 2 INJECTION, SOLUTION, CONCENTRATE INTRAVENOUS at 19:58

## 2018-05-22 RX ADMIN — Medication 2 MG: at 19:57

## 2018-05-22 RX ADMIN — POLYETHYLENE GLYCOL 3350 17 G: 17 POWDER, FOR SOLUTION ORAL at 21:37

## 2018-05-22 RX ADMIN — Medication 2 MG: at 23:28

## 2018-05-22 RX ADMIN — Medication 2 MG: at 18:02

## 2018-05-22 RX ADMIN — I.V. FAT EMULSION 250 ML: 20 EMULSION INTRAVENOUS at 19:57

## 2018-05-22 RX ADMIN — PIPERACILLIN SODIUM AND TAZOBACTAM SODIUM 3.38 G: 3; .375 INJECTION, POWDER, LYOPHILIZED, FOR SOLUTION INTRAVENOUS at 07:49

## 2018-05-22 RX ADMIN — POLYETHYLENE GLYCOL 3350 17 G: 17 POWDER, FOR SOLUTION ORAL at 16:05

## 2018-05-22 RX ADMIN — Medication 2 MG: at 06:04

## 2018-05-22 RX ADMIN — Medication 2 MG: at 16:04

## 2018-05-22 ASSESSMENT — PAIN DESCRIPTION - DESCRIPTORS: DESCRIPTORS: ACHING;CRAMPING

## 2018-05-22 NOTE — PROGRESS NOTES
This is a recent snapshot of the patient's [facility] medical record.  For current drug dose and complete information and questions, call [phone] or In Basket pool, [facility] ([id number])  CSN Number:  [id number]

## 2018-05-22 NOTE — PROVIDER NOTIFICATION
Pt is wondering if there is any update with her care plan and if she can get the colonoscopy today. Provider paged. Talked with provider, provider will come and see her. Was told to paged team when patient returns. Patient has returned and provider paged.

## 2018-05-22 NOTE — PROGRESS NOTES
[facility], [place]    Internal Medicine Progress Note - Gold Service      Assessment & Plan   Rachel Gerhardt is a 42 year old woman with a history of ovarian CA s/p chemo-rads, recurrent SBO s/p ex lap w/ 60 cm small bowel resected in 5/2017 for strictures, opioid dependence w/ prior suboxone use, and malnutrition requiring TPN who was admitted on 5/11 w/ recurrent SBO.     #Recurrent SBO. 2/2 anastomotic stricture per 3/21 MRE. Had small bowel resection in 5/2017 as above. Abd CT 5/11 w/ long segment dilated loops of proximal small bowel, transition point at small bowel anastomosis in pelvis. CRS did not recommend surgical intervention. Is having antegrade bowel function but worsened pain when she advances PO intake beyond clears, and is dependent on opioids for this currently.   - GI and CRS have both signed off (following peripherally).   - See detailed GI note from 5/17 w/ attempts at endoscopic anastomotic dilation/enteroscopy that have failed d/t poor prep, as well as Dr. Vigil's note from 5/21. He does not want to move forward unless patient does NG tube preparation w/ at least 4L Go Lytely at a relatively rapid rate given that adequate bowel prep is critical to success. Was compliant w/ TID Miralax yesterday w/ yellowish clear stools and is willing to continue Miralax as frequently as needed (currently ordered Q6hr) to continue clearing in hopes of having procedure. She states she is psychologically unable to tolerate NG/NJ from past experiences, and unable to tolerate Go Lytely d/t the large volume resulting in prior vomiting. If unable to have procedure will be nearing d/c w/ a poor quality of life given inability to advance diet.   - Clear liquid diet. Do not advance further at this time. NPO @ 0200 in case of procedure tomorrow.   - Continue SL Dilaudid 2 mg Q2hrs PRN per patient preference rather than 4 mg Q4hrs PRN. Will need clear taper plan at d/c given prior opioid  abuse.     #Fever. Developed fevers evening of 5/18 w/ T max 103 on 5/19 and afebrile since later that evening. Started IV vanco on 5/18, as well as cefepime on 5/18 changed to Zosyn on 5/19. Blood cx 5/18 and 5/19 negative. UA neg. CXR neg. C diff neg. Procal 0.66. WBC 12.4-->9.1. Lactate wnl. Concern for bacteremia or fungemia on TPN w/ PICC and frequently disconnecting/leaving unit. No localizing infectious complaints at this time. Vanco stopped 5/21.   - Ok to stop Zosyn today and monitor fever curve closely.   - If recurrent fever overnight needs fungal blood cx drawn. Would also trend procal and restart Zosyn/consider empiric micafungin depending on course.    #Chronic Protein Calorie Malnutrition. Was not regularly getting TPN PTA. Was 132 lb on 4/1 d/c and 122 lb on 5/3 d/c --> 110 lb on admit here --> 123 lb currently.     Consulting Services: GI/CRS following peripherally     Diet: clears, TPN/lipids  DVT Prophylaxis: ambulatory   Code Status: Full     #Pain Assessment:  Current Pain Score 5/22/2018   Patient currently in pain? sleeping: patient not able to self report   Pain score (0-10) -   Pain location -   Pain descriptors -   Some encounter information is confidential and restricted. Go to Review Flowsheets activity to see all data.   - Jenni is experiencing pain due to PSBO. Pain management was discussed and the plan was created in a collaborative fashion.  Jenni's response to the current recommendations: engaged  - Opioid regimen: liquid Dilaudid   - Response to opioid medications: Reduction of symptoms   - Bowel regimen: miralax    Disposition Plan   Expected discharge: 2 - 3 days; recommended to prior living arrangement once procedure completed, nutrition plan in place (no [facility] agency has accepted for TPN at this time d/t prior compliance concerns)     The patient's care was discussed with bedside RN Mally and medicine attending Dr. Fitzpatrick who will speak w/ GI team today.     Arleen Santiago,  ROMAN  Hospitalist Service  [facility]  Pager: [phone]    Interval History   Feeling much better past 24 hours with change in Dilaudid to Q2hr dosing and being woken up overnight for this. Tolerating clears. Took Miralax x 3 yesterday w/ multiple BMs - yellow/clear in appearance. No f/c.     A 4 point ROS was performed (including CV, Resp, GI, ) and negative unless otherwise noted in HPI.     Physical Exam   BP 99/70 (BP Location: Left arm)  Pulse 86  Temp 97.9  F (36.6  C) (Oral)  Resp 18  Wt 57.1 kg (125 lb 12.8 oz)  SpO2 99%  BMI 18.58 kg/m2     GENERAL: Alert and oriented x 3. Sitting up in bed, appears comfortable. Conversant.    HEENT: Anicteric sclera. Mucous membranes moist.   CV: RRR. S1, S2. No murmurs appreciated.   RESPIRATORY: Effort normal on room air. Lungs CTAB with no wheezing, rales, rhonchi.   GI: Abdomen soft and non distended, bowel sounds present. Mild generalized tenderness. No rebound or guarding.   NEUROLOGICAL: No focal deficits. Moves all extremities.    EXTREMITIES: No peripheral edema. Intact bilateral pedal pulses.   SKIN: No jaundice. No rashes.     Lines/Tubes/Drains  PICC    Data reviewed today: I reviewed all medications, new labs and imaging results over the last 24 hours.

## 2018-05-22 NOTE — PLAN OF CARE
Problem: Patient Care Overview  Goal: Plan of Care/Patient Progress Review  Outcome: No Change  BP 99/70 (BP Location: Left arm)  Pulse 86  Temp 97.9  F (36.6  C) (Oral)  Resp 18  Wt 57.1 kg (125 lb 12.8 oz)  SpO2 99%  BMI 18.58 kg/m2    1613-0570: Pt baseline tachycardia. B/Ps softer, patient baseline. Triggered sepsis lactic 0.8. Pt denied nausea, complained of pain and received dil 2mg q 2 hours, calls/asks for it every two hours. Clear liquid diet and tolerating well. PICC currently SL'd. IV antibiotics discontinued. Cycled TPN for 14 hours 9319-1878. Voiding and not saving. No reports of any BM's this shift. Up independently. Waiting for a colonoscopy. Paged team to see what plan of care is for patient and if she is able to go down today. Will continue to monitor and notify MD of any changes.

## 2018-05-22 NOTE — PLAN OF CARE
Problem: Patient Care Overview  Goal: Plan of Care/Patient Progress Review  Outcome: No Change  T.max 99.3.  Rest VSS.  Continues to request PO Dilaudid every 2 hours for abdominal discomfort.  Toilet voids.  BM x 2 per patient but not seen as she was off unit with family and unable to make it to her room.  Did take a photo of first BM with her phone.  Brown liquid noted.  Mainly has been staying on unit in room tonight.  Good mood.  Taking Miralax.  States that she wants to get procedure done.  Clear diet followed on unit.  TPN/Lipids cycled.  IV ATBs per order. Up independently.  Continue to monitor.  Encourage her to stick with plan for scope.  Update MD as needed.

## 2018-05-22 NOTE — PLAN OF CARE
Problem: Patient Care Overview  Goal: Plan of Care/Patient Progress Review  Outcome: No Change  Jenni at start of shift stated she wanted her 2mg of dilaudid elixir every two hours wether she is sleep or not. She stated that is what works best, otherwise her pain is out of control. Not sure where an exceptable administration is best for her. Will continue to monitor and report any significant changes. States her pain is abdominal and that never seems to change for her. Will continue to monitor and report any significant changes.

## 2018-05-22 NOTE — PLAN OF CARE
Problem: Pain, Chronic (Adult)  Goal: Identify Related Risk Factors and Signs and Symptoms  Related risk factors and signs and symptoms are identified upon initiation of Human Response Clinical Practice Guideline (CPG).   Outcome: Improving  Pt is happy with pain management regimen.

## 2018-05-23 VITALS
OXYGEN SATURATION: 96 % | DIASTOLIC BLOOD PRESSURE: 90 MMHG | TEMPERATURE: 98.1 F | BODY MASS INDEX: 18.53 KG/M2 | SYSTOLIC BLOOD PRESSURE: 107 MMHG | WEIGHT: 125.5 LBS | RESPIRATION RATE: 16 BRPM | HEART RATE: 94 BPM

## 2018-05-23 LAB
ANION GAP SERPL CALCULATED.3IONS-SCNC: 9 MMOL/L (ref 3–14)
BUN SERPL-MCNC: 15 MG/DL (ref 7–30)
CALCIUM SERPL-MCNC: 8.9 MG/DL (ref 8.5–10.1)
CHLORIDE SERPL-SCNC: 102 MMOL/L (ref 94–109)
CO2 SERPL-SCNC: 24 MMOL/L (ref 20–32)
CREAT SERPL-MCNC: 0.37 MG/DL (ref 0.52–1.04)
GFR SERPL CREATININE-BSD FRML MDRD: >90 ML/MIN/1.7M2
GLUCOSE SERPL-MCNC: 96 MG/DL (ref 70–99)
MAGNESIUM SERPL-MCNC: 1.8 MG/DL (ref 1.6–2.3)
PHOSPHATE SERPL-MCNC: 4 MG/DL (ref 2.5–4.5)
POTASSIUM SERPL-SCNC: 4.5 MMOL/L (ref 3.4–5.3)
SODIUM SERPL-SCNC: 136 MMOL/L (ref 133–144)

## 2018-05-23 PROCEDURE — 25000132 ZZH RX MED GY IP 250 OP 250 PS 637: Performed by: PHYSICIAN ASSISTANT

## 2018-05-23 PROCEDURE — 25000128 H RX IP 250 OP 636: Performed by: PHYSICIAN ASSISTANT

## 2018-05-23 PROCEDURE — 99207 ZZC APP CREDIT; MD BILLING SHARED VISIT: CPT | Performed by: PHYSICIAN ASSISTANT

## 2018-05-23 PROCEDURE — 36592 COLLECT BLOOD FROM PICC: CPT | Performed by: INTERNAL MEDICINE

## 2018-05-23 PROCEDURE — 80048 BASIC METABOLIC PNL TOTAL CA: CPT | Performed by: INTERNAL MEDICINE

## 2018-05-23 PROCEDURE — 83735 ASSAY OF MAGNESIUM: CPT | Performed by: INTERNAL MEDICINE

## 2018-05-23 PROCEDURE — 99239 HOSP IP/OBS DSCHRG MGMT >30: CPT | Performed by: INTERNAL MEDICINE

## 2018-05-23 PROCEDURE — 84100 ASSAY OF PHOSPHORUS: CPT | Performed by: INTERNAL MEDICINE

## 2018-05-23 RX ORDER — POLYETHYLENE GLYCOL 3350 17 G/17G
1 POWDER, FOR SOLUTION ORAL 2 TIMES DAILY PRN
Qty: 255 G | Refills: 0 | Status: SHIPPED | OUTPATIENT
Start: 2018-05-23 | End: 2018-06-17

## 2018-05-23 RX ORDER — ONDANSETRON 2 MG/ML
4 INJECTION INTRAMUSCULAR; INTRAVENOUS EVERY 6 HOURS PRN
Status: DISCONTINUED | OUTPATIENT
Start: 2018-05-23 | End: 2018-05-23 | Stop reason: HOSPADM

## 2018-05-23 RX ORDER — HYDROMORPHONE HYDROCHLORIDE 1 MG/ML
SOLUTION ORAL
Qty: 48 ML | Refills: 0 | Status: SHIPPED | OUTPATIENT
Start: 2018-05-23 | End: 2018-06-17

## 2018-05-23 RX ORDER — ONDANSETRON 4 MG/1
4 TABLET, ORALLY DISINTEGRATING ORAL EVERY 6 HOURS PRN
Qty: 20 TABLET | Refills: 0 | Status: ON HOLD | OUTPATIENT
Start: 2018-05-23 | End: 2018-06-20

## 2018-05-23 RX ADMIN — Medication 2 MG: at 04:00

## 2018-05-23 RX ADMIN — Medication 2 MG: at 08:01

## 2018-05-23 RX ADMIN — POLYETHYLENE GLYCOL 3350 17 G: 17 POWDER, FOR SOLUTION ORAL at 02:07

## 2018-05-23 RX ADMIN — Medication 2 MG: at 14:52

## 2018-05-23 RX ADMIN — Medication 2 MG: at 02:03

## 2018-05-23 RX ADMIN — POLYETHYLENE GLYCOL 3350, SODIUM SULFATE ANHYDROUS, SODIUM BICARBONATE, SODIUM CHLORIDE, POTASSIUM CHLORIDE 4000 ML: 236; 22.74; 6.74; 5.86; 2.97 POWDER, FOR SOLUTION ORAL at 15:03

## 2018-05-23 RX ADMIN — ONDANSETRON 4 MG: 2 INJECTION INTRAMUSCULAR; INTRAVENOUS at 14:52

## 2018-05-23 RX ADMIN — Medication 2 MG: at 10:14

## 2018-05-23 RX ADMIN — Medication 2 MG: at 06:05

## 2018-05-23 RX ADMIN — POLYETHYLENE GLYCOL 3350 17 G: 17 POWDER, FOR SOLUTION ORAL at 08:00

## 2018-05-23 RX ADMIN — Medication 2 MG: at 12:33

## 2018-05-23 NOTE — DISCHARGE SUMMARY
[facility], [place]    Internal Medicine Discharge Summary- Gold Service    Date of Admission: 5/11/2018  Date of Discharge: 5/23/2018  Discharging Provider: Arleen Santiago PA-C/Whitney Fitzpatrick MD  Discharging Team: Gold 3    Follow-ups Needed After Discharge   See PCP next 5-7 days for hospital follow up  Follow up in pain clinic next 5-7 days   Follow up in GI clinic w/ Dr. Vigil if willing to prep via NG/NJ for endoscopic intervention     Discharge Diagnoses   Recurrent SBO 2/2 Anastomotic Stricture   History of Ovarian Cancer s/p Chemoradiation   Chronic Protein Calorie Malnutrition, on TPN  Fever, Undetermined Etiology, Resolved  History of Opioid Dependence  Acute on Chronic Abdominal Pain     Hospital Course   Rachel Gerhardt is a 42 year old woman with a history of ovarian CA s/p chemo-rads, recurrent SBO s/p ex lap w/ 60 cm small bowel resected in 5/2017 for strictures, opioid dependence w/ prior suboxone use, and malnutrition requiring TPN who was admitted on 5/11 w/ recurrent SBO.     The following problems were addressed during his hospitalization:    #Recurrent SBO. 2/2 anastomotic stricture per 3/21 MRE. Had small bowel resection in 5/2017 as above. Abd CT 5/11 w/ long segment dilated loops of proximal small bowel, transition point at small bowel anastomosis in pelvis. GI and CRS were consulted. CRS noted that w/ prior radiation patient is a high risk surgical candidate w/ risks to include leak and potential need for multiple surgeries. This risk was not deemed acceptable given that patient had not failed less invasive management. GI was involved to attempt endoscopic anastomotic dilation/enteroscopy. History of this includes: 4/24 - case cancelled day of procedure as patient reported being unaware of need for prep, 4/30 - underwent general anesthesia but inadequate bowel prep to allow safe insertion of enteroscope beyond sigmoid colon, 5/3 - again underwent general  "anesthesia but had inadequate bowel prep, and 5/4 - patient left hospital AMA without prepping so OR time was cancelled same day. Patient has been having antegrade bowel function but worsened pain when she advances PO intake beyond clears, and is dependent on opioids for this currently - using Dilaudid solution 2 mg Q2hrs PRN (see opioid discussion below). She was willing to take Miralax so we attempted prepping w/ this Q6-8hr - some clearing of stools but not successful enough for procedure. Case was re-discussed w/ GI Dr. Vigil (see note dated 5/21) - he will not move forward w/ endoscopic intervention unless patient preps w/ full 4L GoLytely at relatively rapid rate via NG or NJ tube. We are otherwise putting patient at risk w/ unnecessary general anesthesia, possible perforations or other complications. Patient adamantly refuses NG/NJ placement stating that she has a psychologic block to it and will pull any tube placed despite any amount of anxiolytics used. Since the procedure cannot be done, patient has requested d/c home. She is able to verbalize that many of her symptoms could potentially be reversed w/ adequate prep and endoscopic intervention, and that she will be at very high risk for hospital readmission. She understands that we are not recommending advancement beyond clear liquid diet, and that she will be dependent on TPN for nutrition. She also understands that we will not continue to prescribe opioids beyond this 7 day taper and that her pain may be difficult to manage in the outpatient setting. She is still choosing to decline prep and d/c home. Spoke w/ mother Staci ([phone]) w/ Jenni's permission and she is appropriately concerned about patient. She wishes that Jneni would make a different decision - states she will come to her house and \"eat all day and vomit\" and that she lies to her and to medical providers. Patient is potentially interested in being seen at [facility] which she can d/w " her PCP. We are not making a referral currently as we are able to offer her appropriate intervention if she agrees to the recommended pre-procedure instructions.     #Acute on Chronic Pain. Patient has a history of opioid misuse. Last PCP visit 2/2018 discussed short Norco script and establishing w/ pain management. Per review of [place] , it appears that patient is receiving a monthly Percocet prescription from Carolynn Bach CNP w/ [facility] in [place]. Patient should stop the Percocet while she is completing this short Dilaudid taper after prolonged hospital stay, and then f/u w/ her pain provider for further management. Do not favor chronic opioids in this patient given that as above there could potentially be a reversible etiology of her abdominal pain, but will defer to her outpatient providers.     #Fever, resolved. Developed fevers evening of 5/18 w/ T max 103 on 5/19 AM, and then afebrile since later that evening. Started IV vanco on 5/18, as well as cefepime on 5/18 which was changed to Zosyn on 5/19. Blood cx 5/18 and 5/19 negative. UA neg. CXR neg. C diff neg. Procal 0.66. WBC 12.4-->9.1. Lactate wnl. Concern for bacteremia or fungemia on TPN w/ PICC and frequently disconnecting/leaving unit but cultures unrevealing. No localizing infectious complaints at this time. Vanco stopped 5/21 and Zosyn 5/22. No recurrence of fevers. If patient were to represent febrile she should have both bacterial and fungal blood cultures drawn, as well as procalcitonin to compare trend.      #Chronic Protein Calorie Malnutrition. Was prescribed but not regularly getting TPN PTA d/t compliance issues. Was 132 lb on 4/1 d/c and 122 lb on 5/3 d/c --> 110 lb on admit here --> 125 lb currently. Will continue TPN/lipids as OP w/ new home care agency in place.     #Discharge Pain Plan:   - During her hospitalization, Jenni experienced pain due to acute on chronic abdominal pain 2/2 SBO.  The pain plan for discharge  was discussed with Jenni and her parents and the plan was created in a collaborative fashion.    - Opioids prescribed on discharge: Dilaudid solution   - Duration of opioids after discharge: A 7 day taper was provided  - Bowel regimen: miralax    Consultations This Hospital Stay   GI, Colorectal Surgery, Nutrition, Care Coordination     Physical Exam   Blood pressure 107/90, pulse 94, temperature 98.1  F (36.7  C), temperature source Oral, resp. rate 16, weight 56.9 kg (125 lb 8 oz), SpO2 96 %, not currently breastfeeding.  GENERAL: Alert and oriented x 3. Sitting up in bed, appears comfortable. Conversant.    HEENT: Anicteric sclera. Mucous membranes moist.   CV: RRR. S1, S2. No murmurs appreciated.   RESPIRATORY: Effort normal on room air. Lungs CTAB with no wheezing, rales, rhonchi.   GI: Abdomen soft and non distended, bowel sounds present. Mild generalized tenderness. No rebound or guarding.   NEUROLOGICAL: No focal deficits. Moves all extremities.    EXTREMITIES: No peripheral edema. Intact bilateral pedal pulses.   SKIN: No jaundice. No rashes.     Significant Results and Procedures   Abd/pelvis CT 5/11/18:  IMPRESSION:   1. Long segment dilated loops of proximal small bowel, similar in  their degree of distention compared with 3/28/2018, with transition  point again seen at the small bowel anastomosis in the pelvis.  2. Increased extensive moderate small bowel and pancolonic wall  thickening. This may be related to venous congestion in the setting of  bowel obstruction or possibly infectious process. Superimposed bowel  ischemia cannot be excluded, but thought less likely. No evidence of  bowel perforation.    Pending Results   None    Primary Care Physician   Reji Avery    Discharge Disposition   Discharged to home w/ [facility] services  Condition at discharge: Stable    Code Status   Full    Discharge Procedure Orders  Home infusion referral     IV access   Order Comments: **Ordering Provider MUST  call/page Care Coordinator/ to discuss arranging this service**    You are going home with the following vascular access device: PICC.  This will be managed by [facility].     Reason for your hospital stay   Order Comments: Admitted with partial small bowel obstruction. Endoscopic intervention recommended but not achieved due to inadequate prep.     Follow Up and recommended labs and tests   Order Comments: See PCP next 5-7 days for hospital follow up. See Dr. Vigil in GI clinic for endoscopic intervention if willing to prep via NG/NJ.     Activity   Order Comments: Your activity upon discharge: activity as tolerated   Order Specific Question Answer Comments   Is discharge order? Yes      When to contact your care team   Order Comments: Notify PCP if worsening/changing abdominal pain, vomiting, inability to have BM.     IV access   Order Comments: You are going home with the following vascular access device: PICC.     Full Code     Diet   Order Comments: Follow this diet upon discharge: clear liquid diet, TPN/lipids   Order Specific Question Answer Comments   Is discharge order? Yes           Discharge Medications   Current Discharge Medication List      START taking these medications    Details   HYDROmorphone, STANDARD CONC, (DILAUDID) 1 MG/ML LIQD liquid Take 2 mg PO Q4hrs PRN x 2 days, then 2 mg Q8hrs PRN x 2 days, then 2 mg BID PRN x 2 days, then 2 mg daily PRN x 2 days, then stop.  Qty: 48 mL, Refills: 0    Associated Diagnoses: Small bowel obstruction      lipids (INTRALIPID) 20 % infusion Inject 250 mLs into the vein every 24 hours Administer through a 1.2 micron filter Requires container change every 12 hours and tubing change every 24 hours.    Associated Diagnoses: Failure to thrive in adult      ondansetron (ZOFRAN ODT) 4 MG ODT tab Take 1 tablet (4 mg) by mouth every 6 hours as needed for nausea  Qty: 20 tablet, Refills: 0    Associated Diagnoses: Small bowel obstruction       polyethylene glycol (MIRALAX) powder Take 17 g (1 capful) by mouth 2 times daily as needed for constipation  Qty: 255 g, Refills: 0    Associated Diagnoses: Small bowel obstruction         STOP taking these medications       ondansetron (ZOFRAN) 4 MG tablet Comments:   Reason for Stopping:         oxyCODONE-acetaminophen (PERCOCET) 5-325 MG per tablet Comments:   Reason for Stopping:               Time Spent on this Encounter   45 minutes spent in discharge.    Patient was evaluated on day of discharge by attending physician who agrees with plan of care.    Spoke with patient's father, mother in regards to this discharge. Page also out to PCP Dr. Avery to discuss.      Arleen Santiago PA-C  Hospitalist Service  [facility]  Pager: [phone]

## 2018-05-23 NOTE — PROGRESS NOTES
RNCC sent referral to [facility] for TPN needs and followed up with phone calls to [facility] today.    [facility] [place], [address] [place] [address]  Ph: [phone]  Fax: [phone]     Bunny at [facility] met with pt to let her know they will follow her and they are seeking an RN agency for the home needs for the picc cares.  RNCC will update bunny @ [phone] on dc plan and fax orders when available.      Per discussion in interdisciplinary rounds, the primary team states that the patient continues to require hospitalization for the following reasons: pt now agreeing to the oral prep for possible scopes tomorrow and may be ready for dc after that scope if completed tomorrow or on Friday.  Rncc will continue to follow.      5/23/2018 3:49 PM MD team states that pt is going to dc to home this evening instead of remaining in pt.  Pt has port and will require TPN at home.  Team agreed that pt can dc with no TPN over night and [facility] will resume TPN tomorrow and pt will have an RN visit her home tomorrow for teaching of their pump and reinforcement of education.  RNCC faxed the TPN order to [facility] [place] and pts MAR/AVS.  RNCC spoke to pt on her cell phone number to discuss dc with TPN, pt agrees and states she set everything up to be delivered to her mothers home where she is staying.  RNCC updated Bedside RN on plan for dc and Picc needing to be flushed as it will not be used until tomorrow.  No other dc needs known.        Kathe Rogers, CINDYCC, BSN    [facility]    Medicine Group  [address] [place] [address]    [email or web address]  [email or web address]    Office: [phone] Pager: [phone]

## 2018-05-23 NOTE — PLAN OF CARE
Problem: Patient Care Overview  Goal: Plan of Care/Patient Progress Review  Outcome: No Change  T.max 99.0.  Rest VSS.  NPO now.  Cycled TPN/lipids.   Continues to request PO dilaudid every 2 hours but states that dose is sufficient.  Up walking earlier in shift but sleepy later and resting comfortably.  Getting scheduled Miralax with water prior and post dose.  No BM tonight.  Patient staes that she probably won't go until morning. Cooperative with cares.  Continue to monitor, support.

## 2018-05-23 NOTE — PLAN OF CARE
Problem: Patient Care Overview  Goal: Plan of Care/Patient Progress Review  Outcome: No Change  At start of shift Jenni expressed concern that she was having increasing pain due to evening nurse did not wake her and give her pain med. She was not waken due to appearing to be sleeping very soundly and almost lethargic. I did not awaken her at 1:30 due to how solemnly she was sleeping.  She called me in at 0200 and expressed concern that for her last couple of doses her pain meds were not every 2hrs. Told her that she was due ba lx at 0300 and I was going to bring it in then. Instead I brought her ba lax in early. Did explain to her the ba lx should be drunken at once followed by the 8oz of water I also brought her. At 0600 her cup of ba lx was still at bedside with the water. She stated she sipped some. Reinforced with her how best to drink it for best results and she stated that she was sleeping. Will continue to monitor and report any significant changes.TPN/IL continue to infuse per picc along with a tko. She is aware that she is not to eat anything due to her NPO status. Also she stated no stool this night.

## 2018-05-23 NOTE — PLAN OF CARE
"Problem: Patient Care Overview  Goal: Plan of Care/Patient Progress Review  Outcome: No Change  /90 (BP Location: Left arm)  Pulse 94  Temp 98.1  F (36.7  C) (Oral)  Resp 16  Wt 56.9 kg (125 lb 8 oz)  SpO2 96%  BMI 18.53 kg/m2     1173-3004: Afebrile, BPs soft per baseline, OVSS on RA. Patient reporting lower abdominal pain, stating \"feels like labor pain\". Patient requesting PRN dilaudid 2 mg Q2H. Patient denies nausea. NPO all shift for possible colonoscopy. However, procedure was canceled as patient refused prep as MD had ordered. No BM since 5/22. Voiding. R PICC SL. Receives cycled TPN. Will have home care RN from [facility] to her mother's house tomorrow to set up TPN. Medical team decided to allow patient to DC as she was not compliant with bowel prep regimen. Patient elected to drive herself home. LES Coffey notified that patient was planning to drive after receiving dilaudid Q2H all shift. She stated since the patient was not opioid naive and had not received a dose in the last 2 hours, it was okay for her to drive. AVS reviewed with patient including risks of driving on opioids. Patient cautioned by writer that driving on opioids could result in DUI. Jenni was alert and oriented upon DC, ambulated independently to pharmacy to  Rx. DC to her mother's home.     Problem: Bowel Obstruction (Adult)  Goal: Signs and Symptoms of Listed Potential Problems Will be Absent, Minimized or Managed (Bowel Obstruction)  Signs and symptoms of listed potential problems will be absent, minimized or managed by discharge/transition of care (reference Bowel Obstruction (Adult) CPG).   Outcome: No Change  Patient reports is not stooling. Is not consistently taking scheduled Miralax per GI recommendations.       "

## 2018-05-24 ENCOUNTER — CARE COORDINATION (OUTPATIENT)
Dept: CARE COORDINATION | Facility: CLINIC | Age: 43
End: 2018-05-24

## 2018-05-24 NOTE — PROGRESS NOTES
[facility]: Post-Discharge Note  SITUATION                                                      Admission:    Admission Date: 05/11/18   Reason for Admission: partial small bowel obstruction. Endoscopic intervention recommended but not achieved due to inadequate prep  Discharge:    Discharge Date: 05/23/18   Discharge Diagnosis: partial small bowel obstruction. Endoscopic intervention recommended but not achieved due to inadequate prep   Discharge Service: Internal medicine   Discharge Plan: See PCP next 5-7 days for hospital follow up  Follow up in pain clinic next 5-7 days   Follow up in GI clinic w/ Dr. Vigil if willing to prep via NG/NJ for endoscopic intervention      BACKGROUND                                                      Rachel Gerhardt is a 42 year old woman with a history of ovarian CA s/p chemo-rads, recurrent SBO s/p ex lap w/ 60 cm small bowel resected in 5/2017 for strictures, opioid dependence w/ prior suboxone use, and malnutrition requiring TPN who was admitted on 5/11 w/ recurrent SBO.    ASSESSMENT      Patient reports symptoms are: Worsening (Patient reports vomiting and worse abdominal pain (8/10))  Does the patient have all of their medications?: Yes  Does patient know what their new medications are for?: Yes  Does paient have a follow-up appointment scheduled?: No  Does patient have any other questions or concerns?: No      PLAN                                                      Outpatient Plan:  Routing to GI and urged patient to contact PCP as AVS suggests for Vomiting and Increased abdominal pain    No future appointments.        Marj Kat RN

## 2018-05-24 NOTE — PROGRESS NOTES
Marj  There is no need for her to see me in clinic  She will likely need admission for bowel preparation per an NJ  If she is unable to unwilling to do this I can not perform a retrograde enteroscopy as previously demonstrated    Omero Vigil MD PhD  Director of Endoscopy  Associate Professor of Medicine, Surgery and Pediatrics  Interventional and Therapeutic Endoscopy    [facility]  Division of Gastroenterology and Hepatology  [facility]  [address], [place] [address]    New Consultations  [phone]  Procedure Scheduling [phone]  Clinical Nurse Coordinator [phone]  Clinical Fax   [phone]  Administrative   [phone]  Administrative Fax  [phone]

## 2018-05-25 LAB
BACTERIA SPEC CULT: NO GROWTH
Lab: NORMAL
SPECIMEN SOURCE: NORMAL

## 2018-06-08 ENCOUNTER — TELEPHONE (OUTPATIENT)
Dept: SURGERY | Facility: CLINIC | Age: 43
End: 2018-06-08

## 2018-06-08 NOTE — TELEPHONE ENCOUNTER
[facility] Call Center    Phone Message    May a detailed message be left on voicemail: yes    Reason for Call: Other: Lamar has been trying to get a hold of pt all week, in regard to her TPN. Originally, pt had refused nurse visits for PIC line changes/lab visits.  Lamar has not been able to get a hold of pt nor has pt called back at all this week and they do not call on the weekends.  They wanted to updated Dr. Moore, as they did not know if pt was in hospital, etc.     Action Taken: Other: [facility] ( C&R)

## 2018-06-08 NOTE — TELEPHONE ENCOUNTER
[facility] Call Center    Phone Message    May a detailed message be left on voicemail: yes    Reason for Call: Other: Adarsh at [facility] wanted to update Teresa's staff of current situation with pt.  He states they have attempted to meet with pt but that she has ignored their calls and has been noncompliant with nursing. He states she was admitted 5/24 for TPN, and was expected to have lab draws and other care. Since they have been unable to see her, they have not done any labs, and they think the pharmacy will not send any supplies until this happens. Please advise.    Action Taken: Message routed to:  [facility] ([facility] Adult

## 2018-06-12 ENCOUNTER — TELEPHONE (OUTPATIENT)
Dept: SURGERY | Facility: CLINIC | Age: 43
End: 2018-06-12

## 2018-06-12 NOTE — TELEPHONE ENCOUNTER
Health Call Center    Phone Message    May a detailed message be left on voicemail: yes    Reason for Call: Other: Lamar with [facility] pharmacy is calling to let you know patient is not complying with TPN order and has fallen  out of contact with the home infusion company, she reported that the patient has missed last 4 to 5 days will not answer door or phone.  She also stated that the patient still hac Pic line as well and its not safe.  Please follow up with they need to do? You can call Lamar direct [phone] or the pharmacy main line [phone].     Action Taken: Message routed to:  [facility] ([facility]): Colon Rectal

## 2018-06-13 NOTE — TELEPHONE ENCOUNTER
Called and left a message with the pharmacist at [facility] infusion with this nurses direct contact information.

## 2018-06-13 NOTE — TELEPHONE ENCOUNTER
[facility] Call Center    Phone Message    May a detailed message be left on voicemail: yes    Reason for Call: picc line in place & patient is at risk of infection since she has picc and it has not been removed. Lamar at [facility] pharmacy is going to put out a vulnerable adult.   Action Taken: Message routed to:  [facility] ([facility]): COLON RECTAL

## 2018-06-17 ENCOUNTER — HOSPITAL ENCOUNTER (INPATIENT)
Facility: CLINIC | Age: 43
LOS: 3 days | Discharge: HOME HEALTH | DRG: 393 | End: 2018-06-20
Attending: EMERGENCY MEDICINE | Admitting: INTERNAL MEDICINE
Payer: COMMERCIAL

## 2018-06-17 ENCOUNTER — APPOINTMENT (OUTPATIENT)
Dept: GENERAL RADIOLOGY | Facility: CLINIC | Age: 43
DRG: 393 | End: 2018-06-17
Payer: COMMERCIAL

## 2018-06-17 ENCOUNTER — APPOINTMENT (OUTPATIENT)
Dept: CT IMAGING | Facility: CLINIC | Age: 43
DRG: 393 | End: 2018-06-17
Attending: EMERGENCY MEDICINE
Payer: COMMERCIAL

## 2018-06-17 DIAGNOSIS — R62.7 FAILURE TO THRIVE IN ADULT: Primary | ICD-10-CM

## 2018-06-17 DIAGNOSIS — R10.9 ABDOMINAL PAIN, UNSPECIFIED ABDOMINAL LOCATION: ICD-10-CM

## 2018-06-17 DIAGNOSIS — R10.9 STOMACH ACHE: ICD-10-CM

## 2018-06-17 DIAGNOSIS — K56.609 SMALL BOWEL OBSTRUCTION (H): ICD-10-CM

## 2018-06-17 DIAGNOSIS — C53.9 MALIGNANT NEOPLASM OF CERVIX, UNSPECIFIED SITE (H): ICD-10-CM

## 2018-06-17 LAB
ALBUMIN SERPL-MCNC: 2.8 G/DL (ref 3.4–5)
ALBUMIN UR-MCNC: 10 MG/DL
ALP SERPL-CCNC: 100 U/L (ref 40–150)
ALT SERPL W P-5'-P-CCNC: 32 U/L (ref 0–50)
AMPHETAMINES UR QL SCN: NEGATIVE
ANION GAP SERPL CALCULATED.3IONS-SCNC: 9 MMOL/L (ref 3–14)
ANION GAP SERPL CALCULATED.3IONS-SCNC: 9 MMOL/L (ref 3–14)
APPEARANCE UR: CLEAR
AST SERPL W P-5'-P-CCNC: 12 U/L (ref 0–45)
BACTERIA #/AREA URNS HPF: ABNORMAL /HPF
BARBITURATES UR QL: NEGATIVE
BASOPHILS # BLD AUTO: 0 10E9/L (ref 0–0.2)
BASOPHILS NFR BLD AUTO: 0.1 %
BENZODIAZ UR QL: NEGATIVE
BILIRUB SERPL-MCNC: 0.3 MG/DL (ref 0.2–1.3)
BILIRUB UR QL STRIP: NEGATIVE
BUN SERPL-MCNC: 15 MG/DL (ref 7–30)
BUN SERPL-MCNC: 22 MG/DL (ref 7–30)
CALCIUM SERPL-MCNC: 8 MG/DL (ref 8.5–10.1)
CALCIUM SERPL-MCNC: 8.4 MG/DL (ref 8.5–10.1)
CANNABINOIDS UR QL SCN: NEGATIVE
CHLORIDE SERPL-SCNC: 101 MMOL/L (ref 94–109)
CHLORIDE SERPL-SCNC: 103 MMOL/L (ref 94–109)
CO2 SERPL-SCNC: 27 MMOL/L (ref 20–32)
CO2 SERPL-SCNC: 31 MMOL/L (ref 20–32)
COCAINE UR QL: NEGATIVE
COLOR UR AUTO: YELLOW
CREAT BLD-MCNC: 0.7 MG/DL (ref 0.52–1.04)
CREAT SERPL-MCNC: 0.53 MG/DL (ref 0.52–1.04)
CREAT SERPL-MCNC: 0.67 MG/DL (ref 0.52–1.04)
DIFFERENTIAL METHOD BLD: NORMAL
EOSINOPHIL # BLD AUTO: 0.1 10E9/L (ref 0–0.7)
EOSINOPHIL NFR BLD AUTO: 1.8 %
ERYTHROCYTE [DISTWIDTH] IN BLOOD BY AUTOMATED COUNT: 14.8 % (ref 10–15)
ETHANOL UR QL SCN: NEGATIVE
GFR SERPL CREATININE-BSD FRML MDRD: >90 ML/MIN/1.7M2
GLUCOSE BLDC GLUCOMTR-MCNC: 143 MG/DL (ref 70–99)
GLUCOSE SERPL-MCNC: 106 MG/DL (ref 70–99)
GLUCOSE SERPL-MCNC: 88 MG/DL (ref 70–99)
GLUCOSE UR STRIP-MCNC: NEGATIVE MG/DL
HCT VFR BLD AUTO: 40.5 % (ref 35–47)
HGB BLD-MCNC: 13.3 G/DL (ref 11.7–15.7)
HGB UR QL STRIP: NEGATIVE
IMM GRANULOCYTES # BLD: 0 10E9/L (ref 0–0.4)
IMM GRANULOCYTES NFR BLD: 0.4 %
KETONES UR STRIP-MCNC: NEGATIVE MG/DL
LACTATE BLD-SCNC: 0.8 MMOL/L (ref 0.7–2)
LEUKOCYTE ESTERASE UR QL STRIP: ABNORMAL
LIPASE SERPL-CCNC: 95 U/L (ref 73–393)
LYMPHOCYTES # BLD AUTO: 1.7 10E9/L (ref 0.8–5.3)
LYMPHOCYTES NFR BLD AUTO: 21.8 %
MAGNESIUM SERPL-MCNC: 1.5 MG/DL (ref 1.6–2.3)
MAGNESIUM SERPL-MCNC: 3.1 MG/DL (ref 1.6–2.3)
MCH RBC QN AUTO: 32.3 PG (ref 26.5–33)
MCHC RBC AUTO-ENTMCNC: 32.8 G/DL (ref 31.5–36.5)
MCV RBC AUTO: 98 FL (ref 78–100)
MONOCYTES # BLD AUTO: 0.7 10E9/L (ref 0–1.3)
MONOCYTES NFR BLD AUTO: 9.4 %
MUCOUS THREADS #/AREA URNS LPF: PRESENT /LPF
NEUTROPHILS # BLD AUTO: 5.2 10E9/L (ref 1.6–8.3)
NEUTROPHILS NFR BLD AUTO: 66.5 %
NITRATE UR QL: NEGATIVE
NRBC # BLD AUTO: 0 10*3/UL
NRBC BLD AUTO-RTO: 0 /100
OPIATES UR QL SCN: POSITIVE
PH UR STRIP: 7 PH (ref 5–7)
PHOSPHATE SERPL-MCNC: 2.7 MG/DL (ref 2.5–4.5)
PHOSPHATE SERPL-MCNC: 2.9 MG/DL (ref 2.5–4.5)
PLATELET # BLD AUTO: 337 10E9/L (ref 150–450)
POTASSIUM SERPL-SCNC: 3.4 MMOL/L (ref 3.4–5.3)
POTASSIUM SERPL-SCNC: 3.5 MMOL/L (ref 3.4–5.3)
PROT SERPL-MCNC: 6.2 G/DL (ref 6.8–8.8)
RBC # BLD AUTO: 4.12 10E12/L (ref 3.8–5.2)
RBC #/AREA URNS AUTO: 1 /HPF (ref 0–2)
SODIUM SERPL-SCNC: 137 MMOL/L (ref 133–144)
SODIUM SERPL-SCNC: 143 MMOL/L (ref 133–144)
SOURCE: ABNORMAL
SP GR UR STRIP: 1 (ref 1–1.03)
SQUAMOUS #/AREA URNS AUTO: 19 /HPF (ref 0–1)
UROBILINOGEN UR STRIP-MCNC: NORMAL MG/DL (ref 0–2)
WBC # BLD AUTO: 7.9 10E9/L (ref 4–11)
WBC #/AREA URNS AUTO: 4 /HPF (ref 0–5)

## 2018-06-17 PROCEDURE — 36415 COLL VENOUS BLD VENIPUNCTURE: CPT | Performed by: STUDENT IN AN ORGANIZED HEALTH CARE EDUCATION/TRAINING PROGRAM

## 2018-06-17 PROCEDURE — 96375 TX/PRO/DX INJ NEW DRUG ADDON: CPT | Performed by: EMERGENCY MEDICINE

## 2018-06-17 PROCEDURE — 12000001 ZZH R&B MED SURG/OB UMMC

## 2018-06-17 PROCEDURE — 25000128 H RX IP 250 OP 636: Performed by: EMERGENCY MEDICINE

## 2018-06-17 PROCEDURE — 25000132 ZZH RX MED GY IP 250 OP 250 PS 637: Performed by: STUDENT IN AN ORGANIZED HEALTH CARE EDUCATION/TRAINING PROGRAM

## 2018-06-17 PROCEDURE — 83735 ASSAY OF MAGNESIUM: CPT | Performed by: STUDENT IN AN ORGANIZED HEALTH CARE EDUCATION/TRAINING PROGRAM

## 2018-06-17 PROCEDURE — 87086 URINE CULTURE/COLONY COUNT: CPT | Performed by: EMERGENCY MEDICINE

## 2018-06-17 PROCEDURE — 3E0436Z INTRODUCTION OF NUTRITIONAL SUBSTANCE INTO CENTRAL VEIN, PERCUTANEOUS APPROACH: ICD-10-PCS | Performed by: INTERNAL MEDICINE

## 2018-06-17 PROCEDURE — 25000125 ZZHC RX 250: Performed by: INTERNAL MEDICINE

## 2018-06-17 PROCEDURE — 84100 ASSAY OF PHOSPHORUS: CPT | Performed by: EMERGENCY MEDICINE

## 2018-06-17 PROCEDURE — 85025 COMPLETE CBC W/AUTO DIFF WBC: CPT | Performed by: EMERGENCY MEDICINE

## 2018-06-17 PROCEDURE — 25000128 H RX IP 250 OP 636: Performed by: STUDENT IN AN ORGANIZED HEALTH CARE EDUCATION/TRAINING PROGRAM

## 2018-06-17 PROCEDURE — 80048 BASIC METABOLIC PNL TOTAL CA: CPT | Performed by: STUDENT IN AN ORGANIZED HEALTH CARE EDUCATION/TRAINING PROGRAM

## 2018-06-17 PROCEDURE — 80053 COMPREHEN METABOLIC PANEL: CPT | Performed by: EMERGENCY MEDICINE

## 2018-06-17 PROCEDURE — 74177 CT ABD & PELVIS W/CONTRAST: CPT

## 2018-06-17 PROCEDURE — 83690 ASSAY OF LIPASE: CPT | Performed by: EMERGENCY MEDICINE

## 2018-06-17 PROCEDURE — 80320 DRUG SCREEN QUANTALCOHOLS: CPT | Performed by: EMERGENCY MEDICINE

## 2018-06-17 PROCEDURE — 82565 ASSAY OF CREATININE: CPT

## 2018-06-17 PROCEDURE — 96376 TX/PRO/DX INJ SAME DRUG ADON: CPT | Performed by: EMERGENCY MEDICINE

## 2018-06-17 PROCEDURE — 25000128 H RX IP 250 OP 636

## 2018-06-17 PROCEDURE — 25000125 ZZHC RX 250: Performed by: STUDENT IN AN ORGANIZED HEALTH CARE EDUCATION/TRAINING PROGRAM

## 2018-06-17 PROCEDURE — 81001 URINALYSIS AUTO W/SCOPE: CPT | Performed by: EMERGENCY MEDICINE

## 2018-06-17 PROCEDURE — 40000558 ZZH STATISTIC CVC DRESSING CHANGE

## 2018-06-17 PROCEDURE — 96374 THER/PROPH/DIAG INJ IV PUSH: CPT | Mod: 59 | Performed by: EMERGENCY MEDICINE

## 2018-06-17 PROCEDURE — 00000146 ZZHCL STATISTIC GLUCOSE BY METER IP

## 2018-06-17 PROCEDURE — 83735 ASSAY OF MAGNESIUM: CPT | Performed by: EMERGENCY MEDICINE

## 2018-06-17 PROCEDURE — 99285 EMERGENCY DEPT VISIT HI MDM: CPT | Mod: 25 | Performed by: EMERGENCY MEDICINE

## 2018-06-17 PROCEDURE — 99223 1ST HOSP IP/OBS HIGH 75: CPT | Mod: AI | Performed by: INTERNAL MEDICINE

## 2018-06-17 PROCEDURE — 80307 DRUG TEST PRSMV CHEM ANLYZR: CPT | Performed by: EMERGENCY MEDICINE

## 2018-06-17 PROCEDURE — 84100 ASSAY OF PHOSPHORUS: CPT | Performed by: STUDENT IN AN ORGANIZED HEALTH CARE EDUCATION/TRAINING PROGRAM

## 2018-06-17 PROCEDURE — 40000986 XR ABDOMEN PORT 1 VW

## 2018-06-17 PROCEDURE — 83605 ASSAY OF LACTIC ACID: CPT | Performed by: EMERGENCY MEDICINE

## 2018-06-17 PROCEDURE — 99284 EMERGENCY DEPT VISIT MOD MDM: CPT | Mod: Z6 | Performed by: EMERGENCY MEDICINE

## 2018-06-17 RX ORDER — LORAZEPAM 2 MG/ML
0.5 INJECTION INTRAMUSCULAR ONCE
Status: COMPLETED | OUTPATIENT
Start: 2018-06-17 | End: 2018-06-17

## 2018-06-17 RX ORDER — LIDOCAINE 4 G/G
2 PATCH TOPICAL
Status: DISCONTINUED | OUTPATIENT
Start: 2018-06-17 | End: 2018-06-20 | Stop reason: HOSPADM

## 2018-06-17 RX ORDER — MAGNESIUM SULFATE HEPTAHYDRATE 40 MG/ML
4 INJECTION, SOLUTION INTRAVENOUS EVERY 4 HOURS PRN
Status: DISCONTINUED | OUTPATIENT
Start: 2018-06-17 | End: 2018-06-20 | Stop reason: HOSPADM

## 2018-06-17 RX ORDER — IOPAMIDOL 755 MG/ML
70 INJECTION, SOLUTION INTRAVASCULAR ONCE
Status: COMPLETED | OUTPATIENT
Start: 2018-06-17 | End: 2018-06-17

## 2018-06-17 RX ORDER — DEXTROSE, SOYBEAN OIL, ELECTROLYTES, LYSINE, PHENYLALANINE, LEUCINE, VALINE, THREONINE, METHIONINE, ISOLEUCINE, TRYPTOPHAN, ALANINE, ARGININE, GLYCINE, PROLINE, HISTIDINE, GLUTAMIC ACID, SERINE, ASPARTIC ACID AND TYROSINE 9.8; 3.9; 239; 174; 147; 96; 29; 263; 231; 231; 213; 164; 164; 164; 55; 467; 330; 231; 199; 199; 164; 131; 99; 6.7 G/100ML; G/100ML; MG/100ML; MG/100ML; MG/100ML; MG/100ML; MG/100ML; MG/100ML; MG/100ML; MG/100ML; MG/100ML; MG/100ML; MG/100ML; MG/100ML; MG/100ML; MG/100ML; MG/100ML; MG/100ML; MG/100ML; MG/100ML; MG/100ML; MG/100ML; MG/100ML; MG/100ML
INJECTION, EMULSION INTRAVENOUS CONTINUOUS
Status: DISPENSED | OUTPATIENT
Start: 2018-06-17 | End: 2018-06-18

## 2018-06-17 RX ORDER — HYDROMORPHONE HYDROCHLORIDE 1 MG/ML
0.5 INJECTION, SOLUTION INTRAMUSCULAR; INTRAVENOUS; SUBCUTANEOUS ONCE
Status: COMPLETED | OUTPATIENT
Start: 2018-06-17 | End: 2018-06-17

## 2018-06-17 RX ORDER — LIDOCAINE 40 MG/G
CREAM TOPICAL
Status: DISCONTINUED | OUTPATIENT
Start: 2018-06-17 | End: 2018-06-20 | Stop reason: HOSPADM

## 2018-06-17 RX ORDER — LORAZEPAM 2 MG/ML
INJECTION INTRAMUSCULAR
Status: COMPLETED
Start: 2018-06-17 | End: 2018-06-17

## 2018-06-17 RX ORDER — ONDANSETRON 2 MG/ML
4 INJECTION INTRAMUSCULAR; INTRAVENOUS ONCE
Status: COMPLETED | OUTPATIENT
Start: 2018-06-17 | End: 2018-06-17

## 2018-06-17 RX ORDER — MORPHINE SULFATE 4 MG/ML
4 INJECTION, SOLUTION INTRAMUSCULAR; INTRAVENOUS ONCE
Status: COMPLETED | OUTPATIENT
Start: 2018-06-17 | End: 2018-06-17

## 2018-06-17 RX ORDER — SODIUM CHLORIDE, SODIUM LACTATE, POTASSIUM CHLORIDE, CALCIUM CHLORIDE 600; 310; 30; 20 MG/100ML; MG/100ML; MG/100ML; MG/100ML
INJECTION, SOLUTION INTRAVENOUS CONTINUOUS
Status: DISCONTINUED | OUTPATIENT
Start: 2018-06-17 | End: 2018-06-17

## 2018-06-17 RX ORDER — HYDROMORPHONE HYDROCHLORIDE 1 MG/ML
0.4 INJECTION, SOLUTION INTRAMUSCULAR; INTRAVENOUS; SUBCUTANEOUS EVERY 4 HOURS PRN
Status: DISCONTINUED | OUTPATIENT
Start: 2018-06-17 | End: 2018-06-19

## 2018-06-17 RX ORDER — LORAZEPAM 2 MG/ML
0.5 INJECTION INTRAMUSCULAR EVERY 4 HOURS PRN
Status: DISCONTINUED | OUTPATIENT
Start: 2018-06-17 | End: 2018-06-20 | Stop reason: HOSPADM

## 2018-06-17 RX ORDER — LORAZEPAM 2 MG/ML
0.5 INJECTION INTRAMUSCULAR
Status: COMPLETED | OUTPATIENT
Start: 2018-06-17 | End: 2018-06-17

## 2018-06-17 RX ORDER — NALOXONE HYDROCHLORIDE 0.4 MG/ML
.1-.4 INJECTION, SOLUTION INTRAMUSCULAR; INTRAVENOUS; SUBCUTANEOUS
Status: DISCONTINUED | OUTPATIENT
Start: 2018-06-17 | End: 2018-06-20 | Stop reason: HOSPADM

## 2018-06-17 RX ORDER — POTASSIUM CL/LIDO/0.9 % NACL 10MEQ/0.1L
10 INTRAVENOUS SOLUTION, PIGGYBACK (ML) INTRAVENOUS
Status: DISCONTINUED | OUTPATIENT
Start: 2018-06-18 | End: 2018-06-18

## 2018-06-17 RX ORDER — ONDANSETRON 2 MG/ML
4 INJECTION INTRAMUSCULAR; INTRAVENOUS EVERY 6 HOURS PRN
Status: COMPLETED | OUTPATIENT
Start: 2018-06-17 | End: 2018-06-20

## 2018-06-17 RX ORDER — OXYCODONE AND ACETAMINOPHEN 5; 325 MG/1; MG/1
1 TABLET ORAL
Status: ON HOLD | COMMUNITY
End: 2018-11-02

## 2018-06-17 RX ADMIN — Medication 0.4 MG: at 21:14

## 2018-06-17 RX ADMIN — Medication 0.5 MG: at 11:08

## 2018-06-17 RX ADMIN — ONDANSETRON 4 MG: 2 INJECTION INTRAMUSCULAR; INTRAVENOUS at 22:01

## 2018-06-17 RX ADMIN — Medication 0.5 MG: at 08:56

## 2018-06-17 RX ADMIN — SODIUM CHLORIDE 1000 ML: 9 INJECTION, SOLUTION INTRAVENOUS at 12:33

## 2018-06-17 RX ADMIN — ONDANSETRON 4 MG: 2 INJECTION INTRAMUSCULAR; INTRAVENOUS at 07:50

## 2018-06-17 RX ADMIN — MORPHINE SULFATE 4 MG: 4 INJECTION, SOLUTION INTRAMUSCULAR; INTRAVENOUS at 11:47

## 2018-06-17 RX ADMIN — Medication 0.5 MG: at 13:21

## 2018-06-17 RX ADMIN — SODIUM CHLORIDE, POTASSIUM CHLORIDE, SODIUM LACTATE AND CALCIUM CHLORIDE: 600; 310; 30; 20 INJECTION, SOLUTION INTRAVENOUS at 16:53

## 2018-06-17 RX ADMIN — IOPAMIDOL 70 ML: 755 INJECTION, SOLUTION INTRAVENOUS at 09:00

## 2018-06-17 RX ADMIN — LORAZEPAM 0.5 MG: 2 INJECTION INTRAMUSCULAR; INTRAVENOUS at 18:40

## 2018-06-17 RX ADMIN — SODIUM CHLORIDE 1000 ML: 9 INJECTION, SOLUTION INTRAVENOUS at 07:50

## 2018-06-17 RX ADMIN — LORAZEPAM 0.5 MG: 2 INJECTION INTRAMUSCULAR; INTRAVENOUS at 16:50

## 2018-06-17 RX ADMIN — LIDOCAINE HYDROCHLORIDE 10 ML: 20 JELLY TOPICAL at 12:25

## 2018-06-17 RX ADMIN — LORAZEPAM 0.5 MG: 2 INJECTION INTRAMUSCULAR; INTRAVENOUS at 13:02

## 2018-06-17 RX ADMIN — Medication 0.4 MG: at 16:28

## 2018-06-17 RX ADMIN — LORAZEPAM 0.5 MG: 2 INJECTION INTRAMUSCULAR; INTRAVENOUS at 12:31

## 2018-06-17 RX ADMIN — Medication 0.5 MG: at 07:51

## 2018-06-17 RX ADMIN — LORAZEPAM 0.5 MG: 2 INJECTION INTRAMUSCULAR at 16:50

## 2018-06-17 RX ADMIN — DEXTROSE, SOYBEAN OIL, ELECTROLYTES, LYSINE, PHENYLALANINE, LEUCINE, VALINE, THREONINE, METHIONINE, ISOLEUCINE, TRYPTOPHAN, ALANINE, ARGININE, GLYCINE, PROLINE, HISTIDINE, GLUTAMIC ACID, SERINE, ASPARTIC ACID AND TYROSINE
9.8; 3.9; 239; 174; 147; 96; 29; 263; 231; 231; 213; 164; 164; 164; 55; 467; 330; 231; 199; 199; 164; 131; 99; 6.7 INJECTION, EMULSION INTRAVENOUS at 20:25

## 2018-06-17 RX ADMIN — MAGNESIUM SULFATE IN WATER 4 G: 40 INJECTION, SOLUTION INTRAVENOUS at 17:46

## 2018-06-17 RX ADMIN — POLYETHYLENE GLYCOL 3350, SODIUM SULFATE ANHYDROUS, SODIUM BICARBONATE, SODIUM CHLORIDE, POTASSIUM CHLORIDE 4000 ML: 236; 22.74; 6.74; 5.86; 2.97 POWDER, FOR SOLUTION ORAL at 19:16

## 2018-06-17 ASSESSMENT — ACTIVITIES OF DAILY LIVING (ADL)
DRESS: 0-->INDEPENDENT
RETIRED_COMMUNICATION: 0-->UNDERSTANDS/COMMUNICATES WITHOUT DIFFICULTY
SWALLOWING: 0-->SWALLOWS FOODS/LIQUIDS WITHOUT DIFFICULTY
TOILETING: 0-->INDEPENDENT
RETIRED_EATING: 0-->INDEPENDENT
BATHING: 0-->INDEPENDENT

## 2018-06-17 ASSESSMENT — ENCOUNTER SYMPTOMS
PSYCHIATRIC NEGATIVE: 1
ABDOMINAL PAIN: 1
DIARRHEA: 0
FLANK PAIN: 0
FEVER: 0
VOMITING: 1
NAUSEA: 1
MUSCULOSKELETAL NEGATIVE: 1
EYES NEGATIVE: 1
HEADACHES: 0
SHORTNESS OF BREATH: 0

## 2018-06-17 NOTE — SUMMARY OF CARE
Pt arrived from the ED at 1535. Belongings consist of gray sweatshirt, black pants, underwear, tshirt, phone, . No wallet or other valuables noted by patient.

## 2018-06-17 NOTE — IP AVS SNAPSHOT
[address] [address]    Phone:  [phone]                                       After Visit Summary   6/17/2018    Rachel A Gerhardt    MRN: [id number]           After Visit Summary Signature Page     I have received my discharge instructions, and my questions have been answered. I have discussed any challenges I see with this plan with the nurse or doctor.    ..........................................................................................................................................  Patient/Patient Representative Signature      ..........................................................................................................................................  Patient Representative Print Name and Relationship to Patient    ..................................................               ................................................  Date                                            Time    ..........................................................................................................................................  Reviewed by Signature/Title    ...................................................              ..............................................  Date                                                            Time

## 2018-06-17 NOTE — LETTER
Transition Communication Hand-off for Care Transitions to Next Level of Care Provider    Name: Rachel A Gerhardt  : 1975  MRN #: [id number]  Primary Care Provider: Reji Avery     Primary Clinic: [facility] [address] [place] [address]     Reason for Hospitalization:  Small bowel obstruction [K56.609]  Abdominal pain, unspecified abdominal location [R10.9]  Malignant neoplasm of cervix, unspecified site (H) [C53.9]  Admit Date/Time: 2018  6:51 AM  Discharge Date: 18  Payor Source: Payor: MEDICA / Plan: MEDICA CHOICE / Product Type: Indemnity /     Readmission Assessment Measure (TOMAS) Risk Score/category: Elevated    Reason for Communication Hand-off Referral: Multiple providers/specialties    Discharge Plan: home on oral diet       Concern for non-adherence with plan of care:   Y/N yes  Already enrolled in Tele-monitoring program and name of program:  no  Follow-up specialty is recommended: Yes    Follow-up plan:  No future appointments.    Key Recommendations:  See AVS    Seema Gaspar RN, BSN  Care Coordinator [facility] [address]  Pager: [phone]  Phone: [phone]    AVS/Discharge Summary is the source of truth; this is a helpful guide for improved communication of patient story

## 2018-06-17 NOTE — PHARMACY-ADMISSION MEDICATION HISTORY
"Admission medication history interview status for the 6/17/2018 admission is complete. See Epic admission navigator for allergy information, pharmacy, prior to admission medications and immunization status.     Medication history interview sources:  Patient, [facility], [facility] [address] ([phone])    Changes made to PTA medication list (reason)  Added: Percocet   Deleted: Dilaudid (Finished taper), Miralax (patient reports not using)  Changed: None    Additional medication history information (including reliability of information, actions taken by pharmacist):  -Patient able to answer questions and was a good historian.  -Allergies: Confirmed  -Pharmacy: [facility] [address] ([phone])  -TPN: Patient recently \"fired\" from [facility] home infusion on Wed 6/13 for non-compliance. She states that she had \"plenty\" at home and has been using it herself since 6/13. It is unclear when she last used it, but upon asking her she stated that she had been using it the last \"couple days\". [facility] can be reached at [phone] or [place] with [facility] at [phone].    Prior to Admission medications    Medication Sig Last Dose Taking? Auth Provider   lipids (INTRALIPID) 20 % infusion Inject 250 mLs into the vein every 24 hours Administer through a 1.2 micron filter Requires container change every 12 hours and tubing change every 24 hours. 6/15/2018 Yes Arleen Santiago PA   ondansetron (ZOFRAN ODT) 4 MG ODT tab Take 1 tablet (4 mg) by mouth every 6 hours as needed for nausea 6/16/2018 at Unknown time Yes Arleen Santiago PA   oxyCODONE-acetaminophen (PERCOCET) 5-325 MG per tablet Take 1 tablet by mouth every 4 hours as needed for pain Max 6 tablets per day 6/16/2018 at Unknown time Yes Unknown, Entered By History   parenteral nutrition - PTA/DISCHARGE ORDER TPN formula managed by [facility] until 6/13. Patient now does not have TPNs being made by anyone. She had left overs at home and has been " using them since 6/13 Past Week at Unknown time Yes Unknown, Entered By History           Medication history completed by: Kimani Clemons, [employer] Student Pharmacist

## 2018-06-17 NOTE — IP AVS SNAPSHOT
MRN:[id number]                      After Visit Summary   6/17/2018    Rachel A Gerhardt    MRN: [id number]           Thank you!     Thank you for choosing [facility] for your care. Our goal is always to provide you with excellent care. Hearing back from our patients is one way we can continue to improve our services. Please take a few minutes to complete the written survey that you may receive in the mail after you visit with us. Thank you!        Patient Information     Date Of Birth          1975        Designated Caregiver       Most Recent Value    Caregiver    Will someone help with your care after discharge? yes    Name of designated caregiver Jasbir    Phone number of caregiver [phone]    Caregiver address [place]      About your hospital stay     You were admitted on:  June 17, 2018 You last received care in the:  [address] [facility]    You were discharged on:  June 20, 2018        Reason for your hospital stay       You were admitted for persistent abdominal pain, found to have a partial small bowel obstruction with anastomosis that was dilated by GI.                  Who to Call     For medical emergencies, please call 911.  For non-urgent questions about your medical care, please call your primary care provider or clinic, [phone]  For questions related to your surgery, please call your surgery clinic        Attending Provider     Provider Specialty    Thi Laurent MD Emergency Medicine    Scar Connolly MD Internal Medicine    Nicholas Veliz MD Internal Medicine    Marya Hendrix DO Internal Medicine       Primary Care Provider Office Phone # Fax #    Reji Avery [phone]       When to contact your care team       Call your primary doctor if you have any of the following: temperature greater than 100.4, decrease in oral intake, persistent nausea and vomiting, worsening abdominal pain, decrease in stool output.                  After  Care Instructions     Activity       Your activity upon discharge: activity as tolerated            Diet       Follow this diet upon discharge:   -Soft Diet  -Provided Boost plus sample for home use.   -Nutrition reviewed ways to increase kcal /protein in diet. Encouraged patient to utilize soft snacks and Oral supplements daily to augment PO given  -Stop TPN            Discharge Instructions       Resume pre procedure diet            Discharge Instructions       Restart home medications.            Discharge Instructions       Please follow-up with your primary care physician within the next week. You will also need to follow-up with Colorectal Surgery and Gastroenterology within the next 2-4 weeks.     Your TPN was discontinued and your PICC line was removed. You should eat a soft diet with nutritional supplements (boost, protein supplements).                  Follow-up Appointments     Adult [facility] Follow-up and recommended labs and tests       Follow up with primary care provider, Reji Avery, within 7 days for hospital follow- up. Evaluate for nutritional intake during this appointment.     Please follow-up with Colorectal Surgery and Gastroenterology within the next 2-4 weeks.     Appointments on [place] and/or [facility] (with [facility] or [facility] provider or service). Call [phone] if you haven't heard regarding these appointments within 7 days of discharge.                  Additional Services     Home infusion referral       Your provider has referred you to: OTHER PROVIDERS:  [facility]    Local Address (if different from home address): N/A    Anticipated Length of Therapy: Per MD    TPN    Home Infusion Pharmacist to adjust therapy based on labs and clinical assessments: Yes    Labs:  May draw labs from Venous Catheter: Yes  Home Infusion Pharmacist to order labs based on therapy type and clinical assessments: Yes  Call/Fax Lab Results to:     Agency Staff to assess nursing  "needs for Infusion Therapy.    Access Device Management:  IV Access Type: PICC  Flush with Heparin and Normal Saline IVP PRN and routine site care (per agency protocol) to maintain access device? Yes                  Further instructions from your care team       [facility]   [facility], [address]   [place] [address]   Phone: [phone]   Fax: [phone]   Toll Free #: [phone]   M-F 8:30AM-5:00PM CST    Additional Information     If you use hormonal birth control (such as the pill, patch, ring or implants): You'll need a second form of birth control for 7 days (condoms, a diaphragm or contraceptive foam). While in the hospital, you received a medicine called Bridion. Your normal birth control will not work as well for a week after taking this medicine.          Pending Results     No orders found from 6/15/2018 to 6/18/2018.            Statement of Approval     Ordered          06/20/18 9394  I have reviewed and agree with all the recommendations and orders detailed in this document.  EFFECTIVE NOW     Approved and electronically signed by:  Tess Richard MD             Admission Information     Date & Time Provider Department Dept. Phone    6/17/2018 Marya Hendrix DO [address] [facility] [phone]      Your Vitals Were     Blood Pressure Pulse Temperature Respirations Height Weight    109/81 (BP Location: Left arm) 87 98.1  F (36.7  C) (Oral) 16 1.753 m (5' 9\") 56.6 kg (124 lb 11.2 oz)    Pulse Oximetry BMI (Body Mass Index)                98% 18.41 kg/m2          [facility] Information     [employer] gives you secure access to your electronic health record. If you see a primary care provider, you can also send messages to your care team and make appointments. If you have questions, please call your primary care clinic.  If you do not have a primary care provider, please call [phone] and they will assist you.        Care EveryWhere ID     " This is your Care EveryWhere ID. This could be used by other organizations to access your [facility] medical records  [id number]        Equal Access to Services     MARISOL GAN : Hadii aad ku hadmarklaurie Rianigel, mariah carterke, jesse kaalcides peterson, wendy atkins. So [facility] [phone].    ATENCIÓN: Si habla español, tiene a epps disposición servicios gratuitos de asistencia lingüística. Llame al [phone].    We comply with applicable federal civil rights laws and [place] laws. We do not discriminate on the basis of race, color, national origin, age, disability, sex, sexual orientation, or gender identity.               Review of your medicines      CONTINUE these medicines which have NOT CHANGED        Dose / Directions    ondansetron 4 MG ODT tab   Commonly known as:  ZOFRAN ODT   Used for:  Small bowel obstruction        Dose:  4 mg   Take 1 tablet (4 mg) by mouth every 6 hours as needed for nausea   Quantity:  20 tablet   Refills:  0       oxyCODONE-acetaminophen 5-325 MG per tablet   Commonly known as:  PERCOCET        Dose:  1 tablet   Take 1 tablet by mouth every 4 hours as needed for pain Max 6 tablets per day   Refills:  0         STOP taking     lipids 20 % infusion   Commonly known as:  INTRALIPID           parenteral nutrition - PTA/DISCHARGE ORDER                    Protect others around you: Learn how to safely use, store and throw away your medicines at www.disposemymeds.org.        Information about OPIOIDS     PRESCRIPTION OPIOIDS: WHAT YOU NEED TO KNOW   We gave you an opioid (narcotic) pain medicine. It is important to manage your pain, but opioids are not always the best choice. You should first try all the other options your care team gave you. Take this medicine for as short a time (and as few doses) as possible.     These medicines have risks:    DO NOT drive when on new or higher doses of pain medicine. These medicines can affect your alertness and reaction  times, and you could be arrested for driving under the influence (DUI). If you need to use opioids long-term, talk to your care team about driving.    DO NOT operate heave machinery    DO NOT do any other dangerous activities while taking these medicines.     DO NOT drink any alcohol while taking these medicines.      If the opioid prescribed includes acetaminophen, DO NOT take with any other medicines that contain acetaminophen. Read all labels carefully. Look for the word  acetaminophen  or  Tylenol.  Ask your pharmacist if you have questions or are unsure.    You can get addicted to pain medicines, especially if you have a history of addiction (chemical, alcohol or substance dependence). Talk to your care team about ways to reduce this risk.    Store your pills in a secure place, locked if possible. We will not replace any lost or stolen medicine. If you don t finish your medicine, please throw away (dispose) as directed by your pharmacist. The [place] Pollution Control Agency has more information about safe disposal: https://www.pca.[facility].[place]/living-green/managing-unwanted-medications.     All opioids tend to cause constipation. Drink plenty of water and eat foods that have a lot of fiber, such as fruits, vegetables, prune juice, apple juice and high-fiber cereal. Take a laxative (Miralax, milk of magnesia, Colace, Senna) if you don t move your bowels at least every other day.              Medication List: This is a list of all your medications and when to take them. Check marks below indicate your daily home schedule. Keep this list as a reference.      Medications           Morning Afternoon Evening Bedtime As Needed    ondansetron 4 MG ODT tab   Commonly known as:  ZOFRAN ODT   Take 1 tablet (4 mg) by mouth every 6 hours as needed for nausea                                oxyCODONE-acetaminophen 5-325 MG per tablet   Commonly known as:  PERCOCET   Take 1 tablet by mouth every 4 hours as needed for pain  Max 6 tablets per day   Last time this was given:  1 tablet on 6/20/2018  1:49 PM

## 2018-06-17 NOTE — H&P
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     #Partial Small Bowel Obstruction  #Hx of Cervical Cancer s/p Chemo-Radiation c/b recurrent SBO s/p ex lap (2017)  #Known anastomotic stricture in pelvis   Per Colorectal Surgery, patient is a high risk surgical candidate and no urgent intervention is indicated at this time. Patient is having bowel movements and passing gas. NG tube placed in ED. Patient willing to undergo bowel prep for retrograde enteroscopy in OR tomorrow (6/18/18) by GI.   -Bowel prep: 4L GoLytely over 4 hours starting now, 2L GoLytely at 0200 via NGT  -Keep patient NPO and on TPN (35 ml/hr)   -Encourage ambulation   -Plan for OR tomorrow if patient completes bowel prep  -GI consult   -Can use ativan PRN to help ease patient's anxiety regarding NG tube   -BMP and phos in AM     #Pain Management   Patient takes percocet 5-325 mg q4H at home. Patient reports she is on a pain contract.   -Dilaudid 0.4mg q4H PRN   -Lidocaine patches    #Nutrition  Patient started TPN in 3/2018 given hx of recurrent SBO.   -Continue TPN (pharmacy/nutrition to dose) via previously placed right PICC   -TPN labs     # Pain Assessment:  Current Pain Score 6/17/2018   Patient currently in pain? yes   Pain score (0-10) -   Pain location Abdomen   Pain descriptors -   Some encounter information is confidential and restricted. Go to  "Review Flowsheets activity to see all data.   - Jenni is experiencing pain due to SBO. Pain management was discussed and the plan was created in a collaborative fashion.  Jenni's response to the current recommendations: engaged  - Please see the plan for pain management as documented above    Diet: NPO  Fluids: LR at 75 ml/hr   DVT Prophylaxis: Pneumatic Compression Devices/Ambulation   Code Status: Full Code    Disposition Plan:   Expected discharge: 2 - 3 days; recommended to prior living arrangement once GI and colorectal surgery have evaluated the patient and she undergoes the enteroscopy.     The patient was discussed with Dr. Veliz.     Tess Richard MD  Med/Peds, PGY1  Pager      ---------------------------------------------------------------------------------------------    Chief Complaint:   Abdominal Pain     History is obtained from the patient    History of Present Illness   Rachel Gerhardt is a 42 yr old female with TPN-dependent malnutrition, cervical cancer s/p chemo-radiation therapy complicated by recurrent SBO s/p ex lap with 60 cm of small bowel resected (2017) complicated by anastomotic stricture in pelvis causing persistent partial small bowel obstructions, and opioid dependence with prior suboxone use who presents with recurrent abdominal pain.     Patient reports she has had intermittent abdominal pain for the past 9 months. The pain comes in \"waves\" and feels like \"labor.\" The pain is described as \"sharp, debilitating.\" Each episode lasts 1-1.5 minutes. She has been having multiple episodes of bilious emesis daily as well as diarrhea 4-5x daily. No blood in the stool. No dysuria or hematuria. No fevers or chills.     Of note, the patient was admitted from 5/11-5/23/2018 with recurrent SBO. CRS was consulted and determined that the patient was not a surgical candidate due to high risk of leak and potential need for multiple surgeries. GI was consulted to pursue less invasive " management, including an attempt at endoscopic anastomotic dilation/enteroscopy. Patient refused to have NG/NJ tube placed, thus adequate prep (4L GoLytely) could not be obtained. Procedure was not performed and patient requested to discharge home. She has failed multiple prior attempts at colonoscopies due to poor prep/non-compliance. The patient contacted [place] for possible treatment options, but was told to come back to [facility].     The patient was previously instructed to not advance her diet more than clear liquids. She has been eating soft foods, such as mashed potatoes and noodles. She is also on TPN via right PICC since 3/2018.     In the ED, the patient was afebrile, not tachycardic, bp 90/60-70s. CMP notable for albumin of 2.8. CBC unremarkable. UA with large leuk esterase, few bacteria, mild proteinuria. Urine tox screen positive for opiates. CT abdomen/pelvis was obtained which showed multiple dilated proximal small bowel loops, transitional point in mid pelvis, similar to 5/11/2018 study. Findings thought to be secondary to adhesive small bowel obstruction. No pneumoperitoneum or pneumatosis. She was evaluated by colorectal surgery who did not recommend surgical intervention. A NG tube was placed at bedside.     Review of Systems:   10 point ROS completed and negative unless noted in HPI.    Past Medical History:    I have reviewed this patient's medical history and updated it with pertinent information if needed.   Past Medical History:   Diagnosis Date     Asthma      Cancer (H)     Per patient OBGYN, cerivical cancer     Cervical cancer (H)      Other chronic pain      Ovarian cancer (H)      Substance abuse     Outside records indicate past history of narcotics abuse or dependence, but patient denies.   -Recurrent SBO (not a surgical candidate given high risk)     Past Surgical History:   I have reviewed this patient's surgical history and updated it with pertinent information if needed.  Past Surgical  History:   Procedure Laterality Date     COLONOSCOPY N/A 5/3/2018    Procedure: COLONOSCOPY;  sigmoidoscopy;  Surgeon: Omero Vigil MD;  Location: [facility] OR     COLONOSCOPY WITH CO2 INSUFFLATION N/A 4/30/2018    Procedure: COLONOSCOPY WITH CO2 INSUFFLATION;  Colonoscopy;  Surgeon: Omero Vigil MD;  Location: [facility] OR     COMBINED CYSTOSCOPY, INSERT STENT URETER(S) Bilateral 5/18/2017    Procedure: COMBINED CYSTOSCOPY, INSERT STENT URETER(S);  Cystoscopy with Bilateral Stent,;  Surgeon: Rene Calero MD;  Location: [facility] OR     ENT SURGERY  2009    mastoid, sinus     EXAM UNDER ANESTHESIA, INSERT ALEX SLEEVE, UTERINE PLACEMENT OF TANDEM AND RING FOR RAD, ULTRASOUND N/A 12/14/2015    Procedure: EXAM UNDER ANESTHESIA, INSERT ALEX SLEEVE, UTERINE PLACEMENT OF TANDEM AND RING FOR RADIATION, ULTRASOUND GUIDED;  Surgeon: Abby Tony MD;  Location: [facility] OR     INSERT TANDEM AND CESIUM APPLICATOR CERVIX, ULTRASOUND GUIDED N/A 12/17/2015    Procedure: INSERT TANDEM AND CESIUM APPLICATOR CERVIX, ULTRASOUND GUIDED;  Surgeon: Kika Wood MD;  Location: [facility] OR     KNEE SURGERY       LAPAROTOMY EXPLORATORY N/A 5/18/2017    Procedure: LAPAROTOMY EXPLORATORY;   Exploratry Laparotomy, Small Bowel Resection with anastomosis, Flexible Sigmoidoscopy;  Surgeon: Jennifer Goodwin MD;  Location: [facility] OR     PICC INSERTION Right 04/29/2017    4fr SL BioFlo PICC, 37cm (3cm external) in the R basilic vein w/ tip in the mid SVC.     PICC INSERTION Right 03/29/2018    4Fr - 40cm (4cm external), R lateral brachial vein, Low SVC     RESECT SMALL BOWEL WITHOUT OSTOMY N/A 5/18/2017    Procedure: RESECT SMALL BOWEL WITHOUT OSTOMY;;  Surgeon: Jennifer Goodwin MD;  Location: [facility] OR     SIGMOIDOSCOPY FLEXIBLE N/A 5/18/2017    Procedure: SIGMOIDOSCOPY FLEXIBLE;;  Surgeon: Jennifer Goodwin MD;  Location: [facility] OR     Social History:   Patient lives by herself. She it takes her about 4 days to smoke 1 pack of cigarettes. She has  been smoking since 2015. No alcohol or drug use.     Family History:   I have reviewed this patient's family history and updated it with pertinent information if needed.   Family History   Problem Relation Age of Onset     DIABETES Mother      Ovarian Cancer No family hx of      Uterine Cancer No family hx of      Cervical Cancer No family hx of      Breast Cancer No family hx of      Prior to Admission Medications:     No current facility-administered medications on file prior to encounter.   Current Outpatient Prescriptions on File Prior to Encounter:  lipids (INTRALIPID) 20 % infusion Inject 250 mLs into the vein every 24 hours Administer through a 1.2 micron filter Requires container change every 12 hours and tubing change every 24 hours.   ondansetron (ZOFRAN ODT) 4 MG ODT tab Take 1 tablet (4 mg) by mouth every 6 hours as needed for nausea   -Oxycodone-acetaminophen 5-325 mg q4H PRN (max 6 tablets daily)    Allergies:   Allergies   Allergen Reactions     No Clinical Screening - See Comments Other (See Comments) and Diarrhea     headache  Carrots cause gastric upset, cramping and diarrhea.     Sulfa Drugs Hives     hives     Amoxicillin Unknown and Other (See Comments)     vomiting  vomiting     Amoxicillin-Pot Clavulanate Other (See Comments) and Nausea     vomiting     Augmentin GI Disturbance, Nausea and Hives     Avelox [Moxifloxacin] Nausea and Vomiting, Unknown and Nausea     Ciprofloxacin Hives and Nausea     Codeine Nausea and Vomiting and Nausea     Ibuprofen Nausea and Vomiting     Other reaction(s): Nausea And Vomiting     Ibuprofen Sodium Hives and GI Disturbance     Quinolones      Tramadol Hives, Diarrhea, Nausea and Nausea and Vomiting     Daucus Carota      Other reaction(s): GI Upset  Other reaction(s): Abdominal pain, Diarrhea  Carrots cause gastric upset, cramping and diarrhea.       Physical Exam:   Vital Signs: Temp: 97.9  F (36.6  C) Temp src: Oral BP: 94/65 Pulse: 83   Resp: 16 SpO2: 93 %  O2 Device: None (Room air)    Weight: 114 lbs 1.6 oz    General: Lying in bed, NG in place.   HEENT: No Scleral icterus. MMM, PEERL, EOMi.   Cardiac: Normal rate and regular rhythm. S1 and S2 heard.   Pulm: Anterior lung fields clear to auscultation bilaterally. No increased work of breathing.   Abd: hyperactive bowel sounds. Mildly-distended. Diffusely tender to mild palpation. Guarding and rigidity.   Skin: No jaundice, No rash on exposed skin. R PICC line in place.   Extremities: No LE edema. Warm and well-perfused.   Neuro: A&Ox3, no focal deficits    Data   Data     Recent Labs  Lab 06/17/18  0742   WBC 7.9   HGB 13.3   MCV 98         POTASSIUM 3.5   CHLORIDE 101   CO2 27   BUN 22   CR 0.67   ANIONGAP 9   JONATAN 8.4*   GLC 88   ALBUMIN 2.8*   PROTTOTAL 6.2*   BILITOTAL 0.3   ALKPHOS 100   ALT 32   AST 12   LIPASE 95     Recent Results (from the past 24 hour(s))   CT Abdomen Pelvis w Contrast    Narrative    EXAMINATION: CT ABDOMEN PELVIS W CONTRAST  6/17/2018 9:01 AM      CLINICAL HISTORY: lower abdl pain h/o cervical ovarian and bladder  cancer and h/o bowel obstruction with strictures;     COMPARISON: CT AP dated 5/11/2018    PROCEDURE COMMENTS: CT of the abdomen was performed with iopamidol  (ISOVUE-370) solution 70 mL    intravenous and oral contrast. Coronal  and sagittal reformatted images were obtained.    FINDINGS:  Lower thorax:   Unremarkable.    Abdomen and pelvis:  Dilated proximal small bowel loops with a transitional point in the  right upper pelvis (image 250 series 5, image 28 series 3) with distal  decompressed small bowel. Similar appearance to 5/11/2018 with  transition point being a few centimeters more medial and distal of the  bowel.     Anastomosis site is widely open. No pneumatosis or portal venous gas,  or pneumoperitoneum. No drainable fluid collection. Trace pelvic  fluid.    Redemonstration of hepatomegaly without focal mass. The gallbladder,  spleen, and pancreas are  normal in appearance. The adrenal glands and  kidneys are normal in appearance.    There are no abnormally dilated or thickened loops of small bowel or  colon. There is no free air or free fluid. There are no abnormally  sized lymph nodes.    Osseous structures:   No suspicious bone lesions.      Impression    IMPRESSION:  Small bowel obstruction with right lower quadrant transition point.  Similar to appearance 5/11/2018 with the point of obstruction being a  few centimeters more medial and distal in the bowel. Likely secondary  to adhesions. No drainable fluid collection, pneumatosis, portal  venous gas or pneumoperitoneum demonstrated.    I have personally reviewed the examination and initial interpretation  and I agree with the findings.    MARY ANN VELA MD   XR Abdomen Port 1 View    Narrative    Exam: XR ABDOMEN PORT 1 VW, 6/17/2018 1:21 PM    Indication: confirm NG position     Comparison: Same day CT abdomen and abdominal plain film on 9/19/2018    Findings:   AP single view of the abdomen. Nasogastric tube passes below the  diaphragm with its sidehole is projecting over the stomach. Distal tip  of the NG tube projects over the distal stomach. Redemonstration of  multiple dilated loops of bowels, see same day CT for further detail.  No free intra-abdominal air, portal venous gas, or pneumatosis. Lung  bases are unremarkable.      Impression    Impression:   1. Sidehole of nasogastric tube projects over the body of the stomach.  2. Redemonstration of multiple loops of dilated bowels. No free  intra-abdominal air, portal venous gas, or pneumatosis. See same day  CT for further detail.    I have personally reviewed the examination and initial interpretation  and I agree with the findings.    ROMERO DELGADO MD     Internal Medicine Staff Addendum  Date of Service: 6/17/2018  I have seen and examined Ms Gerhardt, reviewed the data and discussed the plan of care with the care team on rounds.  I agree with the  above documentation with the additions/changes to the ROS, HPI, Exam or data (including my edits in italics):    I discussed pt's care with bedside RN, case management/social work today.  I personally reviewed, labs, medications and past 24 hr notes.  Assessment/Plan/Diagnoses: plan/dx as above, which contains my edits and reflects our joint medical decision-making.     Nicholas Veliz MD PhD  Internal Medicine Hospitalist & Staff Physician   of Internal Medicine   [facility]  Pager: [phone]

## 2018-06-17 NOTE — ED TRIAGE NOTES
Patient presents to triage c/o abdominal pain and n/v related to a bowel obstruction. Pt reports she was admitted to this hospital for two weeks, surgery was attempted but they were unsuccessful. She was told to go to [place], and [place] sent her back here. She has PICC in place for TPN

## 2018-06-17 NOTE — CONSULTS
"COLORECTAL SURGERY CONSULTATION  6/17/2018     Date of Admission:  6/17/2018  Reason for Consultation: We were asked by Dr. Laurent of ED to evaluate Rachel A Gerhardt for SBO. The patient was seen and evaluated.  Attending: Dr. Seo    HPI: Rachel A Gerhardt is a 42 year old female well-known to the CRS service with significant history of asthma, TPN-dependent malnutrition, cervical cancer s/p chemo-radiation therapy c/b recurrent SBO s/p ex lap, SBR ~60cm in 5/2017 (Dr. Goodwin) c/b anastomotic stricture in pelvis based on findings from 3/21/18 MRE causing persistent partial small bowel obstruction. The patient was admitted from 5/11/18-5/23/18 to the medicine service with attempts for adequate bowel prep to facilitate endoscopic dilation of the stricture by GI, which she failed. GI wishes to have her receive 4L of GoLytely via NG tube due to multiple failed colonoscopies for poor prep/non-compliance. The patient  adamantly refused to have an NG placed. She ultimately discharged to home and sought decompressive GJ at [place] and was declined.     She now is in the ED complaining of the same symptom profile of generalized abdominal pain, bloating, nausea, and bilious vomiting. She still does have liquid bowel movements. She denies any fevers, chills, CP, SOB.     ROS: Negative other than noted in the HPI.    Past Medical History: Asthma, cervical cancer, substance abuse, recurrent SBO, malnutrition  Past Surgical History: Colonoscopy, ENT surgery, Ex lap with SBR  Medications: TPN, zofran, dilaudid, miralax  Allergies: Sulfa, amoxicillin, moxifloxacin, ciprofloxacin, codeine, ibuprofen, quinolones, tramadol, daucus carota  Social History: Current smoker. Lives in [place].   Family History: Non-contributory.     Exam:  BP 97/70  Pulse 115  Temp 98.2  F (36.8  C) (Oral)  Resp 22  Ht 1.753 m (5' 9\")  Wt 51.8 kg (114 lb 1.6 oz)  SpO2 97%  BMI 16.85 kg/m2  General: tearful, uncomfortable  CV: " tachycardic  Pulm: Non labored breathing on RA  Abd: soft, distended, diffusely tender. Surgical scars well-healed.  Ext: WWP  Neuro: A&O x3     Labs: Reviewed in full.  137 101 22 / 88  3.5 27 0.7 \    7.9 > 13.3 < 337    Albumin 2.8, stable  UA large LE, neg nitrites, no WBC    Imaging: Reviewed.   Recent Results (from the past 24 hour(s))   CT Abdomen Pelvis w Contrast    Narrative    Multiple dilated proximal small bowel loops with transitional point in  the mid pelvis. The extent of small bowel distention is similar to  5/11/2018. The etiology favors to be adhesive small bowel obstruction.  No drainable fluid collection, pneumatosis, portal venous gas or  pneumoperitoneum demonstrated.      Assessment: Rachel A Gerhardt is a 42 year old female with significant history of cervical cancer, radiation, ex lap and SBR, and persistent SBO due to anastomotic stricture. Readmitted with pSBO again today, abdomen is benign. Patient now willing to undergo NG decompression and adequate prep to allow for endoscopic intervention.    Recommendations:  - Patient is a high risk surgical candidate for leak, injury, reoperation, recurrent SBO, etc. given radiation and extensive abdominal surgical history. No urgent intervention indicated.  - Admit to medicine with GI consult tomorrow (Monday) for possible endoscopic dilation of anastomotic stricture after adequate decompression and prep.  - NPO, continue TPN, NG to be placed at bedside.  - Replete electrolytes, IVF resuscitation.  - Will follow.    The patient was discussed with INDRA Centeno who agrees with the plan.    Reji Ba MD  General Surgery PGY-3  Pager [phone]        Staff Addendum:  Agree with the consultation H&P as documented by the housestaff. I was personally involved with the recommendations made by our service for this patient. Patient is being followed by gastroenterology and my partner Dr. Moore. Complex situation medically with limited options and  high risk nature of any potential laparotomy.  Renetta Seo MD  Colon and Rectal Surgery Staff  [facility]

## 2018-06-17 NOTE — ED PROVIDER NOTES
"  History     Chief Complaint   Patient presents with     Abdominal Pain     Nausea & Vomiting     HPI  Rachel A Gerhardt is a 42 year old female with a past medical history of bowel obstructions, bowel strictures, polysubstance abuse, and cervical and bladder cancer s/p radiation and chemotherapy who presents with terrible abdominal pain.  Patient states she was admitted 2 weeks ago for surgery to stretch a bowel stricture.  She received the wrong bowel prep and the surgery was unable to be completed.  This happened more than once. She was then sent to [place] to have a GJ tube placed.  [place]  apparently sent her back here to the [place]  She has been having severe pain in her lower mid abdomen.  She has been taking Percocet at home without relief of pain.  Her last bowel movement was today.  She has vomited 3-4 times today.  She denies fevers.  She repeatedly states \"please help me.\"    I have reviewed the Medications, Allergies, Past Medical and Surgical History, and Social History in the Epic system.    Review of Systems   Constitutional: Negative for fever.   Eyes: Negative.    Respiratory: Negative for shortness of breath.    Cardiovascular: Negative for chest pain.   Gastrointestinal: Positive for abdominal pain, nausea and vomiting. Negative for diarrhea.   Genitourinary: Negative for flank pain.   Musculoskeletal: Negative.    Skin: Negative for rash.   Neurological: Negative for headaches.   Psychiatric/Behavioral: Negative.    All other systems reviewed and are negative.      Physical Exam   BP: 95/75  Pulse: 115  Temp: 98.2  F (36.8  C)  Resp: 22  Height: 175.3 cm (5' 9\")  Weight: 51.8 kg (114 lb 1.6 oz)  SpO2: 97 %      Physical Exam  Physical Exam   Constitutional:   Thin, chronically ill-appearing, lying on right side, appears uncomfortable and tearful  HENT:   Head: Normocephalic and atraumatic.   Eyes: Conjunctivae are normal. Pupils are equal, round, and reactive to light.   pharynx has no erythema " or exudate, mucous membranes are dry  Neck:   no adenopathy, no bony tenderness  Cardiovascular: tachycardic without murmurs or gallops  Pulmonary/Chest: Clear to auscultation bilaterally, with no wheezes or retractions. No respiratory distress.  GI: Patient refused to let me touch her abdomen because it hurt too much, she hurts in the lower mid abdomen.  Back:  No bony or CVA tenderness   Musculoskeletal:  no edema or clubbing PICC line in Right arm  Skin: Skin is warm and dry. No rash noted.   Several tattoos  Neurological: alert and oriented to person, place, and time. Nonfocal exam  Psychiatric:  tearful mood and affect.   ED Course     ED Course     Procedures             Critical Care time:  none            Results for orders placed or performed during the hospital encounter of 06/17/18 (from the past 24 hour(s))   CBC with platelets differential   Result Value Ref Range    WBC 7.9 4.0 - 11.0 10e9/L    RBC Count 4.12 3.8 - 5.2 10e12/L    Hemoglobin 13.3 11.7 - 15.7 g/dL    Hematocrit 40.5 35.0 - 47.0 %    MCV 98 78 - 100 fl    MCH 32.3 26.5 - 33.0 pg    MCHC 32.8 31.5 - 36.5 g/dL    RDW 14.8 10.0 - 15.0 %    Platelet Count 337 150 - 450 10e9/L    Diff Method Automated Method     % Neutrophils 66.5 %    % Lymphocytes 21.8 %    % Monocytes 9.4 %    % Eosinophils 1.8 %    % Basophils 0.1 %    % Immature Granulocytes 0.4 %    Nucleated RBCs 0 0 /100    Absolute Neutrophil 5.2 1.6 - 8.3 10e9/L    Absolute Lymphocytes 1.7 0.8 - 5.3 10e9/L    Absolute Monocytes 0.7 0.0 - 1.3 10e9/L    Absolute Eosinophils 0.1 0.0 - 0.7 10e9/L    Absolute Basophils 0.0 0.0 - 0.2 10e9/L    Abs Immature Granulocytes 0.0 0 - 0.4 10e9/L    Absolute Nucleated RBC 0.0    Comprehensive metabolic panel   Result Value Ref Range    Sodium 137 133 - 144 mmol/L    Potassium 3.5 3.4 - 5.3 mmol/L    Chloride 101 94 - 109 mmol/L    Carbon Dioxide 27 20 - 32 mmol/L    Anion Gap 9 3 - 14 mmol/L    Glucose 88 70 - 99 mg/dL    Urea Nitrogen 22 7 - 30  mg/dL    Creatinine 0.67 0.52 - 1.04 mg/dL    GFR Estimate >90 >60 mL/min/1.7m2    GFR Estimate If Black >90 >60 mL/min/1.7m2    Calcium 8.4 (L) 8.5 - 10.1 mg/dL    Bilirubin Total 0.3 0.2 - 1.3 mg/dL    Albumin 2.8 (L) 3.4 - 5.0 g/dL    Protein Total 6.2 (L) 6.8 - 8.8 g/dL    Alkaline Phosphatase 100 40 - 150 U/L    ALT 32 0 - 50 U/L    AST 12 0 - 45 U/L   Lipase   Result Value Ref Range    Lipase 95 73 - 393 U/L   Lactic acid whole blood   Result Value Ref Range    Lactic Acid 0.8 0.7 - 2.0 mmol/L   Creatinine POCT   Result Value Ref Range    Creatinine 0.7 0.52 - 1.04 mg/dL    GFR Estimate >90 >60 mL/min/1.7m2    GFR Estimate If Black >90 >60 mL/min/1.7m2   CT Abdomen Pelvis w Contrast    Narrative    Multiple dilated proximal small bowel loops with transitional point in  the mid pelvis. The extent of small bowel distention is similar to  5/11/2018. The etiology favors to be adhesive small bowel obstruction.  No drainable fluid collection, pneumatosis, portal venous gas or  pneumoperitoneum demonstrated.   UA reflex to Microscopic and Culture   Result Value Ref Range    Color Urine Yellow     Appearance Urine Clear     Glucose Urine Negative NEG^Negative mg/dL    Bilirubin Urine Negative NEG^Negative    Ketones Urine Negative NEG^Negative mg/dL    Specific Gravity Urine 1.005 1.003 - 1.035    Blood Urine Negative NEG^Negative    pH Urine 7.0 5.0 - 7.0 pH    Protein Albumin Urine 10 (A) NEG^Negative mg/dL    Urobilinogen mg/dL Normal 0.0 - 2.0 mg/dL    Nitrite Urine Negative NEG^Negative    Leukocyte Esterase Urine Large (A) NEG^Negative    Source Midstream Urine     RBC Urine 1 0 - 2 /HPF    WBC Urine 4 0 - 5 /HPF    Bacteria Urine Few (A) NEG^Negative /HPF    Squamous Epithelial /HPF Urine 19 (H) 0 - 1 /HPF    Mucous Urine Present (A) NEG^Negative /LPF   Drug abuse screen 6 urine (chem dep)   Result Value Ref Range    Amphetamine Qual Urine Negative NEG^Negative    Barbiturates Qual Urine Negative NEG^Negative     Benzodiazepine Qual Urine Negative NEG^Negative    Cannabinoids Qual Urine Negative NEG^Negative    Cocaine Qual Urine Negative NEG^Negative    Ethanol Qual Urine Negative NEG^Negative    Opiates Qualitative Urine Positive (A) NEG^Negative        Labs Ordered and Resulted from Time of ED Arrival Up to the Time of Departure from the ED   COMPREHENSIVE METABOLIC PANEL - Abnormal; Notable for the following:        Result Value    Calcium 8.4 (*)     Albumin 2.8 (*)     Protein Total 6.2 (*)     All other components within normal limits   UA MACROSCOPIC WITH REFLEX TO MICRO AND CULTURE - Abnormal; Notable for the following:     Protein Albumin Urine 10 (*)     Leukocyte Esterase Urine Large (*)     Bacteria Urine Few (*)     Squamous Epithelial /HPF Urine 19 (*)     Mucous Urine Present (*)     All other components within normal limits   DRUG ABUSE SCREEN 6 CHEM DEP URINE ([facility]) - Abnormal; Notable for the following:     Opiates Qualitative Urine Positive (*)     All other components within normal limits   CBC WITH PLATELETS DIFFERENTIAL   LIPASE   LACTIC ACID WHOLE BLOOD   CREATININE POCT   ISTAT CREATININE NURSING POCT   PATIENT CARE ORDER   URINE CULTURE AEROBIC BACTERIAL            Assessments & Plan (with Medical Decision Making)       I have reviewed the nursing notes.  Emergency Department course:  The patient was seen and examined at 0726 am.   Chart review shows that she has a history of ovarian CA s/p chemo-rads, recurrent SBO s/p ex lap w/ 60 cm small bowel resected in 5/2017 for strictures, opioid dependence w/ prior suboxone use, and malnutrition requiring TPN who was admitted on 5/11 w/ recurrent SBO.   Further notes include: 2/2 anastomotic stricture per 3/21 MRE. Had small bowel resection in 5/2017 as above. Abd CT 5/11 w/ long segment dilated loops of proximal small bowel, transition point at small bowel anastomosis in pelvis. GI and CRS were consulted. CRS noted that w/ prior radiation patient is a  high risk surgical candidate w/ risks to include leak and potential need for multiple surgeries. This risk was not deemed acceptable given that patient had not failed less invasive management. GI was involved to attempt endoscopic anastomotic dilation/enteroscopy. History of this includes: 4/24 - case cancelled day of procedure as patient reported being unaware of need for prep, 4/30 - underwent general anesthesia but inadequate bowel prep to allow safe insertion of enteroscope beyond sigmoid colon, 5/3 - again underwent general anesthesia but had inadequate bowel prep, and 5/4 - patient left hospital AMA without prepping so OR time was cancelled same day. Patient has been having antegrade bowel function but worsened pain when she advances PO intake beyond clears, and is dependent on opioids for this currently - using Dilaudid solution 2 mg Q2hrs PRN (see opioid discussion below). She was willing to take Miralax so we attempted prepping w/ this Q6-8hr - some clearing of stools but not successful enough for procedure.    I treated the patient with Dilaudid IV for repeat doses, Zofran IV and normal saline IV.  Laboratory studies show an unremarkable CBC comprehensive metabolic panel and lipase are within normal limits apart from a low calcium, albumin, and total protein.  Lactate is 0.8.  UA was very delayed as the patient was unable to give a sample.  I treated her with a second liter of Normal saline IV and Dilaudid IV.  The patient states the Dilaudid does not seem to be working at all for her pain.  I tried morphine IV.  She required quite a bit of pain medicine while in the ED.    UA is contaminated with squamous epithelial cells.  Urine tox screen is positive for opiates      CT of the abdomen and pelvis shows:  Multiple dilated proximal small bowel loops with transitional point in  the mid pelvis. The extent of small bowel distention is similar to  5/11/2018. The etiology favors to be adhesive small bowel  obstruction.  No drainable fluid collection, pneumatosis, portal venous gas or  pneumoperitoneum demonstrated.         I consulted Colorectal surgery, who is familiar with this patient.  Dr Ba evaluated her in the ED and will place an NG tube.  The patient previously had refused NG tube but feels bad enough today to have one placed.  Colorectal believes the patient needs GI to dilate the strictures.  She will be admitted to the medicine service for further evaluation and treatment.    This is a 42-year-old female with a past medical history of cervical cancer status post radiation, bowel obstructions and bowel strictures here with abdominal pain and vomiting secondary to bowel obstruction.  She has a significant loops of dilated small bowel on CT. She will be admitted to the internal medicine service for further evaluation treatment.  I spoke to  of medicine regarding admission.   I have reviewed the findings, diagnosis, plan and need for follow up with the patient.    New Prescriptions    No medications on file       Final diagnoses:   Small bowel obstruction   Abdominal pain, unspecified abdominal location   Malignant neoplasm of cervix, unspecified site (H)       6/17/2018   [facility], [place], EMERGENCY DEPARTMENT  This note was created in part by the use of Dragon voice recognition dictation system. Inadvertent grammatical errors and typographical errors may still exist.  MD Mehran Ratliff Alda L, MD  06/17/18 [id number]

## 2018-06-17 NOTE — ED NOTES
[facility] [place]   ED Nurse to Floor Handoff     Rachel A Gerhardt is a 42 year old female who speaks English and lives with family members,  in a home  They arrived in the ED by car from home    ED Chief Complaint: Abdominal Pain and Nausea & Vomiting    ED Dx;   Final diagnoses:   Small bowel obstruction   Abdominal pain, unspecified abdominal location   Malignant neoplasm of cervix, unspecified site (H)         Needed?: No    Allergies:   Allergies   Allergen Reactions     No Clinical Screening - See Comments Other (See Comments) and Diarrhea     headache  Carrots cause gastric upset, cramping and diarrhea.     Sulfa Drugs Hives     hives     Amoxicillin Unknown and Other (See Comments)     vomiting  vomiting     Amoxicillin-Pot Clavulanate Other (See Comments) and Nausea     vomiting     Augmentin GI Disturbance, Nausea and Hives     Avelox [Moxifloxacin] Nausea and Vomiting, Unknown and Nausea     Ciprofloxacin Hives and Nausea     Codeine Nausea and Vomiting and Nausea     Ibuprofen Nausea and Vomiting     Other reaction(s): Nausea And Vomiting     Ibuprofen Sodium Hives and GI Disturbance     Quinolones      Tramadol Hives, Diarrhea, Nausea and Nausea and Vomiting     Daucus Carota      Other reaction(s): GI Upset  Other reaction(s): Abdominal pain, Diarrhea  Carrots cause gastric upset, cramping and diarrhea.   .  Past Medical Hx:   Past Medical History:   Diagnosis Date     Asthma      Cancer (H)     Per patient OBGYN, cerivical cancer     Cervical cancer (H)      Other chronic pain      Ovarian cancer (H)      Substance abuse     Outside records indicate past history of narcotics abuse or dependence, but patient denies.      Baseline Mental status: WDL  Current Mental Status changes: at basesline    Infection present or suspected this encounter: no  Sepsis suspected: No  Isolation type: No active isolations     Activity level - Baseline/Home:  Independent    Activity Level - Current:   Stand with Assist    Bariatric equipment needed?: No    In the ED these meds were given:   Medications   benzocaine 20% (HURRICAINE/TOPEX) 20 % spray 0.5-1 mL (not administered)   lidocaine 2 % (URO-JET) jelly 10 mL (not administered)   lactated ringers infusion (not administered)   0.9% sodium chloride BOLUS (1,000 mLs Intravenous New Bag 6/17/18 0750)   HYDROmorphone (PF) (DILAUDID) injection 0.5 mg (0.5 mg Intravenous Given 6/17/18 0751)   ondansetron (ZOFRAN) injection 4 mg (4 mg Intravenous Given 6/17/18 0750)   HYDROmorphone (PF) (DILAUDID) injection 0.5 mg (0.5 mg Intravenous Given 6/17/18 0856)   iopamidol (ISOVUE-370) solution 70 mL (70 mLs Intravenous Given 6/17/18 0900)   sodium chloride (PF) 0.9% PF flush 60 mL (60 mLs Intravenous Given 6/17/18 0900)   0.9% sodium chloride BOLUS (0 mLs Intravenous Stopped 6/17/18 1303)   HYDROmorphone (PF) (DILAUDID) injection 0.5 mg (0.5 mg Intravenous Given 6/17/18 1108)   morphine (PF) injection 4 mg (4 mg Intravenous Given 6/17/18 1147)   LORazepam (ATIVAN) injection 0.5 mg (0.5 mg Intravenous Given 6/17/18 1231)   LORazepam (ATIVAN) injection 0.5 mg (0.5 mg Intravenous Given 6/17/18 1302)   HYDROmorphone (PF) (DILAUDID) injection 0.5 mg (0.5 mg Intravenous Given 6/17/18 1321)       Drips running?  Yes    Home pump  No    Current LDAs  PICC Single Lumen 03/29/18 Right Brachial vein medial (Active)   Number of days:80       Incision/Surgical Site 05/18/17 Abdomen (Active)   Number of days:395       Labs results:   Labs Ordered and Resulted from Time of ED Arrival Up to the Time of Departure from the ED   COMPREHENSIVE METABOLIC PANEL - Abnormal; Notable for the following:        Result Value    Calcium 8.4 (*)     Albumin 2.8 (*)     Protein Total 6.2 (*)     All other components within normal limits   UA MACROSCOPIC WITH REFLEX TO MICRO AND CULTURE - Abnormal; Notable for the following:     Protein Albumin Urine 10 (*)     Leukocyte  Esterase Urine Large (*)     Bacteria Urine Few (*)     Squamous Epithelial /HPF Urine 19 (*)     Mucous Urine Present (*)     All other components within normal limits   DRUG ABUSE SCREEN 6 CHEM DEP URINE ([facility]) - Abnormal; Notable for the following:     Opiates Qualitative Urine Positive (*)     All other components within normal limits   CBC WITH PLATELETS DIFFERENTIAL   LIPASE   LACTIC ACID WHOLE BLOOD   CREATININE POCT   ISTAT CREATININE NURSING POCT   PATIENT CARE ORDER   URINE CULTURE AEROBIC BACTERIAL       Imaging Studies:   Recent Results (from the past 24 hour(s))   CT Abdomen Pelvis w Contrast    Narrative    Multiple dilated proximal small bowel loops with transitional point in  the mid pelvis. The extent of small bowel distention is similar to  5/11/2018. The etiology favors to be adhesive small bowel obstruction.  No drainable fluid collection, pneumatosis, portal venous gas or  pneumoperitoneum demonstrated.   XR Abdomen Port 1 View    Narrative    Exam: XR ABDOMEN PORT 1 VW, 6/17/2018 1:21 PM    Indication: confirm NG position     Comparison: Same day CT abdomen and abdominal plain film on 9/19/2018    Findings:   AP single view of the abdomen. Nasogastric tube passes below the  diaphragm with its sidehole is projecting over the stomach. Distal tip  of the NG tube projects over the distal stomach. Redemonstration of  multiple dilated loops of bowels, see same day CT for further detail.  No free intra-abdominal air, portal venous gas, or pneumatosis. Lung  bases are unremarkable.      Impression    Impression:   1. Sidehole of nasogastric tube projects over the body of the stomach.  2. Redemonstration of multiple loops of dilated bowels. No free  intra-abdominal air, portal venous gas, or pneumatosis. See same day  CT for further detail.    I have personally reviewed the examination and initial interpretation  and I agree with the findings.    ROMERO DELGADO MD       Recent vital signs:   BP 97/71   "Pulse 115  Temp 98.2  F (36.8  C) (Oral)  Resp 22  Ht 1.753 m (5' 9\")  Wt 51.8 kg (114 lb 1.6 oz)  SpO2 95%  BMI 16.85 kg/m2    Cardiac Rhythm: n/a  Pt needs tele? No  Skin/wound Issues: None    Code Status: Full Code    Pain control: fair    Nausea control: good    Abnormal labs/tests/findings requiring intervention: CT showed SBO    Family present during ED course? No   Family Comments/Social Situation comments: n/a    Tasks needing completion: None    Lamar Gurrola, RN    [phone] [facility]      "

## 2018-06-18 ENCOUNTER — APPOINTMENT (OUTPATIENT)
Dept: GENERAL RADIOLOGY | Facility: CLINIC | Age: 43
DRG: 393 | End: 2018-06-18
Attending: INTERNAL MEDICINE
Payer: COMMERCIAL

## 2018-06-18 ENCOUNTER — ANESTHESIA (OUTPATIENT)
Dept: SURGERY | Facility: CLINIC | Age: 43
DRG: 393 | End: 2018-06-18
Payer: COMMERCIAL

## 2018-06-18 ENCOUNTER — ANESTHESIA EVENT (OUTPATIENT)
Dept: SURGERY | Facility: CLINIC | Age: 43
DRG: 393 | End: 2018-06-18
Payer: COMMERCIAL

## 2018-06-18 LAB
ALBUMIN SERPL-MCNC: 3.3 G/DL (ref 3.4–5)
ALP SERPL-CCNC: 104 U/L (ref 40–150)
ALT SERPL W P-5'-P-CCNC: 30 U/L (ref 0–50)
ANION GAP SERPL CALCULATED.3IONS-SCNC: 10 MMOL/L (ref 3–14)
AST SERPL W P-5'-P-CCNC: 14 U/L (ref 0–45)
BACTERIA SPEC CULT: NORMAL
BASOPHILS # BLD AUTO: 0 10E9/L (ref 0–0.2)
BASOPHILS NFR BLD AUTO: 0.3 %
BILIRUB SERPL-MCNC: 0.3 MG/DL (ref 0.2–1.3)
BUN SERPL-MCNC: 13 MG/DL (ref 7–30)
CALCIUM SERPL-MCNC: 9.1 MG/DL (ref 8.5–10.1)
CHLORIDE SERPL-SCNC: 104 MMOL/L (ref 94–109)
CO2 SERPL-SCNC: 29 MMOL/L (ref 20–32)
CREAT SERPL-MCNC: 0.61 MG/DL (ref 0.52–1.04)
DIFFERENTIAL METHOD BLD: NORMAL
EOSINOPHIL # BLD AUTO: 0.1 10E9/L (ref 0–0.7)
EOSINOPHIL NFR BLD AUTO: 1.6 %
ERYTHROCYTE [DISTWIDTH] IN BLOOD BY AUTOMATED COUNT: 14.9 % (ref 10–15)
GFR SERPL CREATININE-BSD FRML MDRD: >90 ML/MIN/1.7M2
GLUCOSE BLDC GLUCOMTR-MCNC: 102 MG/DL (ref 70–99)
GLUCOSE BLDC GLUCOMTR-MCNC: 105 MG/DL (ref 70–99)
GLUCOSE BLDC GLUCOMTR-MCNC: 106 MG/DL (ref 70–99)
GLUCOSE BLDC GLUCOMTR-MCNC: 124 MG/DL (ref 70–99)
GLUCOSE SERPL-MCNC: 96 MG/DL (ref 70–99)
HCT VFR BLD AUTO: 42.2 % (ref 35–47)
HGB BLD-MCNC: 13.6 G/DL (ref 11.7–15.7)
IMM GRANULOCYTES # BLD: 0 10E9/L (ref 0–0.4)
IMM GRANULOCYTES NFR BLD: 0.3 %
INR PPP: 1.07 (ref 0.86–1.14)
LYMPHOCYTES # BLD AUTO: 1.6 10E9/L (ref 0.8–5.3)
LYMPHOCYTES NFR BLD AUTO: 26 %
Lab: NORMAL
MAGNESIUM SERPL-MCNC: 2.7 MG/DL (ref 1.6–2.3)
MCH RBC QN AUTO: 32 PG (ref 26.5–33)
MCHC RBC AUTO-ENTMCNC: 32.2 G/DL (ref 31.5–36.5)
MCV RBC AUTO: 99 FL (ref 78–100)
MONOCYTES # BLD AUTO: 0.5 10E9/L (ref 0–1.3)
MONOCYTES NFR BLD AUTO: 7.2 %
NEUTROPHILS # BLD AUTO: 4 10E9/L (ref 1.6–8.3)
NEUTROPHILS NFR BLD AUTO: 64.6 %
NRBC # BLD AUTO: 0 10*3/UL
NRBC BLD AUTO-RTO: 0 /100
PHOSPHATE SERPL-MCNC: 2.2 MG/DL (ref 2.5–4.5)
PLATELET # BLD AUTO: 399 10E9/L (ref 150–450)
POTASSIUM SERPL-SCNC: 3.8 MMOL/L (ref 3.4–5.3)
PREALB SERPL IA-MCNC: 19 MG/DL (ref 15–45)
PROT SERPL-MCNC: 7.1 G/DL (ref 6.8–8.8)
PROVATION GI EXAM: NORMAL
RBC # BLD AUTO: 4.25 10E12/L (ref 3.8–5.2)
SODIUM SERPL-SCNC: 144 MMOL/L (ref 133–144)
SPECIMEN SOURCE: NORMAL
WBC # BLD AUTO: 6.2 10E9/L (ref 4–11)

## 2018-06-18 PROCEDURE — 25000125 ZZHC RX 250: Performed by: INTERNAL MEDICINE

## 2018-06-18 PROCEDURE — 25000128 H RX IP 250 OP 636: Performed by: STUDENT IN AN ORGANIZED HEALTH CARE EDUCATION/TRAINING PROGRAM

## 2018-06-18 PROCEDURE — 25000128 H RX IP 250 OP 636

## 2018-06-18 PROCEDURE — 40000280 XR SURGERY CARM FLUORO GREATER THAN 5 MIN

## 2018-06-18 PROCEDURE — C1726 CATH, BAL DIL, NON-VASCULAR: HCPCS | Performed by: INTERNAL MEDICINE

## 2018-06-18 PROCEDURE — 0D7B8ZZ DILATION OF ILEUM, VIA NATURAL OR ARTIFICIAL OPENING ENDOSCOPIC: ICD-10-PCS | Performed by: INTERNAL MEDICINE

## 2018-06-18 PROCEDURE — 71000014 ZZH RECOVERY PHASE 1 LEVEL 2 FIRST HR: Performed by: INTERNAL MEDICINE

## 2018-06-18 PROCEDURE — 36000061 ZZH SURGERY LEVEL 3 W FLUORO 1ST 30 MIN - UMMC: Performed by: INTERNAL MEDICINE

## 2018-06-18 PROCEDURE — 00000146 ZZHCL STATISTIC GLUCOSE BY METER IP

## 2018-06-18 PROCEDURE — 25000128 H RX IP 250 OP 636: Performed by: ANESTHESIOLOGY

## 2018-06-18 PROCEDURE — 84134 ASSAY OF PREALBUMIN: CPT | Performed by: INTERNAL MEDICINE

## 2018-06-18 PROCEDURE — 80053 COMPREHEN METABOLIC PANEL: CPT | Performed by: INTERNAL MEDICINE

## 2018-06-18 PROCEDURE — 37000009 ZZH ANESTHESIA TECHNICAL FEE, EACH ADDTL 15 MIN: Performed by: INTERNAL MEDICINE

## 2018-06-18 PROCEDURE — 85025 COMPLETE CBC W/AUTO DIFF WBC: CPT | Performed by: INTERNAL MEDICINE

## 2018-06-18 PROCEDURE — 12000008 ZZH R&B INTERMEDIATE UMMC

## 2018-06-18 PROCEDURE — C9399 UNCLASSIFIED DRUGS OR BIOLOG: HCPCS | Performed by: NURSE ANESTHETIST, CERTIFIED REGISTERED

## 2018-06-18 PROCEDURE — 25000132 ZZH RX MED GY IP 250 OP 250 PS 637: Performed by: STUDENT IN AN ORGANIZED HEALTH CARE EDUCATION/TRAINING PROGRAM

## 2018-06-18 PROCEDURE — 37000008 ZZH ANESTHESIA TECHNICAL FEE, 1ST 30 MIN: Performed by: INTERNAL MEDICINE

## 2018-06-18 PROCEDURE — 36000059 ZZH SURGERY LEVEL 3 EA 15 ADDTL MIN UMMC: Performed by: INTERNAL MEDICINE

## 2018-06-18 PROCEDURE — 36415 COLL VENOUS BLD VENIPUNCTURE: CPT | Performed by: INTERNAL MEDICINE

## 2018-06-18 PROCEDURE — 71000015 ZZH RECOVERY PHASE 1 LEVEL 2 EA ADDTL HR: Performed by: INTERNAL MEDICINE

## 2018-06-18 PROCEDURE — C1769 GUIDE WIRE: HCPCS | Performed by: INTERNAL MEDICINE

## 2018-06-18 PROCEDURE — 25000125 ZZHC RX 250: Performed by: NURSE ANESTHETIST, CERTIFIED REGISTERED

## 2018-06-18 PROCEDURE — 25000565 ZZH ISOFLURANE, EA 15 MIN: Performed by: INTERNAL MEDICINE

## 2018-06-18 PROCEDURE — 40000170 ZZH STATISTIC PRE-PROCEDURE ASSESSMENT II: Performed by: INTERNAL MEDICINE

## 2018-06-18 PROCEDURE — 84100 ASSAY OF PHOSPHORUS: CPT | Performed by: INTERNAL MEDICINE

## 2018-06-18 PROCEDURE — 25500064 ZZH RX 255 OP 636: Performed by: INTERNAL MEDICINE

## 2018-06-18 PROCEDURE — 85610 PROTHROMBIN TIME: CPT | Performed by: INTERNAL MEDICINE

## 2018-06-18 PROCEDURE — 83735 ASSAY OF MAGNESIUM: CPT | Performed by: INTERNAL MEDICINE

## 2018-06-18 PROCEDURE — 27210794 ZZH OR GENERAL SUPPLY STERILE: Performed by: INTERNAL MEDICINE

## 2018-06-18 PROCEDURE — 25000128 H RX IP 250 OP 636: Performed by: NURSE ANESTHETIST, CERTIFIED REGISTERED

## 2018-06-18 RX ORDER — FLUMAZENIL 0.1 MG/ML
0.2 INJECTION, SOLUTION INTRAVENOUS
Status: ACTIVE | OUTPATIENT
Start: 2018-06-18 | End: 2018-06-19

## 2018-06-18 RX ORDER — NALOXONE HYDROCHLORIDE 0.4 MG/ML
.1-.4 INJECTION, SOLUTION INTRAMUSCULAR; INTRAVENOUS; SUBCUTANEOUS
Status: ACTIVE | OUTPATIENT
Start: 2018-06-18 | End: 2018-06-19

## 2018-06-18 RX ORDER — LORAZEPAM 2 MG/ML
0.5 INJECTION INTRAMUSCULAR ONCE
Status: COMPLETED | OUTPATIENT
Start: 2018-06-18 | End: 2018-06-18

## 2018-06-18 RX ORDER — SODIUM CHLORIDE 9 MG/ML
INJECTION, SOLUTION INTRAVENOUS CONTINUOUS PRN
Status: DISCONTINUED | OUTPATIENT
Start: 2018-06-18 | End: 2018-06-18

## 2018-06-18 RX ORDER — SODIUM CHLORIDE, SODIUM LACTATE, POTASSIUM CHLORIDE, CALCIUM CHLORIDE 600; 310; 30; 20 MG/100ML; MG/100ML; MG/100ML; MG/100ML
INJECTION, SOLUTION INTRAVENOUS CONTINUOUS
Status: DISCONTINUED | OUTPATIENT
Start: 2018-06-18 | End: 2018-06-18 | Stop reason: HOSPADM

## 2018-06-18 RX ORDER — LORAZEPAM 2 MG/ML
INJECTION INTRAMUSCULAR
Status: COMPLETED
Start: 2018-06-18 | End: 2018-06-18

## 2018-06-18 RX ORDER — NALOXONE HYDROCHLORIDE 0.4 MG/ML
.1-.4 INJECTION, SOLUTION INTRAMUSCULAR; INTRAVENOUS; SUBCUTANEOUS
Status: DISCONTINUED | OUTPATIENT
Start: 2018-06-18 | End: 2018-06-18 | Stop reason: HOSPADM

## 2018-06-18 RX ORDER — PROPOFOL 10 MG/ML
INJECTION, EMULSION INTRAVENOUS PRN
Status: DISCONTINUED | OUTPATIENT
Start: 2018-06-18 | End: 2018-06-18

## 2018-06-18 RX ORDER — NALOXONE HYDROCHLORIDE 0.4 MG/ML
.1-.4 INJECTION, SOLUTION INTRAMUSCULAR; INTRAVENOUS; SUBCUTANEOUS
Status: DISCONTINUED | OUTPATIENT
Start: 2018-06-18 | End: 2018-06-18

## 2018-06-18 RX ORDER — MEPERIDINE HYDROCHLORIDE 25 MG/ML
12.5 INJECTION INTRAMUSCULAR; INTRAVENOUS; SUBCUTANEOUS
Status: DISCONTINUED | OUTPATIENT
Start: 2018-06-18 | End: 2018-06-18

## 2018-06-18 RX ORDER — LIDOCAINE HYDROCHLORIDE 20 MG/ML
INJECTION, SOLUTION INFILTRATION; PERINEURAL PRN
Status: DISCONTINUED | OUTPATIENT
Start: 2018-06-18 | End: 2018-06-18

## 2018-06-18 RX ORDER — FENTANYL CITRATE 50 UG/ML
INJECTION, SOLUTION INTRAMUSCULAR; INTRAVENOUS PRN
Status: DISCONTINUED | OUTPATIENT
Start: 2018-06-18 | End: 2018-06-18

## 2018-06-18 RX ORDER — HYDROMORPHONE HCL/0.9% NACL/PF 0.2MG/0.2
0.2 SYRINGE (ML) INTRAVENOUS
Status: DISPENSED | OUTPATIENT
Start: 2018-06-18 | End: 2018-06-18

## 2018-06-18 RX ORDER — LIDOCAINE 40 MG/G
CREAM TOPICAL
Status: DISCONTINUED | OUTPATIENT
Start: 2018-06-18 | End: 2018-06-18 | Stop reason: HOSPADM

## 2018-06-18 RX ORDER — MEPERIDINE HYDROCHLORIDE 50 MG/ML
12.5 INJECTION INTRAMUSCULAR; INTRAVENOUS; SUBCUTANEOUS
Status: DISCONTINUED | OUTPATIENT
Start: 2018-06-18 | End: 2018-06-18 | Stop reason: HOSPADM

## 2018-06-18 RX ORDER — SODIUM CHLORIDE, SODIUM LACTATE, POTASSIUM CHLORIDE, CALCIUM CHLORIDE 600; 310; 30; 20 MG/100ML; MG/100ML; MG/100ML; MG/100ML
INJECTION, SOLUTION INTRAVENOUS CONTINUOUS
Status: DISCONTINUED | OUTPATIENT
Start: 2018-06-18 | End: 2018-06-18

## 2018-06-18 RX ORDER — ONDANSETRON 4 MG/1
4 TABLET, ORALLY DISINTEGRATING ORAL EVERY 30 MIN PRN
Status: DISCONTINUED | OUTPATIENT
Start: 2018-06-18 | End: 2018-06-18

## 2018-06-18 RX ORDER — MEPERIDINE HYDROCHLORIDE 25 MG/ML
12.5 INJECTION INTRAMUSCULAR; INTRAVENOUS; SUBCUTANEOUS
Status: DISCONTINUED | OUTPATIENT
Start: 2018-06-18 | End: 2018-06-20 | Stop reason: HOSPADM

## 2018-06-18 RX ORDER — ONDANSETRON 4 MG/1
4 TABLET, ORALLY DISINTEGRATING ORAL EVERY 30 MIN PRN
Status: DISCONTINUED | OUTPATIENT
Start: 2018-06-18 | End: 2018-06-20 | Stop reason: HOSPADM

## 2018-06-18 RX ORDER — ONDANSETRON 4 MG/1
4 TABLET, ORALLY DISINTEGRATING ORAL EVERY 30 MIN PRN
Status: DISCONTINUED | OUTPATIENT
Start: 2018-06-18 | End: 2018-06-18 | Stop reason: HOSPADM

## 2018-06-18 RX ORDER — ONDANSETRON 2 MG/ML
4 INJECTION INTRAMUSCULAR; INTRAVENOUS EVERY 30 MIN PRN
Status: DISCONTINUED | OUTPATIENT
Start: 2018-06-18 | End: 2018-06-20 | Stop reason: HOSPADM

## 2018-06-18 RX ORDER — FENTANYL CITRATE 50 UG/ML
25-50 INJECTION, SOLUTION INTRAMUSCULAR; INTRAVENOUS
Status: DISCONTINUED | OUTPATIENT
Start: 2018-06-18 | End: 2018-06-18 | Stop reason: HOSPADM

## 2018-06-18 RX ORDER — ONDANSETRON 2 MG/ML
INJECTION INTRAMUSCULAR; INTRAVENOUS PRN
Status: DISCONTINUED | OUTPATIENT
Start: 2018-06-18 | End: 2018-06-18

## 2018-06-18 RX ORDER — IOPAMIDOL 510 MG/ML
INJECTION, SOLUTION INTRAVASCULAR PRN
Status: DISCONTINUED | OUTPATIENT
Start: 2018-06-18 | End: 2018-06-18 | Stop reason: HOSPADM

## 2018-06-18 RX ORDER — ONDANSETRON 2 MG/ML
4 INJECTION INTRAMUSCULAR; INTRAVENOUS EVERY 30 MIN PRN
Status: DISCONTINUED | OUTPATIENT
Start: 2018-06-18 | End: 2018-06-18

## 2018-06-18 RX ORDER — ONDANSETRON 2 MG/ML
4 INJECTION INTRAMUSCULAR; INTRAVENOUS EVERY 30 MIN PRN
Status: DISCONTINUED | OUTPATIENT
Start: 2018-06-18 | End: 2018-06-18 | Stop reason: HOSPADM

## 2018-06-18 RX ORDER — HYDROMORPHONE HCL/0.9% NACL/PF 0.2MG/0.2
0.2 SYRINGE (ML) INTRAVENOUS
Status: COMPLETED | OUTPATIENT
Start: 2018-06-18 | End: 2018-06-18

## 2018-06-18 RX ORDER — POTASSIUM CHLORIDE 7.45 MG/ML
10 INJECTION INTRAVENOUS
Status: COMPLETED | OUTPATIENT
Start: 2018-06-18 | End: 2018-06-18

## 2018-06-18 RX ADMIN — SODIUM CHLORIDE: 9 INJECTION, SOLUTION INTRAVENOUS at 15:51

## 2018-06-18 RX ADMIN — POTASSIUM CHLORIDE: 2 INJECTION, SOLUTION, CONCENTRATE INTRAVENOUS at 22:19

## 2018-06-18 RX ADMIN — Medication 0.2 MG: at 22:38

## 2018-06-18 RX ADMIN — LORAZEPAM 0.5 MG: 2 INJECTION INTRAMUSCULAR; INTRAVENOUS at 03:51

## 2018-06-18 RX ADMIN — LORAZEPAM 0.5 MG: 2 INJECTION INTRAMUSCULAR; INTRAVENOUS at 00:00

## 2018-06-18 RX ADMIN — FENTANYL CITRATE 100 MCG: 50 INJECTION, SOLUTION INTRAMUSCULAR; INTRAVENOUS at 15:56

## 2018-06-18 RX ADMIN — FENTANYL CITRATE 50 MCG: 50 INJECTION INTRAMUSCULAR; INTRAVENOUS at 18:44

## 2018-06-18 RX ADMIN — Medication 100 MG: at 15:56

## 2018-06-18 RX ADMIN — PROPOFOL 80 MG: 10 INJECTION, EMULSION INTRAVENOUS at 15:56

## 2018-06-18 RX ADMIN — ROCURONIUM BROMIDE 10 MG: 10 INJECTION INTRAVENOUS at 16:10

## 2018-06-18 RX ADMIN — LORAZEPAM 0.5 MG: 2 INJECTION INTRAMUSCULAR at 03:51

## 2018-06-18 RX ADMIN — LORAZEPAM 0.5 MG: 2 INJECTION INTRAMUSCULAR; INTRAVENOUS at 07:57

## 2018-06-18 RX ADMIN — ROCURONIUM BROMIDE 10 MG: 10 INJECTION INTRAVENOUS at 16:04

## 2018-06-18 RX ADMIN — FENTANYL CITRATE 25 MCG: 50 INJECTION INTRAMUSCULAR; INTRAVENOUS at 18:01

## 2018-06-18 RX ADMIN — MIDAZOLAM 2 MG: 1 INJECTION INTRAMUSCULAR; INTRAVENOUS at 15:51

## 2018-06-18 RX ADMIN — Medication 0.4 MG: at 10:57

## 2018-06-18 RX ADMIN — Medication 0.4 MG: at 01:05

## 2018-06-18 RX ADMIN — ONDANSETRON 4 MG: 2 INJECTION INTRAMUSCULAR; INTRAVENOUS at 17:17

## 2018-06-18 RX ADMIN — SUGAMMADEX 100 MG: 100 INJECTION, SOLUTION INTRAVENOUS at 17:20

## 2018-06-18 RX ADMIN — POTASSIUM CHLORIDE 10 MEQ: 10 INJECTION, SOLUTION INTRAVENOUS at 03:15

## 2018-06-18 RX ADMIN — POTASSIUM CHLORIDE 10 MEQ: 10 INJECTION, SOLUTION INTRAVENOUS at 06:01

## 2018-06-18 RX ADMIN — Medication 0.2 MG: at 13:14

## 2018-06-18 RX ADMIN — POLYETHYLENE GLYCOL 3350, SODIUM SULFATE ANHYDROUS, SODIUM BICARBONATE, SODIUM CHLORIDE, POTASSIUM CHLORIDE 500 ML: 236; 22.74; 6.74; 5.86; 2.97 POWDER, FOR SOLUTION ORAL at 08:18

## 2018-06-18 RX ADMIN — FENTANYL CITRATE 25 MCG: 50 INJECTION INTRAMUSCULAR; INTRAVENOUS at 18:06

## 2018-06-18 RX ADMIN — Medication 0.4 MG: at 04:57

## 2018-06-18 RX ADMIN — POTASSIUM CHLORIDE 10 MEQ: 10 INJECTION, SOLUTION INTRAVENOUS at 04:57

## 2018-06-18 RX ADMIN — PROPOFOL 50 MG: 10 INJECTION, EMULSION INTRAVENOUS at 16:23

## 2018-06-18 RX ADMIN — FENTANYL CITRATE 50 MCG: 50 INJECTION INTRAMUSCULAR; INTRAVENOUS at 18:12

## 2018-06-18 RX ADMIN — POLYETHYLENE GLYCOL 3350, SODIUM SULFATE ANHYDROUS, SODIUM BICARBONATE, SODIUM CHLORIDE, POTASSIUM CHLORIDE 2000 ML: 236; 22.74; 6.74; 5.86; 2.97 POWDER, FOR SOLUTION ORAL at 01:57

## 2018-06-18 RX ADMIN — POTASSIUM CHLORIDE 10 MEQ: 10 INJECTION, SOLUTION INTRAVENOUS at 01:37

## 2018-06-18 RX ADMIN — LORAZEPAM 0.5 MG: 2 INJECTION INTRAMUSCULAR; INTRAVENOUS at 13:55

## 2018-06-18 RX ADMIN — Medication 0.4 MG: at 20:16

## 2018-06-18 RX ADMIN — LIDOCAINE HYDROCHLORIDE 100 MG: 20 INJECTION, SOLUTION INFILTRATION; PERINEURAL at 15:56

## 2018-06-18 ASSESSMENT — PAIN DESCRIPTION - DESCRIPTORS: DESCRIPTORS: CRAMPING

## 2018-06-18 NOTE — PROGRESS NOTES
[facility], [place]    Internal Medicine Progress Note - [facility] Service    Main Plans for Today   -Plan for retrograde enteroscopy by GI  -Continue pain management with dilaudid PRN     Assessment & Plan   Rachel Gerhardt is a 42 yr old female with TPN-dependent malnutrition, cervical cancer s/p chemo-radiation therapy complicated by recurrent SBO s/p ex lap with 60 cm of small bowel resected (2017) complicated by anastomotic stricture in pelvis causing persistent partial small bowel obstructions, and opioid dependence with prior suboxone use who presents with intermittent abdominal pain with imaging findings suggestive of recurrent partial small bowel obstruction secondary to adhesions now s/p bowel prep with plan to go to the OR later today for retrograde enteroscopy by GI.       #Partial Small Bowel Obstruction  #Hx of Cervical Cancer s/p Chemo-Radiation c/b recurrent SBO s/p ex lap (2017)  #Known anastomotic stricture in pelvis   Per Colorectal Surgery, patient is a high risk surgical candidate and no urgent intervention is indicated at this time. Completed GoLytely bowel prep (8.5L total) overnight via NG tube. Plan for retrograde enteroscopy in OR today by GI.   -Follow-up post-op procedure recs   -Encourage ambulation   -GI consult      #Pain Management   #Anxiety  Patient takes percocet 5-325 mg q4H at home. Patient reports she is on a pain contract.   -Dilaudid 0.4mg q4H PRN   -Lidocaine patches  -Patient would like to avoid ativan as it makes her hallucinate     #Nutrition  Patient started TPN in 3/2018 given hx of recurrent SBO.   -Continue TPN (pharmacy/nutrition to dose) via previously placed right PICC   -TPN labs   -Monitor for refeeding syndrome     # Pain Assessment:  Current Pain Score 6/18/2018   Patient currently in pain? sleeping: patient not able to self report   Pain score (0-10) -   Pain location -   Pain descriptors -   Some encounter information is confidential  and restricted. Go to Review Flowsheets activity to see all data.   - Jenni is experiencing pain due to partial SBO. Pain management was discussed and the plan was created in a collaborative fashion.  Jenni's response to the current recommendations: mixed response  - Please see the plan for pain management as documented above    Diet: NPO for Medical/Clinical Reasons Except for: No Exceptions  parenteral nutrition - Kabiven 3-in-1 contains lipid  Fluids: No IV fluids   DVT Prophylaxis: Pneumatic Compression Devices/Ambulation   Code Status: Full Code    Disposition Plan   Expected discharge: 2 - 3 days, recommended to prior living arrangement once resolution of partial SBO.         Entered: Tess Richard 06/18/2018, 8:49 AM   Information in the above section will display in the discharge planner report.      The patient's care was discussed with the Attending Physician, Dr. Veliz.    Tess Richard  [facility]: 3  Pager: [phone]  Please see sticky note for cross cover information    Interval History   Patient completed 6L GoLytely bowel prep overnight with an additional 2.5L this AM. Plan to go to the OR later today for retrograde enteroscopy.     Patient would like NG tube removed. She continues to report abdominal pain. Had some nausea, no vomiting. No SOB.     The rest of the 4 point ROS is negative except for those mentioned above.     Physical Exam   Vital Signs: Temp: 96  F (35.6  C) Temp src: Oral BP: 119/66 Pulse: 78   Resp: 18 SpO2: 95 % O2 Device: None (Room air)    Weight: 114 lbs 1.6 oz  General Appearance: Sitting in bed, NG in place. No acute distress.   HEENT: No scleral icterus. MMM, PEERL, EOMi.   Respiratory: Anterior lung fields clear to auscultation bilaterally. No increased work of breathing.   Cardiovascular: RRR; S1 and S2 heard. No murmurs appreciated.   GI: hyperactive bowel sounds. Mildly-distended, diffuse tenderness to light palpation. Guarding and  rigidity.   Extremities: No LE edema. Warm and well-perfused.   Neuro: Alert and oriented to person, place, and time. No gross neurological deficits appreciated.     Data   Medications     IV fluid REPLACEMENT ONLY       KABIVEN 35 mL/hr at 06/18/18 0004       lidocaine  2 patch Transdermal Q24h    And     lidocaine   Transdermal Q24H    And     lidocaine   Transdermal Q8H     polyethylene glycol  2,000 mL Oral Once     sodium chloride (PF)  3 mL Intracatheter Q8H     Data     Recent Labs  Lab 06/18/18  0536 06/17/18  2144 06/17/18  0742   WBC 6.2  --  7.9   HGB 13.6  --  13.3   MCV 99  --  98     --  337   INR 1.07  --   --     143 137   POTASSIUM 3.8 3.4 3.5   CHLORIDE 104 103 101   CO2 29 31 27   BUN 13 15 22   CR 0.61 0.53 0.67   ANIONGAP 10 9 9   JONATAN 9.1 8.0* 8.4*   GLC 96 106* 88   ALBUMIN 3.3*  --  2.8*   PROTTOTAL 7.1  --  6.2*   BILITOTAL 0.3  --  0.3   ALKPHOS 104  --  100   ALT 30  --  32   AST 14  --  12   LIPASE  --   --  95     No results found for this or any previous visit (from the past 24 hour(s)).     Internal Medicine Staff Addendum  Date of Service: 6/18/2018  I have seen and examined Ms Gerhardt, reviewed the data and discussed the plan of care with the care team on rounds.  I agree with the above documentation with the additions/changes to the ROS, HPI, Exam or data (including my edits in italics):    I discussed pt's care with bedside RN, case management/social work today.  I personally reviewed, labs, medications and past 24 hr notes.  Assessment/Plan/Diagnoses: plan/dx as above, which contains my edits and reflects our joint medical decision-making.     Nicholas Veliz MD PhD  Internal Medicine Hospitalist & Staff Physician   of Internal Medicine   [facility]  Pager: [phone]

## 2018-06-18 NOTE — TELEPHONE ENCOUNTER
Health Call Center    Phone Message    May a detailed message be left on voicemail: yes    Reason for Call: Other: Lamar from [facility] called to see if pts PICC line was removed.  Lamar would like to talk to Dr. Moore's nurse - Please follow up with Lamar.     Action Taken: Other: [facility] Adult [facility] ([facility])

## 2018-06-18 NOTE — PROGRESS NOTES
CLINICAL NUTRITION SERVICES - ASSESSMENT NOTE     Nutrition Prescription    RECOMMENDATIONS FOR MDs/PROVIDERS TO ORDER:  1. Pt is at high risk for refeeding, Monitor Lyte with TPN start and advancement , replace as needed   2. Avoid IV dextrose from MIVF with start of TPN, decrease or d/c  IVF with TPN start.      Malnutrition Status:    Severe malnutrition in the context of acute on chronic illness    Recommendations already ordered by Registered Dietitian (RD):  1. Sent Pharmacy TPN change order for the following:     -- Use dosing weight 52 kg    --- Goal PN @ 70 ml/hr with initial 100 gm Dex daily (GIR=1.3 mg/kg/min), 90 gm AA daily and 250 ml 20% IV lipids x 6 days per week.      ----Micro/Rx: Infuvite+trace elements,    -- ONLY when pt tolerates ~100% of initial continuous PN volume with K+/Mg++/Phos WNL, advance PN dextrose by 25 gm daily, to goal @ 175 gm dextrose/day.    -- Goal TPN provides: 1383 kcals (27 kcal/kg/day), 1.7 gm PRO/kg/day, GIR =2.3 mg/kg/min with 30.5% kcals from Fat.    ---2. Pharmacy please Order a TG for baseline TG value.     Future/Additional Recommendations:  - Repeat LFTs/TG every Monday to assess for TPN tolerance  - Monitor BUN/Cr to assess for protein tolerance  - Monitor daily wt to assess for volume status       REASON FOR ASSESSMENT  Rachel A Gerhardt is a/an 42 year old female assessed by the dietitian for Admission Nutrition Risk Screen for tube feeding or parenteral nutrition and Pharmacy/Nutrition to Start and Manage PN      Chart reviewed:  PMH: Cervical and ovarian Cancer s/p Chemo-Radiation therapy complicated by recurrent SBO s/p ex lap with 60 cm of small bowel resected (2017) complicated by 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    - Presents with intermittent abdominal pain with imaging findings suggestive of recurrent small bowel obstruction secondary to adhesions.     - Admitted from  "5/11-5/23/2018 with recurrent SBO, (not a surgical candidate given high risk)   - 6/18: Possible endoscopic Dilation of anastomotic stricture after Golytely treatments.       NUTRITION HISTORY  Patient started TPN in 3/2018 due to history of recurrent SBO.   [facility], no longer service this patient.  Home TPN regimen reviewed per previous RD note from 5/18:     Nutrition Support: TPN: (1800 ml): Dextrose 175 gm, AA 90 gm, 20% Lipids 250 ml daily to provide 1455 kcals (28 kcals/kg), 1.7 gm/kg PRO, GIR 2.43 mg/kg/min, 34% kcals from fat.     Pt was discharged on 5/23/18. Per patients report, she has been off TPN since 6/13 (5 days). Reason not specified. Has been eating pureed type foods such as mashed potatoes and Jello.       CURRENT NUTRITION ORDERS  Diet: NPO  - NG tube placed in ED.   - Bowel prep: 4L Golytely over 4 hours started last night, via NGT for plan retrograde enteroscopy in OR today (6/18/18) by GI.    - TPN: Started with Kabiven @ 35 ml/hr ( 840 ml/day), last night ( 6/17).  - Dosing wt: 52 kg   - Regimen: 840 ml volume, Dextrose: 81 gm , AA: 27 gm/day , 164 ml daily lipids.     - Infuvite: 10 ml/day       LABS  Electrolytes, BG reviewed.   K+: 3.8, Mg++: 2.7 (elevated), Phos: 2.2 (L)  LFT's: Normal range  Prealbumin: 19 (lower end normal range)  TG: none this admit, Previously normal range on 5/21 ( 136)      MEDICATIONS  Medications reviewed    ANTHROPOMETRICS  Height: 175.3 cm (5' 9\")  Most Recent Weight: 51.8 kg (114 lb 1.6 oz)    IBW: 65.9 kg (79% IBW)   BMI: 16.85 kg /m2 -  Underweight BMI <18.5  Weight History: wt loss of 3.7 kg over the past 6 weeks , 6.7% net wt loss ( significant)   Wt Readings from Last 10 Encounters:   06/17/18 51.8 kg (114 lb 1.6 oz)   05/23/18 56.9 kg (125 lb 8 oz)   05/09/18 55.3 kg (122 lb)   05/02/18 55.5 kg (122 lb 4.8 oz)   04/30/18 55.4 kg (122 lb 2.2 oz)   04/24/18 55.7 kg (122 lb 12.7 oz)   03/29/18 60.3 kg (132 lb 14.4 oz)   02/13/18 64.9 kg (143 lb)   02/04/18 67.1 " kg (148 lb)   07/17/17 69.5 kg (153 lb 3.2 oz)       Dosing Weight: 52 kg dry admission wt on 6/17/18    ASSESSED NUTRITION NEEDS  Estimated Energy Needs: 1300 - 1560 - 1820  kcals/day (25 - 30 - 35 kcals/kg)  Justification: Lower end for TPN Maintenance and higher end for Repletion with under wt status, wt restoration   Estimated Protein Needs: 78  grams protein/day (1.5  ++ grams of pro/kg)  Justification: Repletion  Estimated Fluid Needs:  (1 mL/kcal)   Justification: Maintenance    PHYSICAL FINDINGS  Extremities: No LE edema. Warm and well-perfused.     MALNUTRITION  % Intake: </= 50% for >/= 5 days (severe), No TPN for the past 5 days, minimal po  % Weight Loss: > 5% in 1 month (severe)  Subcutaneous Fat Loss: Upper arm, Lower arm:  Severe, and Thoracic/intercostal: moderate  Muscle Loss: Thoracic region (clavicle, acromium bone, deltoid, trapezius, pectoral): Moderate, Upper arm (bicep, tricep) and Lower arm  (forearm): Severe   Fluid Accumulation/Edema: None noted  Malnutrition Diagnosis: Severe malnutrition in the context of acute on chronic illness      NUTRITION DIAGNOSIS  Altered GI function related to known recurrent SBO inhibiting patients ability to take PO, as evidenced by NPO/CL diet status since admit,  dependent on TPN for 100% of estimated nutritional needs         INTERVENTIONS  Implementation  Nutrition Education: Explained role of RD in TPN nutrition POC   Parenteral Nutrition/IV Fluids - Collaboration with other providers (PharmD), Sent change order      Goals  Total avg nutritional intake to meet a minimum of 25 kcal/kg and 1.5 gm PRO/kg daily (per dosing wt 50 kg).     Monitoring/Evaluation  Progress toward goals will be monitored and evaluated per protocol.      Annabella Sales RD/CHRISTOPHER  Pager [phone]

## 2018-06-18 NOTE — CONSULTS
GASTROENTEROLOGY CONSULTATION      Date of Admission:  6/17/2018  Date of Service:    06/17/18          ASSESSMENT AND RECOMMENDATIONS:   Assessment:  Rachel Gerhardt is a 42 year old female with a history of cervical cancer s/p radiation therapy with development of small bowel strictures that have led to recurrent SBO, now s/p ex lap 5/18/17 with resection of 60cm of small bowel and resultant anastomotic stricture.  Anastomotic stricture has led to partial SBO, and plans were made to treat with endoscopic dilation.  However, patient has been unwilling to prep or falsely saying had prepped in the past (please see my detailed documentation from 5/17/18). She has had issues limiting the preparation in the past - she reports having significant NG tube related anxiety and nausea/vomiting with GoLytely prep in the past. Most recently she was discharged after refusing to prep and went to [facility] for a second opinion, who said they had the same plans we did at [facility] for bowel prep prior to endoscopic intervention so she decided to leave and come back to [facility].     In the ED, she was found to have overall normal labs (with some hypokalemia, hypomagnesemia, and hypocalcemia) in addition to hypoalbuminemia and and overall normal CBC. CT A/P obtained revealing persistent bowel obstruction, although unclear if transition point at area of anastomosis.Patient does not seem to have complete small bowel obstruction as she is still passing gas and bowel movements and able to tolerate a liquid diet. Patient allowed NG tube placement and colorectal surgery saw the patient who noted she was a poor surgical candidate and recommended GI consult for consideration of endoscopic dilation of anastomotic stricture.      Recommendations:   - Bowel prep: 4L GoLytely now over 4 hours, 2L @ 0200 via NGT   - NPO after last prep administered   - Optimize electrolytes (K, Mg, Ca, Phos)   - Frequent ambulation   - If patient able to tolerate  prep, will plan retrograde enteroscopy in OR for tomorrow; there is a possibility we may be unable to reach anastomotic stricture so appreciate ongoing colorectal surgery involvement    Gastroenterology outpatient follow up recommendations: Pending clinical course.     Thank you for involving us in this patient's care. Please do not hesitate to contact the GI service with any questions or concerns.     Pt care plan discussed with Echo Akins and Musa, GI staff physician.    Meghan Slade MD  Gastroenterology Fellow  -------------------------------------------------------------------------------------------------------------------          Chief Complaint:   We were asked by Dr. Veliz of Internal Medicine to evaluate this patient with partial SBO in setting of anastomotic stricture.     History is obtained from the patient and the medical record.          History of Present Illness:   Rachel Gerhardt is a 42 year old female with a history of cervical cancer s/p radiation therapy with development of small bowel strictures that have led to recurrent SBO, now s/p ex lap 5/18/17 with resection of 60cm of small bowel and resultant anastomotic stricture.  Anastomotic stricture has led to partial SBO, and plans were made to treat with endoscopic dilation.  However, patient has been unwilling to prep or falsely saying had prepped in the past (please see my detailed documentation from 5/17/18). She has had issues limiting the preparation in the past - she reports having significant NG tube related anxiety and nausea/vomiting with GoLytely prep in the past. Most recently she was discharged after refusing to prep and went to [facility] for a second opinion, who said they had the same plans we did at [facility] for bowel prep prior to endoscopic intervention so she decided to leave and come back to [facility] for NGT placement and bowel prep.    Patient reports ongoing diffuse abdominal pain and intermittent nausea/vomiting. She  "tolerates clear liquids and a soft diet but will vomit if she \"moves too quickly\" sometimes. She is passing gas and having three bowel movements daily. She denies fevers, chills, chest pain, shortness of breath, change in urine, bloody or melenic stool, headaches. She feels extremely anxious with the NG tube in and feels she may \"rip it out at any moment if they don't sedate me\".             Past Medical History:   Reviewed and edited as appropriate  Past Medical History:   Diagnosis Date     Asthma      Cancer (H)     Per patient OBGYN, cerivical cancer     Cervical cancer (H)      Other chronic pain      Ovarian cancer (H)      Substance abuse     Outside records indicate past history of narcotics abuse or dependence, but patient denies.            Past Surgical History:   Reviewed and edited as appropriate   Past Surgical History:   Procedure Laterality Date     COLONOSCOPY N/A 5/3/2018    Procedure: COLONOSCOPY;  sigmoidoscopy;  Surgeon: Omero Vigil MD;  Location: [facility] OR     COLONOSCOPY WITH CO2 INSUFFLATION N/A 4/30/2018    Procedure: COLONOSCOPY WITH CO2 INSUFFLATION;  Colonoscopy;  Surgeon: Omero Vigil MD;  Location: [facility] OR     COMBINED CYSTOSCOPY, INSERT STENT URETER(S) Bilateral 5/18/2017    Procedure: COMBINED CYSTOSCOPY, INSERT STENT URETER(S);  Cystoscopy with Bilateral Stent,;  Surgeon: Rene Calero MD;  Location: [facility] OR     ENT SURGERY  2009    mastoid, sinus     EXAM UNDER ANESTHESIA, INSERT ALEX SLEEVE, UTERINE PLACEMENT OF TANDEM AND RING FOR RAD, ULTRASOUND N/A 12/14/2015    Procedure: EXAM UNDER ANESTHESIA, INSERT ALEX SLEEVE, UTERINE PLACEMENT OF TANDEM AND RING FOR RADIATION, ULTRASOUND GUIDED;  Surgeon: Abby Tony MD;  Location: [facility] OR     INSERT TANDEM AND CESIUM APPLICATOR CERVIX, ULTRASOUND GUIDED N/A 12/17/2015    Procedure: INSERT TANDEM AND CESIUM APPLICATOR CERVIX, ULTRASOUND GUIDED;  Surgeon: Kika Wood MD;  Location: [facility] OR     KNEE SURGERY   "     LAPAROTOMY EXPLORATORY N/A 5/18/2017    Procedure: LAPAROTOMY EXPLORATORY;   Exploratry Laparotomy, Small Bowel Resection with anastomosis, Flexible Sigmoidoscopy;  Surgeon: Jennifer Goodwin MD;  Location: [facility] OR     PICC INSERTION Right 04/29/2017    4fr SL BioFlo PICC, 37cm (3cm external) in the R basilic vein w/ tip in the mid SVC.     PICC INSERTION Right 03/29/2018    4Fr - 40cm (4cm external), R lateral brachial vein, Low SVC     RESECT SMALL BOWEL WITHOUT OSTOMY N/A 5/18/2017    Procedure: RESECT SMALL BOWEL WITHOUT OSTOMY;;  Surgeon: Jennifer Goodwin MD;  Location: [facility] OR     SIGMOIDOSCOPY FLEXIBLE N/A 5/18/2017    Procedure: SIGMOIDOSCOPY FLEXIBLE;;  Surgeon: Jennifer Goodwin MD;  Location: [facility] OR            Previous Endoscopy:     Results for orders placed or performed during the hospital encounter of 04/30/18   COLONOSCOPY   Result Value Ref Range    COLONOSCOPY       [address] [place] [address] [phone]     Endoscopy Department  _______________________________________________________________________________  Patient Name: Rachel Gerhardt         Procedure Date: 4/30/2018 3:50 PM  MRN: [id number]                       Account Number: [id number]  YOB: 1975             Admit Type: Outpatient  Age: 42                               Room: OR  Gender: Female                        Note Status: Finalized  Attending MD: Omero Vigil MD   Total Sedation Time:   _______________________________________________________________________________     Procedure:           Colonoscopy  Indications:         Therapeutic procedure  Providers:           Omero Vigil MD  Patient Profile:     Ms Gerhardt is a 43yo woman with cancer status post                        radiation therapy complicated by small bowel obstruction                        status post small bowel resection. She has evidence of                         anastomotic stenosis and now  proceeds to retrograde                        enteroscopy. Of note she only received instructions for                        two enemas.  Referring MD:        Negro Akins MD, Jennifer Goodwin MD  Medicines:           Deep sedation was administered  Complications:       No immediate complications.  _______________________________________________________________________________  Procedure:           Pre-Anesthesia Assessment:                       - Prior to the procedure, a History and Physical was                        performed, and patient medications and allergies were                        reviewed. The patient is competent. The risks and                        benefits of the procedure and the sedation options and                        risks were discussed with the patient. All questions                        were answered and informed consent was obtained. Patient                        identification and proposed procedure were  verified by                        the nurse in the pre-procedure area. Mental Status                        Examination: alert and oriented. Airway Examination:                        Mallampati Class II (the uvula but not tonsillar pillars                        visualized). Respiratory Examination: clear to                        auscultation. CV Examination: normal. ASA Grade                        Assessment: II - A patient with mild systemic disease.                        After reviewing the risks and benefits, the patient was                        deemed in satisfactory condition to undergo the                        procedure. The anesthesia plan was to use deep sedation                        / analgesia. Immediately prior to administration of                        medications, the patient was re-assessed for adequacy to                        receive sedatives. The heart rate, respiratory rate,                        oxygen saturations, blood pressure, adequacy of                          pulmonary ventilation, and response to care were                        monitored throughout the procedure. The physical status                        of the patient was re-assessed after the procedure.                        After obtaining informed consent, the colonoscope was                        passed under direct vision. Throughout the procedure,                        the patient's blood pressure, pulse, and oxygen                        saturations were monitored continuously. The enteroscope                        was introduced through the anus and advanced to the                        cecum, identified by appendiceal orifice and ileocecal                        valve. The colonoscopy was performed without difficulty.                        The patient tolerated the procedure well. The quality of                        the bowel preparation was inadequate.                                                                                   Findings:       Digi neal exam demonstrated intact tone with liquid contents. An        eneteroscope and single ballooned overtube were advanced in concert to        the cecum. This was done with care as a moderate amount of liquid and        sold stool were found throughout the colon. However the solid stool        burden could not be cleared from the cecum precluding valve intubation.        The procedure was then aborted.                                                                                   Impression:          - Inadequate bowel preparation to allow visualization of                        the ileocecal valve  Recommendation:      - Standard outpatient deep sedation recovery with                        probable discharge home this afternoon                       - As this is the second event of inadequate bowel                        preparation due to errors in communication with our                        staff; I would understand if the  patient did not wish to                        return  for the procedure, however if she does wish to                        return for repeat attempt at retrograde enteroscopy she                        will require at least a stanard colonoscopy bowel                        preparation (such as 4L Golytely) with continued bowel                        preparation as needed until completly clear                       - The findings and recommendations were discussed with                        the patient and their family                                                                                     electronically signed by CHAITANYA Vigil  _____________________  Omero Vigil MD  4/30/2018 4:43:24 PM  I was physically present for the entire viewing portion of the exam.  __________________________  Signature of teaching physician  Jose/Violetta Vigil MD  Number of Addenda: 0    Note Initiated On: 4/30/2018 3:50 PM  Scope In:  Scope Out:              Social History:   Reviewed and edited as appropriate  Social History     Social History     Marital status:      Spouse name: N/A     Number of children: N/A     Years of education: N/A     Occupational History     Not on file.     Social History Main Topics     Smoking status: Light Tobacco Smoker     Packs/day: 0.25     Years: 12.00     Types: Cigarettes     Smokeless tobacco: Never Used      Comment: pt smoking about 4 cigs daily     Alcohol use No      Comment: None per pt     Drug use: No      Comment: Patient denies.  Outside records indicate possible Opiate abuse.     Sexual activity: Yes     Partners: Male     Birth control/ protection: None     Other Topics Concern     Parent/Sibling W/ Cabg, Mi Or Angioplasty Before 65f 55m? No     Social History Narrative    Lives alone            Family History:   Reviewed and edited as appropriate  Family History   Problem Relation Age of Onset     DIABETES Mother      Ovarian Cancer No family hx of       Uterine Cancer No family hx of      Cervical Cancer No family hx of      Breast Cancer No family hx of        No known history of colorectal cancer, liver disease, or inflammatory bowel disease.       Allergies:   Reviewed and edited as appropriate     Allergies   Allergen Reactions     No Clinical Screening - See Comments Other (See Comments) and Diarrhea     headache  Carrots cause gastric upset, cramping and diarrhea.     Sulfa Drugs Hives     hives     Amoxicillin Unknown and Other (See Comments)     vomiting  vomiting     Amoxicillin-Pot Clavulanate Other (See Comments) and Nausea     vomiting     Augmentin GI Disturbance, Nausea and Hives     Avelox [Moxifloxacin] Nausea and Vomiting, Unknown and Nausea     Ciprofloxacin Hives and Nausea     Codeine Nausea and Vomiting and Nausea     Ibuprofen Nausea and Vomiting     Other reaction(s): Nausea And Vomiting     Ibuprofen Sodium Hives and GI Disturbance     Quinolones      Tramadol Hives, Diarrhea, Nausea and Nausea and Vomiting     Daucus Carota      Other reaction(s): GI Upset  Other reaction(s): Abdominal pain, Diarrhea  Carrots cause gastric upset, cramping and diarrhea.            Medications:     Prescriptions Prior to Admission   Medication Sig Dispense Refill Last Dose     lipids (INTRALIPID) 20 % infusion Inject 250 mLs into the vein every 24 hours Administer through a 1.2 micron filter Requires container change every 12 hours and tubing change every 24 hours.   6/15/2018     ondansetron (ZOFRAN ODT) 4 MG ODT tab Take 1 tablet (4 mg) by mouth every 6 hours as needed for nausea 20 tablet 0 6/16/2018 at Unknown time     oxyCODONE-acetaminophen (PERCOCET) 5-325 MG per tablet Take 1 tablet by mouth every 4 hours as needed for pain Max 6 tablets per day   6/16/2018 at Unknown time     parenteral nutrition - PTA/DISCHARGE ORDER TPN formula managed by [facility] until 6/13. Patient now does not have TPNs being made by anyone. She had left overs at home and  "has been using them since 6/13   Past Week at Unknown time             Review of Systems:   A complete review of systems was performed and is negative except as noted in the HPI           Physical Exam:   BP 93/66  Pulse 76  Temp 98.3  F (36.8  C) (Oral)  Resp 18  Ht 1.753 m (5' 9\")  Wt 51.8 kg (114 lb 1.6 oz)  SpO2 98%  BMI 16.85 kg/m2  Wt:   Wt Readings from Last 2 Encounters:   06/17/18 51.8 kg (114 lb 1.6 oz)   05/23/18 56.9 kg (125 lb 8 oz)      Constitutional: cooperative, pleasant, not dyspneic/diaphoretic, no acute distress  Eyes: Sclera anicteric/injected; NG tube in place  Ears/nose/mouth/throat: Normal oropharynx without ulcers or exudate, mucus membranes moist, hearing intact  Neck: supple  CV: No edema  Respiratory: Unlabored breathing  Lymph: No axillary, submandibular, supraclavicular lymphadenopathy  Abd: Nondistended, +bs, diffuse TTP, no peritoneal signs  Skin: warm, perfused, no jaundice  Neuro: AAO x 3  Psych: Normal affect  MSK: No gross deformities         Data:   Labs and imaging below were independently reviewed and interpreted    BMP  Recent Labs  Lab 06/17/18  0742      POTASSIUM 3.5   CHLORIDE 101   JONATAN 8.4*   CO2 27   BUN 22   CR 0.67   GLC 88     CBC  Recent Labs  Lab 06/17/18  0742   WBC 7.9   RBC 4.12   HGB 13.3   HCT 40.5   MCV 98   MCH 32.3   MCHC 32.8   RDW 14.8        INRNo lab results found in last 7 days.  LFTs  Recent Labs  Lab 06/17/18  0742   ALKPHOS 100   AST 12   ALT 32   BILITOTAL 0.3   PROTTOTAL 6.2*   ALBUMIN 2.8*      PANC  Recent Labs  Lab 06/17/18  0742   LIPASE 95       Imaging:  CT Abdomen/Pelvis 6/17/18  Small bowel obstruction with right lower quadrant transition point.  Similar to appearance 5/11/2018 with the point of obstruction being a  few centimeters more medial and distal in the bowel. Likely secondary  to adhesions. No drainable fluid collection, pneumatosis, portal  venous gas or pneumoperitoneum demonstrated.     "

## 2018-06-18 NOTE — ANESTHESIA CARE TRANSFER NOTE
Patient: Rachel A Gerhardt    Procedure(s):  Lower bowel Retrograde Enteroscopy with Balloon Dilation . - Wound Class: III-Contaminated    Diagnosis: Anastomatic Stricture   Diagnosis Additional Information: No value filed.    Anesthesia Type:   General     Note:  Airway :Face Mask  Patient transferred to:PACU  Handoff Report: Identifed the Patient, Identified the Reponsible Provider, Reviewed the pertinent medical history, Discussed the surgical course, Reviewed Intra-OP anesthesia mangement and issues during anesthesia, Set expectations for post-procedure period and Allowed opportunity for questions and acknowledgement of understanding      Vitals: (Last set prior to Anesthesia Care Transfer)    CRNA VITALS  6/18/2018 1703 - 6/18/2018 1733      6/18/2018             EKG: NSR                Electronically Signed By: CECY Nuno CRNA  June 18, 2018  5:33 PM

## 2018-06-18 NOTE — TELEPHONE ENCOUNTER
Called and informed the home care agency that the patient was now inpatient. The home care agency has concerns of the PICC line and who will manage it after discharge if patient continues to be noncompliant with her home care agency. This Rn informed her that her concerns will be brought to the physicians if patient is discharged with PICC line.

## 2018-06-18 NOTE — PROGRESS NOTES
GASTROENTEROLOGY PROGRESS NOTE    ASSESSMENT:  Rachel Gerhardt is a 42 year old female with a history of cervical cancer s/p radiation therapy with development of small bowel strictures that have led to recurrent SBO, now s/p ex lap 5/18/17 with resection of 60cm of small bowel and resultant anastomotic stricture.  Anastomotic stricture has led to partial SBO, and plans were made to treat with endoscopic dilation.  However, patient has been unwilling to prep or falsely saying had prepped in the past (please see my detailed documentation from 5/17/18). She has had issues limiting the preparation in the past - she reports having significant NG tube related anxiety and nausea/vomiting with GoLytely prep in the past. Most recently she was discharged after refusing to prep and went to [facility] for a second opinion, who said they had the same plans we did at [facility] for bowel prep prior to endoscopic intervention so she decided to leave and come back to [facility].      In the ED, she was found to have overall normal labs (with some hypokalemia, hypomagnesemia, and hypocalcemia) in addition to hypoalbuminemia and and overall normal CBC. CT A/P obtained revealing persistent bowel obstruction, although unclear if transition point at area of anastomosis. Patient does not seem to have complete small bowel obstruction as she is still passing gas and bowel movements and able to tolerate a liquid diet. Patient allowed NG tube placement and colorectal surgery saw the patient who noted she was a poor surgical candidate and recommended GI consult for consideration of endoscopic dilation of anastomotic stricture.     Since patient's stools are not clear, plan is to continue with bowel prep and will communicate with Dr. Vigil with patient's status.       Recommendations:   - Stool is opaque material, patient needs additional bowel prep till stool is clear    - NPO after last prep administered   - Optimize electrolytes (K, Mg, Ca,  "Phos)   - Frequent ambulation   - If patient able to tolerate prep, will plan retrograde enteroscopy in OR for this afternoon; there is a possibility we may be unable to reach anastomotic stricture so appreciate ongoing colorectal surgery involvement     Gastroenterology outpatient follow up recommendations: Pending clinical course.         Patient care plan discussed with Echo Akins and Musa, GI staff physician. Thank you for involving us in this patient's care. Please do not hesitate to contact the GI service with any questions or concerns.     Dilcia Hassan  Gastroenterology Nurse Practitioner  _______________________________________________________________  S: Patient is complaining of the NG tube and she would like to get it out as soon as possible. She is nauseated but tolerating preb. Stools are yellow but cannot see through.        O:  Blood pressure 119/66, pulse 78, temperature 96  F (35.6  C), temperature source Oral, resp. rate 18, height 1.753 m (5' 9\"), weight 51.8 kg (114 lb 1.6 oz), SpO2 95 %, not currently breastfeeding.    Constitutional: cooperative, pleasant, not dyspneic/diaphoretic, no acute distress  Eyes: Sclera anicteric/injected; NG tube in place  Ears/nose/mouth/throat: Normal oropharynx without ulcers or exudate, mucus membranes moist, hearing intact  Neck: supple  CV: No edema  Respiratory: Unlabored breathing  Lymph: No axillary, submandibular, supraclavicular lymphadenopathy  Abd: Nondistended, +bs, diffuse TTP, no peritoneal signs  Skin: warm, perfused, no jaundice  Neuro: AAO x 3  Psych: Normal affect  MSK: No gross deformities    LABS:  BMP  Recent Labs  Lab 06/18/18  0536 06/17/18  2144 06/17/18  0742    143 137   POTASSIUM 3.8 3.4 3.5   CHLORIDE 104 103 101   JONATAN 9.1 8.0* 8.4*   CO2 29 31 27   BUN 13 15 22   CR 0.61 0.53 0.67   GLC 96 106* 88     CBC  Recent Labs  Lab 06/18/18  0536 06/17/18  0742   WBC 6.2 7.9   RBC 4.25 4.12   HGB 13.6 13.3   HCT 42.2 40.5   MCV " "99 98   MCH 32.0 32.3   MCHC 32.2 32.8   RDW 14.9 14.8    337     INR  Recent Labs  Lab 06/18/18  0536   INR 1.07     LFTs  Recent Labs  Lab 06/18/18  0536 06/17/18  0742   ALKPHOS 104 100   AST 14 12   ALT 30 32   BILITOTAL 0.3 0.3   PROTTOTAL 7.1 6.2*   ALBUMIN 3.3* 2.8*      PANC  Recent Labs  Lab 06/17/18  0742   LIPASE 95       HPI:  Rachel Gerhardt is a 42 year old female with a history of cervical cancer s/p radiation therapy with development of small bowel strictures that have led to recurrent SBO, now s/p ex lap 5/18/17 with resection of 60cm of small bowel and resultant anastomotic stricture.  Anastomotic stricture has led to partial SBO, and plans were made to treat with endoscopic dilation.  However, patient has been unwilling to prep or falsely saying had prepped in the past (please see my detailed documentation from 5/17/18). She has had issues limiting the preparation in the past - she reports having significant NG tube related anxiety and nausea/vomiting with GoLytely prep in the past. Most recently she was discharged after refusing to prep and went to [facility] for a second opinion, who said they had the same plans we did at [facility] for bowel prep prior to endoscopic intervention so she decided to leave and come back to [facility] for NGT placement and bowel prep.     Patient reports ongoing diffuse abdominal pain and intermittent nausea/vomiting. She tolerates clear liquids and a soft diet but will vomit if she \"moves too quickly\" sometimes. She is passing gas and having three bowel movements daily. She denies fevers, chills, chest pain, shortness of breath, change in urine, bloody or melenic stool, headaches. She feels extremely anxious with the NG tube in and feels she may \"rip it out at any moment if they don't sedate me\".     ROS: A comprehensive Review of Systems was asked and answered in the negative unless specifically commented upon in the HPI    PMHx:  Past Medical History:   Diagnosis " Date     Asthma      Cancer (H)     Per patient OBGYN, cerivical cancer     Cervical cancer (H)      Other chronic pain      Ovarian cancer (H)      Substance abuse     Outside records indicate past history of narcotics abuse or dependence, but patient denies.       PSurgHx:   Past Surgical History:   Procedure Laterality Date     COLONOSCOPY N/A 5/3/2018    Procedure: COLONOSCOPY;  sigmoidoscopy;  Surgeon: Omero Vigil MD;  Location: [facility]     COLONOSCOPY WITH CO2 INSUFFLATION N/A 4/30/2018    Procedure: COLONOSCOPY WITH CO2 INSUFFLATION;  Colonoscopy;  Surgeon: Omero Vigil MD;  Location: [facility]     COMBINED CYSTOSCOPY, INSERT STENT URETER(S) Bilateral 5/18/2017    Procedure: COMBINED CYSTOSCOPY, INSERT STENT URETER(S);  Cystoscopy with Bilateral Stent,;  Surgeon: Rene Calero MD;  Location: [facility] OR     ENT SURGERY  2009    mastoid, sinus     EXAM UNDER ANESTHESIA, INSERT ALEX SLEEVE, UTERINE PLACEMENT OF TANDEM AND RING FOR RAD, ULTRASOUND N/A 12/14/2015    Procedure: EXAM UNDER ANESTHESIA, INSERT ALEX SLEEVE, UTERINE PLACEMENT OF TANDEM AND RING FOR RADIATION, ULTRASOUND GUIDED;  Surgeon: Abby Tony MD;  Location: [facility] OR     INSERT TANDEM AND CESIUM APPLICATOR CERVIX, ULTRASOUND GUIDED N/A 12/17/2015    Procedure: INSERT TANDEM AND CESIUM APPLICATOR CERVIX, ULTRASOUND GUIDED;  Surgeon: Kika Wood MD;  Location: [facility] OR     KNEE SURGERY       LAPAROTOMY EXPLORATORY N/A 5/18/2017    Procedure: LAPAROTOMY EXPLORATORY;   Exploratry Laparotomy, Small Bowel Resection with anastomosis, Flexible Sigmoidoscopy;  Surgeon: Jennifer Goodwin MD;  Location: [facility] OR     PICC INSERTION Right 04/29/2017    4fr SL BioFlo PICC, 37cm (3cm external) in the R basilic vein w/ tip in the mid SVC.     PICC INSERTION Right 03/29/2018    4Fr - 40cm (4cm external), R lateral brachial vein, Low SVC     RESECT SMALL BOWEL WITHOUT OSTOMY N/A 5/18/2017    Procedure: RESECT SMALL BOWEL WITHOUT OSTOMY;;   Surgeon: Jennifer Goodwin MD;  Location: [facility]     SIGMOIDOSCOPY FLEXIBLE N/A 5/18/2017    Procedure: SIGMOIDOSCOPY FLEXIBLE;;  Surgeon: Jennifer Goodwin MD;  Location: [facility] OR       MEDS:    No current facility-administered medications on file prior to encounter.   Current Outpatient Prescriptions on File Prior to Encounter:  lipids (INTRALIPID) 20 % infusion Inject 250 mLs into the vein every 24 hours Administer through a 1.2 micron filter Requires container change every 12 hours and tubing change every 24 hours.   ondansetron (ZOFRAN ODT) 4 MG ODT tab Take 1 tablet (4 mg) by mouth every 6 hours as needed for nausea       ALLERGIES:    Allergies   Allergen Reactions     No Clinical Screening - See Comments Other (See Comments) and Diarrhea     headache  Carrots cause gastric upset, cramping and diarrhea.     Sulfa Drugs Hives     hives     Amoxicillin Unknown and Other (See Comments)     vomiting  vomiting     Amoxicillin-Pot Clavulanate Other (See Comments) and Nausea     vomiting     Augmentin GI Disturbance, Nausea and Hives     Avelox [Moxifloxacin] Nausea and Vomiting, Unknown and Nausea     Ciprofloxacin Hives and Nausea     Codeine Nausea and Vomiting and Nausea     Ibuprofen Nausea and Vomiting     Other reaction(s): Nausea And Vomiting     Ibuprofen Sodium Hives and GI Disturbance     Quinolones      Tramadol Hives, Diarrhea, Nausea and Nausea and Vomiting     Daucus Carota      Other reaction(s): GI Upset  Other reaction(s): Abdominal pain, Diarrhea  Carrots cause gastric upset, cramping and diarrhea.       FHx:  Family History   Problem Relation Age of Onset     DIABETES Mother      Ovarian Cancer No family hx of      Uterine Cancer No family hx of      Cervical Cancer No family hx of      Breast Cancer No family hx of        SOCIAL Hx:  Social History     Social History     Marital status:      Spouse name: N/A     Number of children: N/A     Years of education: N/A     Occupational History      Not on file.     Social History Main Topics     Smoking status: Light Tobacco Smoker     Packs/day: 0.25     Years: 12.00     Types: Cigarettes     Smokeless tobacco: Never Used      Comment: pt smoking about 4 cigs daily     Alcohol use No      Comment: None per pt     Drug use: No      Comment: Patient denies.  Outside records indicate possible Opiate abuse.     Sexual activity: Yes     Partners: Male     Birth control/ protection: None     Other Topics Concern     Parent/Sibling W/ Cabg, Mi Or Angioplasty Before 65f 55m? No     Social History Narrative    Lives alone       Reviewed images:  CT Abdomen/Pelvis 6/17/18  Small bowel obstruction with right lower quadrant transition point.  Similar to appearance 5/11/2018 with the point of obstruction being a  few centimeters more medial and distal in the bowel. Likely secondary  to adhesions. No drainable fluid collection, pneumatosis, portal  venous gas or pneumoperitoneum demonstrated.

## 2018-06-18 NOTE — PLAN OF CARE
Problem: Patient Care Overview  Goal: Plan of Care/Patient Progress Review  Outcome: No Change  Vital sign stable on RA. TPN infusing at 35ml/hr. Up self in room and halls. Golytely prep done through NG tube. Pt very frustrated with prep and continuing to have tube in. PRN dilaudid and ativan given. To OR at 1500. Will continue to monitor.

## 2018-06-18 NOTE — ANESTHESIA PREPROCEDURE EVALUATION
Anesthesia Evaluation     .             ROS/MED HX    ENT/Pulmonary:     (+)asthma , . .    Neurologic:       Cardiovascular:  - neg cardiovascular ROS       METS/Exercise Tolerance:     Hematologic:  - neg hematologic  ROS       Musculoskeletal:  - neg musculoskeletal ROS       GI/Hepatic: Comment: History of small obstruction s/p radiation for cervical CA. Patient has had partial small bowel resection. And currently found to have anastomosis stricture. Here for enteroscopy and dilation.     (+) bowel prep,       Renal/Genitourinary:  - ROS Renal section negative       Endo:  - neg endo ROS       Psychiatric: Comment: History of substance abuse        Infectious Disease:  - neg infectious disease ROS       Malignancy:   (+) Malignancy   Ovarian and cervical CA        Other:    (+) H/O Chronic Pain,               Labs:  BMP:  Recent Labs   Lab Test  06/18/18   0536   NA  144   POTASSIUM  3.8   CHLORIDE  104   CO2  29   BUN  13   CR  0.61   GLC  96   JONATAN  9.1     LFTs:   Recent Labs   Lab Test  06/18/18   0536   PROTTOTAL  7.1   ALBUMIN  3.3*   BILITOTAL  0.3   ALKPHOS  104   AST  14   ALT  30     CBC:   Recent Labs   Lab Test  06/18/18   0536   WBC  6.2   RBC  4.25   HGB  13.6   HCT  42.2   MCV  99   MCH  32.0   MCHC  32.2   RDW  14.9   PLT  399     Coags:  Recent Labs   Lab Test  06/18/18 0536   05/01/18   2057   INR  1.07   < >  1.03   PTT   --    --   29    < > = values in this interval not displayed.     Past Medical History:   Diagnosis Date     Asthma      Cancer (H)     Per patient OBGYN, cerivical cancer     Cervical cancer (H)      Other chronic pain      Ovarian cancer (H)      Substance abuse     Outside records indicate past history of narcotics abuse or dependence, but patient denies.         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     CARDIOVASCULAR SCREENING; LDL GOAL LESS THAN 160     Pregnancy complicated by chemical dependency,  antepartum (H)     Opiate dependence (H)     Abdominal pain     History of polysubstance abuse, +cocaine UDS in 2013     Cervix cancer (H)     Encounter for long-term current use of medication     SBO (small bowel obstruction)     Small bowel obstruction, partial     Small intestine obstruction     Small bowel obstruction     Postoperative pain     Failure to thrive in adult     Hypokalemia             Past Surgical History:   Procedure Laterality Date     COLONOSCOPY N/A 5/3/2018    Procedure: COLONOSCOPY;  sigmoidoscopy;  Surgeon: Omero Vigil MD;  [facility]     COLONOSCOPY WITH CO2 INSUFFLATION N/A 4/30/2018    Procedure: COLONOSCOPY WITH CO2 INSUFFLATION;  Colonoscopy;  Surgeon: Omero Vigil MD;  [facility]     COMBINED CYSTOSCOPY, INSERT STENT URETER(S) Bilateral 5/18/2017    Procedure: COMBINED CYSTOSCOPY, INSERT STENT URETER(S);  Cystoscopy with Bilateral Stent,;  Surgeon: Rene Calero MD;  Location: [facility] OR     ENT SURGERY  2009    mastoid, sinus     EXAM UNDER ANESTHESIA, INSERT ALEX SLEEVE, UTERINE PLACEMENT OF TANDEM AND RING FOR RAD, ULTRASOUND N/A 12/14/2015    Procedure: EXAM UNDER ANESTHESIA, INSERT ALEX SLEEVE, UTERINE PLACEMENT OF TANDEM AND RING FOR RADIATION, ULTRASOUND GUIDED;  Surgeon: Abby Tony MD;  Location: [facility] OR     INSERT TANDEM AND CESIUM APPLICATOR CERVIX, ULTRASOUND GUIDED N/A 12/17/2015    Procedure: INSERT TANDEM AND CESIUM APPLICATOR CERVIX, ULTRASOUND GUIDED;  Surgeon: Kika Wood MD;  Location: [facility] OR     KNEE SURGERY       LAPAROTOMY EXPLORATORY N/A 5/18/2017    Procedure: LAPAROTOMY EXPLORATORY;   Exploratry Laparotomy, Small Bowel Resection with anastomosis, Flexible Sigmoidoscopy;  Surgeon: Jennifer Goodwin MD;  Location: [facility] OR     PICC INSERTION Right 04/29/2017    4fr SL BioFlo PICC, 37cm (3cm external) in the R basilic vein w/ tip in the mid SVC.     PICC INSERTION Right 03/29/2018    4Fr - 40cm (4cm external), R lateral  brachial vein, Low SVC     RESECT SMALL BOWEL WITHOUT OSTOMY N/A 5/18/2017    Procedure: RESECT SMALL BOWEL WITHOUT OSTOMY;;  Surgeon: Jennifer Goodwin MD;  [facility]     SIGMOIDOSCOPY FLEXIBLE N/A 5/18/2017    Procedure: SIGMOIDOSCOPY FLEXIBLE;;  Surgeon: Jennifer Goodwin MD;  Location: [facility] OR             Allergies:    Allergies   Allergen Reactions     No Clinical Screening - See Comments Other (See Comments) and Diarrhea     headache  Carrots cause gastric upset, cramping and diarrhea.     Sulfa Drugs Hives     hives     Amoxicillin Unknown and Other (See Comments)     vomiting  vomiting     Amoxicillin-Pot Clavulanate Other (See Comments) and Nausea     vomiting     Augmentin GI Disturbance, Nausea and Hives     Avelox [Moxifloxacin] Nausea and Vomiting, Unknown and Nausea     Ciprofloxacin Hives and Nausea     Codeine Nausea and Vomiting and Nausea     Ibuprofen Nausea and Vomiting     Other reaction(s): Nausea And Vomiting     Ibuprofen Sodium Hives and GI Disturbance     Quinolones      Tramadol Hives, Diarrhea, Nausea and Nausea and Vomiting     Daucus Carota      Other reaction(s): GI Upset  Other reaction(s): Abdominal pain, Diarrhea  Carrots cause gastric upset, cramping and diarrhea.           Meds:   Prescriptions Prior to Admission   Medication Sig Dispense Refill Last Dose     lipids (INTRALIPID) 20 % infusion Inject 250 mLs into the vein every 24 hours Administer through a 1.2 micron filter Requires container change every 12 hours and tubing change every 24 hours.   6/15/2018     ondansetron (ZOFRAN ODT) 4 MG ODT tab Take 1 tablet (4 mg) by mouth every 6 hours as needed for nausea 20 tablet 0 6/16/2018 at Unknown time     oxyCODONE-acetaminophen (PERCOCET) 5-325 MG per tablet Take 1 tablet by mouth every 4 hours as needed for pain Max 6 tablets per day   6/16/2018 at Unknown time     parenteral nutrition - PTA/DISCHARGE ORDER TPN formula managed by [facility] until 6/13. Patient now does  not have TPNs being made by anyone. She had left overs at home and has been using them since 6/13   Past Week at Unknown time       No current outpatient prescriptions on file.           Physical Exam  Normal systems: cardiovascular, pulmonary and dental    Airway   Mallampati: II  TM distance: >3 FB  Neck ROM: full    Dental     Cardiovascular   Rhythm and rate: regular and normal      Pulmonary    breath sounds clear to auscultation                    Anesthesia Plan      History & Physical Review  History and physical reviewed and following examination; no interval change.    ASA Status:  3 .    NPO Status:  > 8 hours    Plan for General with Intravenous induction. Maintenance will be Balanced.    PONV prophylaxis:  Ondansetron (or other 5HT-3) and Dexamethasone or Solumedrol       Postoperative Care  Postoperative pain management:  IV analgesics.      Consents  Anesthetic plan, risks, benefits and alternatives discussed with:  Patient.  Use of blood products discussed: No .   .

## 2018-06-18 NOTE — OR NURSING
Called for report on patient. Report given by Eleni WHITE on 5B. Eleni stated that patient refused the pre op scrub. She stated she will attempt to scrub patient once we got off the phone.

## 2018-06-18 NOTE — PLAN OF CARE
Problem: Patient Care Overview  Goal: Plan of Care/Patient Progress Review  Outcome: No Change  Admitted to unit 5B from ER. Arrived 1535 with staff. Pt able to ambulate independently. Pt very upset about not getting ativan right away. Threatening to remove NG tube and leave the hospital if she could not get ativan. Stated she had very high anxiety about having a tube in her nose. C/o abdominal pain. Dilaudid administered x2 with little-no effect per pt. Ativan administered x2. Ambulating in room and off unit independently. Magnesium replaced per protocol. TPN infusing per orders. NG in place and with Golytely infusing. Boluses of golytely also administered through NG tube. Pt received 2L golytely then c/o nausea and increasing abdominal discomfort/fullness. Infusion via NG tube held 1hr and zofran administered. Golytely restarted at lower rate. BM x1 since starting bowel prep. Possible endoscopic dilation of anastomotic stricture tomorrow. Complete bowel prep. Continue to assess pain and effectiveness of interventions.

## 2018-06-18 NOTE — PLAN OF CARE
Problem: Patient Care Overview  Goal: Plan of Care/Patient Progress Review  Outcome: No Change  Patient has taken in all 6000 ml of Golytely between evening and night shift. Golytely was given via NG and feeding pump and finished at 0530. Patient has had watery yellow stool incontinence and in the toilet, but no formed stool or brown liquid or loose stool. One hour after Golytely was finished RN hooked up NG to LIS, but return was clear liquid and assumed to be Golytely. The NG was clamped to retain Golytely. The last of 4 bags of Potassium is infusing. TPN is running in SL PICC line at 35 ml/hr. The patient is not complaining of pain or nausea at this time. She has received 2 doses of Dilaudid, and 2 doses of Ativan. Continue with current plan of nursing care, and update MD with concerns as needed.

## 2018-06-19 ENCOUNTER — APPOINTMENT (OUTPATIENT)
Dept: GENERAL RADIOLOGY | Facility: CLINIC | Age: 43
DRG: 393 | End: 2018-06-19
Payer: COMMERCIAL

## 2018-06-19 LAB
ANION GAP SERPL CALCULATED.3IONS-SCNC: 10 MMOL/L (ref 3–14)
BASOPHILS # BLD AUTO: 0 10E9/L (ref 0–0.2)
BASOPHILS NFR BLD AUTO: 0.4 %
BUN SERPL-MCNC: 15 MG/DL (ref 7–30)
CALCIUM SERPL-MCNC: 8 MG/DL (ref 8.5–10.1)
CHLORIDE SERPL-SCNC: 108 MMOL/L (ref 94–109)
CO2 SERPL-SCNC: 21 MMOL/L (ref 20–32)
CREAT SERPL-MCNC: 0.54 MG/DL (ref 0.52–1.04)
DIFFERENTIAL METHOD BLD: ABNORMAL
EOSINOPHIL # BLD AUTO: 0.2 10E9/L (ref 0–0.7)
EOSINOPHIL NFR BLD AUTO: 2.3 %
ERYTHROCYTE [DISTWIDTH] IN BLOOD BY AUTOMATED COUNT: 15 % (ref 10–15)
GFR SERPL CREATININE-BSD FRML MDRD: >90 ML/MIN/1.7M2
GLUCOSE BLDC GLUCOMTR-MCNC: 99 MG/DL (ref 70–99)
GLUCOSE SERPL-MCNC: 118 MG/DL (ref 70–99)
HCT VFR BLD AUTO: 38.1 % (ref 35–47)
HGB BLD-MCNC: 11.9 G/DL (ref 11.7–15.7)
IMM GRANULOCYTES # BLD: 0 10E9/L (ref 0–0.4)
IMM GRANULOCYTES NFR BLD: 0.5 %
LYMPHOCYTES # BLD AUTO: 2.5 10E9/L (ref 0.8–5.3)
LYMPHOCYTES NFR BLD AUTO: 29.7 %
MAGNESIUM SERPL-MCNC: 2.1 MG/DL (ref 1.6–2.3)
MCH RBC QN AUTO: 31.5 PG (ref 26.5–33)
MCHC RBC AUTO-ENTMCNC: 31.2 G/DL (ref 31.5–36.5)
MCV RBC AUTO: 101 FL (ref 78–100)
MONOCYTES # BLD AUTO: 0.5 10E9/L (ref 0–1.3)
MONOCYTES NFR BLD AUTO: 6.4 %
NEUTROPHILS # BLD AUTO: 5 10E9/L (ref 1.6–8.3)
NEUTROPHILS NFR BLD AUTO: 60.7 %
NRBC # BLD AUTO: 0 10*3/UL
NRBC BLD AUTO-RTO: 0 /100
PHOSPHATE SERPL-MCNC: 4.3 MG/DL (ref 2.5–4.5)
PLATELET # BLD AUTO: 343 10E9/L (ref 150–450)
POTASSIUM SERPL-SCNC: 4.4 MMOL/L (ref 3.4–5.3)
RBC # BLD AUTO: 3.78 10E12/L (ref 3.8–5.2)
SODIUM SERPL-SCNC: 139 MMOL/L (ref 133–144)
TRIGL SERPL-MCNC: 80 MG/DL
WBC # BLD AUTO: 8.3 10E9/L (ref 4–11)

## 2018-06-19 PROCEDURE — 00000146 ZZHCL STATISTIC GLUCOSE BY METER IP

## 2018-06-19 PROCEDURE — 25000125 ZZHC RX 250: Performed by: INTERNAL MEDICINE

## 2018-06-19 PROCEDURE — 36415 COLL VENOUS BLD VENIPUNCTURE: CPT | Performed by: INTERNAL MEDICINE

## 2018-06-19 PROCEDURE — 84100 ASSAY OF PHOSPHORUS: CPT | Performed by: INTERNAL MEDICINE

## 2018-06-19 PROCEDURE — 83735 ASSAY OF MAGNESIUM: CPT | Performed by: INTERNAL MEDICINE

## 2018-06-19 PROCEDURE — 25000132 ZZH RX MED GY IP 250 OP 250 PS 637: Performed by: STUDENT IN AN ORGANIZED HEALTH CARE EDUCATION/TRAINING PROGRAM

## 2018-06-19 PROCEDURE — 80048 BASIC METABOLIC PNL TOTAL CA: CPT | Performed by: INTERNAL MEDICINE

## 2018-06-19 PROCEDURE — 74018 RADEX ABDOMEN 1 VIEW: CPT

## 2018-06-19 PROCEDURE — 84478 ASSAY OF TRIGLYCERIDES: CPT | Performed by: INTERNAL MEDICINE

## 2018-06-19 PROCEDURE — 25000128 H RX IP 250 OP 636: Performed by: STUDENT IN AN ORGANIZED HEALTH CARE EDUCATION/TRAINING PROGRAM

## 2018-06-19 PROCEDURE — 85025 COMPLETE CBC W/AUTO DIFF WBC: CPT | Performed by: INTERNAL MEDICINE

## 2018-06-19 PROCEDURE — 99233 SBSQ HOSP IP/OBS HIGH 50: CPT | Mod: GC | Performed by: INTERNAL MEDICINE

## 2018-06-19 PROCEDURE — 12000008 ZZH R&B INTERMEDIATE UMMC

## 2018-06-19 RX ORDER — OXYCODONE AND ACETAMINOPHEN 5; 325 MG/1; MG/1
1 TABLET ORAL EVERY 4 HOURS PRN
Status: DISCONTINUED | OUTPATIENT
Start: 2018-06-19 | End: 2018-06-20 | Stop reason: HOSPADM

## 2018-06-19 RX ORDER — HYDROMORPHONE HYDROCHLORIDE 1 MG/ML
.3-.5 INJECTION, SOLUTION INTRAMUSCULAR; INTRAVENOUS; SUBCUTANEOUS ONCE
Status: COMPLETED | OUTPATIENT
Start: 2018-06-19 | End: 2018-06-19

## 2018-06-19 RX ORDER — POLYETHYLENE GLYCOL 3350 17 G/17G
17 POWDER, FOR SOLUTION ORAL DAILY
Status: DISCONTINUED | OUTPATIENT
Start: 2018-06-20 | End: 2018-06-20 | Stop reason: HOSPADM

## 2018-06-19 RX ADMIN — Medication 0.4 MG: at 12:40

## 2018-06-19 RX ADMIN — Medication 0.4 MG: at 00:07

## 2018-06-19 RX ADMIN — Medication 0.4 MG: at 08:59

## 2018-06-19 RX ADMIN — Medication 0.5 MG: at 14:44

## 2018-06-19 RX ADMIN — OXYCODONE HYDROCHLORIDE AND ACETAMINOPHEN 1 TABLET: 5; 325 TABLET ORAL at 16:32

## 2018-06-19 RX ADMIN — CALCIUM GLUCONATE: 98 INJECTION, SOLUTION INTRAVENOUS at 20:55

## 2018-06-19 RX ADMIN — I.V. FAT EMULSION 250 ML: 20 EMULSION INTRAVENOUS at 20:55

## 2018-06-19 RX ADMIN — OXYCODONE HYDROCHLORIDE AND ACETAMINOPHEN 1 TABLET: 5; 325 TABLET ORAL at 21:10

## 2018-06-19 RX ADMIN — Medication 0.4 MG: at 05:27

## 2018-06-19 NOTE — PLAN OF CARE
Problem: Patient Care Overview  Goal: Plan of Care/Patient Progress Review  Outcome: No Change  A:  Patient states still having pain when awake.  Up to bathroom independently.  Continues to have liquid, frothy yellow stools.  Only two tonight.  Denies nausea.  Bowel sounds active. Tolerated courtesy meal.  Sleeping with capno on.  Sat remaining 95% on room air.  R:  Continue to monitor and treat per plan of care.

## 2018-06-19 NOTE — ANESTHESIA POSTPROCEDURE EVALUATION
Patient: Rachel A Gerhardt    Procedure(s):  Lower bowel Retrograde Enteroscopy with Balloon Dilation . - Wound Class: III-Contaminated    Diagnosis:Anastomatic Stricture   Diagnosis Additional Information: No value filed.    Anesthesia Type:  General    Note:  Anesthesia Post Evaluation    Patient location during evaluation: PACU  Patient participation: Able to fully participate in evaluation  Level of consciousness: awake  Pain control: improving   Airway patency: patent  Cardiovascular status: blood pressure returned to baseline  Respiratory status: acceptable  Hydration status: acceptable  PONV: none             Last vitals:  Vitals:    06/18/18 1845 06/18/18 1900 06/18/18 1927   BP: 112/81 112/82    Pulse:      Resp: 16 16 16   Temp: 36.4  C (97.6  F) 36.7  C (98.1  F)    SpO2: 98% 95% 94%         Electronically Signed By: Pamela Guevara MD  June 18, 2018  7:40 PM

## 2018-06-19 NOTE — PROGRESS NOTES
Pt stating that her pain wasn't controlled well enough with pain medications. Md's paged asking for 1xdose. Will continue to monitor.     [phone] paged a second time.

## 2018-06-19 NOTE — PROGRESS NOTES
GASTROENTEROLOGY PROGRESS NOTE    ASSESSMENT:  Rachel Gerhardt is a 42 year old female with a history of cervical cancer s/p radiation therapy with development of small bowel strictures that have led to recurrent SBO, now s/p ex lap 5/18/17 with resection of 60cm of small bowel and resultant anastomotic stricture.  Anastomotic stricture has led to partial SBO, and plans were made to treat with endoscopic dilation.  However, patient has been unwilling to prep or falsely saying had prepped in the past (please see my detailed documentation from 5/17/18). She has had issues limiting the preparation in the past - she reports having significant NG tube related anxiety and nausea/vomiting with GoLytely prep in the past. Most recently she was discharged after refusing to prep and went to [facility] for a second opinion, who said they had the same plans we did at [facility] for bowel prep prior to endoscopic intervention so she decided to leave and come back to [facility].     Patient had lower device-assisted enteroscopy with fluoroscopy for the small bowel stenosis management, s/p uncomplicated ileoileal anastomosis stenosis dilation up to 18mm. Plan is for patient to follow up with colorectal surgery as outpatient.     Recommendations:  --Advance diet slowly as tolerated   --Patient had maximal endoscopic therapy, and only with durable effect repeat dilation would be reasonable. However, requirements are for patient to be seen colorectal surgery first for evaluation and weighing if endoscopic evaluation is reasonable. And at that time she must have bowel prep through NG tube till stool is clear.   --Avoid constipation (daily Miralax)  --GI team is signing off patient to primary team     Gastroenterology outpatient follow up recommendations: Follow up with colorectal surgery as an outpatient     Patient care plan discussed with Echo Akins and Musa, GI staff physician. Thank you for involving us in this patient's care.  "Please do not hesitate to contact the GI service with any questions or concerns.     Dilcia Hassan  Gastroenterology Nurse Practitioner  _______________________________________________________________  S: Patient is feeling better today and she is tolerating diet.       O:  Blood pressure 92/66, pulse 87, temperature 98.4  F (36.9  C), temperature source Oral, resp. rate 20, height 1.753 m (5' 9\"), weight 54.9 kg (121 lb 1.6 oz), SpO2 99 %, not currently breastfeeding.    Constitutional: cooperative, pleasant, not dyspneic/diaphoretic, no acute distress  Eyes: Sclera anicteric/injected; NG tube in place  Ears/nose/mouth/throat: Normal oropharynx without ulcers or exudate, mucus membranes moist, hearing intact  Neck: supple  CV: No edema  Respiratory: Unlabored breathing  Lymph: No axillary, submandibular, supraclavicular lymphadenopathy  Abd: Nondistended, +bs, diffuse TTP, no peritoneal signs  Skin: warm, perfused, no jaundice  Neuro: AAO x 3  Psych: Normal affect  MSK: No gross deformities    LABS:  BMP    Recent Labs  Lab 06/19/18  0613 06/18/18  0536 06/17/18  2144 06/17/18  0742    144 143 137   POTASSIUM 4.4 3.8 3.4 3.5   CHLORIDE 108 104 103 101   JONATAN 8.0* 9.1 8.0* 8.4*   CO2 21 29 31 27   BUN 15 13 15 22   CR 0.54 0.61 0.53 0.67   * 96 106* 88     CBC    Recent Labs  Lab 06/19/18  0613 06/18/18  0536 06/17/18  0742   WBC 8.3 6.2 7.9   RBC 3.78* 4.25 4.12   HGB 11.9 13.6 13.3   HCT 38.1 42.2 40.5   * 99 98   MCH 31.5 32.0 32.3   MCHC 31.2* 32.2 32.8   RDW 15.0 14.9 14.8    399 337     INR    Recent Labs  Lab 06/18/18  0536   INR 1.07     LFTs    Recent Labs  Lab 06/18/18  0536 06/17/18  0742   ALKPHOS 104 100   AST 14 12   ALT 30 32   BILITOTAL 0.3 0.3   PROTTOTAL 7.1 6.2*   ALBUMIN 3.3* 2.8*      PANC    Recent Labs  Lab 06/17/18  0742   LIPASE 95       HPI:  Rachel Gerhardt is a 42 year old female with a history of cervical cancer s/p radiation therapy with development of " "small bowel strictures that have led to recurrent SBO, now s/p ex lap 5/18/17 with resection of 60cm of small bowel and resultant anastomotic stricture.  Anastomotic stricture has led to partial SBO, and plans were made to treat with endoscopic dilation.  However, patient has been unwilling to prep or falsely saying had prepped in the past (please see my detailed documentation from 5/17/18). She has had issues limiting the preparation in the past - she reports having significant NG tube related anxiety and nausea/vomiting with GoLytely prep in the past. Most recently she was discharged after refusing to prep and went to [facility] for a second opinion, who said they had the same plans we did at [facility] for bowel prep prior to endoscopic intervention so she decided to leave and come back to [facility] for NGT placement and bowel prep.     Patient reports ongoing diffuse abdominal pain and intermittent nausea/vomiting. She tolerates clear liquids and a soft diet but will vomit if she \"moves too quickly\" sometimes. She is passing gas and having three bowel movements daily. She denies fevers, chills, chest pain, shortness of breath, change in urine, bloody or melenic stool, headaches. She feels extremely anxious with the NG tube in and feels she may \"rip it out at any moment if they don't sedate me\".     ROS: A comprehensive Review of Systems was asked and answered in the negative unless specifically commented upon in the HPI    PMHx:  Past Medical History:   Diagnosis Date     Asthma      Cancer (H)     Per patient OBGYN, cerivical cancer     Cervical cancer (H)      Other chronic pain      Ovarian cancer (H)      Substance abuse     Outside records indicate past history of narcotics abuse or dependence, but patient denies.       PSurgHx:   Past Surgical History:   Procedure Laterality Date     COLONOSCOPY N/A 5/3/2018    Procedure: COLONOSCOPY;  sigmoidoscopy;  Surgeon: Omero Vigil MD;  Location:  OR     " COLONOSCOPY WITH CO2 INSUFFLATION N/A 4/30/2018    Procedure: COLONOSCOPY WITH CO2 INSUFFLATION;  Colonoscopy;  Surgeon: Omero Vigil MD;  Location: [facility] OR     COMBINED CYSTOSCOPY, INSERT STENT URETER(S) Bilateral 5/18/2017    Procedure: COMBINED CYSTOSCOPY, INSERT STENT URETER(S);  Cystoscopy with Bilateral Stent,;  Surgeon: Rene Calero MD;  Location: [facility] OR     ENT SURGERY  2009    mastoid, sinus     ENTEROSCOPY SMALL BOWEL N/A 6/18/2018    Procedure: ENTEROSCOPY SMALL BOWEL;  Lower bowel Retrograde Enteroscopy with Balloon Dilation .;  Surgeon: Omero Vigil MD;  Location: [facility] OR     EXAM UNDER ANESTHESIA, INSERT ALEX SLEEVE, UTERINE PLACEMENT OF TANDEM AND RING FOR RAD, ULTRASOUND N/A 12/14/2015    Procedure: EXAM UNDER ANESTHESIA, INSERT ALEX SLEEVE, UTERINE PLACEMENT OF TANDEM AND RING FOR RADIATION, ULTRASOUND GUIDED;  Surgeon: Abby Tony MD;  Location: [facility] OR     INSERT TANDEM AND CESIUM APPLICATOR CERVIX, ULTRASOUND GUIDED N/A 12/17/2015    Procedure: INSERT TANDEM AND CESIUM APPLICATOR CERVIX, ULTRASOUND GUIDED;  Surgeon: Kika Wood MD;  Location: [facility] OR     KNEE SURGERY       LAPAROTOMY EXPLORATORY N/A 5/18/2017    Procedure: LAPAROTOMY EXPLORATORY;   Exploratry Laparotomy, Small Bowel Resection with anastomosis, Flexible Sigmoidoscopy;  Surgeon: Jennifer Goodwin MD;  Location: [facility] OR     PICC INSERTION Right 04/29/2017    4fr SL BioFlo PICC, 37cm (3cm external) in the R basilic vein w/ tip in the mid SVC.     PICC INSERTION Right 03/29/2018    4Fr - 40cm (4cm external), R lateral brachial vein, Low SVC     RESECT SMALL BOWEL WITHOUT OSTOMY N/A 5/18/2017    Procedure: RESECT SMALL BOWEL WITHOUT OSTOMY;;  Surgeon: Jennifer Goodwin MD;  Location: [facility]     SIGMOIDOSCOPY FLEXIBLE N/A 5/18/2017    Procedure: SIGMOIDOSCOPY FLEXIBLE;;  Surgeon: Jennifer Goodwin MD;  Location: [facility] OR       MEDS:    No current facility-administered medications on file prior to encounter.    Current Outpatient Prescriptions on File Prior to Encounter:  lipids (INTRALIPID) 20 % infusion Inject 250 mLs into the vein every 24 hours Administer through a 1.2 micron filter Requires container change every 12 hours and tubing change every 24 hours.   ondansetron (ZOFRAN ODT) 4 MG ODT tab Take 1 tablet (4 mg) by mouth every 6 hours as needed for nausea       ALLERGIES:    Allergies   Allergen Reactions     No Clinical Screening - See Comments Other (See Comments) and Diarrhea     headache  Carrots cause gastric upset, cramping and diarrhea.     Sulfa Drugs Hives     hives     Amoxicillin Unknown and Other (See Comments)     vomiting  vomiting     Amoxicillin-Pot Clavulanate Other (See Comments) and Nausea     vomiting     Augmentin GI Disturbance, Nausea and Hives     Avelox [Moxifloxacin] Nausea and Vomiting, Unknown and Nausea     Ciprofloxacin Hives and Nausea     Codeine Nausea and Vomiting and Nausea     Ibuprofen Nausea and Vomiting     Other reaction(s): Nausea And Vomiting     Ibuprofen Sodium Hives and GI Disturbance     Quinolones      Tramadol Hives, Diarrhea, Nausea and Nausea and Vomiting     Daucus Carota      Other reaction(s): GI Upset  Other reaction(s): Abdominal pain, Diarrhea  Carrots cause gastric upset, cramping and diarrhea.       FHx:  Family History   Problem Relation Age of Onset     Diabetes Mother      Ovarian Cancer No family hx of      Uterine Cancer No family hx of      Cervical Cancer No family hx of      Breast Cancer No family hx of        SOCIAL Hx:  Social History     Social History     Marital status:      Spouse name: N/A     Number of children: N/A     Years of education: N/A     Occupational History     Not on file.     Social History Main Topics     Smoking status: Light Tobacco Smoker     Packs/day: 0.25     Years: 12.00     Types: Cigarettes     Smokeless tobacco: Never Used      Comment: pt smoking about 4 cigs daily     Alcohol use No      Comment: None per  pt     Drug use: No      Comment: Patient denies.  Outside records indicate possible Opiate abuse.     Sexual activity: Yes     Partners: Male     Birth control/ protection: None     Other Topics Concern     Parent/Sibling W/ Cabg, Mi Or Angioplasty Before 65f 55m? No     Social History Narrative    Lives alone       Reviewed images:  CT Abdomen/Pelvis 6/17/18  Small bowel obstruction with right lower quadrant transition point.  Similar to appearance 5/11/2018 with the point of obstruction being a  few centimeters more medial and distal in the bowel. Likely secondary  to adhesions. No drainable fluid collection, pneumatosis, portal  venous gas or pneumoperitoneum demonstrated.

## 2018-06-19 NOTE — PLAN OF CARE
"Problem: Patient Care Overview  Goal: Plan of Care/Patient Progress Review  Outcome: Improving  Assumed cares 1500 to 2300.    BP 97/73 (BP Location: Left arm)  Pulse 80  Temp 97.5  F (36.4  C) (Axillary)  Resp 13  Ht 1.753 m (5' 9\")  Wt 51.8 kg (114 lb 1.6 oz)  SpO2 100%  BMI 16.85 kg/m2    Pt A and O x 4. Lung sounds clear and equal on RA. HR and pulses WDL. Bowel sounds hyperactive. Significant pain reported in L abd. Q4h dilaudid admin x 1. But d/t severe pain, was able to give 1 time additional dose of 0.2 mg dilaudid around 2240. Pt advanced to clears. No PO intake however this shift. Adequate UOP. Pt up SBA, but did occasionally refuse assistance this shift (for instance when pt went downstairs). TPN formula changed this shift and rate now at 70/hr, infusing in [facility] PICC, site WDL.    Continue to monitor pt.           "

## 2018-06-19 NOTE — PROGRESS NOTES
"Colorectal Surgery Progress Note    Subjective: Underwent dilatation of IC anastomosis yesterday by GI. Still having pain this morning that is \"almost identical\" to pain she was experiencing prior. Francy n/v. Had 2 BMs yesterday and is passing gas.    Vitals:  Vitals:    06/19/18 0025 06/19/18 0300 06/19/18 0609 06/19/18 0903   BP:   98/80    BP Location:   Left arm    Pulse: 72 87     Resp: 20 22 22    Temp:   98  F (36.7  C)    TempSrc:   Oral    SpO2: 93% 95% 98% 95%   Weight:       Height:           I/O:  I/O last 3 completed shifts:  In: 3486.67 [P.O.:360; I.V.:20; NG/GT:2500]  Out: 800 [Other:800]    Physical Exam:  Gen: AAOx3, NAD  Pulm: Non-labored breathing  Abd: Soft, non-distended, mildly TTP in lower abdomen, no guarding  Ext:  Warm and well-perfused    BMP  Recent Labs  Lab 06/19/18  0613 06/18/18  0536 06/17/18  2144 06/17/18  0742    144 143 137   POTASSIUM 4.4 3.8 3.4 3.5   CHLORIDE 108 104 103 101   CO2 21 29 31 27   BUN 15 13 15 22   CR 0.54 0.61 0.53 0.67   * 96 106* 88   MAG 2.1 2.7* 3.1* 1.5*   PHOS 4.3 2.2* 2.7 2.9     CBC  Recent Labs  Lab 06/19/18  0613 06/18/18  0536 06/17/18  0742   WBC 8.3 6.2 7.9   HGB 11.9 13.6 13.3   HCT 38.1 42.2 40.5    399 337       ASSESSMENT: Rachel A Gerhardt is a 42 year old female with significant history of cervical cancer, radiation, ex lap and SBR, and persistent SBO due to anastomotic stricture, now s/p endoscopic dilatation of anastomotic stricture    -Explained to patient that impact of her procedure yesterday may not manifest immediately, and she may continue to have abdominal pain for some time. Pt voiced understanding.    -No indication for surgical intervention at this point as pt is having adequate anterograde function  -If pt continues to have abdominal pain and poor PO intake may consider repeat endoscopic treatment at GI's discretion   -CRS will sign off at this time. Please contact with any questions or concerns    Gerardo " MD Gary  General Surgery Resident, PGY1  Pager # [phone]    Patient was seen with team and discussed with staff

## 2018-06-19 NOTE — PROGRESS NOTES
Care Coordinator Progress Note    Admission Date/Time:  6/17/2018  Attending MD:  Nicholas Veliz, *    Data  Chart reviewed, discussed with interdisciplinary team.   Patient was admitted for:    Small bowel obstruction  Abdominal pain, unspecified abdominal location  Malignant neoplasm of cervix, unspecified site (H)  Stomach ache.      Barriers to Discharge: chronically ill, history of non-alliance and pending medical clearance    Coordination of Care and Referrals: Provided patient/family with options for Home Infusion.        Assessment  Attempted to meet with patient to verify current services.  She was set up with [facility] for home TPN last admission.  She was declined from [facility] due to leaving AMA and being unable to reach.  Patient was on the phone and did not want to hang up.  Will attempt to see later.    Addendum 1540: Attempted to see patient.  She was not in her bed.    Met with patient.  She is still staying with her mother while she has been sick so often.  Patient drove her car here, but thinks she can have her dad pick her up upon discharge if she is still on pain medications.  Patient is up independently.  States [facility] was going to pull her PICC line and discharge her because they couldn't get a hold of her.  Patient states she was at appointments and the agency never tried to pre-schedule; they would just call and say we'll be there at 3 pm.  Patient was discharged from [facility] as well.  Patient states she hopes she will not have to d/c on TPN because she has finally eaten two meals and hasn't been able to do that in 2 years.  Patient will need another home infusion agency if she does need TPN.  Will continue to follow.     Plan  Anticipated Discharge Date:  1-2 days  Anticipated Discharge Plan:  Home with TPN    Seema Gaspar RN, BSN  Care Coordinator [facility]  Pager: [phone]  Phone: [phone]

## 2018-06-19 NOTE — PLAN OF CARE
Problem: Patient Care Overview  Goal: Plan of Care/Patient Progress Review  Outcome: No Change  Vital signs stable on Ra. Up self in room and halls. PRN medication given for abdominal pain. Pt frustrated throughout shift that pain control isn't enough. Plan is to stop IV pain medication and restart pt home oral regimen. Pt aggreable at this time. BM x2. Tolerating regular diet well. Possible d/c tomorrow. TPN@70 through PICC. Will continue to monitor.

## 2018-06-20 VITALS
SYSTOLIC BLOOD PRESSURE: 109 MMHG | TEMPERATURE: 98.1 F | OXYGEN SATURATION: 98 % | WEIGHT: 124.7 LBS | RESPIRATION RATE: 16 BRPM | HEART RATE: 87 BPM | HEIGHT: 69 IN | DIASTOLIC BLOOD PRESSURE: 81 MMHG | BODY MASS INDEX: 18.47 KG/M2

## 2018-06-20 LAB
ANION GAP SERPL CALCULATED.3IONS-SCNC: 8 MMOL/L (ref 3–14)
BUN SERPL-MCNC: 15 MG/DL (ref 7–30)
CALCIUM SERPL-MCNC: 8.5 MG/DL (ref 8.5–10.1)
CHLORIDE SERPL-SCNC: 106 MMOL/L (ref 94–109)
CO2 SERPL-SCNC: 25 MMOL/L (ref 20–32)
CREAT SERPL-MCNC: 0.52 MG/DL (ref 0.52–1.04)
ERYTHROCYTE [DISTWIDTH] IN BLOOD BY AUTOMATED COUNT: 14.6 % (ref 10–15)
GFR SERPL CREATININE-BSD FRML MDRD: >90 ML/MIN/1.7M2
GLUCOSE BLDC GLUCOMTR-MCNC: 98 MG/DL (ref 70–99)
GLUCOSE SERPL-MCNC: 92 MG/DL (ref 70–99)
HCT VFR BLD AUTO: 36.5 % (ref 35–47)
HGB BLD-MCNC: 11.8 G/DL (ref 11.7–15.7)
MAGNESIUM SERPL-MCNC: 2.1 MG/DL (ref 1.6–2.3)
MCH RBC QN AUTO: 31.6 PG (ref 26.5–33)
MCHC RBC AUTO-ENTMCNC: 32.3 G/DL (ref 31.5–36.5)
MCV RBC AUTO: 98 FL (ref 78–100)
PHOSPHATE SERPL-MCNC: 3.4 MG/DL (ref 2.5–4.5)
PLATELET # BLD AUTO: 308 10E9/L (ref 150–450)
POTASSIUM SERPL-SCNC: 4.4 MMOL/L (ref 3.4–5.3)
RBC # BLD AUTO: 3.73 10E12/L (ref 3.8–5.2)
SODIUM SERPL-SCNC: 138 MMOL/L (ref 133–144)
WBC # BLD AUTO: 6.6 10E9/L (ref 4–11)

## 2018-06-20 PROCEDURE — 00000146 ZZHCL STATISTIC GLUCOSE BY METER IP

## 2018-06-20 PROCEDURE — 25000128 H RX IP 250 OP 636: Performed by: STUDENT IN AN ORGANIZED HEALTH CARE EDUCATION/TRAINING PROGRAM

## 2018-06-20 PROCEDURE — 85027 COMPLETE CBC AUTOMATED: CPT | Performed by: INTERNAL MEDICINE

## 2018-06-20 PROCEDURE — 25000132 ZZH RX MED GY IP 250 OP 250 PS 637: Performed by: STUDENT IN AN ORGANIZED HEALTH CARE EDUCATION/TRAINING PROGRAM

## 2018-06-20 PROCEDURE — 83735 ASSAY OF MAGNESIUM: CPT | Performed by: INTERNAL MEDICINE

## 2018-06-20 PROCEDURE — 80048 BASIC METABOLIC PNL TOTAL CA: CPT | Performed by: INTERNAL MEDICINE

## 2018-06-20 PROCEDURE — 36592 COLLECT BLOOD FROM PICC: CPT | Performed by: INTERNAL MEDICINE

## 2018-06-20 PROCEDURE — 25000128 H RX IP 250 OP 636: Performed by: ANESTHESIOLOGY

## 2018-06-20 PROCEDURE — 99239 HOSP IP/OBS DSCHRG MGMT >30: CPT | Mod: GC | Performed by: INTERNAL MEDICINE

## 2018-06-20 PROCEDURE — 84100 ASSAY OF PHOSPHORUS: CPT | Performed by: INTERNAL MEDICINE

## 2018-06-20 RX ORDER — ONDANSETRON 4 MG/1
4 TABLET, ORALLY DISINTEGRATING ORAL EVERY 6 HOURS PRN
Qty: 20 TABLET | Refills: 0 | Status: ON HOLD | OUTPATIENT
Start: 2018-06-20 | End: 2018-11-02

## 2018-06-20 RX ADMIN — ONDANSETRON 4 MG: 2 INJECTION INTRAMUSCULAR; INTRAVENOUS at 05:49

## 2018-06-20 RX ADMIN — OXYCODONE HYDROCHLORIDE AND ACETAMINOPHEN 1 TABLET: 5; 325 TABLET ORAL at 09:43

## 2018-06-20 RX ADMIN — ONDANSETRON 4 MG: 2 INJECTION INTRAMUSCULAR; INTRAVENOUS at 12:17

## 2018-06-20 RX ADMIN — OXYCODONE HYDROCHLORIDE AND ACETAMINOPHEN 1 TABLET: 5; 325 TABLET ORAL at 01:12

## 2018-06-20 RX ADMIN — OXYCODONE HYDROCHLORIDE AND ACETAMINOPHEN 1 TABLET: 5; 325 TABLET ORAL at 13:49

## 2018-06-20 RX ADMIN — OXYCODONE HYDROCHLORIDE AND ACETAMINOPHEN 1 TABLET: 5; 325 TABLET ORAL at 05:53

## 2018-06-20 NOTE — PROGRESS NOTES
Care Coordinator - Discharge Planning    Admission Date/Time:  6/17/2018  Attending MD:  Marya Hendrix DO     Data  Date of initial CC assessment:  6/19/18  Chart reviewed, discussed with interdisciplinary team.   Patient was admitted for:   1. Failure to thrive in adult    2. Small bowel obstruction    3. Abdominal pain, unspecified abdominal location    4. Malignant neoplasm of cervix, unspecified site (H)    5. Stomach ache         Assessment   Full assessment completed in previous note    Coordination of Care and Referrals: Provided patient/family with options for Home Infusion.    Referral for TPN sent to [facility].  Spoke with Maricel from intake (ph: [phone]).  They can accept patient and know that the patient has been hard to reach.  [facility] also has an office where patient can go in for labs and PICC cares.  She has $29 left on her deductible, then 100% coverage for home TPN.  Attempted to tell patient, she is not in her room right now.  Pharmacy is doing the TPN orders now.     Addendum 1345: Was just updated that patient will d/c home without TPN or a PICC line.  Maricel from [facility] notified to d/c referral and orders.    [facility] [place] [facility]   [facility]   [address] [place] [address], [address]   [place] [address]   Phone: [phone]   Fax: [phone]   Toll Free #: [phone]   M-F 8:30AM-5:00PM CST      Plan  Anticipated Discharge Date:  today  Anticipated Discharge Plan:  Home with TPN    CTS Handoff completed:  NO    Seema Gaspar RN, BSN  Care Coordinator [facility]  Pager: [phone]  Phone: [phone]

## 2018-06-20 NOTE — PROGRESS NOTES
[facility], [place]    Internal Medicine Progress Note - [facility] Service    Main Plans for Today   -Plan for retrograde enteroscopy by GI  -Continue pain management with dilaudid PRN     Assessment & Plan   Rachel Gerhardt is a 42 yr old female with TPN-dependent malnutrition, cervical cancer s/p chemo-radiation therapy complicated by recurrent SBO s/p ex lap with 60 cm of small bowel resected (2017) complicated by anastomotic stricture in pelvis causing persistent partial small bowel obstructions, and opioid dependence with prior suboxone use who presents with intermittent abdominal pain with imaging findings suggestive of recurrent partial small bowel obstruction secondary to adhesions now s/p bowel prep with plan to go to the OR later today for retrograde enteroscopy by GI.       #Partial Small Bowel Obstruction  #Hx of Cervical Cancer s/p Chemo-Radiation c/b recurrent SBO s/p ex lap (2017)  #Known anastomotic stricture in pelvis   Per Colorectal Surgery, patient is a high risk surgical candidate and no urgent intervention is indicated at this time. Completed GoLytely bowel prep (8.5L total) overnight via NG tube. Had enteroscopic procedure that showed benign stenosis of the ileoileal region that was dilated to 18mm.   - ADAT  - Outpatient Colorectal  - Per GI if repeat dilation required, pt would need colorectal surgical evaluation first  - Miralax daily     #Pain Management   #Anxiety  Patient takes percocet 5-325 mg q4H at home. Patient reports she is on a pain contract.  - Discontinued IV dilaudid   - Resumed home oral narcotics  - Lidocaine patches available  - Patient would like to avoid ativan as it makes her hallucinate     #Nutrition  Patient started TPN in 3/2018 given hx of recurrent SBO.   - Continue TPN (pharmacy/nutrition to dose) via previously placed right PICC   - TPN labs   - Monitor for refeeding syndrome   - ADAT    # Pain Assessment:  Current Pain Score 6/19/2018    Patient currently in pain? yes   Pain score (0-10) -   Pain location Abdomen   Pain descriptors Other (comment)   Some encounter information is confidential and restricted. Go to Review Flowsheets activity to see all data.   - Jenni is experiencing pain due to partial SBO. Pain management was discussed and the plan was created in a collaborative fashion.  Jenni's response to the current recommendations: mixed response  - Please see the plan for pain management as documented above    Diet: Advance Diet as Tolerated: Regular Diet Adult  Room Service  parenteral nutrition - ADULT compounded formula  Fluids: No IV fluids   DVT Prophylaxis: Pneumatic Compression Devices/Ambulation   Code Status: Full Code    Disposition Plan   Expected discharge: 2 - 3 days, recommended to prior living arrangement once resolution of partial SBO.         Entered: Buzz Peres 06/19/2018, 9:53 PM   Information in the above section will display in the discharge planner report.      The patient's care was discussed with the Attending Physician, Dr. Veliz.    Buzz Peres  [facility]  Maroon: 3  Pager: [phone]  Please see sticky note for cross cover information    Interval History   ADAT with liquids. Has abdominal pain. Feels that dilaudid not providing adequate control. Has been having semi-solid bowel movements.     The rest of the 4 point ROS is negative except for those mentioned above.     Physical Exam   Vital Signs: Temp: 98.2  F (36.8  C) Temp src: Oral BP: 101/74 Pulse: 87 Heart Rate: 87 Resp: 16 SpO2: 100 % O2 Device: None (Room air) Oxygen Delivery: 2 LPM  Weight: 124 lbs 11.2 oz  General Appearance: Sitting in bed.  No acute distress.   HEENT: No scleral icterus. MMM, PEERL, EOMi.   Respiratory: Anterior lung fields clear to auscultation bilaterally. No increased work of breathing.   Cardiovascular: RRR; S1 and S2 heard. No murmurs appreciated.   GI: hyperactive bowel sounds. Mildly-distended, diffuse  tenderness to light palpation. Guarding and rigidity.   Extremities: No LE edema. Warm and well-perfused.   Neuro: Alert and oriented to person, place, and time. No gross neurological deficits appreciated.     Data   Medications     IV fluid REPLACEMENT ONLY       - MEDICATION INSTRUCTIONS -       parenteral nutrition - ADULT compounded formula 70 mL/hr at 06/19/18 2055       lidocaine  2 patch Transdermal Q24h    And     lidocaine   Transdermal Q24H    And     lidocaine   Transdermal Q8H     lipids  250 mL Intravenous Once per day on Mon Tue Wed Thu Fri Sat     polyethylene glycol  2,000 mL Oral Once     sodium chloride (PF)  3 mL Intracatheter Q8H     Data     Recent Labs  Lab 06/19/18  0613 06/18/18  0536 06/17/18  2144 06/17/18  0742   WBC 8.3 6.2  --  7.9   HGB 11.9 13.6  --  13.3   * 99  --  98    399  --  337   INR  --  1.07  --   --     144 143 137   POTASSIUM 4.4 3.8 3.4 3.5   CHLORIDE 108 104 103 101   CO2 21 29 31 27   BUN 15 13 15 22   CR 0.54 0.61 0.53 0.67   ANIONGAP 10 10 9 9   JONATAN 8.0* 9.1 8.0* 8.4*   * 96 106* 88   ALBUMIN  --  3.3*  --  2.8*   PROTTOTAL  --  7.1  --  6.2*   BILITOTAL  --  0.3  --  0.3   ALKPHOS  --  104  --  100   ALT  --  30  --  32   AST  --  14  --  12   LIPASE  --   --   --  95     Recent Results (from the past 24 hour(s))   XR Abdomen Port 1 View    Narrative    XR ABDOMEN PORT 1 VW  6/19/2018 5:12 PM    History:  please evaluate for resolution of sbo; . Underwent device  assisted enteroscopy on 6/18/2018    Comparison: Abdominal radiograph dated 6/17/2018    Findings:   Portable supine AP abdominal radiograph. No dilated air filled loops  of small bowel.  Debris within the stomach and fecal material over the  colon.  No evidence for pneumatosis or portal venous gas. Suture line  projects over the right transverse process of L5. Round radiopaque  object projects in the left lower quadrant over the SI joint. No acute  osseous abnormality. The  visualized lung bases are clear.      Impression    IMPRESSION:  No dilated air filled loops of small bowel.  Dilated fluid filled  loops cannot be completely excluded. A round radiopaque object  projects over the left lower quadrant     I have personally reviewed the examination and initial interpretation  and I agree with the findings.    CARLITO BURCH MD

## 2018-06-20 NOTE — DISCHARGE SUMMARY
[facility], [place]    Internal Medicine Discharge Summary- [facility] Service    Date of Admission:  6/17/2018  Date of Discharge:  6/20/2018  Discharging Attending Provider: Marya Hendrix DO  Discharge Team: Anthony 3    Discharge Diagnoses   -Partial Small Bowel Obstruction  -Known Anastomotic Pelvic Stricture  -Hx of Cervical Cancer s/p Chemo-Radiation c/b recurrent SBO s/p ex-lap (2017)  -Severe malnutrition in the context of acute on chronic illness    Follow-ups Needed After Discharge   -Patient should follow-up with her primary care physician within the next week to assess her nutritional status   -Patient should follow-up with Colorectal Surgery within the next 2-4 weeks  -Patient should follow-up with Gastroenterology within the next 2-4 weeks     Hospital Course   Rachel Gerhardt is a 42 yr old female with TPN-dependent malnutrition, cervical cancer s/p chemo-radiation therapy complicated by recurrent SBO s/p ex lap with 60 cm of small bowel resected (2017) complicated by anastomotic stricture in pelvis causing persistent partial small bowel obstructions, and opioid dependence with prior suboxone use who presents with intermittent abdominal pain with imaging findings suggestive of recurrent partial small bowel obstruction secondary to adhesions.    # Partial Small Bowel Obstruction  # Known Anastomotic Pelvic Stricture  Colorectal Surgery evaluated the patient in the ED and determined her to be a high risk surgical candidate. No urgent surgical intervention was indicated. She was having bowel movements and passing flatus. A NG tube was placed in the ED and she completed bowel prep with GoLytely. She underwent an uncomplicated ileoileal anastomosis stenosis dilation up to 18 mm on 6/18/2018 with GI. No complications were reported during the procedure. She was able to tolerate a soft diet at the time of discharge and was having multiple stools daily. She continued to have abdominal  pain, but this was managed with her home regimen of Percocet 5-325 mg q4H.     #Severe malnutrition in the context of acute on chronic illness  #Previously TPN dependent   Patient has been intermittently on TPN since 2017. It appears that she was restarted on TPN in March 2018. The patient had a previously placed right PICC line. The patient was continued on her TPN until she received the GI procedure with dilation of her anastomosis on 6/18/2018. After the procedure, the patient was able to tolerate a soft diet and it was determined that the patient no longer required TPN. Nutrition met with the patient and provided her with nutritional supplements including Boost and taught her ways to increase her kcal/protein in her diet. It was recommended that the patient adhere to a soft diet at this time. Her TPN was subsequently discontinued and her PICC line was removed prior to discharge.      # Discharge Pain Plan:   - During her hospitalization, Jenni experienced pain due to chronic pain and SBO.  The pain plan for discharge was discussed with Jenni and the plan was created in a collaborative fashion.    - Chronic/continued opioids: Percocet 5-325 mg q4H (previously on prior to her hospitalization)    Consultations This Hospital Stay   MEDICATION HISTORY IP PHARMACY CONSULT  PHARMACY/NUTRITION TO START AND MANAGE TPN  PAIN MANAGEMENT ADULT IP CONSULT  GI LUMINAL ADULT IP CONSULT  VASCULAR ACCESS CARE ADULT IP CONSULT  PHARMACY IP CONSULT    Code Status   Full Code       The patient was discussed with Dr. Simeon Richard  [facility]  Pager: [phone]  ______________________________________________________________________    Physical Exam   Vital Signs: Temp: 98.1  F (36.7  C) Temp src: Oral BP: 109/81   Heart Rate: 87 Resp: 16 SpO2: 98 % O2 Device: None (Room air)    Weight: 124 lbs 11.2 oz    General Appearance: Sitting up in bed, no distress.   HEENT: EOM intact.   Respiratory: Posterior  lung fields clear to auscultation bilaterally. No increased work of breathing.   Cardiovascular: RRR; S1 and S2 heard.   GI: +bs. Soft, not distended. Diffuse tenderness to palpation with guarding.   LE: No lower extremity edema; warm and well-perfused   Neuro: Alert and oriented, no gross neurological deficits on observation.     Significant Results and Procedures   Most Recent 3 CBC's:  Recent Labs   Lab Test  06/20/18   0540  06/19/18   0613  06/18/18   0536   WBC  6.6  8.3  6.2   HGB  11.8  11.9  13.6   MCV  98  101*  99   PLT  308  343  399     Most Recent 3 BMP's:  Recent Labs   Lab Test  06/20/18   0540  06/19/18   0613  06/18/18   0536   NA  138  139  144   POTASSIUM  4.4  4.4  3.8   CHLORIDE  106  108  104   CO2  25  21  29   BUN  15  15  13   CR  0.52  0.54  0.61   ANIONGAP  8  10  10   JONATAN  8.5  8.0*  9.1   GLC  92  118*  96     Most Recent 2 LFT's:  Recent Labs   Lab Test  06/18/18   0536  06/17/18   0742   AST  14  12   ALT  30  32   ALKPHOS  104  100   BILITOTAL  0.3  0.3     Most Recent 3 INR's:  Recent Labs   Lab Test  06/18/18   0536  05/21/18   0641  05/13/18   0628   INR  1.07  1.03  1.29*   ,   Results for orders placed or performed during the hospital encounter of 06/17/18   CT Abdomen Pelvis w Contrast    Narrative    EXAMINATION: CT ABDOMEN PELVIS W CONTRAST  6/17/2018 9:01 AM      CLINICAL HISTORY: lower abdl pain h/o cervical ovarian and bladder  cancer and h/o bowel obstruction with strictures;     COMPARISON: CT AP dated 5/11/2018    PROCEDURE COMMENTS: CT of the abdomen was performed with iopamidol  (ISOVUE-370) solution 70 mL    intravenous and oral contrast. Coronal  and sagittal reformatted images were obtained.    FINDINGS:  Lower thorax:   Unremarkable.    Abdomen and pelvis:  Dilated proximal small bowel loops with a transitional point in the  right upper pelvis (image 250 series 5, image 28 series 3) with distal  decompressed small bowel. Similar appearance to 5/11/2018  with  transition point being a few centimeters more medial and distal of the  bowel.     Anastomosis site is widely open. No pneumatosis or portal venous gas,  or pneumoperitoneum. No drainable fluid collection. Trace pelvic  fluid.    Redemonstration of hepatomegaly without focal mass. The gallbladder,  spleen, and pancreas are normal in appearance. The adrenal glands and  kidneys are normal in appearance.    There are no abnormally dilated or thickened loops of small bowel or  colon. There is no free air or free fluid. There are no abnormally  sized lymph nodes.    Osseous structures:   No suspicious bone lesions.      Impression    IMPRESSION:  Small bowel obstruction with right lower quadrant transition point.  Similar to appearance 5/11/2018 with the point of obstruction being a  few centimeters more medial and distal in the bowel. Likely secondary  to adhesions. No drainable fluid collection, pneumatosis, portal  venous gas or pneumoperitoneum demonstrated.    I have personally reviewed the examination and initial interpretation  and I agree with the findings.    MARY ANN VELA MD   XR Abdomen Port 1 View    Narrative    Exam: XR ABDOMEN PORT 1 VW, 6/17/2018 1:21 PM    Indication: confirm NG position     Comparison: Same day CT abdomen and abdominal plain film on 9/19/2018    Findings:   AP single view of the abdomen. Nasogastric tube passes below the  diaphragm with its sidehole is projecting over the stomach. Distal tip  of the NG tube projects over the distal stomach. Redemonstration of  multiple dilated loops of bowels, see same day CT for further detail.  No free intra-abdominal air, portal venous gas, or pneumatosis. Lung  bases are unremarkable.      Impression    Impression:   1. Sidehole of nasogastric tube projects over the body of the stomach.  2. Redemonstration of multiple loops of dilated bowels. No free  intra-abdominal air, portal venous gas, or pneumatosis. See same day  CT for further  detail.    I have personally reviewed the examination and initial interpretation  and I agree with the findings.    ROMERO DELGADO MD   XR Surgery MIKEL G/T 5 Min Fluoro    Narrative    This exam was marked as non-reportable because it will not be read by a   radiologist or a [facility] non-radiologist provider.             XR Abdomen Port 1 View    Narrative    XR ABDOMEN PORT 1 VW  6/19/2018 5:12 PM    History:  please evaluate for resolution of sbo; . Underwent device  assisted enteroscopy on 6/18/2018    Comparison: Abdominal radiograph dated 6/17/2018    Findings:   Portable supine AP abdominal radiograph. No dilated air filled loops  of small bowel.  Debris within the stomach and fecal material over the  colon.  No evidence for pneumatosis or portal venous gas. Suture line  projects over the right transverse process of L5. Round radiopaque  object projects in the left lower quadrant over the SI joint. No acute  osseous abnormality. The visualized lung bases are clear.      Impression    IMPRESSION:  No dilated air filled loops of small bowel.  Dilated fluid filled  loops cannot be completely excluded. A round radiopaque object  projects over the left lower quadrant     I have personally reviewed the examination and initial interpretation  and I agree with the findings.    CARLITO BURCH MD       Pending Results   -None       Primary Care Physician   Reji Avery    Discharge Disposition   Discharged to home  Condition at discharge: Stable    Discharge Orders     Discharge Instructions   Resume pre procedure diet     Discharge Instructions   Restart home medications.     Reason for your hospital stay   You were admitted for persistent abdominal pain, found to have a partial small bowel obstruction with anastomosis that was dilated by GI.     Adult [facility] Follow-up and recommended labs and tests   Follow up with primary care provider, Reji Avery, within 7 days for hospital follow- up. Evaluate for  nutritional intake during this appointment.     Please follow-up with Colorectal Surgery and Gastroenterology within the next 2-4 weeks.     Appointments on [place] and/or [facility] (with [facility] or [facility] provider or service). Call [phone] if you haven't heard regarding these appointments within 7 days of discharge.     Activity   Your activity upon discharge: activity as tolerated     When to contact your care team   Call your primary doctor if you have any of the following: temperature greater than 100.4, decrease in oral intake, persistent nausea and vomiting, worsening abdominal pain, decrease in stool output.     Discharge Instructions   Please follow-up with your primary care physician within the next week. You will also need to follow-up with Colorectal Surgery and Gastroenterology within the next 2-4 weeks.     Your TPN was discontinued and your PICC line was removed. You should eat a soft diet with nutritional supplements (boost, protein supplements).     Full Code     Diet   Follow this diet upon discharge:   -Soft Diet  -Provided Boost plus sample for home use.   -Nutrition reviewed ways to increase kcal /protein in diet. Encouraged patient to utilize soft snacks and Oral supplements daily to augment PO given  -Stop TPN       Discharge Medications   Discharge Medication List as of 6/20/2018  2:51 PM      CONTINUE these medications which have NOT CHANGED    Details   oxyCODONE-acetaminophen (PERCOCET) 5-325 MG per tablet Take 1 tablet by mouth every 4 hours as needed for pain Max 6 tablets per day, Historical      ondansetron (ZOFRAN ODT) 4 MG ODT tab Take 1 tablet (4 mg) by mouth every 6 hours as needed for nausea, Disp-20 tablet, R-0, E-Prescribe         STOP taking these medications       lipids (INTRALIPID) 20 % infusion Comments:   Reason for Stopping:         parenteral nutrition - PTA/DISCHARGE ORDER Comments:   Reason for Stopping:             Allergies   Allergies   Allergen Reactions      No Clinical Screening - See Comments Other (See Comments) and Diarrhea     headache  Carrots cause gastric upset, cramping and diarrhea.     Sulfa Drugs Hives     hives     Amoxicillin Unknown and Other (See Comments)     vomiting  vomiting     Amoxicillin-Pot Clavulanate Other (See Comments) and Nausea     vomiting     Augmentin GI Disturbance, Nausea and Hives     Avelox [Moxifloxacin] Nausea and Vomiting, Unknown and Nausea     Ciprofloxacin Hives and Nausea     Codeine Nausea and Vomiting and Nausea     Ibuprofen Nausea and Vomiting     Other reaction(s): Nausea And Vomiting     Ibuprofen Sodium Hives and GI Disturbance     Quinolones      Tramadol Hives, Diarrhea, Nausea and Nausea and Vomiting     Daucus Carota      Other reaction(s): GI Upset  Other reaction(s): Abdominal pain, Diarrhea  Carrots cause gastric upset, cramping and diarrhea.

## 2018-06-20 NOTE — PROGRESS NOTES
Saw pt in young with no TPN infusing. Pt stated her roommate's nurse disconnected her as the pump was beeping; however, roommate's nurse stated she did not disconnect pt.    Unclear as to how pt became disconnected from TPN.    Reconnected pt to TPN. Educated pt on risks of suddenly stopping TPN and on the importance of keeping continuous TPN running. Pt verbalized understanding.

## 2018-06-20 NOTE — PROGRESS NOTES
"CLINICAL NUTRITION SERVICES - Brief NOTE     Nutrition Prescription    RECOMMENDATIONS FOR MDs/PROVIDERS TO ORDER:  1. Could trial Mechanical Soft diet + Oral supplement and discontinue TPN as long as  patient is able to tolerate oral intake and is able to consume adequate meals.     Recommendations already ordered by Registered Dietitian (RD):  1. Provided Boost plus sample for home use.   2. Reviewed ways to increase kcal /protein in diet. Encouraged patient to utilize soft snacks and Oral supplements daily to augment PO given     EVALUATION OF THE PROGRESS TOWARD GOALS   Diet: Upgraded to diet as tolerated / regular on 6/19.     Nutrition Support: Re-started on TPN support 6/17, due to anastomotic stricture in pelvis causing persistent partial small bowel obstructions.  Dextrose is being advanced slowly toward goal to avoid refeeding     Intake: Visited with patient. Patient reports tolerating oral intake. Ate a complete meal yesterday. Trying to go slow today to assure tolerance. Per patient tolerated her egg and softer part of [place] toast this morning. No abdominal pain, no nausea.  Patient in agreement with a trial of Boost plus for home use to improve kcal /protein intake. Educated patient on ways to increase kcal /protein in her diet to promote for wt restoration.      NEW FINDINGS   Lytes: within normal range   6/18: Per providers note, \" Underwent dilatation of IC anastomosis by GI. Still having pain this morning that is \"almost identical\" to pain she was experiencing prior. Denies n/v. Had 2 BMs yesterday and is passing gas\".    Annabella Sales RD/CHRISTOPHER  Pager [phone]           "

## 2018-06-20 NOTE — PLAN OF CARE
"Problem: Patient Care Overview  Goal: Plan of Care/Patient Progress Review  Outcome: Improving  Assumed cares 1500 to 2300.    BP 92/66 (BP Location: Left arm)  Pulse 87  Temp 98.4  F (36.9  C) (Oral)  Resp 20  Ht 1.753 m (5' 9\")  Wt 54.9 kg (121 lb 1.6 oz)  SpO2 99%  BMI 17.88 kg/m2    Pt A and O x 4. Lung sounds diminished on RA. HR and pulses WDL. Bowel sounds active. Report of BM this shift. Significant pain reported in central abd. Percocet admin x 2. Pt on regular diet, good intake this shift. Adequate UOP. Pt up ind shift. TPN infusing in R PICC at 70/hr, site WDL. BG checked this shift, was 99.    As stated in previous note, pt was found disconnected from TPN this shift. Pt stated her roommate's nurse disconnected her as the pump was beeping; however, roommate's nurse stated she did not disconnect pt. Unclear as to how pt became disconnected from TPN. Reconnected pt to TPN. Educated pt on risks of suddenly stopping TPN and on the importance of keeping continuous TPN running. Pt verbalized understanding.    Continue to monitor pt.      "

## 2018-06-20 NOTE — PLAN OF CARE
Problem: Patient Care Overview  Goal: Plan of Care/Patient Progress Review  Outcome: Improving  Vital signs stable on RA. Up self in room and halls. Pain controlled on PO medications. TPN discontinued and PICC removed. Plan for d/c this evening. Pt educated on medications, follow up appointments, and further care needed. Boost present and bedside for pt to take home. Still need protein delivered from kitchen. Will continue to monitor.

## 2018-06-20 NOTE — PLAN OF CARE
Problem: Patient Care Overview  Goal: Plan of Care/Patient Progress Review  Outcome: No Change  Pt alert and oriented. Vitally stable on ra. Ind in room. C/o of abdominal pain and nausea, prn zofran and percocet administered with some relief. Continues to run TPN/lipids through picc. Slept for a few hours during the night. Up to bathroom for spontaneous void and bm. No significant changes overnight.   R: Continue with poc

## 2018-06-20 NOTE — PLAN OF CARE
"Problem: Patient Care Overview  Goal: Plan of Care/Patient Progress Review  Outcome: Adequate for Discharge Date Met: 06/20/18  Assumed cares 1500 until DC.    /81 (BP Location: Left arm)  Pulse 87  Temp 98.1  F (36.7  C) (Oral)  Resp 16  Ht 1.753 m (5' 9\")  Wt 56.6 kg (124 lb 11.2 oz)  SpO2 98%  BMI 18.41 kg/m2    Pt medically ready for DC. Previous nurse reviewed DC instructions with pt and pt verbalized understanding. PICC also removed by prior RN.     Pt requested zofran prescription to be written, writer paged provider and provider stated she would write prescription and talk with DC pharmacy.     Pt gathered all her belongings and left unit independently around 1600. Pt planned to head to DC pharmacy before meeting family member for ride home.      "

## 2018-06-20 NOTE — PROGRESS NOTES
Pt has been off unit since about 1900--stated previously she was going up to cafeteria to eat with family.    Unable to hang 2000 TPN at this time as pt still not in room.

## 2018-06-21 ENCOUNTER — CARE COORDINATION (OUTPATIENT)
Dept: CARE COORDINATION | Facility: CLINIC | Age: 43
End: 2018-06-21

## 2018-07-17 ENCOUNTER — APPOINTMENT (OUTPATIENT)
Dept: CT IMAGING | Facility: CLINIC | Age: 43
DRG: 394 | End: 2018-07-17
Attending: EMERGENCY MEDICINE
Payer: COMMERCIAL

## 2018-07-17 ENCOUNTER — HOSPITAL ENCOUNTER (INPATIENT)
Facility: CLINIC | Age: 43
LOS: 1 days | Discharge: LEFT BEFORE DISCHARGE | DRG: 394 | End: 2018-07-18
Attending: EMERGENCY MEDICINE | Admitting: PEDIATRICS
Payer: COMMERCIAL

## 2018-07-17 DIAGNOSIS — K56.609 SMALL BOWEL OBSTRUCTION (H): ICD-10-CM

## 2018-07-17 DIAGNOSIS — E87.6 HYPOKALEMIA: ICD-10-CM

## 2018-07-17 DIAGNOSIS — E87.6 HYPOPOTASSEMIA: ICD-10-CM

## 2018-07-17 LAB
ALBUMIN SERPL-MCNC: 2.5 G/DL (ref 3.4–5)
ALP SERPL-CCNC: 60 U/L (ref 40–150)
ALT SERPL W P-5'-P-CCNC: 13 U/L (ref 0–50)
ANION GAP SERPL CALCULATED.3IONS-SCNC: 6 MMOL/L (ref 3–14)
AST SERPL W P-5'-P-CCNC: 12 U/L (ref 0–45)
BASOPHILS # BLD AUTO: 0 10E9/L (ref 0–0.2)
BASOPHILS NFR BLD AUTO: 0.4 %
BILIRUB SERPL-MCNC: 0.4 MG/DL (ref 0.2–1.3)
BUN SERPL-MCNC: 19 MG/DL (ref 7–30)
CALCIUM SERPL-MCNC: 6.4 MG/DL (ref 8.5–10.1)
CHLORIDE SERPL-SCNC: 98 MMOL/L (ref 94–109)
CO2 SERPL-SCNC: 36 MMOL/L (ref 20–32)
CREAT SERPL-MCNC: 0.54 MG/DL (ref 0.52–1.04)
DIFFERENTIAL METHOD BLD: NORMAL
EOSINOPHIL # BLD AUTO: 0 10E9/L (ref 0–0.7)
EOSINOPHIL NFR BLD AUTO: 0.6 %
ERYTHROCYTE [DISTWIDTH] IN BLOOD BY AUTOMATED COUNT: 14.3 % (ref 10–15)
GFR SERPL CREATININE-BSD FRML MDRD: >90 ML/MIN/1.7M2
GLUCOSE SERPL-MCNC: 78 MG/DL (ref 70–99)
HCG SERPL QL: NEGATIVE
HCT VFR BLD AUTO: 39.6 % (ref 35–47)
HGB BLD-MCNC: 13.4 G/DL (ref 11.7–15.7)
IMM GRANULOCYTES # BLD: 0 10E9/L (ref 0–0.4)
IMM GRANULOCYTES NFR BLD: 0.2 %
INR PPP: 1.18 (ref 0.86–1.14)
LACTATE BLD-SCNC: 1.1 MMOL/L (ref 0.7–2)
LIPASE SERPL-CCNC: 40 U/L (ref 73–393)
LYMPHOCYTES # BLD AUTO: 1.6 10E9/L (ref 0.8–5.3)
LYMPHOCYTES NFR BLD AUTO: 30.9 %
MAGNESIUM SERPL-MCNC: 1.5 MG/DL (ref 1.6–2.3)
MCH RBC QN AUTO: 31.8 PG (ref 26.5–33)
MCHC RBC AUTO-ENTMCNC: 33.8 G/DL (ref 31.5–36.5)
MCV RBC AUTO: 94 FL (ref 78–100)
MONOCYTES # BLD AUTO: 0.4 10E9/L (ref 0–1.3)
MONOCYTES NFR BLD AUTO: 8.4 %
NEUTROPHILS # BLD AUTO: 3.1 10E9/L (ref 1.6–8.3)
NEUTROPHILS NFR BLD AUTO: 59.5 %
NRBC # BLD AUTO: 0 10*3/UL
NRBC BLD AUTO-RTO: 0 /100
PLATELET # BLD AUTO: 250 10E9/L (ref 150–450)
POTASSIUM SERPL-SCNC: 2.3 MMOL/L (ref 3.4–5.3)
PROT SERPL-MCNC: 4.6 G/DL (ref 6.8–8.8)
RBC # BLD AUTO: 4.22 10E12/L (ref 3.8–5.2)
SODIUM SERPL-SCNC: 140 MMOL/L (ref 133–144)
WBC # BLD AUTO: 5.2 10E9/L (ref 4–11)

## 2018-07-17 PROCEDURE — 83605 ASSAY OF LACTIC ACID: CPT | Performed by: EMERGENCY MEDICINE

## 2018-07-17 PROCEDURE — 85025 COMPLETE CBC W/AUTO DIFF WBC: CPT | Performed by: EMERGENCY MEDICINE

## 2018-07-17 PROCEDURE — 93010 ELECTROCARDIOGRAM REPORT: CPT | Mod: Z6 | Performed by: EMERGENCY MEDICINE

## 2018-07-17 PROCEDURE — 93005 ELECTROCARDIOGRAM TRACING: CPT | Performed by: EMERGENCY MEDICINE

## 2018-07-17 PROCEDURE — 99285 EMERGENCY DEPT VISIT HI MDM: CPT | Mod: 25 | Performed by: EMERGENCY MEDICINE

## 2018-07-17 PROCEDURE — 85610 PROTHROMBIN TIME: CPT | Performed by: EMERGENCY MEDICINE

## 2018-07-17 PROCEDURE — 96361 HYDRATE IV INFUSION ADD-ON: CPT | Performed by: EMERGENCY MEDICINE

## 2018-07-17 PROCEDURE — 96376 TX/PRO/DX INJ SAME DRUG ADON: CPT | Performed by: EMERGENCY MEDICINE

## 2018-07-17 PROCEDURE — 25000128 H RX IP 250 OP 636: Performed by: RADIOLOGY

## 2018-07-17 PROCEDURE — 80053 COMPREHEN METABOLIC PANEL: CPT | Performed by: EMERGENCY MEDICINE

## 2018-07-17 PROCEDURE — 83735 ASSAY OF MAGNESIUM: CPT | Performed by: EMERGENCY MEDICINE

## 2018-07-17 PROCEDURE — 83690 ASSAY OF LIPASE: CPT | Performed by: EMERGENCY MEDICINE

## 2018-07-17 PROCEDURE — 84703 CHORIONIC GONADOTROPIN ASSAY: CPT | Performed by: EMERGENCY MEDICINE

## 2018-07-17 PROCEDURE — 99223 1ST HOSP IP/OBS HIGH 75: CPT | Mod: AI | Performed by: PEDIATRICS

## 2018-07-17 PROCEDURE — 96374 THER/PROPH/DIAG INJ IV PUSH: CPT | Mod: 59 | Performed by: EMERGENCY MEDICINE

## 2018-07-17 PROCEDURE — 74177 CT ABD & PELVIS W/CONTRAST: CPT

## 2018-07-17 PROCEDURE — 12000001 ZZH R&B MED SURG/OB UMMC

## 2018-07-17 PROCEDURE — 25000128 H RX IP 250 OP 636: Performed by: EMERGENCY MEDICINE

## 2018-07-17 RX ORDER — LIDOCAINE 40 MG/G
CREAM TOPICAL
Status: DISCONTINUED | OUTPATIENT
Start: 2018-07-17 | End: 2018-07-19 | Stop reason: HOSPADM

## 2018-07-17 RX ORDER — ONDANSETRON 4 MG/1
4 TABLET, ORALLY DISINTEGRATING ORAL EVERY 6 HOURS PRN
Status: DISCONTINUED | OUTPATIENT
Start: 2018-07-17 | End: 2018-07-19 | Stop reason: HOSPADM

## 2018-07-17 RX ORDER — PROCHLORPERAZINE MALEATE 5 MG
10 TABLET ORAL EVERY 6 HOURS PRN
Status: DISCONTINUED | OUTPATIENT
Start: 2018-07-17 | End: 2018-07-19 | Stop reason: HOSPADM

## 2018-07-17 RX ORDER — MAGNESIUM SULFATE HEPTAHYDRATE 40 MG/ML
4 INJECTION, SOLUTION INTRAVENOUS EVERY 4 HOURS PRN
Status: DISCONTINUED | OUTPATIENT
Start: 2018-07-17 | End: 2018-07-19 | Stop reason: HOSPADM

## 2018-07-17 RX ORDER — METOCLOPRAMIDE HYDROCHLORIDE 5 MG/ML
10 INJECTION INTRAMUSCULAR; INTRAVENOUS EVERY 6 HOURS PRN
Status: DISCONTINUED | OUTPATIENT
Start: 2018-07-17 | End: 2018-07-18

## 2018-07-17 RX ORDER — POTASSIUM CL/LIDO/0.9 % NACL 10MEQ/0.1L
10 INTRAVENOUS SOLUTION, PIGGYBACK (ML) INTRAVENOUS
Status: DISCONTINUED | OUTPATIENT
Start: 2018-07-17 | End: 2018-07-19 | Stop reason: HOSPADM

## 2018-07-17 RX ORDER — HYDROMORPHONE HYDROCHLORIDE 1 MG/ML
0.5 INJECTION, SOLUTION INTRAMUSCULAR; INTRAVENOUS; SUBCUTANEOUS
Status: COMPLETED | OUTPATIENT
Start: 2018-07-17 | End: 2018-07-17

## 2018-07-17 RX ORDER — IOPAMIDOL 755 MG/ML
68 INJECTION, SOLUTION INTRAVASCULAR ONCE
Status: COMPLETED | OUTPATIENT
Start: 2018-07-17 | End: 2018-07-17

## 2018-07-17 RX ORDER — SODIUM CHLORIDE 9 MG/ML
1000 INJECTION, SOLUTION INTRAVENOUS CONTINUOUS
Status: DISCONTINUED | OUTPATIENT
Start: 2018-07-17 | End: 2018-07-19 | Stop reason: HOSPADM

## 2018-07-17 RX ORDER — PROCHLORPERAZINE 25 MG
25 SUPPOSITORY, RECTAL RECTAL EVERY 12 HOURS PRN
Status: DISCONTINUED | OUTPATIENT
Start: 2018-07-17 | End: 2018-07-19 | Stop reason: HOSPADM

## 2018-07-17 RX ORDER — METOCLOPRAMIDE 5 MG/1
10 TABLET ORAL EVERY 6 HOURS PRN
Status: DISCONTINUED | OUTPATIENT
Start: 2018-07-17 | End: 2018-07-18

## 2018-07-17 RX ORDER — NALOXONE HYDROCHLORIDE 0.4 MG/ML
.1-.4 INJECTION, SOLUTION INTRAMUSCULAR; INTRAVENOUS; SUBCUTANEOUS
Status: DISCONTINUED | OUTPATIENT
Start: 2018-07-17 | End: 2018-07-19 | Stop reason: HOSPADM

## 2018-07-17 RX ORDER — ONDANSETRON 2 MG/ML
4 INJECTION INTRAMUSCULAR; INTRAVENOUS EVERY 6 HOURS PRN
Status: DISCONTINUED | OUTPATIENT
Start: 2018-07-17 | End: 2018-07-19 | Stop reason: HOSPADM

## 2018-07-17 RX ADMIN — IOPAMIDOL 68 ML: 755 INJECTION, SOLUTION INTRAVENOUS at 19:35

## 2018-07-17 RX ADMIN — SODIUM CHLORIDE 1000 ML: 900 INJECTION, SOLUTION INTRAVENOUS at 17:35

## 2018-07-17 RX ADMIN — Medication 0.5 MG: at 17:49

## 2018-07-17 RX ADMIN — Medication 10 MEQ: at 21:15

## 2018-07-17 RX ADMIN — Medication 0.5 MG: at 19:02

## 2018-07-17 RX ADMIN — Medication 0.5 MG: at 20:26

## 2018-07-17 RX ADMIN — Medication 10 MEQ: at 20:10

## 2018-07-17 RX ADMIN — SODIUM CHLORIDE 1000 ML: 900 INJECTION, SOLUTION INTRAVENOUS at 21:08

## 2018-07-17 RX ADMIN — Medication 10 MEQ: at 19:02

## 2018-07-17 RX ADMIN — Medication 0.5 MG: at 22:20

## 2018-07-17 ASSESSMENT — ENCOUNTER SYMPTOMS
DIARRHEA: 0
NUMBNESS: 0
FEVER: 0
CONSTIPATION: 0
APPETITE CHANGE: 1
ANAL BLEEDING: 0
WEAKNESS: 0
DYSURIA: 0
COLOR CHANGE: 0
DYSPHORIC MOOD: 0
NERVOUS/ANXIOUS: 0
COUGH: 0
POLYDIPSIA: 0
EYE PAIN: 0
FREQUENCY: 0
DIAPHORESIS: 0
ABDOMINAL PAIN: 1
NECK PAIN: 0
ADENOPATHY: 0
BLOOD IN STOOL: 0
NAUSEA: 1
VOMITING: 1
LIGHT-HEADEDNESS: 0
SORE THROAT: 0
DIZZINESS: 0
HEADACHES: 0
TROUBLE SWALLOWING: 0
HEMATURIA: 0
BACK PAIN: 0
PALPITATIONS: 0
BRUISES/BLEEDS EASILY: 0
CHILLS: 0
VOICE CHANGE: 0
SHORTNESS OF BREATH: 0

## 2018-07-17 NOTE — ED PROVIDER NOTES
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     Patient complains of diffuse, cramping abdominal pain that is constant though fluctuates in severity like \"waves\" with associated nausea and non-bloody vomiting that has been ongoing since Saturday, for the past three days. She has had intermittent diarrhea and constipation which has been chronic since her radiation and chemotherapy. Her last bowel movement was an episode of diarrhea this morning with the consistency of a milkshake. No blood in the stool. She is prescribed Zofran and Percocet which she has been attempting to take, but has been unable to keep down since the onset of her symptoms three days ago. She also reports decreased PO/fluids. Additionally, patient complains of bilateral hip pain she attributes to \"laying around\" for the past three days. She denies midline hip pain or back pain. No chest pain, fever, shortness of breath, or cough. No headache. No changes in urination. No vaginal discharge or vaginal bleeding. Her last endoscopic dilation was on 06/30/18, and she notes she feels as though she never completely got back to her baseline following this procedure, though did somewhat improve and then got worse again. No other symptoms or complaints at this time. Please see ROS for further details.    I have reviewed the Medications, Allergies, Past Medical and Surgical History, and Social History in the [employer] system.  Past Medical History:   Diagnosis Date     Asthma      Cancer (H)     Per patient " OBGYN, cerivical cancer     Cervical cancer (H)      Other chronic pain      Ovarian cancer (H)      Substance abuse     Outside records indicate past history of narcotics abuse or dependence, but patient denies.       Past Surgical History:   Procedure Laterality Date     COLONOSCOPY N/A 5/3/2018    Procedure: COLONOSCOPY;  sigmoidoscopy;  Surgeon: Omero Vigil MD;  Location: [facility] OR     COLONOSCOPY WITH CO2 INSUFFLATION N/A 4/30/2018    Procedure: COLONOSCOPY WITH CO2 INSUFFLATION;  Colonoscopy;  Surgeon: Omero Vigil MD;  Location: [facility]     COMBINED CYSTOSCOPY, INSERT STENT URETER(S) Bilateral 5/18/2017    Procedure: COMBINED CYSTOSCOPY, INSERT STENT URETER(S);  Cystoscopy with Bilateral Stent,;  Surgeon: Rene Calero MD;  Location: [facility] OR     ENT SURGERY  2009    mastoid, sinus     ENTEROSCOPY SMALL BOWEL N/A 6/18/2018    Procedure: ENTEROSCOPY SMALL BOWEL;  Lower bowel Retrograde Enteroscopy with Balloon Dilation .;  Surgeon: Omero Vigil MD;  Location: [facility]     EXAM UNDER ANESTHESIA, INSERT ALEX SLEEVE, UTERINE PLACEMENT OF TANDEM AND RING FOR RAD, ULTRASOUND N/A 12/14/2015    Procedure: EXAM UNDER ANESTHESIA, INSERT ALEX SLEEVE, UTERINE PLACEMENT OF TANDEM AND RING FOR RADIATION, ULTRASOUND GUIDED;  Surgeon: Abby Tony MD;  Location: [facility] OR     INSERT TANDEM AND CESIUM APPLICATOR CERVIX, ULTRASOUND GUIDED N/A 12/17/2015    Procedure: INSERT TANDEM AND CESIUM APPLICATOR CERVIX, ULTRASOUND GUIDED;  Surgeon: Kika Wood MD;  Location: [facility] OR     KNEE SURGERY       LAPAROTOMY EXPLORATORY N/A 5/18/2017    Procedure: LAPAROTOMY EXPLORATORY;   Exploratry Laparotomy, Small Bowel Resection with anastomosis, Flexible Sigmoidoscopy;  Surgeon: Jennifer Goodwin MD;  Location: [facility]     PICC INSERTION Right 04/29/2017    4fr SL BioFlo PICC, 37cm (3cm external) in the R basilic vein w/ tip in the mid SVC.     PICC INSERTION Right 03/29/2018    4Fr - 40cm (4cm external), R  lateral brachial vein, Low SVC     RESECT SMALL BOWEL WITHOUT OSTOMY N/A 5/18/2017    Procedure: RESECT SMALL BOWEL WITHOUT OSTOMY;;  Surgeon: Jennifer Goodwin MD;  Location: [facility]     SIGMOIDOSCOPY FLEXIBLE N/A 5/18/2017    Procedure: SIGMOIDOSCOPY FLEXIBLE;;  Surgeon: Jennifer Goodwin MD;  Location: [facility]       Family History   Problem Relation Age of Onset     Diabetes Mother      Ovarian Cancer No family hx of      Uterine Cancer No family hx of      Cervical Cancer No family hx of      Breast Cancer No family hx of        Social History   Substance Use Topics     Smoking status: Light Tobacco Smoker     Packs/day: 0.25     Years: 12.00     Types: Cigarettes     Smokeless tobacco: Never Used      Comment: pt smoking about 4 cigs daily     Alcohol use No      Comment: None per pt       Current Facility-Administered Medications   Medication     0.9% sodium chloride BOLUS     benzocaine 20% (HURRICAINE/TOPEX) 20 % spray 0.5 mL     HYDROmorphone (PF) (DILAUDID) injection 0.5 mg     magnesium sulfate 4 g in 100 mL sterile water (premade)     potassium chloride 10 mEq in 100 mL intermittent infusion with 10 mg lidocaine     sodium chloride 0.9% infusion     Current Outpatient Prescriptions   Medication     ondansetron (ZOFRAN ODT) 4 MG ODT tab     oxyCODONE-acetaminophen (PERCOCET) 5-325 MG per tablet        Allergies   Allergen Reactions     No Clinical Screening - See Comments Other (See Comments) and Diarrhea     headache  Carrots cause gastric upset, cramping and diarrhea.     Sulfa Drugs Hives     hives     Amoxicillin Unknown and Other (See Comments)     vomiting  vomiting     Amoxicillin-Pot Clavulanate Other (See Comments) and Nausea     vomiting     Augmentin GI Disturbance, Nausea and Hives     Avelox [Moxifloxacin] Nausea and Vomiting, Unknown and Nausea     Ciprofloxacin Hives and Nausea     Codeine Nausea and Vomiting and Nausea     Ibuprofen Nausea and Vomiting     Other reaction(s): Nausea And  "Vomiting     Ibuprofen Sodium Hives and GI Disturbance     Quinolones      Tramadol Hives, Diarrhea, Nausea and Nausea and Vomiting     Daucus Carota      Other reaction(s): GI Upset  Other reaction(s): Abdominal pain, Diarrhea  Carrots cause gastric upset, cramping and diarrhea.       Review of Systems   Constitutional: Positive for appetite change (decreased PO/fluid intake). Negative for chills, diaphoresis and fever.   HENT: Negative for ear pain, sore throat, tinnitus, trouble swallowing and voice change.    Eyes: Negative for pain and visual disturbance.   Respiratory: Negative for cough and shortness of breath.    Cardiovascular: Negative for chest pain, palpitations and leg swelling.   Gastrointestinal: Positive for abdominal pain, nausea and vomiting. Negative for anal bleeding, blood in stool, constipation and diarrhea.   Endocrine: Negative for polydipsia and polyuria.   Genitourinary: Negative for dysuria, frequency, hematuria, urgency, vaginal bleeding and vaginal discharge.   Musculoskeletal: Negative for back pain and neck pain.        Positive for bilateral hip pain   Skin: Negative for color change and rash.   Allergic/Immunologic: Negative for immunocompromised state.   Neurological: Negative for dizziness, weakness, light-headedness, numbness and headaches.   Hematological: Negative for adenopathy. Does not bruise/bleed easily.   Psychiatric/Behavioral: Negative for dysphoric mood. The patient is not nervous/anxious.        Physical Exam   BP: 100/65  Pulse: 94  Heart Rate: 94  Temp: 98.6  F (37  C)  Resp: 22  Height: 177.8 cm (5' 10\")  Weight: 49.9 kg (110 lb)  SpO2: 97 %      Physical Exam  CONSTITUTIONAL: Well-developed and well-nourished. Awake and alert. Non-toxic appearance. No acute distress.   HENT:   - Head: Normocephalic and atraumatic.   - Ears: Hearing and external ear grossly normal.   - Nose: Nose normal. No rhinorrhea. No epistaxis.   - Mouth/Throat: MMM  EYES: Conjunctivae and lids " are normal. No scleral icterus.   NECK: Normal range of motion and phonation normal. Neck supple.  No tracheal deviation, no stridor. No edema or erythema noted.  CARDIOVASCULAR: Normal rate, regular rhythm and no appreciable abnormal heart sounds.  PULMONARY/CHEST: Normal work of breathing. No accessory muscle usage or stridor. No respiratory distress.  No appreciable abnormal breath sounds.  ABDOMEN: Non-distended, thin. Diffuse tenderness. No rigidity, rebound or guarding.   MUSCULOSKELETAL: Extremities warm and seemingly well perfused. No edema or calf tenderness.  NEUROLOGIC: Awake, alert. Not disoriented. Normal tone. No seizure activity. GCS 15  SKIN: Skin is warm and dry. No rash noted. No diaphoresis. No pallor.   PSYCHIATRIC: Normal mood and affect. Speech and behavior normal. Thought processes linear. Cognition and memory are normal.     ED Course     ED Course   Value Comment Time   Potassium: (!!) 2.3 (Reviewed) 07/17 1927   Magnesium: (!) 1.5 (Reviewed) 07/17 1927    Called by Radiology - Pt has a recurrent small bowel obstruction.  Transition point at anastomosis of small bowel, no pneumatosis, portal venous gas or intra-abdominal free air 07/17 2002   Magnesium: (!) 1.5 (Reviewed) 07/17 2004   Lipase: (!) 40 (Reviewed) 07/17 2004   INR: (!) 1.18 (Reviewed) 07/17 2004    Patient refusing NGT 07/17 2039     Procedures             EKG Interpretation:      Interpreted by Brit Brown MD  Time reviewed: 18:23  Symptoms at time of EKG: abdominal pain, nausea, vomiting   Rhythm: sinus rhythm with short ME  Rate: 64 bpm  Axis: Right Axis Deviation  Ectopy: none  Conduction: normal  ST Segments/ T Waves: ST & T wave abnormality, consider anterior ischemia  Comparison to prior: has recurrent TW I in V3 + V4, similar to May 1st 2018; ST-T waves appear similar to May 1st 2018 as well (was hypokalemic @ that time as well)       Labs Ordered and Resulted from Time of ED Arrival Up to the Time of Departure from  the ED   INR - Abnormal; Notable for the following:        Result Value    INR 1.18 (*)     All other components within normal limits   COMPREHENSIVE METABOLIC PANEL - Abnormal; Notable for the following:     Potassium 2.3 (*)     Carbon Dioxide 36 (*)     Calcium 6.4 (*)     Albumin 2.5 (*)     Protein Total 4.6 (*)     All other components within normal limits   LIPASE - Abnormal; Notable for the following:     Lipase 40 (*)     All other components within normal limits   MAGNESIUM - Abnormal; Notable for the following:     Magnesium 1.5 (*)     All other components within normal limits   CBC WITH PLATELETS DIFFERENTIAL   LACTIC ACID WHOLE BLOOD   HCG QUALITATIVE   ROUTINE UA WITH MICROSCOPIC   CARDIAC CONTINUOUS MONITORING   NASOGASTRIC TUBE DECOMPRESSION            Assessments & Plan (with Medical Decision Making)   IMPRESSION: 42 year old female w/ PMH notable for cervical cancer s/p chemo and radiation with subsequent recurrent SBO's and enteric strictures needing dilatations, TPN dependent malnutrition, and opiate dependence who is presenting today with recurrent abdominal discomfort similar to previous bowel obstructions as described further above in HPI/ROS.    Clinically, patient appears nontoxic but does appear incredibly uncomfortable.  She reports diffuse abdominal pain on exam as well as history that was not significantly distended, but patient is very thin, somewhat limiting the exam.  She looks somewhat mildly dehydrated but no acute cardiopulmonary findings and otherwise no acute findings.      Ddx includes, but not limited to, recurrent bowel obstruction, ileus, flare of chronic pain, worsening of cancer/malignancy, gastroenteritis, diverticulitis, etc.    PLAN: laboratory studies, urine studies, CT A/P, symptom management    RESULTS:  - Labs: potassium 2.3, lipase 40, magnesium 1.5, INR 1.18,   - Imaging: I have personally reviewed and interpreted the radiologic images, and have reviewed and agree  with written preliminary reports:  --- CT A/P: Small bowel obstruction similar to 6/17/2018 with a distinct transition point in the central abdomen near the small bowel anastomosis. No pneumatosis, portal venous gas, or intra-abdominal free air.    INTERVENTIONS:   - IV Fluids  - IV Dilaudid x3  - IV potassium chloride  - IV magnesium    RE-EVALUATION:  - The patient's symptoms were somewhat improved here in the ED.  - Pt continues to do well here in the ED, no acute issues or apparent concerning changes in vitals or clinical appearance.     DISCUSSIONS:  - w/ Surgery: They have seen and evaluated patient here in the ED, recommend NGT and medical management.   - w/ IM: Reviewed case. They will admit for further evaluation/management.  - w/ Patient: I have reviewed the available findings, plan with the patient. She expressed understanding. She still refuses NGT. All questions answered to the best of our ability at this time.     DISPOSITION/PLANNING:  - IMPRESSION: Small bowel obstruction, patient refusing NGT; hypokalemia w/ ECG changes  - DISPOSITION: Admit to IM For further evaluation/management, CRS to follow      ______________________________________________________________________________    - I have reviewed the available nursing notes.    New Prescriptions    No medications on file       Final diagnoses:   Small bowel obstruction   Hypokalemia   IEmely, am serving as a trained medical scribe to document services personally performed by Brit Brown MD, based on the provider's statements to me.   IBrit MD, was physically present and have reviewed and verified the accuracy of this note documented by Emely Jacobo.      7/17/2018   [facility], [place], EMERGENCY DEPARTMENT     Brit Brown MD  07/19/18 0103

## 2018-07-17 NOTE — ED TRIAGE NOTES
"ED TRIAGE    Medical / Trauma C/o:  42-yr female patient - presenting to ED for eval of worsening abdominal pain, with N/V; since Saturday.  No blood noted.  Airway patent.  EMS states patient received Fentanyl and zofran, prior to arrival.     Duration of C/o:  4 days    Contributing Factors / Concerning HX:  See HX    Significant Med's / Tx's:  See med's    Febrile / Afebrile:  Afebrile    Patient Vitals for the past 24 hrs:   BP Temp Temp src Pulse Heart Rate Resp SpO2 Height Weight   07/17/18 1442 100/65 98.6  F (37  C) Oral 94 94 22 97 % 1.778 m (5' 10\") 49.9 kg (110 lb)       Mihai Bah  July 17, 2018  2:45 PM    "

## 2018-07-18 VITALS
HEIGHT: 70 IN | OXYGEN SATURATION: 97 % | WEIGHT: 110.4 LBS | SYSTOLIC BLOOD PRESSURE: 101 MMHG | RESPIRATION RATE: 16 BRPM | TEMPERATURE: 97.1 F | BODY MASS INDEX: 15.81 KG/M2 | HEART RATE: 94 BPM | DIASTOLIC BLOOD PRESSURE: 72 MMHG

## 2018-07-18 LAB
ALBUMIN SERPL-MCNC: 2.7 G/DL (ref 3.4–5)
ANION GAP SERPL CALCULATED.3IONS-SCNC: 7 MMOL/L (ref 3–14)
BUN SERPL-MCNC: 17 MG/DL (ref 7–30)
CALCIUM SERPL-MCNC: 7.1 MG/DL (ref 8.5–10.1)
CHLORIDE SERPL-SCNC: 96 MMOL/L (ref 94–109)
CO2 SERPL-SCNC: 34 MMOL/L (ref 20–32)
CREAT SERPL-MCNC: 0.61 MG/DL (ref 0.52–1.04)
GFR SERPL CREATININE-BSD FRML MDRD: >90 ML/MIN/1.7M2
GLUCOSE SERPL-MCNC: 83 MG/DL (ref 70–99)
INTERPRETATION ECG - MUSE: NORMAL
MAGNESIUM SERPL-MCNC: 1.8 MG/DL (ref 1.6–2.3)
POTASSIUM SERPL-SCNC: 2.7 MMOL/L (ref 3.4–5.3)
POTASSIUM SERPL-SCNC: 3.4 MMOL/L (ref 3.4–5.3)
SODIUM SERPL-SCNC: 137 MMOL/L (ref 133–144)

## 2018-07-18 PROCEDURE — 36415 COLL VENOUS BLD VENIPUNCTURE: CPT | Performed by: PEDIATRICS

## 2018-07-18 PROCEDURE — 25000132 ZZH RX MED GY IP 250 OP 250 PS 637: Performed by: PHYSICIAN ASSISTANT

## 2018-07-18 PROCEDURE — 99207 ZZC APP CREDIT; MD BILLING SHARED VISIT: CPT | Performed by: PHYSICIAN ASSISTANT

## 2018-07-18 PROCEDURE — 82040 ASSAY OF SERUM ALBUMIN: CPT | Performed by: STUDENT IN AN ORGANIZED HEALTH CARE EDUCATION/TRAINING PROGRAM

## 2018-07-18 PROCEDURE — 99233 SBSQ HOSP IP/OBS HIGH 50: CPT | Performed by: INTERNAL MEDICINE

## 2018-07-18 PROCEDURE — 25000128 H RX IP 250 OP 636: Performed by: PHYSICIAN ASSISTANT

## 2018-07-18 PROCEDURE — 25000128 H RX IP 250 OP 636: Performed by: STUDENT IN AN ORGANIZED HEALTH CARE EDUCATION/TRAINING PROGRAM

## 2018-07-18 PROCEDURE — 83735 ASSAY OF MAGNESIUM: CPT | Performed by: STUDENT IN AN ORGANIZED HEALTH CARE EDUCATION/TRAINING PROGRAM

## 2018-07-18 PROCEDURE — 25000128 H RX IP 250 OP 636: Performed by: PEDIATRICS

## 2018-07-18 PROCEDURE — 25000132 ZZH RX MED GY IP 250 OP 250 PS 637: Performed by: STUDENT IN AN ORGANIZED HEALTH CARE EDUCATION/TRAINING PROGRAM

## 2018-07-18 PROCEDURE — 25000128 H RX IP 250 OP 636: Performed by: EMERGENCY MEDICINE

## 2018-07-18 PROCEDURE — 36415 COLL VENOUS BLD VENIPUNCTURE: CPT | Performed by: STUDENT IN AN ORGANIZED HEALTH CARE EDUCATION/TRAINING PROGRAM

## 2018-07-18 PROCEDURE — 80048 BASIC METABOLIC PNL TOTAL CA: CPT | Performed by: STUDENT IN AN ORGANIZED HEALTH CARE EDUCATION/TRAINING PROGRAM

## 2018-07-18 PROCEDURE — 84132 ASSAY OF SERUM POTASSIUM: CPT | Performed by: PEDIATRICS

## 2018-07-18 RX ORDER — OXYCODONE AND ACETAMINOPHEN 5; 325 MG/1; MG/1
1 TABLET ORAL EVERY 4 HOURS PRN
Status: DISCONTINUED | OUTPATIENT
Start: 2018-07-18 | End: 2018-07-18

## 2018-07-18 RX ORDER — POTASSIUM CHLORIDE 7.45 MG/ML
10 INJECTION INTRAVENOUS
Status: COMPLETED | OUTPATIENT
Start: 2018-07-18 | End: 2018-07-18

## 2018-07-18 RX ORDER — LORAZEPAM 2 MG/ML
.5-1 INJECTION INTRAMUSCULAR
Status: COMPLETED | OUTPATIENT
Start: 2018-07-18 | End: 2018-07-18

## 2018-07-18 RX ORDER — HYDROMORPHONE HYDROCHLORIDE 1 MG/ML
.3-.5 INJECTION, SOLUTION INTRAMUSCULAR; INTRAVENOUS; SUBCUTANEOUS EVERY 6 HOURS PRN
Status: DISCONTINUED | OUTPATIENT
Start: 2018-07-18 | End: 2018-07-19 | Stop reason: HOSPADM

## 2018-07-18 RX ORDER — HYDROMORPHONE HYDROCHLORIDE 1 MG/ML
.3-.5 INJECTION, SOLUTION INTRAMUSCULAR; INTRAVENOUS; SUBCUTANEOUS
Status: COMPLETED | OUTPATIENT
Start: 2018-07-18 | End: 2018-07-18

## 2018-07-18 RX ORDER — HYDROCODONE BITARTRATE AND ACETAMINOPHEN 5; 325 MG/1; MG/1
1 TABLET ORAL EVERY 6 HOURS PRN
Status: DISCONTINUED | OUTPATIENT
Start: 2018-07-18 | End: 2018-07-19 | Stop reason: HOSPADM

## 2018-07-18 RX ADMIN — ENOXAPARIN SODIUM 40 MG: 100 INJECTION SUBCUTANEOUS at 00:41

## 2018-07-18 RX ADMIN — POTASSIUM CHLORIDE 10 MEQ: 10 INJECTION, SOLUTION INTRAVENOUS at 01:45

## 2018-07-18 RX ADMIN — ACETAMINOPHEN 325 MG: 325 SUPPOSITORY RECTAL at 01:45

## 2018-07-18 RX ADMIN — SODIUM CHLORIDE 1000 ML: 9 INJECTION, SOLUTION INTRAVENOUS at 00:42

## 2018-07-18 RX ADMIN — ONDANSETRON 4 MG: 2 INJECTION INTRAMUSCULAR; INTRAVENOUS at 12:41

## 2018-07-18 RX ADMIN — OXYCODONE HYDROCHLORIDE AND ACETAMINOPHEN 1 TABLET: 5; 325 TABLET ORAL at 12:41

## 2018-07-18 RX ADMIN — Medication 0.2 MG: at 05:10

## 2018-07-18 RX ADMIN — ONDANSETRON 4 MG: 2 INJECTION INTRAMUSCULAR; INTRAVENOUS at 06:43

## 2018-07-18 RX ADMIN — OXYCODONE HYDROCHLORIDE AND ACETAMINOPHEN 1 TABLET: 5; 325 TABLET ORAL at 17:00

## 2018-07-18 RX ADMIN — LORAZEPAM 0.5 MG: 2 INJECTION INTRAMUSCULAR; INTRAVENOUS at 00:41

## 2018-07-18 RX ADMIN — Medication 0.3 MG: at 03:30

## 2018-07-18 RX ADMIN — Medication 0.3 MG: at 06:43

## 2018-07-18 RX ADMIN — Medication 0.5 MG: at 10:03

## 2018-07-18 RX ADMIN — POTASSIUM CHLORIDE 10 MEQ: 10 INJECTION, SOLUTION INTRAVENOUS at 05:00

## 2018-07-18 RX ADMIN — Medication 0.5 MG: at 16:00

## 2018-07-18 RX ADMIN — CALCIUM GLUCONATE 2 G: 98 INJECTION, SOLUTION INTRAVENOUS at 05:57

## 2018-07-18 RX ADMIN — POTASSIUM CHLORIDE 10 MEQ: 10 INJECTION, SOLUTION INTRAVENOUS at 03:30

## 2018-07-18 ASSESSMENT — ACTIVITIES OF DAILY LIVING (ADL)
ADLS_ACUITY_SCORE: 11

## 2018-07-18 NOTE — ED NOTES
[facility], [place]   ED Nurse to Floor Handoff     Rachel A Gerhardt is a 42 year old female who speaks English and lives alone,  in a home  They arrived in the ED by ambulance from home    ED Chief Complaint: Nausea & Vomiting and Abdominal Pain    ED Dx;   Final diagnoses:   Small bowel obstruction   Hypokalemia         Needed?: No    Allergies:   Allergies   Allergen Reactions     No Clinical Screening - See Comments Other (See Comments) and Diarrhea     headache  Carrots cause gastric upset, cramping and diarrhea.     Sulfa Drugs Hives     hives     Amoxicillin Unknown and Other (See Comments)     vomiting  vomiting     Amoxicillin-Pot Clavulanate Other (See Comments) and Nausea     vomiting     Augmentin GI Disturbance, Nausea and Hives     Avelox [Moxifloxacin] Nausea and Vomiting, Unknown and Nausea     Ciprofloxacin Hives and Nausea     Codeine Nausea and Vomiting and Nausea     Ibuprofen Nausea and Vomiting     Other reaction(s): Nausea And Vomiting     Ibuprofen Sodium Hives and GI Disturbance     Quinolones      Tramadol Hives, Diarrhea, Nausea and Nausea and Vomiting     Daucus Carota      Other reaction(s): GI Upset  Other reaction(s): Abdominal pain, Diarrhea  Carrots cause gastric upset, cramping and diarrhea.   .  Past Medical Hx:   Past Medical History:   Diagnosis Date     Asthma      Cancer (H)     Per patient OBGYN, cerivical cancer     Cervical cancer (H)      Other chronic pain      Ovarian cancer (H)      Substance abuse     Outside records indicate past history of narcotics abuse or dependence, but patient denies.      Baseline Mental status: WDL  Current Mental Status changes: at basesline    Infection present or suspected this encounter: no  Sepsis suspected: No  Isolation type: No active isolations     Activity level - Baseline/Home:  Independent  Activity Level - Current:   Independent    Bariatric equipment needed?: No    In the ED these meds were  given:   Medications   0.9% sodium chloride BOLUS (0 mLs Intravenous Stopped 7/17/18 1905)     Followed by   sodium chloride 0.9% infusion (not administered)   HYDROmorphone (PF) (DILAUDID) injection 0.5 mg (0.5 mg Intravenous Given 7/17/18 2026)   potassium chloride 10 mEq in 100 mL intermittent infusion with 10 mg lidocaine (10 mEq Intravenous New Bag 7/17/18 2026)   magnesium sulfate 4 g in 100 mL sterile water (premade) (not administered)   benzocaine 20% (HURRICAINE/TOPEX) 20 % spray 0.5 mL (not administered)   sodium chloride (PF) 0.9% PF flush 67 mL (67 mLs Intravenous Given 7/17/18 1936)   iopamidol (ISOVUE-370) solution 68 mL (68 mLs Intravenous Given 7/17/18 1935)   HYDROmorphone (PF) (DILAUDID) injection 0.5 mg (0.5 mg Intravenous Given 7/17/18 1902)       Drips running?  Yes    Home pump  No    Current LDAs  Peripheral IV 07/17/18 Right Upper forearm (Active)   Site Assessment WDL 7/17/2018  2:40 PM   Line Status Saline locked 7/17/2018  2:40 PM   Phlebitis Scale 0-->no symptoms 7/17/2018  2:40 PM   Number of days:0       Incision/Surgical Site 05/18/17 Abdomen (Active)   Number of days:425       Labs results:   Labs Ordered and Resulted from Time of ED Arrival Up to the Time of Departure from the ED   INR - Abnormal; Notable for the following:        Result Value    INR 1.18 (*)     All other components within normal limits   COMPREHENSIVE METABOLIC PANEL - Abnormal; Notable for the following:     Potassium 2.3 (*)     Carbon Dioxide 36 (*)     Calcium 6.4 (*)     Albumin 2.5 (*)     Protein Total 4.6 (*)     All other components within normal limits   LIPASE - Abnormal; Notable for the following:     Lipase 40 (*)     All other components within normal limits   MAGNESIUM - Abnormal; Notable for the following:     Magnesium 1.5 (*)     All other components within normal limits   CBC WITH PLATELETS DIFFERENTIAL   LACTIC ACID WHOLE BLOOD   HCG QUALITATIVE   ROUTINE UA WITH MICROSCOPIC   CARDIAC CONTINUOUS  MONITORING   NASOGASTRIC TUBE DECOMPRESSION       Imaging Studies:   Recent Results (from the past 24 hour(s))   CT Abdomen Pelvis w Contrast    Narrative    EXAMINATION: CT ABDOMEN PELVIS W CONTRAST, 7/17/2018 7:47 PM    TECHNIQUE:  Helical CT images from the lung bases through the  symphysis pubis were obtained with IV contrast. Contrast dose:  iopamidol (ISOVUE-370) solution 68 mL    COMPARISON: 6/17/2018, 5/11/2018, 3/28/2018, 5/12/2017, 9/24/2015    HISTORY: Abdominal pain    FINDINGS:    Abdomen and pelvis:   Substantially distended small bowel loops measuring up to 4.9 cm in  diameter. Distinct transition point in the central lower abdomen. The  site of a small bowel resection and anastomosis (series 3, image 43).  No pneumatosis, portal venous gas, or intra-abdominal free air. The  distal small bowel and colon is decompressed.    The liver, gallbladder, spleen, adrenal glands, and kidneys appear  normal. Mild prominence of the pancreatic duct without visualized  obstructing stone or mass. The common bile duct is at the upper limits  of normal measuring 6 mm in diameter. The uterus appears grossly  normal.    No intra-abdominal free air. Trace pelvic free fluid. The appendix is  not well visualized. Normal caliber abdominal aorta. The major  abdominal vasculature is patent. The distended loops of bowel with  mild mass effect on the superior mesenteric vein. No lymphadenopathy  in the abdomen or pelvis by size criteria, however there are a few  prominent mesenteric nodes.     Lung bases:   The heart is not enlarged. No pericardial effusion. Calcified  granuloma in the left lower lobe. The lung bases are clear.    Bones and soft tissues:  No acute or worrisome osseous lesions.        Impression    IMPRESSION:   Small bowel obstruction similar to 6/17/2018 with a distinct  transition point in the central abdomen near the small bowel  anastomosis. No pneumatosis, portal venous gas, or intra-abdominal  free  "air.      [Result: Small bowel obstruction]    Finding was identified on 7/17/2018 7:50 PM.     Dr. Brown was contacted by Dr. Whaley at 7/17/2018 8:02 PM and  verbalized understanding of the urgent finding.     I have personally reviewed the examination and initial interpretation  and I agree with the findings.    CARLITO BURCH MD       Recent vital signs:   BP (!) 85/64  Pulse 94  Temp 98.6  F (37  C) (Oral)  Resp 15  Ht 1.778 m (5' 10\")  Wt 49.9 kg (110 lb)  SpO2 99%  BMI 15.78 kg/m2    Cardiac Rhythm: Normal Sinus  Pt needs tele? Yes  Skin/wound Issues: None    Code Status: Full Code    Pain control: fair    Nausea control: good    Abnormal labs/tests/findings requiring intervention: see epic    Family present during ED course? No   Family Comments/Social Situation comments: n/a    Tasks needing completion: None      [phone] [facility] ED      "

## 2018-07-18 NOTE — PLAN OF CARE
Problem: Gastrointestinal Condition Comorbidity  Goal: Gastrointestinal Condition  Patient comorbidity will be monitored for signs and symptoms of Gastrointestinal condition.  Problems will be absent, minimized or managed by discharge/transition of care.   Outcome: Improving  Pt endorses same level of abd pain, however a marked decrease in nausea and vomiting

## 2018-07-18 NOTE — PLAN OF CARE
Problem: Patient Care Overview  Goal: Plan of Care/Patient Progress Review  Admitted from ED, arrived on unit around 2300. Afebrile, VSS ex soft BP s, on RA. Abd pain managed with Tylenol, and IV dilaudid. PIV infusing MIVF @ 125. K (2.7) replaced. Recheck at 0800. Up independently, ambulating halls. NPO. Ativan given x1. Pt declined NGT placement (refer to RN note below), team aware and okay to hold off on NGT. C/o nausea, zofran given. Voiding spontaneously. UA needed, pt aware. No BM this shift. A/Ox4, calling appropriately and making needs known. Robe continue to monitor and follow POC.

## 2018-07-18 NOTE — PROGRESS NOTES
Colorectal Surgery Progress Note    Rachel A Gerhardt  [id number]    No acute events overnight. Complains of abdominal pain requiring IV dilaudid. Also has nausea for which she received zofran. Refused NGT overnight. Last BM yesterday. Voiding and ambulating independently.     Temp:  [97.4  F (36.3  C)-98.6  F (37  C)] 97.4  F (36.3  C)  Pulse:  [94] 94  Heart Rate:  [66-94] 76  Resp:  [8-22] 15  BP: ()/(64-83) 115/83  SpO2:  [95 %-99 %] 98 %    Intake/Output Summary (Last 24 hours) at 07/18/18 0704  Last data filed at 07/18/18 0600   Gross per 24 hour   Intake            662.5 ml   Output                0 ml   Net            662.5 ml     No acute distress, resting in bed, mild anxiety  Non labored respirations  Soft, mild distension, mild tenderness diffusely, no rebound/guarding, no peritonitis  Warm and well perfused   Alert, oriented    K 2.7  Mg 1.8    42 year old female with hx cervical cancer s/p chemo-XRT and recurrent SBO related to radiation s/p ex lap and small bowel resection 5/2017, complicated by ileoileal anastomotic stricture, previously dilated by GI. Exam benign.     - No acute surgical intervention indicated  - Serial abdominal exams  - NPO/bowel rest  - Consider NGT if worsening nausea/patient agreeable   - Judicious use of narcotics  - Optimize electrolytes  - Encourage ambulation  - Recommend GI consult to consider repeat dilation if not improving    Seen with colorectal fellow, who will discuss with staff.     Jessica Sandra MD  General Surgery PGY-3

## 2018-07-18 NOTE — PROGRESS NOTES
[facility], [place]    Internal Medicine Progress Note - Gold Service      Assessment & Plan   Rachel A Gerhardt is a 42 year old female with h/o of cervical cancer s/p chemoradiation complicated by small bowel strictures that have led to recurrent SBO, now s/p lap 5/18/17 with resection of 60 cm of small bowel and resultant anastomotic strictures. Anastomotic stricture led to partial SBO managed with endoscopic dilatation- last done during the most recent admission 6/17-6/20 2018, now readmitted with SBO after presenting to abd pain with abd pain, N/V.      Recurrent SBO  Acute on chronic abd pain  Hx of Ileoilieal anastomotic stricture: Evaluated by colorectal surgery in ED and no surgical intervention indicated but to place NGT and start bowel rest via NPO; however, pt declined NGT as she gets panic attacks with past insertions. Into this am, pt states she had large soft BM and nausea and pain controlled on prn antiemetics and pain meds, respectively. Abd exam benign. Pt has appetite. Pt up OOB many times today to go outside to smoke. Of note, pt client at [facility] pain clinic and prescribed Percocet 5/325 mg, 1 tab q4hrs prn NTE 5 tabs daily.   - CRS consulted and appreciate recommendations.   - D/w CRS team and continue NPO for now  - Consult GI given recent hospitalization for SBO last month of which GI dilated a stricture; appreciate rec's  - Pain: Restart PTA Percocet as above; change IV Dilaudid to 0.3-0.5 mg IV q6hrs x 3 doses  - Nausea: Continue prn Zofran and Compazine.   - Serial abdominal exams  - Defer NG tube placement for now given nausea and pain controlled.   - Continue IVFs  - Continue frequent ambulation     Hypokalemia: Most likely 2/2 recent vomiting and decreased PO intake.   - Replace via K ERP     Chronic protein calorie malnutrition: Pt was on TPN in the past but weaned off during recent hospitalization last month. Pt currently has appetite but remains NPO  per CRS team.   -Nutrition consulted and appreciate rec's       # Pain Assessment:  Current Pain Score 7/18/2018   Patient currently in pain? yes   Pain score (0-10) -   Pain location Abdomen   Pain descriptors Throbbing;Aching   Some encounter information is confidential and restricted. Go to Review Flowsheets activity to see all data.   - Jenni is experiencing pain due to SBO. Pain management was discussed and the plan was created in a collaborative fashion.  Jenni's response to the current recommendations: compliant  - Please see the plan for pain management as documented above        Diet: NPO for Medical/Clinical Reasons Except for: Ice Chips, Meds  Fluids:  ml/hr  DVT Prophylaxis: Pneumatic Compression Devices  Code Status: Full Code    Disposition Plan   Expected discharge: 1-2 days pending GI treatment plan, recommended to prior living arrangement once pt's pain and nausea controlled and pt tolerating regular diet. .     Entered: Luiz Tom Junaid 07/18/2018, 3:08 PM   Information in the above section will display in the discharge planner report.      The patient's care was discussed with the Attending Physician, Dr. Morrison, Bedside Nurse and Patient.    Alexi Quiroga PA-C  Internal Medicine Hospitalist   [facility]  [phone]     Interval History   States nausea stable. No vomiting since yesterday. Up oob several times today. Has an appetite. Abd pain stable. Declined NGT last night as she gets severe panic from this. Denies fever, chills, chest pain, and SOB. LBM this am and large and soft. Denies bladder concerns.       Data reviewed today: I reviewed all medications, new labs and imaging results over the last 24 hours.    Physical Exam   Vital Signs: Temp: 97.1  F (36.2  C) Temp src: Oral BP: 101/72   Heart Rate: 81 Resp: 16 SpO2: 97 % O2 Device: None (Room air)    Weight: 110 lbs 6.4 oz  GEN: In NAD  HEENT: NCAT; PERRL; sclerae non-icteric  LUNGS: CTAB  CV: RRR  ABD:  +BSs; soft, mild generalized tenderness in all quadrants without guarding.   EXT: No BLE edema  SKIN: No acute rashes noted on exposed areas.  NEURO: AAOx3; CNs grossly intact; No acute focal deficits noted.            Data     CMP  Recent Labs  Lab 07/18/18  0757 07/18/18  0310 07/17/18  1736   NA  --  137 140   POTASSIUM 3.4 2.7* 2.3*   CHLORIDE  --  96 98   CO2  --  34* 36*   ANIONGAP  --  7 6   GLC  --  83 78   BUN  --  17 19   CR  --  0.61 0.54   GFRESTIMATED  --  >90 >90   GFRESTBLACK  --  >90 >90   JONATAN  --  7.1* 6.4*   MAG  --  1.8 1.5*   PROTTOTAL  --   --  4.6*   ALBUMIN  --  2.7* 2.5*   BILITOTAL  --   --  0.4   ALKPHOS  --   --  60   AST  --   --  12   ALT  --   --  13     CBC  Recent Labs  Lab 07/17/18  1736   WBC 5.2   RBC 4.22   HGB 13.4   HCT 39.6   MCV 94   MCH 31.8   MCHC 33.8   RDW 14.3        INR  Recent Labs  Lab 07/17/18  1736   INR 1.18*

## 2018-07-18 NOTE — H&P
[facility]    Internal Medicine History and Physical - [facility] Service       Date of Admission:  7/17/2018    Chief Complaint    Abdominal pain  Nausea  Vomiting    History is obtained from the patient    History of Present Illness      Rachel A Gerhardt is a 42 year old female with h/o of cervical cancer s/p chemoradiation complicated by small bowel strictures that have led to recurrent SBO, now s/p lap 5/18/17 with resection of 60 cm of small bowel and resultant anastomotic strictures. Anastomotic stricture led to partial SBO managed with endoscopic dilatation- last done  during the most recent admission 6/17-6/20 2018    Following discharge from hospital last month, she has been having mild to moderate abdominal pains, intermittent nausea and vomiting ( 2-3 per week) and was tolerating a soft diet until about 3 days ago when these symptoms gradually worsened. She describes ongoing diffuse abdominal pain, cramping, comes in waves, severe, 10/10 with associated nausea and bilious vomiting, up to a dozen times today. She has been unable to keep any food or meds down. She is passing stool and flatus. She reports long standing loose stools, sometimes upto 3-5 times a day since her cancer treatment that is non bloody, greenish in colour and associated with tenesmus. She last passed stool 5 minutes before my interview.    She denies fevers, chills, chest pain, shortness of breath or headaches. She reports that she hasn't passed much urine today. Reports feeling generally weak prompting her to call EMS.    ED Course:   Hyperkalemia 2.3 - replenished with 30 meq,   Hypotension BP's in the 80's-95's -given two 1000 ml IV NS boluses  CT Pelvis-small bowel obstruction similar to 6/17 with a distinct transition point in the central abdomen near the small bowel anastomosis.  No pneumatosis or portal venous gas or intra-abdominal free air.  NG was ordered, however patient has significant  NG tube related anxiety and requested meds prior to insertion of the NG tube.    Recent admission(s):   6/17-6/20 2018-admitted to [place] [address] service for SBO. During that admission, she under went  ileoal anastastomois stenosis dilation procedure. After the procedure, the patient was able to tolerate a soft diet and it was determined then that the patient no longer required TPN-discharged on she was on liquid/soft diet as tolerated.      Review of Systems   ROS (+) abdominal pain, nausea, vomiting, general body weakness  ROS (-) headache, fever, chills, night sweats, lumps, rashes     sore throat, SOB, cough, chest pain     Remainder of 10-point ROS negative except mentioned above     Past Medical History    I have reviewed this patient's medical history and updated it with pertinent information if needed.   Past Medical History:   Diagnosis Date     Asthma      Cancer (H)     Per patient OBGYN, cerivical cancer     Cervical cancer (H)      Other chronic pain      Ovarian cancer (H)      Substance abuse     Outside records indicate past history of narcotics abuse or dependence, but patient denies.        Past Surgical History   I have reviewed this patient's surgical history and updated it with pertinent information if needed.  Past Surgical History:   Procedure Laterality Date     COLONOSCOPY N/A 5/3/2018    Procedure: COLONOSCOPY;  sigmoidoscopy;  Surgeon: Omero Vigil MD;  Location: [facility] OR     COLONOSCOPY WITH CO2 INSUFFLATION N/A 4/30/2018    Procedure: COLONOSCOPY WITH CO2 INSUFFLATION;  Colonoscopy;  Surgeon: Omero Vigil MD;  Location: [facility] OR     COMBINED CYSTOSCOPY, INSERT STENT URETER(S) Bilateral 5/18/2017    Procedure: COMBINED CYSTOSCOPY, INSERT STENT URETER(S);  Cystoscopy with Bilateral Stent,;  Surgeon: Rene Calero MD;  Location: [facility] OR     ENT SURGERY  2009    mastoid, sinus     ENTEROSCOPY SMALL BOWEL N/A 6/18/2018    Procedure: ENTEROSCOPY SMALL BOWEL;  Lower bowel  Retrograde Enteroscopy with Balloon Dilation .;  Surgeon: Omero Vigil MD;  Location: [facility] OR     EXAM UNDER ANESTHESIA, INSERT ALEX SLEEVE, UTERINE PLACEMENT OF TANDEM AND RING FOR RAD, ULTRASOUND N/A 12/14/2015    Procedure: EXAM UNDER ANESTHESIA, INSERT ALEX SLEEVE, UTERINE PLACEMENT OF TANDEM AND RING FOR RADIATION, ULTRASOUND GUIDED;  Surgeon: Abby Tony MD;  Location: [facility] OR     INSERT TANDEM AND CESIUM APPLICATOR CERVIX, ULTRASOUND GUIDED N/A 12/17/2015    Procedure: INSERT TANDEM AND CESIUM APPLICATOR CERVIX, ULTRASOUND GUIDED;  Surgeon: Kika Wood MD;  Location: [facility] OR     KNEE SURGERY       LAPAROTOMY EXPLORATORY N/A 5/18/2017    Procedure: LAPAROTOMY EXPLORATORY;   Exploratry Laparotomy, Small Bowel Resection with anastomosis, Flexible Sigmoidoscopy;  Surgeon: Jennifer Goodwin MD;  Location: [facility] OR     PICC INSERTION Right 04/29/2017    4fr SL BioFlo PICC, 37cm (3cm external) in the R basilic vein w/ tip in the mid SVC.     PICC INSERTION Right 03/29/2018    4Fr - 40cm (4cm external), R lateral brachial vein, Low SVC     RESECT SMALL BOWEL WITHOUT OSTOMY N/A 5/18/2017    Procedure: RESECT SMALL BOWEL WITHOUT OSTOMY;;  Surgeon: Jennifer Goodwin MD;  Location: [facility] OR     SIGMOIDOSCOPY FLEXIBLE N/A 5/18/2017    Procedure: SIGMOIDOSCOPY FLEXIBLE;;  Surgeon: Jennifer Goodwin MD;  Location: [facility] OR       Family History   I have reviewed this patient's family history and updated it with pertinent information if needed.   Family History   Problem Relation Age of Onset     Diabetes Mother      Ovarian Cancer No family hx of      Uterine Cancer No family hx of      Cervical Cancer No family hx of      Breast Cancer No family hx of      Living: alone, with children and her mother at present  Smoking: doesn't smoke  Alcohol Drinking: denies current alcohol use  Recreational Drugs Use: denies drug use at present    Prior to Admission Medications   Prior to Admission Medications   Prescriptions  Last Dose Informant Patient Reported? Taking?   ondansetron (ZOFRAN ODT) 4 MG ODT tab   No No   Sig: Take 1 tablet (4 mg) by mouth every 6 hours as needed for nausea   oxyCODONE-acetaminophen (PERCOCET) 5-325 MG per tablet   Yes No   Sig: Take 1 tablet by mouth every 4 hours as needed for pain Max 6 tablets per day      Facility-Administered Medications: None     Allergies   Allergies   Allergen Reactions     No Clinical Screening - See Comments Other (See Comments) and Diarrhea     headache  Carrots cause gastric upset, cramping and diarrhea.     Sulfa Drugs Hives     hives     Amoxicillin Unknown and Other (See Comments)     vomiting  vomiting     Amoxicillin-Pot Clavulanate Other (See Comments) and Nausea     vomiting     Augmentin GI Disturbance, Nausea and Hives     Avelox [Moxifloxacin] Nausea and Vomiting, Unknown and Nausea     Ciprofloxacin Hives and Nausea     Codeine Nausea and Vomiting and Nausea     Ibuprofen Nausea and Vomiting     Other reaction(s): Nausea And Vomiting     Ibuprofen Sodium Hives and GI Disturbance     Quinolones      Tramadol Hives, Diarrhea, Nausea and Nausea and Vomiting     Daucus Carota      Other reaction(s): GI Upset  Other reaction(s): Abdominal pain, Diarrhea  Carrots cause gastric upset, cramping and diarrhea.       Physical Exam   Vital Signs: Temp: 98.6  F (37  C) Temp src: Oral BP: 94/79 Pulse: 94 Heart Rate: 74 Resp: 8 SpO2: 99 % O2 Device: None (Room air)    Weight: 110 lbs 0 oz    General Appearance: AOx3, no clubbing/cyanosis, no edema, dry mucus membranes  HEENT: PERRL, EOMI, no pharyngeal erythema  Respiratory: CTAB, no wheezing,no crackles  Cardiovascular: RRR, normal S1/S2, no murmur  GI: mildly distended, visibile and audible peristalsis, BS+++, diffuse tenderness, no rebound tenderness, no hepatosplenomegaly  Lymph/Hematologic: no lymphadenopathy  Genitourinary: no CVA tenderness  Skin: no rash  Musculoskeletal: no deformities, no joint swelling  Neurologic: CN  grossly intact, no focal neurological deficits      Indwelling lines at admission: Right PIV     Imaging& Procedure Results:  Recent Results (from the past 48 hour(s))   CT Abdomen Pelvis w Contrast    Narrative    EXAMINATION: CT ABDOMEN PELVIS W CONTRAST, 7/17/2018 7:47 PM    TECHNIQUE:  Helical CT images from the lung bases through the  symphysis pubis were obtained with IV contrast. Contrast dose:  iopamidol (ISOVUE-370) solution 68 mL    COMPARISON: 6/17/2018, 5/11/2018, 3/28/2018, 5/12/2017, 9/24/2015    HISTORY: Abdominal pain    FINDINGS:    Abdomen and pelvis:   Substantially distended small bowel loops measuring up to 4.9 cm in  diameter. Distinct transition point in the central lower abdomen. The  site of a small bowel resection and anastomosis (series 3, image 43).  No pneumatosis, portal venous gas, or intra-abdominal free air. The  distal small bowel and colon is decompressed.    The liver, gallbladder, spleen, adrenal glands, and kidneys appear  normal. Mild prominence of the pancreatic duct without visualized  obstructing stone or mass. The common bile duct is at the upper limits  of normal measuring 6 mm in diameter. The uterus appears grossly  normal.    No intra-abdominal free air. Trace pelvic free fluid. The appendix is  not well visualized. Normal caliber abdominal aorta. The major  abdominal vasculature is patent. The distended loops of bowel with  mild mass effect on the superior mesenteric vein. No lymphadenopathy  in the abdomen or pelvis by size criteria, however there are a few  prominent mesenteric nodes.     Lung bases:   The heart is not enlarged. No pericardial effusion. Calcified  granuloma in the left lower lobe. The lung bases are clear.    Bones and soft tissues:  No acute or worrisome osseous lesions.        Impression    IMPRESSION:   Small bowel obstruction similar to 6/17/2018 with a distinct  transition point in the central abdomen near the small bowel  anastomosis. No  pneumatosis, portal venous gas, or intra-abdominal  free air.      [Result: Small bowel obstruction]    Finding was identified on 7/17/2018 7:50 PM.     Dr. Brown was contacted by Dr. Whaley at 7/17/2018 8:02 PM and  verbalized understanding of the urgent finding.     I have personally reviewed the examination and initial interpretation  and I agree with the findings.    CARLITO BURCH MD        ECG :Sinus rhythm with short NH with ST-T wave abnormality      Assessment & Plan   Rachel A Gerhardt is a 42 year old female with h/o of cervical cancer s/p chemoradiation complicated by small bowel strictures that have led to recurrent SBO, now s/p lap 5/18/17 with resection of 60 cm of small bowel and resultant anastomotic strictures. Anastomotic stricture led to partial SBO managed with endoscopic dilatation- last done  during the most recent admission 6/17-6/20 2018      # Partial small bowel obstruction  # Ileoilieal anastomotic stricture  -Appreciate surgery review and recs  -Serial abdominal exams  -NPO  -NG tube placement to LIS  -IV fluid resuscitation  -Ambulate    #Hypokalemia 2.3 /Hypocalcemia- 6.4 (corrected calcium is 7.2),   -Replace potassium> 4.0 and calcium supplementation  -4 hourly checks, with Mg2+, replete if <2    # Emesis  - NGT  - Zofran    # Protein malnutrition  -Nutrition consult.    Chronic Problems    # Pain management  # Anxiety  - Pain consult for assistance with non-opoid modalities.   - Hydromophine for break through pain  - Add IV acetaminophen ( pharmacy indicated it is non formulary-- use per rectal)  - IV ativan prior to NGT insertion-- monitor vitals closely as BP is low side  - Once decompressed- consider PTA percocet.      # Pain Assessment:  Current Pain Score 6/20/2018   Patient currently in pain? yes   Pain score (0-10) -   Pain location Abdomen   Pain descriptors Aching   Some encounter information is confidential and restricted. Go to Review Flowsheets activity to see all data.        Diet:  NPO  Fluids: IVF- NS/RL- 100ml/hr  DVT Prophylaxis: Enoxaparin (Lovenox) SQ  Code Status: Full Code    Disposition Plan   Expected discharge: 2 - 3 days; recommended to prior living arrangement once adequate pain management/ tolerating PO medications.  Entered: Mily Goff 07/17/2018, 10:17 PM   Information in the above section will display in the discharge planner report.    The patient was discussed with Dr. James Goff  [facility]   Pager: [phone]  Please see sticky note for cross cover information

## 2018-07-18 NOTE — PHARMACY-ADMISSION MEDICATION HISTORY
Admission medication history interview status for the 7/17/2018 admission is complete. See Epic admission navigator for allergy information, pharmacy, prior to admission medications and immunization status.     Medication history interview sources:  patient and Surescripts    Changes made to PTA medication list (reason)  Added: None  Deleted: None  Changed:  -ondansetron 4mg Q6H PRN -->  4mg once daily. May take 4mg extra as needed.    Additional medication history information (including reliability of information, actions taken by pharmacist):  -patient was reliable historian, knew meds and doses  -pt c/o nausea with percocet, which is relieved by 1-2 tablets ondansetron per day. Was on hydrocodone/APAP previously, which was unable to control pain, but did not have nausea.      Prior to Admission medications    Medication Sig Last Dose Taking? Auth Provider   ondansetron (ZOFRAN ODT) 4 MG ODT tab Take 1 tablet (4 mg) by mouth every 6 hours as needed for nausea  Patient taking differently: Take 4 mg by mouth daily May take 1 tablet extra as needed. 7/16/2018 Yes Marya Hendrix, DO   oxyCODONE-acetaminophen (PERCOCET) 5-325 MG per tablet Take 1 tablet by mouth 5 times daily Max 6 tablets per day 7/16/2018 at Unknown time Yes Unknown, Entered By History         Medication history completed by: Denny Lopez (PD4 Student)

## 2018-07-18 NOTE — PROGRESS NOTES
CLINICAL NUTRITION SERVICES - ASSESSMENT NOTE     Nutrition Prescription    RECOMMENDATIONS FOR MDs/PROVIDERS TO ORDER:  Diet adv v nutrition support within 2-3 days    Malnutrition Status:    Unable to determine due to pt busy during attempts to visit, but suspect meets criteria given low BMI and severe wt loss over the past month    Recommendations already ordered by Registered Dietitian (RD):  None at this time     Future/Additional Recommendations:  1. Once/if able to advance diet, order supplements b/w meals and consider ordering calorie counts to help determine PO intake adequacy.      2. If unable to adv diet in the next 2-3 days in setting of SBO, recommend begin TPN.  If this becomes plan of care, please consult Pharmacy/Nutrition to start and manage TPN. Would recommend the following:  --Use dosing weight 50 kg  --Begin CPN, goal volume 1560 ml/day (or other per provider) with initial 110g Dex daily (374 kcal, GIR 1.5), 90g AA daily (360 kcal), and 250 ml 20% IV lipids 5 days/week.    --ONLY once pt receives ~100% of initial continuous PN volume with K+/Mg++/Phos WNL, advance PN dex by 45 g every 1-2 days (pending lytes/Glu and Pharm D/RD discretion) to initial goal of 200 g Dex (680 kcal) to increase provisions to 1397 kcals (28 kcal/kg/day), 1.8 g PRO/kg/day, GIR 2.8 with 26% kcals from Fat.    3. If able to advance diet but appetite poor, recommend place FT and start TF.  Please consult Registered Dietitian to Assess and Order TF per Medical Nutrition Therapy Protocol if this becomes plan of care.     REASON FOR ASSESSMENT  Rachel A Gerhardt is a/an 42 year old female assessed by the dietitian for Admission Nutrition Risk Screen for tube feeding or parenteral nutrition    NUTRITION HISTORY  Per chart, PMH includes cervical cancer s/p chemoradiation c/b small bowel strictures and recurrent SBO.  Pt admit yesterday with worsening abdominal pain and nausea/vomiting.    Pt has been on TPN in the past, but  "was discharged from hospital admission 1 month ago after undergoing dilation of ileoileal anastomosis and was subsequently weaned off TPN and diet advanced to mechanical soft at that time.  Pt was educated by RD on 6/20 on ways to increase kcal/protein in her diet and encouraged to drink ONS.    CURRENT NUTRITION ORDERS  Diet: NPO  Intake/Tolerance: NPO x 1 day since admit    LABS  Labs reviewed    MEDICATIONS  Medications reviewed    ANTHROPOMETRICS  Height: 177.8 cm (5' 10\")  Most Recent Weight: 50.1 kg (110 lb 6.4 oz)    IBW: 68.2 kg   BMI: Underweight BMI <18.5  Weight History: 14 lb (11%) wt loss over the past month  Wt Readings from Last 10 Encounters:   07/17/18 50.1 kg (110 lb 6.4 oz)   06/19/18 56.6 kg (124 lb 11.2 oz)   05/23/18 56.9 kg (125 lb 8 oz)   05/09/18 55.3 kg (122 lb)   05/02/18 55.5 kg (122 lb 4.8 oz)   04/30/18 55.4 kg (122 lb 2.2 oz)   04/24/18 55.7 kg (122 lb 12.7 oz)   03/29/18 60.3 kg (132 lb 14.4 oz)   02/13/18 64.9 kg (143 lb)   02/04/18 67.1 kg (148 lb)      Dosing Weight: 50 kg (actual)    ASSESSED NUTRITION NEEDS  Estimated Energy Needs: 6390-7936 kcals/day (35 - 40 kcals/kg for PO/EN); 9592-7640 (25 - 30 kcal/kg for PN)  Justification: Repletion and Underweight; aim lower end if requires PN to help prevent hepatic dysfunction  Estimated Protein Needs:  grams protein/day (1.5 - 2 grams of pro/kg)  Justification: Hypercatabolism with acute illness and Repletion  Estimated Fluid Needs: 5856-3255 mL/day (30 - 35 mL/kg)   Justification: Maintenance, or other per provider pending fluid status    PHYSICAL FINDINGS  See malnutrition section below.     MALNUTRITION  % Intake: Unable to assess  % Weight Loss: > 5% in 1 month (severe)  Subcutaneous Fat Loss: Unable to assess  Muscle Loss: Unable to assess  Fluid Accumulation/Edema: None noted per chart review  Malnutrition Diagnosis: Unable to determine due to pt busy during attempts to visit, but suspect meets criteria given low BMI and " severe wt loss over the past month    NUTRITION DIAGNOSIS  Inadequate protein-energy intake related NPO, and poor appetite PTA, and/or hypermetabolism as evidenced by NPO x 1 day since admit, reports of poor PO intake x 3 days PTA, documented 11% wt loss over the past month with BMI 15.84 kg/m^2    INTERVENTIONS  Implementation  Nutrition Education: Unable to complete due to pt busy during attempts to visit     Goals  Diet adv v nutrition support within 2-3 days.     Monitoring/Evaluation  Progress toward goals will be monitored and evaluated per protocol.     Mylene Duvall RD, LD  Pager: [phone]

## 2018-07-18 NOTE — CONSULTS
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  S/p dilation of that stricture 1 month ago who presents with partial SBO.  Had symptomatic improvement for 2 weeks after dilation but has had progressive increase in pain, nausea, and vomiting over the last 2 weeks.  CT shows similar picture to last bowel obstruction.  Still passing flatus and has had 11x loose BM today.  Symptomatically improving today.  Due to recurrent nature of her SBO and improvement with last dilation would like to attempt another dilation this hospital stay.     RECOMMENDATIONS:  - Dr. Vigil can do endoscopic dilatation Friday with appropriate prep (6-8 L go lytely and transparent anal output)  - Needs NGT prep to start ASAP.  If prep not adequate, will need to do next week.  - Minimize narcotics  - correct electrolyte disturbances  - Continue current nausea regimen  - Serial abdominal exams      Gastroenterology team will continue to follow with you. Thank you for involving us in this patient's care. Please do not hesitate to contact the GI service with any questions or concerns.   Patient care plan has been discussed with Dr. Woodard, GI staff physician.    Julio Rosado, MS4    I was present with the medical student who participated in the service and in the documentation of the note. I have verified the history and personally performed the physical exam and medical decision making. I agree with the assessment and plan of care as documented in the note.    Denny Menjivar MD  Gastroenterology Fellow  [phone]    -------------------------------------------------------------------------------------------------------------------          Chief Complaint:   We were asked by Luiz Quiroga PA-C of Internal  Medicine to evaluate this patient with recurrent SBO s/p ileoileal anastomosis s/p dilation 6/18/18  History is obtained from the patient and the medical record.          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  S/p dilation of that stricture 1 month ago who presents with partial SBO.     Cervical cancer diagnosed August 2015.  Chemoradiation finishing February 2016.  Noted first SBO signs Sept 2016.  Lap resection of 60 cm small bowel 5/18/17 c/b persistent SBO due to anastomotic stricture.  Next SBO Sept 2017.  Dilation of stricture 6/17/18.  Now presenting with 2 weeks progressive symptoms of SBO.    Since discharge 1 month ago, she reports improved symptoms for 2 weeks.  She was able to tolerate soft diet without nausea or vomiting during this time.  She then notes increased pain after a large meal.  Over the next 2 weeks PTA patient reports a slow progression of abdominal pain as well as intermittent nausea and vomiting.  She was still tolerating her diet ok until 5 days ago when pain, nausea, and vomiting worsened.  Pain is crampy, comes in waves (RLQ radiating to LLQ), 10/10 and associated with nausea and bilious vomit, up to 12x day.  Nausea is worse post-prandial and worsens throughout the day.  She last vomited 7/16. She reports she is still passing flatus and having loose stools.  She reports she has had loose stools since her cancer treatment, usually once a day but sometimes up to 5x daily, non-bloody, and bluish-green in color (which she thinks may be due to her Vicodin).  She does note tenesmus.  Appetite is decreased due to pain and she last ate 7/13. However, she is noting improved appetite now.    Patient states she wants to get off percocet and restart Vicodin for pain management.  States she does not do well with percocet and thinks it is contributing to her  abdominal symptoms.  Was on Vicodin previously (switched to percocet 2 months ago) and has increased nausea and abdominal symptoms since.  Would like to decrease her opioid intake if possible.            Past Medical History:   Reviewed and edited as appropriate  Past Medical History:   Diagnosis Date     Asthma      Cancer (H)     Per patient OBGYN, cerivical cancer     Cervical cancer (H)      Other chronic pain      Ovarian cancer (H)      Substance abuse     Outside records indicate past history of narcotics abuse or dependence, but patient denies.            Past Surgical History:   Reviewed and edited as appropriate   Past Surgical History:   Procedure Laterality Date     COLONOSCOPY N/A 5/3/2018    Procedure: COLONOSCOPY;  sigmoidoscopy;  Surgeon: Omero Vigil MD;  Location: [facility] OR     COLONOSCOPY WITH CO2 INSUFFLATION N/A 4/30/2018    Procedure: COLONOSCOPY WITH CO2 INSUFFLATION;  Colonoscopy;  Surgeon: Omero Vigil MD;  Location: [facility]     COMBINED CYSTOSCOPY, INSERT STENT URETER(S) Bilateral 5/18/2017    Procedure: COMBINED CYSTOSCOPY, INSERT STENT URETER(S);  Cystoscopy with Bilateral Stent,;  Surgeon: Rene Calero MD;  Location: [facility] OR     ENT SURGERY  2009    mastoid, sinus     ENTEROSCOPY SMALL BOWEL N/A 6/18/2018    Procedure: ENTEROSCOPY SMALL BOWEL;  Lower bowel Retrograde Enteroscopy with Balloon Dilation .;  Surgeon: Omero Vigil MD;  Location: [facility]     EXAM UNDER ANESTHESIA, INSERT ALEX SLEEVE, UTERINE PLACEMENT OF TANDEM AND RING FOR RAD, ULTRASOUND N/A 12/14/2015    Procedure: EXAM UNDER ANESTHESIA, INSERT ALEX SLEEVE, UTERINE PLACEMENT OF TANDEM AND RING FOR RADIATION, ULTRASOUND GUIDED;  Surgeon: Abby Tony MD;  Location: [facility] OR     INSERT TANDEM AND CESIUM APPLICATOR CERVIX, ULTRASOUND GUIDED N/A 12/17/2015    Procedure: INSERT TANDEM AND CESIUM APPLICATOR CERVIX, ULTRASOUND GUIDED;  Surgeon: Kika Wood MD;  Location: [facility] OR     KNEE  SURGERY       LAPAROTOMY EXPLORATORY N/A 5/18/2017    Procedure: LAPAROTOMY EXPLORATORY;   Exploratry Laparotomy, Small Bowel Resection with anastomosis, Flexible Sigmoidoscopy;  Surgeon: Jennifer Goodwin MD;  Location: [facility]     PICC INSERTION Right 04/29/2017    4fr SL BioFlo PICC, 37cm (3cm external) in the R basilic vein w/ tip in the mid SVC.     PICC INSERTION Right 03/29/2018    4Fr - 40cm (4cm external), R lateral brachial vein, Low SVC     RESECT SMALL BOWEL WITHOUT OSTOMY N/A 5/18/2017    Procedure: RESECT SMALL BOWEL WITHOUT OSTOMY;;  Surgeon: Jennifer Goodwin MD;  Location: [facility]     SIGMOIDOSCOPY FLEXIBLE N/A 5/18/2017    Procedure: SIGMOIDOSCOPY FLEXIBLE;;  Surgeon: Jennifer Goodwin MD;  Location: [facility]            Previous Endoscopy:     Results for orders placed or performed during the hospital encounter of 04/30/18   COLONOSCOPY   Result Value Ref Range    COLONOSCOPY       [address], [place] [id number] [phone]     Endoscopy Department  _______________________________________________________________________________  Patient Name: Rachel Gerhardt         Procedure Date: 4/30/2018 3:50 PM  MRN: [id number]                       Account Number: [id number]  YOB: 1975             Admit Type: Outpatient  Age: 42                               Room: OR  Gender: Female                        Note Status: Finalized  Attending MD: Omero Vigil MD   Total Sedation Time:   _______________________________________________________________________________     Procedure:           Colonoscopy  Indications:         Therapeutic procedure  Providers:           Omero Vigil MD  Patient Profile:     Ms Gerhardt is a 41yo woman with cancer status post                        radiation therapy complicated by small bowel obstruction                        status post small bowel resection. She has evidence of                         anastomotic stenosis  and now proceeds to retrograde                        enteroscopy. Of note she only received instructions for                        two enemas.  Referring MD:        Negro Akins MD, Jennifer Goodwin MD  Medicines:           Deep sedation was administered  Complications:       No immediate complications.  _______________________________________________________________________________  Procedure:           Pre-Anesthesia Assessment:                       - Prior to the procedure, a History and Physical was                        performed, and patient medications and allergies were                        reviewed. The patient is competent. The risks and                        benefits of the procedure and the sedation options and                        risks were discussed with the patient. All questions                        were answered and informed consent was obtained. Patient                        identification and proposed procedure were  verified by                        the nurse in the pre-procedure area. Mental Status                        Examination: alert and oriented. Airway Examination:                        Mallampati Class II (the uvula but not tonsillar pillars                        visualized). Respiratory Examination: clear to                        auscultation. CV Examination: normal. ASA Grade                        Assessment: II - A patient with mild systemic disease.                        After reviewing the risks and benefits, the patient was                        deemed in satisfactory condition to undergo the                        procedure. The anesthesia plan was to use deep sedation                        / analgesia. Immediately prior to administration of                        medications, the patient was re-assessed for adequacy to                        receive sedatives. The heart rate, respiratory rate,                        oxygen saturations, blood pressure, adequacy  of                         pulmonary ventilation, and response to care were                        monitored throughout the procedure. The physical status                        of the patient was re-assessed after the procedure.                        After obtaining informed consent, the colonoscope was                        passed under direct vision. Throughout the procedure,                        the patient's blood pressure, pulse, and oxygen                        saturations were monitored continuously. The enteroscope                        was introduced through the anus and advanced to the                        cecum, identified by appendiceal orifice and ileocecal                        valve. The colonoscopy was performed without difficulty.                        The patient tolerated the procedure well. The quality of                        the bowel preparation was inadequate.                                                                                   Findings:       Digi neal exam demonstrated intact tone with liquid contents. An        eneteroscope and single ballooned overtube were advanced in concert to        the cecum. This was done with care as a moderate amount of liquid and        sold stool were found throughout the colon. However the solid stool        burden could not be cleared from the cecum precluding valve intubation.        The procedure was then aborted.                                                                                   Impression:          - Inadequate bowel preparation to allow visualization of                        the ileocecal valve  Recommendation:      - Standard outpatient deep sedation recovery with                        probable discharge home this afternoon                       - As this is the second event of inadequate bowel                        preparation due to errors in communication with our                        staff; I would  understand if the patient did not wish to                        return  for the procedure, however if she does wish to                        return for repeat attempt at retrograde enteroscopy she                        will require at least a stanard colonoscopy bowel                        preparation (such as 4L Golytely) with continued bowel                        preparation as needed until completly clear                       - The findings and recommendations were discussed with                        the patient and their family                                                                                     electronically signed by CHAITANYA Vigil  _____________________  Omero Vigil MD  4/30/2018 4:43:24 PM  I was physically present for the entire viewing portion of the exam.  __________________________  Signature of teaching physician  Jose/Violetta Vigil MD  Number of Addenda: 0    Note Initiated On: 4/30/2018 3:50 PM  Scope In:  Scope Out:              Social History:   Reviewed and edited as appropriate  Social History     Social History     Marital status:      Spouse name: N/A     Number of children: N/A     Years of education: N/A     Occupational History     Not on file.     Social History Main Topics     Smoking status: Light Tobacco Smoker     Packs/day: 0.25     Years: 12.00     Types: Cigarettes     Smokeless tobacco: Never Used      Comment: pt smoking about 4 cigs daily     Alcohol use No      Comment: None per pt     Drug use: No      Comment: Patient denies.  Outside records indicate possible Opiate abuse.     Sexual activity: Yes     Partners: Male     Birth control/ protection: None     Other Topics Concern     Parent/Sibling W/ Cabg, Mi Or Angioplasty Before 65f 55m? No     Social History Narrative    Lives alone            Family History:   Reviewed and edited as appropriate  No known history of gastrointestinal/liver disease or  gastrointestinal malignancies        Allergies:   Reviewed and edited as appropriate     Allergies   Allergen Reactions     No Clinical Screening - See Comments Other (See Comments) and Diarrhea     headache  Carrots cause gastric upset, cramping and diarrhea.     Sulfa Drugs Hives     hives     Amoxicillin Unknown and Other (See Comments)     vomiting  vomiting     Amoxicillin-Pot Clavulanate Other (See Comments) and Nausea     vomiting     Augmentin GI Disturbance, Nausea and Hives     Avelox [Moxifloxacin] Nausea and Vomiting, Unknown and Nausea     Ciprofloxacin Hives and Nausea     Codeine Nausea and Vomiting and Nausea     Ibuprofen Nausea and Vomiting     Other reaction(s): Nausea And Vomiting     Ibuprofen Sodium Hives and GI Disturbance     Quinolones      Tramadol Hives, Diarrhea, Nausea and Nausea and Vomiting     Daucus Carota      Other reaction(s): GI Upset  Other reaction(s): Abdominal pain, Diarrhea  Carrots cause gastric upset, cramping and diarrhea.            Medications:     Current Facility-Administered Medications   Medication     acetaminophen (TYLENOL) Suppository 325 mg     benzocaine 20% (HURRICAINE/TOPEX) 20 % spray 0.5 mL     HYDROmorphone (PF) (DILAUDID) injection 0.3-0.5 mg     lidocaine (LMX4) cream     lidocaine 1 % 1 mL     magnesium sulfate 4 g in 100 mL sterile water (premade)     metoclopramide (REGLAN) tablet 10 mg    Or     metoclopramide (REGLAN) injection 10 mg     naloxone (NARCAN) injection 0.1-0.4 mg     ondansetron (ZOFRAN-ODT) ODT tab 4 mg    Or     ondansetron (ZOFRAN) injection 4 mg     oxyCODONE-acetaminophen (PERCOCET) 5-325 MG per tablet 1 tablet     potassium chloride 10 mEq in 100 mL intermittent infusion with 10 mg lidocaine     prochlorperazine (COMPAZINE) injection 10 mg    Or     prochlorperazine (COMPAZINE) tablet 10 mg    Or     prochlorperazine (COMPAZINE) Suppository 25 mg     sodium chloride (PF) 0.9% PF flush 3 mL     sodium chloride (PF) 0.9% PF flush 3 mL     sodium chloride 0.9% infusion  "           Review of Systems:   A complete review of systems was performed and is negative except as noted in the HPI           Physical Exam:   /72  Pulse 94  Temp 97.1  F (36.2  C) (Oral)  Resp 16  Ht 1.778 m (5' 10\")  Wt 50.1 kg (110 lb 6.4 oz)  SpO2 97%  BMI 15.84 kg/m2  Wt:   Wt Readings from Last 2 Encounters:   07/17/18 50.1 kg (110 lb 6.4 oz)   06/19/18 56.6 kg (124 lb 11.2 oz)      Constitutional: cooperative, pleasant, not dyspneic/diaphoretic, no acute distress  Eyes: Sclera anicteric/injected  Ears/nose/mouth/throat: Normal oropharynx without ulcers or exudate, mucus membranes moist, hearing intact  Neck: supple, thyroid normal size  CV: RRR. No murmurs rubs or gallops.  No edema  Respiratory: CTAB, Unlabored breathing  Lymph: No axillary, submandibular, supraclavicular or inguinal lymphadenopathy  Abd: Soft, Nondistended, +bs, diffuse mild tender to palpation. Tympanic but no rebound  Skin: warm, perfused, no jaundice  Neuro: AAO x 3, No asterixis  Psych: Normal affect  MSK: No gross deformities         Data:   Labs and imaging below were independently reviewed and interpreted    BMP  Recent Labs  Lab 07/18/18 0757 07/18/18 0310 07/17/18 1736   NA  --  137 140   POTASSIUM 3.4 2.7* 2.3*   CHLORIDE  --  96 98   JONATAN  --  7.1* 6.4*   CO2  --  34* 36*   BUN  --  17 19   CR  --  0.61 0.54   GLC  --  83 78     CBC  Recent Labs  Lab 07/17/18  1736   WBC 5.2   RBC 4.22   HGB 13.4   HCT 39.6   MCV 94   MCH 31.8   MCHC 33.8   RDW 14.3        INR  Recent Labs  Lab 07/17/18  1736   INR 1.18*     LFTs  Recent Labs  Lab 07/18/18 0310 07/17/18 1736   ALKPHOS  --  60   AST  --  12   ALT  --  13   BILITOTAL  --  0.4   PROTTOTAL  --  4.6*   ALBUMIN 2.7* 2.5*      PANC  Recent Labs  Lab 07/17/18  1736   LIPASE 40*       IMAGING:  Lower Device-Assisted Enteroscopy with Fluoroscopy 6/18/2018    Impression:          - Benign stenosis appreciated at the site of likely to                        be the " ileoileal anastomosis status post uncomplicated                        dilation to 18mm   Recommendation:      - Standard inpatient general anesthesia recovery with                        return to the floor when appropriate                        - Diet may resume slowly as tolerated                        - This represents maximal endoscopic therapy: with                        durable effect repeat dilation would be reasonable,                        again only after NG tube bowel preparation; with no                        effect repeat endoscopy is not reasonable and colorectal                        surgery should again be consulted for an opinion                        - The findings and recommendations were discussed with                        the patient and their family

## 2018-07-18 NOTE — CONSULTS
Colorectal Surgery Consult Note    Staff: Dr. 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 Abdominal exam is benign. Patient is hesitant to undergo NG placement. Discussed with patient at length the importance of an NG in management of SBO.   - No surgical intervention at this time  - Serial abdominal exams  - NPO  - NG to LIS  - Minimize narcotics  - Optimize electrolytes K >4.0, Mg >2.0  - IVF resuscitation  - Ambulate  - Admit to medicine  - GI was able to dilate stricture previously, may consider involving them in her care again to consider dilating her anastomosis again as she did experience some relief  - Colorectal Surgery will continue to follow    Discussed with Dr. Yoo, colorectal fellow    Fabby Obregon MD  General Surgery, PGY-3  Pg [phone]    ------------------------------------------  HPI:   Rachel A Gerhardt is a 42 year old female who is being evaluated for recurrent SBO. She has a significant history cervical cancer treated with chemo/rads which has since resulted in recurrent SBOs. Recurrent SBO led to ex lap, SBR of about 60 cm in May 2017 with Dr. Goodwin. This was complicated by persistent SBO due to anastomotic stricture. Most recently in June 2018, GI was able to dilate her ileoileal anastomosis. Patient reports she had a few weeks where she was feeling okay and eating fairly well following the dilation. She gradually developed more abdominal pain and distension which led to her ED visit today. She has been experiencing significant nausea and vomiting. Patient reports she has not been able to keep anything down for the past 3 days. Her last BM was this morning and she reports she is passing flatus. Denies hematochezia and  hematemesis. No fevers, chills, CP, SOB, or dysuria.  ?  Review of Systems:  ROS: 10 point ROS neg other than the symptoms noted above in the HPI.    PMH:  Past Medical History:   Diagnosis Date     Asthma      Cancer (H)     Per patient OBGYN, cerivical cancer     Cervical cancer (H)      Other chronic pain      Ovarian cancer (H)      Substance abuse     Outside records indicate past history of narcotics abuse or dependence, but patient denies.        PSHx:  Past Surgical History:   Procedure Laterality Date     COLONOSCOPY N/A 5/3/2018    Procedure: COLONOSCOPY;  sigmoidoscopy;  Surgeon: Omero Vigil MD;  Location: [facility]     COLONOSCOPY WITH CO2 INSUFFLATION N/A 4/30/2018    Procedure: COLONOSCOPY WITH CO2 INSUFFLATION;  Colonoscopy;  Surgeon: Omero Vigil MD;  Location: [facility]     COMBINED CYSTOSCOPY, INSERT STENT URETER(S) Bilateral 5/18/2017    Procedure: COMBINED CYSTOSCOPY, INSERT STENT URETER(S);  Cystoscopy with Bilateral Stent,;  Surgeon: Rene Calero MD;  Location: [facility]     ENT SURGERY  2009    mastoid, sinus     ENTEROSCOPY SMALL BOWEL N/A 6/18/2018    Procedure: ENTEROSCOPY SMALL BOWEL;  Lower bowel Retrograde Enteroscopy with Balloon Dilation .;  Surgeon: Omero Vigil MD;  [facility]     EXAM UNDER ANESTHESIA, INSERT ALEX SLEEVE, UTERINE PLACEMENT OF TANDEM AND RING FOR RAD, ULTRASOUND N/A 12/14/2015    Procedure: EXAM UNDER ANESTHESIA, INSERT ALEX SLEEVE, UTERINE PLACEMENT OF TANDEM AND RING FOR RADIATION, ULTRASOUND GUIDED;  Surgeon: Abby Tony MD;  Location: [facility]     INSERT TANDEM AND CESIUM APPLICATOR CERVIX, ULTRASOUND GUIDED N/A 12/17/2015    Procedure: INSERT TANDEM AND CESIUM APPLICATOR CERVIX, ULTRASOUND GUIDED;  Surgeon: Kika Wood MD;  Location: [facility]     KNEE SURGERY       LAPAROTOMY EXPLORATORY N/A 5/18/2017    Procedure: LAPAROTOMY EXPLORATORY;   Exploratry Laparotomy, Small Bowel Resection with anastomosis, Flexible  "Sigmoidoscopy;  Surgeon: Jennifer Goodwin MD;  Location: [facility]     PICC INSERTION Right 04/29/2017    4fr SL BioFlo PICC, 37cm (3cm external) in the R basilic vein w/ tip in the mid SVC.     PICC INSERTION Right 03/29/2018    4Fr - 40cm (4cm external), R lateral brachial vein, Low SVC     RESECT SMALL BOWEL WITHOUT OSTOMY N/A 5/18/2017    Procedure: RESECT SMALL BOWEL WITHOUT OSTOMY;;  Surgeon: Jennifer Goodwin MD;  [facility]: [facility]     SIGMOIDOSCOPY FLEXIBLE N/A 5/18/2017    Procedure: SIGMOIDOSCOPY FLEXIBLE;;  Surgeon: Jennifer Goodwin MD;  Location: [facility]        Medications:    No current facility-administered medications on file prior to encounter.   Current Outpatient Prescriptions on File Prior to Encounter:  ondansetron (ZOFRAN ODT) 4 MG ODT tab Take 1 tablet (4 mg) by mouth every 6 hours as needed for nausea   oxyCODONE-acetaminophen (PERCOCET) 5-325 MG per tablet Take 1 tablet by mouth every 4 hours as needed for pain Max 6 tablets per day       Allergies:   No clinical screening - see comments; Sulfa drugs; Amoxicillin; Amoxicillin-pot clavulanate; Augmentin; Avelox [moxifloxacin]; Ciprofloxacin; Codeine; Ibuprofen; Ibuprofen sodium; Quinolones; Tramadol; and Daucus carota     SocHx:  Current smoker, lives in [place]    FamHx:  Negative for bleeding disorders, clotting disorders, or problems with anesthesia    Physical Examination   BP 94/79  Pulse 94  Temp 98.6  F (37  C) (Oral)  Resp 8  Ht 1.778 m (5' 10\")  Wt 49.9 kg (110 lb)  SpO2 99%  BMI 15.78 kg/m2  General: Awake and alert. Anxious appearing  Pulm: Non-labored breathing  CV: RRR  Abdomen: soft, TTP in mid abdomen, distended, tympanic to percussion, no peritoneal signs, no umbilical or inguinal hernias  Musculoskel/Extremities: no edema, erythema, tenderness   Skin: no rashes, no diaphoresis and skin color normal     Labs: Reviewed   WBC 5.2  Hgb 13.4  Plt 250  Na 140  K 2.3  Cr 0.54  INR 1.18  Mg 1.5  Albumin 2.5    Imaging: " Reviewed  IMPRESSION:   Small bowel obstruction similar to 6/17/2018 with a distinct  transition point in the central abdomen near the small bowel  anastomosis. No pneumatosis, portal venous gas, or intra-abdominal  free air.

## 2018-07-18 NOTE — PROGRESS NOTES
"Anthony maldonado paged as pt continue to refuse NG tube placement despite IV ativan given.  Pt requesting IV dilaudid due to abdominal pain and pt cannot sit up due to pain.  Order for dilaudid received, went to discuss with pt who agreed to NG once dilaudid given.  Pt then found to be walking in the halls prior to dilaudid, and stated she isn't willing to take the NG at this time stating \"this isn't my first rodeo, it isn't going to help\".  Anthony maldonado notified of pt continued refusal to get NG, per MD, okay to hold off on NG for now. Bedside nurse Vanessa MCWILLIAMS RN updated, will continue to monitor.  "

## 2018-07-18 NOTE — PLAN OF CARE
Problem: Patient Care Overview  Goal: Plan of Care/Patient Progress Review  Outcome: Improving  Pt A&O, VSS on RA. Unable to place NG tube overnight d/t pt anxiety, team aware, will hold off for now since pt is stooling and nausea unchanged. GI to consult for plan. Prn zofran x1 with percocet, as well as prn dilaudid x1 for abd pain. Up walking frequently. NPO, /hr in PIV. Potassium recheck after replacement 3.4. UA needed, pt aware. Pt adamant that she is leaving before Friday for a .

## 2018-07-19 ENCOUNTER — CARE COORDINATION (OUTPATIENT)
Dept: CARE COORDINATION | Facility: CLINIC | Age: 43
End: 2018-07-19

## 2018-07-19 NOTE — PROGRESS NOTES
"Pt gone from unit for 1.5 hours, overhead paged pt @ 2030. Pt's cell phone called x2 with no answer (cell phone rang but was not picked up). MD team notified. After overhead page, pt's IV pole was found in 5A hallway nearest to entrance with the tubing disconnected without a cap (pt likely disconnected herself from her IV). Staff did not remove patient's IV, as pt did not have discharge orders.    Pt's  called, per pt's , pt had called him to say \"she's ready to be released\" and that she is currently at her home now (pt and  do not live together). MD team paged re: update.   "

## 2018-07-19 NOTE — PLAN OF CARE
Problem: Patient Care Overview  Goal: Plan of Care/Patient Progress Review  Outcome: No Change  Writer called [facility] [employer] for welfare check on patient.  Per police report, patient was picked up by her  and dropped off at her Mother's home in [facility].

## 2018-07-19 NOTE — DISCHARGE SUMMARY
[facility], [place]    Internal Medicine Discharge Summary- Gold Service    Date of Admission:  7/17/2018  Date of Discharge:  No discharge date for patient encounter.  Discharging Provider: LES Farias  Discharge Team: Gold 9    Discharge Diagnoses   Recurrent SBO  Acute on chronic abd pain  Hx of Ileoilieal anastomotic stricture  Hypokalemia  Chronic protein calorie malnutrition    Follow-ups Needed After Discharge   Follow up with GI for consideration of repeat dilation of ileoileal anastomotic stricture    Hospital Course   Rachel A Gerhardt is a 42 year old female with h/o of cervical cancer s/p chemoradiation complicated by small bowel strictures that have led to recurrent SBO, now s/p lap 5/18/17 with resection of 60 cm of small bowel and resultant anastomotic strictures. Anastomotic stricture led to partial SBO managed with endoscopic dilatation- last done during the most recent admission 6/17-6/20 2018, now readmitted with partial SBO after presenting to abd pain with abd pain, N/V. Started spontaneously passing stool 07/18 with improvement of obstruction.  Patient left from unit 5A this evening without notifying or discussing with staff. The following problems were addressed during her hospitalization:    Recurrent SBO  Acute on chronic abd pain  Hx of Ileoilieal anastomotic stricture: Evaluated by colorectal surgery in ED and no surgical intervention indicated but to place NGT and start bowel rest via NPO; however, pt declined NGT as she gets panic attacks with past insertions. Into this am, pt states she had large soft BM and nausea and pain controlled on prn antiemetics and pain meds, respectively. Abd exam benign. Pt has appetite. Pt up OOB many times today to go outside to smoke. Of note, pt client at [facility] pain clinic and prescribed Percocet 5/325 mg, 1 tab q4hrs prn NTE 5 tabs daily. Started spontaneously passing numerous stools 07/18. Initial plan  for repeat dilation Friday, but patient left from unit 5A this evening without notifying or discussing with staff. See RN notes for further details.       Hypokalemia: Most likely 2/2 recent vomiting and decreased PO intake. Should see PCP.     Chronic protein calorie malnutrition: Pt was on TPN in the past but weaned off during recent hospitalization last month.     # Discharge Pain Plan:   - During her hospitalization, Jenni experienced pain due to chronic abdominal pain.  She is unable to participate in a plan for discharge pain management as she  left from unit 5A this evening without notifying or discussing with staff    Consultations This Hospital Stay   GI LUMINAL ADULT IP CONSULT  MEDICATION HISTORY IP PHARMACY CONSULT     Code Status   Full Code    Time Spent on this Encounter   I, Riya Zapien, discharged this patient today but I did not personally see the patient today and will not be billing for the patient's discharge.       LES Farias  Internal Medicine Staff Hospitalist Service  [facility]  Pager: [phone]  ______________________________________________________________________    Physical Exam   Vital Signs: Temp: 97.1  F (36.2  C) Temp src: Oral BP: 101/72   Heart Rate: 81 Resp: 16 SpO2: 97 % O2 Device: None (Room air)    Weight: 110 lbs 6.4 oz    Did not examine as patient left from unit 5A this evening without notifying or discussing with staff. See progress note from this date for PE.    Significant Results and Procedures   Most Recent 3 CBC's:  Recent Labs   Lab Test  07/17/18   1736  06/20/18   0540  06/19/18   0613   WBC  5.2  6.6  8.3   HGB  13.4  11.8  11.9   MCV  94  98  101*   PLT  250  308  343     Most Recent 3 BMP's:  Recent Labs   Lab Test  07/18/18   0757  07/18/18   0310  07/17/18   1736  06/20/18   0540   NA   --   137  140  138   POTASSIUM  3.4  2.7*  2.3*  4.4   CHLORIDE   --   96  98  106   CO2   --   34*  36*  25   BUN   --   17  19  15    CR   --   0.61  0.54  0.52   ANIONGAP   --   7  6  8   JONATAN   --   7.1*  6.4*  8.5   GLC   --   83  78  92     Most Recent 2 LFT's:  Recent Labs   Lab Test  07/17/18   1736  06/18/18   0536   AST  12  14   ALT  13  30   ALKPHOS  60  104   BILITOTAL  0.4  0.3     Most Recent 3 INR's:  Recent Labs   Lab Test  07/17/18   1736  06/18/18   0536  05/21/18   0641   INR  1.18*  1.07  1.03   ,   Results for orders placed or performed during the hospital encounter of 07/17/18   CT Abdomen Pelvis w Contrast    Narrative    EXAMINATION: CT ABDOMEN PELVIS W CONTRAST, 7/17/2018 7:47 PM    TECHNIQUE:  Helical CT images from the lung bases through the  symphysis pubis were obtained with IV contrast. Contrast dose:  iopamidol (ISOVUE-370) solution 68 mL    COMPARISON: 6/17/2018, 5/11/2018, 3/28/2018, 5/12/2017, 9/24/2015    HISTORY: Abdominal pain    FINDINGS:    Abdomen and pelvis:   Substantially distended small bowel loops measuring up to 4.9 cm in  diameter. Distinct transition point in the central lower abdomen. The  site of a small bowel resection and anastomosis (series 3, image 43).  No pneumatosis, portal venous gas, or intra-abdominal free air. The  distal small bowel and colon is decompressed.    The liver, gallbladder, spleen, adrenal glands, and kidneys appear  normal. Mild prominence of the pancreatic duct without visualized  obstructing stone or mass. The common bile duct is at the upper limits  of normal measuring 6 mm in diameter. The uterus appears grossly  normal.    No intra-abdominal free air. Trace pelvic free fluid. The appendix is  not well visualized. Normal caliber abdominal aorta. The major  abdominal vasculature is patent. The distended loops of bowel with  mild mass effect on the superior mesenteric vein. No lymphadenopathy  in the abdomen or pelvis by size criteria, however there are a few  prominent mesenteric nodes.     Lung bases:   The heart is not enlarged. No pericardial effusion.  Calcified  granuloma in the left lower lobe. The lung bases are clear.    Bones and soft tissues:  No acute or worrisome osseous lesions.        Impression    IMPRESSION:   Small bowel obstruction similar to 6/17/2018 with a distinct  transition point in the central abdomen near the small bowel  anastomosis. No pneumatosis, portal venous gas, or intra-abdominal  free air.      [Result: Small bowel obstruction]    Finding was identified on 7/17/2018 7:50 PM.     Dr. Brown was contacted by Dr. Whaley at 7/17/2018 8:02 PM and  verbalized understanding of the urgent finding.     I have personally reviewed the examination and initial interpretation  and I agree with the findings.    CARLITO BURCH MD       Pending Results   These results will be followed up by NA  Unresulted Labs Ordered in the Past 30 Days of this Admission     No orders found for last 61 day(s).             Primary Care Physician   Reji Avery    Discharge Disposition   Discharged to home  LEFT AMA  Condition at discharge: LEFT AMA    Discharge Orders   No discharge procedures on file.  Discharge Medications   Current Discharge Medication List      CONTINUE these medications which have NOT CHANGED    Details   ondansetron (ZOFRAN ODT) 4 MG ODT tab Take 1 tablet (4 mg) by mouth every 6 hours as needed for nausea  Qty: 20 tablet, Refills: 0    Associated Diagnoses: Small bowel obstruction      oxyCODONE-acetaminophen (PERCOCET) 5-325 MG per tablet Take 1 tablet by mouth 5 times daily Max 6 tablets per day           Allergies   Allergies   Allergen Reactions     No Clinical Screening - See Comments Other (See Comments) and Diarrhea     headache  Carrots cause gastric upset, cramping and diarrhea.     Sulfa Drugs Hives     hives     Amoxicillin Unknown and Other (See Comments)     vomiting  vomiting     Amoxicillin-Pot Clavulanate Other (See Comments) and Nausea     vomiting     Augmentin GI Disturbance, Nausea and Hives     Avelox [Moxifloxacin]  Nausea and Vomiting, Unknown and Nausea     Ciprofloxacin Hives and Nausea     Codeine Nausea and Vomiting and Nausea     Ibuprofen Nausea and Vomiting     Other reaction(s): Nausea And Vomiting     Ibuprofen Sodium Hives and GI Disturbance     Quinolones      Tramadol Hives, Diarrhea, Nausea and Nausea and Vomiting     Daucus Carota      Other reaction(s): GI Upset  Other reaction(s): Abdominal pain, Diarrhea  Carrots cause gastric upset, cramping and diarrhea.

## 2018-09-10 ENCOUNTER — TELEPHONE (OUTPATIENT)
Dept: SURGERY | Facility: CLINIC | Age: 43
End: 2018-09-10

## 2018-09-10 NOTE — TELEPHONE ENCOUNTER
[facility] Call Center    Phone Message    May a detailed message be left on voicemail: yes    Reason for Call: Symptoms or Concerns     If patient has red-flag symptoms, warm transfer to triage line    Current symptom or concern: Swelling of left foot up to mid calf, can't even put on shoe     Symptoms have been present for:  1 day(s)    Has patient previously been seen for this? No    By : Madoff    Date: N/A - Intestines stretched 2 months ago, she stated that there were many foots checks during hospital stay.     Are there any new or worsening symptoms? Yes:       Action Taken: Message routed to:  [facility] ([facility] Colon Rectal

## 2018-09-11 NOTE — TELEPHONE ENCOUNTER
"[facility]: Nurse Triage Note  SITUATION/BACKGROUND                                                    Writer was asked to call patient due to red flag symptom: LEFT CALF SWELLING  Rachel A Gerhardt is a 42 year old female who calls with swelling in LEFT foot and calf.  This has been going on x 1 week, and then worsened yesterday.  \"you wouldn't believe how puffy it is- from midcalf through foot\"  Slept with LLE elevated, this improved swelling  and the swelling has returned.  Swelling is from mid-calf downward.  Looks paler, no dulce maria/capillary refill on the LEFT toes now. Right foot is not swollen and does have 1-3 second capillary refill.    Takes painkillers for intestines- Percocet 5 x daily per history, and Zofran are only listed meds.She feels this may mask any calf pain.    PMH: history of cervical cancer s/p chemo-radiation therapy complicated by recurrent SBO s/p ex lap with 60 cm of small bowel resected (2017) complicated by anastomotic stricture in pelvis causing persistent partial small bowel obstructions, TPN-dependent malnutrition, and opioid dependence   Weight 93 pounds.Often has N/V  MEDICATIONS:   Taking medication(s) as prescribed? Yes  Taking over the counter medication(s?) No  Any barriers to taking medication(s) as prescribed?  No  Medication(s) improving/managing symptoms?  No    Allergies:   Allergies   Allergen Reactions     No Clinical Screening - See Comments Other (See Comments) and Diarrhea     headache  Carrots cause gastric upset, cramping and diarrhea.     Sulfa Drugs Hives     hives     Amoxicillin Unknown and Other (See Comments)     vomiting  vomiting     Amoxicillin-Pot Clavulanate Other (See Comments) and Nausea     vomiting     Augmentin GI Disturbance, Nausea and Hives     Avelox [Moxifloxacin] Nausea and Vomiting, Unknown and Nausea     Ciprofloxacin Hives and Nausea     Codeine Nausea and Vomiting and Nausea     Ibuprofen Nausea and Vomiting     Other " reaction(s): Nausea And Vomiting     Ibuprofen Sodium Hives and GI Disturbance     Quinolones      Tramadol Hives, Diarrhea, Nausea and Nausea and Vomiting     Daucus Carota      Other reaction(s): GI Upset  Other reaction(s): Abdominal pain, Diarrhea  Carrots cause gastric upset, cramping and diarrhea.       ASSESSMENT      LEFT calf to foot swelling and decreased capillary refill, Level of pain hard to assess as takes Percoset for intestinal pain. States its more siore than the right foot.  To ED for assessment /of potential DVT    RECOMMENDATION/PLAN                                                      RECOMMENDED DISPOSITION:  To ED now for evaluation of potential DVT LEFT calf.  .Will comply with recommendation: Yes    If further questions/concerns or if symptoms do not improve, worsen or new symptoms develop, call your PCP or [phone] to talk with the Resident on call, as soon as possible.    Guideline used: pp 458 postoperative problems.  Telephone Triage Protocols for Nurses, Fifth Edition, Minal Cabrera RN

## 2018-09-14 ENCOUNTER — TELEPHONE (OUTPATIENT)
Dept: ONCOLOGY | Facility: CLINIC | Age: 43
End: 2018-09-14

## 2018-09-14 NOTE — TELEPHONE ENCOUNTER
Tobacco Treatment Team at the [facility] attempted to reach Ms. Gerhardt on 9/14/2018 regarding the tobacco cessation program to help Ms. Gerhardt to quit smoking. We will attempt to reach Ms. Gerhardt another time.

## 2018-09-19 NOTE — TELEPHONE ENCOUNTER
Tobacco Treatment Team at the [facility] attempted to reach Ms. Gerhardt on 9/19/2018 regarding the tobacco cessation program to help Ms. Gerhardt to quit smoking. We will attempt to reach Ms. Gerhardt another time.

## 2018-09-26 NOTE — TELEPHONE ENCOUNTER
Tobacco Treatment Team at the [facility] attempted to reach Ms. Gerhardt on 9/26/2018 regarding the tobacco cessation program to help Ms. Gerhardt to quit smoking. We will attempt to reach Ms. Gerhardt another time.

## 2018-11-01 ENCOUNTER — HOSPITAL ENCOUNTER (INPATIENT)
Facility: CLINIC | Age: 43
LOS: 12 days | Discharge: HOME HEALTH | DRG: 393 | End: 2018-11-14
Attending: EMERGENCY MEDICINE | Admitting: FAMILY MEDICINE
Payer: COMMERCIAL

## 2018-11-01 ENCOUNTER — APPOINTMENT (OUTPATIENT)
Dept: LAB | Facility: CLINIC | Age: 43
End: 2018-11-01
Attending: COLON & RECTAL SURGERY
Payer: COMMERCIAL

## 2018-11-01 ENCOUNTER — APPOINTMENT (OUTPATIENT)
Dept: GENERAL RADIOLOGY | Facility: CLINIC | Age: 43
DRG: 393 | End: 2018-11-01
Attending: EMERGENCY MEDICINE
Payer: COMMERCIAL

## 2018-11-01 DIAGNOSIS — K56.609 SBO (SMALL BOWEL OBSTRUCTION) (H): ICD-10-CM

## 2018-11-01 LAB
ALBUMIN SERPL-MCNC: 2.2 G/DL (ref 3.4–5)
ALP SERPL-CCNC: 94 U/L (ref 40–150)
ALT SERPL W P-5'-P-CCNC: 25 U/L (ref 0–50)
ANION GAP SERPL CALCULATED.3IONS-SCNC: 6 MMOL/L (ref 3–14)
AST SERPL W P-5'-P-CCNC: 23 U/L (ref 0–45)
BASOPHILS # BLD AUTO: 0 10E9/L (ref 0–0.2)
BASOPHILS NFR BLD AUTO: 0.4 %
BILIRUB SERPL-MCNC: 0.2 MG/DL (ref 0.2–1.3)
BUN SERPL-MCNC: 12 MG/DL (ref 7–30)
CALCIUM SERPL-MCNC: 7.4 MG/DL (ref 8.5–10.1)
CHLORIDE SERPL-SCNC: 108 MMOL/L (ref 94–109)
CO2 SERPL-SCNC: 32 MMOL/L (ref 20–32)
CREAT SERPL-MCNC: 0.6 MG/DL (ref 0.52–1.04)
DIFFERENTIAL METHOD BLD: ABNORMAL
EOSINOPHIL # BLD AUTO: 0.1 10E9/L (ref 0–0.7)
EOSINOPHIL NFR BLD AUTO: 1.7 %
ERYTHROCYTE [DISTWIDTH] IN BLOOD BY AUTOMATED COUNT: 12.7 % (ref 10–15)
GFR SERPL CREATININE-BSD FRML MDRD: >90 ML/MIN/1.7M2
GLUCOSE SERPL-MCNC: 95 MG/DL (ref 70–99)
HCT VFR BLD AUTO: 40.8 % (ref 35–47)
HGB BLD-MCNC: 13 G/DL (ref 11.7–15.7)
IMM GRANULOCYTES # BLD: 0 10E9/L (ref 0–0.4)
IMM GRANULOCYTES NFR BLD: 0.3 %
LYMPHOCYTES # BLD AUTO: 2.2 10E9/L (ref 0.8–5.3)
LYMPHOCYTES NFR BLD AUTO: 31.2 %
MAGNESIUM SERPL-MCNC: 1.1 MG/DL (ref 1.6–2.3)
MCH RBC QN AUTO: 32.3 PG (ref 26.5–33)
MCHC RBC AUTO-ENTMCNC: 31.9 G/DL (ref 31.5–36.5)
MCV RBC AUTO: 102 FL (ref 78–100)
MONOCYTES # BLD AUTO: 0.4 10E9/L (ref 0–1.3)
MONOCYTES NFR BLD AUTO: 5.9 %
NEUTROPHILS # BLD AUTO: 4.3 10E9/L (ref 1.6–8.3)
NEUTROPHILS NFR BLD AUTO: 60.5 %
NRBC # BLD AUTO: 0 10*3/UL
NRBC BLD AUTO-RTO: 0 /100
PLATELET # BLD AUTO: 265 10E9/L (ref 150–450)
POTASSIUM SERPL-SCNC: 3.2 MMOL/L (ref 3.4–5.3)
PROT SERPL-MCNC: 5 G/DL (ref 6.8–8.8)
RBC # BLD AUTO: 4.02 10E12/L (ref 3.8–5.2)
SODIUM SERPL-SCNC: 146 MMOL/L (ref 133–144)
WBC # BLD AUTO: 7.1 10E9/L (ref 4–11)

## 2018-11-01 PROCEDURE — 99285 EMERGENCY DEPT VISIT HI MDM: CPT | Mod: Z6 | Performed by: EMERGENCY MEDICINE

## 2018-11-01 PROCEDURE — 74019 RADEX ABDOMEN 2 VIEWS: CPT

## 2018-11-01 PROCEDURE — 80053 COMPREHEN METABOLIC PANEL: CPT | Performed by: EMERGENCY MEDICINE

## 2018-11-01 PROCEDURE — 96375 TX/PRO/DX INJ NEW DRUG ADDON: CPT | Performed by: EMERGENCY MEDICINE

## 2018-11-01 PROCEDURE — 83735 ASSAY OF MAGNESIUM: CPT | Performed by: EMERGENCY MEDICINE

## 2018-11-01 PROCEDURE — 99285 EMERGENCY DEPT VISIT HI MDM: CPT | Mod: 25 | Performed by: EMERGENCY MEDICINE

## 2018-11-01 PROCEDURE — 96361 HYDRATE IV INFUSION ADD-ON: CPT | Performed by: EMERGENCY MEDICINE

## 2018-11-01 PROCEDURE — 85025 COMPLETE CBC W/AUTO DIFF WBC: CPT | Performed by: EMERGENCY MEDICINE

## 2018-11-01 PROCEDURE — 96376 TX/PRO/DX INJ SAME DRUG ADON: CPT | Performed by: EMERGENCY MEDICINE

## 2018-11-01 PROCEDURE — 25000128 H RX IP 250 OP 636: Performed by: EMERGENCY MEDICINE

## 2018-11-01 RX ORDER — HYDROMORPHONE HYDROCHLORIDE 1 MG/ML
1 INJECTION, SOLUTION INTRAMUSCULAR; INTRAVENOUS; SUBCUTANEOUS ONCE
Status: COMPLETED | OUTPATIENT
Start: 2018-11-01 | End: 2018-11-01

## 2018-11-01 RX ORDER — ONDANSETRON 2 MG/ML
4 INJECTION INTRAMUSCULAR; INTRAVENOUS ONCE
Status: COMPLETED | OUTPATIENT
Start: 2018-11-01 | End: 2018-11-01

## 2018-11-01 RX ADMIN — ONDANSETRON 4 MG: 2 INJECTION INTRAMUSCULAR; INTRAVENOUS at 20:15

## 2018-11-01 RX ADMIN — Medication 1 MG: at 22:09

## 2018-11-01 RX ADMIN — SODIUM CHLORIDE 1000 ML: 9 INJECTION, SOLUTION INTRAVENOUS at 22:12

## 2018-11-01 RX ADMIN — SODIUM CHLORIDE 1000 ML: 9 INJECTION, SOLUTION INTRAVENOUS at 20:15

## 2018-11-01 RX ADMIN — Medication 1 MG: at 20:17

## 2018-11-01 ASSESSMENT — ENCOUNTER SYMPTOMS
WEAKNESS: 1
FEVER: 0
DIARRHEA: 1
NAUSEA: 1
ABDOMINAL PAIN: 1
VOMITING: 1

## 2018-11-01 NOTE — LETTER
Transition Communication Hand-off for Care Transitions to Next Level of Care Provider    Name: Rachel A Gerhardt  : 1975  MRN #: [id number]  Primary Care Provider: Reji Avery     Primary Clinic: [facility] [address] [place] [address]     Reason for Hospitalization:  SBO (small bowel obstruction) (H) [K56.609]  Admit Date/Time: 2018  7:29 PM  Discharge Date: On or around 2018.  Discharge Plan: Discharge to home with TPN and then return for surgery in approx. 2 weeks  Discharge Needs Assessment:  Needs       Most Recent Value    Home Infusion Provider [facility] [phone], Fax: [phone]          Referrals     Future Labs/Procedures    Home infusion referral     Comments:    Please fax discharge orders to [facility]    Ph:  [phone]    Fax: [phone]    Skilled home care RN for initial home safety evaluation and to assist with MD team discharge plans of care as designated in discharge orders.    Skilled home care RN to evaluate medication management, nutrition and hydration evaluation, endurance evaluation, and general status evaluation after discharge from the acute care hospital setting.    Skilled home care RN to assist with management and education reinforcement with home TPN per [facility].  [facility] cares home care agency routine.    Skilled home care to monitor TPN labs per home care agency routine.            Tracey Barnhart, ALCIRA.S.N., P.H.N.,R.N.         Pager

## 2018-11-01 NOTE — IP AVS SNAPSHOT
"    [address] [facility]: [phone]                                              INTERAGENCY TRANSFER FORM - PHYSICIAN ORDERS   2018                    Hospital Admission Date: 2018  RACHEL A GERHARDT   : 1975  Sex: Female        Attending Provider: Sukhdev Aguilar MD     Allergies:  No Clinical Screening - See Comments, Sulfa Drugs, Amoxicillin, Amoxicillin-pot Clavulanate, Augmentin, Avelox [Moxifloxacin], Ciprofloxacin, Codeine, Ibuprofen, Ibuprofen Sodium, Quinolones, Tramadol, Daucus Carota    Infection:  None   Service:  COLORECTAL S    Ht:  1.753 m (5' 9\")   Wt:  54.9 kg (121 lb 1.6 oz)   Admission Wt:  48.6 kg (107 lb 2.3 oz)    BMI:  17.88 kg/m 2   BSA:  1.63 m 2            Patient PCP Information     Provider PCP Type    Reji Avery MD General      ED Clinical Impression     Diagnosis Description Comment Added By Time Added    SBO (small bowel obstruction) (H) [K56.609] SBO (small bowel obstruction) (H) [K56.609]  Dada Craft MD 2018  9:38 PM      Hospital Problems as of 2018              Priority Class Noted POA    Partial small bowel obstruction (H) Medium  2018 Yes      Non-Hospital Problems as of 2018              Priority Class Noted    (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 Medium  2012    CARDIOVASCULAR SCREENING; LDL GOAL LESS THAN 160 Medium  2012    Opiate dependence (H) Medium  2013    Abdominal pain Medium  9/15/2015    History of polysubstance abuse, +cocaine UDS in  Medium  2015    Cervix cancer (H) Medium  2015    Encounter for long-term current use of medication Medium  2015    SBO (small bowel obstruction) (H) Medium  2016    Small bowel obstruction, partial (H) Medium  4/3/2017    Small intestine obstruction (H) Medium  2017    Small bowel obstruction (H) Medium  2017    Postoperative pain Medium  2017    Failure to thrive in adult Medium "  3/29/2018    Hypokalemia Medium  5/2/2018      Code Status History     Date Active Date Inactive Code Status Order ID Comments User Context    11/14/2018 10:28 AM  Full Code [id number]  Julieta Rob PA-C Outpatient    11/2/2018 12:50 AM 11/14/2018 10:28 AM Full Code [id number]  Minal Wilkins MD Inpatient    7/17/2018 11:15 PM 7/19/2018 12:30 AM Full Code [id number]  Mily Goff MD Inpatient    6/20/2018  1:44 PM 7/17/2018 11:15 PM Full Code [id number]  Tess Richard MD Outpatient    6/17/2018  3:52 PM 6/20/2018  1:44 PM Full Code [id number]  Tess Richard MD Inpatient    5/23/2018  3:56 PM 6/17/2018  3:52 PM Full Code [id number]  Arleen Santiago PA Outpatient    5/11/2018  4:31 PM 5/23/2018  3:56 PM Full Code [id number]  Dina Stanley PA-C Inpatient    5/3/2018 12:43 PM 5/11/2018  4:31 PM Full Code [id number]  Sangeetha Lozada PA-C Outpatient    5/2/2018 12:34 AM 5/3/2018 12:43 PM Full Code [id number]  Cait Simmons MD Inpatient    3/30/2018  1:48 PM 5/2/2018 12:34 AM Full Code [id number]  Julieta Rob PA-C Outpatient    3/29/2018  1:02 AM 3/30/2018  1:48 PM Full Code [id number]  Luiz Tolliver MD Inpatient    5/24/2017 12:30 PM 3/29/2018  1:02 AM Full Code [id number]  Julieta Rob PA-C Outpatient    5/18/2017  1:57 PM 5/24/2017 12:30 PM Full Code [id number]  David Reid MD Inpatient    5/18/2017  1:57 PM 5/18/2017  1:57 PM Full Code [id number]  Horace Maloney MD Inpatient    5/1/2017 12:59 PM 5/18/2017  1:57 PM Full Code [id number]  Julieta Rob PA-C Outpatient    5/1/2017 12:29 PM 5/1/2017 12:59 PM Full Code [id number]  Julieta Rob PA-C Outpatient    4/29/2017  9:32 AM 5/1/2017 12:29 PM Full Code [id number]  Jennifer Goodwin MD Inpatient    4/3/2017 10:28 AM 4/3/2017 11:31 PM Full Code [id number]  Luiz Gordon MD Inpatient    12/29/2016  9:09 AM 4/3/2017 10:28  AM Full Code [id number]  Iris Dias APRN CNP Outpatient    12/27/2016 10:17 PM 12/29/2016  9:09 AM Full Code [id number]  Myah Morejon MD Inpatient    9/16/2015 10:07 AM 12/27/2016 10:17 PM Full Code [id number]  Melyssa Ram MD Outpatient    9/15/2015 10:18 PM 9/16/2015 10:07 AM Full Code [id number]  Laquita Miranda MD Inpatient    7/14/2012  6:21 PM 7/17/2012  5:03 PM Full Code [id number]  Fabricio Florence RN Inpatient         Medication Review      START taking        Dose / Directions Comments    HYDROmorphone (STANDARD CONC) 1 MG/ML Liqd liquid   Commonly known as:  DILAUDID   Used for:  SBO (small bowel obstruction) (H)        Dose:  2 mg   Take 2 mLs (2 mg) by mouth every 4 hours as needed for moderate to severe pain   Quantity:  84 mL   Refills:  0          CONTINUE these medications which may have CHANGED, or have new prescriptions. If we are uncertain of the size of tablets/capsules you have at home, strength may be listed as something that might have changed.        Dose / Directions Comments    ondansetron 8 MG tablet   Commonly known as:  ZOFRAN   This may have changed:  how much to take   Used for:  SBO (small bowel obstruction) (H)        Dose:  4-8 mg   Take 0.5-1 tablets (4-8 mg) by mouth every 8 hours as needed for nausea   Quantity:  21 tablet   Refills:  0          CONTINUE these medications which have NOT CHANGED        Dose / Directions Comments    simethicone 40 MG/0.6ML suspension   Commonly known as:  MYLICON        Dose:  40 mg   Take 40 mg by mouth 4 times daily as needed for cramping   Refills:  0                Summary of Visit     Reason for your hospital stay       You were admitted for a small bowel obstruction.  You were started on IV TPN.             After Care     Activity       Your activity upon discharge:   -as tolerated  -No driving while on narcotic analgesics (i.e. Percocet, oxycodone, Vicodin)       Diet       Follow this diet upon discharge:   - TPN as main source of  nutrition.  Clear liquid diet for comfort.             Referrals     Home infusion referral       Please fax discharge orders to [facility]    Ph:  [phone]    Fax: [phone]    Skilled home care RN for initial home safety evaluation and to assist with MD team discharge plans of care as designated in discharge orders.    Skilled home care RN to evaluate medication management, nutrition and hydration evaluation, endurance evaluation, and general status evaluation after discharge from the acute care hospital setting.    Skilled home care RN to assist with management and education reinforcement with home TPN per [facility].  [facility] cares home care agency routine.    Skilled home care to monitor TPN labs per home care agency routine.             Your next 10 appointments already scheduled     Nov 20, 2018   Procedure with Sukhdev Aguilar MD   [facility], [place], Same Day Surgery (--)    [address] [address] [phone]              Follow-Up Appointment Instructions     Future Labs/Procedures    Adult [facility] Follow-up and recommended labs and tests     Comments:    DIET  - TPN as main source of nutrition.  Clear liquid diet for comfort.     ACTIVITY  -as tolerated  -No driving while on narcotic analgesics (i.e. Percocet, oxycodone, Vicodin)      NOTIFY  Please contact Marianela Diaz LPN, Maria L Sidhu RN or Joellen Parish RN at [phone] for problems after discharge such as:  -Temperature > 101F, chills, rigors, dizziness  -You stop passing gas, develop significant bloating, abdominal pain  -Have blood in stools/vomit  -Have severe diarrhea/constipation  -Any other questions or concerns.  - At nights (after 4:30pm), on weekends, or if urgent, call [phone] and ask the  to speak with the on-call Colorectal Surgery resident or fellow      Medication Instructions  Some of your medications may have changed. Please take only prescribed and resumed medications      FOLLOW-UP  1.  Please contact our clinic scheduler Bianca Pavon (phone # [phone]) if you need help scheduling an appointment or need to change appointments.         Appointments on [place] and/or [facility] (with [facility] or [facility] provider or service). Call [phone] if you haven't heard regarding these appointments within 7 days of discharge.      Follow-Up Appointment Instructions     Adult [facility] Follow-up and recommended labs and tests       DIET  - TPN as main source of nutrition.  Clear liquid diet for comfort.     ACTIVITY  -as tolerated  -No driving while on narcotic analgesics (i.e. Percocet, oxycodone, Vicodin)      NOTIFY  Please contact Marianela Diaz LPN, Maria L Sidhu RN or Joellen Parish RN at [phone] for problems after discharge such as:  -Temperature > 101F, chills, rigors, dizziness  -You stop passing gas, develop significant bloating, abdominal pain  -Have blood in stools/vomit  -Have severe diarrhea/constipation  -Any other questions or concerns.  - At nights (after 4:30pm), on weekends, or if urgent, call [phone] and ask the  to speak with the on-call Colorectal Surgery resident or fellow      Medication Instructions  Some of your medications may have changed. Please take only prescribed and resumed medications     FOLLOW-UP  1.  Please contact our clinic scheduler Bianca Pavon (phone # [phone]) if you need help scheduling an appointment or need to change appointments.         Appointments on [place] and/or [facility] (with [facility] or [facility] provider or service). Call [phone] if you haven't heard regarding these appointments within 7 days of discharge.             Statement of Approval     Ordered          11/14/18 1049  I have reviewed and agree with all the recommendations and orders detailed in this document.  EFFECTIVE NOW     Approved and electronically signed by:  Julieta Rob PA-C

## 2018-11-01 NOTE — IP AVS SNAPSHOT
[address] [address]    Phone:  [phone]                                       After Visit Summary   11/1/2018    Rachel A Gerhardt    MRN: [id number]           After Visit Summary Signature Page     I have received my discharge instructions, and my questions have been answered. I have discussed any challenges I see with this plan with the nurse or doctor.    ..........................................................................................................................................  Patient/Patient Representative Signature      ..........................................................................................................................................  Patient Representative Print Name and Relationship to Patient    ..................................................               ................................................  Date                                   Time    ..........................................................................................................................................  Reviewed by Signature/Title    ...................................................              ..............................................  Date                                               Time          22EPIC Rev 08/18

## 2018-11-01 NOTE — IP AVS SNAPSHOT
MRN:[id number]                      After Visit Summary   11/1/2018    Rachel A Gerhardt    MRN: [id number]           Thank you!     Thank you for choosing [facility] for your care. Our goal is always to provide you with excellent care. Hearing back from our patients is one way we can continue to improve our services. Please take a few minutes to complete the written survey that you may receive in the mail after you visit with us. Thank you!        Patient Information     Date Of Birth          1975        Designated Caregiver       Most Recent Value    Caregiver    Will someone help with your care after discharge? no      About your hospital stay     You were admitted on:  November 2, 2018 You last received care in the:  [address] [place] [address]    You were discharged on:  November 14, 2018        Reason for your hospital stay       You were admitted for a small bowel obstruction.  You were started on IV TPN.                  Who to Call     For medical emergencies, please call 911.  For non-urgent questions about your medical care, please call your primary care provider or clinic, [phone]          Attending Provider     Provider Specialty    Dada Craft MD Emergency Medicine    Irene Harris MD Internal Medicine    Tristin, Gavino Escobedo MD Family Medicine - Sports Medicine    Toby Goldman MD Family Practice    [facility], Sukhdev Roth MD Colon and Rectal Surgery       Primary Care Provider Office Phone # Fax #    Reji Avery [phone]      After Care Instructions     Activity       Your activity upon discharge:   -as tolerated  -No driving while on narcotic analgesics (i.e. Percocet, oxycodone, Vicodin)            Diet       Follow this diet upon discharge:   - TPN as main source of nutrition.  Clear liquid diet for comfort.                  Follow-up Appointments     Adult [facility] Follow-up and recommended labs and tests       DIET  -  TPN as main source of nutrition.  Clear liquid diet for comfort.     ACTIVITY  -as tolerated  -No driving while on narcotic analgesics (i.e. Percocet, oxycodone, Vicodin)      NOTIFY  Please contact Marianela Diaz LPN, Maria L Sidhu, CINDY or Joellen Parish RN at [phone] for problems after discharge such as:  -Temperature > 101F, chills, rigors, dizziness  -You stop passing gas, develop significant bloating, abdominal pain  -Have blood in stools/vomit  -Have severe diarrhea/constipation  -Any other questions or concerns.  - At nights (after 4:30pm), on weekends, or if urgent, call [phone] and ask the  to speak with the on-call Colorectal Surgery resident or fellow      Medication Instructions  Some of your medications may have changed. Please take only prescribed and resumed medications     FOLLOW-UP  1.  Please contact our clinic scheduler Bianca Pavon (phone # [phone]) if you need help scheduling an appointment or need to change appointments.   You are scheduled for surgery with Dr. Aguilar on Nov 20th.         Appointments on [place] and/or [facility] (with [facility] or [facility] provider or service). Call [phone] if you haven't heard regarding these appointments within 7 days of discharge.                  Your next 10 appointments already scheduled     Nov 20, 2018   Procedure with Sukhdev Aguilar MD   [facility], [place], Same Day Surgery (--)    [address] [facility] [address] [phone]              Additional Services     Home infusion referral       Please fax discharge orders to [facility]    Ph:  [phone]    Fax: [phone]    Skilled home care RN for initial home safety evaluation and to assist with MD team discharge plans of care as designated in discharge orders.    Skilled home care RN to evaluate medication management, nutrition and hydration evaluation, endurance evaluation, and general status evaluation after discharge from the acute care hospital  "setting.    Skilled home care RN to assist with management and education reinforcement with home TPN per [facility].  [facility] cares home care agency routine.    Skilled home care to monitor TPN labs per home care agency routine.                  Pending Results     No orders found from 10/30/2018 to 11/2/2018.            Statement of Approval     Ordered          11/14/18 1049  I have reviewed and agree with all the recommendations and orders detailed in this document.  EFFECTIVE NOW     Approved and electronically signed by:  Julieta Rob PA-C             Admission Information     Date & Time Provider Department Dept. Phone    11/1/2018 Sukhdev Aguilar MD [address] [employer] [place] [phone]      Your Vitals Were     Blood Pressure Pulse Temperature Respirations Height Weight    94/58 (BP Location: Left arm) 110 97  F (36.1  C) (Oral) 14 1.753 m (5' 9\") 54.9 kg (121 lb 1.6 oz)    Pulse Oximetry BMI (Body Mass Index)                97% 17.88 kg/m2          [employer] Information     [employer] gives you secure access to your electronic health record. If you see a primary care provider, you can also send messages to your care team and make appointments. If you have questions, please call your primary care clinic.  If you do not have a primary care provider, please call [phone] and they will assist you.        Care EveryWhere ID     This is your Care EveryWhere ID. This could be used by other organizations to access your [facility] medical records  [id number]        Equal Access to Services     MARISOL GAN AH: Hadii reynold Gan, waaxda lusamanthaadaha, qaybta kaalmada johnathanyada, wendy atkins. So [facility] [phone].    ATENCIÓN: Si habla español, tiene a epps disposición servicios gratuitos de asistencia lingüística. Llame al [phone].    We comply with applicable federal civil rights laws and [place] laws. We do not discriminate on the basis of race, color, " national origin, age, disability, sex, sexual orientation, or gender identity.               Review of your medicines      START taking        Dose / Directions    HYDROmorphone (STANDARD CONC) 1 MG/ML Liqd liquid   Commonly known as:  DILAUDID   Used for:  SBO (small bowel obstruction) (H)        Dose:  2 mg   Take 2 mLs (2 mg) by mouth every 4 hours as needed for moderate to severe pain   Quantity:  84 mL   Refills:  0         CONTINUE these medicines which may have CHANGED, or have new prescriptions. If we are uncertain of the size of tablets/capsules you have at home, strength may be listed as something that might have changed.        Dose / Directions    ondansetron 8 MG tablet   Commonly known as:  ZOFRAN   This may have changed:  how much to take   Used for:  SBO (small bowel obstruction) (H)        Dose:  4-8 mg   Take 0.5-1 tablets (4-8 mg) by mouth every 8 hours as needed for nausea   Quantity:  21 tablet   Refills:  0         CONTINUE these medicines which have NOT CHANGED        Dose / Directions    simethicone 40 MG/0.6ML suspension   Commonly known as:  MYLICON        Dose:  40 mg   Take 40 mg by mouth 4 times daily as needed for cramping   Refills:  0            Where to get your medicines      These medications were sent to [facility] [place], [place] - [address]  [address] [address]     Phone:  [phone]     ondansetron 8 MG tablet         Some of these will need a paper prescription and others can be bought over the counter. Ask your nurse if you have questions.     Bring a paper prescription for each of these medications     HYDROmorphone (STANDARD CONC) 1 MG/ML Liqd liquid                Protect others around you: Learn how to safely use, store and throw away your medicines at www.disposemymeds.org.        Information about OPIOIDS     PRESCRIPTION OPIOIDS: WHAT YOU NEED TO KNOW   We gave you an opioid (narcotic) pain medicine. It is important  to manage your pain, but opioids are not always the best choice. You should first try all the other options your care team gave you. Take this medicine for as short a time (and as few doses) as possible.    Some activities can increase your pain, such as bandage changes or therapy sessions. It may help to take your pain medicine 30 to 60 minutes before these activities. Reduce your stress by getting enough sleep, working on hobbies you enjoy and practicing relaxation or meditation. Talk to your care team about ways to manage your pain beyond prescription opioids.    These medicines have risks:    DO NOT drive when on new or higher doses of pain medicine. These medicines can affect your alertness and reaction times, and you could be arrested for driving under the influence (DUI). If you need to use opioids long-term, talk to your care team about driving.    DO NOT operate heavy machinery    DO NOT do any other dangerous activities while taking these medicines.    DO NOT drink any alcohol while taking these medicines.     If the opioid prescribed includes acetaminophen, DO NOT take with any other medicines that contain acetaminophen. Read all labels carefully. Look for the word  acetaminophen  or  Tylenol.  Ask your pharmacist if you have questions or are unsure.    You can get addicted to pain medicines, especially if you have a history of addiction (chemical, alcohol or substance dependence). Talk to your care team about ways to reduce this risk.    All opioids tend to cause constipation. Drink plenty of water and eat foods that have a lot of fiber, such as fruits, vegetables, prune juice, apple juice and high-fiber cereal. Take a laxative (Miralax, milk of magnesia, Colace, Senna) if you don t move your bowels at least every other day. Other side effects include upset stomach, sleepiness, dizziness, throwing up, tolerance (needing more of the medicine to have the same effect), physical dependence and slowed  breathing.    Store your pills in a secure place, locked if possible. We will not replace any lost or stolen medicine. If you don t finish your medicine, please throw away (dispose) as directed by your pharmacist. The [place] [employer] has more information about safe disposal: https://www.pca.[facility].[place].us/living-green/managing-unwanted-medications             Medication List: This is a list of all your medications and when to take them. Check marks below indicate your daily home schedule. Keep this list as a reference.      Medications           Morning Afternoon Evening Bedtime As Needed    HYDROmorphone (STANDARD CONC) 1 MG/ML Liqd liquid   Commonly known as:  DILAUDID   Take 2 mLs (2 mg) by mouth every 4 hours as needed for moderate to severe pain   Last time this was given:  2 mg on 11/14/2018  2:50 PM                                ondansetron 8 MG tablet   Commonly known as:  ZOFRAN   Take 0.5-1 tablets (4-8 mg) by mouth every 8 hours as needed for nausea                                simethicone 40 MG/0.6ML suspension   Commonly known as:  MYLICON   Take 40 mg by mouth 4 times daily as needed for cramping   Last time this was given:  40 mg on 11/5/2018 10:11 AM

## 2018-11-02 ENCOUNTER — APPOINTMENT (OUTPATIENT)
Dept: CT IMAGING | Facility: CLINIC | Age: 43
DRG: 393 | End: 2018-11-02
Payer: COMMERCIAL

## 2018-11-02 LAB
ALBUMIN SERPL-MCNC: 2.2 G/DL (ref 3.4–5)
ALP SERPL-CCNC: 85 U/L (ref 40–150)
ALT SERPL W P-5'-P-CCNC: 21 U/L (ref 0–50)
ANION GAP SERPL CALCULATED.3IONS-SCNC: 5 MMOL/L (ref 3–14)
ANION GAP SERPL CALCULATED.3IONS-SCNC: 7 MMOL/L (ref 3–14)
AST SERPL W P-5'-P-CCNC: 13 U/L (ref 0–45)
BASOPHILS # BLD AUTO: 0 10E9/L (ref 0–0.2)
BASOPHILS NFR BLD AUTO: 0.4 %
BILIRUB SERPL-MCNC: 0.2 MG/DL (ref 0.2–1.3)
BUN SERPL-MCNC: 10 MG/DL (ref 7–30)
BUN SERPL-MCNC: 11 MG/DL (ref 7–30)
CALCIUM SERPL-MCNC: 6.8 MG/DL (ref 8.5–10.1)
CALCIUM SERPL-MCNC: 6.9 MG/DL (ref 8.5–10.1)
CHLORIDE SERPL-SCNC: 111 MMOL/L (ref 94–109)
CHLORIDE SERPL-SCNC: 112 MMOL/L (ref 94–109)
CO2 SERPL-SCNC: 28 MMOL/L (ref 20–32)
CO2 SERPL-SCNC: 28 MMOL/L (ref 20–32)
CREAT SERPL-MCNC: 0.54 MG/DL (ref 0.52–1.04)
CREAT SERPL-MCNC: 0.62 MG/DL (ref 0.52–1.04)
DIFFERENTIAL METHOD BLD: ABNORMAL
EOSINOPHIL # BLD AUTO: 0.1 10E9/L (ref 0–0.7)
EOSINOPHIL NFR BLD AUTO: 1.2 %
ERYTHROCYTE [DISTWIDTH] IN BLOOD BY AUTOMATED COUNT: 12.9 % (ref 10–15)
GFR SERPL CREATININE-BSD FRML MDRD: >90 ML/MIN/1.7M2
GFR SERPL CREATININE-BSD FRML MDRD: >90 ML/MIN/1.7M2
GLUCOSE SERPL-MCNC: 102 MG/DL (ref 70–99)
GLUCOSE SERPL-MCNC: 66 MG/DL (ref 70–99)
HCT VFR BLD AUTO: 36.7 % (ref 35–47)
HGB BLD-MCNC: 11.6 G/DL (ref 11.7–15.7)
IMM GRANULOCYTES # BLD: 0 10E9/L (ref 0–0.4)
IMM GRANULOCYTES NFR BLD: 0.4 %
LACTATE BLD-SCNC: 1.5 MMOL/L (ref 0.7–2)
LYMPHOCYTES # BLD AUTO: 1.9 10E9/L (ref 0.8–5.3)
LYMPHOCYTES NFR BLD AUTO: 23.9 %
MCH RBC QN AUTO: 32.5 PG (ref 26.5–33)
MCHC RBC AUTO-ENTMCNC: 31.6 G/DL (ref 31.5–36.5)
MCV RBC AUTO: 103 FL (ref 78–100)
MONOCYTES # BLD AUTO: 0.6 10E9/L (ref 0–1.3)
MONOCYTES NFR BLD AUTO: 6.8 %
NEUTROPHILS # BLD AUTO: 5.5 10E9/L (ref 1.6–8.3)
NEUTROPHILS NFR BLD AUTO: 67.3 %
NRBC # BLD AUTO: 0 10*3/UL
NRBC BLD AUTO-RTO: 0 /100
PLATELET # BLD AUTO: 250 10E9/L (ref 150–450)
POTASSIUM SERPL-SCNC: 3.4 MMOL/L (ref 3.4–5.3)
POTASSIUM SERPL-SCNC: 4.1 MMOL/L (ref 3.4–5.3)
PROT SERPL-MCNC: 4.8 G/DL (ref 6.8–8.8)
RBC # BLD AUTO: 3.57 10E12/L (ref 3.8–5.2)
SODIUM SERPL-SCNC: 145 MMOL/L (ref 133–144)
SODIUM SERPL-SCNC: 146 MMOL/L (ref 133–144)
WBC # BLD AUTO: 8.1 10E9/L (ref 4–11)

## 2018-11-02 PROCEDURE — 25000128 H RX IP 250 OP 636: Performed by: STUDENT IN AN ORGANIZED HEALTH CARE EDUCATION/TRAINING PROGRAM

## 2018-11-02 PROCEDURE — 74177 CT ABD & PELVIS W/CONTRAST: CPT

## 2018-11-02 PROCEDURE — 25000125 ZZHC RX 250: Performed by: STUDENT IN AN ORGANIZED HEALTH CARE EDUCATION/TRAINING PROGRAM

## 2018-11-02 PROCEDURE — 12000001 ZZH R&B MED SURG/OB UMMC

## 2018-11-02 PROCEDURE — 85025 COMPLETE CBC W/AUTO DIFF WBC: CPT | Performed by: FAMILY MEDICINE

## 2018-11-02 PROCEDURE — 36415 COLL VENOUS BLD VENIPUNCTURE: CPT | Performed by: FAMILY MEDICINE

## 2018-11-02 PROCEDURE — 25000132 ZZH RX MED GY IP 250 OP 250 PS 637: Performed by: STUDENT IN AN ORGANIZED HEALTH CARE EDUCATION/TRAINING PROGRAM

## 2018-11-02 PROCEDURE — 80048 BASIC METABOLIC PNL TOTAL CA: CPT | Performed by: FAMILY MEDICINE

## 2018-11-02 PROCEDURE — 96365 THER/PROPH/DIAG IV INF INIT: CPT | Performed by: EMERGENCY MEDICINE

## 2018-11-02 PROCEDURE — 83605 ASSAY OF LACTIC ACID: CPT | Performed by: FAMILY MEDICINE

## 2018-11-02 PROCEDURE — 80053 COMPREHEN METABOLIC PANEL: CPT | Performed by: FAMILY MEDICINE

## 2018-11-02 PROCEDURE — 96376 TX/PRO/DX INJ SAME DRUG ADON: CPT | Performed by: EMERGENCY MEDICINE

## 2018-11-02 RX ORDER — ONDANSETRON 4 MG/1
4 TABLET, ORALLY DISINTEGRATING ORAL EVERY 6 HOURS PRN
Status: DISCONTINUED | OUTPATIENT
Start: 2018-11-02 | End: 2018-11-06

## 2018-11-02 RX ORDER — HYDROMORPHONE HYDROCHLORIDE 1 MG/ML
.3-.5 INJECTION, SOLUTION INTRAMUSCULAR; INTRAVENOUS; SUBCUTANEOUS
Status: DISCONTINUED | OUTPATIENT
Start: 2018-11-02 | End: 2018-11-02

## 2018-11-02 RX ORDER — HYDROCODONE BITARTRATE AND ACETAMINOPHEN 5; 325 MG/1; MG/1
1 TABLET ORAL EVERY 6 HOURS PRN
Status: ON HOLD | COMMUNITY
End: 2018-11-03

## 2018-11-02 RX ORDER — HYDROMORPHONE HYDROCHLORIDE 1 MG/ML
.3-.5 INJECTION, SOLUTION INTRAMUSCULAR; INTRAVENOUS; SUBCUTANEOUS EVERY 4 HOURS PRN
Status: DISCONTINUED | OUTPATIENT
Start: 2018-11-02 | End: 2018-11-02

## 2018-11-02 RX ORDER — POTASSIUM CHLORIDE 1.5 G/1.58G
20-40 POWDER, FOR SOLUTION ORAL
Status: DISCONTINUED | OUTPATIENT
Start: 2018-11-02 | End: 2018-11-06

## 2018-11-02 RX ORDER — POTASSIUM CL/LIDO/0.9 % NACL 10MEQ/0.1L
10 INTRAVENOUS SOLUTION, PIGGYBACK (ML) INTRAVENOUS
Status: DISCONTINUED | OUTPATIENT
Start: 2018-11-02 | End: 2018-11-14 | Stop reason: HOSPADM

## 2018-11-02 RX ORDER — HYDROMORPHONE HYDROCHLORIDE 1 MG/ML
0.3 INJECTION, SOLUTION INTRAMUSCULAR; INTRAVENOUS; SUBCUTANEOUS EVERY 4 HOURS PRN
Status: DISCONTINUED | OUTPATIENT
Start: 2018-11-02 | End: 2018-11-03

## 2018-11-02 RX ORDER — PROCHLORPERAZINE MALEATE 5 MG
10 TABLET ORAL EVERY 6 HOURS PRN
Status: DISCONTINUED | OUTPATIENT
Start: 2018-11-02 | End: 2018-11-03

## 2018-11-02 RX ORDER — SIMETHICONE 40MG/0.6ML
40 SUSPENSION, DROPS(FINAL DOSAGE FORM)(ML) ORAL 4 TIMES DAILY PRN
Status: DISCONTINUED | OUTPATIENT
Start: 2018-11-02 | End: 2018-11-14 | Stop reason: HOSPADM

## 2018-11-02 RX ORDER — POTASSIUM CHLORIDE 750 MG/1
20-40 TABLET, EXTENDED RELEASE ORAL
Status: DISCONTINUED | OUTPATIENT
Start: 2018-11-02 | End: 2018-11-06

## 2018-11-02 RX ORDER — PROCHLORPERAZINE 25 MG
25 SUPPOSITORY, RECTAL RECTAL EVERY 12 HOURS PRN
Status: DISCONTINUED | OUTPATIENT
Start: 2018-11-02 | End: 2018-11-03

## 2018-11-02 RX ORDER — ONDANSETRON 2 MG/ML
4 INJECTION INTRAMUSCULAR; INTRAVENOUS EVERY 6 HOURS PRN
Status: DISCONTINUED | OUTPATIENT
Start: 2018-11-02 | End: 2018-11-14 | Stop reason: HOSPADM

## 2018-11-02 RX ORDER — POTASSIUM CHLORIDE 7.45 MG/ML
10 INJECTION INTRAVENOUS
Status: DISCONTINUED | OUTPATIENT
Start: 2018-11-02 | End: 2018-11-14 | Stop reason: HOSPADM

## 2018-11-02 RX ORDER — POTASSIUM CHLORIDE 29.8 MG/ML
20 INJECTION INTRAVENOUS
Status: DISCONTINUED | OUTPATIENT
Start: 2018-11-02 | End: 2018-11-14 | Stop reason: HOSPADM

## 2018-11-02 RX ORDER — ONDANSETRON 8 MG/1
8 TABLET, FILM COATED ORAL EVERY 8 HOURS PRN
Status: ON HOLD | COMMUNITY
End: 2018-11-14

## 2018-11-02 RX ORDER — IOPAMIDOL 755 MG/ML
70 INJECTION, SOLUTION INTRAVASCULAR ONCE
Status: COMPLETED | OUTPATIENT
Start: 2018-11-02 | End: 2018-11-02

## 2018-11-02 RX ORDER — NALOXONE HYDROCHLORIDE 0.4 MG/ML
.1-.4 INJECTION, SOLUTION INTRAMUSCULAR; INTRAVENOUS; SUBCUTANEOUS
Status: DISCONTINUED | OUTPATIENT
Start: 2018-11-02 | End: 2018-11-14 | Stop reason: HOSPADM

## 2018-11-02 RX ORDER — LIDOCAINE 40 MG/G
CREAM TOPICAL
Status: DISCONTINUED | OUTPATIENT
Start: 2018-11-02 | End: 2018-11-14 | Stop reason: HOSPADM

## 2018-11-02 RX ORDER — MAGNESIUM SULFATE HEPTAHYDRATE 40 MG/ML
4 INJECTION, SOLUTION INTRAVENOUS EVERY 4 HOURS PRN
Status: DISCONTINUED | OUTPATIENT
Start: 2018-11-02 | End: 2018-11-14 | Stop reason: HOSPADM

## 2018-11-02 RX ORDER — SODIUM CHLORIDE AND POTASSIUM CHLORIDE 150; 900 MG/100ML; MG/100ML
INJECTION, SOLUTION INTRAVENOUS CONTINUOUS
Status: DISCONTINUED | OUTPATIENT
Start: 2018-11-02 | End: 2018-11-04

## 2018-11-02 RX ORDER — HYDROCODONE BITARTRATE AND ACETAMINOPHEN 5; 325 MG/1; MG/1
1 TABLET ORAL EVERY 6 HOURS PRN
Status: DISCONTINUED | OUTPATIENT
Start: 2018-11-02 | End: 2018-11-03

## 2018-11-02 RX ORDER — HYDROMORPHONE HYDROCHLORIDE 1 MG/ML
0.3 INJECTION, SOLUTION INTRAMUSCULAR; INTRAVENOUS; SUBCUTANEOUS
Status: DISCONTINUED | OUTPATIENT
Start: 2018-11-02 | End: 2018-11-02

## 2018-11-02 RX ADMIN — POTASSIUM CHLORIDE AND SODIUM CHLORIDE: 900; 150 INJECTION, SOLUTION INTRAVENOUS at 11:43

## 2018-11-02 RX ADMIN — POTASSIUM CHLORIDE 20 MEQ: 750 TABLET, EXTENDED RELEASE ORAL at 09:37

## 2018-11-02 RX ADMIN — ONDANSETRON HYDROCHLORIDE 4 MG: 2 INJECTION INTRAMUSCULAR; INTRAVENOUS at 19:58

## 2018-11-02 RX ADMIN — HYDROCODONE BITARTRATE AND ACETAMINOPHEN 1 TABLET: 5; 325 TABLET ORAL at 01:32

## 2018-11-02 RX ADMIN — SODIUM CHLORIDE, PRESERVATIVE FREE 68 ML: 5 INJECTION INTRAVENOUS at 14:34

## 2018-11-02 RX ADMIN — Medication 0.5 MG: at 07:43

## 2018-11-02 RX ADMIN — SIMETHICONE 40 MG: 20 SUSPENSION/ DROPS ORAL at 18:03

## 2018-11-02 RX ADMIN — HYDROCODONE BITARTRATE AND ACETAMINOPHEN 1 TABLET: 5; 325 TABLET ORAL at 09:50

## 2018-11-02 RX ADMIN — SIMETHICONE 40 MG: 20 SUSPENSION/ DROPS ORAL at 09:34

## 2018-11-02 RX ADMIN — HYDROCODONE BITARTRATE AND ACETAMINOPHEN 1 TABLET: 5; 325 TABLET ORAL at 17:36

## 2018-11-02 RX ADMIN — ONDANSETRON HYDROCHLORIDE 4 MG: 2 INJECTION INTRAMUSCULAR; INTRAVENOUS at 11:10

## 2018-11-02 RX ADMIN — Medication 0.3 MG: at 11:41

## 2018-11-02 RX ADMIN — MAGNESIUM SULFATE IN WATER 4 G: 40 INJECTION, SOLUTION INTRAVENOUS at 00:32

## 2018-11-02 RX ADMIN — Medication 0.3 MG: at 19:53

## 2018-11-02 RX ADMIN — Medication 0.5 MG: at 04:03

## 2018-11-02 RX ADMIN — POTASSIUM CHLORIDE 40 MEQ: 750 TABLET, EXTENDED RELEASE ORAL at 01:28

## 2018-11-02 RX ADMIN — Medication 0.5 MG: at 00:32

## 2018-11-02 RX ADMIN — Medication 0.3 MG: at 15:42

## 2018-11-02 RX ADMIN — IOPAMIDOL 70 ML: 755 INJECTION, SOLUTION INTRAVENOUS at 14:34

## 2018-11-02 RX ADMIN — POTASSIUM CHLORIDE AND SODIUM CHLORIDE: 900; 150 INJECTION, SOLUTION INTRAVENOUS at 02:25

## 2018-11-02 ASSESSMENT — ACTIVITIES OF DAILY LIVING (ADL)
AMBULATION: 0-->INDEPENDENT
COGNITION: 0 - NO COGNITION ISSUES REPORTED
RETIRED_COMMUNICATION: 0-->UNDERSTANDS/COMMUNICATES WITHOUT DIFFICULTY
BATHING: 0-->INDEPENDENT
RETIRED_EATING: 0-->INDEPENDENT
ADLS_ACUITY_SCORE: 11
TOILETING: 0-->INDEPENDENT
ADLS_ACUITY_SCORE: 11
DRESS: 0-->INDEPENDENT
ADLS_ACUITY_SCORE: 11
FALL_HISTORY_WITHIN_LAST_SIX_MONTHS: NO
SWALLOWING: 0-->SWALLOWS FOODS/LIQUIDS WITHOUT DIFFICULTY
TRANSFERRING: 0-->INDEPENDENT

## 2018-11-02 ASSESSMENT — PAIN DESCRIPTION - DESCRIPTORS
DESCRIPTORS: ACHING
DESCRIPTORS: CRAMPING
DESCRIPTORS: CRAMPING
DESCRIPTORS: INTERMITTENT;CRAMPING
DESCRIPTORS: ACHING

## 2018-11-02 NOTE — ED NOTES
[facility], [place]   ED Nurse to Floor Handoff     Rachel A Gerhardt is a 42 year old female who speaks English and lives with family members,  in a home  They arrived in the ED by car from home    ED Chief Complaint: Abdominal Pain and Nausea, Vomiting, & Diarrhea    ED Dx;   Final diagnoses:   SBO (small bowel obstruction) (H)         Needed?: No    Allergies:   Allergies   Allergen Reactions     No Clinical Screening - See Comments Other (See Comments) and Diarrhea     headache  Carrots cause gastric upset, cramping and diarrhea.     Sulfa Drugs Hives     hives     Amoxicillin Unknown and Other (See Comments)     vomiting  vomiting     Amoxicillin-Pot Clavulanate Other (See Comments) and Nausea     vomiting     Augmentin GI Disturbance, Nausea and Hives     Avelox [Moxifloxacin] Nausea and Vomiting, Unknown and Nausea     Ciprofloxacin Hives and Nausea     Codeine Nausea and Vomiting and Nausea     Ibuprofen Nausea and Vomiting     Other reaction(s): Nausea And Vomiting     Ibuprofen Sodium Hives and GI Disturbance     Quinolones      Tramadol Hives, Diarrhea, Nausea and Nausea and Vomiting     Daucus Carota      Other reaction(s): GI Upset  Other reaction(s): Abdominal pain, Diarrhea  Carrots cause gastric upset, cramping and diarrhea.   .  Past Medical Hx:   Past Medical History:   Diagnosis Date     Asthma      Cancer (H)     Per patient OBGYN, cerivical cancer     Cervical cancer (H)      Other chronic pain      Ovarian cancer (H)      Substance abuse (H)     Outside records indicate past history of narcotics abuse or dependence, but patient denies.      Baseline Mental status: WDL  Current Mental Status changes: at basesline    Infection present or suspected this encounter: no  Sepsis suspected: No  Isolation type: No active isolations     Activity level - Baseline/Home:  Stand with Assist  Activity Level - Current:   Stand with Assist    Bariatric equipment needed?:  No    In the ED these meds were given:   Medications   0.9% sodium chloride BOLUS (1,000 mLs Intravenous New Bag 11/1/18 2212)   ondansetron (ZOFRAN) injection 4 mg (4 mg Intravenous Given 11/1/18 2015)   HYDROmorphone (PF) (DILAUDID) injection 1 mg (1 mg Intravenous Given 11/1/18 2017)   0.9% sodium chloride BOLUS (0 mLs Intravenous Stopped 11/1/18 2129)   HYDROmorphone (PF) (DILAUDID) injection 1 mg (1 mg Intravenous Given 11/1/18 2209)       Drips running?  No    Home pump  No    Current LDAs  Peripheral IV 07/17/18 Right Upper forearm (Active)   Number of days:107       Peripheral IV 11/01/18 Right Upper forearm (Active)   Site Assessment WDL 11/1/2018  8:08 PM   Line Status Saline locked 11/1/2018  8:08 PM   Number of days:0       Incision/Surgical Site 05/18/17 Abdomen (Active)   Number of days:532       Labs results:   Labs Ordered and Resulted from Time of ED Arrival Up to the Time of Departure from the ED   COMPREHENSIVE METABOLIC PANEL - Abnormal; Notable for the following:        Result Value    Sodium 146 (*)     Potassium 3.2 (*)     Calcium 7.4 (*)     Albumin 2.2 (*)     Protein Total 5.0 (*)     All other components within normal limits   CBC WITH PLATELETS DIFFERENTIAL - Abnormal; Notable for the following:      (*)     All other components within normal limits       Imaging Studies:   Recent Results (from the past 24 hour(s))   Abdomen XR, 2 vw, flat and upright    Narrative    Exam: XR ABDOMEN 2 VW, 11/1/2018 8:58 PM    Indication: r/o sbo;     Comparison: Radiograph the abdomen 6/19/2018    Findings:   Upright and supine AP views of the abdomen. There are diffusely  dilated loops of small bowel throughout the abdomen, measuring up to  6.1 cm. No air in the colon identified. No intraperitoneal free air.  Lung bases are clear.      Impression    Impression: Diffuse dilation of the small bowel throughout the  abdomen, concerning for small bowel obstruction.    [Result: Small bowel  "obstruction]    Finding was identified on 11/1/2018 9:18 PM.     Dr. Craft was contacted by Dr. Ray at 11/1/2018 9:39 PM and  verbalized understanding of the urgent finding.        Recent vital signs:   /72  Pulse 86  Temp 98.2  F (36.8  C) (Oral)  Resp 18  Ht 1.753 m (5' 9\")  Wt 48.6 kg (107 lb 2.3 oz)  SpO2 98%  BMI 15.82 kg/m2    Cardiac Rhythm: Normal Sinus  Pt needs tele? No  Skin/wound Issues: None    Code Status: Full Code    Pain control: fair    Nausea control: pt had none    Abnormal labs/tests/findings requiring intervention: NA    Family present during ED course? No   Family Comments/Social Situation comments: NA    Tasks needing completion: None    Mark Rivera RN  [phone] [facility]    "

## 2018-11-02 NOTE — PLAN OF CARE
"Problem: Patient Care Overview  Goal: Plan of Care/Patient Progress Review  Outcome: No Change  Pt arrived from ED 0030. HD #1, admitted for N/V/D and abdominal pain for 1 week. AVSS. A&Ox4. Apical pulse regular. Lungs CTA. Bowel sounds active in all quadrants, +gas/+BM this shift. Pt is NPO + sips of clears and ice chips; pt drank ~500cc fluid overnight and later had excruciating abdominal pain that felt like she was in \"labor\" along with 150cc emesis around 0430. Voiding spontaneously with low output, pt states she has recently had 10lbs of weight lost. MIVF running at 100cc/hr. Mg replaced, only 40mEq of 60mEq given as pt because nauseated and refused remainder of replacement. Pain managed with PO [place] and IV Dilaudid. Up independently in halls. Continue with POC.      "

## 2018-11-02 NOTE — PROGRESS NOTES
[facility], [facility] Progress Note - [facility]       Main Plans for Today   - CT abdomen  - GI consult  - Wean IV pain meds      Assessment & Plan   Rachel A Gerhardt is a 42 year old female admitted on 11/1/2018. 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     # Acute on chronic abdominal pain  # Nausea/Vomiting  # Hx ileoileal anastomotic stricture  # Partial small bowel obstruction  Bowel rest with NPO. Still passing gas and stool, soft BM this AM. Was supposed to have endoscopic dilation during last admission in July, but left AMA.  - GI consult for consideration of balloon dilation  - NPO except for meds, ice chips, and small sips of water; pt is not adhering to this  - Maintenance fluid NS + 20 KCl at 100 mlhr  - Serial abdominal exams  - Pt does refuse NG placement due to anxiety, including medications with placement   - Pain management with home Vicodin 5/325 Q6H PRN and IV dilaudid 0.3 mg Q4H PRN for break though pain; will attempt to wean and decrease narcotics  - IV Zofran PRN for nausea and compazine PRN  - Electrolyte replacement to keep Mg > 2 and K > 4     # Chronic diarrhea  Patient has chronic diarrhea s/p small bowel resection in 2017. Diarrhea most likely due to short gut syndrome. Less likely c diff or other infectious cause due to no elevation in WBC or fevers. No testing indicated at this time.   - Monitor electrolytes and replete as needed  - IV fluids     # Chronic protein malnutrition  # Weight loss  Patient unable to keep down food due to nausea and vomiting recently. Even when n/v is better, still only eats small meals. Has been on  TPN before as well. Patient states that she has had a 10 lbs weight loss since last hospitalization (weight same from 9/20/18 clinic visit and only 3 lbs down from 7/17/18 hospitalization).  - NPO for now due to partial SBO  - Consult nutrition when able to advance patient's diet     # Opioid dependence  Seen at [facility] pain clinic where she gets her Vicodin 5/325 prescribed.  - Continue PTA pain regimen, weaning IV dilaudid      # Tobacco dependence  - Nicotine gum PRN    # Pain Assessment:  Current Pain Score 11/2/2018   Patient currently in pain? yes   Pain score (0-10) -   Pain location Abdomen   Pain descriptors Cramping   Some encounter information is confidential and restricted. Go to Review Flowsheets activity to see all data.   - Jenni is experiencing pain due to abdominal and chronic pain. Pain management was discussed and the plan was created in a collaborative fashion.  Jenni's response to the current recommendations: mixed response  - Please see the plan for pain management as documented above    Diet: NPO for Medical/Clinical Reasons Except for: Meds, Ice Chips  Fluids: NS + 20K @100 mL/hr  DVT Prophylaxis: Pneumatic Compression Devices  Code Status: Full Code    Disposition Plan   Expected discharge: 2 - 3 days, recommended to prior living arrangement once adequate pain management/ tolerating PO medications. Dispo: Expected Discharge Date: 11/04/18        Entered: John Dc 11/02/2018, 3:01 PM   Information in the above section will display in the discharge planner report.      The patient's care was discussed with and seen by the Attending Physician, Dr. Crow.    John Dc,   [facility]  Pager: [phone]  Please see sticky note for cross cover information    Interval History   Pt having soft BMs and gas. Abdominal pain still present. Ambulating. Per RN note, pt had  aprox 500 mL liquid then emesis. Pt denies that there was anything in per mouth  overnight except ice chips. Reiterated that pt needs bowel rest, ambulation, and should be weaning opiates as they worsen SBO.     3:36 PM  Team went back to see pt, pt not present, but team did notice empty cups of ice tea as well as candy and partially eaten snacks in her room despite NPO order.     Physical Exam   Vital Signs: Temp: 98.5  F (36.9  C) Temp src: Oral BP: 93/69 Pulse: 78   Resp: 16 SpO2: 98 % O2 Device: None (Room air)    Weight: 114 lbs 9.6 oz    Constitutional: She is oriented to person, place, and time. No distress.   Cachetic   HENT:   Head: Normocephalic and atraumatic.   Mouth/Throat: Oropharynx is clear and moist. No oropharyngeal exudate.   Eyes: Conjunctivae and EOM are normal. Pupils are equal, round, and reactive to light.   Neck: Neck supple.   Cardiovascular: Normal rate, regular rhythm, normal heart sounds and intact distal pulses.  Exam reveals no gallop and no friction rub.    No murmur heard.  Pulmonary/Chest: Effort normal and breath sounds normal. No respiratory distress. She has no wheezes. She has no rales.   Abdominal: Bowel sounds are normal. She exhibits no distension. There is tenderness (generalized to palpation worse in lower quads). There is voluntary guarding (mild). There is no rebound.   Tearful during parts of abdominal exam due to pain   Musculoskeletal: She exhibits no edema or tenderness (in LE extremities).   Lymphadenopathy:     She has no cervical adenopathy.   Neurological: She is alert and oriented to person, place, and time.   Skin: Skin is warm and dry.   Psychiatric: She has a normal mood and affect.    Data     Recent Labs  Lab 11/02/18  0731 11/01/18 2007   WBC  --  7.1   HGB  --  13.0   MCV  --  102*   PLT  --  265   * 146*   POTASSIUM 3.4 3.2*   CHLORIDE 111* 108   CO2 28 32   BUN 11 12   CR 0.62 0.60   ANIONGAP 7 6   JONATAN 6.8* 7.4*   * 95   ALBUMIN  --  2.2*   PROTTOTAL  --  5.0*   BILITOTAL  --  0.2   ALKPHOS  --  94   ALT  --  25   AST   --  23       Recent Results (from the past 24 hour(s))   Abdomen XR, 2 vw, flat and upright    Narrative    Exam: XR ABDOMEN 2 VW, 11/1/2018 8:58 PM    Indication: r/o sbo;     Comparison: Radiograph the abdomen 6/19/2018    Findings:   Upright and supine AP views of the abdomen. There are diffusely  dilated loops of small bowel throughout the abdomen, measuring up to  6.1 cm. No intraperitoneal free air. Lung bases are clear.      Impression    Impression: Diffuse dilation of the small bowel throughout the  abdomen, concerning for partial small bowel obstruction.    [Result: Small bowel obstruction]    Finding was identified on 11/1/2018 9:18 PM.     Dr. Craft was contacted by Dr. Ray at 11/1/2018 9:39 PM and  verbalized understanding of the urgent finding.     I have personally reviewed the examination and initial interpretation  and I agree with the findings.    MARY ANN VELA MD

## 2018-11-02 NOTE — PLAN OF CARE
Problem: Patient Care Overview  Goal: Plan of Care/Patient Progress Review  Outcome: No Change  Abdominal pain continues, fair relief with Dilaudid iv and Norco, passing gas, hyperactive bowel sounds, loose stools continue. Voiding spont, up ad dania, abdominal CT to be done this afternoon.

## 2018-11-02 NOTE — PROGRESS NOTES
"CLINICAL NUTRITION SERVICES - ASSESSMENT NOTE     Nutrition Prescription    RECOMMENDATIONS FOR MDs/PROVIDERS TO ORDER:  Diet adv v nutrition support within 2-3 days    Malnutrition Status:    Unable to determine due to pt not in room during attempts to visit    Recommendations already ordered by Registered Dietitian (RD):  None at this time     Future/Additional Recommendations:  1. Once/if diet advances, order supplements and/or scheduled snacks between meals to help optimize PO intake.  Order andi counts if pt eating <75% of meals TID (or the equivalent)  2. If unable to advance and tolerate diet in the next 2-3 days 2/2 SBO, recommend begin TPN     REASON FOR ASSESSMENT  Rachel A Gerhardt is a/an 42 year old female assessed by the dietitian for Admission Nutrition Risk Screen for unintentional loss of 10# or more in the past two months and reduced oral intake over the last month and RN Consult - \"Pt states she has lost ~10lbs in last month.\"    NUTRITION HISTORY  Per chart, pt reports recent difficulty keeping down food 2/2 nausea and vomiting.  Pt has had baseline nausea/vomiting (once daily), diarrhea, and abdominal pain since May 2017 when she had a bowel resection.  Pt has been on TPN in the past (per chart review was weaned off in June 2018 after diet advanced to and tolerated mechanical soft).  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  Admit with partial SBO and reported 10 lbs weight loss.    CURRENT NUTRITION ORDERS  Diet: NPO  Intake/Tolerance: NPO since admit yesterday    LABS  Labs reviewed    MEDICATIONS  Medications reviewed    ANTHROPOMETRICS  Height: 175.3 cm (5' 9\")  Most Recent Weight: 52 kg (114 lb 9.6 oz)    IBW: 65.9 kg   BMI: Underweight BMI <18.5  Weight History: Only 3 recent weight records found, but appears has weight has been mostly stable x 4 months.  Pt " did have a14 lb weight loss from 6/19/18 to 7/17/18.  Wt Readings from Last 10 Encounters:   11/02/18 52 kg (114 lb 9.6 oz)   09/20/18 48.6 kg (107 lb) per Care Everywhere   07/17/18 50.1 kg (110 lb 6.4 oz)   06/19/18 56.6 kg (124 lb 11.2 oz)   05/23/18 56.9 kg (125 lb 8 oz)   05/09/18 55.3 kg (122 lb)   05/02/18 55.5 kg (122 lb 4.8 oz)   04/30/18 55.4 kg (122 lb 2.2 oz)   04/24/18 55.7 kg (122 lb 12.7 oz)   03/29/18 60.3 kg (132 lb 14.4 oz)   02/13/18 64.9 kg (143 lb)      Dosing Weight: 52 kg (actual)    ASSESSED NUTRITION NEEDS  Estimated Energy Needs: 9169-4909 kcals/day (35 - 40 kcals/kg for PO/EN); 6511-3995 kcals/day (25 - 30 kcals/kg for PN)  Justification: Repletion and Underweight; aim lower range with sole PN to help prevent overfeeding liver  Estimated Protein Needs:  grams protein/day (1.5 - 2 grams of pro/kg)  Justification: Repletion  Estimated Fluid Needs: 2580-9379 mL/day (30 - 35 mL/kg)   Justification: Maintenance, or other per provider pending fluid status    PHYSICAL FINDINGS  See malnutrition section below.    MALNUTRITION  % Intake: Unable to assess  % Weight Loss: Unable to assess  Subcutaneous Fat Loss: Unable to assess  Muscle Loss: Unable to assess  Fluid Accumulation/Edema: None noted per chart review  Malnutrition Diagnosis: Unable to determine due to pt not in room during attempts to visit    NUTRITION DIAGNOSIS  Inadequate oral intake related to nausea/vomiting hindering PO intake as evidenced by pt reporting unable to keep down food 2/2 nausea/vomiting recently PTA    INTERVENTIONS  Implementation  Nutrition Education: Unable to complete due to pt not in room during visits     Goals  Diet adv v nutrition support within 2-3 days.     Monitoring/Evaluation  Progress toward goals will be monitored and evaluated per protocol.     Mylene Duvall, RD, LD  [address] RD pager: [phone]

## 2018-11-02 NOTE — CONSULTS
"Colorectal Surgery Consult    I was asked by Dr Galvez to evaluate for SBO    CC: Abdominal Pain    HPI: Rachel Gerhardt is a 42 year old female with a past medical history of cervical cancer s/p chemotherapy and radiation that has an ileal ileal anastomosis following 60 cm small bowel resection for strictures in 2017 and has been getting endoscopic dilations with gastroenterology, most recently in June of 2018, that was admitted to [facility] service yesterday for a small bowel obstruction with a defined transition point. At time of interview she relays a history of 1 month of worsening diffuse abdominal pain and worsening nausea and vomiting. It went until about 5 days ago when she could not take it anymore. Per pt, the last time she was able to keep food down without vomiting was 5 days prior but per [facility] service pt has been sneaking in additional PO intake such as sandwiches and cups of water. Interestingly, she says she is passing flatus and having multiple bowel movements a day (~5). She says she has been here for this same problem at least 6 times and was most recently discharged on 6/20/18 during which a colorectal surgery consult was obtained which recommended against surgical intervention at that time. Has not been on TPN for several months. Denies fevers, chills, chest pain, shortness of breath, dysuria.    ROS: 10 point ROS was conducted and negative except as mentioned in the HPI  PMHx: Cervical cancer s/p chemorads  PSHx: Ex lap with 60 cm of small bowel resected and ileal ileal anastomosis by Dr Jennifer Goodwin on 5/18/2017. Denies other abdominal surgeries  SH: Current smoker, no EtOH use  FHx: Denies FH of bleeding or anesthesia rxns    BP 93/69 (BP Location: Right arm)  Pulse 78  Temp 98.5  F (36.9  C) (Oral)  Resp 16  Ht 1.753 m (5' 9\")  Wt 52 kg (114 lb 9.6 oz)  SpO2 98%  BMI 16.92 kg/m2    Gen: Well appearing in nad  Eyes: No scleral icterus  Pulm: NLB on RA  CVS: " RRR  Abd: Soft, diffusely tender, distended. No r/g  Ext: Wwp no edema  Psych: Cooperative  Neuro: CN II-XII intact    WBC 8.1  K 4.1, Mg 1.1  Albumin 2.2  Prealbumin 19    CT Abd/Pelvis shows dilated loops of small bowel with focal transition point. 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 She may be a candidate for surgical revision of her anastomosis, but she is malnourished and there is no acute need for emergent intervention.    Plan:  - Recommend an NG tube given extent of small bowel dilation. I discussed this at length with the patient. She may still refuse it  - Recommend NPO  - Will re-evaluate the patient in the AM. Colorectal surgery will follow please call with questions    D/w colorectal fellow, Dr Dietrich    --  Isaias Quinonez  General Surgery PGY2

## 2018-11-02 NOTE — PHARMACY-ADMISSION MEDICATION HISTORY
Admission medication history interview status for the 11/1/2018 admission is complete. See Epic admission navigator for allergy information, pharmacy, prior to admission medications and immunization status.     Medication history interview sources: patient, outside pharmacy records    Changes made to PTA medication list (reason)  Added: Norco (per patient and outside pharmacy records)  Deleted: Percocet (per patient, she is only using Norco)  Changed:   - ondansetron: updated dose from 4 mg ODT every 6 hours as needed --> 8 mg tablet every 8 hours as needed    Additional medication history information (including reliability of information, actions taken by pharmacist):  - Patient is a reliable historian and could name medications, strengths, and directions.      Prior to Admission medications    Medication Sig Last Dose Taking? Auth Provider   HYDROcodone-acetaminophen (NORCO) 5-325 MG per tablet Take 1 tablet by mouth every 6 hours as needed for severe pain Past Week at Unknown time Yes Unknown, Entered By History   ondansetron (ZOFRAN) 8 MG tablet Take 8 mg by mouth every 8 hours as needed for nausea Past Week at Unknown time Yes Unknown, Entered By History   simethicone (MYLICON) 40 MG/0.6ML suspension Take 40 mg by mouth 4 times daily as needed for cramping Past Week at Unknown time Yes Reported, Patient         Medication history completed by: Bharti Rob, PharmD IV Student

## 2018-11-02 NOTE — H&P
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    Patient has baseline nausea, NBNB vomiting (once daily), diarrhea (4-5/daily), and abdominal pain since her bowel resection in May 2017. She has had multiple hospitalizations for SBOs, most recently June and July 2018 with a retrograde enteroscopy with balloon dilation on 6/18/18. She was managing her chronic symptoms since her last discharge with Zofran, Vicodin (5/325 Q6H PRN), and simethicone at home until about a week ago when things started to worsen. She started to have more episodes of vomiting per day (2-3) and increased abdominal pain not controlled with her Vicodin. Then a few days ago she started vomiting up everything she tried to eat.  However, was able to keep down fluids. Still is passing gas and the same amount of loose stools (4-5/daily). Did have one episode of bright red blood per rectum about 10 days ago. Has also noted about a 10 lbs weight loss recently. Denies fever, chills, headache, congestion, chest pain, SOB, abdominal distention, or LE swelling.    In the ED, her vitals were stable. Labs were notable for Na 146, K 3.2, Ca 7.4, Mg 1.1, Albumin 2.2, total protein 5.0. No elevated WBC, hgb 13.0. Xray of abdomen showed diffusely dilated loops of small bowel throughout the abdomen, measuring up to 6.1 cm. No  intraperitoneal free air. Lung bases are clear.  Concerning for partial small bowel obstruction. She was given IV zofran for nausea, IV dilaudid for pain, IV NS, and transferred to the floor in stable condition.         History:   Review of Systems   The 10 point Review of Systems is negative other than noted in the HPI or here.   Review of Systems    Past Medical History    I have reviewed this patient's medical history and updated it with pertinent information if needed.   Past Medical History:   Diagnosis Date     Asthma      Cancer (H)     Per patient OBGYN, cerivical cancer     Cervical cancer (H)      Other chronic pain      Ovarian cancer (H)      Substance abuse (H)     Outside records indicate past history of narcotics abuse or dependence, but patient denies.   SBO    Past Surgical History   I have reviewed this patient's surgical history and updated it with pertinent information if needed.  Past Surgical History:   Procedure Laterality Date     COLONOSCOPY N/A 5/3/2018    Procedure: COLONOSCOPY;  sigmoidoscopy;  Surgeon: Omero Vigil MD;  Location: [facility] OR     COLONOSCOPY WITH CO2 INSUFFLATION N/A 4/30/2018    Procedure: COLONOSCOPY WITH CO2 INSUFFLATION;  Colonoscopy;  Surgeon: Omero Vigil MD;  Location: [facility] OR     COMBINED CYSTOSCOPY, INSERT STENT URETER(S) Bilateral 5/18/2017    Procedure: COMBINED CYSTOSCOPY, INSERT STENT URETER(S);  Cystoscopy with Bilateral Stent,;  Surgeon: Rene Calero MD;  Location: [facility] OR     ENT SURGERY  2009    mastoid, sinus     ENTEROSCOPY SMALL BOWEL N/A 6/18/2018    Procedure: ENTEROSCOPY SMALL BOWEL;  Lower bowel Retrograde Enteroscopy with Balloon Dilation .;  Surgeon: Omero Vigil MD;  Location: [facility] OR     EXAM UNDER ANESTHESIA, INSERT ALEX SLEEVE, UTERINE PLACEMENT OF TANDEM AND RING FOR RAD, ULTRASOUND N/A 12/14/2015    Procedure: EXAM UNDER ANESTHESIA, INSERT ALEX SLEEVE, UTERINE PLACEMENT OF TANDEM AND RING FOR RADIATION,  ULTRASOUND GUIDED;  Surgeon: Abby Tony MD;  Location: [facility] OR     INSERT TANDEM AND CESIUM APPLICATOR CERVIX, ULTRASOUND GUIDED N/A 12/17/2015    Procedure: INSERT TANDEM AND CESIUM APPLICATOR CERVIX, ULTRASOUND GUIDED;  Surgeon: Kika Wood MD;  Location: [facility] OR     KNEE SURGERY       LAPAROTOMY EXPLORATORY N/A 5/18/2017    Procedure: LAPAROTOMY EXPLORATORY;   Exploratry Laparotomy, Small Bowel Resection with anastomosis, Flexible Sigmoidoscopy;  Surgeon: Jennifer Goodwin MD;  Location: [facility] OR     PICC INSERTION Right 04/29/2017    4fr SL BioFlo PICC, 37cm (3cm external) in the R basilic vein w/ tip in the mid SVC.     PICC INSERTION Right 03/29/2018    4Fr - 40cm (4cm external), R lateral brachial vein, Low SVC     RESECT SMALL BOWEL WITHOUT OSTOMY N/A 5/18/2017    Procedure: RESECT SMALL BOWEL WITHOUT OSTOMY;;  Surgeon: Jennifer Goodwin MD;  Location: [facility] OR     SIGMOIDOSCOPY FLEXIBLE N/A 5/18/2017    Procedure: SIGMOIDOSCOPY FLEXIBLE;;  Surgeon: Jennifer Goodwin MD;  Location: [facility] OR        Social History   Social History   Substance Use Topics     Smoking status: Light Tobacco Smoker     Packs/day: 0.25     Years: 12.00     Types: Cigarettes     Smokeless tobacco: Never Used      Comment: pt smoking about 4 cigs daily     Alcohol use No      Comment: None per pt       Family History   I have reviewed this patient's family history and updated it with pertinent information if needed.   Family History   Problem Relation Age of Onset     Diabetes Mother      Ovarian Cancer No family hx of      Uterine Cancer No family hx of      Cervical Cancer No family hx of      Breast Cancer No family hx of        Prior to Admission Medications   Prior to Admission Medications   Prescriptions Last Dose Informant Patient Reported? Taking?   ondansetron (ZOFRAN ODT) 4 MG ODT tab   No No   Sig: Take 1 tablet (4 mg) by mouth every 6 hours as needed for nausea   Patient taking differently: Take 4 mg by mouth daily May  take 1 tablet extra as needed.   oxyCODONE-acetaminophen (PERCOCET) 5-325 MG per tablet 11/1/2018 at Unknown time  Yes Yes   Sig: Take 1 tablet by mouth 5 times daily Max 6 tablets per day   simethicone (MYLICON) 40 MG/0.6ML suspension 11/1/2018 at Unknown time  Yes Yes   Sig: Take 40 mg by mouth 4 times daily as needed for cramping      Facility-Administered Medications: None     Allergies   Allergies   Allergen Reactions     No Clinical Screening - See Comments Other (See Comments) and Diarrhea     headache  Carrots cause gastric upset, cramping and diarrhea.     Sulfa Drugs Hives     hives     Amoxicillin Unknown and Other (See Comments)     vomiting  vomiting     Amoxicillin-Pot Clavulanate Other (See Comments) and Nausea     vomiting     Augmentin GI Disturbance, Nausea and Hives     Avelox [Moxifloxacin] Nausea and Vomiting, Unknown and Nausea     Ciprofloxacin Hives and Nausea     Codeine Nausea and Vomiting and Nausea     Ibuprofen Nausea and Vomiting     Other reaction(s): Nausea And Vomiting     Ibuprofen Sodium Hives and GI Disturbance     Quinolones      Tramadol Hives, Diarrhea, Nausea and Nausea and Vomiting     Daucus Carota      Other reaction(s): GI Upset  Other reaction(s): Abdominal pain, Diarrhea  Carrots cause gastric upset, cramping and diarrhea.           Physical Exam      Vital Signs: Temp: 98.2  F (36.8  C) Temp src: Oral BP: 124/72 Pulse: 86   Resp: 18 SpO2: 98 % O2 Device: None (Room air)    Weight: 107 lbs 2.3 oz    Physical Exam   Constitutional: She is oriented to person, place, and time. No distress.   Cachetic   HENT:   Head: Normocephalic and atraumatic.   Mouth/Throat: Oropharynx is clear and moist. No oropharyngeal exudate.   Eyes: Conjunctivae and EOM are normal. Pupils are equal, round, and reactive to light.   Neck: Neck supple.   Cardiovascular: Normal rate, regular rhythm, normal heart sounds and intact distal pulses.  Exam reveals no gallop and no friction rub.    No murmur  heard.  Pulmonary/Chest: Effort normal and breath sounds normal. No respiratory distress. She has no wheezes. She has no rales.   Abdominal: Bowel sounds are normal. She exhibits no distension. There is tenderness (generalized to palpation). There is guarding (mild). There is no rebound.   Tearful during parts of abdominal exam due to pain   Musculoskeletal: She exhibits no edema or tenderness (in LE extremities).   Lymphadenopathy:     She has no cervical adenopathy.   Neurological: She is alert and oriented to person, place, and time.   Skin: Skin is warm and dry.   Psychiatric: She has a normal mood and affect. Her behavior is normal. Judgment and thought content normal.       Assessment & Plan      Rachel A Gerhardt is a 42 year old female admitted on 11/1/2018. 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    # Acute on chronic abdominal pain  # Nausea/Vomiting  # Hx ileoileal anastomotic stricture  # Partial small bowel obstruction  Recommend standard treatment for SBO with NG to LIS and bowel rest with NPO. Patient has had adverse reactions to NG tubes in the past. Even with multiple doses of ativan before insertion, they set off a panic attack. Patient declines NG tube at this time. Still passing gas and stool. Was supposed to have endoscopic dilation during last admission in July, but left AMA.  - GI consult in AM for possible balloon dilation this admission  - NPO except for meds, ice chips, and small sips of water  - Maintenance fluid NS + 20 KCl at 100 mlhr  - Serial abdominal exams  - Pain management with home Vicodin 5/325 Q6H PRN and IV dilaudid 0.3-0.5 mg Q4H PRN for break though pain; will  attempt to minimize narcotics as much as possible  - IV Zofran PRN for nausea and compazine PRN  - Electrolyte replacement to keep Mg > 2 and K > 4  - Repeat BMP and Mg in AM    # Chronic diarrhea  Patient has chronic diarrhea s/p small bowel resection in 2017. Diarrhea most likely due to short gut syndrome. Less likely c diff or other infectious cause due to no elevation in WBC or fevers. No testing indicated at this time.   - Monitor electrolytes and replete as needed  - IV fluids    # Chronic protein malnutrition  # Weight loss  Patient unable to keep down food due to nausea and vomiting recently. Even when n/v is better, still only eats small meals. Has been on TPN before as well. Patient states that she has had a 10 lbs weight loss since last hospitalization (weight same from 9/20/18 clinic visit and only 3 lbs down from 7/17/18 hospitalization).  - NPO for now due to partial SBO  - Consult nutrition when able to advance patient's diet    # Opioid dependence  Seen at [facility] pain clinic where she gets her Vicodin 5/325 prescribed.  - Continue PTA pain regimen and IV dilaudid for breakthrough pain    # Tobacco dependence  - Nicotine gum PRN    # Pain Assessment:  Current Pain Score 7/18/2018   Patient currently in pain? yes   Pain score (0-10) -   Pain location Abdomen   Pain descriptors Aching;Constant;Throbbing   Some encounter information is confidential and restricted. Go to Review Flowsheets activity to see all data.   - Jenni is experiencing pain due to SBO. Pain management was discussed and the plan was created in a collaborative fashion.  Jenni's response to the current recommendations: engaged  - Please see the plan for pain management as documented above      Diet:  NPO except for meds, ice chips, and small sips of water  Fluids: NS + 20 KCL at 100 ml/hr  DVT Prophylaxis: Pneumatic Compression Devices and Ambulate every shift  Code Status: Full Code    Disposition Plan   Expected discharge: 4 - 7  days; recommended to prior living arrangement once adequate pain management/ tolerating PO medications. Dispo:          Entered: Minal Wilkins 11/01/2018, 11:49 PM   Information in the above section will display in the discharge planner report.    The patient was discussed with Dr. Vasquez.    Minal Wilkins    [facility]  [facility]   Pager: [phone]  Please see sticky note for cross cover information      Results:     Data     Recent Labs  Lab 11/01/18 2007   WBC 7.1   HGB 13.0   *      *   POTASSIUM 3.2*   CHLORIDE 108   CO2 32   BUN 12   CR 0.60   ANIONGAP 6   JONATAN 7.4*   GLC 95   ALBUMIN 2.2*   PROTTOTAL 5.0*   BILITOTAL 0.2   ALKPHOS 94   ALT 25   AST 23       Recent Results (from the past 24 hour(s))   Abdomen XR, 2 vw, flat and upright    Narrative    Exam: XR ABDOMEN 2 VW, 11/1/2018 8:58 PM    Indication: r/o sbo;     Comparison: Radiograph the abdomen 6/19/2018    Findings:   Upright and supine AP views of the abdomen. There are diffusely  dilated loops of small bowel throughout the abdomen, measuring up to  6.1 cm. No intraperitoneal free air. Lung bases are clear.      Impression    Impression: Diffuse dilation of the small bowel throughout the  abdomen, concerning for partial small bowel obstruction.    [Result: Small bowel obstruction]    Finding was identified on 11/1/2018 9:18 PM.     Dr. Craft was contacted by Dr. Ray at 11/1/2018 9:39 PM and  verbalized understanding of the urgent finding.     I have personally reviewed the examination and initial interpretation  and I agree with the findings.    MARY ANN VELA MD

## 2018-11-02 NOTE — CONSULTS
"    GASTROENTEROLOGY CONSULTATION      Date of Admission:  11/1/2018          ASSESSMENT AND RECOMMENDATIONS:   Assessment:  42 year old female well known to the service with a history of cervical cancer s/p chemoradiation c/b small bowel strictures and recurrent SBO and ex lap 5/18/17 with 60cm small bowel resection and resultant ileoileal anastomotic stricture. She has continued to struggle with recurrent bowel obstructions for which she underwent therapeutic balloon dilation to 18mm of that stricture June 2018 after which she had symptomatic improvement for 2 weeks but was re-admitted within one month with recurrent obstructive symptoms.    #. Recurrent SBO with history of ileoileal anastamotic and chemoradiation small bowel strictures:  Management of her recurrent symptoms remains a challenge. This is largely related to significant anxiety with the NG tube and persistsent nausea and vomiting with her golytely prep. As jordan GI note dated 6/19/18 that following her balloon dilation she had achieved maximal endoscopic therapy at that point. Specifically noted, \"only with durable effect would repeat dilation be considered.\"    Discussed with Dr. Vigil 11/2/18. In light of no durable response to balloon dilation there is no further endoscopic therapy available. We would recommend surgery evaluation for consideration of surgical management including ostomy to manage her recurrent and debilitating symptoms.     Recommendations:  -- Consult surgery for evaluation of potential surgical management of recurrent symptoms  -- Continue to minimize narcotics, consider pain-service consultation  -- In the interval continue with conservative management of partial SBO with bowel rest and NG placement for decompression    -- Trial bedside NG, if patient cannot tolerate consider NG placement under fluoroscopy  -- Once bowel obstruction resolved may consider bile acid sequestrant  -- Continue to monitor with serial abdominal " exams  -- Correct electrolyte imbalances  -- Anti-emetics per primary team, may consider palliative care consult for assistance  -- Inpatient GI will follow peripherally over the weekend    Thank you for involving us in this patient's care. Please do not hesitate to contact the GI service with any questions or concerns.     Pt care plan discussed with Dr. Akins, GI staff physician.    Zahida Ndiaye MD  GI Fellow  [phone]    ATTENDING ATTESTATION:    REFERRING PROVIDER: DIONNE Crow  DATE SEEN: 11/2/18    Patient was discussed, seen, and examined by me, Negro Akins. The plan of care and pertinent data/imaging were also reviewed with the GI Consult team and GI Fellow as well. Agree with the joint assessment and plan as delineated above.    Please contact me with any further questions.    Negro Akins MD    [facility] - Department of Medicine  Division of Gastroenterology  [phone]    -------------------------------------------------------------------------------------------------------------------          Chief Complaint:   We were asked by Dr. Lenny Tang of Family Medicine to evaluate this patient with recurrent small bowel obstruction.    History is obtained from the patient and the medical record.          History of Present Illness:   Rachel A Gerhardt is a 42 year old female well known to the service with a history of cervical cancer s/p chemoradiation c/b small bowel strictures and recurrent SBO and ex lap 5/18/17 with 60cm small bowel resection and resultant ileoileal anastomotic stricture. She has continued to struggle with recurrent bowel obstructions for which she underwent therapeutic balloon dilation to 18mm of that stricture June 2018 after which she had symptomatic improvement for 2 weeks but was re-admitted within one month with recurrent obstructive symptoms.    The patient reports that after her initial dilation in June she had some improvement in  her nausea and vomiting (for about 2-4 weeks at best). She was re-admitted in July for recurrent SBO. After discharge from that hospitalization she notes that she was feeling better until September. She started to have gradually progressive abdominal pain and diarrhea at that time with increased frequency of nausea and vomiting. She states that she got to the point where she was having 5-6 episodes of diarrhea/day (including today) and daily vomiting. This is what prompted her to come to the ED for evaluation again.    She denies any recent fevers, chills, hematemesis, melena, hematochezia. She has had about 10-15lbs of unintentional weight loss in the past couple of months.            Past Medical History:   Reviewed and edited as appropriate  Past Medical History:   Diagnosis Date     Asthma      Cancer (H)     Per patient OBGYN, cerivical cancer     Cervical cancer (H)      Other chronic pain      Ovarian cancer (H)      Substance abuse (H)     Outside records indicate past history of narcotics abuse or dependence, but patient denies.            Past Surgical History:   Reviewed and edited as appropriate   Past Surgical History:   Procedure Laterality Date     COLONOSCOPY N/A 5/3/2018    Procedure: COLONOSCOPY;  sigmoidoscopy;  Surgeon: Omero iVgil MD;  Location: [facility] OR     COLONOSCOPY WITH CO2 INSUFFLATION N/A 4/30/2018    Procedure: COLONOSCOPY WITH CO2 INSUFFLATION;  Colonoscopy;  Surgeon: Omero Vigil MD;  Location: [facility] OR     COMBINED CYSTOSCOPY, INSERT STENT URETER(S) Bilateral 5/18/2017    Procedure: COMBINED CYSTOSCOPY, INSERT STENT URETER(S);  Cystoscopy with Bilateral Stent,;  Surgeon: Rene Calero MD;  Location: [facility] OR     ENT SURGERY  2009    mastoid, sinus     ENTEROSCOPY SMALL BOWEL N/A 6/18/2018    Procedure: ENTEROSCOPY SMALL BOWEL;  Lower bowel Retrograde Enteroscopy with Balloon Dilation .;  Surgeon: Omero Vigil MD;  Location: [facility] OR     EXAM UNDER  ANESTHESIA, INSERT ALEX SLEEVE, UTERINE PLACEMENT OF TANDEM AND RING FOR RAD, ULTRASOUND N/A 12/14/2015    Procedure: EXAM UNDER ANESTHESIA, INSERT ALEX SLEEVE, UTERINE PLACEMENT OF TANDEM AND RING FOR RADIATION, ULTRASOUND GUIDED;  Surgeon: Abby Tony MD;  Location: [facility] OR     INSERT TANDEM AND CESIUM APPLICATOR CERVIX, ULTRASOUND GUIDED N/A 12/17/2015    Procedure: INSERT TANDEM AND CESIUM APPLICATOR CERVIX, ULTRASOUND GUIDED;  Surgeon: Kika Wood MD;  Location: [facility] OR     KNEE SURGERY       LAPAROTOMY EXPLORATORY N/A 5/18/2017    Procedure: LAPAROTOMY EXPLORATORY;   Exploratry Laparotomy, Small Bowel Resection with anastomosis, Flexible Sigmoidoscopy;  Surgeon: Jennifer Goodwin MD;  Location: [facility] OR     PICC INSERTION Right 04/29/2017    4fr SL BioFlo PICC, 37cm (3cm external) in the R basilic vein w/ tip in the mid SVC.     PICC INSERTION Right 03/29/2018    4Fr - 40cm (4cm external), R lateral brachial vein, Low SVC     RESECT SMALL BOWEL WITHOUT OSTOMY N/A 5/18/2017    Procedure: RESECT SMALL BOWEL WITHOUT OSTOMY;;  Surgeon: Jennifer Goodwin MD;  Location: [facility]     SIGMOIDOSCOPY FLEXIBLE N/A 5/18/2017    Procedure: SIGMOIDOSCOPY FLEXIBLE;;  Surgeon: Jennifer Goodwin MD;  Location: [facility] OR            Previous Endoscopy:     Results for orders placed or performed during the hospital encounter of 04/30/18   COLONOSCOPY   Result Value Ref Range    COLONOSCOPY       [address] [address] [phone]     Endoscopy Department  _______________________________________________________________________________  Patient Name: Rachel Gerhardt         Procedure Date: 4/30/2018 3:50 PM  MRN: [id number]                       Account Number: [id number]  YOB: 1975             Admit Type: Outpatient  Age: 42                               Room: OR  Gender: Female                        Note Status: Finalized  Attending MD: Omero Vigil MD   Total  Sedation Time:   _______________________________________________________________________________     Procedure:           Colonoscopy  Indications:         Therapeutic procedure  Providers:           Omero Vigil MD  Patient Profile:     Ms Gerhardt is a 43yo woman with cancer status post                        radiation therapy complicated by small bowel obstruction                        status post small bowel resection. She has evidence of                         anastomotic stenosis and now proceeds to retrograde                        enteroscopy. Of note she only received instructions for                        two enemas.  Referring MD:        Negro Akins MD, Jennifer Goodwin MD  Medicines:           Deep sedation was administered  Complications:       No immediate complications.  _______________________________________________________________________________  Procedure:           Pre-Anesthesia Assessment:                       - Prior to the procedure, a History and Physical was                        performed, and patient medications and allergies were                        reviewed. The patient is competent. The risks and                        benefits of the procedure and the sedation options and                        risks were discussed with the patient. All questions                        were answered and informed consent was obtained. Patient                        identification and proposed procedure were  verified by                        the nurse in the pre-procedure area. Mental Status                        Examination: alert and oriented. Airway Examination:                        Mallampati Class II (the uvula but not tonsillar pillars                        visualized). Respiratory Examination: clear to                        auscultation. CV Examination: normal. ASA Grade                        Assessment: II - A patient with mild systemic disease.                        After  reviewing the risks and benefits, the patient was                        deemed in satisfactory condition to undergo the                        procedure. The anesthesia plan was to use deep sedation                        / analgesia. Immediately prior to administration of                        medications, the patient was re-assessed for adequacy to                        receive sedatives. The heart rate, respiratory rate,                        oxygen saturations, blood pressure, adequacy of                         pulmonary ventilation, and response to care were                        monitored throughout the procedure. The physical status                        of the patient was re-assessed after the procedure.                        After obtaining informed consent, the colonoscope was                        passed under direct vision. Throughout the procedure,                        the patient's blood pressure, pulse, and oxygen                        saturations were monitored continuously. The enteroscope                        was introduced through the anus and advanced to the                        cecum, identified by appendiceal orifice and ileocecal                        valve. The colonoscopy was performed without difficulty.                        The patient tolerated the procedure well. The quality of                        the bowel preparation was inadequate.                                                                                   Findings:       Digi neal exam demonstrated intact tone with liquid contents. An        eneteroscope and single ballooned overtube were advanced in concert to        the cecum. This was done with care as a moderate amount of liquid and        sold stool were found throughout the colon. However the solid stool        burden could not be cleared from the cecum precluding valve intubation.        The procedure was then aborted.                                                                                    Impression:          - Inadequate bowel preparation to allow visualization of                        the ileocecal valve  Recommendation:      - Standard outpatient deep sedation recovery with                        probable discharge home this afternoon                       - As this is the second event of inadequate bowel                        preparation due to errors in communication with our                        staff; I would understand if the patient did not wish to                        return  for the procedure, however if she does wish to                        return for repeat attempt at retrograde enteroscopy she                        will require at least a stanard colonoscopy bowel                        preparation (such as 4L Golytely) with continued bowel                        preparation as needed until completly clear                       - The findings and recommendations were discussed with                        the patient and their family                                                                                     electronically signed by CHAITANYA Vigil  _____________________  Omero Vigil MD  4/30/2018 4:43:24 PM  I was physically present for the entire viewing portion of the exam.  __________________________  Signature of teaching physician  [id number] Vigil MD  Number of Addenda: 0    Note Initiated On: 4/30/2018 3:50 PM  Scope In:  Scope Out:              Social History:   Reviewed and edited as appropriate  Social History     Social History     Marital status:      Spouse name: N/A     Number of children: N/A     Years of education: N/A     Occupational History     Not on file.     Social History Main Topics     Smoking status: Light Tobacco Smoker     Packs/day: 0.25     Years: 12.00     Types: Cigarettes     Smokeless tobacco: Never Used      Comment: pt smoking about 4 cigs daily     Alcohol use No       Comment: None per pt     Drug use: No      Comment: Patient denies.  Outside records indicate possible Opiate abuse.     Sexual activity: Yes     Partners: Male     Birth control/ protection: None     Other Topics Concern     Parent/Sibling W/ Cabg, Mi Or Angioplasty Before 65f 55m? No     Social History Narrative    Lives alone          Family History:   Reviewed and edited as appropriate  No known history of gastrointestinal/liver disease or  gastrointestinal malignancies       Allergies:   Reviewed and edited as appropriate     Allergies   Allergen Reactions     No Clinical Screening - See Comments Other (See Comments) and Diarrhea     headache  Carrots cause gastric upset, cramping and diarrhea.     Sulfa Drugs Hives     hives     Amoxicillin Unknown and Other (See Comments)     vomiting  vomiting     Amoxicillin-Pot Clavulanate Other (See Comments) and Nausea     vomiting     Augmentin GI Disturbance, Nausea and Hives     Avelox [Moxifloxacin] Nausea and Vomiting, Unknown and Nausea     Ciprofloxacin Hives and Nausea     Codeine Nausea and Vomiting and Nausea     Ibuprofen Nausea and Vomiting     Other reaction(s): Nausea And Vomiting     Ibuprofen Sodium Hives and GI Disturbance     Quinolones      Tramadol Hives, Diarrhea, Nausea and Nausea and Vomiting     Daucus Carota      Other reaction(s): GI Upset  Other reaction(s): Abdominal pain, Diarrhea  Carrots cause gastric upset, cramping and diarrhea.            Medications:     Current Facility-Administered Medications   Medication     0.9% sodium chloride + KCl 20 mEq/L infusion     HYDROcodone-acetaminophen (NORCO) 5-325 MG per tablet 1 tablet     HYDROmorphone (PF) (DILAUDID) injection 0.3 mg     lidocaine (LMX4) cream     lidocaine 1 % 1 mL     magnesium sulfate 2 g in NS intermittent infusion ([facility] or [facility] Cmpd)     magnesium sulfate 4 g in 100 mL sterile water (premade)     melatonin tablet 1 mg     naloxone (NARCAN) injection 0.1-0.4 mg     nicotine  "polacrilex (NICORETTE) gum 2-4 mg     ondansetron (ZOFRAN-ODT) ODT tab 4 mg    Or     ondansetron (ZOFRAN) injection 4 mg     potassium chloride (KLOR-CON) Packet 20-40 mEq     potassium chloride 10 mEq in 100 mL intermittent infusion with 10 mg lidocaine     potassium chloride 10 mEq in 100 mL sterile water intermittent infusion (premix)     potassium chloride 20 mEq in 50 mL intermittent infusion     potassium chloride SA (K-DUR/KLOR-CON M) CR tablet 20-40 mEq     prochlorperazine (COMPAZINE) injection 10 mg    Or     prochlorperazine (COMPAZINE) tablet 10 mg    Or     prochlorperazine (COMPAZINE) Suppository 25 mg     simethicone (MYLICON) suspension 40 mg     sodium chloride (PF) 0.9% PF flush 3 mL     sodium chloride (PF) 0.9% PF flush 3 mL             Review of Systems:   A complete review of systems was performed and is negative except as noted in the HPI           Physical Exam:   BP 93/66 (BP Location: Left arm)  Pulse 75  Temp 96.3  F (35.7  C) (Oral)  Resp 18  Ht 1.753 m (5' 9\")  Wt 52 kg (114 lb 9.6 oz)  SpO2 98%  BMI 16.92 kg/m2  Wt:   Wt Readings from Last 2 Encounters:   11/02/18 52 kg (114 lb 9.6 oz)   07/17/18 50.1 kg (110 lb 6.4 oz)      Constitutional: Ms. Gerhardt is sitting up, she is cooperative, pleasant, not dyspneic/diaphoretic, no acute distress  Eyes: Sclera anicteric/injected  Ears/nose/mouth/throat: Normal oropharynx without ulcers or exudate, mucus membranes moist, hearing intact  CV: RRR w/ no M/R/G, no edema  Respiratory: CTAB with unlabored breathing  Abd: Distended, hyperactive high pitched bowel sounds.   Skin: warm, perfused, no jaundice  Neuro: AAO x 3  Psych: Normal affect  MSK: No gross deformities         Data:   Labs and imaging below were independently reviewed and interpreted    BMP  Recent Labs  Lab 11/02/18  0731 11/01/18 2007   * 146*   POTASSIUM 3.4 3.2*   CHLORIDE 111* 108   JONATAN 6.8* 7.4*   CO2 28 32   BUN 11 12   CR 0.62 0.60   * 95 "     CBC  Recent Labs  Lab 11/01/18 2007   WBC 7.1   RBC 4.02   HGB 13.0   HCT 40.8   *   MCH 32.3   MCHC 31.9   RDW 12.7        INRNo lab results found in last 7 days.  LFTs  Recent Labs  Lab 11/01/18 2007   ALKPHOS 94   AST 23   ALT 25   BILITOTAL 0.2   PROTTOTAL 5.0*   ALBUMIN 2.2*      PANCNo lab results found in last 7 days.    Imaging:  XR Abd:     Findings:   Upright and supine AP views of the abdomen. There are diffusely  dilated loops of small bowel throughout the abdomen, measuring up to  6.1 cm. No intraperitoneal free air. Lung bases are clear.         Impression: Diffuse dilation of the small bowel throughout the  abdomen, concerning for partial small bowel obstruction.

## 2018-11-02 NOTE — ED PROVIDER NOTES
History     Chief Complaint   Patient presents with     Abdominal Pain     Nausea, Vomiting, & Diarrhea     HPI  Rachel A Gerhardt is a 42 year old female with a history of small bowel obstruction, cervical and ovarian cancer, substance abuse who presents to the Emergency Department for the evaluation of abdominal pain, nausea, vomiting. Patient states for the past 4-5 days she has had worsening abdominal pain and generalized weakness with vomiting and diarrhea. Patient states she is able to keep fluids down and occasionally foods down in small quantities. Patient also notes a 12 pound weight loss. Patient notes that she has had diarrhea since her last surgery on 6/18/2018, a retrograde enteroscopy with balloon dilation. Patient denies known fever. She has been managing symptoms with Zofran, Vicodin, and Simeticone with minimal relief. Patient states her current symptoms are similar to previous bowel obstructions.     I have reviewed the Medications, Allergies, Past Medical and Surgical History, and Social History in the [employer] system.  Past Medical History:   Diagnosis Date     Asthma      Cancer (H)     Per patient OBGYN, cerivical cancer     Cervical cancer (H)      Other chronic pain      Ovarian cancer (H)      Substance abuse (H)     Outside records indicate past history of narcotics abuse or dependence, but patient denies.       Past Surgical History:   Procedure Laterality Date     COLONOSCOPY N/A 5/3/2018    Procedure: COLONOSCOPY;  sigmoidoscopy;  Surgeon: Omero Vigil MD;  Location: [facility] OR     COLONOSCOPY WITH CO2 INSUFFLATION N/A 4/30/2018    Procedure: COLONOSCOPY WITH CO2 INSUFFLATION;  Colonoscopy;  Surgeon: Omero Vigil MD;  Location: [facility]     COMBINED CYSTOSCOPY, INSERT STENT URETER(S) Bilateral 5/18/2017    Procedure: COMBINED CYSTOSCOPY, INSERT STENT URETER(S);  Cystoscopy with Bilateral Stent,;  Surgeon: Rene Calero MD;  Location: [facility] OR     ENT SURGERY  2009     mastoid, sinus     ENTEROSCOPY SMALL BOWEL N/A 6/18/2018    Procedure: ENTEROSCOPY SMALL BOWEL;  Lower bowel Retrograde Enteroscopy with Balloon Dilation .;  Surgeon: Omero Vigil MD;  Location: [facility]     EXAM UNDER ANESTHESIA, INSERT ALEX SLEEVE, UTERINE PLACEMENT OF TANDEM AND RING FOR RAD, ULTRASOUND N/A 12/14/2015    Procedure: EXAM UNDER ANESTHESIA, INSERT ALEX SLEEVE, UTERINE PLACEMENT OF TANDEM AND RING FOR RADIATION, ULTRASOUND GUIDED;  Surgeon: Abby Tony MD;  Location: [facility]     INSERT TANDEM AND CESIUM APPLICATOR CERVIX, ULTRASOUND GUIDED N/A 12/17/2015    Procedure: INSERT TANDEM AND CESIUM APPLICATOR CERVIX, ULTRASOUND GUIDED;  Surgeon: Kika Wood MD;  Location: [facility] OR     KNEE SURGERY       LAPAROTOMY EXPLORATORY N/A 5/18/2017    Procedure: LAPAROTOMY EXPLORATORY;   Exploratry Laparotomy, Small Bowel Resection with anastomosis, Flexible Sigmoidoscopy;  Surgeon: Jennifer Goodwin MD;  Location: [facility]     PICC INSERTION Right 04/29/2017    4fr SL BioFlo PICC, 37cm (3cm external) in the R basilic vein w/ tip in the mid SVC.     PICC INSERTION Right 03/29/2018    4Fr - 40cm (4cm external), R lateral brachial vein, Low SVC     RESECT SMALL BOWEL WITHOUT OSTOMY N/A 5/18/2017    Procedure: RESECT SMALL BOWEL WITHOUT OSTOMY;;  Surgeon: Jennifer Goodwin MD;  Location: [facility]     SIGMOIDOSCOPY FLEXIBLE N/A 5/18/2017    Procedure: SIGMOIDOSCOPY FLEXIBLE;;  Surgeon: Jennifer Goodwin MD;  Location: [facility]       Family History   Problem Relation Age of Onset     Diabetes Mother      Ovarian Cancer No family hx of      Uterine Cancer No family hx of      Cervical Cancer No family hx of      Breast Cancer No family hx of        Social History   Substance Use Topics     Smoking status: Light Tobacco Smoker     Packs/day: 0.25     Years: 12.00     Types: Cigarettes     Smokeless tobacco: Never Used      Comment: pt smoking about 4 cigs daily     Alcohol use No      Comment: None per pt        Current Facility-Administered Medications   Medication     0.9% sodium chloride + KCl 20 mEq/L infusion     HYDROcodone-acetaminophen (NORCO) 5-325 MG per tablet 1 tablet     HYDROmorphone (PF) (DILAUDID) injection 0.3-0.5 mg     lidocaine (LMX4) cream     lidocaine 1 % 1 mL     magnesium sulfate 2 g in NS intermittent infusion ([facility] or [facility] Cmpd)     magnesium sulfate 4 g in 100 mL sterile water (premade)     melatonin tablet 1 mg     naloxone (NARCAN) injection 0.1-0.4 mg     nicotine polacrilex (NICORETTE) gum 2-4 mg     ondansetron (ZOFRAN-ODT) ODT tab 4 mg    Or     ondansetron (ZOFRAN) injection 4 mg     potassium chloride (KLOR-CON) Packet 20-40 mEq     potassium chloride 10 mEq in 100 mL intermittent infusion with 10 mg lidocaine     potassium chloride 10 mEq in 100 mL sterile water intermittent infusion (premix)     potassium chloride 20 mEq in 50 mL intermittent infusion     potassium chloride SA (K-DUR/KLOR-CON M) CR tablet 20-40 mEq     prochlorperazine (COMPAZINE) injection 10 mg    Or     prochlorperazine (COMPAZINE) tablet 10 mg    Or     prochlorperazine (COMPAZINE) Suppository 25 mg     simethicone (MYLICON) suspension 40 mg     sodium chloride (PF) 0.9% PF flush 3 mL     sodium chloride (PF) 0.9% PF flush 3 mL        Allergies   Allergen Reactions     No Clinical Screening - See Comments Other (See Comments) and Diarrhea     headache  Carrots cause gastric upset, cramping and diarrhea.     Sulfa Drugs Hives     hives     Amoxicillin Unknown and Other (See Comments)     vomiting  vomiting     Amoxicillin-Pot Clavulanate Other (See Comments) and Nausea     vomiting     Augmentin GI Disturbance, Nausea and Hives     Avelox [Moxifloxacin] Nausea and Vomiting, Unknown and Nausea     Ciprofloxacin Hives and Nausea     Codeine Nausea and Vomiting and Nausea     Ibuprofen Nausea and Vomiting     Other reaction(s): Nausea And Vomiting     Ibuprofen Sodium Hives and GI Disturbance     Quinolones   "    Tramadol Hives, Diarrhea, Nausea and Nausea and Vomiting     Daucus Carota      Other reaction(s): GI Upset  Other reaction(s): Abdominal pain, Diarrhea  Carrots cause gastric upset, cramping and diarrhea.       Review of Systems   Constitutional: Negative for fever.   Gastrointestinal: Positive for abdominal pain, diarrhea, nausea and vomiting.   Neurological: Positive for weakness.   All other systems reviewed and are negative.      Physical Exam   BP: 109/84  Pulse: 99  Temp: 99  F (37.2  C)  Resp: 16  Height: 175.3 cm (5' 9\")  Weight: 48.6 kg (107 lb 2.3 oz)  SpO2: 99 %      Physical Exam   Constitutional: She is oriented to person, place, and time. Vital signs are normal. She appears cachectic. No distress.   Patient is in obvious discomfort   HENT:   Head: Normocephalic and atraumatic.   Eyes: EOM are normal. Pupils are equal, round, and reactive to light. No scleral icterus.   Neck: Normal range of motion. Neck supple.   Cardiovascular: Normal rate.    Pulmonary/Chest: Effort normal.   Abdominal: Soft. There is tenderness.   Diffusely tender   Neurological: She is alert and oriented to person, place, and time.   Skin: Skin is warm and dry. No rash noted. She is not diaphoretic. No erythema. No pallor.       ED Course   7:36 PM  The patient was seen and examined by Dada Craft MD in Room [facility].     ED Course     Procedures             Critical Care time:  none             Labs Ordered and Resulted from Time of ED Arrival Up to the Time of Departure from the ED   COMPREHENSIVE METABOLIC PANEL - Abnormal; Notable for the following:        Result Value    Sodium 146 (*)     Potassium 3.2 (*)     Calcium 7.4 (*)     Albumin 2.2 (*)     Protein Total 5.0 (*)     All other components within normal limits   CBC WITH PLATELETS DIFFERENTIAL - Abnormal; Notable for the following:      (*)     All other components within normal limits   MAGNESIUM - Abnormal; Notable for the following:     Magnesium 1.1 (*)  "    All other components within normal limits            Assessments & Plan (with Medical Decision Making)   This is a 42-year-old female patient coming into emergency room with complaints of abdominal pain, nausea, vomiting diarrhea for 1 week.  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  She has had multiple admissions for the small bowel obstructions in the past.  All labs and imaging are reviewed which does show a return of her small bowel obstruction.  IV fluids were started and she was provided with IV Dilaudid and Zofran.  She had significant improvement of her symptoms but continues to be nauseous.  She adamantly refuses to have a nasogastric tube.  The case was discussed with medicine and at this time they will accept to their service for continued care and management.    I have reviewed the nursing notes.    I have reviewed the findings, diagnosis, plan and need for follow up with the patient.    Current Discharge Medication List          Final diagnoses:   SBO (small bowel obstruction) (H)     ILuiz, am serving as a trained medical scribe to document services personally performed by Dada Craft MD, based on the provider's statements to me.   Dada MARCANO MD, was physically present and have reviewed and verified the accuracy of this note documented by Luiz Zhang.    11/1/2018   [facility], [place], EMERGENCY DEPARTMENT     Dada Craft MD  11/02/18 0102

## 2018-11-02 NOTE — ED TRIAGE NOTES
Pt comes in with abdominal pain, N/V/D x 1 week. She states that she has hx of gastric cancer and now has intestinal strictures that cause severe pain. She has been taking oxycodone without relief.

## 2018-11-03 LAB
ALBUMIN SERPL-MCNC: 2.1 G/DL (ref 3.4–5)
ALP SERPL-CCNC: 81 U/L (ref 40–150)
ALT SERPL W P-5'-P-CCNC: 19 U/L (ref 0–50)
ANION GAP SERPL CALCULATED.3IONS-SCNC: 6 MMOL/L (ref 3–14)
AST SERPL W P-5'-P-CCNC: 14 U/L (ref 0–45)
BASOPHILS # BLD AUTO: 0 10E9/L (ref 0–0.2)
BASOPHILS NFR BLD AUTO: 0.3 %
BILIRUB SERPL-MCNC: 0.1 MG/DL (ref 0.2–1.3)
BUN SERPL-MCNC: 8 MG/DL (ref 7–30)
CALCIUM SERPL-MCNC: 7.4 MG/DL (ref 8.5–10.1)
CHLORIDE SERPL-SCNC: 113 MMOL/L (ref 94–109)
CO2 SERPL-SCNC: 25 MMOL/L (ref 20–32)
CREAT SERPL-MCNC: 0.55 MG/DL (ref 0.52–1.04)
DIFFERENTIAL METHOD BLD: ABNORMAL
EOSINOPHIL # BLD AUTO: 0.1 10E9/L (ref 0–0.7)
EOSINOPHIL NFR BLD AUTO: 1.1 %
ERYTHROCYTE [DISTWIDTH] IN BLOOD BY AUTOMATED COUNT: 12.9 % (ref 10–15)
GFR SERPL CREATININE-BSD FRML MDRD: >90 ML/MIN/1.7M2
GLUCOSE SERPL-MCNC: 90 MG/DL (ref 70–99)
HCT VFR BLD AUTO: 37.8 % (ref 35–47)
HGB BLD-MCNC: 11.7 G/DL (ref 11.7–15.7)
IMM GRANULOCYTES # BLD: 0 10E9/L (ref 0–0.4)
IMM GRANULOCYTES NFR BLD: 0.1 %
LYMPHOCYTES # BLD AUTO: 1.7 10E9/L (ref 0.8–5.3)
LYMPHOCYTES NFR BLD AUTO: 22.8 %
MAGNESIUM SERPL-MCNC: 1.8 MG/DL (ref 1.6–2.3)
MCH RBC QN AUTO: 32.1 PG (ref 26.5–33)
MCHC RBC AUTO-ENTMCNC: 31 G/DL (ref 31.5–36.5)
MCV RBC AUTO: 104 FL (ref 78–100)
MONOCYTES # BLD AUTO: 0.3 10E9/L (ref 0–1.3)
MONOCYTES NFR BLD AUTO: 4.4 %
NEUTROPHILS # BLD AUTO: 5.4 10E9/L (ref 1.6–8.3)
NEUTROPHILS NFR BLD AUTO: 71.3 %
NRBC # BLD AUTO: 0 10*3/UL
NRBC BLD AUTO-RTO: 0 /100
PLATELET # BLD AUTO: 223 10E9/L (ref 150–450)
POTASSIUM SERPL-SCNC: 4.3 MMOL/L (ref 3.4–5.3)
PROT SERPL-MCNC: 4.7 G/DL (ref 6.8–8.8)
RBC # BLD AUTO: 3.64 10E12/L (ref 3.8–5.2)
SODIUM SERPL-SCNC: 144 MMOL/L (ref 133–144)
TRANSFERRIN SERPL-MCNC: 180 MG/DL (ref 210–360)
WBC # BLD AUTO: 7.5 10E9/L (ref 4–11)

## 2018-11-03 PROCEDURE — 12000001 ZZH R&B MED SURG/OB UMMC

## 2018-11-03 PROCEDURE — 84466 ASSAY OF TRANSFERRIN: CPT | Performed by: STUDENT IN AN ORGANIZED HEALTH CARE EDUCATION/TRAINING PROGRAM

## 2018-11-03 PROCEDURE — 25000128 H RX IP 250 OP 636: Performed by: STUDENT IN AN ORGANIZED HEALTH CARE EDUCATION/TRAINING PROGRAM

## 2018-11-03 PROCEDURE — 25000125 ZZHC RX 250: Performed by: STUDENT IN AN ORGANIZED HEALTH CARE EDUCATION/TRAINING PROGRAM

## 2018-11-03 PROCEDURE — 40000141 ZZH STATISTIC PERIPHERAL IV START W/O US GUIDANCE

## 2018-11-03 PROCEDURE — 25000132 ZZH RX MED GY IP 250 OP 250 PS 637: Performed by: STUDENT IN AN ORGANIZED HEALTH CARE EDUCATION/TRAINING PROGRAM

## 2018-11-03 PROCEDURE — 25000132 ZZH RX MED GY IP 250 OP 250 PS 637: Performed by: COLON & RECTAL SURGERY

## 2018-11-03 PROCEDURE — 80053 COMPREHEN METABOLIC PANEL: CPT | Performed by: STUDENT IN AN ORGANIZED HEALTH CARE EDUCATION/TRAINING PROGRAM

## 2018-11-03 PROCEDURE — 85025 COMPLETE CBC W/AUTO DIFF WBC: CPT | Performed by: STUDENT IN AN ORGANIZED HEALTH CARE EDUCATION/TRAINING PROGRAM

## 2018-11-03 PROCEDURE — 25000131 ZZH RX MED GY IP 250 OP 636 PS 637: Performed by: STUDENT IN AN ORGANIZED HEALTH CARE EDUCATION/TRAINING PROGRAM

## 2018-11-03 PROCEDURE — 83735 ASSAY OF MAGNESIUM: CPT | Performed by: STUDENT IN AN ORGANIZED HEALTH CARE EDUCATION/TRAINING PROGRAM

## 2018-11-03 RX ORDER — HYDROMORPHONE HCL/0.9% NACL/PF 0.2MG/0.2
0.2 SYRINGE (ML) INTRAVENOUS ONCE
Status: COMPLETED | OUTPATIENT
Start: 2018-11-03 | End: 2018-11-03

## 2018-11-03 RX ORDER — HYDROCODONE BITARTRATE AND ACETAMINOPHEN 5; 325 MG/1; MG/1
1 TABLET ORAL EVERY 4 HOURS PRN
Status: DISCONTINUED | OUTPATIENT
Start: 2018-11-03 | End: 2018-11-04

## 2018-11-03 RX ORDER — POLYETHYLENE GLYCOL 3350 17 G/17G
17 POWDER, FOR SOLUTION ORAL 2 TIMES DAILY
Status: DISCONTINUED | OUTPATIENT
Start: 2018-11-03 | End: 2018-11-05

## 2018-11-03 RX ORDER — HYDROMORPHONE HCL/0.9% NACL/PF 0.2MG/0.2
0.2 SYRINGE (ML) INTRAVENOUS EVERY 4 HOURS PRN
Status: DISCONTINUED | OUTPATIENT
Start: 2018-11-03 | End: 2018-11-03

## 2018-11-03 RX ADMIN — SODIUM PHOSPHATE 1 ENEMA: 7; 19 ENEMA RECTAL at 16:02

## 2018-11-03 RX ADMIN — Medication 0.3 MG: at 03:49

## 2018-11-03 RX ADMIN — Medication 0.2 MG: at 12:19

## 2018-11-03 RX ADMIN — HYDROCODONE BITARTRATE AND ACETAMINOPHEN 1 TABLET: 5; 325 TABLET ORAL at 23:59

## 2018-11-03 RX ADMIN — Medication 2 G: at 23:59

## 2018-11-03 RX ADMIN — SIMETHICONE 40 MG: 20 SUSPENSION/ DROPS ORAL at 16:01

## 2018-11-03 RX ADMIN — HYDROCODONE BITARTRATE AND ACETAMINOPHEN 1 TABLET: 5; 325 TABLET ORAL at 09:02

## 2018-11-03 RX ADMIN — Medication 0.3 MG: at 07:42

## 2018-11-03 RX ADMIN — SIMETHICONE 40 MG: 20 SUSPENSION/ DROPS ORAL at 23:59

## 2018-11-03 RX ADMIN — POLYETHYLENE GLYCOL 3350 17 G: 17 POWDER, FOR SOLUTION ORAL at 19:57

## 2018-11-03 RX ADMIN — SIMETHICONE 40 MG: 20 SUSPENSION/ DROPS ORAL at 04:51

## 2018-11-03 RX ADMIN — POTASSIUM CHLORIDE AND SODIUM CHLORIDE: 900; 150 INJECTION, SOLUTION INTRAVENOUS at 12:20

## 2018-11-03 RX ADMIN — HYDROCODONE BITARTRATE AND ACETAMINOPHEN 1 TABLET: 5; 325 TABLET ORAL at 19:57

## 2018-11-03 RX ADMIN — ONDANSETRON 4 MG: 4 TABLET, ORALLY DISINTEGRATING ORAL at 15:59

## 2018-11-03 RX ADMIN — SIMETHICONE 40 MG: 20 SUSPENSION/ DROPS ORAL at 12:20

## 2018-11-03 RX ADMIN — HYDROCODONE BITARTRATE AND ACETAMINOPHEN 1 TABLET: 5; 325 TABLET ORAL at 15:59

## 2018-11-03 RX ADMIN — ONDANSETRON HYDROCHLORIDE 4 MG: 2 INJECTION INTRAMUSCULAR; INTRAVENOUS at 07:43

## 2018-11-03 RX ADMIN — POTASSIUM CHLORIDE AND SODIUM CHLORIDE: 900; 150 INJECTION, SOLUTION INTRAVENOUS at 02:50

## 2018-11-03 RX ADMIN — HYDROCODONE BITARTRATE AND ACETAMINOPHEN 1 TABLET: 5; 325 TABLET ORAL at 02:53

## 2018-11-03 ASSESSMENT — ACTIVITIES OF DAILY LIVING (ADL)
ADLS_ACUITY_SCORE: 11

## 2018-11-03 ASSESSMENT — PAIN DESCRIPTION - DESCRIPTORS
DESCRIPTORS: CRAMPING

## 2018-11-03 NOTE — PLAN OF CARE
Problem: Patient Care Overview  Goal: Plan of Care/Patient Progress Review  Outcome: No Change  VSS. NPO. Patient had 2 emesis which were treated c Zofran c some relief. Encourage patient to save urine and stool for I & O. Patient stated she had urgency c liquid stool and voided. Did not save. Ambulated halls independently. Pain controled c Norco po and Dilaudid IV. Patient would like to be woken for pain medication when due. MIV @ 100 ml/hr. Continue c POC.

## 2018-11-03 NOTE — PLAN OF CARE
Problem: Patient Care Overview  Goal: Plan of Care/Patient Progress Review  Outcome: Improving  Abdominal cramping continues, weaning off IV Dilaudid, now only taking Norco. Loose stools continue,voiding spont, not always saving, NPO. Up ad dania.

## 2018-11-03 NOTE — PLAN OF CARE
Problem: Patient Care Overview  Goal: Plan of Care/Patient Progress Review  Outcome: No Change  AVSS on RA. Pt is taking prn norco and prn dilaudid for abd pain management, would like pain meds when available, does not call for them. Strict I&O, pt voided x1 overnight but did not use hat in bathroom. Passing gas, +BS. x2 BM overnight. NPO except meds and chips, denies nausea. Ambulated to bathroom and in hallway ind. PIV infusing IVMF @ 100 mL/hr. Continue w/ pain management, continue w/ POC.

## 2018-11-03 NOTE — PROGRESS NOTES
[facility] Progress Note - [place]       Main Plans for Today   - Reiterating bowel rest needs  - NG tube suggested and declined  - Colorectal surg and GI following   - Pain meds to PO    Assessment & Plan   Rachel A Gerhardt is a 42 year old female admitted on 11/1/2018. 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     # Acute on chronic abdominal pain  # Nausea/Vomiting  # Hx ileoileal anastomotic stricture  # Partial small bowel obstruction  Bowel rest with NPO except meds, though concerns with adherence. Still passing gas and stool, soft BMs.   - GI and colorectal following appreciate recs  - NPO except for meds, ice chips, and small sips of water  - NG recommended for decompression   - Maintenance fluid NS + 20 KCl at 100 mlhr  - Serial abdominal exams  - Pt does refuse NG placement due to anxiety, including medications with placement   - Pain management with Norco 5/325 Q4H PRN; will attempt to wean narcotics  - IV Zofran PRN for nausea and compazine PRN  - Electrolyte replacement to keep Mg > 2 and K > 4, prealbumin ordered    # Chronic diarrhea  Patient has chronic diarrhea s/p small bowel resection in 2017. Diarrhea most likely due to short gut syndrome. Less likely c diff or other infectious cause due to no elevation in WBC or fevers. No testing indicated at this time.   - Monitor electrolytes and replete as needed  - IV fluids     # Chronic protein malnutrition  # Weight loss  Patient unable to keep down food due to nausea and vomiting recently. Even when n/v is better, still only eats small meals. Has been on TPN before as well.  Patient states that she has had a 10 lbs weight loss since last hospitalization (weight same from 9/20/18 clinic visit and only 3 lbs down from 7/17/18 hospitalization).  - NPO for now due to partial SBO, though concerns of adherence   - Consider nutrition consult     # Opioid dependence  Seen at [facility] pain clinic where she gets her Vicodin 5/325 prescribed.  - Goal to resume PTA pain regimen      # Tobacco dependence  - Nicotine gum PRN    # Pain Assessment:  Current Pain Score 11/3/2018   Patient currently in pain? yes   Pain score (0-10) -   Pain location Abdomen   Pain descriptors Cramping   Some encounter information is confidential and restricted. Go to Review Flowsheets activity to see all data.   - Jenni is experiencing pain due to abdominal and chronic pain. Pain management was discussed and the plan was created in a collaborative fashion.  Jenni's response to the current recommendations: mixed response  - Please see the plan for pain management as documented above    Diet: NPO for Medical/Clinical Reasons Except for: Meds, Ice Chips  Fluids: NS + 20K @100 mL/hr  DVT Prophylaxis: Pneumatic Compression Devices  Code Status: Full Code    Disposition Plan   Expected discharge: 2 - 3 days, recommended to prior living arrangement once adequate pain management/ tolerating PO medications. Dispo: Expected Discharge Date: 11/04/18        Entered: John Dc 11/03/2018, 2:56 PM   Information in the above section will display in the discharge planner report.      The patient's care was discussed with and seen by the Attending Physician, Dr. Crow.    John Dc,   [facility]  Pager: [phone]  Please see sticky note for cross cover information    Interval History   Pt having soft BMs and gas. Abdominal pain still present. Ambulating. Concerns of adherence to NPO order. Reiterated that pt needs bowel rest, ambulation, and should be weaning opiates as they worsen  SBO. No SOB. Recommending NG tube and pt declines, unless conscious sedation with ativan for entirety of NG placement.     Physical Exam   Vital Signs: Temp: 97.4  F (36.3  C) Temp src: Oral BP: 100/60 Pulse: 76   Resp: 16 SpO2: 99 % O2 Device: None (Room air)    Weight: 121 lbs 9.6 oz    Constitutional: She is oriented to person, place, and time. No distress.   Cachetic   HENT:   Head: Normocephalic and atraumatic.   Mouth/Throat: Oropharynx is clear and moist. No oropharyngeal exudate.   Eyes: Conjunctivae and EOM are normal. Pupils are equal, round, and reactive to light.   Neck: Neck supple.   Cardiovascular: Normal rate, regular rhythm, normal heart sounds and intact distal pulses.  Exam reveals no gallop and no friction rub.    No murmur heard.  Pulmonary/Chest: Effort normal and breath sounds normal. No respiratory distress. She has no wheezes. She has no rales.   Abdominal: Bowel sounds are normal. She exhibits no distension. There is tenderness, though distractible. There is voluntary guarding (mild). There is no rebound.   Musculoskeletal: She exhibits no edema or tenderness.   Lymphadenopathy:     She has no cervical adenopathy.   Neurological: She is alert and oriented to person, place, and time.   Skin: Skin is warm and dry.   Psychiatric: She has a normal mood and affect.    Data     Recent Labs  Lab 11/03/18  1009 11/02/18  1743 11/02/18  0731 11/01/18 2007   WBC 7.5 8.1  --  7.1   HGB 11.7 11.6*  --  13.0   * 103*  --  102*    250  --  265    145* 146* 146*   POTASSIUM 4.3 4.1 3.4 3.2*   CHLORIDE 113* 112* 111* 108   CO2 25 28 28 32   BUN 8 10 11 12   CR 0.55 0.54 0.62 0.60   ANIONGAP 6 5 7 6   JONATAN 7.4* 6.9* 6.8* 7.4*   GLC 90 66* 102* 95   ALBUMIN 2.1* 2.2*  --  2.2*   PROTTOTAL 4.7* 4.8*  --  5.0*   BILITOTAL 0.1* 0.2  --  0.2   ALKPHOS 81 85  --  94   ALT 19 21  --  25   AST 14 13  --  23       No results found for this or any previous visit (from the past 24 hour(s)).

## 2018-11-03 NOTE — PROGRESS NOTES
"Colorectal Surgery Progress Note      Subjective:  Doing ok this AM - tends to do better in the morning vs evenings.  After last dilatation in summer had a hard time eating initially but then had about 2 months where she ate ok.  But then about mid-August obstructive symptoms started again slowly - emesis once a week, then progressing to a few times a week and then to daily.  Had a few emesis last night.  But also passing stools and gas.  Has not had TPN since last dilatation in the summer.  Pain feels like \"labor cramps.\"     Vitals:  Vitals:    11/02/18 2000 11/02/18 2222 11/03/18 0352 11/03/18 0732   BP: 103/73 93/61 91/53 113/87   BP Location: Left arm Left arm Right arm Left arm   Pulse: 77 83 56 74   Resp: 16 16 15 16   Temp: 98.9  F (37.2  C) 98  F (36.7  C) 97  F (36.1  C) 97  F (36.1  C)   TempSrc: Oral Oral Oral Oral   SpO2: 97% 96% 96% 99%   Weight:       Height:         I/O:  I/O last 3 completed shifts:  In: 2700 [P.O.:300; I.V.:2400]  Out: 650 [Urine:350; Emesis/NG output:300]    Physical Exam:  Gen: AAOx3, NAD  Pulm: Non-labored breathing  Abd: Soft, non-distended, mildly tender in the lower abdomen, no guarding/rebound  Ext:  Warm and well-perfused    BMP  Recent Labs  Lab 11/02/18 1743 11/02/18  0731 11/01/18 2007   * 146* 146*   POTASSIUM 4.1 3.4 3.2*   CHLORIDE 112* 111* 108   CO2 28 28 32   BUN 10 11 12   CR 0.54 0.62 0.60   GLC 66* 102* 95   MAG  --   --  1.1*     CBC  Recent Labs  Lab 11/02/18 1743 11/01/18 2007   WBC 8.1 7.1   HGB 11.6* 13.0   HCT 36.7 40.8    265         ASSESSMENT: This is a 42 year old female with PMH cervical cancer s/p chemo-rads developed SBO enteritis with multiple recurrent SBO to point of needing TPN at times, does have an ileal-ileal anastomosis for a SBR in 2017 and has been receiving endoscopic dilatations with Gastroenterology, last one in June 2018.  Last time on TPN was June 2018.    - no indication for acute surgical intervention  - please " obtain nutrition labs - prealbumin  - we will continue to follow  - will further d/w attending    Julieta Rob PA-C   Colon and Rectal Surgery  [id number]     Patient was seen and discussed with Dr. Dietrich

## 2018-11-04 LAB
ALBUMIN SERPL-MCNC: 2.1 G/DL (ref 3.4–5)
ALP SERPL-CCNC: 75 U/L (ref 40–150)
ALT SERPL W P-5'-P-CCNC: 18 U/L (ref 0–50)
ANION GAP SERPL CALCULATED.3IONS-SCNC: 6 MMOL/L (ref 3–14)
AST SERPL W P-5'-P-CCNC: 14 U/L (ref 0–45)
BILIRUB SERPL-MCNC: 0.6 MG/DL (ref 0.2–1.3)
BUN SERPL-MCNC: 12 MG/DL (ref 7–30)
CALCIUM SERPL-MCNC: 7.1 MG/DL (ref 8.5–10.1)
CHLORIDE SERPL-SCNC: 111 MMOL/L (ref 94–109)
CO2 SERPL-SCNC: 24 MMOL/L (ref 20–32)
CREAT SERPL-MCNC: 0.58 MG/DL (ref 0.52–1.04)
ERYTHROCYTE [DISTWIDTH] IN BLOOD BY AUTOMATED COUNT: 13.3 % (ref 10–15)
GFR SERPL CREATININE-BSD FRML MDRD: >90 ML/MIN/1.7M2
GLUCOSE SERPL-MCNC: 76 MG/DL (ref 70–99)
HCT VFR BLD AUTO: 35.7 % (ref 35–47)
HGB BLD-MCNC: 11.2 G/DL (ref 11.7–15.7)
INR PPP: 1.16 (ref 0.86–1.14)
MAGNESIUM SERPL-MCNC: 1.8 MG/DL (ref 1.6–2.3)
MCH RBC QN AUTO: 32.3 PG (ref 26.5–33)
MCHC RBC AUTO-ENTMCNC: 31.4 G/DL (ref 31.5–36.5)
MCV RBC AUTO: 103 FL (ref 78–100)
PLATELET # BLD AUTO: 211 10E9/L (ref 150–450)
POTASSIUM SERPL-SCNC: 4.9 MMOL/L (ref 3.4–5.3)
PROT SERPL-MCNC: 4.8 G/DL (ref 6.8–8.8)
RBC # BLD AUTO: 3.47 10E12/L (ref 3.8–5.2)
SODIUM SERPL-SCNC: 141 MMOL/L (ref 133–144)
WBC # BLD AUTO: 7.8 10E9/L (ref 4–11)

## 2018-11-04 PROCEDURE — 12000001 ZZH R&B MED SURG/OB UMMC

## 2018-11-04 PROCEDURE — 25000132 ZZH RX MED GY IP 250 OP 250 PS 637: Performed by: COLON & RECTAL SURGERY

## 2018-11-04 PROCEDURE — 25000132 ZZH RX MED GY IP 250 OP 250 PS 637: Performed by: STUDENT IN AN ORGANIZED HEALTH CARE EDUCATION/TRAINING PROGRAM

## 2018-11-04 PROCEDURE — 80053 COMPREHEN METABOLIC PANEL: CPT | Performed by: SURGERY

## 2018-11-04 PROCEDURE — 25000128 H RX IP 250 OP 636: Performed by: STUDENT IN AN ORGANIZED HEALTH CARE EDUCATION/TRAINING PROGRAM

## 2018-11-04 PROCEDURE — 84134 ASSAY OF PREALBUMIN: CPT | Performed by: SURGERY

## 2018-11-04 PROCEDURE — 36415 COLL VENOUS BLD VENIPUNCTURE: CPT | Performed by: STUDENT IN AN ORGANIZED HEALTH CARE EDUCATION/TRAINING PROGRAM

## 2018-11-04 PROCEDURE — 25000132 ZZH RX MED GY IP 250 OP 250 PS 637: Performed by: FAMILY MEDICINE

## 2018-11-04 PROCEDURE — 85610 PROTHROMBIN TIME: CPT | Performed by: SURGERY

## 2018-11-04 PROCEDURE — 85027 COMPLETE CBC AUTOMATED: CPT | Performed by: STUDENT IN AN ORGANIZED HEALTH CARE EDUCATION/TRAINING PROGRAM

## 2018-11-04 PROCEDURE — 25000125 ZZHC RX 250: Performed by: STUDENT IN AN ORGANIZED HEALTH CARE EDUCATION/TRAINING PROGRAM

## 2018-11-04 PROCEDURE — 83735 ASSAY OF MAGNESIUM: CPT | Performed by: SURGERY

## 2018-11-04 RX ORDER — HYDROMORPHONE HCL/0.9% NACL/PF 0.2MG/0.2
0.2 SYRINGE (ML) INTRAVENOUS ONCE
Status: COMPLETED | OUTPATIENT
Start: 2018-11-04 | End: 2018-11-04

## 2018-11-04 RX ORDER — HYDROXYZINE HYDROCHLORIDE 25 MG/1
50 TABLET, FILM COATED ORAL 3 TIMES DAILY PRN
Status: DISCONTINUED | OUTPATIENT
Start: 2018-11-04 | End: 2018-11-06

## 2018-11-04 RX ORDER — HYDROCODONE BITARTRATE AND ACETAMINOPHEN 5; 325 MG/1; MG/1
1-2 TABLET ORAL EVERY 4 HOURS PRN
Status: DISCONTINUED | OUTPATIENT
Start: 2018-11-04 | End: 2018-11-05

## 2018-11-04 RX ADMIN — HYDROXYZINE HYDROCHLORIDE 50 MG: 25 TABLET ORAL at 01:51

## 2018-11-04 RX ADMIN — HYDROCODONE BITARTRATE AND ACETAMINOPHEN 1 TABLET: 5; 325 TABLET ORAL at 08:41

## 2018-11-04 RX ADMIN — SIMETHICONE 40 MG: 20 SUSPENSION/ DROPS ORAL at 08:43

## 2018-11-04 RX ADMIN — HYDROCODONE BITARTRATE AND ACETAMINOPHEN 2 TABLET: 5; 325 TABLET ORAL at 12:31

## 2018-11-04 RX ADMIN — SIMETHICONE 40 MG: 20 SUSPENSION/ DROPS ORAL at 03:55

## 2018-11-04 RX ADMIN — HYDROCODONE BITARTRATE AND ACETAMINOPHEN 1 TABLET: 5; 325 TABLET ORAL at 04:14

## 2018-11-04 RX ADMIN — POLYETHYLENE GLYCOL 3350 17 G: 17 POWDER, FOR SOLUTION ORAL at 08:41

## 2018-11-04 RX ADMIN — HYDROCODONE BITARTRATE AND ACETAMINOPHEN 2 TABLET: 5; 325 TABLET ORAL at 20:34

## 2018-11-04 RX ADMIN — Medication 2 G: at 14:03

## 2018-11-04 RX ADMIN — SIMETHICONE 40 MG: 20 SUSPENSION/ DROPS ORAL at 14:02

## 2018-11-04 RX ADMIN — Medication 0.2 MG: at 03:46

## 2018-11-04 RX ADMIN — POLYETHYLENE GLYCOL 3350 17 G: 17 POWDER, FOR SOLUTION ORAL at 20:34

## 2018-11-04 RX ADMIN — HYDROCODONE BITARTRATE AND ACETAMINOPHEN 2 TABLET: 5; 325 TABLET ORAL at 16:27

## 2018-11-04 RX ADMIN — SIMETHICONE 40 MG: 20 SUSPENSION/ DROPS ORAL at 19:55

## 2018-11-04 ASSESSMENT — PAIN DESCRIPTION - DESCRIPTORS
DESCRIPTORS: ACHING;CONSTANT
DESCRIPTORS: ACHING;CRAMPING;CONSTANT
DESCRIPTORS: ACHING;CONSTANT
DESCRIPTORS: ACHING;CONSTANT;CRAMPING
DESCRIPTORS: ACHING

## 2018-11-04 ASSESSMENT — ACTIVITIES OF DAILY LIVING (ADL)
ADLS_ACUITY_SCORE: 11

## 2018-11-04 NOTE — PLAN OF CARE
Problem: Patient Care Overview  Goal: Plan of Care/Patient Progress Review  Outcome: Improving  VSS. Abdominal cramping and pain controled c Norco q 4 hours. Patient would like to be woken for pain medication overnight. New PIV started. Patient states she had large loose stool after fleets enema. + flatus. Belching. Ambulating halls independently. MIV @ 100. NPO. Continue c POC.

## 2018-11-04 NOTE — PROGRESS NOTES
[facility] Progress Note - [facility]       Main Plans for Today   - Advance diet to low fiber  - Colorectal surg and GI following   - Pain meds to PO - increase Norco to 2 tabs every 4 hours.   - Patient declined nutrition consult today. May consider in future pending nutrition lab results.  - Try to obtain records from patient's pain clinic ([facility]) on Monday 11/5 to further explore what adjuvant treatments have been tried in the past.      Assessment & Plan   Rachel A Gerhardt is a 42 year old female admitted on 11/1/2018. 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     # Acute on chronic abdominal pain  # Nausea/Vomiting  # Hx ileoileal anastomotic stricture  # Partial small bowel obstruction  Bowel rest with NPO except meds, though concerns with adherence. Still passing gas and stool, soft BMs. Patient declined NG placement when offered multiple times during admission.   - GI and colorectal following appreciate recs  - Advance diet to low fiber today (11/4) and discontinue IVF  - Serial abdominal exams  - Pain management with Norco 5/325 - 2 tablets every 4hrs PRN. Stop IV Dilaudid (had 1 doses overnight for breakthrough pain). Plan to wean to 2 tablets every 6hrs PRN tomorrow (11/5). We discussed today that it is reasonable to try the Norco at a higher dose, but if this doesn't help it is likely that the Norco is not the correct medication to treat her chronic pain and we should consider weaning it. She was agreeable to this but also frustrated because nothing seems to help her pain.  - Continue non-opiate pain management with heat  packs.    - IV Zofran PRN for nausea and compazine PRN  - Electrolyte replacement to keep Mg > 2 and K > 4, prealbumin ordered  - Patient declined nutrition consult today, states she has seen them in the past without much benefit.     # Chronic diarrhea  Patient has chronic diarrhea s/p small bowel resection in 2017. Diarrhea most likely due to short gut syndrome. Less likely c diff or other infectious cause due to no elevation in WBC or fevers. No testing indicated at this time.   - Monitor electrolytes and replete as needed     # Chronic protein malnutrition  # Weight loss  Patient unable to keep down food due to nausea and vomiting recently. Even when n/v is better, still only eats small meals. Has been on TPN before as well. Patient states that she has had a 10 lbs weight loss since last hospitalization (weight same from 9/20/18 clinic visit and only 3 lbs down from 7/17/18 hospitalization).  - Advance diet 11/4 to low fiber.   - Consider nutrition consult, pending results of pre-albumin     # Opioid dependence  Seen at [facility] pain clinic where she gets her Vicodin 5/325 prescribed.  - Goal to resume PTA pain regimen      # Tobacco dependence  - Nicotine gum PRN    # Pain Assessment:  Current Pain Score 11/4/2018   Patient currently in pain? yes   Pain score (0-10) -   Pain location Abdomen   Pain descriptors Aching;Constant   Some encounter information is confidential and restricted. Go to Review Flowsheets activity to see all data.   - Jenni is experiencing pain due to abdominal and chronic pain. Pain management was discussed and the plan was created in a collaborative fashion.  Jenni's response to the current recommendations: mixed response  - Please see the plan for pain management as documented above    Diet: Low Fiber Diet  Fluids: NS + 20K @100 mL/hr  DVT Prophylaxis: Pneumatic Compression Devices  Code Status: Full Code    Disposition Plan   Expected discharge: 2 - 3 days, recommended to prior  living arrangement once adequate pain management/ tolerating PO medications. Dispo: Expected Discharge Date: 11/04/18        Entered: Jeanette Baltazar 11/04/2018, 10:30 AM   Information in the above section will display in the discharge planner report.      The patient's care was discussed with the patient and [facility] Team.     Jeanette Baltazar DO  [facility]  Pager: [phone]  Please see sticky note for cross cover information    Interval History   Pt reports frequent loose stools (which is her baseline). Had severe episode of cramping overnight (typical for her), required Atarax and IV Dilaudid. Admits the Norco really doesn't do anything for her pain, states was on 2 tablets previously via her pain physician and wonders if this could help. Feeling hopeless about her medical situation, wants to know what are her next steps for treatment and understands this may require frequent outpatient visits with her colorectal surgery team.     Physical Exam   Vital Signs: Temp: 97.9  F (36.6  C) Temp src: Oral BP: 107/84 Pulse: 88 Heart Rate: 77 Resp: 16 SpO2: 100 % O2 Device: None (Room air)    Weight: 125 lbs 11.2 oz    Constitutional: She is oriented to person, place, and time. No distress.   Cachetic   HENT:   Head: Normocephalic and atraumatic.   Mouth/Throat: Oropharynx is clear and moist. No oropharyngeal exudate.   Eyes: Conjunctivae and EOM are normal. Pupils are equal, round, and reactive to light.   Neck: Neck supple.   Cardiovascular: Normal rate, regular rhythm, normal heart sounds and intact distal pulses.  Exam reveals no gallop and no friction rub.    No murmur heard.  Pulmonary/Chest: Effort normal. No respiratory distress.   Abdominal: Bowel sounds are normal. She exhibits no distension. Minimal tenderness in all 4 quadrants. No rebound or guarding. .    Neurological: She is alert and oriented to person, place, and time.   Skin: Skin is warm and dry.    Psychiatric: She has a normal mood and affect.    Data     Recent Labs  Lab 11/04/18  0832 11/03/18  1009 11/02/18  1743   WBC 7.8 7.5 8.1   HGB 11.2* 11.7 11.6*   * 104* 103*    223 250   INR 1.16*  --   --     144 145*   POTASSIUM 4.9 4.3 4.1   CHLORIDE 111* 113* 112*   CO2 24 25 28   BUN 12 8 10   CR 0.58 0.55 0.54   ANIONGAP 6 6 5   JONATAN 7.1* 7.4* 6.9*   GLC 76 90 66*   ALBUMIN 2.1* 2.1* 2.2*   PROTTOTAL 4.8* 4.7* 4.8*   BILITOTAL 0.6 0.1* 0.2   ALKPHOS 75 81 85   ALT 18 19 21   AST 14 14 13       No results found for this or any previous visit (from the past 24 hour(s)).

## 2018-11-04 NOTE — DOWNTIME EVENT NOTE
The EMR was down for 1.5 hours on 11/4/2018.    Thao Garvey RN was responsible for completing the paper charting during this time period.     The following information was re-entered into the system by Thao Garvey: None    The following information will remain in the paper chart: None    Thao Garvey  11/4/2018

## 2018-11-04 NOTE — PLAN OF CARE
Problem: Patient Care Overview  Goal: Plan of Care/Patient Progress Review  Outcome: Improving  Tolerating low fiber diet, denies nausea, loose stools continue, voiding not saving.Urgency with bowel movements, patient has difficulty  urine and stool.Norco increased to two tablets every four hours prn with better pain control. Up ad dania in halls.Possible discharge tomorrow.

## 2018-11-04 NOTE — PLAN OF CARE
"Problem: Patient Care Overview  Goal: Plan of Care/Patient Progress Review  Outcome: Therapy, progress toward functional goals is gradual    AVSS. Pain not well managed overnight, Norco q4 and simethicone were not effective.  MD paged, atarax ordered and not effective.  MD paged again, 1x dose of IV dilaudid given and somewhat helpful.  Pt didn't get any sleep.  Also tried heat and walking.   Is having loose/soft explosive urgent BMs and passing lots of gas. + bowel sounds. States she has these episodes every once in awhile and she comes in to the hospital when she can't handle the pain anymore at home. States her abdominal pain feels like labor pains, but worse; they get really bad when \"my intestines have to work harder than they want to\", for example with spicy food, increased amt water, bowel meds, enemas.   Denies nausea, NPO + ice/sips with meds.  Voiding adequate amts.      "

## 2018-11-04 NOTE — PROGRESS NOTES
"Colorectal Surgery Progress Note  11/04/2018      Stable overnight. Having bowel movements. Eating food from outside. Walking around.    /84 (BP Location: Left arm)  Pulse 88  Temp 97.9  F (36.6  C) (Oral)  Resp 16  Ht 1.753 m (5' 9\")  Wt 57 kg (125 lb 11.2 oz)  SpO2 100%  BMI 18.56 kg/m2    , 850 overnight  Stool 200, 500 overnight    PE  NAD  NLB on RA  Abd soft, nontender, nondistended  Ext wwp     Labs  Albumin 2.1  Transferrin 180  Prealbumin pending    A/P: This is a 42 year old female with PMH cervical cancer s/p chemo-rads who developed SBO enteritis with multiple recurrent SBO to point of needing TPN at times, does have an ileal-ileal anastomosis for a SBR in 2017 and received endoscopic dilatations with Gastroenterology, last one in June 2018.  Last time on TPN was June 2018.    - no indication for acute surgical intervention  - follow up nutrition labs, recommend dietary consult in or outpatient   - follow up in clinic with Dr. Moore in 2 weeks       Seen with staff, Dr. Aguilar.    Brit Red MD PGY3  General Surgery     "

## 2018-11-04 NOTE — PLAN OF CARE
Problem: Patient Care Overview  Goal: Plan of Care/Patient Progress Review  Outcome: Therapy, progress toward functional goals as expected    POD 3. Pain managed with Tylenol #3, does not want to take PO dilaudid because it makes her sick. Slept on/off, walked in halls.  Denies nausea, tolerating full liquid diet.  Ileostomy with gas and watery stool, appliance leaked and was changed last evening.  Godwin with good UOP, likely removed today.  Port hep locked.

## 2018-11-05 ENCOUNTER — APPOINTMENT (OUTPATIENT)
Dept: GENERAL RADIOLOGY | Facility: CLINIC | Age: 43
DRG: 393 | End: 2018-11-05
Attending: COLON & RECTAL SURGERY
Payer: COMMERCIAL

## 2018-11-05 ENCOUNTER — HOSPITAL ENCOUNTER (INPATIENT)
Facility: CLINIC | Age: 43
Setting detail: SURGERY ADMIT
End: 2018-11-05
Attending: COLON & RECTAL SURGERY | Admitting: COLON & RECTAL SURGERY
Payer: COMMERCIAL

## 2018-11-05 DIAGNOSIS — K56.699 STRICTURE OF SMALL INTESTINE (H): Primary | ICD-10-CM

## 2018-11-05 LAB
ALBUMIN SERPL-MCNC: 2.6 G/DL (ref 3.4–5)
ALP SERPL-CCNC: 90 U/L (ref 40–150)
ALT SERPL W P-5'-P-CCNC: 22 U/L (ref 0–50)
ANION GAP SERPL CALCULATED.3IONS-SCNC: 6 MMOL/L (ref 3–14)
AST SERPL W P-5'-P-CCNC: 16 U/L (ref 0–45)
BILIRUB SERPL-MCNC: 0.2 MG/DL (ref 0.2–1.3)
BUN SERPL-MCNC: 13 MG/DL (ref 7–30)
CALCIUM SERPL-MCNC: 7.9 MG/DL (ref 8.5–10.1)
CHLORIDE SERPL-SCNC: 106 MMOL/L (ref 94–109)
CO2 SERPL-SCNC: 27 MMOL/L (ref 20–32)
CREAT SERPL-MCNC: 0.52 MG/DL (ref 0.52–1.04)
ERYTHROCYTE [DISTWIDTH] IN BLOOD BY AUTOMATED COUNT: 13.2 % (ref 10–15)
GFR SERPL CREATININE-BSD FRML MDRD: >90 ML/MIN/1.7M2
GLUCOSE BLDC GLUCOMTR-MCNC: 84 MG/DL (ref 70–99)
GLUCOSE SERPL-MCNC: 75 MG/DL (ref 70–99)
HCT VFR BLD AUTO: 43.4 % (ref 35–47)
HGB BLD-MCNC: 13.7 G/DL (ref 11.7–15.7)
MAGNESIUM SERPL-MCNC: 1.9 MG/DL (ref 1.6–2.3)
MCH RBC QN AUTO: 32.6 PG (ref 26.5–33)
MCHC RBC AUTO-ENTMCNC: 31.6 G/DL (ref 31.5–36.5)
MCV RBC AUTO: 103 FL (ref 78–100)
PHOSPHATE SERPL-MCNC: 2.8 MG/DL (ref 2.5–4.5)
PLATELET # BLD AUTO: 277 10E9/L (ref 150–450)
POTASSIUM SERPL-SCNC: 4.4 MMOL/L (ref 3.4–5.3)
PREALB SERPL IA-MCNC: 12 MG/DL (ref 15–45)
PROT SERPL-MCNC: 6.2 G/DL (ref 6.8–8.8)
RBC # BLD AUTO: 4.2 10E12/L (ref 3.8–5.2)
SODIUM SERPL-SCNC: 139 MMOL/L (ref 133–144)
TRIGL SERPL-MCNC: 141 MG/DL
WBC # BLD AUTO: 7.7 10E9/L (ref 4–11)

## 2018-11-05 PROCEDURE — 27210208 ZZH KIT VALVED DOUBLE LUMEN

## 2018-11-05 PROCEDURE — 25000125 ZZHC RX 250: Performed by: STUDENT IN AN ORGANIZED HEALTH CARE EDUCATION/TRAINING PROGRAM

## 2018-11-05 PROCEDURE — 84478 ASSAY OF TRIGLYCERIDES: CPT | Performed by: COLON & RECTAL SURGERY

## 2018-11-05 PROCEDURE — 40000986 XR CHEST PORT 1 VW

## 2018-11-05 PROCEDURE — 3E0336Z INTRODUCTION OF NUTRITIONAL SUBSTANCE INTO PERIPHERAL VEIN, PERCUTANEOUS APPROACH: ICD-10-PCS | Performed by: COLON & RECTAL SURGERY

## 2018-11-05 PROCEDURE — 85027 COMPLETE CBC AUTOMATED: CPT | Performed by: STUDENT IN AN ORGANIZED HEALTH CARE EDUCATION/TRAINING PROGRAM

## 2018-11-05 PROCEDURE — 84478 ASSAY OF TRIGLYCERIDES: CPT | Performed by: STUDENT IN AN ORGANIZED HEALTH CARE EDUCATION/TRAINING PROGRAM

## 2018-11-05 PROCEDURE — 25000125 ZZHC RX 250: Performed by: FAMILY MEDICINE

## 2018-11-05 PROCEDURE — 25000125 ZZHC RX 250: Performed by: COLON & RECTAL SURGERY

## 2018-11-05 PROCEDURE — 25000128 H RX IP 250 OP 636: Performed by: STUDENT IN AN ORGANIZED HEALTH CARE EDUCATION/TRAINING PROGRAM

## 2018-11-05 PROCEDURE — 12000001 ZZH R&B MED SURG/OB UMMC

## 2018-11-05 PROCEDURE — 25000131 ZZH RX MED GY IP 250 OP 636 PS 637: Performed by: STUDENT IN AN ORGANIZED HEALTH CARE EDUCATION/TRAINING PROGRAM

## 2018-11-05 PROCEDURE — 25000132 ZZH RX MED GY IP 250 OP 250 PS 637: Performed by: STUDENT IN AN ORGANIZED HEALTH CARE EDUCATION/TRAINING PROGRAM

## 2018-11-05 PROCEDURE — 84100 ASSAY OF PHOSPHORUS: CPT | Performed by: STUDENT IN AN ORGANIZED HEALTH CARE EDUCATION/TRAINING PROGRAM

## 2018-11-05 PROCEDURE — 36569 INSJ PICC 5 YR+ W/O IMAGING: CPT

## 2018-11-05 PROCEDURE — 80053 COMPREHEN METABOLIC PANEL: CPT | Performed by: STUDENT IN AN ORGANIZED HEALTH CARE EDUCATION/TRAINING PROGRAM

## 2018-11-05 PROCEDURE — 00000146 ZZHCL STATISTIC GLUCOSE BY METER IP

## 2018-11-05 PROCEDURE — 83735 ASSAY OF MAGNESIUM: CPT | Performed by: STUDENT IN AN ORGANIZED HEALTH CARE EDUCATION/TRAINING PROGRAM

## 2018-11-05 PROCEDURE — 36415 COLL VENOUS BLD VENIPUNCTURE: CPT | Performed by: STUDENT IN AN ORGANIZED HEALTH CARE EDUCATION/TRAINING PROGRAM

## 2018-11-05 PROCEDURE — 25000132 ZZH RX MED GY IP 250 OP 250 PS 637: Performed by: FAMILY MEDICINE

## 2018-11-05 PROCEDURE — 25000128 H RX IP 250 OP 636: Performed by: FAMILY MEDICINE

## 2018-11-05 RX ORDER — METOCLOPRAMIDE HYDROCHLORIDE 5 MG/ML
10 INJECTION INTRAMUSCULAR; INTRAVENOUS EVERY 6 HOURS PRN
Status: DISCONTINUED | OUTPATIENT
Start: 2018-11-05 | End: 2018-11-06

## 2018-11-05 RX ORDER — HYDROCODONE BITARTRATE AND ACETAMINOPHEN 5; 325 MG/1; MG/1
1-2 TABLET ORAL EVERY 6 HOURS PRN
Status: DISCONTINUED | OUTPATIENT
Start: 2018-11-05 | End: 2018-11-06

## 2018-11-05 RX ORDER — KETOROLAC TROMETHAMINE 30 MG/ML
30 INJECTION, SOLUTION INTRAMUSCULAR; INTRAVENOUS EVERY 6 HOURS PRN
Status: DISCONTINUED | OUTPATIENT
Start: 2018-11-05 | End: 2018-11-06

## 2018-11-05 RX ORDER — METOCLOPRAMIDE 10 MG/1
10 TABLET ORAL EVERY 6 HOURS PRN
Status: DISCONTINUED | OUTPATIENT
Start: 2018-11-05 | End: 2018-11-06

## 2018-11-05 RX ORDER — LIDOCAINE 40 MG/G
CREAM TOPICAL
Status: DISCONTINUED | OUTPATIENT
Start: 2018-11-05 | End: 2018-11-14 | Stop reason: HOSPADM

## 2018-11-05 RX ORDER — HEPARIN SODIUM,PORCINE 10 UNIT/ML
2-5 VIAL (ML) INTRAVENOUS
Status: COMPLETED | OUTPATIENT
Start: 2018-11-05 | End: 2018-11-07

## 2018-11-05 RX ADMIN — Medication 2 G: at 14:26

## 2018-11-05 RX ADMIN — HYDROCODONE BITARTRATE AND ACETAMINOPHEN 2 TABLET: 5; 325 TABLET ORAL at 22:15

## 2018-11-05 RX ADMIN — PROCHLORPERAZINE EDISYLATE 10 MG: 5 INJECTION INTRAMUSCULAR; INTRAVENOUS at 12:23

## 2018-11-05 RX ADMIN — ONDANSETRON 4 MG: 4 TABLET, ORALLY DISINTEGRATING ORAL at 18:00

## 2018-11-05 RX ADMIN — HYDROCODONE BITARTRATE AND ACETAMINOPHEN 2 TABLET: 5; 325 TABLET ORAL at 04:41

## 2018-11-05 RX ADMIN — POTASSIUM CHLORIDE: 2 INJECTION, SOLUTION, CONCENTRATE INTRAVENOUS at 20:15

## 2018-11-05 RX ADMIN — SIMETHICONE 40 MG: 20 SUSPENSION/ DROPS ORAL at 05:47

## 2018-11-05 RX ADMIN — KETOROLAC TROMETHAMINE 30 MG: 30 INJECTION, SOLUTION INTRAMUSCULAR at 14:26

## 2018-11-05 RX ADMIN — LIDOCAINE HYDROCHLORIDE 3 ML: 10 INJECTION, SOLUTION EPIDURAL; INFILTRATION; INTRACAUDAL; PERINEURAL at 16:53

## 2018-11-05 RX ADMIN — SIMETHICONE 40 MG: 20 SUSPENSION/ DROPS ORAL at 10:11

## 2018-11-05 RX ADMIN — ONDANSETRON 4 MG: 4 TABLET, ORALLY DISINTEGRATING ORAL at 06:36

## 2018-11-05 RX ADMIN — I.V. FAT EMULSION 250 ML: 20 EMULSION INTRAVENOUS at 20:15

## 2018-11-05 RX ADMIN — HYDROCODONE BITARTRATE AND ACETAMINOPHEN 2 TABLET: 5; 325 TABLET ORAL at 08:49

## 2018-11-05 RX ADMIN — HYDROCODONE BITARTRATE AND ACETAMINOPHEN 2 TABLET: 5; 325 TABLET ORAL at 00:41

## 2018-11-05 RX ADMIN — HYDROCODONE BITARTRATE AND ACETAMINOPHEN 2 TABLET: 5; 325 TABLET ORAL at 14:45

## 2018-11-05 ASSESSMENT — PAIN DESCRIPTION - DESCRIPTORS
DESCRIPTORS: ACHING;CONSTANT
DESCRIPTORS: CONSTANT
DESCRIPTORS: ACHING;CONSTANT
DESCRIPTORS: SPASM

## 2018-11-05 ASSESSMENT — ACTIVITIES OF DAILY LIVING (ADL)
ADLS_ACUITY_SCORE: 11

## 2018-11-05 NOTE — PLAN OF CARE
Problem: Patient Care Overview  Goal: Plan of Care/Patient Progress Review  Outcome: No Change   11/05/18 1447   OTHER   Plan Of Care Reviewed With patient   Plan of Care Review   Progress no change     Shift: 1968-5524  VS: Temp: 97.3  F (36.3  C) Temp src: Oral BP: 103/73   Heart Rate: 98 Resp: 16 SpO2: 99 % O2 Device: None (Room air)    Pain: Pt c/o of abdominal pain, received norco x2, toradol IVP x1, T-pump heating pad in use.   Neuro: A&Ox4, neuros intact  Cardiac: VSS  Respiratory: LS clear/diminished, no SOB  GI/Diet/Appetite: Full liquid diet, pt had 1 occurrence of emesis, received compazine x1, per pt report diarrhea continues  : wnl  LDA's: PIV TKO   Skin: Intact  Activity: Independent  Tests/Procedures: PICC to be placed   Pertinent Labs/Lab Collection: Mg 1.9, being replaced     Plan: PICC to be placed this taryn and TPN to be started. Pharmacy requests a phone call once PICC placement timing is determined, by 1700 in order to mix and deliver TPN for 2000 dose. SW working with pt on discharge planning. Continue with POC.        Problem: Bowel Obstruction (Adult)  Goal: Signs and Symptoms of Listed Potential Problems Will be Absent, Minimized or Managed (Bowel Obstruction)  Signs and symptoms of listed potential problems will be absent, minimized or managed by discharge/transition of care (reference Bowel Obstruction (Adult) CPG).   Outcome: No Change   11/05/18 0830   Bowel Obstruction   Problems Assessed (Bowel Obstruction) all   Problems Present (Bowel Obstruction) diarrhea;pain
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
## 2018-11-05 NOTE — PROGRESS NOTES
"Colorectal Surgery Progress Note  11/05/2018    Increased nausea suddenly this morning, able to tolerate toast, though \"would be able to vomit\". Tolerated multiple regular meals yesterday without issue. Multiple bowel movements over the evening, up every hour. Feeling more distended. Voiding adequately, good UOP.     /73 (BP Location: Left arm)  Pulse 83  Temp 97.3  F (36.3  C) (Oral)  Resp 16  Ht 1.753 m (5' 9\")  Wt 57 kg (125 lb 11.2 oz)  SpO2 99%  BMI 18.56 kg/m2      Stool 500, 7x since midnight     PE  NAD  NLB on RA  Abd soft, nontender, slightly distended   Ext wwp     Labs  Albumin 2.1  Transferrin 180  Prealbumin pending    A/P: 42F w/ cervical cancer s/p chemo-rads who developed SBO enteritis with multiple recurrent SBO to point of needing TPN at times, does have an ileal-ileal anastomosis for a SBR in 2017 and received endoscopic dilatations with Gastroenterology, last one in June 2018.  Last time on TPN was June 2018.     Return of nausea and distention. Recommend:  -NPO w/ sips until nausea resolves, resume IVF  -Repeat AXR 2 views   -C. Diff for multiple loose stools  -Follow nutrition labs  -Check Mg, phos, bmp as having multiple bowel movements  -Recommend dietary or nutrition follow   -Follow up in clinic w/ Dr. Moore in 2 weeks      D/w and seen w/ CRS Fellow Dr. Brabosa, who will discuss w/ staff     Seema Peña MD  Surgery PGY1    "
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
## 2018-11-05 NOTE — PROGRESS NOTES
CLINICAL NUTRITION SERVICES - REASSESSMENT NOTE  *Received consult: Pharmacy/Nutrition to start and manage TPN (Central line NOT in place (Provider needs to order); Indication: Bowel Obstruction)   Nutrition Prescription    RECOMMENDATIONS FOR MDs/PROVIDERS TO ORDER:  Please alert RD or PharmD if would like adjustments to PN fluid volume    Malnutrition Status:    Severe malnutrition in the context of acute on chronic illness    Recommendations already ordered by Registered Dietitian (RD):  1. Once central line in place, begin the following:  --Use dosing weight 49 kg  --Begin CPN, goal volume 1560 ml/day with initial 115g Dex daily (391 kcal, GIR 1.6), 95g AA daily (380 kcal), and 250 ml 20% IV lipids 5 days/week.    --If  K+/Mg++ >/= nrml and Phos >1.9, advance PN dex by 50 g/day (pending lytes/Glu and Pharm D/RD discretion) to initial goal of 165g Dex (561 kcal) to increase provisions to 1298 kcals (26 kcal/kg/day), 1.9 g PRO/kg/day, GIR 2.3 with 28% kcals from Fat.    2. TG add-on  3. PRN supplements    Future/Additional Recommendations:  Monitor weight trends and PO intakes vs need to adjust PN provisions in future     EVALUATION OF THE PROGRESS TOWARD GOALS   Diet: Full Liquids    Intake: Was NPO 11/2-11/4.  Low fiber diet 11/4-11/5.  Pt initially ate and tolerated low fiber diet yesterday, but diet downgraded to full liquids today 2/2 increased abominal distension and nausea today.  Pt says she doesn't have appetite to eat much today.       NEW FINDINGS   Nutrition History: pt reports ongoing poor ability to tolerate adequate PO intakes x 2 years and has lost overall 100 lbs (during cancer treatment she was able to maintain her weight ~193 lbs, but since SBO issues started she has lost significant amount of weight (~100 lbs)).  Has been on TPN in the past, most recently stopped in May 2018.      She focuses on eating softer, bland foods (macaroni and cheese, mashed potatoes, oatmeal, eggs).  Overall her  appetite is good, but recurrent nausea/vomiting is main barrier to eating adequately. Often only eats breakfast (oatmeal + 2 eggs) and dinner (whatever she makes her kids, often something like macaroni and cheese + green beans).  Does not eat snacks in between.  Used to drink Boost/Ensure, but cost a barrier to drinking these regularly. Over the past week she has not tolerated PO 2/2 nausea and vomiting.    Weight: Pt says 1 month ago she weighed 93 lbs, but at recent clinic visit on 10/25 she reports weighing 103 lbs.  Initial weight this admission on 11/1 was 48.6 kg (107 lbs), suspect weight currently up since admit 2/2 fluids (IV fluids discontinued yesterday). Will adjust dosing weight to 49 kg and reassess estimated nutrition needs below:    ASSESSED NUTRITION NEEDS  Estimated Energy Needs: 5885-0503 kcals/day (35 - 40 kcals/kg for PO/EN); 9427-8661 kcals/day (25 - 30 kcals/kg for sole PN)  Justification: Repletion and Underweight; aim lower range with sole PN to help prevent overfeeding liver  Estimated Protein Needs: 74-98 grams protein/day (1.5 - 2 grams of pro/kg)  Justification: Repletion  Estimated Fluid Needs: 1902-2256 mL/day (30 - 35 mL/kg)   Justification: Maintenance, or other per provider pending fluid status    Labs: K+, Mg++, Phos WNL. Alk Phos, ALT, AST, and Tbili WNL.    MALNUTRITION  % Intake: </= 50% for >/= 5 days (severe)  % Weight Loss: > 20% in 1 year (severe)  Subcutaneous Fat Loss: Lower arm and Thoracic/intercostal:  Moderate/severe  Muscle Loss: Thoracic region (clavicle), Lower arm  (forearm), and Upper leg (quadricep, hamstring):  moderate  Fluid Accumulation/Edema: None noted per chart review  Malnutrition Diagnosis: Severe malnutrition in the context of acute on chronic illness    Previous Goals   Diet adv v nutrition support within 2-3 days.  Evaluation: Met    Previous Nutrition Diagnosis  Inadequate oral intake related to nausea/vomiting hindering PO intake as evidenced by pt  reporting unable to keep down food 2/2 nausea/vomiting recently PTA  Evaluation: No change, updated    CURRENT NUTRITION DIAGNOSIS  Inadequate protein-energy intake related to ongoing nausea/vomiting 2/2 recurrent SBO as evidenced by very poor PO intake x 1 week and overall difficulty tolerating adequate PO intake and subsequent severe unintentional weight loss (44%) over the past 2 years    INTERVENTIONS  Implementation  Collaboration with other providers: spoke with primary team, plan to start TPN today and pt will likely require this after discharge.  Nutrition Education: role of RD and nutrition POC  Parenteral Nutrition/IV Fluids - Initiate (sent change order request)    Goals  Total avg nutritional intake to meet a minimum of 25 kcal/kg and 1.5 g PRO/kg daily (per dosing wt 49 kg).    Monitoring/Evaluation  Progress toward goals will be monitored and evaluated per protocol.     Mylene Duvall RD, LD  7C RD pager: [phone]

## 2018-11-05 NOTE — PROGRESS NOTES
[facility], [facility] Progress Note - [facility]       Main Plans for Today   - PICC placement and TPN  - Pain meds taper   - Obtain records from patient's pain clinic ([facility]) to further explore what adjuvant treatments have been tried in the past.      Assessment & Plan   Rachel A Gerhardt is a 42 year old female admitted on 11/1/2018. 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     # Acute on chronic abdominal pain  # Nausea/Vomiting  # Hx ileoileal anastomotic stricture  # Partial small bowel obstruction  Passing gas and BMs. Patient declined NG placement when offered multiple times during admission.   - GI and colorectal following appreciate recs  - TPN and PO diet per colorectal   - Serial abdominal exams  - Pain management with Norco 5/325 - 2 tablets every 6hrs PRN. Previously discussed today that it is reasonable to try the Norco at a higher dose, but if this doesn't help it is likely that the Norco is not the correct medication to treat her chronic pain and we should consider weaning it. She was agreeable to this but also frustrated because nothing seems to help her pain.  - Continue non-opiate pain management with heat packs.    - IV Zofran PRN for nausea and compazine PRN  - Electrolyte replacement to keep Mg > 2 and K > 4, prealbumin ordered    # Chronic diarrhea  Patient has chronic diarrhea s/p small bowel resection in 2017. Diarrhea most likely due to short gut syndrome. Less likely c diff or other infectious cause due to no elevation in WBC or fevers. No testing indicated at this time.   - Monitor electrolytes and replete  as needed  - Considered c diff testing, though not within best practice to test for this as pt has had laxatives multiple times in past 24 hours. Will hold laxatives and monitor change of stools. Afebrile and no leukocytosis.       # Chronic protein malnutrition  # Weight loss  Patient unable to keep down food due to nausea and vomiting recently. Even when n/v is better, still only eats small meals. Has been on TPN before as well. Patient states that she has had a 10 lbs weight loss since last hospitalization (weight same from 9/20/18 clinic visit and only 3 lbs down from 7/17/18 hospitalization).   - TPN recommended     # Opioid dependence  Seen at [facility] pain clinic where she gets her Vicodin 5/325 prescribed.  - Goal to resume PTA pain regimen      # Tobacco dependence  - Nicotine gum PRN    # Pain Assessment:  Current Pain Score 11/5/2018   Patient currently in pain? yes   Pain score (0-10) -   Pain location Abdomen   Pain descriptors Aching;Constant   Some encounter information is confidential and restricted. Go to Review Flowsheets activity to see all data.   - Jenni is experiencing pain due to abdominal and chronic pain. Pain management was discussed and the plan was created in a collaborative fashion.  Jenni's response to the current recommendations: mixed response  - Please see the plan for pain management as documented above    Diet: Full Liquid Diet  Fluids: PO  DVT Prophylaxis: Pneumatic Compression Devices  Code Status: Full Code    Disposition Plan   Expected discharge: 2 - 3 days, recommended to prior living arrangement once adequate pain management/ tolerating PO medications. Dispo: Expected Discharge Date: 11/06/18        Entered: John Dc 11/05/2018, 10:04 AM   Information in the above section will display in the discharge planner report.      The patient's care was discussed with the patient and [facility] Team.     John Dc DO  [facility]  [facility]  Pager: [phone]  Please see sticky note for cross cover information    Interval History   Nausea fully resolved on AM rounds. Discussed spacing of pain medication through today. Planning for PICC placement and TPN. Felt good overnight until severe pain this AM that resolved with large belching. Feels OK after and pleasant this AM. Bright affect and joking. Ambulating well. Looking forward to talking with colorectal staff today. Stooling.      Physical Exam   Vital Signs: Temp: 97.3  F (36.3  C) Temp src: Oral BP: 103/73 Pulse: 83 Heart Rate: 98 Resp: 16 SpO2: 99 % O2 Device: None (Room air)    Weight: 125 lbs 11.2 oz    Constitutional: She is oriented to person, place, and time. No distress.   Cachetic   HENT:   Head: Normocephalic and atraumatic.   Mouth/Throat: Oropharynx is clear and moist. No oropharyngeal exudate.   Eyes: Conjunctivae and EOM are normal. Pupils are equal, round, and reactive to light.   Neck: Neck supple.   Cardiovascular: Normal rate, regular rhythm, normal heart sounds and intact distal pulses.  Exam reveals no gallop and no friction rub.    No murmur heard.  Pulmonary/Chest: Effort normal. No respiratory distress.   Abdominal: Bowel sounds are normal. She exhibits no distension. Minimal tenderness in all 4 quadrants. No rebound or guarding. .    Neurological: She is alert and oriented to person, place, and time.   Skin: Skin is warm and dry.   Psychiatric: She has a normal mood and affect.    Data     Recent Labs  Lab 11/05/18  0725 11/04/18  0832 11/03/18  1009   WBC 7.7 7.8 7.5   HGB 13.7 11.2* 11.7   * 103* 104*    211 223   INR  --  1.16*  --     141 144   POTASSIUM 4.4 4.9 4.3   CHLORIDE 106 111* 113*   CO2 27 24 25   BUN 13 12 8   CR 0.52 0.58 0.55   ANIONGAP 6 6 6   JONATAN 7.9* 7.1* 7.4*   GLC 75 76 90   ALBUMIN 2.6* 2.1* 2.1*   PROTTOTAL 6.2* 4.8* 4.7*   BILITOTAL 0.2 0.6 0.1*   ALKPHOS 90 75 81   ALT 22 18 19   AST 16 14 14       No  results found for this or any previous visit (from the past 24 hour(s)).

## 2018-11-05 NOTE — PLAN OF CARE
"Problem: Patient Care Overview  Goal: Plan of Care/Patient Progress Review  Outcome: Therapy, progress toward functional goals is gradual    Abdominal pain better managed tonight with Norco 2 tabs q4h.  Still describes pain as \"labor type pains\".  Taking simethicone for gas pains.   Bowel sounds very loud and hyperactive.  States she had a BM almost every hour overnight.  Also voiding a lot, not saving.  Zofran 1x for nausea at end of shift.  Had tolerated LRD yesterday.  UAL.   PLAN: Discharge today.      "

## 2018-11-05 NOTE — PROGRESS NOTES
"November 5, 2018  2:14 PM    Called to patient's room because patient wished to leave hospital    Called the patient's room because the patient said that she went to the hospital.  I responded and went to see the patient immediately.  The patient said that she had been having excruciating pain which she felt we were not taking seriously.  I assured her that we did take her pain seriously, but additional opioids were not the answer to her pain.  I explained that opioids did not decrease pain but merely decreased once caring about pain to some extent.  I also described the negative effects of opioids including decreased bowel motility and explained that opioids were relatively contraindicated and small bowel obstruction.  Patient said that she needed \"something for pain that acted quickly, she did not want a pill that took half an hour or an hour to kick in.\"  I reviewed her medication list and told her that I was going to give her IV Toradol.  I explained that this is very efficacious for treating pain and that she can have up to 20 doses per month.  I did explain the risks of kidney injury and bleeding with the patient.  She was amenable to this.  I also ordered menthol patches.  She declined Lidoderm patches for the abdomen.  She is currently using heat pack which she says helps to some extent.  She says she is continuing to have bowel function and despite some vomiting earlier today she is tolerating a diet for the most part.  Abdominal exam is unchanged.  Patient's response to pain plan is reluctantly accepting.  Patient decided that she does not want to leave the hospital at this time.  She did say during the encounter that she has thought about leaving her room and going down to the emergency department so that she can get IV pain medication.    Clyde Galvez MD  Chief Resident  [facility]  [phone]    "

## 2018-11-05 NOTE — PROVIDER NOTIFICATION
11/05/18 1647   Specimen Collection Status   Specimen Collection Lab Collect   PICC Double Lumen 11/05/18 Right Brachial vein medial   Placement Date/Time: 11/05/18 1654   Catheter Brand: STACK Media valved  Size (Fr): 5 Fr  Lot #: [id number]  Full barrier precautions done: Yes, hand hygiene, sterile gown, sterile gloves, mask, cap, full body drape, chlorhexidine scrub  Consent Signed: Ye...   Site Assessment WDL   External Cath Length (cm) 2 cm   Extremity Circumference (cm) 21 cm   Dressing Change Due 11/12/18   Double Lumen PICC Status   Gray Lumen Status Blood return noted;Saline locked   Purple Lumen Status Blood return noted;Saline locked   Peripheral IV 11/03/18 Right;Anterior Lower forearm   Placement Date/Time: 11/03/18 2009   Size (Gauge)/Length: 22 G  Brand: import2  Orientation: Right;Anterior  Location: Lower forearm  Site Prep: Chlorhexidine  Local Anesthetic: None  Insertion attempts without ultrasound: 1  Patient Tolerance: ...   Site Assessment WDL   Line Status Saline locked   Phlebitis Scale 0-->no symptoms   Infiltration Scale 0   Extravasation? No

## 2018-11-05 NOTE — PLAN OF CARE
Problem: Patient Care Overview  Goal: Plan of Care/Patient Progress Review  Outcome: Improving  VSS. Patient tolerated LFD.  Denies nausea. Abdominal pain treated c Norco 2 tabs q 4 hours. Patient would like to be woken for pain medications. Voiding not saving. Patient states she has urgency c loose stool. Continue c POC.

## 2018-11-06 ENCOUNTER — HOME INFUSION (PRE-WILLOW HOME INFUSION) (OUTPATIENT)
Dept: PHARMACY | Facility: CLINIC | Age: 43
End: 2018-11-06

## 2018-11-06 ENCOUNTER — APPOINTMENT (OUTPATIENT)
Dept: GENERAL RADIOLOGY | Facility: CLINIC | Age: 43
DRG: 393 | End: 2018-11-06
Attending: SURGERY
Payer: COMMERCIAL

## 2018-11-06 LAB
ALBUMIN SERPL-MCNC: 2.5 G/DL (ref 3.4–5)
ALP SERPL-CCNC: 85 U/L (ref 40–150)
ALT SERPL W P-5'-P-CCNC: 26 U/L (ref 0–50)
ANION GAP SERPL CALCULATED.3IONS-SCNC: 9 MMOL/L (ref 3–14)
AST SERPL W P-5'-P-CCNC: 21 U/L (ref 0–45)
BILIRUB DIRECT SERPL-MCNC: <0.1 MG/DL (ref 0–0.2)
BILIRUB SERPL-MCNC: 0.3 MG/DL (ref 0.2–1.3)
BUN SERPL-MCNC: 18 MG/DL (ref 7–30)
C DIFF TOX B STL QL: NEGATIVE
CALCIUM SERPL-MCNC: 8 MG/DL (ref 8.5–10.1)
CHLORIDE SERPL-SCNC: 103 MMOL/L (ref 94–109)
CO2 SERPL-SCNC: 25 MMOL/L (ref 20–32)
CREAT SERPL-MCNC: 0.48 MG/DL (ref 0.52–1.04)
ERYTHROCYTE [DISTWIDTH] IN BLOOD BY AUTOMATED COUNT: 13.2 % (ref 10–15)
GFR SERPL CREATININE-BSD FRML MDRD: >90 ML/MIN/1.7M2
GLUCOSE BLDC GLUCOMTR-MCNC: 106 MG/DL (ref 70–99)
GLUCOSE SERPL-MCNC: 110 MG/DL (ref 70–99)
HCT VFR BLD AUTO: 40.8 % (ref 35–47)
HGB BLD-MCNC: 12.9 G/DL (ref 11.7–15.7)
INR PPP: 1.11 (ref 0.86–1.14)
MAGNESIUM SERPL-MCNC: 2 MG/DL (ref 1.6–2.3)
MCH RBC QN AUTO: 32.6 PG (ref 26.5–33)
MCHC RBC AUTO-ENTMCNC: 31.6 G/DL (ref 31.5–36.5)
MCV RBC AUTO: 103 FL (ref 78–100)
PHOSPHATE SERPL-MCNC: 3.6 MG/DL (ref 2.5–4.5)
PLATELET # BLD AUTO: 286 10E9/L (ref 150–450)
POTASSIUM SERPL-SCNC: 4.7 MMOL/L (ref 3.4–5.3)
PREALB SERPL IA-MCNC: 17 MG/DL (ref 15–45)
PROT SERPL-MCNC: 5.8 G/DL (ref 6.8–8.8)
RBC # BLD AUTO: 3.96 10E12/L (ref 3.8–5.2)
SODIUM SERPL-SCNC: 137 MMOL/L (ref 133–144)
SPECIMEN SOURCE: NORMAL
WBC # BLD AUTO: 8.2 10E9/L (ref 4–11)

## 2018-11-06 PROCEDURE — 85027 COMPLETE CBC AUTOMATED: CPT | Performed by: STUDENT IN AN ORGANIZED HEALTH CARE EDUCATION/TRAINING PROGRAM

## 2018-11-06 PROCEDURE — 84134 ASSAY OF PREALBUMIN: CPT | Performed by: STUDENT IN AN ORGANIZED HEALTH CARE EDUCATION/TRAINING PROGRAM

## 2018-11-06 PROCEDURE — 12000001 ZZH R&B MED SURG/OB UMMC

## 2018-11-06 PROCEDURE — 83735 ASSAY OF MAGNESIUM: CPT | Performed by: STUDENT IN AN ORGANIZED HEALTH CARE EDUCATION/TRAINING PROGRAM

## 2018-11-06 PROCEDURE — 25000132 ZZH RX MED GY IP 250 OP 250 PS 637: Performed by: STUDENT IN AN ORGANIZED HEALTH CARE EDUCATION/TRAINING PROGRAM

## 2018-11-06 PROCEDURE — C9113 INJ PANTOPRAZOLE SODIUM, VIA: HCPCS | Performed by: COLON & RECTAL SURGERY

## 2018-11-06 PROCEDURE — 25000128 H RX IP 250 OP 636: Performed by: COLON & RECTAL SURGERY

## 2018-11-06 PROCEDURE — 84100 ASSAY OF PHOSPHORUS: CPT | Performed by: STUDENT IN AN ORGANIZED HEALTH CARE EDUCATION/TRAINING PROGRAM

## 2018-11-06 PROCEDURE — 25000125 ZZHC RX 250: Performed by: FAMILY MEDICINE

## 2018-11-06 PROCEDURE — 25000128 H RX IP 250 OP 636: Performed by: STUDENT IN AN ORGANIZED HEALTH CARE EDUCATION/TRAINING PROGRAM

## 2018-11-06 PROCEDURE — 85610 PROTHROMBIN TIME: CPT | Performed by: STUDENT IN AN ORGANIZED HEALTH CARE EDUCATION/TRAINING PROGRAM

## 2018-11-06 PROCEDURE — 36592 COLLECT BLOOD FROM PICC: CPT | Performed by: STUDENT IN AN ORGANIZED HEALTH CARE EDUCATION/TRAINING PROGRAM

## 2018-11-06 PROCEDURE — 25000128 H RX IP 250 OP 636: Performed by: FAMILY MEDICINE

## 2018-11-06 PROCEDURE — 74019 RADEX ABDOMEN 2 VIEWS: CPT

## 2018-11-06 PROCEDURE — 82248 BILIRUBIN DIRECT: CPT | Performed by: STUDENT IN AN ORGANIZED HEALTH CARE EDUCATION/TRAINING PROGRAM

## 2018-11-06 PROCEDURE — 80053 COMPREHEN METABOLIC PANEL: CPT | Performed by: STUDENT IN AN ORGANIZED HEALTH CARE EDUCATION/TRAINING PROGRAM

## 2018-11-06 PROCEDURE — 00000146 ZZHCL STATISTIC GLUCOSE BY METER IP

## 2018-11-06 PROCEDURE — 87493 C DIFF AMPLIFIED PROBE: CPT | Performed by: SURGERY

## 2018-11-06 PROCEDURE — 25000125 ZZHC RX 250: Performed by: COLON & RECTAL SURGERY

## 2018-11-06 RX ORDER — HYDROMORPHONE HCL/0.9% NACL/PF 0.2MG/0.2
.2-.4 SYRINGE (ML) INTRAVENOUS
Status: DISCONTINUED | OUTPATIENT
Start: 2018-11-06 | End: 2018-11-11

## 2018-11-06 RX ORDER — KETOROLAC TROMETHAMINE 30 MG/ML
15 INJECTION, SOLUTION INTRAMUSCULAR; INTRAVENOUS EVERY 6 HOURS PRN
Status: DISCONTINUED | OUTPATIENT
Start: 2018-11-06 | End: 2018-11-09

## 2018-11-06 RX ADMIN — Medication 0.4 MG: at 16:13

## 2018-11-06 RX ADMIN — Medication 0.2 MG: at 09:24

## 2018-11-06 RX ADMIN — Medication 0.4 MG: at 22:24

## 2018-11-06 RX ADMIN — Medication 0.2 MG: at 10:06

## 2018-11-06 RX ADMIN — POTASSIUM CHLORIDE: 2 INJECTION, SOLUTION, CONCENTRATE INTRAVENOUS at 20:04

## 2018-11-06 RX ADMIN — PANTOPRAZOLE SODIUM 40 MG: 40 INJECTION, POWDER, FOR SOLUTION INTRAVENOUS at 11:03

## 2018-11-06 RX ADMIN — ONDANSETRON HYDROCHLORIDE 4 MG: 2 INJECTION INTRAMUSCULAR; INTRAVENOUS at 14:12

## 2018-11-06 RX ADMIN — Medication 0.4 MG: at 12:02

## 2018-11-06 RX ADMIN — Medication 0.4 MG: at 14:07

## 2018-11-06 RX ADMIN — I.V. FAT EMULSION 250 ML: 20 EMULSION INTRAVENOUS at 20:04

## 2018-11-06 RX ADMIN — CALCIUM GLUCONATE 2 G: 98 INJECTION, SOLUTION INTRAVENOUS at 11:03

## 2018-11-06 RX ADMIN — ENOXAPARIN SODIUM 40 MG: 40 INJECTION SUBCUTANEOUS at 11:03

## 2018-11-06 RX ADMIN — Medication 0.4 MG: at 18:15

## 2018-11-06 RX ADMIN — HYDROCODONE BITARTRATE AND ACETAMINOPHEN 2 TABLET: 5; 325 TABLET ORAL at 04:25

## 2018-11-06 RX ADMIN — Medication 0.4 MG: at 20:16

## 2018-11-06 RX ADMIN — KETOROLAC TROMETHAMINE 30 MG: 30 INJECTION, SOLUTION INTRAMUSCULAR at 07:53

## 2018-11-06 ASSESSMENT — ACTIVITIES OF DAILY LIVING (ADL)
ADLS_ACUITY_SCORE: 11

## 2018-11-06 ASSESSMENT — PAIN DESCRIPTION - DESCRIPTORS
DESCRIPTORS: SORE
DESCRIPTORS: SORE
DESCRIPTORS: CONSTANT
DESCRIPTORS: CONSTANT;SHARP
DESCRIPTORS: SORE;CRAMPING
DESCRIPTORS: THROBBING
DESCRIPTORS: SORE
DESCRIPTORS: SORE
DESCRIPTORS: CONSTANT;CRAMPING
DESCRIPTORS: SORE
DESCRIPTORS: CONSTANT

## 2018-11-06 NOTE — PLAN OF CARE
Problem: Patient Care Overview  Goal: Plan of Care/Patient Progress Review  Outcome: No Change  A&Ox4. VSS on RA. Pain controlled with norco given x1 and heating pad. Pt complained of intermittent nausea but denied interventions, no emesis. Tolerating full liquid diet. PIV saline locked, Double lumen PICC infusing continuous TPN and Lipids, additional lumen saline locked. Pt on q6 hour BG checks. Voiding spontaneously with adequate urine output. Hyperactive bowel sounds, passing gas. Pt was having diarrhea, placed on enteric precautions, one loose bm this shift, stool sample collection missed, awaiting another bm. Mg replaced yesterday, this AM recheck. Up independently. Continue with plan of care.    0600 Addendum: Stool sample collected and sent.

## 2018-11-06 NOTE — PROGRESS NOTES
Care Coordinator - Discharge Planning    Admission Date/Time:  11/1/2018  Attending MD:  Sukhdev Aguilar*     Data  Date of initial CC assessment:  Today after update during interdisciplinary rounds from the new Primary MD Team, Colorectal Surgery.  Chart reviewed, discussed with interdisciplinary team.   Patient was admitted for:   1. SBO (small bowel obstruction) (H)         Assessment   Per MD team, patient will discharge to home with TPN in 1-2 days and will be re admitted for surgery in approximately 2 weeks.  Patient will be prescribed TPN at time of discharge to assist with optimizing patients nutrition prior to surgery.  Patient is not a rehab/TCU candidate though her preference is to discharge to a setting as above.  [facility]'s RN Liaison, will discuss TPN expectations with patient at bedside and she may have to have patient sign a Patient Contract that will state she will be compliant with home TPN expectations including answering the phone when home care agency calls her. Per  [facility], patient has been non compliant with home care in the past. In light of the above, the following home care plans of care have been tentatively arranged on patients behalf pending ongoing evaluation for home care compliance concerns:    Please fax discharge orders to [facility]     Ph:  [phone]     Fax: [phone]     Skilled home care RN for initial home safety evaluation and to assist with MD team discharge plans of care as designated in discharge orders.     Skilled home care RN to evaluate medication management, nutrition and hydration evaluation, endurance evaluation, and general status evaluation after discharge from the acute care hospital setting.     Skilled home care RN to assist with management and education reinforcement with home TPN per [facility].  [facility] cares home care agency routine.     Skilled home care to monitor TPN labs per home care agency routine.        Coordination of Care and Referrals: Provided patient/family with options for home care agency that can assist with a plan of care.       Plan  Anticipated Discharge Date:  To be determined.  Anticipated Discharge Plan:  As abpve and as outlined in MD team plans of care.    CTS Handoff completed: Clinic Letter)  Sent    TREVER Brooke.EDELMIRA., P.H.N.,R.N.         Pager

## 2018-11-06 NOTE — PROGRESS NOTES
"Colorectal Surgery Progress Note  11/06/2018    Emesis x2 yesterday. Agreed to PICC/TPN. Tolerated overnight. On fulls but hasn't had any PO since last evening. Having lots of stool output. Good UOP. Ambulating.     /78 (BP Location: Left arm)  Pulse 85  Temp 98.1  F (36.7  C) (Oral)  Resp 18  Ht 1.753 m (5' 9\")  Wt 53.5 kg (117 lb 14.4 oz)  SpO2 95%  BMI 17.41 kg/m2     since midnight  Stool 1000 since midnight  Emesis 1650 yesterday        PE  NAD  NLB on RA  Abd soft, nontender, slightly distended   Ext wwp     Labs  Albumin 2.1  Transferrin 180  Prealbumin 12  C diff negative     A/P: 42F w/ cervical cancer s/p chemo-rads who developed SBO enteritis with multiple recurrent SBO to point of needing TPN at times, does have an ileal-ileal anastomosis for a SBR in 2017 and received endoscopic dilatations with Gastroenterology, last one in June 2018.  Last time on TPN was June 2018. Now restarted. Intermittent emesis persists.     -AXR ordered today   -sips of clears with Boost Breeze  -smoking cessation discussed, amenable to stop smoking now especially with surgery involving anastomosis coming up  -follow up in clinic in 2 weeks with Dr. Aguilar       Seen with colorectal fellow Dr. Barbosa, discussed with Dr. Aguilar.    Brit Red MD PGY3  General Surgery      "

## 2018-11-06 NOTE — DISCHARGE SUMMARY
[facility]  Discharge Summary  Colon and Rectal Surgery     Rachel A Gerhardt MRN# [id number]   YOB: 1975 Age: 42 year old     Date of Admission:  11/1/2018  Date of Discharge::  11/14/2018  Admitting Physician:  Jeremiah Vasquez MD  Discharge Physician:  Dr. Aguilar   Primary Care Physician:        Reji Avery          Admission Diagnoses:   SBO (small bowel obstruction) (H) [K56.609]  Recurrent small bowel obstructions  Small bowel enteritis  History of cervical cancer  History of prior chemoradiation  Tobacco abuse  History of opiate abuse  Opiate dependence          Discharge Diagnosis:   SBO (small bowel obstruction) (H) [K56.609]  Recurrent small bowel obstructions  Small bowel enteritis  History of cervical cancer  History of prior chemoradiation  Tobacco abuse  History of opiate abuse  Opiate dependence  Severe malnutrition in the context of chronic illness         Procedures:   PICC placement             Consultations:   NUTRITION SERVICES ADULT IP CONSULT  GI LUMINAL ADULT IP CONSULT  MEDICATION HISTORY IP PHARMACY CONSULT  SURGERY GENERAL ADULT IP CONSULT  VASCULAR ACCESS CARE ADULT IP CONSULT  PHARMACY/NUTRITION TO START AND MANAGE TPN  VASCULAR ACCESS ADULT IP CONSULT  PHARMACY IP CONSULT         Imaging Studies:     Results for orders placed or performed during the hospital encounter of 11/01/18   Abdomen XR, 2 vw, flat and upright    Narrative    Exam: XR ABDOMEN 2 VW, 11/1/2018 8:58 PM    Indication: r/o sbo;     Comparison: Radiograph the abdomen 6/19/2018    Findings:   Upright and supine AP views of the abdomen. There are diffusely  dilated loops of small bowel throughout the abdomen, measuring up to  6.1 cm. No intraperitoneal free air. Lung bases are clear.      Impression    Impression: Diffuse dilation of the small bowel throughout the  abdomen, concerning for partial small bowel obstruction.    [Result: Small bowel obstruction]    Finding was  identified on 11/1/2018 9:18 PM.     Dr. Craft was contacted by Dr. Ray at 11/1/2018 9:39 PM and  verbalized understanding of the urgent finding.     I have personally reviewed the examination and initial interpretation  and I agree with the findings.    MARY ANN VELA MD   CT Abdomen Pelvis w Contrast    Narrative    EXAMINATION: CT ABDOMEN PELVIS W CONTRAST, 11/2/2018 2:40 PM    TECHNIQUE:  Helical CT images from the lung bases through the  symphysis pubis were obtained with IV contrast. Contrast dose: 70ml  isovue 370    COMPARISON: Abdominal x-ray 11/1/2018, CT abdomen pelvis 7/17/2018    HISTORY: partial SBO, extensive abd. surgical hx, hx of ileoileal  anastomotic stricture;     FINDINGS:    Abdomen and pelvis:    Segmental small bowel resection changes are again seen in the left  lower quadrant/left hemipelvis.     There are numerous loops of substantially dilated loops small bowel  measuring up to 4.5 cm in diameter. There is a relative caliber  change/transition point in the left lower quadrants near the small  bowel small bowel anastomosis (series 2, images 100-110).   Hyperenhancement and wall thickening of narrowed loops of small bowel  as well as several loops of dilated small bowel, especially pronounced  in the pelvis. The distal and terminal ileum as well as the colon are  decompressed and normal in caliber. There is a small volume of free  fluid in the pelvis, and diffuse mesenteric haziness and edema. No  free intraperitoneal gas, pneumatosis, or portal venous gas.    The liver, gallbladder, spleen, adrenal glands and kidneys appear  unremarkable. No hydronephrosis or renal stones. Urinary bladder  appears unremarkable. No pelvic masses.    Lung bases: Heart is normal in size. The lung bases are clear.    Bones and soft tissues: No acute osseous abnormalities or suspicious  osseous lesions.      Impression    IMPRESSION:   Small bowel obstruction, with apparent transition point in the  left  lower quadrant/left hemipelvis near the small bowel small bowel  anastomosis. Findings are similar to those seen on CT on 7/17/2018.  While there is no portal venous gas, pneumatosis, or free  intraperitoneal gas, there are multiple loops of thickened and  inflamed appearing abnormally enhancing bowel. Consider correlation  with serum lactate.    [Result: Bowel obstruction with features of possible bowel ischemia]    Finding was identified on 11/2/2018 2:48 PM.     Dr. Galvez was contacted by Dr. Strong at 11/2/2018 5:06 PM and  verbalized understanding of the urgent finding.      I have personally reviewed the examination and initial interpretation  and I agree with the findings.    GELY STRONG MD   XR Chest Port 1 View    Narrative    Exam: XR CHEST PORT 1 VW, 11/5/2018 6:05 PM    Indication: PICC    Comparison: 5/19/2018, 5/1/2018    Findings:   A single portable AP view the chest was obtained. The right arm PICC  tip projects over the mid SVC. The cardiac mediastinal silhouette is  within normal limits. No pneumothorax or pleural effusion. No focal  airspace opacities.      Impression    Impression:   The right arm PICC tip projects over the mid SVC. Lungs are clear.    I have personally reviewed the examination and initial interpretation  and I agree with the findings.    CARLIN ALCALA MD   XR Abdomen 2 Views    Narrative    Exam: Abdominal x-ray, supine and upright views, 11/6/2018.    COMPARISON: CT exam 11 2018.    HISTORY: Evaluate for obstruction. Known stricture at ileoileal  anastomosis.    FINDINGS: Supine and upright views of the abdomen were obtained. Again  noted multiple air dilated loops of small bowel measuring up to 6.3 cm  throughout the abdomen with air-fluid levels. The colon is  decompressed. No pneumatosis. No portal venous gas. Lung bases are  clear. No free air beneath the diaphragm.      Impression    IMPRESSION: Persistent multiple air dilated loops of small bowel  slightly worse  from prior CT 11/2/2018 considering differences in  technique. This was previously characterized as a small bowel  obstruction with apparent transition point in the left lower  quadrant/left hemipelvis near the small bowel with small bowel  anastomoses on CT exam 11/2/2018.    QUINN ALMAGUER MD            Medications Prior to Admission:     Prescriptions Prior to Admission   Medication Sig Dispense Refill Last Dose     ondansetron (ZOFRAN) 8 MG tablet Take 8 mg by mouth every 8 hours as needed for nausea   Past Week at Unknown time     simethicone (MYLICON) 40 MG/0.6ML suspension Take 40 mg by mouth 4 times daily as needed for cramping   Past Week at Unknown time            Discharge Medications:     Current Discharge Medication List      START taking these medications    Details   HYDROmorphone, STANDARD CONC, (DILAUDID) 1 MG/ML LIQD liquid Take 2 mLs (2 mg) by mouth every 4 hours as needed for moderate to severe pain  Qty: 84 mL, Refills: 0    Associated Diagnoses: SBO (small bowel obstruction) (H)         CONTINUE these medications which have CHANGED    Details   ondansetron (ZOFRAN) 8 MG tablet Take 0.5-1 tablets (4-8 mg) by mouth every 8 hours as needed for nausea  Qty: 21 tablet, Refills: 0    Associated Diagnoses: SBO (small bowel obstruction) (H)         CONTINUE these medications which have NOT CHANGED    Details   simethicone (MYLICON) 40 MG/0.6ML suspension Take 40 mg by mouth 4 times daily as needed for cramping                 Brief History of Illness:   Rachel Gerhardt is a 42F w/ h/o cervical cancer with extension into bl ovaries and bladder, history of suboxone use, s/p chemoradiation, c/b SBO due to SB enteritis s/p SBR (5/2017), 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            Hospital Course:   The patient was admitted for partial SBO concerning for small bowel stricture at the site of the previous anastomosis. She refused an NGT upon admission despite multiple recommendations. There were multiple discussions regarding observation vs repeat dilatation with gastroenterology vs surgical resection. The patient would prefer to undergo the surgical resection.     Due to poor nutritional status a PICC line was placed and TPN was initiated.  Pt had difficulties with pain control and required Iv dilaudid due to intermittent vomiting.  However pt continued to pass loose stools and gas.  Pt was eventually switched to oral dilaudid solution at 2mg every 4 hours prn for which she tolerated.  There are concerns for opiate dependence and this will need to be further addressed after surgery.  Pt was discharged home and is to continue TPN until the day of surgery.     Patient is to present for surgery on 11/20/2018.  Pt has undergone a pre-operative evaluation by Internal Medicine on 11/13/2018.          Day of Discharge Physical Exam:   Blood pressure 108/79, pulse 85, temperature 98.9  F (37.2  C), temperature source Oral, resp. rate 18, height 1.753 m (5' 9\"), weight 53.5 kg (117 lb 14.4 oz), SpO2 96 %, not currently breastfeeding.    Gen: AAOx3, NAD.  Ambulates most of the day around the hospital campus and visits with friends through out the hospital   Pulm: Non-labored breathing  Abd: Soft, appropriately tender, no guarding/rebound  Ext:  Warm and well-perfused         Discharge Instructions and Follow-Up:       Discharge Procedure Orders  Home infusion referral     Reason for your hospital stay   Order Comments: You were admitted for a small bowel obstruction.  You were started on IV TPN.     Adult [facility] Follow-up and recommended labs and tests   Order Comments: DIET  - TPN as main source of nutrition.  Clear liquid diet for comfort.     ACTIVITY  -as tolerated  -No " driving while on narcotic analgesics (i.e. Percocet, oxycodone, Vicodin)      NOTIFY  Please contact Marianela Diaz LPN, Maria L Sidhu RN or Joellen Parish RN at [phone] for problems after discharge such as:  -Temperature > 101F, chills, rigors, dizziness  -You stop passing gas, develop significant bloating, abdominal pain  -Have blood in stools/vomit  -Have severe diarrhea/constipation  -Any other questions or concerns.  - At nights (after 4:30pm), on weekends, or if urgent, call [phone] and ask the  to speak with the on-call Colorectal Surgery resident or fellow      Medication Instructions  Some of your medications may have changed. Please take only prescribed and resumed medications     FOLLOW-UP  1.  Please contact our clinic scheduler Bianca Pavon (phone # [phone]) if you need help scheduling an appointment or need to change appointments.         Appointments on Midnight and/or [facility] (with [facility] or [facility] provider or service). Call [phone] if you haven't heard regarding these appointments within 7 days of discharge.     Activity   Order Comments: Your activity upon discharge:   -as tolerated  -No driving while on narcotic analgesics (i.e. Percocet, oxycodone, Vicodin)   Order Specific Question Answer Comments   Is discharge order? Yes      Full Code   Order Specific Question Answer Comments   Code status determined by: Discussion with patient/legal decision maker      Diet   Order Comments: Follow this diet upon discharge:   - TPN as main source of nutrition.  Clear liquid diet for comfort.   Order Specific Question Answer Comments   Is discharge order? Yes               Home Health Care:   Arranged           Discharge Disposition:   Discharged to home      Condition at discharge: Stable    Julieta Rob PA-C  Colon and Rectal Surgery     Pt was seen and discussed with Dr. Barbosa on 11/14/2018.

## 2018-11-06 NOTE — PROGRESS NOTES
Therapy: TPN  Insurance: [employer]   Ded: $3000  Met: $2971     Co-Insurance: 100%    In reference to admission on 11/1/18 to check TPN coverage      Please contact Intake with any questions, [phone] or In Basket pool, [facility] ([phone]).

## 2018-11-06 NOTE — PLAN OF CARE
Problem: Patient Care Overview  Goal: Plan of Care/Patient Progress Review  Outcome: Improving    VS: AVSS on RA.   Neuro: WNL  Resp: WNL  Cardiac: WNL  GI: +passing flatus. Bowel sounds hyperactive, 1 large loose stool this AM. 1 large emesis this afternoon (Pt believes due to eating jello)-declines anti-emetic medication intervention  : Voiding spontaneously w/o difficulty   Skin: WNL  Diet: Clear liquids. Appetite poor- tolerating poorly. TPN infusing   VAD: Double lumen PICC in right arm. Purple infusing TPN. White saline locked   Activity: Up ad dania   Pain: Severe abd pain controlled with 0.4mg IV Dilaudid. Aqua K-pad in use     Plan of Care: Continue with current POC. Pain management. Increase oral intake of clear liquids as tolerated. Increase ambulation.     Shante Stone RN on 11/6/2018 at 2:46 PM

## 2018-11-06 NOTE — PLAN OF CARE
Problem: Patient Care Overview  Goal: Plan of Care/Patient Progress Review  Neuro: A&Ox4.   Cardiac: VSS.   Respiratory: Sating 96 on RA.  GI/: Adequate urine output. BM X1  Diet/appetite: Tolerating NPO  Activity:  Independant, up to chair and in halls.  Pain: At acceptable level on current regimen.   Skin: No new deficits noted.  LDA's: PICC, PIV    Plan: Continue with POC. Notify primary team with changes.

## 2018-11-06 NOTE — PROGRESS NOTES
Gastroenterology Inpatient Sign Off Note    Inpatient GI consults service will sign off. No further recommendations at this time. If primary team has addition questions, please page consult fellow listed in José.    Current GI Consult Staff: Dr. Akins     Follow up recommendations:   No outpatient GI clinic follow-up indicated. If there is ongoing concern for patient's symptoms and question about any further endoscopic therapy please discuss with Dr. Vigil. At this juncture any further endoscopic therapy is unlikely to provide any significant or durable benefit. Follow-up with primary care provider at timing determined by discharge physician.    If outpatient GI follow-up is felt indicated by primary care, they may coordinate this by placing new GI referral and contacting  General GI clinic - ([phone])     Discussed with Dr. Akins.    Zahida Ndiaye MD  GI Fellow, PGY-4  P: [phone]

## 2018-11-06 NOTE — PLAN OF CARE
Problem: Patient Care Overview  Goal: Plan of Care/Patient Progress Review  Outcome: No Change  VSS on RA. Up ad dania. Pain tolerable - PRN Norco given x1. Double lumen PICC placed tonight, placement verified with xray. BG check 84, orders to check q 6h x 72h. TPN and Lipids infusing into PICC. PIV saline locked. Order placed for enteric isolation and to test for C. Diff, pt has not had any stools since order was placed. Had a large emesis x1, about 600 mL. PRN zofran given x1. Cross-cover notified that pt has had a large emesis x2 today. Pt states she is tired tonight, sleeping between cares. Continue with POC.

## 2018-11-06 NOTE — PROGRESS NOTES
[facility] Progress Note - [facility] Service       Main Plans for Today   - Colorectal to take over primary      Assessment & Plan   Rachel A Gerhardt is a 42 year old female admitted on 11/1/2018. 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     # Acute on chronic abdominal pain  # Nausea/Vomiting  # Hx ileoileal anastomotic stricture  # Partial small bowel obstruction  Passing gas and BMs. Patient declined NG placement when offered multiple times during admission.   - GI and colorectal following and colorectal to take over as primary.   - TPN and PO diet per colorectal   - Serial abdominal exams  - Abdominal XR today   - Pain management with Norco 5/325 - 2 tablets every 6hrs PRN. Previously discussed today that it is reasonable to try the Norco at a higher dose, but if this doesn't help it is likely that the Norco is not the correct medication to treat her chronic pain and we should consider weaning it. She was agreeable to this but also frustrated because nothing seems to help her pain.  - Continue non-opiate pain management with heat packs.    - IV Zofran PRN for nausea and compazine PRN  - Electrolyte replacement to keep Mg > 2 and K > 4, prealbumin ordered    # Chronic diarrhea  Patient has chronic diarrhea s/p small bowel resection in 2017. Diarrhea most likely due to short gut syndrome. Less likely c diff or other infectious cause due to no elevation in WBC or fevers. C diff negative.   - Monitor electrolytes and replete as needed     # Chronic protein malnutrition  # Weight loss  Patient unable to keep down food due to nausea and  vomiting recently. Even when n/v is better, still only eats small meals. Has been on TPN before as well. Patient states that she has had a 10 lbs weight loss since last hospitalization (weight same from 9/20/18 clinic visit and only 3 lbs down from 7/17/18 hospitalization).   - TPN     # Opioid dependence  Seen at [facility] pain clinic where she gets her Vicodin 5/325 prescribed.  - Goal to resume PTA pain regimen      # Tobacco dependence  - Nicotine gum PRN    # Pain Assessment:  Current Pain Score 11/6/2018   Patient currently in pain? yes   Pain score (0-10) -   Pain location Abdomen   Pain descriptors Constant;Sharp   Some encounter information is confidential and restricted. Go to Review Flowsheets activity to see all data.   - Jenni is experiencing pain due to abdominal and chronic pain. Pain management was discussed and the plan was created in a collaborative fashion.  Jenni's response to the current recommendations: mixed response  - Please see the plan for pain management as documented above    Diet: Snacks/Supplements Adult: Other; PRN; Between Meals  parenteral nutrition - ADULT compounded formula  Clear Liquid Diet  Snacks/Supplements Adult: Boost Breeze; Between Meals  Fluids: PO  DVT Prophylaxis: Pneumatic Compression Devices  Code Status: Full Code    Disposition Plan   Expected discharge: > 7 days, recommended to prior living arrangement once adequate pain management/ tolerating PO medications. Dispo: Expected Discharge Date: 11/06/18        Entered: John Dc 11/06/2018, 9:10 AM   Information in the above section will display in the discharge planner report.      The patient's care was discussed with the patient and [place]'s Family Medicine Team.     John Dc DO  [facility]  Pager: [phone]  Please see sticky note for cross cover information    Interval History   Difficulty with pain and emesis overnight. Discussed with colorectal who recommend  pt to stay until procedure in a few weeks. No other medical needs and colorectal recommending assuming primary role for preparation for surgery. PICC placed and TPN pending. Pt still having BMs. Good UOP.       Physical Exam   Vital Signs: Temp: 98.1  F (36.7  C) Temp src: Oral BP: 112/78 Pulse: 85 Heart Rate: 89 Resp: 18 SpO2: 95 % O2 Device: None (Room air)    Weight: 117 lbs 14.4 oz    Constitutional: She is oriented to person, place, and time. No distress.   Cachetic   HENT:   Head: Normocephalic and atraumatic.   Mouth/Throat: Oropharynx is clear and moist. No oropharyngeal exudate.   Eyes: Conjunctivae and EOM are normal. Pupils are equal, round, and reactive to light.   Neck: Neck supple.   Cardiovascular: Normal rate, regular rhythm, normal heart sounds and intact distal pulses.  Exam reveals no gallop and no friction rub.    No murmur heard.  Pulmonary/Chest: Effort normal. No respiratory distress.   Abdominal: Bowel sounds are normal. She exhibits no distension.gross tenderness in all 4 quadrants, distractable. No rebound or guarding. .    Neurological: She is alert and oriented to person, place, and time.   Skin: Skin is warm and dry.   Psychiatric: She has a normal mood and affect.    Data     Recent Labs  Lab 11/06/18  0747 11/05/18  0725 11/04/18  0832   WBC 8.2 7.7 7.8   HGB 12.9 13.7 11.2*   * 103* 103*    277 211   INR 1.11  --  1.16*    139 141   POTASSIUM 4.7 4.4 4.9   CHLORIDE 103 106 111*   CO2 25 27 24   BUN 18 13 12   CR 0.48* 0.52 0.58   ANIONGAP 9 6 6   JONATAN 8.0* 7.9* 7.1*   * 75 76   ALBUMIN 2.5* 2.6* 2.1*   PROTTOTAL 5.8* 6.2* 4.8*   BILITOTAL 0.3 0.2 0.6   ALKPHOS 85 90 75   ALT 26 22 18   AST 21 16 14       Recent Results (from the past 24 hour(s))   XR Chest Port 1 View    Narrative    Exam: XR CHEST PORT 1 VW, 11/5/2018 6:05 PM    Indication: PICC    Comparison: 5/19/2018, 5/1/2018    Findings:   A single portable AP view the chest was obtained. The right arm  PICC  tip projects over the mid SVC. The cardiac mediastinal silhouette is  within normal limits. No pneumothorax or pleural effusion. No focal  airspace opacities.      Impression    Impression:   The right arm PICC tip projects over the mid SVC. Lungs are clear.    I have personally reviewed the examination and initial interpretation  and I agree with the findings.    CARLIN ALCALA MD

## 2018-11-07 LAB
ALBUMIN SERPL-MCNC: 2.3 G/DL (ref 3.4–5)
ALBUMIN SERPL-MCNC: 2.4 G/DL (ref 3.4–5)
ALP SERPL-CCNC: 69 U/L (ref 40–150)
ALP SERPL-CCNC: 76 U/L (ref 40–150)
ALT SERPL W P-5'-P-CCNC: 19 U/L (ref 0–50)
ALT SERPL W P-5'-P-CCNC: 23 U/L (ref 0–50)
ANION GAP SERPL CALCULATED.3IONS-SCNC: 5 MMOL/L (ref 3–14)
ANION GAP SERPL CALCULATED.3IONS-SCNC: 5 MMOL/L (ref 3–14)
AST SERPL W P-5'-P-CCNC: 12 U/L (ref 0–45)
AST SERPL W P-5'-P-CCNC: 23 U/L (ref 0–45)
BILIRUB SERPL-MCNC: 0.1 MG/DL (ref 0.2–1.3)
BILIRUB SERPL-MCNC: 0.2 MG/DL (ref 0.2–1.3)
BUN SERPL-MCNC: 14 MG/DL (ref 7–30)
BUN SERPL-MCNC: 14 MG/DL (ref 7–30)
CALCIUM SERPL-MCNC: 7.7 MG/DL (ref 8.5–10.1)
CALCIUM SERPL-MCNC: 7.8 MG/DL (ref 8.5–10.1)
CHLORIDE SERPL-SCNC: 108 MMOL/L (ref 94–109)
CHLORIDE SERPL-SCNC: 109 MMOL/L (ref 94–109)
CO2 SERPL-SCNC: 24 MMOL/L (ref 20–32)
CO2 SERPL-SCNC: 26 MMOL/L (ref 20–32)
CREAT SERPL-MCNC: 0.46 MG/DL (ref 0.52–1.04)
CREAT SERPL-MCNC: 0.46 MG/DL (ref 0.52–1.04)
GFR SERPL CREATININE-BSD FRML MDRD: >90 ML/MIN/1.7M2
GFR SERPL CREATININE-BSD FRML MDRD: >90 ML/MIN/1.7M2
GLUCOSE SERPL-MCNC: 117 MG/DL (ref 70–99)
GLUCOSE SERPL-MCNC: 89 MG/DL (ref 70–99)
LACTATE BLD-SCNC: 1 MMOL/L (ref 0.7–2)
MAGNESIUM SERPL-MCNC: 1.7 MG/DL (ref 1.6–2.3)
PHOSPHATE SERPL-MCNC: 3.2 MG/DL (ref 2.5–4.5)
POTASSIUM SERPL-SCNC: 3.8 MMOL/L (ref 3.4–5.3)
POTASSIUM SERPL-SCNC: 6.3 MMOL/L (ref 3.4–5.3)
PROT SERPL-MCNC: 5.4 G/DL (ref 6.8–8.8)
PROT SERPL-MCNC: 5.8 G/DL (ref 6.8–8.8)
SODIUM SERPL-SCNC: 137 MMOL/L (ref 133–144)
SODIUM SERPL-SCNC: 139 MMOL/L (ref 133–144)

## 2018-11-07 PROCEDURE — 80053 COMPREHEN METABOLIC PANEL: CPT | Performed by: STUDENT IN AN ORGANIZED HEALTH CARE EDUCATION/TRAINING PROGRAM

## 2018-11-07 PROCEDURE — 25000128 H RX IP 250 OP 636: Performed by: COLON & RECTAL SURGERY

## 2018-11-07 PROCEDURE — 25000125 ZZHC RX 250: Performed by: FAMILY MEDICINE

## 2018-11-07 PROCEDURE — 25000125 ZZHC RX 250: Performed by: COLON & RECTAL SURGERY

## 2018-11-07 PROCEDURE — 83605 ASSAY OF LACTIC ACID: CPT | Performed by: COLON & RECTAL SURGERY

## 2018-11-07 PROCEDURE — C9113 INJ PANTOPRAZOLE SODIUM, VIA: HCPCS | Performed by: COLON & RECTAL SURGERY

## 2018-11-07 PROCEDURE — 12000008 ZZH R&B INTERMEDIATE UMMC

## 2018-11-07 PROCEDURE — 83735 ASSAY OF MAGNESIUM: CPT | Performed by: STUDENT IN AN ORGANIZED HEALTH CARE EDUCATION/TRAINING PROGRAM

## 2018-11-07 PROCEDURE — 36592 COLLECT BLOOD FROM PICC: CPT | Performed by: COLON & RECTAL SURGERY

## 2018-11-07 PROCEDURE — 36592 COLLECT BLOOD FROM PICC: CPT | Performed by: PHYSICIAN ASSISTANT

## 2018-11-07 PROCEDURE — 80053 COMPREHEN METABOLIC PANEL: CPT | Performed by: PHYSICIAN ASSISTANT

## 2018-11-07 PROCEDURE — 36592 COLLECT BLOOD FROM PICC: CPT | Performed by: STUDENT IN AN ORGANIZED HEALTH CARE EDUCATION/TRAINING PROGRAM

## 2018-11-07 PROCEDURE — 84100 ASSAY OF PHOSPHORUS: CPT | Performed by: STUDENT IN AN ORGANIZED HEALTH CARE EDUCATION/TRAINING PROGRAM

## 2018-11-07 RX ADMIN — I.V. FAT EMULSION 250 ML: 20 EMULSION INTRAVENOUS at 20:58

## 2018-11-07 RX ADMIN — Medication 5 ML: at 07:49

## 2018-11-07 RX ADMIN — Medication 0.4 MG: at 16:47

## 2018-11-07 RX ADMIN — Medication 0.4 MG: at 09:44

## 2018-11-07 RX ADMIN — Medication 0.4 MG: at 18:57

## 2018-11-07 RX ADMIN — ENOXAPARIN SODIUM 40 MG: 40 INJECTION SUBCUTANEOUS at 10:42

## 2018-11-07 RX ADMIN — Medication 0.4 MG: at 04:40

## 2018-11-07 RX ADMIN — Medication 0.4 MG: at 12:03

## 2018-11-07 RX ADMIN — POTASSIUM CHLORIDE: 2 INJECTION, SOLUTION, CONCENTRATE INTRAVENOUS at 20:57

## 2018-11-07 RX ADMIN — Medication 0.4 MG: at 20:56

## 2018-11-07 RX ADMIN — Medication 0.2 MG: at 07:03

## 2018-11-07 RX ADMIN — Medication 0.4 MG: at 14:23

## 2018-11-07 RX ADMIN — Medication 0.4 MG: at 22:56

## 2018-11-07 RX ADMIN — Medication 0.2 MG: at 07:56

## 2018-11-07 RX ADMIN — Medication 0.4 MG: at 01:36

## 2018-11-07 RX ADMIN — PANTOPRAZOLE SODIUM 40 MG: 40 INJECTION, POWDER, FOR SOLUTION INTRAVENOUS at 10:42

## 2018-11-07 ASSESSMENT — ACTIVITIES OF DAILY LIVING (ADL)
ADLS_ACUITY_SCORE: 11

## 2018-11-07 ASSESSMENT — PAIN DESCRIPTION - DESCRIPTORS
DESCRIPTORS: ACHING
DESCRIPTORS: ACHING
DESCRIPTORS: THROBBING
DESCRIPTORS: ACHING;THROBBING
DESCRIPTORS: ACHING;CRAMPING
DESCRIPTORS: ACHING;CRAMPING
DESCRIPTORS: THROBBING
DESCRIPTORS: ACHING
DESCRIPTORS: ACHING;CRAMPING

## 2018-11-07 NOTE — PLAN OF CARE
Problem: Patient Care Overview  Goal: Plan of Care/Patient Progress Review  Outcome: Therapy, progress toward functional goals is gradual    HD 6.  Pt admitted for chronic SBO.   Has been passing gas and having very frequent loose stools.  New PICC and TPN started on 11/5, NPO.  Abdominal pain managed with IV dilaudid q2-3h.  No anti-nausea med given overnight.  Voiding adequate amts.  Up walking multiple times to help with abdominal pain.  PLAN: Home with TPN in 1-2 days, possible surgery in a couple weeks.  Pt still needs to make a decision re: risks/benefits of another SBR and possible stoma.

## 2018-11-07 NOTE — PROGRESS NOTES
CLINICAL NUTRITION SERVICES - BRIEF NOTE  *See RD note on 11/5 for last nutrition reassessment details    TPN advanced to goal dextrose 165 g/day last night.  K+, Mg++, Phos WNL today.  Per discussion with primary team yesterday, pt will require TPN after discharge so plan is to cycle TPN when appropriate.     INTERVENTIONS  Implementation  Collaboration with other providers and Parenteral Nutrition/IV Fluids - discussed with PharmD, start cycling TPN tonight to 18 hours (GIR 3.1 @ peak infusion)    Monitoring/Evaluation  Progress toward goals will be monitored and evaluated per protocol.    Mylene Duvall, RD, LD  7C RD pager: [phone]

## 2018-11-07 NOTE — PLAN OF CARE
Problem: Patient Care Overview  Goal: Plan of Care/Patient Progress Review    VS: Last VS: Vital signs:  Temp: 98.5  F (36.9  C) Temp src: Oral BP: 107/74 Pulse: 95 Heart Rate: 82 Resp: 16 SpO2: 98 % O2 Device: None (Room air)       Triggered sepsis protocol at 1403. Sepsis protocol activated.     Neuro: WNL  Resp: WNL  Cardiac: WNL  GI: + passing flatus, abd rounded-firm, bowels hyperactive and audible in all quads. Pt reported 5 small loose stools. Denies nausea. NG tube ordered, Pt refused. MD notified.   : Voiding spontaneously w/o difficulty   Skin: WNL  Diet:Tolerating NPO, ice chips.   VAD: RUE double lumen PICC- purple lumen infusing continuous TPN  Activity: Up ad dania. Frequently ambulating in halls   Pain: Managed with 0.4mg IV Dilaudid PRN. Requesting as available     Plan of Care: Continue with existing POC. NPO due to nausea/emesis yesterday. Pain management.     Shante Stone RN on 11/7/2018 at 1:49 PM

## 2018-11-07 NOTE — PROGRESS NOTES
"Colorectal Surgery Progress Note  11/07/2018    Pain controlled. 1 bout of emesis, large amount, yesterday. Has remained NPO since. Getting TPN. Good UOP. Ambulating. AXR with multiple dilated loops of bowel yesterday.        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  Last time on TPN was June 2018. Now restarted. Emesis persists.     -dilaudid IV and toradol as needed  -will make strict NPO today due to nausea, emesis  -continue TPN  -no smoking  -lovenox      Seen with colorectal fellow Dr. Barbosa, discussed with Dr. Aguilar.    Brit Red MD PGY3  General Surgery      "

## 2018-11-08 ENCOUNTER — APPOINTMENT (OUTPATIENT)
Dept: GENERAL RADIOLOGY | Facility: CLINIC | Age: 43
DRG: 393 | End: 2018-11-08
Attending: SURGERY
Payer: COMMERCIAL

## 2018-11-08 LAB
ALBUMIN SERPL-MCNC: 2.4 G/DL (ref 3.4–5)
ALP SERPL-CCNC: 74 U/L (ref 40–150)
ALT SERPL W P-5'-P-CCNC: 21 U/L (ref 0–50)
ANION GAP SERPL CALCULATED.3IONS-SCNC: 10 MMOL/L (ref 3–14)
AST SERPL W P-5'-P-CCNC: 15 U/L (ref 0–45)
BILIRUB SERPL-MCNC: 0.3 MG/DL (ref 0.2–1.3)
BUN SERPL-MCNC: 20 MG/DL (ref 7–30)
CALCIUM SERPL-MCNC: 7.7 MG/DL (ref 8.5–10.1)
CHLORIDE SERPL-SCNC: 107 MMOL/L (ref 94–109)
CO2 SERPL-SCNC: 21 MMOL/L (ref 20–32)
CREAT SERPL-MCNC: 0.51 MG/DL (ref 0.52–1.04)
GFR SERPL CREATININE-BSD FRML MDRD: >90 ML/MIN/1.7M2
GLUCOSE SERPL-MCNC: 92 MG/DL (ref 70–99)
MAGNESIUM SERPL-MCNC: 2.5 MG/DL (ref 1.6–2.3)
PHOSPHATE SERPL-MCNC: 2.8 MG/DL (ref 2.5–4.5)
POTASSIUM SERPL-SCNC: 4.5 MMOL/L (ref 3.4–5.3)
PROT SERPL-MCNC: 5.8 G/DL (ref 6.8–8.8)
SODIUM SERPL-SCNC: 138 MMOL/L (ref 133–144)

## 2018-11-08 PROCEDURE — 36592 COLLECT BLOOD FROM PICC: CPT | Performed by: STUDENT IN AN ORGANIZED HEALTH CARE EDUCATION/TRAINING PROGRAM

## 2018-11-08 PROCEDURE — 84100 ASSAY OF PHOSPHORUS: CPT | Performed by: STUDENT IN AN ORGANIZED HEALTH CARE EDUCATION/TRAINING PROGRAM

## 2018-11-08 PROCEDURE — 25000128 H RX IP 250 OP 636: Performed by: STUDENT IN AN ORGANIZED HEALTH CARE EDUCATION/TRAINING PROGRAM

## 2018-11-08 PROCEDURE — 83735 ASSAY OF MAGNESIUM: CPT | Performed by: STUDENT IN AN ORGANIZED HEALTH CARE EDUCATION/TRAINING PROGRAM

## 2018-11-08 PROCEDURE — C9113 INJ PANTOPRAZOLE SODIUM, VIA: HCPCS | Performed by: COLON & RECTAL SURGERY

## 2018-11-08 PROCEDURE — 80053 COMPREHEN METABOLIC PANEL: CPT | Performed by: STUDENT IN AN ORGANIZED HEALTH CARE EDUCATION/TRAINING PROGRAM

## 2018-11-08 PROCEDURE — 74019 RADEX ABDOMEN 2 VIEWS: CPT

## 2018-11-08 PROCEDURE — 40000802 ZZH SITE CHECK

## 2018-11-08 PROCEDURE — 25000125 ZZHC RX 250: Performed by: COLON & RECTAL SURGERY

## 2018-11-08 PROCEDURE — 25000125 ZZHC RX 250: Performed by: STUDENT IN AN ORGANIZED HEALTH CARE EDUCATION/TRAINING PROGRAM

## 2018-11-08 PROCEDURE — 25000128 H RX IP 250 OP 636: Performed by: COLON & RECTAL SURGERY

## 2018-11-08 PROCEDURE — 12000001 ZZH R&B MED SURG/OB UMMC

## 2018-11-08 RX ADMIN — Medication 0.4 MG: at 22:02

## 2018-11-08 RX ADMIN — Medication 0.4 MG: at 04:54

## 2018-11-08 RX ADMIN — Medication 0.4 MG: at 20:02

## 2018-11-08 RX ADMIN — POTASSIUM CHLORIDE: 2 INJECTION, SOLUTION, CONCENTRATE INTRAVENOUS at 20:34

## 2018-11-08 RX ADMIN — PANTOPRAZOLE SODIUM 40 MG: 40 INJECTION, POWDER, FOR SOLUTION INTRAVENOUS at 08:39

## 2018-11-08 RX ADMIN — Medication 0.4 MG: at 01:10

## 2018-11-08 RX ADMIN — Medication 0.4 MG: at 15:49

## 2018-11-08 RX ADMIN — Medication 0.4 MG: at 17:59

## 2018-11-08 RX ADMIN — Medication 2 G: at 04:57

## 2018-11-08 RX ADMIN — Medication 0.4 MG: at 13:20

## 2018-11-08 RX ADMIN — Medication 0.4 MG: at 08:52

## 2018-11-08 RX ADMIN — Medication 0.4 MG: at 06:55

## 2018-11-08 RX ADMIN — POTASSIUM CHLORIDE 10 MEQ: 10 INJECTION, SOLUTION INTRAVENOUS at 06:05

## 2018-11-08 RX ADMIN — Medication 0.4 MG: at 11:07

## 2018-11-08 RX ADMIN — ONDANSETRON HYDROCHLORIDE 4 MG: 2 INJECTION INTRAMUSCULAR; INTRAVENOUS at 19:58

## 2018-11-08 RX ADMIN — ENOXAPARIN SODIUM 40 MG: 40 INJECTION SUBCUTANEOUS at 08:39

## 2018-11-08 ASSESSMENT — PAIN DESCRIPTION - DESCRIPTORS
DESCRIPTORS: CRAMPING
DESCRIPTORS: ACHING;CRAMPING
DESCRIPTORS: ACHING;CRAMPING
DESCRIPTORS: CRAMPING
DESCRIPTORS: CRAMPING

## 2018-11-08 ASSESSMENT — ACTIVITIES OF DAILY LIVING (ADL)
ADLS_ACUITY_SCORE: 11

## 2018-11-08 NOTE — PROGRESS NOTES
Colon and Rectal Surgery Update      Patient's mother Staci Hamlin, [phone] called yesterday 11/7 for a medical update.  Notified Jenni of this and Jenni does NOT want her mother receiving any medical updates.  Jenni would prefer that her mom not even know that she has been hospitalized here.      Did NOT call patient's mother back.  Discussed with Charge RN.     Julieta Rob PA-C  Colon and Rectal Surgery

## 2018-11-08 NOTE — PLAN OF CARE
Problem: Patient Care Overview  Goal: Plan of Care/Patient Progress Review  Outcome: No Change  VSS. Abdominal pain controled c Dilaudid IV q 2 hours. NPO x ice chips. Denies nausea. Voiding urgency c stool not measured. Patient ambulating halls independently. TPN & IL infusing. Continue c POC.

## 2018-11-08 NOTE — PLAN OF CARE
Problem: Patient Care Overview  Goal: Plan of Care/Patient Progress Review  Outcome: No Change    Abdominal pain somewhat managed with IV dilaudid, heat, ambulation. Declined toradol. Abdomen rounded, hyperactive bowel sounds, 3-4 loose BMs overnight, passing gas.   Did get a 4 hour chunk of sleep but woke in pain.  UAL, walked in halls.  Denies nausea. NPO + cycled TPN. Mag and K replaced.  Voiding, not always saving.  PLAN: Monitor bowel function, nausea. Repeat AXR at some point. Surgery tentatively planned for 11/20, unsure if pt will remain inhouse until then.

## 2018-11-08 NOTE — PLAN OF CARE
Problem: Patient Care Overview  Goal: Plan of Care/Patient Progress Review  Afebrile,vitals stable,denied nausea,abdominal discomfort and cramping comfortably managed with prn dilaudid 0.4mg every 2 hrs.Patient remains NPO,denied nausea.Abdominal X-ray done this afternoon,result pending.Patient reported passing small liquid stool x 12 this shift,pt not saving output.Continue per plan of care.

## 2018-11-08 NOTE — PROGRESS NOTES
"Colorectal Surgery Progress Note  11/08/2018    Intermittent nausea.  Continues to pass gas and liquid stools.  Walking as much as possible.  Pt hesitant to start liquids as she has worse symptoms with liquids than with solids.  But she will try some jello and popsicles.     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  Last time on TPN was June 2018. Now restarted. Emesis persists.     -dilaudid IV and toradol as needed  -continue NPO, will get AXR today, then likely advance diet to CLD  -continue TPN  -no smoking  -lovenox      Seen with colorectal fellow Dr. Barbosa, discussed with Dr. Aguilar.    Brit Red MD PGY3  General Surgery    Julieta Rob PA-C  Colon and Rectal Surgery           "

## 2018-11-09 LAB
ANION GAP SERPL CALCULATED.3IONS-SCNC: 8 MMOL/L (ref 3–14)
BUN SERPL-MCNC: 20 MG/DL (ref 7–30)
CALCIUM SERPL-MCNC: 8.1 MG/DL (ref 8.5–10.1)
CHLORIDE SERPL-SCNC: 108 MMOL/L (ref 94–109)
CO2 SERPL-SCNC: 22 MMOL/L (ref 20–32)
CREAT SERPL-MCNC: 0.42 MG/DL (ref 0.52–1.04)
GFR SERPL CREATININE-BSD FRML MDRD: >90 ML/MIN/1.7M2
GLUCOSE SERPL-MCNC: 84 MG/DL (ref 70–99)
MAGNESIUM SERPL-MCNC: 1.9 MG/DL (ref 1.6–2.3)
PHOSPHATE SERPL-MCNC: 3.4 MG/DL (ref 2.5–4.5)
POTASSIUM SERPL-SCNC: 4.8 MMOL/L (ref 3.4–5.3)
SODIUM SERPL-SCNC: 138 MMOL/L (ref 133–144)

## 2018-11-09 PROCEDURE — 83735 ASSAY OF MAGNESIUM: CPT | Performed by: FAMILY MEDICINE

## 2018-11-09 PROCEDURE — 84100 ASSAY OF PHOSPHORUS: CPT | Performed by: FAMILY MEDICINE

## 2018-11-09 PROCEDURE — 36592 COLLECT BLOOD FROM PICC: CPT | Performed by: FAMILY MEDICINE

## 2018-11-09 PROCEDURE — 25000125 ZZHC RX 250: Performed by: COLON & RECTAL SURGERY

## 2018-11-09 PROCEDURE — 25000125 ZZHC RX 250: Performed by: FAMILY MEDICINE

## 2018-11-09 PROCEDURE — 25000128 H RX IP 250 OP 636: Performed by: COLON & RECTAL SURGERY

## 2018-11-09 PROCEDURE — 12000001 ZZH R&B MED SURG/OB UMMC

## 2018-11-09 PROCEDURE — 25000125 ZZHC RX 250: Performed by: STUDENT IN AN ORGANIZED HEALTH CARE EDUCATION/TRAINING PROGRAM

## 2018-11-09 PROCEDURE — 80048 BASIC METABOLIC PNL TOTAL CA: CPT | Performed by: FAMILY MEDICINE

## 2018-11-09 PROCEDURE — C9113 INJ PANTOPRAZOLE SODIUM, VIA: HCPCS | Performed by: COLON & RECTAL SURGERY

## 2018-11-09 RX ORDER — KETOROLAC TROMETHAMINE 30 MG/ML
15 INJECTION, SOLUTION INTRAMUSCULAR; INTRAVENOUS EVERY 6 HOURS PRN
Status: DISCONTINUED | OUTPATIENT
Start: 2018-11-09 | End: 2018-11-10

## 2018-11-09 RX ADMIN — I.V. FAT EMULSION 250 ML: 20 EMULSION INTRAVENOUS at 20:09

## 2018-11-09 RX ADMIN — Medication 0.4 MG: at 06:49

## 2018-11-09 RX ADMIN — ENOXAPARIN SODIUM 40 MG: 40 INJECTION SUBCUTANEOUS at 11:12

## 2018-11-09 RX ADMIN — POTASSIUM CHLORIDE: 2 INJECTION, SOLUTION, CONCENTRATE INTRAVENOUS at 20:04

## 2018-11-09 RX ADMIN — Medication 0.4 MG: at 22:43

## 2018-11-09 RX ADMIN — Medication 0.4 MG: at 20:01

## 2018-11-09 RX ADMIN — PANTOPRAZOLE SODIUM 40 MG: 40 INJECTION, POWDER, FOR SOLUTION INTRAVENOUS at 11:12

## 2018-11-09 RX ADMIN — Medication 0.4 MG: at 14:09

## 2018-11-09 RX ADMIN — Medication 0.4 MG: at 04:47

## 2018-11-09 RX ADMIN — Medication 0.4 MG: at 02:16

## 2018-11-09 RX ADMIN — Medication 0.4 MG: at 11:32

## 2018-11-09 RX ADMIN — Medication 0.4 MG: at 00:09

## 2018-11-09 RX ADMIN — Medication 2 G: at 11:27

## 2018-11-09 RX ADMIN — Medication 0.4 MG: at 16:04

## 2018-11-09 RX ADMIN — Medication 0.4 MG: at 09:23

## 2018-11-09 RX ADMIN — Medication 0.4 MG: at 18:00

## 2018-11-09 ASSESSMENT — PAIN DESCRIPTION - DESCRIPTORS
DESCRIPTORS: ACHING;CRAMPING
DESCRIPTORS: ACHING
DESCRIPTORS: ACHING;CRAMPING
DESCRIPTORS: ACHING
DESCRIPTORS: CRAMPING;SHARP
DESCRIPTORS: ACHING;CRAMPING
DESCRIPTORS: ACHING
DESCRIPTORS: ACHING;CRAMPING
DESCRIPTORS: ACHING;CRAMPING

## 2018-11-09 ASSESSMENT — ACTIVITIES OF DAILY LIVING (ADL)
ADLS_ACUITY_SCORE: 11

## 2018-11-09 NOTE — PROGRESS NOTES
"Colorectal Surgery Progress Note  11/09/2018    Felt well yesterday morning. Repeat abdominal XR in the AM demonstrated resolution of small bowel distention, therefore, advanced to clear liquid diet. However, she developed nausea with sips of water. No emesis. Discussed taking diet very slow given her history of emesis with oral intake. Will try boost today. Continues to have multiple bowel movements.     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  Last time on TPN was June 2018, restarted this hospitalization. Plan for surgery on 11/20.      -Dilaudid IV and toradol as needed, if tolerating PO, may transition to oral meds   -Continue CLD as tolerated, added boost today   -Continue TPN  -No smoking  -Lovenox      Seen with colorectal fellow Dr. Barbosa, who will discuss with Dr. Aguilar.    Seema Peña MD          "

## 2018-11-09 NOTE — PLAN OF CARE
Problem: Patient Care Overview  Goal: Plan of Care/Patient Progress Review  Outcome: No Change  VSS. Patient tried 2 apple juice this evening. Pt had increased pain and abdominal cramping p juice. Pt states she's had multiple loose stool. Voiding not saving. Pain controled c Dilaudid q 2 hrs. Patient would like to be woken for pain medications when due overnight. Pt ambulating halls independently. Zofran IV given x 1 for nausea. No emesis.  TPN infusing. Continue c POC.

## 2018-11-09 NOTE — PLAN OF CARE
Problem: Patient Care Overview  Goal: Plan of Care/Patient Progress Review  AVSS. Pt c/o constant cramping pain by umbilicus and lower abdomen. Pain controlled with 0.4 mg PRN dilaudid q2h. Pt has reported 4 to 5 loose BMs tonight. Denies nausea. Voiding, not saving (d/t urgency with stools). Pt up ad dania, independent. R PICC double lumen, one lumen infusing cyclic TPN, other lumen SL. Continue POC.

## 2018-11-09 NOTE — PLAN OF CARE
Problem: Bowel Obstruction (Adult)  Goal: Signs and Symptoms of Listed Potential Problems Will be Absent, Minimized or Managed (Bowel Obstruction)  Signs and symptoms of listed potential problems will be absent, minimized or managed by discharge/transition of care (reference Bowel Obstruction (Adult) CPG).   Outcome: No Change  VSS for pt.UAL,ambulating frequently,long distances in halls.Is steady on feet.States she has continued to have frequent loose stools,slightly cloudy,green/brown in color.C/O abdominal pain,requesting IV dilaudid 0.4mg every 2 hours.Declined use of icy hot patch as well as IV toradol.States hot packs work better than anything.Taking sips of clear liquid diet.Voiding,not saving.On cycled TPN.

## 2018-11-10 ENCOUNTER — HEALTH MAINTENANCE LETTER (OUTPATIENT)
Age: 43
End: 2018-11-10

## 2018-11-10 PROCEDURE — 25000125 ZZHC RX 250: Performed by: FAMILY MEDICINE

## 2018-11-10 PROCEDURE — C9113 INJ PANTOPRAZOLE SODIUM, VIA: HCPCS | Performed by: COLON & RECTAL SURGERY

## 2018-11-10 PROCEDURE — 12000001 ZZH R&B MED SURG/OB UMMC

## 2018-11-10 PROCEDURE — 25000128 H RX IP 250 OP 636: Performed by: COLON & RECTAL SURGERY

## 2018-11-10 PROCEDURE — 25000125 ZZHC RX 250: Performed by: COLON & RECTAL SURGERY

## 2018-11-10 RX ADMIN — Medication 0.4 MG: at 07:44

## 2018-11-10 RX ADMIN — Medication 0.4 MG: at 20:10

## 2018-11-10 RX ADMIN — Medication 0.4 MG: at 12:03

## 2018-11-10 RX ADMIN — I.V. FAT EMULSION 250 ML: 20 EMULSION INTRAVENOUS at 20:26

## 2018-11-10 RX ADMIN — Medication 0.4 MG: at 22:18

## 2018-11-10 RX ADMIN — PANTOPRAZOLE SODIUM 40 MG: 40 INJECTION, POWDER, FOR SOLUTION INTRAVENOUS at 09:52

## 2018-11-10 RX ADMIN — Medication 0.4 MG: at 01:08

## 2018-11-10 RX ADMIN — Medication 0.4 MG: at 09:49

## 2018-11-10 RX ADMIN — Medication 0.4 MG: at 03:23

## 2018-11-10 RX ADMIN — Medication 0.4 MG: at 14:00

## 2018-11-10 RX ADMIN — Medication 0.4 MG: at 18:18

## 2018-11-10 RX ADMIN — Medication 0.4 MG: at 05:41

## 2018-11-10 RX ADMIN — POTASSIUM CHLORIDE: 2 INJECTION, SOLUTION, CONCENTRATE INTRAVENOUS at 20:22

## 2018-11-10 RX ADMIN — ENOXAPARIN SODIUM 40 MG: 40 INJECTION SUBCUTANEOUS at 09:52

## 2018-11-10 RX ADMIN — Medication 0.4 MG: at 16:19

## 2018-11-10 ASSESSMENT — ACTIVITIES OF DAILY LIVING (ADL)
ADLS_ACUITY_SCORE: 11

## 2018-11-10 ASSESSMENT — PAIN DESCRIPTION - DESCRIPTORS
DESCRIPTORS: ACHING;CRAMPING
DESCRIPTORS: ACHING
DESCRIPTORS: ACHING
DESCRIPTORS: ACHING;CRAMPING
DESCRIPTORS: ACHING;CRAMPING
DESCRIPTORS: ACHING;DISCOMFORT
DESCRIPTORS: ACHING;CRAMPING

## 2018-11-10 NOTE — PROGRESS NOTES
"Colorectal Surgery Progress Note  11/10/2018    S: No changes. Continues to have frequent flatus and stool. Tried jello yesterday and within a couple hours developed abdominal cramping. Discussed going slow on diet and not taking anything by mouth. Ambulating. No other concerns.     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  Last time on TPN was June 2018, restarted this hospitalization. Plan for surgery on 11/20.      -Dilaudid IV as needed, discontinued tramadol as to hold it for use after surgery   -Continue CLD but recommended to take nothing by mouth   -Continue TPN  -No smoking  -Lovenox      Seen with colorectal fellow Dr. Barbosa and colorectal staff Dr. Teresa Peña MD          "

## 2018-11-10 NOTE — PLAN OF CARE
Problem: Patient Care Overview  Goal: Plan of Care/Patient Progress Review  Outcome: No Change  AVSS.  Pain continues overnight, requesting dilaudid 0.4mg q2hr overnight with relief.  Heat pad with relief as well.    Denies nausea, tolerating clears.  PICC with TPN and lipids infusing.  Up independently in room.  Voiding, loose stools continue.    Cont with POC.  Plan for surgery 11/20.

## 2018-11-10 NOTE — PLAN OF CARE
Problem: Patient Care Overview  Goal: Plan of Care/Patient Progress Review  Outcome: No Change  Assumed care of pt from [id number]  VSS. Pain controlled with PRN Dilaudid. Denies nausea. PICC SL, cycled TPN/lipids. Clear liquid diet, only taking clears. UAL, walking frequently. Scheduled Lovenox. Continue to monitor.

## 2018-11-10 NOTE — PLAN OF CARE
Problem: Patient Care Overview  Goal: Plan of Care/Patient Progress Review  Outcome: Improving  Hypotensive, baseline. Order parameters for notification not met. Continue to monitor. AOVSS. Pain managed with dilaudid q 2 hours. Paged team to possibly switch to PO dilaudid if applicable. Denies n/v. PICC SL. Cycled TPN/lipids ordered. + gas, + BM. Tolerating clear liquid diet. Up ad dania. Walks halls frequently. Family and son present at the bedside.

## 2018-11-10 NOTE — PLAN OF CARE
Problem: Patient Care Overview  Goal: Plan of Care/Patient Progress Review  Outcome: No Change  AVSS. Pain controlled with PRN dilaudid. Heating pad applied to abdomen per patient. Denies N/V. PICC double lumen saline locked. Clear liquid diet. Up independently, walking frequently. Voiding independently, passing flatus. Continue with POC.

## 2018-11-10 NOTE — PLAN OF CARE
Problem: Patient Care Overview  Goal: Plan of Care/Patient Progress Review  Outcome: No Change  VSS. Pain controlled with PRN Dilaudid. Denies nausea. Pt ate jello x2 this evening. Multiple BMs per pt baseline. TPN/lipids via PICC. UAL, walking frequently. Continue to monitor.

## 2018-11-11 LAB
ANION GAP SERPL CALCULATED.3IONS-SCNC: 7 MMOL/L (ref 3–14)
BUN SERPL-MCNC: 24 MG/DL (ref 7–30)
C DIFF TOX B STL QL: NEGATIVE
CALCIUM SERPL-MCNC: 8.3 MG/DL (ref 8.5–10.1)
CHLORIDE SERPL-SCNC: 106 MMOL/L (ref 94–109)
CO2 SERPL-SCNC: 25 MMOL/L (ref 20–32)
CREAT SERPL-MCNC: 0.43 MG/DL (ref 0.52–1.04)
GFR SERPL CREATININE-BSD FRML MDRD: >90 ML/MIN/1.7M2
GLUCOSE SERPL-MCNC: 90 MG/DL (ref 70–99)
MAGNESIUM SERPL-MCNC: 1.9 MG/DL (ref 1.6–2.3)
PHOSPHATE SERPL-MCNC: 4.7 MG/DL (ref 2.5–4.5)
POTASSIUM SERPL-SCNC: 5 MMOL/L (ref 3.4–5.3)
SODIUM SERPL-SCNC: 138 MMOL/L (ref 133–144)
SPECIMEN SOURCE: NORMAL

## 2018-11-11 PROCEDURE — 87493 C DIFF AMPLIFIED PROBE: CPT | Performed by: SURGERY

## 2018-11-11 PROCEDURE — 25000125 ZZHC RX 250: Performed by: COLON & RECTAL SURGERY

## 2018-11-11 PROCEDURE — 25000128 H RX IP 250 OP 636: Performed by: COLON & RECTAL SURGERY

## 2018-11-11 PROCEDURE — 12000001 ZZH R&B MED SURG/OB UMMC

## 2018-11-11 PROCEDURE — 83735 ASSAY OF MAGNESIUM: CPT | Performed by: COLON & RECTAL SURGERY

## 2018-11-11 PROCEDURE — 84100 ASSAY OF PHOSPHORUS: CPT | Performed by: COLON & RECTAL SURGERY

## 2018-11-11 PROCEDURE — C9113 INJ PANTOPRAZOLE SODIUM, VIA: HCPCS | Performed by: COLON & RECTAL SURGERY

## 2018-11-11 PROCEDURE — 36592 COLLECT BLOOD FROM PICC: CPT | Performed by: COLON & RECTAL SURGERY

## 2018-11-11 PROCEDURE — 25000128 H RX IP 250 OP 636: Performed by: SURGERY

## 2018-11-11 PROCEDURE — 80048 BASIC METABOLIC PNL TOTAL CA: CPT | Performed by: COLON & RECTAL SURGERY

## 2018-11-11 PROCEDURE — 25000128 H RX IP 250 OP 636: Performed by: STUDENT IN AN ORGANIZED HEALTH CARE EDUCATION/TRAINING PROGRAM

## 2018-11-11 RX ORDER — HYDROMORPHONE HCL/0.9% NACL/PF 0.2MG/0.2
0.2 SYRINGE (ML) INTRAVENOUS EVERY 4 HOURS PRN
Status: DISCONTINUED | OUTPATIENT
Start: 2018-11-11 | End: 2018-11-11

## 2018-11-11 RX ORDER — HYDROMORPHONE HCL/0.9% NACL/PF 0.2MG/0.2
0.2 SYRINGE (ML) INTRAVENOUS
Status: DISCONTINUED | OUTPATIENT
Start: 2018-11-11 | End: 2018-11-12

## 2018-11-11 RX ADMIN — Medication 0.4 MG: at 01:25

## 2018-11-11 RX ADMIN — Medication 0.4 MG: at 08:17

## 2018-11-11 RX ADMIN — CALCIUM GLUCONATE: 98 INJECTION, SOLUTION INTRAVENOUS at 20:11

## 2018-11-11 RX ADMIN — Medication 0.4 MG: at 06:19

## 2018-11-11 RX ADMIN — Medication 0.2 MG: at 11:42

## 2018-11-11 RX ADMIN — PANTOPRAZOLE SODIUM 40 MG: 40 INJECTION, POWDER, FOR SOLUTION INTRAVENOUS at 09:31

## 2018-11-11 RX ADMIN — Medication 0.4 MG: at 04:14

## 2018-11-11 RX ADMIN — Medication 0.2 MG: at 18:31

## 2018-11-11 RX ADMIN — ENOXAPARIN SODIUM 40 MG: 40 INJECTION SUBCUTANEOUS at 09:31

## 2018-11-11 RX ADMIN — Medication 2 G: at 12:58

## 2018-11-11 RX ADMIN — Medication 0.2 MG: at 15:14

## 2018-11-11 RX ADMIN — Medication 0.2 MG: at 21:15

## 2018-11-11 ASSESSMENT — PAIN DESCRIPTION - DESCRIPTORS
DESCRIPTORS: CRAMPING;CONSTANT
DESCRIPTORS: ACHING;SORE
DESCRIPTORS: CRAMPING
DESCRIPTORS: ACHING;SORE

## 2018-11-11 ASSESSMENT — ACTIVITIES OF DAILY LIVING (ADL)
ADLS_ACUITY_SCORE: 11

## 2018-11-11 NOTE — PLAN OF CARE
Problem: Patient Care Overview  Goal: Plan of Care/Patient Progress Review  Outcome: No Change  AVSS, alert & oriented x 4. Dilaudid PRN, dose adjustment this shift. Heating pad and aromatherapy adjunctive pain management with mild relief. Denies N/V. Clear liquid diet. Up independently, walking frequently. Voiding independently. Soft tan stool sent for Clostridium Difficile testing, result negative. Passing flatus. PICC infusing Magnesium Sulfate replacement, second lumen saline locked. Provider notified regarding pt leaving the unit and possible solid oral intake.

## 2018-11-11 NOTE — PLAN OF CARE
Problem: Patient Care Overview  Goal: Plan of Care/Patient Progress Review  Outcome: No Change  AVSS.  Pt resting well between cares.  Pain adequately controlled with dilaudid IV + heat pad.  Up independently in room and halls.  Voiding, not saving.  Continues with loose stools.    PICC with TPN/Lipids infusing.  Denies nausea, tolerating clears.  Cont with POC.

## 2018-11-11 NOTE — PLAN OF CARE
Problem: Patient Care Overview  Goal: Plan of Care/Patient Progress Review  Shift [id number]:    Pt alert and oriented. Pt continues to have pain. Per pt GI  informed pt oral pain medications would most likely not be absorbed and that is why pt is only getting IV pain meds. Pt continued with TPN and lipids per PICC. Pt voiding and having BMs this shift. Pt independent with ambulation and ADLs. Able to make needs known. Call light within reach. Will continue to monitor.

## 2018-11-11 NOTE — PROGRESS NOTES
"Colorectal Surgery Progress Note  11/11/2018    S: Diarrhea all night long. On clears. No emesis. Walking around.     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  Last time on TPN was June 2018, restarted this hospitalization. Plan for surgery on 11/20.      -Dilaudid IV as needed (decreased today to 0.2 q4)  -Continue CLD but recommended to take nothing by mouth   -Continue TPN  -Considering persistent diarrhea all night will send C diff again  -No smoking  -Lovenox      Seen with colorectal fellow Dr. Barbosa, discussed with staff Dr. Moore.      Brit Red MD PGY3  General Surgery           "

## 2018-11-11 NOTE — PLAN OF CARE
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  Last time on TPN was June 2018, restarted this hospitalization. Plan for surgery on 11/20(per MD note).   VS: VSS except tachy in the 100s(unchanged from previous days)  Neuro: wnl, frustrated d/t pain.  Cardiac: tachy                Respiratory: wnl  GI/: +BS, passing small amounts of soft tan stools, negative for C.diff(witnessed 1, pt stated that she had about 4-5 soft stools since the morning). Voiding but not saving.  Diet/appetite: Tolerating clear liquids.  Activity: UAL  Pain: with some relief from prn dilaudid IV q3hrs.  Skin/drains: wnl  Lines: double lumen PICC sl'd in between IV meds  P: continue to monitor VS, pain, bowel functions and gen status and continue with POC.

## 2018-11-12 LAB
ALBUMIN SERPL-MCNC: 2.5 G/DL (ref 3.4–5)
ALP SERPL-CCNC: 56 U/L (ref 40–150)
ALT SERPL W P-5'-P-CCNC: 35 U/L (ref 0–50)
ANION GAP SERPL CALCULATED.3IONS-SCNC: 5 MMOL/L (ref 3–14)
AST SERPL W P-5'-P-CCNC: 18 U/L (ref 0–45)
BILIRUB SERPL-MCNC: 0.4 MG/DL (ref 0.2–1.3)
BUN SERPL-MCNC: 24 MG/DL (ref 7–30)
CALCIUM SERPL-MCNC: 7.7 MG/DL (ref 8.5–10.1)
CHLORIDE SERPL-SCNC: 110 MMOL/L (ref 94–109)
CO2 SERPL-SCNC: 23 MMOL/L (ref 20–32)
CREAT SERPL-MCNC: 0.39 MG/DL (ref 0.52–1.04)
GFR SERPL CREATININE-BSD FRML MDRD: >90 ML/MIN/1.7M2
GLUCOSE SERPL-MCNC: 84 MG/DL (ref 70–99)
INR PPP: 1.01 (ref 0.86–1.14)
MAGNESIUM SERPL-MCNC: 1.8 MG/DL (ref 1.6–2.3)
PHOSPHATE SERPL-MCNC: 2.3 MG/DL (ref 2.5–4.5)
POTASSIUM SERPL-SCNC: 4.4 MMOL/L (ref 3.4–5.3)
PREALB SERPL IA-MCNC: 26 MG/DL (ref 15–45)
PROT SERPL-MCNC: 5.4 G/DL (ref 6.8–8.8)
SODIUM SERPL-SCNC: 138 MMOL/L (ref 133–144)
TRIGL SERPL-MCNC: 71 MG/DL

## 2018-11-12 PROCEDURE — 83735 ASSAY OF MAGNESIUM: CPT | Performed by: FAMILY MEDICINE

## 2018-11-12 PROCEDURE — 36592 COLLECT BLOOD FROM PICC: CPT | Performed by: FAMILY MEDICINE

## 2018-11-12 PROCEDURE — 25000128 H RX IP 250 OP 636: Performed by: STUDENT IN AN ORGANIZED HEALTH CARE EDUCATION/TRAINING PROGRAM

## 2018-11-12 PROCEDURE — 85610 PROTHROMBIN TIME: CPT | Performed by: FAMILY MEDICINE

## 2018-11-12 PROCEDURE — 25000125 ZZHC RX 250: Performed by: COLON & RECTAL SURGERY

## 2018-11-12 PROCEDURE — 12000001 ZZH R&B MED SURG/OB UMMC

## 2018-11-12 PROCEDURE — 25000128 H RX IP 250 OP 636: Performed by: SURGERY

## 2018-11-12 PROCEDURE — C9113 INJ PANTOPRAZOLE SODIUM, VIA: HCPCS | Performed by: COLON & RECTAL SURGERY

## 2018-11-12 PROCEDURE — 80053 COMPREHEN METABOLIC PANEL: CPT | Performed by: FAMILY MEDICINE

## 2018-11-12 PROCEDURE — 84134 ASSAY OF PREALBUMIN: CPT | Performed by: FAMILY MEDICINE

## 2018-11-12 PROCEDURE — 25000125 ZZHC RX 250: Performed by: FAMILY MEDICINE

## 2018-11-12 PROCEDURE — 25000128 H RX IP 250 OP 636: Performed by: COLON & RECTAL SURGERY

## 2018-11-12 PROCEDURE — 40000558 ZZH STATISTIC CVC DRESSING CHANGE

## 2018-11-12 PROCEDURE — 84478 ASSAY OF TRIGLYCERIDES: CPT | Performed by: FAMILY MEDICINE

## 2018-11-12 PROCEDURE — 84100 ASSAY OF PHOSPHORUS: CPT | Performed by: FAMILY MEDICINE

## 2018-11-12 PROCEDURE — 25000125 ZZHC RX 250: Performed by: STUDENT IN AN ORGANIZED HEALTH CARE EDUCATION/TRAINING PROGRAM

## 2018-11-12 RX ORDER — HYDROXYZINE HYDROCHLORIDE 25 MG/1
25 TABLET, FILM COATED ORAL 3 TIMES DAILY PRN
Status: DISCONTINUED | OUTPATIENT
Start: 2018-11-12 | End: 2018-11-13

## 2018-11-12 RX ORDER — MAGNESIUM SULFATE HEPTAHYDRATE 40 MG/ML
2 INJECTION, SOLUTION INTRAVENOUS DAILY PRN
Status: DISCONTINUED | OUTPATIENT
Start: 2018-11-12 | End: 2018-11-14 | Stop reason: HOSPADM

## 2018-11-12 RX ORDER — HYDROMORPHONE HCL/0.9% NACL/PF 0.2MG/0.2
0.2 SYRINGE (ML) INTRAVENOUS
Status: DISCONTINUED | OUTPATIENT
Start: 2018-11-12 | End: 2018-11-13

## 2018-11-12 RX ORDER — ACETAMINOPHEN 325 MG/1
650 TABLET ORAL EVERY 4 HOURS PRN
Status: DISCONTINUED | OUTPATIENT
Start: 2018-11-12 | End: 2018-11-13

## 2018-11-12 RX ADMIN — CALCIUM GLUCONATE: 98 INJECTION, SOLUTION INTRAVENOUS at 19:40

## 2018-11-12 RX ADMIN — Medication 0.2 MG: at 03:04

## 2018-11-12 RX ADMIN — Medication 0.2 MG: at 18:05

## 2018-11-12 RX ADMIN — Medication 0.2 MG: at 22:10

## 2018-11-12 RX ADMIN — Medication 0.2 MG: at 05:18

## 2018-11-12 RX ADMIN — Medication 0.2 MG: at 11:17

## 2018-11-12 RX ADMIN — ENOXAPARIN SODIUM 40 MG: 40 INJECTION SUBCUTANEOUS at 09:16

## 2018-11-12 RX ADMIN — PANTOPRAZOLE SODIUM 40 MG: 40 INJECTION, POWDER, FOR SOLUTION INTRAVENOUS at 09:16

## 2018-11-12 RX ADMIN — I.V. FAT EMULSION 250 ML: 20 EMULSION INTRAVENOUS at 19:40

## 2018-11-12 RX ADMIN — Medication 0.2 MG: at 07:15

## 2018-11-12 RX ADMIN — Medication 0.2 MG: at 09:16

## 2018-11-12 RX ADMIN — Medication 0.2 MG: at 20:12

## 2018-11-12 RX ADMIN — MAGNESIUM SULFATE IN WATER 2 G: 40 INJECTION, SOLUTION INTRAVENOUS at 19:34

## 2018-11-12 RX ADMIN — POTASSIUM PHOSPHATE, MONOBASIC AND POTASSIUM PHOSPHATE, DIBASIC 15 MMOL: 224; 236 INJECTION, SOLUTION INTRAVENOUS at 13:12

## 2018-11-12 RX ADMIN — Medication 0.2 MG: at 00:44

## 2018-11-12 RX ADMIN — Medication 0.2 MG: at 15:46

## 2018-11-12 RX ADMIN — Medication 0.2 MG: at 13:13

## 2018-11-12 ASSESSMENT — PAIN DESCRIPTION - DESCRIPTORS
DESCRIPTORS: CRAMPING
DESCRIPTORS: CONSTANT
DESCRIPTORS: CONSTANT;DISCOMFORT
DESCRIPTORS: CRAMPING
DESCRIPTORS: CRAMPING;DISCOMFORT

## 2018-11-12 ASSESSMENT — ACTIVITIES OF DAILY LIVING (ADL)
ADLS_ACUITY_SCORE: 11

## 2018-11-12 NOTE — PLAN OF CARE
"Problem: Patient Care Overview  Goal: Plan of Care/Patient Progress Review  Outcome: No Change  Pain: abdominal pain managed w/ heat pack and PRN dilaudid 0.2 every two hours   Nausea: denies   Lab: Phos 2.3, replacement infusing, re-check in the AM   /72 (BP Location: Left arm)  Pulse 104  Temp 96.8  F (36  C) (Oral)  Resp 18  Ht 1.753 m (5' 9\")  Wt 52.3 kg (115 lb 4.8 oz)  SpO2 100%  BMI 17.03 kg/m2   Output: Adequately voiding, not saving   Activity: UAL   Diet: Clear liquid/ TPN & lipids   Bowel Function: Bowel sounds heard in all quadrants, passing flatus, had bm x 3 per pt report   Lines: R PICC, dressing changed today by vascular, infusing phos replacement, dressing CDI   Plan: Continue to monitor pain, VS, labs, and output. Plan for procedure on 11/20          "

## 2018-11-12 NOTE — PROGRESS NOTES
"Colorectal Surgery Progress Note  11/12/2018    S: Up all night because of diarrhea. Felt very dehydrated, drank an entire glass of water. No nausea or vomiting. Pain uncontrolled, required increased frequency of dilaudid IV with minimal relief. No other concerns.     /72 (BP Location: Left arm)  Pulse 92  Temp 97.7  F (36.5  C) (Oral)  Resp 16  Ht 1.753 m (5' 9\")  Wt 52.3 kg (115 lb 4.8 oz)  SpO2 98%  BMI 17.03 kg/m2     NAD  NLB on RA  Abd soft, nondistended, mildly tender  Ext wwp     UOP: Unmeasured, voiding on own  Stool: 8x recorded    Labs: Reviewed  C. 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  Last time on TPN was June 2018, restarted this hospitalization. Plan for surgery on 11/20.      -Dilaudid IV as needed, increased frequency over course of day yesterday, considering alternate pain medications  -Continue CLD but recommended to take nothing by mouth   -Continue TPN  -Considering persistent diarrhea, c. Diff was negatie   -No smoking  -Lovenox      Seen with colorectal fellow Dr. Barbosa, who will discuss with staff Dr. Lauren Peña MD        "

## 2018-11-12 NOTE — CONSULTS
Internal Medicine Consult  Department of Internal Medicine     Patient Name: Rachel A Gerhardt MRN# [id number]   Age: 42 year old YOB: 1975      Date of Admission: 11/1/2018     Primary care provider: Reji Avery  Date of Service: 11/13/2018      Reason for consult: I was asked by Dr. Aguilar to consult on this patient for preoperative assessment for this patient undergoing surgical resection of bowel for partial small bowel obstruction due to ileo-ileo anastomotic stricture.       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  The plan at this time is for the patient to undergo surgical resection of bowel on 11/20.  A pre-operative risk assessment was requested by the primary team.       # Preoperative risk assessment: EKG 7/7/2018 shows normal sinus rhythm.  No recent echos in system. Patient is euvolemic at this time and has been at quite good for age functional status prior to admission. Denies any history of chest pain. Able to ambulate around the house, fold laundry, etc without dyspnea or chest pain.      Patient's cardiac risk by revised cardiac risk index is 0.9%. Her chronic conditions make her ASA class 2. ACS NSQIP lists her cardiac risk similarly at 0.1%. Her risk of any complication is 12.6% serious complications 8.5%, death 0.0%. She has not required urgent intubation in the past.       She is currently euvolemic and is/is not on supplemental oxygen. She not showing any signs/sx of any other active process at this  time.      I do not think that any further testing is indicated at this time as all of her chronic issues seem to be maximized. I discussed the risks listed above with the patient and my recommendations for no further testing or change in treatment prior to proceeding with surgery.  Resume medications as soon as possible post-operatively.     As all of her medical conditions are at their baseline will signoff at this time.  Please contact the triage hospitalist at job code [phone] if you need medicine to be involved at any time in the future.     Kiesha Stallworth, [facility]  Hospitalist Service  Pager [phone]     HPI:   Rachel A Gerhardt is a 42 year old year old female with history of cervical cancer into bilateral ovaries and bladder s/p chemoradiation complicated by SBP s/p SBR (5/2017), recurrent SBO 2/2 ileo-ileo anastomotic stricture s/p endoscopic dilations with GI (most recently 6/2018), chronic protein-calorie malnutrition, chronic abdominal pain with associated nausea and vomiting leading to chronic opoid dependence who was admitted to CRS team for partial SBO and abdominal pain.  The plan at this time is for the patient to undergo surgical resection of bowel on 11/20.  A pre-operative risk assessment was requested by the primary team.      She lives independently.  She is able to walk around the house with/without assistance. She is able to perform ADL's including bathing, cooking, dressing. She does not have any recollection of ever being limited by dyspnea or chest pain. She cannot recall ever having chest pain. She has not had any fevers, rashes, N/V/D, urinary symptoms. Her chief complaint at this time is ongoing abdominal pain.      Past Medical History:     Past Medical History:   Diagnosis Date     Asthma      Cancer (H)     Per patient OBGYN, cerivical cancer     Cervical cancer (H)      Other chronic pain      Ovarian cancer (H)      Substance abuse (H)     Outside records indicate past history  of narcotics abuse or dependence, but patient denies.        Past Surgical History:     Past Surgical History:   Procedure Laterality Date     COLONOSCOPY N/A 5/3/2018    Procedure: COLONOSCOPY;  sigmoidoscopy;  Surgeon: Omero Vigil MD;  Location: [facility] OR     COLONOSCOPY WITH CO2 INSUFFLATION N/A 4/30/2018    Procedure: COLONOSCOPY WITH CO2 INSUFFLATION;  Colonoscopy;  Surgeon: Omero Vigil MD;  Location: [facility]     COMBINED CYSTOSCOPY, INSERT STENT URETER(S) Bilateral 5/18/2017    Procedure: COMBINED CYSTOSCOPY, INSERT STENT URETER(S);  Cystoscopy with Bilateral Stent,;  Surgeon: Rene Calero MD;  Location: [facility] OR     ENT SURGERY  2009    mastoid, sinus     ENTEROSCOPY SMALL BOWEL N/A 6/18/2018    Procedure: ENTEROSCOPY SMALL BOWEL;  Lower bowel Retrograde Enteroscopy with Balloon Dilation .;  Surgeon: Omero Vigil MD;  Location: [facility] OR     EXAM UNDER ANESTHESIA, INSERT ALEX SLEEVE, UTERINE PLACEMENT OF TANDEM AND RING FOR RAD, ULTRASOUND N/A 12/14/2015    Procedure: EXAM UNDER ANESTHESIA, INSERT ALEX SLEEVE, UTERINE PLACEMENT OF TANDEM AND RING FOR RADIATION, ULTRASOUND GUIDED;  Surgeon: Abby Tony MD;  Location: [facility] OR     INSERT TANDEM AND CESIUM APPLICATOR CERVIX, ULTRASOUND GUIDED N/A 12/17/2015    Procedure: INSERT TANDEM AND CESIUM APPLICATOR CERVIX, ULTRASOUND GUIDED;  Surgeon: Kika Wood MD;  Location: [facility] OR     KNEE SURGERY       LAPAROTOMY EXPLORATORY N/A 5/18/2017    Procedure: LAPAROTOMY EXPLORATORY;   Exploratry Laparotomy, Small Bowel Resection with anastomosis, Flexible Sigmoidoscopy;  Surgeon: Jennifer Goodwin MD;  Location: [facility]     PICC INSERTION Right 04/29/2017    4fr SL BioFlo PICC, 37cm (3cm external) in the R basilic vein w/ tip in the mid SVC.     PICC INSERTION Right 03/29/2018    4Fr - 40cm (4cm external), R lateral brachial vein, Low SVC     RESECT SMALL BOWEL WITHOUT OSTOMY N/A 5/18/2017    Procedure: RESECT SMALL BOWEL WITHOUT  OSTOMY;;  Surgeon: Jennifer Goodwin MD;  Location: [facility]     SIGMOIDOSCOPY FLEXIBLE N/A 5/18/2017    Procedure: SIGMOIDOSCOPY FLEXIBLE;;  Surgeon: Jennifer Goodwin MD;  Location: [facility]        Social History:   Tobacco use: Light use  Alcohol use: Denies  Illicit substances:  Denies      Family History:     .Reviewed and updated.    Immunizations:     Immunization History   Administered Date(s) Administered     Influenza (IIV3) PF 10/08/2015     Tdap (Adacel,Boostrix) 01/01/2008       Allergies:      Allergies   Allergen Reactions     No Clinical Screening - See Comments Other (See Comments) and Diarrhea     headache  Carrots cause gastric upset, cramping and diarrhea.     Sulfa Drugs Hives     hives     Amoxicillin Unknown and Other (See Comments)     vomiting  vomiting     Amoxicillin-Pot Clavulanate Other (See Comments) and Nausea     vomiting     Augmentin GI Disturbance, Nausea and Hives     Avelox [Moxifloxacin] Nausea and Vomiting, Unknown and Nausea     Ciprofloxacin Hives and Nausea     Codeine Nausea and Vomiting and Nausea     Ibuprofen Nausea and Vomiting     Other reaction(s): Nausea And Vomiting     Ibuprofen Sodium Hives and GI Disturbance     Quinolones      Tramadol Hives, Diarrhea, Nausea and Nausea and Vomiting     Daucus Carota      Other reaction(s): GI Upset  Other reaction(s): Abdominal pain, Diarrhea  Carrots cause gastric upset, cramping and diarrhea.     Medications:     Prescriptions Prior to Admission   Medication Sig Dispense Refill Last Dose     ondansetron (ZOFRAN) 8 MG tablet Take 8 mg by mouth every 8 hours as needed for nausea   Past Week at Unknown time     simethicone (MYLICON) 40 MG/0.6ML suspension Take 40 mg by mouth 4 times daily as needed for cramping   Past Week at Unknown time        Review of Systems:   A complete ROS was performed and is negative other than what is stated in the HPI.      Physical Exam:     Temp: 97.6  F (36.4  C) Temp src: Oral BP: 95/58 Pulse: 110  Heart Rate: 105 Resp: 16 SpO2: 98 % O2 Device: None (Room air)    General: Alert and oriented, pleasant and conversational.   Head: Normocephalic, atraumatic,   Eyes: PERRL, EOMI, corneas and conjunctivae clear, no scleral icterus  ENT: Mucus membranes moist, no exudates, no erythema.  Neck: No tenderness, supple, full AROM, no adenopathy  Chest/Pulmonary: CTAB, moving air well, no rales or wheezes, no tachypnea   Cardiovascular: Regular rate and rhythm, S1/S2, no JVD. No murmurs, rubs, or gallops. Distal pulses 2+. No edema  Abdomen: Soft, non-distended.  Mildly tender to light palpation.  No s/s of peritonitis.   Skin: no diaphoresis, skin color normal  Neuro: speech clear, cranial nerves II-XII grossly intact, intact sensory/light touch sensation   Psychiatric: affect/mood normal, cooperative, no psychomotor retardation, memory intact   Data:      All labs and imaging reviewed.

## 2018-11-12 NOTE — PROGRESS NOTES
"CLINICAL NUTRITION SERVICES - REASSESSMENT NOTE     Nutrition Prescription    RECOMMENDATIONS FOR MDs/PROVIDERS TO ORDER:  Alert RD or PharmD if would like adjustments to PN fluid volume    Malnutrition Status:    Severe malnutrition in the context of chronic illness    Recommendations already ordered by Registered Dietitian (RD):  TG add-on     EVALUATION OF THE PROGRESS TOWARD GOALS   Diet: Clear liquids + Boost Plus (being sent Boost Breeze due clear liquid diet)    Nutrition Support: CPN (1560 mL/day x 12 hours) with goal 165 g dextrose, 95 g AA, and 250 mL 20% IV lipids 5 days/week which provides 1298 kcal (26 kcal/kg), 1.9 g/kg PRO, GIR 2.3 @ peak infusion, and 28% kcal from fat    PO Intake: clear liquid diet since 11/8.  Was on full liquids 11/5, clear liquids most of day on 11/6 but downgraded to NPO in afternoon.  Tolerating clear liquids per progress notes. Per provider note, \"Continue CLD but recommend to take nothing by mouth\".    PN Intake: CPN started 11/5 with initial dextrose 115 g/day.  Advanced to goal dextrose 11/6, and began cycling TPN on 11/7 (reached goal 12 hour cycle on 11/9).  No documentation of any PN/lipid  interruptions per review of progress notes.     NEW FINDINGS   Weight: mostly stable with some fluctuations since admit, remains above admit wt 48.6 kg on 11/1.    Labs: last TG on 11/5 (WNL).  Alk Phos, ALT, AST, and Tbili WNL today.      GI: ongoing diarrhea, C.diff negative on 11/11    MALNUTRITION  % Intake: Decreased intake does not meet criteria  % Weight Loss: > 20% in 1 year (severe)  Subcutaneous Fat Loss: Lower arm and Thoracic/intercostal:  Moderate/severe per RD note on 11/5  Muscle Loss: Thoracic region (clavicle), Lower arm  (forearm), and Upper leg (quadricep, hamstring): moderate per RD note on 11/5  Fluid Accumulation/Edema: None noted per chart review  Malnutrition Diagnosis: Severe malnutrition in the context of chronic illness    Previous Goals   Total avg " nutritional intake to meet a minimum of 25 kcal/kg and 1.5 g PRO/kg daily (per dosing wt 49 kg).  Evaluation: Met x 6 days once advanced to goal dextrose    Previous Nutrition Diagnosis  Inadequate protein-energy intake related to ongoing nausea/vomiting 2/2 recurrent SBO as evidenced by very poor PO intake x 1 week and overall difficulty tolerating adequate PO intake and subsequent severe unintentional weight loss (44%) over the past 2 years  Evaluation: Improving, updated    CURRENT NUTRITION DIAGNOSIS  Predicted inadequate nutrient intake (protein-energy) related to TPN/lipids providing 100% estimated nutrition needs (pt on clear liquid diet) and potential for PN interruptions    INTERVENTIONS  Implementation  Lab Monitoring: see above  Obtained information from chart review, pt not in room during attempts to visit    Goals  Total avg nutritional intake to meet a minimum of 25 kcal/kg and 1.5 g PRO/kg daily (per dosing wt 49 kg).    Monitoring/Evaluation  Progress toward goals will be monitored and evaluated per protocol.     Mylene Duvall RD, LD  7C RD pager: [phone]

## 2018-11-12 NOTE — PLAN OF CARE
Problem: Patient Care Overview  Goal: Plan of Care/Patient Progress Review  Outcome: No Change  AVSS.  98% RA.  Pt continues to request pain meds Q2hrs overnight, Dilaudid 0.2mg with relief.  PICC with TPN infusing.  Abd tender - pt using heat pad with relief.  Up independently in room, halls.  Voiding good vols, continues with loose stools.  Denies nausea, tolerating clears.    Cont with POC.  Plan for surgery 11/20.

## 2018-11-12 NOTE — PLAN OF CARE
Problem: Patient Care Overview  Goal: Plan of Care/Patient Progress Review  Outcome: No Change   11/11/18 2157   OTHER   Plan Of Care Reviewed With patient   Plan of Care Review   Progress no change     Temp: 98.2  F (36.8  C) Temp src: Oral BP: 97/56 Pulse: 102   Resp: 14 SpO2: 96 % O2 Device: None (Room air)       Patient is here for SBO due to small bowel stricture per MD note:    -C/O abdominal pain, given 0.2 IV Dilaudid with some relief  -on cyclic TPN and clear diet with poor appetite; denies nausea  -frequently ambulating  -C/O diarrhea; MD is aware  -alert and oriented x 4  -lungs clear    Plan: Surgery on 11/20.  Continue with plan of care.

## 2018-11-13 LAB
MAGNESIUM SERPL-MCNC: 2 MG/DL (ref 1.6–2.3)
PHOSPHATE SERPL-MCNC: 3.6 MG/DL (ref 2.5–4.5)

## 2018-11-13 PROCEDURE — 25000128 H RX IP 250 OP 636: Performed by: STUDENT IN AN ORGANIZED HEALTH CARE EDUCATION/TRAINING PROGRAM

## 2018-11-13 PROCEDURE — 36592 COLLECT BLOOD FROM PICC: CPT | Performed by: COLON & RECTAL SURGERY

## 2018-11-13 PROCEDURE — C9113 INJ PANTOPRAZOLE SODIUM, VIA: HCPCS | Performed by: COLON & RECTAL SURGERY

## 2018-11-13 PROCEDURE — 12000001 ZZH R&B MED SURG/OB UMMC

## 2018-11-13 PROCEDURE — 25000125 ZZHC RX 250: Performed by: FAMILY MEDICINE

## 2018-11-13 PROCEDURE — 99207 ZZC APP CREDIT; MD BILLING SHARED VISIT: CPT | Performed by: NURSE PRACTITIONER

## 2018-11-13 PROCEDURE — 84100 ASSAY OF PHOSPHORUS: CPT | Performed by: COLON & RECTAL SURGERY

## 2018-11-13 PROCEDURE — 25000125 ZZHC RX 250: Performed by: COLON & RECTAL SURGERY

## 2018-11-13 PROCEDURE — 83735 ASSAY OF MAGNESIUM: CPT | Performed by: COLON & RECTAL SURGERY

## 2018-11-13 PROCEDURE — 25000132 ZZH RX MED GY IP 250 OP 250 PS 637: Performed by: PHYSICIAN ASSISTANT

## 2018-11-13 PROCEDURE — 25000128 H RX IP 250 OP 636: Performed by: COLON & RECTAL SURGERY

## 2018-11-13 PROCEDURE — 25000128 H RX IP 250 OP 636: Performed by: PHYSICIAN ASSISTANT

## 2018-11-13 RX ORDER — HYDROMORPHONE HCL/0.9% NACL/PF 0.2MG/0.2
0.2 SYRINGE (ML) INTRAVENOUS EVERY 8 HOURS PRN
Status: DISCONTINUED | OUTPATIENT
Start: 2018-11-13 | End: 2018-11-14 | Stop reason: HOSPADM

## 2018-11-13 RX ORDER — HYDROMORPHONE HYDROCHLORIDE 5 MG/5ML
2 SOLUTION ORAL EVERY 4 HOURS PRN
Status: DISCONTINUED | OUTPATIENT
Start: 2018-11-13 | End: 2018-11-14 | Stop reason: HOSPADM

## 2018-11-13 RX ORDER — HYDROMORPHONE HCL/0.9% NACL/PF 0.2MG/0.2
0.2 SYRINGE (ML) INTRAVENOUS EVERY 4 HOURS PRN
Status: DISCONTINUED | OUTPATIENT
Start: 2018-11-13 | End: 2018-11-13

## 2018-11-13 RX ORDER — HYDROMORPHONE HYDROCHLORIDE 1 MG/ML
0.3 INJECTION, SOLUTION INTRAMUSCULAR; INTRAVENOUS; SUBCUTANEOUS
Status: DISCONTINUED | OUTPATIENT
Start: 2018-11-13 | End: 2018-11-13

## 2018-11-13 RX ADMIN — HYDROMORPHONE HYDROCHLORIDE 0.3 MG: 1 INJECTION, SOLUTION INTRAMUSCULAR; INTRAVENOUS; SUBCUTANEOUS at 05:56

## 2018-11-13 RX ADMIN — CALCIUM GLUCONATE: 98 INJECTION, SOLUTION INTRAVENOUS at 20:28

## 2018-11-13 RX ADMIN — HYDROMORPHONE HYDROCHLORIDE 0.3 MG: 1 INJECTION, SOLUTION INTRAMUSCULAR; INTRAVENOUS; SUBCUTANEOUS at 08:02

## 2018-11-13 RX ADMIN — PANTOPRAZOLE SODIUM 40 MG: 40 INJECTION, POWDER, FOR SOLUTION INTRAVENOUS at 08:02

## 2018-11-13 RX ADMIN — HYDROMORPHONE HYDROCHLORIDE 2 MG: 1 SOLUTION ORAL at 20:45

## 2018-11-13 RX ADMIN — Medication 0.2 MG: at 18:28

## 2018-11-13 RX ADMIN — ENOXAPARIN SODIUM 40 MG: 40 INJECTION SUBCUTANEOUS at 08:03

## 2018-11-13 RX ADMIN — Medication 0.2 MG: at 00:11

## 2018-11-13 RX ADMIN — HYDROMORPHONE HYDROCHLORIDE 2 MG: 1 SOLUTION ORAL at 16:35

## 2018-11-13 RX ADMIN — MAGNESIUM SULFATE IN WATER 2 G: 40 INJECTION, SOLUTION INTRAVENOUS at 14:40

## 2018-11-13 RX ADMIN — HYDROMORPHONE HYDROCHLORIDE 2 MG: 1 SOLUTION ORAL at 12:09

## 2018-11-13 RX ADMIN — HYDROMORPHONE HYDROCHLORIDE 0.3 MG: 1 INJECTION, SOLUTION INTRAMUSCULAR; INTRAVENOUS; SUBCUTANEOUS at 01:56

## 2018-11-13 RX ADMIN — I.V. FAT EMULSION 250 ML: 20 EMULSION INTRAVENOUS at 20:28

## 2018-11-13 RX ADMIN — HYDROMORPHONE HYDROCHLORIDE 0.3 MG: 1 INJECTION, SOLUTION INTRAMUSCULAR; INTRAVENOUS; SUBCUTANEOUS at 10:10

## 2018-11-13 RX ADMIN — HYDROMORPHONE HYDROCHLORIDE 0.3 MG: 1 INJECTION, SOLUTION INTRAMUSCULAR; INTRAVENOUS; SUBCUTANEOUS at 03:59

## 2018-11-13 ASSESSMENT — PAIN DESCRIPTION - DESCRIPTORS
DESCRIPTORS: CRAMPING

## 2018-11-13 ASSESSMENT — ACTIVITIES OF DAILY LIVING (ADL)
ADLS_ACUITY_SCORE: 11

## 2018-11-13 NOTE — PLAN OF CARE
Problem: Patient Care Overview  Goal: Plan of Care/Patient Progress Review  AVSS. Pt c/o cramping abdominal pain. Pt opioid dependent, charline talked to pt this morning regarding pain control options and expectations. Pain regimen changed, pt unhappy, continues to have cramps 2 hours after PO dilaudid, IV dilaudid not available. Heat pack in place. Denies nausea. Tolerating sips of clears. R PICC double lumen, one lumen SL, other lumen infusing Mg replacement. Pt up ad dania, independent. Pt scheduled for surgery 11/20. Continue POC.

## 2018-11-13 NOTE — PLAN OF CARE
Problem: Patient Care Overview  Goal: Plan of Care/Patient Progress Review  Outcome: No Change  AVSS, alert & oriented x 4. Dilaudid PRN, pt reports pain unmanaged at current dose. Heating pad applied to abdomen per pt. Ambulating less frequently due to pain. Clear liquid diet. Up independently, voiding independently. Bowel movements today, passing flatus. PICC infusing potassium phosphate, other lumen saline locked.

## 2018-11-13 NOTE — PROGRESS NOTES
"Colon & Rectal Surgery Progress Note    Subjective:   Pain continues to be a problem for Jenni. Requesting IV dilaudid q2hrs. +BMs. Tolerating minimal sips.    Objective: BP 94/71 (BP Location: Left arm)  Pulse 110  Temp 97.1  F (36.2  C) (Oral)  Resp 18  Ht 5' 9\"  Wt 121 lb 1.6 oz  SpO2 99%  BMI 17.88 kg/m2     Recent Labs   Lab Test  11/06/18   0747   WBC  8.2   RBC  3.96   HGB  12.9   HCT  40.8   MCV  103*   MCH  32.6   MCHC  31.6   RDW  13.2   PLT  286       Intake/Output Summary (Last 24 hours) at 11/13/18 1200  Last data filed at 11/13/18 0556   Gross per 24 hour   Intake              809 ml   Output              300 ml   Net              509 ml       Abdomen: soft, NT.    Assessment:  pSBO 2/2 AA in the setting of previous pelvic XRT, malnutrition and chronic narcotic use. Had a long discussion on the need to convert to PO pain meds and wean as able in order to discontinue home; as well as postop expectation including potential for stoma, recurrent stricture and bowel obstruction, fistula, and persistent pain.    Plan:  - CLD sips.  - Cont TPN.  - Convert to PO Dilaudid solution.  - Preop eval per Medicine today.  - Likely discontinue home tomorrow with cont TPN. Surgery on 11/20.    Sukhdev Aguilar M.D., M.Sc.    Colon and Rectal Surgery  Pager: [phone].    "

## 2018-11-13 NOTE — PLAN OF CARE
"Problem: Patient Care Overview  Goal: Plan of Care/Patient Progress Review  Outcome: No Change  Pt AVSS. Pt pain remains an issue tonite. Pt stated \"she was going to have to go to the ED if her pain couldn't be resolved up here.\" MD notified at 0030 bc increased abd pain. Pt rated it as greater than a 10, and \"it felt like I was in labor.\" MD up at BS to assess pt. End result was an increase in IV dilaudid from 0.2 to 0.3 mg q 2hr. Pt satisfied with that. Has required q2 hr doses. Pt has been up amb in halls x2 tonite. Stated that it felt better to walk but only within 30 min of dilaudid dose. Pt wearing aqua K heat pad with good results. Pt asleep now, but easily arousable. TPN/IL infusing via picc. Pt on clears, liberty well. No nausea reported. Pt stated she has had no BM's tonite. Voiding not saving. Cont to maintain pain control. Cont to offer nonpharmalogical methods to reduce pain.       "

## 2018-11-13 NOTE — PLAN OF CARE
Problem: Patient Care Overview  Goal: Plan of Care/Patient Progress Review  Outcome: No Change  Slightly tachycardic at times, otherwise vitals stable. Up ad dania, ambulating in halls. Pt does not feel pain is well managed - taking PRN IV dilaudid q 2h. Cross-cover MD notified and ordered tylenol and atarax, but pt refuses to take them and states they will not be absorbed. Pt has been requesting to speak to Dr. Aguilar, but he was not available tonight. Voiding. Having BM's. TPN and lipids infusing into PICC. Magnesium replacement given, re-check tomorrow AM. Pt states she is only taking sips of clears. Denies nausea. Continue with POC.

## 2018-11-14 ENCOUNTER — HOME INFUSION (PRE-WILLOW HOME INFUSION) (OUTPATIENT)
Dept: PHARMACY | Facility: CLINIC | Age: 43
End: 2018-11-14

## 2018-11-14 VITALS
TEMPERATURE: 97 F | OXYGEN SATURATION: 97 % | WEIGHT: 121.1 LBS | RESPIRATION RATE: 14 BRPM | BODY MASS INDEX: 17.94 KG/M2 | HEART RATE: 110 BPM | DIASTOLIC BLOOD PRESSURE: 58 MMHG | SYSTOLIC BLOOD PRESSURE: 94 MMHG | HEIGHT: 69 IN

## 2018-11-14 LAB
ANION GAP SERPL CALCULATED.3IONS-SCNC: 8 MMOL/L (ref 3–14)
BUN SERPL-MCNC: 26 MG/DL (ref 7–30)
CALCIUM SERPL-MCNC: 8.5 MG/DL (ref 8.5–10.1)
CHLORIDE SERPL-SCNC: 106 MMOL/L (ref 94–109)
CO2 SERPL-SCNC: 24 MMOL/L (ref 20–32)
CREAT SERPL-MCNC: 0.5 MG/DL (ref 0.52–1.04)
GFR SERPL CREATININE-BSD FRML MDRD: >90 ML/MIN/1.7M2
GLUCOSE SERPL-MCNC: 84 MG/DL (ref 70–99)
MAGNESIUM SERPL-MCNC: 2 MG/DL (ref 1.6–2.3)
PHOSPHATE SERPL-MCNC: 3.5 MG/DL (ref 2.5–4.5)
PLATELET # BLD AUTO: 359 10E9/L (ref 150–450)
POTASSIUM SERPL-SCNC: 4.6 MMOL/L (ref 3.4–5.3)
SODIUM SERPL-SCNC: 138 MMOL/L (ref 133–144)

## 2018-11-14 PROCEDURE — 83735 ASSAY OF MAGNESIUM: CPT | Performed by: FAMILY MEDICINE

## 2018-11-14 PROCEDURE — C9113 INJ PANTOPRAZOLE SODIUM, VIA: HCPCS | Performed by: COLON & RECTAL SURGERY

## 2018-11-14 PROCEDURE — 36592 COLLECT BLOOD FROM PICC: CPT | Performed by: FAMILY MEDICINE

## 2018-11-14 PROCEDURE — 85049 AUTOMATED PLATELET COUNT: CPT | Performed by: FAMILY MEDICINE

## 2018-11-14 PROCEDURE — 25000128 H RX IP 250 OP 636: Performed by: PHYSICIAN ASSISTANT

## 2018-11-14 PROCEDURE — 25000128 H RX IP 250 OP 636: Performed by: COLON & RECTAL SURGERY

## 2018-11-14 PROCEDURE — 84100 ASSAY OF PHOSPHORUS: CPT | Performed by: FAMILY MEDICINE

## 2018-11-14 PROCEDURE — 25000132 ZZH RX MED GY IP 250 OP 250 PS 637: Performed by: PHYSICIAN ASSISTANT

## 2018-11-14 PROCEDURE — 80048 BASIC METABOLIC PNL TOTAL CA: CPT | Performed by: FAMILY MEDICINE

## 2018-11-14 RX ORDER — ONDANSETRON 8 MG/1
4-8 TABLET, FILM COATED ORAL EVERY 8 HOURS PRN
Qty: 21 TABLET | Refills: 0 | Status: ON HOLD | OUTPATIENT
Start: 2018-11-14 | End: 2019-03-15

## 2018-11-14 RX ORDER — HYDROMORPHONE HYDROCHLORIDE 1 MG/ML
2 SOLUTION ORAL EVERY 4 HOURS PRN
Qty: 84 ML | Refills: 0 | Status: SHIPPED | OUTPATIENT
Start: 2018-11-14 | End: 2019-03-07

## 2018-11-14 RX ADMIN — Medication 0.2 MG: at 04:47

## 2018-11-14 RX ADMIN — Medication 0.2 MG: at 13:42

## 2018-11-14 RX ADMIN — PANTOPRAZOLE SODIUM 40 MG: 40 INJECTION, POWDER, FOR SOLUTION INTRAVENOUS at 08:56

## 2018-11-14 RX ADMIN — HYDROMORPHONE HYDROCHLORIDE 2 MG: 1 SOLUTION ORAL at 01:23

## 2018-11-14 RX ADMIN — HYDROMORPHONE HYDROCHLORIDE 2 MG: 1 SOLUTION ORAL at 14:50

## 2018-11-14 RX ADMIN — HYDROMORPHONE HYDROCHLORIDE 2 MG: 1 SOLUTION ORAL at 10:25

## 2018-11-14 RX ADMIN — ENOXAPARIN SODIUM 40 MG: 40 INJECTION SUBCUTANEOUS at 08:51

## 2018-11-14 RX ADMIN — HYDROMORPHONE HYDROCHLORIDE 2 MG: 1 SOLUTION ORAL at 06:23

## 2018-11-14 ASSESSMENT — PAIN DESCRIPTION - DESCRIPTORS
DESCRIPTORS: CRAMPING

## 2018-11-14 ASSESSMENT — ACTIVITIES OF DAILY LIVING (ADL)
ADLS_ACUITY_SCORE: 11

## 2018-11-14 NOTE — PROGRESS NOTES
Transition Planning Update/Discharge Today    D:  Per MD team, patient is ready to discharge today with the following plans of care at her home:    Please fax discharge orders to [facility]     Ph:  [phone]     Fax: [phone]     Skilled home care RN for initial home safety evaluation and to assist with MD team discharge plans of care as designated in discharge orders.     Skilled home care RN to evaluate medication management, nutrition and hydration evaluation, endurance evaluation, and general status evaluation after discharge from the acute care hospital setting.     Skilled home care RN to assist with management and education reinforcement with home TPN per [facility].  [facility] cares home care agency routine.     Skilled home care to monitor TPN labs per home care agency routine.     A/P:  Inpatient cares continue per MD team plans of care through discharge later today.  Patient will follow up in clinic per discharge plans of care.    Tracey Barnhart, ALCIRA.S.N., P.H.N.,R.N.         Pager

## 2018-11-14 NOTE — PLAN OF CARE
Problem: Patient Care Overview  Goal: Plan of Care/Patient Progress Review  Outcome: No Change    VS: AVSS on RA. Trending tachycardia   Neuro: WNL  Resp: WNL  Cardiac: S1S2, apical pulse regular, tachycardia   GI: WNL  : WNL  Skin: WNL  Diet: Continuous TPN running at 140, lipids infusing. Tolerating clear liquid diet   VAD: Double lumen PICC- Purple lumen saline locked. Gray lumen infusing TPN/lipids.   Activity: Up ad dania   Pain: 2mg PO Dilaudid administered at 0123 and at 0625 for abd discomfort . 0.2mg IV PRN Dilaudid administered at 0445 for abd pain.     Plan of Care: No acute events overnight. Continue with current POC. Potential discharge today (11/14/18) to home with TPN (?)     Shante Stone RN on 11/14/2018 at 3:20 AM

## 2018-11-14 NOTE — PLAN OF CARE
Problem: Patient Care Overview  Goal: Plan of Care/Patient Progress Review  AVSS. Pt c/o abdominal pain, controlled with PO and IV dilaudid. Denies nausea, on CL diet, minimal intake encouraged per MD. Pt up independent, ambulating frequently. Good urine output. Last BM was early this morning. PICC line double lumen SL, to be kept upon discharge from hospital d/t home TPN. Pt to discharge home today and  meds from discharge pharmacy. Continue POC.

## 2018-11-14 NOTE — PROGRESS NOTES
Home Infusion  Jenni is discharging today and will be going home on cycled TPN (12 hrs).    She has been on service with \[facility]\"" for TPN in the past as recently as this past May.    \[facility]\"" did have issue with Jenni not answering or returning calls from \[facility]\"" while on service and not receiving deliveries.   I had a discussion with her about this and she has agreed to be available for nurse visits, accept her deliveries and answer or return calls from \[facility]\"" and her nursing agency ([facility] will be providing home nursing).  \[facility]\"" Care Agreement reviewed and signed by Jenni.  Met with Jenni at bedside and reviewed process for her TPN therapy and  I services.    Reminded her about supplies and delivery of supplies, storage of medication, cycle schedule, plan for SNV and 24/7 availability of \[facility]\"" staff while on IV therapy.   \[facility]\"" will plan to deliver her TPN and supplies later today to her mother's house where Jenni is staying.  Jenni verbalized understanding of all information given.   She is willing and able to learn and manage home IV therapy and agree to terms of the Care Agreement.  Questions answered and brochure left with her with both \[facility]\"" and [facility] contact information.  [facility] Liaison  [phone] pager

## 2018-11-14 NOTE — PLAN OF CARE
Problem: Patient Care Overview  Goal: Plan of Care/Patient Progress Review  Outcome: No Change  Tachycardic, OVSS. A&Ox4. Pt complains of abdominal cramping pain. Oral dilaudid given x2, IV dilaudid given x1, pt tolerating the changed pain regimen. Heating pack in place. Pt denies nausea. Tolerating sips of clears. Double lumen PICC infusing cycled TPN and lipids, additional lumen saline locked. Pt up ad dania in hallway, independent. Voiding spontaneously with adequate urine output, +bowel sounds, passing gas. Surgery scheduled for 11/20. Mg replaced on day shift, recheck for AM. Continue with plan of care.

## 2018-11-14 NOTE — PROGRESS NOTES
Social Work Services Progress Note    Hospital Day: 13  Date of Initial Social Work Evaluation:  11/5/18  Collaborated with:  Pt at bedside.    Data: Pt is 42 year old female who has a history of cervical cancer that spread to ovaries and bladder s/p chemo/rad, complicated SBO s/p ex lap (60 cm small bowel resected on 5/28/17), recurrent SBO 2/2 ileoileal anastomotic stricture, chronic protein malnutrition, chronic abdominal pain with associated n/v, and opioid dependence who is admitted for partial small bowel obstruction.    Intervention:  SW consulted re: parking resources. Patient reports she  parked her truck in visitor parking day of admission and does not have the funds to remove vehicle. Patient does not have current income or personal housing (resides in her mother's home). SW assisted in acquiring a [employer] parking pass for patient and her upcoming surgery. Pt educated on purpose of parking pass and encouraged to use for upcoming appts and surgery.     Assessment:  Pt is open and cooperative to SW visit. Patient expresses no personal concerns regarding discharge home. Patient asked for SW phone number to provide her mother. SW reiterated to Pt that contact has not been made to her mother d/t patient wishes, she states that's correct. However, if her mother expresses concerns with pt returning home, Pt will give permission to staff to explain upcoming surgery and current POC at that time.     Plan:    Anticipated Disposition:  Home with services    Barriers to d/c plan:  Medical stability    Follow Up:  SW to f/u & assist as needed.    LUCHO Morales, URIEL  7C Surgical/Oncology Unit   Phone: [phone]  Pager: [phone]

## 2018-11-15 ENCOUNTER — TELEPHONE (OUTPATIENT)
Dept: CARE COORDINATION | Facility: CLINIC | Age: 43
End: 2018-11-15

## 2018-11-15 ENCOUNTER — HOME INFUSION (PRE-WILLOW HOME INFUSION) (OUTPATIENT)
Dept: PHARMACY | Facility: CLINIC | Age: 43
End: 2018-11-15

## 2018-11-15 NOTE — PLAN OF CARE
Problem: Patient Care Overview  Goal: Plan of Care/Patient Progress Review  Outcome: Adequate for Discharge Date Met: 11/14/18  Pt discharged to home with [facility] for TPN at home. Plan for pt to return 11/20 for procedure. Pt discharged with double lumen PICC line in place, educated on proper cares. discharge paperwork discussed and signed, medications picked up at discharge pharmacy. Pt went to  medications and never returned to 7C, leaving her belongings in her room. Pts family notified.

## 2018-11-15 NOTE — PROGRESS NOTES
This is a recent snapshot of the patient's [facility] medical record.  For current drug dose and complete information and questions, call [phone] or In Basket pool, [facility] home [facility] ([id number])  CSN Number:  [id number]

## 2018-11-15 NOTE — TELEPHONE ENCOUNTER
Received phone call from Mother of patient, stating she received medical supplies at her home for her daughter yesterday.  She stated a nurse had come by as well and she informed nurse that daughter is not staying with her and she does not know her whereabouts.  Mother stated the 3 large boxes of supplies are on her front porch.

## 2018-11-16 ENCOUNTER — TELEPHONE (OUTPATIENT)
Dept: CARE COORDINATION | Facility: CLINIC | Age: 43
End: 2018-11-16

## 2018-11-16 ENCOUNTER — HOME INFUSION (PRE-WILLOW HOME INFUSION) (OUTPATIENT)
Dept: PHARMACY | Facility: CLINIC | Age: 43
End: 2018-11-16

## 2018-11-16 ENCOUNTER — PATIENT OUTREACH (OUTPATIENT)
Dept: SURGERY | Facility: CLINIC | Age: 43
End: 2018-11-16

## 2018-11-16 ENCOUNTER — TELEPHONE (OUTPATIENT)
Dept: SURGERY | Facility: CLINIC | Age: 43
End: 2018-11-16

## 2018-11-16 DIAGNOSIS — E46 MALNUTRITION (H): Primary | ICD-10-CM

## 2018-11-16 RX ORDER — ERTAPENEM 1 G/1
1 INJECTION, POWDER, LYOPHILIZED, FOR SOLUTION INTRAMUSCULAR; INTRAVENOUS
Status: CANCELLED | OUTPATIENT
Start: 2018-11-16

## 2018-11-16 RX ORDER — GRANISETRON HYDROCHLORIDE 1 MG/ML
1 INJECTION INTRAVENOUS ONCE
Status: CANCELLED | OUTPATIENT
Start: 2018-11-16 | End: 2018-11-16

## 2018-11-16 RX ORDER — ERTAPENEM 1 G/1
1 INJECTION, POWDER, LYOPHILIZED, FOR SOLUTION INTRAMUSCULAR; INTRAVENOUS SEE ADMIN INSTRUCTIONS
Status: CANCELLED | OUTPATIENT
Start: 2018-11-16

## 2018-11-16 NOTE — TELEPHONE ENCOUNTER
[facility] Call Center    Phone Message    May a detailed message be left on voicemail: yes    Reason for Call: Other: Pt was suppose to start home infusions on 1/14/18 but [facility] could not reach the Pt until today. The Pt is homeless and living in a truck so they are refusing to service her for violating her contract.      Action Taken: Message routed to:  [facility] ([facility]): see above - you can call nurse Walters if questions [phone]

## 2018-11-16 NOTE — PROGRESS NOTES
Patient was called back after discussing her situation with Dr. Aguilar and FARHEEN Baca. Patient will be contacted by [employer] for housing assistance. Per Dr. Aguilar patient needs to have her albumin and prealbumin checked on Monday if these labs are not satisfactory for surgery patient's surgery will be postponed. Patient stated understanding of these instructions and is in agreement with this plan of are.

## 2018-11-16 NOTE — PROGRESS NOTES
Patient was called after she discharged from the hospital. Patient states when she was discharged from the hospital she was supposed to discharge to her mother's house per their agreement.  Patient states her TPN supplies were delivered to her mother's house.  Patient states when she showed up to her mother's house her mother refused to let her in or get her TPN supplies stating she would call the  if she entered the home.  Patient states she has been sleeping in her car the past 2 nights.  Her  was called for resources for this patient, a page was also sent to her pager for resources.

## 2018-11-16 NOTE — PROGRESS NOTES
This is a recent snapshot of the patient's [facility] medical record.  For current drug dose and complete information and questions, call [phone] or In Basket pool, [facility] ([id number])  CSN Number:  [id number]
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
## 2018-11-19 ENCOUNTER — APPOINTMENT (OUTPATIENT)
Dept: CT IMAGING | Facility: CLINIC | Age: 43
DRG: 393 | End: 2018-11-19
Attending: EMERGENCY MEDICINE
Payer: COMMERCIAL

## 2018-11-19 ENCOUNTER — PATIENT OUTREACH (OUTPATIENT)
Dept: SURGERY | Facility: CLINIC | Age: 43
End: 2018-11-19

## 2018-11-19 ENCOUNTER — HOSPITAL ENCOUNTER (INPATIENT)
Facility: CLINIC | Age: 43
LOS: 1 days | Discharge: HOME | DRG: 393 | End: 2018-11-21
Attending: EMERGENCY MEDICINE | Admitting: COLON & RECTAL SURGERY
Payer: COMMERCIAL

## 2018-11-19 ENCOUNTER — TELEPHONE (OUTPATIENT)
Dept: SURGERY | Facility: CLINIC | Age: 43
End: 2018-11-19

## 2018-11-19 ENCOUNTER — HOME INFUSION (PRE-WILLOW HOME INFUSION) (OUTPATIENT)
Dept: PHARMACY | Facility: CLINIC | Age: 43
End: 2018-11-19

## 2018-11-19 DIAGNOSIS — K56.609 SMALL BOWEL OBSTRUCTION (H): ICD-10-CM

## 2018-11-19 LAB
ALBUMIN SERPL-MCNC: 2.9 G/DL (ref 3.4–5)
ALP SERPL-CCNC: 84 U/L (ref 40–150)
ALT SERPL W P-5'-P-CCNC: 53 U/L (ref 0–50)
ANION GAP SERPL CALCULATED.3IONS-SCNC: 4 MMOL/L (ref 3–14)
APTT PPP: 25 SEC (ref 22–37)
AST SERPL W P-5'-P-CCNC: 42 U/L (ref 0–45)
BASOPHILS # BLD AUTO: 0 10E9/L (ref 0–0.2)
BASOPHILS NFR BLD AUTO: 0.3 %
BILIRUB SERPL-MCNC: 0.2 MG/DL (ref 0.2–1.3)
BUN SERPL-MCNC: 10 MG/DL (ref 7–30)
CALCIUM SERPL-MCNC: 8.5 MG/DL (ref 8.5–10.1)
CHLORIDE SERPL-SCNC: 106 MMOL/L (ref 94–109)
CO2 SERPL-SCNC: 31 MMOL/L (ref 20–32)
CREAT SERPL-MCNC: 0.5 MG/DL (ref 0.52–1.04)
DIFFERENTIAL METHOD BLD: ABNORMAL
EOSINOPHIL # BLD AUTO: 0.2 10E9/L (ref 0–0.7)
EOSINOPHIL NFR BLD AUTO: 2.8 %
ERYTHROCYTE [DISTWIDTH] IN BLOOD BY AUTOMATED COUNT: 13.8 % (ref 10–15)
GFR SERPL CREATININE-BSD FRML MDRD: >90 ML/MIN/1.7M2
GLUCOSE SERPL-MCNC: 100 MG/DL (ref 70–99)
HCT VFR BLD AUTO: 37.7 % (ref 35–47)
HGB BLD-MCNC: 11.7 G/DL (ref 11.7–15.7)
IMM GRANULOCYTES # BLD: 0 10E9/L (ref 0–0.4)
IMM GRANULOCYTES NFR BLD: 0.5 %
INR PPP: 1.02 (ref 0.86–1.14)
LACTATE BLD-SCNC: 0.9 MMOL/L (ref 0.7–2)
LYMPHOCYTES # BLD AUTO: 1.8 10E9/L (ref 0.8–5.3)
LYMPHOCYTES NFR BLD AUTO: 22.5 %
MCH RBC QN AUTO: 31.5 PG (ref 26.5–33)
MCHC RBC AUTO-ENTMCNC: 31 G/DL (ref 31.5–36.5)
MCV RBC AUTO: 102 FL (ref 78–100)
MONOCYTES # BLD AUTO: 0.6 10E9/L (ref 0–1.3)
MONOCYTES NFR BLD AUTO: 7.3 %
NEUTROPHILS # BLD AUTO: 5.2 10E9/L (ref 1.6–8.3)
NEUTROPHILS NFR BLD AUTO: 66.6 %
NRBC # BLD AUTO: 0 10*3/UL
NRBC BLD AUTO-RTO: 0 /100
PLATELET # BLD AUTO: 359 10E9/L (ref 150–450)
POTASSIUM SERPL-SCNC: 3.8 MMOL/L (ref 3.4–5.3)
PROT SERPL-MCNC: 6.4 G/DL (ref 6.8–8.8)
RBC # BLD AUTO: 3.71 10E12/L (ref 3.8–5.2)
SODIUM SERPL-SCNC: 141 MMOL/L (ref 133–144)
WBC # BLD AUTO: 7.8 10E9/L (ref 4–11)

## 2018-11-19 PROCEDURE — 25000125 ZZHC RX 250: Performed by: RADIOLOGY

## 2018-11-19 PROCEDURE — 84134 ASSAY OF PREALBUMIN: CPT | Performed by: EMERGENCY MEDICINE

## 2018-11-19 PROCEDURE — 96361 HYDRATE IV INFUSION ADD-ON: CPT | Performed by: EMERGENCY MEDICINE

## 2018-11-19 PROCEDURE — 96374 THER/PROPH/DIAG INJ IV PUSH: CPT | Mod: 59 | Performed by: EMERGENCY MEDICINE

## 2018-11-19 PROCEDURE — 74177 CT ABD & PELVIS W/CONTRAST: CPT

## 2018-11-19 PROCEDURE — 96376 TX/PRO/DX INJ SAME DRUG ADON: CPT | Performed by: EMERGENCY MEDICINE

## 2018-11-19 PROCEDURE — 25000128 H RX IP 250 OP 636: Performed by: EMERGENCY MEDICINE

## 2018-11-19 PROCEDURE — 80053 COMPREHEN METABOLIC PANEL: CPT | Performed by: EMERGENCY MEDICINE

## 2018-11-19 PROCEDURE — 96375 TX/PRO/DX INJ NEW DRUG ADDON: CPT | Performed by: EMERGENCY MEDICINE

## 2018-11-19 PROCEDURE — 85610 PROTHROMBIN TIME: CPT | Performed by: EMERGENCY MEDICINE

## 2018-11-19 PROCEDURE — 85025 COMPLETE CBC W/AUTO DIFF WBC: CPT | Performed by: EMERGENCY MEDICINE

## 2018-11-19 PROCEDURE — 99285 EMERGENCY DEPT VISIT HI MDM: CPT | Mod: 25 | Performed by: EMERGENCY MEDICINE

## 2018-11-19 PROCEDURE — 99285 EMERGENCY DEPT VISIT HI MDM: CPT | Mod: Z6 | Performed by: EMERGENCY MEDICINE

## 2018-11-19 PROCEDURE — 25000128 H RX IP 250 OP 636: Performed by: RADIOLOGY

## 2018-11-19 PROCEDURE — 83605 ASSAY OF LACTIC ACID: CPT | Performed by: EMERGENCY MEDICINE

## 2018-11-19 PROCEDURE — 85730 THROMBOPLASTIN TIME PARTIAL: CPT | Performed by: EMERGENCY MEDICINE

## 2018-11-19 RX ORDER — HYDROMORPHONE HYDROCHLORIDE 1 MG/ML
0.5 INJECTION, SOLUTION INTRAMUSCULAR; INTRAVENOUS; SUBCUTANEOUS
Status: COMPLETED | OUTPATIENT
Start: 2018-11-19 | End: 2018-11-19

## 2018-11-19 RX ORDER — ONDANSETRON 2 MG/ML
4 INJECTION INTRAMUSCULAR; INTRAVENOUS EVERY 30 MIN PRN
Status: DISCONTINUED | OUTPATIENT
Start: 2018-11-19 | End: 2018-11-20

## 2018-11-19 RX ORDER — SODIUM CHLORIDE 9 MG/ML
1000 INJECTION, SOLUTION INTRAVENOUS CONTINUOUS
Status: DISCONTINUED | OUTPATIENT
Start: 2018-11-19 | End: 2018-11-20

## 2018-11-19 RX ORDER — FENTANYL CITRATE 50 UG/ML
25-50 INJECTION, SOLUTION INTRAMUSCULAR; INTRAVENOUS
Status: CANCELLED | OUTPATIENT
Start: 2018-11-19

## 2018-11-19 RX ORDER — NALOXONE HYDROCHLORIDE 0.4 MG/ML
.1-.4 INJECTION, SOLUTION INTRAMUSCULAR; INTRAVENOUS; SUBCUTANEOUS
Status: CANCELLED | OUTPATIENT
Start: 2018-11-19

## 2018-11-19 RX ORDER — FLUMAZENIL 0.1 MG/ML
0.2 INJECTION, SOLUTION INTRAVENOUS
Status: CANCELLED | OUTPATIENT
Start: 2018-11-19

## 2018-11-19 RX ORDER — IOPAMIDOL 755 MG/ML
68 INJECTION, SOLUTION INTRAVASCULAR ONCE
Status: COMPLETED | OUTPATIENT
Start: 2018-11-19 | End: 2018-11-19

## 2018-11-19 RX ADMIN — IOPAMIDOL 68 ML: 755 INJECTION, SOLUTION INTRAVENOUS at 22:30

## 2018-11-19 RX ADMIN — ONDANSETRON HYDROCHLORIDE 4 MG: 2 INJECTION, SOLUTION INTRAMUSCULAR; INTRAVENOUS at 21:16

## 2018-11-19 RX ADMIN — Medication 0.5 MG: at 22:24

## 2018-11-19 RX ADMIN — Medication 0.5 MG: at 23:34

## 2018-11-19 RX ADMIN — SODIUM CHLORIDE, PRESERVATIVE FREE 67 ML: 5 INJECTION INTRAVENOUS at 22:30

## 2018-11-19 RX ADMIN — Medication 0.5 MG: at 21:30

## 2018-11-19 RX ADMIN — SODIUM CHLORIDE 1000 ML: 9 INJECTION, SOLUTION INTRAVENOUS at 21:27

## 2018-11-19 ASSESSMENT — ENCOUNTER SYMPTOMS
WEAKNESS: 1
HEADACHES: 0
EYE REDNESS: 0
ARTHRALGIAS: 0
FEVER: 0
CONFUSION: 0
ABDOMINAL PAIN: 1
COLOR CHANGE: 0
NECK STIFFNESS: 0
SHORTNESS OF BREATH: 0
DIFFICULTY URINATING: 0
VOMITING: 1
NAUSEA: 1

## 2018-11-19 NOTE — PROGRESS NOTES
12:37- Patient was called to check in and see why she was cancelling her surgery as per Dr. Aguilar patient needs to have her surgery. The direct phone number was left in a voicemail with a request for the patient to call back at her earliest convenience.     2:17- Called again and left voicemail requesting a callback.    2:37- Called again and left a voicemail requesting a callback.

## 2018-11-19 NOTE — OR NURSING
Jenni told writer that she is not planning to come to the hospital for surgery tomorrow and service notified.

## 2018-11-19 NOTE — PROGRESS NOTES
This is a recent snapshot of the patient's [facility] medical record.  For current drug dose and complete information and questions, call [phone] or In Basket pool, [facility] ([id number])  CSN Number:  [id number]

## 2018-11-19 NOTE — TELEPHONE ENCOUNTER
[facility] Call Center    Phone Message    May a detailed message be left on voicemail: yes    Reason for Call: Order(s): Other:   Reason for requested: Discharge orders, due to unstable home environment, per [facility]  Date needed: ASAP  Provider name: Dr. Sukhdev Aguilar       Action Taken: Other: [facility]

## 2018-11-19 NOTE — PROGRESS NOTES
Patient was called to see why she has not been able to get her labs completed today. Patient was advised to callback directly at her earliest convenience.

## 2018-11-20 ENCOUNTER — SURGERY (OUTPATIENT)
Age: 43
End: 2018-11-20

## 2018-11-20 LAB
ALBUMIN UR-MCNC: NEGATIVE MG/DL
APPEARANCE UR: CLEAR
BILIRUB UR QL STRIP: NEGATIVE
COLOR UR AUTO: YELLOW
GLUCOSE BLDC GLUCOMTR-MCNC: 77 MG/DL (ref 70–99)
GLUCOSE UR STRIP-MCNC: NEGATIVE MG/DL
HCG UR QL: NEGATIVE
HGB UR QL STRIP: NEGATIVE
KETONES UR STRIP-MCNC: NEGATIVE MG/DL
LEUKOCYTE ESTERASE UR QL STRIP: ABNORMAL
MUCOUS THREADS #/AREA URNS LPF: PRESENT /LPF
NITRATE UR QL: NEGATIVE
PH UR STRIP: 6 PH (ref 5–7)
PREALB SERPL IA-MCNC: 25 MG/DL (ref 15–45)
RADIOLOGIST FLAGS: ABNORMAL
RBC #/AREA URNS AUTO: 3 /HPF (ref 0–2)
SOURCE: ABNORMAL
SP GR UR STRIP: >1.035 (ref 1–1.03)
SQUAMOUS #/AREA URNS AUTO: 2 /HPF (ref 0–1)
UROBILINOGEN UR STRIP-MCNC: NORMAL MG/DL (ref 0–2)
WBC #/AREA URNS AUTO: 2 /HPF (ref 0–5)

## 2018-11-20 PROCEDURE — 25000128 H RX IP 250 OP 636: Performed by: EMERGENCY MEDICINE

## 2018-11-20 PROCEDURE — 96376 TX/PRO/DX INJ SAME DRUG ADON: CPT | Performed by: EMERGENCY MEDICINE

## 2018-11-20 PROCEDURE — 81025 URINE PREGNANCY TEST: CPT | Performed by: EMERGENCY MEDICINE

## 2018-11-20 PROCEDURE — 25000132 ZZH RX MED GY IP 250 OP 250 PS 637: Performed by: COLON & RECTAL SURGERY

## 2018-11-20 PROCEDURE — 25000125 ZZHC RX 250: Performed by: COLON & RECTAL SURGERY

## 2018-11-20 PROCEDURE — 00000146 ZZHCL STATISTIC GLUCOSE BY METER IP

## 2018-11-20 PROCEDURE — 25000128 H RX IP 250 OP 636: Performed by: COLON & RECTAL SURGERY

## 2018-11-20 PROCEDURE — 81001 URINALYSIS AUTO W/SCOPE: CPT | Performed by: EMERGENCY MEDICINE

## 2018-11-20 PROCEDURE — 12000001 ZZH R&B MED SURG/OB UMMC

## 2018-11-20 PROCEDURE — 25000128 H RX IP 250 OP 636: Performed by: STUDENT IN AN ORGANIZED HEALTH CARE EDUCATION/TRAINING PROGRAM

## 2018-11-20 PROCEDURE — 25000132 ZZH RX MED GY IP 250 OP 250 PS 637: Performed by: STUDENT IN AN ORGANIZED HEALTH CARE EDUCATION/TRAINING PROGRAM

## 2018-11-20 RX ORDER — ONDANSETRON 4 MG/1
4 TABLET, ORALLY DISINTEGRATING ORAL EVERY 6 HOURS PRN
Status: DISCONTINUED | OUTPATIENT
Start: 2018-11-20 | End: 2018-11-20

## 2018-11-20 RX ORDER — NALOXONE HYDROCHLORIDE 0.4 MG/ML
.1-.4 INJECTION, SOLUTION INTRAMUSCULAR; INTRAVENOUS; SUBCUTANEOUS
Status: DISCONTINUED | OUTPATIENT
Start: 2018-11-20 | End: 2018-11-21 | Stop reason: HOSPADM

## 2018-11-20 RX ORDER — SODIUM CHLORIDE, SODIUM LACTATE, POTASSIUM CHLORIDE, CALCIUM CHLORIDE 600; 310; 30; 20 MG/100ML; MG/100ML; MG/100ML; MG/100ML
INJECTION, SOLUTION INTRAVENOUS CONTINUOUS
Status: DISCONTINUED | OUTPATIENT
Start: 2018-11-20 | End: 2018-11-20

## 2018-11-20 RX ORDER — POTASSIUM CL/LIDO/0.9 % NACL 10MEQ/0.1L
10 INTRAVENOUS SOLUTION, PIGGYBACK (ML) INTRAVENOUS
Status: DISCONTINUED | OUTPATIENT
Start: 2018-11-20 | End: 2018-11-21 | Stop reason: HOSPADM

## 2018-11-20 RX ORDER — NALOXONE HYDROCHLORIDE 0.4 MG/ML
.1-.4 INJECTION, SOLUTION INTRAMUSCULAR; INTRAVENOUS; SUBCUTANEOUS
Status: CANCELLED | OUTPATIENT
Start: 2018-11-20

## 2018-11-20 RX ORDER — HYDROMORPHONE HYDROCHLORIDE 1 MG/ML
1 INJECTION, SOLUTION INTRAMUSCULAR; INTRAVENOUS; SUBCUTANEOUS ONCE
Status: COMPLETED | OUTPATIENT
Start: 2018-11-20 | End: 2018-11-20

## 2018-11-20 RX ORDER — HYDROMORPHONE HYDROCHLORIDE 1 MG/ML
1 INJECTION, SOLUTION INTRAMUSCULAR; INTRAVENOUS; SUBCUTANEOUS
Status: COMPLETED | OUTPATIENT
Start: 2018-11-20 | End: 2018-11-20

## 2018-11-20 RX ORDER — ONDANSETRON 2 MG/ML
4 INJECTION INTRAMUSCULAR; INTRAVENOUS EVERY 6 HOURS PRN
Status: CANCELLED | OUTPATIENT
Start: 2018-11-20

## 2018-11-20 RX ORDER — HYDROMORPHONE HYDROCHLORIDE 1 MG/ML
2 SOLUTION ORAL EVERY 4 HOURS PRN
Status: DISCONTINUED | OUTPATIENT
Start: 2018-11-20 | End: 2018-11-21 | Stop reason: HOSPADM

## 2018-11-20 RX ORDER — HYDROMORPHONE HCL/0.9% NACL/PF 0.2MG/0.2
0.2 SYRINGE (ML) INTRAVENOUS EVERY 4 HOURS PRN
Status: DISCONTINUED | OUTPATIENT
Start: 2018-11-20 | End: 2018-11-21 | Stop reason: HOSPADM

## 2018-11-20 RX ORDER — BETA-CAROTENE 7500 MCG
25000 CAPSULE ORAL 2 TIMES DAILY
Status: CANCELLED | OUTPATIENT
Start: 2018-11-20

## 2018-11-20 RX ORDER — POTASSIUM CHLORIDE 7.45 MG/ML
10 INJECTION INTRAVENOUS
Status: DISCONTINUED | OUTPATIENT
Start: 2018-11-20 | End: 2018-11-21 | Stop reason: HOSPADM

## 2018-11-20 RX ORDER — NALOXONE HYDROCHLORIDE 0.4 MG/ML
.1-.4 INJECTION, SOLUTION INTRAMUSCULAR; INTRAVENOUS; SUBCUTANEOUS
Status: DISCONTINUED | OUTPATIENT
Start: 2018-11-20 | End: 2018-11-20

## 2018-11-20 RX ORDER — ONDANSETRON 2 MG/ML
4 INJECTION INTRAMUSCULAR; INTRAVENOUS EVERY 6 HOURS PRN
Status: DISCONTINUED | OUTPATIENT
Start: 2018-11-20 | End: 2018-11-20

## 2018-11-20 RX ORDER — POTASSIUM CHLORIDE 29.8 MG/ML
20 INJECTION INTRAVENOUS
Status: DISCONTINUED | OUTPATIENT
Start: 2018-11-20 | End: 2018-11-21 | Stop reason: HOSPADM

## 2018-11-20 RX ORDER — HYDROMORPHONE HYDROCHLORIDE 1 MG/ML
0.5 INJECTION, SOLUTION INTRAMUSCULAR; INTRAVENOUS; SUBCUTANEOUS
Status: COMPLETED | OUTPATIENT
Start: 2018-11-20 | End: 2018-11-20

## 2018-11-20 RX ORDER — ACETAMINOPHEN 325 MG/1
650 TABLET ORAL EVERY 6 HOURS PRN
Status: DISCONTINUED | OUTPATIENT
Start: 2018-11-20 | End: 2018-11-20

## 2018-11-20 RX ORDER — SIMETHICONE 40MG/0.6ML
40 SUSPENSION, DROPS(FINAL DOSAGE FORM)(ML) ORAL 4 TIMES DAILY PRN
Status: DISCONTINUED | OUTPATIENT
Start: 2018-11-20 | End: 2018-11-21 | Stop reason: HOSPADM

## 2018-11-20 RX ORDER — LIDOCAINE 40 MG/G
CREAM TOPICAL
Status: CANCELLED | OUTPATIENT
Start: 2018-11-20

## 2018-11-20 RX ORDER — MAGNESIUM SULFATE HEPTAHYDRATE 40 MG/ML
4 INJECTION, SOLUTION INTRAVENOUS EVERY 4 HOURS PRN
Status: DISCONTINUED | OUTPATIENT
Start: 2018-11-20 | End: 2018-11-21 | Stop reason: HOSPADM

## 2018-11-20 RX ORDER — SODIUM CHLORIDE, SODIUM LACTATE, POTASSIUM CHLORIDE, CALCIUM CHLORIDE 600; 310; 30; 20 MG/100ML; MG/100ML; MG/100ML; MG/100ML
INJECTION, SOLUTION INTRAVENOUS CONTINUOUS
Status: CANCELLED | OUTPATIENT
Start: 2018-11-20

## 2018-11-20 RX ORDER — LIDOCAINE 40 MG/G
CREAM TOPICAL
Status: DISCONTINUED | OUTPATIENT
Start: 2018-11-20 | End: 2018-11-20

## 2018-11-20 RX ORDER — HYDROMORPHONE HYDROCHLORIDE 5 MG/5ML
2 SOLUTION ORAL EVERY 4 HOURS PRN
Status: DISCONTINUED | OUTPATIENT
Start: 2018-11-20 | End: 2018-11-20

## 2018-11-20 RX ADMIN — HYDROMORPHONE HYDROCHLORIDE 2 MG: 1 SOLUTION ORAL at 17:51

## 2018-11-20 RX ADMIN — HYDROMORPHONE HYDROCHLORIDE 2 MG: 1 SOLUTION ORAL at 14:02

## 2018-11-20 RX ADMIN — HYDROMORPHONE HYDROCHLORIDE 2 MG: 1 SOLUTION ORAL at 22:09

## 2018-11-20 RX ADMIN — SODIUM CHLORIDE, POTASSIUM CHLORIDE, SODIUM LACTATE AND CALCIUM CHLORIDE: 600; 310; 30; 20 INJECTION, SOLUTION INTRAVENOUS at 06:53

## 2018-11-20 RX ADMIN — I.V. FAT EMULSION 250 ML: 20 EMULSION INTRAVENOUS at 20:31

## 2018-11-20 RX ADMIN — HYDROMORPHONE HYDROCHLORIDE 0.5 MG: 10 INJECTION, SOLUTION INTRAMUSCULAR; INTRAVENOUS; SUBCUTANEOUS at 04:25

## 2018-11-20 RX ADMIN — HYDROMORPHONE HYDROCHLORIDE 2 MG: 1 SOLUTION ORAL at 06:53

## 2018-11-20 RX ADMIN — HYDROMORPHONE HYDROCHLORIDE 1 MG: 1 INJECTION, SOLUTION INTRAMUSCULAR; INTRAVENOUS; SUBCUTANEOUS at 00:47

## 2018-11-20 RX ADMIN — Medication 0.2 MG: at 20:52

## 2018-11-20 RX ADMIN — ONDANSETRON HYDROCHLORIDE 4 MG: 2 INJECTION, SOLUTION INTRAMUSCULAR; INTRAVENOUS at 02:53

## 2018-11-20 RX ADMIN — HYDROMORPHONE HYDROCHLORIDE 1 MG: 1 INJECTION, SOLUTION INTRAMUSCULAR; INTRAVENOUS; SUBCUTANEOUS at 01:53

## 2018-11-20 RX ADMIN — SODIUM CHLORIDE 1000 ML: 9 INJECTION, SOLUTION INTRAVENOUS at 00:47

## 2018-11-20 RX ADMIN — POTASSIUM CHLORIDE: 2 INJECTION, SOLUTION, CONCENTRATE INTRAVENOUS at 20:30

## 2018-11-20 RX ADMIN — SIMETHICONE 40 MG: 20 SUSPENSION/ DROPS ORAL at 21:10

## 2018-11-20 RX ADMIN — HYDROMORPHONE HYDROCHLORIDE 0.5 MG: 1 INJECTION, SOLUTION INTRAMUSCULAR; INTRAVENOUS; SUBCUTANEOUS at 02:50

## 2018-11-20 RX ADMIN — FAMOTIDINE 20 MG: 20 INJECTION, SOLUTION INTRAVENOUS at 07:30

## 2018-11-20 ASSESSMENT — ACTIVITIES OF DAILY LIVING (ADL)
ADLS_ACUITY_SCORE: 11
RETIRED_COMMUNICATION: 0-->UNDERSTANDS/COMMUNICATES WITHOUT DIFFICULTY
SWALLOWING: 0-->SWALLOWS FOODS/LIQUIDS WITHOUT DIFFICULTY
FALL_HISTORY_WITHIN_LAST_SIX_MONTHS: NO
DRESS: 0-->INDEPENDENT
TRANSFERRING: 0-->INDEPENDENT
COGNITION: 0 - NO COGNITION ISSUES REPORTED
AMBULATION: 0-->INDEPENDENT
RETIRED_EATING: 0-->INDEPENDENT
TOILETING: 0-->INDEPENDENT
ADLS_ACUITY_SCORE: 11
BATHING: 0-->INDEPENDENT

## 2018-11-20 ASSESSMENT — PAIN DESCRIPTION - DESCRIPTORS
DESCRIPTORS: CRAMPING
DESCRIPTORS: CRAMPING
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
## 2018-11-20 NOTE — ED NOTES
[facility], [place]   ED Nurse to Floor Handoff     Rachel A Gerhardt is a 42 year old female who speaks English and lives alone,  in a home  They arrived in the ED by car from home    ED Chief Complaint: Abdominal Pain (Intestinal Strictures ) and Nausea & Vomiting    ED Dx;   Final diagnoses:   Small bowel obstruction (H)         Needed?: No    Allergies:   Allergies   Allergen Reactions     No Clinical Screening - See Comments Other (See Comments) and Diarrhea     headache  Carrots cause gastric upset, cramping and diarrhea.     Sulfa Drugs Hives     hives     Amoxicillin Unknown and Other (See Comments)     vomiting  vomiting     Amoxicillin-Pot Clavulanate Other (See Comments) and Nausea     vomiting     Augmentin GI Disturbance, Nausea and Hives     Avelox [Moxifloxacin] Nausea and Vomiting, Unknown and Nausea     Ciprofloxacin Hives and Nausea     Codeine Nausea and Vomiting and Nausea     Ibuprofen Nausea and Vomiting     Other reaction(s): Nausea And Vomiting     Ibuprofen Sodium Hives and GI Disturbance     Quinolones      Tramadol Hives, Diarrhea, Nausea and Nausea and Vomiting     Daucus Carota      Other reaction(s): GI Upset  Other reaction(s): Abdominal pain, Diarrhea  Carrots cause gastric upset, cramping and diarrhea.   .  Past Medical Hx:   Past Medical History:   Diagnosis Date     Asthma      Cancer (H)     Per patient OBGYN, cerivical cancer     Cervical cancer (H)      Other chronic pain      Ovarian cancer (H)      Substance abuse (H)     Outside records indicate past history of narcotics abuse or dependence, but patient denies.      Baseline Mental status: WDL  Current Mental Status changes: at basesline    Infection present or suspected this encounter: no  Sepsis suspected: No  Isolation type: No active isolations     Activity level - Baseline/Home:  Independent  Activity Level - Current:   Independent    Bariatric equipment needed?: No    In the ED  these meds were given:   Medications   0.9% sodium chloride BOLUS (0 mLs Intravenous Stopped 11/20/18 0047)     Followed by   sodium chloride 0.9% infusion (1,000 mLs Intravenous New Bag 11/20/18 0047)   ondansetron (ZOFRAN) injection 4 mg (4 mg Intravenous Given 11/19/18 2116)   HYDROmorphone (PF) (DILAUDID) injection 0.5 mg (0.5 mg Intravenous Given 11/19/18 2334)   iopamidol (ISOVUE-370) solution 68 mL (68 mLs Intravenous Given 11/19/18 2230)   sodium chloride 0.9 % bag 500mL for CT scan flush use (67 mLs Intravenous Given 11/19/18 2230)   HYDROmorphone (PF) (DILAUDID) injection 1 mg (1 mg Intravenous Given 11/20/18 0047)       Drips running?  Yes    Home pump  No    Current LDAs  Peripheral IV 07/17/18 Right Upper forearm (Active)   Number of days:126       PICC Double Lumen 11/05/18 Right Brachial vein medial (Active)   Number of days:15       Incision/Surgical Site 05/18/17 Abdomen (Active)   Number of days:551       Labs results:   Labs Ordered and Resulted from Time of ED Arrival Up to the Time of Departure from the ED   CBC WITH PLATELETS DIFFERENTIAL - Abnormal; Notable for the following:        Result Value    RBC Count 3.71 (*)      (*)     MCHC 31.0 (*)     All other components within normal limits   COMPREHENSIVE METABOLIC PANEL - Abnormal; Notable for the following:     Glucose 100 (*)     Creatinine 0.50 (*)     Albumin 2.9 (*)     Protein Total 6.4 (*)     ALT 53 (*)     All other components within normal limits   INR   PARTIAL THROMBOPLASTIN TIME   LACTIC ACID WHOLE BLOOD   ROUTINE UA WITH MICROSCOPIC   HCG QUALITATIVE URINE   FREE WATER   NASOGASTRIC TUBE DECOMPRESSION       Imaging Studies:   Recent Results (from the past 24 hour(s))   CT Abdomen Pelvis w Contrast   Result Value    Radiologist flags Mechanical small bowel obstruction (Urgent)    Narrative    Resident preliminary report:   1. Findings suggestive of mechanical small bowel obstruction with a  transition point in the region of  "small bowel anastomosis.  2. No pneumatosis/pneumoperitoneum/portal venous gas    [Urgent Result: Mechanical small bowel obstruction]    Finding was identified on 11/19/2018 11:18 PM.     Dr. Diaz was contacted by Dr. Lauri Raza at 11/19/2018 11:29 PM  and verbalized understanding of the urgent finding.        Recent vital signs:   /67  Pulse 107  Temp 98.7  F (37.1  C) (Oral)  Resp 26  Ht 1.753 m (5' 9\")  Wt 49.9 kg (110 lb)  SpO2 96%  BMI 16.24 kg/m2    Cardiac Rhythm: Normal Sinus  Pt needs tele? No  Skin/wound Issues: None    Code Status: Full Code    Pain control: fair    Nausea control: good    Abnormal labs/tests/findings requiring intervention: see epic    Family present during ED course? No   Family Comments/Social Situation comments: n/a    Tasks needing completion: None      3-[facility] [facility]      "

## 2018-11-20 NOTE — ED NOTES
[facility], [place]   ED Nurse to Floor Handoff     Rachel A Gerhardt is a 42 year old female who speaks English and lives alone,  in a home  They arrived in the ED by unknown from home    ED Chief Complaint: Abdominal Pain (Intestinal Strictures ) and Nausea & Vomiting    ED Dx;   Final diagnoses:   Small bowel obstruction (H)         Needed?: No    Allergies:   Allergies   Allergen Reactions     No Clinical Screening - See Comments Other (See Comments) and Diarrhea     headache  Carrots cause gastric upset, cramping and diarrhea.     Sulfa Drugs Hives     hives     Amoxicillin Unknown and Other (See Comments)     vomiting  vomiting     Amoxicillin-Pot Clavulanate Other (See Comments) and Nausea     vomiting     Augmentin GI Disturbance, Nausea and Hives     Avelox [Moxifloxacin] Nausea and Vomiting, Unknown and Nausea     Ciprofloxacin Hives and Nausea     Codeine Nausea and Vomiting and Nausea     Ibuprofen Nausea and Vomiting     Other reaction(s): Nausea And Vomiting     Ibuprofen Sodium Hives and GI Disturbance     Quinolones      Tramadol Hives, Diarrhea, Nausea and Nausea and Vomiting     Daucus Carota      Other reaction(s): GI Upset  Other reaction(s): Abdominal pain, Diarrhea  Carrots cause gastric upset, cramping and diarrhea.   .  Past Medical Hx:   Past Medical History:   Diagnosis Date     Asthma      Cancer (H)     Per patient OBGYN, cerivical cancer     Cervical cancer (H)      Other chronic pain      Ovarian cancer (H)      Substance abuse (H)     Outside records indicate past history of narcotics abuse or dependence, but patient denies.      Baseline Mental status: WDL  Current Mental Status changes: at basesline    Infection present or suspected this encounter: no  Sepsis suspected: No  Isolation type: No active isolations     Activity level - Baseline/Home:  Independent  Activity Level - Current:   Independent    Bariatric equipment needed?: No    In the  ED these meds were given:   Medications   0.9% sodium chloride BOLUS (0 mLs Intravenous Stopped 11/20/18 0047)     Followed by   sodium chloride 0.9% infusion (1,000 mLs Intravenous New Bag 11/20/18 0047)   ondansetron (ZOFRAN) injection 4 mg ( Intravenous Unhold 11/20/18 0744)   naloxone (NARCAN) injection 0.1-0.4 mg ( Intravenous Unhold 11/20/18 0744)   melatonin tablet 1 mg ( Oral Unhold 11/20/18 0744)   lidocaine 1 % 1 mL ( Other Unhold 11/20/18 0744)   lidocaine (LMX4) cream ( Topical Unhold 11/20/18 0744)   sodium chloride (PF) 0.9% PF flush 3 mL ( Intracatheter Unhold 11/20/18 0744)   sodium chloride (PF) 0.9% PF flush 3 mL ( Intracatheter Canceled Entry 11/20/18 0747)   acetaminophen (TYLENOL) tablet 650 mg ( Oral Unhold 11/20/18 0744)   ondansetron (ZOFRAN-ODT) ODT tab 4 mg ( Oral Unhold 11/20/18 0744)     Or   ondansetron (ZOFRAN) injection 4 mg ( Intravenous Unhold 11/20/18 0744)   famotidine (PEPCID) infusion 20 mg ( Intravenous Unhold 11/20/18 0744)   HYDROmorphone (STANDARD CONC) (DILAUDID) liquid 2 mg ( Oral Unhold 11/20/18 0744)   lactated ringers infusion ( Intravenous Rate/Dose Verify 11/20/18 0746)   HYDROmorphone (PF) (DILAUDID) injection 0.5 mg (0.5 mg Intravenous Given 11/19/18 2334)   iopamidol (ISOVUE-370) solution 68 mL (68 mLs Intravenous Given 11/19/18 2230)   sodium chloride 0.9 % bag 500mL for CT scan flush use (67 mLs Intravenous Given 11/19/18 2230)   HYDROmorphone (PF) (DILAUDID) injection 1 mg (1 mg Intravenous Given 11/20/18 0047)   HYDROmorphone (PF) (DILAUDID) injection 1 mg (1 mg Intravenous Given 11/20/18 0153)   HYDROmorphone (PF) (DILAUDID) injection 0.5 mg (0.5 mg Intravenous Given 11/20/18 9260)   HYDROmorphone (PF) (DILAUDID) injection 0.5 mg (0.5 mg Intravenous Given 11/20/18 6395)       Drips running?  Yes    Home pump  No    Current LDAs  PICC Double Lumen 11/05/18 Right Brachial vein medial (Active)   Number of days:15       Incision/Surgical Site 05/18/17 Abdomen  (Active)   Number of days:551       Labs results:   Labs Ordered and Resulted from Time of ED Arrival Up to the Time of Departure from the ED   CBC WITH PLATELETS DIFFERENTIAL - Abnormal; Notable for the following:        Result Value    RBC Count 3.71 (*)      (*)     MCHC 31.0 (*)     All other components within normal limits   COMPREHENSIVE METABOLIC PANEL - Abnormal; Notable for the following:     Glucose 100 (*)     Creatinine 0.50 (*)     Albumin 2.9 (*)     Protein Total 6.4 (*)     ALT 53 (*)     All other components within normal limits   ROUTINE UA WITH MICROSCOPIC - Abnormal; Notable for the following:     Specific Gravity Urine >1.035 (*)     Leukocyte Esterase Urine Small (*)     RBC Urine 3 (*)     Squamous Epithelial /HPF Urine 2 (*)     Mucous Urine Present (*)     All other components within normal limits   INR   PARTIAL THROMBOPLASTIN TIME   LACTIC ACID WHOLE BLOOD   HCG QUALITATIVE URINE   PREALBUMIN   GLUCOSE BY METER   GLUCOSE MONITOR NURSING POCT   FREE WATER   NASOGASTRIC TUBE DECOMPRESSION   IP ASSIGN PROVIDER TEAM TO TREATMENT TEAM   VITAL SIGNS   INTAKE AND OUTPUT   INCENTIVE SPIROMETRY NURSING   PERIPHERAL IV CATHETER   NO INDWELLING URINARY CATHETER (BURROUGHS) PRESENT OR NEEDED   BLADDER SCAN   APPLY PNEUMATIC COMPRESSION DEVICE (PCD)       Imaging Studies:   Recent Results (from the past 24 hour(s))   CT Abdomen Pelvis w Contrast   Result Value    Radiologist flags Mechanical small bowel obstruction (Urgent)    Narrative    EXAMINATION: CT ABDOMEN PELVIS W CONTRAST, 11/19/2018 10:50 PM    TECHNIQUE:  Helical CT images from the lung bases through the  symphysis pubis were obtained with contrast.  Coronal and sagittal  reformatted images were generated at a workstation for further  assessment.    CONTRAST:  68 ml Isovue 370.    COMPARISON: CT 11/2/2018.    HISTORY: Abdominal pain, history of ovarian cancer;     FINDINGS:    Lines and tubes: None.    Lung bases: No consolidation or  "pleural effusion. Mild subsegmental  bibasilar atelectasis.    Liver: No suspicious liver lesions. Portal veins appear patent.  Gallbladder: No gallstones. No evidence of acute cholecystitis.  Spleen: Normal size.  Pancreas: No suspicious pancreatic lesions. The pancreatic duct is not  dilated.  Adrenal glands: No adrenal nodules.  Kidneys: No hydronephrosis or obstructing renal stones.  Bladder / Pelvic organs: Unremarkable.  Bowel: Multiple loops of small bowel, with maximum dilatation up to  4.9 cm, previously 4.6 cm. There is decompression of the distal small  bowel. Air distention is noted in the transverse colon. Point of  transition appears to be in the region of the small bowel anastomosis  (coronal images 34 through 39 in the right hemipelvis.  Lymph nodes: Mesenteric edema.  Peritoneum / Retroperitoneum: No free fluid or air within the abdomen.  Vessels: No infrarenal aortic aneurysm.     Bones and soft tissues: Degenerative changes of the spine.      Impression    IMPRESSION:   1. Findings suggestive of mechanical small bowel obstruction with a  transition point in the region of small bowel anastomosis.  2. No pneumatosis/pneumoperitoneum/portal venous gas    [Urgent Result: Mechanical small bowel obstruction]    Finding was identified on 11/19/2018 11:18 PM.     Dr. Diaz was contacted by Dr. Lauri Raza at 11/19/2018 11:29 PM  and verbalized understanding of the urgent finding.     I have personally reviewed the examination and initial interpretation  and I agree with the findings.    BHARGAV ESQUIVEL MD       Recent vital signs:   BP 94/64  Pulse 107  Temp 98.6  F (37  C) (Oral)  Resp 26  Ht 1.753 m (5' 9\")  Wt 49.9 kg (110 lb)  SpO2 96%  BMI 16.24 kg/m2    Cardiac Rhythm: Normal Sinus  Pt needs tele? No  Skin/wound Issues: None    Code Status: Full Code    Pain control: good    Nausea control: good    Abnormal labs/tests/findings requiring intervention: na    Family present during ED course? " No   Family Comments/Social Situation comments: na    Tasks needing completion: None    KUNAL ESTRADA, RN    [phone] [facility]

## 2018-11-20 NOTE — ED TRIAGE NOTES
"Pt came for evaluation of abd pain, Nausea, vomiting, and diarrhea that started yesterday, and is progressively getting worse. Per pt, \" I am suppose to be on liquid nutrition, but I ate regular food, so I am in bad shape.\" here in triage, pt puked all over her sweat shirt, stomach is continuously growling and crying in pain.  "

## 2018-11-20 NOTE — PLAN OF CARE
Problem: Patient Care Overview  Goal: Plan of Care/Patient Progress Review  Outcome: Therapy, progress toward functional goals is gradual  Pt. Admitted from ED, discharged 6 days ago. Pt. Reports nausea and vomiting since yest. Plan upon discharge was to return for add'l surg., pt has been off TPN for the 6 days and needs add'l nutritional support pre-op. Up ad dania, amb. In the young independently. NPO. Oral soln Dil. For pain with relief. + BS and flatus, denies nausea at this time. Admission completed. LR per dbl. PICC.

## 2018-11-20 NOTE — ED PROVIDER NOTES
[place] EMERGENCY DEPARTMENT ([facility])  11/19/18 ED 6 8:58 PM   History     Chief Complaint   Patient presents with     Abdominal Pain     Intestinal Strictures      Nausea & Vomiting     The history is provided by the patient and medical records.     Rachel A Gerhardt is a 42 year old female who presents with abdominal pain, nausea, and vomiting that started yesterday.  She has a very complex past medical history including ovarian cancer status post chemotherapy, radiation complicated by recurrent small bowel obstructions status post explorative laparotomy with 60 cm of bowel resected back in 2017 for strictures.  She also has a history of opiate dependence with prior Suboxone use and malnutrition requiring TPN.  She is deemed a high risk surgical candidate.  She states symptoms were triggered by eating. She is supposed to be on TPN via PICC line, but they delivered patient's TPN to her mother's house.  Patient states that her mother will not give her her TPN and she has not had TPN in 6 days. She called her doctor, told them her situation and states that they encouraged her to do her best with oral nutrition. She atempted to eat but developed abdominal pain, nausea and vomiting.  Everything she tries to and just comes right back up. She was supposed to have bowel resection surgery tomorrow but due to the missed TPN she has become too weak for surgery and this surgery has been held.  No other symptoms noted.      I have reviewed the Medications, Allergies, Past Medical and Surgical History, and Social History in the [facility] system.  Past Medical History:   Diagnosis Date     Asthma      Cancer (H)     Per patient OBGYN, cerivical cancer     Cervical cancer (H)      Other chronic pain      Ovarian cancer (H)      Substance abuse (H)     Outside records indicate past history of narcotics abuse or dependence, but patient denies.       Past Surgical History:   Procedure Laterality Date     COLONOSCOPY N/A  5/3/2018    Procedure: COLONOSCOPY;  sigmoidoscopy;  Surgeon: Omero Vigil MD;  Location: [facility] OR     COLONOSCOPY WITH CO2 INSUFFLATION N/A 4/30/2018    Procedure: COLONOSCOPY WITH CO2 INSUFFLATION;  Colonoscopy;  Surgeon: Omero Vigil MD;  Location: [facility] OR     COMBINED CYSTOSCOPY, INSERT STENT URETER(S) Bilateral 5/18/2017    Procedure: COMBINED CYSTOSCOPY, INSERT STENT URETER(S);  Cystoscopy with Bilateral Stent,;  Surgeon: Rene Calero MD;  Location: [facility] OR     ENT SURGERY  2009    mastoid, sinus     ENTEROSCOPY SMALL BOWEL N/A 6/18/2018    Procedure: ENTEROSCOPY SMALL BOWEL;  Lower bowel Retrograde Enteroscopy with Balloon Dilation .;  Surgeon: Omero Vigil MD;  Location: [facility] OR     EXAM UNDER ANESTHESIA, INSERT ALEX SLEEVE, UTERINE PLACEMENT OF TANDEM AND RING FOR RAD, ULTRASOUND N/A 12/14/2015    Procedure: EXAM UNDER ANESTHESIA, INSERT ALEX SLEEVE, UTERINE PLACEMENT OF TANDEM AND RING FOR RADIATION, ULTRASOUND GUIDED;  Surgeon: Abby Tony MD;  Location: [facility] OR     INSERT TANDEM AND CESIUM APPLICATOR CERVIX, ULTRASOUND GUIDED N/A 12/17/2015    Procedure: INSERT TANDEM AND CESIUM APPLICATOR CERVIX, ULTRASOUND GUIDED;  Surgeon: Kika Wood MD;  Location: [facility] OR     KNEE SURGERY       LAPAROTOMY EXPLORATORY N/A 5/18/2017    Procedure: LAPAROTOMY EXPLORATORY;   Exploratry Laparotomy, Small Bowel Resection with anastomosis, Flexible Sigmoidoscopy;  Surgeon: Jennifer Goodwin MD;  Location: [facility] OR     PICC INSERTION Right 04/29/2017    4fr SL BioFlo PICC, 37cm (3cm external) in the R basilic vein w/ tip in the mid SVC.     PICC INSERTION Right 03/29/2018    4Fr - 40cm (4cm external), R lateral brachial vein, Low SVC     RESECT SMALL BOWEL WITHOUT OSTOMY N/A 5/18/2017    Procedure: RESECT SMALL BOWEL WITHOUT OSTOMY;;  Surgeon: Jennifer Goodwin MD;  Location: [facility] OR     SIGMOIDOSCOPY FLEXIBLE N/A 5/18/2017    Procedure: SIGMOIDOSCOPY FLEXIBLE;;  Surgeon: Jennifer Goodwin,  "MD;  Location:  OR       Family History   Problem Relation Age of Onset     Diabetes Mother      Ovarian Cancer No family hx of      Uterine Cancer No family hx of      Cervical Cancer No family hx of      Breast Cancer No family hx of        Social History   Substance Use Topics     Smoking status: Light Tobacco Smoker     Packs/day: 0.25     Years: 12.00     Types: Cigarettes     Smokeless tobacco: Never Used      Comment: pt smoking about 4 cigs daily     Alcohol use No      Comment: None per pt      Review of Systems   Constitutional: Negative for fever.   HENT: Negative for congestion.    Eyes: Negative for redness.   Respiratory: Negative for shortness of breath.    Cardiovascular: Negative for chest pain.   Gastrointestinal: Positive for abdominal pain, nausea and vomiting.   Genitourinary: Negative for difficulty urinating.   Musculoskeletal: Negative for arthralgias and neck stiffness.   Skin: Negative for color change.   Neurological: Positive for weakness. Negative for headaches.   Psychiatric/Behavioral: Negative for confusion.   All other systems reviewed and are negative.      Physical Exam   BP: 96/62  Pulse: 107  Temp: 98.7  F (37.1  C)  Resp: 26  Height: 175.3 cm (5' 9\")  Weight: 49.9 kg (110 lb)  SpO2: 97 %      Physical Exam   Constitutional: She is oriented to person, place, and time. She appears well-developed and well-nourished. She appears cachectic. She has a sickly appearance. She appears distressed.   Tearful     HENT:   Head: Normocephalic and atraumatic.   Mouth/Throat: No oropharyngeal exudate.   Eyes: Pupils are equal, round, and reactive to light. Right eye exhibits no discharge. Left eye exhibits no discharge. No scleral icterus.   Neck: Normal range of motion. Neck supple.   Cardiovascular: Regular rhythm, normal heart sounds and intact distal pulses.  Tachycardia present.  Exam reveals no gallop and no friction rub.    No murmur heard.  Pulmonary/Chest: Effort normal and breath " sounds normal. No respiratory distress. She has no wheezes. She exhibits no tenderness.   Abdominal: Soft. Bowel sounds are normal. She exhibits no distension. There is no tenderness.   Musculoskeletal: Normal range of motion. She exhibits no edema, tenderness or deformity.   PICC line right upper extremity.     Neurological: She is alert and oriented to person, place, and time. No cranial nerve deficit.   Skin: Skin is warm. No rash noted. She is diaphoretic. No erythema. No pallor.   Psychiatric: She has a normal mood and affect.   Nursing note and vitals reviewed.      ED Course     ED Course     Results for orders placed or performed during the hospital encounter of 11/19/18   CT Abdomen Pelvis w Contrast   Result Value Ref Range    Radiologist flags Mechanical small bowel obstruction (Urgent)     Narrative    Resident preliminary report:   1. Findings suggestive of mechanical small bowel obstruction with a  transition point in the region of small bowel anastomosis.  2. No pneumatosis/pneumoperitoneum/portal venous gas    [Urgent Result: Mechanical small bowel obstruction]    Finding was identified on 11/19/2018 11:18 PM.     Dr. Diaz was contacted by Dr. Lauri Raza at 11/19/2018 11:29 PM  and verbalized understanding of the urgent finding.    CBC with platelets differential   Result Value Ref Range    WBC 7.8 4.0 - 11.0 10e9/L    RBC Count 3.71 (L) 3.8 - 5.2 10e12/L    Hemoglobin 11.7 11.7 - 15.7 g/dL    Hematocrit 37.7 35.0 - 47.0 %     (H) 78 - 100 fl    MCH 31.5 26.5 - 33.0 pg    MCHC 31.0 (L) 31.5 - 36.5 g/dL    RDW 13.8 10.0 - 15.0 %    Platelet Count 359 150 - 450 10e9/L    Diff Method Automated Method     % Neutrophils 66.6 %    % Lymphocytes 22.5 %    % Monocytes 7.3 %    % Eosinophils 2.8 %    % Basophils 0.3 %    % Immature Granulocytes 0.5 %    Nucleated RBCs 0 0 /100    Absolute Neutrophil 5.2 1.6 - 8.3 10e9/L    Absolute Lymphocytes 1.8 0.8 - 5.3 10e9/L    Absolute Monocytes 0.6 0.0 -  1.3 10e9/L    Absolute Eosinophils 0.2 0.0 - 0.7 10e9/L    Absolute Basophils 0.0 0.0 - 0.2 10e9/L    Abs Immature Granulocytes 0.0 0 - 0.4 10e9/L    Absolute Nucleated RBC 0.0    Comprehensive metabolic panel   Result Value Ref Range    Sodium 141 133 - 144 mmol/L    Potassium 3.8 3.4 - 5.3 mmol/L    Chloride 106 94 - 109 mmol/L    Carbon Dioxide 31 20 - 32 mmol/L    Anion Gap 4 3 - 14 mmol/L    Glucose 100 (H) 70 - 99 mg/dL    Urea Nitrogen 10 7 - 30 mg/dL    Creatinine 0.50 (L) 0.52 - 1.04 mg/dL    GFR Estimate >90 >60 mL/min/1.7m2    GFR Estimate If Black >90 >60 mL/min/1.7m2    Calcium 8.5 8.5 - 10.1 mg/dL    Bilirubin Total 0.2 0.2 - 1.3 mg/dL    Albumin 2.9 (L) 3.4 - 5.0 g/dL    Protein Total 6.4 (L) 6.8 - 8.8 g/dL    Alkaline Phosphatase 84 40 - 150 U/L    ALT 53 (H) 0 - 50 U/L    AST 42 0 - 45 U/L   INR   Result Value Ref Range    INR 1.02 0.86 - 1.14   Partial thromboplastin time   Result Value Ref Range    PTT 25 22 - 37 sec   Lactic acid whole blood   Result Value Ref Range    Lactic Acid 0.9 0.7 - 2.0 mmol/L     Procedures             Critical Care time:  none             Labs Ordered and Resulted from Time of ED Arrival Up to the Time of Departure from the ED - No data to display         Assessments & Plan (with Medical Decision Making)   This is a 42-year-old female with cervical cancer and multiple SBO's with resection who presents with abdominal pain with nausea and vomiting.  Pain began this morning.  Symptoms are consistent with her previous small bowel obstruction.  Patient is scheduled for surgery with colorectal tomorrow.  CBC and CMP showed no acute abnormalities.  Lactic acid is not elevated.  CT scan shows a small bowel obstruction with transition point at her small bowel anastomosis.  Discussed the case with GYN oncology.  Also discussed the case with surgery who saw and evaluated the patient.  They recommend NG tube and states they will admit the patient.  Patient declines NG tube at this  time.  Patient was given hydromorphone and ondansetron with for pain control.  Patient will be admitted to surgery for further monitoring, workup and possible intervention.    I have reviewed the nursing notes.    I have reviewed the findings, diagnosis, plan and need for follow up with the patient.    New Prescriptions    No medications on file       Final diagnoses:   None   I, Clari Miramontes, am serving as a trained medical scribe to document services personally performed by Denny Diaz DO based on the provider's statements to me on November 19, 2018.  This document has been checked and approved by the attending provider.    I, Denny Diaz DO, was physically present and have reviewed and verified the accuracy of this note documented by Clari Miramontes, medical scribe.       11/19/2018   [facility], [place], EMERGENCY DEPARTMENT     Denny Diaz DO  11/20/18 0206

## 2018-11-20 NOTE — PROGRESS NOTES
This is a recent snapshot of the patient's [facility] medical record.  For current drug dose and complete information and questions, call [phone] or In [facility] pool, fv home infusion ([id number])  CSN Number:  [id number]

## 2018-11-20 NOTE — PHARMACY-ADMISSION MEDICATION HISTORY
Admission medication history interview status for the 11/19/2018 admission is complete. See Epic admission navigator for allergy information, pharmacy, prior to admission medications and immunization status.     Medication history interview sources:  patient, Epic chart    Changes made to PTA medication list (reason)  Added: none  Deleted: none  Changed: none    Additional medication history information (including reliability of information, actions taken by pharmacist):  Pt denies taking anything else other than meds listed below. Pt' allergy list reviewed during this interview.       Prior to Admission medications    Medication Sig Last Dose Taking? Auth Provider   HYDROmorphone, STANDARD CONC, (DILAUDID) 1 MG/ML LIQD liquid Take 2 mLs (2 mg) by mouth every 4 hours as needed for moderate to severe pain 11/19/2018 at 8pm Yes Julieta Rob PA-C   ondansetron (ZOFRAN) 8 MG tablet Take 0.5-1 tablets (4-8 mg) by mouth every 8 hours as needed for nausea 11/19/2018 at 5pm Yes Julieta Rob PA-C   simethicone (MYLICON) 40 MG/0.6ML suspension Take 40 mg by mouth 4 times daily as needed for cramping Past Month at Unknown time Yes Reported, Patient       Medication history completed by: Grace Lerma, PD4 student

## 2018-11-20 NOTE — PROGRESS NOTES
Social Work Services Progress Note    Hospital Day: 1  Date of Initial Social Work Evaluation:  11/5/18 - previous admission  Collaborated with:  Pt at bedside    Data:  Pt is 42 yr old female who presents with abdominal pain, nausea, and vomiting. Pt previously discharged on 11/14/18 with discharge plan to mother's home on TPN. SW involved due to housing and psychosocial concerns.     Intervention:  SW met with Pt to discuss events post discharge leading to readmission. Patient reports she went to her mother's home the evening of discharge and mother would not allow Pt to come home or receive TPN supplies. Pt stayed at a friends home for 2 days and then was living in her truck. Per  Home Care, Pt's mother did not sign for TPN supplies and therefore they were not delivered for Pt.    Pt did not give SW consent to speak with her mother during previous admission to discuss discharge planning. Through VMs to the case management office (not confirming Pt's current admission status or information) Pt's mother left multiple VMs expressing concerns of opioid dependence. SW discussed chemical dependency with Pt who denies dependency concerns stating she is actively involved with [facility] and was last there on 10/26. Pt reports her mother is who is actively drug seeking therefore evicting patient from the home by denying her access to her pain medications. Pt denies opioid dependency and information regarding chemical dependency resources.      SW discussed TCU criteria, requirement of a skilled need, and current insurance coverage with Pt as well. Due to Pt establishing with TPN in May 2018, it is not a new need therefore it does not meet criteria for a skilled TCU need. Patient's primary coverage is [employer] via Pt's 's employer insurance also limiting TCU coverage without a skilled need.  In discussion to Pt's housing, Pt reports she pays her mother $300/month for rent and believes it is  "illegal for her mother to evict her from home. Patient's desires her discharge plan to be her mother's home with a police escort assisting her home and facilitating the exchange to enforce access to the home and her belongings. SW consulted with Clinic Supervisor for [facility] Medical beds to pursue potential medical bed, safety concerns were expressed of Pt housing with PICC line and TPN supplies with a population of potential IV drug users as well.  Clinic Supervisor gave information in regards to [facility]. SW discussed homeless resources and information regarding [facility] as a potential option (requires someone to have income and approved for H - Pt currently receives $580/month via [employer] [employer] (welfare program for low-income families with children). Pt declined all resources stating she would rather return to her mother's home with a police escort. Pt states \"I'm not afraid to go back there, I have the right to be there where my daughter is.\" Pt named multiple friends she could stay with, but states she does not want to be a burden and denies as discharge option.    Pt expresses desire for TPN with the ultimate goal of colorectal surgery. SW discussed non-compliance concerns per [facility] in regards to poor communication and diet recommendation - Pt denies non-compliance stating she has been in active communication following last discharge.     FARHEEN discussed support with Pt, she identifies her  as a somewhat involved source of support. Pt's  lives with his parents along with their 8 year old son, and her 's 2 siblings and their spouse. Pt reports their home is a possible option for discharge planning, but does not desire to discharge there stating it is crowded. Pt states her  \"has a lot on his plate\" with working full time and caring for their son. Patient expressed feeling like a burden to her family and friends stating " returning to mother's home basement is where she can best be self-sufficient.     Pt gave verbal authorization to SW and medical team to discuss current medical needs and discharge plan with Pt's mother; SW to consult with team.     Pt's current discharge plan is unclear. SW consulting with SW colleagues and supervisor in regards to psychosocial needs and discharge planning.     SW does not appear to meet criteria at this time for a vulnerable adult report to be made due to Pt's independence in decision making, mobility, and TPN needs.     Assessment:  Pt open to SW visit to discuss discharge planning and psychosocial needs. Pt presented frustration and weepy throughout conversation in regards to housing concerns and her relationship with her mother. Pt expressed she does not feel supported or cared for by her mother. Pt's identifies her current goals include receiving adequate nutrition and obtaining upcoming surgery. Pt strongly believes it is illegal for her mother to deny access back into the home without a proper 30-day eviction notice.     Plan:    Anticipated Disposition:  TBD    Barriers to d/c plan:  Medical stability, housing    Follow Up:  SW to f/u & assist as needed.    LUCHO Morales, URIEL  7C Surgical/Oncology Unit   Phone: [phone]  Pager: [phone]

## 2018-11-20 NOTE — PROGRESS NOTES
CLINICAL NUTRITION SERVICES - ASSESSMENT NOTE     Nutrition Prescription    RECOMMENDATIONS FOR MDs/PROVIDERS TO ORDER:  If plan to restart TPN, please send consult Pharmacy/Nutrition to start and manage TPN    Malnutrition Status:    Severe malnutrition in the context of chronic illness    Recommendations already ordered by Registered Dietitian (RD):  None at this time     Future/Additional Recommendations:  If plan to restart TPN, recommend start the following if K+/Mg++ >/= nrml and phos >1.9 (pt at refeeding risk given minimal nutrition intake x 7 days):    --Use dosing weight 50 kg  --Begin CPN, goal volume 1560 ml/day x 24 hours with initial 110 g Dex daily (374 kcal, GIR 1.5), 95g AA daily (380 kcal), and 250 ml 20% IV lipids 5 days/week.    --ONLY once pt receives ~100% of initial continuous PN volume with K+/Mg++/Phos WNL, advance PN dex by 55 g every 1-2 days (pending lytes/Glu and Pharm D/RD discretion) to initial goal of 165g Dex (561 kcal) to increase provisions to 1298 kcals (26 kcal/kg/day), 1.9 g PRO/kg/day, GIR 2.3 with 28% kcals from Fat.     REASON FOR ASSESSMENT  Rachel A Gerhardt is a/an 42 year old female assessed by the dietitian for Interdisciplinary team rounds    NUTRITION HISTORY  Patient known to writer from previous admission.  Started on TPN on 11/5, reached goal dextrose on 11/6, and began cycling TPN on 11/7, and reached 12 hour cycle on 11/9.  Pt discharged on 12 hour cycled TPN on 11/14; however, per discussion with Colorectal team and chart review, pt presented to ED yesterday with abdominal pain.  Has not run her TPN in 1 week.  Poor PO intake tolerance 2/2 nausea/vomiting.     Previous TPN regimen: 1560 mL/day x 12 hours with goal 165 g dextrose, 95 g AA, and 250 mL 20% IV lipids 5 days/week which provides 1298 kcal (26 kcal/kg), 1.9 g/kg PRO, GIR 4.6 @ peak infusion, and 28% kcal from fat per dosing weight 50 kg    CURRENT NUTRITION ORDERS  Diet: NPO  Intake/Tolerance: NPO x 1  "day since admission    LABS  Labs reviewed    MEDICATIONS  Medications reviewed  - LR @ 100 mL/hr    ANTHROPOMETRICS  Height: 175.3 cm (5' 9\")  Most Recent Weight: 49.9 kg (110 lb)    IBW: 65.9 kg   BMI: Underweight BMI <18.5  Weight History: Per recent RD note on 11/5, pt reports ongoing weight loss x 2 years since SBO issues began.   While pt was on chemo in the past for previous cancer, she was able to maintain her weight ~193 lbs.  Weight has fluctuating but mostly in range of 50-54 kg during last admission.  Wt Readings from Last 20 Encounters:   11/19/18 49.9 kg (110 lb)   11/13/18 54.9 kg (121 lb 1.6 oz)   07/17/18 50.1 kg (110 lb 6.4 oz)   06/19/18 56.6 kg (124 lb 11.2 oz)   05/23/18 56.9 kg (125 lb 8 oz)   05/09/18 55.3 kg (122 lb)   05/02/18 55.5 kg (122 lb 4.8 oz)   04/30/18 55.4 kg (122 lb 2.2 oz)   04/24/18 55.7 kg (122 lb 12.7 oz)   03/29/18 60.3 kg (132 lb 14.4 oz)   02/13/18 64.9 kg (143 lb)   02/04/18 67.1 kg (148 lb)   07/17/17 69.5 kg (153 lb 3.2 oz)   07/16/17 70.2 kg (154 lb 12.2 oz)   07/16/17 70.2 kg (154 lb 12.2 oz)   06/22/17 70.5 kg (155 lb 6.4 oz)   06/06/17 67.1 kg (147 lb 14.4 oz)   05/31/17 68.2 kg (150 lb 6.4 oz)   05/28/17 68.9 kg (151 lb 14.4 oz)   05/24/17 69.7 kg (153 lb 9.6 oz)      Dosing Weight: 50 kg (actual)    ASSESSED NUTRITION NEEDS  Estimated Energy Needs: 3075-4734 kcals/day (35 - 40 kcals/kg for PO/EN); 3267-2790 kcals/day (25 - 30 kcals/kg for PN)  Justification: Repletion and Underweight; lower range with PN to help prevent underfeeding  Estimated Protein Needs:  grams protein/day (1.5 - 2 grams of pro/kg)  Justification: Repletion  Estimated Fluid Needs: 6424-3350 mL/day (30 - 35 mL/kg)   Justification: Maintenance, or other per provider pending fluid status    PHYSICAL FINDINGS  See malnutrition section below.    MALNUTRITION  % Intake: </= 50% for >/= 5 days (severe)  % Weight Loss: > 20% in 1 year (severe)  Subcutaneous Fat Loss: Unable to assess  Muscle " Loss: Unable to assess  Fluid Accumulation/Edema: None noted per chart review  Malnutrition Diagnosis: Severe malnutrition in the context of chronic illness    NUTRITION DIAGNOSIS  Inadequate protein-energy intake related to TPN interruptions and poor PO intake tolerance as evidenced by no TPN x 7 days and reports of poor PO intake the past week as well    INTERVENTIONS  Implementation  Nutrition Education: Unable to complete due to pt unavailable   Collaboration with other providers: discussed pt in interdisciplinary rounds, asked Colorectal team for TPN consult if that was POC.    Goals  Diet adv v nutrition support within 2-3 days.     Monitoring/Evaluation  Progress toward goals will be monitored and evaluated per protocol.     Mylene Duvall RD, LD  7C RD pager: [phone]

## 2018-11-21 ENCOUNTER — HOSPITAL ENCOUNTER (INPATIENT)
Facility: CLINIC | Age: 43
Setting detail: SURGERY ADMIT
End: 2018-11-21
Attending: COLON & RECTAL SURGERY | Admitting: COLON & RECTAL SURGERY
Payer: COMMERCIAL

## 2018-11-21 VITALS
OXYGEN SATURATION: 98 % | BODY MASS INDEX: 16.29 KG/M2 | HEART RATE: 90 BPM | HEIGHT: 69 IN | SYSTOLIC BLOOD PRESSURE: 95 MMHG | DIASTOLIC BLOOD PRESSURE: 68 MMHG | WEIGHT: 110 LBS | RESPIRATION RATE: 16 BRPM | TEMPERATURE: 97.4 F

## 2018-11-21 LAB — GLUCOSE BLDC GLUCOMTR-MCNC: 92 MG/DL (ref 70–99)

## 2018-11-21 PROCEDURE — 00000146 ZZHCL STATISTIC GLUCOSE BY METER IP

## 2018-11-21 PROCEDURE — 25000132 ZZH RX MED GY IP 250 OP 250 PS 637: Performed by: COLON & RECTAL SURGERY

## 2018-11-21 PROCEDURE — 25000131 ZZH RX MED GY IP 250 OP 636 PS 637: Performed by: STUDENT IN AN ORGANIZED HEALTH CARE EDUCATION/TRAINING PROGRAM

## 2018-11-21 PROCEDURE — 25000128 H RX IP 250 OP 636: Performed by: COLON & RECTAL SURGERY

## 2018-11-21 RX ORDER — ONDANSETRON 4 MG/1
4 TABLET, ORALLY DISINTEGRATING ORAL EVERY 6 HOURS PRN
Status: DISCONTINUED | OUTPATIENT
Start: 2018-11-21 | End: 2018-11-21 | Stop reason: HOSPADM

## 2018-11-21 RX ADMIN — HYDROMORPHONE HYDROCHLORIDE 2 MG: 1 SOLUTION ORAL at 07:13

## 2018-11-21 RX ADMIN — SIMETHICONE 40 MG: 20 SUSPENSION/ DROPS ORAL at 02:57

## 2018-11-21 RX ADMIN — HYDROMORPHONE HYDROCHLORIDE 2 MG: 1 SOLUTION ORAL at 02:56

## 2018-11-21 RX ADMIN — ONDANSETRON 4 MG: 4 TABLET, ORALLY DISINTEGRATING ORAL at 00:17

## 2018-11-21 RX ADMIN — Medication 0.2 MG: at 05:12

## 2018-11-21 RX ADMIN — Medication 0.2 MG: at 01:06

## 2018-11-21 ASSESSMENT — PAIN DESCRIPTION - DESCRIPTORS: DESCRIPTORS: CONSTANT;CRAMPING;SHARP

## 2018-11-21 ASSESSMENT — ACTIVITIES OF DAILY LIVING (ADL)
ADLS_ACUITY_SCORE: 11

## 2018-11-21 NOTE — PROGRESS NOTES
CLINICAL NUTRITION SERVICES - BRIEF NOTE  *Received consult: Pharmacy/Nutrition to start and manage TPN    Per discussion in rounds, pt left unit and went to pain clinic in [place] without telling floor staff (had a presheduled appointment).  TPN had been started last night by PharmD (recs left in RD note yesterday), but appears pt unhooked herself from TPN before leaving the unit this morning.  Primary team has recommended pull PICC and no longer continue TPN.  See provider note for additional details regarding this morning's events.        Monitoring/Evaluation  Progress toward goals will be monitored and evaluated per protocol.     Mylene Duvall RD, LD  7C RD pager: [phone]

## 2018-11-21 NOTE — PLAN OF CARE
"Problem: Patient Care Overview  Goal: Plan of Care/Patient Progress Review  Outcome: No Change  BP 95/68 (BP Location: Right arm)  Pulse 90  Temp 97.4  F (36.3  C) (Oral)  Resp 16  Ht 1.753 m (5' 9\")  Wt 49.9 kg (110 lb)  SpO2 98%  BMI 16.24 kg/m2    Shift: 0966-9342  Neuro: A/Ox4, denies numbness or tingling.   Cardiac: VSS, BP tends to run soft but above parameters.   Respiratory: Sats > 92% on RA.   GI/: Hyperactive bowl sounds that can be heard without ascultation. +flatus. Voiding spontaneously, adequate amounts.   Diet/Appetite: NPO  Activity: Up with SBA.   Pain: Pain controlled with PO and IV dilaudid.   Skin: No new deficits noted.   LDA's: DL PICC with TPN and lipids infusing.     Plan: Parenteral nutrition support until surgery later this week. Will continue POC.        Problem: Nutrition, Parenteral (Adult)  Goal: Signs and Symptoms of Listed Potential Problems Will be Absent, Minimized or Managed (Nutrition, Parenteral)  Signs and symptoms of listed potential problems will be absent, minimized or managed by discharge/transition of care (reference Nutrition, Parenteral (Adult) CPG).    11/21/18 0607   Nutrition, Parenteral   Problems Assessed (Parenteral Nutrition) all   Problems Present (Parenteral Nutrition) malnutrition         "

## 2018-11-21 NOTE — PLAN OF CARE
Problem: Patient Care Overview  Goal: Plan of Care/Patient Progress Review  Outcome: Therapy, unable to show any progress toward functional goals  Last saw pt. At 0730. Lab reported her not being in room @ 0745. Writer received phone call from [facility] in [place] stating that pt. Had arrived for a prescheduled appt. Pt. Stated that U Staff had required her to leave the hospital to make appt, Pt unhooked her TPN/IL and drove herself to appt. Without knowledge of floor Staff. There has been communication btw Pain Clinic Staff, Primary Service here about POC . Attempted to call pt. On cell to invite her to return to floor for removal of PICC line . Pt. Did not answer, msg. Left. Pt. Departure is being treated as Elopement, waiting to see if pt. Will return voluntarily to put closure to admission. Backpack in room, put in secure coat room.

## 2018-11-21 NOTE — DISCHARGE SUMMARY
Colorectal Surgery Progress Note      Subjective:  No acute events overnight, TPN running this AM. Passing flatus and having bms. Voiding. Denies n/v. Afebrile, HDS.     Vitals:  Vitals:    11/20/18 1349 11/20/18 1830 11/20/18 2333 11/21/18 0139   BP: 156/90  114/71 95/68   BP Location: Left leg  Right arm Right arm   Pulse: 99  89 90   Resp: 22 18 16 16   Temp: 97.1  F (36.2  C)  97.8  F (36.6  C) 97.4  F (36.3  C)   TempSrc: Oral  Oral Oral   SpO2: 98%  98% 98%   Weight:       Height:         I/O:  I/O last 3 completed shifts:  In: 216 [P.O.:120]  Out: -       Physical Exam:  Gen: AAOx3, NAD  Pulm: Non-labored breathing on RA  Abd: Soft, non-distended, non-tender, no rebound or guarding, well healed surgical scar   Ext:  Warm and well-perfused    BMP  Recent Labs  Lab 11/19/18 2127      POTASSIUM 3.8   CHLORIDE 106   CO2 31   BUN 10   CR 0.50*   *     CBC  Recent Labs  Lab 11/19/18 2127   WBC 7.8   HGB 11.7   HCT 37.7        Alb 2.9 (11/19)  Prealbumin 25 (11/19)    ASSESSMENT: This is a 42 year old female with 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 She was scheduled to undergo surgery after nutritional optimization with outpatient TPN. However due to complex social situation, she was unable to receive TPN.     -Neuro: tylenol prn, dilaudid prn   -FEN/GI: NPO, continue nutritional optimization prior to surgery with TPN until albumin > 3.2, prealbumin > 15, simethicone prn  -Ppx: Lovenox, PPI  -Dispo: Social work following for dispo planning       Mali Knight PGY1   Colon and Rectal Surgery    Patient was seen with fellow, Dr. Dietrich, who discussed with staff.

## 2018-11-21 NOTE — SIGNIFICANT EVENT
Colon and Rectal Surgery Update    Was notified by bedside nurse at about 8:45 am that pt is currently at the [facility] in [place] as bedside RN received a call from the Pain Clinic.  Per bedside RN, RN has not seen pt at all this shift starting at 7am.  TPN poles are in room, unplugged and programs to IV pumps have been completely cleared.      Spoke to Bernadette Bach NP at the [facility] in [place]. Bernadette reports that pt presented to her pain clinic appointment in a hospital gown.  And per pt, she came to her pain appointment because she was going to be discharged today anyways.  Jenni brought an empty bottle of liquid dilaudid and reported that the hospital is holding on to her home bryanna. Jenni also told Bernadette that she wasn't sure if she really even wanted to have surgery because there is no guarantee that her abdominal pain would be cured by the surgery.     Updated Bernadette that we have been looking into discharge options as her only reason for admission is TPN and homelessness.  That her surgery on 11/20 was cancelled because she had missed a week of TPN (pre-operative TPN) due to a conflict with her mother and has been living in her car during that time period and has been unable to receive her TPN.  But we have NOT actually found a safe discharge option.  Pt does not meet requirement for admission/nor have insurance coverage for a TCU.  We have looked into a medical homeless shelter that can take people with PICC lines and on TPN, although unlikely an appropriate candidate due to communal setting of shelter and potential that other shelter residents are IV drug users.  This admission, Jenni did give us permission to talk to her mother, as Jenni did not give us this permission on the last hospitalization.  Please refer to SW notes for more information.  Due to the lack of discharge options, decision had been made yesterday to have patient remain an inpatient until her surgery on  Dec 18, 2018.    Due to this event today and the dishonesty, Bernadette will no longer prescribe her opiates and will no longer see Jenni as a patient either. Simultaneously surgery attending, Dr. Aguilar was updated to the events.  Nurse Manager and Charge RN was also notified.  This is considered Elopement.  Please refer to note written by Dr. Aguilar today for more details.     Discussed with Bernadette that the PICC line is still in place.  Asked if someone at the Pain clinic would be willing to remove the PICC line.  Bernadette declined this.  I asked Bernadette to send Jenni back to the hospital immediately after her appointment so we could remove her PICC line.  Bernadette said that she would relay the message to Jenni.     Attempted to call patient at 7:33am to confirm whereabouts and to encourage pt to return to the hospital for PICC line removal.  There was no answer, but did leave a brief message notifying patient that we would like to see her.      Of note, the hospital does not have any of patient's [place] in our safe.  Patient's backpack was left in room.  Patient's belongings have been locked up.     If patient re-presents, pt will be discharged without TPN and prescriptions and PICC line needs to be removed.      Julieta Rob PA-C  Colon and Rectal Surgery     Addendum:   Patient still has not been seen.   If patient is to re-present for her belongings, her PICC LINE NEEDS TO BE REMOVED.   There is a Patient Care Order in [facility] for this current hospital encounter for PICC line to be removed.   Julieta Rob PA-C  Colon and Rectal Surgery

## 2018-11-21 NOTE — PROVIDER NOTIFICATION
Notified provider Leesa regarding pt c/o ABD pain that is now radiating to chest. VSS at this time, see flow sheets. Will continue to monitor.

## 2018-11-21 NOTE — PLAN OF CARE
Problem: Bowel Obstruction (Adult)  Goal: Signs and Symptoms of Listed Potential Problems Will be Absent, Minimized or Managed (Bowel Obstruction)  Signs and symptoms of listed potential problems will be absent, minimized or managed by discharge/transition of care (reference Bowel Obstruction (Adult) CPG).  Outcome: No Change  VSS. Pt reports abdominal pain, controlled with PO Dilaudid x2, IV Dil x1, & simethicone x1. PICC with TPN & Lipids infusing. NPO with ice chips & sips, pt denies nausea. BS hyperactive, audible without auscultation, pt reports passing gas, x2 loose BMs per pt report. Pt up independently, voiding fine per pt report. Plan for surgery later on after increased nutritional status.

## 2018-11-21 NOTE — PROGRESS NOTES
BRIEF  NOTE:    Per discussion in rounds, Pt left unit & went to pain clinic in [place] without tell floor staff. Pt has not yet returned to the unit at this time, therefore SW unable to meet with Pt as originally planned in order to contact mother w/ the Pt re: discharge planning and psychosocial concerns.     SW confirmed with financial counseling services, Pt does not have active MNsure since 2007. Screen for MA may be appropriate at a later time.     Disability Linkage Line (P:1-270.766.7406) and Evictions/Landlord abuse Legal Help Line (Client Intake P: [phone]) resources were intended to be provided to Patient, but were unable to be provided due to her lack of return to the unit.     LUCHO Morales, URIEL  7C Surgical/Oncology Unit   Phone: [phone]  Pager: [phone]

## 2018-11-23 ENCOUNTER — TELEPHONE (OUTPATIENT)
Dept: SURGERY | Facility: CLINIC | Age: 43
End: 2018-11-23

## 2018-11-23 NOTE — PROGRESS NOTES
Colon and Rectal Surgery Late Entry Progress Note  Please refer to Progress Note dated 11/21/2018 written by Dr. Mali Knight and Dr. Sukhdev Aguilar for more details.   Please include: Severe Malnutrition in the Context of chronic illness into patient diagnoses.   Julieta Rob PA-C  Colon and Rectal Surgery

## 2019-02-14 NOTE — ED NOTES
Attempted to call SBAR report to 7B; unit unable to take report at this time.   
Pt has had abd pain the last 2 days and its getting worse, she cant tolerate the pain.   
Pt refused ng tube  
Received report and assumed care of patient. VS are WNL for this patient. Patient sleeping, but easily aroused.  PIV patent and infusing NS bolus.   
Breath sounds clear and equal bilaterally.

## 2019-03-06 PROCEDURE — 96374 THER/PROPH/DIAG INJ IV PUSH: CPT

## 2019-03-06 PROCEDURE — 96375 TX/PRO/DX INJ NEW DRUG ADDON: CPT

## 2019-03-06 PROCEDURE — 99285 EMERGENCY DEPT VISIT HI MDM: CPT | Mod: 25

## 2019-03-06 PROCEDURE — 96372 THER/PROPH/DIAG INJ SC/IM: CPT

## 2019-03-06 PROCEDURE — 96376 TX/PRO/DX INJ SAME DRUG ADON: CPT

## 2019-03-06 PROCEDURE — 99285 EMERGENCY DEPT VISIT HI MDM: CPT | Mod: Z6 | Performed by: EMERGENCY MEDICINE

## 2019-03-06 ASSESSMENT — MIFFLIN-ST. JEOR: SCORE: 1238.28

## 2019-03-07 ENCOUNTER — HOSPITAL ENCOUNTER (INPATIENT)
Facility: CLINIC | Age: 44
LOS: 3 days | Discharge: HOME | DRG: 389 | End: 2019-03-10
Attending: EMERGENCY MEDICINE | Admitting: INTERNAL MEDICINE
Payer: COMMERCIAL

## 2019-03-07 ENCOUNTER — HOSPITAL ENCOUNTER (OUTPATIENT)
Age: 44
End: 2019-03-07
Attending: INTERNAL MEDICINE | Admitting: INTERNAL MEDICINE
Payer: COMMERCIAL

## 2019-03-07 ENCOUNTER — APPOINTMENT (OUTPATIENT)
Dept: CT IMAGING | Facility: CLINIC | Age: 44
DRG: 389 | End: 2019-03-07
Attending: EMERGENCY MEDICINE
Payer: COMMERCIAL

## 2019-03-07 ENCOUNTER — APPOINTMENT (OUTPATIENT)
Dept: GENERAL RADIOLOGY | Facility: CLINIC | Age: 44
DRG: 389 | End: 2019-03-07
Attending: EMERGENCY MEDICINE
Payer: COMMERCIAL

## 2019-03-07 DIAGNOSIS — R10.9 ABDOMINAL PAIN, UNSPECIFIED ABDOMINAL LOCATION: Primary | ICD-10-CM

## 2019-03-07 DIAGNOSIS — K56.609 SBO (SMALL BOWEL OBSTRUCTION) (H): ICD-10-CM

## 2019-03-07 LAB
ALBUMIN SERPL-MCNC: 3.1 G/DL (ref 3.4–5)
ALP SERPL-CCNC: 81 U/L (ref 40–150)
ALT SERPL W P-5'-P-CCNC: 20 U/L (ref 0–50)
ANION GAP SERPL CALCULATED.3IONS-SCNC: 6 MMOL/L (ref 3–14)
AST SERPL W P-5'-P-CCNC: 13 U/L (ref 0–45)
BASOPHILS # BLD AUTO: 0.1 10E9/L (ref 0–0.2)
BASOPHILS NFR BLD AUTO: 0.7 %
BILIRUB SERPL-MCNC: 0.2 MG/DL (ref 0.2–1.3)
BUN SERPL-MCNC: 10 MG/DL (ref 7–30)
CALCIUM SERPL-MCNC: 8.4 MG/DL (ref 8.5–10.1)
CHLORIDE SERPL-SCNC: 107 MMOL/L (ref 94–109)
CO2 SERPL-SCNC: 29 MMOL/L (ref 20–32)
CREAT SERPL-MCNC: 0.59 MG/DL (ref 0.52–1.04)
DIFFERENTIAL METHOD BLD: ABNORMAL
EOSINOPHIL # BLD AUTO: 0.1 10E9/L (ref 0–0.7)
EOSINOPHIL NFR BLD AUTO: 1.5 %
ERYTHROCYTE [DISTWIDTH] IN BLOOD BY AUTOMATED COUNT: 14.2 % (ref 10–15)
GFR SERPL CREATININE-BSD FRML MDRD: >90 ML/MIN/{1.73_M2}
GLUCOSE SERPL-MCNC: 92 MG/DL (ref 70–99)
HCT VFR BLD AUTO: 40.4 % (ref 35–47)
HGB BLD-MCNC: 12.4 G/DL (ref 11.7–15.7)
IMM GRANULOCYTES # BLD: 0.1 10E9/L (ref 0–0.4)
IMM GRANULOCYTES NFR BLD: 0.7 %
LIPASE SERPL-CCNC: 124 U/L (ref 73–393)
LYMPHOCYTES # BLD AUTO: 1.7 10E9/L (ref 0.8–5.3)
LYMPHOCYTES NFR BLD AUTO: 22 %
MCH RBC QN AUTO: 31.5 PG (ref 26.5–33)
MCHC RBC AUTO-ENTMCNC: 30.7 G/DL (ref 31.5–36.5)
MCV RBC AUTO: 103 FL (ref 78–100)
MONOCYTES # BLD AUTO: 0.9 10E9/L (ref 0–1.3)
MONOCYTES NFR BLD AUTO: 11.2 %
NEUTROPHILS # BLD AUTO: 4.9 10E9/L (ref 1.6–8.3)
NEUTROPHILS NFR BLD AUTO: 63.9 %
NRBC # BLD AUTO: 0 10*3/UL
NRBC BLD AUTO-RTO: 0 /100
PLATELET # BLD AUTO: 294 10E9/L (ref 150–450)
POTASSIUM SERPL-SCNC: 4.1 MMOL/L (ref 3.4–5.3)
PREALB SERPL IA-MCNC: 18 MG/DL (ref 15–45)
PROT SERPL-MCNC: 6.2 G/DL (ref 6.8–8.8)
RBC # BLD AUTO: 3.94 10E12/L (ref 3.8–5.2)
SODIUM SERPL-SCNC: 142 MMOL/L (ref 133–144)
VIT B12 SERPL-MCNC: 221 PG/ML (ref 193–986)
WBC # BLD AUTO: 7.6 10E9/L (ref 4–11)

## 2019-03-07 PROCEDURE — 82607 VITAMIN B-12: CPT | Performed by: EMERGENCY MEDICINE

## 2019-03-07 PROCEDURE — 25800030 ZZH RX IP 258 OP 636

## 2019-03-07 PROCEDURE — 25800030 ZZH RX IP 258 OP 636: Performed by: EMERGENCY MEDICINE

## 2019-03-07 PROCEDURE — 25000132 ZZH RX MED GY IP 250 OP 250 PS 637

## 2019-03-07 PROCEDURE — 74019 RADEX ABDOMEN 2 VIEWS: CPT

## 2019-03-07 PROCEDURE — 25000128 H RX IP 250 OP 636: Performed by: STUDENT IN AN ORGANIZED HEALTH CARE EDUCATION/TRAINING PROGRAM

## 2019-03-07 PROCEDURE — 84134 ASSAY OF PREALBUMIN: CPT | Performed by: EMERGENCY MEDICINE

## 2019-03-07 PROCEDURE — 85025 COMPLETE CBC W/AUTO DIFF WBC: CPT | Performed by: EMERGENCY MEDICINE

## 2019-03-07 PROCEDURE — 82607 VITAMIN B-12: CPT | Performed by: STUDENT IN AN ORGANIZED HEALTH CARE EDUCATION/TRAINING PROGRAM

## 2019-03-07 PROCEDURE — 99222 1ST HOSP IP/OBS MODERATE 55: CPT | Mod: GC | Performed by: INTERNAL MEDICINE

## 2019-03-07 PROCEDURE — 25000125 ZZHC RX 250: Performed by: STUDENT IN AN ORGANIZED HEALTH CARE EDUCATION/TRAINING PROGRAM

## 2019-03-07 PROCEDURE — 74177 CT ABD & PELVIS W/CONTRAST: CPT

## 2019-03-07 PROCEDURE — 12000012 ZZH R&B MS OVERFLOW UMMC

## 2019-03-07 PROCEDURE — 25000132 ZZH RX MED GY IP 250 OP 250 PS 637: Performed by: STUDENT IN AN ORGANIZED HEALTH CARE EDUCATION/TRAINING PROGRAM

## 2019-03-07 PROCEDURE — 25000128 H RX IP 250 OP 636: Performed by: EMERGENCY MEDICINE

## 2019-03-07 PROCEDURE — 83690 ASSAY OF LIPASE: CPT | Performed by: EMERGENCY MEDICINE

## 2019-03-07 PROCEDURE — 80053 COMPREHEN METABOLIC PANEL: CPT | Performed by: EMERGENCY MEDICINE

## 2019-03-07 PROCEDURE — 25000128 H RX IP 250 OP 636

## 2019-03-07 RX ORDER — OXYCODONE HYDROCHLORIDE 5 MG/1
5 TABLET ORAL EVERY 4 HOURS PRN
Status: DISCONTINUED | OUTPATIENT
Start: 2019-03-07 | End: 2019-03-10 | Stop reason: HOSPADM

## 2019-03-07 RX ORDER — AMOXICILLIN 250 MG
1 CAPSULE ORAL 2 TIMES DAILY PRN
Status: DISCONTINUED | OUTPATIENT
Start: 2019-03-07 | End: 2019-03-10 | Stop reason: HOSPADM

## 2019-03-07 RX ORDER — AMOXICILLIN 250 MG
2 CAPSULE ORAL 2 TIMES DAILY PRN
Status: DISCONTINUED | OUTPATIENT
Start: 2019-03-07 | End: 2019-03-10 | Stop reason: HOSPADM

## 2019-03-07 RX ORDER — HYDROXYZINE HYDROCHLORIDE 50 MG/1
50 TABLET, FILM COATED ORAL EVERY 6 HOURS PRN
Status: DISCONTINUED | OUTPATIENT
Start: 2019-03-07 | End: 2019-03-10 | Stop reason: HOSPADM

## 2019-03-07 RX ORDER — PROCHLORPERAZINE MALEATE 5 MG
10 TABLET ORAL EVERY 6 HOURS PRN
Status: DISCONTINUED | OUTPATIENT
Start: 2019-03-07 | End: 2019-03-10 | Stop reason: HOSPADM

## 2019-03-07 RX ORDER — METOCLOPRAMIDE 5 MG/1
10 TABLET ORAL EVERY 6 HOURS PRN
Status: DISCONTINUED | OUTPATIENT
Start: 2019-03-07 | End: 2019-03-10 | Stop reason: HOSPADM

## 2019-03-07 RX ORDER — NALOXONE HYDROCHLORIDE 0.4 MG/ML
.1-.4 INJECTION, SOLUTION INTRAMUSCULAR; INTRAVENOUS; SUBCUTANEOUS
Status: DISCONTINUED | OUTPATIENT
Start: 2019-03-07 | End: 2019-03-10 | Stop reason: HOSPADM

## 2019-03-07 RX ORDER — HYDROMORPHONE HYDROCHLORIDE 1 MG/ML
0.5 INJECTION, SOLUTION INTRAMUSCULAR; INTRAVENOUS; SUBCUTANEOUS
Status: COMPLETED | OUTPATIENT
Start: 2019-03-07 | End: 2019-03-07

## 2019-03-07 RX ORDER — POTASSIUM CL/LIDO/0.9 % NACL 10MEQ/0.1L
10 INTRAVENOUS SOLUTION, PIGGYBACK (ML) INTRAVENOUS
Status: DISCONTINUED | OUTPATIENT
Start: 2019-03-07 | End: 2019-03-10 | Stop reason: HOSPADM

## 2019-03-07 RX ORDER — HYDROMORPHONE HYDROCHLORIDE 1 MG/ML
.3-.5 INJECTION, SOLUTION INTRAMUSCULAR; INTRAVENOUS; SUBCUTANEOUS
Status: CANCELLED | OUTPATIENT
Start: 2019-03-07

## 2019-03-07 RX ORDER — SODIUM CHLORIDE 9 MG/ML
INJECTION, SOLUTION INTRAVENOUS ONCE
Status: COMPLETED | OUTPATIENT
Start: 2019-03-07 | End: 2019-03-07

## 2019-03-07 RX ORDER — POTASSIUM CHLORIDE 29.8 MG/ML
20 INJECTION INTRAVENOUS
Status: DISCONTINUED | OUTPATIENT
Start: 2019-03-07 | End: 2019-03-07 | Stop reason: CLARIF

## 2019-03-07 RX ORDER — ONDANSETRON 4 MG/1
4 TABLET, ORALLY DISINTEGRATING ORAL EVERY 6 HOURS PRN
Status: DISCONTINUED | OUTPATIENT
Start: 2019-03-07 | End: 2019-03-10 | Stop reason: HOSPADM

## 2019-03-07 RX ORDER — MAGNESIUM SULFATE HEPTAHYDRATE 40 MG/ML
4 INJECTION, SOLUTION INTRAVENOUS EVERY 4 HOURS PRN
Status: DISCONTINUED | OUTPATIENT
Start: 2019-03-07 | End: 2019-03-10 | Stop reason: HOSPADM

## 2019-03-07 RX ORDER — HYDROMORPHONE HYDROCHLORIDE 1 MG/ML
.3-.5 INJECTION, SOLUTION INTRAMUSCULAR; INTRAVENOUS; SUBCUTANEOUS EVERY 4 HOURS PRN
Status: DISCONTINUED | OUTPATIENT
Start: 2019-03-07 | End: 2019-03-07

## 2019-03-07 RX ORDER — AMOXICILLIN 250 MG
1 CAPSULE ORAL 2 TIMES DAILY
Status: DISCONTINUED | OUTPATIENT
Start: 2019-03-07 | End: 2019-03-10 | Stop reason: HOSPADM

## 2019-03-07 RX ORDER — HYDROCODONE BITARTRATE AND ACETAMINOPHEN 5; 325 MG/1; MG/1
1 TABLET ORAL EVERY 6 HOURS PRN
Status: ON HOLD | COMMUNITY
End: 2019-03-10

## 2019-03-07 RX ORDER — DICYCLOMINE HCL 20 MG
20 TABLET ORAL 4 TIMES DAILY PRN
Status: ON HOLD | COMMUNITY
End: 2019-03-10

## 2019-03-07 RX ORDER — ONDANSETRON 2 MG/ML
4 INJECTION INTRAMUSCULAR; INTRAVENOUS EVERY 6 HOURS PRN
Status: DISCONTINUED | OUTPATIENT
Start: 2019-03-07 | End: 2019-03-10 | Stop reason: HOSPADM

## 2019-03-07 RX ORDER — POTASSIUM CHLORIDE 750 MG/1
20-40 TABLET, EXTENDED RELEASE ORAL
Status: DISCONTINUED | OUTPATIENT
Start: 2019-03-07 | End: 2019-03-10 | Stop reason: HOSPADM

## 2019-03-07 RX ORDER — METOCLOPRAMIDE HYDROCHLORIDE 5 MG/ML
10 INJECTION INTRAMUSCULAR; INTRAVENOUS EVERY 6 HOURS PRN
Status: DISCONTINUED | OUTPATIENT
Start: 2019-03-07 | End: 2019-03-10 | Stop reason: HOSPADM

## 2019-03-07 RX ORDER — BISACODYL 10 MG
10 SUPPOSITORY, RECTAL RECTAL DAILY PRN
Status: DISCONTINUED | OUTPATIENT
Start: 2019-03-07 | End: 2019-03-10 | Stop reason: HOSPADM

## 2019-03-07 RX ORDER — ACETAMINOPHEN 325 MG/1
325-650 TABLET ORAL EVERY 4 HOURS PRN
Status: DISCONTINUED | OUTPATIENT
Start: 2019-03-07 | End: 2019-03-10 | Stop reason: HOSPADM

## 2019-03-07 RX ORDER — POTASSIUM CHLORIDE 29.8 MG/ML
20 INJECTION INTRAVENOUS
Status: DISCONTINUED | OUTPATIENT
Start: 2019-03-07 | End: 2019-03-10 | Stop reason: HOSPADM

## 2019-03-07 RX ORDER — POTASSIUM CHLORIDE 7.45 MG/ML
10 INJECTION INTRAVENOUS
Status: DISCONTINUED | OUTPATIENT
Start: 2019-03-07 | End: 2019-03-10 | Stop reason: HOSPADM

## 2019-03-07 RX ORDER — AMOXICILLIN 250 MG
2 CAPSULE ORAL 2 TIMES DAILY
Status: DISCONTINUED | OUTPATIENT
Start: 2019-03-07 | End: 2019-03-10 | Stop reason: HOSPADM

## 2019-03-07 RX ORDER — POTASSIUM CHLORIDE 1.5 G/1.58G
20-40 POWDER, FOR SOLUTION ORAL
Status: DISCONTINUED | OUTPATIENT
Start: 2019-03-07 | End: 2019-03-10 | Stop reason: HOSPADM

## 2019-03-07 RX ORDER — SODIUM CHLORIDE, SODIUM LACTATE, POTASSIUM CHLORIDE, CALCIUM CHLORIDE 600; 310; 30; 20 MG/100ML; MG/100ML; MG/100ML; MG/100ML
INJECTION, SOLUTION INTRAVENOUS CONTINUOUS
Status: DISCONTINUED | OUTPATIENT
Start: 2019-03-07 | End: 2019-03-10 | Stop reason: HOSPADM

## 2019-03-07 RX ORDER — ONDANSETRON 2 MG/ML
4 INJECTION INTRAMUSCULAR; INTRAVENOUS
Status: COMPLETED | OUTPATIENT
Start: 2019-03-07 | End: 2019-03-07

## 2019-03-07 RX ORDER — PROCHLORPERAZINE 25 MG
25 SUPPOSITORY, RECTAL RECTAL EVERY 12 HOURS PRN
Status: DISCONTINUED | OUTPATIENT
Start: 2019-03-07 | End: 2019-03-10 | Stop reason: HOSPADM

## 2019-03-07 RX ORDER — HYDROMORPHONE HYDROCHLORIDE 1 MG/ML
.3-.5 INJECTION, SOLUTION INTRAMUSCULAR; INTRAVENOUS; SUBCUTANEOUS EVERY 12 HOURS PRN
Status: DISCONTINUED | OUTPATIENT
Start: 2019-03-07 | End: 2019-03-08

## 2019-03-07 RX ORDER — IOPAMIDOL 755 MG/ML
73 INJECTION, SOLUTION INTRAVASCULAR ONCE
Status: COMPLETED | OUTPATIENT
Start: 2019-03-07 | End: 2019-03-07

## 2019-03-07 RX ORDER — HYDROXYZINE HYDROCHLORIDE 25 MG/1
25 TABLET, FILM COATED ORAL EVERY 6 HOURS PRN
Status: DISCONTINUED | OUTPATIENT
Start: 2019-03-07 | End: 2019-03-10 | Stop reason: HOSPADM

## 2019-03-07 RX ADMIN — OXYCODONE HYDROCHLORIDE 5 MG: 5 TABLET ORAL at 19:35

## 2019-03-07 RX ADMIN — SODIUM CHLORIDE: 9 INJECTION, SOLUTION INTRAVENOUS at 05:00

## 2019-03-07 RX ADMIN — OXYCODONE HYDROCHLORIDE 5 MG: 5 TABLET ORAL at 15:28

## 2019-03-07 RX ADMIN — SODIUM CHLORIDE, PRESERVATIVE FREE 68 ML: 5 INJECTION INTRAVENOUS at 08:09

## 2019-03-07 RX ADMIN — Medication 0.5 MG: at 13:25

## 2019-03-07 RX ADMIN — Medication 0.5 MG: at 09:08

## 2019-03-07 RX ADMIN — SODIUM CHLORIDE, POTASSIUM CHLORIDE, SODIUM LACTATE AND CALCIUM CHLORIDE: 600; 310; 30; 20 INJECTION, SOLUTION INTRAVENOUS at 23:49

## 2019-03-07 RX ADMIN — ONDANSETRON 4 MG: 2 INJECTION INTRAMUSCULAR; INTRAVENOUS at 02:51

## 2019-03-07 RX ADMIN — ONDANSETRON 4 MG: 2 INJECTION INTRAMUSCULAR; INTRAVENOUS at 18:21

## 2019-03-07 RX ADMIN — HYDROXYZINE HYDROCHLORIDE 50 MG: 50 TABLET, FILM COATED ORAL at 18:21

## 2019-03-07 RX ADMIN — Medication 0.5 MG: at 06:54

## 2019-03-07 RX ADMIN — IOPAMIDOL 73 ML: 755 INJECTION, SOLUTION INTRAVENOUS at 08:09

## 2019-03-07 RX ADMIN — SODIUM CHLORIDE, POTASSIUM CHLORIDE, SODIUM LACTATE AND CALCIUM CHLORIDE: 600; 310; 30; 20 INJECTION, SOLUTION INTRAVENOUS at 11:20

## 2019-03-07 RX ADMIN — Medication 0.5 MG: at 04:50

## 2019-03-07 RX ADMIN — Medication 0.5 MG: at 02:51

## 2019-03-07 RX ADMIN — ENOXAPARIN SODIUM 40 MG: 40 INJECTION SUBCUTANEOUS at 10:56

## 2019-03-07 RX ADMIN — Medication 0.5 MG: at 11:20

## 2019-03-07 RX ADMIN — Medication 0.5 MG: at 17:09

## 2019-03-07 ASSESSMENT — MIFFLIN-ST. JEOR: SCORE: 1234.2

## 2019-03-07 ASSESSMENT — ACTIVITIES OF DAILY LIVING (ADL): ADLS_ACUITY_SCORE: 12

## 2019-03-07 NOTE — CONSULTS
"Colorectal Surgery Consult Note  3/7/2019    I was asked by the ED to evaluate for SBO    CC: N/V    HPI: Rachel Gerhardt is a 43 year old female with a past medical history of cervical cancer s/p chemorads complicated by an SBO that necessitated an ex lap with small bowel resection by Dr Jennifer Goodwin on 5/18/17 c/b subsequent anastomotic stricture and obstructions dilated by GI enteroscopy that is now seen in the ED for symptoms of nausea and vomiting for several days duration. She also complains of diffuse abdominal pain. This is similar to her prior episodes of obstruction. Ms Gerhardt was admitted recently to the colorectal surgery service for TPN and nutrition optimization while planning to have resection of her stricture, but she left AMA with a PICC line still in her arm and was subsequently fired from Dr Aguilar's service. She tells me she was concerned about having an ostomy after her surgery. Interestingly, Jenni is passing flatus and has had a bowel movement since being in the ED. She denies chest pain, shortness of breath, new weakness/numbness/tingling.    ROS: A 10 point ROS was conducted and negative except as mentioned in the HPI.  PMH: Ovarian cancer s/p chemorads, chronic pain, asthma  PSH: Ex lap with small bowel resection, small bowel enteroscopy  SH: Current smoker, no EtOH, denies recreational drug use. Homeless.  FH: Denies FH of bleeding or anesthesia reactions  Allergies: Answered no known drug allergies    Physical Exam    BP 97/73   Pulse 77   Temp 98.3  F (36.8  C) (Oral)   Resp 16   Ht 1.727 m (5' 8\")   Wt 54 kg (119 lb)   SpO2 98%   BMI 18.09 kg/m      Gen: Well appearing in NAD  Eyes: No scleral icterus  Pulm: NLB on RA  CVS: RRR  Abd: Soft, diffusely tender, distended. No r/g.  Ext: Wwp no edema  Psych: Cooperative  Neuro: CN II-XII intact    Lab Results   Component Value Date    WBC 7.6 03/07/2019    HGB 12.4 03/07/2019    HCT 40.4 03/07/2019     03/07/2019     " 03/07/2019    POTASSIUM 4.1 03/07/2019    CHLORIDE 107 03/07/2019    CO2 29 03/07/2019    BUN 10 03/07/2019    CR 0.59 03/07/2019    GLC 92 03/07/2019    SED 18 02/13/2018    TROPONIN 0.00 05/01/2018    TROPI <0.015 05/01/2018    AST 13 03/07/2019    ALT 20 03/07/2019    ALKPHOS 81 03/07/2019    BILITOTAL 0.2 03/07/2019    INR 1.02 11/19/2018     Abd xray shows dilated loops of bowel with air fluid levels    Assessment: 43 year old female with a past medical history of cervical cancer and SBO requiring small bowel resection now with xray findings and symptoms suggestive of an SBO but with robust anterograde bowel function. This presentation is consistent with her known anastomotic stricture.    Plan:  - Colorectal surgery will not admit this patient as she has been fired from our service. Per ED staff to colorectal staff discussion, we request a medicine admission or discharge from the ED (which may be reasonable given her robust anterograde bowel function).   - The colorectal day team will evaluate the patient shortly with more recommendations to follow.    Discussed with fellow, Dr Jelly Fine    --  Isaias Quinonez MD  General Surgery PGY2  [phone]    Staff Addendum:  Agree with the consultation H&P as documented by the housestaff. I was personally involved with the recommendations made by our service for this patient.  Renetta Seo MD  Colon and Rectal Surgery Staff  [facility]

## 2019-03-07 NOTE — ED NOTES
[facility], [place]   ED Nurse to Floor Handoff     Rachel A Gerhardt is a 43 year old female who speaks English and lives with others,  in a home  They arrived in the ED by car from home    ED Chief Complaint: Abdominal Pain and Nausea & Vomiting    ED Dx;   Final diagnoses:   SBO (small bowel obstruction) (H)         Needed?: No    Allergies:   Allergies   Allergen Reactions     No Clinical Screening - See Comments Other (See Comments) and Diarrhea     headache  Carrots cause gastric upset, cramping and diarrhea.     Sulfa Drugs Hives     hives     Amoxicillin Unknown and Other (See Comments)     vomiting  vomiting     Amoxicillin-Pot Clavulanate Other (See Comments) and Nausea     vomiting     Augmentin GI Disturbance, Nausea and Hives     Avelox [Moxifloxacin] Nausea and Vomiting, Unknown and Nausea     Ciprofloxacin Hives and Nausea     Codeine Nausea and Vomiting and Nausea     Ibuprofen Nausea and Vomiting     Other reaction(s): Nausea And Vomiting     Ibuprofen Sodium Hives and GI Disturbance     Quinolones      Tramadol Hives, Diarrhea, Nausea and Nausea and Vomiting     Daucus Carota      Other reaction(s): GI Upset  Other reaction(s): Abdominal pain, Diarrhea  Carrots cause gastric upset, cramping and diarrhea.   .  Past Medical Hx:   Past Medical History:   Diagnosis Date     Asthma      Cancer (H)     Per patient OBGYN, cerivical cancer     Cervical cancer (H)      Other chronic pain      Ovarian cancer (H)      Substance abuse (H)     Outside records indicate past history of narcotics abuse or dependence, but patient denies.      Baseline Mental status: WDL  Current Mental Status changes: at basesline    Infection present or suspected this encounter: no  Sepsis suspected: No  Isolation type: No active isolations     Activity level - Baseline/Home:  Independent  Activity Level - Current:   Independent    Bariatric equipment needed?: No    In the ED these meds were  "given:   Medications   HYDROmorphone (PF) (DILAUDID) injection 0.5 mg (0.5 mg Intravenous Given 3/7/19 9477)   ondansetron (ZOFRAN) injection 4 mg (4 mg Intravenous Given 3/7/19 4824)   sodium chloride 0.9% infusion ( Intravenous New Bag 3/7/19 1560)       Drips running?  Yes, MIVF     Home pump  No    Current LDAs  Peripheral IV 03/07/19 Left Upper forearm (Active)   Site Assessment WDL 3/7/2019  2:24 AM   Number of days: 0       PICC Double Lumen 11/05/18 Right Brachial vein medial (Active)   Number of days: 122       Incision/Surgical Site 05/18/17 Abdomen (Active)   Number of days: 658       Labs results:   Labs Ordered and Resulted from Time of ED Arrival Up to the Time of Departure from the ED   CBC WITH PLATELETS DIFFERENTIAL - Abnormal; Notable for the following components:       Result Value     (*)     MCHC 30.7 (*)     All other components within normal limits   COMPREHENSIVE METABOLIC PANEL - Abnormal; Notable for the following components:    Calcium 8.4 (*)     Albumin 3.1 (*)     Protein Total 6.2 (*)     All other components within normal limits   LIPASE   NASOGASTRIC TUBE DECOMPRESSION       Imaging Studies:   Recent Results (from the past 24 hour(s))   Abdomen XR, 2 vw, flat and upright    Impression    IMPRESSION: Findings suspicious for small bowel obstruction. Recommend  CT for confirmation.         Recent vital signs:   BP 97/73   Pulse 77   Temp 98.3  F (36.8  C) (Oral)   Resp 16   Ht 1.727 m (5' 8\")   Wt 54 kg (119 lb)   SpO2 98%   BMI 18.09 kg/m      Cardiac Rhythm: Normal Sinus  Pt needs tele? No  Skin/wound Issues: None    Code Status: Full Code    Pain control: fair    Nausea control: fair    Abnormal labs/tests/findings requiring intervention: see results review. Patient has small bowel obstruction.     Family present during ED course? No   Family Comments/Social Situation comments:     Tasks needing completion: None    Poonam Beard, RN    [phone] [facility]      "

## 2019-03-07 NOTE — ED PROVIDER NOTES
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  She was admitted to [facility] with bowel obstruction last week and after discharge she has had continued pain and vomiting.  Her last bowel movement was this morning.  She denies any hematochezia or melena.  She has no fever or chills.  She denies any urinary symptoms.  Her symptoms are similar to her prior bowel obstructions.       PAST MEDICAL HISTORY:   Past Medical History:   Diagnosis Date     Asthma      Cancer (H)     Per patient OBGYN, cerivical cancer     Cervical cancer (H)      Other chronic pain      Ovarian cancer (H)      Substance abuse (H)     Outside records indicate past history of narcotics abuse or dependence, but patient denies.       PAST SURGICAL HISTORY:   Past Surgical History:   Procedure Laterality Date     COLONOSCOPY N/A 5/3/2018    Procedure: COLONOSCOPY;  sigmoidoscopy;  Surgeon: Omero Vigil MD;  Location: [facility] OR     COLONOSCOPY WITH CO2 INSUFFLATION N/A 4/30/2018    Procedure: COLONOSCOPY WITH CO2 INSUFFLATION;  Colonoscopy;  Surgeon: Omero Vigil MD;  Location: [facility] OR     COMBINED CYSTOSCOPY, INSERT STENT URETER(S) Bilateral 5/18/2017    Procedure: COMBINED CYSTOSCOPY, INSERT STENT URETER(S);  Cystoscopy with Bilateral Stent,;  Surgeon: eRne Calero MD;  Location: [facility] OR     ENT SURGERY  2009    mastoid, sinus     ENTEROSCOPY SMALL BOWEL N/A 6/18/2018    Procedure: ENTEROSCOPY SMALL BOWEL;  Lower bowel Retrograde Enteroscopy with Balloon Dilation .;  Surgeon: Omero Vigil MD;  Location: [facility] OR     EXAM UNDER ANESTHESIA, INSERT ALEX SLEEVE, UTERINE PLACEMENT OF TANDEM AND RING FOR RAD, ULTRASOUND N/A 12/14/2015     Procedure: EXAM UNDER ANESTHESIA, INSERT ALEX SLEEVE, UTERINE PLACEMENT OF TANDEM AND RING FOR RADIATION, ULTRASOUND GUIDED;  Surgeon: Abby Tony MD;  Location: [facility] OR     INSERT TANDEM AND CESIUM APPLICATOR CERVIX, ULTRASOUND GUIDED N/A 12/17/2015    Procedure: INSERT TANDEM AND CESIUM APPLICATOR CERVIX, ULTRASOUND GUIDED;  Surgeon: Kika Wood MD;  Location: [facility] OR     KNEE SURGERY       LAPAROTOMY EXPLORATORY N/A 5/18/2017    Procedure: LAPAROTOMY EXPLORATORY;   Exploratry Laparotomy, Small Bowel Resection with anastomosis, Flexible Sigmoidoscopy;  Surgeon: Jennifer Goodwin MD;  Location: [facility] OR     PICC INSERTION Right 04/29/2017    4fr SL BioFlo PICC, 37cm (3cm external) in the R basilic vein w/ tip in the mid SVC.     PICC INSERTION Right 03/29/2018    4Fr - 40cm (4cm external), R lateral brachial vein, Low SVC     RESECT SMALL BOWEL WITHOUT OSTOMY N/A 5/18/2017    Procedure: RESECT SMALL BOWEL WITHOUT OSTOMY;;  Surgeon: Jennifer Goodwin MD;  Location: [facility] OR     SIGMOIDOSCOPY FLEXIBLE N/A 5/18/2017    Procedure: SIGMOIDOSCOPY FLEXIBLE;;  Surgeon: Jennifer Goodwin MD;  Location: [facility] OR       FAMILY HISTORY:   Family History   Problem Relation Age of Onset     Diabetes Mother      Ovarian Cancer No family hx of      Uterine Cancer No family hx of      Cervical Cancer No family hx of      Breast Cancer No family hx of        SOCIAL HISTORY:   Social History     Tobacco Use     Smoking status: Light Tobacco Smoker     Packs/day: 0.25     Years: 12.00     Pack years: 3.00     Types: Cigarettes     Smokeless tobacco: Never Used     Tobacco comment: pt smoking about 4 cigs daily   Substance Use Topics     Alcohol use: No     Alcohol/week: 0.0 oz     Comment: None per pt       Patient's Medications   New Prescriptions    No medications on file   Previous Medications    ONDANSETRON (ZOFRAN) 8 MG TABLET    Take 0.5-1 tablets (4-8 mg) by mouth every 8 hours as needed for nausea    SIMETHICONE (MYLICON) 40  "MG/0.6ML SUSPENSION    Take 40 mg by mouth 4 times daily as needed for cramping   Modified Medications    No medications on file   Discontinued Medications    No medications on file          Allergies   Allergen Reactions     No Clinical Screening - See Comments Other (See Comments) and Diarrhea     headache  Carrots cause gastric upset, cramping and diarrhea.     Sulfa Drugs Hives     hives     Amoxicillin Unknown and Other (See Comments)     vomiting  vomiting     Amoxicillin-Pot Clavulanate Other (See Comments) and Nausea     vomiting     Augmentin GI Disturbance, Nausea and Hives     Avelox [Moxifloxacin] Nausea and Vomiting, Unknown and Nausea     Ciprofloxacin Hives and Nausea     Codeine Nausea and Vomiting and Nausea     Ibuprofen Nausea and Vomiting     Other reaction(s): Nausea And Vomiting     Ibuprofen Sodium Hives and GI Disturbance     Quinolones      Tramadol Hives, Diarrhea, Nausea and Nausea and Vomiting     Daucus Carota      Other reaction(s): GI Upset  Other reaction(s): Abdominal pain, Diarrhea  Carrots cause gastric upset, cramping and diarrhea.               I have reviewed the Medications, Allergies, Past Medical and Surgical History, and Social History in the Epic system.    Review of Systems   All other systems reviewed and are negative.      Physical Exam   BP: 102/70  Pulse: 98  Temp: 98.3  F (36.8  C)  Resp: 16  Height: 172.7 cm (5' 8\")  Weight: 54 kg (119 lb)  SpO2: 98 %      Physical Exam   Constitutional: She is oriented to person, place, and time. No distress.   HENT:   Head: Normocephalic and atraumatic.   Eyes: Conjunctivae are normal. Pupils are equal, round, and reactive to light.   Neck: Normal range of motion. Neck supple.   Cardiovascular: Normal rate and intact distal pulses.   Pulmonary/Chest: Effort normal. No respiratory distress. She has no wheezes. She has no rales.   Abdominal: Soft. She exhibits distension. There is tenderness. There is no rebound and no guarding. "   Musculoskeletal: Normal range of motion.   Neurological: She is alert and oriented to person, place, and time.   Skin: Skin is warm and dry. She is not diaphoretic.   Psychiatric: She has a normal mood and affect. Her behavior is normal. Thought content normal.   Nursing note and vitals reviewed.      ED Course        Procedures             Critical Care time:  none             Labs Ordered and Resulted from Time of ED Arrival Up to the Time of Departure from the ED - No data to display         Assessments & Plan (with Medical Decision Making)   1.  SBO    43-year-old female with a history of recurrent small bowel obstruction secondary to anastomotic stricture who presents with abdominal pain and vomiting.  Abdominal x-ray shows multiple air-fluid levels consistent with small bowel obstruction.  Colorectal surgery was consulted and recommended admission with GI consult as she has had GI enteroscopy with dilation in the past.  She is nontoxic-appearing and her lab evaluation is unremarkable.  Pain was improved with IV Dilaudid.  She will be admitted to the medicine service with colorectal surgery consult.       I have reviewed the nursing notes.    I have reviewed the findings, diagnosis, plan and need for follow up with the patient.       Medication List      ASK your doctor about these medications    HYDROmorphone (STANDARD CONC) 1 MG/ML oral solution  Commonly known as:  DILAUDID  2 mg, Oral, EVERY 4 HOURS PRN  Ask about: Should I take this medication?            Final diagnoses:   None       3/6/2019   [facility], [place], EMERGENCY DEPARTMENT     Jude Waddell MD  03/07/19 0637

## 2019-03-07 NOTE — PHARMACY-ADMISSION MEDICATION HISTORY
Admission medication history interview status for the 3/7/2019 admission is complete. See Epic admission navigator for allergy information, pharmacy, prior to admission medications and immunization status.     Medication history interview sources:  Patient, [facility] [place] ([phone])    Changes made to PTA medication list (reason)  Added: Hydrocodone-APAP, dicylcomine  Deleted: Hydromorphone liquid  Changed: Simethicone (liquid --> tablet)    Additional medication history information (including reliability of information, actions taken by pharmacist):  -Pt only filling [place] and Zofran at [facility]. Unsure where she is filling dicyclomine, but said she ran out approx. 1 week ago.  -PRN use of simethicone is 2 tablets in the morning and 2 tablets at night.  -PRN use of Zofran has been 1 tab every 8 hours for the past 3 days.     Prior to Admission medications    Medication Sig Last Dose Taking? Auth Provider   dicyclomine (BENTYL) 20 MG tablet Take 20 mg by mouth 4 times daily as needed Past Week at Unknown time Yes Unknown, Entered By History   HYDROcodone-acetaminophen (NORCO) 5-325 MG tablet Take 1 tablet by mouth every 6 hours as needed for severe pain 3/6/2019 at Unknown time Yes Unknown, Entered By History   ondansetron (ZOFRAN) 8 MG tablet Take 0.5-1 tablets (4-8 mg) by mouth every 8 hours as needed for nausea 3/6/2019 at Unknown time Yes Julieta Rob PA-C   simethicone 80 MG TABS Take 80 mg by mouth 4 times daily as needed for cramping  3/6/2019 at Unknown time Yes Reported, Patient       Medication history completed by: Brit Ron, PD4 pharmacy student

## 2019-03-07 NOTE — ED TRIAGE NOTES
Abdominal pain, nausea, vomiting for about 1 weeks. Pain has remained consistent since its onset. She is able to tolerate fluids at home.

## 2019-03-07 NOTE — H&P
[facility], [place]    History and Physical - Anthony 2 Service        Date of Admission:  3/7/2019    Assessment & Plan   Rachel A Gerhardt is a 43 year old with hx of cervical cancer dx 2014/15 s/p radiation therapy, chronic pain (norco 5/325 qid) and hx chronic pSBO due to anastomotic stricture presenting with pSBO.      # Recurrent SBO  # Cervical cancer, s/p chemo/rads  Cervical cancer s/p chemo/rads which was complicated by SBO that necessitated an ex lap with small bowel resection. Has anastomotic ileo-ileo strictu. She subsequently developed anastomotic stricture and obstructions which have been dilated by GI enteroscopy. She now presents to the ED with XR findings of SBO, though she is not clinically obstructed. She is having robust anterograde bowel function.  Just recently admitted to [facility] 3/3 to 3/6 where she was treated conservatively. They held PO intake then started clears and slowly advanced diet. She didn't need an NG tube, left passing multiple stools, tolerated regular diet consuming 100% of meals for 36 hrs prior to discharge. Here, CXR with air fluid levels, CT abdomen/pelvis without transition point. Seen by colorectal, but she has been fired from their service and will be managed medically. Have had her NPO today, but will trial clears this afternoon.  -Okay to trial clears   -Would recommend test for C. Diff, stool panel    -Will consider GI consult tomorrow (as per colorectal recs)  -Colorectal will not be admitting this patient. Per ED staff to colorectal staff discussion, if admission is advised, we request a medicine admission.  - Stopping IV fluids while clear liquid diet    # Opiate use disorder  - Will give Dilaudid 0.3-0.5 q4 prn  - transition back to norco at discharge; has [facility]. Fired from pain clinic previously    Probable situational depression/reactive depression  due to underlying medical problem  - in past was on cymbalta, lyrica  - recommend outpatient psychological and psychiatric support.  - She declined psych consult this admission    Acute on chronic abd pain   - doesn't tolerate gabapentin (too dizzy per patient)  - tried lyrica, cymbalta, all were stopped for one reason or another; she's not sure which.  - trial hydroxyzine, Rx given  - bentyl also given for antispasmodic as pt had run out of medication     Compliance issues  Was fired from colorectal surgery surgeon ([facility] hospital AM to go to pain clinic appt)  - may need to establish w new surgeon going forward; pt is planning to see [facility] in late March 2019  - she does have real underlying medical pathology and I don't think she is solely drug seeking      Diet: Clears  Fluids: PO, gave  ml/hr until she started taking PO  DVT Prophylaxis: Heparin SQ  Godwin Catheter: not present  Code Status: Full    Disposition Plan   Expected discharge: 2 - 3 days, recommended to prior living arrangement once adequate pain management/ tolerating PO medications.  Entered: Carlo Dennis MD 03/07/2019, 8:30 AM       The patient's care was discussed with the Attending Physician, Dr. Oseguera.    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  She was admitted to [facility] with bowel obstruction last week and after discharge she has had continued pain and  vomiting.  Her last bowel movement was this morning.  She denies any hematochezia or melena.  She has no fever or chills.  She denies any urinary symptoms.  Her symptoms are similar to her prior bowel obstructions.     Additionally: Abdominal xray in ED with air-fluid levels, CT abdomen without transition point. Since hospital is at capacity and it would take exceedingly long time for her to get a bed, transfer to [facility] was suggested. She did not want to do this though because she is worried about her truck which is parked in handicapped spot. No one can move it for her and security at [facility] unwilling to move as she said that the brakes are not functioning (anti-lock brakes went out as she was driving to hospital).    Review of Systems    The 10 point Review of Systems is negative other than noted in the HPI or here.     Past Medical History    I have reviewed this patient's medical history and updated it with pertinent information if needed.   Past Medical History:   Diagnosis Date     Asthma      Cancer (H)     Per patient OBGYN, cerivical cancer     Cervical cancer (H)      Other chronic pain      Ovarian cancer (H)      Substance abuse (H)     Outside records indicate past history of narcotics abuse or dependence, but patient denies.        Past Surgical History   I have reviewed this patient's surgical history and updated it with pertinent information if needed.  Past Surgical History:   Procedure Laterality Date     COLONOSCOPY N/A 5/3/2018    Procedure: COLONOSCOPY;  sigmoidoscopy;  Surgeon: Omero Vigil MD;  Location: [facility] OR     COLONOSCOPY WITH CO2 INSUFFLATION N/A 4/30/2018    Procedure: COLONOSCOPY WITH CO2 INSUFFLATION;  Colonoscopy;  Surgeon: Omero Vigil MD;  Location: [facility] OR     COMBINED CYSTOSCOPY, INSERT STENT URETER(S) Bilateral 5/18/2017    Procedure: COMBINED CYSTOSCOPY, INSERT STENT URETER(S);  Cystoscopy with Bilateral Stent,;  Surgeon: Rene Calero MD;  Location:  [facility] OR     ENT SURGERY  2009    mastoid, sinus     ENTEROSCOPY SMALL BOWEL N/A 6/18/2018    Procedure: ENTEROSCOPY SMALL BOWEL;  Lower bowel Retrograde Enteroscopy with Balloon Dilation .;  Surgeon: Omero Vigil MD;  Location: [facility] OR     EXAM UNDER ANESTHESIA, INSERT ALEX SLEEVE, UTERINE PLACEMENT OF TANDEM AND RING FOR RAD, ULTRASOUND N/A 12/14/2015    Procedure: EXAM UNDER ANESTHESIA, INSERT ALEX SLEEVE, UTERINE PLACEMENT OF TANDEM AND RING FOR RADIATION, ULTRASOUND GUIDED;  Surgeon: Abby Tony MD;  Location: [facility] OR     INSERT TANDEM AND CESIUM APPLICATOR CERVIX, ULTRASOUND GUIDED N/A 12/17/2015    Procedure: INSERT TANDEM AND CESIUM APPLICATOR CERVIX, ULTRASOUND GUIDED;  Surgeon: Kika Wood MD;  Location: [facility] OR     KNEE SURGERY       LAPAROTOMY EXPLORATORY N/A 5/18/2017    Procedure: LAPAROTOMY EXPLORATORY;   Exploratry Laparotomy, Small Bowel Resection with anastomosis, Flexible Sigmoidoscopy;  Surgeon: Jennifer Goodwin MD;  Location: [facility] OR     PICC INSERTION Right 04/29/2017    4fr SL BioFlo PICC, 37cm (3cm external) in the R basilic vein w/ tip in the mid SVC.     PICC INSERTION Right 03/29/2018    4Fr - 40cm (4cm external), R lateral brachial vein, Low SVC     RESECT SMALL BOWEL WITHOUT OSTOMY N/A 5/18/2017    Procedure: RESECT SMALL BOWEL WITHOUT OSTOMY;;  Surgeon: Jennifer Goodwin MD;  Location: [facility] OR     SIGMOIDOSCOPY FLEXIBLE N/A 5/18/2017    Procedure: SIGMOIDOSCOPY FLEXIBLE;;  Surgeon: Jennifer Goodwin MD;  Location: [facility] OR        Social History   I have reviewed this patient's social history and updated it with pertinent information if needed. Rachel A Gerhardt  reports that she has been smoking cigarettes.  She has a 3.00 pack-year smoking history. she has never used smokeless tobacco. She reports that she does not drink alcohol or use drugs.    Family History   I have reviewed this patient's family history and updated it with pertinent information if needed.   Family History    Problem Relation Age of Onset     Diabetes Mother      Ovarian Cancer No family hx of      Uterine Cancer No family hx of      Cervical Cancer No family hx of      Breast Cancer No family hx of        Prior to Admission Medications   Prior to Admission Medications   Prescriptions Last Dose Informant Patient Reported? Taking?   HYDROmorphone, STANDARD CONC, (DILAUDID) 1 MG/ML LIQD liquid   No No   Sig: Take 2 mLs (2 mg) by mouth every 4 hours as needed for moderate to severe pain   ondansetron (ZOFRAN) 8 MG tablet   No No   Sig: Take 0.5-1 tablets (4-8 mg) by mouth every 8 hours as needed for nausea   simethicone (MYLICON) 40 MG/0.6ML suspension   Yes No   Sig: Take 40 mg by mouth 4 times daily as needed for cramping      Facility-Administered Medications: None     Allergies   Allergies   Allergen Reactions     No Clinical Screening - See Comments Other (See Comments) and Diarrhea     headache  Carrots cause gastric upset, cramping and diarrhea.     Sulfa Drugs Hives     hives     Amoxicillin Unknown and Other (See Comments)     vomiting  vomiting     Amoxicillin-Pot Clavulanate Other (See Comments) and Nausea     vomiting     Augmentin GI Disturbance, Nausea and Hives     Avelox [Moxifloxacin] Nausea and Vomiting, Unknown and Nausea     Ciprofloxacin Hives and Nausea     Codeine Nausea and Vomiting and Nausea     Ibuprofen Nausea and Vomiting     Other reaction(s): Nausea And Vomiting     Ibuprofen Sodium Hives and GI Disturbance     Quinolones      Tramadol Hives, Diarrhea, Nausea and Nausea and Vomiting     Daucus Carota      Other reaction(s): GI Upset  Other reaction(s): Abdominal pain, Diarrhea  Carrots cause gastric upset, cramping and diarrhea.       Physical Exam   Vital Signs: Temp: 98.3  F (36.8  C) Temp src: Oral BP: 94/70 Pulse: 75   Resp: 16 SpO2: 98 % O2 Device: None (Room air)    Weight: 119 lbs 0 oz    Constitutional: She is oriented to person, place, and time. Appears to be uncomfortable  HEENT:  Normocephalic and atraumatic. Conjunctivae are normal. Pupils are equal, round, and reactive to light.   Neck: Normal range of motion. Neck supple.   Cardiovascular: Normal rate and intact distal pulses.   Pulmonary/Chest: Effort normal. No respiratory distress. She has no wheezes. She has no rales.   Abdominal: Soft. Mild distension, very tender throughout, normoactive to hyperactive bowl sounds (no high pitch). No rebound/gaurding.  Musculoskeletal: Normal range of motion.   Neurological: intact  Skin: Skin is warm and dry. She is not diaphoretic.     Data   Data reviewed today: I reviewed all medications, new labs and imaging results over the last 24 hours. I personally reviewed     Recent Labs   Lab 03/07/19  0223   WBC 7.6   HGB 12.4   *         POTASSIUM 4.1   CHLORIDE 107   CO2 29   BUN 10   CR 0.59   ANIONGAP 6   JONATAN 8.4*   GLC 92   ALBUMIN 3.1*   PROTTOTAL 6.2*   BILITOTAL 0.2   ALKPHOS 81   ALT 20   AST 13   LIPASE 124

## 2019-03-07 NOTE — ED NOTES
Pt educated on where she can walk in the halls. Pt wanted to leave the ED. She understands and states she will be compliant with ED request.

## 2019-03-07 NOTE — PROGRESS NOTES
"COLON & RECTAL SURGERY PROGRESS NOTE    S:  Afebrile. Pt seen while in the emergency department. She is having frequent loose stools as well as what she describes as \"episodes\" of intense abdominal pain. She was tolerating a regular diet until yesterday, though did not eat much yesterday secondary to the pain.     Vitals:  Vitals:    03/07/19 0630 03/07/19 0645 03/07/19 0700 03/07/19 0715   BP:  92/71 94/70    Pulse: 70 76 75    Resp:       Temp:       TempSrc:       SpO2: 98% 98% 98% 98%   Weight:       Height:         I/O:  No intake/output data recorded.    PE:  GEN: NAD  CV: RRR, no murmurs  RESP: Non-labored breathing   ABD: soft, non-tender, without guarding or rebound tenderness, incisions c/d/i.   PSYCH: cooperative     Labs:  Recent Labs   Lab 03/07/19  0223   WBC 7.6   HGB 12.4         POTASSIUM 4.1   CHLORIDE 107   CO2 29   BUN 10   CR 0.59   GLC 92   AST 13   ALT 20     A/P: 44 yo F with a past medical history of cervical cancer s/p chemo/rads which was complicated by SBO that necessitated an ex lap with small bowel resection. She subsequently developed anastomotic stricture and obstructions which have been dilated by GI enteroscopy. She now presents to the ED with XR findings of SBO, though she is not clinically obstructed. She is having robust anterograde bowel function.    -Okay to trial clears   -Would recommend test for C. Diff, stool panel    -Would recommend consult GI  -Colorectal will not be admitting this patient. Per ED staff to colorectal staff discussion, if admission is advised, we request a medicine admission.    Any Love MD   Surgery PGY-1       "

## 2019-03-08 LAB
ALBUMIN SERPL-MCNC: 3 G/DL (ref 3.4–5)
ALP SERPL-CCNC: 84 U/L (ref 40–150)
ALT SERPL W P-5'-P-CCNC: 22 U/L (ref 0–50)
ANION GAP SERPL CALCULATED.3IONS-SCNC: 7 MMOL/L (ref 3–14)
AST SERPL W P-5'-P-CCNC: 11 U/L (ref 0–45)
BILIRUB SERPL-MCNC: 0.3 MG/DL (ref 0.2–1.3)
BUN SERPL-MCNC: 10 MG/DL (ref 7–30)
C DIFF TOX B STL QL: NEGATIVE
CALCIUM SERPL-MCNC: 8.5 MG/DL (ref 8.5–10.1)
CHLORIDE SERPL-SCNC: 104 MMOL/L (ref 94–109)
CO2 SERPL-SCNC: 30 MMOL/L (ref 20–32)
CREAT SERPL-MCNC: 0.52 MG/DL (ref 0.52–1.04)
ERYTHROCYTE [DISTWIDTH] IN BLOOD BY AUTOMATED COUNT: 14.3 % (ref 10–15)
GFR SERPL CREATININE-BSD FRML MDRD: >90 ML/MIN/{1.73_M2}
GLUCOSE SERPL-MCNC: 110 MG/DL (ref 70–99)
HCT VFR BLD AUTO: 42.1 % (ref 35–47)
HGB BLD-MCNC: 12.9 G/DL (ref 11.7–15.7)
MCH RBC QN AUTO: 31.2 PG (ref 26.5–33)
MCHC RBC AUTO-ENTMCNC: 30.6 G/DL (ref 31.5–36.5)
MCV RBC AUTO: 102 FL (ref 78–100)
PLATELET # BLD AUTO: 308 10E9/L (ref 150–450)
POTASSIUM SERPL-SCNC: 3.7 MMOL/L (ref 3.4–5.3)
PROT SERPL-MCNC: 6.4 G/DL (ref 6.8–8.8)
RBC # BLD AUTO: 4.14 10E12/L (ref 3.8–5.2)
SODIUM SERPL-SCNC: 140 MMOL/L (ref 133–144)
SPECIMEN SOURCE: NORMAL
WBC # BLD AUTO: 9.7 10E9/L (ref 4–11)

## 2019-03-08 PROCEDURE — 85027 COMPLETE CBC AUTOMATED: CPT | Performed by: INTERNAL MEDICINE

## 2019-03-08 PROCEDURE — 25000128 H RX IP 250 OP 636: Performed by: STUDENT IN AN ORGANIZED HEALTH CARE EDUCATION/TRAINING PROGRAM

## 2019-03-08 PROCEDURE — 12000012 ZZH R&B MS OVERFLOW UMMC

## 2019-03-08 PROCEDURE — 25000132 ZZH RX MED GY IP 250 OP 250 PS 637: Performed by: INTERNAL MEDICINE

## 2019-03-08 PROCEDURE — 25000132 ZZH RX MED GY IP 250 OP 250 PS 637: Performed by: STUDENT IN AN ORGANIZED HEALTH CARE EDUCATION/TRAINING PROGRAM

## 2019-03-08 PROCEDURE — 25000128 H RX IP 250 OP 636: Performed by: INTERNAL MEDICINE

## 2019-03-08 PROCEDURE — 99232 SBSQ HOSP IP/OBS MODERATE 35: CPT | Mod: GC | Performed by: INTERNAL MEDICINE

## 2019-03-08 PROCEDURE — 36415 COLL VENOUS BLD VENIPUNCTURE: CPT | Performed by: INTERNAL MEDICINE

## 2019-03-08 PROCEDURE — 87493 C DIFF AMPLIFIED PROBE: CPT

## 2019-03-08 PROCEDURE — 25000132 ZZH RX MED GY IP 250 OP 250 PS 637

## 2019-03-08 PROCEDURE — 80053 COMPREHEN METABOLIC PANEL: CPT | Performed by: INTERNAL MEDICINE

## 2019-03-08 PROCEDURE — 25000128 H RX IP 250 OP 636

## 2019-03-08 PROCEDURE — 25800030 ZZH RX IP 258 OP 636

## 2019-03-08 RX ORDER — HYDROMORPHONE HYDROCHLORIDE 1 MG/ML
.3-.5 INJECTION, SOLUTION INTRAMUSCULAR; INTRAVENOUS; SUBCUTANEOUS EVERY 8 HOURS PRN
Status: DISCONTINUED | OUTPATIENT
Start: 2019-03-08 | End: 2019-03-10

## 2019-03-08 RX ORDER — DICYCLOMINE HYDROCHLORIDE 10 MG/5ML
20 SOLUTION ORAL
Status: DISCONTINUED | OUTPATIENT
Start: 2019-03-08 | End: 2019-03-09

## 2019-03-08 RX ORDER — SIMETHICONE 40MG/0.6ML
40 SUSPENSION, DROPS(FINAL DOSAGE FORM)(ML) ORAL EVERY 6 HOURS PRN
Status: DISCONTINUED | OUTPATIENT
Start: 2019-03-08 | End: 2019-03-09

## 2019-03-08 RX ADMIN — SIMETHICONE 40 MG: 20 SUSPENSION/ DROPS ORAL at 17:30

## 2019-03-08 RX ADMIN — DICYCLOMINE HYDROCHLORIDE 20 MG: 10 SOLUTION ORAL at 12:48

## 2019-03-08 RX ADMIN — DICYCLOMINE HYDROCHLORIDE 20 MG: 10 SOLUTION ORAL at 17:30

## 2019-03-08 RX ADMIN — ONDANSETRON 4 MG: 2 INJECTION INTRAMUSCULAR; INTRAVENOUS at 14:39

## 2019-03-08 RX ADMIN — Medication 0.5 MG: at 05:10

## 2019-03-08 RX ADMIN — HYDROXYZINE HYDROCHLORIDE 50 MG: 50 TABLET, FILM COATED ORAL at 01:14

## 2019-03-08 RX ADMIN — OXYCODONE HYDROCHLORIDE 5 MG: 5 TABLET ORAL at 14:39

## 2019-03-08 RX ADMIN — SODIUM CHLORIDE, POTASSIUM CHLORIDE, SODIUM LACTATE AND CALCIUM CHLORIDE: 600; 310; 30; 20 INJECTION, SOLUTION INTRAVENOUS at 22:06

## 2019-03-08 RX ADMIN — ONDANSETRON 4 MG: 2 INJECTION INTRAMUSCULAR; INTRAVENOUS at 18:37

## 2019-03-08 RX ADMIN — ENOXAPARIN SODIUM 40 MG: 40 INJECTION SUBCUTANEOUS at 11:31

## 2019-03-08 RX ADMIN — OXYCODONE HYDROCHLORIDE 5 MG: 5 TABLET ORAL at 01:14

## 2019-03-08 RX ADMIN — OXYCODONE HYDROCHLORIDE 5 MG: 5 TABLET ORAL at 22:37

## 2019-03-08 RX ADMIN — Medication 0.5 MG: at 19:37

## 2019-03-08 RX ADMIN — Medication 0.5 MG: at 12:11

## 2019-03-08 RX ADMIN — OXYCODONE HYDROCHLORIDE 5 MG: 5 TABLET ORAL at 10:31

## 2019-03-08 RX ADMIN — OXYCODONE HYDROCHLORIDE 5 MG: 5 TABLET ORAL at 06:03

## 2019-03-08 RX ADMIN — DICYCLOMINE HYDROCHLORIDE 20 MG: 10 SOLUTION ORAL at 22:07

## 2019-03-08 RX ADMIN — OXYCODONE HYDROCHLORIDE 5 MG: 5 TABLET ORAL at 18:37

## 2019-03-08 ASSESSMENT — MIFFLIN-ST. JEOR: SCORE: 1232.84

## 2019-03-08 ASSESSMENT — ACTIVITIES OF DAILY LIVING (ADL)
ADLS_ACUITY_SCORE: 10
ADLS_ACUITY_SCORE: 11
ADLS_ACUITY_SCORE: 10
ADLS_ACUITY_SCORE: 11

## 2019-03-08 NOTE — PROGRESS NOTES
[facility], [place]    Progress Note - Anthony 2 Service        Date of Admission:  3/7/2019    Assessment & Plan   Rachel A Gerhardt is a 43 year old with hx of cervical cancer dx 2014/15 s/p radiation therapy, chronic pain (norco 5/325 qid) and hx chronic pSBO due to anastomotic stricture presenting with pSBO.    # Recurrent pSBO  # Cervical cancer, s/p chemo/rads  # Acute on chronic abd pain   Cervical cancer s/p chemo/rads which was complicated by SBO that necessitated an ex lap with small bowel resection. Has anastomotic ileo-ileo stricture. She subsequently developed anastomotic stricture and obstructions which have been dilated by GI enteroscopy. She now presents to the ED with XR findings of SBO, though she is not clinically obstructed. She is having robust anterograde bowel function.  Just recently admitted to [facility] 3/3 to 3/6 where she was treated conservatively. CXR with air fluid levels, CT abdomen/pelvis without transition point. Seen by colorectal, but she has been fired from their service and will be managed medically.Tolerating small amounts of clears. Nutrition and GI recommending full liquid diet   - Full liquid diet   - GI consulted, not recommending further dilation as it would be futile       - patient also unsure if it helped previously   - Colorectal consulted by ED, not recommending surgical intervention at this point      - Pt has appointment with colorectal surgery at [facility] at end of March.    - Minimize narcotics given concern for decreased bowel motility.    - doesn't tolerate gabapentin (too dizzy per patient)   - tried lyrica, cymbalta, all were stopped for one reason or another; she's not sure which.   - trial hydroxyzine, Rx given   - bentyl also given for antispasmodic as pt had run out of medication   - simethicone PRN    # Opiate use disorder  - Will give Dilaudid 0.3-0.5 q8hr PRN, also 5mg oxycodone q4 PRN  - transition back to norco at  discharge; has been getting pain meds through her primary provider through [facility] clinic. Fired from pain clinic previously    # Probable situational depression/reactive depression due to underlying medical problem  - in past was on cymbalta, lyrica  - recommend outpatient psychological and psychiatric support.  - She declined psych consult this admission    # Compliance issues  Was fired from colorectal surgery surgeon (left hospital AMA with PICC line to go to pain clinic appt)   - may need to establish w new surgeon going forward; pt is planning to see [facility] in late March 2019   - she does have real underlying medical pathology and I don't think she is solely drug seeking      Diet: Diet  Full Liquid Diet    Fluids: 100ml/hr while minimal oral input  DVT Prophylaxis: Ambulate every shift  Godwin Catheter: not present  Code Status: Full Code      Disposition Plan   Expected discharge: Tomorrow, recommended to prior living arrangement once adequate pain management/ tolerating PO medications.  Entered: Farhana iL MD 03/08/2019, 4:47 PM       The patient's care was discussed with the Attending Physician, Dr. Oseguera.    Farhana Li MD  [address], [place]  Pager: [phone]  Please see sticky note for cross cover information  ______________________________________________________________________    Interval History   Admitted yesterday for partial small bowel obstruction. Endorsing continued severe pain. Still passing gas and having stools. Able to tolerate small amounts of clear liquids - open to trying oral medication. Denies SOB or CP.     Physical Exam   Vital Signs: Temp: 98.3  F (36.8  C) Temp src: Oral BP: 110/84 Pulse: 97   Resp: 18 SpO2: 98 % O2 Device: None (Room air)    Weight: 117 lbs 12.8 oz    Gen: mild distress, laying in fetal position, tearful  HEENT: EOMI, no scleral icterus, tracking appropriately  Resp: CTAB, no crackles or wheezes, no  increased WOB  Cardiac: RRR, no S3/S4, no M/R/G appreciated  GI: soft, tender to light palpation, hyperactive bowel sounds  Ext: WWP, no edema, spontaneous movement in all 4  Neuro: AOx3, CN 2-12 grossly intact, appropriate mentation    Data   Reviewed in EPIC

## 2019-03-08 NOTE — PLAN OF CARE
"  BP 99/68 (BP Location: Right arm)   Pulse 78   Temp 98.2  F (36.8  C) (Oral)   Resp 18   Ht 1.727 m (5' 8\")   Wt 53.6 kg (118 lb 1.6 oz)   SpO2 98%   BMI 17.96 kg/m      [id number]  Rachel Gerhardt admitted to 7a from ED with SBO. Soft BPs, 90s/60s but is baseline per pt report, OVSS on RA, no s/s of distress. A/Ox4, calm/flat, cooperative with most cares. Up independently, voiding not saving, no BMs this shift, though reported that she has been having diarrhea the last few days; declined senna tonight; stool sample still needed for cdiff r/o. Endorsed episodes of pain; treated with PRN oxy x1 and atarax x1 with adequate relief for now; pt was able to ambulate off unit and go down to lobby independently; slept intermittently between cares most of shift. Paged MD per pt request for more pain medications--tylenol ordered and per MD continue to monitor. PIV patent with LR@100ml/hr. Endorsed one episode of nausea--treated with IV zofran x1 with relief of symptoms. AquaK pad ordered as well. Pt sleeping now, call light and belongings within reach. Will continue to monitor and continue POC/notify team as needed.   "

## 2019-03-08 NOTE — PROGRESS NOTES
Emergency Social Work Services Note    Date of  Intervention: 03/07/19  Last Emergency Department Visit:  2/10, 2/27, and 3/3 (at outside hospitals)  Care Plan:  no  Collaborated with:  Patient, ED MD, Admitting team MD,ED RN, hospital security    Data:  Patient being seen in the ED for abd pain/SBO with plan to admit to inpatient.  Bed available at [facility]    Requesting assistance with her vehicle (truck) that is parked on the street close to the hospital in a handicapped spot (she does have a permit).   Reports she believes the brakes may have gone out in her truck on the way to the hospital and is very concerned about getting her car taken care of before she'll agree to transfer to [facility].        Intervention:  Several conversations with patient over the course of her ED stay attempting to problem solve issue with her truck.   Spoke with security, who is not able to move vehicle for patient.    Admitting team physician aware of patient's request.     Assessment:  Patient very concerned about her truck and is requesting it be moved to the parking ramp to avoid being towed.  She is waiting to hear back from friend, who may be able to assist.  She declines transfer to [facility] as long as car remains parked in current location.      Plan:    Anticipated Disposition:  Admit to inpatient (location pending)    Barriers to d/c plan:  transportation    Follow Up:  Inpatient Care Coordination to assist as able with transportation and re-location of patient's vehicle.    LUCHO Mckenzie, RN  Social Work Services, Emergency Dept [facility]  Pager: [phone] Mon-Sat 9 am - 9 pm, on-call/after hours pager [phone]

## 2019-03-08 NOTE — PLAN OF CARE
Alert and oriented x 4. Complained of abdominal pain and was given oxycodone 5 mg x 2 and dilaudid 0.5 mg x 1. Patient had no complaint of nausea. Up to the BR independently, voided. Had loose stool x 2. C-diff was negative. LR infusing at 100 ml/hr.

## 2019-03-08 NOTE — CONSULTS
GASTROENTEROLOGY CONSULTATION      Date of Admission:  3/7/2019           Reason for Consultation:   We were asked by Dr. Oseguera of medicine to evaluate this patient with partial SBO           ASSESSMENT AND RECOMMENDATIONS:   Assessment:  43 year old female with a history of cervical cancer s/p chemoradiation c/b small bowel strictures and recurrent SBO and ex lap 5/18/17 with 60cm small bowel resection and resultant ileoileal anastomotic stricture, s/p endoscopic dilation to 18mm 6/2018 who GI was consulted on for partial SBO and consideration of recurrent dilation. CT here without clear transition point and actually appears improved from 11/2018. She is also having antegrade bowel function, had minimal short-lived benefit from last procedure, and required significant prep via NG for last procedure. Given this, would not recommend repeat endoscopic dilation.      Recommendations  - No plan for repeat procedure at this time  - We discussed diverting options, such as diverting ileostomy or cecostomy, however she was not interested in these options at this time  - Ongoing conservative management  - Consider advancement to full liquids as patient feels this helps her symptoms more than clear liquids  - Follow-up at [facility] as planned later this month    GI will sign off    Thank you for involving us in this patient's care. Please do not hesitate to contact the GI service with any questions or concerns.     Pt care plan discussed with Dr. Vigil, GI staff physician.    Negro Zamora MD  GI Fellow  P: [phone]  -------------------------------------------------------------------------------------------------------------------    History of Present Illness   Rachel A Gerhardt is a 43 year old female with a history of cervical cancer s/p chemoradiation c/b small bowel strictures and recurrent SBO and ex lap 5/18/17 with 60cm small bowel resection and resultant ileoileal anastomotic stricture, s/p endoscopic dilation  "to 18mm 6/2018 who GI was consulted on for partial SBO and consideration of recurrent dilation. Last seen by GI 11/2018, at that time endoscopic therapy thought unlikely to offer any benefit. She reports that the 6/2018 procedure only gave her improvement for a few weeks, and then she had recurrent pain/episodes.     Note recent admission 3/3-3/6 at [facility] for SBO, reportedly improved with conservative management, although patient says she didn't get any better and notes indicate she was reluctant to discharge. She presented here with ongoing acute on chronic abdominal pain and multiple non-bloody loose stools daily (\"50 per day\"), along with ongoing passing of flatus. She is no longer vomiting, but has intermittent nausea and reportedly hasn't be tolerating clears (although she says she thinks she would tolerate more solid food such as noodles or potatoes, and that liquids trigger her episodes). She reports this is her second hospitalization in past 6 months for an episode of acute on chronic pain. Takes 4 Norco at baseline daily.     CT here 3/7 with \"Chronically distended mid small bowel, decreased since comparison CTon 11/19/2018, currently without identifiable transition point to  suggest high-grade small bowel obstruction. No pneumatosis, portal  venous gas, or free air to suggest bowel ischemia or perforation.\"    Seen by colorectal this admission, rec conservative management and follow-up at [place] as planned. She reports she is seeing a GI specialist there at the end of this month.     Also note was fired by colorectal surgery as during prior admission left AMA with PICC line in place.     Past Medical History    Reviewed and edited as appropriate  Past Medical History:   Diagnosis Date     Asthma      Cancer (H)     Per patient OBGYN, cerivical cancer     Cervical cancer (H)      Other chronic pain      Ovarian cancer (H)      Substance abuse (H)     Outside records indicate past history of narcotics " abuse or dependence, but patient denies.       Past Surgical History   Reviewed and edited as appropriate   Past Surgical History:   Procedure Laterality Date     COLONOSCOPY N/A 5/3/2018    Procedure: COLONOSCOPY;  sigmoidoscopy;  Surgeon: Omero Vigil MD;  Location: [facility] OR     COLONOSCOPY WITH CO2 INSUFFLATION N/A 4/30/2018    Procedure: COLONOSCOPY WITH CO2 INSUFFLATION;  Colonoscopy;  Surgeon: Omero Vigil MD;  Location: [facility] OR     COMBINED CYSTOSCOPY, INSERT STENT URETER(S) Bilateral 5/18/2017    Procedure: COMBINED CYSTOSCOPY, INSERT STENT URETER(S);  Cystoscopy with Bilateral Stent,;  Surgeon: Rene Calero MD;  Location: [facility] OR     ENT SURGERY  2009    mastoid, sinus     ENTEROSCOPY SMALL BOWEL N/A 6/18/2018    Procedure: ENTEROSCOPY SMALL BOWEL;  Lower bowel Retrograde Enteroscopy with Balloon Dilation .;  Surgeon: Omero Vigil MD;  Location: [facility] OR     EXAM UNDER ANESTHESIA, INSERT ALEX SLEEVE, UTERINE PLACEMENT OF TANDEM AND RING FOR RAD, ULTRASOUND N/A 12/14/2015    Procedure: EXAM UNDER ANESTHESIA, INSERT ALEX SLEEVE, UTERINE PLACEMENT OF TANDEM AND RING FOR RADIATION, ULTRASOUND GUIDED;  Surgeon: Abby Tony MD;  Location: [facility] OR     INSERT TANDEM AND CESIUM APPLICATOR CERVIX, ULTRASOUND GUIDED N/A 12/17/2015    Procedure: INSERT TANDEM AND CESIUM APPLICATOR CERVIX, ULTRASOUND GUIDED;  Surgeon: Kika Wood MD;  Location: [facility] OR     KNEE SURGERY       LAPAROTOMY EXPLORATORY N/A 5/18/2017    Procedure: LAPAROTOMY EXPLORATORY;   Exploratry Laparotomy, Small Bowel Resection with anastomosis, Flexible Sigmoidoscopy;  Surgeon: Jennifer Goodwin MD;  Location: [facility] OR     PICC INSERTION Right 04/29/2017    4fr SL BioFlo PICC, 37cm (3cm external) in the R basilic vein w/ tip in the mid SVC.     PICC INSERTION Right 03/29/2018    4Fr - 40cm (4cm external), R lateral brachial vein, Low SVC     RESECT SMALL BOWEL WITHOUT OSTOMY N/A 5/18/2017    Procedure: RESECT SMALL  BOWEL WITHOUT OSTOMY;;  Surgeon: Jennifer Goodwin MD;  Location: [facility] OR     SIGMOIDOSCOPY FLEXIBLE N/A 5/18/2017    Procedure: SIGMOIDOSCOPY FLEXIBLE;;  Surgeon: Jennifer Goodwin MD;  Location: [facility] OR       Social History   Reviewed and edited as appropriate  Social History     Socioeconomic History     Marital status:      Spouse name: Not on file     Number of children: Not on file     Years of education: Not on file     Highest education level: Not on file   Occupational History     Not on file   Social Needs     Financial resource strain: Not on file     Food insecurity:     Worry: Not on file     Inability: Not on file     Transportation needs:     Medical: Not on file     Non-medical: Not on file   Tobacco Use     Smoking status: Light Tobacco Smoker     Packs/day: 0.25     Years: 12.00     Pack years: 3.00     Types: Cigarettes     Smokeless tobacco: Never Used     Tobacco comment: pt smoking about 4 cigs daily   Substance and Sexual Activity     Alcohol use: No     Alcohol/week: 0.0 oz     Comment: None per pt     Drug use: No     Comment: Patient denies.  Outside records indicate possible Opiate abuse.     Sexual activity: Yes     Partners: Male     Birth control/protection: None   Lifestyle     Physical activity:     Days per week: Not on file     Minutes per session: Not on file     Stress: Not on file   Relationships     Social connections:     Talks on phone: Not on file     Gets together: Not on file     Attends [employer] service: Not on file     Active member of club or organization: Not on file     Attends meetings of clubs or organizations: Not on file     Relationship status: Not on file     Intimate partner violence:     Fear of current or ex partner: Not on file     Emotionally abused: Not on file     Physically abused: Not on file     Forced sexual activity: Not on file   Other Topics Concern     Parent/sibling w/ CABG, MI or angioplasty before 65F 55M? No   Social History Narrative     Lives alone       Family History     Reviewed and edited as appropriate  Family History   Problem Relation Age of Onset     Diabetes Mother      Ovarian Cancer No family hx of      Uterine Cancer No family hx of      Cervical Cancer No family hx of      Breast Cancer No family hx of      Allergies   Reviewed and edited as appropriate     Allergies   Allergen Reactions     No Clinical Screening - See Comments Other (See Comments) and Diarrhea     headache  Carrots cause gastric upset, cramping and diarrhea.     Sulfa Drugs Hives     hives     Amoxicillin Unknown and Other (See Comments)     vomiting  vomiting     Amoxicillin-Pot Clavulanate Other (See Comments) and Nausea     vomiting     Augmentin GI Disturbance, Nausea and Hives     Avelox [Moxifloxacin] Nausea and Vomiting, Unknown and Nausea     Ciprofloxacin Hives and Nausea     Codeine Nausea and Vomiting and Nausea     Ibuprofen Nausea and Vomiting     Other reaction(s): Nausea And Vomiting     Ibuprofen Sodium Hives and GI Disturbance     Quinolones      Tramadol Hives, Diarrhea, Nausea and Nausea and Vomiting     Daucus Carota      Other reaction(s): GI Upset  Other reaction(s): Abdominal pain, Diarrhea  Carrots cause gastric upset, cramping and diarrhea.        Prior to Admission Medications    Current Facility-Administered Medications   Medication     acetaminophen (TYLENOL) tablet 325-650 mg     bisacodyl (DULCOLAX) Suppository 10 mg     dicyclomine (BENTYL) solution 20 mg     enoxaparin (LOVENOX) injection 40 mg     HYDROmorphone (PF) (DILAUDID) injection 0.3-0.5 mg     hydrOXYzine (ATARAX) tablet 25 mg    Or     hydrOXYzine (ATARAX) tablet 50 mg     lactated ringers infusion     magnesium hydroxide (MILK OF MAGNESIA) suspension 30 mL     magnesium sulfate 4 g in 100 mL sterile water (premade)     melatonin tablet 1 mg     metoclopramide (REGLAN) tablet 10 mg    Or     metoclopramide (REGLAN) injection 10 mg     naloxone (NARCAN) injection 0.1-0.4 mg      "nicotine (NICORETTE) gum 2-4 mg     ondansetron (ZOFRAN-ODT) ODT tab 4 mg    Or     ondansetron (ZOFRAN) injection 4 mg     oxyCODONE (ROXICODONE) tablet 5 mg     potassium chloride (KLOR-CON) Packet 20-40 mEq     potassium chloride 10 mEq in 100 mL intermittent infusion with 10 mg lidocaine     potassium chloride 10 mEq in 100 mL sterile water intermittent infusion (premix)     potassium chloride 20 mEq in 50 mL intermittent infusion     potassium chloride ER (K-DUR/KLOR-CON M) CR tablet 20-40 mEq     prochlorperazine (COMPAZINE) injection 10 mg    Or     prochlorperazine (COMPAZINE) tablet 10 mg    Or     prochlorperazine (COMPAZINE) Suppository 25 mg     senna-docusate (SENOKOT-S/PERICOLACE) 8.6-50 MG per tablet 1 tablet    Or     senna-docusate (SENOKOT-S/PERICOLACE) 8.6-50 MG per tablet 2 tablet     senna-docusate (SENOKOT-S/PERICOLACE) 8.6-50 MG per tablet 1 tablet    Or     senna-docusate (SENOKOT-S/PERICOLACE) 8.6-50 MG per tablet 2 tablet     simethicone (MYLICON) suspension 40 mg      Medications Prior to Admission   Medication Sig Dispense Refill Last Dose     dicyclomine (BENTYL) 20 MG tablet Take 20 mg by mouth 4 times daily as needed   Past Week at Unknown time     HYDROcodone-acetaminophen (NORCO) 5-325 MG tablet Take 1 tablet by mouth every 6 hours as needed for severe pain   3/6/2019 at Unknown time     ondansetron (ZOFRAN) 8 MG tablet Take 0.5-1 tablets (4-8 mg) by mouth every 8 hours as needed for nausea 21 tablet 0 3/6/2019 at Unknown time     simethicone 80 MG TABS Take 80 mg by mouth 4 times daily as needed for cramping    3/6/2019 at Unknown time        Review of Systems   A complete review of systems was performed and is negative except as noted in the HPI      Physical Exam   /68 (BP Location: Right arm)   Pulse 82   Temp 97.7  F (36.5  C) (Oral)   Resp 18   Ht 1.727 m (5' 8\")   Wt 53.4 kg (117 lb 12.8 oz)   SpO2 95%   BMI 17.91 kg/m    Wt:   Wt Readings from Last 2 Encounters: "   03/08/19 53.4 kg (117 lb 12.8 oz)   11/19/18 49.9 kg (110 lb)      Constitutional: no acute distress, thin  Eyes: Sclera anicteric/injected  Ears/nose/mouth/throat: Normal oropharynx without ulcers or exudate, mucus membranes moist  Neck: supple  CV: No edema  Respiratory: Unlabored breathing  Abd: Minimally distended, +bs, +mild diffuse tenderness, no peritoneal signs  Skin: warm, perfused  Neuro: AAO x 3  Psych: Normal affect  MSK: No gross deformities    Data   Labs and imaging below were independently reviewed and interpreted    BMP  Recent Labs   Lab 03/08/19  0609 03/07/19 0223    142   POTASSIUM 3.7 4.1   CHLORIDE 104 107   JONATAN 8.5 8.4*   CO2 30 29   BUN 10 10   CR 0.52 0.59   * 92     CBC  Recent Labs   Lab 03/08/19  0609 03/07/19 0223   WBC 9.7 7.6   RBC 4.14 3.94   HGB 12.9 12.4   HCT 42.1 40.4   * 103*   MCH 31.2 31.5   MCHC 30.6* 30.7*   RDW 14.3 14.2    294     INRNo lab results found in last 7 days.  LFTs  Recent Labs   Lab 03/08/19  0609 03/07/19 0223   ALKPHOS 84 81   AST 11 13   ALT 22 20   BILITOTAL 0.3 0.2   PROTTOTAL 6.4* 6.2*   ALBUMIN 3.0* 3.1*      PANC  Recent Labs   Lab 03/07/19 0223   LIPASE 124       Imaging:  CT 3/7:  Chronically distended mid small bowel, decreased since comparison CT  on 11/19/2018, currently without identifiable transition point to  suggest high-grade small bowel obstruction. No pneumatosis, portal  venous gas, or free air to suggest bowel ischemia or perforation.

## 2019-03-08 NOTE — PROGRESS NOTES
COLON & RECTAL SURGERY PROGRESS NOTE    S:  Abdominal exam remains benign. Tolerated some clears yesterday but afraid to eat due to pain. Continues to pass liquid stool. C diff negative.     Vitals:  Vitals:    03/08/19 0004 03/08/19 0118 03/08/19 0434 03/08/19 0521   BP: (!) 85/57 102/64 100/68    BP Location: Left arm Right arm Right arm    Pulse: 77 87 82    Resp: 18  18    Temp: 98.4  F (36.9  C)  97.7  F (36.5  C)    TempSrc: Oral  Oral    SpO2: 97%  95%    Weight:    53.4 kg (117 lb 12.8 oz)   Height:         I/O:  I/O last 3 completed shifts:  In: 1165 [I.V.:1165]  Out: -     PE:  GEN: NAD  CV: RRR, no murmurs  RESP: Non-labored breathing   ABD: soft, non-tender, mild distension, no guarding or rebound tenderness, incisions well healed  PSYCH: cooperative     Labs:  Recent Labs   Lab 03/08/19  0609 03/07/19  0223   WBC 9.7 7.6   HGB 12.9 12.4    294    142   POTASSIUM 3.7 4.1   CHLORIDE 104 107   CO2 30 29   BUN 10 10   CR 0.52 0.59   * 92   AST 11 13   ALT 22 20     A/P: 42 yo F with a past medical history of cervical cancer s/p chemo/rads which was complicated by SBO that necessitated an ex lap with small bowel resection. She subsequently developed anastomotic stricture and obstructions which have been dilated by GI enteroscopy. Admitted for partial SBO.    - No role for acute surgical intervention.   - Clears and advance diet as tolerated. Discussed that she may need to be on full liquid diet with protein shakes until she is seen at [place] at the end of the month for surgical evaluation.   - C diff negative. If continues to have diarrhea would consider full infectious stool panel.   - If unable to tolerate diet advancement, would consult GI service for endoscopic dilation to dilate stricture for symptomatic relief.       Jelly Mcdonald MD  Colon and Rectal Surgery Fellow

## 2019-03-08 NOTE — PLAN OF CARE
VSS.  C/o of unrelenting abdominal pain this AM.  Multiple soft brown/tan BMs. Voids well. Pain meds inadequate.  MD update.  Linda added.and IV Dilaudid restarted every 8 hours PRN.  GI consulted.  MIV infusing.  Patient up independently in room.  Not eating.  C/o intermittent nausea with Zofran given.  Continue to monitor, support.  Updated MD as needed.

## 2019-03-08 NOTE — PROGRESS NOTES
"CLINICAL NUTRITION SERVICES - ASSESSMENT NOTE     Nutrition Prescription    RECOMMENDATIONS FOR MDs/PROVIDERS TO ORDER:  1. If patient needs to follow full liquids with protein shakes upon discharge she will need at least 5 cans of boost plus daily v 3 cans of boost plus mixed with 1/2 cup ice cream daily   2. When diet advanced to full liquid recommend boost plus (chocolate or vanilla) between meals     Malnutrition Status:    Non-severe malnutrition in the context of acute on chronic illness    Recommendations already ordered by Registered Dietitian (RD):  None at this time     Future/Additional Recommendations:  1. Oral intake of TID meals and supplements     REASON FOR ASSESSMENT  Rachel A Gerhardt is a/an 43 year old female assessed by the dietitian for Admission Nutrition Risk Screen for unintentional loss of 10# or more in the past two months and reduced oral intake over the last month    NUTRITION HISTORY  On admit + abdominal pain, nausea, emesis.   Per chart review - team at this time thinks patient may need to stay on full liquids with protein shakes until appointment at [facility]    Patient reports decreased oral intake due to pain. She dislikes boost breeze but does like vanilla or chocolate boost plus    CURRENT NUTRITION ORDERS  Diet: Clear Liquid  Intake/Tolerance: patient with minimal intake due to pain    LABS  Labs reviewed    MEDICATIONS  Medications reviewed    ANTHROPOMETRICS  Height: 172.7 cm (5' 8\")  Most Recent Weight: 53.4 kg (117 lb 12.8 oz)    IBW: 64.2 kg  BMI: Underweight BMI <18.5  Weight History:   Wt Readings from Last 10 Encounters:   03/08/19 53.4 kg (117 lb 12.8 oz)   11/19/18 49.9 kg (110 lb)   11/13/18 54.9 kg (121 lb 1.6 oz)   07/17/18 50.1 kg (110 lb 6.4 oz)   06/19/18 56.6 kg (124 lb 11.2 oz)   05/23/18 56.9 kg (125 lb 8 oz)   05/09/18 55.3 kg (122 lb)   05/02/18 55.5 kg (122 lb 4.8 oz)   04/30/18 55.4 kg (122 lb 2.2 oz)   04/24/18 55.7 kg (122 lb 12.7 oz)   recent weight " gain   Dosing Weight: 53 kg (lowest wt since admit 3/8/19)    ASSESSED NUTRITION NEEDS  Estimated Energy Needs: 3533-5722+ kcals/day (30 - 35+ kcals/kg )  Justification: Repletion and Underweight  Estimated Protein Needs: 64-80+ grams protein/day (1.2 - 1.5+ grams of pro/kg)  Justification: Repletion  Estimated Fluid Needs: 1 mL/kcal   Justification: Maintenance    PHYSICAL FINDINGS  See malnutrition section below.    MALNUTRITION  % Intake: </= 50% for >/= 5 days (severe)  % Weight Loss: Weight loss does not meet criteria  Subcutaneous Fat Loss: Facial region:  mild  Muscle Loss: Thoracic region (clavicle, acromium bone, deltoid, trapezius, pectoral):  mild  Fluid Accumulation/Edema: None noted  Malnutrition Diagnosis: Non-severe malnutrition in the context of acute on chronic illness    NUTRITION DIAGNOSIS  Inadequate oral intake related to decreased appetite, pain as evidenced by minimal oral intake of clear liquids      INTERVENTIONS  Implementation  1. Nutrition Education: See education flowsheet - Provided handout on high calorie, high protein recipes     Goals  Diet adv v nutrition support within 2-3 days.     Monitoring/Evaluation  Progress toward goals will be monitored and evaluated per protocol.    Pastora Velez MS/RD/CHRISTOPHER/CNSC  [address] Pager: [phone]

## 2019-03-09 LAB
ANION GAP SERPL CALCULATED.3IONS-SCNC: 6 MMOL/L (ref 3–14)
BUN SERPL-MCNC: 8 MG/DL (ref 7–30)
CALCIUM SERPL-MCNC: 8.7 MG/DL (ref 8.5–10.1)
CHLORIDE SERPL-SCNC: 101 MMOL/L (ref 94–109)
CO2 SERPL-SCNC: 30 MMOL/L (ref 20–32)
CREAT SERPL-MCNC: 0.57 MG/DL (ref 0.52–1.04)
GFR SERPL CREATININE-BSD FRML MDRD: >90 ML/MIN/{1.73_M2}
GLUCOSE SERPL-MCNC: 95 MG/DL (ref 70–99)
POTASSIUM SERPL-SCNC: 3.9 MMOL/L (ref 3.4–5.3)
SODIUM SERPL-SCNC: 137 MMOL/L (ref 133–144)

## 2019-03-09 PROCEDURE — 25800030 ZZH RX IP 258 OP 636

## 2019-03-09 PROCEDURE — 99232 SBSQ HOSP IP/OBS MODERATE 35: CPT | Mod: GC | Performed by: INTERNAL MEDICINE

## 2019-03-09 PROCEDURE — 25000132 ZZH RX MED GY IP 250 OP 250 PS 637

## 2019-03-09 PROCEDURE — 25000128 H RX IP 250 OP 636: Performed by: INTERNAL MEDICINE

## 2019-03-09 PROCEDURE — 25000132 ZZH RX MED GY IP 250 OP 250 PS 637: Performed by: STUDENT IN AN ORGANIZED HEALTH CARE EDUCATION/TRAINING PROGRAM

## 2019-03-09 PROCEDURE — 12000012 ZZH R&B MS OVERFLOW UMMC

## 2019-03-09 PROCEDURE — 80048 BASIC METABOLIC PNL TOTAL CA: CPT | Performed by: STUDENT IN AN ORGANIZED HEALTH CARE EDUCATION/TRAINING PROGRAM

## 2019-03-09 PROCEDURE — 36415 COLL VENOUS BLD VENIPUNCTURE: CPT | Performed by: STUDENT IN AN ORGANIZED HEALTH CARE EDUCATION/TRAINING PROGRAM

## 2019-03-09 PROCEDURE — 25000128 H RX IP 250 OP 636

## 2019-03-09 PROCEDURE — 25000132 ZZH RX MED GY IP 250 OP 250 PS 637: Performed by: INTERNAL MEDICINE

## 2019-03-09 RX ORDER — SIMETHICONE 80 MG
80 TABLET,CHEWABLE ORAL EVERY 6 HOURS PRN
Status: DISCONTINUED | OUTPATIENT
Start: 2019-03-09 | End: 2019-03-10 | Stop reason: HOSPADM

## 2019-03-09 RX ORDER — DICYCLOMINE HYDROCHLORIDE 10 MG/1
20 CAPSULE ORAL 4 TIMES DAILY
Status: DISCONTINUED | OUTPATIENT
Start: 2019-03-09 | End: 2019-03-10 | Stop reason: HOSPADM

## 2019-03-09 RX ADMIN — ENOXAPARIN SODIUM 40 MG: 40 INJECTION SUBCUTANEOUS at 11:52

## 2019-03-09 RX ADMIN — ONDANSETRON 4 MG: 2 INJECTION INTRAMUSCULAR; INTRAVENOUS at 20:02

## 2019-03-09 RX ADMIN — DICYCLOMINE HYDROCHLORIDE 20 MG: 10 SOLUTION ORAL at 11:52

## 2019-03-09 RX ADMIN — DICYCLOMINE HYDROCHLORIDE 20 MG: 10 CAPSULE ORAL at 17:02

## 2019-03-09 RX ADMIN — DICYCLOMINE HYDROCHLORIDE 20 MG: 10 CAPSULE ORAL at 20:11

## 2019-03-09 RX ADMIN — SIMETHICONE CHEW TAB 80 MG 80 MG: 80 TABLET ORAL at 20:10

## 2019-03-09 RX ADMIN — OXYCODONE HYDROCHLORIDE 5 MG: 5 TABLET ORAL at 06:03

## 2019-03-09 RX ADMIN — ONDANSETRON 4 MG: 2 INJECTION INTRAMUSCULAR; INTRAVENOUS at 06:03

## 2019-03-09 RX ADMIN — OXYCODONE HYDROCHLORIDE 5 MG: 5 TABLET ORAL at 02:00

## 2019-03-09 RX ADMIN — OXYCODONE HYDROCHLORIDE 5 MG: 5 TABLET ORAL at 15:01

## 2019-03-09 RX ADMIN — SODIUM CHLORIDE, POTASSIUM CHLORIDE, SODIUM LACTATE AND CALCIUM CHLORIDE: 600; 310; 30; 20 INJECTION, SOLUTION INTRAVENOUS at 17:02

## 2019-03-09 RX ADMIN — Medication 0.5 MG: at 11:49

## 2019-03-09 RX ADMIN — DICYCLOMINE HYDROCHLORIDE 20 MG: 10 SOLUTION ORAL at 07:39

## 2019-03-09 RX ADMIN — OXYCODONE HYDROCHLORIDE 5 MG: 5 TABLET ORAL at 22:38

## 2019-03-09 RX ADMIN — OXYCODONE HYDROCHLORIDE 5 MG: 5 TABLET ORAL at 10:32

## 2019-03-09 RX ADMIN — Medication 0.5 MG: at 03:30

## 2019-03-09 RX ADMIN — SIMETHICONE 40 MG: 20 SUSPENSION/ DROPS ORAL at 01:34

## 2019-03-09 RX ADMIN — OXYCODONE HYDROCHLORIDE 5 MG: 5 TABLET ORAL at 18:43

## 2019-03-09 RX ADMIN — SIMETHICONE 40 MG: 20 SUSPENSION/ DROPS ORAL at 07:39

## 2019-03-09 RX ADMIN — Medication 0.5 MG: at 20:02

## 2019-03-09 RX ADMIN — SODIUM CHLORIDE, POTASSIUM CHLORIDE, SODIUM LACTATE AND CALCIUM CHLORIDE: 600; 310; 30; 20 INJECTION, SOLUTION INTRAVENOUS at 07:27

## 2019-03-09 ASSESSMENT — ACTIVITIES OF DAILY LIVING (ADL)
ADLS_ACUITY_SCORE: 10

## 2019-03-09 ASSESSMENT — PAIN DESCRIPTION - DESCRIPTORS
DESCRIPTORS: CONSTANT;CRAMPING

## 2019-03-09 ASSESSMENT — MIFFLIN-ST. JEOR: SCORE: 1227.39

## 2019-03-09 NOTE — PROGRESS NOTES
"COLON & RECTAL SURGERY PROGRESS NOTE    S:  NAEON. Abdominal exam remains benign. Some immediate nausea with PO intake, an episode of immediate emesis after eating solid food overnight. Continues to pass liquid stool. C diff negative.     Vitals:  BP 99/71 (BP Location: Right arm)   Pulse 88   Temp 97.9  F (36.6  C) (Oral)   Resp 16   Ht 1.727 m (5' 8\")   Wt 53.4 kg (117 lb 12.8 oz)   SpO2 96%   BMI 17.91 kg/m      I/O last 3 completed shifts:  In: 700 [I.V.:700]  Out: -     PE:  GEN: NAD, sitting in bed  CV: RRR, WWP  RESP: Non-labored breathing on room air  ABD: soft, non-tender, mild distension, no guarding or rebound tenderness, incisions well healed  PSYCH: cooperative     Labs: Reviewed    A/P: 42 yo F with a past medical history of cervical cancer s/p chemo/rads which was complicated by SBO that necessitated an ex lap with small bowel resection. She subsequently developed anastomotic stricture and obstructions which have been dilated by GI enteroscopy. Admitted for partial SBO.    - No role for acute surgical intervention.   - Advance diet as tolerated. Discussed that she may need to be on full liquid diet with protein shakes until she is seen at [place] at the end of the month for surgical evaluation.   - C diff negative. If continues to have diarrhea would consider full infectious stool panel.     - No interventions to offer from Colorectal Surgery perspective that would be of benefit for this patient. We will sign off. Please call with questions.     Seen with CRS fellow Dr. Mcdonald. Discussed with CRS staff Dr. Esteban.    Han Lomas MD  PGY-3 General Surgery    "

## 2019-03-09 NOTE — PLAN OF CARE
VSS.  Continues with abdominal pain and cramping.  Soft foul smelling BM x 2.  Getting IV dilaudid as available and PO Oxycodone every 4 hours  Scheduled Braydenyl. Started on regular diet today.  Patient wants to test what food she is able to tolerate.  Intermitnent nausea. MIV infusing. Up independently in room and on floor.  Possible discontinue tomorrow. Continue to monitor, support.

## 2019-03-09 NOTE — PROGRESS NOTES
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    TODAY  Thought she might be able to discharge today but she would like to see if she can handle general diet. She has had trouble with just liquid diet as it feels like it \"sloshes around\" and she becomes nauseated w/ vomiting if she just has liquids. She sees benefit in getting IV fluids as it corrects her dehydration and we discussed her getting outpatient IV fluids  - Progress to general diet  - Change to tablet forms of Bentyl and Simethicone    # Recurrent pSBO  # Cervical cancer, s/p chemo/rads  # Acute on chronic abd pain   Cervical cancer s/p chemo/rads which was complicated by SBO that necessitated an ex lap with small bowel resection. Has anastomotic ileo-ileo stricture. She subsequently developed anastomotic stricture and obstructions which have been dilated by GI enteroscopy. She now presents to the ED with XR findings of SBO, though she is not clinically obstructed. She is having robust anterograde bowel function.  Just recently admitted to [facility] 3/3 to 3/6 where she was treated conservatively. CXR with air fluid levels, CT abdomen/pelvis without transition point. Seen by colorectal, but she has been fired from their service and will be managed medically.Tolerating small amounts of clears. Nutrition and GI recommending full liquid diet   - General diet   - GI consulted, not recommending further dilation as it would be futile       - patient also unsure if it helped previously   - Colorectal consulted by ED, not recommending surgical intervention at this point      - Pt has appointment with colorectal surgery at [address] at end of March.    - Minimize " narcotics given concern for decreased bowel motility.    - doesn't tolerate gabapentin (too dizzy per patient)   - tried lyrica, cymbalta, all were stopped for one reason or another; she's not sure which.   - trial hydroxyzine, Rx given   - bentyl also given for antispasmodic as pt had run out of medication   - simethicone PRN    # Opiate use disorder  - Will give Dilaudid 0.3-0.5 q8hr PRN, also 5mg oxycodone q4 PRN  - transition back to norco at discharge; has been getting pain meds through her primary provider through [facility]. Fired from pain clinic previously    # Probable situational depression/reactive depression due to underlying medical problem  - in past was on cymbalta, lyrica  - recommend outpatient psychological and psychiatric support.  - She declined psych consult this admission    # Compliance issues  Was fired from colorectal surgery surgeon (left [facility] with PICC line to go to pain clinic appt)   - may need to establish w new surgeon going forward; pt is planning to see [facility] in late March 2019   - she does have real underlying medical pathology and I don't think she is solely drug seeking      Diet: Diet  Regular Diet Adult    Fluids: 100ml/hr while minimal oral input  DVT Prophylaxis: Ambulate every shift  Godwin Catheter: not present  Code Status: Full Code      Disposition Plan   Expected discharge: Tomorrow, recommended to prior living arrangement once adequate pain management/ tolerating PO medications.  Entered: Carlo Dennis MD 03/09/2019, 12:58 PM       The patient's care was discussed with the Attending Physician, Dr. Oseguera.    Kareem Dennis MD  [address]  Pager: [phone]  Please see sticky note for cross cover information  ______________________________________________________________________    Interval History   She is feeling better today but still some episodes of n/v which will likely be ongoing.  Set expectations for discharge tomorrow. Still passing gas and having stools, would like to have eggs and toast today, advanced diet. No SOB or CP     Physical Exam   Vital Signs: Temp: 98.5  F (36.9  C) Temp src: Oral BP: 101/73 Pulse: 90   Resp: 16 SpO2: 96 % O2 Device: None (Room air)    Weight: 116 lbs 9.6 oz    Gen: NAD, alert and oriented  HEENT: EOMI, no scleral icterus, tracking appropriately  Resp: CTAB, no crackles or wheezes, no increased WOB  Cardiac: RRR, no S3/S4, no M/R/G appreciated  GI: soft, tender to light palpation, hyperactive bowel sounds  Ext: WWP, no edema, spontaneous movement in all 4  Neuro: AOx3, CN 2-12 grossly intact, appropriate mentation    Data   Reviewed in EPIC

## 2019-03-09 NOTE — PLAN OF CARE
VS: VSS, afebrile, on RA  Mental Status: A&Ox4  Cardiac: WNL, denies chest pain  Respiratory: LS clear, diminished, bilaterally; denies SOB  GI/: voiding adequate amount urine per pt report; abdomen rounded, + bowel tones, + BM's, denies nausea   Pain: + abdominal pain; PRN oxycodone and IV dilaudid given with minimum relief in pain   Diet: Full liquid diet   Mobility: Up independent   Treatment: Continue scheduled bentyl, PRN pain medication, conservative diet increase   DC plan: TBD; home with family assist

## 2019-03-10 VITALS
RESPIRATION RATE: 16 BRPM | BODY MASS INDEX: 17.93 KG/M2 | SYSTOLIC BLOOD PRESSURE: 99 MMHG | DIASTOLIC BLOOD PRESSURE: 57 MMHG | HEART RATE: 93 BPM | WEIGHT: 118.3 LBS | TEMPERATURE: 98.4 F | OXYGEN SATURATION: 95 % | HEIGHT: 68 IN

## 2019-03-10 LAB
ANION GAP SERPL CALCULATED.3IONS-SCNC: 6 MMOL/L (ref 3–14)
BUN SERPL-MCNC: 13 MG/DL (ref 7–30)
CALCIUM SERPL-MCNC: 8.9 MG/DL (ref 8.5–10.1)
CHLORIDE SERPL-SCNC: 104 MMOL/L (ref 94–109)
CO2 SERPL-SCNC: 29 MMOL/L (ref 20–32)
CREAT SERPL-MCNC: 0.59 MG/DL (ref 0.52–1.04)
GFR SERPL CREATININE-BSD FRML MDRD: >90 ML/MIN/{1.73_M2}
GLUCOSE SERPL-MCNC: 104 MG/DL (ref 70–99)
PLATELET # BLD AUTO: 314 10E9/L (ref 150–450)
POTASSIUM SERPL-SCNC: 4.5 MMOL/L (ref 3.4–5.3)
SODIUM SERPL-SCNC: 139 MMOL/L (ref 133–144)

## 2019-03-10 PROCEDURE — 25800030 ZZH RX IP 258 OP 636

## 2019-03-10 PROCEDURE — 36415 COLL VENOUS BLD VENIPUNCTURE: CPT

## 2019-03-10 PROCEDURE — 85049 AUTOMATED PLATELET COUNT: CPT

## 2019-03-10 PROCEDURE — 25000128 H RX IP 250 OP 636

## 2019-03-10 PROCEDURE — 99239 HOSP IP/OBS DSCHRG MGMT >30: CPT | Mod: GC | Performed by: INTERNAL MEDICINE

## 2019-03-10 PROCEDURE — 80048 BASIC METABOLIC PNL TOTAL CA: CPT

## 2019-03-10 PROCEDURE — 25000132 ZZH RX MED GY IP 250 OP 250 PS 637

## 2019-03-10 PROCEDURE — 25000132 ZZH RX MED GY IP 250 OP 250 PS 637: Performed by: STUDENT IN AN ORGANIZED HEALTH CARE EDUCATION/TRAINING PROGRAM

## 2019-03-10 PROCEDURE — 25000128 H RX IP 250 OP 636: Performed by: INTERNAL MEDICINE

## 2019-03-10 RX ORDER — OXYCODONE AND ACETAMINOPHEN 5; 325 MG/1; MG/1
1 TABLET ORAL EVERY 4 HOURS PRN
Qty: 26 TABLET | Refills: 0 | Status: ON HOLD | OUTPATIENT
Start: 2019-03-10 | End: 2019-06-28

## 2019-03-10 RX ORDER — DICYCLOMINE HCL 20 MG
20 TABLET ORAL 4 TIMES DAILY PRN
Qty: 60 TABLET | Refills: 0 | Status: SHIPPED | OUTPATIENT
Start: 2019-03-10 | End: 2019-05-18

## 2019-03-10 RX ORDER — HYDROCODONE BITARTRATE AND ACETAMINOPHEN 5; 325 MG/1; MG/1
1 TABLET ORAL EVERY 6 HOURS PRN
Qty: 20 TABLET | Refills: 0 | Status: SHIPPED | OUTPATIENT
Start: 2019-03-10 | End: 2019-03-10

## 2019-03-10 RX ADMIN — OXYCODONE HYDROCHLORIDE 5 MG: 5 TABLET ORAL at 11:28

## 2019-03-10 RX ADMIN — ONDANSETRON 4 MG: 2 INJECTION INTRAMUSCULAR; INTRAVENOUS at 13:03

## 2019-03-10 RX ADMIN — Medication 0.5 MG: at 05:02

## 2019-03-10 RX ADMIN — DICYCLOMINE HYDROCHLORIDE 20 MG: 10 CAPSULE ORAL at 08:44

## 2019-03-10 RX ADMIN — OXYCODONE HYDROCHLORIDE 5 MG: 5 TABLET ORAL at 07:26

## 2019-03-10 RX ADMIN — DICYCLOMINE HYDROCHLORIDE 20 MG: 10 CAPSULE ORAL at 12:40

## 2019-03-10 RX ADMIN — SODIUM CHLORIDE, POTASSIUM CHLORIDE, SODIUM LACTATE AND CALCIUM CHLORIDE: 600; 310; 30; 20 INJECTION, SOLUTION INTRAVENOUS at 06:50

## 2019-03-10 RX ADMIN — ONDANSETRON 4 MG: 2 INJECTION INTRAMUSCULAR; INTRAVENOUS at 03:20

## 2019-03-10 RX ADMIN — DICYCLOMINE HYDROCHLORIDE 20 MG: 10 CAPSULE ORAL at 16:02

## 2019-03-10 RX ADMIN — ENOXAPARIN SODIUM 40 MG: 40 INJECTION SUBCUTANEOUS at 11:28

## 2019-03-10 RX ADMIN — OXYCODONE HYDROCHLORIDE 5 MG: 5 TABLET ORAL at 03:20

## 2019-03-10 RX ADMIN — OXYCODONE HYDROCHLORIDE 5 MG: 5 TABLET ORAL at 16:02

## 2019-03-10 ASSESSMENT — ACTIVITIES OF DAILY LIVING (ADL)
ADLS_ACUITY_SCORE: 10

## 2019-03-10 ASSESSMENT — PAIN DESCRIPTION - DESCRIPTORS: DESCRIPTORS: CONSTANT;CRAMPING

## 2019-03-10 ASSESSMENT — MIFFLIN-ST. JEOR: SCORE: 1235.11

## 2019-03-10 NOTE — PLAN OF CARE
VSS.  Pain continues with ongoing pain and discomfort.  Frequent soft foul smelling bowel movements.   Some nausea with eating.  Getting Zofran and PO Oxycodone.  IV dilaudid dc'd.  Plan for patient to discharge to home.  To f/up  as outpatient and continue with [facility] appt. At end of month.  To leave facility when meds are ready

## 2019-03-10 NOTE — PROGRESS NOTES
Social Work Services Progress Note    Hospital Day: 4  Date of Initial Social Work Evaluation:  Not completed  Collaborated with:  MD, patient, [facility] scheduling    Data:  Pt is a 44yo female admitted to the hospital for abdominal pain and a history of cervical cancer. MD paged asking for assistance getting patient set up for a follow up appointment with her clinic prior to her discharge from the hospital.    Intervention:  With patient, called [facility] scheduling and set up an appointment for Friday 3/15/19 at 9:00 with Dr. Reji Avery at the [facility] on [address].    Assessment:  Pt agreeable to setting up appointment.    Plan:    Anticipated Disposition:  Home, no needs identified    Barriers to d/c plan:  Patient stability    Follow Up:  SW follow up as needed.    LUCHO Fischer, [facility] Social Work  [facility], [place]    4A, 4C, 4E, 5A, 5B: [facility] [phone]  6A, 6B, 6C, 6D: [facility] [phone]  7A, 7B, 7C, 7D, 5C: [facility] [phone]

## 2019-03-10 NOTE — PROGRESS NOTES
"Transition Planning Request for Outpatient IV Hydration    D:  Received page from MD team asking for assistance with arranging outpatient IV fluids/hydration.  This writer talked with patient to confirm her address and she stated that the [place] Address on her face sheet is a \"billing address.\"  Patient explained to this writer that she lives in [facility] and [facility] ), 2nd floor for IV hydration needs.         This writer phoned the [facility] Charge RN who stated that this clinic would not be able to schedule patient for IV hydration needs today but, the RN stated that this request could be handled tomorrow morning any time after 07:00 a.m.  This writer left a message with the weekday Care Coordinator for the [address] Team and asked that she assist with this IIV hydration appointment request on behalf of patient.         MD team (pager [phone]) was updated that he would have to place a Therapy Plan in Epic in order for the appointment to be arranged tomorrow.  MD team member stated he would obtain assistance from one of his peers to place the appointment/therapy plan in patients chart.  This writer update patient Ms. Gerhardt and she stated she understood and will be awaiting a call tomorrow morning for a time to come to the ACT center at the [facility] and she stated she will be driving herself to obtain her hydration as will be ordered.  A/P:  Inpatient cares continue through time of discharge.  Please refer to discharge orders for plans of care prn.  Inpatient Care Management Team is available to assist with transition needs prn.    Tracey Barnhart, ALCIRA.S.N., P.H.N.,R.N.         Pager   "

## 2019-03-10 NOTE — PROGRESS NOTES
Prescription for Norco returned from [facility] with note that they are not able to refill util 3.16.19  Spoke to Dr. Dennis to inform him of this.  MD states pt has appointment scheduled with primary MD on 3.15.19; he will speak to Attending re: pain medications for discharge and call back to unit.  Bedside RN informed.

## 2019-03-10 NOTE — PLAN OF CARE
VS: VSS, afebrile, on RA  Mental Status: A&Ox4  Cardiac: WNL, denies chest pain  Respiratory: LS clear, diminished, bilaterally; denies SOB  GI/: voiding adequate amount urine per pt report; abdomen rounded, + bowel tones, + BM's  Pain: + abdominal pain; PRN oxycodone and IV dilaudid given with minimum relief in pain   Diet:Regular diet; increased nausea per pt report; PRN Zofran given x2  Mobility: Up independent   Treatment: Continue scheduled bentyl, PRN pain medication, encourage PO intake  DC plan: today; home with family support/assist

## 2019-03-10 NOTE — PROGRESS NOTES
Patient leaving facility at 1815.  Friend to  in lobby.  To follow up as outpatient.  Has all belongings and medications.

## 2019-03-11 ENCOUNTER — PATIENT OUTREACH (OUTPATIENT)
Dept: CARE COORDINATION | Facility: CLINIC | Age: 44
End: 2019-03-11

## 2019-03-11 NOTE — PROGRESS NOTES
Patient has clinic visit within 24-48 hours of discharge so no post DC follow up call is needed.    Name: Gerhardt, Rachel A MRN: [id number]   Date: 3/11/2019 Status: [facility]   Time: 1:00 PM  1:00 PM Length: 120  120   Visit Type: [facility] SPEC INFUSION 120 [[id number]] KRISTINA: [id number]   Provider: HANANE SPEC INFUSION  [facility] Department:  SPECIALTY INFUSION  [facility]

## 2019-03-11 NOTE — DISCHARGE SUMMARY
[facility], [place]  Discharge Summary - Medicine & Pediatrics       Date of Admission:  3/7/2019  Date of Discharge:  3/10/2019  7:30 PM  Discharging Provider: Dr Oseguera  Discharge Service: Anthony Lee    Discharge Diagnoses     Follow-ups Needed After Discharge   Follow-up Appointments     Adult [email or web address] Follow-up and recommended labs and tests      Follow up with primary care provider, Reji Avery, within 7 days   for hospital follow- up. Appointment made for Friday 3/15/19 at 9:00 with Dr. Reji Avery at the [facility] on [address].  No follow up labs or test are needed.      Appointments on [place] and/or [facility] (with [facility] [email or web address]] or [facility]   provider or service). Call [phone] if you haven't heard regarding   these appointments within 7 days of discharge.             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     The following problems were addressed during this hospitalization:      # Recurrent pSBO  # Cervical cancer, s/p chemo/rads  Cervical cancer s/p chemo/rads which was complicated by SBO that necessitated an ex lap with small bowel resection. Has anastomotic ileo-ileo stricture. She subsequently developed anastomotic stricture and obstructions which have been dilated by GI enteroscopy. She presented to the ED with XR findings of SBO, though she is not clinically obstructed. She has had robust anterograde bowel function.  Just recently admitted to [facility] 3/3 to 3/6 where she was treated conservatively. At admission to [facility], she had abdominal pain, a KUB with air fluid levels, CT abdomen/pelvis without transition point and continued stool output  "consistent with a partial SBO. Seen by colorectal surgery, but she has been fired from their service and was managed medically. Given her continued pain and slow improvement, GI service also saw her and recommended against re-dilation. She did progress to tolerating soft foods, but given that intake of liquids makes her pain worse with distension and \"sloshing\", a plan to support her with IVF infusions in clinic was made with the patient.   Of note, if IVF infusions are helpful and planned to continue, her PCP will need to re-order a therapy plan.     # Opiate use disorder  # Acute on chronic abdominal pain   Ms. Gerhardt had an acute exacerbation of her chronic abdominal pain that was not able to be managed on her home opioids. She has a long history of difficult to control pain, and was fired from her pain clinic previously. She was discharged on a short supply of percocet to get her to her PCP appointment on Friday 3/15, and was instructed to bring her unfinished Norco to the appointment. She was additionally sent on dicylomine and simethicone as adjuncts.     # Probable situational depression/reactive depression due to underlying medical problem  - in past was on cymbalta, lyrica  - recommend outpatient psychological and psychiatric support.  - She declined psych consult this admission    # Compliance issues  Was fired from colorectal surgery surgeon (left hospital AMA with PICC line to go to pain clinic appt)  - may need to establish w new surgeon going forward; pt is planning to see [facility] in late March 2019    # Non-severe malnutrition in the context of acute on chronic illness  See RD note of 3/8 for details. Continue to optimize nutrition within diet/comfort restrictions as able given her upcoming appointments with GI at [place].     Consultations This Hospital Stay   MEDICATION HISTORY IP PHARMACY CONSULT  GI LUMINAL ADULT IP CONSULT    Code Status   Full Code       The patient was discussed with  " MD Anthony Pringle 2 Service  [facility], [place]  Pager: [phone]  ______________________________________________________________________    Physical Exam   Vital Signs: Temp: 98.4  F (36.9  C) Temp src: Oral BP: 99/57 Pulse: 93   Resp: 16 SpO2: 95 % O2 Device: None (Room air)    Weight: 118 lbs 4.8 oz  Gen: NAD, alert and oriented  HEENT: EOMI, no scleral icterus, tracking appropriately  Resp: CTAB, no crackles or wheezes, no increased WOB  Cardiac: RRR, no S3/S4, no M/R/G appreciated  GI: soft, mildly distended, tender to light palpation, hyperactive bowel sounds  Ext: WWP, no edema, spontaneous movement in all 4  Neuro: AOx3, CN 2-12 grossly intact, appropriate mentation      Primary Care Physician   Reji Avery    Immunizations       Discharge Orders      Adult [facility] Follow-up and recommended labs and tests    Follow up with primary care provider, Reji Avery, within 7 days for hospital follow- up.  No follow up labs or test are needed.      Appointments on [place] and/or [facility] (with [facility] or [facility] provider or service). Call [phone] if you haven't heard regarding these appointments within 7 days of discharge.     Activity    Your activity upon discharge: activity as tolerated     When to contact your care team    Call your primary doctor or return to the emergency department if you have any of the following: temperature greater than 101F, increased pain, no stools, or new vomiting.     Reason for your hospital stay    You were hospitalized with a partial small bowel obstruction. This obstruction was managed with pausing oral intake and giving IVF fluids. You have been tolerating medications by mouth and having bowel movements. As discussed, it may be necessary to stick with soft foods and high-protein full liquids (including Boost/ Ensure as much as you can tolerate) until your appointment with [facility].   We have set up for you to  get IV fluid infusions as an outpatient up to 3 times per week. You will be called about this tomorrow (Monday) by the  to establish what clinic will be most convenient for you. This and your pain medications will need to be taken over by your primary, Dr. Avery. Please take the remainder of your norco pills to that appointment.     Full Code     Diet    Follow this diet upon discharge: Clear Liquid Diet, advance slowly as tolerated with bowel obstruction       Significant Results and Procedures   Results for orders placed or performed during the hospital encounter of 03/07/19   Abdomen XR, 2 vw, flat and upright    Narrative    EXAM: XR ABDOMEN 2 VW  3/7/2019 2:52 AM      HISTORY: abd pain and vomiting history of SBO    COMPARISON: CT 1/19/2018    FINDINGS: Upright and supine radiographs of the abdomen. Moderate  distention of the large bowel with stool and gas. Focal segment of  bowel distention in the left upper quadrant, possibly representing  distended small bowel. Multiple air-fluid levels on the upright film.  No portal venous gas. Visualized portions of the lung demonstrate no  focal airspace opacities.       Impression    IMPRESSION: Findings suspicious for small bowel obstruction. Recommend  CT for confirmation.    I have personally reviewed the examination and initial interpretation  and I agree with the findings.    CARLIN ALCALA MD   CT Abdomen Pelvis w Contrast    Narrative    EXAMINATION: CT ABDOMEN PELVIS W CONTRAST, 3/7/2019 8:15 AM    TECHNIQUE:  Helical CT images from the lung bases through the  symphysis pubis were obtained with IV contrast. Contrast dose:  iopamidol (ISOVUE-370) solution 73 mL    COMPARISON: Multiple prior exams, the most recent 11/19/2018 CT  abdomen/pelvis    HISTORY: Abd distension    FINDINGS:    Abdomen and pelvis: Diffusely distended mid small bowel in the central  abdomen, decreased compared to the most recent CT on 11/19/2018  without focal transition  point. Moderate fatty stool throughout the  colon. Anastomotic staple line in the right lower quadrant related to  small bowel resection. No pneumatosis, portal venous gas, or free air.    The liver, gallbladder, pancreas, spleen, adrenal glands, and kidneys,  aside from tiny cortical cysts, are within normal limits. No  hydronephrosis, hydroureter, or urinary tract stone. The bladder is  decompressed. Redemonstration of a 7 mm enhancing focus in the  submucosal anterior uterine body, likely a submucosal fibroid. The  uterus and ovaries are otherwise grossly unremarkable. The cervical  region is not well evaluated on the current exam. Mild irregularity of  the peroneal soft tissues may be related to prior radiation. Trace  free pelvic fluid. Mild haziness of the omentum diffusely presumably  related to edema. No discrete peritoneal nodules identified and  findings are similar to additional prior exams.. No free air. The  major abdominal vessels are patent.    Lung bases/lower chest:  Clear.    Bones and soft tissues: No aggressive osseous lesion. The bones appear  diffusely demineralized. Mild soft tissue edema.      Impression    IMPRESSION:   Chronically distended mid small bowel, decreased since comparison CT  on 11/19/2018, currently without identifiable transition point to  suggest high-grade small bowel obstruction. No pneumatosis, portal  venous gas, or free air to suggest bowel ischemia or perforation.     I have personally reviewed the examination and initial interpretation  and I agree with the findings.    CARLITO BURCH MD       Discharge Medications   Discharge Medication List as of 3/10/2019  2:54 PM      START taking these medications    Details   oxyCODONE-acetaminophen (PERCOCET) 5-325 MG tablet Take 1 tablet by mouth every 4 hours as needed for severe pain, Disp-26 tablet, R-0, Local Print         CONTINUE these medications which have CHANGED    Details   dicyclomine (BENTYL) 20 MG tablet Take 1  tablet (20 mg) by mouth 4 times daily as needed (cramping), Disp-60 tablet, R-0, E-Prescribe         CONTINUE these medications which have NOT CHANGED    Details   ondansetron (ZOFRAN) 8 MG tablet Take 0.5-1 tablets (4-8 mg) by mouth every 8 hours as needed for nausea, Disp-21 tablet, R-0, E-Prescribe      simethicone 80 MG TABS Take 80 mg by mouth 4 times daily as needed for cramping , Historical         STOP taking these medications       HYDROcodone-acetaminophen (NORCO) 5-325 MG tablet Comments:   Reason for Stopping:             Allergies   Allergies   Allergen Reactions     No Clinical Screening - See Comments Other (See Comments) and Diarrhea     headache  Carrots cause gastric upset, cramping and diarrhea.     Sulfa Drugs Hives     hives     Amoxicillin Unknown and Other (See Comments)     vomiting  vomiting     Amoxicillin-Pot Clavulanate Other (See Comments) and Nausea     vomiting     Augmentin GI Disturbance, Nausea and Hives     Avelox [Moxifloxacin] Nausea and Vomiting, Unknown and Nausea     Ciprofloxacin Hives and Nausea     Codeine Nausea and Vomiting and Nausea     Ibuprofen Nausea and Vomiting     Other reaction(s): Nausea And Vomiting     Ibuprofen Sodium Hives and GI Disturbance     Quinolones      Tramadol Hives, Diarrhea, Nausea and Nausea and Vomiting     Daucus Carota      Other reaction(s): GI Upset  Other reaction(s): Abdominal pain, Diarrhea  Carrots cause gastric upset, cramping and diarrhea.     Physician Attestation   I, Mary Grace Oseguera, saw and evaluated this patient prior to discharge.  I discussed the patient with the resident/fellow and agree with plan of care as documented in the note.      I personally reviewed vital signs, medications, labs and imaging.    I personally spent 35 minutes on discharge activities.    Mary Grace Oseguera MD  Date of Service (when I saw the patient): 3/10/19

## 2019-03-11 NOTE — PROGRESS NOTES
Discharge Planning/Care Coordination    Received msg from andie RNCC stating that patient discharged on 3/10 and MD put in therapy plan for IV hydration. Wknd RNCC spoke with patient and she would like to go to the [facility]. [facility] unable to schedule patient yesterday as it was Sunday. Call placed to [facility] this am and appointment made for patient for today at 1pm. Spoke with patient via phone to update on appointments.     Kassy Toledo RN Care Coordinator   Ph: [phone]  Pager: [phone]

## 2019-03-12 ENCOUNTER — APPOINTMENT (OUTPATIENT)
Dept: GENERAL RADIOLOGY | Facility: CLINIC | Age: 44
DRG: 393 | End: 2019-03-12
Attending: EMERGENCY MEDICINE
Payer: COMMERCIAL

## 2019-03-12 ENCOUNTER — HOSPITAL ENCOUNTER (INPATIENT)
Facility: CLINIC | Age: 44
LOS: 3 days | Discharge: HOME | DRG: 393 | End: 2019-03-15
Attending: EMERGENCY MEDICINE | Admitting: INTERNAL MEDICINE
Payer: COMMERCIAL

## 2019-03-12 DIAGNOSIS — R10.9 ABDOMINAL PAIN, UNSPECIFIED ABDOMINAL LOCATION: ICD-10-CM

## 2019-03-12 DIAGNOSIS — G47.00 INSOMNIA, UNSPECIFIED TYPE: Primary | ICD-10-CM

## 2019-03-12 DIAGNOSIS — K56.609 SBO (SMALL BOWEL OBSTRUCTION) (H): ICD-10-CM

## 2019-03-12 LAB
ALBUMIN SERPL-MCNC: 3.8 G/DL (ref 3.4–5)
ALP SERPL-CCNC: 110 U/L (ref 40–150)
ALT SERPL W P-5'-P-CCNC: 35 U/L (ref 0–50)
ANION GAP SERPL CALCULATED.3IONS-SCNC: 10 MMOL/L (ref 3–14)
AST SERPL W P-5'-P-CCNC: 29 U/L (ref 0–45)
BASOPHILS # BLD AUTO: 0 10E9/L (ref 0–0.2)
BASOPHILS NFR BLD AUTO: 0.2 %
BILIRUB SERPL-MCNC: 0.3 MG/DL (ref 0.2–1.3)
BUN SERPL-MCNC: 13 MG/DL (ref 7–30)
CALCIUM SERPL-MCNC: 9.4 MG/DL (ref 8.5–10.1)
CHLORIDE SERPL-SCNC: 101 MMOL/L (ref 94–109)
CO2 SERPL-SCNC: 32 MMOL/L (ref 20–32)
CREAT SERPL-MCNC: 0.55 MG/DL (ref 0.52–1.04)
DIFFERENTIAL METHOD BLD: NORMAL
EOSINOPHIL # BLD AUTO: 0.1 10E9/L (ref 0–0.7)
EOSINOPHIL NFR BLD AUTO: 1.2 %
ERYTHROCYTE [DISTWIDTH] IN BLOOD BY AUTOMATED COUNT: 13.5 % (ref 10–15)
GFR SERPL CREATININE-BSD FRML MDRD: >90 ML/MIN/{1.73_M2}
GLUCOSE SERPL-MCNC: 90 MG/DL (ref 70–99)
HCT VFR BLD AUTO: 45.5 % (ref 35–47)
HGB BLD-MCNC: 14.4 G/DL (ref 11.7–15.7)
IMM GRANULOCYTES # BLD: 0.1 10E9/L (ref 0–0.4)
IMM GRANULOCYTES NFR BLD: 0.5 %
LIPASE SERPL-CCNC: 96 U/L (ref 73–393)
LYMPHOCYTES # BLD AUTO: 2.1 10E9/L (ref 0.8–5.3)
LYMPHOCYTES NFR BLD AUTO: 22.3 %
MCH RBC QN AUTO: 31.5 PG (ref 26.5–33)
MCHC RBC AUTO-ENTMCNC: 31.6 G/DL (ref 31.5–36.5)
MCV RBC AUTO: 100 FL (ref 78–100)
MONOCYTES # BLD AUTO: 0.7 10E9/L (ref 0–1.3)
MONOCYTES NFR BLD AUTO: 7.3 %
NEUTROPHILS # BLD AUTO: 6.4 10E9/L (ref 1.6–8.3)
NEUTROPHILS NFR BLD AUTO: 68.5 %
NRBC # BLD AUTO: 0 10*3/UL
NRBC BLD AUTO-RTO: 0 /100
PLATELET # BLD AUTO: 395 10E9/L (ref 150–450)
POTASSIUM SERPL-SCNC: 4.1 MMOL/L (ref 3.4–5.3)
PROT SERPL-MCNC: 7.8 G/DL (ref 6.8–8.8)
RBC # BLD AUTO: 4.57 10E12/L (ref 3.8–5.2)
SODIUM SERPL-SCNC: 143 MMOL/L (ref 133–144)
WBC # BLD AUTO: 9.3 10E9/L (ref 4–11)

## 2019-03-12 PROCEDURE — 99223 1ST HOSP IP/OBS HIGH 75: CPT | Mod: AI | Performed by: INTERNAL MEDICINE

## 2019-03-12 PROCEDURE — 96376 TX/PRO/DX INJ SAME DRUG ADON: CPT | Performed by: EMERGENCY MEDICINE

## 2019-03-12 PROCEDURE — 83690 ASSAY OF LIPASE: CPT | Performed by: EMERGENCY MEDICINE

## 2019-03-12 PROCEDURE — 96375 TX/PRO/DX INJ NEW DRUG ADDON: CPT | Performed by: EMERGENCY MEDICINE

## 2019-03-12 PROCEDURE — 85025 COMPLETE CBC W/AUTO DIFF WBC: CPT | Performed by: EMERGENCY MEDICINE

## 2019-03-12 PROCEDURE — 99285 EMERGENCY DEPT VISIT HI MDM: CPT | Mod: 25 | Performed by: EMERGENCY MEDICINE

## 2019-03-12 PROCEDURE — 74019 RADEX ABDOMEN 2 VIEWS: CPT

## 2019-03-12 PROCEDURE — 12000001 ZZH R&B MED SURG/OB UMMC

## 2019-03-12 PROCEDURE — 96374 THER/PROPH/DIAG INJ IV PUSH: CPT | Mod: 59 | Performed by: EMERGENCY MEDICINE

## 2019-03-12 PROCEDURE — 80053 COMPREHEN METABOLIC PANEL: CPT | Performed by: EMERGENCY MEDICINE

## 2019-03-12 PROCEDURE — 25000128 H RX IP 250 OP 636: Performed by: EMERGENCY MEDICINE

## 2019-03-12 PROCEDURE — 99285 EMERGENCY DEPT VISIT HI MDM: CPT | Mod: Z6 | Performed by: EMERGENCY MEDICINE

## 2019-03-12 RX ORDER — HYDROMORPHONE HYDROCHLORIDE 1 MG/ML
0.5 INJECTION, SOLUTION INTRAMUSCULAR; INTRAVENOUS; SUBCUTANEOUS
Status: DISCONTINUED | OUTPATIENT
Start: 2019-03-12 | End: 2019-03-13

## 2019-03-12 RX ORDER — ONDANSETRON 2 MG/ML
4 INJECTION INTRAMUSCULAR; INTRAVENOUS ONCE
Status: COMPLETED | OUTPATIENT
Start: 2019-03-12 | End: 2019-03-12

## 2019-03-12 RX ORDER — HYDROMORPHONE HYDROCHLORIDE 1 MG/ML
0.5 INJECTION, SOLUTION INTRAMUSCULAR; INTRAVENOUS; SUBCUTANEOUS ONCE
Status: COMPLETED | OUTPATIENT
Start: 2019-03-12 | End: 2019-03-12

## 2019-03-12 RX ADMIN — Medication 0.5 MG: at 20:49

## 2019-03-12 RX ADMIN — ONDANSETRON 4 MG: 2 INJECTION INTRAMUSCULAR; INTRAVENOUS at 22:12

## 2019-03-12 RX ADMIN — Medication 0.5 MG: at 22:12

## 2019-03-12 ASSESSMENT — ENCOUNTER SYMPTOMS
COUGH: 1
NUMBNESS: 1
DIARRHEA: 1
SHORTNESS OF BREATH: 1
VOMITING: 1
ABDOMINAL PAIN: 1

## 2019-03-12 ASSESSMENT — MIFFLIN-ST. JEOR: SCORE: 1221.55

## 2019-03-12 NOTE — ED TRIAGE NOTES
43 year old female with history of small bowel obstruction and discharged from the hospital on 03/10/19, presents with complaints of nausea and vomiting and worsening abdominal pain that is not relieved by pain medication.  Today patient notes chest pain.

## 2019-03-12 NOTE — LETTER
Transition Communication Hand-off for Care Transitions to Next Level of Care Provider    Name: Rachel A Gerhardt  : 1975  MRN #: [id number]  Primary Care Provider: Reji Avery     Primary Clinic: [facility] [address] [place] [address]     Reason for Hospitalization:  SBO (small bowel obstruction) (H) [K56.609]  Admit Date/Time: 3/12/2019  6:55 PM  Discharge Date: 3/15/19  Payor Source: Payor: MEDICA / Plan: MEDICA CHOICE / Product Type: Indemnity /            Reason for Communication Hand-off Referral: continuation of care    Discharge Plan: Home       Concern for non-adherence with plan of care:   Y/N n  Discharge Needs Assessment:      Already enrolled in Tele-monitoring program and name of program:    Follow-up specialty is recommended: Yes    Follow-up plan:  No future appointments.    Any outstanding tests or procedures:              Key Recommendations:      Kassy Toledo    AVS/Discharge Summary is the source of truth; this is a helpful guide for improved communication of patient story

## 2019-03-13 ENCOUNTER — APPOINTMENT (OUTPATIENT)
Dept: CT IMAGING | Facility: CLINIC | Age: 44
DRG: 393 | End: 2019-03-13
Attending: STUDENT IN AN ORGANIZED HEALTH CARE EDUCATION/TRAINING PROGRAM
Payer: COMMERCIAL

## 2019-03-13 PROBLEM — K56.609 SMALL BOWEL OBSTRUCTION (H): Status: ACTIVE | Noted: 2017-05-17

## 2019-03-13 LAB
CREAT SERPL-MCNC: 0.56 MG/DL (ref 0.52–1.04)
GFR SERPL CREATININE-BSD FRML MDRD: >90 ML/MIN/{1.73_M2}
PLATELET # BLD AUTO: 373 10E9/L (ref 150–450)

## 2019-03-13 PROCEDURE — 74177 CT ABD & PELVIS W/CONTRAST: CPT

## 2019-03-13 PROCEDURE — 87506 IADNA-DNA/RNA PROBE TQ 6-11: CPT | Performed by: STUDENT IN AN ORGANIZED HEALTH CARE EDUCATION/TRAINING PROGRAM

## 2019-03-13 PROCEDURE — 96376 TX/PRO/DX INJ SAME DRUG ADON: CPT | Performed by: EMERGENCY MEDICINE

## 2019-03-13 PROCEDURE — 25000132 ZZH RX MED GY IP 250 OP 250 PS 637: Performed by: STUDENT IN AN ORGANIZED HEALTH CARE EDUCATION/TRAINING PROGRAM

## 2019-03-13 PROCEDURE — 25800030 ZZH RX IP 258 OP 636: Performed by: STUDENT IN AN ORGANIZED HEALTH CARE EDUCATION/TRAINING PROGRAM

## 2019-03-13 PROCEDURE — 25000125 ZZHC RX 250: Performed by: STUDENT IN AN ORGANIZED HEALTH CARE EDUCATION/TRAINING PROGRAM

## 2019-03-13 PROCEDURE — 25000128 H RX IP 250 OP 636: Performed by: EMERGENCY MEDICINE

## 2019-03-13 PROCEDURE — 99233 SBSQ HOSP IP/OBS HIGH 50: CPT | Mod: GC | Performed by: INTERNAL MEDICINE

## 2019-03-13 PROCEDURE — 85049 AUTOMATED PLATELET COUNT: CPT | Performed by: STUDENT IN AN ORGANIZED HEALTH CARE EDUCATION/TRAINING PROGRAM

## 2019-03-13 PROCEDURE — 82565 ASSAY OF CREATININE: CPT | Performed by: STUDENT IN AN ORGANIZED HEALTH CARE EDUCATION/TRAINING PROGRAM

## 2019-03-13 PROCEDURE — 25000128 H RX IP 250 OP 636: Performed by: STUDENT IN AN ORGANIZED HEALTH CARE EDUCATION/TRAINING PROGRAM

## 2019-03-13 PROCEDURE — 25000132 ZZH RX MED GY IP 250 OP 250 PS 637

## 2019-03-13 PROCEDURE — 12000001 ZZH R&B MED SURG/OB UMMC

## 2019-03-13 PROCEDURE — 99207 ZZC NO CHARGE NEW CONSULT PS: CPT

## 2019-03-13 PROCEDURE — 36415 COLL VENOUS BLD VENIPUNCTURE: CPT | Performed by: STUDENT IN AN ORGANIZED HEALTH CARE EDUCATION/TRAINING PROGRAM

## 2019-03-13 RX ORDER — ONDANSETRON 4 MG/1
4 TABLET, ORALLY DISINTEGRATING ORAL EVERY 6 HOURS PRN
Status: DISCONTINUED | OUTPATIENT
Start: 2019-03-13 | End: 2019-03-15 | Stop reason: HOSPADM

## 2019-03-13 RX ORDER — ONDANSETRON 2 MG/ML
4 INJECTION INTRAMUSCULAR; INTRAVENOUS EVERY 6 HOURS PRN
Status: DISCONTINUED | OUTPATIENT
Start: 2019-03-13 | End: 2019-03-15 | Stop reason: HOSPADM

## 2019-03-13 RX ORDER — LIDOCAINE 4 G/G
1 PATCH TOPICAL
Status: DISCONTINUED | OUTPATIENT
Start: 2019-03-13 | End: 2019-03-15 | Stop reason: HOSPADM

## 2019-03-13 RX ORDER — LIDOCAINE 40 MG/G
CREAM TOPICAL
Status: DISCONTINUED | OUTPATIENT
Start: 2019-03-13 | End: 2019-03-15 | Stop reason: HOSPADM

## 2019-03-13 RX ORDER — NALOXONE HYDROCHLORIDE 0.4 MG/ML
.1-.4 INJECTION, SOLUTION INTRAMUSCULAR; INTRAVENOUS; SUBCUTANEOUS
Status: DISCONTINUED | OUTPATIENT
Start: 2019-03-13 | End: 2019-03-15 | Stop reason: HOSPADM

## 2019-03-13 RX ORDER — SODIUM CHLORIDE 9 MG/ML
INJECTION, SOLUTION INTRAVENOUS CONTINUOUS
Status: DISCONTINUED | OUTPATIENT
Start: 2019-03-13 | End: 2019-03-13

## 2019-03-13 RX ORDER — HYDROMORPHONE HYDROCHLORIDE 2 MG/1
2 TABLET ORAL EVERY 4 HOURS PRN
Status: DISCONTINUED | OUTPATIENT
Start: 2019-03-13 | End: 2019-03-15 | Stop reason: HOSPADM

## 2019-03-13 RX ORDER — OXYCODONE HYDROCHLORIDE 5 MG/1
5 TABLET ORAL EVERY 6 HOURS PRN
Status: DISCONTINUED | OUTPATIENT
Start: 2019-03-13 | End: 2019-03-13

## 2019-03-13 RX ORDER — AMOXICILLIN 250 MG
1 CAPSULE ORAL 2 TIMES DAILY PRN
Status: DISCONTINUED | OUTPATIENT
Start: 2019-03-13 | End: 2019-03-15 | Stop reason: HOSPADM

## 2019-03-13 RX ORDER — DICYCLOMINE HCL 20 MG
20 TABLET ORAL 4 TIMES DAILY PRN
Status: DISCONTINUED | OUTPATIENT
Start: 2019-03-13 | End: 2019-03-13

## 2019-03-13 RX ORDER — DICYCLOMINE HCL 20 MG
20 TABLET ORAL
Status: DISCONTINUED | OUTPATIENT
Start: 2019-03-13 | End: 2019-03-15 | Stop reason: HOSPADM

## 2019-03-13 RX ORDER — HYDROMORPHONE HYDROCHLORIDE 1 MG/ML
.3-.5 INJECTION, SOLUTION INTRAMUSCULAR; INTRAVENOUS; SUBCUTANEOUS
Status: DISCONTINUED | OUTPATIENT
Start: 2019-03-13 | End: 2019-03-13

## 2019-03-13 RX ORDER — AMOXICILLIN 250 MG
2 CAPSULE ORAL 2 TIMES DAILY PRN
Status: DISCONTINUED | OUTPATIENT
Start: 2019-03-13 | End: 2019-03-15 | Stop reason: HOSPADM

## 2019-03-13 RX ORDER — ACETAMINOPHEN 500 MG
1000 TABLET ORAL 3 TIMES DAILY
Status: DISCONTINUED | OUTPATIENT
Start: 2019-03-13 | End: 2019-03-15 | Stop reason: HOSPADM

## 2019-03-13 RX ORDER — SIMETHICONE 80 MG
80 TABLET,CHEWABLE ORAL 4 TIMES DAILY PRN
Status: DISCONTINUED | OUTPATIENT
Start: 2019-03-13 | End: 2019-03-15 | Stop reason: HOSPADM

## 2019-03-13 RX ORDER — HEPARIN SODIUM 5000 [USP'U]/.5ML
5000 INJECTION, SOLUTION INTRAVENOUS; SUBCUTANEOUS EVERY 12 HOURS
Status: DISCONTINUED | OUTPATIENT
Start: 2019-03-13 | End: 2019-03-13

## 2019-03-13 RX ORDER — IOPAMIDOL 755 MG/ML
69 INJECTION, SOLUTION INTRAVASCULAR ONCE
Status: COMPLETED | OUTPATIENT
Start: 2019-03-13 | End: 2019-03-13

## 2019-03-13 RX ADMIN — Medication 0.5 MG: at 02:25

## 2019-03-13 RX ADMIN — SODIUM CHLORIDE, PRESERVATIVE FREE 67 ML: 5 INJECTION INTRAVENOUS at 09:48

## 2019-03-13 RX ADMIN — HYDROMORPHONE HYDROCHLORIDE 2 MG: 2 TABLET ORAL at 16:06

## 2019-03-13 RX ADMIN — SODIUM CHLORIDE: 9 INJECTION, SOLUTION INTRAVENOUS at 03:14

## 2019-03-13 RX ADMIN — SODIUM CHLORIDE: 9 INJECTION, SOLUTION INTRAVENOUS at 16:11

## 2019-03-13 RX ADMIN — ACETAMINOPHEN 1000 MG: 500 TABLET, FILM COATED ORAL at 20:04

## 2019-03-13 RX ADMIN — IOPAMIDOL 69 ML: 755 INJECTION, SOLUTION INTRAVENOUS at 09:48

## 2019-03-13 RX ADMIN — ENOXAPARIN SODIUM 40 MG: 40 INJECTION SUBCUTANEOUS at 10:25

## 2019-03-13 RX ADMIN — Medication 0.5 MG: at 05:31

## 2019-03-13 RX ADMIN — Medication 0.5 MG: at 00:14

## 2019-03-13 RX ADMIN — HYDROMORPHONE HYDROCHLORIDE 2 MG: 2 TABLET ORAL at 23:56

## 2019-03-13 RX ADMIN — HYDROMORPHONE HYDROCHLORIDE 2 MG: 2 TABLET ORAL at 20:04

## 2019-03-13 RX ADMIN — DICYCLOMINE HYDROCHLORIDE 20 MG: 20 TABLET ORAL at 23:55

## 2019-03-13 RX ADMIN — ACETAMINOPHEN 1000 MG: 500 TABLET, FILM COATED ORAL at 07:32

## 2019-03-13 RX ADMIN — HYDROMORPHONE HYDROCHLORIDE 2 MG: 2 TABLET ORAL at 12:13

## 2019-03-13 RX ADMIN — Medication 0.5 MG: at 08:34

## 2019-03-13 ASSESSMENT — ACTIVITIES OF DAILY LIVING (ADL)
ADLS_ACUITY_SCORE: 10

## 2019-03-13 ASSESSMENT — MIFFLIN-ST. JEOR
SCORE: 1213.79
SCORE: 1213.79

## 2019-03-13 NOTE — PROVIDER NOTIFICATION
Web paged M1 intern per pt request. New order for q4 oral dilaudid provided 2hr relief. Pt is now very uncomfortable. Pt declining tylenol as it upsets her stomach. Pt is seeking improved pain control as non-pharmaceutical interventions do not provide adequate relief.

## 2019-03-13 NOTE — ED PROVIDER NOTES
History     Chief Complaint   Patient presents with     Abdominal Pain     The history is provided by the patient and medical records.     Rachel A Gerhardt is a 43 year old female with a medical history significant for cervical cancer s/p chemo and radiation therapy, chronic SBO due to anastomotic stricture s/p small bowel resection without ostomy (5/2017), chronic abdominal pain, opiate dependence, asthma, and ovarian cancer who presents to the Emergency Department for evaluation of abdominal pain.     Per chart review, patient was hospitalized here from 3/7/19-3/10/19 for evaluation of recurrent partial SBO and given IVF fluids, after being treated and discharged from [facility] from 3/3/19-3/6/19 for SBO. She was recommended IVF infusions in clinic 3 times weekly.     Here, patient reports that she was discharged here on Sunday for SBO and was scheduled for IVF infusion yesterday; however, missed it due to severe abdominal pain. She notes IVF infusions scheduled for MWF. She notes of vomiting, chest pain and shortness of breath associated with abdominal pain, hands, lips, and nose numbness, cough, and diarrhea. She denies a history of heart problems or DVT. She is not anticoagulated. She denies a history of C diff. She denies a history of asthma or COPD. She denies having a hernia.     Past Medical History:   Diagnosis Date     Asthma      Cancer (H)     Per patient OBGYN, cerivical cancer     Cervical cancer (H)      Other chronic pain      Ovarian cancer (H)      Substance abuse (H)     Outside records indicate past history of narcotics abuse or dependence, but patient denies.       Past Surgical History:   Procedure Laterality Date     COLONOSCOPY N/A 5/3/2018    Procedure: COLONOSCOPY;  sigmoidoscopy;  Surgeon: Omero Vigil MD;  Location: [facility] OR     COLONOSCOPY WITH CO2 INSUFFLATION N/A 4/30/2018    Procedure: COLONOSCOPY WITH CO2 INSUFFLATION;  Colonoscopy;  Surgeon: Omero Vigil  MD Lauro;  Location: [facility] OR     COMBINED CYSTOSCOPY, INSERT STENT URETER(S) Bilateral 5/18/2017    Procedure: COMBINED CYSTOSCOPY, INSERT STENT URETER(S);  Cystoscopy with Bilateral Stent,;  Surgeon: Rene Calero MD;  Location: [facility] OR     ENT SURGERY  2009    mastoid, sinus     ENTEROSCOPY SMALL BOWEL N/A 6/18/2018    Procedure: ENTEROSCOPY SMALL BOWEL;  Lower bowel Retrograde Enteroscopy with Balloon Dilation .;  Surgeon: Omero Vigil MD;  Location: [facility] OR     EXAM UNDER ANESTHESIA, INSERT ALEX SLEEVE, UTERINE PLACEMENT OF TANDEM AND RING FOR RAD, ULTRASOUND N/A 12/14/2015    Procedure: EXAM UNDER ANESTHESIA, INSERT ALEX SLEEVE, UTERINE PLACEMENT OF TANDEM AND RING FOR RADIATION, ULTRASOUND GUIDED;  Surgeon: Abby Tony MD;  Location: [facility] OR     INSERT TANDEM AND CESIUM APPLICATOR CERVIX, ULTRASOUND GUIDED N/A 12/17/2015    Procedure: INSERT TANDEM AND CESIUM APPLICATOR CERVIX, ULTRASOUND GUIDED;  Surgeon: Kika Wood MD;  Location: [facility] OR     KNEE SURGERY       LAPAROTOMY EXPLORATORY N/A 5/18/2017    Procedure: LAPAROTOMY EXPLORATORY;   Exploratry Laparotomy, Small Bowel Resection with anastomosis, Flexible Sigmoidoscopy;  Surgeon: Jennifer Goodwin MD;  Location: [facility] OR     PICC INSERTION Right 04/29/2017    4fr SL BioFlo PICC, 37cm (3cm external) in the R basilic vein w/ tip in the mid SVC.     PICC INSERTION Right 03/29/2018    4Fr - 40cm (4cm external), R lateral brachial vein, Low SVC     RESECT SMALL BOWEL WITHOUT OSTOMY N/A 5/18/2017    Procedure: RESECT SMALL BOWEL WITHOUT OSTOMY;;  Surgeon: Jennifer Goodwin MD;  Location: [facility]     SIGMOIDOSCOPY FLEXIBLE N/A 5/18/2017    Procedure: SIGMOIDOSCOPY FLEXIBLE;;  Surgeon: Jennifer Goodwin MD;  Location: [facility] OR       Family History   Problem Relation Age of Onset     Diabetes Mother      Ovarian Cancer No family hx of      Uterine Cancer No family hx of      Cervical Cancer No family hx of      Breast Cancer No family hx of         Social History     Tobacco Use     Smoking status: Light Tobacco Smoker     Packs/day: 0.25     Years: 12.00     Pack years: 3.00     Types: Cigarettes     Smokeless tobacco: Never Used     Tobacco comment: pt smoking about 4 cigs daily   Substance Use Topics     Alcohol use: No     Alcohol/week: 0.0 oz     Comment: None per pt       Current Facility-Administered Medications   Medication     HYDROmorphone (PF) (DILAUDID) injection 0.5 mg     Current Outpatient Medications   Medication     dicyclomine (BENTYL) 20 MG tablet     ondansetron (ZOFRAN) 8 MG tablet     oxyCODONE-acetaminophen (PERCOCET) 5-325 MG tablet     simethicone 80 MG TABS        Allergies   Allergen Reactions     No Clinical Screening - See Comments Other (See Comments) and Diarrhea     headache  Carrots cause gastric upset, cramping and diarrhea.     Sulfa Drugs Hives     hives     Amoxicillin Unknown and Other (See Comments)     vomiting  vomiting     Amoxicillin-Pot Clavulanate Other (See Comments) and Nausea     vomiting     Augmentin GI Disturbance, Nausea and Hives     Avelox [Moxifloxacin] Nausea and Vomiting, Unknown and Nausea     Ciprofloxacin Hives and Nausea     Codeine Nausea and Vomiting and Nausea     Ibuprofen Nausea and Vomiting     Other reaction(s): Nausea And Vomiting     Ibuprofen Sodium Hives and GI Disturbance     Quinolones      Tramadol Hives, Diarrhea, Nausea and Nausea and Vomiting     Daucus Carota      Other reaction(s): GI Upset  Other reaction(s): Abdominal pain, Diarrhea  Carrots cause gastric upset, cramping and diarrhea.       I have reviewed the Medications, Allergies, Past Medical and Surgical History, and Social History in the Epic system.    Review of Systems   Respiratory: Positive for cough and shortness of breath (Associated with abdominal pain).    Cardiovascular: Positive for chest pain (Associated with abdominal pain).   Gastrointestinal: Positive for abdominal pain, diarrhea and vomiting.  "  Neurological: Positive for numbness (Hands, lips, and nose).   All other systems reviewed and are negative.      Physical Exam   BP: 117/79  Pulse: 109  Temp: 97.8  F (36.6  C)  Resp: 22  Height: 172.7 cm (5' 8\")  Weight: 52.3 kg (115 lb 5 oz)  SpO2: 99 %      Physical Exam   Constitutional: She is oriented to person, place, and time. No distress.   HENT:   Head: Normocephalic and atraumatic.   Eyes: Conjunctivae are normal. Pupils are equal, round, and reactive to light.   Neck: Normal range of motion. Neck supple.   Cardiovascular: Normal rate and intact distal pulses.   Pulmonary/Chest: Effort normal. No respiratory distress. She has no wheezes. She exhibits no tenderness.   Abdominal: Soft. There is no tenderness. There is no rebound and no guarding.   Musculoskeletal: Normal range of motion.   Neurological: She is alert and oriented to person, place, and time.   Skin: Skin is warm and dry. She is not diaphoretic.   Psychiatric: She has a normal mood and affect. Her behavior is normal. Thought content normal.   Nursing note and vitals reviewed.      ED Course   7:29 PM  The patient was seen and examined by Jude Waddell MD in Room ED03.        Procedures             Critical Care time:  none             Labs Ordered and Resulted from Time of ED Arrival Up to the Time of Departure from the ED   CBC WITH PLATELETS DIFFERENTIAL   COMPREHENSIVE METABOLIC PANEL            Assessments & Plan (with Medical Decision Making)   1.  Partial Small Bowel Obstruction    43-year-old female with a prior history of cervical cancer, small bowel resection with anastomotic stricture who presents with abdominal pain and vomiting.  She was just discharged from the hospital with a partial small bowel obstruction where she was treated conservatively and had improvement in her symptoms.  She still complained of pain and was scheduled to follow-up with the infusion clinic Monday Wednesday and Friday but she has not done this yet.  An " abdominal x-ray showed dilated loops of bowel consistent with her prior small bowel obstruction.  Lab evaluation including CBC CMP and lipase were normal.  Pain was improved with IV Dilaudid.  Jenni will be admitted to the medicine service for management of her recurrent small bowel obstruction.     I have reviewed the nursing notes.    I have reviewed the findings, diagnosis, plan and need for follow up with the patient.       Medication List      There are no discharge medications for this visit.         Final diagnoses:   None     IDayron, am serving as a trained medical scribe to document services personally performed by Jude Waddell MD, based on the provider's statements to me.      Jude MARCANO MD, was physically present and have reviewed and verified the accuracy of this note documented by Dayron Esteban.     3/12/2019   [facility], [place], EMERGENCY DEPARTMENT     Jude Waddell MD  03/13/19 7131

## 2019-03-13 NOTE — PROGRESS NOTES
CLINICAL NUTRITION SERVICES - ASSESSMENT NOTE     Nutrition Prescription    RECOMMENDATIONS FOR MDs/PROVIDERS TO ORDER:  1. Patient is a candidate for TPN support due to FTT / inability to tolerate large volume, ongoing wt loss. Once able to start TPN, consult RD    2. If patient needs to follow full liquids with protein shakes upon discharge she will needs to augment 4 cans of high protein oral nutrition supplements daily to her diet for nutrition maintenance + meals     Malnutrition Status:    Severe malnutrition in the context of chronic condition    Recommendations already ordered by Registered Dietitian (RD):  Ordered high protein Gelatin salad with meals  Magic cup in between meals   Boost plus shake at bedtime  Calorie count     Future/Additional Recommendations:  Pending calorie count data        REASON FOR ASSESSMENT  Rachel A Gerhardt is a/an 43 year old female assessed by the dietitian for Provider Order - History of severe protein malnutrition      Chart reviewed:  History of cervical cancer (s/p chemo-radiation) 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      --Discharge from [facility] on 3/10/19 for management of SBO, SBO managed conservatively. Now with abdominal X-ray demonstrating slightly increased diffuse luminal distension without focal point of obstruction.       NUTRITION HISTORY  Obtained from patient:  Per patient, has gastric outlet obstruction associated with mass in her stomach. She is unable to tolerate much of anything. Eats very soft foods such as mashed potatoes and soft noodles. Unable to tolerate eggs or any other protein base meals. Can drink some milk. Unable to drink nutrition supplements due to volume. Patient notes  "severe nausea and emesis and abdominal pain post oral supplement intake.     --Per patient, has been having abdominal pain with N/V since discharge home last from hospital on 3/10.     CURRENT NUTRITION ORDERS  Diet: Full Liquid  Intake/Tolerance: Poor     LABS  Urine ketones: N/A on admit     MEDICATIONS  Medications reviewed    ANTHROPOMETRICS  Height: 172.7 cm (5' 8\")  Most Recent Weight: 51.5 kg (113 lb 9.6 oz) - most recent wt on 3/13/19   IBW: 64.2 kg ( 80% IBW)   BMI: 17.27 kg /m2 -  Underweight BMI <18.5  Weight History: 1.9 kg wt loss over the past week ( 3.5% net wt loss )   Wt Readings from Last 10 Encounters:   03/13/19 51.5 kg (113 lb 9.6 oz)   03/10/19 53.7 kg (118 lb 4.8 oz)   11/19/18 49.9 kg (110 lb)   11/13/18 54.9 kg (121 lb 1.6 oz)   07/17/18 50.1 kg (110 lb 6.4 oz)   06/19/18 56.6 kg (124 lb 11.2 oz)   05/23/18 56.9 kg (125 lb 8 oz)   05/09/18 55.3 kg (122 lb)   05/02/18 55.5 kg (122 lb 4.8 oz)   04/30/18 55.4 kg (122 lb 2.2 oz)       Dosing Weight: 52 kg dry admit wt     ASSESSED NUTRITION NEEDS  Estimated Energy Needs: 0437-9659 kcals/day (30 - 35 kcals/kg )  Justification: Increased needs, underwt   Estimated Protein Needs: 62-78  grams protein/day (1.2 - 1.5 +  grams of pro/kg)  Justification: Repletion  Estimated Fluid Needs:  (1 mL/kcal)   Justification: Maintenance    PHYSICAL FINDINGS  See malnutrition section below.    MALNUTRITION  % Intake: </= 50% for >/= 5 days (severe)  % Weight Loss: > 2% in 1 week (severe)  Subcutaneous Fat Loss: Facial region:  Moderate  Muscle Loss: Thoracic region (clavicle, acromium bone, deltoid, trapezius, pectoral): Mild / moderate   Fluid Accumulation/Edema: None noted  Malnutrition Diagnosis: Severe malnutrition in the context of chronic condition    NUTRITION DIAGNOSIS  Inadequate oral intake related to N/V/abdominal pain associated with partial small bowel obstruction, hindering patients ability to tolerate PO  as evidenced by recent severe wt loss, " evidence of fat / muscle loss      INTERVENTIONS  Implementation  Nutrition Education: Role of RD in nutrition POC, encouraged oral nutrition drinks if able to tolerate  Medical food supplement therapy     Goals  Total avg nutritional intake to meet a minimum of 30 kcal/kg and 1.2 gm PRO/kg daily (per dosing wt 52 kg recent wt ).     Monitoring/Evaluation  Progress toward goals will be monitored and evaluated per protocol.    Annabella Sales RD/CHRISTOPHER  Pager [phone]

## 2019-03-13 NOTE — PLAN OF CARE
Pt came from the ED around 0130. Pt came into the ED because of abdominal pain. A CT scan showed she had a small bowel obstruction. Pt is A&Ox4. VSS on RA. Pt is up independently. Pt's abdominal pain is controlled with 0.5 of IV Dilaudid, pt also thinks that walking helps with her pain, pt walked around the unit x2. Pt has 1 PIV running NS at 100mL/hr. On a full liquid diet. Pt has intermittent nausea but didn't c/o of any. Pt says that IV Zofran helps alleviate her nausea. Will continue to monitor and follow POC.

## 2019-03-13 NOTE — PROVIDER NOTIFICATION
Pt was visited by pain service pharmacists to discuss pain control.     Web paged [facility] intern that pt is requesting her last dose of IV dilaudid PRN q3 as she was due at 11:30 and it was discontinued. Pt also stated that oral oxycodone does not adequately cover her pain.     Team called back and said they would switch it to oral dilaudid.

## 2019-03-13 NOTE — CONSULTS
Inpatient Pain Management Service: Consultation    Patient: Rachel A Gerhardt  Date of Consult: March 13, 2019 Admission Date:3/12/2019   * No surgery found *     Chief Pain Endorsement:  Abdominal pain    Recommendations were discussed and relayed to Dr. John Kaiser ([facility])   Plan was reviewed by the Inpatient Pain Service and staff attending, Dr. Duane Mcmahan        1. Discontinue IV hydromorphone.  2. Start hydromorphone 2mg po q4h prn moderate to severe pain while the work up is ongoing (CT results pending).  Patient does not feel oxycodone is helpful for her.    If the work up shows no new organic cause of pain would resume home hydrocodone - acetaminophen 5/325mg 1 tab po 4 times a day as needed.  3. Continue acetaminophen 1000mg po 3 times a day scheduled.  4. Patient declines topical patches.  5. Has taken gabapentin and pregabalin and they both make her dizzy.  6. Declines NSAIDs as they cause N/V.  7. Bowel regimen per primary team to prevent opioid induced constipation.    Pain Service will Sign Off at this time.     Thank you for consulting the Inpatient Pain Management Service.   The above recommendations are to be acted upon at the primary team s discretion.    To reach us:  Mon - Friday 8 AM - 3 PM: Pager [phone] (Text Page)  After hours, weekends and holidays: Primary service should call [phone] for the on-call pain specialist      1. Multiple recent admissions for abdominal pain.  Has a chronic partial SBO that is unchanged.  2. History of chronic abdominal pain, patient is going to [facility] at the end of the month for a second opinion.  3. History of cervical and ovarian cancer, diagnosed in 8/2015.  4. History of asthma.  5. History of substance abuse, has been been kicked out of a pain clinic in the past.      -- Outpatient opioid requirements prior to admission:   -- Oxycodone - acetaminophen 5/325mg tabs #26 filled 3/10/19 for a 5 day supply (discharge Rx from [facility])  --  Hydrocodone - acetaminophen 5/325mg tabs #120 filled 2/18/19 for a 30 day supply.    Primary Care Provider: Reji Avery, prescribes hydrocodone - acetaminophen for patient.  Chronic Pain Provider: none  [place]  reviewed    History of Present Illness:  Rachel A Gerhardt is a 43 year old female who was seen today (March 13, 2019) with the primary team and she reports that her pain is located in the abdomen and is described as cramping that comes in waves.  The pain is improved with IV hydromorphone but that only lasts for about 1 hour and the pain returns.  Patient tolerating a full liquid diet, reports some nausea with breakfast.  Will discontinue IV opioids and start oral opioids while the work up is ongoing.  Patient declines or has tried and failed non opioid adjuvants.  This is the third hospitalization for this abdominal pain flare.     CAPA (Clinically Aligned Pain Assessment)  Comfort (How is your pain?): Intolerable  Change in Pain (Since your last medication/intervention?): About the same  Pain Control (How are your pain treatments working?): Partially effective pain control  Functioning (Are you able to do activities to get better?) : Pain keeps me from doing most of what I need to do  Sleep (Does your pain management allow you to sleep or rest?): Awake with pain most of the night     FUNCTIONAL STATUS:  Change:      Unchanged  Oral intake:     Full liquids  Bowel function:    Patient reports diarrhea  Activity level:     Ambulating in young after receiving pain meds  Mood:      Tired, poor energy      HEALTH & LIFESTYLE PRACTICES:   Tobacco:  reports that she has been smoking cigarettes.  She has a 3.00 pack-year smoking history. she has never used smokeless tobacco.  Alcohol:  reports that she does not drink alcohol.  Illicit drugs:  reports that she does not use drugs.    ALLERGIES:    Allergies   Allergen Reactions     Sulfa Drugs Hives     Ibuprofen Nausea and Vomiting and Hives     Unknown if  reaction hives or N/V     No Clinical Screening - See Comments Other (See Comments) and Diarrhea     headache  Carrots cause gastric upset, cramping and diarrhea.     Ciprofloxacin Hives and Nausea     Quinolones      Tramadol Hives, Diarrhea, Nausea and Nausea and Vomiting     Amoxicillin Nausea and Vomiting     Augmentin Nausea, Hives and GI Disturbance     Patient tolerated course of zosyn in 5/2018     Avelox [Moxifloxacin] Nausea and Vomiting     Codeine Nausea and Vomiting     Daucus Carota      Other reaction(s): GI Upset  Other reaction(s): Abdominal pain, Diarrhea  Carrots cause gastric upset, cramping and diarrhea.        INPATIENT MEDICATIONS PERTINENT TO PAIN CONSULT:   Medications related to Pain Management (From now, onward)    Start     Dose/Rate Route Frequency Ordered Stop    03/13/19 0800  acetaminophen (TYLENOL) tablet 1,000 mg      1,000 mg Oral 3 TIMES DAILY 03/13/19 0228      03/13/19 0146  HYDROmorphone (PF) (DILAUDID) injection 0.3-0.5 mg      0.3-0.5 mg Intravenous EVERY 3 HOURS PRN 03/13/19 0146      03/13/19 0138  dicyclomine (BENTYL) tablet 20 mg      20 mg Oral 4 TIMES DAILY PRN 03/13/19 0138      03/13/19 0138  simethicone (MYLICON) chewable tablet 80 mg      80 mg Oral 4 TIMES DAILY PRN 03/13/19 0138      03/13/19 0138  lidocaine 1 % 0.1-1 mL      0.1-1 mL Other EVERY 1 HOUR PRN 03/13/19 0138      03/13/19 0138  lidocaine (LMX4) cream       Topical EVERY 1 HOUR PRN 03/13/19 0138      03/13/19 0138  senna-docusate (SENOKOT-S/PERICOLACE) 8.6-50 MG per tablet 1 tablet      1 tablet Oral 2 TIMES DAILY PRN 03/13/19 0138      03/13/19 0138  senna-docusate (SENOKOT-S/PERICOLACE) 8.6-50 MG per tablet 2 tablet      2 tablet Oral 2 TIMES DAILY PRN 03/13/19 0138            LABORATORY VALUES:   Recent Labs   Lab 03/13/19  0634 03/12/19  1910 03/10/19  0603  03/08/19  0609 03/07/19  0223   CR 0.56 0.55 0.59   < > 0.52 0.59   WBC  --  9.3  --   --  9.7 7.6   HGB  --  14.4  --   --  12.9 12.4   AST   --  29  --   --  11 13   ALT  --  35  --   --  22 20   ALKPHOS  --  110  --   --  84 81   LIPASE  --  96  --   --   --  124    < > = values in this interval not displayed.       -----------------------------------------------------------------------------------  Ariadna Heath PharmD, MS  Inpatient Pain Service    To reach us:  Mon - Friday 8 AM - 3 PM: Pager [phone] (Text Page)  After hours, weekends and holidays: Primary service should call [phone] for the on-call pain specialist    Helpful Resources:  Getting Rid of Unwanted Medications (printable PDF for patients)   Opioid Overdose Prevention Toolkit (printable PDF for patients)

## 2019-03-13 NOTE — H&P
[facility], [place]    History and Physical - [facility]        Date of Admission:  3/12/2019    Assessment & Plan   Rachel A Gerhardt is a 43 year old female admitted on 3/12/2019. 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     # Acute on chronic abdominal pain  # Nausea and emesis   # Hx of cervical cancer s/p chemo-radiation c/b recurrent SBO s/p ex lap (2017)   # Known ileoileal anastomotic stricture s/p dilation 6/2018  Patient admitted from 3/7-3/10/2019 for management of partial SBO after being discharged from [facility] from 3/3-3/6/2019 for management of same. SBO was managed conservatively and patient declined decompression and/or diversion options. The GI service evaluated the patient during that admission and did not recommend further dilations as she was having bowel movements. Her abdominal pain has not worsened and she continues to have multiple loose stools daily, which sounds like has been ongoing for at least a year. Continues to have nausea and vomiting, worse after she eats. Abdominal X-ray on admission with slightly increased diffuse luminal distension but no focal point of obstruction. Labs (CMP/CBC) were unremarkable. Colorectal Surgery will no longer evaluate the patient given history of noncompliance. Patient plans to go to [facility] at end of the month for a second opinion.   -Will hold off on GI consult for now as patient was just  evaluated with the recommendations for conservative management. She has no new symptoms.   -Could consider placing NG tube for decompression, but patient declined during previous admission. She has a history of anxiety with NG tube placements in the past requiring frequent ativan administration.   -Encourage ambulation   -Attempt to limit narcotic use as able   -Full liquid diet as noted below   -Continue PTA simethicone 80 mg QID and Bentyl 20 mg QID   -Zofran PRN     # Pain Management   # Opiate use disorder   Previously on Norco (prescribed by her PCP) and discharged home on 3/10 with a small supply of percocet, which she has been taking every 4 hours. Fired from pain clinic in the past.   -Inpatient Pain consult   -Scheduled tylenol 1000 mg TID  -Dilaudid IV 0.3-0.5 mg q3H prn   -Attempt to wean to oral narcotics as soon as able     # Mental Health   Concern for depression during patient's last admission, declined psych consult. In the past was on Cymbalta and Lyrica. Of note, patient's abuse screen was positive on admission.   -Continue to monitor - consider outpatient Psychiatry referral if patient is willing     # Severe protein malnutrition   In the past, patient has required TPN via PICC line. She no longer has a PICC line. Albumin within normal limits on admission.   -Nutrition consult      Diet: Full liquid diet   Fluids:  ml/hr   DVT Prophylaxis: Enoxaparin (Lovenox) subcutaneous, encuorage ambulation   Godwin Catheter: not present  Code Status: Full     Disposition Plan   Expected discharge: 2 - 3 days, recommended to prior living arrangement once adequate pain management/ tolerating PO medications.  Entered: Tess Richard MD 03/12/2019, 11:16 PM       The patient's care was discussed with the Attending Physician, Dr. Chaves .    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     Patient was admitted from 3/7-3/10/2019 for management of a partial SBO after being discharged from [facility] from 3/3-3/6/2019 for management of same. GI was consulted and recommended conservative management as repeat retrograde enteroscopy dilation would have no benefit given patient was having bowel movements and passing gas. Patient declined decompression and/or diversion options. Seen by colorectal surgery in the past but has been \"fired\" from their service as she previously left [facility] with a PICC line in place. PICC line has since been removed. At the time of discharge, patient was tolerating soft foods. Plan was for patient to receive IV fluid infusions as an outpatient on M/W/F. Patient did not receive infusions on 3/11 as she did not feel well.     Patient reports that she was \"begging\" not to be discharged from the hospital on 3/10 as she continued to have abdominal pain. She started to vomit that evening when she returned home. Vomit was NBNB. She has vomited about 4-5x daily since. She typically vomits after she eats. She reports that her abdominal pain is about the same as when she was discharged. States that she has \"episodes\" of abdominal pain and vomiting that typically last a few days to a few weeks. Continues to have about " "10 episodes of diarrhea daily. Has had diarrhea for \"years.\" C. Diff was negative during most recent admission. Describes her abdominal pain as if she is in \"labor.\" Pain comes in \"waves\" and she describes it as a cramping pain. No blood in stools. Able to tolerate soft foods (oatmeal, mac&cheese) as well as water and boost. No fevers or rashes. Has developed a cough since her discharge. Plans to go to [facility] at the end of the month for a second opinion.     In terms of her pain control, patient was previously on Norco (5/325 qid). She was discharged from the hospital on percocet with plan for patient to follow-up with her PCP on 3/15. Her PCP was prescribing her Norco as patient was also dismissed from her outpatient pain clinic. She reports she has been taking percocet every 4 hours since her discharge. She has also taken Bentyl 20 mg 3x, simethicone 80 mg twice daily, and zofran 8 mg every 8 hours since her discharge.     In the ED, patient was afebrile, initially tachycardic to 109, but otherwise hemodynamically stable. Labs (CMP, CBC, and lipase) were unremarkable. Abdominal X-ray showed diffuse luminal distension slightly increased from previous without evidence of pneumatosis or portal venous gas. She was given dilaudid 0.5 mg x3 and 4 mg of zofran.      Review of Systems    The 10 point Review of Systems is negative other than noted in the HPI.     Past Medical History    I have reviewed this patient's medical history and updated it with pertinent information if needed.   Past Medical History:   Diagnosis Date     Asthma      Cancer (H)     Per patient OBGYN, cerivical cancer     Cervical cancer (H)      Other chronic pain      Ovarian cancer (H)      Substance abuse (H)     Outside records indicate past history of narcotics abuse or dependence, but patient denies.       Past Surgical History   I have reviewed this patient's surgical history and updated it with pertinent information if needed.  Past " Surgical History:   Procedure Laterality Date     COLONOSCOPY N/A 5/3/2018    Procedure: COLONOSCOPY;  sigmoidoscopy;  Surgeon: Omero Vigil MD;  Location: [facility] OR     COLONOSCOPY WITH CO2 INSUFFLATION N/A 4/30/2018    Procedure: COLONOSCOPY WITH CO2 INSUFFLATION;  Colonoscopy;  Surgeon: Omero Vigil MD;  Location: [facility] OR     COMBINED CYSTOSCOPY, INSERT STENT URETER(S) Bilateral 5/18/2017    Procedure: COMBINED CYSTOSCOPY, INSERT STENT URETER(S);  Cystoscopy with Bilateral Stent,;  Surgeon: Rene Calero MD;  Location: [facility] OR     ENT SURGERY  2009    mastoid, sinus     ENTEROSCOPY SMALL BOWEL N/A 6/18/2018    Procedure: ENTEROSCOPY SMALL BOWEL;  Lower bowel Retrograde Enteroscopy with Balloon Dilation .;  Surgeon: Omero Vigil MD;  Location: [facility] OR     EXAM UNDER ANESTHESIA, INSERT ALEX SLEEVE, UTERINE PLACEMENT OF TANDEM AND RING FOR RAD, ULTRASOUND N/A 12/14/2015    Procedure: EXAM UNDER ANESTHESIA, INSERT ALEX SLEEVE, UTERINE PLACEMENT OF TANDEM AND RING FOR RADIATION, ULTRASOUND GUIDED;  Surgeon: Abby Tony MD;  Location: [facility] OR     INSERT TANDEM AND CESIUM APPLICATOR CERVIX, ULTRASOUND GUIDED N/A 12/17/2015    Procedure: INSERT TANDEM AND CESIUM APPLICATOR CERVIX, ULTRASOUND GUIDED;  Surgeon: Kika Wood MD;  Location: [facility] OR     KNEE SURGERY       LAPAROTOMY EXPLORATORY N/A 5/18/2017    Procedure: LAPAROTOMY EXPLORATORY;   Exploratry Laparotomy, Small Bowel Resection with anastomosis, Flexible Sigmoidoscopy;  Surgeon: Jennifer Goodwin MD;  Location: [facility] OR     PICC INSERTION Right 04/29/2017    4fr SL BioFlo PICC, 37cm (3cm external) in the R basilic vein w/ tip in the mid SVC.     PICC INSERTION Right 03/29/2018    4Fr - 40cm (4cm external), R lateral brachial vein, Low SVC     RESECT SMALL BOWEL WITHOUT OSTOMY N/A 5/18/2017    Procedure: RESECT SMALL BOWEL WITHOUT OSTOMY;;  Surgeon: Jennifer Goodwin MD;  Location: [facility] OR     SIGMOIDOSCOPY FLEXIBLE N/A 5/18/2017  "   Procedure: SIGMOIDOSCOPY FLEXIBLE;;  Surgeon: Jennifer Goodwin MD;  Location: [facility] OR       Social History   Patient lives with a friend. She has an 8 year old son who lives with his father because she is \"too sick to care for him.\" She smokes 1 cig/day and has been smoking tobacco intermittently since college. She denies alcohol and drug use. She used to own a restaurant.     Family History   I have reviewed this patient's family history and updated it with pertinent information if needed.   Family History   Problem Relation Age of Onset     Diabetes Mother      Ovarian Cancer No family hx of      Uterine Cancer No family hx of      Cervical Cancer No family hx of      Breast Cancer No family hx of    No family history of colon cancer.     Prior to Admission Medications   Prior to Admission Medications   Prescriptions Last Dose Informant Patient Reported? Taking?   dicyclomine (BENTYL) 20 MG tablet 3/12/2019 at Unknown time  No Yes   Sig: Take 1 tablet (20 mg) by mouth 4 times daily as needed (cramping)   ondansetron (ZOFRAN) 8 MG tablet 3/12/2019 at Unknown time  No Yes   Sig: Take 0.5-1 tablets (4-8 mg) by mouth every 8 hours as needed for nausea   oxyCODONE-acetaminophen (PERCOCET) 5-325 MG tablet 3/12/2019 at Unknown time  No Yes   Sig: Take 1 tablet by mouth every 4 hours as needed for severe pain   simethicone 80 MG TABS 3/12/2019 at Unknown time  Yes Yes   Sig: Take 80 mg by mouth 4 times daily as needed for cramping       Facility-Administered Medications: None     Allergies   Allergies   Allergen Reactions     Sulfa Drugs Hives     Ibuprofen Nausea and Vomiting and Hives     Unknown if reaction hives or N/V     No Clinical Screening - See Comments Other (See Comments) and Diarrhea     headache  Carrots cause gastric upset, cramping and diarrhea.     Ciprofloxacin Hives and Nausea     Quinolones      Tramadol Hives, Diarrhea, Nausea and Nausea and Vomiting     Amoxicillin Nausea and Vomiting     Augmentin " Nausea, Hives and GI Disturbance     Patient tolerated course of zosyn in 5/2018     Avelox [Moxifloxacin] Nausea and Vomiting     Codeine Nausea and Vomiting     Daucus Carota      Other reaction(s): GI Upset  Other reaction(s): Abdominal pain, Diarrhea  Carrots cause gastric upset, cramping and diarrhea.       Physical Exam   Vital Signs: Temp: 97.8  F (36.6  C) Temp src: Oral BP: 114/84 Pulse: 97 Heart Rate: 86 Resp: 20 SpO2: 93 % O2 Device: None (Room air)    Weight: 115 lbs 5 oz    General Appearance: Lying on her side, no distress. Thin female.   Eyes: EOMs intact.   HEENT: MMM; nares patent; no oral lesions appreciated   Respiratory: Posterior lung fields clear to auscultation bilaterally, no increase work of breathing.   Cardiovascular: RRR; S1 and S2 heard.   GI: +bs. Distended, diffuse tenderness to mild palpation. Previous midline abdominal scar noted.   Hematologic: No cervical or supraclavicular lymphadenopathy   Skin: No rashes on exposed skin.   Musculoskeletal: No deformities or LE edema. Warm and well-perfused.   Neurologic: Alert and oriented. No gross neurological deficits appreciated on observation.     Data   Data reviewed today:   Imaging: Abdominal X-ray (3/12/19):   Diffuse large and small bowel luminal dilation not slightly increased  compared to the CT performed on 3/7/2019. No evidence for pneumatosis, pneumoperitoneum or portal venous gas.    Recent Labs   Lab 03/12/19  1910 03/10/19  0603 03/09/19  0645 03/08/19  0609 03/07/19  0223   WBC 9.3  --   --  9.7 7.6   HGB 14.4  --   --  12.9 12.4     --   --  102* 103*    314  --  308 294    139 137 140 142   POTASSIUM 4.1 4.5 3.9 3.7 4.1   CHLORIDE 101 104 101 104 107   CO2 32 29 30 30 29   BUN 13 13 8 10 10   CR 0.55 0.59 0.57 0.52 0.59   ANIONGAP 10 6 6 7 6   JONATAN 9.4 8.9 8.7 8.5 8.4*   GLC 90 104* 95 110* 92   ALBUMIN 3.8  --   --  3.0* 3.1*   PROTTOTAL 7.8  --   --  6.4* 6.2*   BILITOTAL 0.3  --   --  0.3 0.2   ALKPHOS  110  --   --  84 81   ALT 35  --   --  22 20   AST 29  --   --  11 13   LIPASE 96  --   --   --  124

## 2019-03-13 NOTE — PROGRESS NOTES
[facility] [place]    Progress Note - [facility] 1 Service        Date of Admission:  3/12/2019    Assessment & Plan   41 y/o female with PMHx significant for malnutrition, cervical cancer s/p chemo-radiation therapy complicated by recurrent SBO s/p ex lap with 60 cm of small bowel resected (2017) complicated by anastomotic stricture in pelvis causing persistent partial small bowel obstructions, and opioid dependence (prior suboxone use) was admitted for recurrent abdominal pain.     #Acute on chronic abdominal pain  #Hx of Small Bowel Obstruction  #Hx of Cervical Cancer s/p Chemo-Radiation c/b recurrent SBO s/p ex lap (2017)  #Known ilioileal anastomotic stricture   She has been admitted several times due to episodes of worsened abdominal pain. She had ileal dilation in 06/2018, however is not a candidate for repeat procedure. She was admitted on 03/03/2019 - 03/06/19 and 03/07/19 - 03/10/19 for management of abdominal pain and SBO. This was done conservatively. She has declined decompression or colonic diversions which could result in needing colostomy bag. On this admission, CT showing improved bowel distention compared to previous admission few days ago and no signs of obstruction. No pneumastosis or free air in peritoneum. She continues to have diarrhea which tested negative for C diff 03/08/19.  - Enteric panel  - ADAT     #Pain Management   #Anxiety  #Opiate use disorder  #?Opiate induced hyperalgesia   Patient on several narcotic regimens during hospitalizations. She was on a pain contract, but was terminated at the [facility] pain clinic due to possible over use of opioids.   - Discontinued IV dilaudid   - Pain service consulted  - Tylenol 1g TID scheduled  - Dicyclomine QID scheduled  - Will discuss with pain team about suboxone     #Severe protein malnutrition  She was on TPN. Appears to be tolerating liquid diet for now.   - Nutrition consulted       Diet: Full Liquid Diet  Calorie  Counts  Snacks/Supplements Adult: Magic Cup; Between Meals  Snacks/Supplements Adult: Gelatein Plus; With Meals  Snacks/Supplements Adult: Boost Shake; Between Meals    Fluids: None  Lines: 1piv  DVT Prophylaxis: Enoxaparin (Lovenox) SQ  Godwin Catheter: not present  Code Status: Full Code      Disposition Plan   Expected discharge: Tomorrow, recommended to prior living arrangement once adequate pain management/ tolerating PO medications.  Entered: Buzz Peres MD 03/13/2019, 6:47 PM       The patient's care was discussed with Dr. Talbot.    Buzz Peres MD  [facility], PGY3  [facility], [place]  Pager: [phone]  Please see sticky note for cross cover information  ______________________________________________________________________    Interval History   No overnight events. Continues to have loose bowel movements. Diffuse abdominal pain is episodic. Requesting IV narcotics because of the pain.     Data reviewed today:   Results for orders placed or performed during the hospital encounter of 03/12/19 (from the past 24 hour(s))   CBC with platelets differential   Result Value Ref Range    WBC 9.3 4.0 - 11.0 10e9/L    RBC Count 4.57 3.8 - 5.2 10e12/L    Hemoglobin 14.4 11.7 - 15.7 g/dL    Hematocrit 45.5 35.0 - 47.0 %     78 - 100 fl    MCH 31.5 26.5 - 33.0 pg    MCHC 31.6 31.5 - 36.5 g/dL    RDW 13.5 10.0 - 15.0 %    Platelet Count 395 150 - 450 10e9/L    Diff Method Automated Method     % Neutrophils 68.5 %    % Lymphocytes 22.3 %    % Monocytes 7.3 %    % Eosinophils 1.2 %    % Basophils 0.2 %    % Immature Granulocytes 0.5 %    Nucleated RBCs 0 0 /100    Absolute Neutrophil 6.4 1.6 - 8.3 10e9/L    Absolute Lymphocytes 2.1 0.8 - 5.3 10e9/L    Absolute Monocytes 0.7 0.0 - 1.3 10e9/L    Absolute Eosinophils 0.1 0.0 - 0.7 10e9/L    Absolute Basophils 0.0 0.0 - 0.2 10e9/L    Abs Immature Granulocytes 0.1 0 - 0.4 10e9/L    Absolute Nucleated RBC 0.0    Comprehensive metabolic panel    Result Value Ref Range    Sodium 143 133 - 144 mmol/L    Potassium 4.1 3.4 - 5.3 mmol/L    Chloride 101 94 - 109 mmol/L    Carbon Dioxide 32 20 - 32 mmol/L    Anion Gap 10 3 - 14 mmol/L    Glucose 90 70 - 99 mg/dL    Urea Nitrogen 13 7 - 30 mg/dL    Creatinine 0.55 0.52 - 1.04 mg/dL    GFR Estimate >90 >60 mL/min/[1.73_m2]    GFR Estimate If Black >90 >60 mL/min/[1.73_m2]    Calcium 9.4 8.5 - 10.1 mg/dL    Bilirubin Total 0.3 0.2 - 1.3 mg/dL    Albumin 3.8 3.4 - 5.0 g/dL    Protein Total 7.8 6.8 - 8.8 g/dL    Alkaline Phosphatase 110 40 - 150 U/L    ALT 35 0 - 50 U/L    AST 29 0 - 45 U/L   Lipase   Result Value Ref Range    Lipase 96 73 - 393 U/L   Abdomen XR, 2 vw, flat and upright    Narrative    XR ABDOMEN 2 VW  3/12/2019 9:03 PM      HISTORY: abd pain and vomiting    COMPARISON: CT of the abdomen and pelvis 3/7/2019.    FINDINGS:   Upright and supine views of the abdomen were performed.    Diffuse luminal distention of the bowel in all quadrants, slightly  increased. No evidence of pneumatosis or portal venous gas.    The lower chest is within normal limits. The osseous structures are  within normal limits.      Impression    IMPRESSION:   Diffuse large and small bowel luminal dilation not slightly increased  compared to the CT performed on 3/7/2019. No evidence for pneumatosis,  pneumoperitoneum or portal venous gas.    I have personally reviewed the examination and initial interpretation  and I agree with the findings.    CORTNEY XAVIER MD   Pain Management Adult IP Consult: Patient to be seen: Routine - within 24 hours; Hx of chronic abdominal pain on opioids; Consultant may enter orders: Yes; Requesting provider? Attending physician; Name: [facility]     3/13/2019 12:10 PM  Inpatient Pain Management Service: Consultation    Patient: Rachel A Gerhardt  Date of Consult: March 13, 2019 Admission Date:3/12/2019   * No   surgery found *     Chief Pain Endorsement:   Abdominal pain    Recommendations were discussed and relayed to Dr. John Kaiser   ([place] 1)   Plan was reviewed by the Inpatient Pain Service and staff   attending, Dr. Duane Mcmahan        1. Discontinue IV hydromorphone.  2. Start hydromorphone 2mg po q4h prn moderate to severe pain   while the work up is ongoing (CT results pending).  Patient does   not feel oxycodone is helpful for her.    If the work up shows no new organic cause of pain would resume   home hydrocodone - acetaminophen 5/325mg 1 tab po 4 times a day   as needed.  3. Continue acetaminophen 1000mg po 3 times a day scheduled.  4. Patient declines topical patches.  5. Has taken gabapentin and pregabalin and they both make her   dizzy.  6. Declines NSAIDs as they cause N/V.  7. Bowel regimen per primary team to prevent opioid induced   constipation.    Pain Service will Sign Off at this time.     Thank you for consulting the Inpatient Pain Management Service.     The above recommendations are to be acted upon at the primary   team s discretion.    To reach us:  Mon - Friday 8 AM - 3 PM: Pager [phone] (Text Page)  After hours, weekends and holidays: Primary service should call   [phone] for the on-call pain specialist      1. Multiple recent admissions for abdominal pain.  Has a chronic   partial SBO that is unchanged.  2. History of chronic abdominal pain, patient is going to [place] at   the end of the month for a second opinion.  3. History of cervical and ovarian cancer, diagnosed in 8/2015.  4. History of asthma.  5. History of substance abuse, has been been kicked out of a pain   clinic in the past.      -- Outpatient opioid requirements prior to admission:   -- Oxycodone - acetaminophen 5/325mg tabs #26 filled 3/10/19 for   a 5 day supply (discharge Rx from [facility])  -- Hydrocodone - acetaminophen 5/325mg tabs #120 filled 2/18/19   for a 30 day supply.    Primary Care Provider: Reji Avery, prescribes hydrocodone   -  acetaminophen for patient.  Chronic Pain Provider: none  [place]  reviewed    History of Present Illness:  Rachel A Gerhardt is a 43 year old female who was seen today   (March 13, 2019) with the primary team and she reports that her   pain is located in the abdomen and is described as cramping that   comes in waves.  The pain is improved with IV hydromorphone but   that only lasts for about 1 hour and the pain returns.  Patient   tolerating a full liquid diet, reports some nausea with   breakfast.  Will discontinue IV opioids and start oral opioids   while the work up is ongoing.  Patient declines or has tried and   failed non opioid adjuvants.  This is the third hospitalization   for this abdominal pain flare.     CAPA (Clinically Aligned Pain Assessment)  Comfort (How is your pain?): Intolerable  Change in Pain (Since your last medication/intervention?): About   the same  Pain Control (How are your pain treatments working?): Partially   effective pain control  Functioning (Are you able to do activities to get better?) : Pain   keeps me from doing most of what I need to do  Sleep (Does your pain management allow you to sleep or rest?):   Awake with pain most of the night     FUNCTIONAL STATUS:  Change:      Unchanged  Oral intake:     Full liquids  Bowel function:    Patient reports diarrhea  Activity level:     Ambulating in young after receiving pain meds  Mood:      Tired, poor energy      HEALTH & LIFESTYLE PRACTICES:   Tobacco:  reports that she has been smoking cigarettes.  She has   a 3.00 pack-year smoking history. she has never used smokeless   tobacco.  Alcohol:  reports that she does not drink alcohol.  Illicit drugs:  reports that she does not use drugs.    ALLERGIES:    Allergies   Allergen Reactions     Sulfa Drugs Hives     Ibuprofen Nausea and Vomiting and Hives     Unknown if reaction hives or N/V     No Clinical Screening - See Comments Other (See Comments) and   Diarrhea     headache  Carrots cause  gastric upset, cramping and diarrhea.     Ciprofloxacin Hives and Nausea     Quinolones      Tramadol Hives, Diarrhea, Nausea and Nausea and Vomiting     Amoxicillin Nausea and Vomiting     Augmentin Nausea, Hives and GI Disturbance     Patient tolerated course of zosyn in 5/2018     Avelox [Moxifloxacin] Nausea and Vomiting     Codeine Nausea and Vomiting     Daucus Carota      Other reaction(s): GI Upset  Other reaction(s): Abdominal pain, Diarrhea  Carrots cause gastric upset, cramping and diarrhea.        INPATIENT MEDICATIONS PERTINENT TO PAIN CONSULT:   Medications related to Pain Management (From now, onward)    Start     Dose/Rate Route Frequency Ordered Stop    03/13/19 0800  acetaminophen (TYLENOL) tablet 1,000 mg      1,000 mg Oral 3 TIMES DAILY 03/13/19 0228      03/13/19 0146  HYDROmorphone (PF) (DILAUDID) injection 0.3-0.5   mg      0.3-0.5 mg Intravenous EVERY 3 HOURS PRN 03/13/19 0146      03/13/19 0138  dicyclomine (BENTYL) tablet 20 mg      20 mg Oral 4 TIMES DAILY PRN 03/13/19 0138      03/13/19 0138  simethicone (MYLICON) chewable tablet 80 mg      80 mg Oral 4 TIMES DAILY PRN 03/13/19 0138      03/13/19 0138  lidocaine 1 % 0.1-1 mL      0.1-1 mL Other EVERY 1 HOUR PRN 03/13/19 0138      03/13/19 0138  lidocaine (LMX4) cream       Topical EVERY 1 HOUR PRN 03/13/19 0138      03/13/19 0138  senna-docusate (SENOKOT-S/PERICOLACE) 8.6-50 MG   per tablet 1 tablet      1 tablet Oral 2 TIMES DAILY PRN 03/13/19 0138      03/13/19 0138  senna-docusate (SENOKOT-S/PERICOLACE) 8.6-50 MG   per tablet 2 tablet      2 tablet Oral 2 TIMES DAILY PRN 03/13/19 0138            LABORATORY VALUES:   Recent Labs   Lab 03/13/19  0634 03/12/19  1910 03/10/19  0603  03/08/19  0609 03/07/19  0223   CR 0.56 0.55 0.59   < > 0.52 0.59   WBC  --  9.3  --   --  9.7 7.6   HGB  --  14.4  --   --  12.9 12.4   AST  --  29  --   --  11 13   ALT  --  35  --   --  22 20   ALKPHOS  --  110  --   --  84 81   LIPASE  --  96  --   --   --   124    < > = values in this interval not displayed.       ------------------------------------------------------------------  -----------------  Ariadna Heath PharmD, MS  Inpatient Pain Service    To reach us:  Mon - Friday 8 AM - 3 PM: Pager [phone] (Text Page)  After hours, weekends and holidays: Primary service should call   [phone] for the on-call pain specialist    Helpful Resources:  Getting Rid of Unwanted Medications (printable PDF for patients)   Opioid Overdose Prevention Toolkit (printable PDF for patients)    Platelet count   Result Value Ref Range    Platelet Count 373 150 - 450 10e9/L   Creatinine   Result Value Ref Range    Creatinine 0.56 0.52 - 1.04 mg/dL    GFR Estimate >90 >60 mL/min/[1.73_m2]    GFR Estimate If Black >90 >60 mL/min/[1.73_m2]   CT Abdomen Pelvis w Contrast    Narrative    Exam: CT abdomen and pelvis with contrast    Comparison: 3/7/2019    History: Abdominal distention; bowel obstruction, high-grade    Technique: CT of the abdomen and pelvis was obtained following the  administration of  intravenous contrast. Coronal and sagittal  reconstructions were obtained and reviewed.    Contrast dose: iopamidol (ISOVUE-370) solution 69 mL    Findings:    Lower chest: No consolidative airspace opacities. No suspicious  pulmonary nodules. No pleural effusion. Heart size is normal. No  pericardial effusion.    Abdomen/pelvis: Again seen is diffuse bowel distention measuring up to  5.5 cm, slightly increased from the most recent prior and improved  from more remote prior exams, for example 11/19/2018. No convincing  transition point is identified to suggest high-grade obstruction. The  anastomotic sutures in the left lower quadrant, sequelae of prior  small bowel resection. No pneumatosis, portal venous gas, or free air.  Mesenteric edema.     No enhancing hepatic lesions. No intra or extrahepatic biliary ductal  dilatation. Gallbladder is distended and unremarkable.  Normal  pancreas. The spleen and adrenal glands are within normal limits.  Hypodensities in the kidneys, too small to adequately characterize. No  hydronephrosis or hydroureter. No renal calculi. The bladder is  partially distended and unremarkable.    Unchanged enhancing focus in the anterior uterine body favored to  represent submucosal fibroid. The cervical region is not well  visualized on the current exam. Irregularity of the peritoneal soft  tissues may be secondary to prior radiation. Small amount of free  fluid in the posterior cul-de-sac. No appreciable abdominal or pelvic  lymphadenopathy. Normal caliber of the aorta. Portal venous system is  patent. No free air in the abdomen or pelvis.    Bones: No acute or suspicious appearing bony abnormalities.      Impression    Impression:   Slightly increased chronic gaseous small bowel distention compared to  the recent prior of 3/7/2018, improved from more remote prior exams,  for example 11/19/2018. No focal transition point to suggest  high-grade obstruction. No pneumatosis, portal venous gas, or free  air.    I have personally reviewed the examination and initial interpretation  and I agree with the findings.    CARLITO BURCH MD         Physical Exam   Vital Signs: Temp: 97.8  F (36.6  C) Temp src: Oral BP: 131/79 Pulse: 102 Heart Rate: 93 Resp: 16 SpO2: 97 % O2 Device: None (Room air)    Weight: 113 lbs 9.6 oz  Constitutional: AAOX3, in pain  Eyes: Lids and lashes normal, pupils equal, round and reactive to light, extra ocular muscles intact, sclera clear, conjunctiva normal  Respiratory: No increased work of breathing, good air exchange, clear to auscultation bilaterally, no crackles or wheezing  Cardiovascular: Normal apical impulse, regular rate and rhythm, normal S1 and S2, no S3 or S4, and no murmur noted  GI: diffuse abdominal pain, +bs, no distention  Skin: no rash, petechiae or lesions  Neurologic: no focal deficits on exam  Neuropsychiatric:  Affect: sad

## 2019-03-13 NOTE — ED NOTES
[facility], [place]   ED Nurse to Floor Handoff     Rachel A Gerhardt is a 43 year old female who speaks English and lives alone,  in a home  They arrived in the ED by car from home    ED Chief Complaint: Abdominal Pain    ED Dx;   Final diagnoses:   SBO (small bowel obstruction) (H)         Needed?: No    Allergies:   Allergies   Allergen Reactions     Sulfa Drugs Hives     Ibuprofen Nausea and Vomiting and Hives     Unknown if reaction hives or N/V     No Clinical Screening - See Comments Other (See Comments) and Diarrhea     headache  Carrots cause gastric upset, cramping and diarrhea.     Ciprofloxacin Hives and Nausea     Quinolones      Tramadol Hives, Diarrhea, Nausea and Nausea and Vomiting     Amoxicillin Nausea and Vomiting     Augmentin Nausea, Hives and GI Disturbance     Patient tolerated course of zosyn in 5/2018     Avelox [Moxifloxacin] Nausea and Vomiting     Codeine Nausea and Vomiting     Daucus Carota      Other reaction(s): GI Upset  Other reaction(s): Abdominal pain, Diarrhea  Carrots cause gastric upset, cramping and diarrhea.   .  Past Medical Hx:   Past Medical History:   Diagnosis Date     Asthma      Cancer (H)     Per patient OBGYN, cerivical cancer     Cervical cancer (H)      Other chronic pain      Ovarian cancer (H)      Substance abuse (H)     Outside records indicate past history of narcotics abuse or dependence, but patient denies.      Baseline Mental status: WDL  Current Mental Status changes: at basesline    Infection present or suspected this encounter: no  Sepsis suspected: No  Isolation type: No active isolations     Activity level - Baseline/Home:  Independent  Activity Level - Current:   Independent    Bariatric equipment needed?: No    In the ED these meds were given:   Medications   HYDROmorphone (PF) (DILAUDID) injection 0.5 mg (0.5 mg Intravenous Given 3/12/19 2049)       Drips running?  No    Home pump  No    Current  "LDAs  Peripheral IV 03/07/19 Left Upper forearm (Active)   Number of days: 5       Peripheral IV 03/12/19 Right (Active)   Site Assessment WDL 3/12/2019  7:17 PM   Line Status Saline locked 3/12/2019  7:17 PM   Number of days: 0       PICC Double Lumen 11/05/18 Right Brachial vein medial (Active)   Number of days: 127       Incision/Surgical Site 05/18/17 Abdomen (Active)   Number of days: 663       Labs results:   Labs Ordered and Resulted from Time of ED Arrival Up to the Time of Departure from the ED   CBC WITH PLATELETS DIFFERENTIAL   COMPREHENSIVE METABOLIC PANEL   LIPASE       Imaging Studies:   Recent Results (from the past 24 hour(s))   Abdomen XR, 2 vw, flat and upright    Narrative    XR ABDOMEN 2 VW  3/12/2019 9:03 PM      HISTORY: abd pain and vomiting    COMPARISON: CT of the abdomen and pelvis 3/7/2019.    FINDINGS:   Upright and supine views of the abdomen were performed.    Diffuse luminal distention of the bowel in all quadrants, slightly  increased. No evidence of pneumatosis or portal venous gas.    The lower chest is within normal limits. The osseous structures are  within normal limits.      Impression    IMPRESSION:   Diffuse large and small bowel luminal dilation not slightly increased  compared to the CT performed on 3/7/2019. No evidence for pneumatosis,  pneumoperitoneum or portal venous gas.    I have personally reviewed the examination and initial interpretation  and I agree with the findings.    CORTNEY XAVIER MD       Recent vital signs:   BP 95/69   Pulse 91   Temp 97.8  F (36.6  C) (Oral)   Resp 22   Ht 1.727 m (5' 8\")   Wt 52.3 kg (115 lb 5 oz)   SpO2 94%   BMI 17.53 kg/m      Cardiac Rhythm: Normal Sinus  Pt needs tele? No  Skin/wound Issues: None    Code Status: Full Code    Pain control: fair    Nausea control: fair    Abnormal labs/tests/findings requiring intervention: none    Family present during ED course? No   Family Comments/Social Situation comments: NA    Tasks " needing completion: None    Marina Walker, RN  [phone] [facility]

## 2019-03-14 PROCEDURE — 25000132 ZZH RX MED GY IP 250 OP 250 PS 637: Performed by: STUDENT IN AN ORGANIZED HEALTH CARE EDUCATION/TRAINING PROGRAM

## 2019-03-14 PROCEDURE — 25000128 H RX IP 250 OP 636: Performed by: STUDENT IN AN ORGANIZED HEALTH CARE EDUCATION/TRAINING PROGRAM

## 2019-03-14 PROCEDURE — 25000132 ZZH RX MED GY IP 250 OP 250 PS 637

## 2019-03-14 PROCEDURE — 99232 SBSQ HOSP IP/OBS MODERATE 35: CPT | Mod: GC | Performed by: INTERNAL MEDICINE

## 2019-03-14 PROCEDURE — 12000001 ZZH R&B MED SURG/OB UMMC

## 2019-03-14 PROCEDURE — 99207 ZZC NO CHARGE SIGN-OFF PS: CPT

## 2019-03-14 PROCEDURE — 25000131 ZZH RX MED GY IP 250 OP 636 PS 637: Performed by: STUDENT IN AN ORGANIZED HEALTH CARE EDUCATION/TRAINING PROGRAM

## 2019-03-14 RX ADMIN — HYDROMORPHONE HYDROCHLORIDE 2 MG: 2 TABLET ORAL at 23:58

## 2019-03-14 RX ADMIN — DICYCLOMINE HYDROCHLORIDE 20 MG: 20 TABLET ORAL at 20:12

## 2019-03-14 RX ADMIN — DICYCLOMINE HYDROCHLORIDE 20 MG: 20 TABLET ORAL at 15:50

## 2019-03-14 RX ADMIN — DICYCLOMINE HYDROCHLORIDE 20 MG: 20 TABLET ORAL at 08:01

## 2019-03-14 RX ADMIN — LIDOCAINE 1 PATCH: 560 PATCH PERCUTANEOUS; TOPICAL; TRANSDERMAL at 07:58

## 2019-03-14 RX ADMIN — HYDROMORPHONE HYDROCHLORIDE 2 MG: 2 TABLET ORAL at 03:56

## 2019-03-14 RX ADMIN — HYDROMORPHONE HYDROCHLORIDE 2 MG: 2 TABLET ORAL at 11:45

## 2019-03-14 RX ADMIN — ONDANSETRON 4 MG: 4 TABLET, ORALLY DISINTEGRATING ORAL at 18:38

## 2019-03-14 RX ADMIN — ACETAMINOPHEN 1000 MG: 500 TABLET, FILM COATED ORAL at 15:19

## 2019-03-14 RX ADMIN — HYDROMORPHONE HYDROCHLORIDE 2 MG: 2 TABLET ORAL at 20:12

## 2019-03-14 RX ADMIN — HYDROMORPHONE HYDROCHLORIDE 2 MG: 2 TABLET ORAL at 15:49

## 2019-03-14 RX ADMIN — HYDROMORPHONE HYDROCHLORIDE 2 MG: 2 TABLET ORAL at 07:56

## 2019-03-14 RX ADMIN — DICYCLOMINE HYDROCHLORIDE 20 MG: 20 TABLET ORAL at 11:44

## 2019-03-14 RX ADMIN — ENOXAPARIN SODIUM 40 MG: 40 INJECTION SUBCUTANEOUS at 08:03

## 2019-03-14 RX ADMIN — ACETAMINOPHEN 1000 MG: 500 TABLET, FILM COATED ORAL at 23:57

## 2019-03-14 ASSESSMENT — ACTIVITIES OF DAILY LIVING (ADL)
ADLS_ACUITY_SCORE: 10

## 2019-03-14 ASSESSMENT — MIFFLIN-ST. JEOR: SCORE: 1229.21

## 2019-03-14 NOTE — PLAN OF CARE
Pt is A&Ox4. VSS on RA. Up independently. Pt's abdominal pain is controlled with 2mg PO Dilaudid. Pt walking frequently. PIV SL. On a full liquid diet. Denies nausea. 1 loose BM. Will continue to monitor and follow POC.

## 2019-03-14 NOTE — PROGRESS NOTES
[facility], [place]    Progress Note - Anthony 1 Service        Date of Admission:  3/12/2019    Assessment & Plan   41 y/o female with PMHx significant for malnutrition, cervical cancer s/p chemo-radiation therapy complicated by recurrent SBO s/p ex lap with 60 cm of small bowel resected (2017) complicated by anastomotic stricture in pelvis causing persistent partial small bowel obstructions, and opioid dependence (prior suboxone use) was admitted for recurrent abdominal pain.     #Acute on chronic abdominal pain  #Hx of Small Bowel Obstruction  #Hx of Cervical Cancer s/p Chemo-Radiation c/b recurrent SBO s/p ex lap (2017)  #Known ilioileal anastomotic stricture   She has been admitted several times due to episodes of worsened abdominal pain. She had ileal dilation in 06/2018, however is not a candidate for repeat dilation. She was admitted on 03/03/2019 - 03/06/19 and 03/07/19 - 03/10/19 for management of abdominal pain and SBO. This was done conservatively. She has declined decompression or colonic diversions which could result in needing colostomy bag. On this admission, CT showing improved bowel distention compared to previous admission few days ago and no signs of obstruction. No pneumastosis or free air in peritoneum. She continues to have diarrhea which tested negative for C diff 03/08/19. Enteric panel on 03/14/19 negative.   - ADAT to regular high protein diet.      #Pain Management   #Anxiety  #Opiate use disorder  #?Opiate induced hyperalgesia   Patient on several narcotic regimens during hospitalizations. She was on a pain contract, but was terminated at the [facility] pain clinic due to possible over use of opioids. Also discontinued suboxone treatment.   - Avoiding IV narcotics due to high risk of SBO  - Pain service consulted  - Tylenol 1g TID scheduled  - Dicyclomine QID scheduled  - Continue simethicone  - Trailing GI cocktail  - Will discuss with pain team about  suboxone     #Severe protein malnutrition  She was on TPN. Appears to be tolerating liquid diet for now.   - Nutrition consulted     Diet: Calorie Counts  Snacks/Supplements Adult: Magic Cup; Between Meals  Snacks/Supplements Adult: Gelatein Plus; With Meals  Snacks/Supplements Adult: Boost Shake; Between Meals  Room Service  Advance Diet as Tolerated: Regular Diet Adult    Fluids: None  Lines: 1piv  DVT Prophylaxis: Enoxaparin (Lovenox) SQ  Godwin Catheter: not present  Code Status: Full Code      Disposition Plan   Expected discharge: Tomorrow, pending diet tolerance.      The patient's care was discussed with Dr. Talbot.    MD Deepika Banksoon 1 Service, PGY3  [facility], [place]  Pager: [phone]  Please see sticky note for cross cover information  ______________________________________________________________________    Interval History   No overnight events. Continues to have loose bowel movements. Diffuse, crampy abdominal pain.  Mildly tolerating advancing her diet. Complete ROS negative.     Data reviewed today:   Results for orders placed or performed during the hospital encounter of 03/12/19 (from the past 24 hour(s))   Enteric Bacteria and Virus Panel by MIRANDA Stool   Result Value Ref Range    Campylobacter group by MIRANDA Not Detected NDET^Not Detected    Salmonella species by MIRANDA Not Detected NDET^Not Detected    Shigella species by MIRANDA Not Detected NDET^Not Detected    Vibrio group by MIRANDA Not Detected NDET^Not Detected    Rotavirus A by MIRANDA Not Detected NDET^Not Detected    Shiga toxin 1 gene by MIRANDA Not Detected NDET^Not Detected    Shiga toxin 2 gene by MIRANDA Not Detected NDET^Not Detected    Norovirus I and II by MIRANDA Not Detected NDET^Not Detected    Yersinia enterocolitica by MIRANDA Not Detected NDET^Not Detected    Enteric pathogen comment       Testing performed by multiplexed, qualitative PCR using the [employer] Enteric   Pathogens Nucleic Acid Test. Results should not  be used as the sole basis for diagnosis,   treatment, or other patient management decisions.           Physical Exam   Vital Signs: Temp: 98.9  F (37.2  C) Temp src: Oral BP: 118/84 Pulse: 100 Heart Rate: 91 Resp: 16 SpO2: 99 % O2 Device: None (Room air)    Weight: 113 lbs 9.6 oz  Constitutional: AAOX3, in pain  Eyes: Lids and lashes normal, pupils equal, round and reactive to light, extra ocular muscles intact, sclera clear, conjunctiva normal  Respiratory: No increased work of breathing, good air exchange, clear to auscultation bilaterally, no crackles or wheezing  Cardiovascular: Normal apical impulse, regular rate and rhythm, normal S1 and S2, no S3 or S4, and no murmur noted  GI: diffuse abdominal pain, +bs, no distention  Skin: no rash, petechiae or lesions  Neurologic: no focal deficits on exam  Neuropsychiatric: Affect: sad

## 2019-03-14 NOTE — PLAN OF CARE
Pt A&Ox4. VSS. Up independently. Loose stools. PIV saline locked. Pt reports pain in abdomen is not adequately controlled with q4 oral dilaudid with heat pack. Pt tolerated breakfast well.     Discharge is potential this evening or tomorrow with pt tolerating diet. Continue with plan of care.

## 2019-03-14 NOTE — PLAN OF CARE
D/I  Uneventful shift.  Patient outside numerous times independently.  A&Ox4.   VSS.  Loose stools, sent stool to lab.  Reminded patient that we need to measure all output.   PIV infusing NS 100mL/hr.  Has not drank much this shift, appetite poor.  Needs encouragement.    Pt reports pain in abdomen is not adequately controlled with previous q4h po dilaudid, given at 4 and 8pm.  No complaints thus far.   Heat packs given.  Family here visiting this evening.    Continue to monitor per plan of care and notify MD of any acute changes.

## 2019-03-15 ENCOUNTER — DOCUMENTATION ONLY (OUTPATIENT)
Dept: PHARMACY | Facility: CLINIC | Age: 44
End: 2019-03-15

## 2019-03-15 ENCOUNTER — PATIENT OUTREACH (OUTPATIENT)
Dept: CARE COORDINATION | Facility: CLINIC | Age: 44
End: 2019-03-15

## 2019-03-15 VITALS
BODY MASS INDEX: 17.82 KG/M2 | OXYGEN SATURATION: 96 % | DIASTOLIC BLOOD PRESSURE: 86 MMHG | HEIGHT: 68 IN | SYSTOLIC BLOOD PRESSURE: 121 MMHG | WEIGHT: 117.6 LBS | HEART RATE: 108 BPM | RESPIRATION RATE: 18 BRPM | TEMPERATURE: 98.7 F

## 2019-03-15 PROCEDURE — 25000132 ZZH RX MED GY IP 250 OP 250 PS 637: Performed by: STUDENT IN AN ORGANIZED HEALTH CARE EDUCATION/TRAINING PROGRAM

## 2019-03-15 PROCEDURE — 25000128 H RX IP 250 OP 636: Performed by: STUDENT IN AN ORGANIZED HEALTH CARE EDUCATION/TRAINING PROGRAM

## 2019-03-15 PROCEDURE — 25000132 ZZH RX MED GY IP 250 OP 250 PS 637

## 2019-03-15 PROCEDURE — 99238 HOSP IP/OBS DSCHRG MGMT 30/<: CPT | Mod: GC | Performed by: INTERNAL MEDICINE

## 2019-03-15 RX ORDER — HYDROMORPHONE HYDROCHLORIDE 1 MG/ML
1 SOLUTION ORAL 2 TIMES DAILY
Qty: 14 ML | Refills: 0 | Status: ON HOLD | OUTPATIENT
Start: 2019-03-15 | End: 2019-05-20

## 2019-03-15 RX ORDER — ONDANSETRON 4 MG/1
4 TABLET, ORALLY DISINTEGRATING ORAL EVERY 6 HOURS PRN
Qty: 90 TABLET | Refills: 0 | Status: SHIPPED | OUTPATIENT
Start: 2019-03-15

## 2019-03-15 RX ORDER — ACETAMINOPHEN 500 MG
1000 TABLET ORAL 3 TIMES DAILY
Qty: 180 TABLET | Refills: 0 | Status: SHIPPED | OUTPATIENT
Start: 2019-03-15 | End: 2019-05-18

## 2019-03-15 RX ADMIN — ACETAMINOPHEN 1000 MG: 500 TABLET, FILM COATED ORAL at 17:11

## 2019-03-15 RX ADMIN — DICYCLOMINE HYDROCHLORIDE 20 MG: 20 TABLET ORAL at 08:13

## 2019-03-15 RX ADMIN — HYDROMORPHONE HYDROCHLORIDE 2 MG: 2 TABLET ORAL at 11:44

## 2019-03-15 RX ADMIN — HYDROMORPHONE HYDROCHLORIDE 2 MG: 2 TABLET ORAL at 16:17

## 2019-03-15 RX ADMIN — HYDROMORPHONE HYDROCHLORIDE 2 MG: 2 TABLET ORAL at 08:12

## 2019-03-15 RX ADMIN — DICYCLOMINE HYDROCHLORIDE 20 MG: 20 TABLET ORAL at 11:44

## 2019-03-15 RX ADMIN — DICYCLOMINE HYDROCHLORIDE 20 MG: 20 TABLET ORAL at 17:11

## 2019-03-15 RX ADMIN — ACETAMINOPHEN 1000 MG: 500 TABLET, FILM COATED ORAL at 06:14

## 2019-03-15 RX ADMIN — HYDROMORPHONE HYDROCHLORIDE 2 MG: 2 TABLET ORAL at 04:03

## 2019-03-15 RX ADMIN — LIDOCAINE 1 PATCH: 560 PATCH PERCUTANEOUS; TOPICAL; TRANSDERMAL at 10:51

## 2019-03-15 RX ADMIN — ENOXAPARIN SODIUM 40 MG: 40 INJECTION SUBCUTANEOUS at 08:13

## 2019-03-15 ASSESSMENT — ACTIVITIES OF DAILY LIVING (ADL)
ADLS_ACUITY_SCORE: 10

## 2019-03-15 ASSESSMENT — MIFFLIN-ST. JEOR: SCORE: 1231.93

## 2019-03-15 NOTE — PLAN OF CARE
Pt A&Ox4. VSS. Up independently. Loose stools. PIV saline locked. Pt reports pain in abdomen is adequately controlled with q4 oral dilaudid with heat pack. Pt tolerated breakfast and lunch well.     Team would like to speak with pt before discharge this evening. Meds to be filled in discharge pharmacy.   Continue with plan of care.

## 2019-03-15 NOTE — PLAN OF CARE
Pt is A&Ox4. VSS on RA. Up independently. Pt's abdominal pain is controlled with 2mg PO Dilaudid q4 hours. Pt walking frequently. PIV SL. Pt said that she hasn't been tolerating her high andi/high protein diet. Denies nausea. Using heat pad on abdomen. Potential discharge today. Will continue to monitor and follow POC.

## 2019-03-15 NOTE — PLAN OF CARE
D/I  Uneventful shift.   Up independently, abdominal pain controlled well with 2 mg PO Dilaudid q 4hrs PRN.  A&Ox4. VSS on RA.  PIV SL. On a high calorie/high protein diet.  Slight nausea, Zofran SL x 1. 1 loose BM. Will continue to monitor per POC, notify MD of any acute changes.

## 2019-03-15 NOTE — PLAN OF CARE
Patient A&O x4, vitally stable, up ad dania in room. PIV removed. AVS covered with patient. Patient picked up discharge medications from pharmacy. Private ride provided by patient's significant other.

## 2019-03-15 NOTE — PROGRESS NOTES
Calorie Count  Intake recorded for: 3/14  Total Kcals: 668 Total Protein: 17g  Kcals from Hospital Food: 668   Protein: 17g  Kcals from Outside Food (average):0  Protein: 0g  # Meals Recorded: 100% 5 apple juice (4oz each)  # Supplements Recorded: 75% 1 Boost Shake.

## 2019-03-16 NOTE — DISCHARGE SUMMARY
[facility], [place]  Hospitalist Discharge Summary       Date of Admission:  3/12/2019  Date of Discharge:  3/15/2019  7:00 PM  Discharging Provider: Buzz Peres MD  Discharge Team: M1    Discharge Diagnoses   Acute on chronic abdominal pain  Hx of SBO  Hx Cervical cancer s/p chemo-radiation cb recurrent SBO  Known ilioileal anastomotic stricture  Chronic pain syndrome  Opdiod dependence  Possible opioid induced hyperalgesia  Severe protein malnutrition    Follow-ups Needed After Discharge   Follow-up Appointments     Adult [facility] Follow-up and recommended labs and tests      Follow up with primary care provider, Reji Avery, within 7 days   to evaluate treatment change.  No follow up labs or test are needed.      Appointments on [place] and/or [facility] (with [facility] or [facility]   provider or service). Call [phone] if you haven't heard regarding   these appointments within 7 days of discharge.            Patient to follow with PCP to ensure not abusing pain meds.    Unresulted Labs Ordered in the Past 30 Days of this Admission     No orders found from 1/11/2019 to 3/13/2019.        Discharge Disposition   Discharged to home  Condition at discharge: Stable    Hospital Course   41 y/o female with PMHx significant for malnutrition, cervical cancer s/p chemo-radiation therapy complicated by recurrent SBO s/p ex lap with 60 cm of small bowel resected (2017) complicated by anastomotic stricture in pelvis causing persistent partial small bowel obstructions, and opioid dependence (prior suboxone use) was admitted for recurrent abdominal pain.      #Pain Management   #Anxiety  #Opiate use disorder  #?Opiate induced hyperalgesia   Patient on several narcotic regimens during hospitalizations. She was on a pain contract, but was terminated at the [facility] pain clinic due to possible over use of opioids. Also discontinued suboxone treatment. We were able to speak with Dr. Avery, her  PCP, about formulating a plan and avoid frequent admissions. Dr. Avery believes patient has NOT been abusing narcotics and is comfortable in refilling her Norco. We discussed adding liquid dilaudid at a conservative dose ONLY for breakthrough pain at home and under the guidance of her PCP which he agreed. He will be seeing within one week and ensure adherence to this plan and that she is not abusing narcotics. Should patient return to hospital with abdominal pain or symptoms of obstruction, strategy recommended is to obtain objective data with CT imaging and rule out organic cause of pain and if work up is negative she should be discharged WITHOUT any narcotics. Discharged on tylenol 1g TID, dicyclomine, simethicone, GI cocktail and liquid dilaudid (1mg BID PRN).     #Acute on chronic abdominal pain  #Hx of Small Bowel Obstruction  #Hx of Cervical Cancer s/p Chemo-Radiation c/b recurrent SBO s/p ex lap (2017)  #Known ilioileal anastomotic stricture   She has been admitted several times due to episodes of worsened abdominal pain. She had ileal dilation in 06/2018, however is not a candidate for repeat dilation. She was admitted on 03/03/2019 - 03/06/19 and 03/07/19 - 03/10/19 for management of abdominal pain and SBO. This was done conservatively. She has declined decompression or colonic diversions which could result in needing colostomy bag. On this admission, CT showing improved bowel distention compared to previous admission few days ago and no signs of obstruction. No pneumastosis or free air in peritoneum. She continues to have diarrhea which tested negative for C diff 03/08/19. Enteric panel on 03/14/19 negative. She tolerated diet advancement.      #Severe protein malnutrition  She was on TPN. Appears to be tolerating liquid diet for now.     Consultations This Hospital Stay   NUTRITION SERVICES ADULT IP CONSULT  PAIN MANAGEMENT ADULT IP CONSULT    Code Status   Full Code    Time Spent on this Encounter    I, Buzz Peres, personally saw the patient today and spent greater than 30 minutes discharging this patient.       Buzz Peres MD  [facility], [place]  ______________________________________________________________________    Physical Exam   Vital Signs: Temp: 98.7  F (37.1  C) Temp src: Oral BP: 121/86 Pulse: 108   Resp: 18 SpO2: 96 % O2 Device: None (Room air)    Weight: 117 lbs 9.6 oz  General Appearance: AAOX3, NAD  Respiratory: CTAB  Cardiovascular: RRR, no murmur, no rubs, no gallops  GI: diffuse tenderness, +BS, mildly tympanic  Skin: no lesions, no petechiae  Ext: WWP, no edema  Psych: guarded        Primary Care Physician   Reji Avery    Immunizations       Discharge Orders      Reason for your hospital stay    You were admitted to the hospital because of acute on chronic abdominal pain.     Adult [facility] Follow-up and recommended labs and tests    Follow up with primary care provider, Reji Avery, within 7 days to evaluate treatment change.  No follow up labs or test are needed.      Appointments on [place] and/or [facility] (with [facility] or [facility] provider or service). Call [phone] if you haven't heard regarding these appointments within 7 days of discharge.     Activity    Your activity upon discharge: activity as tolerated     Discharge Instructions    Call your primary care provider if breakthrough pain medications do not work.     Full Code     Diet    Follow this diet upon discharge:       Advance Diet as Tolerated: Regular Diet Adult       Significant Results and Procedures   Results for orders placed or performed during the hospital encounter of 03/12/19   Abdomen XR, 2 vw, flat and upright    Narrative    XR ABDOMEN 2 VW  3/12/2019 9:03 PM      HISTORY: abd pain and vomiting    COMPARISON: CT of the abdomen and pelvis 3/7/2019.    FINDINGS:   Upright and supine views of the abdomen were performed.    Diffuse luminal distention of the bowel  in all quadrants, slightly  increased. No evidence of pneumatosis or portal venous gas.    The lower chest is within normal limits. The osseous structures are  within normal limits.      Impression    IMPRESSION:   Diffuse large and small bowel luminal dilation not slightly increased  compared to the CT performed on 3/7/2019. No evidence for pneumatosis,  pneumoperitoneum or portal venous gas.    I have personally reviewed the examination and initial interpretation  and I agree with the findings.    CORTNEY XAVIER MD   CT Abdomen Pelvis w Contrast    Narrative    Exam: CT abdomen and pelvis with contrast    Comparison: 3/7/2019    History: Abdominal distention; bowel obstruction, high-grade    Technique: CT of the abdomen and pelvis was obtained following the  administration of  intravenous contrast. Coronal and sagittal  reconstructions were obtained and reviewed.    Contrast dose: iopamidol (ISOVUE-370) solution 69 mL    Findings:    Lower chest: No consolidative airspace opacities. No suspicious  pulmonary nodules. No pleural effusion. Heart size is normal. No  pericardial effusion.    Abdomen/pelvis: Again seen is diffuse bowel distention measuring up to  5.5 cm, slightly increased from the most recent prior and improved  from more remote prior exams, for example 11/19/2018. No convincing  transition point is identified to suggest high-grade obstruction. The  anastomotic sutures in the left lower quadrant, sequelae of prior  small bowel resection. No pneumatosis, portal venous gas, or free air.  Mesenteric edema.     No enhancing hepatic lesions. No intra or extrahepatic biliary ductal  dilatation. Gallbladder is distended and unremarkable. Normal  pancreas. The spleen and adrenal glands are within normal limits.  Hypodensities in the kidneys, too small to adequately characterize. No  hydronephrosis or hydroureter. No renal calculi. The bladder is  partially distended and unremarkable.    Unchanged enhancing  focus in the anterior uterine body favored to  represent submucosal fibroid. The cervical region is not well  visualized on the current exam. Irregularity of the peritoneal soft  tissues may be secondary to prior radiation. Small amount of free  fluid in the posterior cul-de-sac. No appreciable abdominal or pelvic  lymphadenopathy. Normal caliber of the aorta. Portal venous system is  patent. No free air in the abdomen or pelvis.    Bones: No acute or suspicious appearing bony abnormalities.      Impression    Impression:   Slightly increased chronic gaseous small bowel distention compared to  the recent prior of 3/7/2018, improved from more remote prior exams,  for example 11/19/2018. No focal transition point to suggest  high-grade obstruction. No pneumatosis, portal venous gas, or free  air.    I have personally reviewed the examination and initial interpretation  and I agree with the findings.    CARLITO BURCH MD       Discharge Medications   Discharge Medication List as of 3/15/2019  2:32 PM      START taking these medications    Details   acetaminophen (TYLENOL) 500 MG tablet Take 2 tablets (1,000 mg) by mouth 3 times daily, Disp-180 tablet, R-0, E-Prescribe      HYDROmorphone, STANDARD CONC, (DILAUDID) 1 MG/ML oral solution Take 1 mL (1 mg) by mouth 2 times daily for 7 days, Disp-14 mL, R-0, Local Print      melatonin 1 MG TABS tablet Take 1 tablet (1 mg) by mouth nightly as needed for sleep, Disp-30 tablet, R-3, E-Prescribe      ondansetron (ZOFRAN-ODT) 4 MG ODT tab Take 1 tablet (4 mg) by mouth every 6 hours as needed for nausea or vomiting, Disp-90 tablet, R-0, E-Prescribe         CONTINUE these medications which have NOT CHANGED    Details   dicyclomine (BENTYL) 20 MG tablet Take 1 tablet (20 mg) by mouth 4 times daily as needed (cramping), Disp-60 tablet, R-0, E-Prescribe      oxyCODONE-acetaminophen (PERCOCET) 5-325 MG tablet Take 1 tablet by mouth every 4 hours as needed for severe pain, Disp-26  tablet, R-0, Local Print      simethicone 80 MG TABS Take 80 mg by mouth 4 times daily as needed for cramping , Historical         STOP taking these medications       ondansetron (ZOFRAN) 8 MG tablet Comments:   Reason for Stopping:             Allergies   Allergies   Allergen Reactions     Sulfa Drugs Hives     Ibuprofen Nausea and Vomiting and Hives     Unknown if reaction hives or N/V     No Clinical Screening - See Comments Other (See Comments) and Diarrhea     headache  Carrots cause gastric upset, cramping and diarrhea.     Ciprofloxacin Hives and Nausea     Quinolones      Tramadol Hives, Diarrhea, Nausea and Nausea and Vomiting     Amoxicillin Nausea and Vomiting     Augmentin Nausea, Hives and GI Disturbance     Patient tolerated course of zosyn in 5/2018     Avelox [Moxifloxacin] Nausea and Vomiting     Codeine Nausea and Vomiting     Daucus Carota      Other reaction(s): GI Upset  Other reaction(s): Abdominal pain, Diarrhea  Carrots cause gastric upset, cramping and diarrhea.

## 2019-03-22 NOTE — PROGRESS NOTES
Prior Authorization Approval    ondansetron 4mg ODT  Date Initiated: 3/15/2019  Date Completed: 3/21/2019  Prior Auth Type: Quantity Limit                Status: Approved    Effective Date: 03/21/2019 - 03/21/2020  Copay: 0.00     [place] Filled: Yes    Insurance: [employer] - Phone [phone] Fax [phone]  ID: [id number]  Case Number: [id number]   Submitted Via: Osito Proctor  [facility] Liaison  Ph: [phone] Page: [phone]

## 2019-04-26 NOTE — TELEPHONE ENCOUNTER
Tobacco Treatment Team at the [facility] attempted to reach Ms. Gerhardt on 4/26/2019 regarding the tobacco cessation program to help Ms. Gerhardt to quit smoking. We will attempt to reach Ms. Gerhardt another time.

## 2019-05-18 ENCOUNTER — APPOINTMENT (OUTPATIENT)
Dept: GENERAL RADIOLOGY | Facility: CLINIC | Age: 44
End: 2019-05-18
Attending: EMERGENCY MEDICINE
Payer: COMMERCIAL

## 2019-05-18 ENCOUNTER — HOSPITAL ENCOUNTER (OUTPATIENT)
Facility: CLINIC | Age: 44
Setting detail: OBSERVATION
Discharge: HOME | End: 2019-05-20
Attending: EMERGENCY MEDICINE | Admitting: INTERNAL MEDICINE
Payer: COMMERCIAL

## 2019-05-18 ENCOUNTER — APPOINTMENT (OUTPATIENT)
Dept: CT IMAGING | Facility: CLINIC | Age: 44
End: 2019-05-18
Attending: EMERGENCY MEDICINE
Payer: COMMERCIAL

## 2019-05-18 DIAGNOSIS — J18.9 PNEUMONIA OF LEFT LOWER LOBE DUE TO INFECTIOUS ORGANISM: ICD-10-CM

## 2019-05-18 DIAGNOSIS — J06.9 VIRAL UPPER RESPIRATORY TRACT INFECTION: ICD-10-CM

## 2019-05-18 DIAGNOSIS — B34.8 RHINOVIRUS: Primary | ICD-10-CM

## 2019-05-18 DIAGNOSIS — K56.609 SBO (SMALL BOWEL OBSTRUCTION) (H): ICD-10-CM

## 2019-05-18 DIAGNOSIS — F17.210 CIGARETTE SMOKER: ICD-10-CM

## 2019-05-18 DIAGNOSIS — E86.0 DEHYDRATION: ICD-10-CM

## 2019-05-18 DIAGNOSIS — E46 PROTEIN-CALORIE MALNUTRITION, UNSPECIFIED SEVERITY (H): ICD-10-CM

## 2019-05-18 DIAGNOSIS — K56.609 INTESTINAL OBSTRUCTION, UNSPECIFIED CAUSE, UNSPECIFIED WHETHER PARTIAL OR COMPLETE (H): ICD-10-CM

## 2019-05-18 DIAGNOSIS — R10.9 ABDOMINAL PAIN, UNSPECIFIED ABDOMINAL LOCATION: ICD-10-CM

## 2019-05-18 DIAGNOSIS — J18.8 OTHER PNEUMONIA, UNSPECIFIED ORGANISM: ICD-10-CM

## 2019-05-18 DIAGNOSIS — E46 CHRONIC MALNUTRITION (H): ICD-10-CM

## 2019-05-18 PROBLEM — Z76.89 ENCOUNTER FOR ASSESSMENT FOR SMALL BOWEL OBSTRUCITON: Status: ACTIVE | Noted: 2019-05-18

## 2019-05-18 LAB
ALBUMIN SERPL-MCNC: 2.5 G/DL (ref 3.4–5)
ALP SERPL-CCNC: 92 U/L (ref 40–150)
ALT SERPL W P-5'-P-CCNC: 24 U/L (ref 0–50)
ANION GAP SERPL CALCULATED.3IONS-SCNC: 7 MMOL/L (ref 3–14)
AST SERPL W P-5'-P-CCNC: 21 U/L (ref 0–45)
BASOPHILS # BLD AUTO: 0 10E9/L (ref 0–0.2)
BASOPHILS NFR BLD AUTO: 0.2 %
BILIRUB SERPL-MCNC: 0.3 MG/DL (ref 0.2–1.3)
BUN SERPL-MCNC: 13 MG/DL (ref 7–30)
CALCIUM SERPL-MCNC: 8.4 MG/DL (ref 8.5–10.1)
CHLORIDE SERPL-SCNC: 103 MMOL/L (ref 94–109)
CO2 SERPL-SCNC: 33 MMOL/L (ref 20–32)
CREAT SERPL-MCNC: 0.53 MG/DL (ref 0.52–1.04)
DIFFERENTIAL METHOD BLD: ABNORMAL
EOSINOPHIL # BLD AUTO: 0 10E9/L (ref 0–0.7)
EOSINOPHIL NFR BLD AUTO: 0.1 %
ERYTHROCYTE [DISTWIDTH] IN BLOOD BY AUTOMATED COUNT: 14.2 % (ref 10–15)
FLUAV+FLUBV RNA SPEC QL NAA+PROBE: NEGATIVE
FLUAV+FLUBV RNA SPEC QL NAA+PROBE: NEGATIVE
GFR SERPL CREATININE-BSD FRML MDRD: >90 ML/MIN/{1.73_M2}
GLUCOSE SERPL-MCNC: 143 MG/DL (ref 70–99)
HCT VFR BLD AUTO: 41 % (ref 35–47)
HGB BLD-MCNC: 12.6 G/DL (ref 11.7–15.7)
IMM GRANULOCYTES # BLD: 0.1 10E9/L (ref 0–0.4)
IMM GRANULOCYTES NFR BLD: 0.6 %
LACTATE BLD-SCNC: 1.4 MMOL/L (ref 0.7–2)
LYMPHOCYTES # BLD AUTO: 1.6 10E9/L (ref 0.8–5.3)
LYMPHOCYTES NFR BLD AUTO: 17.8 %
MAGNESIUM SERPL-MCNC: 1.7 MG/DL (ref 1.6–2.3)
MCH RBC QN AUTO: 29.6 PG (ref 26.5–33)
MCHC RBC AUTO-ENTMCNC: 30.7 G/DL (ref 31.5–36.5)
MCV RBC AUTO: 96 FL (ref 78–100)
MONOCYTES # BLD AUTO: 0.5 10E9/L (ref 0–1.3)
MONOCYTES NFR BLD AUTO: 5.5 %
NEUTROPHILS # BLD AUTO: 6.7 10E9/L (ref 1.6–8.3)
NEUTROPHILS NFR BLD AUTO: 75.8 %
NRBC # BLD AUTO: 0 10*3/UL
NRBC BLD AUTO-RTO: 0 /100
PLATELET # BLD AUTO: 389 10E9/L (ref 150–450)
POTASSIUM SERPL-SCNC: 3.1 MMOL/L (ref 3.4–5.3)
POTASSIUM SERPL-SCNC: 3.2 MMOL/L (ref 3.4–5.3)
PROCALCITONIN SERPL-MCNC: 0.1 NG/ML
PROT SERPL-MCNC: 6.2 G/DL (ref 6.8–8.8)
RBC # BLD AUTO: 4.26 10E12/L (ref 3.8–5.2)
RSV RNA SPEC NAA+PROBE: NEGATIVE
SODIUM SERPL-SCNC: 143 MMOL/L (ref 133–144)
SPECIMEN SOURCE: NORMAL
TROPONIN I SERPL-MCNC: <0.015 UG/L (ref 0–0.04)
WBC # BLD AUTO: 8.9 10E9/L (ref 4–11)

## 2019-05-18 PROCEDURE — 93010 ELECTROCARDIOGRAM REPORT: CPT | Mod: Z6 | Performed by: EMERGENCY MEDICINE

## 2019-05-18 PROCEDURE — 96366 THER/PROPH/DIAG IV INF ADDON: CPT | Performed by: EMERGENCY MEDICINE

## 2019-05-18 PROCEDURE — 87631 RESP VIRUS 3-5 TARGETS: CPT | Performed by: FAMILY MEDICINE

## 2019-05-18 PROCEDURE — 96365 THER/PROPH/DIAG IV INF INIT: CPT | Mod: 59 | Performed by: EMERGENCY MEDICINE

## 2019-05-18 PROCEDURE — 99285 EMERGENCY DEPT VISIT HI MDM: CPT | Mod: 25 | Performed by: EMERGENCY MEDICINE

## 2019-05-18 PROCEDURE — 25000128 H RX IP 250 OP 636: Performed by: EMERGENCY MEDICINE

## 2019-05-18 PROCEDURE — 93005 ELECTROCARDIOGRAM TRACING: CPT | Performed by: EMERGENCY MEDICINE

## 2019-05-18 PROCEDURE — 80053 COMPREHEN METABOLIC PANEL: CPT | Performed by: EMERGENCY MEDICINE

## 2019-05-18 PROCEDURE — 71046 X-RAY EXAM CHEST 2 VIEWS: CPT

## 2019-05-18 PROCEDURE — G0378 HOSPITAL OBSERVATION PER HR: HCPCS

## 2019-05-18 PROCEDURE — 85025 COMPLETE CBC W/AUTO DIFF WBC: CPT | Performed by: EMERGENCY MEDICINE

## 2019-05-18 PROCEDURE — 74177 CT ABD & PELVIS W/CONTRAST: CPT

## 2019-05-18 PROCEDURE — 25000128 H RX IP 250 OP 636: Performed by: STUDENT IN AN ORGANIZED HEALTH CARE EDUCATION/TRAINING PROGRAM

## 2019-05-18 PROCEDURE — 96375 TX/PRO/DX INJ NEW DRUG ADDON: CPT | Mod: 59 | Performed by: EMERGENCY MEDICINE

## 2019-05-18 PROCEDURE — 84145 PROCALCITONIN (PCT): CPT | Performed by: EMERGENCY MEDICINE

## 2019-05-18 PROCEDURE — 84132 ASSAY OF SERUM POTASSIUM: CPT | Performed by: STUDENT IN AN ORGANIZED HEALTH CARE EDUCATION/TRAINING PROGRAM

## 2019-05-18 PROCEDURE — 96376 TX/PRO/DX INJ SAME DRUG ADON: CPT | Performed by: EMERGENCY MEDICINE

## 2019-05-18 PROCEDURE — 25800030 ZZH RX IP 258 OP 636: Performed by: STUDENT IN AN ORGANIZED HEALTH CARE EDUCATION/TRAINING PROGRAM

## 2019-05-18 PROCEDURE — 83605 ASSAY OF LACTIC ACID: CPT | Performed by: EMERGENCY MEDICINE

## 2019-05-18 PROCEDURE — 84484 ASSAY OF TROPONIN QUANT: CPT | Performed by: EMERGENCY MEDICINE

## 2019-05-18 PROCEDURE — 96361 HYDRATE IV INFUSION ADD-ON: CPT

## 2019-05-18 PROCEDURE — 25000132 ZZH RX MED GY IP 250 OP 250 PS 637: Performed by: STUDENT IN AN ORGANIZED HEALTH CARE EDUCATION/TRAINING PROGRAM

## 2019-05-18 PROCEDURE — 71260 CT THORAX DX C+: CPT

## 2019-05-18 PROCEDURE — 96361 HYDRATE IV INFUSION ADD-ON: CPT | Performed by: EMERGENCY MEDICINE

## 2019-05-18 PROCEDURE — 87633 RESP VIRUS 12-25 TARGETS: CPT | Performed by: STUDENT IN AN ORGANIZED HEALTH CARE EDUCATION/TRAINING PROGRAM

## 2019-05-18 PROCEDURE — 25000132 ZZH RX MED GY IP 250 OP 250 PS 637: Performed by: EMERGENCY MEDICINE

## 2019-05-18 PROCEDURE — 25000125 ZZHC RX 250: Performed by: STUDENT IN AN ORGANIZED HEALTH CARE EDUCATION/TRAINING PROGRAM

## 2019-05-18 PROCEDURE — 83735 ASSAY OF MAGNESIUM: CPT | Performed by: EMERGENCY MEDICINE

## 2019-05-18 PROCEDURE — 36415 COLL VENOUS BLD VENIPUNCTURE: CPT | Performed by: STUDENT IN AN ORGANIZED HEALTH CARE EDUCATION/TRAINING PROGRAM

## 2019-05-18 PROCEDURE — 96376 TX/PRO/DX INJ SAME DRUG ADON: CPT

## 2019-05-18 PROCEDURE — 25800030 ZZH RX IP 258 OP 636: Performed by: EMERGENCY MEDICINE

## 2019-05-18 PROCEDURE — 74019 RADEX ABDOMEN 2 VIEWS: CPT

## 2019-05-18 PROCEDURE — 96367 TX/PROPH/DG ADDL SEQ IV INF: CPT | Performed by: EMERGENCY MEDICINE

## 2019-05-18 RX ORDER — AMOXICILLIN 250 MG
1 CAPSULE ORAL 2 TIMES DAILY
Status: DISCONTINUED | OUTPATIENT
Start: 2019-05-18 | End: 2019-05-18

## 2019-05-18 RX ORDER — ACETAMINOPHEN 650 MG/1
650 SUPPOSITORY RECTAL EVERY 4 HOURS PRN
Status: DISCONTINUED | OUTPATIENT
Start: 2019-05-18 | End: 2019-05-19

## 2019-05-18 RX ORDER — IPRATROPIUM BROMIDE AND ALBUTEROL SULFATE 2.5; .5 MG/3ML; MG/3ML
3 SOLUTION RESPIRATORY (INHALATION) EVERY 4 HOURS PRN
Status: DISCONTINUED | OUTPATIENT
Start: 2019-05-18 | End: 2019-05-20 | Stop reason: HOSPADM

## 2019-05-18 RX ORDER — ONDANSETRON 2 MG/ML
4 INJECTION INTRAMUSCULAR; INTRAVENOUS ONCE
Status: COMPLETED | OUTPATIENT
Start: 2019-05-18 | End: 2019-05-18

## 2019-05-18 RX ORDER — AMOXICILLIN 250 MG
1 CAPSULE ORAL 2 TIMES DAILY PRN
Status: DISCONTINUED | OUTPATIENT
Start: 2019-05-18 | End: 2019-05-20 | Stop reason: HOSPADM

## 2019-05-18 RX ORDER — ONDANSETRON 2 MG/ML
4 INJECTION INTRAMUSCULAR; INTRAVENOUS EVERY 6 HOURS PRN
Status: DISCONTINUED | OUTPATIENT
Start: 2019-05-18 | End: 2019-05-20 | Stop reason: HOSPADM

## 2019-05-18 RX ORDER — AMOXICILLIN 250 MG
2 CAPSULE ORAL 2 TIMES DAILY PRN
Status: DISCONTINUED | OUTPATIENT
Start: 2019-05-18 | End: 2019-05-20 | Stop reason: HOSPADM

## 2019-05-18 RX ORDER — CEFTRIAXONE 1 G/1
1 INJECTION, POWDER, FOR SOLUTION INTRAMUSCULAR; INTRAVENOUS ONCE
Status: COMPLETED | OUTPATIENT
Start: 2019-05-18 | End: 2019-05-18

## 2019-05-18 RX ORDER — POTASSIUM CHLORIDE 7.45 MG/ML
10 INJECTION INTRAVENOUS
Status: DISCONTINUED | OUTPATIENT
Start: 2019-05-18 | End: 2019-05-20 | Stop reason: HOSPADM

## 2019-05-18 RX ORDER — LIDOCAINE 40 MG/G
CREAM TOPICAL
Status: DISCONTINUED | OUTPATIENT
Start: 2019-05-18 | End: 2019-05-20 | Stop reason: HOSPADM

## 2019-05-18 RX ORDER — ALBUTEROL SULFATE 90 UG/1
2 AEROSOL, METERED RESPIRATORY (INHALATION) EVERY 6 HOURS PRN
COMMUNITY
End: 2019-10-19

## 2019-05-18 RX ORDER — POTASSIUM CL/LIDO/0.9 % NACL 10MEQ/0.1L
10 INTRAVENOUS SOLUTION, PIGGYBACK (ML) INTRAVENOUS
Status: DISCONTINUED | OUTPATIENT
Start: 2019-05-18 | End: 2019-05-20 | Stop reason: HOSPADM

## 2019-05-18 RX ORDER — HEPARIN SODIUM,PORCINE 10 UNIT/ML
2-5 VIAL (ML) INTRAVENOUS
Status: DISCONTINUED | OUTPATIENT
Start: 2019-05-18 | End: 2019-05-20 | Stop reason: HOSPADM

## 2019-05-18 RX ORDER — ACETAMINOPHEN 325 MG/1
650 TABLET ORAL EVERY 4 HOURS PRN
Status: DISCONTINUED | OUTPATIENT
Start: 2019-05-18 | End: 2019-05-19

## 2019-05-18 RX ORDER — AMOXICILLIN 250 MG
2 CAPSULE ORAL 2 TIMES DAILY
Status: DISCONTINUED | OUTPATIENT
Start: 2019-05-18 | End: 2019-05-18

## 2019-05-18 RX ORDER — POTASSIUM CHLORIDE 29.8 MG/ML
20 INJECTION INTRAVENOUS
Status: DISCONTINUED | OUTPATIENT
Start: 2019-05-18 | End: 2019-05-18 | Stop reason: ALTCHOICE

## 2019-05-18 RX ORDER — POTASSIUM CHLORIDE 1.5 G/1.58G
20-40 POWDER, FOR SOLUTION ORAL
Status: DISCONTINUED | OUTPATIENT
Start: 2019-05-18 | End: 2019-05-20 | Stop reason: HOSPADM

## 2019-05-18 RX ORDER — ONDANSETRON 4 MG/1
4 TABLET, ORALLY DISINTEGRATING ORAL EVERY 6 HOURS PRN
Status: DISCONTINUED | OUTPATIENT
Start: 2019-05-18 | End: 2019-05-20 | Stop reason: HOSPADM

## 2019-05-18 RX ORDER — NALOXONE HYDROCHLORIDE 0.4 MG/ML
.1-.4 INJECTION, SOLUTION INTRAMUSCULAR; INTRAVENOUS; SUBCUTANEOUS
Status: DISCONTINUED | OUTPATIENT
Start: 2019-05-18 | End: 2019-05-20 | Stop reason: HOSPADM

## 2019-05-18 RX ORDER — HYDROMORPHONE HYDROCHLORIDE 1 MG/ML
.3-.5 INJECTION, SOLUTION INTRAMUSCULAR; INTRAVENOUS; SUBCUTANEOUS EVERY 4 HOURS PRN
Status: DISCONTINUED | OUTPATIENT
Start: 2019-05-18 | End: 2019-05-19

## 2019-05-18 RX ORDER — AZITHROMYCIN 250 MG/1
500 TABLET, FILM COATED ORAL ONCE
Status: COMPLETED | OUTPATIENT
Start: 2019-05-18 | End: 2019-05-18

## 2019-05-18 RX ORDER — HYDROMORPHONE HYDROCHLORIDE 1 MG/ML
0.5 INJECTION, SOLUTION INTRAMUSCULAR; INTRAVENOUS; SUBCUTANEOUS
Status: COMPLETED | OUTPATIENT
Start: 2019-05-18 | End: 2019-05-18

## 2019-05-18 RX ORDER — BENZONATATE 100 MG/1
100 CAPSULE ORAL 3 TIMES DAILY PRN
Status: DISCONTINUED | OUTPATIENT
Start: 2019-05-18 | End: 2019-05-20 | Stop reason: HOSPADM

## 2019-05-18 RX ORDER — IOPAMIDOL 755 MG/ML
72 INJECTION, SOLUTION INTRAVASCULAR ONCE
Status: COMPLETED | OUTPATIENT
Start: 2019-05-18 | End: 2019-05-18

## 2019-05-18 RX ORDER — BENZONATATE 200 MG/1
200 CAPSULE ORAL 2 TIMES DAILY
Status: ON HOLD | COMMUNITY
End: 2019-06-23

## 2019-05-18 RX ORDER — SODIUM CHLORIDE 9 MG/ML
1000 INJECTION, SOLUTION INTRAVENOUS CONTINUOUS
Status: DISCONTINUED | OUTPATIENT
Start: 2019-05-18 | End: 2019-05-18

## 2019-05-18 RX ORDER — POTASSIUM CHLORIDE 750 MG/1
20-40 TABLET, EXTENDED RELEASE ORAL
Status: DISCONTINUED | OUTPATIENT
Start: 2019-05-18 | End: 2019-05-20 | Stop reason: HOSPADM

## 2019-05-18 RX ORDER — DEXTROSE MONOHYDRATE, SODIUM CHLORIDE, AND POTASSIUM CHLORIDE 50; 1.49; 4.5 G/1000ML; G/1000ML; G/1000ML
INJECTION, SOLUTION INTRAVENOUS CONTINUOUS
Status: DISCONTINUED | OUTPATIENT
Start: 2019-05-18 | End: 2019-05-19

## 2019-05-18 RX ADMIN — POTASSIUM CHLORIDE, DEXTROSE MONOHYDRATE AND SODIUM CHLORIDE: 150; 5; 450 INJECTION, SOLUTION INTRAVENOUS at 17:20

## 2019-05-18 RX ADMIN — AZITHROMYCIN 500 MG: 250 TABLET, FILM COATED ORAL at 15:57

## 2019-05-18 RX ADMIN — POTASSIUM CHLORIDE 40 MEQ: 750 TABLET, EXTENDED RELEASE ORAL at 23:11

## 2019-05-18 RX ADMIN — Medication 0.5 MG: at 15:41

## 2019-05-18 RX ADMIN — IOPAMIDOL 72 ML: 755 INJECTION, SOLUTION INTRAVENOUS at 13:30

## 2019-05-18 RX ADMIN — CEFTRIAXONE 1 G: 1 INJECTION, POWDER, FOR SOLUTION INTRAMUSCULAR; INTRAVENOUS at 15:43

## 2019-05-18 RX ADMIN — BENZONATATE 100 MG: 100 CAPSULE, LIQUID FILLED ORAL at 21:04

## 2019-05-18 RX ADMIN — SODIUM CHLORIDE 1000 ML: 9 INJECTION, SOLUTION INTRAVENOUS at 11:56

## 2019-05-18 RX ADMIN — POTASSIUM CHLORIDE, DEXTROSE MONOHYDRATE AND SODIUM CHLORIDE: 150; 5; 450 INJECTION, SOLUTION INTRAVENOUS at 23:11

## 2019-05-18 RX ADMIN — SODIUM CHLORIDE 1000 ML: 9 INJECTION, SOLUTION INTRAVENOUS at 14:36

## 2019-05-18 RX ADMIN — Medication 0.5 MG: at 22:04

## 2019-05-18 RX ADMIN — Medication 0.5 MG: at 17:54

## 2019-05-18 RX ADMIN — ONDANSETRON 4 MG: 2 INJECTION INTRAMUSCULAR; INTRAVENOUS at 16:00

## 2019-05-18 RX ADMIN — SODIUM CHLORIDE 68 ML: 9 INJECTION, SOLUTION INTRAVENOUS at 13:32

## 2019-05-18 ASSESSMENT — ENCOUNTER SYMPTOMS
BLOOD IN STOOL: 0
SHORTNESS OF BREATH: 1
TROUBLE SWALLOWING: 0
SINUS PRESSURE: 0
FLANK PAIN: 0
NAUSEA: 1
SLEEP DISTURBANCE: 0
HEMATURIA: 0
ABDOMINAL PAIN: 1
PALPITATIONS: 0
WHEEZING: 0
BACK PAIN: 0
COUGH: 1
ACTIVITY CHANGE: 0
SORE THROAT: 0
DIARRHEA: 1
WEAKNESS: 1
VOMITING: 1
ABDOMINAL DISTENTION: 0
ARTHRALGIAS: 1
DIZZINESS: 0
FEVER: 0
RHINORRHEA: 1
FATIGUE: 1
CONSTIPATION: 0
NERVOUS/ANXIOUS: 0
MYALGIAS: 0
CHILLS: 0
CHEST TIGHTNESS: 1
APPETITE CHANGE: 0
DIARRHEA: 0
DYSURIA: 0
UNEXPECTED WEIGHT CHANGE: 0

## 2019-05-18 ASSESSMENT — MIFFLIN-ST. JEOR: SCORE: 1227.85

## 2019-05-18 NOTE — ED NOTES
Initial Assessment: VSS. Communicates needs without difficulty. Pleasant and co-op. Reports SOB and chest heaviness. Yellow sputum noted. MD in room. Chest congestion audible at the bedside with unaided ear.

## 2019-05-18 NOTE — PHARMACY-ADMISSION MEDICATION HISTORY
Admission medication history interview status for the 5/18/2019 admission is complete. See Epic admission navigator for allergy information, pharmacy, prior to admission medications and immunization status.     Medication history interview sources:  pt, med list, surescripts, and medication bottles     Changes made to PTA medication list (reason)  Added: yes, Pt reported currently taking the following medications:   -albuterol (PROAIR HFA/PROVENTIL HFA/VENTOLIN HFA) 108 (90 Base) MCG/ACT inhaler  - benzonatate (TESSALON) 200 MG capsule- verified the direction on the bottle- Pt reported taking BID instead BID PRN ( written on the medication bottle)   Deleted: yes, Pt reported not currently taking the following medications:   -acetaminophen (TYLENOL) 500 MG tablet  -dicyclomine (BENTYL) 20 MG tablet  -melatonin 1 MG TABS tablet  Changed: yes, Simethicone from 80mg QID PRN-----> 125mg daily - Pt reported taking 125mg daily.     Additional medication history information (including reliability of information, actions taken by pharmacist):  Reliability: Pt knows her medication well     Allergies :confirmed   Pharmacy: [facility] [id number] - P: [phone]  Patient confirmed not taking any other prescription medication, supplements, herbals, topical, or recreational drugs.       Prior to Admission medications    Medication Sig Last Dose Taking? Auth Provider   albuterol (PROAIR HFA/PROVENTIL HFA/VENTOLIN HFA) 108 (90 Base) MCG/ACT inhaler Inhale 2 puffs into the lungs every 6 hours as needed for shortness of breath / dyspnea or wheezing 5/18/2019 at Unknown time Yes Unknown, Entered By History   benzonatate (TESSALON) 200 MG capsule Take 200 mg by mouth 2 times daily 5/17/2019 at Unknown time Yes Unknown, Entered By History   ondansetron (ZOFRAN-ODT) 4 MG ODT tab Take 1 tablet (4 mg) by mouth every 6 hours as needed for nausea or vomiting 5/18/2019 at Unknown time Yes Buzz Peres MD   oxyCODONE-acetaminophen  (PERCOCET) 5-325 MG tablet Take 1 tablet by mouth every 4 hours as needed for severe pain 5/18/2019 at Unknown time Yes Mary Grace Oseguera MD   Simethicone 125 MG TABS Take 125 mg by mouth daily  5/18/2019 at Unknown time Yes Unknown, Entered By History         Medication history completed by: Deloris Stahl, PD2, Student Pharmacist

## 2019-05-18 NOTE — CONSULTS
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 Her symptoms of emesis prompted a abdominal CT which demonstrated dilated small bowel with concerns for SBO. Reassuring abdominal exam, WNL WBC and lactate and has evidence of bowel function.         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 Her symptoms of emesis prompted a abdominal CT which demonstrated dilated small bowel with concerns for SBO. General surgery has been consulted for assistance with management. She is having bowel function though     Patient has previously been seen by colorectal surgery staff in the past and had been offered lysis of adhesion as a therapeutic option which she refused. Colorectal surgery has declined to evaluate this patient. She reports having daily loose stools and reports passing. Reports having nausea and emesis after coughing. Denies any worsening of abdominal pain.     Workup in ED revealed WNL WBC,  WNL lactic acid and CT scan shows chronically dilated SB loops(more dilataed than last scan from 3/13) with gradual transition to thickened loops of small bowel in pelvis. Does have rectal gas and does not have any evidence of bowel compromise. Of note last CT scan from 3/2019 demonstrates similarly dilated loops of small bowel            Past Medical History:  Past Medical History:   Diagnosis Date     Asthma      Cervical cancer (H)      Other chronic pain      Ovarian cancer (H)      Substance abuse (H)     Outside records indicate past history of narcotics abuse or dependence, but patient denies.       Past Surgical History:  Past Surgical History:   Procedure Laterality Date     COLONOSCOPY N/A 5/3/2018    Procedure: COLONOSCOPY;  sigmoidoscopy;  Surgeon: Omero Vigil MD;  Location: [facility] OR     COLONOSCOPY WITH CO2 INSUFFLATION N/A 4/30/2018    Procedure: COLONOSCOPY WITH CO2 INSUFFLATION;  Colonoscopy;  Surgeon: Omeor Vigil MD;  Location: [facility]     COMBINED CYSTOSCOPY, INSERT STENT URETER(S) Bilateral 5/18/2017    Procedure: COMBINED CYSTOSCOPY, INSERT STENT URETER(S);  Cystoscopy with Bilateral Stent,;  Surgeon: Rene Calero MD;  Location: [facility] OR     ENT SURGERY  2009    mastoid, sinus     ENTEROSCOPY SMALL BOWEL N/A 6/18/2018    Procedure: ENTEROSCOPY SMALL BOWEL;  Lower bowel Retrograde Enteroscopy with Balloon Dilation .;  Surgeon: Omero Vigil MD;  Location: [facility]     EXAM UNDER ANESTHESIA, INSERT ALEX SLEEVE, UTERINE PLACEMENT OF TANDEM AND RING FOR RAD, ULTRASOUND N/A 12/14/2015    Procedure: EXAM UNDER ANESTHESIA, INSERT ALEX SLEEVE, UTERINE PLACEMENT OF TANDEM AND RING FOR RADIATION, ULTRASOUND GUIDED;  Surgeon: Abby Tony MD;  Location: [facility]     INSERT TANDEM AND CESIUM APPLICATOR CERVIX, ULTRASOUND GUIDED N/A 12/17/2015    Procedure: INSERT TANDEM AND CESIUM APPLICATOR CERVIX, ULTRASOUND GUIDED;  Surgeon: Kika Wood MD;  Location: [facility] OR     KNEE  SURGERY       LAPAROTOMY EXPLORATORY N/A 2017    Procedure: LAPAROTOMY EXPLORATORY;   Exploratry Laparotomy, Small Bowel Resection with anastomosis, Flexible Sigmoidoscopy;  Surgeon: Jennifer Goodwin MD;  Location: [facility]     PICC INSERTION Right 2017    4fr SL BioFlo PICC, 37cm (3cm external) in the R basilic vein w/ tip in the mid SVC.     PICC INSERTION Right 2018    4Fr - 40cm (4cm external), R lateral brachial vein, Low SVC     RESECT SMALL BOWEL WITHOUT OSTOMY N/A 2017    Procedure: RESECT SMALL BOWEL WITHOUT OSTOMY;;  Surgeon: Jennifer Goodwin MD;  Location: [facility]     SIGMOIDOSCOPY FLEXIBLE N/A 2017    Procedure: SIGMOIDOSCOPY FLEXIBLE;;  Surgeon: Jennifer Goodwin MD;  Location: [facility]       Allergies:     Allergies   Allergen Reactions     Sulfa Drugs Hives     Ibuprofen Nausea and Vomiting and Hives     Unknown if reaction hives or N/V     No Clinical Screening - See Comments Other (See Comments) and Diarrhea     headache  Carrots cause gastric upset, cramping and diarrhea.     Ciprofloxacin Hives and Nausea     Quinolones      Tramadol Hives, Diarrhea, Nausea and Nausea and Vomiting     Amoxicillin Nausea and Vomiting     Augmentin Nausea, Hives and GI Disturbance     Patient tolerated course of zosyn in 2018     Avelox [Moxifloxacin] Nausea and Vomiting     Codeine Nausea and Vomiting     Daucus Carota      Other reaction(s): GI Upset  Other reaction(s): Abdominal pain, Diarrhea  Carrots cause gastric upset, cramping and diarrhea.       Medications:    No current facility-administered medications on file prior to encounter.   Current Outpatient Medications on File Prior to Encounter:  acetaminophen (TYLENOL) 500 MG tablet Take 2 tablets (1,000 mg) by mouth 3 times daily   dicyclomine (BENTYL) 20 MG tablet Take 1 tablet (20 mg) by mouth 4 times daily as needed (cramping)   [] HYDROmorphone, STANDARD CONC, (DILAUDID) 1 MG/ML oral solution Take 1 mL (1 mg) by mouth 2  times daily for 7 days   melatonin 1 MG TABS tablet Take 1 tablet (1 mg) by mouth nightly as needed for sleep   ondansetron (ZOFRAN-ODT) 4 MG ODT tab Take 1 tablet (4 mg) by mouth every 6 hours as needed for nausea or vomiting   oxyCODONE-acetaminophen (PERCOCET) 5-325 MG tablet Take 1 tablet by mouth every 4 hours as needed for severe pain   simethicone 80 MG TABS Take 80 mg by mouth 4 times daily as needed for cramping        Social History:  Social History     Socioeconomic History     Marital status:      Spouse name: Not on file     Number of children: Not on file     Years of education: Not on file     Highest education level: Not on file   Occupational History     Not on file   Social Needs     Financial resource strain: Not on file     Food insecurity:     Worry: Not on file     Inability: Not on file     Transportation needs:     Medical: Not on file     Non-medical: Not on file   Tobacco Use     Smoking status: Light Tobacco Smoker     Packs/day: 0.25     Years: 12.00     Pack years: 3.00     Types: Cigarettes     Smokeless tobacco: Never Used     Tobacco comment: pt smoking about 4 cigs daily   Substance and Sexual Activity     Alcohol use: No     Alcohol/week: 0.0 oz     Comment: None per pt     Drug use: No     Comment: Patient denies.  Outside records indicate possible Opiate abuse.     Sexual activity: Yes     Partners: Male     Birth control/protection: None   Lifestyle     Physical activity:     Days per week: Not on file     Minutes per session: Not on file     Stress: Not on file   Relationships     Social connections:     Talks on phone: Not on file     Gets together: Not on file     Attends Jain service: Not on file     Active member of club or organization: Not on file     Attends meetings of clubs or organizations: Not on file     Relationship status: Not on file     Intimate partner violence:     Fear of current or ex partner: Not on file     Emotionally abused: Not on file      "Physically abused: Not on file     Forced sexual activity: Not on file   Other Topics Concern     Parent/sibling w/ CABG, MI or angioplasty before 65F 55M? No   Social History Narrative    Lives alone       Family History:  Family History   Problem Relation Age of Onset     Diabetes Mother      Ovarian Cancer No family hx of      Uterine Cancer No family hx of      Cervical Cancer No family hx of      Breast Cancer No family hx of        ROS:  C: NEGATIVE for f change in weight, constipation.   E/M: NEGATIVE for changes in vision, hearing, voice, or swallowing.   R: positive for cough   CV: NEGATIVE for chest pain, palpitations or peripheral edema   GI: NEGATIVE for melena, hematochezia, heartburn, or other changes in bowel habits.  : NEGATIVE for frequency, dysuria, or hematuria   M: NEGATIVE for significant arthralgias or myalgia.  N: NEGATIVE for weakness, dizziness or paresthesias   H/I: NEGATIVE for bleeding problems, worrisome rashes, moles or lesions.  P: NEGATIVE for changes in mood or affect  The remainder of the complete ROS was negative unless noted in the HPI.    Exam:  BP 97/75   Pulse 77   Temp 98.5  F (36.9  C) (Oral)   Resp 16   Ht 1.753 m (5' 9\")   SpO2 99%   BMI 17.37 kg/m    General:  Alert and oriented with appropriate responses to questions, in NAD.  HEENT: NC/AT, sclera anicteric  Resp: non labored breathing,  no crackles or wheezes  Cardiac: regular rate and rhythm, no murmur.  Abdomen: Soft, mildly tender in RLQ, moderately distended. No rebound or guarding.  Extremities: No LE edema  Skin: Warm and dry, no jaundice or rash  Neuro: Cn II-XII intact, moves all extremities equally    Labs:  Most Recent CBC:   Recent Labs   Lab Test 05/18/19  1136   WBC 8.9   RBC 4.26   HGB 12.6   HCT 41.0   MCV 96   MCH 29.6   MCHC 30.7*   RDW 14.2        Most Recent BMP:   Recent Labs   Lab Test 05/18/19  1136      POTASSIUM 3.2*   CHLORIDE 103   CO2 33*   BUN 13   CR 0.53   *   MAG " 1.7           Imaging:  Recent Results (from the past 24 hour(s))   XR Abdomen 2 Views    Narrative    Exam: XR ABDOMEN 2 VW, 5/18/2019 12:12 PM    Indication: vomiting, h/o bowel obstructions. History of cervical  cancer status post radiation therapy.    Comparison: CT AP 3/13/2019, abdominal radiograph 3/12/2019.    Findings: Upright and supine abdominal radiographs. Multiple dilated  loops of small bowel, measuring up to 5.6 cm in maximal diameter.  Multiple air-fluid levels demonstrated on upright images. No free air  or portal venous gas. No definite pneumatosis. No acute osseous  abnormalities.      Impression    Impression: Multiple dilated loops of small bowel with air-fluid  levels, consistent with small bowel obstruction.    I have personally reviewed the examination and initial interpretation  and I agree with the findings.    GELY WASHINGTON MD   XR Chest 2 Views    Narrative    Exam:  Chest X-ray 5/18/2019 12:13 PM    History: cough, SOB, rule out PNA    Comparison: 11/5/2018    Findings: PA and lateral chest radiograph. Trachea is midline.  Cardiomediastinal silhouette within normal limits. Pulmonary  vasculature is distinct. No pleural effusion or pneumothorax. No focal  pulmonary opacities. Prominent loop of air-filled bowel in the upper  abdomen. No acute bony abnormalities. Radiodense foreign body  projecting over the left hemithorax.      Impression    Impression:   1. No acute cardiopulmonary abnormalities.  2. Prominent loop of air-filled bowel in the upper abdomen, see  same-day abdominal radiograph report.    I have personally reviewed the examination and initial interpretation  and I agree with the findings.    GELY WASHINGTON MD   CT Chest/Abdomen/Pelvis w Contrast    Narrative    EXAMINATION: CT CHEST/ABDOMEN/PELVIS W CONTRAST  5/18/2019 1:41 PM      CLINICAL HISTORY: eval for pneumonia and possible recurrent SBO    COMPARISON: Same day radiographs, 3/13/2019, 11/2/2018        PROCEDURE  COMMENTS: CT of the chest, abdomen, and pelvis was performed  with   iopamidol (ISOVUE-370) solution 72 mL  intravenous and oral  contrast. Axial MIP  images of the chest, and coronal and sagittal  reformatted images of the chest, abdomen, and pelvis obtained.    FINDINGS:      Chest:    Visualized portions of the thyroid gland are unremarkable in  appearance. The heart is normal in size, without pericardial effusion.  No substantial coronary artery calcifications. The thoracic vessels  are normal in caliber and configuration. Prominent left paratracheal  lymphadenopathy measuring up to 1.1 cm in short axis dimension (series  4, image 41). No enlarged axillary or hilar lymph nodes.    Minimal debris in the distal trachea. Central airways are otherwise  clear. No pneumothorax or pleural effusion. No focal airspace  consolidation. Mild thickening of the airways with diffuse tree-in-bud  opacities, with a lower lobe predominance. Few superimposed  peribronchovascular nodular opacities in the inferior left lower lobe  (series 2, image 110).    Abdomen/pelvis:  Diffuse bowel distention is again seen, measuring up to 6.7 cm,  increased from comparison dated 3/13/2018, within the bowel measuring  up to 5.5 cm. No convincing transition point is identified to suggest  high-grade obstruction. There is however a gradual transition in the  right anterior pelvis where where there are several mildly thickened  loops of small bowel which contain formed stool (series 5, images  420-460). Anastomotic suture line seen in the left lower quadrant,  likely sequelae of prior small bowel resection. There is no  pneumatosis or portal venous gas. No intraperitoneal free air. Trace  physiologic free fluid in the pelvis. No focal fluid collection within  the abdomen.    The liver parenchyma enhances homogenously without focal lesion. The  hepatic vasculature appears patent. The gallbladder is decompressed.  No extrahepatic biliary dilatation.  Minimal intrahepatic biliary  dilatation centrally. The pancreas is mildly atrophic in appearance,  and otherwise unremarkable. No pancreatic ductal dilatation. The  spleen and adrenal glands are normal. Small right inferior pole  lesion, hypodense, stable, though indeterminate simple left renal  cyst. Kidneys are otherwise normal.. The urinary bladder is partially  distended and otherwise unremarkable in appearance. No focal adnexal  mass.    Bones:   No acute or suspicious appearing osseous abnormality. Mild  degenerative changes of the thoracolumbar spine.      Impression    IMPRESSION:  1. Chronically dilated loops of fluid in gas-distended small bowel,  substantially increased from comparison dated 3/13/2019. While there  is no focal or discrete transition point, there is a gradual luminal  caliber change anteriorly in the right hemipelvis where there are  abnormal thickened loops of decompressed small bowel which may be  causing obstruction. There is no pneumatosis or portal venous gas.  Colon is decompressed and the rectum contains gas, arguing against  high-grade or long-standing obstruction.  2. Mild thickening of the airways with diffuse basilar predominant  tree-in-bud opacities with a few superimposed nodular opacities in the  left lung base, concerning for infection including  bronchitis/brachiolitis.    Findings of this examination were discussed with Dr. Medina over the  telephone by Dr. Wilson at 2:00 PM on 5/18/2019.    I have personally reviewed the examination and initial interpretation  and I agree with the findings.    GELY WASHINGTON MD       Assessment/ Plan: See above.     Noel Lo MD   General Surgery Resident PGY-3  Pager: [phone]    Pt reviewed with chief resident

## 2019-05-18 NOTE — ED TRIAGE NOTES
Pt reports increased SOB and chest heaviness. Green/yellow sputum noted. Pt reports feeling dehydrated. N/V/ diarrhea reported.

## 2019-05-18 NOTE — H&P
[facility] History and Physical - Nat[facility]  Service       Date of Admission:  5/18/2019    Chief Complaint   History is obtained from the patient    History of Present Illness   Rachel A Gerhardt is a 43 year old female  who has a history of history of cervical cancer (s/p chemo-radiation) complicated by recurrent SBO (s/p bowel resection of 60cm in 2017), further complicated by ileoilial anastomotic stricture (s/p dilatation) opioid dependence, and severe protein malnutrition and is admitted for SOB, cough , N/V.     Patient has had cough for about 10 days that has persisted despite seeing her PCP 5 days into illness for tessalon perrles and albuterol. The cough is dry and hacking and occasionally keeps her up. Has gotten to point of post tussive emesis past 2 days. Other friends with it have now gotten better. She reports no asthma or COPD diagnosis but occasionally uses albuterol for RAD she thinks she was told she has a few years ago. Albuterol helps mildly w cough. She also states she has had less PO intake given feeling bad and that her increase in baseline emesis made her think she is dehydrated. Reports darker urine output today but no hematuria or dysuria. Last emesis was this AM and non bloody (usually throws up 3-5 times a week at baseline), and last BM was this AM and regular bulk/consistency. She has on increase in her chronic abdominal pain but felt it coming on because she was w/o her home pain medicine for about 8 hours. Now better with dilaudid x1.     In the ED, Vitals stable on room air.  Labs notable for  mild hypokalemia with normal lactic acid. Had abd pain on exam that prompted > CT CAP to look for signs obstruction given post tussive emesis and patient's history. Showed tree in bud opacities concerning for possible pulmonary infection and small bowel distension and signs of possible obstruction withput any transition point.     Family  medicine consulted for concerns for CAP and SBO    Review of Systems   Review of Systems   Constitutional: Positive for fatigue. Negative for activity change, appetite change, chills, fever and unexpected weight change.   HENT: Positive for postnasal drip and rhinorrhea. Negative for congestion, sinus pressure, sneezing, sore throat and trouble swallowing.    Respiratory: Positive for cough, chest tightness and shortness of breath. Negative for wheezing.    Cardiovascular: Negative for chest pain, palpitations and leg swelling.   Gastrointestinal: Positive for abdominal pain, nausea and vomiting. Negative for abdominal distention, blood in stool, constipation and diarrhea.   Genitourinary: Positive for decreased urine volume. Negative for dysuria, flank pain, hematuria and menstrual problem.   Musculoskeletal: Positive for arthralgias. Negative for back pain and myalgias.   Skin: Negative for rash.   Neurological: Positive for weakness. Negative for dizziness.   Psychiatric/Behavioral: Negative for sleep disturbance. The patient is not nervous/anxious.        Past Medical History    I have reviewed this patient's medical history and updated it with pertinent information if needed.   Past Medical History:   Diagnosis Date     Asthma      Cervical cancer (H)      Other chronic pain      Ovarian cancer (H)      Substance abuse (H)     Outside records indicate past history of narcotics abuse or dependence, but patient denies.        Past Surgical History   I have reviewed this patient's surgical history and updated it with pertinent information if needed.  Past Surgical History:   Procedure Laterality Date     COLONOSCOPY N/A 5/3/2018    Procedure: COLONOSCOPY;  sigmoidoscopy;  Surgeon: Omero Vigil MD;  Location: [facility] OR     COLONOSCOPY WITH CO2 INSUFFLATION N/A 4/30/2018    Procedure: COLONOSCOPY WITH CO2 INSUFFLATION;  Colonoscopy;  Surgeon: Omero Vigil MD;  Location: [facility] OR     COMBINED CYSTOSCOPY,  INSERT STENT URETER(S) Bilateral 5/18/2017    Procedure: COMBINED CYSTOSCOPY, INSERT STENT URETER(S);  Cystoscopy with Bilateral Stent,;  Surgeon: Rene Calero MD;  Location: [facility] OR     ENT SURGERY  2009    mastoid, sinus     ENTEROSCOPY SMALL BOWEL N/A 6/18/2018    Procedure: ENTEROSCOPY SMALL BOWEL;  Lower bowel Retrograde Enteroscopy with Balloon Dilation .;  Surgeon: Omero Vigil MD;  Location: [facility] OR     EXAM UNDER ANESTHESIA, INSERT ALEX SLEEVE, UTERINE PLACEMENT OF TANDEM AND RING FOR RAD, ULTRASOUND N/A 12/14/2015    Procedure: EXAM UNDER ANESTHESIA, INSERT ALEX SLEEVE, UTERINE PLACEMENT OF TANDEM AND RING FOR RADIATION, ULTRASOUND GUIDED;  Surgeon: Abby Tony MD;  Location: [facility] OR     INSERT TANDEM AND CESIUM APPLICATOR CERVIX, ULTRASOUND GUIDED N/A 12/17/2015    Procedure: INSERT TANDEM AND CESIUM APPLICATOR CERVIX, ULTRASOUND GUIDED;  Surgeon: Kika Wood MD;  Location: [facility] OR     KNEE SURGERY       LAPAROTOMY EXPLORATORY N/A 5/18/2017    Procedure: LAPAROTOMY EXPLORATORY;   Exploratry Laparotomy, Small Bowel Resection with anastomosis, Flexible Sigmoidoscopy;  Surgeon: Jennifer Goodwin MD;  Location: [facility] OR     PICC INSERTION Right 04/29/2017    4fr SL BioFlo PICC, 37cm (3cm external) in the R basilic vein w/ tip in the mid SVC.     PICC INSERTION Right 03/29/2018    4Fr - 40cm (4cm external), R lateral brachial vein, Low SVC     RESECT SMALL BOWEL WITHOUT OSTOMY N/A 5/18/2017    Procedure: RESECT SMALL BOWEL WITHOUT OSTOMY;;  Surgeon: Jennifer Goodwin MD;  Location: [facility]     SIGMOIDOSCOPY FLEXIBLE N/A 5/18/2017    Procedure: SIGMOIDOSCOPY FLEXIBLE;;  Surgeon: Jennifer Goodwin MD;  Location: [facility] OR        Social History   Social History     Tobacco Use     Smoking status: Light Tobacco Smoker     Packs/day: 0.25     Years: 12.00     Pack years: 3.00     Types: Cigarettes     Smokeless tobacco: Never Used     Tobacco comment: pt smoking about 4 cigs daily   Substance Use  Topics     Alcohol use: No     Alcohol/week: 0.0 oz     Comment: None per pt     Drug use: No     Comment: Patient denies.  Outside records indicate possible Opiate abuse.       Family History   I have reviewed this patient's family history and updated it with pertinent information if needed.   Family History   Problem Relation Age of Onset     Diabetes Mother      Ovarian Cancer No family hx of      Uterine Cancer No family hx of      Cervical Cancer No family hx of      Breast Cancer No family hx of        Prior to Admission Medications   Prior to Admission Medications   Prescriptions Last Dose Informant Patient Reported? Taking?   acetaminophen (TYLENOL) 500 MG tablet   No No   Sig: Take 2 tablets (1,000 mg) by mouth 3 times daily   dicyclomine (BENTYL) 20 MG tablet   No No   Sig: Take 1 tablet (20 mg) by mouth 4 times daily as needed (cramping)   melatonin 1 MG TABS tablet   No No   Sig: Take 1 tablet (1 mg) by mouth nightly as needed for sleep   ondansetron (ZOFRAN-ODT) 4 MG ODT tab   No No   Sig: Take 1 tablet (4 mg) by mouth every 6 hours as needed for nausea or vomiting   oxyCODONE-acetaminophen (PERCOCET) 5-325 MG tablet   No No   Sig: Take 1 tablet by mouth every 4 hours as needed for severe pain   simethicone 80 MG TABS   Yes No   Sig: Take 80 mg by mouth 4 times daily as needed for cramping       Facility-Administered Medications: None     Allergies   Allergies   Allergen Reactions     Sulfa Drugs Hives     Ibuprofen Nausea and Vomiting and Hives     Unknown if reaction hives or N/V     No Clinical Screening - See Comments Other (See Comments) and Diarrhea     headache  Carrots cause gastric upset, cramping and diarrhea.     Ciprofloxacin Hives and Nausea     Quinolones      Tramadol Hives, Diarrhea, Nausea and Nausea and Vomiting     Amoxicillin Nausea and Vomiting     Augmentin Nausea, Hives and GI Disturbance     Patient tolerated course of zosyn in 5/2018     Avelox [Moxifloxacin] Nausea and Vomiting      Codeine Nausea and Vomiting     Daucus Carota      Other reaction(s): GI Upset  Other reaction(s): Abdominal pain, Diarrhea  Carrots cause gastric upset, cramping and diarrhea.       Physical Exam   Vital Signs: Temp: 98.5  F (36.9  C) Temp src: Oral BP: 97/75 Pulse: 77 Heart Rate: 88 Resp: 16 SpO2: 99 % O2 Device: Nasal cannula Oxygen Delivery: 3 LPM  Weight: 0 lbs 0 oz    General Appearance: Thin frail appearing, looks stated age. No distress  Eyes: clear sclerae, eyes reactive to light   HEENT: atraumatic, oropharynx mildly red but no tonsillar exudates. Shoddy cervical adenopathy  Respiratory: rhonchi scattered in upper fields bilaterally but consistent and prominent in bilateral lower lungs. No increased WOB  Cardiovascular: rrr   GI: midline scar noted, mild distension, non rigid. Diffusely tender equally all quadrants. BS +  Genitourinary: deferred   Skin: no rashes or easy bruising   Musculoskeletal: dec bulk regular tone   Neurologic: CN 2-12 intact a o x3.   Psychiatric: affect fine mood mildly withdrawn and flat.     Assessment & Plan   Rachel A Gerhardt is a 43 year old female admitted on 5/18/2019. She has a history of chronic abdominal pain and strictures with removal of small bowel and frequent dilations in setting of chemo/radiation and surgery for cervical cancer (remote) and is admitted for cough, SOB, poor PO intake due to cough. Exam features of bronchitis and possible bowel obstruction.     # SOB, cough, Chronic tobacco abuse, chest tightness  - 10 days symptoms steadily worsened with dry cough predominant feature. Sick contact. CXR unremarkable, CT chest with bilatreal tree in bud concerning for bronchitis vs brachiolitis. No fevers, leukocytosis or o2 requirement. Chronic emesis but no features concerning for pneumonitis or pneumonia. Clinically suspect viral etiology. Post ceftriaxone/aziro in ED. XR concerning for obstructive disease given flattened ribs, expansion.   - Would determine  need for further abx by pro andi, RVP. Not due until tomorrow since covered w ceftriaxone/azithro. Follow-up pro andi, RVP  - duo nebs q4 PRN  - not taking PO but opiate should help w cough  - pt declined nicotine patch  - Would recommend spirometry in 4 weeks as outpatient once better. Also discuss tobacco cessation.   - CBC AM    Chronic Abdominal Pain, Chronic Nausea/Vomiting, Imaging Concerning for Possible Obstruction  Patient with knowledge of strictures/obstruction has no signs or symptoms on exam other than abdominal pain that is at her baseline. Passing gas, stool this AM. Surgery consulted in ED awaiting there recs. Lactic acid and white count + exam unimpressive.   - Follow-up surgery recs  -NPO for now, no need for NG at this time from my perspective  - chronic opiod user. Will give equivalent IV of home opiate to not cause withdrawal. IV dilaudid 0.3-0.5 q4 hours.   - likely could resume PO in morning if surgery gives green light.   -- cautioun w opiods given patient's abuse history. Some amount of abdominal pain is her baseline.     Dehydration,Hypokalemia  Patient not feeling well so has drank less for past few days. Also few more episodes emesis than usual baseline due to cough. S/P 1.4 L 0.9 NS in ED. Stable vitals, wet oral mucosa (dry on admission). No signs of skin turgor on exam.   - slightly over maintenance 100 ml d5 1/2 w K 20 overnight while NPO  - K repleted in ED. On K protocol now, check in morning w BMP       # Pain Assessment:  Current Pain Score 3/15/2019   Patient currently in pain? denies   Pain score (0-10) -   Pain location -   Pain descriptors -   Some encounter information is confidential and restricted. Go to Review Flowsheets activity to see all data.   - Jenni is experiencing pain due to chronic abd pain. Pain management was discussed and the plan was created in a collaborative fashion.  Jenni's response to the current recommendations: engaged  - Please see the plan for pain  management as documented above        Diet: NPO for Medical/Clinical Reasons Except for: Ice Chips  Fluids: 100 d5 1/2 K 20   DVT Prophylaxis: Pneumatic Compression Devices  Code Status: Full Code    Disposition Plan   Expected discharge: Tomorrow; recommended to prior living arrangement once rule out obstruction, determine need for antibiotics.Dispo: Expected Discharge Date: 05/19/19        Entered: Ced Taylor 05/18/2019, 5:22 PM   Information in the above section will display in the discharge planner report.    The patient was discussed with Dr. Andrew Taylor  [facility]  [id number]  [facility]   Pager: [phone]  Please see sticky note for cross cover information      Data   Recent Labs   Lab 05/18/19  1136   WBC 8.9   HGB 12.6   MCV 96         POTASSIUM 3.2*   CHLORIDE 103   CO2 33*   BUN 13   CR 0.53   ANIONGAP 7   JONATAN 8.4*   *   ALBUMIN 2.5*   PROTTOTAL 6.2*   BILITOTAL 0.3   ALKPHOS 92   ALT 24   AST 21   TROPI <0.015

## 2019-05-18 NOTE — ED PROVIDER NOTES
[place] EMERGENCY DEPARTMENT ([facility])  5/18/19 ED 14 11:31 AM   History     Chief Complaint   Patient presents with     Cold Symptoms     SOB     The history is provided by the patient and medical records.     Rachel A Gerhardt is a 43 year old female with history of cervical cancer status post chemoradiation complicated by recurrent small bowel obstructions status post exploratory laparotomy with 60 cm of small bowel resected in 2017 and chronic abdominal pain with opioid dependence who presents with cough, increased shortness of breath, chest heaviness, nausea, vomiting and concern for dehydration.  She had a hospitalization from 3/12-3/15/2019 for acute on chronic abdominal pain.  She had been on a pain contract previously but was terminated at [facility] pain clinic due to possible overuse of opiates.  Her Suboxone treatment was also discontinued. She was discharged to home.  Patient states that she has been doing okay at home, at baseline has difficulty eating due to recurrent abdominal pain and partial small bowel obstructions.  About 10 days ago she developed a cold like symptoms.  Initially the first 2 days consisted of rhinorrhea that eventually became productive cough and chest congestion. She saw her doctor who prescribed her tessalon Perles, albuterol and was told to return for evaluation if she developed worsening symptoms. Patient states that she continued to have generalized malaise and cough, and for past 3 days she has had difficulty sleeping due to sputum and coughing fits all night long. Denies any fevers.  She visited her son and had to cut out early because she felt so unwell. She noticed feeling chest tightness like someone was sitting on her chest.  Patient has chronic nausea and vomiting from her abdominal strictures, but has been vomiting more this week due to coughing fits and is concerned that she is dehydrated. Has chronic abdominal pain at baseline, feels about the same but  muscles ache due to coughing fits. No dysuria, urinary frequency, pain or swelling in legs history of DVT or PE. She is not on anticoagulation. She was on antibiotics 1 month ago for an ear infection, was on Z-Dimitry for this. Those symptoms have resolved. She is not normally on oxygen at home.    Epic records reviewed, this was noted in her discharge summary on 3/15/2019  #Pain Management   #Anxiety  #Opiate use disorder  #?Opiate induced hyperalgesia   Patient on several narcotic regimens during hospitalizations. She was on a pain contract, but was terminated at the [facility] pain clinic due to possible over use of opioids. Also discontinued suboxone treatment. We were able to speak with Dr. Avery, her PCP, about formulating a plan and avoid frequent admissions. Dr. Avery believes patient has NOT been abusing narcotics and is comfortable in refilling her Norco. We discussed adding liquid dilaudid at a conservative dose ONLY for breakthrough pain at home and under the guidance of her PCP which he agreed. He will be seeing within one week and ensure adherence to this plan and that she is not abusing narcotics. Should patient return to hospital with abdominal pain or symptoms of obstruction, strategy recommended is to obtain objective data with CT imaging and rule out organic cause of pain and if work up is negative she should be discharged WITHOUT any narcotics. Discharged on tylenol 1g TID, dicyclomine, simethicone, GI cocktail and liquid dilaudid (1mg BID PRN).     I have reviewed the Medications, Allergies, Past Medical and Surgical History, and Social History in the [facility] system.    Past Medical History:   Diagnosis Date     Asthma      Cancer (H)     Per patient OBGYN, cerivical cancer     Cervical cancer (H)      Other chronic pain      Ovarian cancer (H)      Substance abuse (H)     Outside records indicate past history of narcotics abuse or dependence, but patient denies.       Past Surgical History:    Procedure Laterality Date     COLONOSCOPY N/A 5/3/2018    Procedure: COLONOSCOPY;  sigmoidoscopy;  Surgeon: Omero Vigil MD;  Location: [facility] OR     COLONOSCOPY WITH CO2 INSUFFLATION N/A 4/30/2018    Procedure: COLONOSCOPY WITH CO2 INSUFFLATION;  Colonoscopy;  Surgeon: Omero Vigil MD;  Location: [facility] OR     COMBINED CYSTOSCOPY, INSERT STENT URETER(S) Bilateral 5/18/2017    Procedure: COMBINED CYSTOSCOPY, INSERT STENT URETER(S);  Cystoscopy with Bilateral Stent,;  Surgeon: Rene Calero MD;  Location: [facility] OR     ENT SURGERY  2009    mastoid, sinus     ENTEROSCOPY SMALL BOWEL N/A 6/18/2018    Procedure: ENTEROSCOPY SMALL BOWEL;  Lower bowel Retrograde Enteroscopy with Balloon Dilation .;  Surgeon: Omero Vigil MD;  Location: [facility] OR     EXAM UNDER ANESTHESIA, INSERT ALEX SLEEVE, UTERINE PLACEMENT OF TANDEM AND RING FOR RAD, ULTRASOUND N/A 12/14/2015    Procedure: EXAM UNDER ANESTHESIA, INSERT ALEX SLEEVE, UTERINE PLACEMENT OF TANDEM AND RING FOR RADIATION, ULTRASOUND GUIDED;  Surgeon: Abby Tony MD;  Location: [facility] OR     INSERT TANDEM AND CESIUM APPLICATOR CERVIX, ULTRASOUND GUIDED N/A 12/17/2015    Procedure: INSERT TANDEM AND CESIUM APPLICATOR CERVIX, ULTRASOUND GUIDED;  Surgeon: Kika Wood MD;  Location: [facility] OR     KNEE SURGERY       LAPAROTOMY EXPLORATORY N/A 5/18/2017    Procedure: LAPAROTOMY EXPLORATORY;   Exploratry Laparotomy, Small Bowel Resection with anastomosis, Flexible Sigmoidoscopy;  Surgeon: Jennifer Goodwin MD;  Location: [facility] OR     PICC INSERTION Right 04/29/2017    4fr SL BioFlo PICC, 37cm (3cm external) in the R basilic vein w/ tip in the mid SVC.     PICC INSERTION Right 03/29/2018    4Fr - 40cm (4cm external), R lateral brachial vein, Low SVC     RESECT SMALL BOWEL WITHOUT OSTOMY N/A 5/18/2017    Procedure: RESECT SMALL BOWEL WITHOUT OSTOMY;;  Surgeon: Jennifer Goodwin MD;  Location: [facility] OR     SIGMOIDOSCOPY FLEXIBLE N/A 5/18/2017    Procedure:  SIGMOIDOSCOPY FLEXIBLE;;  Surgeon: Jennifer Goodwin MD;  Location:  OR       Family History   Problem Relation Age of Onset     Diabetes Mother      Ovarian Cancer No family hx of      Uterine Cancer No family hx of      Cervical Cancer No family hx of      Breast Cancer No family hx of        Social History     Tobacco Use     Smoking status: Light Tobacco Smoker     Packs/day: 0.25     Years: 12.00     Pack years: 3.00     Types: Cigarettes     Smokeless tobacco: Never Used     Tobacco comment: pt smoking about 4 cigs daily   Substance Use Topics     Alcohol use: No     Alcohol/week: 0.0 oz     Comment: None per pt     No current facility-administered medications for this encounter.      Current Outpatient Medications   Medication     acetaminophen (TYLENOL) 500 MG tablet     dicyclomine (BENTYL) 20 MG tablet     melatonin 1 MG TABS tablet     ondansetron (ZOFRAN-ODT) 4 MG ODT tab     oxyCODONE-acetaminophen (PERCOCET) 5-325 MG tablet     simethicone 80 MG TABS        Allergies   Allergen Reactions     Sulfa Drugs Hives     Ibuprofen Nausea and Vomiting and Hives     Unknown if reaction hives or N/V     No Clinical Screening - See Comments Other (See Comments) and Diarrhea     headache  Carrots cause gastric upset, cramping and diarrhea.     Ciprofloxacin Hives and Nausea     Quinolones      Tramadol Hives, Diarrhea, Nausea and Nausea and Vomiting     Amoxicillin Nausea and Vomiting     Augmentin Nausea, Hives and GI Disturbance     Patient tolerated course of zosyn in 5/2018     Avelox [Moxifloxacin] Nausea and Vomiting     Codeine Nausea and Vomiting     Daucus Carota      Other reaction(s): GI Upset  Other reaction(s): Abdominal pain, Diarrhea  Carrots cause gastric upset, cramping and diarrhea.       Review of Systems   Constitutional: Positive for fatigue.   Respiratory: Positive for cough, chest tightness and shortness of breath.    Cardiovascular: Negative for chest pain.   Gastrointestinal: Positive for  "diarrhea, nausea and vomiting.   Neurological: Positive for weakness (generalized).   All other systems reviewed and are negative.      Physical Exam      /87   Pulse 96   Temp 98.5  F (36.9  C) (Oral)   Resp 16   Ht 1.753 m (5' 9\")   SpO2 100%   BMI 17.37 kg/m      Physical Exam  General: patient is alert and oriented and in no acute distress, chronically malnourished appearing  Head: atraumatic and normocephalic   EENT: try mucus membranes without tonsillar erythema or exudates, pupils round and reactive, sclera anicteric  Neck: supple with full ROM  Cardiovascular: regular rate and rhythm, extremities warm and well perfused, no lower extremity edema  Pulmonary: lungs diminished at bases, no wheezing or crackles  Abdomen: soft, mildly distended, generalized abdominal discomfort to palpation without guarding, hyperactive bowel sounds  Musculoskeletal: normal range of motion   Neurological: alert and oriented, moving all extremities symmetrically, gait normal   Skin: warm, dry     ED Course        Procedures             EKG Interpretation:      Interpreted by Kaity Medina  Time reviewed: 1315  Symptoms at time of EKG: chest heaviness   Rhythm: normal sinus   Rate: normal  Axis: normal  Ectopy: none  Conduction: normal  ST Segments/ T Waves: No ST-T wave changes  Q Waves: none  Comparison to prior: Unchanged    Clinical Impression: normal EKG          Critical Care time:  none             Labs Ordered and Resulted from Time of ED Arrival Up to the Time of Departure from the ED - No data to display         Assessments & Plan (with Medical Decision Making)   Rachel A Gerhardt is a 43-year-old female with a history of cervical and ovarian cancer status post radiation to the abdomen and complicated by multiple bowel adhesions and recurrent bowel obstructions who presents to the emergency department with persistent cough and shortness of breath.  She is hemodynamically stable and afebrile.  She is able " to speak in full sentences without any respiratory distress.  Differential diagnosis includes but is not limited to, viral URI, influenza-like illness, dehydration, less likely cardiac etiology or PE.  She has had episodes of vomiting however these are after prolonged fits of coughing and denies any worsening abdominal pain from baseline.  Lower suspicion for recurrent bowel obstruction.  She did have a chest x-ray and abdominal x-ray which are notable for concerns of a recurrent small bowel obstruction.  Labs drawn and notable for procalcitonin of 0.10, no leukocytosis or elevation in lactate.  She does have a very low albumin at 2.5, potassium is 3.2, creatinine is within normal limits.  She did have a CT to further evaluate for obstruction.  CT shows left basilar opacity consistent with pneumonia as well as a worsening bowel obstruction without a clear transition point.  She was given IV fluids, ceftriaxone and azithromycin in the emergency department.  Surgery was consulted.  Patient will be admitted to medicine for continued IV fluids, bowel rest and IV antibiotics.    This part of the document was transcribed by Remberto Ramirez Scribe.      I have reviewed the nursing notes.    I have reviewed the findings, diagnosis, plan and need for follow up with the patient.       Medication List      There are no discharge medications for this visit.         Final diagnoses:   Pneumonia of left lower lobe due to infectious organism (H)   SBO (small bowel obstruction) (H)   Dehydration   Chronic malnutrition (H)   I, Clari Miramontes, am serving as a trained medical scribe to document services personally performed by Kaity Payan MD based on the provider's statements to me on May 18, 2019.  This document has been checked and approved by the attending provider.    I, Kaity Payan MD, was physically present and have reviewed and verified the accuracy of this note documented by remberto Mathis  melchor.       5/18/2019   [facility], [place], EMERGENCY DEPARTMENT     Kaity Medina MD  05/18/19 2236

## 2019-05-18 NOTE — ED NOTES
[facility], [place]   ED Nurse to Floor Handoff     Rachel A Gerhardt is a 43 year old female who speaks English and lives with a spouse,  in a home  They arrived in the ED by car from home    ED Chief Complaint: Cold Symptoms (SOB)    ED Dx;   Final diagnoses:   Pneumonia of left lower lobe due to infectious organism (H)   SBO (small bowel obstruction) (H)   Dehydration   Chronic malnutrition (H)         Needed?: No    Allergies:   Allergies   Allergen Reactions     Sulfa Drugs Hives     Ibuprofen Nausea and Vomiting and Hives     Unknown if reaction hives or N/V     No Clinical Screening - See Comments Other (See Comments) and Diarrhea     headache  Carrots cause gastric upset, cramping and diarrhea.     Ciprofloxacin Hives and Nausea     Quinolones      Tramadol Hives, Diarrhea, Nausea and Nausea and Vomiting     Amoxicillin Nausea and Vomiting     Augmentin Nausea, Hives and GI Disturbance     Patient tolerated course of zosyn in 5/2018     Avelox [Moxifloxacin] Nausea and Vomiting     Codeine Nausea and Vomiting     Daucus Carota      Other reaction(s): GI Upset  Other reaction(s): Abdominal pain, Diarrhea  Carrots cause gastric upset, cramping and diarrhea.   .  Past Medical Hx:   Past Medical History:   Diagnosis Date     Asthma      Cervical cancer (H)      Other chronic pain      Ovarian cancer (H)      Substance abuse (H)     Outside records indicate past history of narcotics abuse or dependence, but patient denies.      Baseline Mental status: WDL  Current Mental Status changes: at basesline    Infection present or suspected this encounter: yes respiratory  Sepsis suspected: No  Isolation type: No active isolations     Activity level - Baseline/Home:  Independent  Activity Level - Current:   Independent    Bariatric equipment needed?: No    In the ED these meds were given:   Medications   lidocaine 1 % 0.5-5 mL (has no administration in time range)   lidocaine  (LMX4) cream (has no administration in time range)   sodium chloride (PF) 0.9% PF flush 5-50 mL (has no administration in time range)   heparin lock flush 10 UNIT/ML injection 2-5 mL (has no administration in time range)   acetaminophen (TYLENOL) tablet 650 mg (has no administration in time range)   acetaminophen (TYLENOL) Suppository 650 mg (has no administration in time range)   naloxone (NARCAN) injection 0.1-0.4 mg (has no administration in time range)   melatonin tablet 1 mg (has no administration in time range)   ondansetron (ZOFRAN-ODT) ODT tab 4 mg (has no administration in time range)     Or   ondansetron (ZOFRAN) injection 4 mg (has no administration in time range)   senna-docusate (SENOKOT-S/PERICOLACE) 8.6-50 MG per tablet 1 tablet (has no administration in time range)     Or   senna-docusate (SENOKOT-S/PERICOLACE) 8.6-50 MG per tablet 2 tablet (has no administration in time range)   0.9% sodium chloride BOLUS (0 mLs Intravenous Stopped 5/18/19 1300)   iopamidol (ISOVUE-370) solution 72 mL (72 mLs Intravenous Given 5/18/19 1330)   sodium chloride 0.9 % bag 500mL for CT scan flush use (68 mLs Intravenous Given 5/18/19 1332)   cefTRIAXone (ROCEPHIN) 1 g vial to attach to  mL bag for ADULTS or NS 50 mL bag for PEDS (0 g Intravenous Stopped 5/18/19 1629)   azithromycin (ZITHROMAX) tablet 500 mg (500 mg Oral Given 5/18/19 1557)   HYDROmorphone (PF) (DILAUDID) injection 0.5 mg (0.5 mg Intravenous Given 5/18/19 1541)   ondansetron (ZOFRAN) injection 4 mg (4 mg Intravenous Given 5/18/19 1600)       Drips running?  No    Home pump  No    Current LDAs  Peripheral IV 05/18/19 Right Upper forearm (Active)   Number of days: 0       Incision/Surgical Site 05/18/17 Abdomen (Active)   Number of days: 730       Labs results:   Labs Ordered and Resulted from Time of ED Arrival Up to the Time of Departure from the ED   CBC WITH PLATELETS DIFFERENTIAL - Abnormal; Notable for the following components:       Result  Value    MCHC 30.7 (*)     All other components within normal limits   COMPREHENSIVE METABOLIC PANEL - Abnormal; Notable for the following components:    Potassium 3.2 (*)     Carbon Dioxide 33 (*)     Glucose 143 (*)     Calcium 8.4 (*)     Albumin 2.5 (*)     Protein Total 6.2 (*)     All other components within normal limits   TROPONIN I   LACTIC ACID WHOLE BLOOD   MAGNESIUM   PROCALCITONIN   IP ASSIGN PROVIDER TEAM TO TREATMENT TEAM   OBSERVATION GOALS   ASSESS   NOTIFY PHYSICIAN   MEASURE HEIGHT AND WEIGHT   VITAL SIGNS   NOTIFY   NOTIFY   ACTIVITY   RESPIRATORY VIRUS PANEL BY PCR       Imaging Studies:   Recent Results (from the past 24 hour(s))   XR Abdomen 2 Views    Narrative    Exam: XR ABDOMEN 2 VW, 5/18/2019 12:12 PM    Indication: vomiting, h/o bowel obstructions. History of cervical  cancer status post radiation therapy.    Comparison: CT AP 3/13/2019, abdominal radiograph 3/12/2019.    Findings: Upright and supine abdominal radiographs. Multiple dilated  loops of small bowel, measuring up to 5.6 cm in maximal diameter.  Multiple air-fluid levels demonstrated on upright images. No free air  or portal venous gas. No definite pneumatosis. No acute osseous  abnormalities.      Impression    Impression: Multiple dilated loops of small bowel with air-fluid  levels, consistent with small bowel obstruction.    I have personally reviewed the examination and initial interpretation  and I agree with the findings.    GELY WASHINGTON MD   XR Chest 2 Views    Narrative    Exam:  Chest X-ray 5/18/2019 12:13 PM    History: cough, SOB, rule out PNA    Comparison: 11/5/2018    Findings: PA and lateral chest radiograph. Trachea is midline.  Cardiomediastinal silhouette within normal limits. Pulmonary  vasculature is distinct. No pleural effusion or pneumothorax. No focal  pulmonary opacities. Prominent loop of air-filled bowel in the upper  abdomen. No acute bony abnormalities. Radiodense foreign body  projecting over  the left hemithorax.      Impression    Impression:   1. No acute cardiopulmonary abnormalities.  2. Prominent loop of air-filled bowel in the upper abdomen, see  same-day abdominal radiograph report.    I have personally reviewed the examination and initial interpretation  and I agree with the findings.    GELY WASHINGTON MD   CT Chest/Abdomen/Pelvis w Contrast    Narrative    EXAMINATION: CT CHEST/ABDOMEN/PELVIS W CONTRAST  5/18/2019 1:41 PM      CLINICAL HISTORY: eval for pneumonia and possible recurrent SBO    COMPARISON: Same day radiographs, 3/13/2019, 11/2/2018        PROCEDURE COMMENTS: CT of the chest, abdomen, and pelvis was performed  with   iopamidol (ISOVUE-370) solution 72 mL  intravenous and oral  contrast. Axial MIP  images of the chest, and coronal and sagittal  reformatted images of the chest, abdomen, and pelvis obtained.    FINDINGS:      Chest:    Visualized portions of the thyroid gland are unremarkable in  appearance. The heart is normal in size, without pericardial effusion.  No substantial coronary artery calcifications. The thoracic vessels  are normal in caliber and configuration. Prominent left paratracheal  lymphadenopathy measuring up to 1.1 cm in short axis dimension (series  4, image 41). No enlarged axillary or hilar lymph nodes.    Minimal debris in the distal trachea. Central airways are otherwise  clear. No pneumothorax or pleural effusion. No focal airspace  consolidation. Mild thickening of the airways with diffuse tree-in-bud  opacities, with a lower lobe predominance. Few superimposed  peribronchovascular nodular opacities in the inferior left lower lobe  (series 2, image 110).    Abdomen/pelvis:  Diffuse bowel distention is again seen, measuring up to 6.7 cm,  increased from comparison dated 3/13/2018, within the bowel measuring  up to 5.5 cm. No convincing transition point is identified to suggest  high-grade obstruction. There is however a gradual transition in the  right  anterior pelvis where where there are several mildly thickened  loops of small bowel which contain formed stool (series 5, images  420-460). Anastomotic suture line seen in the left lower quadrant,  likely sequelae of prior small bowel resection. There is no  pneumatosis or portal venous gas. No intraperitoneal free air. Trace  physiologic free fluid in the pelvis. No focal fluid collection within  the abdomen.    The liver parenchyma enhances homogenously without focal lesion. The  hepatic vasculature appears patent. The gallbladder is decompressed.  No extrahepatic biliary dilatation. Minimal intrahepatic biliary  dilatation centrally. The pancreas is mildly atrophic in appearance,  and otherwise unremarkable. No pancreatic ductal dilatation. The  spleen and adrenal glands are normal. Small right inferior pole  lesion, hypodense, stable, though indeterminate simple left renal  cyst. Kidneys are otherwise normal.. The urinary bladder is partially  distended and otherwise unremarkable in appearance. No focal adnexal  mass.    Bones:   No acute or suspicious appearing osseous abnormality. Mild  degenerative changes of the thoracolumbar spine.      Impression    IMPRESSION:  1. Chronically dilated loops of fluid in gas-distended small bowel,  substantially increased from comparison dated 3/13/2019. While there  is no focal or discrete transition point, there is a gradual luminal  caliber change anteriorly in the right hemipelvis where there are  abnormal thickened loops of decompressed small bowel which may be  causing obstruction. There is no pneumatosis or portal venous gas.  Colon is decompressed and the rectum contains gas, arguing against  high-grade or long-standing obstruction.  2. Mild thickening of the airways with diffuse basilar predominant  tree-in-bud opacities with a few superimposed nodular opacities in the  left lung base, concerning for infection including  bronchitis/brachiolitis.    Findings of this  "examination were discussed with Dr. Medina over the  telephone by Dr. Wilson at 2:00 PM on 5/18/2019.    I have personally reviewed the examination and initial interpretation  and I agree with the findings.    GELY WASHINGTON MD       Recent vital signs:   BP 93/74   Pulse 74   Temp 98.5  F (36.9  C) (Oral)   Resp 16   Ht 1.753 m (5' 9\")   SpO2 96%   BMI 17.37 kg/m      Messi Coma Scale Score: 15 (05/18/19 1120)       Cardiac Rhythm: Normal Sinus  Pt needs tele? No  Skin/wound Issues: None    Code Status: Full Code    Pain control: good    Nausea control: good    Abnormal labs/tests/findings requiring intervention: N/A    Family present during ED course? No   Family Comments/Social Situation comments: N/A    Tasks needing completion: None    Nicholas Vasquez, RN    [phone] [facility]  [phone] [facility]      "

## 2019-05-19 PROBLEM — K56.609 SMALL BOWEL OBSTRUCTION (H): Status: RESOLVED | Noted: 2017-05-17 | Resolved: 2019-05-19

## 2019-05-19 PROBLEM — K56.600 SMALL BOWEL OBSTRUCTION, PARTIAL (H): Status: RESOLVED | Noted: 2017-04-03 | Resolved: 2019-05-19

## 2019-05-19 PROBLEM — K56.609 SMALL INTESTINE OBSTRUCTION (H): Status: RESOLVED | Noted: 2017-04-29 | Resolved: 2019-05-19

## 2019-05-19 LAB
ANION GAP SERPL CALCULATED.3IONS-SCNC: 2 MMOL/L (ref 3–14)
BUN SERPL-MCNC: 9 MG/DL (ref 7–30)
CALCIUM SERPL-MCNC: 7.1 MG/DL (ref 8.5–10.1)
CHLORIDE SERPL-SCNC: 112 MMOL/L (ref 94–109)
CO2 SERPL-SCNC: 27 MMOL/L (ref 20–32)
CREAT SERPL-MCNC: 0.56 MG/DL (ref 0.52–1.04)
ERYTHROCYTE [DISTWIDTH] IN BLOOD BY AUTOMATED COUNT: 14.6 % (ref 10–15)
FLUAV H1 2009 PAND RNA SPEC QL NAA+PROBE: NEGATIVE
FLUAV H1 RNA SPEC QL NAA+PROBE: NEGATIVE
FLUAV H3 RNA SPEC QL NAA+PROBE: NEGATIVE
FLUAV RNA SPEC QL NAA+PROBE: NEGATIVE
FLUBV RNA SPEC QL NAA+PROBE: NEGATIVE
GFR SERPL CREATININE-BSD FRML MDRD: >90 ML/MIN/{1.73_M2}
GLUCOSE SERPL-MCNC: 69 MG/DL (ref 70–99)
HADV DNA SPEC QL NAA+PROBE: NEGATIVE
HADV DNA SPEC QL NAA+PROBE: NEGATIVE
HCT VFR BLD AUTO: 35.8 % (ref 35–47)
HGB BLD-MCNC: 10.4 G/DL (ref 11.7–15.7)
HMPV RNA SPEC QL NAA+PROBE: NEGATIVE
HPIV1 RNA SPEC QL NAA+PROBE: NEGATIVE
HPIV2 RNA SPEC QL NAA+PROBE: NEGATIVE
HPIV3 RNA SPEC QL NAA+PROBE: NEGATIVE
MCH RBC QN AUTO: 29.5 PG (ref 26.5–33)
MCHC RBC AUTO-ENTMCNC: 29.1 G/DL (ref 31.5–36.5)
MCV RBC AUTO: 102 FL (ref 78–100)
MICROBIOLOGIST REVIEW: ABNORMAL
PLATELET # BLD AUTO: 340 10E9/L (ref 150–450)
POTASSIUM SERPL-SCNC: 4.1 MMOL/L (ref 3.4–5.3)
RBC # BLD AUTO: 3.52 10E12/L (ref 3.8–5.2)
RHINOVIRUS RNA SPEC QL NAA+PROBE: POSITIVE
RSV RNA SPEC QL NAA+PROBE: NEGATIVE
RSV RNA SPEC QL NAA+PROBE: NEGATIVE
SODIUM SERPL-SCNC: 141 MMOL/L (ref 133–144)
SPECIMEN SOURCE: ABNORMAL
WBC # BLD AUTO: 5.8 10E9/L (ref 4–11)

## 2019-05-19 PROCEDURE — 25800030 ZZH RX IP 258 OP 636: Performed by: STUDENT IN AN ORGANIZED HEALTH CARE EDUCATION/TRAINING PROGRAM

## 2019-05-19 PROCEDURE — 25800030 ZZH RX IP 258 OP 636: Performed by: FAMILY MEDICINE

## 2019-05-19 PROCEDURE — 25000132 ZZH RX MED GY IP 250 OP 250 PS 637: Performed by: STUDENT IN AN ORGANIZED HEALTH CARE EDUCATION/TRAINING PROGRAM

## 2019-05-19 PROCEDURE — 36415 COLL VENOUS BLD VENIPUNCTURE: CPT | Performed by: FAMILY MEDICINE

## 2019-05-19 PROCEDURE — 25000132 ZZH RX MED GY IP 250 OP 250 PS 637: Performed by: FAMILY MEDICINE

## 2019-05-19 PROCEDURE — 96376 TX/PRO/DX INJ SAME DRUG ADON: CPT

## 2019-05-19 PROCEDURE — 80048 BASIC METABOLIC PNL TOTAL CA: CPT | Performed by: FAMILY MEDICINE

## 2019-05-19 PROCEDURE — G0378 HOSPITAL OBSERVATION PER HR: HCPCS

## 2019-05-19 PROCEDURE — 25000128 H RX IP 250 OP 636: Performed by: STUDENT IN AN ORGANIZED HEALTH CARE EDUCATION/TRAINING PROGRAM

## 2019-05-19 PROCEDURE — 85027 COMPLETE CBC AUTOMATED: CPT | Performed by: FAMILY MEDICINE

## 2019-05-19 PROCEDURE — 96361 HYDRATE IV INFUSION ADD-ON: CPT

## 2019-05-19 RX ORDER — OXYCODONE AND ACETAMINOPHEN 5; 325 MG/1; MG/1
1 TABLET ORAL EVERY 4 HOURS PRN
Status: DISCONTINUED | OUTPATIENT
Start: 2019-05-19 | End: 2019-05-20 | Stop reason: HOSPADM

## 2019-05-19 RX ORDER — PSEUDOEPHEDRINE HCL 30 MG
60 TABLET ORAL EVERY 6 HOURS
Status: DISCONTINUED | OUTPATIENT
Start: 2019-05-19 | End: 2019-05-20 | Stop reason: HOSPADM

## 2019-05-19 RX ORDER — SODIUM CHLORIDE 9 MG/ML
INJECTION, SOLUTION INTRAVENOUS CONTINUOUS
Status: DISCONTINUED | OUTPATIENT
Start: 2019-05-19 | End: 2019-05-20

## 2019-05-19 RX ORDER — OXYCODONE AND ACETAMINOPHEN 5; 325 MG/1; MG/1
1 TABLET ORAL EVERY 6 HOURS PRN
Status: DISCONTINUED | OUTPATIENT
Start: 2019-05-19 | End: 2019-05-19

## 2019-05-19 RX ADMIN — Medication 0.5 MG: at 01:52

## 2019-05-19 RX ADMIN — PSEUDOEPHEDRINE HCL 60 MG: 30 TABLET, FILM COATED ORAL at 14:26

## 2019-05-19 RX ADMIN — PSEUDOEPHEDRINE HCL 60 MG: 30 TABLET, FILM COATED ORAL at 21:37

## 2019-05-19 RX ADMIN — POTASSIUM CHLORIDE, DEXTROSE MONOHYDRATE AND SODIUM CHLORIDE: 150; 5; 450 INJECTION, SOLUTION INTRAVENOUS at 08:13

## 2019-05-19 RX ADMIN — OXYCODONE HYDROCHLORIDE AND ACETAMINOPHEN 1 TABLET: 5; 325 TABLET ORAL at 15:04

## 2019-05-19 RX ADMIN — BENZONATATE 100 MG: 100 CAPSULE, LIQUID FILLED ORAL at 09:42

## 2019-05-19 RX ADMIN — SODIUM CHLORIDE: 9 INJECTION, SOLUTION INTRAVENOUS at 21:41

## 2019-05-19 RX ADMIN — OXYCODONE HYDROCHLORIDE AND ACETAMINOPHEN 1 TABLET: 5; 325 TABLET ORAL at 19:27

## 2019-05-19 RX ADMIN — SODIUM CHLORIDE: 9 INJECTION, SOLUTION INTRAVENOUS at 12:53

## 2019-05-19 RX ADMIN — Medication 0.5 MG: at 05:55

## 2019-05-19 RX ADMIN — BENZONATATE 100 MG: 100 CAPSULE, LIQUID FILLED ORAL at 18:14

## 2019-05-19 RX ADMIN — OXYCODONE HYDROCHLORIDE AND ACETAMINOPHEN 1 TABLET: 5; 325 TABLET ORAL at 11:22

## 2019-05-19 RX ADMIN — BENZONATATE 100 MG: 100 CAPSULE, LIQUID FILLED ORAL at 01:52

## 2019-05-19 RX ADMIN — POTASSIUM CHLORIDE 20 MEQ: 750 TABLET, EXTENDED RELEASE ORAL at 01:43

## 2019-05-19 ASSESSMENT — MIFFLIN-ST. JEOR: SCORE: 1240.1

## 2019-05-19 NOTE — PLAN OF CARE
Admit from ER to  for pneumonia. Patient received fluids and antibiotics in the ER. Continues on IV maintenance fluids with 20meq of potassium. Potassium level replaced with tablets and recheck ordered for the morning. Patient is requiring 1.5L of oxygen to keep saturations >90%. Patient had strong, productive cough and was given PRN tessalon x2. IV dilaudid x2 for chronic pain. Patient ambulated in the halls x2. Bowel sounds hyperactive and LBM morning of 5/18. Continue plan of care.     Observation goals:   1. Tolerating regular diet: NPO  2. Antibiotic plan established: No   3. Wean off of oxygen: on 1.5L of oxygen

## 2019-05-19 NOTE — PROGRESS NOTES
Patient arrived to  with the following belongings: clothing, shoes, bag, earings, phone, and . Patient denies having cash or home medications.

## 2019-05-19 NOTE — PLAN OF CARE
BP 89/70. She denies dizziness when up or in bed. Baseline BP for patient.   Patient tolerating regular diet.  O2 sats 97% on  room air but patient requested to keep O2 on at 1 liter nasal canula.  She complains of abdominal pain and requested percocet for pain management every 4 hours.  She is up ad dania ambulating in young independently.    Plan to monitor respiratory status and medicate as needed for pain.

## 2019-05-19 NOTE — PROGRESS NOTES
[facility], [facility] Progress Note    Main Plans for Today    -Continue IVF despite PO intake  -Regular diet (high protein diet)  -NTD from surgery  -Plan for discharge after RVP results    Assessment & Plan   Rachel A Gerhardt is a 43 year old female admitted on 5/18/2019. She has a history of chronic abdominal pain and strictures with removal of small bowel and frequent dilations in setting of chemo/radiation and surgery for cervical cancer (remote) and is admitted for cough, SOB, poor PO intake due to cough. Exam features of bronchitis and possible bowel obstruction.      # SOB, cough, Chronic tobacco abuse, chest tightness  10 days symptoms steadily worsened with dry cough predominant feature. Sick contact. CXR unremarkable, CT chest with bilatreal tree in bud concerning for bronchitis vs brachiolitis. No fevers, leukocytosis or o2 requirement. Chronic emesis but no features concerning for pneumonitis or pneumonia. Clinically suspect viral etiology. Post ceftriaxone/aziro in ED. XR concerning for obstructive disease given flattened ribs, expansion.   - Stop Abx and follow up RVP (likely viral URI)  - duo nebs q4 PRN  - Restart home dose percocet dose (5/325 Q4H) - Ok to give 1 additional dose of pain medication  - pt declined nicotine patch     Chronic Abdominal Pain, Chronic Nausea/Vomiting, Imaging Concerning for Possible Obstruction  Patient with knowledge of strictures/obstruction has no signs or symptoms on exam other than abdominal pain that is at her baseline. Passing gas, stool this AM. Surgery consulted in ED awaiting there recs. Lactic acid and white count + exam unimpressive.   - Surgery: NTD  - Restart her diet (high protein)  - Restart home pain regimen     Dehydration,Hypokalemia  Patient not feeling well so has drank less for past few days. Also few more episodes emesis than usual baseline due to cough. S/P 1.4 L 0.9 NS in ED. Stable vitals,  wet oral mucosa (dry on admission). No signs of skin turgor on exam.   - Responded well to IVF and will continue to hydrate while inpatient despite PO intake.     # Pain Assessment:  Current Pain Score 5/19/2019   Patient currently in pain? yes   Pain score (0-10) -   Pain location -   Pain descriptors -   Some encounter information is confidential and restricted. Go to Review Flowsheets activity to see all data.   Jenni holliday pain level was assessed and she currently denies pain.      Diet: Regular Diet Adult  Fluids: NS @ 100cc/hr  DVT Prophylaxis: Ambulate every shift  Code Status: Full Code    Disposition Plan   Expected discharge:Expected Discharge Date: 05/19/19 2 - 3 days, recommended to prior living arrangement once adequate pain management/ tolerating PO medications.Dispo: Expected Discharge Date: 05/19/19        Entered: Khalida Stallings 05/19/2019, 12:48 PM   Information in the above section will display in the discharge planner report.      The patient's care was discussed with the Attending Physician, Dr. Nguyen.    Khalida Stallings  [facility]: PGY-3  Pager: [phone]  Please see sticky note for cross cover information    Interval History      Patient seen and examined this morning. No acute events overnight. Afebrile with stable vital signs. Pain is well controlled with current regimen. Denies any nausea, vomiting, fever, chills, SOB, abdominal pain, change in bowel movements.     Review of Systems  Skin: negative except as above  Respiratory: negative except as above  Cardiovascular: negative except as above  Gastrointestinal: negative except as above  Genitourinary: negative except as above  Musculoskeletal: negative except as above  Neurologic: negative except as above  Psychiatric: negative except as above  Hematologic/Lymphatic/Immunologic: negative except as above  Endocrine: negative except as above    Physical Exam   Vital Signs: Temp: (P) 97.8  F (36.6  C)  Temp src: (P) Oral BP: (!) (P) 89/70 Pulse: 77 Heart Rate: (P) 87 Resp: (P) 16 SpO2: (P) 94 % O2 Device: (P) None (Room air) Oxygen Delivery: 1.5 LPM  Weight: 113 lbs 3.2 oz  Physical Exam  General Appearance: Thin frail appearing, looks stated age. No distress  Eyes: clear sclerae, eyes reactive to light   HEENT: atraumatic, oropharynx mildly red but no tonsillar exudates. Shoddy cervical adenopathy  Respiratory: rhonchi scattered in upper fields bilaterally but consistent and prominent in bilateral lower lungs. No increased WOB  Cardiovascular: rrr   GI: midline scar noted, mild distension, non rigid. Diffusely tender equally all quadrants. BS +  Genitourinary: deferred   Skin: no rashes or easy bruising   Musculoskeletal: dec bulk regular tone   Neurologic: CN 2-12 intact a o x3.   Psychiatric: affect fine mood mildly withdrawn and flat.         Data   Recent Labs   Lab 05/19/19  0548 05/18/19  2200 05/18/19  1136   WBC 5.8  --  8.9   HGB 10.4*  --  12.6   *  --  96     --  389     --  143   POTASSIUM 4.1 3.1* 3.2*   CHLORIDE 112*  --  103   CO2 27  --  33*   BUN 9  --  13   CR 0.56  --  0.53   ANIONGAP 2*  --  7   JONATAN 7.1*  --  8.4*   GLC 69*  --  143*   ALBUMIN  --   --  2.5*   PROTTOTAL  --   --  6.2*   BILITOTAL  --   --  0.3   ALKPHOS  --   --  92   ALT  --   --  24   AST  --   --  21   TROPI  --   --  <0.015

## 2019-05-19 NOTE — PLAN OF CARE
VSS. On 1.5 liters nasal canula.  Ambulating in young independently. Patient reports feeling hungry.  Diet advanced to regular.  Patient reports having several stools this am.  Abdominal pain continues.

## 2019-05-20 VITALS
TEMPERATURE: 97.5 F | DIASTOLIC BLOOD PRESSURE: 59 MMHG | OXYGEN SATURATION: 92 % | HEIGHT: 69 IN | WEIGHT: 115.9 LBS | RESPIRATION RATE: 18 BRPM | HEART RATE: 82 BPM | BODY MASS INDEX: 17.17 KG/M2 | SYSTOLIC BLOOD PRESSURE: 89 MMHG

## 2019-05-20 PROBLEM — J06.9 VIRAL UPPER RESPIRATORY TRACT INFECTION: Status: ACTIVE | Noted: 2019-05-20

## 2019-05-20 PROBLEM — K56.7 ILEUS (H): Status: ACTIVE | Noted: 2019-05-20

## 2019-05-20 PROBLEM — F17.200 TOBACCO DEPENDENCE SYNDROME: Status: ACTIVE | Noted: 2019-05-20

## 2019-05-20 PROCEDURE — 96376 TX/PRO/DX INJ SAME DRUG ADON: CPT

## 2019-05-20 PROCEDURE — 25000132 ZZH RX MED GY IP 250 OP 250 PS 637: Performed by: STUDENT IN AN ORGANIZED HEALTH CARE EDUCATION/TRAINING PROGRAM

## 2019-05-20 PROCEDURE — G0378 HOSPITAL OBSERVATION PER HR: HCPCS

## 2019-05-20 PROCEDURE — 25800030 ZZH RX IP 258 OP 636: Performed by: FAMILY MEDICINE

## 2019-05-20 PROCEDURE — 25000132 ZZH RX MED GY IP 250 OP 250 PS 637: Performed by: FAMILY MEDICINE

## 2019-05-20 PROCEDURE — 25000128 H RX IP 250 OP 636: Performed by: STUDENT IN AN ORGANIZED HEALTH CARE EDUCATION/TRAINING PROGRAM

## 2019-05-20 PROCEDURE — 25000131 ZZH RX MED GY IP 250 OP 636 PS 637: Performed by: STUDENT IN AN ORGANIZED HEALTH CARE EDUCATION/TRAINING PROGRAM

## 2019-05-20 RX ORDER — BENZONATATE 100 MG/1
100 CAPSULE ORAL 3 TIMES DAILY PRN
Qty: 30 CAPSULE | Refills: 0 | Status: ON HOLD | OUTPATIENT
Start: 2019-05-20 | End: 2019-06-23

## 2019-05-20 RX ORDER — ONDANSETRON 4 MG/1
4 TABLET, ORALLY DISINTEGRATING ORAL EVERY 6 HOURS PRN
Qty: 18 TABLET | Refills: 0 | Status: SHIPPED | OUTPATIENT
Start: 2019-05-20

## 2019-05-20 RX ORDER — AZITHROMYCIN 250 MG/1
TABLET, FILM COATED ORAL
Qty: 6 TABLET | Refills: 0 | Status: ON HOLD | OUTPATIENT
Start: 2019-05-20 | End: 2019-06-23

## 2019-05-20 RX ADMIN — OXYCODONE HYDROCHLORIDE AND ACETAMINOPHEN 1 TABLET: 5; 325 TABLET ORAL at 05:24

## 2019-05-20 RX ADMIN — ONDANSETRON 4 MG: 2 INJECTION INTRAMUSCULAR; INTRAVENOUS at 05:51

## 2019-05-20 RX ADMIN — OXYCODONE HYDROCHLORIDE AND ACETAMINOPHEN 1 TABLET: 5; 325 TABLET ORAL at 13:29

## 2019-05-20 RX ADMIN — SODIUM CHLORIDE: 9 INJECTION, SOLUTION INTRAVENOUS at 05:57

## 2019-05-20 RX ADMIN — OXYCODONE HYDROCHLORIDE AND ACETAMINOPHEN 1 TABLET: 5; 325 TABLET ORAL at 00:04

## 2019-05-20 RX ADMIN — BENZONATATE 100 MG: 100 CAPSULE, LIQUID FILLED ORAL at 09:23

## 2019-05-20 RX ADMIN — PSEUDOEPHEDRINE HCL 60 MG: 30 TABLET, FILM COATED ORAL at 09:23

## 2019-05-20 RX ADMIN — OXYCODONE HYDROCHLORIDE AND ACETAMINOPHEN 1 TABLET: 5; 325 TABLET ORAL at 09:23

## 2019-05-20 RX ADMIN — ONDANSETRON 4 MG: 4 TABLET, ORALLY DISINTEGRATING ORAL at 12:50

## 2019-05-20 RX ADMIN — BENZONATATE 100 MG: 100 CAPSULE, LIQUID FILLED ORAL at 00:04

## 2019-05-20 NOTE — PLAN OF CARE
Observation goals:   1. Tolerating regular diet: goal met  2. Antibiotic plan established: goal met   3. Wean off of oxygen: goal met     Plan for discharge later today per MDs.

## 2019-05-20 NOTE — PROVIDER NOTIFICATION
Observation goals:   1. Tolerating regular diet: goal met  2. Antibiotic plan established: goal met   3. Wean off of oxygen: goal met

## 2019-05-20 NOTE — PLAN OF CARE
Patient under RN care after 2300. IV maintenance fluids. PRN tessalon and percocet given. Patient had large episode of emesis in the morning and was given PRN zofran. No other significant events.

## 2019-05-20 NOTE — DISCHARGE SUMMARY
[facility], [facility] Discharge Summary- [facility]  Service    Date of Admission:  5/18/2019  Date of Service: 5/20/2019  Date of Discharge:  5/20/2019  2:13 PM  Discharging Attending Provider: Junior  Discharge Team: Naty's    Discharge Diagnoses   Rhinovirus   Chronic Tobacco Abuse  Chronic Nausea/Vomiting  Hypokalemia  Dehydration  Chronic Abdominal Pain    Follow-ups Needed After Discharge   -Recommend checking methylmalonic acid (B12 low normal in past) and macrocytic anemia w poor oral intake.   -Spirometry in 4 weeks to assess base line lung function.   - methylmalonic acid to evaluate for b12 deficiency. If low would likely need IV b12 injection over oral given poor PO intake chronically     Hospital Course   Rachel A Gerhardt was admitted on 5/18/2019 for Chest tightness, SOB, cough.  The following problems were addressed during her hospitalization:    # SOB, Chest Tightness, Rhinovirus +, Chronic Tobacco Abuse  - Patient on admission ECG, troponin, CXR all unremarkable (other than signs of flattened rib/ possible chronic hyperinflation). Exam with diffuse rhonchi, no o2 requirement, white count or fever. CT CAP in ED performed showed tree in bud opacities most concerning for bronchitis. Treated x1 with ceftriaxone and azithro in ED. Pro andi was negative and RVP showed rhinovirus; therefore, abx not continued. Patient was given duo neb q4PRN , tessalon perles for cough. Her symptoms improved by the time of discharge. Due to her prolonged symptoms and question if she would not see her primary care doctor in a few days she was given a Z pack to use if she worsened prior to seeing PCP.   - Would recommend continuing to discuss tobacco cessation as outpatient. Furthermore, given her medical past concerning for potential reactive airway, xray findings mentioned above, and chronic tobacco use would recommend outpatient PFTs in 4-6 weeks to evaluate for chronic lung  disease.    Chronic Abdominal Pain, Chronic Nausea/Vomiting, Imaging Concerning for Possible Obstruction  Patient with knowledge of strictures/obstruction had no signs or symptoms on exam other than abdominal pain that is at her baseline. Passing gas, stool this AM.  However, due to abdominal pain in ED and her hx a CT was done showing signs of possible obstruction. Was kept NPO overnight of admisison.  Surgery consulted in ED and had no interventions. Patient continued to pass gas and had another sotol first morning of admission and advanced diet well. Nausea was treated during stay w Zofran and she was kept on home pain regimen percocet 5mg q4hr.     Dehydration,Hypokalemia  Patient w poor PO intake due to fatigue and cough with some post tussive emesis. Received IV fluids overnight and first day of admission with imrpovement in skin turgor and correction potassium.     # Discharge Pain Plan:   - During her hospitalization, Jenni experienced pain due to abdominal pain.  The pain plan for discharge was discussed with Jenni and the plan was created in a collaborative fashion.    - Continue prior to admission percocet 5 q4hr.     Consultations This Hospital Stay   VASCULAR ACCESS ADULT IP CONSULT    Code Status   Full Code       The patient was discussed with Dr. Junior [facility] Inpatient Service  [facility]   Pager:[phone]_  ___________________________________________________________________  Review of Systems:  CONSTITUTIONAL: NEGATIVE for fever, chills, change in weight  INTEGUMENTARY/SKIN: NEGATIVE for worrisome rashes, moles or lesions  EYES: NEGATIVE for vision changes or irritation  RESP: NEGATIVE for significant cough or SOB  CV: NEGATIVE for chest pain, palpitations or peripheral edema  GI: NEGATIVE for nausea, abdominal pain, heartburn, or change in bowel habits  : NEGATIVE for frequency, dysuria, or hematuria  MUSCULOSKELETAL: NEGATIVE for significant  arthralgias or myalgia  NEURO: NEGATIVE for weakness, dizziness or paresthesias  HEME/ALLERGY: NEGATIVE for bleeding problems  PSYCHIATRIC: NEGATIVE for changes in mood or affect    Physical Exam   Vital Signs: Temp: 97.5  F (36.4  C) Temp src: Oral BP: (!) 89/59 Pulse: 82   Resp: 18 SpO2: 92 % O2 Device: None (Room air)    Weight: 115 lbs 14.4 oz    General Appearance: Thin frail appearing, looks stated age. No distress  Eyes: clear sclerae, eyes reactive to light   HEENT: atraumatic, oropharynx mildly red but no tonsillar exudates. Shoddy cervical adenopathy  Respiratory: rhonchi improved in upper fields bilaterally but consistent in bilateral lower lungs. No increased WOB  Cardiovascular: rrr   GI: midline scar noted, no distension, non rigid. Diffusely tender equally all quadrants. BS +  Genitourinary: deferred   Skin: no rashes or easy bruising   Musculoskeletal: dec bulk regular tone   Neurologic: CN 2-12 intact a o x3.   Psychiatric: affect fine mood mildly withdrawn and flat.      Significant Results and Procedures   Most Recent 3 CBC's:  Recent Labs   Lab Test 05/19/19  0548 05/18/19  1136 03/13/19  0634 03/12/19 1910   WBC 5.8 8.9  --  9.3   HGB 10.4* 12.6  --  14.4   * 96  --  100    389 373 395     Most Recent 3 BMP's:  Recent Labs   Lab Test 05/19/19  0548 05/18/19  2200 05/18/19  1136 03/13/19  0634 03/12/19 1910     --  143  --  143   POTASSIUM 4.1 3.1* 3.2*  --  4.1   CHLORIDE 112*  --  103  --  101   CO2 27  --  33*  --  32   BUN 9  --  13  --  13   CR 0.56  --  0.53 0.56 0.55   ANIONGAP 2*  --  7  --  10   JONATAN 7.1*  --  8.4*  --  9.4   GLC 69*  --  143*  --  90          Primary Care Physician   Reji Avery    Discharge Disposition   Discharged to home  Condition at discharge: Stable    Discharge Orders      Reason for your hospital stay    Respiratory virus and concern for obstruction in intestine     Follow Up and recommended labs and tests    Follow up with primary  care provider, Reji Avery, within 7 days for hospital follow- up.  The following labs/tests are recommended: none (would do spirometry in 4 weeks ).     Activity    Your activity upon discharge: activity as tolerated     Full Code     Diet    Follow this diet upon discharge: Orders Placed This Encounter      High Kcal/High Protein Diet, ADULT     Discharge Medications   Discharge Medication List as of 5/20/2019  1:38 PM      START taking these medications    Details   azithromycin (ZITHROMAX) 250 MG tablet If you are feeling worse and can't see your doctor by Tuesday take two tablets one day and then 1 tablet daily until finished with bottle., Disp-6 tablet, R-0, E-Prescribe      !! benzonatate (TESSALON) 100 MG capsule Take 1 capsule (100 mg) by mouth 3 times daily as needed for cough, Disp-30 capsule, R-0, E-Prescribe      !! ondansetron (ZOFRAN-ODT) 4 MG ODT tab Take 1 tablet (4 mg) by mouth every 6 hours as needed for nausea or vomiting, Disp-18 tablet, R-0, E-Prescribe       !! - Potential duplicate medications found. Please discuss with provider.      CONTINUE these medications which have NOT CHANGED    Details   albuterol (PROAIR HFA/PROVENTIL HFA/VENTOLIN HFA) 108 (90 Base) MCG/ACT inhaler Inhale 2 puffs into the lungs every 6 hours as needed for shortness of breath / dyspnea or wheezing, Historical      !! benzonatate (TESSALON) 200 MG capsule Take 200 mg by mouth 2 times daily, Historical      !! ondansetron (ZOFRAN-ODT) 4 MG ODT tab Take 1 tablet (4 mg) by mouth every 6 hours as needed for nausea or vomiting, Disp-90 tablet, R-0, E-Prescribe      oxyCODONE-acetaminophen (PERCOCET) 5-325 MG tablet Take 1 tablet by mouth every 4 hours as needed for severe pain, Disp-26 tablet, R-0, Local Print      Simethicone 125 MG TABS Take 125 mg by mouth daily , Historical       !! - Potential duplicate medications found. Please discuss with provider.      STOP taking these medications       HYDROmorphone,  STANDARD CONC, (DILAUDID) 1 MG/ML oral solution Comments:   Reason for Stopping:             Allergies   Allergies   Allergen Reactions     Sulfa Drugs Hives     Ibuprofen Nausea and Vomiting and Hives     Unknown if reaction hives or N/V     No Clinical Screening - See Comments Other (See Comments) and Diarrhea     headache  Carrots cause gastric upset, cramping and diarrhea.     Ciprofloxacin Hives and Nausea     Quinolones      Tramadol Hives, Diarrhea, Nausea and Nausea and Vomiting     Amoxicillin Nausea and Vomiting     Augmentin Nausea, Hives and GI Disturbance     Patient tolerated course of zosyn in 5/2018     Avelox [Moxifloxacin] Nausea and Vomiting     Codeine Nausea and Vomiting     Daucus Carota      Other reaction(s): GI Upset  Other reaction(s): Abdominal pain, Diarrhea  Carrots cause gastric upset, cramping and diarrhea.

## 2019-05-20 NOTE — PLAN OF CARE
All observation goals met. Pt given AVS and all questions answered by this writer. Instructed to stop by pharmacy here to  medications, pt verbalized understanding. Left unit independently with all possessions around 1400, planned to have father transport home.

## 2019-05-20 NOTE — PLAN OF CARE
Care assumed [id number]. No changes since previous note. Tessalon rocio given x1. Continue with POC.    Observation goals:   1. Tolerating regular diet: goal met  2. Antibiotic plan established: progressing towards goal  3. Wean off of oxygen: on 1L O2 for comfort, maintains sats on RA

## 2019-05-21 ENCOUNTER — PATIENT OUTREACH (OUTPATIENT)
Dept: CARE COORDINATION | Facility: CLINIC | Age: 44
End: 2019-05-21

## 2019-05-22 NOTE — ANESTHESIA PREPROCEDURE EVALUATION
Anesthesia Evaluation     . Pt has had prior anesthetic.     No history of anesthetic complications          ROS/MED HX    ENT/Pulmonary:     (+)tobacco use, Current use asthma , . .   (-) COPD and sleep apnea   Neurologic:      (-) seizures, CVA and TIA   Cardiovascular: Comment: TTE 2010:  LVEF 50%  RV nl       (-) arrhythmias, irregular heartbeat/palpitations and valvular problems/murmurs   METS/Exercise Tolerance:     Hematologic:         Musculoskeletal:         GI/Hepatic: Comment: Hx of cervical cancer s/p chemoradiation c/b SBO s/p resection, bowel strictures    Nausea today    On TPN       (-) GERD   Renal/Genitourinary:      (-) renal disease   Endo:         Psychiatric:         Infectious Disease:         Malignancy:         Other:    (+) H/O Chronic Pain,H/O chronic opiod use ,                    Physical Exam  Normal systems: dental    Airway   Mallampati: II  TM distance: >3 FB  Neck ROM: full    Dental     Cardiovascular   Rhythm and rate: regular and normal  (-) no weak pulses and no murmur    Pulmonary    breath sounds clear to auscultation(-) no rhonchi                    Anesthesia Plan      History & Physical Review      ASA Status:  3 .    NPO Status:  > 8 hours    Plan for ETT, General and RSI with Intravenous induction. Maintenance will be Balanced.      GETA. PICC. Standard ASA monitors. IV opioids. PACU postop    Risks and benefits of anesthetic discussed with patient including sore throat, voice hoarseness, injury to vocal cords, throat, mouth, teeth, tongue, and lips from intubation; nausea/vomiting; cardiac arrest, respiratory complications, MI, stroke, blood clots, death.    Presented opportunity to answer patient and family questions. Questions addressed.        Postoperative Care      Consents  Anesthetic plan, risks, benefits and alternatives discussed with:  Patient..                          .   Medications

## 2019-06-22 ENCOUNTER — APPOINTMENT (OUTPATIENT)
Dept: ULTRASOUND IMAGING | Facility: CLINIC | Age: 44
DRG: 388 | End: 2019-06-22
Attending: EMERGENCY MEDICINE
Payer: COMMERCIAL

## 2019-06-22 ENCOUNTER — APPOINTMENT (OUTPATIENT)
Dept: GENERAL RADIOLOGY | Facility: CLINIC | Age: 44
DRG: 388 | End: 2019-06-22
Attending: EMERGENCY MEDICINE
Payer: COMMERCIAL

## 2019-06-22 ENCOUNTER — HOSPITAL ENCOUNTER (INPATIENT)
Facility: CLINIC | Age: 44
LOS: 6 days | Discharge: HOME | DRG: 388 | End: 2019-06-29
Attending: EMERGENCY MEDICINE | Admitting: FAMILY MEDICINE
Payer: COMMERCIAL

## 2019-06-22 DIAGNOSIS — R10.9 ABDOMINAL PAIN, UNSPECIFIED ABDOMINAL LOCATION: ICD-10-CM

## 2019-06-22 DIAGNOSIS — R62.7 FAILURE TO THRIVE IN ADULT: ICD-10-CM

## 2019-06-22 DIAGNOSIS — K56.600 PARTIAL OBSTRUCTION OF SMALL INTESTINE (H): ICD-10-CM

## 2019-06-22 DIAGNOSIS — K56.609 SBO (SMALL BOWEL OBSTRUCTION) (H): ICD-10-CM

## 2019-06-22 DIAGNOSIS — K56.609 SMALL BOWEL OBSTRUCTION (H): Primary | ICD-10-CM

## 2019-06-22 LAB
ALBUMIN SERPL-MCNC: NORMAL G/DL (ref 3.4–5)
ALBUMIN UR-MCNC: NEGATIVE MG/DL
ALP SERPL-CCNC: NORMAL U/L (ref 40–150)
ALT SERPL W P-5'-P-CCNC: NORMAL U/L (ref 0–50)
ANION GAP SERPL CALCULATED.3IONS-SCNC: NORMAL MMOL/L (ref 6–17)
APPEARANCE UR: CLEAR
AST SERPL W P-5'-P-CCNC: NORMAL U/L (ref 0–45)
BILIRUB SERPL-MCNC: NORMAL MG/DL (ref 0.2–1.3)
BILIRUB UR QL STRIP: NEGATIVE
BUN SERPL-MCNC: NORMAL MG/DL (ref 7–30)
CALCIUM SERPL-MCNC: NORMAL MG/DL (ref 8.5–10.1)
CAOX CRY #/AREA URNS HPF: ABNORMAL /HPF
CHLORIDE SERPL-SCNC: NORMAL MMOL/L (ref 94–109)
CO2 BLDCOV-SCNC: 28 MMOL/L (ref 21–28)
CO2 SERPL-SCNC: NORMAL MMOL/L (ref 20–32)
COLOR UR AUTO: YELLOW
CREAT SERPL-MCNC: NORMAL MG/DL (ref 0.52–1.04)
GFR SERPL CREATININE-BSD FRML MDRD: NORMAL ML/MIN/{1.73_M2}
GLUCOSE SERPL-MCNC: NORMAL MG/DL (ref 70–99)
GLUCOSE UR STRIP-MCNC: NEGATIVE MG/DL
HGB UR QL STRIP: NEGATIVE
KETONES UR STRIP-MCNC: NEGATIVE MG/DL
LACTATE BLD-SCNC: 0.8 MMOL/L (ref 0.7–2.1)
LEUKOCYTE ESTERASE UR QL STRIP: ABNORMAL
LIPASE SERPL-CCNC: NORMAL U/L (ref 73–393)
MUCOUS THREADS #/AREA URNS LPF: PRESENT /LPF
NITRATE UR QL: NEGATIVE
PCO2 BLDV: 53 MM HG (ref 40–50)
PH BLDV: 7.34 PH (ref 7.32–7.43)
PH UR STRIP: 6.5 PH (ref 5–7)
PO2 BLDV: 19 MM HG (ref 25–47)
POTASSIUM SERPL-SCNC: NORMAL MMOL/L (ref 3.4–5.3)
PROT SERPL-MCNC: NORMAL G/DL (ref 6.8–8.8)
RBC #/AREA URNS AUTO: 3 /HPF (ref 0–2)
SAO2 % BLDV FROM PO2: 24 %
SODIUM SERPL-SCNC: NORMAL MMOL/L (ref 133–144)
SOURCE: ABNORMAL
SP GR UR STRIP: 1.03 (ref 1–1.03)
SQUAMOUS #/AREA URNS AUTO: 1 /HPF (ref 0–1)
UROBILINOGEN UR STRIP-MCNC: NORMAL MG/DL (ref 0–2)
WBC #/AREA URNS AUTO: 2 /HPF (ref 0–5)

## 2019-06-22 PROCEDURE — 93971 EXTREMITY STUDY: CPT | Mod: RT

## 2019-06-22 PROCEDURE — 87506 IADNA-DNA/RNA PROBE TQ 6-11: CPT | Performed by: EMERGENCY MEDICINE

## 2019-06-22 PROCEDURE — 76882 US LMTD JT/FCL EVL NVASC XTR: CPT

## 2019-06-22 PROCEDURE — 86900 BLOOD TYPING SEROLOGIC ABO: CPT | Performed by: EMERGENCY MEDICINE

## 2019-06-22 PROCEDURE — 25000125 ZZHC RX 250: Performed by: EMERGENCY MEDICINE

## 2019-06-22 PROCEDURE — 25800025 ZZH RX 258: Performed by: EMERGENCY MEDICINE

## 2019-06-22 PROCEDURE — 86850 RBC ANTIBODY SCREEN: CPT | Performed by: EMERGENCY MEDICINE

## 2019-06-22 PROCEDURE — 87493 C DIFF AMPLIFIED PROBE: CPT | Performed by: EMERGENCY MEDICINE

## 2019-06-22 PROCEDURE — 87086 URINE CULTURE/COLONY COUNT: CPT | Performed by: STUDENT IN AN ORGANIZED HEALTH CARE EDUCATION/TRAINING PROGRAM

## 2019-06-22 PROCEDURE — 96365 THER/PROPH/DIAG IV INF INIT: CPT | Performed by: EMERGENCY MEDICINE

## 2019-06-22 PROCEDURE — 96361 HYDRATE IV INFUSION ADD-ON: CPT | Performed by: EMERGENCY MEDICINE

## 2019-06-22 PROCEDURE — 25000128 H RX IP 250 OP 636: Performed by: EMERGENCY MEDICINE

## 2019-06-22 PROCEDURE — 83605 ASSAY OF LACTIC ACID: CPT

## 2019-06-22 PROCEDURE — 81001 URINALYSIS AUTO W/SCOPE: CPT | Performed by: EMERGENCY MEDICINE

## 2019-06-22 PROCEDURE — 82803 BLOOD GASES ANY COMBINATION: CPT

## 2019-06-22 PROCEDURE — 83010 ASSAY OF HAPTOGLOBIN QUANT: CPT | Performed by: EMERGENCY MEDICINE

## 2019-06-22 PROCEDURE — 96376 TX/PRO/DX INJ SAME DRUG ADON: CPT | Performed by: EMERGENCY MEDICINE

## 2019-06-22 PROCEDURE — 93010 ELECTROCARDIOGRAM REPORT: CPT | Mod: Z6 | Performed by: EMERGENCY MEDICINE

## 2019-06-22 PROCEDURE — 96375 TX/PRO/DX INJ NEW DRUG ADDON: CPT | Performed by: EMERGENCY MEDICINE

## 2019-06-22 PROCEDURE — 85025 COMPLETE CBC W/AUTO DIFF WBC: CPT | Performed by: EMERGENCY MEDICINE

## 2019-06-22 PROCEDURE — 86901 BLOOD TYPING SEROLOGIC RH(D): CPT | Performed by: EMERGENCY MEDICINE

## 2019-06-22 PROCEDURE — 99285 EMERGENCY DEPT VISIT HI MDM: CPT | Mod: 25 | Performed by: EMERGENCY MEDICINE

## 2019-06-22 PROCEDURE — 74019 RADEX ABDOMEN 2 VIEWS: CPT

## 2019-06-22 PROCEDURE — 93005 ELECTROCARDIOGRAM TRACING: CPT | Performed by: EMERGENCY MEDICINE

## 2019-06-22 RX ORDER — HYDROMORPHONE HYDROCHLORIDE 1 MG/ML
0.5 INJECTION, SOLUTION INTRAMUSCULAR; INTRAVENOUS; SUBCUTANEOUS
Status: COMPLETED | OUTPATIENT
Start: 2019-06-22 | End: 2019-06-23

## 2019-06-22 RX ORDER — ONDANSETRON 2 MG/ML
4 INJECTION INTRAMUSCULAR; INTRAVENOUS EVERY 30 MIN PRN
Status: DISCONTINUED | OUTPATIENT
Start: 2019-06-22 | End: 2019-06-23 | Stop reason: DRUGHIGH

## 2019-06-22 RX ADMIN — SODIUM CHLORIDE 1000 ML: 9 INJECTION, SOLUTION INTRAVENOUS at 22:32

## 2019-06-22 RX ADMIN — ONDANSETRON 4 MG: 2 INJECTION INTRAMUSCULAR; INTRAVENOUS at 22:28

## 2019-06-22 RX ADMIN — ONDANSETRON 4 MG: 2 INJECTION INTRAMUSCULAR; INTRAVENOUS at 23:42

## 2019-06-22 RX ADMIN — HYDROMORPHONE HYDROCHLORIDE 0.5 MG: 1 INJECTION, SOLUTION INTRAMUSCULAR; INTRAVENOUS; SUBCUTANEOUS at 22:32

## 2019-06-22 RX ADMIN — HYDROMORPHONE HYDROCHLORIDE 0.5 MG: 1 INJECTION, SOLUTION INTRAMUSCULAR; INTRAVENOUS; SUBCUTANEOUS at 23:42

## 2019-06-22 RX ADMIN — FOLIC ACID: 5 INJECTION, SOLUTION INTRAMUSCULAR; INTRAVENOUS; SUBCUTANEOUS at 23:45

## 2019-06-22 ASSESSMENT — ENCOUNTER SYMPTOMS
BLOOD IN STOOL: 0
NAUSEA: 1
DIARRHEA: 1
VOMITING: 1
ABDOMINAL PAIN: 1
BACK PAIN: 0
ANAL BLEEDING: 0
UNEXPECTED WEIGHT CHANGE: 1
DYSURIA: 0

## 2019-06-23 PROBLEM — K56.600 PARTIAL OBSTRUCTION OF SMALL INTESTINE (H): Status: ACTIVE | Noted: 2019-06-23

## 2019-06-23 LAB
ABO + RH BLD: NORMAL
ABO + RH BLD: NORMAL
ALBUMIN SERPL-MCNC: 2.5 G/DL (ref 3.4–5)
ALP SERPL-CCNC: 61 U/L (ref 40–150)
ALT SERPL W P-5'-P-CCNC: 26 U/L (ref 0–50)
ANION GAP SERPL CALCULATED.3IONS-SCNC: 3 MMOL/L (ref 3–14)
APTT PPP: 29 SEC (ref 22–37)
AST SERPL W P-5'-P-CCNC: 10 U/L (ref 0–45)
BASOPHILS # BLD AUTO: 0 10E9/L (ref 0–0.2)
BASOPHILS # BLD AUTO: NORMAL 10E9/L (ref 0–0.2)
BASOPHILS NFR BLD AUTO: 0.6 %
BASOPHILS NFR BLD AUTO: NORMAL %
BILIRUB SERPL-MCNC: 0.3 MG/DL (ref 0.2–1.3)
BLD GP AB SCN SERPL QL: NORMAL
BLOOD BANK CMNT PATIENT-IMP: NORMAL
BUN SERPL-MCNC: 15 MG/DL (ref 7–30)
C COLI+JEJUNI+LARI FUSA STL QL NAA+PROBE: NOT DETECTED
C DIFF TOX B STL QL: NEGATIVE
CALCIUM SERPL-MCNC: 7.3 MG/DL (ref 8.5–10.1)
CHLORIDE SERPL-SCNC: 111 MMOL/L (ref 94–109)
CO2 SERPL-SCNC: 28 MMOL/L (ref 20–32)
CREAT SERPL-MCNC: 0.53 MG/DL (ref 0.52–1.04)
CREAT SERPL-MCNC: 0.58 MG/DL (ref 0.52–1.04)
DIFFERENTIAL METHOD BLD: ABNORMAL
DIFFERENTIAL METHOD BLD: NORMAL
EC STX1 GENE STL QL NAA+PROBE: NOT DETECTED
EC STX2 GENE STL QL NAA+PROBE: NOT DETECTED
ENTERIC PATHOGEN COMMENT: NORMAL
EOSINOPHIL # BLD AUTO: 0.1 10E9/L (ref 0–0.7)
EOSINOPHIL # BLD AUTO: NORMAL 10E9/L (ref 0–0.7)
EOSINOPHIL NFR BLD AUTO: 2 %
EOSINOPHIL NFR BLD AUTO: NORMAL %
ERYTHROCYTE [DISTWIDTH] IN BLOOD BY AUTOMATED COUNT: 14 % (ref 10–15)
ERYTHROCYTE [DISTWIDTH] IN BLOOD BY AUTOMATED COUNT: 14.3 % (ref 10–15)
ERYTHROCYTE [DISTWIDTH] IN BLOOD BY AUTOMATED COUNT: NORMAL % (ref 10–15)
FERRITIN SERPL-MCNC: 45 NG/ML (ref 12–150)
GFR SERPL CREATININE-BSD FRML MDRD: >90 ML/MIN/{1.73_M2}
GFR SERPL CREATININE-BSD FRML MDRD: >90 ML/MIN/{1.73_M2}
GLUCOSE SERPL-MCNC: 106 MG/DL (ref 70–99)
HCG UR QL: NEGATIVE
HCT VFR BLD AUTO: 37.1 % (ref 35–47)
HCT VFR BLD AUTO: 37.6 % (ref 35–47)
HCT VFR BLD AUTO: NORMAL % (ref 35–47)
HEMOCCULT STL QL: NEGATIVE
HGB BLD-MCNC: 11.2 G/DL (ref 11.7–15.7)
HGB BLD-MCNC: 11.2 G/DL (ref 11.7–15.7)
HGB BLD-MCNC: NORMAL G/DL (ref 11.7–15.7)
IMM GRANULOCYTES # BLD: 0 10E9/L (ref 0–0.4)
IMM GRANULOCYTES # BLD: NORMAL 10E9/L (ref 0–0.4)
IMM GRANULOCYTES NFR BLD: 0.6 %
IMM GRANULOCYTES NFR BLD: NORMAL %
INR PPP: 1.28 (ref 0.86–1.14)
INTERPRETATION ECG - MUSE: NORMAL
LACTATE BLD-SCNC: 1.9 MMOL/L (ref 0.7–2)
LDH SERPL L TO P-CCNC: NORMAL U/L (ref 81–234)
LIPASE SERPL-CCNC: 99 U/L (ref 73–393)
LYMPHOCYTES # BLD AUTO: 1.7 10E9/L (ref 0.8–5.3)
LYMPHOCYTES # BLD AUTO: NORMAL 10E9/L (ref 0.8–5.3)
LYMPHOCYTES NFR BLD AUTO: 26.3 %
LYMPHOCYTES NFR BLD AUTO: NORMAL %
MAGNESIUM SERPL-MCNC: 1 MG/DL (ref 1.6–2.3)
MAGNESIUM SERPL-MCNC: 2.5 MG/DL (ref 1.6–2.3)
MCH RBC QN AUTO: 30.9 PG (ref 26.5–33)
MCH RBC QN AUTO: 31 PG (ref 26.5–33)
MCH RBC QN AUTO: NORMAL PG (ref 26.5–33)
MCHC RBC AUTO-ENTMCNC: 29.8 G/DL (ref 31.5–36.5)
MCHC RBC AUTO-ENTMCNC: 30.2 G/DL (ref 31.5–36.5)
MCHC RBC AUTO-ENTMCNC: NORMAL G/DL (ref 31.5–36.5)
MCV RBC AUTO: 103 FL (ref 78–100)
MCV RBC AUTO: 104 FL (ref 78–100)
MCV RBC AUTO: NORMAL FL (ref 78–100)
MONOCYTES # BLD AUTO: 0.5 10E9/L (ref 0–1.3)
MONOCYTES # BLD AUTO: NORMAL 10E9/L (ref 0–1.3)
MONOCYTES NFR BLD AUTO: 7.2 %
MONOCYTES NFR BLD AUTO: NORMAL %
NEUTROPHILS # BLD AUTO: 4.1 10E9/L (ref 1.6–8.3)
NEUTROPHILS # BLD AUTO: NORMAL 10E9/L (ref 1.6–8.3)
NEUTROPHILS NFR BLD AUTO: 63.3 %
NEUTROPHILS NFR BLD AUTO: NORMAL %
NOROV GI+II ORF1-ORF2 JNC STL QL NAA+PR: NOT DETECTED
NRBC # BLD AUTO: 0 10*3/UL
NRBC # BLD AUTO: NORMAL 10*3/UL
NRBC BLD AUTO-RTO: 0 /100
NRBC BLD AUTO-RTO: NORMAL /100
PHOSPHATE SERPL-MCNC: 2.6 MG/DL (ref 2.5–4.5)
PLATELET # BLD AUTO: 211 10E9/L (ref 150–450)
PLATELET # BLD AUTO: 217 10E9/L (ref 150–450)
PLATELET # BLD AUTO: 223 10E9/L (ref 150–450)
PLATELET # BLD AUTO: NORMAL 10E9/L (ref 150–450)
POTASSIUM SERPL-SCNC: 3.7 MMOL/L (ref 3.4–5.3)
PROT SERPL-MCNC: 5 G/DL (ref 6.8–8.8)
RADIOLOGIST FLAGS: ABNORMAL
RBC # BLD AUTO: 3.61 10E12/L (ref 3.8–5.2)
RBC # BLD AUTO: 3.62 10E12/L (ref 3.8–5.2)
RBC # BLD AUTO: NORMAL 10E12/L (ref 3.8–5.2)
RETICS # AUTO: NORMAL 10E9/L (ref 25–95)
RETICS/RBC NFR AUTO: NORMAL % (ref 0.5–2)
RVA NSP5 STL QL NAA+PROBE: NOT DETECTED
SALMONELLA SP RPOD STL QL NAA+PROBE: NOT DETECTED
SHIGELLA SP+EIEC IPAH STL QL NAA+PROBE: NOT DETECTED
SODIUM SERPL-SCNC: 142 MMOL/L (ref 133–144)
SPECIMEN EXP DATE BLD: NORMAL
SPECIMEN SOURCE: NORMAL
TROPONIN I SERPL-MCNC: <0.015 UG/L (ref 0–0.04)
TSH SERPL DL<=0.005 MIU/L-ACNC: 1.02 MU/L (ref 0.4–4)
TSH SERPL DL<=0.005 MIU/L-ACNC: 1.02 MU/L (ref 0.4–4)
V CHOL+PARA RFBL+TRKH+TNAA STL QL NAA+PR: NOT DETECTED
WBC # BLD AUTO: 6.5 10E9/L (ref 4–11)
WBC # BLD AUTO: 7.3 10E9/L (ref 4–11)
WBC # BLD AUTO: NORMAL 10E9/L (ref 4–11)
Y ENTERO RECN STL QL NAA+PROBE: NOT DETECTED

## 2019-06-23 PROCEDURE — 83605 ASSAY OF LACTIC ACID: CPT | Performed by: STUDENT IN AN ORGANIZED HEALTH CARE EDUCATION/TRAINING PROGRAM

## 2019-06-23 PROCEDURE — 82565 ASSAY OF CREATININE: CPT | Performed by: FAMILY MEDICINE

## 2019-06-23 PROCEDURE — 81025 URINE PREGNANCY TEST: CPT | Performed by: EMERGENCY MEDICINE

## 2019-06-23 PROCEDURE — 85610 PROTHROMBIN TIME: CPT | Performed by: EMERGENCY MEDICINE

## 2019-06-23 PROCEDURE — 36415 COLL VENOUS BLD VENIPUNCTURE: CPT | Performed by: FAMILY MEDICINE

## 2019-06-23 PROCEDURE — 25000128 H RX IP 250 OP 636: Performed by: STUDENT IN AN ORGANIZED HEALTH CARE EDUCATION/TRAINING PROGRAM

## 2019-06-23 PROCEDURE — 40000893 ZZH STATISTIC PT IP EVAL DEFER

## 2019-06-23 PROCEDURE — 84100 ASSAY OF PHOSPHORUS: CPT | Performed by: EMERGENCY MEDICINE

## 2019-06-23 PROCEDURE — 80053 COMPREHEN METABOLIC PANEL: CPT | Performed by: EMERGENCY MEDICINE

## 2019-06-23 PROCEDURE — 36415 COLL VENOUS BLD VENIPUNCTURE: CPT | Performed by: STUDENT IN AN ORGANIZED HEALTH CARE EDUCATION/TRAINING PROGRAM

## 2019-06-23 PROCEDURE — 84134 ASSAY OF PREALBUMIN: CPT | Performed by: EMERGENCY MEDICINE

## 2019-06-23 PROCEDURE — C9113 INJ PANTOPRAZOLE SODIUM, VIA: HCPCS | Performed by: EMERGENCY MEDICINE

## 2019-06-23 PROCEDURE — 96375 TX/PRO/DX INJ NEW DRUG ADDON: CPT

## 2019-06-23 PROCEDURE — 83735 ASSAY OF MAGNESIUM: CPT | Performed by: EMERGENCY MEDICINE

## 2019-06-23 PROCEDURE — 83540 ASSAY OF IRON: CPT | Performed by: EMERGENCY MEDICINE

## 2019-06-23 PROCEDURE — 87389 HIV-1 AG W/HIV-1&-2 AB AG IA: CPT | Performed by: FAMILY MEDICINE

## 2019-06-23 PROCEDURE — 12000001 ZZH R&B MED SURG/OB UMMC

## 2019-06-23 PROCEDURE — 84443 ASSAY THYROID STIM HORMONE: CPT | Performed by: EMERGENCY MEDICINE

## 2019-06-23 PROCEDURE — 83690 ASSAY OF LIPASE: CPT | Performed by: EMERGENCY MEDICINE

## 2019-06-23 PROCEDURE — 96376 TX/PRO/DX INJ SAME DRUG ADON: CPT

## 2019-06-23 PROCEDURE — 84484 ASSAY OF TROPONIN QUANT: CPT | Performed by: EMERGENCY MEDICINE

## 2019-06-23 PROCEDURE — 25000128 H RX IP 250 OP 636: Performed by: EMERGENCY MEDICINE

## 2019-06-23 PROCEDURE — 82607 VITAMIN B-12: CPT | Performed by: EMERGENCY MEDICINE

## 2019-06-23 PROCEDURE — 82272 OCCULT BLD FECES 1-3 TESTS: CPT | Performed by: FAMILY MEDICINE

## 2019-06-23 PROCEDURE — 85027 COMPLETE CBC AUTOMATED: CPT | Performed by: STUDENT IN AN ORGANIZED HEALTH CARE EDUCATION/TRAINING PROGRAM

## 2019-06-23 PROCEDURE — 82746 ASSAY OF FOLIC ACID SERUM: CPT | Performed by: EMERGENCY MEDICINE

## 2019-06-23 PROCEDURE — 82728 ASSAY OF FERRITIN: CPT | Performed by: EMERGENCY MEDICINE

## 2019-06-23 PROCEDURE — 25800030 ZZH RX IP 258 OP 636: Performed by: STUDENT IN AN ORGANIZED HEALTH CARE EDUCATION/TRAINING PROGRAM

## 2019-06-23 PROCEDURE — 83735 ASSAY OF MAGNESIUM: CPT | Performed by: FAMILY MEDICINE

## 2019-06-23 RX ORDER — HYDROMORPHONE HYDROCHLORIDE 1 MG/ML
.3-.5 INJECTION, SOLUTION INTRAMUSCULAR; INTRAVENOUS; SUBCUTANEOUS
Status: COMPLETED | OUTPATIENT
Start: 2019-06-23 | End: 2019-06-23

## 2019-06-23 RX ORDER — PROCHLORPERAZINE 25 MG
25 SUPPOSITORY, RECTAL RECTAL EVERY 12 HOURS PRN
Status: DISCONTINUED | OUTPATIENT
Start: 2019-06-23 | End: 2019-06-29 | Stop reason: HOSPADM

## 2019-06-23 RX ORDER — PROCHLORPERAZINE MALEATE 5 MG
10 TABLET ORAL EVERY 6 HOURS PRN
Status: DISCONTINUED | OUTPATIENT
Start: 2019-06-23 | End: 2019-06-29 | Stop reason: HOSPADM

## 2019-06-23 RX ORDER — MAGNESIUM SULFATE HEPTAHYDRATE 40 MG/ML
4 INJECTION, SOLUTION INTRAVENOUS EVERY 4 HOURS PRN
Status: DISCONTINUED | OUTPATIENT
Start: 2019-06-23 | End: 2019-06-29 | Stop reason: HOSPADM

## 2019-06-23 RX ORDER — NALOXONE HYDROCHLORIDE 0.4 MG/ML
.1-.4 INJECTION, SOLUTION INTRAMUSCULAR; INTRAVENOUS; SUBCUTANEOUS
Status: DISCONTINUED | OUTPATIENT
Start: 2019-06-23 | End: 2019-06-29 | Stop reason: HOSPADM

## 2019-06-23 RX ORDER — LIDOCAINE 40 MG/G
CREAM TOPICAL
Status: DISCONTINUED | OUTPATIENT
Start: 2019-06-23 | End: 2019-06-29 | Stop reason: HOSPADM

## 2019-06-23 RX ORDER — HYDROMORPHONE HYDROCHLORIDE 1 MG/ML
0.5 INJECTION, SOLUTION INTRAMUSCULAR; INTRAVENOUS; SUBCUTANEOUS
Status: COMPLETED | OUTPATIENT
Start: 2019-06-23 | End: 2019-06-23

## 2019-06-23 RX ORDER — DEXTROSE MONOHYDRATE, SODIUM CHLORIDE, AND POTASSIUM CHLORIDE 50; 1.49; 4.5 G/1000ML; G/1000ML; G/1000ML
INJECTION, SOLUTION INTRAVENOUS
Status: DISCONTINUED
Start: 2019-06-23 | End: 2019-06-23 | Stop reason: CLARIF

## 2019-06-23 RX ORDER — ONDANSETRON 4 MG/1
4 TABLET, ORALLY DISINTEGRATING ORAL EVERY 6 HOURS PRN
Status: DISCONTINUED | OUTPATIENT
Start: 2019-06-23 | End: 2019-06-29 | Stop reason: HOSPADM

## 2019-06-23 RX ORDER — HYDROMORPHONE HYDROCHLORIDE 1 MG/ML
.3-.5 INJECTION, SOLUTION INTRAMUSCULAR; INTRAVENOUS; SUBCUTANEOUS
Status: DISCONTINUED | OUTPATIENT
Start: 2019-06-23 | End: 2019-06-26

## 2019-06-23 RX ORDER — ONDANSETRON 2 MG/ML
4 INJECTION INTRAMUSCULAR; INTRAVENOUS EVERY 6 HOURS PRN
Status: DISCONTINUED | OUTPATIENT
Start: 2019-06-23 | End: 2019-06-29 | Stop reason: HOSPADM

## 2019-06-23 RX ORDER — DEXTROSE MONOHYDRATE, SODIUM CHLORIDE, AND POTASSIUM CHLORIDE 50; 1.49; 4.5 G/1000ML; G/1000ML; G/1000ML
INJECTION, SOLUTION INTRAVENOUS CONTINUOUS
Status: DISCONTINUED | OUTPATIENT
Start: 2019-06-23 | End: 2019-06-28

## 2019-06-23 RX ADMIN — POTASSIUM CHLORIDE, DEXTROSE MONOHYDRATE AND SODIUM CHLORIDE: 150; 5; 450 INJECTION, SOLUTION INTRAVENOUS at 05:03

## 2019-06-23 RX ADMIN — PANTOPRAZOLE SODIUM 80 MG: 40 INJECTION, POWDER, FOR SOLUTION INTRAVENOUS at 00:08

## 2019-06-23 RX ADMIN — HYDROMORPHONE HYDROCHLORIDE 0.5 MG: 1 INJECTION, SOLUTION INTRAMUSCULAR; INTRAVENOUS; SUBCUTANEOUS at 21:00

## 2019-06-23 RX ADMIN — POTASSIUM CHLORIDE, DEXTROSE MONOHYDRATE AND SODIUM CHLORIDE: 150; 5; 450 INJECTION, SOLUTION INTRAVENOUS at 15:05

## 2019-06-23 RX ADMIN — HYDROMORPHONE HYDROCHLORIDE 0.5 MG: 1 INJECTION, SOLUTION INTRAMUSCULAR; INTRAVENOUS; SUBCUTANEOUS at 02:19

## 2019-06-23 RX ADMIN — ONDANSETRON HYDROCHLORIDE 4 MG: 2 INJECTION, SOLUTION INTRAMUSCULAR; INTRAVENOUS at 20:37

## 2019-06-23 RX ADMIN — ENOXAPARIN SODIUM 40 MG: 40 INJECTION SUBCUTANEOUS at 07:52

## 2019-06-23 RX ADMIN — HYDROMORPHONE HYDROCHLORIDE 0.5 MG: 1 INJECTION, SOLUTION INTRAMUSCULAR; INTRAVENOUS; SUBCUTANEOUS at 01:02

## 2019-06-23 RX ADMIN — HYDROMORPHONE HYDROCHLORIDE 0.5 MG: 1 INJECTION, SOLUTION INTRAMUSCULAR; INTRAVENOUS; SUBCUTANEOUS at 13:35

## 2019-06-23 RX ADMIN — HYDROMORPHONE HYDROCHLORIDE 0.5 MG: 1 INJECTION, SOLUTION INTRAMUSCULAR; INTRAVENOUS; SUBCUTANEOUS at 10:48

## 2019-06-23 RX ADMIN — MAGNESIUM SULFATE HEPTAHYDRATE 4 G: 40 INJECTION, SOLUTION INTRAVENOUS at 03:00

## 2019-06-23 RX ADMIN — HYDROMORPHONE HYDROCHLORIDE 0.5 MG: 1 INJECTION, SOLUTION INTRAMUSCULAR; INTRAVENOUS; SUBCUTANEOUS at 16:36

## 2019-06-23 RX ADMIN — HYDROMORPHONE HYDROCHLORIDE 0.5 MG: 1 INJECTION, SOLUTION INTRAMUSCULAR; INTRAVENOUS; SUBCUTANEOUS at 07:52

## 2019-06-23 RX ADMIN — HYDROMORPHONE HYDROCHLORIDE 0.5 MG: 1 INJECTION, SOLUTION INTRAMUSCULAR; INTRAVENOUS; SUBCUTANEOUS at 19:45

## 2019-06-23 RX ADMIN — HYDROMORPHONE HYDROCHLORIDE 0.5 MG: 1 INJECTION, SOLUTION INTRAMUSCULAR; INTRAVENOUS; SUBCUTANEOUS at 05:03

## 2019-06-23 RX ADMIN — HYDROMORPHONE HYDROCHLORIDE 0.5 MG: 1 INJECTION, SOLUTION INTRAMUSCULAR; INTRAVENOUS; SUBCUTANEOUS at 22:51

## 2019-06-23 ASSESSMENT — ACTIVITIES OF DAILY LIVING (ADL)
BATHING: 0-->INDEPENDENT
SWALLOWING: 0-->SWALLOWS FOODS/LIQUIDS WITHOUT DIFFICULTY
TOILETING: 0-->INDEPENDENT
ADLS_ACUITY_SCORE: 10
RETIRED_COMMUNICATION: 0-->UNDERSTANDS/COMMUNICATES WITHOUT DIFFICULTY
RETIRED_EATING: 0-->INDEPENDENT
DRESS: 0-->INDEPENDENT
TRANSFERRING: 0-->INDEPENDENT
AMBULATION: 0-->INDEPENDENT
FALL_HISTORY_WITHIN_LAST_SIX_MONTHS: NO
ADLS_ACUITY_SCORE: 10
ADLS_ACUITY_SCORE: 10
COGNITION: 0 - NO COGNITION ISSUES REPORTED

## 2019-06-23 ASSESSMENT — MIFFLIN-ST. JEOR: SCORE: 1192.47

## 2019-06-23 NOTE — PLAN OF CARE
PT / 7B - Defer/Cancel.  PT orders received and acknowledged.  Per chart review and discussion/observation, patient demonstrating ambulation in hallway independently and navigated 8 stairs safely and independently.  Patient reports no concerns with ADLs and reports performing daily physical activity.  No IP PT needs identified at this time.  PT orders will be completed at this time, thank you for the consult.

## 2019-06-23 NOTE — PLAN OF CARE
"0200-0730    ../79   Pulse 85   Temp 97.5  F (36.4  C) (Oral)   Resp 14   Ht 1.753 m (5' 9\")   Wt 47.8 kg (105 lb 6.4 oz)   SpO2 99%   BMI 15.56 kg/m        Pt admitted from ED at 0200 with abdominal pain due to small bowel obstruction.    ..Activity: up at dania   Neuros: alert and orientated x 4, calm and cooperative   Cardiac: WNL   Respiratory: O2 sats 99% on RA, unlabored   GI/: abdomen soft/tender, pt having loose stools, voiding spont   Diet: NPO  Skin: intact   Lines: PIV x 2   Incisions/Drains: none   Labs: pending   Pain/nausea: dilaudid IV for pain with \"relief\", zofran effective for nausea, no emesis   New changes this shift: none   Plan: surgery consult , nutrition consult, IV hydration, pain control,  continue POC       "

## 2019-06-23 NOTE — PROGRESS NOTES
"CLINICAL NUTRITION SERVICES - ASSESSMENT NOTE     Nutrition Prescription    Recommendations:  If unable to advance to oral diet within 2-3 days due to SBO, consider initiating parenteral nutrition support.     Malnutrition Status:    Unable to determine due to pt out of room during attempts to assess. Will follow-up as able.     Future Recommendations:  If TPN indicated, recommend - 960 ml/day with initial 100g Dex (GIR 1.4), 100g AA + 250 ml 20% IV lipids 5x/week. Adv to eventual goal of 200g Dex (per PharmD/RD) to provide 1437 kcals (30 kcal/kg), 2.1 g PRO/kg, GIR 2.9 with 25% kcals from Fat.    If advances to oral diet, recommend scheduled snacks/suppelments, calorie counts and daily multivitamin with minerals.      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 Presenting to [facility] with symptoms of nausea, vomiting, diarrhea and findings of SBO on AXR.     NUTRITION HISTORY  Per chart, pt acutely ill (nausea/vomiting) for the past 1-2 days and unable to take any PO.   Pt reports 100 lb wt loss over past year. However, records show pt only lost 20 lbs over past year. Majority of it, (15 lbs) over the past month.    Unable to obtain further history, pt out of room during attempts to assess.      CURRENT NUTRITION ORDERS  Diet: NPO    LABS  Labs reviewed    MEDICATIONS  Medications reviewed  D5% and 0.45% NaCl + KCl 20 mEq/L @ 100 ml/hr     ANTHROPOMETRICS  Height: 175.3 cm (5' 9\")  Most Recent Weight: 47.8 kg (105 lb 6.4 oz)    IBW: 65.9 kg  BMI: Underweight BMI <18.5  Weight History: Records show pt only lost 20 lbs over past year. Majority of it, (15 lbs or 12%) over the past month.    Wt Readings from Last 10 Encounters:   06/23/19 47.8 kg (105 lb 6.4 " oz)   05/19/19 52.6 kg (115 lb 14.4 oz)   03/15/19 53.3 kg (117 lb 9.6 oz)   03/10/19 53.7 kg (118 lb 4.8 oz)   11/19/18 49.9 kg (110 lb)   11/13/18 54.9 kg (121 lb 1.6 oz)   07/17/18 50.1 kg (110 lb 6.4 oz)   06/19/18 56.6 kg (124 lb 11.2 oz)   05/23/18 56.9 kg (125 lb 8 oz)   05/09/18 55.3 kg (122 lb)      Dosing Weight: 48 kg (actual, based on admit wt of 47.8 kg on 6/23/19)     ASSESSED NUTRITION NEEDS  Estimated Energy Needs: 1440 - 1680+ kcals/day (30 - 35+ kcals/kg )  Justification: Underweight  Estimated Protein Needs: 72 - 96+ grams protein/day (1.5 - 2+ grams of pro/kg)  Justification: Repletion  Estimated Fluid Needs: 1440 - 1680 mL/day (30 - 35 mL/kg)   Justification: Maintenance, pending fluid status     PHYSICAL FINDINGS  See malnutrition section below.    MALNUTRITION  % Intake: Unable to assess  % Weight Loss: 12% in 1 month (severe)  Subcutaneous Fat Loss: Unable to assess  Muscle Loss: Unable to assess  Fluid Accumulation/Edema: Unable to assess  Malnutrition Diagnosis: Unable to determine due to pt out of room during attempts to assess. Will follow-up as able.     NUTRITION DIAGNOSIS  Inadequate oral intake related to altered GI status (SBO) as evidenced by NPO status.       INTERVENTIONS  Implementation  Parenteral Nutrition - Recommendations     Goals  Diet adv vs nutrition support within 2-3 days.     Monitoring/Evaluation  Progress toward goals will be monitored and evaluated per protocol.    Lucy Skinner, ELISA, LD  Weekday [address]B RD Pager: [phone]  Weekend Pager: [phone]

## 2019-06-23 NOTE — PROGRESS NOTES
Brief General Surgery Note    I discussed the importance of an NG tube at length with Jenni. She explained that she gets extremely anxious after placement and cannot help but pull them out immediately. She absolutely refused NG placement. No contrast administration today, we will continue to follow along. D/w chief Dr Ana Maria Pop    --  Isaias Quinonez MD  General Surgery PGY2  [phone]

## 2019-06-23 NOTE — ED NOTES
[facility], [place]   ED Nurse to Floor Handoff     Rachel A Gerhardt is a 43 year old female who speaks English and lives alone,  in a home  They arrived in the ED by car from home    ED Chief Complaint: Abdominal Pain    ED Dx;   Final diagnoses:   Small bowel obstruction (H)   Failure to thrive in adult         Needed?: No    Allergies:   Allergies   Allergen Reactions     Sulfa Drugs Hives     Ibuprofen Nausea and Vomiting and Hives     Unknown if reaction hives or N/V     No Clinical Screening - See Comments Other (See Comments) and Diarrhea     headache  Carrots cause gastric upset, cramping and diarrhea.     Ciprofloxacin Hives and Nausea     Quinolones      Tramadol Hives, Diarrhea, Nausea and Nausea and Vomiting     Amoxicillin Nausea and Vomiting     Augmentin Nausea, Hives and GI Disturbance     Patient tolerated course of zosyn in 5/2018     Avelox [Moxifloxacin] Nausea and Vomiting     Codeine Nausea and Vomiting     Daucus Carota      Other reaction(s): GI Upset  Other reaction(s): Abdominal pain, Diarrhea  Carrots cause gastric upset, cramping and diarrhea.   .  Past Medical Hx:   Past Medical History:   Diagnosis Date     Asthma      Cervical cancer (H)      Other chronic pain      Ovarian cancer (H)      Partial small bowel obstruction (H) 11/2/2018     SBO (small bowel obstruction) (H) 12/27/2016     Small bowel obstruction (H) 5/17/2017     Small intestine obstruction (H) 4/29/2017     Substance abuse (H)     Outside records indicate past history of narcotics abuse or dependence, but patient denies.      Baseline Mental status: WDL  Current Mental Status changes: at basesline    Infection present or suspected this encounter: no  Sepsis suspected: No  Isolation type: Enteric     Activity level - Baseline/Home:  Independent  Activity Level - Current:   Stand with Assist    Bariatric equipment needed?: No    In the ED these meds were given:   Medications    ondansetron (ZOFRAN) injection 4 mg (4 mg Intravenous Given 6/22/19 2342)   0.9% sodium chloride BOLUS (0 mLs Intravenous Stopped 6/22/19 2352)   HYDROmorphone (PF) (DILAUDID) injection 0.5 mg (0.5 mg Intravenous Given 6/23/19 0102)   pantoprazole (PROTONIX) 40 mg IV push injection (80 mg Intravenous Given 6/23/19 0008)   dextrose 5% and 0.45% NaCl 1,000 mL with INFUVITE ADULT 10 mL, thiamine 100 mg, folic acid 1 mg infusion ( Intravenous Stopped 6/23/19 0102)       Drips running?  No    Home pump  No    Current LDAs  Peripheral IV 06/22/19 Left Upper forearm (Active)   Site Assessment [facility] 6/22/2019 10:37 PM   Line Status Saline locked 6/22/2019 10:37 PM   Phlebitis Scale 0-->no symptoms 6/22/2019 10:37 PM   Extravasation? No 6/22/2019 10:37 PM   Dressing Intervention New dressing  6/22/2019 10:37 PM   Number of days: 1       Peripheral IV 06/22/19 Right Upper forearm (Active)   Site Assessment [facility] 6/22/2019 11:53 PM   Line Status Saline locked 6/22/2019 11:53 PM   Phlebitis Scale 0-->no symptoms 6/22/2019 11:53 PM   Extravasation? No 6/22/2019 11:53 PM   Dressing Intervention New dressing  6/22/2019 11:53 PM   Number of days: 1       Incision/Surgical Site 05/18/17 Abdomen (Active)   Number of days: 766       Labs results:   Labs Ordered and Resulted from Time of ED Arrival Up to the Time of Departure from the ED   ROUTINE UA WITH MICROSCOPIC REFLEX TO CULTURE - Abnormal; Notable for the following components:       Result Value    Leukocyte Esterase Urine Moderate (*)     RBC Urine 3 (*)     Mucous Urine Present (*)     Calcium Oxalate Few (*)     All other components within normal limits   MAGNESIUM - Abnormal; Notable for the following components:    Magnesium 1.0 (*)     All other components within normal limits   INR - Abnormal; Notable for the following components:    INR 1.28 (*)     All other components within normal limits   CBC WITH PLATELETS DIFFERENTIAL - Abnormal; Notable for the following components:     RBC Count 3.62 (*)     Hemoglobin 11.2 (*)      (*)     MCHC 30.2 (*)     All other components within normal limits   COMPREHENSIVE METABOLIC PANEL - Abnormal; Notable for the following components:    Chloride 111 (*)     Glucose 106 (*)     Calcium 7.3 (*)     Albumin 2.5 (*)     Protein Total 5.0 (*)     All other components within normal limits   ISTAT  GASES LACTATE ANGEL LUIS POCT - Abnormal; Notable for the following components:    PCO2 Venous 53 (*)     PO2 Venous 19 (*)     All other components within normal limits   CBC WITH PLATELETS DIFFERENTIAL   COMPREHENSIVE METABOLIC PANEL   LIPASE   PHOSPHORUS   TSH   HCG QUALITATIVE URINE   PREALBUMIN   FERRITIN   IRON AND IRON BINDING CAPACITY   BLOOD MORPHOLOGY PATHOLOGIST REVIEW   VITAMIN B12   METHYLMALONIC ACID   PARTIAL THROMBOPLASTIN TIME   LACTATE DEHYDROGENASE   HAPTOGLOBIN   TSH WITH FREE T4 REFLEX   TROPONIN I   FOLATE   LIPASE   RETICULOCYTE COUNT   PERIPHERAL IV CATHETER   ISTAT CG4 GASES LACTATE ANGEL LUIS NURSING POCT   ISTAT CG4 GASES LACTATE ANGEL LUIS NURSING POCT   CARDIAC CONTINUOUS MONITORING   PERIPHERAL IV CATHETER   MEASURE URINE OUTPUT   OCCULT BLOOD STOOL POCT   ABO/RH TYPE AND SCREEN   RED BLOOD CELL PREPARE ORDER UNIT   ENTERIC BACTERIA AND VIRUS PANEL BY MIRANDA STOOL   CLOSTRIDIUM DIFFICILE TOXIN B       Imaging Studies:   Recent Results (from the past 24 hour(s))   XR Abdomen 2 Views   Result Value    Radiologist flags Small bowel obstruction. (Urgent)    Impression    Impression:   1. Findings consistent with small bowel obstruction. There is some gas  in the colon suggesting obstruction is not complete.  2. No free air in the abdomen.    [Urgent Result: Small bowel obstruction.]    Finding was identified on 6/22/2019 10:31 PM.     Kaila Rodney MD was contacted by Dr. Aakash Melchor DO (Radiology  R2) at 6/22/2019 10:37 PM and verbalized understanding of the urgent  finding.    US Extremity Non Vascular Bilateral    Impression    Impression:    Soft tissue nodule in the right groin, which is concerning for  metastatic disease versus reactive lymph node. No drainable fluid  collections identified.   US Lower Extremity Venous Duplex Right    Impression    IMPRESSION:  1.  No evidence of right lower extremity deep venous thrombosis.         Recent vital signs:   /84   Pulse 85   Temp 98.8  F (37.1  C) (Oral)   Resp 12   SpO2 99%     [place] Coma Scale Score: 15 (06/22/19 2013)       Cardiac Rhythm: Normal Sinus  Pt needs tele? No  Skin/wound Issues: None    Code Status: Full Code    Pain control: fair    Nausea control: fair    Abnormal labs/tests/findings requiring intervention: see results     Family present during ED course? No   Family Comments/Social Situation comments:     Tasks needing completion: None    KATERIN ARNOLD, RN  [phone] [facility]

## 2019-06-23 NOTE — CONSULTS
"General Surgery Consult  6/23/2019    I was asked by internal medicine to evaluate for small bowel obstruction    CC: Nausea and vomiting    HPI: Rachel Gerhardt is a 43 year old female with a past medical history of ovarian and cervical cancer s/p chemorads c/b small bowel stricture now s/p small bowel resection c/b anastomotic stricture that was admitted to the hospital last night for nausea, vomiting, and abdominal pain. She says this felt like her prior small bowel obstructions. Had enteroscopic dilation on 6/17/18. 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 She was previously scheduled for surgery with colorectal for this issue. She says she was afraid of having an ileostomy and so did not go through with it but she has been so miserable for the last few months with reduced PO intake, nausea, vomiting, and abdominal pain that now she will consider anything, even surgery. Denies cp, sob, new weakness/numbness/tingling. Endorses loose diarrhea without hematochezia.     ROS: 10 point ROS was conducted and negative except as mentioned in the HPI.  PMHx: Ovarian and cervical cancer s/p chemorads, narcotics abuse, recurrent SBO, ilealileal anastomosis stricture, asthma  PSHx: Small bowel resection with Dr Tinoco on 5/18/2017, enteroscopic anastomotic structure dilation on 6/17/2018 with Dr Hardy  SH: Current smoker, no EtOH  FH: Denies FH of bleeding or anesthesia reactions  Medications: Percocet, albuterol, zofran, simethicone  Allergies: Sulfa, ibuprofen, ciprofloxacin, quinolones, tramadol, amaoxicillin    Physical Exam    /77 (BP Location: Left arm)   Pulse 85   Temp 96.2  F (35.7  C) (Oral)   Resp 16   Ht 1.753 m (5' 9\")   Wt 47.8 kg (105 lb 6.4 oz)   SpO2 99%   BMI 15.56 kg/m      Gen: Well appearing in NAD  Eyes: No scleral icterus  Pulm: NLB on RA  CVS: RRR  Abd: Soft, tender in RLQ " and LLQ, minimal tenderness in RUQ and LUQ, distended. No r/g  Ext: Wwp, no edema  Psych: Cooperative  Neuro: CN II-XII intact    Lab Results   Component Value Date    WBC 6.5 06/22/2019    HGB 11.2 (L) 06/22/2019    HCT 37.1 06/22/2019     06/23/2019     06/23/2019    POTASSIUM 3.7 06/23/2019    CHLORIDE 111 (H) 06/23/2019    CO2 28 06/23/2019    BUN 15 06/23/2019    CR 0.53 06/23/2019     (H) 06/23/2019    SED 18 02/13/2018    TROPONIN 0.00 05/01/2018    TROPI <0.015 06/23/2019    AST 10 06/23/2019    ALT 26 06/23/2019    ALKPHOS 61 06/23/2019    BILITOTAL 0.3 06/23/2019    INR 1.28 (H) 06/23/2019     Lactate normal    XR Abd/Pelvis shows air fluid levels, small bowel measuring 5.5 cm    Assessment: 43 year old female with a past medical history of cervical and ovarian cancer now s/p chemorads c/b small bowel stricture now s/p small bowel resection c/b anastomotic stricture s/p enteroscopic dilation with recurrent small bowel obstruction. There is no urgent indication for operation as she is not peritonitic, has a normal white count, normal vitals, and a normal lactate. To evaluate the extent of obstruction at the stricture given some anterograde bowel function we would recommend administering contrast through an NG tube as described below.    Plan:  - Recommend placing an NG tube  - Recommend administering water soluble contrast through the NG tube and getting an abdominal film 6 hours later to evaluate obstruction  - General surgery will follow please call with questions    Discussed with chief resident, Dr Ana Maria Pop and with staff, Dr Daniel Em    --  Isaias Quinonez MD  General Surgery PGY2  [phone]

## 2019-06-23 NOTE — ED TRIAGE NOTES
Pt presents to ED with c/o abdominal pain. Has known intestinal blockage and is unable to have surgery at this time. Pain got worse yesterday. Pt also vomiting and having diarrhea.

## 2019-06-23 NOTE — ED PROVIDER NOTES
History     Chief Complaint   Patient presents with     Abdominal Pain     HPI  Rachel A Gerhardt is a 43 year old female with a history of cervical, ovarian, and bladder cancer status post chemoradiation therapy; complicated by recurrent SBO status post bowel resection of 60cm (2017), further complicated by ileoilial anastomotic stricture status post dilatation, opioid dependence, and severe protein malnutrition; who presents to the Emergency Department for evaluation of abdominal pain, nausea, vomiting, and diarrhea c/w previous episodes of recurrent bowel obstruction.     Patient firstly complains of diffuse waxing and waning abdominal pain ongoing the past day and half reminiscent of her previous bowel obstructions. This is associated with nausea, inability to tolerate oral intake, frequent vomiting and diarrhea. She typically has diarrhea almost on a daily basis, however, she reports that this is changed from baseline as she is having more frequent episodes of large volume diarrhea that is watery in consistency. She reports having approximately five of such episodes today, which have been non-bloody. She denies any hematemesis. She denies any recent antibiotics. Patient notes that she has had a weight loss of about 100 lbs within the past year or so, and has lost over 14 lbs over the past two months. She reports feeling quite dehydrated currently. She denies any weight restricting disorder. She denies previous history of Clostridium difficile colitis. Patient reports that she does not have a gastronomy tube in place. She notes that this has been discussed with her in the past to treat her chronic malnutrition. Patient reports that during her previous admissions her obstructions would resolve on their own without a NJ tube, as she did not tolerate this in the past.     Additionally, patient also complains of three masses located on her right inguinal region that have been increasing in size for awhile. She now  has shooting radiation down her right lower extremity and worsening inguinal pain--described as a burning, stabbing sensation that is muscular in nature and not located at the bone. She denies any lower extremity swelling, vaginal discharge, dysuria, or back pain.     Wt Readings from Last 10 Encounters:   06/23/19 47.8 kg (105 lb 6.4 oz)   05/19/19 52.6 kg (115 lb 14.4 oz)   03/15/19 53.3 kg (117 lb 9.6 oz)   03/10/19 53.7 kg (118 lb 4.8 oz)   11/19/18 49.9 kg (110 lb)   11/13/18 54.9 kg (121 lb 1.6 oz)   07/17/18 50.1 kg (110 lb 6.4 oz)   06/19/18 56.6 kg (124 lb 11.2 oz)   05/23/18 56.9 kg (125 lb 8 oz)   05/09/18 55.3 kg (122 lb)       I have reviewed the Medications, Allergies, Past Medical and Surgical History, and Social History in the [facility] system.    Past Medical History:   Diagnosis Date     Asthma      Cervical cancer (H)      Other chronic pain      Ovarian cancer (H)      Partial small bowel obstruction (H) 11/2/2018     SBO (small bowel obstruction) (H) 12/27/2016     Small bowel obstruction (H) 5/17/2017     Small intestine obstruction (H) 4/29/2017     Substance abuse (H)     Outside records indicate past history of narcotics abuse or dependence, but patient denies.       Past Surgical History:   Procedure Laterality Date     COLONOSCOPY N/A 5/3/2018    Procedure: COLONOSCOPY;  sigmoidoscopy;  Surgeon: Omero Vigil MD;  Location: [facility] OR     COLONOSCOPY WITH CO2 INSUFFLATION N/A 4/30/2018    Procedure: COLONOSCOPY WITH CO2 INSUFFLATION;  Colonoscopy;  Surgeon: Omero Vigil MD;  Location: [facility] OR     COMBINED CYSTOSCOPY, INSERT STENT URETER(S) Bilateral 5/18/2017    Procedure: COMBINED CYSTOSCOPY, INSERT STENT URETER(S);  Cystoscopy with Bilateral Stent,;  Surgeon: Rene Calero MD;  Location: [facility] OR     ENT SURGERY  2009    mastoid, sinus     ENTEROSCOPY SMALL BOWEL N/A 6/18/2018    Procedure: ENTEROSCOPY SMALL BOWEL;  Lower bowel Retrograde Enteroscopy with Balloon Dilation .;   Surgeon: Omero Vigil MD;  Location: [facility] OR     EXAM UNDER ANESTHESIA, INSERT ALEX SLEEVE, UTERINE PLACEMENT OF TANDEM AND RING FOR RAD, ULTRASOUND N/A 12/14/2015    Procedure: EXAM UNDER ANESTHESIA, INSERT ALEX SLEEVE, UTERINE PLACEMENT OF TANDEM AND RING FOR RADIATION, ULTRASOUND GUIDED;  Surgeon: Abby Tony MD;  Location: [facility] OR     INSERT TANDEM AND CESIUM APPLICATOR CERVIX, ULTRASOUND GUIDED N/A 12/17/2015    Procedure: INSERT TANDEM AND CESIUM APPLICATOR CERVIX, ULTRASOUND GUIDED;  Surgeon: Kika Wood MD;  Location: [facility] OR     KNEE SURGERY       LAPAROTOMY EXPLORATORY N/A 5/18/2017    Procedure: LAPAROTOMY EXPLORATORY;   Exploratry Laparotomy, Small Bowel Resection with anastomosis, Flexible Sigmoidoscopy;  Surgeon: Jennifer Goodwin MD;  Location: [facility] OR     PICC INSERTION Right 04/29/2017    4fr SL BioFlo PICC, 37cm (3cm external) in the R basilic vein w/ tip in the mid SVC.     PICC INSERTION Right 03/29/2018    4Fr - 40cm (4cm external), R lateral brachial vein, Low SVC     RESECT SMALL BOWEL WITHOUT OSTOMY N/A 5/18/2017    Procedure: RESECT SMALL BOWEL WITHOUT OSTOMY;;  Surgeon: Jennifer Goodwin MD;  Location: [facility]     SIGMOIDOSCOPY FLEXIBLE N/A 5/18/2017    Procedure: SIGMOIDOSCOPY FLEXIBLE;;  Surgeon: Jennifer Goodwin MD;  Location: [facility] OR       Family History   Problem Relation Age of Onset     Diabetes Mother      Ovarian Cancer No family hx of      Uterine Cancer No family hx of      Cervical Cancer No family hx of      Breast Cancer No family hx of        Social History     Tobacco Use     Smoking status: Light Tobacco Smoker     Years: 12.00     Types: Cigarettes     Smokeless tobacco: Never Used     Tobacco comment: pt smoking about 4 cigs per week   Substance Use Topics     Alcohol use: No     Alcohol/week: 0.0 oz     Comment: None per pt     Current Facility-Administered Medications   Medication     dextrose 5% and 0.45% NaCl + KCl 20 mEq/L infusion     enoxaparin  (LOVENOX) injection 40 mg     HYDROmorphone (PF) (DILAUDID) injection 0.3-0.5 mg     lidocaine (LMX4) cream     lidocaine 1 % 0.1-1 mL     magnesium sulfate 4 g in 100 mL sterile water (premade)     melatonin tablet 1 mg     naloxone (NARCAN) injection 0.1-0.4 mg     ondansetron (ZOFRAN-ODT) ODT tab 4 mg    Or     ondansetron (ZOFRAN) injection 4 mg     potassium phosphate 15 mmol in D5W 250 mL intermittent infusion     potassium phosphate 20 mmol in D5W 250 mL intermittent infusion     potassium phosphate 20 mmol in D5W 500 mL intermittent infusion     potassium phosphate 25 mmol in D5W 500 mL intermittent infusion     prochlorperazine (COMPAZINE) injection 10 mg    Or     prochlorperazine (COMPAZINE) tablet 10 mg    Or     prochlorperazine (COMPAZINE) Suppository 25 mg     sodium chloride (PF) 0.9% PF flush 3 mL     sodium chloride (PF) 0.9% PF flush 3 mL        Allergies   Allergen Reactions     Sulfa Drugs Hives     Ibuprofen Nausea and Vomiting and Hives     Unknown if reaction hives or N/V     No Clinical Screening - See Comments Other (See Comments) and Diarrhea     headache  Carrots cause gastric upset, cramping and diarrhea.     Ciprofloxacin Hives and Nausea     Quinolones      Tramadol Hives, Diarrhea, Nausea and Nausea and Vomiting     Amoxicillin Nausea and Vomiting     Augmentin Nausea, Hives and GI Disturbance     Patient tolerated course of zosyn in 5/2018     Avelox [Moxifloxacin] Nausea and Vomiting     Codeine Nausea and Vomiting     Daucus Carota      Other reaction(s): GI Upset  Other reaction(s): Abdominal pain, Diarrhea  Carrots cause gastric upset, cramping and diarrhea.       Review of Systems   Constitutional: Positive for unexpected weight change.   Cardiovascular: Negative for leg swelling.   Gastrointestinal: Positive for abdominal pain (diffuse), diarrhea, nausea and vomiting (non-bloody). Negative for anal bleeding and blood in stool.   Genitourinary: Negative for dysuria and vaginal  "discharge.   Musculoskeletal: Negative for back pain.        Positive for right inguinal pain radiating down LE.   All other systems reviewed and are negative.      Physical Exam   BP: 106/80  Pulse: 117  Heart Rate: 88  Temp: 98.8  F (37.1  C)  Resp: 18  Height: 175.3 cm (5' 9\")  Weight: 47.8 kg (105 lb 6.4 oz)  SpO2: 98 %      Physical Exam   Constitutional: She is oriented to person, place, and time. She appears cachectic. She has a sickly appearance. She appears distressed.   HENT:   Head: Normocephalic and atraumatic.   Nose: Nose normal.   Mouth/Throat: Mucous membranes are dry.   Eyes: Pupils are equal, round, and reactive to light. Conjunctivae and EOM are normal. No scleral icterus.   Neck: Normal range of motion. Neck supple.   Cardiovascular: Regular rhythm, normal heart sounds and intact distal pulses. Tachycardia present.   No murmur heard.  Pulmonary/Chest: Effort normal and breath sounds normal. No stridor. No respiratory distress. She has no wheezes. She has no rales.   Abdominal: Soft. She exhibits distension. She exhibits no mass. Bowel sounds are increased. There is generalized tenderness. There is guarding. There is no rigidity, no rebound and no CVA tenderness. No hernia.   Musculoskeletal: Normal range of motion. She exhibits tenderness. She exhibits no edema or deformity.   Lymphadenopathy:        Right: Inguinal adenopathy present.   Tender right inguinal LND. Fixed and hard. No overlying skin changes or fluctuance.   Neurological: She is alert and oriented to person, place, and time.   Skin: Skin is warm. No rash noted. She is not diaphoretic.   Psychiatric: Her behavior is normal. Her mood appears anxious. Her affect is labile.   Nursing note and vitals reviewed.      ED Course        Procedures             EKG Interpretation:      Interpreted by Kaila Rodney MD  Time reviewed: 23:15  Symptoms at time of EKG: None   Rhythm: Normal sinus  Rate: 82 bpm  Axis: Normal  Ectopy: none  Conduction: " normal  ST Segments/ T Waves: No ST-T wave changes and No acute ischemic changes  Q Waves: none  Comparison to prior: No acute ischemic changes    Clinical Impression: No acute ischemic changes                Critical Care time:  none             Labs Ordered and Resulted from Time of ED Arrival Up to the Time of Departure from the ED   ROUTINE UA WITH MICROSCOPIC REFLEX TO CULTURE - Abnormal; Notable for the following components:       Result Value    Leukocyte Esterase Urine Moderate (*)     RBC Urine 3 (*)     Mucous Urine Present (*)     Calcium Oxalate Few (*)     All other components within normal limits   MAGNESIUM - Abnormal; Notable for the following components:    Magnesium 1.0 (*)     All other components within normal limits   INR - Abnormal; Notable for the following components:    INR 1.28 (*)     All other components within normal limits   CBC WITH PLATELETS DIFFERENTIAL - Abnormal; Notable for the following components:    RBC Count 3.62 (*)     Hemoglobin 11.2 (*)      (*)     MCHC 30.2 (*)     All other components within normal limits   COMPREHENSIVE METABOLIC PANEL - Abnormal; Notable for the following components:    Chloride 111 (*)     Glucose 106 (*)     Calcium 7.3 (*)     Albumin 2.5 (*)     Protein Total 5.0 (*)     All other components within normal limits   ISTAT  GASES LACTATE ANGEL LUIS POCT - Abnormal; Notable for the following components:    PCO2 Venous 53 (*)     PO2 Venous 19 (*)     All other components within normal limits   CBC WITH PLATELETS DIFFERENTIAL   COMPREHENSIVE METABOLIC PANEL   LIPASE   PHOSPHORUS   TSH   HCG QUALITATIVE URINE   PREALBUMIN   FERRITIN   IRON AND IRON BINDING CAPACITY   BLOOD MORPHOLOGY PATHOLOGIST REVIEW   VITAMIN B12   METHYLMALONIC ACID   PARTIAL THROMBOPLASTIN TIME   LACTATE DEHYDROGENASE   HAPTOGLOBIN   TSH WITH FREE T4 REFLEX   TROPONIN I   FOLATE   LIPASE   RETICULOCYTE COUNT   PERIPHERAL IV CATHETER   ISTAT CG4 GASES LACTATE ANGEL LUIS NURSING POCT    ISTAT CG4 GASES LACTATE ANGEL LUIS NURSING POCT   CARDIAC CONTINUOUS MONITORING   PERIPHERAL IV CATHETER   MEASURE URINE OUTPUT   OCCULT BLOOD STOOL POCT   ABO/RH TYPE AND SCREEN   RED BLOOD CELL PREPARE ORDER UNIT   ENTERIC BACTERIA AND VIRUS PANEL BY MIRANDA STOOL   CLOSTRIDIUM DIFFICILE TOXIN B            Assessments & Plan (with Medical Decision Making)   Rachel A Gerhardt is a 43 year old female with a history of cervical, ovarian, and bladder cancer status post chemoradiation therapy; complicated by recurrent SBO status post bowel resection of 60cm (2017), further complicated by ileoilial anastomotic stricture status post dilatation, opioid dependence, and severe protein malnutrition; who presents to the Emergency Department for evaluation of abdominal pain, nausea, vomiting, and diarrhea c/w previous episodes of recurrent bowel obstruction.     Ddx: recurrent bowel obstruction, gastroenteritis, UTI, colitis, c diff, malnourishment, SELENA, dehydration, pancreatitis, sepsis    Initial hgb resulted 5.9 but repeat 11.2 and previous result reported as contaminated specimen. Blood transfusion and anemia w/u cancelled.     Patient c/o progressive right groin pain with tender inguinal lymphadenopathy. C/f metastatic ca recurrence. DVT US neg for DVT but confirms enlarged inguinal node. Xray abd c/w SBO. No free air. Patient reports sxs are typical for her during SBO episodes. She is normally treated with medical admission for IVF and pain control. Surgery has evaluated and is not offering operative intervention. (See consult note from last admission in May). Defer repeat CT abd/pelv. Given zofran, IVF, dilaudid, banana bag. Patient appears severely malnourished with report of rapid weight loss. C/f for recurrent cancer. Admitted to medicine for further monitoring and pain control.     I have reviewed the nursing notes.    I have reviewed the findings, diagnosis, plan and need for follow up with the patient.       Medication  List      There are no discharge medications for this visit.         Final diagnoses:   Small bowel obstruction (H)   Failure to thrive in adult     I, Shelley Beebe, am serving as a trained medical scribe to document services personally performed by Kaila Rodney MD, based on the provider's statements to me.   Kaila MARCANO MD, was physically present and have reviewed and verified the accuracy of this note documented by Shelley Beebe.    6/22/2019   [facility], [place], EMERGENCY DEPARTMENT     Kaila Rodney MD  06/23/19 1138       Kaila Rodney MD  06/25/19 [id number]

## 2019-06-23 NOTE — PLAN OF CARE
OT 7B: Orders received and appreciated. Per chart review and discussion with PT, pt is not a candidate for skilled inpatient OT services at this time. Per PT, pt is (I) with all ADLs and mobility. OT will continue to monitor and reassess pending status change.
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
## 2019-06-23 NOTE — PLAN OF CARE
Alert and oriented x 4. Vital signs stable.on room air. Pain is partially managed with iv pain medications.Denies nausea/emesis.Positive bowel sound x 4. Had BM.Passing gas. Voiding but unable to collect sample, urine mixed with stool.NPO. On maintenance iv fluid. Ambulates independently. Plan:Continue care. Collect urine sample.
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
## 2019-06-24 ENCOUNTER — APPOINTMENT (OUTPATIENT)
Dept: GENERAL RADIOLOGY | Facility: CLINIC | Age: 44
DRG: 388 | End: 2019-06-24
Payer: COMMERCIAL

## 2019-06-24 LAB
ANION GAP SERPL CALCULATED.3IONS-SCNC: 4 MMOL/L (ref 3–14)
BACTERIA SPEC CULT: NORMAL
BASOPHILS # BLD AUTO: 0 10E9/L (ref 0–0.2)
BASOPHILS NFR BLD AUTO: 0.7 %
BUN SERPL-MCNC: 10 MG/DL (ref 7–30)
CALCIUM SERPL-MCNC: 7.7 MG/DL (ref 8.5–10.1)
CHLORIDE SERPL-SCNC: 111 MMOL/L (ref 94–109)
CO2 BLDCOV-SCNC: NORMAL MMOL/L (ref 21–28)
CO2 SERPL-SCNC: 23 MMOL/L (ref 20–32)
CREAT SERPL-MCNC: 0.69 MG/DL (ref 0.52–1.04)
DIFFERENTIAL METHOD BLD: ABNORMAL
EOSINOPHIL # BLD AUTO: 0.1 10E9/L (ref 0–0.7)
EOSINOPHIL NFR BLD AUTO: 2.3 %
ERYTHROCYTE [DISTWIDTH] IN BLOOD BY AUTOMATED COUNT: 13.9 % (ref 10–15)
GFR SERPL CREATININE-BSD FRML MDRD: >90 ML/MIN/{1.73_M2}
GLUCOSE SERPL-MCNC: 83 MG/DL (ref 70–99)
HCT VFR BLD AUTO: 41.6 % (ref 35–47)
HGB BLD-MCNC: 12.7 G/DL (ref 11.7–15.7)
HIV 1+2 AB+HIV1 P24 AG SERPL QL IA: NONREACTIVE
IMM GRANULOCYTES # BLD: 0 10E9/L (ref 0–0.4)
IMM GRANULOCYTES NFR BLD: 0.3 %
LACTATE BLD-SCNC: NORMAL MMOL/L (ref 0.7–2.1)
LYMPHOCYTES # BLD AUTO: 1.5 10E9/L (ref 0.8–5.3)
LYMPHOCYTES NFR BLD AUTO: 25.1 %
MAGNESIUM SERPL-MCNC: 1.8 MG/DL (ref 1.6–2.3)
MCH RBC QN AUTO: 30.8 PG (ref 26.5–33)
MCHC RBC AUTO-ENTMCNC: 30.5 G/DL (ref 31.5–36.5)
MCV RBC AUTO: 101 FL (ref 78–100)
MONOCYTES # BLD AUTO: 0.6 10E9/L (ref 0–1.3)
MONOCYTES NFR BLD AUTO: 10 %
NEUTROPHILS # BLD AUTO: 3.8 10E9/L (ref 1.6–8.3)
NEUTROPHILS NFR BLD AUTO: 61.6 %
NRBC # BLD AUTO: 0 10*3/UL
NRBC BLD AUTO-RTO: 0 /100
PCO2 BLDV: NORMAL MM HG (ref 40–50)
PH BLDV: NORMAL PH (ref 7.32–7.43)
PHOSPHATE SERPL-MCNC: 2.1 MG/DL (ref 2.5–4.5)
PLATELET # BLD AUTO: 234 10E9/L (ref 150–450)
PO2 BLDV: NORMAL MM HG (ref 25–47)
POTASSIUM SERPL-SCNC: 4.8 MMOL/L (ref 3.4–5.3)
PREALB SERPL IA-MCNC: 9 MG/DL (ref 15–45)
RBC # BLD AUTO: 4.13 10E12/L (ref 3.8–5.2)
SAO2 % BLDV FROM PO2: NORMAL %
SODIUM SERPL-SCNC: 138 MMOL/L (ref 133–144)
SPECIMEN SOURCE: NORMAL
T PALLIDUM AB SER QL: NONREACTIVE
WBC # BLD AUTO: 6.1 10E9/L (ref 4–11)

## 2019-06-24 PROCEDURE — 85025 COMPLETE CBC W/AUTO DIFF WBC: CPT | Performed by: STUDENT IN AN ORGANIZED HEALTH CARE EDUCATION/TRAINING PROGRAM

## 2019-06-24 PROCEDURE — 83735 ASSAY OF MAGNESIUM: CPT | Performed by: STUDENT IN AN ORGANIZED HEALTH CARE EDUCATION/TRAINING PROGRAM

## 2019-06-24 PROCEDURE — 25000128 H RX IP 250 OP 636: Performed by: STUDENT IN AN ORGANIZED HEALTH CARE EDUCATION/TRAINING PROGRAM

## 2019-06-24 PROCEDURE — 80048 BASIC METABOLIC PNL TOTAL CA: CPT | Performed by: STUDENT IN AN ORGANIZED HEALTH CARE EDUCATION/TRAINING PROGRAM

## 2019-06-24 PROCEDURE — 25800030 ZZH RX IP 258 OP 636: Performed by: STUDENT IN AN ORGANIZED HEALTH CARE EDUCATION/TRAINING PROGRAM

## 2019-06-24 PROCEDURE — 86780 TREPONEMA PALLIDUM: CPT | Performed by: STUDENT IN AN ORGANIZED HEALTH CARE EDUCATION/TRAINING PROGRAM

## 2019-06-24 PROCEDURE — 12000001 ZZH R&B MED SURG/OB UMMC

## 2019-06-24 PROCEDURE — 36415 COLL VENOUS BLD VENIPUNCTURE: CPT | Performed by: STUDENT IN AN ORGANIZED HEALTH CARE EDUCATION/TRAINING PROGRAM

## 2019-06-24 PROCEDURE — 74018 RADEX ABDOMEN 1 VIEW: CPT

## 2019-06-24 PROCEDURE — 25000125 ZZHC RX 250: Performed by: STUDENT IN AN ORGANIZED HEALTH CARE EDUCATION/TRAINING PROGRAM

## 2019-06-24 PROCEDURE — 84100 ASSAY OF PHOSPHORUS: CPT | Performed by: STUDENT IN AN ORGANIZED HEALTH CARE EDUCATION/TRAINING PROGRAM

## 2019-06-24 PROCEDURE — 40000141 ZZH STATISTIC PERIPHERAL IV START W/O US GUIDANCE

## 2019-06-24 RX ORDER — HYDROMORPHONE HYDROCHLORIDE 1 MG/ML
0.3 INJECTION, SOLUTION INTRAMUSCULAR; INTRAVENOUS; SUBCUTANEOUS ONCE
Status: COMPLETED | OUTPATIENT
Start: 2019-06-24 | End: 2019-06-24

## 2019-06-24 RX ORDER — LIDOCAINE HYDROCHLORIDE 20 MG/ML
JELLY TOPICAL ONCE
Status: DISCONTINUED | OUTPATIENT
Start: 2019-06-24 | End: 2019-06-24 | Stop reason: CLARIF

## 2019-06-24 RX ORDER — LIDOCAINE/PRILOCAINE 2.5 %-2.5%
CREAM (GRAM) TOPICAL ONCE
Status: COMPLETED | OUTPATIENT
Start: 2019-06-24 | End: 2019-06-24

## 2019-06-24 RX ORDER — LIDOCAINE 50 MG/G
OINTMENT TOPICAL EVERY 4 HOURS PRN
Status: DISCONTINUED | OUTPATIENT
Start: 2019-06-24 | End: 2019-06-29 | Stop reason: HOSPADM

## 2019-06-24 RX ADMIN — LIDOCAINE AND PRILOCAINE: 25; 25 CREAM TOPICAL at 17:28

## 2019-06-24 RX ADMIN — HYDROMORPHONE HYDROCHLORIDE 0.5 MG: 1 INJECTION, SOLUTION INTRAMUSCULAR; INTRAVENOUS; SUBCUTANEOUS at 14:20

## 2019-06-24 RX ADMIN — DIATRIZOATE MEGLUMINE AND DIATRIZOATE SODIUM 120 ML: 660; 100 SOLUTION ORAL; RECTAL at 00:47

## 2019-06-24 RX ADMIN — HYDROMORPHONE HYDROCHLORIDE 0.5 MG: 1 INJECTION, SOLUTION INTRAMUSCULAR; INTRAVENOUS; SUBCUTANEOUS at 18:11

## 2019-06-24 RX ADMIN — HYDROMORPHONE HYDROCHLORIDE 0.5 MG: 1 INJECTION, SOLUTION INTRAMUSCULAR; INTRAVENOUS; SUBCUTANEOUS at 21:14

## 2019-06-24 RX ADMIN — HYDROMORPHONE HYDROCHLORIDE 0.5 MG: 1 INJECTION, SOLUTION INTRAMUSCULAR; INTRAVENOUS; SUBCUTANEOUS at 11:08

## 2019-06-24 RX ADMIN — HYDROMORPHONE HYDROCHLORIDE 0.5 MG: 1 INJECTION, SOLUTION INTRAMUSCULAR; INTRAVENOUS; SUBCUTANEOUS at 05:16

## 2019-06-24 RX ADMIN — HYDROMORPHONE HYDROCHLORIDE 0.5 MG: 1 INJECTION, SOLUTION INTRAMUSCULAR; INTRAVENOUS; SUBCUTANEOUS at 08:15

## 2019-06-24 RX ADMIN — HYDROMORPHONE HYDROCHLORIDE 0.5 MG: 1 INJECTION, SOLUTION INTRAMUSCULAR; INTRAVENOUS; SUBCUTANEOUS at 01:44

## 2019-06-24 RX ADMIN — HYDROMORPHONE HYDROCHLORIDE 0.3 MG: 1 INJECTION, SOLUTION INTRAMUSCULAR; INTRAVENOUS; SUBCUTANEOUS at 16:51

## 2019-06-24 RX ADMIN — POTASSIUM CHLORIDE, DEXTROSE MONOHYDRATE AND SODIUM CHLORIDE: 150; 5; 450 INJECTION, SOLUTION INTRAVENOUS at 01:47

## 2019-06-24 RX ADMIN — POTASSIUM PHOSPHATE, MONOBASIC AND POTASSIUM PHOSPHATE, DIBASIC 15 MMOL: 224; 236 INJECTION, SOLUTION INTRAVENOUS at 13:35

## 2019-06-24 RX ADMIN — POTASSIUM CHLORIDE, DEXTROSE MONOHYDRATE AND SODIUM CHLORIDE: 150; 5; 450 INJECTION, SOLUTION INTRAVENOUS at 19:31

## 2019-06-24 ASSESSMENT — ACTIVITIES OF DAILY LIVING (ADL)
ADLS_ACUITY_SCORE: 10

## 2019-06-24 NOTE — PROGRESS NOTES
Surgery Progress Note  06/24/2019       Subjective:  - SAURABH overnight.  - Does complain of diarrhea, flatus, and some blood in stool yesterday, no hematemesis.    - Nauseated from PO contrast had had some emesis but says she tolerated most of it.   - C/o rectal soreness  - Otherwise no acute complaints. Ambulating.     Objective:  Temp:  [97.5  F (36.4  C)-98.9  F (37.2  C)] 97.5  F (36.4  C)  Heart Rate:  [82-90] 82  Resp:  [16-18] 18  BP: (88-94)/(63-72) 88/63  SpO2:  [97 %-99 %] 99 %    I/O last 3 completed shifts:  In: 2220 [P.O.:120; I.V.:2100]  Out: -       Gen: Awake, alert, NAD  Resp: NLB on RA  Abd: soft, mildly distended, mildly diffusely tender; rectal exam attempted, no visible external hemorrhoids or fissures, pt exquisitely tender while passing through anal sphincter and exam not completed.  No blood or stool on glove.  Ext: WWP, no edema     Labs:  Recent Labs   Lab 06/24/19  0934 06/23/19  0503 06/22/19  2358   WBC 6.1 7.3 6.5   HGB 12.7 11.2* 11.2*    223  211 217       Recent Labs   Lab 06/24/19  0934 06/23/19  0653 06/23/19  0503 06/23/19  0000 06/22/19  2234     --   --  142 Canceled, Test credited   POTASSIUM 4.8  --   --  3.7 Canceled, Test credited   CHLORIDE 111*  --   --  111* Canceled, Test credited   CO2 23  --   --  28 Canceled, Test credited   BUN 10  --   --  15 Canceled, Test credited   CR 0.69  --  0.53 0.58 Canceled, Test credited   GLC 83  --   --  106* Canceled, Test credited   JONATAN 7.7*  --   --  7.3* Canceled, Test credited   MAG 1.8 2.5*  --  1.0*  --    PHOS 2.1*  --   --  2.6  --        Imaging:  XR abdomen (gastrografin challenge) pending      Assessment/Plan:   43 year old female with pmhx ovarian and cervical cancer s/p chemoradiation, 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  Likely recurrent SBO.     - Need to evaluate for distal obstruction; may require gastrografin enema with  anxiolytic pending results of gastrografin study   - Pain control   - continue mIVF      Seen, examined, and discussed with chief resident, who will discuss with staff.    Lizabeth Irwin MD  Surgery Resident PGY-1  Pg [phone]

## 2019-06-24 NOTE — PLAN OF CARE
Vital signs:  Temp: 98.9  F (37.2  C) Temp src: Oral BP: 94/72   Heart Rate: 85 Resp: 16 SpO2: 97 % O2 Device: None (Room air)       Activity: Up independently.   Neuros: A&O x4.   Cardiac: WDL ex baseline BP soft.   Respiratory: WDL on RA. Denies SOB.   GI/: Voiding spontaneously. +BS, passing flatus, BM x2.   Diet: NPO ex meds.   Lines: Left PIV infusing D5 1/2NS+20 KCl @ 100mL/hr.   Pain/nausea: Dilaudid given q3h. Zofran given x1.   New changes this shift: Pt c/o increased pain in evening, one time dose 0.5 mg dilaudid given with relief. Pt drank most of gastrograffin contrast, however, was not able to finish, Provider aware.   Plan: Plan for Xray @ 0900.
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
## 2019-06-24 NOTE — PROVIDER NOTIFICATION
"Pt stated \"there was a lot of blood in my stool and on my toilet paper\" however, RN noted a large amount of tan stool in hat. Provider notified. No new orders. Will continue to monitor. Occult blood stool sample sent.  "

## 2019-06-24 NOTE — PROVIDER NOTIFICATION
Pt has about 50 mL left of gastrograffin contrast. RN encouraged pt to drink all of it, however, pt states she is unable to. Provider notified.
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
## 2019-06-24 NOTE — PLAN OF CARE
"Vital signs:  Temp: 97.5  F (36.4  C) Temp src: Oral BP: (!) 88/63   Heart Rate: 82 Resp: 18 SpO2: 99 % O2 Device: None (Room air)   Height: 175.3 cm (5' 9\") Weight: 47.8 kg (105 lb 6.4 oz)  Estimated body mass index is 15.56 kg/m  as calculated from the following:    Height as of this encounter: 1.753 m (5' 9\").    Weight as of this encounter: 47.8 kg (105 lb 6.4 oz).      Soft BP, other VSS. Small BM, passing flatus & MIV infusing per order. Refused second PIV for potassium phosphate replacement, will infuse IV after replacement. Reported abd pain & Dilaudid given per request. Up ambulating ind. A&O times 4. Continue POC.   "

## 2019-06-25 ENCOUNTER — APPOINTMENT (OUTPATIENT)
Dept: GENERAL RADIOLOGY | Facility: CLINIC | Age: 44
DRG: 388 | End: 2019-06-25
Payer: COMMERCIAL

## 2019-06-25 LAB
ANION GAP SERPL CALCULATED.3IONS-SCNC: 5 MMOL/L (ref 3–14)
BUN SERPL-MCNC: 12 MG/DL (ref 7–30)
CALCIUM SERPL-MCNC: 8.2 MG/DL (ref 8.5–10.1)
CHLORIDE SERPL-SCNC: 111 MMOL/L (ref 94–109)
CO2 SERPL-SCNC: 22 MMOL/L (ref 20–32)
CREAT SERPL-MCNC: 0.49 MG/DL (ref 0.52–1.04)
ERYTHROCYTE [DISTWIDTH] IN BLOOD BY AUTOMATED COUNT: 13.9 % (ref 10–15)
GFR SERPL CREATININE-BSD FRML MDRD: >90 ML/MIN/{1.73_M2}
GLUCOSE SERPL-MCNC: 80 MG/DL (ref 70–99)
HCT VFR BLD AUTO: 43.2 % (ref 35–47)
HGB BLD-MCNC: 12.8 G/DL (ref 11.7–15.7)
MAGNESIUM SERPL-MCNC: 1.6 MG/DL (ref 1.6–2.3)
MCH RBC QN AUTO: 30.2 PG (ref 26.5–33)
MCHC RBC AUTO-ENTMCNC: 29.6 G/DL (ref 31.5–36.5)
MCV RBC AUTO: 102 FL (ref 78–100)
PHOSPHATE SERPL-MCNC: 2.9 MG/DL (ref 2.5–4.5)
PLATELET # BLD AUTO: 265 10E9/L (ref 150–450)
POTASSIUM SERPL-SCNC: 4.3 MMOL/L (ref 3.4–5.3)
RBC # BLD AUTO: 4.24 10E12/L (ref 3.8–5.2)
SODIUM SERPL-SCNC: 137 MMOL/L (ref 133–144)
WBC # BLD AUTO: 6.6 10E9/L (ref 4–11)

## 2019-06-25 PROCEDURE — 25000128 H RX IP 250 OP 636: Performed by: STUDENT IN AN ORGANIZED HEALTH CARE EDUCATION/TRAINING PROGRAM

## 2019-06-25 PROCEDURE — 12000001 ZZH R&B MED SURG/OB UMMC

## 2019-06-25 PROCEDURE — 25000132 ZZH RX MED GY IP 250 OP 250 PS 637: Performed by: FAMILY MEDICINE

## 2019-06-25 PROCEDURE — 36415 COLL VENOUS BLD VENIPUNCTURE: CPT | Performed by: STUDENT IN AN ORGANIZED HEALTH CARE EDUCATION/TRAINING PROGRAM

## 2019-06-25 PROCEDURE — 25000128 H RX IP 250 OP 636: Performed by: FAMILY MEDICINE

## 2019-06-25 PROCEDURE — 25000125 ZZHC RX 250: Performed by: RADIOLOGY

## 2019-06-25 PROCEDURE — 84100 ASSAY OF PHOSPHORUS: CPT | Performed by: STUDENT IN AN ORGANIZED HEALTH CARE EDUCATION/TRAINING PROGRAM

## 2019-06-25 PROCEDURE — 74270 X-RAY XM COLON 1CNTRST STD: CPT

## 2019-06-25 PROCEDURE — 25000125 ZZHC RX 250: Performed by: STUDENT IN AN ORGANIZED HEALTH CARE EDUCATION/TRAINING PROGRAM

## 2019-06-25 PROCEDURE — 83735 ASSAY OF MAGNESIUM: CPT | Performed by: STUDENT IN AN ORGANIZED HEALTH CARE EDUCATION/TRAINING PROGRAM

## 2019-06-25 PROCEDURE — 74019 RADEX ABDOMEN 2 VIEWS: CPT

## 2019-06-25 PROCEDURE — 25800030 ZZH RX IP 258 OP 636: Performed by: STUDENT IN AN ORGANIZED HEALTH CARE EDUCATION/TRAINING PROGRAM

## 2019-06-25 PROCEDURE — 80048 BASIC METABOLIC PNL TOTAL CA: CPT | Performed by: STUDENT IN AN ORGANIZED HEALTH CARE EDUCATION/TRAINING PROGRAM

## 2019-06-25 PROCEDURE — 85027 COMPLETE CBC AUTOMATED: CPT | Performed by: STUDENT IN AN ORGANIZED HEALTH CARE EDUCATION/TRAINING PROGRAM

## 2019-06-25 RX ORDER — HYDROMORPHONE HYDROCHLORIDE 1 MG/ML
0.5 INJECTION, SOLUTION INTRAMUSCULAR; INTRAVENOUS; SUBCUTANEOUS ONCE
Status: COMPLETED | OUTPATIENT
Start: 2019-06-25 | End: 2019-06-25

## 2019-06-25 RX ORDER — LIDOCAINE HYDROCHLORIDE 20 MG/ML
JELLY TOPICAL ONCE
Status: COMPLETED | OUTPATIENT
Start: 2019-06-25 | End: 2019-06-25

## 2019-06-25 RX ORDER — ACETAMINOPHEN 325 MG/1
650 TABLET ORAL EVERY 4 HOURS PRN
Status: DISCONTINUED | OUTPATIENT
Start: 2019-06-25 | End: 2019-06-29 | Stop reason: HOSPADM

## 2019-06-25 RX ORDER — HYDROMORPHONE HYDROCHLORIDE 1 MG/ML
0.3 INJECTION, SOLUTION INTRAMUSCULAR; INTRAVENOUS; SUBCUTANEOUS ONCE
Status: COMPLETED | OUTPATIENT
Start: 2019-06-25 | End: 2019-06-25

## 2019-06-25 RX ADMIN — HYDROMORPHONE HYDROCHLORIDE 0.5 MG: 1 INJECTION, SOLUTION INTRAMUSCULAR; INTRAVENOUS; SUBCUTANEOUS at 07:08

## 2019-06-25 RX ADMIN — HYDROMORPHONE HYDROCHLORIDE 0.5 MG: 1 INJECTION, SOLUTION INTRAMUSCULAR; INTRAVENOUS; SUBCUTANEOUS at 16:17

## 2019-06-25 RX ADMIN — DIATRIZOATE MEGLUMINE AND DIATRIZOATE SODIUM 600 ML: 660; 100 SOLUTION ORAL; RECTAL at 08:49

## 2019-06-25 RX ADMIN — LIDOCAINE HYDROCHLORIDE 1 TUBE: 20 JELLY TOPICAL at 08:49

## 2019-06-25 RX ADMIN — HYDROMORPHONE HYDROCHLORIDE 0.5 MG: 1 INJECTION, SOLUTION INTRAMUSCULAR; INTRAVENOUS; SUBCUTANEOUS at 10:05

## 2019-06-25 RX ADMIN — POTASSIUM CHLORIDE, DEXTROSE MONOHYDRATE AND SODIUM CHLORIDE: 150; 5; 450 INJECTION, SOLUTION INTRAVENOUS at 02:08

## 2019-06-25 RX ADMIN — HYDROMORPHONE HYDROCHLORIDE 0.5 MG: 1 INJECTION, SOLUTION INTRAMUSCULAR; INTRAVENOUS; SUBCUTANEOUS at 00:27

## 2019-06-25 RX ADMIN — HYDROMORPHONE HYDROCHLORIDE 0.3 MG: 1 INJECTION, SOLUTION INTRAMUSCULAR; INTRAVENOUS; SUBCUTANEOUS at 08:13

## 2019-06-25 RX ADMIN — HYDROMORPHONE HYDROCHLORIDE 0.5 MG: 1 INJECTION, SOLUTION INTRAMUSCULAR; INTRAVENOUS; SUBCUTANEOUS at 12:47

## 2019-06-25 RX ADMIN — HYDROMORPHONE HYDROCHLORIDE 0.5 MG: 1 INJECTION, SOLUTION INTRAMUSCULAR; INTRAVENOUS; SUBCUTANEOUS at 03:44

## 2019-06-25 RX ADMIN — HYDROMORPHONE HYDROCHLORIDE 0.5 MG: 1 INJECTION, SOLUTION INTRAMUSCULAR; INTRAVENOUS; SUBCUTANEOUS at 23:28

## 2019-06-25 RX ADMIN — LIDOCAINE: 50 OINTMENT TOPICAL at 08:12

## 2019-06-25 RX ADMIN — POTASSIUM CHLORIDE, DEXTROSE MONOHYDRATE AND SODIUM CHLORIDE: 150; 5; 450 INJECTION, SOLUTION INTRAVENOUS at 10:08

## 2019-06-25 RX ADMIN — HYDROMORPHONE HYDROCHLORIDE 0.5 MG: 1 INJECTION, SOLUTION INTRAMUSCULAR; INTRAVENOUS; SUBCUTANEOUS at 21:10

## 2019-06-25 RX ADMIN — HYDROMORPHONE HYDROCHLORIDE 0.5 MG: 1 INJECTION, SOLUTION INTRAMUSCULAR; INTRAVENOUS; SUBCUTANEOUS at 19:11

## 2019-06-25 RX ADMIN — ACETAMINOPHEN 650 MG: 325 TABLET, FILM COATED ORAL at 21:10

## 2019-06-25 ASSESSMENT — PAIN DESCRIPTION - DESCRIPTORS
DESCRIPTORS: ACHING

## 2019-06-25 ASSESSMENT — ACTIVITIES OF DAILY LIVING (ADL)
ADLS_ACUITY_SCORE: 10
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
## 2019-06-25 NOTE — CONSULTS
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 Also being worked up for R sided inguinal lymphadenopathy.    She had an enteroscopy with dilation at the ileo-ileal anastomotic site on 6/17/18 (the diameter was noted to be <10 mm at that time). She reported minimal to no improvement which was short lived benefit from her enteroscopy and dilation and did require significant prepping via NG (had 2 procedures cancelled due to poor prep). She was evaluated by Dr. Vigil recently in March 2019 when she was admitted with another SBO. Dr. Vigil felt that repeat enteroscopy and dilation with same intent would be futile due to aforementioned reasons. Diverting ileostomy was discussed with patient during that admission but she was not interested in those options.     RECOMMENDATIONS:  - Agree with Gastrogaffin enema and evaluation of the colon to rule out distal obstruction.  - Work-up of inguinal LN deferred to primary and general surgery teams  - Advance diet since patient is feeling improved and has had multiple BMs today with Gastrogaffin study. Can trial clear liquids today.  - Will recommend CT or MR enterography once her partial bowel obstruction has resolved to further delineate her small bowel anatomy. This MRE is being done to strictly evaluate for any other source of small bowel obstruction such as a small bowel mass vs confirm the diagnosis of ileo-ileal stricturing. There is NO plan to pursue enteroscopy and dilation since she had minimal to no improvement after her first procedure in 2018.  - Continue  discussion with [facility] general surgery team for surgical options since she is likely not a candidate for any endoscopic therapy though she continues to refuse any surgery due to possibility of ostomy. This significantly limits any therapeutic options for her unfortunately.    Gastroenterology team will follow peripherally till the results of the MR enterography are back. Please contact the GI service once MRE has resulted or with any questions or concerns.   Patient care plan has been discussed with Dr. Foster, GI staff physician.    Cleopatra Velazquez  Gastroenterology Fellow  P [phone]  -------------------------------------------------------------------------------------------------------------------          Chief Complaint:   We were asked by Dr. Real Orr of medicine team to evaluate this patient with recurrent SBOs with a small bowel anastomotic stricture.  History is obtained from the patient and the medical record.          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 Also being work-up for R sided inguinal lymphadenopathy.    Patient reports that she has been having nausea/vomiting with abdominal pain and poor PO intake for the last several months but her abdominal pain did become acute worse about a day prior to her admission (on 6/22/19). It was localized to the lower abdomen. Her nausea and vomiting became worse as well. She did have home percocet and ondansetron which were unable to control her symptoms. In addition, she endorsed fatigue over the last several weeks to a point where she was unable to even move 15 feet before stopping to rest. She also reported  diarrhea with 4-5 loose stools for 2-3 days prior to admission (at baseline, has one episode of loose stool). She denies any hematochezia, melena, chest pain or any recent weight loss. On admission, she was placed on IV opioids and anti-emetics for symptoms control. Initial labs were reassuring with normal CBC, BMP. C Diff testing was negative.  General surgery was consulted. Patient was seen and examined by their team on 6/23/19. An NG tube was placed and an attempt at obtaining a small bowel follow through study was unsuccessful since patient was unable to keep the water soluble contrast down. X-Ray abdomen did show persistently dilated loops of small and large bowel with some bowel wall thickening (concerning for possible small bowel obstruction) but repeat X-Rays (last one obtained today) have continued to show improvement. A gastrograffin diagnostic study of the colon has been ordered as well and was just done today (since his large bowel was noted to be dilated on imaging as well).     She was recently hospitalized about one month ago with an SBO that was managed conservatively. A CT abdomen/pelvis done at that time showed chronically dilated loops of fluid in gas-distended small bowel, substantially increased from comparison dated 3/13/2019 with no focal or discrete transition point. There was a gradual luminal caliber change anteriorly in the right hemipelvis where there are abnormal thickened loops of decompressed small bowel which possibly could be causing obstruction.She had also refused an ex lap w/ JUANA during that admission.     Patient did have an enteroscopic with dilation at the ileo-ileal anastomotic site on 6/17/18 (the diameter was noted to be <10 mm at that time).  On her admission to the hospital with SBO in March 2019, she was seen by the GI team and Dr. Vigil who did the initial procedure. She reported minimal to no improvement which was short lived benefit from her enteroscopy and dilation  "and did require significant prepping via NG (had 2 procedures cancelled due to poor prep). This is why Dr. Vigil felt that repeat procedure with same intent would be futile. Diverting ileostomy was discussed with patient during that admission but she was not interested in those options.     As per General surgery note: \"Notably, she has been fired from the colorectal surgery service for leaving the floor with a PICC line in place which she may have used to inject narcotics. She was previously scheduled for surgery with colorectal for this issue. She says she was afraid of having an ileostomy and so did not go through with it\"            Past Medical History:   Reviewed and edited as appropriate  Past Medical History:   Diagnosis Date     Asthma      Cervical cancer (H)      Other chronic pain      Ovarian cancer (H)      Partial small bowel obstruction (H) 11/2/2018     SBO (small bowel obstruction) (H) 12/27/2016     Small bowel obstruction (H) 5/17/2017     Small intestine obstruction (H) 4/29/2017     Substance abuse (H)     Outside records indicate past history of narcotics abuse or dependence, but patient denies.            Past Surgical History:   Reviewed and edited as appropriate   Past Surgical History:   Procedure Laterality Date     COLONOSCOPY N/A 5/3/2018    Procedure: COLONOSCOPY;  sigmoidoscopy;  Surgeon: Omero Vigil MD;  Location: [facility] OR     COLONOSCOPY WITH CO2 INSUFFLATION N/A 4/30/2018    Procedure: COLONOSCOPY WITH CO2 INSUFFLATION;  Colonoscopy;  Surgeon: Omero Vigil MD;  Location: [facility] OR     COMBINED CYSTOSCOPY, INSERT STENT URETER(S) Bilateral 5/18/2017    Procedure: COMBINED CYSTOSCOPY, INSERT STENT URETER(S);  Cystoscopy with Bilateral Stent,;  Surgeon: Rene Calero MD;  Location: [facility] OR     ENT SURGERY  2009    mastoid, sinus     ENTEROSCOPY SMALL BOWEL N/A 6/18/2018    Procedure: ENTEROSCOPY SMALL BOWEL;  Lower bowel Retrograde Enteroscopy with Balloon " Dilation .;  Surgeon: Omero Vigil MD;  Location: [facility] OR     EXAM UNDER ANESTHESIA, INSERT ALEX SLEEVE, UTERINE PLACEMENT OF TANDEM AND RING FOR RAD, ULTRASOUND N/A 12/14/2015    Procedure: EXAM UNDER ANESTHESIA, INSERT ALEX SLEEVE, UTERINE PLACEMENT OF TANDEM AND RING FOR RADIATION, ULTRASOUND GUIDED;  Surgeon: Abby Tony MD;  Location: [facility] OR     INSERT TANDEM AND CESIUM APPLICATOR CERVIX, ULTRASOUND GUIDED N/A 12/17/2015    Procedure: INSERT TANDEM AND CESIUM APPLICATOR CERVIX, ULTRASOUND GUIDED;  Surgeon: Kika Wood MD;  Location: [facility] OR     KNEE SURGERY       LAPAROTOMY EXPLORATORY N/A 5/18/2017    Procedure: LAPAROTOMY EXPLORATORY;   Exploratry Laparotomy, Small Bowel Resection with anastomosis, Flexible Sigmoidoscopy;  Surgeon: Jennifer Goodwin MD;  Location: [facility] OR     PICC INSERTION Right 04/29/2017    4fr SL BioFlo PICC, 37cm (3cm external) in the R basilic vein w/ tip in the mid SVC.     PICC INSERTION Right 03/29/2018    4Fr - 40cm (4cm external), R lateral brachial vein, Low SVC     RESECT SMALL BOWEL WITHOUT OSTOMY N/A 5/18/2017    Procedure: RESECT SMALL BOWEL WITHOUT OSTOMY;;  Surgeon: Jennifer Goodwin MD;  Location: [facility]     SIGMOIDOSCOPY FLEXIBLE N/A 5/18/2017    Procedure: SIGMOIDOSCOPY FLEXIBLE;;  Surgeon: Jennifer Goodwin MD;  Location: [facility] OR            Previous Endoscopy:     Results for orders placed or performed during the hospital encounter of 04/30/18   COLONOSCOPY   Result Value Ref Range    COLONOSCOPY       [address] [place] [address] [phone]     Endoscopy Department  _______________________________________________________________________________  Patient Name: Rachel Gerhardt         Procedure Date: 4/30/2018 3:50 PM  MRN: [id number]                       Account Number: [id number]  YOB: 1975             Admit Type: Outpatient  Age: 42                               Room: OR  Gender: Female                         Note Status: Finalized  Attending MD: Omero Vigil MD   Total Sedation Time:   _______________________________________________________________________________     Procedure:           Colonoscopy  Indications:         Therapeutic procedure  Providers:           Omero Vigil MD  Patient Profile:     Ms Gerhardt is a 43yo woman with cancer status post                        radiation therapy complicated by small bowel obstruction                        status post small bowel resection. She has evidence of                         anastomotic stenosis and now proceeds to retrograde                        enteroscopy. Of note she only received instructions for                        two enemas.  Referring MD:        Negro Akins MD, Jennifer Goodwin MD  Medicines:           Deep sedation was administered  Complications:       No immediate complications.  _______________________________________________________________________________  Procedure:           Pre-Anesthesia Assessment:                       - Prior to the procedure, a History and Physical was                        performed, and patient medications and allergies were                        reviewed. The patient is competent. The risks and                        benefits of the procedure and the sedation options and                        risks were discussed with the patient. All questions                        were answered and informed consent was obtained. Patient                        identification and proposed procedure were  verified by                        the nurse in the pre-procedure area. Mental Status                        Examination: alert and oriented. Airway Examination:                        Mallampati Class II (the uvula but not tonsillar pillars                        visualized). Respiratory Examination: clear to                        auscultation. CV Examination: normal. ASA Grade                         Assessment: II - A patient with mild systemic disease.                        After reviewing the risks and benefits, the patient was                        deemed in satisfactory condition to undergo the                        procedure. The anesthesia plan was to use deep sedation                        / analgesia. Immediately prior to administration of                        medications, the patient was re-assessed for adequacy to                        receive sedatives. The heart rate, respiratory rate,                        oxygen saturations, blood pressure, adequacy of                         pulmonary ventilation, and response to care were                        monitored throughout the procedure. The physical status                        of the patient was re-assessed after the procedure.                        After obtaining informed consent, the colonoscope was                        passed under direct vision. Throughout the procedure,                        the patient's blood pressure, pulse, and oxygen                        saturations were monitored continuously. The enteroscope                        was introduced through the anus and advanced to the                        cecum, identified by appendiceal orifice and ileocecal                        valve. The colonoscopy was performed without difficulty.                        The patient tolerated the procedure well. The quality of                        the bowel preparation was inadequate.                                                                                   Findings:       Digi neal exam demonstrated intact tone with liquid contents. An        eneteroscope and single ballooned overtube were advanced in concert to        the cecum. This was done with care as a moderate amount of liquid and        sold stool were found throughout the colon. However the solid stool        burden could not be cleared from the cecum precluding  valve intubation.        The procedure was then aborted.                                                                                   Impression:          - Inadequate bowel preparation to allow visualization of                        the ileocecal valve  Recommendation:      - Standard outpatient deep sedation recovery with                        probable discharge home this afternoon                       - As this is the second event of inadequate bowel                        preparation due to errors in communication with our                        staff; I would understand if the patient did not wish to                        return  for the procedure, however if she does wish to                        return for repeat attempt at retrograde enteroscopy she                        will require at least a stanard colonoscopy bowel                        preparation (such as 4L Golytely) with continued bowel                        preparation as needed until completly clear                       - The findings and recommendations were discussed with                        the patient and their family                                                                                     electronically signed by CHAITANYA Vigil  _____________________  Omero Vigil MD  4/30/2018 4:43:24 PM  I was physically present for the entire viewing portion of the exam.  __________________________  Signature of teaching physician  [id number] Vigil MD  Number of Addenda: 0    Note Initiated On: 4/30/2018 3:50 PM  Scope In:  Scope Out:              Social History:   Reviewed and edited as appropriate  Social History     Socioeconomic History     Marital status:      Spouse name: Not on file     Number of children: Not on file     Years of education: Not on file     Highest education level: Not on file   Occupational History     Not on file   Social Needs     Financial resource strain: Not on file     Food  insecurity:     Worry: Not on file     Inability: Not on file     Transportation needs:     Medical: Not on file     Non-medical: Not on file   Tobacco Use     Smoking status: Light Tobacco Smoker     Years: 12.00     Types: Cigarettes     Smokeless tobacco: Never Used     Tobacco comment: pt smoking about 4 cigs per week   Substance and Sexual Activity     Alcohol use: No     Alcohol/week: 0.0 oz     Comment: None per pt     Drug use: No     Comment: Patient denies.  Outside records indicate possible Opiate abuse.     Sexual activity: Yes     Partners: Male     Birth control/protection: None   Lifestyle     Physical activity:     Days per week: Not on file     Minutes per session: Not on file     Stress: Not on file   Relationships     Social connections:     Talks on phone: Not on file     Gets together: Not on file     Attends [employer] service: Not on file     Active member of club or organization: Not on file     Attends meetings of clubs or organizations: Not on file     Relationship status: Not on file     Intimate partner violence:     Fear of current or ex partner: Not on file     Emotionally abused: Not on file     Physically abused: Not on file     Forced sexual activity: Not on file   Other Topics Concern     Parent/sibling w/ CABG, MI or angioplasty before 65F 55M? No   Social History Narrative    Lives alone            Family History:   Reviewed and edited as appropriate  No known history of gastrointestinal/liver disease or  gastrointestinal malignancies       Allergies:   Reviewed and edited as appropriate     Allergies   Allergen Reactions     Sulfa Drugs Hives     Ibuprofen Nausea and Vomiting and Hives     Unknown if reaction hives or N/V     No Clinical Screening - See Comments Other (See Comments) and Diarrhea     headache  Carrots cause gastric upset, cramping and diarrhea.     Ciprofloxacin Hives and Nausea     Quinolones      Tramadol Hives, Diarrhea, Nausea and Nausea and Vomiting      "Amoxicillin Nausea and Vomiting     Augmentin Nausea, Hives and GI Disturbance     Patient tolerated course of zosyn in 5/2018     Avelox [Moxifloxacin] Nausea and Vomiting     Codeine Nausea and Vomiting     Daucus Carota      Other reaction(s): GI Upset  Other reaction(s): Abdominal pain, Diarrhea  Carrots cause gastric upset, cramping and diarrhea.            Medications:     Current Facility-Administered Medications   Medication     dextrose 5% and 0.45% NaCl + KCl 20 mEq/L infusion     HYDROmorphone (PF) (DILAUDID) injection 0.3-0.5 mg     lidocaine (LMX4) cream     lidocaine (XYLOCAINE) 5 % ointment     lidocaine 1 % 0.1-1 mL     magnesium sulfate 4 g in 100 mL sterile water (premade)     melatonin tablet 1 mg     naloxone (NARCAN) injection 0.1-0.4 mg     ondansetron (ZOFRAN-ODT) ODT tab 4 mg    Or     ondansetron (ZOFRAN) injection 4 mg     potassium phosphate 15 mmol in D5W 250 mL intermittent infusion     potassium phosphate 20 mmol in D5W 250 mL intermittent infusion     potassium phosphate 20 mmol in D5W 500 mL intermittent infusion     potassium phosphate 25 mmol in D5W 500 mL intermittent infusion     prochlorperazine (COMPAZINE) injection 10 mg    Or     prochlorperazine (COMPAZINE) tablet 10 mg    Or     prochlorperazine (COMPAZINE) Suppository 25 mg     sodium chloride (PF) 0.9% PF flush 3 mL     sodium chloride (PF) 0.9% PF flush 3 mL             Review of Systems:   A complete review of systems was performed and is negative except as noted in the HPI           Physical Exam:   BP 93/66 (BP Location: Right arm)   Pulse 85   Temp 97.2  F (36.2  C) (Oral)   Resp 16   Ht 1.753 m (5' 9\")   Wt 47.8 kg (105 lb 6.4 oz)   SpO2 98%   BMI 15.56 kg/m    Wt:   Wt Readings from Last 2 Encounters:   06/23/19 47.8 kg (105 lb 6.4 oz)   05/19/19 52.6 kg (115 lb 14.4 oz)      Constitutional: cooperative, pleasant, not dyspneic/diaphoretic, no acute distress  Eyes: Sclera anicteric/injected  Neck: supple, " thyroid normal size  CV: No edema  Respiratory: Unlabored breathing  Abd:  Mildly distended, +bs, no hepatosplenomegaly, Diffusely tender to palpation, no peritoneal signs  Skin: warm, perfused, no jaundice  Neuro: AAO x 3, No asterixis  Psych: Flat affect  MSK: No gross deformities         Data:   Labs and imaging below were independently reviewed and interpreted    BMP  Recent Labs   Lab 06/24/19  0934 06/23/19  0503 06/23/19  0000 06/22/19  2234     --  142 Canceled, Test credited   POTASSIUM 4.8  --  3.7 Canceled, Test credited   CHLORIDE 111*  --  111* Canceled, Test credited   JONATAN 7.7*  --  7.3* Canceled, Test credited   CO2 23  --  28 Canceled, Test credited   BUN 10  --  15 Canceled, Test credited   CR 0.69 0.53 0.58 Canceled, Test credited   GLC 83  --  106* Canceled, Test credited     CBC  Recent Labs   Lab 06/24/19  0934 06/23/19  0503 06/22/19  2358 06/22/19  2234   WBC 6.1 7.3 6.5 Canceled, Test credited   RBC 4.13 3.61* 3.62* Canceled, Test credited   HGB 12.7 11.2* 11.2* Canceled, Test credited   HCT 41.6 37.6 37.1 Canceled, Test credited   * 104* 103* Canceled, Test credited   MCH 30.8 31.0 30.9 Canceled, Test credited   MCHC 30.5* 29.8* 30.2* Canceled, Test credited   RDW 13.9 14.3 14.0 Canceled, Test credited    223  211 217 Canceled, Test credited     INR  Recent Labs   Lab 06/23/19  0000   INR 1.28*     LFTs  Recent Labs   Lab 06/23/19  0000 06/22/19  2234   ALKPHOS 61 Canceled, Test credited   AST 10 Canceled, Test credited   ALT 26 Canceled, Test credited   BILITOTAL 0.3 Canceled, Test credited   PROTTOTAL 5.0* Canceled, Test credited   ALBUMIN 2.5* Canceled, Test credited      PANC  Recent Labs   Lab 06/23/19  0000 06/22/19  2234   LIPASE 99 Canceled, Test credited       IMAGING:    CT chest/abdomen/pelvis 5/18/19:  Chest:  Visualized portions of the thyroid gland are unremarkable in appearance. The heart is normal in size, without pericardial effusion. No substantial  coronary artery calcifications. The thoracic vessels are normal in caliber and configuration. Prominent left paratracheal lymphadenopathy measuring up to 1.1 cm in short axis dimension (series 4, image 41). No enlarged axillary or hilar lymph nodes.  Minimal debris in the distal trachea. Central airways are otherwise clear. No pneumothorax or pleural effusion. No focal airspace consolidation. Mild thickening of the airways with diffuse tree-in-bud opacities, with a lower lobe predominance. Few superimposed peribronchovascular nodular opacities in the inferior left lower lobe (series 2, image 110).  Abdomen/pelvis: Diffuse bowel distention is again seen, measuring up to 6.7 cm, increased from comparison dated 3/13/2018, within the bowel measuring up to 5.5 cm. No convincing transition point is identified to suggest high-grade obstruction. There is however a gradual transition in the right anterior pelvis where where there are several mildly thickened loops of small bowel which contain formed stool (series 5, images 420-460). Anastomotic suture line seen in the left lower quadrant, likely sequelae of prior small bowel resection. There is no pneumatosis or portal venous gas. No intraperitoneal free air. Trace physiologic free fluid in the pelvis. No focal fluid collection within the abdomen.  The liver parenchyma enhances homogenously without focal lesion. The hepatic vasculature appears patent. The gallbladder is decompressed. No extrahepatic biliary dilatation. Minimal intrahepatic biliary dilatation centrally. The pancreas is mildly atrophic in appearance, and otherwise unremarkable. No pancreatic ductal dilatation. The spleen and adrenal glands are normal. Small right inferior pole lesion, hypodense, stable, though indeterminate simple left renal cyst. Kidneys are otherwise normal.. The urinary bladder is partially distended and otherwise unremarkable in appearance. No focal adnexal mass.                                                       IMPRESSION:  1. Chronically dilated loops of fluid in gas-distended small bowel, substantially increased from comparison dated 3/13/2019. While thereis no focal or discrete transition point, there is a gradual luminal caliber change anteriorly in the right hemipelvis where there are abnormal thickened loops of decompressed small bowel which may be causing obstruction. There is no pneumatosis or portal venous gas. Colon is decompressed and the rectum contains gas, arguing against high-grade or long-standing obstruction.  2. Mild thickening of the airways with diffuse basilar predominant tree-in-bud opacities with a few superimposed nodular opacities in the left lung base, concerning for infection including bronchitis/brachiolitis.
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
## 2019-06-25 NOTE — PLAN OF CARE
"BP 93/66 (BP Location: Right arm)   Pulse 85   Temp 97.2  F (36.2  C) (Oral)   Resp 16   Ht 1.753 m (5' 9\")   Wt 47.8 kg (105 lb 6.4 oz)   SpO2 98%   BMI 15.56 kg/m      BP soft but within parameters. A&Ox4. Pt reports abd pain below her umbilicus managed with dilaudid IV x2 and heating pad. Ind in room and outside x1. NPO. L PIV running MIVF. Good UOP, x3 loose, tan BMs. Will continue with POC.   "

## 2019-06-25 NOTE — PLAN OF CARE
"BP 99/65 (BP Location: Left arm)   Pulse 85   Temp 98.7  F (37.1  C) (Oral)   Resp 16   Ht 1.753 m (5' 9\")   Wt 47.8 kg (105 lb 6.4 oz)   SpO2 97%   BMI 15.56 kg/m      Shift: 0669-2137  Reason for Admission: Partial small bowel obstruction  VS: Soft BPs, otherwise VSS on RA.  Neuros: A/Ox4, able to make needs known.  GI/: 2 loose stools this shift. Voiding adequately   Diet: NPO  Drains/Lines: PIV infusing D5 + NS + KCl @ 125 mL/hr  Activity: Pt up ad dania. Ambulates frequently around the unit and outside to smoke  Pain/Nausea: Denies nausea. C/o of abdominal pain- PRN IV Dilaudid given  Labs: Phos replaced. Pending AM recheck?  New this shift: XR Attempted to do a grastrograffin enema today but failed, will attempt again tomorrow.  Plan of care: Will continue to monitor and follow POC.   "

## 2019-06-25 NOTE — PROGRESS NOTES
Surgery Progress Note  06/25/2019       Subjective:  - SAURABH overnight.  - Denies nausea for past few days  - Increased diarrhea since enema earlier today, increased cramping  - Had perhaps only a month of mild improvement with previous dilation, states it was not very successful   - Not sure she is interested in surgery given possibility of ostomy, but also states quality of life is poor and she is reconsidering surgery  - States she has more difficulty consuming liquids than solids     Objective:  Temp:  [97.2  F (36.2  C)-99.2  F (37.3  C)] 97.2  F (36.2  C)  Heart Rate:  [79-82] 79  Resp:  [16-18] 16  BP: (80-99)/(54-66) 93/66  SpO2:  [97 %-99 %] 98 %    I/O last 3 completed shifts:  In: 2155.42 [P.O.:120; I.V.:2035.42]  Out: 1150 [Urine:1150]     EXAM   Gen: Awake, alert, NAD  Resp: NLB on RA  Abd: soft, mildly distended, mildly diffusely tender  Ext: WWP, no edema     Labs:  Recent Labs   Lab 06/24/19  0934 06/23/19  0503 06/22/19  2358   WBC 6.1 7.3 6.5   HGB 12.7 11.2* 11.2*    223  211 217       Recent Labs   Lab 06/24/19  0934 06/23/19  0653 06/23/19  0503 06/23/19  0000 06/22/19  2234     --   --  142 Canceled, Test credited   POTASSIUM 4.8  --   --  3.7 Canceled, Test credited   CHLORIDE 111*  --   --  111* Canceled, Test credited   CO2 23  --   --  28 Canceled, Test credited   BUN 10  --   --  15 Canceled, Test credited   CR 0.69  --  0.53 0.58 Canceled, Test credited   GLC 83  --   --  106* Canceled, Test credited   JONATAN 7.7*  --   --  7.3* Canceled, Test credited   MAG 1.8 2.5*  --  1.0*  --    PHOS 2.1*  --   --  2.6  --        Imaging:  XR abdomen (gastrografin challenge) pending      Assessment/Plan:   43 year old female with pmhx ovarian and cervical cancer s/p chemoradiation, 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  Likely recurrent SBO.     - Gastrografin enema not completed yesterday, completed today    - Appreciate GI recommendations  - Pain control   - Advance diet slowly  - Would not recommend surgery at this time given the patient's nutritional status   - workup of tender inguinal nodes as outpatient     Seen, examined, and discussed with chief resident, who will discuss with staff.    Lizabeth Irwin MD  Surgery Resident PGY-1  Pg [phone]

## 2019-06-25 NOTE — PLAN OF CARE
Patient AVSS. GI enema done this am. Asking to eat, md paged. IV dilaudid for pain. Patient voiding and having liquid bms. Up ad dania.     Patient upset this afternoon. Md changed diet to full liquids and patient states liquids make her sick. Md updated. Patient ordered sherman nunes.

## 2019-06-26 ENCOUNTER — APPOINTMENT (OUTPATIENT)
Dept: MRI IMAGING | Facility: CLINIC | Age: 44
DRG: 388 | End: 2019-06-26
Attending: STUDENT IN AN ORGANIZED HEALTH CARE EDUCATION/TRAINING PROGRAM
Payer: COMMERCIAL

## 2019-06-26 LAB
ANION GAP SERPL CALCULATED.3IONS-SCNC: 5 MMOL/L (ref 3–14)
BUN SERPL-MCNC: 12 MG/DL (ref 7–30)
CALCIUM SERPL-MCNC: 8 MG/DL (ref 8.5–10.1)
CHLORIDE SERPL-SCNC: 107 MMOL/L (ref 94–109)
CO2 SERPL-SCNC: 27 MMOL/L (ref 20–32)
CREAT SERPL-MCNC: 0.58 MG/DL (ref 0.52–1.04)
ERYTHROCYTE [DISTWIDTH] IN BLOOD BY AUTOMATED COUNT: 14 % (ref 10–15)
GFR SERPL CREATININE-BSD FRML MDRD: >90 ML/MIN/{1.73_M2}
GLUCOSE SERPL-MCNC: 88 MG/DL (ref 70–99)
HCT VFR BLD AUTO: 39.9 % (ref 35–47)
HGB BLD-MCNC: 11.8 G/DL (ref 11.7–15.7)
MAGNESIUM SERPL-MCNC: 1.4 MG/DL (ref 1.6–2.3)
MAGNESIUM SERPL-MCNC: 2.3 MG/DL (ref 1.6–2.3)
MCH RBC QN AUTO: 30.4 PG (ref 26.5–33)
MCHC RBC AUTO-ENTMCNC: 29.6 G/DL (ref 31.5–36.5)
MCV RBC AUTO: 103 FL (ref 78–100)
PLATELET # BLD AUTO: 264 10E9/L (ref 150–450)
POTASSIUM SERPL-SCNC: 4.2 MMOL/L (ref 3.4–5.3)
RBC # BLD AUTO: 3.88 10E12/L (ref 3.8–5.2)
SODIUM SERPL-SCNC: 139 MMOL/L (ref 133–144)
WBC # BLD AUTO: 6.9 10E9/L (ref 4–11)

## 2019-06-26 PROCEDURE — 25000128 H RX IP 250 OP 636: Performed by: STUDENT IN AN ORGANIZED HEALTH CARE EDUCATION/TRAINING PROGRAM

## 2019-06-26 PROCEDURE — 83735 ASSAY OF MAGNESIUM: CPT | Performed by: STUDENT IN AN ORGANIZED HEALTH CARE EDUCATION/TRAINING PROGRAM

## 2019-06-26 PROCEDURE — 25500064 ZZH RX 255 OP 636: Performed by: STUDENT IN AN ORGANIZED HEALTH CARE EDUCATION/TRAINING PROGRAM

## 2019-06-26 PROCEDURE — 25800030 ZZH RX IP 258 OP 636: Performed by: STUDENT IN AN ORGANIZED HEALTH CARE EDUCATION/TRAINING PROGRAM

## 2019-06-26 PROCEDURE — 25000128 H RX IP 250 OP 636: Performed by: FAMILY MEDICINE

## 2019-06-26 PROCEDURE — 80048 BASIC METABOLIC PNL TOTAL CA: CPT | Performed by: STUDENT IN AN ORGANIZED HEALTH CARE EDUCATION/TRAINING PROGRAM

## 2019-06-26 PROCEDURE — 83735 ASSAY OF MAGNESIUM: CPT | Performed by: FAMILY MEDICINE

## 2019-06-26 PROCEDURE — 36415 COLL VENOUS BLD VENIPUNCTURE: CPT | Performed by: STUDENT IN AN ORGANIZED HEALTH CARE EDUCATION/TRAINING PROGRAM

## 2019-06-26 PROCEDURE — 36415 COLL VENOUS BLD VENIPUNCTURE: CPT | Performed by: FAMILY MEDICINE

## 2019-06-26 PROCEDURE — 85027 COMPLETE CBC AUTOMATED: CPT | Performed by: STUDENT IN AN ORGANIZED HEALTH CARE EDUCATION/TRAINING PROGRAM

## 2019-06-26 PROCEDURE — A9585 GADOBUTROL INJECTION: HCPCS | Performed by: STUDENT IN AN ORGANIZED HEALTH CARE EDUCATION/TRAINING PROGRAM

## 2019-06-26 PROCEDURE — 12000001 ZZH R&B MED SURG/OB UMMC

## 2019-06-26 PROCEDURE — 25000132 ZZH RX MED GY IP 250 OP 250 PS 637: Performed by: STUDENT IN AN ORGANIZED HEALTH CARE EDUCATION/TRAINING PROGRAM

## 2019-06-26 PROCEDURE — 72197 MRI PELVIS W/O & W/DYE: CPT

## 2019-06-26 PROCEDURE — 85049 AUTOMATED PLATELET COUNT: CPT | Performed by: FAMILY MEDICINE

## 2019-06-26 RX ORDER — GADOBUTROL 604.72 MG/ML
0.1 INJECTION INTRAVENOUS ONCE
Status: COMPLETED | OUTPATIENT
Start: 2019-06-26 | End: 2019-06-26

## 2019-06-26 RX ORDER — OXYCODONE HYDROCHLORIDE 5 MG/1
5 TABLET ORAL EVERY 4 HOURS PRN
Status: DISCONTINUED | OUTPATIENT
Start: 2019-06-26 | End: 2019-06-27

## 2019-06-26 RX ORDER — HYDROMORPHONE HCL/0.9% NACL/PF 0.2MG/0.2
0.2 SYRINGE (ML) INTRAVENOUS EVERY 4 HOURS PRN
Status: DISCONTINUED | OUTPATIENT
Start: 2019-06-26 | End: 2019-06-27

## 2019-06-26 RX ADMIN — SODIUM CHLORIDE 100 ML: 9 INJECTION, SOLUTION INTRAVENOUS at 19:17

## 2019-06-26 RX ADMIN — POTASSIUM CHLORIDE, DEXTROSE MONOHYDRATE AND SODIUM CHLORIDE: 150; 5; 450 INJECTION, SOLUTION INTRAVENOUS at 08:22

## 2019-06-26 RX ADMIN — POTASSIUM CHLORIDE, DEXTROSE MONOHYDRATE AND SODIUM CHLORIDE: 150; 5; 450 INJECTION, SOLUTION INTRAVENOUS at 22:13

## 2019-06-26 RX ADMIN — POTASSIUM CHLORIDE, DEXTROSE MONOHYDRATE AND SODIUM CHLORIDE: 150; 5; 450 INJECTION, SOLUTION INTRAVENOUS at 00:22

## 2019-06-26 RX ADMIN — OXYCODONE HYDROCHLORIDE 5 MG: 5 TABLET ORAL at 20:32

## 2019-06-26 RX ADMIN — ONDANSETRON HYDROCHLORIDE 4 MG: 2 INJECTION, SOLUTION INTRAMUSCULAR; INTRAVENOUS at 13:46

## 2019-06-26 RX ADMIN — GLUCAGON HYDROCHLORIDE 1 MG: 1 INJECTION, POWDER, FOR SOLUTION INTRAMUSCULAR; INTRAVENOUS; SUBCUTANEOUS at 18:40

## 2019-06-26 RX ADMIN — GADOBUTROL 5 ML: 604.72 INJECTION INTRAVENOUS at 19:16

## 2019-06-26 RX ADMIN — MAGNESIUM SULFATE HEPTAHYDRATE 4 G: 40 INJECTION, SOLUTION INTRAVENOUS at 13:53

## 2019-06-26 RX ADMIN — HYDROMORPHONE HYDROCHLORIDE 0.5 MG: 1 INJECTION, SOLUTION INTRAMUSCULAR; INTRAVENOUS; SUBCUTANEOUS at 08:42

## 2019-06-26 RX ADMIN — OXYCODONE HYDROCHLORIDE 5 MG: 5 TABLET ORAL at 16:16

## 2019-06-26 RX ADMIN — Medication 0.2 MG: at 17:54

## 2019-06-26 RX ADMIN — OXYCODONE HYDROCHLORIDE 5 MG: 5 TABLET ORAL at 11:45

## 2019-06-26 RX ADMIN — HYDROMORPHONE HYDROCHLORIDE 0.5 MG: 1 INJECTION, SOLUTION INTRAMUSCULAR; INTRAVENOUS; SUBCUTANEOUS at 02:32

## 2019-06-26 RX ADMIN — Medication 0.2 MG: at 13:46

## 2019-06-26 RX ADMIN — Medication 0.2 MG: at 22:08

## 2019-06-26 RX ADMIN — HYDROMORPHONE HYDROCHLORIDE 0.5 MG: 1 INJECTION, SOLUTION INTRAMUSCULAR; INTRAVENOUS; SUBCUTANEOUS at 05:41

## 2019-06-26 ASSESSMENT — ACTIVITIES OF DAILY LIVING (ADL)
ADLS_ACUITY_SCORE: 10
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
## 2019-06-26 NOTE — PLAN OF CARE
"Vital signs:  Temp: 97.6  F (36.4  C) Temp src: Oral BP: (!) 81/55   Heart Rate: 90 Resp: 18 SpO2: 97 % O2 Device: None (Room air)   Height: 175.3 cm (5' 9\") Weight: 47.8 kg (105 lb 6.4 oz)    Per 8 hours:   Activity: Up to bathroom and halls independently.   Neuros: A & O x4. Neuro intact.   Cardiac: BP 81/55, patient states baseline, providers aware. HR 90s.   Respiratory: Lung sounds diminished. O2 sats high 90s on RA. Denies SOB. Patient ambulates outside to smoke.   GI/: BS+, passing flatus, had two loose BMs overnight. Voiding, not saving.   Diet: Tolerating full liquid diet.   Skin: None.   Lines: MIVF infusing via PIV.   Incisions/Drains: None.   Labs: None.   Pain/nausea: PRN Dilaudid x2 per patient request. Denies nausea.   New changes this shift: None.   Plan: Continue POC.     "

## 2019-06-26 NOTE — PLAN OF CARE
Temp: 97.5  F (36.4  C) Temp src: Oral BP: 94/69   Heart Rate: 90 Resp: 18 SpO2: 98 % O2 Device: None (Room air)    Neuro: A&Ox4, able to make needs known  Resp: Lungs diminished at the bases on RA denies shortness of breath  Cardiac: Baseline soft bps  GI/: +bs, +flatus, +multiple loose bm this shift, pt not saving. Suppose to be strict I&O. Adequate urine output.   Diet: Was changed to regular diet, which she tolerated but then switched to NPO due to MRI.   IV access: Left PIV infusing Mag replacement right now. MIVF had to be stopped due to one IV and MIVF not compatible.   Labs: Mag replacement needed per protocol, mag hanging currently, labs in for rechecks for 2 hrs after and tomorrow AM.   Activity: Up ad dania, walks frequently off unit. Safety not completed, pt understands risks.   Pain: IV hydromorphone given x2 and oxycodone given x1 and effective.   Plan: Continue with POC, MRI with contrast this afternoon, awaiting for MRI to send up contrast.

## 2019-06-26 NOTE — PROGRESS NOTES
Social Work Services Progress Note    Hospital Day: 4  Date of Initial Social Work Evaluation:  6/25/19  Collaborated with:  Pt, financial counselor, RNCC    Data:  Pt is a 43 year-old woman admitted to . SW consulted for resources as pt has been living in her truck. Pt is aware of/connected with all available resources at this time (see SW assessment by Melissa Fuller 6/25/19; for additional context see SW notes by Riya Munguia from 11/2018).    Intervention: SW received voicemail from pt this morning asking what she should do with SMRT paperwork she filled out. SW met with pt at bedside to introduce self as covering SW. Advised pt to hold paperwork at this time while awaiting financial counselor, as paperwork should be submitted to [facility] with MA application if applicable. Pt voiced understanding.    SW called Financial Counselor Rose and left voicemail requesting to touch base on pt's MA application and to ensure SMRT paperwork does not get lost in the shuffle.    Assessment:  Pt presents with chronic psychosocial issues that are not being accommodated easily by the existing social safety net    Plan:    Anticipated Disposition:  Likely to discharge to prior living situation    Barriers to d/c plan:  None    Follow Up:  SW will continue to remain available for discharge planning, other resources and support PRN.    Addendum  Pt called SW at desk at 3:30 this afternoon to say she has not heard from Financial Counselor and is concerned that she will not hear from her prior to discharge. Told pt that SW will follow up. SW reached out to  again, this time via email, to ask that FC make pt a priority due to impending discharge.     Dora Cabrera, LUCHO, [facility]  Float  covering   P: [phone]  Pager: [phone] or [phone]

## 2019-06-26 NOTE — PLAN OF CARE
"Patient requested and received a one-time dose of IV dilaudid for \"crampy pain\" in her stomach. This was reportedly helpful. Continue POC.  "

## 2019-06-26 NOTE — PROGRESS NOTES
Patient has been educated on potential risks of choosing to leave the unit and that the responsibility for patient well-being will belong to the patient. Pt has been informed that admission to hospital is due to need for medical treatment. Education given to the patient on some of the potential risks included but are not limited to:      - lack of access to nursing intervention      - possible missed appointments with MD, therapies, tests      - possible missed medications, antibiotics, management of IV's    Patient Response:Pt understands risks    Patient notified staff prior to leaving unit: no, pt leaves floor frequently and doesn't let staff know.   Coban wrap placed over IV prior to pt leaving unit: yes

## 2019-06-26 NOTE — PROGRESS NOTES
Surgery Progress Note  06/26/2019       Subjective:  - SAURABH overnight.  - still having diarrhea  - denies having regular food yesterday, c/o discomfort and belching with liquids     Objective:  Temp:  [96.7  F (35.9  C)-99.1  F (37.3  C)] 97.6  F (36.4  C)  Heart Rate:  [78-91] 78  Resp:  [16-18] 18  BP: ()/(54-71) 99/64  SpO2:  [97 %] 97 %    I/O last 3 completed shifts:  In: 3290 [P.O.:480; I.V.:2810]  Out: 800 [Urine:800]     EXAM   Gen: Awake, alert, NAD  Resp: NLB on RA  Abd: soft, mildly distended, mildly diffusely tender  Ext: WWP, no edema     Labs:  Recent Labs   Lab 06/25/19  1014 06/24/19  0934 06/23/19  0503   WBC 6.6 6.1 7.3   HGB 12.8 12.7 11.2*    234 223  211       Recent Labs   Lab 06/25/19  1014 06/24/19  0934 06/23/19  0653 06/23/19  0503 06/23/19  0000    138  --   --  142   POTASSIUM 4.3 4.8  --   --  3.7   CHLORIDE 111* 111*  --   --  111*   CO2 22 23  --   --  28   BUN 12 10  --   --  15   CR 0.49* 0.69  --  0.53 0.58   GLC 80 83  --   --  106*   JONATAN 8.2* 7.7*  --   --  7.3*   MAG 1.6 1.8 2.5*  --  1.0*   PHOS 2.9 2.1*  --   --  2.6       Imaging:  XR Colon: No evidence of stricture or distal obstruction   Xray abd 2 view: Improving gaseous distention of the large bowel,  nonspecific, but possibly representing resolving adynamic ileus.     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  Likely recurrent SBO. Improving abdominal pain, multiple bowel movements and flatus.     - Gastrografin enema with no evidence of stricture or distal obstruction    - Advance to regular diet today  - Appreciate GI recommendations  - Pain control   - Would not recommend surgery at this time given the patient's nutritional status   - workup of tender inguinal nodes as outpatient     Seen, examined, and discussed with chief  resident, who will discuss with staff.    Lizabeth Irwin MD  Surgery Resident PGY-1  Pg [phone]

## 2019-06-27 LAB
ANION GAP SERPL CALCULATED.3IONS-SCNC: 4 MMOL/L (ref 3–14)
BUN SERPL-MCNC: 14 MG/DL (ref 7–30)
CALCIUM SERPL-MCNC: 8.5 MG/DL (ref 8.5–10.1)
CHLORIDE SERPL-SCNC: 107 MMOL/L (ref 94–109)
CO2 SERPL-SCNC: 28 MMOL/L (ref 20–32)
CREAT SERPL-MCNC: 0.64 MG/DL (ref 0.52–1.04)
ERYTHROCYTE [DISTWIDTH] IN BLOOD BY AUTOMATED COUNT: 14.1 % (ref 10–15)
GFR SERPL CREATININE-BSD FRML MDRD: >90 ML/MIN/{1.73_M2}
GLUCOSE SERPL-MCNC: 85 MG/DL (ref 70–99)
HCT VFR BLD AUTO: 44.1 % (ref 35–47)
HGB BLD-MCNC: 13.3 G/DL (ref 11.7–15.7)
MAGNESIUM SERPL-MCNC: 2.1 MG/DL (ref 1.6–2.3)
MCH RBC QN AUTO: 30.3 PG (ref 26.5–33)
MCHC RBC AUTO-ENTMCNC: 30.2 G/DL (ref 31.5–36.5)
MCV RBC AUTO: 101 FL (ref 78–100)
PLATELET # BLD AUTO: 308 10E9/L (ref 150–450)
POTASSIUM SERPL-SCNC: 4.9 MMOL/L (ref 3.4–5.3)
RBC # BLD AUTO: 4.39 10E12/L (ref 3.8–5.2)
SODIUM SERPL-SCNC: 139 MMOL/L (ref 133–144)
WBC # BLD AUTO: 6.6 10E9/L (ref 4–11)

## 2019-06-27 PROCEDURE — 25000128 H RX IP 250 OP 636: Performed by: STUDENT IN AN ORGANIZED HEALTH CARE EDUCATION/TRAINING PROGRAM

## 2019-06-27 PROCEDURE — 25000132 ZZH RX MED GY IP 250 OP 250 PS 637: Performed by: STUDENT IN AN ORGANIZED HEALTH CARE EDUCATION/TRAINING PROGRAM

## 2019-06-27 PROCEDURE — 83735 ASSAY OF MAGNESIUM: CPT | Performed by: STUDENT IN AN ORGANIZED HEALTH CARE EDUCATION/TRAINING PROGRAM

## 2019-06-27 PROCEDURE — 80048 BASIC METABOLIC PNL TOTAL CA: CPT | Performed by: STUDENT IN AN ORGANIZED HEALTH CARE EDUCATION/TRAINING PROGRAM

## 2019-06-27 PROCEDURE — 12000001 ZZH R&B MED SURG/OB UMMC

## 2019-06-27 PROCEDURE — 85027 COMPLETE CBC AUTOMATED: CPT | Performed by: STUDENT IN AN ORGANIZED HEALTH CARE EDUCATION/TRAINING PROGRAM

## 2019-06-27 PROCEDURE — 25800030 ZZH RX IP 258 OP 636: Performed by: STUDENT IN AN ORGANIZED HEALTH CARE EDUCATION/TRAINING PROGRAM

## 2019-06-27 PROCEDURE — 40000141 ZZH STATISTIC PERIPHERAL IV START W/O US GUIDANCE

## 2019-06-27 PROCEDURE — 36415 COLL VENOUS BLD VENIPUNCTURE: CPT | Performed by: STUDENT IN AN ORGANIZED HEALTH CARE EDUCATION/TRAINING PROGRAM

## 2019-06-27 RX ORDER — HYDROMORPHONE HCL/0.9% NACL/PF 0.2MG/0.2
0.2 SYRINGE (ML) INTRAVENOUS ONCE
Status: COMPLETED | OUTPATIENT
Start: 2019-06-27 | End: 2019-06-27

## 2019-06-27 RX ORDER — OXYCODONE HYDROCHLORIDE 5 MG/1
5 TABLET ORAL EVERY 4 HOURS
Status: DISCONTINUED | OUTPATIENT
Start: 2019-06-27 | End: 2019-06-29 | Stop reason: HOSPADM

## 2019-06-27 RX ORDER — OXYCODONE HYDROCHLORIDE 5 MG/1
5 TABLET ORAL EVERY 4 HOURS PRN
Status: DISCONTINUED | OUTPATIENT
Start: 2019-06-27 | End: 2019-06-29 | Stop reason: HOSPADM

## 2019-06-27 RX ORDER — HYDROMORPHONE HCL/0.9% NACL/PF 0.2MG/0.2
0.2 SYRINGE (ML) INTRAVENOUS ONCE
Status: DISCONTINUED | OUTPATIENT
Start: 2019-06-27 | End: 2019-06-27

## 2019-06-27 RX ADMIN — OXYCODONE HYDROCHLORIDE 5 MG: 5 TABLET ORAL at 06:54

## 2019-06-27 RX ADMIN — OXYCODONE HYDROCHLORIDE 5 MG: 5 TABLET ORAL at 02:43

## 2019-06-27 RX ADMIN — ONDANSETRON HYDROCHLORIDE 4 MG: 2 INJECTION, SOLUTION INTRAMUSCULAR; INTRAVENOUS at 16:55

## 2019-06-27 RX ADMIN — POTASSIUM CHLORIDE, DEXTROSE MONOHYDRATE AND SODIUM CHLORIDE: 150; 5; 450 INJECTION, SOLUTION INTRAVENOUS at 07:01

## 2019-06-27 RX ADMIN — OXYCODONE HYDROCHLORIDE 5 MG: 5 TABLET ORAL at 21:25

## 2019-06-27 RX ADMIN — OXYCODONE HYDROCHLORIDE 5 MG: 5 TABLET ORAL at 17:01

## 2019-06-27 RX ADMIN — OXYCODONE HYDROCHLORIDE 5 MG: 5 TABLET ORAL at 15:43

## 2019-06-27 RX ADMIN — POTASSIUM CHLORIDE, DEXTROSE MONOHYDRATE AND SODIUM CHLORIDE: 150; 5; 450 INJECTION, SOLUTION INTRAVENOUS at 16:57

## 2019-06-27 RX ADMIN — Medication 0.2 MG: at 08:43

## 2019-06-27 RX ADMIN — OXYCODONE HYDROCHLORIDE 5 MG: 5 TABLET ORAL at 11:28

## 2019-06-27 RX ADMIN — ONDANSETRON HYDROCHLORIDE 4 MG: 2 INJECTION, SOLUTION INTRAMUSCULAR; INTRAVENOUS at 06:58

## 2019-06-27 RX ADMIN — Medication 0.2 MG: at 11:27

## 2019-06-27 RX ADMIN — OXYCODONE HYDROCHLORIDE 5 MG: 5 TABLET ORAL at 20:30

## 2019-06-27 RX ADMIN — OXYCODONE HYDROCHLORIDE 5 MG: 5 TABLET ORAL at 23:11

## 2019-06-27 RX ADMIN — Medication 0.2 MG: at 04:19

## 2019-06-27 ASSESSMENT — ACTIVITIES OF DAILY LIVING (ADL)
ADLS_ACUITY_SCORE: 10

## 2019-06-27 NOTE — PLAN OF CARE
"Vital signs:  Temp: 97.7  F (36.5  C) Temp src: Oral BP: 100/67   Heart Rate: 94 Resp: 18 SpO2: 96 % O2 Device: None (Room air)   Height: 175.3 cm (5' 9\") Weight: 47.8 kg (105 lb 6.4 oz)    Per 12 hours:    Activity: Up to bathroom and halls independently.   Neuros: A & O x4. Neuro intact.   Cardiac: HR 90s. BPs 100s/60s. WDL.   Respiratory: Lung sounds diminished. O2 sats high 90s on RA. Denies SOB. Patient ambulates outside to smoke.   GI/: BS+, passing flatus, had multiple loose stools per patient. Voiding, not saving.   Diet: Tolerating regular diet. Ate two trays full.   Lines: MIVF infusing via PIV. New pump ordered due to previous pump turning off.   Incisions/Drains: None.   Labs: Recheck Mg level 2.3.  Pain/nausea: PRN Dilaudid x2, PRN oxycodone 5mg x2 effective for cramping abdominal pain. Denies nausea.   New changes this shift: None.   Plan: Continue POC.   "
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
## 2019-06-27 NOTE — PLAN OF CARE
Pt. is alert and oriented x 4 ,abdominal pain controled with IV dilaudid 0.2 mg given two times and oxycodone 5 mg given one time pt denies nausea ,has good appetite.pt. is up indep.pt walked outside to smoke multiple times.LS diminished,BS+,pt reported couple loose stools and adequate urinary output.Will continue to monitor.

## 2019-06-27 NOTE — PROGRESS NOTES
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 Also being worked up for R sided inguinal lymphadenopathy.     She had an enteroscopy with dilation at the ileo-ileal anastomotic site on 6/17/18 (the diameter was noted to be <10 mm at that time). She reported minimal to no improvement which was short lived benefit from her enteroscopy and dilation and did require significant prepping via NG (had 2 procedures cancelled due to poor prep). She was evaluated by Dr. Vigil recently in March 2019 when she was admitted with another SBO. Dr. Vigil felt that repeat enteroscopy and dilation with same intent would be futile due to aforementioned reasons. Diverting ileostomy was discussed with patient during that admission but she was not interested in those options.     MR enterography done during this admission confirms an ileo-ileal anastomotic stricture and is also showing inflammation in the loops of small bowel maybe causing functional obstruction (maybe related to recent obstruction versus chronic process such as crohn's).     RECOMMENDATIONS:  - Work-up of inguinal LN deferred to primary and general surgery teams  - Repeat MRE in 4-8 weeks to evaluate for evidence of persistent inflammation in the ileum (which would make presence of another underlying inflammatory process likely).   - We will set her up to see one of the IBD providers in clinic in 8 weeks (our team will arrange). If MRE shows persistent inflammation, will need to determine best next steps for diagnostic purposes. Enteroscopy will likely not be possible due to presence of a downstream ileo-ileal stricture which will inhibit scope passage. Can  consider use of Budesonide at that time to determine any improvement in her symptoms for a presumptive diagnosis of IBD based on imaging.  - There is NO plan to pursue enteroscopy and dilation since she had minimal to no improvement after her first procedure in 2018.  - Continue discussion with [facility] vs general surgery team for surgical options since she is likely not a candidate for any endoscopic therapy though she continues to refuse any surgery due to possibility of ostomy. This significantly limits any therapeutic options for her. If surgery is pursued, small bowel biopsy can be obtained to rule out presence of any underlying IBD.  - Avoid use of NSAIDs (not using currently)     Gastroenterology team will sign off.    Patient care plan has been discussed with Dr. Foster, GI staff physician.     Cleopatra Velazquez  Gastroenterology Fellow  P [phone]

## 2019-06-27 NOTE — PROGRESS NOTES
"  Care Coordinator - Brief Discharge Note    Admission Date/Time:  6/22/2019  Attending MD:  Susu Mcknight MD     Data  Chart reviewed, discussed with interdisciplinary team.   Patient was admitted for:   1. Small bowel obstruction (H)    2. Failure to thrive in adult         Coordination of Care   D: Plan of care discussed with Medical Team. Plan for patient to discharge when pain is adequately controlled and medically stable.     I/A: SW has previously completed evaluation of patient dc needs. No RNCC discharge needs identified. [facility] Team requested writer assist with determining if Financial Counseling is able to assist patient with insurance issues and [employer] prior to discharge.    Spoke with Financial Counselor; she has been waiting for patients [facility]  to return  phone calls for last two days.  reports the steps involved in rectifying the issues will take much longer than the amount of time patient is in the hospital for. Patient should continue to work with her [facility] on a long term basis. The below statement was added to patients discharge After Visit Summary.    Please contact your [facility]  to continue working toward resolution of insurance needs and to obtain [facility] Assistance.  Phone: [phone], choose the option for \"Team 3\"    P: Please contact RN Care Coordinator for additional discharge needs that may arise.        Zuleyma Wynne RN, BSN, PHN  Medicine Care Coordinator  [facility], [facility] and [facility]  Desk Phone: [phone]  Pager: [phone]    To contact Weekend RNCC, dial [phone] and enter job code [phone] at prompt.   This pager can not be contacted by text page or outside line.     "

## 2019-06-28 LAB
ANION GAP SERPL CALCULATED.3IONS-SCNC: 4 MMOL/L (ref 3–14)
BUN SERPL-MCNC: 15 MG/DL (ref 7–30)
CALCIUM SERPL-MCNC: 8.5 MG/DL (ref 8.5–10.1)
CHLORIDE SERPL-SCNC: 101 MMOL/L (ref 94–109)
CO2 SERPL-SCNC: 33 MMOL/L (ref 20–32)
CREAT SERPL-MCNC: 0.76 MG/DL (ref 0.52–1.04)
ERYTHROCYTE [DISTWIDTH] IN BLOOD BY AUTOMATED COUNT: 14 % (ref 10–15)
GFR SERPL CREATININE-BSD FRML MDRD: >90 ML/MIN/{1.73_M2}
GLUCOSE SERPL-MCNC: 101 MG/DL (ref 70–99)
HCT VFR BLD AUTO: 42.9 % (ref 35–47)
HGB BLD-MCNC: 13 G/DL (ref 11.7–15.7)
MAGNESIUM SERPL-MCNC: 1.6 MG/DL (ref 1.6–2.3)
MCH RBC QN AUTO: 30.6 PG (ref 26.5–33)
MCHC RBC AUTO-ENTMCNC: 30.3 G/DL (ref 31.5–36.5)
MCV RBC AUTO: 101 FL (ref 78–100)
PLATELET # BLD AUTO: 312 10E9/L (ref 150–450)
POTASSIUM SERPL-SCNC: 4.5 MMOL/L (ref 3.4–5.3)
RBC # BLD AUTO: 4.25 10E12/L (ref 3.8–5.2)
SODIUM SERPL-SCNC: 138 MMOL/L (ref 133–144)
WBC # BLD AUTO: 5.7 10E9/L (ref 4–11)

## 2019-06-28 PROCEDURE — 36415 COLL VENOUS BLD VENIPUNCTURE: CPT | Performed by: STUDENT IN AN ORGANIZED HEALTH CARE EDUCATION/TRAINING PROGRAM

## 2019-06-28 PROCEDURE — 83735 ASSAY OF MAGNESIUM: CPT | Performed by: STUDENT IN AN ORGANIZED HEALTH CARE EDUCATION/TRAINING PROGRAM

## 2019-06-28 PROCEDURE — 25000128 H RX IP 250 OP 636: Performed by: STUDENT IN AN ORGANIZED HEALTH CARE EDUCATION/TRAINING PROGRAM

## 2019-06-28 PROCEDURE — 80048 BASIC METABOLIC PNL TOTAL CA: CPT | Performed by: STUDENT IN AN ORGANIZED HEALTH CARE EDUCATION/TRAINING PROGRAM

## 2019-06-28 PROCEDURE — 85027 COMPLETE CBC AUTOMATED: CPT | Performed by: STUDENT IN AN ORGANIZED HEALTH CARE EDUCATION/TRAINING PROGRAM

## 2019-06-28 PROCEDURE — 25800030 ZZH RX IP 258 OP 636: Performed by: STUDENT IN AN ORGANIZED HEALTH CARE EDUCATION/TRAINING PROGRAM

## 2019-06-28 PROCEDURE — 25000132 ZZH RX MED GY IP 250 OP 250 PS 637: Performed by: STUDENT IN AN ORGANIZED HEALTH CARE EDUCATION/TRAINING PROGRAM

## 2019-06-28 PROCEDURE — 12000001 ZZH R&B MED SURG/OB UMMC

## 2019-06-28 PROCEDURE — 25000132 ZZH RX MED GY IP 250 OP 250 PS 637: Performed by: FAMILY MEDICINE

## 2019-06-28 RX ORDER — HYDROXYZINE HYDROCHLORIDE 10 MG/1
5 TABLET, FILM COATED ORAL EVERY 4 HOURS PRN
Status: DISCONTINUED | OUTPATIENT
Start: 2019-06-28 | End: 2019-06-29 | Stop reason: HOSPADM

## 2019-06-28 RX ORDER — DIPHENHYDRAMINE HCL 12.5MG/5ML
12.5 LIQUID (ML) ORAL ONCE
Status: DISCONTINUED | OUTPATIENT
Start: 2019-06-28 | End: 2019-06-29 | Stop reason: HOSPADM

## 2019-06-28 RX ORDER — OXYCODONE AND ACETAMINOPHEN 5; 325 MG/1; MG/1
1 TABLET ORAL EVERY 4 HOURS PRN
Qty: 12 TABLET | Refills: 0 | Status: SHIPPED | OUTPATIENT
Start: 2019-06-28

## 2019-06-28 RX ORDER — HYDROXYZINE HYDROCHLORIDE 10 MG/1
10 TABLET, FILM COATED ORAL EVERY 4 HOURS PRN
Qty: 20 TABLET | Refills: 0 | Status: SHIPPED | OUTPATIENT
Start: 2019-06-28 | End: 2019-10-19

## 2019-06-28 RX ORDER — DIPHENHYDRAMINE HCL 25 MG
12.5 TABLET ORAL ONCE
Status: DISCONTINUED | OUTPATIENT
Start: 2019-06-28 | End: 2019-06-28

## 2019-06-28 RX ORDER — ONDANSETRON 4 MG/1
4 TABLET, ORALLY DISINTEGRATING ORAL EVERY 6 HOURS PRN
Qty: 20 TABLET | Refills: 0 | Status: SHIPPED | OUTPATIENT
Start: 2019-06-28

## 2019-06-28 RX ADMIN — ONDANSETRON HYDROCHLORIDE 4 MG: 2 INJECTION, SOLUTION INTRAMUSCULAR; INTRAVENOUS at 14:49

## 2019-06-28 RX ADMIN — OXYCODONE HYDROCHLORIDE 5 MG: 5 TABLET ORAL at 05:45

## 2019-06-28 RX ADMIN — OXYCODONE HYDROCHLORIDE 5 MG: 5 TABLET ORAL at 01:25

## 2019-06-28 RX ADMIN — OXYCODONE HYDROCHLORIDE 5 MG: 5 TABLET ORAL at 09:42

## 2019-06-28 RX ADMIN — ONDANSETRON HYDROCHLORIDE 4 MG: 2 INJECTION, SOLUTION INTRAMUSCULAR; INTRAVENOUS at 21:13

## 2019-06-28 RX ADMIN — OXYCODONE HYDROCHLORIDE 5 MG: 5 TABLET ORAL at 06:51

## 2019-06-28 RX ADMIN — OXYCODONE HYDROCHLORIDE 5 MG: 5 TABLET ORAL at 14:48

## 2019-06-28 RX ADMIN — OXYCODONE HYDROCHLORIDE 5 MG: 5 TABLET ORAL at 11:06

## 2019-06-28 RX ADMIN — ONDANSETRON HYDROCHLORIDE 4 MG: 2 INJECTION, SOLUTION INTRAMUSCULAR; INTRAVENOUS at 03:14

## 2019-06-28 RX ADMIN — OXYCODONE HYDROCHLORIDE 5 MG: 5 TABLET ORAL at 21:13

## 2019-06-28 RX ADMIN — POTASSIUM CHLORIDE, DEXTROSE MONOHYDRATE AND SODIUM CHLORIDE: 150; 5; 450 INJECTION, SOLUTION INTRAVENOUS at 01:24

## 2019-06-28 RX ADMIN — OXYCODONE HYDROCHLORIDE 5 MG: 5 TABLET ORAL at 18:56

## 2019-06-28 RX ADMIN — OXYCODONE HYDROCHLORIDE 5 MG: 5 TABLET ORAL at 17:12

## 2019-06-28 RX ADMIN — OXYCODONE HYDROCHLORIDE 5 MG: 5 TABLET ORAL at 23:10

## 2019-06-28 RX ADMIN — MULTIVITAMIN 15 ML: LIQUID ORAL at 17:11

## 2019-06-28 RX ADMIN — OXYCODONE HYDROCHLORIDE 5 MG: 5 TABLET ORAL at 03:12

## 2019-06-28 ASSESSMENT — PAIN DESCRIPTION - DESCRIPTORS
DESCRIPTORS: CRAMPING

## 2019-06-28 ASSESSMENT — ACTIVITIES OF DAILY LIVING (ADL)
ADLS_ACUITY_SCORE: 10

## 2019-06-28 ASSESSMENT — MIFFLIN-ST. JEOR: SCORE: 1205.17

## 2019-06-28 NOTE — PLAN OF CARE
"Time: 8800-4290  Reason of admission: partial small intestine obstruction   Vitals: vs stable with exception of low bp, baseline per patient report and asymptomatic.   BP 97/70 (BP Location: Left arm)   Pulse 85   Temp 98.2  F (36.8  C) (Oral)   Resp 16   Ht 1.753 m (5' 9\")   Wt 47.8 kg (105 lb 6.4 oz)   SpO2 97%   BMI 15.56 kg/m       Activity:independent   Pain: c/o abdominal pain. Oxycodone 5 mg scheduled and prn. Alternated every 2 hours prn and scheduled, pt appreciative.    Neuro:  A&Ox4, call appropriately   Cardiac: WNL, denied chest pain or SOB.   Respiratory: on room air, maintained O2 sat > 92%, denied SOB, deep breathing and coughing encouraged.   GI/:  abdomen tender, no bm this evening, bowel sound active all quadrant. C/o nausea, no emesis. Zofran x 1 for this shift with some desired effect. Voiding spontaneously using bathroom.   Diet:  regular, prn for nausea was given x 1. Patient reported that she still has tolerable nausea.    Skin: no deficit.   LDAs: PIV, infusing D5 1/2 NS + KCl 125 ml/hr  Labs: pending    New change for this shift: none   Plan: .discharge home today. Patient thinks that she is not ready for discharge because unable to retain food.   "
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
## 2019-06-28 NOTE — PROGRESS NOTES
"CLINICAL NUTRITION SERVICES - REASSESSMENT NOTE     Nutrition Prescription    RECOMMENDATIONS FOR MDs/PROVIDERS TO ORDER:  If patient is engaging in self-induced emesis, recommend as follows:   - Removal of garbage from room for 30 - 60 minutes after meals  - Consideration of 1:1 sitter during meals and 30 - 60 minutes after meals     Malnutrition Status:    Severe malnutrition in the context of chronic illness.      Recommendations already ordered by Registered Dietitian (RD):  Updated weight measurement   Room Service Appropriate with Assist   Magic Cup (chocolate) TID between meals   Certavite (15 ml daily)      Future/Additional Recommendations:  1. Continue to monitor oral intake. Pending LOS, consider starting calorie counts to obtain objective data regarding oral intake.    2. Monitor weight trends for maintenance/desired weight gain.     3. Monitor validity of patient report of not engaging in self-induced emesis.      EVALUATION OF THE PROGRESS TOWARD GOALS   Diet: Regular + Room Service Appropriate with Assist   Intake: % of 2-3 meals/day per RN flowsheet.   *Suspect inaccuracy of oral intake records given patient throwing food out per nursing report and suspected vomiting after meals.   - Per bedside RN note on 6/28: \"Reported by previous RN to watch out for possible binging large amounts of food then purging behavior like yesterday.\"   - Per [facility] review, patient ordering 2-3 meals/day via kitchen.      Information obtained from patient:   -  Patient denies self-induced vomiting and states \"why would I do that when I am desperately trying to gain weight.\" Reports that her vomiting is uncontrollable.    - Pt reporting that she will experience increased nausea/vomiting with fluids vs solid foods. Expressed having a minimal appetite at this time given severe abd pain. Reports that drinking water will make her feel very full, which she does not like feeling that way so she tends to avoid fluids " "when this bloating occurs.   - Patient states that she has tried using Zofran prior to all meals, but does not like feeling constipated (reported side effect of med) the day after.     NEW FINDINGS   Weight: unable to assess weight trends during LOS as no weight obtained since admit.      Labs: M.6 (WNL, trending down over the past 48-hrs)    GI: per I/Os, patient stooling 1-11x/day over the past week.   Per MD note on : \"denies having regular food yesterday, c/o discomfort and belching with liquids\"     MALNUTRITION  % Intake: Does not meet criteria (1-2 days of worsening intake)   % Weight Loss: > 5% in 1 month (severe)--overall trend   Subcutaneous Fat Loss: Facial region, Upper arm, Lower arm and Thoracic/intercostal:  Moderate-severe   Muscle Loss: Temporal, Facial & jaw region, Scapular bone, Thoracic region (clavicle, acromium bone, deltoid, trapezius, pectoral), Upper arm (bicep, tricep), Lower arm  (forearm), Dorsal hand, Upper leg (quadricep, hamstring), Patellar region and Posterior calf: Severe   Fluid Accumulation/Edema: None noted  Malnutrition Diagnosis: Severe malnutrition in the context of chronic illness.     Previous Goals   Diet adv vs nutrition support within 2-3 days.  Evaluation: Met    Previous Nutrition Diagnosis  Inadequate oral intake related to altered GI status (SBO) as evidenced by NPO status.     Evaluation: No change, updated below     CURRENT NUTRITION DIAGNOSIS  Inadequate protein-energy intake related to decreased appetite and nausea/vomiting as evidenced by patient report of inability tolerate PO intake w/o emesis and observed muscle/fat loss.         INTERVENTIONS  Implementation  Updated weight measurement   Room Service Appropriate with Assist   Medical food supplement therapy (see above)   Multivitamin/mineral supplement therapy (see above)   Collaboration with other providers: spoke with team provider regarding strategies to monitor for self-induced emesis. "     Goals  1. Weight maintenance of >/= 48 kg.     2. Patient to consume % of nutritionally adequate meal trays TID, or the equivalent with supplements/snacks.    Monitoring/Evaluation  Progress toward goals will be monitored and evaluated per protocol.       Vanessa Robert RD, LD   5A [phone])/7B floor pager [phone]

## 2019-06-28 NOTE — DISCHARGE INSTRUCTIONS
"Please contact your [facility] Waiver  to continue working toward resolution of insurance needs and to obtain [facility] Waiver Assistance.   Phone: [phone], choose the option for \"Team 3\"    __________________________________________________________    Outpatient hydration has been arranged for you:    On July 1, 3, 5, 8,10, and 15th your normal saline infusions will be at:  [facility]  [address], [place]  Ph: [phone]    On July 12th and from July 17th after your normal saline infusions will be at:  [facility]  [address] [place] [address]  Ph: [phone]    "

## 2019-06-28 NOTE — PROGRESS NOTES
Shift: 1530 - 1930  VS: Temp: 98.1  F (36.7  C) Temp src: Oral BP: 105/72   Heart Rate: 87 Resp: 16 SpO2: 95 % O2 Device: None (Room air)    Pain: Abdominal pain, oxycodone 5mg scheduled given, alternated with PRN oxycodone 5mg.   Neuro: A&Ox4. Calm and cooperative with care.   Cardiac:   WDL,   Respiratory: Lung sounds clear on RA. Denies DURAN/SOB.  GI/Diet/Appetite: Regular diet good appetite. Concern for eating disorder, monitor for N/V. Abdomen is rounded, bowel sounds A&A. LBM 6/28.   :  Voiding w/o difficulty.   LDA's: PIV to LUE, SL   Skin: Intact.   Activity: independent.   Tests/Procedures:   Pertinent Labs/Lab Collection:      Plan: Discharge 6/29. Oxycodone script in chart.

## 2019-06-28 NOTE — PLAN OF CARE
"Time: 8433-7916  Reason of admission: partial small intestine obstruction   Vitals: vs stable with exception of low bp, baseline per patient report and asymptomatic.   BP 97/70 (BP Location: Left arm)   Pulse 85   Temp 98.2  F (36.8  C) (Oral)   Resp 16   Ht 1.753 m (5' 9\")   Wt 47.8 kg (105 lb 6.4 oz)   SpO2 97%   BMI 15.56 kg/m      Activity:independent   Pain: c/o abdominal pain. Patient had emesis at 1935 x1. Day nurse reported that patient had multiple trays of meals if induced emesis with partial small bowel obstruction. Pt was crying of abdominal pain. Scheduled Oxycodone 5 mg offered, but pt refused and c/o nausea for oral pill. Patient wanted to have iv dilaudid instead, which was discontinued during the day shift. Patient wanted back her iv pain med. This RN tried to explain with crossover coverage that they can't reverse discontinued med by primary attendant. Pager sent to [facility] and explained the situation. [facility] didn't give a new order as they couldn't find a reason for iv pain med. Pt updated and agreed to take oxycodone. Pt doesn't want to go home. No emesis with intake of Oxycodone.   Neuro:  A&Ox4, crying of pain  Cardiac: WNL, denied chest pain or SOB.   Respiratory: on room air, maintained O2 sat > 92%, denied SOB, deep breathing and coughing encouraged.   GI/:  abdomen tender, no bm this evening, bowel sound active all quadrant. C/o nausea/vomiting. Voiding spontaneously using bathroom.   Diet:  regular, emesis x 1, prn for nausea was given prior emesis. Patient reported that she still has tolerable nausea.    Skin: no deficit.   LDAs: PIV, infusing D5 1/2 NS + KCl 125 ml/hr  Labs: no labs this evening.   New change for this shift: none   Plan: .discharge home tomorrow.   "

## 2019-06-28 NOTE — PLAN OF CARE
Patient remains A&O, ambulating frequently and tolerating PO intake. Patient reporting minor nausea, declining intervention at this time. Reported by previous RN to watch out for possible binging large amounts of food then purging behavior like yesterday- asked dietary to let RN know if ordering excessively outside of normal breakfast, lunch, dinner. Possible discharge later today.

## 2019-06-28 NOTE — PROGRESS NOTES
"  Care Coordinator - Discharge Planning    Admission Date/Time:  6/22/2019  Attending MD:  Susu Mcknight MD     Data  Date of initial CC assessment:  6/272019  Chart reviewed, discussed with interdisciplinary team.   Patient was admitted for:   1. Small bowel obstruction (H)    2. Failure to thrive in adult         Assessment   Concerns with insurance coverage for discharge needs: None.  Current Living Situation: Patient lives in a truck outside her mothers home.  Services Involved: Outpatient Infusion Services  Transportation at Discharge: Car  Transportation to Medical Appointments:  - self  Barriers to Discharge: medical needs      Coordination of Care  D: Chart reviewed and plan of care discussed with Medical Team. Plan for patient to discharge when medically stable.     I/A: Per Team patient needs [facility] Infusion MWF after discharge. Therapy Plan complete. [facility] can accept patient beginning 7/12, they are full on 7/15, okay to accept on a regular schedule after that beginning 7/17. Orders will be completed by their .    In the mean time patient can go to [facility]. (patient should not be scheduled for \"Home Infusion\" due to living conditions (currently living in her working [facility]). Team to complete [facility]fusion orders (for [facility]) in patients discharge orders for July 1,3,5,8,10, and 15th.     [facility]  [address] [place], [place]    All infusions, at both facilities, are by peripheral IV.  Benefit check: Patient has [employer] plan with a ded $3000 (all met) coverage is at 100% for IV hydration.    P: Care Coordinator will remain available for discharge needs that may arise.          Referrals: Provided patient with options for Outpatient Infusion Services. The below information has been added to patients discharge paperwork:    Outpatient hydration has been arranged for you:    On July 1, 3, 5, 8,10, " and 15th your normal saline infusions will be at:  [facility]  [address] [place], [place]  Ph: [phone]    On July 12th and from July 17th after your normal saline infusions will be at:  [facility]  [address] [place] [address]  Ph: [phone]      Plan  Anticipated Discharge Date:  Over the weekend  Anticipated Discharge Plan:  Home      Zuleyma Wynne RN, BSN, PHN  Medicine Care Coordinator  [facility]  Desk Phone: [phone]  Pager: [phone]    To contact Weekend RNCC, dial [phone] and enter job code [phone] at prompt.   This pager can not be contacted by text page or outside line.

## 2019-06-29 VITALS
SYSTOLIC BLOOD PRESSURE: 109 MMHG | WEIGHT: 108.2 LBS | TEMPERATURE: 97.7 F | RESPIRATION RATE: 16 BRPM | OXYGEN SATURATION: 97 % | HEIGHT: 69 IN | BODY MASS INDEX: 16.03 KG/M2 | DIASTOLIC BLOOD PRESSURE: 72 MMHG | HEART RATE: 87 BPM

## 2019-06-29 LAB — LACTATE BLD-SCNC: 1.1 MMOL/L (ref 0.7–2)

## 2019-06-29 PROCEDURE — 25000128 H RX IP 250 OP 636: Performed by: STUDENT IN AN ORGANIZED HEALTH CARE EDUCATION/TRAINING PROGRAM

## 2019-06-29 PROCEDURE — 83605 ASSAY OF LACTIC ACID: CPT | Performed by: FAMILY MEDICINE

## 2019-06-29 PROCEDURE — 36415 COLL VENOUS BLD VENIPUNCTURE: CPT | Performed by: FAMILY MEDICINE

## 2019-06-29 PROCEDURE — 25000132 ZZH RX MED GY IP 250 OP 250 PS 637: Performed by: STUDENT IN AN ORGANIZED HEALTH CARE EDUCATION/TRAINING PROGRAM

## 2019-06-29 RX ADMIN — OXYCODONE HYDROCHLORIDE 5 MG: 5 TABLET ORAL at 06:28

## 2019-06-29 RX ADMIN — ONDANSETRON HYDROCHLORIDE 4 MG: 2 INJECTION, SOLUTION INTRAMUSCULAR; INTRAVENOUS at 06:26

## 2019-06-29 RX ADMIN — OXYCODONE HYDROCHLORIDE 5 MG: 5 TABLET ORAL at 03:31

## 2019-06-29 RX ADMIN — OXYCODONE HYDROCHLORIDE 5 MG: 5 TABLET ORAL at 11:23

## 2019-06-29 RX ADMIN — OXYCODONE HYDROCHLORIDE 5 MG: 5 TABLET ORAL at 09:17

## 2019-06-29 RX ADMIN — OXYCODONE HYDROCHLORIDE 5 MG: 5 TABLET ORAL at 01:30

## 2019-06-29 RX ADMIN — OXYCODONE HYDROCHLORIDE 5 MG: 5 TABLET ORAL at 17:33

## 2019-06-29 RX ADMIN — OXYCODONE HYDROCHLORIDE 5 MG: 5 TABLET ORAL at 15:12

## 2019-06-29 ASSESSMENT — ACTIVITIES OF DAILY LIVING (ADL)
ADLS_ACUITY_SCORE: 10

## 2019-06-29 ASSESSMENT — PAIN DESCRIPTION - DESCRIPTORS: DESCRIPTORS: ACHING;CONSTANT;CRAMPING

## 2019-06-29 NOTE — PLAN OF CARE
"/72 (BP Location: Left arm)   Pulse 87   Temp 97.7  F (36.5  C) (Oral)   Resp 16   Ht 1.753 m (5' 9\")   Wt 49.1 kg (108 lb 3.2 oz)   SpO2 97%   BMI 15.98 kg/m      Patient reports pain is tolerable with PRN and scheduled oral oxycodone. Tolerating diet, denies n/v. PIV removed.  Triggers sepsis, VS done, Lactate results 1.1. MD aware of lactate result and okay patient is being discharge.  Discharge education materials provided, pt verbalized understand the education materials and follow up appointments.  was talking to patient over the phone to arranged plans and pt declined taxi services. Pt stated \" I have ride. I will have my family come pick me up.\" Patient left at 1800 with all her belongings and her discharged medications.    "
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
## 2019-06-29 NOTE — PROGRESS NOTES
"Social Work Services Progress Note    Hospital Day: 7  Date of Initial Social Work Evaluation:  6/25/19  Collaborated with:   Nursing    Data: Pt is a 43 year-old woman admitted to . SW consulted for resources as pt has been living in her truck. Pt is aware of/connected with all available resources at this time (see SW assessment by Melissa Fuller 6/25/19; for additional context see SW notes by Riya Munguia from 11/2018).    SW was paged by  Nursing station as Pt had inquired about the [facility] resource, SW provided the resource number (P: [phone]) and informed them that the weekend hours are 1-5 pm. No additional needs indicated at this time.      Addendum: SW paged by Pt's nurse that Pt is reporting she doesn't feel medically stable to discharge today and had questions about [facility]. SW spoke w/ Pt re: concerns. Patient states she does not wish to be sent in a cab [place] to a shelter without being guaranteed a bed and stated \"I might as well be sent in a cab to where my truck is parked and stay there.\" SW informed Pt that she has to decide which option is most appropriate for her. Per team, Pt is medically stable to discharge and no longer requires hospital level of care. SW informed Pt writer would page primary team so she can discuss her concerns. Pt expressed frustration with her overall plan and limited options stating \"I don't even know why you're talking to me, you can't help.\" and Pt ended the conversation. Primary medical team was paged and bedside nurse was updated with cab voucher information.     Intervention:  Homeless resource    Assessment:  See bedside notes, medical team notes    Plan:    Anticipated Disposition:  Home with services    Barriers to d/c plan:  N/A    Follow Up:  SW to f/u & assist as needed.    LUCHO Morales, URIEL  7C Surgical/Oncology Unit   Phone: [phone]  Pager: [phone]  "

## 2019-06-29 NOTE — PLAN OF CARE
Patient complained of abdominal constant abdominal pain and cramping,scheduled oxycodone and prn oxycodone controlling pain.Discharge order written by provider,pt reported that she does not have a place to  go to after discharge,that she lives in her truck, was contacted,phone number to [facility] provided to patient,pt said she called the place and was told she has to [facility] and [facility] an intake form and that there is no guarantee of getting a bed there tonight,pt said she does not want to get stuck there if there is no bed opening. paged  for intervention,awaiting call back.Continue with plan of care.

## 2019-06-29 NOTE — PLAN OF CARE
"1900-2330: AVSS. Pt complains of stomach upset/pain. States stomach \"feels like its flip flopping/churning.\" Gave prn oxy x1 and scheduled oxy x1. Gave prn zofran x1 as well; pt is still able to eat/keep foods down. Plan to discharge tomorrow. No other concerns, continue plan of care.     Problem: Adult Inpatient Plan of Care  Goal: Plan of Care Review  Outcome: No Change     Problem: Adult Inpatient Plan of Care  Goal: Patient-Specific Goal (Individualization)  Outcome: No Change     Problem: Adult Inpatient Plan of Care  Goal: Absence of Hospital-Acquired Illness or Injury  Outcome: No Change     Problem: Pain (Intestinal Obstruction)  Goal: Acceptable Pain Control  Outcome: No Change     "

## 2019-06-30 ENCOUNTER — HOME INFUSION (PRE-WILLOW HOME INFUSION) (OUTPATIENT)
Dept: PHARMACY | Facility: CLINIC | Age: 44
End: 2019-06-30

## 2019-06-30 ENCOUNTER — PATIENT OUTREACH (OUTPATIENT)
Dept: CARE COORDINATION | Facility: CLINIC | Age: 44
End: 2019-06-30

## 2019-07-01 ENCOUNTER — HOME INFUSION (PRE-WILLOW HOME INFUSION) (OUTPATIENT)
Dept: PHARMACY | Facility: CLINIC | Age: 44
End: 2019-07-01

## 2019-07-01 NOTE — PROGRESS NOTES
This is a recent snapshot of the patient's [facility] medical record.  For current drug dose and complete information and questions, call [phone] or In Basket pool, fv home infusion ([id number])  CSN Number:  [id number]

## 2019-07-02 NOTE — PROGRESS NOTES
This is a recent snapshot of the patient's [facility] medical record.  For current drug dose and complete information and questions, call [phone] or In Basket pool, fv home infusion ([id number])  CSN Number:  [id number]

## 2019-07-03 ENCOUNTER — HOME INFUSION (PRE-WILLOW HOME INFUSION) (OUTPATIENT)
Dept: PHARMACY | Facility: CLINIC | Age: 44
End: 2019-07-03

## 2019-07-05 ENCOUNTER — HOME INFUSION (PRE-WILLOW HOME INFUSION) (OUTPATIENT)
Dept: PHARMACY | Facility: CLINIC | Age: 44
End: 2019-07-05

## 2019-07-05 NOTE — PROGRESS NOTES
This is a recent snapshot of the patient's [facility] medical record.  For current drug dose and complete information and questions, call [phone] or In Basket pool, fv home infusion ([id number])  CSN Number:  [id number]

## 2019-07-08 ENCOUNTER — HOME INFUSION (PRE-WILLOW HOME INFUSION) (OUTPATIENT)
Dept: PHARMACY | Facility: CLINIC | Age: 44
End: 2019-07-08

## 2019-07-08 NOTE — PROGRESS NOTES
This is a recent snapshot of the patient's [facility] medical record.  For current drug dose and complete information and questions, call [phone] or In Basket pool, fv home infusion ([id number])  CSN Number:  [id number]

## 2019-07-09 ENCOUNTER — HOSPITAL ENCOUNTER (EMERGENCY)
Facility: CLINIC | Age: 44
Discharge: LEFT BEFORE DISCHARGE | End: 2019-07-09
Attending: EMERGENCY MEDICINE
Payer: COMMERCIAL

## 2019-07-09 VITALS
DIASTOLIC BLOOD PRESSURE: 81 MMHG | WEIGHT: 108.1 LBS | SYSTOLIC BLOOD PRESSURE: 106 MMHG | RESPIRATION RATE: 18 BRPM | TEMPERATURE: 98 F | HEART RATE: 82 BPM | OXYGEN SATURATION: 100 % | BODY MASS INDEX: 15.96 KG/M2

## 2019-07-09 DIAGNOSIS — Z53.21 ELOPED FROM EMERGENCY DEPARTMENT: ICD-10-CM

## 2019-07-09 NOTE — PROGRESS NOTES
This is a recent snapshot of the patient's [facility] medical record.  For current drug dose and complete information and questions, call [phone] or In [facility] pool, fv home infusion ([id number])  CSN Number:  [id number]

## 2019-07-09 NOTE — ED NOTES
Writer went into pts room to fulfil lab orders and place IV. Pt was not in room and belongings were missing. Writer went out to triage area and asked if anyone at the desk had seen pt, but no one could confirm. Unit was checked and pt is not present and has eloped. MD informed of pt status, had not seen pt yet.

## 2019-07-09 NOTE — ED NOTES
Bed: ED04  Expected date:   Expected time:   Means of arrival:   Comments:  MAEGAN 631  43F - Abdominal pain, distention  YELLOW

## 2019-07-09 NOTE — ED TRIAGE NOTES
Pt c/o abdominal pain. Started yesterday. Pt states normal bowel and bladder habits, diarrhea and nausea are baseline. Pt states normal intake for duration of pain.

## 2019-07-09 NOTE — PROGRESS NOTES
Emergency Social Work Services Note    Date of  Intervention: 07/09/19  Last Emergency Department Visit:  6/22/19  Care Plan:  Discharge with resources  Collaborated with:  MD, RN    Data:  Pt is a 43 year-old woman known to SW service from prior admissions.    Intervention:  SW consulted with bedside RN and MD and provided information on pt's recent hospitalization. RN to offer SW support to pt if pt is interested; pt has been educated on most resources at this time but SW available for emotional support (see SW notes from Riya Munguia [11/05-11/21/2018] and Melissa Fuller [6/25/19] for additional information).     SW was told by team shortly after that pt left without being seen.    Assessment:  Pt left without being seen    Plan:    Anticipated Disposition:  Left ED without being seen    Barriers to d/c plan:  N/A    Follow Up:  N/A    LUCHO Mcintosh, FARHEEN  [facility]   Pager: [phone]

## 2019-07-09 NOTE — ED NOTES
Patient was found to not be in the room for evaluation.  Patient eloped from room prior to being seen by physician.     Denny Diaz,   07/09/19 1826

## 2019-10-03 ENCOUNTER — HEALTH MAINTENANCE LETTER (OUTPATIENT)
Age: 44
End: 2019-10-03

## 2019-10-18 ENCOUNTER — HOSPITAL ENCOUNTER (INPATIENT)
Facility: CLINIC | Age: 44
LOS: 29 days | Discharge: HOME | DRG: 329 | End: 2019-11-17
Attending: EMERGENCY MEDICINE | Admitting: FAMILY MEDICINE
Payer: COMMERCIAL

## 2019-10-18 ENCOUNTER — APPOINTMENT (OUTPATIENT)
Dept: CT IMAGING | Facility: CLINIC | Age: 44
DRG: 329 | End: 2019-10-18
Attending: EMERGENCY MEDICINE
Payer: COMMERCIAL

## 2019-10-18 DIAGNOSIS — K56.609 SBO (SMALL BOWEL OBSTRUCTION) (H): ICD-10-CM

## 2019-10-18 DIAGNOSIS — K56.609 SMALL BOWEL OBSTRUCTION (H): Primary | ICD-10-CM

## 2019-10-18 LAB
ALBUMIN SERPL-MCNC: 3.4 G/DL (ref 3.4–5)
ALP SERPL-CCNC: 95 U/L (ref 40–150)
ALT SERPL W P-5'-P-CCNC: 49 U/L (ref 0–50)
ANION GAP SERPL CALCULATED.3IONS-SCNC: 6 MMOL/L (ref 3–14)
AST SERPL W P-5'-P-CCNC: 19 U/L (ref 0–45)
BASOPHILS # BLD AUTO: 0 10E9/L (ref 0–0.2)
BASOPHILS NFR BLD AUTO: 0.4 %
BILIRUB SERPL-MCNC: 0.4 MG/DL (ref 0.2–1.3)
BUN SERPL-MCNC: 14 MG/DL (ref 7–30)
CALCIUM SERPL-MCNC: 8.3 MG/DL (ref 8.5–10.1)
CHLORIDE SERPL-SCNC: 103 MMOL/L (ref 94–109)
CO2 SERPL-SCNC: 29 MMOL/L (ref 20–32)
CREAT SERPL-MCNC: 0.59 MG/DL (ref 0.52–1.04)
DIFFERENTIAL METHOD BLD: NORMAL
EOSINOPHIL # BLD AUTO: 0.1 10E9/L (ref 0–0.7)
EOSINOPHIL NFR BLD AUTO: 1.2 %
ERYTHROCYTE [DISTWIDTH] IN BLOOD BY AUTOMATED COUNT: 13.2 % (ref 10–15)
GFR SERPL CREATININE-BSD FRML MDRD: >90 ML/MIN/{1.73_M2}
GLUCOSE SERPL-MCNC: 98 MG/DL (ref 70–99)
HCT VFR BLD AUTO: 40.5 % (ref 35–47)
HGB BLD-MCNC: 13 G/DL (ref 11.7–15.7)
IMM GRANULOCYTES # BLD: 0.1 10E9/L (ref 0–0.4)
IMM GRANULOCYTES NFR BLD: 0.5 %
LACTATE BLD-SCNC: 1.2 MMOL/L (ref 0.7–2)
LYMPHOCYTES # BLD AUTO: 2.2 10E9/L (ref 0.8–5.3)
LYMPHOCYTES NFR BLD AUTO: 20.2 %
MCH RBC QN AUTO: 31.2 PG (ref 26.5–33)
MCHC RBC AUTO-ENTMCNC: 32.1 G/DL (ref 31.5–36.5)
MCV RBC AUTO: 97 FL (ref 78–100)
MONOCYTES # BLD AUTO: 0.7 10E9/L (ref 0–1.3)
MONOCYTES NFR BLD AUTO: 6.4 %
NEUTROPHILS # BLD AUTO: 7.7 10E9/L (ref 1.6–8.3)
NEUTROPHILS NFR BLD AUTO: 71.3 %
NRBC # BLD AUTO: 0 10*3/UL
NRBC BLD AUTO-RTO: 0 /100
PLATELET # BLD AUTO: 313 10E9/L (ref 150–450)
POTASSIUM SERPL-SCNC: 2.8 MMOL/L (ref 3.4–5.3)
PROT SERPL-MCNC: 6.5 G/DL (ref 6.8–8.8)
RBC # BLD AUTO: 4.17 10E12/L (ref 3.8–5.2)
SODIUM SERPL-SCNC: 138 MMOL/L (ref 133–144)
WBC # BLD AUTO: 10.8 10E9/L (ref 4–11)

## 2019-10-18 PROCEDURE — 83605 ASSAY OF LACTIC ACID: CPT | Performed by: EMERGENCY MEDICINE

## 2019-10-18 PROCEDURE — 74177 CT ABD & PELVIS W/CONTRAST: CPT

## 2019-10-18 PROCEDURE — 25000132 ZZH RX MED GY IP 250 OP 250 PS 637: Performed by: EMERGENCY MEDICINE

## 2019-10-18 PROCEDURE — 85025 COMPLETE CBC W/AUTO DIFF WBC: CPT | Performed by: EMERGENCY MEDICINE

## 2019-10-18 PROCEDURE — 99285 EMERGENCY DEPT VISIT HI MDM: CPT | Mod: Z6 | Performed by: EMERGENCY MEDICINE

## 2019-10-18 PROCEDURE — 96365 THER/PROPH/DIAG IV INF INIT: CPT | Performed by: EMERGENCY MEDICINE

## 2019-10-18 PROCEDURE — 83735 ASSAY OF MAGNESIUM: CPT | Performed by: EMERGENCY MEDICINE

## 2019-10-18 PROCEDURE — 96375 TX/PRO/DX INJ NEW DRUG ADDON: CPT | Performed by: EMERGENCY MEDICINE

## 2019-10-18 PROCEDURE — 25000128 H RX IP 250 OP 636: Performed by: EMERGENCY MEDICINE

## 2019-10-18 PROCEDURE — 80053 COMPREHEN METABOLIC PANEL: CPT | Performed by: EMERGENCY MEDICINE

## 2019-10-18 PROCEDURE — 99285 EMERGENCY DEPT VISIT HI MDM: CPT | Mod: 25 | Performed by: EMERGENCY MEDICINE

## 2019-10-18 RX ORDER — ONDANSETRON 2 MG/ML
4 INJECTION INTRAMUSCULAR; INTRAVENOUS ONCE
Status: COMPLETED | OUTPATIENT
Start: 2019-10-18 | End: 2019-10-18

## 2019-10-18 RX ORDER — POTASSIUM CL/LIDO/0.9 % NACL 10MEQ/0.1L
10 INTRAVENOUS SOLUTION, PIGGYBACK (ML) INTRAVENOUS ONCE
Status: COMPLETED | OUTPATIENT
Start: 2019-10-18 | End: 2019-10-19

## 2019-10-18 RX ORDER — SODIUM CHLORIDE 9 MG/ML
1000 INJECTION, SOLUTION INTRAVENOUS CONTINUOUS
Status: DISCONTINUED | OUTPATIENT
Start: 2019-10-18 | End: 2019-10-19 | Stop reason: DRUGHIGH

## 2019-10-18 RX ORDER — OLANZAPINE 5 MG/1
5 TABLET, ORALLY DISINTEGRATING ORAL ONCE
Status: COMPLETED | OUTPATIENT
Start: 2019-10-18 | End: 2019-10-18

## 2019-10-18 RX ADMIN — SODIUM CHLORIDE 1000 ML: 9 INJECTION, SOLUTION INTRAVENOUS at 23:40

## 2019-10-18 RX ADMIN — Medication 10 MEQ: at 23:40

## 2019-10-18 RX ADMIN — ONDANSETRON HYDROCHLORIDE 4 MG: 2 INJECTION, SOLUTION INTRAMUSCULAR; INTRAVENOUS at 23:36

## 2019-10-18 RX ADMIN — HYDROMORPHONE HYDROCHLORIDE 1 MG: 1 INJECTION, SOLUTION INTRAMUSCULAR; INTRAVENOUS; SUBCUTANEOUS at 23:36

## 2019-10-18 RX ADMIN — OLANZAPINE 5 MG: 5 TABLET, ORALLY DISINTEGRATING ORAL at 23:36

## 2019-10-18 ASSESSMENT — ENCOUNTER SYMPTOMS
VOMITING: 1
NAUSEA: 1
ABDOMINAL PAIN: 1
DIARRHEA: 1
ABDOMINAL DISTENTION: 1

## 2019-10-19 PROBLEM — K56.609 SMALL BOWEL OBSTRUCTION (H): Status: ACTIVE | Noted: 2019-10-19

## 2019-10-19 LAB
ANION GAP SERPL CALCULATED.3IONS-SCNC: 5 MMOL/L (ref 3–14)
BASOPHILS # BLD AUTO: 0.1 10E9/L (ref 0–0.2)
BASOPHILS NFR BLD AUTO: 0.5 %
BUN SERPL-MCNC: 11 MG/DL (ref 7–30)
CALCIUM SERPL-MCNC: 7.4 MG/DL (ref 8.5–10.1)
CHLORIDE SERPL-SCNC: 108 MMOL/L (ref 94–109)
CO2 SERPL-SCNC: 30 MMOL/L (ref 20–32)
CREAT SERPL-MCNC: 0.6 MG/DL (ref 0.52–1.04)
DIFFERENTIAL METHOD BLD: ABNORMAL
EOSINOPHIL # BLD AUTO: 0.2 10E9/L (ref 0–0.7)
EOSINOPHIL NFR BLD AUTO: 1.6 %
ERYTHROCYTE [DISTWIDTH] IN BLOOD BY AUTOMATED COUNT: 13.2 % (ref 10–15)
GFR SERPL CREATININE-BSD FRML MDRD: >90 ML/MIN/{1.73_M2}
GLUCOSE SERPL-MCNC: 79 MG/DL (ref 70–99)
HCT VFR BLD AUTO: 39.5 % (ref 35–47)
HGB BLD-MCNC: 12.4 G/DL (ref 11.7–15.7)
IMM GRANULOCYTES # BLD: 0.1 10E9/L (ref 0–0.4)
IMM GRANULOCYTES NFR BLD: 1.1 %
LYMPHOCYTES # BLD AUTO: 2.5 10E9/L (ref 0.8–5.3)
LYMPHOCYTES NFR BLD AUTO: 26.7 %
MAGNESIUM SERPL-MCNC: 1.3 MG/DL (ref 1.6–2.3)
MAGNESIUM SERPL-MCNC: 2.2 MG/DL (ref 1.6–2.3)
MAGNESIUM SERPL-MCNC: 2.2 MG/DL (ref 1.6–2.3)
MCH RBC QN AUTO: 31.2 PG (ref 26.5–33)
MCHC RBC AUTO-ENTMCNC: 31.4 G/DL (ref 31.5–36.5)
MCV RBC AUTO: 99 FL (ref 78–100)
MONOCYTES # BLD AUTO: 0.7 10E9/L (ref 0–1.3)
MONOCYTES NFR BLD AUTO: 7.1 %
NEUTROPHILS # BLD AUTO: 5.8 10E9/L (ref 1.6–8.3)
NEUTROPHILS NFR BLD AUTO: 63 %
NRBC # BLD AUTO: 0 10*3/UL
NRBC BLD AUTO-RTO: 0 /100
PLATELET # BLD AUTO: 318 10E9/L (ref 150–450)
POTASSIUM SERPL-SCNC: 3.2 MMOL/L (ref 3.4–5.3)
POTASSIUM SERPL-SCNC: 3.9 MMOL/L (ref 3.4–5.3)
RADIOLOGIST FLAGS: ABNORMAL
RBC # BLD AUTO: 3.98 10E12/L (ref 3.8–5.2)
SODIUM SERPL-SCNC: 142 MMOL/L (ref 133–144)
WBC # BLD AUTO: 9.2 10E9/L (ref 4–11)

## 2019-10-19 PROCEDURE — 36415 COLL VENOUS BLD VENIPUNCTURE: CPT | Performed by: FAMILY MEDICINE

## 2019-10-19 PROCEDURE — 83735 ASSAY OF MAGNESIUM: CPT | Performed by: EMERGENCY MEDICINE

## 2019-10-19 PROCEDURE — 80048 BASIC METABOLIC PNL TOTAL CA: CPT | Performed by: STUDENT IN AN ORGANIZED HEALTH CARE EDUCATION/TRAINING PROGRAM

## 2019-10-19 PROCEDURE — 25000132 ZZH RX MED GY IP 250 OP 250 PS 637: Performed by: FAMILY MEDICINE

## 2019-10-19 PROCEDURE — 83735 ASSAY OF MAGNESIUM: CPT | Performed by: STUDENT IN AN ORGANIZED HEALTH CARE EDUCATION/TRAINING PROGRAM

## 2019-10-19 PROCEDURE — 36415 COLL VENOUS BLD VENIPUNCTURE: CPT | Performed by: STUDENT IN AN ORGANIZED HEALTH CARE EDUCATION/TRAINING PROGRAM

## 2019-10-19 PROCEDURE — 12000001 ZZH R&B MED SURG/OB UMMC

## 2019-10-19 PROCEDURE — 25800030 ZZH RX IP 258 OP 636: Performed by: STUDENT IN AN ORGANIZED HEALTH CARE EDUCATION/TRAINING PROGRAM

## 2019-10-19 PROCEDURE — 96376 TX/PRO/DX INJ SAME DRUG ADON: CPT | Performed by: EMERGENCY MEDICINE

## 2019-10-19 PROCEDURE — 25800030 ZZH RX IP 258 OP 636: Performed by: EMERGENCY MEDICINE

## 2019-10-19 PROCEDURE — 85025 COMPLETE CBC W/AUTO DIFF WBC: CPT | Performed by: STUDENT IN AN ORGANIZED HEALTH CARE EDUCATION/TRAINING PROGRAM

## 2019-10-19 PROCEDURE — 25000128 H RX IP 250 OP 636: Performed by: EMERGENCY MEDICINE

## 2019-10-19 PROCEDURE — 83735 ASSAY OF MAGNESIUM: CPT | Performed by: FAMILY MEDICINE

## 2019-10-19 PROCEDURE — 84132 ASSAY OF SERUM POTASSIUM: CPT | Performed by: FAMILY MEDICINE

## 2019-10-19 PROCEDURE — 25000132 ZZH RX MED GY IP 250 OP 250 PS 637: Performed by: STUDENT IN AN ORGANIZED HEALTH CARE EDUCATION/TRAINING PROGRAM

## 2019-10-19 PROCEDURE — 96361 HYDRATE IV INFUSION ADD-ON: CPT | Performed by: EMERGENCY MEDICINE

## 2019-10-19 PROCEDURE — 25000128 H RX IP 250 OP 636: Performed by: FAMILY MEDICINE

## 2019-10-19 PROCEDURE — 96366 THER/PROPH/DIAG IV INF ADDON: CPT | Performed by: EMERGENCY MEDICINE

## 2019-10-19 PROCEDURE — 25000128 H RX IP 250 OP 636: Performed by: STUDENT IN AN ORGANIZED HEALTH CARE EDUCATION/TRAINING PROGRAM

## 2019-10-19 RX ORDER — LIDOCAINE HYDROCHLORIDE 40 MG/ML
5 SOLUTION TOPICAL
Status: DISCONTINUED | OUTPATIENT
Start: 2019-10-19 | End: 2019-11-17 | Stop reason: HOSPADM

## 2019-10-19 RX ORDER — POTASSIUM CHLORIDE 750 MG/1
20-40 TABLET, EXTENDED RELEASE ORAL
Status: DISCONTINUED | OUTPATIENT
Start: 2019-10-19 | End: 2019-11-17

## 2019-10-19 RX ORDER — SODIUM CHLORIDE AND POTASSIUM CHLORIDE 150; 900 MG/100ML; MG/100ML
INJECTION, SOLUTION INTRAVENOUS CONTINUOUS
Status: DISCONTINUED | OUTPATIENT
Start: 2019-10-19 | End: 2019-10-19

## 2019-10-19 RX ORDER — NALOXONE HYDROCHLORIDE 0.4 MG/ML
.1-.4 INJECTION, SOLUTION INTRAMUSCULAR; INTRAVENOUS; SUBCUTANEOUS
Status: DISCONTINUED | OUTPATIENT
Start: 2019-10-19 | End: 2019-10-31

## 2019-10-19 RX ORDER — SIMETHICONE 80 MG
80 TABLET,CHEWABLE ORAL EVERY 6 HOURS PRN
Status: DISCONTINUED | OUTPATIENT
Start: 2019-10-19 | End: 2019-11-05

## 2019-10-19 RX ORDER — POTASSIUM CHLORIDE 29.8 MG/ML
20 INJECTION INTRAVENOUS
Status: DISCONTINUED | OUTPATIENT
Start: 2019-10-19 | End: 2019-11-17

## 2019-10-19 RX ORDER — IOPAMIDOL 755 MG/ML
64 INJECTION, SOLUTION INTRAVASCULAR ONCE
Status: COMPLETED | OUTPATIENT
Start: 2019-10-19 | End: 2019-10-19

## 2019-10-19 RX ORDER — POTASSIUM CL/LIDO/0.9 % NACL 10MEQ/0.1L
10 INTRAVENOUS SOLUTION, PIGGYBACK (ML) INTRAVENOUS
Status: DISCONTINUED | OUTPATIENT
Start: 2019-10-19 | End: 2019-11-17

## 2019-10-19 RX ORDER — POTASSIUM CHLORIDE 7.45 MG/ML
10 INJECTION INTRAVENOUS
Status: DISCONTINUED | OUTPATIENT
Start: 2019-10-19 | End: 2019-11-17

## 2019-10-19 RX ORDER — ONDANSETRON 2 MG/ML
4 INJECTION INTRAMUSCULAR; INTRAVENOUS EVERY 6 HOURS PRN
Status: DISCONTINUED | OUTPATIENT
Start: 2019-10-19 | End: 2019-11-13

## 2019-10-19 RX ORDER — HYDROMORPHONE HYDROCHLORIDE 1 MG/ML
.5-1 INJECTION, SOLUTION INTRAMUSCULAR; INTRAVENOUS; SUBCUTANEOUS
Status: DISCONTINUED | OUTPATIENT
Start: 2019-10-19 | End: 2019-10-20

## 2019-10-19 RX ORDER — HYDROMORPHONE HYDROCHLORIDE 10 MG/ML
.5-1 INJECTION INTRAMUSCULAR; INTRAVENOUS; SUBCUTANEOUS
Status: DISCONTINUED | OUTPATIENT
Start: 2019-10-19 | End: 2019-10-19 | Stop reason: CLARIF

## 2019-10-19 RX ORDER — POTASSIUM CHLORIDE 1.5 G/1.58G
20-40 POWDER, FOR SOLUTION ORAL
Status: DISCONTINUED | OUTPATIENT
Start: 2019-10-19 | End: 2019-11-17

## 2019-10-19 RX ORDER — LIDOCAINE 40 MG/G
CREAM TOPICAL
Status: DISCONTINUED | OUTPATIENT
Start: 2019-10-19 | End: 2019-11-17 | Stop reason: HOSPADM

## 2019-10-19 RX ORDER — POLYETHYLENE GLYCOL 3350 17 G/17G
17 POWDER, FOR SOLUTION ORAL 2 TIMES DAILY
Status: DISCONTINUED | OUTPATIENT
Start: 2019-10-19 | End: 2019-10-31

## 2019-10-19 RX ORDER — DEXTROSE MONOHYDRATE, SODIUM CHLORIDE, AND POTASSIUM CHLORIDE 50; 1.49; 4.5 G/1000ML; G/1000ML; G/1000ML
INJECTION, SOLUTION INTRAVENOUS CONTINUOUS
Status: DISCONTINUED | OUTPATIENT
Start: 2019-10-19 | End: 2019-10-28

## 2019-10-19 RX ORDER — DOCUSATE SODIUM 100 MG/1
100 CAPSULE, LIQUID FILLED ORAL 2 TIMES DAILY
Status: DISCONTINUED | OUTPATIENT
Start: 2019-10-19 | End: 2019-10-27

## 2019-10-19 RX ORDER — ACETAMINOPHEN 325 MG/1
650 TABLET ORAL EVERY 4 HOURS
Status: DISCONTINUED | OUTPATIENT
Start: 2019-10-19 | End: 2019-10-19 | Stop reason: DRUGHIGH

## 2019-10-19 RX ORDER — DOCUSATE SODIUM 100 MG/1
100 CAPSULE, LIQUID FILLED ORAL 2 TIMES DAILY
Status: DISCONTINUED | OUTPATIENT
Start: 2019-10-19 | End: 2019-10-19

## 2019-10-19 RX ORDER — ONDANSETRON 4 MG/1
4 TABLET, ORALLY DISINTEGRATING ORAL EVERY 6 HOURS PRN
Status: DISCONTINUED | OUTPATIENT
Start: 2019-10-19 | End: 2019-11-17 | Stop reason: HOSPADM

## 2019-10-19 RX ORDER — MORPHINE SULFATE 10 MG/5ML
5 SOLUTION ORAL
Status: CANCELLED | OUTPATIENT
Start: 2019-10-19

## 2019-10-19 RX ORDER — OXYMETAZOLINE HYDROCHLORIDE 0.05 G/100ML
2 SPRAY NASAL
Status: DISCONTINUED | OUTPATIENT
Start: 2019-10-19 | End: 2019-11-17 | Stop reason: HOSPADM

## 2019-10-19 RX ORDER — MAGNESIUM SULFATE HEPTAHYDRATE 40 MG/ML
4 INJECTION, SOLUTION INTRAVENOUS EVERY 4 HOURS PRN
Status: DISCONTINUED | OUTPATIENT
Start: 2019-10-19 | End: 2019-11-17 | Stop reason: HOSPADM

## 2019-10-19 RX ADMIN — SODIUM CHLORIDE 1000 ML: 9 INJECTION, SOLUTION INTRAVENOUS at 00:52

## 2019-10-19 RX ADMIN — POTASSIUM CHLORIDE, DEXTROSE MONOHYDRATE AND SODIUM CHLORIDE: 150; 5; 450 INJECTION, SOLUTION INTRAVENOUS at 22:19

## 2019-10-19 RX ADMIN — HYDROMORPHONE HYDROCHLORIDE 1 MG: 1 INJECTION, SOLUTION INTRAMUSCULAR; INTRAVENOUS; SUBCUTANEOUS at 04:50

## 2019-10-19 RX ADMIN — HYDROMORPHONE HYDROCHLORIDE 1 MG: 1 INJECTION, SOLUTION INTRAMUSCULAR; INTRAVENOUS; SUBCUTANEOUS at 12:59

## 2019-10-19 RX ADMIN — HYDROMORPHONE HYDROCHLORIDE 0.5 MG: 1 INJECTION, SOLUTION INTRAMUSCULAR; INTRAVENOUS; SUBCUTANEOUS at 08:43

## 2019-10-19 RX ADMIN — HYDROMORPHONE HYDROCHLORIDE 1 MG: 1 INJECTION, SOLUTION INTRAMUSCULAR; INTRAVENOUS; SUBCUTANEOUS at 11:48

## 2019-10-19 RX ADMIN — HYDROMORPHONE HYDROCHLORIDE 1 MG: 1 INJECTION, SOLUTION INTRAMUSCULAR; INTRAVENOUS; SUBCUTANEOUS at 22:20

## 2019-10-19 RX ADMIN — Medication 10 MEQ: at 12:59

## 2019-10-19 RX ADMIN — HYDROMORPHONE HYDROCHLORIDE 1 MG: 1 INJECTION, SOLUTION INTRAMUSCULAR; INTRAVENOUS; SUBCUTANEOUS at 02:47

## 2019-10-19 RX ADMIN — POTASSIUM CHLORIDE AND SODIUM CHLORIDE: 900; 150 INJECTION, SOLUTION INTRAVENOUS at 01:24

## 2019-10-19 RX ADMIN — Medication 10 MEQ: at 08:46

## 2019-10-19 RX ADMIN — HYDROMORPHONE HYDROCHLORIDE 1 MG: 1 INJECTION, SOLUTION INTRAMUSCULAR; INTRAVENOUS; SUBCUTANEOUS at 07:04

## 2019-10-19 RX ADMIN — HYDROMORPHONE HYDROCHLORIDE 1 MG: 1 INJECTION, SOLUTION INTRAMUSCULAR; INTRAVENOUS; SUBCUTANEOUS at 21:07

## 2019-10-19 RX ADMIN — Medication 10 MEQ: at 10:10

## 2019-10-19 RX ADMIN — HYDROMORPHONE HYDROCHLORIDE 1 MG: 1 INJECTION, SOLUTION INTRAMUSCULAR; INTRAVENOUS; SUBCUTANEOUS at 19:54

## 2019-10-19 RX ADMIN — ACETAMINOPHEN 650 MG: 325 SOLUTION ORAL at 22:26

## 2019-10-19 RX ADMIN — HYDROMORPHONE HYDROCHLORIDE 1 MG: 1 INJECTION, SOLUTION INTRAMUSCULAR; INTRAVENOUS; SUBCUTANEOUS at 17:07

## 2019-10-19 RX ADMIN — HYDROMORPHONE HYDROCHLORIDE 1 MG: 1 INJECTION, SOLUTION INTRAMUSCULAR; INTRAVENOUS; SUBCUTANEOUS at 03:47

## 2019-10-19 RX ADMIN — HYDROMORPHONE HYDROCHLORIDE 1 MG: 1 INJECTION, SOLUTION INTRAMUSCULAR; INTRAVENOUS; SUBCUTANEOUS at 01:24

## 2019-10-19 RX ADMIN — HYDROMORPHONE HYDROCHLORIDE 1 MG: 1 INJECTION, SOLUTION INTRAMUSCULAR; INTRAVENOUS; SUBCUTANEOUS at 06:01

## 2019-10-19 RX ADMIN — ONDANSETRON HYDROCHLORIDE 4 MG: 2 INJECTION, SOLUTION INTRAMUSCULAR; INTRAVENOUS at 19:53

## 2019-10-19 RX ADMIN — HYDROMORPHONE HYDROCHLORIDE 1 MG: 1 INJECTION, SOLUTION INTRAMUSCULAR; INTRAVENOUS; SUBCUTANEOUS at 13:59

## 2019-10-19 RX ADMIN — POTASSIUM CHLORIDE, DEXTROSE MONOHYDRATE AND SODIUM CHLORIDE: 150; 5; 450 INJECTION, SOLUTION INTRAVENOUS at 08:56

## 2019-10-19 RX ADMIN — HYDROMORPHONE HYDROCHLORIDE 1 MG: 1 INJECTION, SOLUTION INTRAMUSCULAR; INTRAVENOUS; SUBCUTANEOUS at 10:06

## 2019-10-19 RX ADMIN — MAGNESIUM SULFATE HEPTAHYDRATE 4 G: 40 INJECTION, SOLUTION INTRAVENOUS at 04:58

## 2019-10-19 RX ADMIN — POLYETHYLENE GLYCOL 3350 17 G: 17 POWDER, FOR SOLUTION ORAL at 21:11

## 2019-10-19 RX ADMIN — HYDROMORPHONE HYDROCHLORIDE 1 MG: 1 INJECTION, SOLUTION INTRAMUSCULAR; INTRAVENOUS; SUBCUTANEOUS at 23:39

## 2019-10-19 RX ADMIN — IOPAMIDOL 64 ML: 755 INJECTION, SOLUTION INTRAVENOUS at 00:05

## 2019-10-19 RX ADMIN — Medication 10 MEQ: at 14:27

## 2019-10-19 RX ADMIN — HYDROMORPHONE HYDROCHLORIDE 1 MG: 1 INJECTION, SOLUTION INTRAMUSCULAR; INTRAVENOUS; SUBCUTANEOUS at 15:39

## 2019-10-19 RX ADMIN — DOCUSATE SODIUM 100 MG: 100 CAPSULE, LIQUID FILLED ORAL at 21:07

## 2019-10-19 RX ADMIN — HYDROMORPHONE HYDROCHLORIDE 1 MG: 1 INJECTION, SOLUTION INTRAMUSCULAR; INTRAVENOUS; SUBCUTANEOUS at 18:21

## 2019-10-19 ASSESSMENT — PAIN DESCRIPTION - DESCRIPTORS
DESCRIPTORS: CRAMPING
DESCRIPTORS: SHARP
DESCRIPTORS: SHARP
DESCRIPTORS: CRAMPING

## 2019-10-19 ASSESSMENT — ACTIVITIES OF DAILY LIVING (ADL)
ADLS_ACUITY_SCORE: 10

## 2019-10-19 NOTE — PLAN OF CARE
[facility] [place] INTERNAL MEDICINE  MOHINDER Herbert / 37 y.o. / female  06/13/2019      ASSESSMENT & PLAN:    Problem List Items Addressed This Visit        Cardiovascular and Mediastinum    Migraine    Relevant Medications    traZODone (DESYREL) 100 MG tablet    VIIBRYD 40 MG tablet tablet    propranolol (INDERAL) 20 MG tablet      Other Visit Diagnoses     Acute bronchitis, unspecified organism    -  Primary    Relevant Medications    methylPREDNISolone (MEDROL, KALANI,) 4 MG tablet    fexofenadine (ALLEGRA ALLERGY) 180 MG tablet    Family history of breast cancer        Relevant Orders    Ambulatory Referral to Gynecology    Allergic bronchitis with acute exacerbation        Relevant Medications    methylPREDNISolone (MEDROL, KALANI,) 4 MG tablet    fexofenadine (ALLEGRA ALLERGY) 180 MG tablet    Encounter to establish care        Recurrent cough        Relevant Orders    XR Chest PA & Lateral        Orders Placed This Encounter   Procedures   • XR Chest PA & Lateral   • Ambulatory Referral to Gynecology     New Medications Ordered This Visit   Medications   • methylPREDNISolone (MEDROL, KALANI,) 4 MG tablet     Sig: Take as directed on package instructions.     Dispense:  1 each     Refill:  0   • fexofenadine (ALLEGRA ALLERGY) 180 MG tablet     Sig: Take 1 tablet by mouth Daily.       Summary/Discussion:    1. Acute bronchitis, unspecified organism  Suspect likely allergic related bronchitis/bronchospasm.  Recommended treatment with steroids for acute symptoms, recommended patient start daily antihistamine to help prevention reoccurrence.  Will check chest x-ray today related to ongoing cough for the past few months.   May need to consider further testing, including updated pulmonary function testing, or addition of Singulair if symptoms persist.  Instructed patient to follow-up as needed if no improvement in symptoms or recurrence of symptoms.    - methylPREDNISolone (MEDROL, KALANI,) 4 MG tablet; Take as  Pt admitted from ER at 0330 for abdominal pain and SBO. Pt A&O x4. VSS. Abdomin distended with hyperactive bowel sounds. NPO.Refused NG. Requesting dialudid 1.0 mg IV every 1 hour.Pt receiving Mg sulf IV and will be needing KCL replacement yet. Up in young independently. Pt instructed to ask for assist but getting up on own.    "directed on package instructions.  Dispense: 1 each; Refill: 0  - fexofenadine (ALLEGRA ALLERGY) 180 MG tablet; Take 1 tablet by mouth Daily.    2. Family history of breast cancer  Strong family history of breast cancer on mother side, recommended referral to GYN for regular well woman exams and care in addition to possible BRCA testing for breast cancer gene.    - Ambulatory Referral to Gynecology    3. Allergic bronchitis with acute exacerbation    - methylPREDNISolone (MEDROL, KALANI,) 4 MG tablet; Take as directed on package instructions.  Dispense: 1 each; Refill: 0  - fexofenadine (ALLEGRA ALLERGY) 180 MG tablet; Take 1 tablet by mouth Daily.    4. Other migraine without status migrainosus, not intractable      5. Encounter to establish care      6. Recurrent cough    - XR Chest PA & Lateral        Return in about 6 months (around 12/13/2019) for Annual physical.    ____________________________________________________________________    VITALS:    Visit Vitals  /82   Pulse 83   Temp 97.3 °F (36.3 °C)   Ht 160 cm (63\")   Wt 69 kg (152 lb 3.2 oz)   SpO2 99%   BMI 26.96 kg/m²       BP Readings from Last 3 Encounters:   06/13/19 108/82   01/23/19 122/86   12/13/18 118/86     Wt Readings from Last 3 Encounters:   06/13/19 69 kg (152 lb 3.2 oz)   01/23/19 67.6 kg (149 lb)   12/13/18 70.3 kg (155 lb)      Body mass index is 26.96 kg/m².    CC: Main reason(s) for today's visit: Establish Care (new pt to establish care) and Bronchitis (pt c/o recurrent bronchitis, w/ productive cough w/ light green sputum, chest congestion; went to [facility] on Sunday, Rx'd Zpak and finished today, and Rx'd tessalon, promethazine syrup)      HPI:    Patient is a 37 y.o. female who is here to establish care. Previous patient of Dr. Pucektt. Moved here from [place] 2 years ago.     Cough: Patient complains of nonproductive cough and wheezing.  Symptoms began a few days ago.  The cough is non-productive, paroxysmal and is " aggravated by nothing Associated symptoms include:sneezing, congestion. Patient does not have new pets. Patient does not have a history of asthma. Patient does have a history of environmental allergens, not currently taking any regular medications. Patient does not have recent travel. Patient does not have a history of smoking. Patient  has not previous Chest X-ray.  She reports she has had multiple recent episodes of bronchitis in the past few months.     She had moved here to [place] from [place] approximately 2 years ago.  She reports that she thinks she may have had pulmonary function testing for asthma many years ago.       Patient Care Team:  Rea Cordoba APRN as PCP - General (Internal Medicine)  ____________________________________________________________________      REVIEW OF SYSTEMS    Review of Systems   Constitutional: Negative for fever.   HENT: Negative for congestion, ear pain, postnasal drip, rhinorrhea, sinus pressure, sneezing, sore throat and trouble swallowing.    Eyes: Negative for discharge, redness and itching.   Respiratory: Positive for cough, shortness of breath and wheezing.    Cardiovascular: Negative for chest pain.   Gastrointestinal: Negative for diarrhea, nausea and vomiting.   Musculoskeletal: Negative for arthralgias and myalgias.   Allergic/Immunologic: Positive for environmental allergies.   Hematological: Negative for adenopathy.       PHYSICAL EXAMINATION    Physical Exam   Constitutional: She is oriented to person, place, and time. Vital signs are normal. She appears well-developed and well-nourished. She is cooperative. She does not appear ill. No distress.   HENT:   Head: Normocephalic and atraumatic.   Right Ear: Hearing, tympanic membrane, external ear and ear canal normal. No drainage or swelling. Tympanic membrane is not injected and not erythematous. No middle ear effusion.   Left Ear: Hearing, tympanic membrane, external ear and ear canal normal. No drainage  or swelling. Tympanic membrane is not injected and not erythematous.  No middle ear effusion.   Nose: Nose normal.   Mouth/Throat: Uvula is midline, oropharynx is clear and moist and mucous membranes are normal. No tonsillar exudate.   Cardiovascular: Normal rate, regular rhythm and normal heart sounds.   No murmur heard.  Pulmonary/Chest: Effort normal and breath sounds normal. She has no decreased breath sounds. She has no wheezes. She has no rhonchi. She has no rales.   Harsh, barking cough present on exam    Lymphadenopathy:     She has no cervical adenopathy.        Right cervical: No superficial cervical, no deep cervical and no posterior cervical adenopathy present.       Left cervical: No superficial cervical, no deep cervical and no posterior cervical adenopathy present.   Neurological: She is alert and oriented to person, place, and time.   Skin: Skin is warm, dry and intact.   Psychiatric: She has a normal mood and affect. Her speech is normal and behavior is normal. Judgment and thought content normal. Cognition and memory are normal.   Nursing note and vitals reviewed.        REVIEWED DATA:    Labs:   Lab Results   Component Value Date     12/13/2018    K 4.4 12/13/2018    AST 17 12/13/2018    ALT 14 12/13/2018    BUN 10 12/13/2018    CREATININE 0.84 12/13/2018    EGFRIFNONA 76 12/13/2018    EGFRIFAFRI 92 12/13/2018       Lab Results   Component Value Date    HGBA1C 5.30 12/13/2018       Lab Results   Component Value Date     (H) 12/13/2018    HDL 62 (H) 12/13/2018    TRIG 106 12/13/2018       Lab Results   Component Value Date    TSH 2.860 12/13/2018          Lab Results   Component Value Date    WBC 8.15 12/13/2018    HGB 13.3 12/13/2018     12/13/2018         Imaging:        Medical Tests:        Summary of old records / correspondence / consultant report:        Request outside records:         ______________________________________________________________________    ALLERGIES  Allergies   Allergen Reactions   • Dilaudid [Hydromorphone] Rash        MEDICATIONS  Current Outpatient Medications on File Prior to Visit   Medication Sig   • albuterol 108 (90 Base) MCG/ACT inhaler Inhale 2 puffs Every 4 (Four) Hours As Needed.   • propranolol (INDERAL) 20 MG tablet Take 1 tablet by mouth 3 (Three) Times a Day As Needed (anxiety).   • traZODone (DESYREL) 100 MG tablet Take 100 mg by mouth every night at bedtime.   • VIIBRYD 40 MG tablet tablet Take 1 tablet by mouth Daily.     No current facility-administered medications on file prior to visit.        PFSH:     The following portions of the patient's history were reviewed and updated as appropriate: Allergies / Current Medications / Past Medical History / Surgical History / Social History / Family History    PROBLEM LIST   Patient Active Problem List   Diagnosis   • Depression   • Anxiety   • Allergic   • Migraine   • Sciatica   • Hyperlipidemia       PAST MEDICAL HISTORY  Past Medical History:   Diagnosis Date   • Allergic    • Anxiety    • Depression    • Hyperlipidemia    • Migraine with aura    • Sciatica        SURGICAL HISTORY  Past Surgical History:   Procedure Laterality Date   • FACIAL RECONSTRUCTION SURGERY      due to infection after root canal       SOCIAL HISTORY  Social History     Socioeconomic History   • Marital status: Single     Spouse name: Not on file   • Number of children: Not on file   • Years of education: Not on file   • Highest education level: Not on file   Occupational History   • Occupation: childcare worker    Tobacco Use   • Smoking status: Former Smoker     Last attempt to quit: 2019     Years since quittin.4   • Smokeless tobacco: Never Used   Substance and Sexual Activity   • Alcohol use: Yes     Comment: intermittently   • Drug use: No   • Sexual activity: Yes     Partners: Female   Social History Narrative    She  lives at home with her cat.  She works in childcare and education with toddlers.      Patient wants to document in chart that she is homosexual.        FAMILY HISTORY  Family History   Problem Relation Age of Onset   • Breast cancer Mother    • Depression Mother    • Miscarriages / Stillbirths Sister    • Alcohol abuse Brother    • Drug abuse Brother    • Breast cancer Maternal Aunt    • Breast cancer Maternal Grandmother    • Stroke Maternal Grandmother    • Alcohol abuse Maternal Grandmother    • Alcohol abuse Maternal Grandfather    • No Known Problems Father          **Israelon Disclaimer:   Much of this encounter note is an electronic transcription/translation of spoken language to printed text. The electronic translation of spoken language may permit erroneous, or at times, nonsensical words or phrases to be inadvertently transcribed. Although I have reviewed the note for such errors, some may still exist.

## 2019-10-19 NOTE — CONSULTS
"[facility] Surgery Consultation    Rachel A Gerhardt MRN# [id number]   Age: 43 year old YOB: 1975     Date of Admission:  10/18/2019    Date of Consult:   10/18/19    Reason for consult: Small bowel obstruction       Requesting service: Medicine; requesting provider: Janes                   Assessment and Plan:   Assessment:   Rachel A Gerhardt is a 43 year old female admitted on 10/18/2019. 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     Diarrhea is secondary to fecal overflow.         Plan:   Patient has previously been told that she has a stricture at her I-I anastomosis and was offered surgery. She continues to not be interested in surgery at this time, though she says \"things are coming to a head\" and \"eventually things will have to change.\"     Recommendations:  - Miralax BID for impacted stool in colon, docusate sodium BID  - Fleet enema today, escalate if necessary  - NGT if nausea / vomiting / increasing abdominal pain  - Surgery will continue to follow    Discussed with staff, Dr. Maloney            Chief Complaint:   SBO         History of Present Illness:   Has had many such \"episodes\" previously. Can last between several days and several weeks. This one started Thursday - ate lunch, began feeling abdominal pain, 1x emesis, pain relieved. Follow this pattern -- postprandial pain and bloating, relieved by emesis. Is exquisitely bloated, says \"I look pregnant!\" Having many bowel movements, diarrhea, has previously been discussed that this is secondary to fecal overflow.     Had bowel resection in May 2017 2/2 to strictures. These strictures are a result of her ovarian cancer treatment, which was treated with XRT and chemo.           Past " Medical History:     Past Medical History:   Diagnosis Date     Asthma      Cervical cancer (H)      Other chronic pain      Ovarian cancer (H)      Partial small bowel obstruction (H) 11/2/2018     SBO (small bowel obstruction) (H) 12/27/2016     Small bowel obstruction (H) 5/17/2017     Small intestine obstruction (H) 4/29/2017     Substance abuse (H)     Outside records indicate past history of narcotics abuse or dependence, but patient denies.          Past Surgical History:     Past Surgical History:   Procedure Laterality Date     COLONOSCOPY N/A 5/3/2018    Procedure: COLONOSCOPY;  sigmoidoscopy;  Surgeon: Omero Vigil MD;  Location: [facility] OR     COLONOSCOPY WITH CO2 INSUFFLATION N/A 4/30/2018    Procedure: COLONOSCOPY WITH CO2 INSUFFLATION;  Colonoscopy;  Surgeon: Omero Vigil MD;  Location: [facility] OR     COMBINED CYSTOSCOPY, INSERT STENT URETER(S) Bilateral 5/18/2017    Procedure: COMBINED CYSTOSCOPY, INSERT STENT URETER(S);  Cystoscopy with Bilateral Stent,;  Surgeon: Rene Calero MD;  Location: [facility] OR     ENT SURGERY  2009    mastoid, sinus     ENTEROSCOPY SMALL BOWEL N/A 6/18/2018    Procedure: ENTEROSCOPY SMALL BOWEL;  Lower bowel Retrograde Enteroscopy with Balloon Dilation .;  Surgeon: Omero Vigil MD;  Location: [facility] OR     EXAM UNDER ANESTHESIA, INSERT ALEX SLEEVE, UTERINE PLACEMENT OF TANDEM AND RING FOR RAD, ULTRASOUND N/A 12/14/2015    Procedure: EXAM UNDER ANESTHESIA, INSERT ALEX SLEEVE, UTERINE PLACEMENT OF TANDEM AND RING FOR RADIATION, ULTRASOUND GUIDED;  Surgeon: Abby Tony MD;  Location: [facility] OR     INSERT TANDEM AND CESIUM APPLICATOR CERVIX, ULTRASOUND GUIDED N/A 12/17/2015    Procedure: INSERT TANDEM AND CESIUM APPLICATOR CERVIX, ULTRASOUND GUIDED;  Surgeon: Kika Wood MD;  Location: [facility] OR     KNEE SURGERY       LAPAROTOMY EXPLORATORY N/A 5/18/2017    Procedure: LAPAROTOMY EXPLORATORY;   Exploratry Laparotomy, Small Bowel Resection with  anastomosis, Flexible Sigmoidoscopy;  Surgeon: Jennifer Goodwin MD;  Location: [facility] OR     PICC INSERTION Right 04/29/2017    4fr SL BioFlo PICC, 37cm (3cm external) in the R basilic vein w/ tip in the mid SVC.     PICC INSERTION Right 03/29/2018    4Fr - 40cm (4cm external), R lateral brachial vein, Low SVC     RESECT SMALL BOWEL WITHOUT OSTOMY N/A 5/18/2017    Procedure: RESECT SMALL BOWEL WITHOUT OSTOMY;;  Surgeon: Jennifer Goodwin MD;  Location: [facility] OR     SIGMOIDOSCOPY FLEXIBLE N/A 5/18/2017    Procedure: SIGMOIDOSCOPY FLEXIBLE;;  Surgeon: Jennifer Goodwin MD;  Location: [facility] OR          Social History:     Social History     Tobacco Use     Smoking status: Light Tobacco Smoker     Years: 12.00     Types: Cigarettes     Smokeless tobacco: Never Used     Tobacco comment: pt smoking about 4 cigs per week   Substance Use Topics     Alcohol use: No     Alcohol/week: 0.0 standard drinks     Comment: None per pt          Family History:     Family History   Problem Relation Age of Onset     Diabetes Mother      Ovarian Cancer No family hx of      Uterine Cancer No family hx of      Cervical Cancer No family hx of      Breast Cancer No family hx of           Allergies:     Allergies   Allergen Reactions     Sulfa Drugs Hives     Ibuprofen Nausea and Vomiting and Hives     Unknown if reaction hives or N/V     No Clinical Screening - See Comments Other (See Comments) and Diarrhea     headache  Carrots cause gastric upset, cramping and diarrhea.     Ciprofloxacin Hives and Nausea     Quinolones      Tramadol Hives, Diarrhea, Nausea and Nausea and Vomiting     Amoxicillin Nausea and Vomiting     Augmentin Nausea, Hives and GI Disturbance     Patient tolerated course of zosyn in 5/2018     Avelox [Moxifloxacin] Nausea and Vomiting     Codeine Nausea and Vomiting     Daucus Carota      Other reaction(s): GI Upset  Other reaction(s): Abdominal pain, Diarrhea  Carrots cause gastric upset, cramping and diarrhea.              Medications:     Current Facility-Administered Medications   Medication     acetaminophen (TYLENOL) tablet 650 mg     dextrose 5% and 0.45% NaCl + KCl 20 mEq/L infusion     HYDROmorphone (PF) (DILAUDID) injection 0.5-1 mg     lidocaine (LMX4) cream     lidocaine (XYLOCAINE) 4 % solution 5 mL     lidocaine 1 % 0.1-1 mL     magnesium sulfate 4 g in 100 mL sterile water (premade)     melatonin tablet 1 mg     naloxone (NARCAN) injection 0.1-0.4 mg     ondansetron (ZOFRAN-ODT) ODT tab 4 mg    Or     ondansetron (ZOFRAN) injection 4 mg     oxymetazoline (AFRIN) 0.05 % spray 2 spray     potassium chloride (KLOR-CON) Packet 20-40 mEq     potassium chloride 10 mEq in 100 mL intermittent infusion with 10 mg lidocaine     potassium chloride 10 mEq in 100 mL sterile water intermittent infusion (premix)     potassium chloride 20 mEq in 50 mL intermittent infusion     potassium chloride ER (K-DUR/KLOR-CON M) CR tablet 20-40 mEq     sodium chloride (PF) 0.9% PF flush 3 mL     sodium chloride (PF) 0.9% PF flush 3 mL               Review of Systems:   CV: NEGATIVE for chest pain, palpitations or peripheral edema  C: NEGATIVE for fever, chills, change in weight  E/M: NEGATIVE for ear, mouth and throat problems  R: NEGATIVE for significant cough or SOB          Physical Exam:   All vitals have been reviewed  Temp:  [96.9  F (36.1  C)-98.5  F (36.9  C)] 96.9  F (36.1  C)  Pulse:  [71-94] 71  Heart Rate:  [75-97] 78  Resp:  [10-21] 18  BP: ()/(64-91) 93/68  SpO2:  [96 %-100 %] 96 %    Intake/Output Summary (Last 24 hours) at 10/19/2019 0928  Last data filed at 10/19/2019 0600  Gross per 24 hour   Intake 1979.17 ml   Output --   Net 1979.17 ml       GEN: NAD  HEENT: anicteric  Car: RRR  Pulm: normal work of breathing  Abd: soft, very bloated, tender globally.   Ext: WWP           Data:   All laboratory data reviewed    Results:  [facility]  Recent Labs   Lab 10/19/19  0550 10/18/19  2305    138   POTASSIUM 3.2* 2.8*   CHLORIDE 108  103   CO2 30 29   BUN 11 14   CR 0.60 0.59   GLC 79 98     CBC  Recent Labs   Lab 10/19/19  0550 10/18/19  2305   WBC 9.2 10.8   HGB 12.4 13.0    313     LFT  Recent Labs   Lab 10/18/19  2305   AST 19   ALT 49   ALKPHOS 95   BILITOTAL 0.4   ALBUMIN 3.4     Recent Labs   Lab 10/19/19  0550 10/18/19  2305   GLC 79 98       Imaging:     EXAMINATION: CT ABDOMEN PELVIS W CONTRAST  10/19/2019 12:25 AM       CLINICAL HISTORY: abd pain r/o obstruction     COMPARISON: MR 6/26/2019. CT 5/18/2019.     PROCEDURE COMMENTS: CT of the abdomen was performed with iopamidol  (ISOVUE-370) solution 64 mL    intravenous and oral contrast. Coronal  and sagittal reformatted images were obtained.     FINDINGS:  Multiple dilated loops of small bowel with air-fluid levels are again  noted and similar in distribution to prior exams though with increased  luminal distention largest measuring up to 7.3 cm in the right lower  quadrant (series 5 image 287), previously luminal caliber measuring up  to 5.8 cm on CT 5/18/2019. Transition point is noted in the mid  abdomen near patient's known ileoileal anastomosis with decompressed  bowel distally (series 5 images 223-243 and series 4 image 53). No  definite pneumatosis, portal venous gas, or free air. Bowel wall shows  expected mucosal enhancement. The downstream decompressed small bowel  appears to have diffuse mucosal hyperenhancement. There is moderate  stool burden in the colon which is normal caliber.     The liver, gallbladder, spleen, and adrenal glands are normal.  Prominence of the main pancreatic duct measuring 5 mm in the  pancreatic head, similar to prior studies. Probable focal fatty  infiltration of the anterior pancreatic head. In the right lower renal  pole, there is a 10 mm slightly heterogeneous, partially cystic lesion  (series 2 image 70). In the left mid pole, there is a subcentimeter  hypodensity which is too small to characterize. No hydronephrosis. The  bladder is  partially distended. No pelvic masses. Abdominal aorta is  normal caliber. Abdominal vasculature is patent. Multiple prominent  mesenteric lymph nodes are noted and relatively stable. Unchanged mild  mesenteric edema. No significant free fluid.      Lower thorax: Visualized lung bases are clear. Heart size is normal  without pericardial effusion.     Bones and soft tissues: No suspicious abnormality.                                                                      IMPRESSION:  1. Chronic small bowel obstruction with the transition point in the  mid abdomen at the ileoileal anastomotic stricture. Luminal distention  proximal to the anastomosis measures up to 7.3 cm, which has increased  from prior exams. No pneumatosis or other CT findings of ischemia.  2. The downstream decompressed small bowel appears to have mucosal  hyperenhancement which could be due to incomplete distention or  represent a superimposed enteritis.  3. Moderate colonic stool burden.  4. Multiple mildly enlarged mesenteric lymph nodes, presumably  reactive.  5. Unchanged prominence of the main pancreatic duct in the head  measuring 5 mm in diameter.     Tobin Miguel MD, [facility]  Plastic Surgery PGY-1  Pager: [phone]

## 2019-10-19 NOTE — PROGRESS NOTES
[facility] Progress Note    Main Plans for Today   - f/u surgery recs  -Try suppository, enemas prn - will discuss with surgery  - Pain control, ambulation     Assessment & Plan   Rachel A Gerhardt is a 43 year old female admitted on 10/18/2019. 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      #Small bowel obstruction  #Constipation  #Acute on chronic abdominal pain  Patient with multiple medically managed SBO's, and difficulty with NG placement. Her exam is notable for tenderness and distention, but no rebound or signs of peritonitis. LA wnl on admission. CT 10/19 with a transition point near her previous stricture, and dilated loops proximal to this, with heavy colonic stool burdern.  As she does not want NG placement, will manage conservatively with fluids and bowel rest, ambulation. Continues to have diarrhea daily in AM, passing flatus.  Last meal 10/15/2019.  - NPO. If prolonged, may need to consider parenteric nutrition.  - I/Os  - mIVF  - IV Dilaudid 0.5-1 mg every 2 hour as needed  - Tylenol 650 mg every 4 hours scheduled  - Zofran for nausea  - Surgery consult, appreciate recommendations  - Consider PEG tube for decompression (IR vs surgery to place)  - Serial abdominal exams  - If worsening, would discuss NG placement with patient again  - Encourage ambulation; Glycerin suppository, tap water enema prn.     #History of cervical and ovarian cancer  #Lymphadenopathy  Patient was scheduled to discuss lymph node biopsy with surgery as an outpatient after last admission in June, but was unable to follow through with this. She would like to discuss the biopsy again. Of note, she does also have multiple  enlarged mesenteric lymph nodes on CT, read as reactive, but this should be noted going forward.  - Surgery consult, appreciate rcommendations in scheduling lymph node biopsy. Will likely be as an outpatient.    # Pain Assessment:  Current Pain Score 10/19/2019   Patient currently in pain? sleeping: patient not able to self report   Pain score (0-10) -   Pain location -   Pain descriptors -   Some encounter information is confidential and restricted. Go to Review Flowsheets activity to see all data.   - Jenni is experiencing pain due to bowel obstruction. Pain management was discussed and the plan was created in a collaborative fashion.  Jenni's response to the current recommendations: engaged  - Please see the plan for pain management as documented above      Diet: NPO for Medical/Clinical Reasons Except for: Meds, Ice Chips  Fluids: D5 1/2 NS + 20 KCl @ 125 ml/hr  DVT Prophylaxis: Pneumatic Compression Devices  Code Status: Full Code    Disposition Plan   Expected discharge:  2 - 3 days, recommended to prior living arrangement once adequate pain management/ tolerating PO medications, safe disposition plan/ TCU bed available and able to resume PO diet.Dispo:          Entered: Arpita Lerma 10/19/2019, 8:30 AM   Information in the above section will display in the discharge planner report.      The patient's care was discussed with the Attending Physician, Dr. Vasquez.    Arpita Lerma  [facility]:   Pager: [phone]  Please see sticky note for cross cover information    Interval History    Pain improved.  Trying to ambulate as this has been most helpful in past  Still loose stools in AM.  Last ate 4 days ago. Tried yesterday, vomited up.  Review of Systems Negative except as noted above    Physical Exam   Vital Signs: Temp: 97  F (36.1  C) Temp src: Oral BP: 106/77 Pulse: 80 Heart Rate: 78 Resp: 12 SpO2: 99 % O2 Device: None (Room air)    Weight: 109 lbs 14.4  oz  Physical Exam  Constitutional:       General: She is not in acute distress.     Appearance: She is well-developed.      Comments: Thin, malnourished   HENT:      Head: Normocephalic and atraumatic.      Mouth/Throat:      Mouth: Mucous membranes are moist.      Pharynx: Oropharynx is clear.   Eyes:      General: No scleral icterus.     Conjunctiva/sclera: Conjunctivae normal.   Cardiovascular:      Rate and Rhythm: Normal rate and regular rhythm.      Heart sounds: Normal heart sounds. No murmur. No friction rub.   Pulmonary:      Effort: Pulmonary effort is normal. No respiratory distress.      Breath sounds: Normal breath sounds.   Abdominal:      General: There is distension.      Palpations: There is no mass.      Tenderness: There is tenderness (diffuse, worse in upper abdomen). There is no guarding or rebound.      Comments: Hyperactive bowel sounds   Musculoskeletal:         General: No swelling.   Skin:     General: Skin is warm and dry.      Findings: No erythema.      Comments: 1.5 cm lymph nodes R inguinal, firm, mildly tender, mobile.  0.5 cm LN L mid-thigh, mildly tender.   Neurological:      Mental Status: She is alert and oriented to person, place, and time.   Psychiatric:         Mood and Affect: Mood normal.             Data   Recent Labs   Lab 10/19/19  0550 10/18/19  2305   WBC 9.2 10.8   HGB 12.4 13.0   MCV 99 97    313    138   POTASSIUM 3.2* 2.8*   CHLORIDE 108 103   CO2 30 29   BUN 11 14   CR 0.60 0.59   ANIONGAP 5 6   JONATAN 7.4* 8.3*   GLC 79 98   ALBUMIN  --  3.4   PROTTOTAL  --  6.5*   BILITOTAL  --  0.4   ALKPHOS  --  95   ALT  --  49   AST  --  19

## 2019-10-19 NOTE — PROGRESS NOTES
"Brief progress note    Discussed at length with patient increasing pain. Reports she does not actually want a change in pain regimen, and was doing well until she \"went on a walk and maybe overdid it\" and now in acute pain that she believes she can overcome. She is open to getting an NGT placed if pain does not subside in the next 2-3 hours. Thinks some simethicone will help with gas.    Crunched over in fetal position on bed.  No emesis.  Abdomen unchanged.    - Simethicone ordered  - Patient will let us know if pain persists, open to NGT at that time  - No changes to pain regimen for now  - Discussed with general surgery  - Rest of plan per daily rounding note    Arpita Lerma MD    "

## 2019-10-19 NOTE — ED PROVIDER NOTES
"      [place] EMERGENCY DEPARTMENT ([facility])  October 18, 2019    History     Chief Complaint   Patient presents with     Abdominal Pain     HPI  Rachel A Gerhardt is a 43 year old female with a history of cervical cancer, small bowel obstruction (2016 and 2018), asthma, and substance use disorder who presents to the ED for evaluation of abdominal pain. Patient thinks she has another bowel obstruction. She reports her abdominal pain started yesterday. She complains of abdominal distention. She states her \"abdomen feels hard as a rock\". Patient reports she can barely walk, because the pain is so severe. She states her last bowel movement was this morning, which consisted of diarrhea. She notes she has had diarrhea for a year, and her episode today was unchanged from her baseline. Patient reports she has vomited 5 times today. She states she does not normally vomit on a daily basis. She reports she has tried to eat food, but she vomits immediately after eating anything.    PAST MEDICAL HISTORY  Past Medical History:   Diagnosis Date     Asthma      Cervical cancer (H)      Other chronic pain      Ovarian cancer (H)      Partial small bowel obstruction (H) 11/2/2018     SBO (small bowel obstruction) (H) 12/27/2016     Small bowel obstruction (H) 5/17/2017     Small intestine obstruction (H) 4/29/2017     Substance abuse (H)     Outside records indicate past history of narcotics abuse or dependence, but patient denies.     PAST SURGICAL HISTORY  Past Surgical History:   Procedure Laterality Date     COLONOSCOPY N/A 5/3/2018    Procedure: COLONOSCOPY;  sigmoidoscopy;  Surgeon: Omero Vigil MD;  Location: [facility] OR     COLONOSCOPY WITH CO2 INSUFFLATION N/A 4/30/2018    Procedure: COLONOSCOPY WITH CO2 INSUFFLATION;  Colonoscopy;  Surgeon: Omero Vigil MD;  Location: [facility] OR     COMBINED CYSTOSCOPY, INSERT STENT URETER(S) Bilateral 5/18/2017    Procedure: COMBINED CYSTOSCOPY, INSERT STENT " URETER(S);  Cystoscopy with Bilateral Stent,;  Surgeon: Rene Calero MD;  Location: [facility] OR     ENT SURGERY  2009    mastoid, sinus     ENTEROSCOPY SMALL BOWEL N/A 6/18/2018    Procedure: ENTEROSCOPY SMALL BOWEL;  Lower bowel Retrograde Enteroscopy with Balloon Dilation .;  Surgeon: Omero Vigil MD;  Location: [facility] OR     EXAM UNDER ANESTHESIA, INSERT ALEX SLEEVE, UTERINE PLACEMENT OF TANDEM AND RING FOR RAD, ULTRASOUND N/A 12/14/2015    Procedure: EXAM UNDER ANESTHESIA, INSERT ALEX SLEEVE, UTERINE PLACEMENT OF TANDEM AND RING FOR RADIATION, ULTRASOUND GUIDED;  Surgeon: Abby Tony MD;  Location: [facility] OR     INSERT TANDEM AND CESIUM APPLICATOR CERVIX, ULTRASOUND GUIDED N/A 12/17/2015    Procedure: INSERT TANDEM AND CESIUM APPLICATOR CERVIX, ULTRASOUND GUIDED;  Surgeon: Kika Wood MD;  Location: [facility] OR     KNEE SURGERY       LAPAROTOMY EXPLORATORY N/A 5/18/2017    Procedure: LAPAROTOMY EXPLORATORY;   Exploratry Laparotomy, Small Bowel Resection with anastomosis, Flexible Sigmoidoscopy;  Surgeon: Jennifer Goodwin MD;  Location: [facility] OR     PICC INSERTION Right 04/29/2017    4fr SL BioFlo PICC, 37cm (3cm external) in the R basilic vein w/ tip in the mid SVC.     PICC INSERTION Right 03/29/2018    4Fr - 40cm (4cm external), R lateral brachial vein, Low SVC     RESECT SMALL BOWEL WITHOUT OSTOMY N/A 5/18/2017    Procedure: RESECT SMALL BOWEL WITHOUT OSTOMY;;  Surgeon: Jennifer Goodwin MD;  Location: [facility] OR     SIGMOIDOSCOPY FLEXIBLE N/A 5/18/2017    Procedure: SIGMOIDOSCOPY FLEXIBLE;;  Surgeon: Jennifer Goodwin MD;  Location: [facility] OR     FAMILY HISTORY  Family History   Problem Relation Age of Onset     Diabetes Mother      Ovarian Cancer No family hx of      Uterine Cancer No family hx of      Cervical Cancer No family hx of      Breast Cancer No family hx of      SOCIAL HISTORY  Social History     Tobacco Use     Smoking status: Light Tobacco Smoker     Years: 12.00     Types: Cigarettes      Smokeless tobacco: Never Used     Tobacco comment: pt smoking about 4 cigs per week   Substance Use Topics     Alcohol use: No     Alcohol/week: 0.0 standard drinks     Comment: None per pt     MEDICATIONS  Current Facility-Administered Medications   Medication     0.9% sodium chloride + KCl 20 mEq/L infusion     lidocaine (XYLOCAINE) 4 % solution 5 mL     oxymetazoline (AFRIN) 0.05 % spray 2 spray     sodium chloride 0.9% infusion     Current Outpatient Medications   Medication     ondansetron (ZOFRAN-ODT) 4 MG ODT tab     oxyCODONE-acetaminophen (PERCOCET) 5-325 MG tablet     Simethicone 125 MG TABS     albuterol (PROAIR HFA/PROVENTIL HFA/VENTOLIN HFA) 108 (90 Base) MCG/ACT inhaler     hydrOXYzine (ATARAX) 10 MG tablet     ondansetron (ZOFRAN-ODT) 4 MG ODT tab     ondansetron (ZOFRAN-ODT) 4 MG ODT tab     ALLERGIES  Allergies   Allergen Reactions     Sulfa Drugs Hives     Ibuprofen Nausea and Vomiting and Hives     Unknown if reaction hives or N/V     No Clinical Screening - See Comments Other (See Comments) and Diarrhea     headache  Carrots cause gastric upset, cramping and diarrhea.     Ciprofloxacin Hives and Nausea     Quinolones      Tramadol Hives, Diarrhea, Nausea and Nausea and Vomiting     Amoxicillin Nausea and Vomiting     Augmentin Nausea, Hives and GI Disturbance     Patient tolerated course of zosyn in 5/2018     Avelox [Moxifloxacin] Nausea and Vomiting     Codeine Nausea and Vomiting     Daucus Carota      Other reaction(s): GI Upset  Other reaction(s): Abdominal pain, Diarrhea  Carrots cause gastric upset, cramping and diarrhea.       I have reviewed the Medications, Allergies, Past Medical and Surgical History, and Social History in the Epic system.    Review of Systems   Gastrointestinal: Positive for abdominal distention, abdominal pain, diarrhea, nausea and vomiting (x5).   All other systems reviewed and are negative.      Physical Exam   BP: 98/70  Pulse: 81  Heart Rate: 97  Temp: 98.5  F  (36.9  C)  Resp: 18  Weight: 46.7 kg (103 lb)  SpO2: 97 %      Physical Exam  Constitutional:       General: She is not in acute distress.     Appearance: She is well-developed. She is not diaphoretic.      Comments: Pt is obviously uncomfortable, malnourished    HENT:      Head: Normocephalic and atraumatic.      Mouth/Throat:      Pharynx: No oropharyngeal exudate.   Eyes:      General: No scleral icterus.     Pupils: Pupils are equal, round, and reactive to light.   Neck:      Musculoskeletal: Normal range of motion and neck supple.   Cardiovascular:      Heart sounds: Normal heart sounds.   Pulmonary:      Effort: Pulmonary effort is normal. No respiratory distress.      Breath sounds: Normal breath sounds.   Abdominal:      General: A surgical scar is present. Bowel sounds are normal. There is distension.      Tenderness: There is generalized tenderness.   Musculoskeletal:         General: No tenderness.   Skin:     General: Skin is warm and dry.      Coloration: Skin is not pale.      Findings: No erythema or rash.   Neurological:      Mental Status: She is alert and oriented to person, place, and time.         ED Course        Procedures             Critical Care time:  none         Results for orders placed or performed during the hospital encounter of 10/18/19   CT Abdomen Pelvis w Contrast   Result Value Ref Range    Radiologist flags Small bowel obstruction (Urgent)     Narrative    1. Small bowel obstruction with transition point in the mid abdomen  adjacent ileo-ileal anastomotic stricture. Luminal distention proximal  to the anastomosis measures up to 7.3 cm, which is increased from  prior exams in this patient with a history of chronically dilated  loops of small bowel. No pneumatosis or other CT findings of ischemia.  2. Heavy stool burden throughout the colon.  3. Multiple mildly enlarged mesenteric lymph nodes, presumably  reactive.    [Urgent Result: Small bowel obstruction]    Finding was  identified on 10/19/2019 12:52 AM.     Dr. Craft was contacted by Dr. Esteban at 10/19/2019 1:06 AM and  verbalized understanding of the urgent finding.    CBC with platelets differential   Result Value Ref Range    WBC 10.8 4.0 - 11.0 10e9/L    RBC Count 4.17 3.8 - 5.2 10e12/L    Hemoglobin 13.0 11.7 - 15.7 g/dL    Hematocrit 40.5 35.0 - 47.0 %    MCV 97 78 - 100 fl    MCH 31.2 26.5 - 33.0 pg    MCHC 32.1 31.5 - 36.5 g/dL    RDW 13.2 10.0 - 15.0 %    Platelet Count 313 150 - 450 10e9/L    Diff Method Automated Method     % Neutrophils 71.3 %    % Lymphocytes 20.2 %    % Monocytes 6.4 %    % Eosinophils 1.2 %    % Basophils 0.4 %    % Immature Granulocytes 0.5 %    Nucleated RBCs 0 0 /100    Absolute Neutrophil 7.7 1.6 - 8.3 10e9/L    Absolute Lymphocytes 2.2 0.8 - 5.3 10e9/L    Absolute Monocytes 0.7 0.0 - 1.3 10e9/L    Absolute Eosinophils 0.1 0.0 - 0.7 10e9/L    Absolute Basophils 0.0 0.0 - 0.2 10e9/L    Abs Immature Granulocytes 0.1 0 - 0.4 10e9/L    Absolute Nucleated RBC 0.0    Comprehensive metabolic panel   Result Value Ref Range    Sodium 138 133 - 144 mmol/L    Potassium 2.8 (L) 3.4 - 5.3 mmol/L    Chloride 103 94 - 109 mmol/L    Carbon Dioxide 29 20 - 32 mmol/L    Anion Gap 6 3 - 14 mmol/L    Glucose 98 70 - 99 mg/dL    Urea Nitrogen 14 7 - 30 mg/dL    Creatinine 0.59 0.52 - 1.04 mg/dL    GFR Estimate >90 >60 mL/min/[1.73_m2]    GFR Estimate If Black >90 >60 mL/min/[1.73_m2]    Calcium 8.3 (L) 8.5 - 10.1 mg/dL    Bilirubin Total 0.4 0.2 - 1.3 mg/dL    Albumin 3.4 3.4 - 5.0 g/dL    Protein Total 6.5 (L) 6.8 - 8.8 g/dL    Alkaline Phosphatase 95 40 - 150 U/L    ALT 49 0 - 50 U/L    AST 19 0 - 45 U/L   Lactic acid whole blood   Result Value Ref Range    Lactic Acid 1.2 0.7 - 2.0 mmol/L       Medications   0.9% sodium chloride BOLUS (0 mLs Intravenous Stopped 10/19/19 0052)     Followed by   sodium chloride 0.9% infusion (1,000 mLs Intravenous New Bag 10/19/19 0052)   oxymetazoline (AFRIN) 0.05 % spray 2 spray  (has no administration in time range)   lidocaine (XYLOCAINE) 4 % solution 5 mL (has no administration in time range)   0.9% sodium chloride + KCl 20 mEq/L infusion ( Intravenous New Bag 10/19/19 0124)   HYDROmorphone (DILAUDID) injection 1 mg (1 mg Intravenous Given 10/18/19 2336)   ondansetron (ZOFRAN) injection 4 mg (4 mg Intravenous Given 10/18/19 2336)   OLANZapine zydis (zyPREXA) ODT tab 5 mg (5 mg Oral Given 10/18/19 2336)   potassium chloride 10 mEq in 100 mL intermittent infusion with 10 mg lidocaine (0 mEq Intravenous Stopped 10/19/19 0052)   iopamidol (ISOVUE-370) solution 64 mL (64 mLs Intravenous Given 10/19/19 0005)   sodium chloride (PF) 0.9% PF flush 66 mL (66 mLs Intravenous Given 10/19/19 0006)   HYDROmorphone (DILAUDID) injection 1 mg (1 mg Intravenous Given 10/19/19 0124)       I have reviewed the patient's prior chart including recent labs, ER visits, and available inpatient discharge summaries if available.         Labs Ordered and Resulted from Time of ED Arrival Up to the Time of Departure from the ED   COMPREHENSIVE METABOLIC PANEL - Abnormal; Notable for the following components:       Result Value    Potassium 2.8 (*)     Calcium 8.3 (*)     Protein Total 6.5 (*)     All other components within normal limits   CBC WITH PLATELETS DIFFERENTIAL   LACTIC ACID WHOLE BLOOD            Assessments & Plan (with Medical Decision Making)   This is a 43-year-old female patient coming into the emergency room with complaints of abdominal pain with a long history of chronic abdominal pain/SBO.  On physical exam she is obviously uncomfortable and laying on her side.  Her abdomen is distended, generally tender with previous surgical scars.  Otherwise she is vitally stable.  She has a history of lower blood pressures and today her blood pressure is about at her baseline.  She is provided with IV fluids, Dilaudid for discomfort and Zofran for nausea.  Her abdominal CT shows that there is a small bowel  "obstruction at her chronic spot in her right lower quadrant.  The results were discussed with the patient.  We discussed the utilization of a nasogastric tube however the patient states that she is adamant that she will not get one as she has panic disorder that is associated with nasogastric tubes.  I believe that it is okay that she does not have a gastric to this time as she is not actively vomiting.  At this time she will be admitted to the medicine service for continued care and management.    Mild hypokalemia treated with supplemental IV potassium    I have reviewed the nursing notes.    I have reviewed the findings, diagnosis, plan and need for follow up with the patient.    New Prescriptions    No medications on file       Final diagnoses:   SBO (small bowel obstruction) (H)     I, Jerrica Hernandez, am serving as a trained medical scribe to document services personally performed by Dada Craft MD, based on the provider's statements to me.      IDada MD, was physically present and have reviewed and verified the accuracy of this note documented by Jerrica Hernandez.     \"This dictation was performed with the assistance of voice recognition software and may contain inadvertant transcription  errors,  omissions and/or  inadvertent word substitution.\" --Dada Craft MD     10/18/2019   [facility], [place], EMERGENCY DEPARTMENT     Dada Craft MD  10/19/19 0139    "

## 2019-10-19 NOTE — H&P
[facility], [place]    History and Physical - Natys Service        Date of Admission:  10/18/2019    Assessment & Plan   Rachel A Gerhardt is a 43 year old female admitted on 10/18/2019. 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     #Small bowel obstruction  #Constipation  #Acute on chronic abdominal pain  Patient with multiple medically managed SBO's, and difficulty with NG placement.  Her exam is notable for tenderness and distention, but no rebound or signs of peritonitis.  Last BM was this morning, and she is passing gas.  CT does show an obstruction with a transition point near her previous stricture, and dilated loops proximal to this.  As she does not want NG placement, will manage conservatively with fluids and bowel rest.  Will also consult surgery.  -N.p.o.  -IV fluids with normal saline plus K at 125 mL/h  -IV Dilaudid 0.5-1 mg every 1 hour as needed, wean as able  -Tylenol 650 mg every 4 hours scheduled  -Patient takes Percocet every 4 hours at home  -Zofran for nausea  -Surgery consult, appreciate recommendations  -Serial abdominal exams  -If worsening, would discuss NG placement with patient again  -Consider PEG tube for decompression (IR vs surgery to place)    #History of cervical and ovarian cancer  #Lymphadenopathy  Patient was scheduled to discuss lymph node biopsy with surgery as an outpatient after last admission in June, but was unable to follow through with this.  She would like to discuss the biopsy again.  Of note, she does also have multiple enlarged mesenteric lymph nodes on CT, read as reactive, but this should be noted going forward.  -Surgery consult, appreciate recommendations in scheduling lymph node  biopsy      Diet: NPO  Fluids: NS + K at 125 mL/hr  DVT Prophylaxis: Pneumatic Compression Devices  Godwin Catheter: not present  Code Status: FULL    Disposition Plan   Expected discharge: 2 - 3 days, recommended to prior living arrangement once adequate pain management/ tolerating PO medications and SBO resolved.  Entered: Francis Marrero MD 10/19/2019, 2:14 AM       The patient's care was discussed with the Attending Physician, Dr. Pacheco and seen by Dr. Vasquez.    Francis Marrero MD  [facility], [place]  Pager: [phone]  Please see sticky note for cross cover information  ______________________________________________________________________    Chief Complaint   Abdominal pain    History is obtained from the patient    History of Present Illness   Rachel A Gerhardt is a 43 year old female admitted on 10/18/2019. 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  Has had abdominal pain, bloating, nausea, vomiting, for the past 4 days.  Unable to hold her food down for the last few days as well.  She has been passing gas, but feels like it is less than normal.  She also describes daily episodes of early morning diarrhea, 5-8 times every morning, which was no different today.  Her symptoms feel like her previous obstructions.  First episode of SBO was in 2017 when she had a resection of small bowel. She then had another bowel obstruction 6 months later which was confusing for her because she just had a resection and was told she would not have one again that soon.  She has since had multiple obstructions and recently has been discussing her case with surgery but they can't promise she will not end up with an ostomy bag, or that it  will stop the episdoes, so she is hesitant to proceed with any surgical intervention.  Last discussion with them was during her last admission in June. She has a history of multiple colonoscopies with insufflation to attempt to open things up as well. Most of these were with Dr. Vigil. She doesn't get NG tubes because she describes a mental disorder when she gets an NG tube in and it causes her to freak out.  The one time she was able to tolerate an NG was when they gave her Ativan continuously while she had the tube in and she doesn't remember anything from that time.    Also has a history of cervical, ovarian, and bladder cancer, status post chemo and radiation.  She has had 2-3 swollen lymph nodes recently for which she was scheduled to talk to surgery about a biopsy after last admission, but did not follow up for this appointment.  Today she says they are still swollen and painful, and when they are pushed she gets radiating pain down her leg.    ED course: Afebrile, hemodynamically stable.  Labs notable for potassium of 2.8, normal lactic acid.  CT done and showed obstruction with transition point in the mid abdomen adjacent to the ileo-ileal anastomotic stricture.  Also noted was an increased dilation of the lumen proximal to this point.  Additionally, she had heavy stool burden throughout the colon.      Review of Systems    The 10 point Review of Systems is negative other than noted in the HPI or here.     Past Medical History    I have reviewed this patient's medical history and updated it with pertinent information if needed.   Past Medical History:   Diagnosis Date     Asthma      Cervical cancer (H)      Other chronic pain      Ovarian cancer (H)      Partial small bowel obstruction (H) 11/2/2018     SBO (small bowel obstruction) (H) 12/27/2016     Small bowel obstruction (H) 5/17/2017     Small intestine obstruction (H) 4/29/2017     Substance abuse (H)     Outside records indicate past history of  narcotics abuse or dependence, but patient denies.        Past Surgical History   I have reviewed this patient's surgical history and updated it with pertinent information if needed.  Past Surgical History:   Procedure Laterality Date     COLONOSCOPY N/A 5/3/2018    Procedure: COLONOSCOPY;  sigmoidoscopy;  Surgeon: Omero Vigil MD;  Location: [facility] OR     COLONOSCOPY WITH CO2 INSUFFLATION N/A 4/30/2018    Procedure: COLONOSCOPY WITH CO2 INSUFFLATION;  Colonoscopy;  Surgeon: Omero Vigil MD;  Location: [facility]     COMBINED CYSTOSCOPY, INSERT STENT URETER(S) Bilateral 5/18/2017    Procedure: COMBINED CYSTOSCOPY, INSERT STENT URETER(S);  Cystoscopy with Bilateral Stent,;  Surgeon: Rene Calero MD;  Location: [facility] OR     ENT SURGERY  2009    mastoid, sinus     ENTEROSCOPY SMALL BOWEL N/A 6/18/2018    Procedure: ENTEROSCOPY SMALL BOWEL;  Lower bowel Retrograde Enteroscopy with Balloon Dilation .;  Surgeon: Omero Vigil MD;  Location: [facility] OR     EXAM UNDER ANESTHESIA, INSERT ALEX SLEEVE, UTERINE PLACEMENT OF TANDEM AND RING FOR RAD, ULTRASOUND N/A 12/14/2015    Procedure: EXAM UNDER ANESTHESIA, INSERT ALEX SLEEVE, UTERINE PLACEMENT OF TANDEM AND RING FOR RADIATION, ULTRASOUND GUIDED;  Surgeon: Abby Tony MD;  Location: [facility] OR     INSERT TANDEM AND CESIUM APPLICATOR CERVIX, ULTRASOUND GUIDED N/A 12/17/2015    Procedure: INSERT TANDEM AND CESIUM APPLICATOR CERVIX, ULTRASOUND GUIDED;  Surgeon: Kika Wood MD;  Location: [facility] OR     KNEE SURGERY       LAPAROTOMY EXPLORATORY N/A 5/18/2017    Procedure: LAPAROTOMY EXPLORATORY;   Exploratry Laparotomy, Small Bowel Resection with anastomosis, Flexible Sigmoidoscopy;  Surgeon: Jennifer Goodwin MD;  Location: [facility] OR     PICC INSERTION Right 04/29/2017    4fr SL BioFlo PICC, 37cm (3cm external) in the R basilic vein w/ tip in the mid SVC.     PICC INSERTION Right 03/29/2018    4Fr - 40cm (4cm external), R lateral brachial vein, Low SVC      RESECT SMALL BOWEL WITHOUT OSTOMY N/A 5/18/2017    Procedure: RESECT SMALL BOWEL WITHOUT OSTOMY;;  Surgeon: Jennifer Goodwin MD;  Location: [facility]     SIGMOIDOSCOPY FLEXIBLE N/A 5/18/2017    Procedure: SIGMOIDOSCOPY FLEXIBLE;;  Surgeon: Jennifer Goodwin MD;  Location: [facility]        Social History   I have reviewed this patient's social history and updated it with pertinent information if needed. Rachel A Gerhardt  reports that she has been smoking cigarettes. She has smoked for the past 12.00 years. She has never used smokeless tobacco. She reports that she does not drink alcohol or use drugs.    Family History   I have reviewed this patient's family history and updated it with pertinent information if needed.   Family History   Problem Relation Age of Onset     Diabetes Mother      Ovarian Cancer No family hx of      Uterine Cancer No family hx of      Cervical Cancer No family hx of      Breast Cancer No family hx of        Prior to Admission Medications   Prior to Admission Medications   Prescriptions Last Dose Informant Patient Reported? Taking?   Simethicone 125 MG TABS 10/18/2019 at 1300  Yes Yes   Sig: Take 125 mg by mouth daily    albuterol (PROAIR HFA/PROVENTIL HFA/VENTOLIN HFA) 108 (90 Base) MCG/ACT inhaler   Yes No   Sig: Inhale 2 puffs into the lungs every 6 hours as needed for shortness of breath / dyspnea or wheezing   hydrOXYzine (ATARAX) 10 MG tablet   No No   Sig: Take 1 tablet (10 mg) by mouth every 4 hours as needed for anxiety (nausea, pain)   ondansetron (ZOFRAN-ODT) 4 MG ODT tab 10/17/2019 at 1300  No Yes   Sig: Take 1 tablet (4 mg) by mouth every 6 hours as needed for nausea or vomiting   ondansetron (ZOFRAN-ODT) 4 MG ODT tab   No No   Sig: Take 1 tablet (4 mg) by mouth every 6 hours as needed for nausea or vomiting   ondansetron (ZOFRAN-ODT) 4 MG ODT tab   No No   Sig: Take 1 tablet (4 mg) by mouth every 6 hours as needed for nausea or vomiting   oxyCODONE-acetaminophen (PERCOCET) 5-325 MG  tablet 10/18/2019 at 1700  No Yes   Sig: Take 1 tablet by mouth every 4 hours as needed for severe pain      Facility-Administered Medications: None     Allergies   Allergies   Allergen Reactions     Sulfa Drugs Hives     Ibuprofen Nausea and Vomiting and Hives     Unknown if reaction hives or N/V     No Clinical Screening - See Comments Other (See Comments) and Diarrhea     headache  Carrots cause gastric upset, cramping and diarrhea.     Ciprofloxacin Hives and Nausea     Quinolones      Tramadol Hives, Diarrhea, Nausea and Nausea and Vomiting     Amoxicillin Nausea and Vomiting     Augmentin Nausea, Hives and GI Disturbance     Patient tolerated course of zosyn in 5/2018     Avelox [Moxifloxacin] Nausea and Vomiting     Codeine Nausea and Vomiting     Daucus Carota      Other reaction(s): GI Upset  Other reaction(s): Abdominal pain, Diarrhea  Carrots cause gastric upset, cramping and diarrhea.       Physical Exam   Vital Signs: Temp: 98.5  F (36.9  C) Temp src: Oral BP: 104/79 Pulse: 80 Heart Rate: 79 Resp: 10 SpO2: 98 % O2 Device: None (Room air)    Weight: 103 lbs 0 oz    Physical Exam  Constitutional:       General: She is not in acute distress.     Appearance: She is well-developed.      Comments: Appears to be in pain  Thin, malnourished   HENT:      Head: Normocephalic and atraumatic.      Mouth/Throat:      Mouth: Mucous membranes are moist.      Pharynx: Oropharynx is clear.   Eyes:      General: No scleral icterus.     Conjunctiva/sclera: Conjunctivae normal.   Cardiovascular:      Rate and Rhythm: Normal rate and regular rhythm.      Heart sounds: Normal heart sounds. No murmur. No friction rub.   Pulmonary:      Effort: Pulmonary effort is normal. No respiratory distress.      Breath sounds: Normal breath sounds.   Abdominal:      General: There is distension.      Palpations: There is no mass.      Tenderness: There is tenderness (diffuse, worse in upper abdomen). There is no guarding or rebound.       Comments: High-pitched bowel sounds   Musculoskeletal:         General: No swelling.   Skin:     General: Skin is warm and dry.      Findings: No erythema.      Comments: 2 swollen lymph nodes palpated in right groin, tender to palpation   Neurological:      Mental Status: She is alert and oriented to person, place, and time.   Psychiatric:         Mood and Affect: Mood normal.           Data   Data reviewed today: I reviewed all medications, new labs and imaging results over the last 24 hours. I personally reviewed the abdominal CT image(s) showing obstruction with transition point in the mid abdomen adjacent to the ileal anastomotic stricture.    Recent Labs   Lab 10/18/19  2305   WBC 10.8   HGB 13.0   MCV 97         POTASSIUM 2.8*   CHLORIDE 103   CO2 29   BUN 14   CR 0.59   ANIONGAP 6   JONATAN 8.3*   GLC 98   ALBUMIN 3.4   PROTTOTAL 6.5*   BILITOTAL 0.4   ALKPHOS 95   ALT 49   AST 19

## 2019-10-19 NOTE — PLAN OF CARE
"Abdominal \"cramping \" pain continues in abdomen,Dilaudid 1mg IV given with fair relief. Hyperactive bowel sounds, passing  gas, no stool this shift, voiding spont, up ad dania.Potassium is 3.2 replacement infusing.Potassium and Magnesium  ordered to be checked at 1730.  "

## 2019-10-19 NOTE — ED NOTES
[facility], [place]   ED Nurse to Floor Handoff     Rachel A Gerhardt is a 43 year old female who speaks English and lives alone,  in a home  They arrived in the ED by car from home    ED Chief Complaint: Abdominal Pain    ED Dx;   Final diagnoses:   None         Needed?: No    Allergies:   Allergies   Allergen Reactions     Sulfa Drugs Hives     Ibuprofen Nausea and Vomiting and Hives     Unknown if reaction hives or N/V     No Clinical Screening - See Comments Other (See Comments) and Diarrhea     headache  Carrots cause gastric upset, cramping and diarrhea.     Ciprofloxacin Hives and Nausea     Quinolones      Tramadol Hives, Diarrhea, Nausea and Nausea and Vomiting     Amoxicillin Nausea and Vomiting     Augmentin Nausea, Hives and GI Disturbance     Patient tolerated course of zosyn in 5/2018     Avelox [Moxifloxacin] Nausea and Vomiting     Codeine Nausea and Vomiting     Daucus Carota      Other reaction(s): GI Upset  Other reaction(s): Abdominal pain, Diarrhea  Carrots cause gastric upset, cramping and diarrhea.   .  Past Medical Hx:   Past Medical History:   Diagnosis Date     Asthma      Cervical cancer (H)      Other chronic pain      Ovarian cancer (H)      Partial small bowel obstruction (H) 11/2/2018     SBO (small bowel obstruction) (H) 12/27/2016     Small bowel obstruction (H) 5/17/2017     Small intestine obstruction (H) 4/29/2017     Substance abuse (H)     Outside records indicate past history of narcotics abuse or dependence, but patient denies.      Baseline Mental status: WDL  Current Mental Status changes: at basesline    Infection present or suspected this encounter: no  Sepsis suspected: No  Isolation type: No active isolations     Activity level - Baseline/Home:  Independent.  Activity Level - Current:   Stand with Assist    Bariatric equipment needed?: No    In the ED these meds were given:   Medications   0.9% sodium chloride BOLUS (0 mLs  Intravenous Stopped 10/19/19 0052)     Followed by   sodium chloride 0.9% infusion (1,000 mLs Intravenous New Bag 10/19/19 0052)   oxymetazoline (AFRIN) 0.05 % spray 2 spray (has no administration in time range)   lidocaine (XYLOCAINE) 4 % solution 5 mL (has no administration in time range)   HYDROmorphone (DILAUDID) injection 1 mg (1 mg Intravenous Given 10/18/19 2336)   ondansetron (ZOFRAN) injection 4 mg (4 mg Intravenous Given 10/18/19 2336)   OLANZapine zydis (zyPREXA) ODT tab 5 mg (5 mg Oral Given 10/18/19 2336)   potassium chloride 10 mEq in 100 mL intermittent infusion with 10 mg lidocaine (0 mEq Intravenous Stopped 10/19/19 0052)   iopamidol (ISOVUE-370) solution 64 mL (64 mLs Intravenous Given 10/19/19 0005)   sodium chloride (PF) 0.9% PF flush 66 mL (66 mLs Intravenous Given 10/19/19 0006)       Drips running?  Yes - NS    Home pump  No    Current LDAs  Peripheral IV 10/18/19 Left Upper forearm (Active)   Site Assessment WDL 10/18/2019 11:08 PM   Line Status Saline locked 10/18/2019 11:08 PM   Phlebitis Scale 0-->no symptoms 10/18/2019 11:08 PM   Infiltration Scale 0 10/18/2019 11:08 PM   Infiltration Site Treatment Method  None 10/18/2019 11:08 PM   Extravasation? No 10/18/2019 11:08 PM   Dressing Intervention New dressing  10/18/2019 11:08 PM   Number of days: 1       Incision/Surgical Site 05/18/17 Abdomen (Active)   Number of days: 884       Labs results:   Labs Ordered and Resulted from Time of ED Arrival Up to the Time of Departure from the ED   COMPREHENSIVE METABOLIC PANEL - Abnormal; Notable for the following components:       Result Value    Potassium 2.8 (*)     Calcium 8.3 (*)     Protein Total 6.5 (*)     All other components within normal limits   CBC WITH PLATELETS DIFFERENTIAL   LACTIC ACID WHOLE BLOOD       Imaging Studies: No results found for this or any previous visit (from the past 24 hour(s)).    Recent vital signs:   BP (!) 83/64   Pulse 73   Temp 98.5  F (36.9  C) (Oral)   Resp  14   Wt 46.7 kg (103 lb)   SpO2 99%   BMI 15.21 kg/m      Messi Coma Scale Score: 15 (10/18/19 2300)       Cardiac Rhythm: Normal Sinus  Pt needs tele? No  Skin/wound Issues: None    Code Status: Full Code    Pain control: fair    Nausea control: good    Abnormal labs/tests/findings requiring intervention: SBO    Family present during ED course? No   Family Comments/Social Situation comments: n/a    Tasks needing completion: None    Noa Grubbs, RN    [phone] [facility]

## 2019-10-20 LAB
ANION GAP SERPL CALCULATED.3IONS-SCNC: 4 MMOL/L (ref 3–14)
BASOPHILS # BLD AUTO: 0.1 10E9/L (ref 0–0.2)
BASOPHILS NFR BLD AUTO: 0.6 %
BUN SERPL-MCNC: 9 MG/DL (ref 7–30)
CALCIUM SERPL-MCNC: 8.1 MG/DL (ref 8.5–10.1)
CHLORIDE SERPL-SCNC: 106 MMOL/L (ref 94–109)
CO2 SERPL-SCNC: 26 MMOL/L (ref 20–32)
CREAT SERPL-MCNC: 0.58 MG/DL (ref 0.52–1.04)
DIFFERENTIAL METHOD BLD: ABNORMAL
EOSINOPHIL # BLD AUTO: 0.1 10E9/L (ref 0–0.7)
EOSINOPHIL NFR BLD AUTO: 1.5 %
ERYTHROCYTE [DISTWIDTH] IN BLOOD BY AUTOMATED COUNT: 13.2 % (ref 10–15)
GFR SERPL CREATININE-BSD FRML MDRD: >90 ML/MIN/{1.73_M2}
GLUCOSE SERPL-MCNC: 89 MG/DL (ref 70–99)
HCT VFR BLD AUTO: 38.3 % (ref 35–47)
HGB BLD-MCNC: 12 G/DL (ref 11.7–15.7)
IMM GRANULOCYTES # BLD: 0.1 10E9/L (ref 0–0.4)
IMM GRANULOCYTES NFR BLD: 0.6 %
LYMPHOCYTES # BLD AUTO: 1.4 10E9/L (ref 0.8–5.3)
LYMPHOCYTES NFR BLD AUTO: 15.9 %
MCH RBC QN AUTO: 31.3 PG (ref 26.5–33)
MCHC RBC AUTO-ENTMCNC: 31.3 G/DL (ref 31.5–36.5)
MCV RBC AUTO: 100 FL (ref 78–100)
MONOCYTES # BLD AUTO: 0.7 10E9/L (ref 0–1.3)
MONOCYTES NFR BLD AUTO: 8.2 %
NEUTROPHILS # BLD AUTO: 6.5 10E9/L (ref 1.6–8.3)
NEUTROPHILS NFR BLD AUTO: 73.2 %
NRBC # BLD AUTO: 0 10*3/UL
NRBC BLD AUTO-RTO: 0 /100
PLATELET # BLD AUTO: 286 10E9/L (ref 150–450)
POTASSIUM SERPL-SCNC: 4.5 MMOL/L (ref 3.4–5.3)
RBC # BLD AUTO: 3.84 10E12/L (ref 3.8–5.2)
SODIUM SERPL-SCNC: 136 MMOL/L (ref 133–144)
WBC # BLD AUTO: 8.9 10E9/L (ref 4–11)

## 2019-10-20 PROCEDURE — 40000141 ZZH STATISTIC PERIPHERAL IV START W/O US GUIDANCE

## 2019-10-20 PROCEDURE — 25800030 ZZH RX IP 258 OP 636: Performed by: STUDENT IN AN ORGANIZED HEALTH CARE EDUCATION/TRAINING PROGRAM

## 2019-10-20 PROCEDURE — 25000128 H RX IP 250 OP 636: Performed by: STUDENT IN AN ORGANIZED HEALTH CARE EDUCATION/TRAINING PROGRAM

## 2019-10-20 PROCEDURE — 85025 COMPLETE CBC W/AUTO DIFF WBC: CPT | Performed by: STUDENT IN AN ORGANIZED HEALTH CARE EDUCATION/TRAINING PROGRAM

## 2019-10-20 PROCEDURE — 25000128 H RX IP 250 OP 636: Performed by: FAMILY MEDICINE

## 2019-10-20 PROCEDURE — 36415 COLL VENOUS BLD VENIPUNCTURE: CPT | Performed by: STUDENT IN AN ORGANIZED HEALTH CARE EDUCATION/TRAINING PROGRAM

## 2019-10-20 PROCEDURE — 40000556 ZZH STATISTIC PERIPHERAL IV START W US GUIDANCE

## 2019-10-20 PROCEDURE — 80048 BASIC METABOLIC PNL TOTAL CA: CPT | Performed by: STUDENT IN AN ORGANIZED HEALTH CARE EDUCATION/TRAINING PROGRAM

## 2019-10-20 PROCEDURE — 25000132 ZZH RX MED GY IP 250 OP 250 PS 637: Performed by: STUDENT IN AN ORGANIZED HEALTH CARE EDUCATION/TRAINING PROGRAM

## 2019-10-20 PROCEDURE — 12000001 ZZH R&B MED SURG/OB UMMC

## 2019-10-20 RX ORDER — DIPHENHYDRAMINE HYDROCHLORIDE 50 MG/ML
25 INJECTION INTRAMUSCULAR; INTRAVENOUS EVERY 6 HOURS PRN
Status: DISCONTINUED | OUTPATIENT
Start: 2019-10-20 | End: 2019-11-13

## 2019-10-20 RX ADMIN — DIPHENHYDRAMINE HYDROCHLORIDE 25 MG: 50 INJECTION, SOLUTION INTRAMUSCULAR; INTRAVENOUS at 08:26

## 2019-10-20 RX ADMIN — HYDROMORPHONE HYDROCHLORIDE 1 MG: 1 INJECTION, SOLUTION INTRAMUSCULAR; INTRAVENOUS; SUBCUTANEOUS at 21:58

## 2019-10-20 RX ADMIN — HYDROMORPHONE HYDROCHLORIDE 1 MG: 1 INJECTION, SOLUTION INTRAMUSCULAR; INTRAVENOUS; SUBCUTANEOUS at 02:04

## 2019-10-20 RX ADMIN — ONDANSETRON HYDROCHLORIDE 4 MG: 2 INJECTION, SOLUTION INTRAMUSCULAR; INTRAVENOUS at 16:52

## 2019-10-20 RX ADMIN — POTASSIUM CHLORIDE, DEXTROSE MONOHYDRATE AND SODIUM CHLORIDE: 150; 5; 450 INJECTION, SOLUTION INTRAVENOUS at 08:10

## 2019-10-20 RX ADMIN — DIPHENHYDRAMINE HYDROCHLORIDE 25 MG: 50 INJECTION, SOLUTION INTRAMUSCULAR; INTRAVENOUS at 01:27

## 2019-10-20 RX ADMIN — HYDROMORPHONE HYDROCHLORIDE 1 MG: 1 INJECTION, SOLUTION INTRAMUSCULAR; INTRAVENOUS; SUBCUTANEOUS at 09:08

## 2019-10-20 RX ADMIN — HYDROMORPHONE HYDROCHLORIDE 1 MG: 1 INJECTION, SOLUTION INTRAMUSCULAR; INTRAVENOUS; SUBCUTANEOUS at 20:32

## 2019-10-20 RX ADMIN — HYDROMORPHONE HYDROCHLORIDE 1 MG: 1 INJECTION, SOLUTION INTRAMUSCULAR; INTRAVENOUS; SUBCUTANEOUS at 07:42

## 2019-10-20 RX ADMIN — HYDROMORPHONE HYDROCHLORIDE 1 MG: 1 INJECTION, SOLUTION INTRAMUSCULAR; INTRAVENOUS; SUBCUTANEOUS at 23:20

## 2019-10-20 RX ADMIN — POTASSIUM CHLORIDE, DEXTROSE MONOHYDRATE AND SODIUM CHLORIDE: 150; 5; 450 INJECTION, SOLUTION INTRAVENOUS at 17:07

## 2019-10-20 RX ADMIN — POLYETHYLENE GLYCOL 3350 17 G: 17 POWDER, FOR SOLUTION ORAL at 08:10

## 2019-10-20 RX ADMIN — HYDROMORPHONE HYDROCHLORIDE 1 MG: 1 INJECTION, SOLUTION INTRAMUSCULAR; INTRAVENOUS; SUBCUTANEOUS at 00:42

## 2019-10-20 RX ADMIN — DOCUSATE SODIUM 100 MG: 100 CAPSULE, LIQUID FILLED ORAL at 08:10

## 2019-10-20 RX ADMIN — Medication: at 09:56

## 2019-10-20 RX ADMIN — DOCUSATE SODIUM 100 MG: 100 CAPSULE, LIQUID FILLED ORAL at 20:32

## 2019-10-20 RX ADMIN — HYDROMORPHONE HYDROCHLORIDE 1 MG: 1 INJECTION, SOLUTION INTRAMUSCULAR; INTRAVENOUS; SUBCUTANEOUS at 06:35

## 2019-10-20 ASSESSMENT — ACTIVITIES OF DAILY LIVING (ADL)
ADLS_ACUITY_SCORE: 10

## 2019-10-20 ASSESSMENT — ENCOUNTER SYMPTOMS
DIARRHEA: 1
NAUSEA: 1
CONFUSION: 0
CHILLS: 0
ABDOMINAL PAIN: 1
FEVER: 0
JOINT SWELLING: 1

## 2019-10-20 ASSESSMENT — PAIN DESCRIPTION - DESCRIPTORS: DESCRIPTORS: SHARP

## 2019-10-20 NOTE — PLAN OF CARE
"Pt is alert and oriented x4.AVSS. Pt C/O abdominal cramping pain \" that comes as a wave like labor pain\", pt crunches  over in fetal position on bed when pain comes ; pt requests dilaudid1 mg IV Q 1 hr around the clock; pt reported that this pain med regime is working to control her pain.Pt started taking her scheduled tylenol once it got changed to solution form per pt request( pt had one dose so far). Emesis x1( 300 ml), pt had zofran 4 mg IV with relief. NPO, passing gas and abdomen with active loud bowel sounds; no BM this shift. Up to bathroom independently, voided adequate amount. MIV is infusing at 125 ml/hr. Mag came back 2.0 and K came back 3.9 after replacement during day shift.  Continue with plan of care.  "

## 2019-10-20 NOTE — PROGRESS NOTES
GENERAL SURGERY PROGRESS NOTE  [id number]  Rachel A Gerhardt    S: Emesis x 2, last night and this morning. No other acute events. Still not currently interested in surgery.     O:  BP 96/71   Pulse 89   Temp 98.3  F (36.8  C) (Oral)   Resp 16   Wt 49.9 kg (109 lb 14.4 oz)   SpO2 98%   BMI 16.23 kg/m      GEN: NAD  HEENT: anicteric  Car: RRR  Pulm: normal work of breathing  Abd: soft, very bloated, tender globally.   Ext: WWP    A/P: SBO 2/2 stricture w/ fecal overflow    - continue bowel regimen / enemas  - rec NGT given emesis  - surgery indicated but patient reluctant  - surgery will continue to follow    Seen with chief resident, Dr. Patino, who will discuss with staff.    Tobin Miguel MD, [facility]  Plastic Surgery PGY-1  Pager: [phone]
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
## 2019-10-20 NOTE — PROGRESS NOTES
"  [facility]   BRIEF PROGRESS NOTE    SUBJECTIVE  Attempted to see patient to discuss pain medication cessation plan.  Patient was unavailable, out of room, but did discuss pain cessation plan with nursing.     Nursing had concerns noting that throughout the day, the patient had concerns about her IV pump, and that she was not receiving her pain medication.  Per daytime RN, patient did not \"feel the warmth \"when the PCA would administer medication.  She asked nursing to change out the PCA pump.  Patient also requested to have IV pump with maintenance fluids changed because pump was not holding a charge, and patient has been walking around, also seen outside smoking by staff.  Per evening RN, patient had noted to turn maintenance fluids off.  As conversation with nursing was wrapping up, patient was seen ambulating without difficulty down the hallway, and quickly walked past team noting she was rushing to the restroom, and would be in there for a while.    Noted that I was there to discuss pain regimen, and patient noted that this is not a good time.  Told patient that physician would be by after she was out of the restroom.    OBJECTIVE:    /73 (BP Location: Left arm)   Pulse 89   Temp 98  F (36.7  C) (Oral)   Resp 18   Wt 49.9 kg (109 lb 14.4 oz)   SpO2 97%   BMI 16.23 kg/m      Exam:  General: Alert and oriented, in no acute distress.  Skin: Warm and dry, no abnormalities noted.  ENT: Speech intact, nasal passages open, no hearing impairment noted.  CV: No cyanosis or pallor, warm and well perfused.  Respiratory: No respiratory distress  Neuro: Gait and station normal, comprehension intact. Gross and fine motor skills intact.   Extremities: Warm, able to move all four extremities at will.      ASSESSMENT/PLAN:  Please see daily rounding note for full A/P.    Updates:  1. Night team to see, to discuss PCA spacing to 1 mg dilaudid every 2 hours.    John Dc, DO  5:40 PM    "

## 2019-10-20 NOTE — PLAN OF CARE
AVSS. Abdominal pain managed with PRN IV dilaudid. Refused tylenol. Sticky note placed per charge nurse for primary team to consult initiating PCA due to pt requiring hourly doses of IV pain medication. Abdomen round and distended. BS hyperactive. Intermittent n/v. Declined NG tube placement. 600ml emesis. Refused antiemetics. PIV infusing IVMF. Benadryl given x 1 for c/o itching back of calves and scalp. Up ad dania. Continue POC.     Addendum: PIV infiltrated on L arm. Stopped IV, removed. Per pharmacist direction, applied ice and elevated. New IV placed.

## 2019-10-20 NOTE — PROGRESS NOTES
[facility], [facility] Progress Note    Main Plans for Today   - PCA with weaning plan this afternoon  - Per Surgery, recommending intervention, pt declining     Assessment & Plan   Rachel A Gerhardt is a 43 year old female admitted on 10/18/2019. 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      # Small bowel obstruction  # Constipation  # Acute on chronic abdominal pain  Patient with multiple medically managed SBO's, and difficulty with NG placement. Her exam is notable for tenderness and distention, but no rebound or signs of peritonitis. LA wnl on admission. CT 10/19 with a transition point near her previous stricture, and dilated loops proximal to this, with heavy colonic stool burdern.  As she does not want NG placement, will manage conservatively with fluids and bowel rest, ambulation. Continues to have diarrhea daily in AM, passing flatus. Last meal 10/15/2019.  - Surgery consult, appreciate recommendations  --- Intervention indicated, declined by pt  --- Consider PEG tube for decompression (IR vs surgery to place)  --- Recommending NG placement   - Given BMs and flatus, trailing clears today   - I/Os  - mIVF  - IV Dilaudid PCA 1 mg every 1 hour as needed, plan to wean this PM  - Tylenol 650 mg every 4 hours scheduled  - Zofran for nausea  - Ambulation; Glycerin suppository, tap water enema prn.     # History of cervical and ovarian cancer  # Lymphadenopathy  Patient was scheduled to discuss lymph node biopsy with surgery as an outpatient after last admission in June, but was unable to follow through with this. She would like to discuss the biopsy again. Of note, she does also have multiple enlarged mesenteric  lymph nodes on CT, read as reactive, but this should be noted going forward.  - Surgery consult, appreciate recommendations  --- lymph node biopsy, likely as an outpatient.      # Pain Assessment:  Current Pain Score 10/20/2019   Patient currently in pain? yes   Pain score (0-10) -   Pain location -   Pain descriptors -   Some encounter information is confidential and restricted. Go to Review Flowsheets activity to see all data.   - Jenni is experiencing pain due to bowel obstruction. Pain management was discussed and the plan was created in a collaborative fashion.  Jenni's response to the current recommendations: engaged  - Please see the plan for pain management as documented above    Diet: Advance Diet as Tolerated: Clear Liquid Diet  Fluids: D5 1/2 NS + 20 KCl @ 125 ml/hr  DVT Prophylaxis: Pneumatic Compression Devices  Code Status: Full Code    Disposition Plan   Expected discharge:  2 - 3 days, recommended to prior living arrangement once adequate pain management/ tolerating PO medications, safe disposition plan/ TCU bed available and able to resume PO diet.Dispo:          Entered: John Dc 10/20/2019, 2:20 PM   Information in the above section will display in the discharge planner report.      The patient's care was discussed with the Attending Physician, Dr. Vasquez.    John Dc, DO  [facility]:   Pager: [phone]  Please see sticky note for cross cover information    Interval History    Taking aprox q1h doses of IV pain meds overnight. RN requesting PCA 2/2 to level of cares and switched. Will plan to space over night and will follow up discussion this afternoon.     Review of Systems   Constitutional: Negative for chills and fever.   Gastrointestinal: Positive for abdominal pain, diarrhea and nausea.   Musculoskeletal: Positive for joint swelling. Negative for gait problem.   Skin: Negative for rash.   Neurological: Negative for syncope.    Psychiatric/Behavioral: Negative for confusion.     Physical Exam   Vital Signs: Temp: 98.3  F (36.8  C) Temp src: Oral BP: 96/71 Pulse: 89 Heart Rate: 95 Resp: 16 SpO2: 96 % O2 Device: None (Room air)    Weight: 109 lbs 14.4 oz  Physical Exam  Constitutional:       General: She is not in acute distress.     Appearance: She is well-developed.      Comments: Thin, malnourished   HENT:      Head: Normocephalic and atraumatic.      Mouth/Throat:      Mouth: Mucous membranes are moist.      Pharynx: Oropharynx is clear.   Eyes:      General: No scleral icterus.     Conjunctiva/sclera: Conjunctivae normal.   Cardiovascular:      Rate and Rhythm: Normal rate and regular rhythm.      Heart sounds: Normal heart sounds. No murmur. No friction rub.   Pulmonary:      Effort: Pulmonary effort is normal. No respiratory distress.      Breath sounds: Normal breath sounds.   Abdominal:      General: There is distension.      Palpations: There is no mass.      Tenderness: There is tenderness (diffuse, worse in upper abdomen). There is no guarding or rebound.      Comments: Hyperactive bowel sounds   Musculoskeletal:         General: No swelling.   Skin:     General: Skin is warm and dry.      Findings: No erythema.      Comments: 1.5 cm lymph nodes R inguinal, firm, mildly tender, mobile.  0.5 cm LN L mid-thigh, mildly tender.   Neurological:      Mental Status: She is alert and oriented to person, place, and time.   Psychiatric:         Mood and Affect: Mood normal.       Data   Recent Labs   Lab 10/20/19  0734 10/19/19  1715 10/19/19  0550 10/18/19  2305   WBC 8.9  --  9.2 10.8   HGB 12.0  --  12.4 13.0     --  99 97     --  318 313     --  142 138   POTASSIUM 4.5 3.9 3.2* 2.8*   CHLORIDE 106  --  108 103   CO2 26  --  30 29   BUN 9  --  11 14   CR 0.58  --  0.60 0.59   ANIONGAP 4  --  5 6   JONATAN 8.1*  --  7.4* 8.3*   GLC 89  --  79 98   ALBUMIN  --   --   --  3.4   PROTTOTAL  --   --   --  6.5*   BILITOTAL   --   --   --  0.4   ALKPHOS  --   --   --  95   ALT  --   --   --  49   AST  --   --   --  19

## 2019-10-20 NOTE — PLAN OF CARE
Dilaudid pca started at 1mg [id number] with fair relief per patient, abdominal cramping continues, some relief after passing stool, stool loose/tan, denies nausea. Abdomen firm and distended.Diet changed to clear liquids this afternoon, taking small amounts. Up ad dania, refuses Capnography, o2 sat monitor on.

## 2019-10-21 ENCOUNTER — APPOINTMENT (OUTPATIENT)
Dept: GENERAL RADIOLOGY | Facility: CLINIC | Age: 44
DRG: 329 | End: 2019-10-21
Attending: STUDENT IN AN ORGANIZED HEALTH CARE EDUCATION/TRAINING PROGRAM
Payer: COMMERCIAL

## 2019-10-21 ENCOUNTER — APPOINTMENT (OUTPATIENT)
Dept: OCCUPATIONAL THERAPY | Facility: CLINIC | Age: 44
DRG: 329 | End: 2019-10-21
Attending: STUDENT IN AN ORGANIZED HEALTH CARE EDUCATION/TRAINING PROGRAM
Payer: COMMERCIAL

## 2019-10-21 LAB
ALBUMIN SERPL-MCNC: 2.8 G/DL (ref 3.4–5)
ALP SERPL-CCNC: 87 U/L (ref 40–150)
ALT SERPL W P-5'-P-CCNC: 34 U/L (ref 0–50)
ANION GAP SERPL CALCULATED.3IONS-SCNC: 3 MMOL/L (ref 3–14)
AST SERPL W P-5'-P-CCNC: 9 U/L (ref 0–45)
BILIRUB SERPL-MCNC: 0.5 MG/DL (ref 0.2–1.3)
BUN SERPL-MCNC: 9 MG/DL (ref 7–30)
CALCIUM SERPL-MCNC: 8.3 MG/DL (ref 8.5–10.1)
CHLORIDE SERPL-SCNC: 101 MMOL/L (ref 94–109)
CO2 SERPL-SCNC: 32 MMOL/L (ref 20–32)
CREAT SERPL-MCNC: 0.57 MG/DL (ref 0.52–1.04)
ERYTHROCYTE [DISTWIDTH] IN BLOOD BY AUTOMATED COUNT: 12.9 % (ref 10–15)
GFR SERPL CREATININE-BSD FRML MDRD: >90 ML/MIN/{1.73_M2}
GLUCOSE SERPL-MCNC: 113 MG/DL (ref 70–99)
HCT VFR BLD AUTO: 37.4 % (ref 35–47)
HGB BLD-MCNC: 11.8 G/DL (ref 11.7–15.7)
MCH RBC QN AUTO: 31.1 PG (ref 26.5–33)
MCHC RBC AUTO-ENTMCNC: 31.6 G/DL (ref 31.5–36.5)
MCV RBC AUTO: 98 FL (ref 78–100)
PLATELET # BLD AUTO: 302 10E9/L (ref 150–450)
POTASSIUM SERPL-SCNC: 4.3 MMOL/L (ref 3.4–5.3)
PROT SERPL-MCNC: 5.6 G/DL (ref 6.8–8.8)
RBC # BLD AUTO: 3.8 10E12/L (ref 3.8–5.2)
SODIUM SERPL-SCNC: 136 MMOL/L (ref 133–144)
WBC # BLD AUTO: 7.9 10E9/L (ref 4–11)

## 2019-10-21 PROCEDURE — 12000001 ZZH R&B MED SURG/OB UMMC

## 2019-10-21 PROCEDURE — 97535 SELF CARE MNGMENT TRAINING: CPT | Mod: GO

## 2019-10-21 PROCEDURE — 25800030 ZZH RX IP 258 OP 636: Performed by: STUDENT IN AN ORGANIZED HEALTH CARE EDUCATION/TRAINING PROGRAM

## 2019-10-21 PROCEDURE — 36415 COLL VENOUS BLD VENIPUNCTURE: CPT | Performed by: STUDENT IN AN ORGANIZED HEALTH CARE EDUCATION/TRAINING PROGRAM

## 2019-10-21 PROCEDURE — 85027 COMPLETE CBC AUTOMATED: CPT | Performed by: STUDENT IN AN ORGANIZED HEALTH CARE EDUCATION/TRAINING PROGRAM

## 2019-10-21 PROCEDURE — 80053 COMPREHEN METABOLIC PANEL: CPT | Performed by: STUDENT IN AN ORGANIZED HEALTH CARE EDUCATION/TRAINING PROGRAM

## 2019-10-21 PROCEDURE — 97165 OT EVAL LOW COMPLEX 30 MIN: CPT | Mod: GO

## 2019-10-21 PROCEDURE — 25000128 H RX IP 250 OP 636: Performed by: STUDENT IN AN ORGANIZED HEALTH CARE EDUCATION/TRAINING PROGRAM

## 2019-10-21 PROCEDURE — 25000132 ZZH RX MED GY IP 250 OP 250 PS 637: Performed by: STUDENT IN AN ORGANIZED HEALTH CARE EDUCATION/TRAINING PROGRAM

## 2019-10-21 PROCEDURE — 97530 THERAPEUTIC ACTIVITIES: CPT | Mod: GO

## 2019-10-21 PROCEDURE — 74019 RADEX ABDOMEN 2 VIEWS: CPT

## 2019-10-21 RX ORDER — CALCIUM CARBONATE 500 MG/1
500 TABLET, CHEWABLE ORAL DAILY PRN
Status: DISCONTINUED | OUTPATIENT
Start: 2019-10-21 | End: 2019-11-17 | Stop reason: HOSPADM

## 2019-10-21 RX ORDER — CALCIUM CARBONATE 500 MG/1
1000 TABLET, CHEWABLE ORAL ONCE
Status: COMPLETED | OUTPATIENT
Start: 2019-10-21 | End: 2019-10-21

## 2019-10-21 RX ADMIN — ONDANSETRON HYDROCHLORIDE 4 MG: 2 INJECTION, SOLUTION INTRAMUSCULAR; INTRAVENOUS at 02:38

## 2019-10-21 RX ADMIN — CALCIUM CARBONATE (ANTACID) CHEW TAB 500 MG 1000 MG: 500 CHEW TAB at 04:08

## 2019-10-21 RX ADMIN — DOCUSATE SODIUM 100 MG: 100 CAPSULE, LIQUID FILLED ORAL at 07:45

## 2019-10-21 RX ADMIN — HYDROMORPHONE HYDROCHLORIDE 1 MG: 1 INJECTION, SOLUTION INTRAMUSCULAR; INTRAVENOUS; SUBCUTANEOUS at 06:25

## 2019-10-21 RX ADMIN — DOCUSATE SODIUM 100 MG: 100 CAPSULE, LIQUID FILLED ORAL at 19:52

## 2019-10-21 RX ADMIN — ONDANSETRON HYDROCHLORIDE 4 MG: 2 INJECTION, SOLUTION INTRAMUSCULAR; INTRAVENOUS at 10:50

## 2019-10-21 RX ADMIN — HYDROMORPHONE HYDROCHLORIDE 1 MG: 1 INJECTION, SOLUTION INTRAMUSCULAR; INTRAVENOUS; SUBCUTANEOUS at 01:30

## 2019-10-21 RX ADMIN — HYDROMORPHONE HYDROCHLORIDE 1 MG: 1 INJECTION, SOLUTION INTRAMUSCULAR; INTRAVENOUS; SUBCUTANEOUS at 03:39

## 2019-10-21 RX ADMIN — HYDROMORPHONE HYDROCHLORIDE 1 MG: 1 INJECTION, SOLUTION INTRAMUSCULAR; INTRAVENOUS; SUBCUTANEOUS at 21:59

## 2019-10-21 RX ADMIN — HYDROMORPHONE HYDROCHLORIDE 1 MG: 1 INJECTION, SOLUTION INTRAMUSCULAR; INTRAVENOUS; SUBCUTANEOUS at 15:34

## 2019-10-21 RX ADMIN — HYDROMORPHONE HYDROCHLORIDE 1 MG: 1 INJECTION, SOLUTION INTRAMUSCULAR; INTRAVENOUS; SUBCUTANEOUS at 19:52

## 2019-10-21 RX ADMIN — HYDROMORPHONE HYDROCHLORIDE 1 MG: 1 INJECTION, SOLUTION INTRAMUSCULAR; INTRAVENOUS; SUBCUTANEOUS at 08:33

## 2019-10-21 RX ADMIN — SIMETHICONE CHEW TAB 80 MG 80 MG: 80 TABLET ORAL at 17:04

## 2019-10-21 RX ADMIN — SIMETHICONE CHEW TAB 80 MG 80 MG: 80 TABLET ORAL at 10:56

## 2019-10-21 RX ADMIN — POTASSIUM CHLORIDE, DEXTROSE MONOHYDRATE AND SODIUM CHLORIDE: 150; 5; 450 INJECTION, SOLUTION INTRAVENOUS at 12:43

## 2019-10-21 RX ADMIN — CALCIUM CARBONATE (ANTACID) CHEW TAB 500 MG 500 MG: 500 CHEW TAB at 17:28

## 2019-10-21 RX ADMIN — HYDROMORPHONE HYDROCHLORIDE 1 MG: 1 INJECTION, SOLUTION INTRAMUSCULAR; INTRAVENOUS; SUBCUTANEOUS at 13:07

## 2019-10-21 RX ADMIN — HYDROMORPHONE HYDROCHLORIDE 1 MG: 1 INJECTION, SOLUTION INTRAMUSCULAR; INTRAVENOUS; SUBCUTANEOUS at 10:50

## 2019-10-21 RX ADMIN — SODIUM CHLORIDE, POTASSIUM CHLORIDE, SODIUM LACTATE AND CALCIUM CHLORIDE 1000 ML: 600; 310; 30; 20 INJECTION, SOLUTION INTRAVENOUS at 02:59

## 2019-10-21 RX ADMIN — POTASSIUM CHLORIDE, DEXTROSE MONOHYDRATE AND SODIUM CHLORIDE: 150; 5; 450 INJECTION, SOLUTION INTRAVENOUS at 02:38

## 2019-10-21 RX ADMIN — HYDROMORPHONE HYDROCHLORIDE 1 MG: 1 INJECTION, SOLUTION INTRAMUSCULAR; INTRAVENOUS; SUBCUTANEOUS at 17:28

## 2019-10-21 RX ADMIN — POTASSIUM CHLORIDE, DEXTROSE MONOHYDRATE AND SODIUM CHLORIDE: 150; 5; 450 INJECTION, SOLUTION INTRAVENOUS at 21:59

## 2019-10-21 ASSESSMENT — ENCOUNTER SYMPTOMS
CHILLS: 0
ABDOMINAL PAIN: 1
CONFUSION: 0
DIARRHEA: 1
FEVER: 0
JOINT SWELLING: 1
NAUSEA: 1

## 2019-10-21 ASSESSMENT — ACTIVITIES OF DAILY LIVING (ADL)
ADLS_ACUITY_SCORE: 10
ADLS_ACUITY_SCORE: 10
ADLS_ACUITY_SCORE: 12
ADLS_ACUITY_SCORE: 10
ADLS_ACUITY_SCORE: 10
ADLS_ACUITY_SCORE: 12

## 2019-10-21 ASSESSMENT — PAIN DESCRIPTION - DESCRIPTORS: DESCRIPTORS: CRAMPING

## 2019-10-21 NOTE — PLAN OF CARE
D/A/I:  Patient A&O x4, denied palpitations, difficulty breathing, SOB, and dizziness.  Reported mild nausea that was managed with prn ondansetron.  Abdomen rounded, bowel sounds hyperactive.  Patient had loose stools during shift.  Lung sounds clear to auscultation, no abnormal heart sounds noted.  HR 90s, SBP 100s, SaO2 97% on room air.  D5W1/2NS with 20mEq K+ gtt continued at 125 ml/hr.  Reported abdominal pain that was managed with IV hydromorphone via PCA pump.  Analgesic regimen adjusted to wean from IV medications, PCA pump discontinued and hydromorphone boli scheduled for every 90 minutes.  Ambulated in hallway independently with steady gait.    P:  Continue to monitor pain, VS, heart rhythm, fluid status, bowel status, cardiac and respiratory status.  Notify care team of changes in patient condition or other concerns.  Will transition to IV hydromorphone q2hr at midnight.

## 2019-10-21 NOTE — PLAN OF CARE
[id number]: A/O. Pt c/o severe abdominal pain. IV dilaudid q 2 hours. Tums x 1 and Simethicone x 1. One episode of emesis. IV fluids in PIV. Up independently - walks in hallways. Xray completed. 1 loose small tan BM in toilet.

## 2019-10-21 NOTE — PLAN OF CARE
D/A/I: Patient A&O x4, denied palpitations, difficulty breathing, SOB, and dizziness.  Emesis x3 overnight, prn ondansetron given after 3rd episode.  Abdomen rounded, very tender, bowel sounds hyperactive. No stool overnight. Lung sounds clear to auscultation, no abnormal heart sounds noted.  HR 80s, SBP 90s, SaO2 97% on room air. D5W1/2NS with 20mEq K+ gtt continued at 125 ml/hr, new L PIV. Reported abdominal pain that was managed with IV hydromorphone 1mg q2h. Analgesic regimen adjusted to wean from IV medications. Tums given x1 for acid reflux. Ambulated in hallway independently with steady gait. Did not get  much sleep overnight. 1L LR bolus given for low UOP and fluid replacement.      P:  Continue to monitor pain, VS, heart rhythm, fluid status, bowel status, cardiac and respiratory status.  Notify care team of changes in patient condition or other concerns.  Will transition to IV hydromorphone q2hr at midnight.

## 2019-10-21 NOTE — PROGRESS NOTES
10/21/19 1500   Quick Adds   Type of Visit Initial Occupational Therapy Evaluation   Living Environment   Lives With child(jalyn), dependent;parent(s)   Living Arrangements house   Home Accessibility stairs to enter home;stairs within home   Number of Stairs, Main Entrance 9   Number of Stairs, Within Home, Primary other (see comments)  (full flight down to basement, does not have to use)   Transportation Anticipated family or friend will provide   Living Environment Comment Pt lives in a house with her mother and 16 year old daughter. Pt reports there are 9 stairs to enter the home, full flight to basement but pt does not have to use these.    Self-Care   Usual Activity Tolerance moderate   Current Activity Tolerance moderate   Regular Exercise No   Equipment Currently Used at Home grab bar, tub/shower;shower chair   Activity/Exercise/Self-Care Comment Pt reports she is very fatigued all the time, does not engage in much activity.    Functional Level   Ambulation 0-->independent   Transferring 0-->independent   Toileting 0-->independent   Bathing 3-->assistive equipment and person   Dressing 0-->independent   Eating 0-->independent   Communication 0-->understands/communicates without difficulty   Swallowing 0-->swallows foods/liquids without difficulty   Cognition 0 - no cognition issues reported   Fall history within last six months yes   Number of times patient has fallen within last six months 1   Which of the above functional risks had a recent onset or change? none   Prior Functional Level Comment Pt reports she is independent with all functional mobility at baseline, reports using a shower chair and grab bars for bathing, reports her mom will occasionally help with bathing but her mom is also legally blind. Pt was just approved for PCA services for 24 hours per week.    General Information   Onset of Illness/Injury or Date of Surgery - Date 10/18/19   Referring Physician Arpita Lerma MD   Patient/Family  Goals Statement gain independence, return to safe home    Additional Occupational Profile Info/Pertinent History of Current Problem Rachel A Gerhardt is a 43 year old female admitted on 10/18/2019. 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    Visual Perception   Visual Perception No deficits were identified   Sensory Examination   Sensory Comments Reports some numbness in hands and feet, likely 2/2 chemo.    Pain Assessment   Patient Currently in Pain Yes, see Vital Sign flowsheet   Integumentary/Edema   Integumentary/Edema no deficits were identifed   Posture   Posture forward head position;protracted shoulders   Range of Motion (ROM)   ROM Comment BUEs WFL    Strength   Strength Comments WFL    Hand Strength   Hand Strength Comments Reports decreaded  strength.    Coordination   Upper Extremity Coordination No deficits were identified   Gross Motor Coordination No deficits identified   Fine Motor Coordination Reports difficulty with C activities (buttons, zippers).    Mobility   Bed Mobility Comments independent    Transfer Skill: Bed to  "Chair/Chair to Bed   Level of [place]: Bed to Chair independent   Transfer Skill: Sit to Stand   Level of [place]: Sit/Stand independent   Transfer Skill: Toilet Transfer   Level of [place]: Toilet independent   Balance   Balance Comments Pt did not have any loss of balance with OOB activity today.    Instrumental Activities of Daily Living (IADL)   IADL Comments Pt has all IADLs provided for her between her mother and daughter helping, does not drive.    Activities of Daily Living Analysis   Impairments Contributing to Impaired Activities of Daily Living strength decreased;pain;coordination impaired;cognition impaired  (fatigue)   General Therapy Interventions   Planned Therapy Interventions progressive activity/exercise;home program guidelines;fine motor coordination training;strengthening   Clinical Impression   Criteria for Skilled Therapeutic Interventions Met yes, treatment indicated   OT Diagnosis Decreased IADL-I   Influenced by the following impairments general deconditioning, fatigue, pain, fine motor deficits, and cognition deficits   Assessment of Occupational Performance 3-5 Performance Deficits   Identified Performance Deficits home management, community mobility, bathing, stairs    Clinical Decision Making (Complexity) Low complexity   Therapy Frequency 4x/week   Predicted Duration of Therapy Intervention (days/wks) 10/28/2019   Anticipated Discharge Disposition Home with Assist;Home with Home Therapy   Risks and Benefits of Treatment have been explained. Yes   Patient, Family & other staff in agreement with plan of care Yes   Clinical Impression Comments Pt presents to OT today with general deconditioning, fatigue, pain, fine motor deficits, and cognition deficits, all leading to decreased ADL-I. Pt to benefit from skilled OT services to address the following problem list.    [facility] AM-PAC  \"6 Clicks\" Daily Activity Inpatient Short Form   1. Putting on and taking off regular " lower body clothing? 4 - None   2. Bathing (including washing, rinsing, drying)? 3 - A Little   3. Toileting, which includes using toilet, bedpan or urinal? 4 - None   4. Putting on and taking off regular upper body clothing? 4 - None   5. Taking care of personal grooming such as brushing teeth? 4 - None   6. Eating meals? 4 - None   Daily Activity Raw Score (Score out of 24.Lower scores equate to lower levels of function) 23   Total Evaluation Time   Total Evaluation Time (Minutes) 5
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
## 2019-10-21 NOTE — PROGRESS NOTES
[facility] Medicine Progress Note    Main Plans for Today   - Recommend NGT for decompression  - NPO given worsening  - OT consult, SW to see     Assessment & Plan   Rachel A Gerhardt is a 43 year old female admitted on 10/18/2019. 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      # Small bowel obstruction  # Constipation  # Acute on chronic abdominal pain  Patient with multiple medically managed SBO's, and difficulty with NG placement. Her exam is notable for tenderness and distention, but no rebound or signs of peritonitis. LA wnl on admission. CT 10/19 with a transition point near her previous stricture, and dilated loops proximal to this, with heavy colonic stool burdern.  As she does not want NG placement, will manage conservatively with fluids and bowel rest, ambulation. Continues to have diarrhea daily in AM, passing flatus. Last meal 10/15/2019.  - Surgery consult, appreciate recommendations   - Intervention indicated, declined by pt   - Will f/u their discussions with GI regarding potential endoscopic dilation   - Consider PEG tube for decompression (IR vs surgery to place)   - Recommending NG placement   - NPO  - I/Os  - mIVF @ 125 ml/hr  - Tylenol 650 mg every 4 hours scheduled  - Dilaudid 1 mg q2h prn  - Zofran for nausea  - Ambulation; Glycerin suppository, tap water enema prn.     # History of cervical and ovarian cancer  # Lymphadenopathy  Patient was scheduled to discuss lymph node biopsy with surgery as an outpatient after last admission in June, but was unable to follow through with this. She would like to discuss the biopsy again. Of note, she does also have multiple enlarged mesenteric lymph nodes on  CT, read as reactive, but this should be noted going forward.  - Surgery consult, appreciate recommendations   - Lymph node biopsy, likely as an outpatient.      # Pain Assessment:  Current Pain Score 10/21/2019   Patient currently in pain? yes   Pain score (0-10) -   Pain location -   Pain descriptors -   Some encounter information is confidential and restricted. Go to Review Flowsheets activity to see all data.   - Jenni is experiencing pain due to bowel obstruction. Pain management was discussed and the plan was created in a collaborative fashion.  Jenni's response to the current recommendations: engaged  - Please see the plan for pain management as documented above    Diet: Advance Diet as Tolerated: Clear Liquid Diet  Fluids: D5 1/2 NS + 20 KCl @ 125 ml/hr  DVT Prophylaxis: Pneumatic Compression Devices  Code Status: Full Code    Disposition Plan   Expected discharge:  2 - 3 days, recommended to prior living arrangement once adequate pain management/ tolerating PO medications, safe disposition plan/ TCU bed available and able to resume PO diet.Dispo:          Entered: Arpita Lerma 10/21/2019, 10:17 AM   Information in the above section will display in the discharge planner report.      The patient's care was discussed with the Attending Physician, Dr. Pacheco.    Arpita Lerma MD  [facility]:   Pager: [phone]  Please see sticky note for cross cover information    Interval History    Emesis last night. Currently pain ongoing but better than on admission.  4 episodes soft stools yesterday.  Lots of stomach growling.  May be open to NGT today  Thinks advanced too fast yesterday.    Review of Systems   Constitutional: Negative for chills and fever.   Gastrointestinal: Positive for abdominal pain, diarrhea and nausea.   Musculoskeletal: Positive for joint swelling. Negative for gait problem.   Skin: Negative for rash.   Neurological: Negative for syncope.    Psychiatric/Behavioral: Negative for confusion.     Physical Exam   Vital Signs: Temp: 97.3  F (36.3  C) Temp src: Oral BP: 95/63 Pulse: 92 Heart Rate: 83 Resp: 16 SpO2: 97 % O2 Device: None (Room air)    Weight: 113 lbs 1.6 oz  Physical Exam  Constitutional:       General: She is not in acute distress.     Appearance: She is well-developed.      Comments: Thin, malnourished   HENT:      Head: Normocephalic and atraumatic.      Mouth/Throat:      Mouth: Mucous membranes are moist.      Pharynx: Oropharynx is clear.   Eyes:      General: No scleral icterus.     Conjunctiva/sclera: Conjunctivae normal.   Cardiovascular:      Rate and Rhythm: Normal rate and regular rhythm.      Heart sounds: Normal heart sounds. No murmur. No friction rub.   Pulmonary:      Effort: Pulmonary effort is normal. No respiratory distress.      Breath sounds: Normal breath sounds.   Abdominal:      General: There is distension.      Palpations: There is no mass.      Tenderness: There is tenderness (diffuse, worse in upper abdomen). There is no guarding or rebound.      Comments: Hyperactive bowel sounds   Musculoskeletal:         General: No swelling.   Skin:     General: Skin is warm and dry.      Findings: No erythema.      Comments: 1.5 cm lymph nodes R inguinal, firm, mildly tender, mobile.  0.5 cm LN L mid-thigh, mildly tender.   Neurological:      Mental Status: She is alert and oriented to person, place, and time.   Psychiatric:         Mood and Affect: Mood normal.       Data   Recent Labs   Lab 10/21/19  0713 10/20/19  0734 10/19/19  1715 10/19/19  0550 10/18/19  2305   WBC 7.9 8.9  --  9.2 10.8   HGB 11.8 12.0  --  12.4 13.0   MCV 98 100  --  99 97    286  --  318 313    136  --  142 138   POTASSIUM 4.3 4.5 3.9 3.2* 2.8*   CHLORIDE 101 106  --  108 103   CO2 32 26  --  30 29   BUN 9 9  --  11 14   CR 0.57 0.58  --  0.60 0.59   ANIONGAP 3 4  --  5 6   JONATAN 8.3* 8.1*  --  7.4* 8.3*   * 89  --  79 98   ALBUMIN  2.8*  --   --   --  3.4   PROTTOTAL 5.6*  --   --   --  6.5*   BILITOTAL 0.5  --   --   --  0.4   ALKPHOS 87  --   --   --  95   ALT 34  --   --   --  49   AST 9  --   --   --  19

## 2019-10-21 NOTE — PLAN OF CARE
"HD#4-admitted for small bowel obstruction. H/o history of cervical cancer and ovarian cancer s/p chemo/rad, recurrent SBOs s/p exlap small bowel resection with primary anastamosis in 2017, ileo-ileal anastamosis dilation in June 2018, and multiple medically managed recurrent SBOs.  BP 95/63 (BP Location: Right arm)   Pulse 92   Temp 97.3  F (36.3  C) (Oral)   Resp 16   Wt 51.3 kg (113 lb 1.6 oz)   SpO2 97%   BMI 16.70 kg/m    C/o's abd pain with some relief, given zofran for nausea and simeticone \"gas pain\". Abd is tender to touch, distended. Passing flatus, 1 loose stool in the morning. Good uop. UAL in hallways. PIV infusing at 125ml/hr. NPO except with meds.  Continue to monitor bowel functions, VS, pain and gen status and continue with POC.  "

## 2019-10-21 NOTE — PROGRESS NOTES
Thoracic Surgery Progress Note    Called by the primary team who suggested that Ms Gerhardt might be open to a PEG or NG placement. Discussed with the patient herself, who definitively did not want a PEG, and who would only agree to NG if she was sedated the whole time it was in; I explained that that would not be possible. We will continue to defer surgical options at this time.    Luiz Cortez MD, PhD, PGY-1  General Surgery
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
## 2019-10-22 ENCOUNTER — APPOINTMENT (OUTPATIENT)
Dept: GENERAL RADIOLOGY | Facility: CLINIC | Age: 44
DRG: 329 | End: 2019-10-22
Payer: COMMERCIAL

## 2019-10-22 LAB
ALBUMIN SERPL-MCNC: 2.9 G/DL (ref 3.4–5)
ALP SERPL-CCNC: 92 U/L (ref 40–150)
ALT SERPL W P-5'-P-CCNC: 30 U/L (ref 0–50)
ANION GAP SERPL CALCULATED.3IONS-SCNC: 3 MMOL/L (ref 3–14)
AST SERPL W P-5'-P-CCNC: 8 U/L (ref 0–45)
BILIRUB SERPL-MCNC: 0.4 MG/DL (ref 0.2–1.3)
BUN SERPL-MCNC: 8 MG/DL (ref 7–30)
CALCIUM SERPL-MCNC: 8.3 MG/DL (ref 8.5–10.1)
CHLORIDE SERPL-SCNC: 105 MMOL/L (ref 94–109)
CO2 SERPL-SCNC: 31 MMOL/L (ref 20–32)
CREAT SERPL-MCNC: 0.59 MG/DL (ref 0.52–1.04)
GFR SERPL CREATININE-BSD FRML MDRD: >90 ML/MIN/{1.73_M2}
GLUCOSE SERPL-MCNC: 93 MG/DL (ref 70–99)
POTASSIUM SERPL-SCNC: 4.6 MMOL/L (ref 3.4–5.3)
PROT SERPL-MCNC: 6.1 G/DL (ref 6.8–8.8)
SODIUM SERPL-SCNC: 138 MMOL/L (ref 133–144)

## 2019-10-22 PROCEDURE — 80053 COMPREHEN METABOLIC PANEL: CPT | Performed by: STUDENT IN AN ORGANIZED HEALTH CARE EDUCATION/TRAINING PROGRAM

## 2019-10-22 PROCEDURE — 25800030 ZZH RX IP 258 OP 636: Performed by: STUDENT IN AN ORGANIZED HEALTH CARE EDUCATION/TRAINING PROGRAM

## 2019-10-22 PROCEDURE — 36415 COLL VENOUS BLD VENIPUNCTURE: CPT | Performed by: STUDENT IN AN ORGANIZED HEALTH CARE EDUCATION/TRAINING PROGRAM

## 2019-10-22 PROCEDURE — 25000132 ZZH RX MED GY IP 250 OP 250 PS 637: Performed by: FAMILY MEDICINE

## 2019-10-22 PROCEDURE — 40000986 XR ABDOMEN PORT 1 VW

## 2019-10-22 PROCEDURE — 25000128 H RX IP 250 OP 636: Performed by: STUDENT IN AN ORGANIZED HEALTH CARE EDUCATION/TRAINING PROGRAM

## 2019-10-22 PROCEDURE — 25000132 ZZH RX MED GY IP 250 OP 250 PS 637: Performed by: STUDENT IN AN ORGANIZED HEALTH CARE EDUCATION/TRAINING PROGRAM

## 2019-10-22 PROCEDURE — 12000001 ZZH R&B MED SURG/OB UMMC

## 2019-10-22 RX ORDER — LORAZEPAM 0.5 MG/1
1 TABLET ORAL ONCE
Status: COMPLETED | OUTPATIENT
Start: 2019-10-22 | End: 2019-10-22

## 2019-10-22 RX ADMIN — HYDROMORPHONE HYDROCHLORIDE 1 MG: 1 INJECTION, SOLUTION INTRAMUSCULAR; INTRAVENOUS; SUBCUTANEOUS at 22:45

## 2019-10-22 RX ADMIN — DOCUSATE SODIUM 100 MG: 100 CAPSULE, LIQUID FILLED ORAL at 08:04

## 2019-10-22 RX ADMIN — HYDROMORPHONE HYDROCHLORIDE 1 MG: 1 INJECTION, SOLUTION INTRAMUSCULAR; INTRAVENOUS; SUBCUTANEOUS at 12:03

## 2019-10-22 RX ADMIN — HYDROMORPHONE HYDROCHLORIDE 1 MG: 1 INJECTION, SOLUTION INTRAMUSCULAR; INTRAVENOUS; SUBCUTANEOUS at 19:22

## 2019-10-22 RX ADMIN — ACETAMINOPHEN 650 MG: 325 SOLUTION ORAL at 10:05

## 2019-10-22 RX ADMIN — HYDROMORPHONE HYDROCHLORIDE 1 MG: 1 INJECTION, SOLUTION INTRAMUSCULAR; INTRAVENOUS; SUBCUTANEOUS at 05:52

## 2019-10-22 RX ADMIN — HYDROMORPHONE HYDROCHLORIDE 1 MG: 1 INJECTION, SOLUTION INTRAMUSCULAR; INTRAVENOUS; SUBCUTANEOUS at 20:49

## 2019-10-22 RX ADMIN — HYDROMORPHONE HYDROCHLORIDE 1 MG: 1 INJECTION, SOLUTION INTRAMUSCULAR; INTRAVENOUS; SUBCUTANEOUS at 17:59

## 2019-10-22 RX ADMIN — HYDROMORPHONE HYDROCHLORIDE 0.8 MG: 1 INJECTION, SOLUTION INTRAMUSCULAR; INTRAVENOUS; SUBCUTANEOUS at 10:13

## 2019-10-22 RX ADMIN — POTASSIUM CHLORIDE, DEXTROSE MONOHYDRATE AND SODIUM CHLORIDE: 150; 5; 450 INJECTION, SOLUTION INTRAVENOUS at 22:41

## 2019-10-22 RX ADMIN — POTASSIUM CHLORIDE, DEXTROSE MONOHYDRATE AND SODIUM CHLORIDE: 150; 5; 450 INJECTION, SOLUTION INTRAVENOUS at 06:02

## 2019-10-22 RX ADMIN — DOCUSATE SODIUM 100 MG: 100 CAPSULE, LIQUID FILLED ORAL at 20:34

## 2019-10-22 RX ADMIN — HYDROMORPHONE HYDROCHLORIDE 1 MG: 1 INJECTION, SOLUTION INTRAMUSCULAR; INTRAVENOUS; SUBCUTANEOUS at 14:07

## 2019-10-22 RX ADMIN — HYDROMORPHONE HYDROCHLORIDE 1 MG: 1 INJECTION, SOLUTION INTRAMUSCULAR; INTRAVENOUS; SUBCUTANEOUS at 08:01

## 2019-10-22 RX ADMIN — HYDROMORPHONE HYDROCHLORIDE 1 MG: 1 INJECTION, SOLUTION INTRAMUSCULAR; INTRAVENOUS; SUBCUTANEOUS at 21:55

## 2019-10-22 RX ADMIN — LORAZEPAM 1 MG: 0.5 TABLET ORAL at 18:33

## 2019-10-22 RX ADMIN — HYDROMORPHONE HYDROCHLORIDE 1 MG: 1 INJECTION, SOLUTION INTRAMUSCULAR; INTRAVENOUS; SUBCUTANEOUS at 16:15

## 2019-10-22 RX ADMIN — SIMETHICONE CHEW TAB 80 MG 80 MG: 80 TABLET ORAL at 07:28

## 2019-10-22 RX ADMIN — HYDROMORPHONE HYDROCHLORIDE 1 MG: 1 INJECTION, SOLUTION INTRAMUSCULAR; INTRAVENOUS; SUBCUTANEOUS at 01:17

## 2019-10-22 RX ADMIN — HYDROMORPHONE HYDROCHLORIDE 0.2 MG: 1 INJECTION, SOLUTION INTRAMUSCULAR; INTRAVENOUS; SUBCUTANEOUS at 10:04

## 2019-10-22 RX ADMIN — SIMETHICONE CHEW TAB 80 MG 80 MG: 80 TABLET ORAL at 14:07

## 2019-10-22 RX ADMIN — POTASSIUM CHLORIDE, DEXTROSE MONOHYDRATE AND SODIUM CHLORIDE: 150; 5; 450 INJECTION, SOLUTION INTRAVENOUS at 15:04

## 2019-10-22 ASSESSMENT — ENCOUNTER SYMPTOMS
DIARRHEA: 1
ABDOMINAL PAIN: 1
NAUSEA: 1
FEVER: 0
CONFUSION: 0
CHILLS: 0

## 2019-10-22 ASSESSMENT — ACTIVITIES OF DAILY LIVING (ADL)
ADLS_ACUITY_SCORE: 12
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
## 2019-10-22 NOTE — PROGRESS NOTES
[facility], [facility] Progress Note    Main Plans for Today   - Recommend NGT for decompression  - NPO  - bowel regimen    Assessment & Plan   Rachel A Gerhardt is a 43 year old female admitted on 10/18/2019. 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      # Small bowel obstruction  # Constipation  # Acute on chronic abdominal pain  Patient with multiple medically managed SBO's, and difficulty with NG placement. Her exam is notable for tenderness and distention (slowly improving), but no rebound or signs of peritonitis. LA wnl on admission. CT 10/19 with a transition point near her previous stricture, and dilated loops proximal to this, with heavy colonic stool burdern.  As she does not want NG placement, will manage conservatively with fluids and bowel rest, ambulation. Continues to have diarrhea daily in AM, passing flatus. Last meal 10/15/2019.  - Surgery consult, appreciate recommendations, signed off 10/22.    - Intervention indicated, declined by pt   - Will f/u their discussions with GI regarding potential endoscopic dilation   - patient refuses PEG placement    - patient continues to refuse NGT, continued recommendation of NG placement   - NPO  - I/Os  - mIVF @ 125 ml/hr  - Tylenol 650 mg every 4 hours scheduled  - Dilaudid 1 mg q2h prn  - Zofran for nausea  - bowel regimen: miralax twice daily (refusing), docusate, Ambulation; Glycerin suppository     # History of cervical and ovarian cancer  # Lymphadenopathy  Patient was scheduled to discuss lymph node biopsy with surgery as an outpatient after last admission in June, but was unable to follow through with this. She would like to discuss the biopsy again. Of note, she does also  have multiple enlarged mesenteric lymph nodes on CT, read as reactive, but this should be noted going forward.  - Surgery consult, appreciate recommendations   - Lymph node biopsy, likely as an outpatient.    # Pain Assessment:  Current Pain Score 10/22/2019   Patient currently in pain? yes   Pain score (0-10) -   Pain location -   Pain descriptors Cramping   Some encounter information is confidential and restricted. Go to Review Flowsheets activity to see all data.   -Jenni is experiencing pain due to bowel obstruction. Pain management was discussed and the plan was created in a collaborative fashion.  Jenni's response to the current recommendations: engaged  -Please see the plan for pain management as documented above    Diet: NPO for Medical/Clinical Reasons Except for: Meds  Fluids: D5 1/2 NS + 20 KCl @ 125 ml/hr  DVT Prophylaxis: Pneumatic Compression Devices  Code Status: Full Code    Disposition Plan   Expected discharge:Expected Discharge Date: 10/25/19 2 - 3 days, recommended to prior living arrangement once adequate pain management/ tolerating PO medications, safe disposition plan/ TCU bed available and able to resume PO diet.Dispo: Expected Discharge Date: 10/25/19   Entered: Jesus Hobbs 10/22/2019, 10:52 AM   Information in the above section will display in the discharge planner report.      The patient's care was discussed with the Attending Physician, Dr. Andrew Hobbs MD  [facility] Family Medicine:   Pager: [phone]  Please see sticky note for cross cover information    Interval History    HDS overnight. XR 10/21 showed continued SBO. Continued to deny NGT. Emesis yesterday. BM this AM. Lots of stomach growling.   Patient saw surgery this AM- surg signed off. No difficulty with urination.     Review of Systems   Constitutional: Negative for chills and fever.   Gastrointestinal: Positive for abdominal pain, diarrhea and nausea.   Skin: Negative  for rash.   Neurological: Negative for syncope.   Psychiatric/Behavioral: Negative for confusion.     Physical Exam   Vital Signs: Temp: 97.5  F (36.4  C) Temp src: Oral BP: 116/79 Pulse: 93 Heart Rate: 72 Resp: 14 SpO2: 98 % O2 Device: None (Room air)    Weight: 113 lbs 1.6 oz  Physical Exam  Constitutional:       General: She is not in acute distress.     Appearance: She is well-developed.      Comments: Thin, malnourished   HENT:      Head: Normocephalic and atraumatic.      Mouth/Throat:      Mouth: Mucous membranes are moist.      Pharynx: Oropharynx is clear.   Eyes:      General: No scleral icterus.     Conjunctiva/sclera: Conjunctivae normal.   Cardiovascular:      Rate and Rhythm: Normal rate and regular rhythm.      Heart sounds: Normal heart sounds. No murmur. No friction rub.   Pulmonary:      Effort: Pulmonary effort is normal. No respiratory distress.      Breath sounds: Normal breath sounds.   Abdominal:      General: There is distension.      Palpations: Abdomen is soft. There is no mass.      Tenderness: There is tenderness (diffuse, worse in upper abdomen). There is no guarding or rebound.      Comments: Hypoactive bs   Musculoskeletal:         General: No swelling.   Skin:     General: Skin is warm and dry.      Findings: No erythema.   Neurological:      Mental Status: She is alert and oriented to person, place, and time.   Psychiatric:         Mood and Affect: Mood normal.       Previously noted:  1.5 cm lymph nodes R inguinal, firm, mildly tender, mobile.  0.5 cm LN L mid-thigh, mildly tender.    Data   Recent Labs   Lab 10/22/19  0943 10/21/19  0713 10/20/19  0734  10/19/19  0550   WBC  --  7.9 8.9  --  9.2   HGB  --  11.8 12.0  --  12.4   MCV  --  98 100  --  99   PLT  --  302 286  --  318    136 136  --  142   POTASSIUM 4.6 4.3 4.5   < > 3.2*   CHLORIDE 105 101 106  --  108   CO2 31 32 26  --  30   BUN 8 9 9  --  11   CR 0.59 0.57 0.58  --  0.60   ANIONGAP 3 3 4  --  5   JONATAN 8.3* 8.3*  8.1*  --  7.4*   GLC 93 113* 89  --  79   ALBUMIN 2.9* 2.8*  --   --   --    PROTTOTAL 6.1* 5.6*  --   --   --    BILITOTAL 0.4 0.5  --   --   --    ALKPHOS 92 87  --   --   --    ALT 30 34  --   --   --    AST 8 9  --   --   --     < > = values in this interval not displayed.

## 2019-10-22 NOTE — PROGRESS NOTES
Care Coordinator Progress Note    Admission Date/Time:  10/18/2019  Attending MD:  Maribel Pacheco MD    Data  Chart reviewed, discussed with interdisciplinary team.   Patient was admitted for: SBO (small bowel obstruction) (H).    Concerns with insurance coverage for discharge needs: None.  Current Living Situation: Patient lives with family; Pt lives in a house with her mother and 16 year old daughter. Pt reports there are 9 stairs to enter the home, full flight to basement but pt does not have to use these.   Support System: Supportive and Involved  Services Involved: PCA; pt reports 25.5hrs per week (does not recall agency name)  Transportation at Discharge: Car and pt has her own care in the parking ramp  Transportation to Medical Appointments:  - Name of caregiver: self  Barriers to Discharge: medical needs    Coordination of Care   D: Plan of care discussed with Medical Team at Interdisciplinary Rounds, plan for patient to discharge today.     I/A: Chart reviewed; met with patient at bedside to confirm home support, living arrangements and transportation. Therapy recommending HHPT and OT, patient is not homebound; referrals for outpatient pt and ot completed. No additional RNCC discharge needs identified.     P: Care Coordinator will remain available for discharge needs that may arise.    Referrals  OP PT and OT      Plan  Anticipated Discharge Date:  Few days  Anticipated Discharge Plan:  Home    Zuleyma Wynne RN, BSN, PHN  Internal Medicine Care Coordinator  [facility]  Desk Phone: [phone]  Pager: [phone]    To contact Weekend RNCC, dial [phone] and enter job code [phone] at prompt.   This pager can not be contacted by text page or outside line.

## 2019-10-22 NOTE — PLAN OF CARE
Pt A&O. VSS on RA. Up ad dania, calls appropriately. C/o abdominal pain, managed with IV dilaudid PRN. No BM. Hyperactive BS. Voiding. MIVF at 125 ml/hr. NPO + meds. Colace given. Refused miralax. Plan to monitor for stool output.

## 2019-10-22 NOTE — PROGRESS NOTES
GENERAL SURGERY PROGRESS NOTE  [id number]  Rachel A Gerhardt    S: 950 of emesis yesterday. BM this morning with some relief in pain.    O:  /79 (BP Location: Left arm)   Pulse 93   Temp 97.5  F (36.4  C) (Oral)   Resp 14   Wt 51.3 kg (113 lb 1.6 oz)   SpO2 98%   BMI 16.70 kg/m      GEN: NAD  HEENT: anicteric  Car: RRR  Pulm: normal work of breathing  Abd: soft, continued distension, tender globally but improved from yesterday.   Ext: WWP    A/P: Ms. Oropeza is a  42 yo F with chronic SBO here with SBO 2/2 stricture w/ fecal overflow    - continue bowel regimen / enemas  - Sheis having antegrade bowel function and appears to be at her baseline for her chronic obstructions, she continues to refuse surgical intervention  - Surgery will sign off, please call with questions      Seen with chief resident, Dr. Patino, who will discuss with staff.    Luiz Leon MD  EGS Service, PGY2

## 2019-10-22 NOTE — PROGRESS NOTES
Worsening abd pain with burping/ one episode of emesis this afternoon. Informed surgery via page of worsening status and asked if they would be following along as they signed off this AM.     Patient finally agreeable to NGT placement after extensive discussion. Patient continues to deny physical exam due to pain.   RN aware of NGT placement plan.     Plan:  NGT LIS  NPO  1 mg PO ativan prior to procedure  Follow up AXR for placement     MD [facility]'s Family Medicine PGY-2

## 2019-10-22 NOTE — PROGRESS NOTES
"[facility]   BRIEF PROGRESS NOTE    Called to discuss placement. Went to Bedside with Day resident Dr. Hobbs.   Handoff completed in front of patient with RN. PLan to give PO ativan now and place NG in 20-30 min. Questions were eliticed and answered. PT still in agreement and wanted team to be aware of worry that she will be \"out of it\" when the NG is placed.     John Dc, DO  6:38 PM      "

## 2019-10-22 NOTE — PLAN OF CARE
OT 7C: Attempted to see pt x2 this am, pt feeling unwell both times and declining.  Left [facility] activities in room, will go over tomorrow.  Reschedule.

## 2019-10-23 LAB
ALBUMIN SERPL-MCNC: 2.6 G/DL (ref 3.4–5)
ALP SERPL-CCNC: 81 U/L (ref 40–150)
ALT SERPL W P-5'-P-CCNC: 20 U/L (ref 0–50)
ANION GAP SERPL CALCULATED.3IONS-SCNC: 5 MMOL/L (ref 3–14)
AST SERPL W P-5'-P-CCNC: 11 U/L (ref 0–45)
BILIRUB SERPL-MCNC: 0.4 MG/DL (ref 0.2–1.3)
BUN SERPL-MCNC: 8 MG/DL (ref 7–30)
CALCIUM SERPL-MCNC: 8.3 MG/DL (ref 8.5–10.1)
CHLORIDE SERPL-SCNC: 105 MMOL/L (ref 94–109)
CO2 SERPL-SCNC: 27 MMOL/L (ref 20–32)
CREAT SERPL-MCNC: 0.6 MG/DL (ref 0.52–1.04)
GFR SERPL CREATININE-BSD FRML MDRD: >90 ML/MIN/{1.73_M2}
GLUCOSE SERPL-MCNC: 88 MG/DL (ref 70–99)
POTASSIUM SERPL-SCNC: 4.3 MMOL/L (ref 3.4–5.3)
PROT SERPL-MCNC: 5.5 G/DL (ref 6.8–8.8)
SODIUM SERPL-SCNC: 137 MMOL/L (ref 133–144)

## 2019-10-23 PROCEDURE — 25000128 H RX IP 250 OP 636: Performed by: STUDENT IN AN ORGANIZED HEALTH CARE EDUCATION/TRAINING PROGRAM

## 2019-10-23 PROCEDURE — 12000001 ZZH R&B MED SURG/OB UMMC

## 2019-10-23 PROCEDURE — 99207 ZZC CDG-MDM COMPONENT: MEETS MODERATE - UP CODED: CPT | Performed by: PHYSICIAN ASSISTANT

## 2019-10-23 PROCEDURE — 99254 IP/OBS CNSLTJ NEW/EST MOD 60: CPT | Performed by: PHYSICIAN ASSISTANT

## 2019-10-23 PROCEDURE — 40000141 ZZH STATISTIC PERIPHERAL IV START W/O US GUIDANCE

## 2019-10-23 PROCEDURE — 25800030 ZZH RX IP 258 OP 636: Performed by: STUDENT IN AN ORGANIZED HEALTH CARE EDUCATION/TRAINING PROGRAM

## 2019-10-23 PROCEDURE — 25000125 ZZHC RX 250: Performed by: STUDENT IN AN ORGANIZED HEALTH CARE EDUCATION/TRAINING PROGRAM

## 2019-10-23 PROCEDURE — 36415 COLL VENOUS BLD VENIPUNCTURE: CPT | Performed by: STUDENT IN AN ORGANIZED HEALTH CARE EDUCATION/TRAINING PROGRAM

## 2019-10-23 PROCEDURE — 80053 COMPREHEN METABOLIC PANEL: CPT | Performed by: STUDENT IN AN ORGANIZED HEALTH CARE EDUCATION/TRAINING PROGRAM

## 2019-10-23 PROCEDURE — 25000132 ZZH RX MED GY IP 250 OP 250 PS 637: Performed by: STUDENT IN AN ORGANIZED HEALTH CARE EDUCATION/TRAINING PROGRAM

## 2019-10-23 RX ORDER — KETAMINE HYDROCHLORIDE 10 MG/ML
5 INJECTION, SOLUTION INTRAMUSCULAR; INTRAVENOUS ONCE
Status: COMPLETED | OUTPATIENT
Start: 2019-10-23 | End: 2019-10-23

## 2019-10-23 RX ORDER — LIDOCAINE HYDROCHLORIDE 20 MG/ML
JELLY TOPICAL EVERY 4 HOURS PRN
Status: DISCONTINUED | OUTPATIENT
Start: 2019-10-23 | End: 2019-11-17 | Stop reason: HOSPADM

## 2019-10-23 RX ADMIN — HYDROMORPHONE HYDROCHLORIDE 1 MG: 1 INJECTION, SOLUTION INTRAMUSCULAR; INTRAVENOUS; SUBCUTANEOUS at 14:41

## 2019-10-23 RX ADMIN — DOCUSATE SODIUM 100 MG: 100 CAPSULE, LIQUID FILLED ORAL at 20:46

## 2019-10-23 RX ADMIN — HYDROMORPHONE HYDROCHLORIDE 1 MG: 1 INJECTION, SOLUTION INTRAMUSCULAR; INTRAVENOUS; SUBCUTANEOUS at 04:52

## 2019-10-23 RX ADMIN — HYDROMORPHONE HYDROCHLORIDE 1 MG: 1 INJECTION, SOLUTION INTRAMUSCULAR; INTRAVENOUS; SUBCUTANEOUS at 18:44

## 2019-10-23 RX ADMIN — SIMETHICONE CHEW TAB 80 MG 80 MG: 80 TABLET ORAL at 06:08

## 2019-10-23 RX ADMIN — HYDROMORPHONE HYDROCHLORIDE 1 MG: 1 INJECTION, SOLUTION INTRAMUSCULAR; INTRAVENOUS; SUBCUTANEOUS at 03:45

## 2019-10-23 RX ADMIN — HYDROMORPHONE HYDROCHLORIDE 1 MG: 1 INJECTION, SOLUTION INTRAMUSCULAR; INTRAVENOUS; SUBCUTANEOUS at 22:48

## 2019-10-23 RX ADMIN — HYDROMORPHONE HYDROCHLORIDE 1 MG: 1 INJECTION, SOLUTION INTRAMUSCULAR; INTRAVENOUS; SUBCUTANEOUS at 10:50

## 2019-10-23 RX ADMIN — DOCUSATE SODIUM 100 MG: 100 CAPSULE, LIQUID FILLED ORAL at 08:55

## 2019-10-23 RX ADMIN — HYDROMORPHONE HYDROCHLORIDE 1 MG: 1 INJECTION, SOLUTION INTRAMUSCULAR; INTRAVENOUS; SUBCUTANEOUS at 20:45

## 2019-10-23 RX ADMIN — HYDROMORPHONE HYDROCHLORIDE 1 MG: 1 INJECTION, SOLUTION INTRAMUSCULAR; INTRAVENOUS; SUBCUTANEOUS at 00:08

## 2019-10-23 RX ADMIN — ONDANSETRON HYDROCHLORIDE 4 MG: 2 INJECTION, SOLUTION INTRAMUSCULAR; INTRAVENOUS at 21:31

## 2019-10-23 RX ADMIN — POTASSIUM CHLORIDE, DEXTROSE MONOHYDRATE AND SODIUM CHLORIDE: 150; 5; 450 INJECTION, SOLUTION INTRAVENOUS at 06:51

## 2019-10-23 RX ADMIN — HYDROMORPHONE HYDROCHLORIDE 1 MG: 1 INJECTION, SOLUTION INTRAMUSCULAR; INTRAVENOUS; SUBCUTANEOUS at 06:54

## 2019-10-23 RX ADMIN — POTASSIUM CHLORIDE, DEXTROSE MONOHYDRATE AND SODIUM CHLORIDE: 150; 5; 450 INJECTION, SOLUTION INTRAVENOUS at 21:37

## 2019-10-23 RX ADMIN — KETAMINE HYDROCHLORIDE 5 MG: 10 INJECTION INTRAMUSCULAR; INTRAVENOUS at 11:54

## 2019-10-23 RX ADMIN — HYDROMORPHONE HYDROCHLORIDE 1 MG: 1 INJECTION, SOLUTION INTRAMUSCULAR; INTRAVENOUS; SUBCUTANEOUS at 02:15

## 2019-10-23 RX ADMIN — SIMETHICONE CHEW TAB 80 MG 80 MG: 80 TABLET ORAL at 16:48

## 2019-10-23 RX ADMIN — HYDROMORPHONE HYDROCHLORIDE 1 MG: 1 INJECTION, SOLUTION INTRAMUSCULAR; INTRAVENOUS; SUBCUTANEOUS at 16:44

## 2019-10-23 RX ADMIN — HYDROMORPHONE HYDROCHLORIDE 1 MG: 1 INJECTION, SOLUTION INTRAMUSCULAR; INTRAVENOUS; SUBCUTANEOUS at 08:55

## 2019-10-23 ASSESSMENT — ACTIVITIES OF DAILY LIVING (ADL)
ADLS_ACUITY_SCORE: 12

## 2019-10-23 ASSESSMENT — PAIN DESCRIPTION - DESCRIPTORS
DESCRIPTORS: ACHING
DESCRIPTORS: ACHING;INTERMITTENT;CRAMPING
DESCRIPTORS: ACHING
DESCRIPTORS: ACHING;INTERMITTENT;CRAMPING

## 2019-10-23 ASSESSMENT — ENCOUNTER SYMPTOMS
FEVER: 0
DIARRHEA: 1
CONFUSION: 0
NAUSEA: 1
ABDOMINAL PAIN: 1
CHILLS: 0

## 2019-10-23 NOTE — PLAN OF CARE
/72 (BP Location: Right arm)   Pulse 100   Temp 95.4  F (35.2  C) (Oral)   Resp 18   Wt 51.3 kg (113 lb 1.6 oz)   SpO2 98%   BMI 16.70 kg/m      VSS on room air. Up ad dania. NPO, denies nausea. NG to LIS with yellow output. Pain noted in abdomen. Voiding adequately. Denies flatus and BM during shift. Resting between cares. Continue with POC.

## 2019-10-23 NOTE — PROGRESS NOTES
[facility], [facility] Progress Note    Main Plans for Today   - follow up surgery recs  - bowel regimen  - NPO  - NGT in place  -pain team consult, appreciate recs  -ketamine one time dose for pain    Assessment & Plan   Rachel A Gerhardt is a 43 year old female admitted on 10/18/2019. 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      # Small bowel obstruction  # Constipation  # Acute on chronic abdominal pain  Patient with multiple medically managed SBO's, and difficulty with NG placement. Her exam is notable for tenderness and distention (slowly improving), but no rebound or signs of peritonitis. LA wnl on admission. CT 10/19 with a transition point near her previous stricture, and dilated loops proximal to this, with heavy colonic stool burdern.  Managing conservatively with NGT, fluids and bowel rest, ambulation. Continues to have diarrhea daily in AM, passing flatus. Last meal 10/15/2019.  - Surgery consult, appreciate recommendations, signed off 10/22 but to reeval now.    - Intervention indicated, declined by pt   - Will f/u their discussions with GI regarding potential endoscopic dilation  - patient refuses PEG placement    - patient agreed to NGT placement 10/22 evening  -Monitor I/Os  - NPO  - I/Os  - mIVF @ 125 ml/hr  - Tylenol 650 mg every 4 hours scheduled  - Dilaudid 1 mg q2h prn  - Zofran for nausea  - bowel regimen: miralax twice daily (refusing), docusate, Ambulation; Glycerin suppository     # History of cervical and ovarian cancer  # Lymphadenopathy  Patient was scheduled to discuss lymph node biopsy with surgery as an outpatient after last admission in June, but was unable to follow through with this. She would like to  discuss the biopsy again. Of note, she does also have multiple enlarged mesenteric lymph nodes on CT, read as reactive, but this should be noted going forward.  - Surgery consult, appreciate recommendations   - Lymph node biopsy, likely as an outpatient.    # Pain Assessment:  Current Pain Score 10/23/2019   Patient currently in pain? sleeping: patient not able to self report   Pain score (0-10) -   Pain location -   Pain descriptors -   Some encounter information is confidential and restricted. Go to Review Flowsheets activity to see all data.   -Jenni is experiencing pain due to bowel obstruction. Pain management was discussed and the plan was created in a collaborative fashion.  Jenni's response to the current recommendations: engaged  -Please see the plan for pain management as documented above    Diet: NPO for Medical/Clinical Reasons Except for: Meds  Fluids: D5 1/2 NS + 20 KCl @ 125 ml/hr  DVT Prophylaxis: Pneumatic Compression Devices  Code Status: Full Code    Disposition Plan   Expected discharge:Expected Discharge Date: 10/25/19 2 - 3 days, recommended to prior living arrangement once adequate pain management/ tolerating PO medications, safe disposition plan/ TCU bed available and able to resume PO diet.Dispo: Expected Discharge Date: 10/25/19   Entered: Jesus Hobbs 10/23/2019, 7:05 AM   Information in the above section will display in the discharge planner report.      The patient's care was discussed with the Attending Physician, Dr. Susi Hobbs MD  [facility]'s Family Medicine:   Pager: [phone]  Please see sticky note for cross cover information    Interval History    NGT placed around 7 PM last evening. HDS overnight. Still on IVF. Put out 550 bilious output. 1 BM yesterday. Required extra doses of 1mg dilaudid x3 overnight between q2h prn doses. Patient requesting more pain medication now. She was ambulating when I tried to see her this AM.      Patient saw surgery this AM again. No difficulty with urination.     Review of Systems   Constitutional: Negative for chills and fever.   Gastrointestinal: Positive for abdominal pain, diarrhea and nausea.   Skin: Negative for rash.   Neurological: Negative for syncope.   Psychiatric/Behavioral: Negative for confusion.     Physical Exam   Vital Signs: Temp: 95.4  F (35.2  C) Temp src: Oral BP: 108/72 Pulse: 100   Resp: 18 SpO2: 98 % O2 Device: None (Room air)    Weight: 113 lbs 1.6 oz  Physical Exam  Constitutional:       General: She is not in acute distress.     Appearance: She is well-developed.      Comments: Thin, malnourished   HENT:      Head: Normocephalic and atraumatic.      Mouth/Throat:      Mouth: Mucous membranes are moist.      Pharynx: Oropharynx is clear.   Eyes:      General: No scleral icterus.     Conjunctiva/sclera: Conjunctivae normal.   Cardiovascular:      Rate and Rhythm: Normal rate and regular rhythm.      Heart sounds: Normal heart sounds. No murmur. No friction rub.   Pulmonary:      Effort: Pulmonary effort is normal. No respiratory distress.      Breath sounds: Normal breath sounds.   Abdominal:      General: There is distension (mildly improved).      Palpations: Abdomen is soft. There is no mass.      Tenderness: There is tenderness (diffuse, worse in upper abdomen). There is no guarding or rebound.      Comments: Hypoactive bs   Musculoskeletal:         General: No swelling.   Skin:     General: Skin is warm and dry.      Findings: No erythema.   Neurological:      Mental Status: She is alert and oriented to person, place, and time.   Psychiatric:         Mood and Affect: Mood normal.       Previously noted:  1.5 cm lymph nodes R inguinal, firm, mildly tender, mobile.  0.5 cm LN L mid-thigh, mildly tender.    Data   Recent Labs   Lab 10/22/19  0943 10/21/19  0713 10/20/19  0734  10/19/19  0550   WBC  --  7.9 8.9  --  9.2   HGB  --  11.8 12.0  --  12.4   MCV  --  98 100  --  99    PLT  --  302 286  --  318    136 136  --  142   POTASSIUM 4.6 4.3 4.5   < > 3.2*   CHLORIDE 105 101 106  --  108   CO2 31 32 26  --  30   BUN 8 9 9  --  11   CR 0.59 0.57 0.58  --  0.60   ANIONGAP 3 3 4  --  5   JONATAN 8.3* 8.3* 8.1*  --  7.4*   GLC 93 113* 89  --  79   ALBUMIN 2.9* 2.8*  --   --   --    PROTTOTAL 6.1* 5.6*  --   --   --    BILITOTAL 0.4 0.5  --   --   --    ALKPHOS 92 87  --   --   --    ALT 30 34  --   --   --    AST 8 9  --   --   --     < > = values in this interval not displayed.

## 2019-10-23 NOTE — PROGRESS NOTES
[facility]   BRIEF PROGRESS NOTE    Assessed Pt post NGT placement. AXR obtained and confirmed placement. NG putting out clear green liquid. Pt not agitated.Discussed extra doses of 1mg dilaudid x3 overnight between q2h prn doses.     John Dc, DO  8:43 PM

## 2019-10-23 NOTE — CONSULTS
"Inpatient Pain Management Service: Consultation      DATE OF CONSULT: October 23, 2019      REASON FOR PAIN CONSULTATION:  Rachel A Gerhardt is a 43 year old female I am seeing in consultation at the request of Jesus Hobbs MD for evaluation and recommendations for her abdominal pain due to recurrent small bowel obstruction 2/2 to stricture.       CHIEF PAIN COMPLAINT: abdominal pain      ASSESSMENT:   1. Abdominal pain due to recurrent SBO, with escalation of opioid use. Pt has h/o cervical and ovarian cancer dx in 2015 s/p chemotherapy and radiation therapy c/b recurrent SBOs.  She underwent ex/lap small bowel resection with anastomosis in 2017.   Then she underwent anastomosis dilation in 2018 due to stricture.  She continues to have recurrent SBOs 2/2 to known stricture.  November of 2018, she was supposed to undergo surgery with colorectal surgery but did not occur becausethere was concerns with her TPN non adherence and elopement during the hospital stay (please see note by colorectal team from 11/21/18).  During this admission, surgery servicehas been consulted and recommended surgery which patient is declining at this time as she states that she has not been guaranteed that surgery will fix her problem, relieve pain, and does not wish to risk having an ostomy bag.  Now has NG tube, placed on 10/22/19 after initially declining.  Upset today that 3 extra 1mg IV doses of Dilaudid in addition to the 1mg IV Q2 hours PRN were discontinued.  She states that she thought the extra doses would be continued until the NG tube would start to help with her pain.  If she had known that she would not get the continued extra doses, she \"would have demanded it\" and now she may just want the NG tube removed.  2. Chronic abdominal pain on chronic opioid management.  PTA, she states she takes oxycodone 5mg every 4 hours (30mg/day).  3. H/o opioid use disorder.  Per chart review, patient reported h/o abusing prescription opioids " "including buying them off the street. See chem dep note from 7/14/12.  She was started on Suboxone at the time.    4. Opioid induced constipation.     -- Outpatient opioid requirements prior to admission: oxycodone 5mg, 6 tablets/day.     reviewed.    Primary Care Provider: Reji Avery  Chronic Pain Provider: none at this time.  (note that in past she was managed at [facility] and [facility])    TREATMENT RECOMMENDATIONS/PLAN:   1. Would recommend limiting and tapering down IV Dilaudid by 1/dose/day as tolerated and to transition to PTA dose of oxycodone 5mg PO Q 4 hours PRN.  Patient is currently NPO. Long discussion with patient that opioids can worsen SBOs and the goal is to properly treat the underlying cause of pain and not to exacerbate the condition.  Also discussed other risks with opioids which she states that she has heard before and understands.  Also encourage to decrease/limit use of opioids to treat chronic abdominal pain in the outpatient setting to minimize risks of SBO recurrence.  2. Avoid use of ketamine-patient received ketamine IV push today and states that she felt \"like walking through cement\" and \"dumb\" and uncertain if helpful for pain.  3. Discussed the possibility of IV Ketorolac but patient states that it was ineffective in the past but denies side effects from it. (ibuprofen causes nausea)  4. Patient declined topicals such as lidocaine patches due to ineffectiveness  5. Patient denies muscle spasms so no muscle relaxant is warranted.  6. Patient does not want to use gabapentin/lyrica as they cause dizziness and feeling of being \"drunk\"  7. Continue acetaminophen 650mg solution 650mg PO Q 4 hours as tolerated, she reported throwing them up the last 2 doses.  8. Continue simethicone 80mg PO Q 6 hours PRN, she has been using but unsure if helpful.  9. Consider psychiatry consult for pain coping strategies and opioid use escalation given her h/o opioid use " "disorder.  10. Bowel regimen per primary team.    Pain Service will Sign Off at this time.     Recommendations were discussed with medicine team and pain team  Plan was reviewed by the Pain Service consisting of Farnaz Ferrara MD.    Thank you for consulting the Inpatient Pain Management Service.   The above recommendations are to be acted upon at the primary team s discretion.    To reach us:  Mon - Friday 8 AM - 3 PM: Pager [phone] (Text Page)  After hours, weekends and holidays: Primary service should call [phone] for the on-call pain specialist    HISTORY OF PRESENT ILLNESS: Rachel A Gerhardt is a 43 year old female with history of headaches, chronic abdominal pain on chronic opioid management, cervical and ovarian cancer in 2015 s/p chemo and radiation therapy, recurrent SBOs s/p ex/lap with anastomosis who was admitted on 10/18/19 for recurrent SBO.    Today, patient was seen by the pain medicine service.  She states her pain is deep in the abdomen and lower back which is similar to pain that she experienced with past SBOs.  She states that pain comes in waves and those come every 1/2 hour and last 30 seconds to minutes that are the worst for her.  She states the pain feels like labor pain.  She rates her current pain at 6/10 on VAS and is tolerable.  During the visit, she had about 2 waves of these pain and grimaces during these periods.  She states that she also feels like running into cement, and \"stupid\" because she just received IV ketamine push and does not like it.  She is coherent and answers questions appropriately.  She states that she was upset this morning when the 3 extra doses of IV Dilaudid was discontinued as she thought that the \"deal\" to have the NG tube placement was to continue the extra Dilaudid doses until the NG tube would start working.  She states that she does not know if the NG is helping although earlier she reports that she is less distended.    She states that she has " "chronic abdominal pain and with her chronic opioids, her baseline pain level is 5-6/10 on the VAS.    We discussed the reason for her admission and increased in pain.  We reviewed the medication regimen and discussed that routes/medications are somewhat limited by her NPO status and preferences.  Discussed that we would recommend limiting and decreasing the use of opioids to avoid worsening the SBO and well as other side effects.  We discussed that her current opioid dose is approximately 6x her PTA dose.  She states that she understands this as she heard it all before.  She wanted to end the visit.    PAIN HISTORY:   Location: abdomen, lower back  Duration: constant, and in waves  Pain intensity: 6/10  Quality of the pain: feels like labor pain  Aggravating factors: pain comes in waves  Relieving factors : heating pads    CAPA (Clinically Aligned Pain Assessment)  Comfort (How is your pain?): Tolerable with discomfort  Change in Pain (Since your last medication/intervention?): About the same  Pain Control (How are your pain treatments working?): Partially effective pain control  Functioning (Are you able to do activities to get better?) : Can do most things, but pain gets in the way of some   Sleep (Does your pain management allow you to sleep or rest?): slept 3-4 hours,less than baseline      FUNCTIONAL STATUS:  Change:      unchanged  Oral intake:     NPO  Activity level:     Ambulating in young  Sleep:      Sleeps less than baseline  Mood:      Tired appearing, soft spoken, \"raging sarcastic,\" somewhat cooperative      REVIEW OF 10 BODY SYSTEMS: 10 point ROS of systems including Constitutional, Eyes, Respiratory, Cardiovascular, Gastroenterology, Genitourinary, Integumentary, Musculoskeletal, Psychiatric were all negative except for pertinent positives noted in my HPI.       INPATIENT MEDICATIONS PERTINENT TO PAIN CONSULT:   Medications related to Pain Management (From now, onward)    Start     Dose/Rate Route " Frequency Ordered Stop    10/22/19 1930  HYDROmorphone (DILAUDID) injection 1 mg      1 mg Intravenous ONCE 10/22/19 1921      10/21/19 0045  HYDROmorphone (DILAUDID) injection 1 mg      1 mg Intravenous EVERY 2 HOURS PRN 10/20/19 1907      10/20/19 0012  diphenhydrAMINE (BENADRYL) injection 25 mg      25 mg Intravenous EVERY 6 HOURS PRN 10/20/19 0012      10/19/19 2300  acetaminophen (TYLENOL) solution 650 mg      650 mg Oral EVERY 4 HOURS 10/19/19 2213      10/19/19 2000  docusate sodium (COLACE) capsule 100 mg      100 mg Oral 2 TIMES DAILY 10/19/19 1344      10/19/19 2000  polyethylene glycol (MIRALAX/GLYCOLAX) Packet 17 g      17 g Oral 2 TIMES DAILY 10/19/19 1344      10/19/19 1613  simethicone (MYLICON) chewable tablet 80 mg      80 mg Oral EVERY 6 HOURS PRN 10/19/19 1613      10/19/19 1343  sodium phosphate (FLEET ENEMA) 1 enema      1 enema Rectal DAILY PRN 10/19/19 1343      10/19/19 1343  glycerin (ADULT) Suppository 1 suppository      1 suppository Rectal DAILY PRN 10/19/19 1343      10/19/19 0330  lidocaine 1 % 0.1-1 mL      0.1-1 mL Other EVERY 1 HOUR PRN 10/19/19 0330      10/19/19 0330  lidocaine (LMX4) cream       Topical EVERY 1 HOUR PRN 10/19/19 0330      10/19/19 0107  lidocaine (XYLOCAINE) 4 % solution 5 mL      5 mL Topical ONCE PRN 10/19/19 0107              CURRENT MEDICATIONS:   Current Facility-Administered Medications Ordered in Epic   Medication Dose Route Frequency Provider Last Rate Last Dose     acetaminophen (TYLENOL) solution 650 mg  650 mg Oral Q4H Jeremiah Vasquez MD   650 mg at 10/22/19 1005     benzocaine 20% (HURRICAINE/TOPEX) 20 % spray 0.5-1 mL  1-2 spray Mouth/Throat Q3H PRN John Dc DO         calcium carbonate (TUMS) chewable tablet 500 mg  500 mg Oral Daily PRN Arpita Lerma MD   500 mg at 10/21/19 1728     dextrose 5% and 0.45% NaCl + KCl 20 mEq/L infusion   Intravenous Continuous Arpita Lerma  mL/hr at 10/23/19 0651       diphenhydrAMINE (BENADRYL)  injection 25 mg  25 mg Intravenous Q6H PRN Francis Marrero MD   25 mg at 10/20/19 0826     docusate sodium (COLACE) capsule 100 mg  100 mg Oral BID Arpita Lerma MD   100 mg at 10/23/19 0855     glycerin (ADULT) Suppository 1 suppository  1 suppository Rectal Daily PRN Arpita Lerma MD         HYDROmorphone (DILAUDID) injection 1 mg  1 mg Intravenous Once John Dc DO         HYDROmorphone (DILAUDID) injection 1 mg  1 mg Intravenous Q2H PRN Francis Marrero MD   1 mg at 10/23/19 1050     lidocaine (LMX4) cream   Topical Q1H PRN Francis Marrero MD         lidocaine (XYLOCAINE) 4 % solution 5 mL  5 mL Topical Once PRN Francis Marrero MD         lidocaine 1 % 0.1-1 mL  0.1-1 mL Other Q1H PRN Francis Marrero MD         magnesium sulfate 4 g in 100 mL sterile water (premade)  4 g Intravenous Q4H PRN Francis Marrero MD   4 g at 10/19/19 0458     melatonin tablet 1 mg  1 mg Oral At Bedtime PRN Francis Marrero MD         naloxone (NARCAN) injection 0.1-0.4 mg  0.1-0.4 mg Intravenous Q2 Min PRN Francis Marrero MD         ondansetron (ZOFRAN-ODT) ODT tab 4 mg  4 mg Oral Q6H PRN Francis Marrero MD        Or     ondansetron (ZOFRAN) injection 4 mg  4 mg Intravenous Q6H PRN Francis Marrero MD   4 mg at 10/21/19 1050     oxymetazoline (AFRIN) 0.05 % spray 2 spray  2 spray Nasal Once PRN Francis Marrero MD         polyethylene glycol (MIRALAX/GLYCOLAX) Packet 17 g  17 g Oral BID Arpita Lerma MD   17 g at 10/20/19 0810     potassium chloride (KLOR-CON) Packet 20-40 mEq  20-40 mEq Oral or Feeding Tube Q2H PRN Francis Marrero MD         potassium chloride 10 mEq in 100 mL intermittent infusion with 10 mg lidocaine  10 mEq Intravenous Q1H PRN Francis Marrero MD   10 mEq at 10/19/19 1427     potassium chloride 10 mEq in 100 mL sterile water intermittent infusion (premix)  10 mEq Intravenous Q1H PRN Francis Marrero MD          potassium chloride 20 mEq in 50 mL intermittent infusion  20 mEq Intravenous Q1H PRN Francis Marrero MD         potassium chloride ER (K-DUR/KLOR-CON M) CR tablet 20-40 mEq  20-40 mEq Oral Q2H PRN Francis Marrero MD         simethicone (MYLICON) chewable tablet 80 mg  80 mg Oral Q6H PRN Arpita Lerma MD   80 mg at 10/23/19 0608     sodium chloride (PF) 0.9% PF flush 3 mL  3 mL Intracatheter q1 min prn Francis Marrero MD   3 mL at 10/23/19 0345     sodium chloride (PF) 0.9% PF flush 3 mL  3 mL Intracatheter Q8H Francis Marrero MD   3 mL at 10/20/19 2040     sodium phosphate (FLEET ENEMA) 1 enema  1 enema Rectal Daily PRN Arpita Lerma MD         No current [facility]-ordered outpatient medications on file.           HOME/PREVIOUS MEDICATIONS:   Prior to Admission medications    Medication Sig Start Date End Date Taking? Authorizing Provider   ondansetron (ZOFRAN-ODT) 4 MG ODT tab Take 1 tablet (4 mg) by mouth every 6 hours as needed for nausea or vomiting 3/15/19  Yes Buzz Peres MD   oxyCODONE-acetaminophen (PERCOCET) 5-325 MG tablet Take 1 tablet by mouth every 4 hours as needed for severe pain 6/28/19  Yes Ced Taylor MD   Simethicone 125 MG TABS Take 125 mg by mouth daily    Yes Unknown, Entered By History   ondansetron (ZOFRAN-ODT) 4 MG ODT tab Take 1 tablet (4 mg) by mouth every 6 hours as needed for nausea or vomiting 6/28/19   Ced Taylor MD   ondansetron (ZOFRAN-ODT) 4 MG ODT tab Take 1 tablet (4 mg) by mouth every 6 hours as needed for nausea or vomiting 5/20/19   Ced Taylor MD           PAST PAIN TREATMENT:         ALLERGIES:    Allergies   Allergen Reactions     Sulfa Drugs Hives     Ibuprofen Nausea and Vomiting and Hives     Unknown if reaction hives or N/V     No Clinical Screening - See Comments Other (See Comments) and Diarrhea     headache  Carrots cause gastric upset, cramping and diarrhea.     Ciprofloxacin Hives and Nausea      Quinolones      Tramadol Hives, Diarrhea, Nausea and Nausea and Vomiting     Amoxicillin Nausea and Vomiting     Augmentin Nausea, Hives and GI Disturbance     Patient tolerated course of zosyn in 5/2018     Avelox [Moxifloxacin] Nausea and Vomiting     Codeine Nausea and Vomiting     Daucus Carota      Other reaction(s): GI Upset  Other reaction(s): Abdominal pain, Diarrhea  Carrots cause gastric upset, cramping and diarrhea.            PAST MEDICAL AND PSYCHIATRIC HISTORY:    Past Medical History:   Diagnosis Date     Asthma      Cervical cancer (H)      Other chronic pain      Ovarian cancer (H)      Partial small bowel obstruction (H) 11/2/2018     SBO (small bowel obstruction) (H) 12/27/2016     Small bowel obstruction (H) 5/17/2017     Small intestine obstruction (H) 4/29/2017     Substance abuse (H)     Outside records indicate past history of narcotics abuse or dependence, but patient denies.           PAST SURGICAL HISTORY:   Past Surgical History:   Procedure Laterality Date     COLONOSCOPY N/A 5/3/2018    Procedure: COLONOSCOPY;  sigmoidoscopy;  Surgeon: Omero Vigil MD;  Location: [facility] OR     COLONOSCOPY WITH CO2 INSUFFLATION N/A 4/30/2018    Procedure: COLONOSCOPY WITH CO2 INSUFFLATION;  Colonoscopy;  Surgeon: Omero Vigil MD;  Location: [facility] OR     COMBINED CYSTOSCOPY, INSERT STENT URETER(S) Bilateral 5/18/2017    Procedure: COMBINED CYSTOSCOPY, INSERT STENT URETER(S);  Cystoscopy with Bilateral Stent,;  Surgeon: Rene Calero MD;  Location: [facility] OR     ENT SURGERY  2009    mastoid, sinus     ENTEROSCOPY SMALL BOWEL N/A 6/18/2018    Procedure: ENTEROSCOPY SMALL BOWEL;  Lower bowel Retrograde Enteroscopy with Balloon Dilation .;  Surgeon: Omero Vigil MD;  Location: [facility] OR     EXAM UNDER ANESTHESIA, INSERT ALEX SLEEVE, UTERINE PLACEMENT OF TANDEM AND RING FOR RAD, ULTRASOUND N/A 12/14/2015    Procedure: EXAM UNDER ANESTHESIA, INSERT ALEX SLEEVE, UTERINE PLACEMENT OF  TANDEM AND RING FOR RADIATION, ULTRASOUND GUIDED;  Surgeon: Abby Tony MD;  Location: [facility] OR     INSERT TANDEM AND CESIUM APPLICATOR CERVIX, ULTRASOUND GUIDED N/A 12/17/2015    Procedure: INSERT TANDEM AND CESIUM APPLICATOR CERVIX, ULTRASOUND GUIDED;  Surgeon: Kika Wood MD;  Location: [facility] OR     KNEE SURGERY       LAPAROTOMY EXPLORATORY N/A 5/18/2017    Procedure: LAPAROTOMY EXPLORATORY;   Exploratry Laparotomy, Small Bowel Resection with anastomosis, Flexible Sigmoidoscopy;  Surgeon: Jennifer Goodwin MD;  Location: [facility] OR     PICC INSERTION Right 04/29/2017    4fr SL BioFlo PICC, 37cm (3cm external) in the R basilic vein w/ tip in the mid SVC.     PICC INSERTION Right 03/29/2018    4Fr - 40cm (4cm external), R lateral brachial vein, Low SVC     RESECT SMALL BOWEL WITHOUT OSTOMY N/A 5/18/2017    Procedure: RESECT SMALL BOWEL WITHOUT OSTOMY;;  Surgeon: Jennifer Goodiwn MD;  Location: [facility]     SIGMOIDOSCOPY FLEXIBLE N/A 5/18/2017    Procedure: SIGMOIDOSCOPY FLEXIBLE;;  Surgeon: Jennifer Goodwin MD;  Location: [facility]           FAMILY HISTORY: family history includes Diabetes in her mother.      HEALTH & LIFESTYLE PRACTICES:   Tobacco:  reports that she has been smoking cigarettes. She has smoked for the past 12.00 years. She has never used smokeless tobacco.  Alcohol:  reports no history of alcohol use.  Illicit drugs:  reports no history of drug use.      SOCIAL HISTORY:   Social History     Socioeconomic History     Marital status:      Spouse name: Not on file     Number of children: Not on file     Years of education: Not on file     Highest education level: Not on file   Occupational History     Not on file   Social Needs     Financial resource strain: Not on file     Food insecurity:     Worry: Not on file     Inability: Not on file     Transportation needs:     Medical: Not on file     Non-medical: Not on file   Tobacco Use     Smoking status: Light Tobacco Smoker     Years: 12.00      Types: Cigarettes     Smokeless tobacco: Never Used     Tobacco comment: pt smoking about 4 cigs per week   Substance and Sexual Activity     Alcohol use: No     Alcohol/week: 0.0 standard drinks     Comment: None per pt     Drug use: No     Comment: Patient denies.  Outside records indicate possible Opiate abuse.     Sexual activity: Yes     Partners: Male     Birth control/protection: None   Lifestyle     Physical activity:     Days per week: Not on file     Minutes per session: Not on file     Stress: Not on file   Relationships     Social connections:     Talks on phone: Not on file     Gets together: Not on file     Attends Sabianist service: Not on file     Active member of club or organization: Not on file     Attends meetings of clubs or organizations: Not on file     Relationship status: Not on file     Intimate partner violence:     Fear of current or ex partner: Not on file     Emotionally abused: Not on file     Physically abused: Not on file     Forced sexual activity: Not on file   Other Topics Concern     Parent/sibling w/ CABG, MI or angioplasty before 65F 55M? No   Social History Narrative    Lives alone       LABORATORY VALUES:   Last Basic Metabolic Panel:  Lab Results   Component Value Date     10/23/2019      Lab Results   Component Value Date    POTASSIUM 4.3 10/23/2019     Lab Results   Component Value Date    CHLORIDE 105 10/23/2019     Lab Results   Component Value Date    JONATAN 8.3 10/23/2019     Lab Results   Component Value Date    CO2 27 10/23/2019     Lab Results   Component Value Date    BUN 8 10/23/2019     Lab Results   Component Value Date    CR 0.60 10/23/2019     Lab Results   Component Value Date    GLC 88 10/23/2019       CBC results:  Lab Results   Component Value Date    WBC 7.9 10/21/2019     Lab Results   Component Value Date    HGB 11.8 10/21/2019     Lab Results   Component Value Date    HCT 37.4 10/21/2019     Lab Results   Component Value Date     10/21/2019        DIAGNOSTIC TESTS:       Labs above reviewed as well as additional relevant diagnostic studies from the EPIC record.       PHYSICAL EXAMINATION:  VITAL SIGNS:  B/P: 115/92, T: 96.8, P: 89, R: 16    CONSTITUTIONAL/GENERAL APPEARANCE: AAOx3. Conversant.  EYES: EOMI, sclerae clear  ENT  RESPIRATORY:  Non-labored breathing  CARDIOVASCULAR: HR within normal limits  GI:round  MUSCULOSKELETAL/BACK/SPINE/EXTREMITIES: ambulating independently    SKIN/VASCULAR EXAM:  Dry and warm.  PSYCHIATRIC/BEHAVIORAL/OBSERVATIONS:  No objective signs of pain observed during our interview.   Judgment/Insight -fair   Orientation - x3   Memory -good   Mood and affect - calm, pleasant, cooperative            Total face to face time spent was 60 minutes, and more than 50% of face to face time was spent in counseling and/or coordination of care regarding principles of multidisciplinary care which included discussions on self care, rehabilitation, psychological aspects of pain, medication management and interventions.    Gabbie Carranza PA-C  October 23, 2019, 1:34 PM  Inpatient Pain Management Service

## 2019-10-23 NOTE — PLAN OF CARE
"[id number]    /82 (BP Location: Left arm)   Pulse 92   Temp 97  F (36.1  C) (Oral)   Resp 18   Wt 51.3 kg (113 lb 1.6 oz)   SpO2 98%   BMI 16.70 kg/m      VSS. Afebrile. LS clear, on room air. Patient had her NG placed during shift change, x-ray was obtained to confirm placement, and MD ok'd to use. NG to LIS, outputting light yellow w/ green flecks. Pain remains an issue, patient states her pain comes in \"waves\", and when it is present, it's an \"intense pressure\"; pain intervention was recently given, so MD was paged. MD came up to speak to patient, and an order for IV Dilaudid up to 3 doses in between her current PRN Q2H IV Dilaudid was made available; 2 out of the 3 doses was given (she has 1 left). Denies nausea. Reports having several episodes of diarrhea this AM, but denied any this evening. NPO. Strict I/Os.  PIV infusing fluids. UpAdLib. Will continue with POC.       "

## 2019-10-23 NOTE — PLAN OF CARE
VSS on RA. IV Dilaudid Q2 hrs administered and 1 dose of IV Ketamine for breakthrough. Seen by pain service, recs pending. NGT to LIS with moderate light green output. Abdomen still distended. Denies flatus/BM today. Voiding with good output. Up ad dania. Continue with POC.

## 2019-10-24 ENCOUNTER — APPOINTMENT (OUTPATIENT)
Dept: GENERAL RADIOLOGY | Facility: CLINIC | Age: 44
DRG: 329 | End: 2019-10-24
Payer: COMMERCIAL

## 2019-10-24 LAB
ALBUMIN SERPL-MCNC: 2.6 G/DL (ref 3.4–5)
ALP SERPL-CCNC: 83 U/L (ref 40–150)
ALT SERPL W P-5'-P-CCNC: 20 U/L (ref 0–50)
ANION GAP SERPL CALCULATED.3IONS-SCNC: 5 MMOL/L (ref 3–14)
AST SERPL W P-5'-P-CCNC: 10 U/L (ref 0–45)
BILIRUB SERPL-MCNC: 0.5 MG/DL (ref 0.2–1.3)
BUN SERPL-MCNC: 9 MG/DL (ref 7–30)
CALCIUM SERPL-MCNC: 8.4 MG/DL (ref 8.5–10.1)
CHLORIDE SERPL-SCNC: 99 MMOL/L (ref 94–109)
CO2 SERPL-SCNC: 33 MMOL/L (ref 20–32)
CREAT SERPL-MCNC: 0.55 MG/DL (ref 0.52–1.04)
GFR SERPL CREATININE-BSD FRML MDRD: >90 ML/MIN/{1.73_M2}
GLUCOSE SERPL-MCNC: 106 MG/DL (ref 70–99)
POTASSIUM SERPL-SCNC: 4.1 MMOL/L (ref 3.4–5.3)
PROT SERPL-MCNC: 5.4 G/DL (ref 6.8–8.8)
SODIUM SERPL-SCNC: 137 MMOL/L (ref 133–144)

## 2019-10-24 PROCEDURE — 36415 COLL VENOUS BLD VENIPUNCTURE: CPT | Performed by: STUDENT IN AN ORGANIZED HEALTH CARE EDUCATION/TRAINING PROGRAM

## 2019-10-24 PROCEDURE — 12000001 ZZH R&B MED SURG/OB UMMC

## 2019-10-24 PROCEDURE — 25000132 ZZH RX MED GY IP 250 OP 250 PS 637: Performed by: STUDENT IN AN ORGANIZED HEALTH CARE EDUCATION/TRAINING PROGRAM

## 2019-10-24 PROCEDURE — 25000128 H RX IP 250 OP 636: Performed by: STUDENT IN AN ORGANIZED HEALTH CARE EDUCATION/TRAINING PROGRAM

## 2019-10-24 PROCEDURE — 99207 ZZC NO CHARGE SIGN-OFF PS: CPT

## 2019-10-24 PROCEDURE — 25800030 ZZH RX IP 258 OP 636: Performed by: STUDENT IN AN ORGANIZED HEALTH CARE EDUCATION/TRAINING PROGRAM

## 2019-10-24 PROCEDURE — 74019 RADEX ABDOMEN 2 VIEWS: CPT

## 2019-10-24 PROCEDURE — 80053 COMPREHEN METABOLIC PANEL: CPT | Performed by: STUDENT IN AN ORGANIZED HEALTH CARE EDUCATION/TRAINING PROGRAM

## 2019-10-24 RX ADMIN — HYDROMORPHONE HYDROCHLORIDE 1 MG: 1 INJECTION, SOLUTION INTRAMUSCULAR; INTRAVENOUS; SUBCUTANEOUS at 17:34

## 2019-10-24 RX ADMIN — HYDROMORPHONE HYDROCHLORIDE 1 MG: 1 INJECTION, SOLUTION INTRAMUSCULAR; INTRAVENOUS; SUBCUTANEOUS at 02:53

## 2019-10-24 RX ADMIN — HYDROMORPHONE HYDROCHLORIDE 1 MG: 1 INJECTION, SOLUTION INTRAMUSCULAR; INTRAVENOUS; SUBCUTANEOUS at 00:46

## 2019-10-24 RX ADMIN — SIMETHICONE CHEW TAB 80 MG 80 MG: 80 TABLET ORAL at 09:15

## 2019-10-24 RX ADMIN — HYDROMORPHONE HYDROCHLORIDE 1 MG: 1 INJECTION, SOLUTION INTRAMUSCULAR; INTRAVENOUS; SUBCUTANEOUS at 06:58

## 2019-10-24 RX ADMIN — POTASSIUM CHLORIDE, DEXTROSE MONOHYDRATE AND SODIUM CHLORIDE: 150; 5; 450 INJECTION, SOLUTION INTRAVENOUS at 12:28

## 2019-10-24 RX ADMIN — DOCUSATE SODIUM 100 MG: 100 CAPSULE, LIQUID FILLED ORAL at 09:15

## 2019-10-24 RX ADMIN — HYDROMORPHONE HYDROCHLORIDE 1 MG: 1 INJECTION, SOLUTION INTRAMUSCULAR; INTRAVENOUS; SUBCUTANEOUS at 13:22

## 2019-10-24 RX ADMIN — POTASSIUM CHLORIDE, DEXTROSE MONOHYDRATE AND SODIUM CHLORIDE: 150; 5; 450 INJECTION, SOLUTION INTRAVENOUS at 20:56

## 2019-10-24 RX ADMIN — HYDROMORPHONE HYDROCHLORIDE 1 MG: 1 INJECTION, SOLUTION INTRAMUSCULAR; INTRAVENOUS; SUBCUTANEOUS at 22:25

## 2019-10-24 RX ADMIN — HYDROMORPHONE HYDROCHLORIDE 1 MG: 1 INJECTION, SOLUTION INTRAMUSCULAR; INTRAVENOUS; SUBCUTANEOUS at 19:33

## 2019-10-24 RX ADMIN — HYDROMORPHONE HYDROCHLORIDE 1 MG: 1 INJECTION, SOLUTION INTRAMUSCULAR; INTRAVENOUS; SUBCUTANEOUS at 15:28

## 2019-10-24 RX ADMIN — HYDROMORPHONE HYDROCHLORIDE 1 MG: 1 INJECTION, SOLUTION INTRAMUSCULAR; INTRAVENOUS; SUBCUTANEOUS at 11:03

## 2019-10-24 RX ADMIN — HYDROMORPHONE HYDROCHLORIDE 1 MG: 1 INJECTION, SOLUTION INTRAMUSCULAR; INTRAVENOUS; SUBCUTANEOUS at 04:56

## 2019-10-24 RX ADMIN — HYDROMORPHONE HYDROCHLORIDE 1 MG: 1 INJECTION, SOLUTION INTRAMUSCULAR; INTRAVENOUS; SUBCUTANEOUS at 09:07

## 2019-10-24 ASSESSMENT — ENCOUNTER SYMPTOMS
FEVER: 0
ABDOMINAL PAIN: 1
DIARRHEA: 0
CHILLS: 0
NAUSEA: 0
CONFUSION: 0

## 2019-10-24 ASSESSMENT — ACTIVITIES OF DAILY LIVING (ADL)
ADLS_ACUITY_SCORE: 12

## 2019-10-24 ASSESSMENT — PAIN DESCRIPTION - DESCRIPTORS
DESCRIPTORS: ACHING;CRAMPING
DESCRIPTORS: ACHING;CRAMPING

## 2019-10-24 NOTE — PROGRESS NOTES
General Surgery Progress Note    S: The patient is amenable to surgery at this time. She does not like the NG but will try not to pull it out.     O:  Vitals:    10/24/19 0043 10/24/19 0609 10/24/19 1231 10/24/19 1524   BP: 93/73 95/68 105/80 95/77   BP Location: Right arm Right arm Right arm Left arm   Pulse:       Resp:  14 16 16   Temp:  96.2  F (35.7  C) 97.3  F (36.3  C) 98.3  F (36.8  C)   TempSrc:  Oral Oral Oral   SpO2:  96% 98% 97%   Weight:         AXR: obstructive gas pattern similar to 2 days ago    Exam:  Alert, oriented, NAD  Abdomen moderately distended, tender, with voluntary guarding    A/P: We will work on timing of surgery. Tomorrow may be difficult to schedule, weekend may be possible.      Discussed with staff    Luiz Cortez MD, PhD, PGY-1  General Surgery

## 2019-10-24 NOTE — PROGRESS NOTES
[facility] Progress Note    Main Plans for Today   - follow up surgery discussion for possible surg intervention   - bowel regimen  - NPO  - NGT in place  - Nutrition consult to consider PPN vs TPN    Assessment & Plan   Rachel A Gerhardt is a 43 year old female admitted on 10/18/2019. 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 Patient's SBO continues to be present after medical management since admission, surgery to have another discussion for surgical intervention.      # Small bowel obstruction  # Constipation  # Acute on chronic abdominal pain  Patient with multiple medically managed SBO's, and difficulty with NG placement. Her exam is notable for tenderness and distention (slowly improving), but no rebound or signs of peritonitis. LA wnl on admission. CT 10/19 with a transition point near her previous stricture, and dilated loops proximal to this, with heavy colonic stool burdern.  Managing conservatively with NGT, fluids and bowel rest, ambulation but this is not resolving currently and will likely benefit from surgery input/procedure. Last meal 10/15/2019.  - Surgery consult, appreciate recommendations, signed off 10/22 but to reeval 10/23 and on.   - Intervention indicated, she is reconsidering now and open to more discussion with un resolving SBO  - Will f/u their discussions with GI regarding potential endoscopic dilation vs surgery recs  - patient refuses PEG placement  - patient agreed to NGT placement 10/22 evening, NGT in place still with well over 2 L bilious output 10/23  - Monitor I/Os  - NPO  - Nutrition consult for TPN vs PPN with prolonged SBO/NPO  - I/Os  - mIVF @ 125 ml/hr  - Tylenol 650 mg every 4 hours  scheduled  - Dilaudid 1 mg q2h as needed, need to decrease this, could consider interval Toradol   - pain consulted 10/23 where they rec'd decreasing opioid dose, patient kicked them out of room, patient refused lidocaine patch, refused gabapentin/lyrica, tylenol, simethicone, signed off 10/23  - Zofran for nausea  - bowel regimen: miralax twice daily (refusing), docusate, Ambulation; Glycerin suppository     # History of cervical and ovarian cancer  # Lymphadenopathy  Patient was scheduled to discuss lymph node biopsy with surgery as an outpatient after last admission in June, but was unable to follow through with this. She would like to discuss the biopsy again. Of note, she does also have multiple enlarged mesenteric lymph nodes on CT, read as reactive, but this should be noted going forward.  - Surgery consult, appreciate recommendations  - Lymph node biopsy, likely as an outpatient.    # Pain Assessment:  Current Pain Score 10/24/2019   Patient currently in pain? sleeping: patient not able to self report   Pain score (0-10) -   Pain location -   Pain descriptors -   Some encounter information is confidential and restricted. Go to Review Flowsheets activity to see all data.   -Jenni is experiencing pain due to bowel obstruction. Pain management was discussed and the plan was created in a collaborative fashion.  Jenni's response to the current recommendations: engaged  -Please see the plan for pain management as documented above    Diet: NPO for Medical/Clinical Reasons Except for: Meds  Fluids: D5 1/2 NS + 20 KCl @ 125 ml/hr  DVT Prophylaxis: Pneumatic Compression Devices  Code Status: Full Code    Disposition Plan   Expected discharge:Expected Discharge Date: 10/25/19 2 - 3 days, recommended to prior living arrangement once adequate pain management/ tolerating PO medications, safe disposition plan/ TCU bed available and able to resume PO diet.Dispo: Expected Discharge Date: 10/25/19   Entered: Jesus  Dora Hobbs 10/24/2019, 6:50 AM   Information in the above section will display in the discharge planner report.    The patient's care was discussed with the Attending Physician, Dr. Andrew Hobbs MD  [facility] Medicine:   Pager: [phone]  Please see sticky note for cross cover information    Interval History  NGT in place. HDS overnight. Still on IVF. Ambulating the hallways. Smoking outside. Patient stilll having waves of pain. NGT output of 2.2 L yesterday. Last BM 10/22. Patient does not want decreased dilaudid at this point. Patient interested in surgical options- I informed surgery this AM and they will see her to further discuss. No difficulty with urination.     Review of Systems   Constitutional: Negative for chills and fever.   Gastrointestinal: Positive for abdominal pain (in waves). Negative for diarrhea and nausea.   Skin: Negative for rash.   Neurological: Negative for syncope.   Psychiatric/Behavioral: Negative for confusion.     Physical Exam   Vital Signs: Temp: 96.2  F (35.7  C) Temp src: Oral BP: 95/68 Pulse: 89 Heart Rate: 78 Resp: 14 SpO2: 96 % O2 Device: None (Room air)    Weight: 113 lbs 1.6 oz  Physical Exam  Constitutional:       General: She is not in acute distress.     Appearance: She is well-developed.      Comments: Thin, malnourished   HENT:      Head: Normocephalic and atraumatic.      Mouth/Throat:      Mouth: Mucous membranes are moist.      Pharynx: Oropharynx is clear.   Eyes:      General: No scleral icterus.     Conjunctiva/sclera: Conjunctivae normal.   Cardiovascular:      Rate and Rhythm: Normal rate and regular rhythm.      Heart sounds: Normal heart sounds. No murmur. No friction rub.   Pulmonary:      Effort: Pulmonary effort is normal. No respiratory distress.      Breath sounds: Normal breath sounds.   Abdominal:      General: There is distension (mildly improved).      Palpations: Abdomen is soft. There is no mass.       Tenderness: There is tenderness (diffuse, worse in upper abdomen). There is no guarding or rebound.      Comments: Hypoactive bs   Musculoskeletal:         General: No swelling.   Skin:     General: Skin is warm and dry.      Findings: No erythema.   Neurological:      Mental Status: She is alert and oriented to person, place, and time.   Psychiatric:         Mood and Affect: Mood normal.       Previously noted:  1.5 cm lymph nodes R inguinal, firm, mildly tender, mobile.  0.5 cm LN L mid-thigh, mildly tender.  Data   Recent Labs   Lab 10/23/19  0659 10/22/19  0943 10/21/19  0713 10/20/19  0734  10/19/19  0550   WBC  --   --  7.9 8.9  --  9.2   HGB  --   --  11.8 12.0  --  12.4   MCV  --   --  98 100  --  99   PLT  --   --  302 286  --  318    138 136 136  --  142   POTASSIUM 4.3 4.6 4.3 4.5   < > 3.2*   CHLORIDE 105 105 101 106  --  108   CO2 27 31 32 26  --  30   BUN 8 8 9 9  --  11   CR 0.60 0.59 0.57 0.58  --  0.60   ANIONGAP 5 3 3 4  --  5   JONATAN 8.3* 8.3* 8.3* 8.1*  --  7.4*   GLC 88 93 113* 89  --  79   ALBUMIN 2.6* 2.9* 2.8*  --   --   --    PROTTOTAL 5.5* 6.1* 5.6*  --   --   --    BILITOTAL 0.4 0.4 0.5  --   --   --    ALKPHOS 81 92 87  --   --   --    ALT 20 30 34  --   --   --    AST 11 8 9  --   --   --     < > = values in this interval not displayed.

## 2019-10-24 NOTE — PLAN OF CARE
BP 93/73 (BP Location: Right arm)   Pulse 89   Temp 96.9  F (36.1  C) (Oral)   Resp 14   Wt 51.3 kg (113 lb 1.6 oz)   SpO2 99%   BMI 16.70 kg/m      VSS with hypotension at baseline. Up ad dania. NPO, denies nausea. NG to LIS. 1200cc out during night. BS present. No BM, passing flatus. Voiding adequately. Pain noted in abdomen, PRN dilaudid for pain management. Resting between cares. Continue with POC.

## 2019-10-24 NOTE — PLAN OF CARE
VSS. Pt reports abdominal pain, controlled with IV Dil x3, Simethicone x1, & heating pad. NGT to LIS, high output: 1300 ml (yellow/thin). Asked pt if she has been drinking, states only for PO meds rarely. Pt denied nausea, except when NGT was clamped for Colace, relief with IV Zofran x1. BS hyperactive, pt reports passing gas. Pt up independently, amb in halls x2, voiding adequately. Pt felt very reassured about meeting with Surgery team this afternoon.

## 2019-10-24 NOTE — PROGRESS NOTES
"CLINICAL NUTRITION SERVICES - ASSESSMENT NOTE     Nutrition Prescription    RECOMMENDATIONS FOR MDs/PROVIDERS TO ORDER:  Recommend diet adv vs nutrition support within 1-2 days.  If expected to only require PN for a few days, recommend short-term PPN; however, if expected to require PN for longer would recommend place central line and start TPN    Malnutrition Status:    at least Non-severe malnutrition in the context of acute on chronic illness    Recommendations already ordered by Registered Dietitian (RD):  None at this time     Future/Additional Recommendations:  1. If plan to begin PPN, recommend peripheral Clinimix (D5/AA4.25) @ 65 mL/hr (1560 mL/day) + 250 mL 20% IV lipids 5 days/week to provide 886 kcal (18 kcal/kg), 66 g protein (1.1 g/kg), 78 g dextrose (GIR 1.1), and 40% kcal from fat per dosing weight 50 kg.  This meets 59% kcal needs and 88% protein needs, difficult to meet nutrition needs with PPN 2/2 osmolarity restrictions    2. If plan to place central line and start TPN, recommend the following:  --Use dosing weight 50 kg  --Begin CPN, initial Clinimix (D15/AA5) @ 25 mL/hr (600 mL/day) + 250 mL 20% IV lipids 6 days/week (this provides 90 g dextrose, GIR 1.3)  --Advance PN rate by 10-15 mL/hr 1-2 days if K+/Mg++ >/= nrml and phos >1.9 and BGs stable (per Pharm D/RD discretion) to goal rate 65 mL/hr    Goal: Clinimix @ 65 mL/hr (1560 mL/day) + 250 mL of 20% IV lipids 6 days/week to provide 1537 kcals (31 kcal/kg), 78 g protein (1.6 g/kg), 234 g CHO (GIR 3.3), and 28% of kcals from fat on average daily.     REASON FOR ASSESSMENT  Rachel A Gerhardt is a/an 43 year old female assessed by the dietitian for Provider Order - \"peipheral parenteral nutrition possiblity, recs for this vs TPN, prolonged NPO for unresolving SBO since 10/19\"    NUTRITION HISTORY  Obtained info from chart, pt not in room during visit.  Pt followed by RDs during previous admissions.  Has been on TPN in the past (most recently Nov " "2018).  Pt admit on 10/19 with abdominal pain and poor PO intake for 4 days.     CURRENT NUTRITION ORDERS  Diet: NPO  Intake/Tolerance: Mostly NPO since 10/19 (was on clear liquids 10/20, but made NPO again on 10/21)    LABS  Labs reviewed    MEDICATIONS  Medications reviewed    ANTHROPOMETRICS  Height: 175.3 cm (5' 9\")  Most Recent Weight: 51.3 kg (113 lb 1.6 oz)    IBW: 65.9 kg   BMI: Underweight BMI <18.5  Weight History: Wt is overall stable x 3-4 months, but is down 9 lbs from 6 months ago (7.6% wt loss)  Wt Readings from Last 15 Encounters:   10/21/19 51.3 kg (113 lb 1.6 oz)   10/19/19 49.9 kg (109 lb 14.4 oz)   10/18/19 46.7 kg (103 lb)   07/09/19 49 kg (108 lb 1.6 oz)   06/28/19 49.1 kg (108 lb 3.2 oz)   05/19/19 52.6 kg (115 lb 14.4 oz)   03/15/19 53.3 kg (117 lb 9.6 oz)   03/10/19 53.7 kg (118 lb 4.8 oz)   11/19/18 49.9 kg (110 lb)   11/13/18 54.9 kg (121 lb 1.6 oz)   07/17/18 50.1 kg (110 lb 6.4 oz)   06/19/18 56.6 kg (124 lb 11.2 oz)   05/23/18 56.9 kg (125 lb 8 oz)   05/09/18 55.3 kg (122 lb)   05/02/18 55.5 kg (122 lb 4.8 oz)   04/30/18 55.4 kg (122 lb 2.2 oz)   04/24/18 55.7 kg (122 lb 12.7 oz)      Dosing Weight: 50 kg (actual)    ASSESSED NUTRITION NEEDS  Estimated Energy Needs: 9309-1872 kcals/day (30 - 35 kcals/kg)  Justification: Underweight  Estimated Protein Needs:  grams protein/day (1.5 - 2 grams of pro/kg)  Justification: Hypercatabolism with acute illness and Repletion  Estimated Fluid Needs: 8608-6707 mL/day (1 mL/kcal)   Justification: Maintenance, or other per provider pending fluid status    PHYSICAL FINDINGS  See malnutrition section below.    MALNUTRITION  % Intake: <50% for >/= 5 days (severe)  % Weight Loss:Up to 10% in 6 months (non-severe)  Subcutaneous Fat Loss: Unable to assess  Muscle Loss: Unable to assess  Fluid Accumulation/Edema: None noted per chart review  Malnutrition Diagnosis: at least Non-severe malnutrition in the context of acute on chronic " illness    NUTRITION DIAGNOSIS  Inadequate oral intake related to NPO 2/2 SBO as evidenced by mostly NPO x 6 days      INTERVENTIONS  Implementation  Nutrition Education: Unable to complete due to pt not in room     Goals  Diet adv v nutrition support within 1-2 days.     Monitoring/Evaluation  Progress toward goals will be monitored and evaluated per protocol.    Mylene Duvall RD, LD  [address] RD pager: [phone]

## 2019-10-25 ENCOUNTER — APPOINTMENT (OUTPATIENT)
Dept: GENERAL RADIOLOGY | Facility: CLINIC | Age: 44
DRG: 329 | End: 2019-10-25
Payer: COMMERCIAL

## 2019-10-25 LAB
ALBUMIN SERPL-MCNC: 3.5 G/DL (ref 3.4–5)
ALP SERPL-CCNC: 104 U/L (ref 40–150)
ALT SERPL W P-5'-P-CCNC: 28 U/L (ref 0–50)
ANION GAP SERPL CALCULATED.3IONS-SCNC: 6 MMOL/L (ref 3–14)
AST SERPL W P-5'-P-CCNC: 17 U/L (ref 0–45)
BILIRUB SERPL-MCNC: 0.4 MG/DL (ref 0.2–1.3)
BUN SERPL-MCNC: 15 MG/DL (ref 7–30)
CALCIUM SERPL-MCNC: 9.1 MG/DL (ref 8.5–10.1)
CHLORIDE SERPL-SCNC: 96 MMOL/L (ref 94–109)
CO2 SERPL-SCNC: 37 MMOL/L (ref 20–32)
CREAT SERPL-MCNC: 0.82 MG/DL (ref 0.52–1.04)
GFR SERPL CREATININE-BSD FRML MDRD: 87 ML/MIN/{1.73_M2}
GLUCOSE BLDC GLUCOMTR-MCNC: 98 MG/DL (ref 70–99)
GLUCOSE SERPL-MCNC: 121 MG/DL (ref 70–99)
MAGNESIUM SERPL-MCNC: 1.9 MG/DL (ref 1.6–2.3)
PHOSPHATE SERPL-MCNC: 4.9 MG/DL (ref 2.5–4.5)
POTASSIUM SERPL-SCNC: 4.2 MMOL/L (ref 3.4–5.3)
PROT SERPL-MCNC: 7.1 G/DL (ref 6.8–8.8)
SODIUM SERPL-SCNC: 138 MMOL/L (ref 133–144)
TRIGL SERPL-MCNC: 51 MG/DL

## 2019-10-25 PROCEDURE — 25800030 ZZH RX IP 258 OP 636: Performed by: STUDENT IN AN ORGANIZED HEALTH CARE EDUCATION/TRAINING PROGRAM

## 2019-10-25 PROCEDURE — 25000125 ZZHC RX 250: Performed by: STUDENT IN AN ORGANIZED HEALTH CARE EDUCATION/TRAINING PROGRAM

## 2019-10-25 PROCEDURE — 84478 ASSAY OF TRIGLYCERIDES: CPT | Performed by: STUDENT IN AN ORGANIZED HEALTH CARE EDUCATION/TRAINING PROGRAM

## 2019-10-25 PROCEDURE — 40000986 XR ABDOMEN PORT 1 VW

## 2019-10-25 PROCEDURE — 36415 COLL VENOUS BLD VENIPUNCTURE: CPT | Performed by: STUDENT IN AN ORGANIZED HEALTH CARE EDUCATION/TRAINING PROGRAM

## 2019-10-25 PROCEDURE — 25000132 ZZH RX MED GY IP 250 OP 250 PS 637: Performed by: STUDENT IN AN ORGANIZED HEALTH CARE EDUCATION/TRAINING PROGRAM

## 2019-10-25 PROCEDURE — 84100 ASSAY OF PHOSPHORUS: CPT | Performed by: STUDENT IN AN ORGANIZED HEALTH CARE EDUCATION/TRAINING PROGRAM

## 2019-10-25 PROCEDURE — G0463 HOSPITAL OUTPT CLINIC VISIT: HCPCS

## 2019-10-25 PROCEDURE — 40000141 ZZH STATISTIC PERIPHERAL IV START W/O US GUIDANCE

## 2019-10-25 PROCEDURE — 80048 BASIC METABOLIC PNL TOTAL CA: CPT | Performed by: STUDENT IN AN ORGANIZED HEALTH CARE EDUCATION/TRAINING PROGRAM

## 2019-10-25 PROCEDURE — 00000146 ZZHCL STATISTIC GLUCOSE BY METER IP

## 2019-10-25 PROCEDURE — 80053 COMPREHEN METABOLIC PANEL: CPT | Performed by: STUDENT IN AN ORGANIZED HEALTH CARE EDUCATION/TRAINING PROGRAM

## 2019-10-25 PROCEDURE — 12000001 ZZH R&B MED SURG/OB UMMC

## 2019-10-25 PROCEDURE — 25000128 H RX IP 250 OP 636: Performed by: STUDENT IN AN ORGANIZED HEALTH CARE EDUCATION/TRAINING PROGRAM

## 2019-10-25 PROCEDURE — 83735 ASSAY OF MAGNESIUM: CPT | Performed by: STUDENT IN AN ORGANIZED HEALTH CARE EDUCATION/TRAINING PROGRAM

## 2019-10-25 RX ADMIN — TOPICAL ANESTHETIC 0.5 ML: 200 SPRAY DENTAL; PERIODONTAL at 08:41

## 2019-10-25 RX ADMIN — ASCORBIC ACID, VITAMIN A PALMITATE, CHOLECALCIFEROL, THIAMINE HYDROCHLORIDE, RIBOFLAVIN-5 PHOSPHATE SODIUM, PYRIDOXINE HYDROCHLORIDE, NIACINAMIDE, DEXPANTHENOL, ALPHA-TOCOPHEROL ACETATE, VITAMIN K1, FOLIC ACID, BIOTIN, CYANOCOBALAMIN: 200; 3300; 200; 6; 3.6; 6; 40; 15; 10; 150; 600; 60; 5 INJECTION, SOLUTION INTRAVENOUS at 14:53

## 2019-10-25 RX ADMIN — HYDROMORPHONE HYDROCHLORIDE 1 MG: 1 INJECTION, SOLUTION INTRAMUSCULAR; INTRAVENOUS; SUBCUTANEOUS at 02:40

## 2019-10-25 RX ADMIN — HYDROMORPHONE HYDROCHLORIDE 1 MG: 1 INJECTION, SOLUTION INTRAMUSCULAR; INTRAVENOUS; SUBCUTANEOUS at 00:34

## 2019-10-25 RX ADMIN — DOCUSATE SODIUM 100 MG: 100 CAPSULE, LIQUID FILLED ORAL at 08:41

## 2019-10-25 RX ADMIN — HYDROMORPHONE HYDROCHLORIDE 1 MG: 1 INJECTION, SOLUTION INTRAMUSCULAR; INTRAVENOUS; SUBCUTANEOUS at 14:58

## 2019-10-25 RX ADMIN — HYDROMORPHONE HYDROCHLORIDE 1 MG: 1 INJECTION, SOLUTION INTRAMUSCULAR; INTRAVENOUS; SUBCUTANEOUS at 17:01

## 2019-10-25 RX ADMIN — HYDROMORPHONE HYDROCHLORIDE 1 MG: 1 INJECTION, SOLUTION INTRAMUSCULAR; INTRAVENOUS; SUBCUTANEOUS at 19:18

## 2019-10-25 RX ADMIN — DOCUSATE SODIUM 100 MG: 100 CAPSULE, LIQUID FILLED ORAL at 19:20

## 2019-10-25 RX ADMIN — TOPICAL ANESTHETIC 0.5 ML: 200 SPRAY DENTAL; PERIODONTAL at 19:17

## 2019-10-25 RX ADMIN — TOPICAL ANESTHETIC 0.5 ML: 200 SPRAY DENTAL; PERIODONTAL at 19:18

## 2019-10-25 RX ADMIN — CALCIUM CARBONATE (ANTACID) CHEW TAB 500 MG 500 MG: 500 CHEW TAB at 15:28

## 2019-10-25 RX ADMIN — SIMETHICONE CHEW TAB 80 MG 80 MG: 80 TABLET ORAL at 15:28

## 2019-10-25 RX ADMIN — HYDROMORPHONE HYDROCHLORIDE 1 MG: 1 INJECTION, SOLUTION INTRAMUSCULAR; INTRAVENOUS; SUBCUTANEOUS at 08:48

## 2019-10-25 RX ADMIN — HYDROMORPHONE HYDROCHLORIDE 1 MG: 1 INJECTION, SOLUTION INTRAMUSCULAR; INTRAVENOUS; SUBCUTANEOUS at 04:44

## 2019-10-25 RX ADMIN — HYDROMORPHONE HYDROCHLORIDE 1 MG: 1 INJECTION, SOLUTION INTRAMUSCULAR; INTRAVENOUS; SUBCUTANEOUS at 06:44

## 2019-10-25 RX ADMIN — HYDROMORPHONE HYDROCHLORIDE 1 MG: 1 INJECTION, SOLUTION INTRAMUSCULAR; INTRAVENOUS; SUBCUTANEOUS at 10:44

## 2019-10-25 RX ADMIN — HYDROMORPHONE HYDROCHLORIDE 1 MG: 1 INJECTION, SOLUTION INTRAMUSCULAR; INTRAVENOUS; SUBCUTANEOUS at 21:22

## 2019-10-25 RX ADMIN — TOPICAL ANESTHETIC 1 ML: 200 SPRAY DENTAL; PERIODONTAL at 22:20

## 2019-10-25 RX ADMIN — I.V. FAT EMULSION 250 ML: 20 EMULSION INTRAVENOUS at 20:20

## 2019-10-25 RX ADMIN — HYDROMORPHONE HYDROCHLORIDE 1 MG: 1 INJECTION, SOLUTION INTRAMUSCULAR; INTRAVENOUS; SUBCUTANEOUS at 23:24

## 2019-10-25 RX ADMIN — POTASSIUM CHLORIDE, DEXTROSE MONOHYDRATE AND SODIUM CHLORIDE: 150; 5; 450 INJECTION, SOLUTION INTRAVENOUS at 05:06

## 2019-10-25 RX ADMIN — HYDROMORPHONE HYDROCHLORIDE 1 MG: 1 INJECTION, SOLUTION INTRAMUSCULAR; INTRAVENOUS; SUBCUTANEOUS at 12:56

## 2019-10-25 RX ADMIN — POTASSIUM CHLORIDE, DEXTROSE MONOHYDRATE AND SODIUM CHLORIDE: 150; 5; 450 INJECTION, SOLUTION INTRAVENOUS at 13:11

## 2019-10-25 ASSESSMENT — ENCOUNTER SYMPTOMS
CONFUSION: 0
FEVER: 0
NAUSEA: 0
ABDOMINAL PAIN: 1
DIARRHEA: 0
CHILLS: 0

## 2019-10-25 ASSESSMENT — ACTIVITIES OF DAILY LIVING (ADL)
ADLS_ACUITY_SCORE: 12

## 2019-10-25 ASSESSMENT — PAIN DESCRIPTION - DESCRIPTORS
DESCRIPTORS: ACHING;CRAMPING
DESCRIPTORS: ACHING;CRAMPING
DESCRIPTORS: ACHING
DESCRIPTORS: ACHING

## 2019-10-25 NOTE — PLAN OF CARE
VSS except BP slightly hypotensive. Pt. is alert and oriented x 4 ,up indep ,c/o abdominal pain and requested IV Dilaudid every 2 hrs which was given.pt denies nausea.LS clear,BS+,hyperactive,passing gas, no BM ,adequate urinary output.pt c/o NG  Tube site irritation and wanted to take it out ,MD was notified ,pt. encouraged to keep NG  tube in until necessary.pt continues to be NPO,pt had large  gastric output,almost 3 liters since early morning. Pt. goes outside to smoke frequently.Plan to have abdominal X ray tonight , pain management and possible surgery in next couple of days.

## 2019-10-25 NOTE — PROGRESS NOTES
"CLINICAL NUTRITION SERVICES - BRIEF NOTE  *See RD note on 10/24 for nutrition assessment details/recs  *Received consult: Pharmacy/Nutrition to start and manage TPN (Line Type: \"Standard Peripheral Catheter -REQUIRES peripheral formula\")   Nutrition Prescription    RECOMMENDATIONS FOR MDs/PROVIDERS TO ORDER:  Adjust/titrate IV fluids when PPN begins per provider discretion    If continues to require parenteral nutrition over the next 3 days, recommend place central line and transition to TPN    Recommendations already ordered by Registered Dietitian (RD):  1. Initiate PPN: peripheral Clinimix (D5/AA4.25) @ 65 mL/hr (1560 mL/day) + 250 mL 20% IV lipids 5 days/week to provide 886 kcal (18 kcal/kg), 66 g protein (1.1 g/kg), 78 g dextrose (GIR 1.1), and 40% kcal from fat per dosing weight 50 kg.      This meets 59% kcal needs and 88% protein needs, difficult to meet nutrition needs with PPN 2/2 osmolarity restrictions    2. Add-ons: Mg++, Phos, and TG add-ons       Labs: K+ and Mg++ WNL, Phos 4.9 (H). LFTs WNL. TG WNL.    Meds: D5 @ 125 mL/hr since 10/19    INTERVENTIONS  Implementation  Collaboration with other providers and Parenteral Nutrition/IV Fluids - sent change order request    Monitoring/Evaluation  Progress toward goals will be monitored and evaluated per protocol.     Mylene Duvall RD, LD  [address] RD pager: [phone]   "

## 2019-10-25 NOTE — PLAN OF CARE
AOx4. Hypotensive @ times, OVSS on ra. Up ad dania. + flatus, no BM overnight. NG tube to LIS, with tan output. AUO. Skin around NG tube irritated with blanchable redness; adjusted NG tube and put in WOC consult. Abd pain managed with PRN IV Dilaudid. PIV infusing with IVMF. Cont with POC.

## 2019-10-25 NOTE — PROGRESS NOTES
[facility] Nurse Inpatient Pressure Injury Assessment   Reason for consultation: Evaluate and treat L nare wound    ASSESSMENT  Currently no visible Pressure Injury on external nose or septum or nare   Per pt's and nursing description, likely mucosal trauma wound that is resolving     TREATMENT PLAN  Left nare wound: apply vaseline using q-tip prn   Keep NG tube positioned away from mucosal tissue   Orders Written  WO Nurse follow-up plan:signing off  Nursing to notify the Provider(s) and re-consult the WO Nurse if wound(s) deteriorates or new skin concern.    Patient History  According to provider note(s):  43 year old female admitted on 10/18/2019. She has a history of cervical cancer and ovarian cancer s/p chemo/rad, recurrent SBOs s/p exlap small bowel resection with primary anastamosis in 2017, ileo-ileal anastamosis dilation in June 2018, and multiple medically managed recurrent SBOs and is admitted for SBO    Objective Data    Current Diet/ Nutrition:  Orders Placed This Encounter      NPO for Medical/Clinical Reasons Except for: Meds      Output:   I/O last 3 completed shifts:  In: 3605.83 [I.V.:3605.83]  Out: 6150 [Urine:1450; Emesis/NG output:4700]    Risk Assessment:   Sensory Perception: 4-->no impairment  Moisture: 4-->rarely moist  Activity: 4-->walks frequently  Mobility: 4-->no limitation  Nutrition: 2-->probably inadequate  Friction and Shear: 3-->no apparent problem  Basim Score: 21      Labs:   Recent Labs   Lab 10/25/19  0752  10/21/19  0713   ALBUMIN 3.5   < > 2.8*   HGB  --   --  11.8   WBC  --   --  7.9    < > = values in this interval not displayed.       Physical Exam  Skin inspection: focused nose    Wound Location:  Left septum  Wound History: pt c/o pain in nare from NG tube.  States that she found a piece of plastic attached to the tubing last evening.  Had dried drainage that she then cleaned out.  Currently NG tube is positioned with contact to septum.    Nursing staff noted blanchable  erythema on external nose.     Exam: skin is intact, without erythema.  No noted mucosal erosion within the nare.  Dried drainage under the tape thurston.    Pain: significantly improved per pt     Interventions  Visual inspection of wound(s) completed   Tube Securement: removed the current tape system and placed new one, offsetting it to avoid pressure on the septum.   Applied vaseline to the mucous membrane of Left nare  Supplies: gathered  Educated provided: plan of care  Education provided to: patient  and nurse  Discussed plan of care with Nurse

## 2019-10-25 NOTE — PROGRESS NOTES
GENERAL SURGERY PROGRESS NOTE  [id number]  Rachel A Gerhardt    S: 5700 from NG yesterday. Slightly hypotensive this morning, likely from high NG output without adequate replacement.    O:  BP (!) 89/67 (BP Location: Right arm)   Pulse 89   Temp 97.2  F (36.2  C) (Oral)   Resp 16   Wt 51.3 kg (113 lb 1.6 oz)   SpO2 94%   BMI 16.70 kg/m      GEN: NAD  HEENT: anicteric  Car: RRR  Pulm: normal work of breathing  Abd: soft, continued distension, tender globally  Ext: WWP    A/P: Ms. Oropeza is a  44 yo F with chronic SBO here with SBO 2/2 stricture w/ fecal overflow. Surgical options are most likely going to be the most beneficial for her in the long run although she has been hesitant to undergo surgery, especially if there is a risk of ostomy.    - continue bowel regimen / enemas  - Will try to schedule surgery for this weekend/early next week  - Recommend replacing NG output with IV fluids      Seen with chief resident, Dr. Patino, who will discuss with staff.    Luiz Cortez MD, PhD, PGY-1  General Surgery

## 2019-10-25 NOTE — PROVIDER NOTIFICATION
Notified provider regarding pt NG-tube pulled further from the nares and NG tube no longer having output in the canister. Awaiting for response.     Addendum: Provider called and said to advance NG tube and ordered an abdominal x-ray to confirm placement.

## 2019-10-25 NOTE — PLAN OF CARE
VSS on RA, except hypotensive. Pain controlled with IV Dilaudid every 2 hrs. NGT to LIS with thick yellow output. Had an emesis around NGT after her walk, MD notified. Reports passing gas and a BM today. Abd distended but getting softer. Voiding with good output. Up ad dania, in halls frequently. Surgery now primary, plan for surgery either this weekend or early next week. Plan to start TPN today once available from pharmacy; PIV in place. Continue with POC.

## 2019-10-25 NOTE — PROVIDER NOTIFICATION
Notified provider regarding pt receiving abd x-ray and waiting for an okay to put pt NG back to suction.

## 2019-10-25 NOTE — PROGRESS NOTES
[facility], [facility] Progress Note    Main Plans for Today    - NPO  - 150 ml/hr IVF  - start peripheral parenteral nutrition today  - NGT in place  - transfer to surgery team for upcoming surgical procedure    Assessment & Plan   Rachel A Gerhardt is a 43 year old female admitted on 10/18/2019. 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 Patient's SBO continues to be present after medical management since admission, surgery to have another discussion for surgical intervention.      # Small bowel obstruction  # Constipation  # Acute on chronic abdominal pain  Patient with multiple medically managed SBO's. Her exam is notable for tenderness and distention (slowly improving), but no rebound or signs of peritonitis. LA wnl on admission. CT 10/19 with a transition point near her previous stricture, and dilated loops proximal to this, with heavy colonic stool burdern.  Managing conservatively with NGT, fluids and bowel rest, ambulation but this is not resolving currently and will benefit from surgery input/procedure. Last meal 10/15/2019.  - Transfer care to surgery team as patient requiring surgical intervention and no other med issues  - Intervention indicated, pending surgery recs  - NGT in place since 10/22 still with well over 2 L bilious output 10/23 and increased to 5L output 10/24 but patient has been seen taking PO intake of apple juice and nutrition room visits multiple times.  - Monitor I/Os  - NPO  - Nutrition consult for TPN vs PPN with prolonged SBO/NPO  -- PPN started 10/25 per nutrition recs   - If continues to require parenteral nutrition over the next 3 days (until 10/28), recommend place central line and transition to TPN  -  I/Os  - mIVF @ 150 ml/hr  - Tylenol 650 mg every 4 hours scheduled  - Dilaudid 1 mg q2h as needed, need to decrease this, could consider interval Toradol   - pain consulted 10/23 where they rec'd decreasing opioid dose, patient kicked them out of room, patient refused lidocaine patch, refused gabapentin/lyrica, tylenol, simethicone, signed off 10/23  - Zofran for nausea  - bowel regimen: miralax twice daily (refusing), docusate, Ambulation; Glycerin suppository     # History of cervical and ovarian cancer  # Lymphadenopathy  Patient was scheduled to discuss lymph node biopsy with surgery as an outpatient after last admission in June, but was unable to follow through with this. She would like to discuss the biopsy again. Of note, she does also have multiple enlarged mesenteric lymph nodes on CT, read as reactive, but this should be noted going forward.  - Surgery consult, appreciate recommendations  - Lymph node biopsy, likely as an outpatient    # Pain Assessment:  Current Pain Score 10/25/2019   Patient currently in pain? yes   Pain score (0-10) -   Pain location -   Pain descriptors -   Some encounter information is confidential and restricted. Go to Review Flowsheets activity to see all data.   -Jenni is experiencing pain due to bowel obstruction. Pain management was discussed and the plan was created in a collaborative fashion.  Jenni's response to the current recommendations: engaged  -Please see the plan for pain management as documented above    Diet: NPO for Medical/Clinical Reasons Except for: Meds  parenteral nutrition - Clinimix E  Fluids: D5 1/2 NS + 20 KCl @ 150 ml/hr  DVT Prophylaxis: Pneumatic Compression Devices  Code Status: Full Code    Disposition Plan   Expected discharge:Expected Discharge Date: 10/28/19 2 - 3 days, recommended to prior living arrangement once adequate pain management/ tolerating PO medications, safe disposition plan/ TCU bed available and able to resume PO diet.Dispo:  Expected Discharge Date: 10/28/19   Entered: Jesus Hobbs 10/25/2019, 2:19 PM   Information in the above section will display in the discharge planner report.    The patient's care was discussed with the Attending Physician, Dr. Anderw Hobbs MD  [facility]:   Pager: [phone]  Please see sticky note for cross cover information    Interval History  NGT in place, repositioned and put out over 800 ml. NGT output is 1.6 L since 10/25 AM and over 5 L 10/24 but patient has been seen drinking apple juice and making multiple visits to the nutrition room. Last BM 10/22. Patient not seen in this AM as was ambulating. She knows we will be spacing out opioids today. I spoke with surgery and patient is to be transferred to their service for surgical procedure management.     HDS overnight but lower BPs. Still on IVF. Ambulating the hallways. Smoking outside. Patient stilll having waves of pain.  No difficulty with urination.     Review of Systems   Constitutional: Negative for chills and fever.   Gastrointestinal: Positive for abdominal pain (in waves). Negative for diarrhea and nausea.   Skin: Negative for rash.   Neurological: Negative for syncope.   Psychiatric/Behavioral: Negative for confusion.     Physical Exam   Vital Signs: Temp: 97.2  F (36.2  C) Temp src: Oral BP: (!) 89/67(BP recheck)   Heart Rate: 77 Resp: 16 SpO2: 94 % O2 Device: None (Room air)    Weight: 113 lbs 1.6 oz  Physical Exam  Constitutional:       General: She is not in acute distress.     Appearance: She is well-developed.      Comments: Thin, malnourished   HENT:      Head: Normocephalic and atraumatic.      Mouth/Throat:      Mouth: Mucous membranes are moist.      Pharynx: Oropharynx is clear.   Eyes:      General: No scleral icterus.     Conjunctiva/sclera: Conjunctivae normal.   Cardiovascular:      Rate and Rhythm: Normal rate and regular rhythm.      Heart sounds: Normal heart  sounds. No murmur. No friction rub.   Pulmonary:      Effort: Pulmonary effort is normal. No respiratory distress.      Breath sounds: Normal breath sounds.   Abdominal:      General: There is distension (mildly improved).      Palpations: Abdomen is soft. There is no mass.      Tenderness: There is tenderness (diffuse, worse in upper abdomen). There is no guarding or rebound.      Comments: Hypoactive bs   Musculoskeletal:         General: No swelling.   Skin:     General: Skin is warm and dry.      Findings: No erythema.   Neurological:      Mental Status: She is alert and oriented to person, place, and time.   Psychiatric:         Mood and Affect: Mood normal.       Previously noted:  1.5 cm lymph nodes R inguinal, firm, mildly tender, mobile.  0.5 cm LN L mid-thigh, mildly tender.  Data   Recent Labs   Lab 10/25/19  0752 10/24/19  0646 10/23/19  0659  10/21/19  0713 10/20/19  0734  10/19/19  0550   WBC  --   --   --   --  7.9 8.9  --  9.2   HGB  --   --   --   --  11.8 12.0  --  12.4   MCV  --   --   --   --  98 100  --  99   PLT  --   --   --   --  302 286  --  318    137 137   < > 136 136  --  142   POTASSIUM 4.2 4.1 4.3   < > 4.3 4.5   < > 3.2*   CHLORIDE 96 99 105   < > 101 106  --  108   CO2 37* 33* 27   < > 32 26  --  30   BUN 15 9 8   < > 9 9  --  11   CR 0.82 0.55 0.60   < > 0.57 0.58  --  0.60   ANIONGAP 6 5 5   < > 3 4  --  5   JONATAN 9.1 8.4* 8.3*   < > 8.3* 8.1*  --  7.4*   * 106* 88   < > 113* 89  --  79   ALBUMIN 3.5 2.6* 2.6*   < > 2.8*  --   --   --    PROTTOTAL 7.1 5.4* 5.5*   < > 5.6*  --   --   --    BILITOTAL 0.4 0.5 0.4   < > 0.5  --   --   --    ALKPHOS 104 83 81   < > 87  --   --   --    ALT 28 20 20   < > 34  --   --   --    AST 17 10 11   < > 9  --   --   --     < > = values in this interval not displayed.

## 2019-10-26 ENCOUNTER — APPOINTMENT (OUTPATIENT)
Dept: GENERAL RADIOLOGY | Facility: CLINIC | Age: 44
DRG: 329 | End: 2019-10-26
Attending: SURGERY
Payer: COMMERCIAL

## 2019-10-26 LAB
ALBUMIN SERPL-MCNC: 2.8 G/DL (ref 3.4–5)
ALP SERPL-CCNC: 86 U/L (ref 40–150)
ALT SERPL W P-5'-P-CCNC: 20 U/L (ref 0–50)
ANION GAP SERPL CALCULATED.3IONS-SCNC: 6 MMOL/L (ref 3–14)
AST SERPL W P-5'-P-CCNC: 12 U/L (ref 0–45)
BILIRUB DIRECT SERPL-MCNC: 0.1 MG/DL (ref 0–0.2)
BILIRUB SERPL-MCNC: 0.4 MG/DL (ref 0.2–1.3)
BUN SERPL-MCNC: 18 MG/DL (ref 7–30)
CALCIUM SERPL-MCNC: 8.3 MG/DL (ref 8.5–10.1)
CHLORIDE SERPL-SCNC: 100 MMOL/L (ref 94–109)
CO2 SERPL-SCNC: 31 MMOL/L (ref 20–32)
CREAT SERPL-MCNC: 0.59 MG/DL (ref 0.52–1.04)
GFR SERPL CREATININE-BSD FRML MDRD: >90 ML/MIN/{1.73_M2}
GLUCOSE BLDC GLUCOMTR-MCNC: 113 MG/DL (ref 70–99)
GLUCOSE SERPL-MCNC: 101 MG/DL (ref 70–99)
INR PPP: 1.15 (ref 0.86–1.14)
MAGNESIUM SERPL-MCNC: 1.9 MG/DL (ref 1.6–2.3)
PHOSPHATE SERPL-MCNC: 4.1 MG/DL (ref 2.5–4.5)
POTASSIUM SERPL-SCNC: 4.3 MMOL/L (ref 3.4–5.3)
PROT SERPL-MCNC: 5.8 G/DL (ref 6.8–8.8)
SODIUM SERPL-SCNC: 137 MMOL/L (ref 133–144)

## 2019-10-26 PROCEDURE — 84134 ASSAY OF PREALBUMIN: CPT | Performed by: STUDENT IN AN ORGANIZED HEALTH CARE EDUCATION/TRAINING PROGRAM

## 2019-10-26 PROCEDURE — 36415 COLL VENOUS BLD VENIPUNCTURE: CPT | Performed by: STUDENT IN AN ORGANIZED HEALTH CARE EDUCATION/TRAINING PROGRAM

## 2019-10-26 PROCEDURE — 27211417 ZZ H KIT, VPS RHYTHM STYLET

## 2019-10-26 PROCEDURE — 25000125 ZZHC RX 250: Performed by: STUDENT IN AN ORGANIZED HEALTH CARE EDUCATION/TRAINING PROGRAM

## 2019-10-26 PROCEDURE — 25800030 ZZH RX IP 258 OP 636: Performed by: STUDENT IN AN ORGANIZED HEALTH CARE EDUCATION/TRAINING PROGRAM

## 2019-10-26 PROCEDURE — 36569 INSJ PICC 5 YR+ W/O IMAGING: CPT

## 2019-10-26 PROCEDURE — 84100 ASSAY OF PHOSPHORUS: CPT | Performed by: STUDENT IN AN ORGANIZED HEALTH CARE EDUCATION/TRAINING PROGRAM

## 2019-10-26 PROCEDURE — 12000001 ZZH R&B MED SURG/OB UMMC

## 2019-10-26 PROCEDURE — 00000146 ZZHCL STATISTIC GLUCOSE BY METER IP

## 2019-10-26 PROCEDURE — 85610 PROTHROMBIN TIME: CPT | Performed by: STUDENT IN AN ORGANIZED HEALTH CARE EDUCATION/TRAINING PROGRAM

## 2019-10-26 PROCEDURE — 83735 ASSAY OF MAGNESIUM: CPT | Performed by: STUDENT IN AN ORGANIZED HEALTH CARE EDUCATION/TRAINING PROGRAM

## 2019-10-26 PROCEDURE — 27211414 ZZ H ADHESIVE SKIN CLOSURE, DERMABOND

## 2019-10-26 PROCEDURE — 27211389 ZZ H KIT, 5 FR DL BIOFLO OPEN ENDED PICC

## 2019-10-26 PROCEDURE — 40000141 ZZH STATISTIC PERIPHERAL IV START W/O US GUIDANCE

## 2019-10-26 PROCEDURE — 25000128 H RX IP 250 OP 636: Performed by: STUDENT IN AN ORGANIZED HEALTH CARE EDUCATION/TRAINING PROGRAM

## 2019-10-26 PROCEDURE — 27210577 ZZ H INTRODUCER MICRO SET

## 2019-10-26 PROCEDURE — 40000986 XR CHEST PORT 1 VW

## 2019-10-26 PROCEDURE — 27211383 ZZ H DEVICE 5FR SECURACATH

## 2019-10-26 PROCEDURE — 82248 BILIRUBIN DIRECT: CPT | Performed by: STUDENT IN AN ORGANIZED HEALTH CARE EDUCATION/TRAINING PROGRAM

## 2019-10-26 PROCEDURE — 80053 COMPREHEN METABOLIC PANEL: CPT | Performed by: STUDENT IN AN ORGANIZED HEALTH CARE EDUCATION/TRAINING PROGRAM

## 2019-10-26 RX ORDER — LIDOCAINE 40 MG/G
CREAM TOPICAL
Status: DISCONTINUED | OUTPATIENT
Start: 2019-10-26 | End: 2019-10-26

## 2019-10-26 RX ORDER — HEPARIN SODIUM,PORCINE 10 UNIT/ML
2-5 VIAL (ML) INTRAVENOUS
Status: COMPLETED | OUTPATIENT
Start: 2019-10-26 | End: 2019-11-12

## 2019-10-26 RX ADMIN — HYDROMORPHONE HYDROCHLORIDE 1 MG: 1 INJECTION, SOLUTION INTRAMUSCULAR; INTRAVENOUS; SUBCUTANEOUS at 05:30

## 2019-10-26 RX ADMIN — TOPICAL ANESTHETIC 0.5 ML: 200 SPRAY DENTAL; PERIODONTAL at 21:08

## 2019-10-26 RX ADMIN — ASCORBIC ACID, VITAMIN A PALMITATE, CHOLECALCIFEROL, THIAMINE HYDROCHLORIDE, RIBOFLAVIN-5 PHOSPHATE SODIUM, PYRIDOXINE HYDROCHLORIDE, NIACINAMIDE, DEXPANTHENOL, ALPHA-TOCOPHEROL ACETATE, VITAMIN K1, FOLIC ACID, BIOTIN, CYANOCOBALAMIN: 200; 3300; 200; 6; 3.6; 6; 40; 15; 10; 150; 600; 60; 5 INJECTION, SOLUTION INTRAVENOUS at 22:56

## 2019-10-26 RX ADMIN — HYDROMORPHONE HYDROCHLORIDE 1 MG: 1 INJECTION, SOLUTION INTRAMUSCULAR; INTRAVENOUS; SUBCUTANEOUS at 14:04

## 2019-10-26 RX ADMIN — HYDROMORPHONE HYDROCHLORIDE 1 MG: 1 INJECTION, SOLUTION INTRAMUSCULAR; INTRAVENOUS; SUBCUTANEOUS at 03:22

## 2019-10-26 RX ADMIN — POTASSIUM CHLORIDE, DEXTROSE MONOHYDRATE AND SODIUM CHLORIDE: 150; 5; 450 INJECTION, SOLUTION INTRAVENOUS at 14:10

## 2019-10-26 RX ADMIN — HYDROMORPHONE HYDROCHLORIDE 1 MG: 1 INJECTION, SOLUTION INTRAMUSCULAR; INTRAVENOUS; SUBCUTANEOUS at 20:50

## 2019-10-26 RX ADMIN — HYDROMORPHONE HYDROCHLORIDE 1 MG: 1 INJECTION, SOLUTION INTRAMUSCULAR; INTRAVENOUS; SUBCUTANEOUS at 11:48

## 2019-10-26 RX ADMIN — HYDROMORPHONE HYDROCHLORIDE 1 MG: 1 INJECTION, SOLUTION INTRAMUSCULAR; INTRAVENOUS; SUBCUTANEOUS at 22:55

## 2019-10-26 RX ADMIN — HYDROMORPHONE HYDROCHLORIDE 1 MG: 1 INJECTION, SOLUTION INTRAMUSCULAR; INTRAVENOUS; SUBCUTANEOUS at 18:29

## 2019-10-26 RX ADMIN — HYDROMORPHONE HYDROCHLORIDE 1 MG: 1 INJECTION, SOLUTION INTRAMUSCULAR; INTRAVENOUS; SUBCUTANEOUS at 16:07

## 2019-10-26 RX ADMIN — HYDROMORPHONE HYDROCHLORIDE 1 MG: 1 INJECTION, SOLUTION INTRAMUSCULAR; INTRAVENOUS; SUBCUTANEOUS at 07:59

## 2019-10-26 RX ADMIN — TOPICAL ANESTHETIC 1 ML: 200 SPRAY DENTAL; PERIODONTAL at 03:31

## 2019-10-26 RX ADMIN — I.V. FAT EMULSION 250 ML: 20 EMULSION INTRAVENOUS at 20:48

## 2019-10-26 RX ADMIN — LIDOCAINE HYDROCHLORIDE 5 ML: 10 INJECTION, SOLUTION EPIDURAL; INFILTRATION; INTRACAUDAL; PERINEURAL at 14:54

## 2019-10-26 RX ADMIN — HYDROMORPHONE HYDROCHLORIDE 1 MG: 1 INJECTION, SOLUTION INTRAMUSCULAR; INTRAVENOUS; SUBCUTANEOUS at 09:58

## 2019-10-26 RX ADMIN — POTASSIUM CHLORIDE, DEXTROSE MONOHYDRATE AND SODIUM CHLORIDE: 150; 5; 450 INJECTION, SOLUTION INTRAVENOUS at 21:10

## 2019-10-26 RX ADMIN — TOPICAL ANESTHETIC 1 ML: 200 SPRAY DENTAL; PERIODONTAL at 11:43

## 2019-10-26 RX ADMIN — POTASSIUM CHLORIDE, DEXTROSE MONOHYDRATE AND SODIUM CHLORIDE: 150; 5; 450 INJECTION, SOLUTION INTRAVENOUS at 04:46

## 2019-10-26 RX ADMIN — TOPICAL ANESTHETIC 1 ML: 200 SPRAY DENTAL; PERIODONTAL at 16:07

## 2019-10-26 ASSESSMENT — ACTIVITIES OF DAILY LIVING (ADL)
ADLS_ACUITY_SCORE: 12

## 2019-10-26 ASSESSMENT — PAIN DESCRIPTION - DESCRIPTORS
DESCRIPTORS: CRAMPING

## 2019-10-26 NOTE — PLAN OF CARE
A&OX4. VSS on RA, except hypotensive. Pain controlled with IV Dilaudid every 2 hrs and hurricane spray q 3hrs. NGT to LIS with thick yellow output. No emesis noted during shift. NPO passing gas, mo BM during shift. Abdomen is distended. Voiding spontaneously. Independent in room has been ambulated in the young. TPN and lipid running continuously. PIV infusing MIVF. Continue with plan of care.

## 2019-10-26 NOTE — PLAN OF CARE
Hypotensive but within notifying parameters otherwise AVSS. Pt reports abdominal pain, given 1 mg of IV Dilaudid q 2 hours with relief. Also reports throat discomfort, given hurricane spray with relief. Reports intermittent nausea, NG to LIS with a large amount of thick yellow output. Passing flatus and had a large BM this morning. Voiding spontaneously but not saving at times. PICC placed for TPN. Clinimix was infusing via PIV. IVMF running at 150/hr. Up ab dania. Plan for bowel surgery later this week, wanting to increase nutritional status with TPN first. Continue to monitor pain, GI/ function,nutrition and fluid status.

## 2019-10-26 NOTE — PLAN OF CARE
A x O, VSS on RA. Pain controlled w/ IV Dilaudid Q2H. Throat pain managed w/ hurricane spray. NG to LIS suction w/ yellow output. +Flatus/+BM. Voiding spontaneously. TPN and lipids running continuously. Up ad dania. Continue with POC.

## 2019-10-26 NOTE — PROGRESS NOTES
GENERAL SURGERY PROGRESS NOTE  [id number]  Rachel A Gerhardt    S: Continues to have high NG output. Continues to have intermittent waves of pain.     O:  /78 (BP Location: Right arm)   Pulse 74   Temp 98.2  F (36.8  C) (Oral)   Resp 16   Wt 51.3 kg (113 lb 1.6 oz)   SpO2 94%   BMI 16.70 kg/m      GEN: NAD  HEENT: anicteric  Car: RRR  Pulm: normal work of breathing  Abd: soft, continued distension though improved from yesterday, tender globally  Ext: WWP    A/P: Ms. Oropeza is a  44 yo F with chronic SBO here with SBO 2/2 stricture w/ fecal overflow. Surgical options are most likely going to be the most beneficial and team currently working on optimal timing, possibly tomorrow 10/27    - continue bowel regimen / enemas  - Will try to schedule surgery for this weekend/early next week  - Recommend replacing NG output with IV fluids      Seen with chief resident, Dr. Patino, who will discuss with staff.    Luiz Leon MD  [facility], PGY2

## 2019-10-27 LAB
ALBUMIN SERPL-MCNC: 2.6 G/DL (ref 3.4–5)
ALP SERPL-CCNC: 78 U/L (ref 40–150)
ALT SERPL W P-5'-P-CCNC: 19 U/L (ref 0–50)
ANION GAP SERPL CALCULATED.3IONS-SCNC: 4 MMOL/L (ref 3–14)
AST SERPL W P-5'-P-CCNC: 12 U/L (ref 0–45)
BILIRUB SERPL-MCNC: 0.3 MG/DL (ref 0.2–1.3)
BUN SERPL-MCNC: 26 MG/DL (ref 7–30)
CALCIUM SERPL-MCNC: 8 MG/DL (ref 8.5–10.1)
CHLORIDE SERPL-SCNC: 101 MMOL/L (ref 94–109)
CO2 SERPL-SCNC: 29 MMOL/L (ref 20–32)
CREAT SERPL-MCNC: 0.64 MG/DL (ref 0.52–1.04)
GFR SERPL CREATININE-BSD FRML MDRD: >90 ML/MIN/{1.73_M2}
GLUCOSE SERPL-MCNC: 100 MG/DL (ref 70–99)
MAGNESIUM SERPL-MCNC: 1.9 MG/DL (ref 1.6–2.3)
PHOSPHATE SERPL-MCNC: 3.5 MG/DL (ref 2.5–4.5)
POTASSIUM SERPL-SCNC: 4.3 MMOL/L (ref 3.4–5.3)
PROT SERPL-MCNC: 5.5 G/DL (ref 6.8–8.8)
SODIUM SERPL-SCNC: 134 MMOL/L (ref 133–144)

## 2019-10-27 PROCEDURE — 25800030 ZZH RX IP 258 OP 636: Performed by: STUDENT IN AN ORGANIZED HEALTH CARE EDUCATION/TRAINING PROGRAM

## 2019-10-27 PROCEDURE — 25000132 ZZH RX MED GY IP 250 OP 250 PS 637: Performed by: STUDENT IN AN ORGANIZED HEALTH CARE EDUCATION/TRAINING PROGRAM

## 2019-10-27 PROCEDURE — 25000125 ZZHC RX 250: Performed by: STUDENT IN AN ORGANIZED HEALTH CARE EDUCATION/TRAINING PROGRAM

## 2019-10-27 PROCEDURE — 36592 COLLECT BLOOD FROM PICC: CPT | Performed by: STUDENT IN AN ORGANIZED HEALTH CARE EDUCATION/TRAINING PROGRAM

## 2019-10-27 PROCEDURE — 84100 ASSAY OF PHOSPHORUS: CPT | Performed by: STUDENT IN AN ORGANIZED HEALTH CARE EDUCATION/TRAINING PROGRAM

## 2019-10-27 PROCEDURE — 80053 COMPREHEN METABOLIC PANEL: CPT | Performed by: STUDENT IN AN ORGANIZED HEALTH CARE EDUCATION/TRAINING PROGRAM

## 2019-10-27 PROCEDURE — 25000125 ZZHC RX 250: Performed by: FAMILY MEDICINE

## 2019-10-27 PROCEDURE — 40000802 ZZH SITE CHECK

## 2019-10-27 PROCEDURE — 83735 ASSAY OF MAGNESIUM: CPT | Performed by: STUDENT IN AN ORGANIZED HEALTH CARE EDUCATION/TRAINING PROGRAM

## 2019-10-27 PROCEDURE — 25000128 H RX IP 250 OP 636: Performed by: STUDENT IN AN ORGANIZED HEALTH CARE EDUCATION/TRAINING PROGRAM

## 2019-10-27 PROCEDURE — 12000001 ZZH R&B MED SURG/OB UMMC

## 2019-10-27 RX ADMIN — POTASSIUM CHLORIDE, DEXTROSE MONOHYDRATE AND SODIUM CHLORIDE: 150; 5; 450 INJECTION, SOLUTION INTRAVENOUS at 17:40

## 2019-10-27 RX ADMIN — ONDANSETRON HYDROCHLORIDE 4 MG: 2 INJECTION, SOLUTION INTRAMUSCULAR; INTRAVENOUS at 08:16

## 2019-10-27 RX ADMIN — HYDROMORPHONE HYDROCHLORIDE 1 MG: 1 INJECTION, SOLUTION INTRAMUSCULAR; INTRAVENOUS; SUBCUTANEOUS at 19:54

## 2019-10-27 RX ADMIN — POTASSIUM CHLORIDE, DEXTROSE MONOHYDRATE AND SODIUM CHLORIDE: 150; 5; 450 INJECTION, SOLUTION INTRAVENOUS at 04:07

## 2019-10-27 RX ADMIN — HYDROMORPHONE HYDROCHLORIDE 1 MG: 1 INJECTION, SOLUTION INTRAMUSCULAR; INTRAVENOUS; SUBCUTANEOUS at 08:21

## 2019-10-27 RX ADMIN — HYDROMORPHONE HYDROCHLORIDE 1 MG: 1 INJECTION, SOLUTION INTRAMUSCULAR; INTRAVENOUS; SUBCUTANEOUS at 11:17

## 2019-10-27 RX ADMIN — HYDROMORPHONE HYDROCHLORIDE 1 MG: 1 INJECTION, SOLUTION INTRAMUSCULAR; INTRAVENOUS; SUBCUTANEOUS at 21:57

## 2019-10-27 RX ADMIN — HYDROMORPHONE HYDROCHLORIDE 1 MG: 1 INJECTION, SOLUTION INTRAMUSCULAR; INTRAVENOUS; SUBCUTANEOUS at 15:36

## 2019-10-27 RX ADMIN — TOPICAL ANESTHETIC 1 ML: 200 SPRAY DENTAL; PERIODONTAL at 19:12

## 2019-10-27 RX ADMIN — DOCUSATE SODIUM 100 MG: 50 LIQUID ORAL at 19:12

## 2019-10-27 RX ADMIN — ASCORBIC ACID, VITAMIN A PALMITATE, CHOLECALCIFEROL, THIAMINE HYDROCHLORIDE, RIBOFLAVIN-5 PHOSPHATE SODIUM, PYRIDOXINE HYDROCHLORIDE, NIACINAMIDE, DEXPANTHENOL, ALPHA-TOCOPHEROL ACETATE, VITAMIN K1, FOLIC ACID, BIOTIN, CYANOCOBALAMIN: 200; 3300; 200; 6; 3.6; 6; 40; 15; 10; 150; 600; 60; 5 INJECTION, SOLUTION INTRAVENOUS at 17:40

## 2019-10-27 RX ADMIN — TOPICAL ANESTHETIC 0.5 ML: 200 SPRAY DENTAL; PERIODONTAL at 15:47

## 2019-10-27 RX ADMIN — TOPICAL ANESTHETIC 0.5 ML: 200 SPRAY DENTAL; PERIODONTAL at 14:54

## 2019-10-27 RX ADMIN — PHENOL 1 ML: 1.5 LIQUID ORAL at 16:46

## 2019-10-27 RX ADMIN — HYDROMORPHONE HYDROCHLORIDE 1 MG: 1 INJECTION, SOLUTION INTRAMUSCULAR; INTRAVENOUS; SUBCUTANEOUS at 01:11

## 2019-10-27 RX ADMIN — POTASSIUM CHLORIDE, DEXTROSE MONOHYDRATE AND SODIUM CHLORIDE: 150; 5; 450 INJECTION, SOLUTION INTRAVENOUS at 10:36

## 2019-10-27 RX ADMIN — HYDROMORPHONE HYDROCHLORIDE 1 MG: 1 INJECTION, SOLUTION INTRAMUSCULAR; INTRAVENOUS; SUBCUTANEOUS at 13:14

## 2019-10-27 RX ADMIN — TOPICAL ANESTHETIC 1 ML: 200 SPRAY DENTAL; PERIODONTAL at 06:15

## 2019-10-27 RX ADMIN — HYDROMORPHONE HYDROCHLORIDE 1 MG: 1 INJECTION, SOLUTION INTRAMUSCULAR; INTRAVENOUS; SUBCUTANEOUS at 04:07

## 2019-10-27 RX ADMIN — HYDROMORPHONE HYDROCHLORIDE 1 MG: 1 INJECTION, SOLUTION INTRAMUSCULAR; INTRAVENOUS; SUBCUTANEOUS at 06:03

## 2019-10-27 RX ADMIN — HYDROMORPHONE HYDROCHLORIDE 1 MG: 1 INJECTION, SOLUTION INTRAMUSCULAR; INTRAVENOUS; SUBCUTANEOUS at 17:40

## 2019-10-27 ASSESSMENT — PAIN DESCRIPTION - DESCRIPTORS
DESCRIPTORS: CRAMPING

## 2019-10-27 ASSESSMENT — ACTIVITIES OF DAILY LIVING (ADL)
ADLS_ACUITY_SCORE: 12

## 2019-10-27 NOTE — PROGRESS NOTES
Surgery note    Doing okay, a bit more distention this morning, thinks its because NG tube was clogged. No vomiting, mild nausea only.     /74 (BP Location: Right arm)   Pulse 74   Temp 97.2  F (36.2  C) (Oral)   Resp 16   Wt 46.9 kg (103 lb 6.4 oz)   SpO2 97%   BMI 15.27 kg/m    Resting in bed, non toxic  Abdomen soft, diffuse tenderness without peritonitis  Breathing comfortably, no wheezing  NG tube in place, gastric contents in tubing  Appropriately oriented, cooperative    Labs reviewed: unremarkable    A/P: 43 year old woman with history of pelvic irradiation and prior SBR for obstruction who has been dealing with chronic partial small bowel obstruction for years, now with worsening symptoms. Will likely benefit from surgery, either bypass or resection of disease bowel, but severe malnutrition must be addressed first. Will continue TPN and NG tube decompression for now, plan for surgery later this week.    Seen and discussed with staff Dr Cyr,    Jerome Patino MD  Surgery [phone]

## 2019-10-27 NOTE — PLAN OF CARE
HD10 admitted with SBO 2/2 to stricture with fecal overflow. A&Ox4, VSS on room air, pain is controlled with IV dilaudid. NG to LIS with high output. > 1100 this shift. Clinimix still running, will change to TPN for PICC use. IVM running at 150. BS+ passing gas, BMs x2 today. Voiding sometimes saving. Plan is for surgery for stricture on Thurs (10/31).

## 2019-10-27 NOTE — PLAN OF CARE
Pt continues to c/o abdominal pain and requests IV dilaudid q 2 hrs. Throat spray for sore throat. NG output still high, but less over night than pm shift. 500cc output overnight. Output continues to be yellow. PICC CDI. Clinimix infusing at 65cc/hr. IVF at 150cc/hr- order clarified last pm. Ambulates to BR to void. No BM overnight. Will continue to monitor pt.

## 2019-10-27 NOTE — PROGRESS NOTES
CLINICAL NUTRITION SERVICES - BRIEF NOTE     Nutrition Prescription    RECOMMENDATIONS FOR MDs/PROVIDERS TO ORDER:  Given need for decrease in overall volume with start of central TPN formula over the next few days until goal dextrose met (likely 3-4 days), please re-start a Total IV fluid order (for PN + Maintenance IV to equal a total fluid goal).    Recommendations already ordered by Registered Dietitian (RD):  Start Clinimix central formula @ 30 ml/hr -> will provide 108 grams dextrose (GIR 1.5mg/kg/min)  -high refeeding risk, current PPN GIR is 1.1 mg/kg/min    Pending tolerance to initial TPN formula -> advance by 10-15 ml/hr q 1 day pending K+/Mg++ >/= nrml and phos >1.9 and BGs stable (per Pharm D/RD discretion) to goal rate of 65 ml/hr.      Goal: Clinimix @ 65 mL/hr (1560 mL/day) + 250 mL of 20% IV lipids 6 days/week to provide 1537 kcals (31 kcal/kg), 78 g protein (1.6 g/kg), 234 g CHO (GIR 3.3), and 28% of kcals from fat on average daily.       EVALUATION OF THE PROGRESS TOWARD GOALS   Diet: NPO  Nutrition Support: PPN @ 65 ml/hr -> providing 78 grams dextrose (GIR 1.1 mg/kg/min), 886 kcal (18 kcal/kg)      NEW FINDINGS   Chronic SBO, possible surgery soon.  PICC in place, plan to continue PN.    Discussed plan with PharmD.      Monitoring/Evaluation  Progress toward goals will be monitored and evaluated per protocol.    Hawa Wayne, MS, RD, LD  Weekend pager [phone]  [address] weekday pager: [phone]

## 2019-10-28 LAB
ALBUMIN SERPL-MCNC: 2.6 G/DL (ref 3.4–5)
ALP SERPL-CCNC: 77 U/L (ref 40–150)
ALT SERPL W P-5'-P-CCNC: 15 U/L (ref 0–50)
ANION GAP SERPL CALCULATED.3IONS-SCNC: 3 MMOL/L (ref 3–14)
AST SERPL W P-5'-P-CCNC: 12 U/L (ref 0–45)
BILIRUB DIRECT SERPL-MCNC: 0.1 MG/DL (ref 0–0.2)
BILIRUB SERPL-MCNC: 0.4 MG/DL (ref 0.2–1.3)
BUN SERPL-MCNC: 23 MG/DL (ref 7–30)
CALCIUM SERPL-MCNC: 8.2 MG/DL (ref 8.5–10.1)
CHLORIDE SERPL-SCNC: 102 MMOL/L (ref 94–109)
CO2 SERPL-SCNC: 31 MMOL/L (ref 20–32)
CREAT SERPL-MCNC: 0.6 MG/DL (ref 0.52–1.04)
GFR SERPL CREATININE-BSD FRML MDRD: >90 ML/MIN/{1.73_M2}
GLUCOSE SERPL-MCNC: 92 MG/DL (ref 70–99)
INR PPP: 1.23 (ref 0.86–1.14)
MAGNESIUM SERPL-MCNC: 1.7 MG/DL (ref 1.6–2.3)
PHOSPHATE SERPL-MCNC: 3.2 MG/DL (ref 2.5–4.5)
POTASSIUM SERPL-SCNC: 4.2 MMOL/L (ref 3.4–5.3)
PREALB SERPL IA-MCNC: 7 MG/DL (ref 15–45)
PREALB SERPL IA-MCNC: 9 MG/DL (ref 15–45)
PROT SERPL-MCNC: 5.3 G/DL (ref 6.8–8.8)
SODIUM SERPL-SCNC: 136 MMOL/L (ref 133–144)

## 2019-10-28 PROCEDURE — 25000125 ZZHC RX 250: Performed by: STUDENT IN AN ORGANIZED HEALTH CARE EDUCATION/TRAINING PROGRAM

## 2019-10-28 PROCEDURE — 85610 PROTHROMBIN TIME: CPT | Performed by: STUDENT IN AN ORGANIZED HEALTH CARE EDUCATION/TRAINING PROGRAM

## 2019-10-28 PROCEDURE — 84100 ASSAY OF PHOSPHORUS: CPT | Performed by: STUDENT IN AN ORGANIZED HEALTH CARE EDUCATION/TRAINING PROGRAM

## 2019-10-28 PROCEDURE — 12000001 ZZH R&B MED SURG/OB UMMC

## 2019-10-28 PROCEDURE — 83735 ASSAY OF MAGNESIUM: CPT | Performed by: STUDENT IN AN ORGANIZED HEALTH CARE EDUCATION/TRAINING PROGRAM

## 2019-10-28 PROCEDURE — 82248 BILIRUBIN DIRECT: CPT | Performed by: STUDENT IN AN ORGANIZED HEALTH CARE EDUCATION/TRAINING PROGRAM

## 2019-10-28 PROCEDURE — 84134 ASSAY OF PREALBUMIN: CPT | Performed by: STUDENT IN AN ORGANIZED HEALTH CARE EDUCATION/TRAINING PROGRAM

## 2019-10-28 PROCEDURE — 36592 COLLECT BLOOD FROM PICC: CPT | Performed by: STUDENT IN AN ORGANIZED HEALTH CARE EDUCATION/TRAINING PROGRAM

## 2019-10-28 PROCEDURE — 25000128 H RX IP 250 OP 636: Performed by: STUDENT IN AN ORGANIZED HEALTH CARE EDUCATION/TRAINING PROGRAM

## 2019-10-28 PROCEDURE — 25000125 ZZHC RX 250: Performed by: FAMILY MEDICINE

## 2019-10-28 PROCEDURE — 25800030 ZZH RX IP 258 OP 636: Performed by: STUDENT IN AN ORGANIZED HEALTH CARE EDUCATION/TRAINING PROGRAM

## 2019-10-28 PROCEDURE — 80053 COMPREHEN METABOLIC PANEL: CPT | Performed by: STUDENT IN AN ORGANIZED HEALTH CARE EDUCATION/TRAINING PROGRAM

## 2019-10-28 RX ORDER — SODIUM CHLORIDE AND POTASSIUM CHLORIDE 150; 450 MG/100ML; MG/100ML
INJECTION, SOLUTION INTRAVENOUS CONTINUOUS
Status: DISCONTINUED | OUTPATIENT
Start: 2019-10-28 | End: 2019-10-31

## 2019-10-28 RX ADMIN — TOPICAL ANESTHETIC 1 ML: 200 SPRAY DENTAL; PERIODONTAL at 22:02

## 2019-10-28 RX ADMIN — HYDROMORPHONE HYDROCHLORIDE 1 MG: 1 INJECTION, SOLUTION INTRAMUSCULAR; INTRAVENOUS; SUBCUTANEOUS at 17:57

## 2019-10-28 RX ADMIN — TOPICAL ANESTHETIC 0.5 ML: 200 SPRAY DENTAL; PERIODONTAL at 13:52

## 2019-10-28 RX ADMIN — HYDROMORPHONE HYDROCHLORIDE 1 MG: 1 INJECTION, SOLUTION INTRAMUSCULAR; INTRAVENOUS; SUBCUTANEOUS at 06:43

## 2019-10-28 RX ADMIN — HYDROMORPHONE HYDROCHLORIDE 1 MG: 1 INJECTION, SOLUTION INTRAMUSCULAR; INTRAVENOUS; SUBCUTANEOUS at 22:02

## 2019-10-28 RX ADMIN — TOPICAL ANESTHETIC 0.5 ML: 200 SPRAY DENTAL; PERIODONTAL at 11:28

## 2019-10-28 RX ADMIN — TOPICAL ANESTHETIC 1 ML: 200 SPRAY DENTAL; PERIODONTAL at 16:44

## 2019-10-28 RX ADMIN — I.V. FAT EMULSION 250 ML: 20 EMULSION INTRAVENOUS at 20:11

## 2019-10-28 RX ADMIN — HYDROMORPHONE HYDROCHLORIDE 1 MG: 1 INJECTION, SOLUTION INTRAMUSCULAR; INTRAVENOUS; SUBCUTANEOUS at 09:21

## 2019-10-28 RX ADMIN — POTASSIUM CHLORIDE, DEXTROSE MONOHYDRATE AND SODIUM CHLORIDE: 150; 5; 450 INJECTION, SOLUTION INTRAVENOUS at 00:31

## 2019-10-28 RX ADMIN — HYDROMORPHONE HYDROCHLORIDE 1 MG: 1 INJECTION, SOLUTION INTRAMUSCULAR; INTRAVENOUS; SUBCUTANEOUS at 04:41

## 2019-10-28 RX ADMIN — HYDROMORPHONE HYDROCHLORIDE 1 MG: 1 INJECTION, SOLUTION INTRAMUSCULAR; INTRAVENOUS; SUBCUTANEOUS at 13:48

## 2019-10-28 RX ADMIN — SODIUM CHLORIDE, POTASSIUM CHLORIDE, SODIUM LACTATE AND CALCIUM CHLORIDE 1000 ML: 600; 310; 30; 20 INJECTION, SOLUTION INTRAVENOUS at 06:10

## 2019-10-28 RX ADMIN — POTASSIUM CHLORIDE, DEXTROSE MONOHYDRATE AND SODIUM CHLORIDE: 150; 5; 450 INJECTION, SOLUTION INTRAVENOUS at 11:04

## 2019-10-28 RX ADMIN — ASCORBIC ACID, VITAMIN A PALMITATE, CHOLECALCIFEROL, THIAMINE HYDROCHLORIDE, RIBOFLAVIN-5 PHOSPHATE SODIUM, PYRIDOXINE HYDROCHLORIDE, NIACINAMIDE, DEXPANTHENOL, ALPHA-TOCOPHEROL ACETATE, VITAMIN K1, FOLIC ACID, BIOTIN, CYANOCOBALAMIN: 200; 3300; 200; 6; 3.6; 6; 40; 15; 10; 150; 600; 60; 5 INJECTION, SOLUTION INTRAVENOUS at 15:51

## 2019-10-28 RX ADMIN — HYDROMORPHONE HYDROCHLORIDE 1 MG: 1 INJECTION, SOLUTION INTRAMUSCULAR; INTRAVENOUS; SUBCUTANEOUS at 20:02

## 2019-10-28 RX ADMIN — TOPICAL ANESTHETIC 1 ML: 200 SPRAY DENTAL; PERIODONTAL at 06:08

## 2019-10-28 RX ADMIN — HYDROMORPHONE HYDROCHLORIDE 1 MG: 1 INJECTION, SOLUTION INTRAMUSCULAR; INTRAVENOUS; SUBCUTANEOUS at 02:21

## 2019-10-28 RX ADMIN — HYDROMORPHONE HYDROCHLORIDE 1 MG: 1 INJECTION, SOLUTION INTRAMUSCULAR; INTRAVENOUS; SUBCUTANEOUS at 00:08

## 2019-10-28 RX ADMIN — HYDROMORPHONE HYDROCHLORIDE 1 MG: 1 INJECTION, SOLUTION INTRAMUSCULAR; INTRAVENOUS; SUBCUTANEOUS at 11:29

## 2019-10-28 RX ADMIN — HYDROMORPHONE HYDROCHLORIDE 1 MG: 1 INJECTION, SOLUTION INTRAMUSCULAR; INTRAVENOUS; SUBCUTANEOUS at 15:47

## 2019-10-28 RX ADMIN — POTASSIUM CHLORIDE AND SODIUM CHLORIDE 50 ML/HR: 450; 150 INJECTION, SOLUTION INTRAVENOUS at 15:59

## 2019-10-28 ASSESSMENT — PAIN DESCRIPTION - DESCRIPTORS
DESCRIPTORS: SHARP;CRAMPING
DESCRIPTORS: CRAMPING
DESCRIPTORS: SHARP;CRAMPING
DESCRIPTORS: CRAMPING

## 2019-10-28 ASSESSMENT — ACTIVITIES OF DAILY LIVING (ADL)
ADLS_ACUITY_SCORE: 12

## 2019-10-28 NOTE — PROVIDER NOTIFICATION
Notified Team of very high NG output. 800ml this shift of which 250 was in the last 45 min. Discussed with pt the importance of not eating or drinking anything. Pt states she has not eaten or drank anything this shift.

## 2019-10-28 NOTE — PLAN OF CARE
HD11 admitted with SBO 2/2 to stricture w/fecal overflow. A&Ox4, VSS on room air, pain is controlled with IV dilaudid. LSC, BS hyperactive, NPO, NG at LIS with high output. See Provider notification. Per shift total NG output is comparable with previous output. TPN infusing in PICC, IVM running at 150/hr. Passing gas, LBM 12/27. Plan is to provide nutrition for possible surgery on Thursday.

## 2019-10-28 NOTE — PLAN OF CARE
/54 (BP Location: Left arm)   Pulse 74   Temp 98.5  F (36.9  C) (Oral)   Resp 16   Wt 46.9 kg (103 lb 6.4 oz)   SpO2 97%   BMI 15.27 kg/m      VSS on RA. Pain somewhat controlled with PRN dilaudid. Pt refusing liq Tylenol due to volume in stomach being uncomfortable. UAL. Voids spont, sometimes saving. NPO. No N/V. NG with high output. +gas and small amounts of stool. Plan to surgically repair SBO Thurs 10/31. PIV SL. PICC infusing TPN and MIVF. Pt resting between cares. Continue POC.

## 2019-10-28 NOTE — PROGRESS NOTES
Surgery progress note    S: No acute events. Minimal changes in bloating/pain/nausea. Concern from nursing staff that she is eating around her NG.    BP 98/66 (BP Location: Left arm)   Pulse 74   Temp 97.9  F (36.6  C) (Oral)   Resp 16   Wt 46.9 kg (103 lb 6.4 oz)   SpO2 95%   BMI 15.27 kg/m    Resting in bed, non toxic  Abdomen soft, diffuse tenderness without peritonitis  Breathing comfortably, no wheezing  NG tube in place, gastric contents in tubing  Appropriately oriented, cooperative    Labs reviewed: unremarkable    A/P: 43 year old woman with history of pelvic irradiation and prior SBR for obstruction who has been dealing with chronic partial small bowel obstruction for years, now with worsening symptoms. Will likely benefit from surgery, either bypass or resection of disease bowel, but severe malnutrition must be addressed first. Will continue TPN and NG tube decompression for now, plan for surgery later this week.  - May be eating around her NG, hence high NG output    Seen and discussed with chief resident and staff    Luiz Cortez MD, PhD, PGY-1  General Surgery

## 2019-10-28 NOTE — PLAN OF CARE
A&O. VSS. Pain controlled with prn dilaudid. PICC infusing MIVF and TPN. PIV x 1 saline locked. NG to LIS with high output. Spontaneously voiding, writing on board the amount. Passing gas ,no BM reported. Independent. NPO. Continue with plan of care.

## 2019-10-29 LAB
ANION GAP SERPL CALCULATED.3IONS-SCNC: 6 MMOL/L (ref 3–14)
BUN SERPL-MCNC: 29 MG/DL (ref 7–30)
CALCIUM SERPL-MCNC: 8.1 MG/DL (ref 8.5–10.1)
CHLORIDE SERPL-SCNC: 101 MMOL/L (ref 94–109)
CO2 SERPL-SCNC: 30 MMOL/L (ref 20–32)
CREAT SERPL-MCNC: 0.6 MG/DL (ref 0.52–1.04)
GFR SERPL CREATININE-BSD FRML MDRD: >90 ML/MIN/{1.73_M2}
GLUCOSE BLDC GLUCOMTR-MCNC: 96 MG/DL (ref 70–99)
GLUCOSE BLDC GLUCOMTR-MCNC: 97 MG/DL (ref 70–99)
GLUCOSE SERPL-MCNC: 89 MG/DL (ref 70–99)
MAGNESIUM SERPL-MCNC: 1.9 MG/DL (ref 1.6–2.3)
PHOSPHATE SERPL-MCNC: 3.5 MG/DL (ref 2.5–4.5)
POTASSIUM SERPL-SCNC: 4 MMOL/L (ref 3.4–5.3)
SODIUM SERPL-SCNC: 137 MMOL/L (ref 133–144)

## 2019-10-29 PROCEDURE — 84100 ASSAY OF PHOSPHORUS: CPT | Performed by: STUDENT IN AN ORGANIZED HEALTH CARE EDUCATION/TRAINING PROGRAM

## 2019-10-29 PROCEDURE — 80048 BASIC METABOLIC PNL TOTAL CA: CPT | Performed by: STUDENT IN AN ORGANIZED HEALTH CARE EDUCATION/TRAINING PROGRAM

## 2019-10-29 PROCEDURE — 00000146 ZZHCL STATISTIC GLUCOSE BY METER IP

## 2019-10-29 PROCEDURE — 25000128 H RX IP 250 OP 636: Performed by: STUDENT IN AN ORGANIZED HEALTH CARE EDUCATION/TRAINING PROGRAM

## 2019-10-29 PROCEDURE — 25000125 ZZHC RX 250: Performed by: STUDENT IN AN ORGANIZED HEALTH CARE EDUCATION/TRAINING PROGRAM

## 2019-10-29 PROCEDURE — 83735 ASSAY OF MAGNESIUM: CPT | Performed by: STUDENT IN AN ORGANIZED HEALTH CARE EDUCATION/TRAINING PROGRAM

## 2019-10-29 PROCEDURE — 25000125 ZZHC RX 250: Performed by: FAMILY MEDICINE

## 2019-10-29 PROCEDURE — 12000001 ZZH R&B MED SURG/OB UMMC

## 2019-10-29 PROCEDURE — 36592 COLLECT BLOOD FROM PICC: CPT | Performed by: STUDENT IN AN ORGANIZED HEALTH CARE EDUCATION/TRAINING PROGRAM

## 2019-10-29 RX ADMIN — HYDROMORPHONE HYDROCHLORIDE 1 MG: 1 INJECTION, SOLUTION INTRAMUSCULAR; INTRAVENOUS; SUBCUTANEOUS at 18:14

## 2019-10-29 RX ADMIN — TOPICAL ANESTHETIC 0.5 ML: 200 SPRAY DENTAL; PERIODONTAL at 07:49

## 2019-10-29 RX ADMIN — TOPICAL ANESTHETIC 0.5 ML: 200 SPRAY DENTAL; PERIODONTAL at 14:09

## 2019-10-29 RX ADMIN — HYDROMORPHONE HYDROCHLORIDE 1 MG: 1 INJECTION, SOLUTION INTRAMUSCULAR; INTRAVENOUS; SUBCUTANEOUS at 07:49

## 2019-10-29 RX ADMIN — HYDROMORPHONE HYDROCHLORIDE 1 MG: 1 INJECTION, SOLUTION INTRAMUSCULAR; INTRAVENOUS; SUBCUTANEOUS at 00:18

## 2019-10-29 RX ADMIN — TOPICAL ANESTHETIC 1 ML: 200 SPRAY DENTAL; PERIODONTAL at 03:18

## 2019-10-29 RX ADMIN — HYDROMORPHONE HYDROCHLORIDE 1 MG: 1 INJECTION, SOLUTION INTRAMUSCULAR; INTRAVENOUS; SUBCUTANEOUS at 03:09

## 2019-10-29 RX ADMIN — TOPICAL ANESTHETIC 0.5 ML: 200 SPRAY DENTAL; PERIODONTAL at 20:28

## 2019-10-29 RX ADMIN — HYDROMORPHONE HYDROCHLORIDE 1 MG: 1 INJECTION, SOLUTION INTRAMUSCULAR; INTRAVENOUS; SUBCUTANEOUS at 14:01

## 2019-10-29 RX ADMIN — ASCORBIC ACID, VITAMIN A PALMITATE, CHOLECALCIFEROL, THIAMINE HYDROCHLORIDE, RIBOFLAVIN-5 PHOSPHATE SODIUM, PYRIDOXINE HYDROCHLORIDE, NIACINAMIDE, DEXPANTHENOL, ALPHA-TOCOPHEROL ACETATE, VITAMIN K1, FOLIC ACID, BIOTIN, CYANOCOBALAMIN: 200; 3300; 200; 6; 3.6; 6; 40; 15; 10; 150; 600; 60; 5 INJECTION, SOLUTION INTRAVENOUS at 20:36

## 2019-10-29 RX ADMIN — HYDROMORPHONE HYDROCHLORIDE 1 MG: 1 INJECTION, SOLUTION INTRAMUSCULAR; INTRAVENOUS; SUBCUTANEOUS at 16:00

## 2019-10-29 RX ADMIN — HYDROMORPHONE HYDROCHLORIDE 1 MG: 1 INJECTION, SOLUTION INTRAMUSCULAR; INTRAVENOUS; SUBCUTANEOUS at 09:54

## 2019-10-29 RX ADMIN — HYDROMORPHONE HYDROCHLORIDE 1 MG: 1 INJECTION, SOLUTION INTRAMUSCULAR; INTRAVENOUS; SUBCUTANEOUS at 20:28

## 2019-10-29 RX ADMIN — TOPICAL ANESTHETIC 0.5 ML: 200 SPRAY DENTAL; PERIODONTAL at 12:00

## 2019-10-29 RX ADMIN — HYDROMORPHONE HYDROCHLORIDE 1 MG: 1 INJECTION, SOLUTION INTRAMUSCULAR; INTRAVENOUS; SUBCUTANEOUS at 05:44

## 2019-10-29 RX ADMIN — I.V. FAT EMULSION 250 ML: 20 EMULSION INTRAVENOUS at 20:36

## 2019-10-29 RX ADMIN — TOPICAL ANESTHETIC 0.5 ML: 200 SPRAY DENTAL; PERIODONTAL at 16:43

## 2019-10-29 RX ADMIN — HYDROMORPHONE HYDROCHLORIDE 1 MG: 1 INJECTION, SOLUTION INTRAMUSCULAR; INTRAVENOUS; SUBCUTANEOUS at 12:00

## 2019-10-29 RX ADMIN — HYDROMORPHONE HYDROCHLORIDE 1 MG: 1 INJECTION, SOLUTION INTRAMUSCULAR; INTRAVENOUS; SUBCUTANEOUS at 22:35

## 2019-10-29 RX ADMIN — POTASSIUM CHLORIDE AND SODIUM CHLORIDE: 450; 150 INJECTION, SOLUTION INTRAVENOUS at 09:51

## 2019-10-29 ASSESSMENT — ACTIVITIES OF DAILY LIVING (ADL)
ADLS_ACUITY_SCORE: 12

## 2019-10-29 ASSESSMENT — PAIN DESCRIPTION - DESCRIPTORS
DESCRIPTORS: CRAMPING
DESCRIPTORS: INTERMITTENT;CRAMPING

## 2019-10-29 NOTE — PLAN OF CARE
1930-2330:  Patient resting in bed this shift.  Up ad dania in room and halls.  Reports intermittent sharp abdominal pain, requesting PRN IV Dilaudid every 2 hours with reported decrease in pain.  Voiding adequate amounts.  Passing flatus and reports last BM was 10/27.  NG to LIS with large amount of tan-brown output.  PICC infusing with TPN/Lipids and MIVF.  PIV saline-locked.      Plan for possible surgery on Thursday.

## 2019-10-29 NOTE — PROGRESS NOTES
Surgery progress note    S: No acute events. No changes in bloating/pain/nausea. Continued concern from nursing staff that she is eating around her NG given very high NG output without changes in physical exam.    BP 94/72 (BP Location: Right arm)   Pulse 77   Temp 98.2  F (36.8  C) (Oral)   Resp 18   Wt 47 kg (103 lb 9.6 oz)   SpO2 95%   BMI 15.30 kg/m    Resting in bed, non toxic  Abdomen soft, diffuse tenderness without peritonitis  Breathing comfortably, no wheezing  NG tube in place, gastric contents in tubing  Appropriately oriented, cooperative    Labs reviewed: unremarkable    A/P: 43 year old woman with history of pelvic irradiation and prior SBR for obstruction who has been dealing with chronic partial small bowel obstruction for years, now with worsening symptoms. Will likely benefit from surgery, either bypass or resection of disease bowel, but severe malnutrition must be addressed first. Will continue TPN and NG tube decompression for now, plan for surgery later this week.  - May be eating around her NG, hence high NG output    Seen and discussed with chief resident and staff    Luiz Cortez MD, PhD, PGY-1  General Surgery

## 2019-10-29 NOTE — PLAN OF CARE
BP 97/76 (BP Location: Left arm)   Pulse 77   Temp 97.8  F (36.6  C) (Oral)   Resp 16   Wt 47 kg (103 lb 9.6 oz)   SpO2 96%   BMI 15.30 kg/m      A &O x4. Soft BP 90/70's- baseline for pt. Requesting pain med every 2 hrs. NGT to LIS with high output 1000ml yellowish color looks like apple juice. Possible non compliance with npo order. Up walking in halls by herself. Receiving TPN for nutrition. BS hyperactive

## 2019-10-29 NOTE — PLAN OF CARE
VSS on RA. Up ad dania.  NPO. NG with LIS. Large amount of tan/brownish drainage. Abdomen is tender. Pain controled with IV dilaudid. PICC running TPN, LIPIDS and IVMF. PIV- saline locked. Voiding spontaneously. Patient reported having a loose watery stool. Hyperactive bowel sounds.  Plan for possible surgery on Thursday.

## 2019-10-29 NOTE — PROGRESS NOTES
CLINICAL NUTRITION SERVICES - BRIEF NOTE  *See RD note on 10/24 for nutrition assessment details    Continues on CPN, Clinimix @ 45 mL/hr + lipids 6 days/week.  Goal is Clinimix @ 65 mL/hr.      K+, Mg++, and Phos WNL today    INTERVENTIONS  Implementation  1. Collaboration with other providers and Parenteral Nutrition/IV Fluids - discussed with pharmacist, plan to increase CPN to goal rate 65 mL/hr.      Goal: Clinimix @ 65 mL/hr (1560 mL/day) + 250 mL of 20% IV lipids 6 days/week to provide 1537 kcals (31 kcal/kg), 78 g protein (1.6 g/kg), 234 g CHO (GIR 3.3), and 28% of kcals from fat on average daily    2. Ordered BMP today (Mg++ and Phos already ordered)      Monitoring/Evaluation  Progress toward goals will be monitored and evaluated per protocol.     Mylene Duvall RD, LD  [address] RD pager: [phone]

## 2019-10-29 NOTE — PLAN OF CARE
Pt. is alert and oriented x 4 up indep. ,denies nausea ,c/o constant abdominal pain and requested IV dilaudid every 2 hrs.LS clear,BS hyperactive ,reported adequate urinary output , had large output from NG. ,possible noncompliance with NPO order.TPN infusing  Per order.BP on low side which is baseline .Will continue to monitor.

## 2019-10-30 LAB
ANION GAP SERPL CALCULATED.3IONS-SCNC: 4 MMOL/L (ref 3–14)
BUN SERPL-MCNC: 26 MG/DL (ref 7–30)
CALCIUM SERPL-MCNC: 8.4 MG/DL (ref 8.5–10.1)
CHLORIDE SERPL-SCNC: 100 MMOL/L (ref 94–109)
CO2 SERPL-SCNC: 31 MMOL/L (ref 20–32)
CREAT SERPL-MCNC: 0.66 MG/DL (ref 0.52–1.04)
GFR SERPL CREATININE-BSD FRML MDRD: >90 ML/MIN/{1.73_M2}
GLUCOSE BLDC GLUCOMTR-MCNC: 167 MG/DL (ref 70–99)
GLUCOSE SERPL-MCNC: 88 MG/DL (ref 70–99)
MAGNESIUM SERPL-MCNC: 1.9 MG/DL (ref 1.6–2.3)
PHOSPHATE SERPL-MCNC: 3.3 MG/DL (ref 2.5–4.5)
POTASSIUM SERPL-SCNC: 4.4 MMOL/L (ref 3.4–5.3)
SODIUM SERPL-SCNC: 136 MMOL/L (ref 133–144)

## 2019-10-30 PROCEDURE — 84100 ASSAY OF PHOSPHORUS: CPT | Performed by: STUDENT IN AN ORGANIZED HEALTH CARE EDUCATION/TRAINING PROGRAM

## 2019-10-30 PROCEDURE — 80048 BASIC METABOLIC PNL TOTAL CA: CPT | Performed by: STUDENT IN AN ORGANIZED HEALTH CARE EDUCATION/TRAINING PROGRAM

## 2019-10-30 PROCEDURE — 25000128 H RX IP 250 OP 636: Performed by: STUDENT IN AN ORGANIZED HEALTH CARE EDUCATION/TRAINING PROGRAM

## 2019-10-30 PROCEDURE — 00000146 ZZHCL STATISTIC GLUCOSE BY METER IP

## 2019-10-30 PROCEDURE — 36592 COLLECT BLOOD FROM PICC: CPT | Performed by: STUDENT IN AN ORGANIZED HEALTH CARE EDUCATION/TRAINING PROGRAM

## 2019-10-30 PROCEDURE — 25000125 ZZHC RX 250: Performed by: STUDENT IN AN ORGANIZED HEALTH CARE EDUCATION/TRAINING PROGRAM

## 2019-10-30 PROCEDURE — 12000001 ZZH R&B MED SURG/OB UMMC

## 2019-10-30 PROCEDURE — 83735 ASSAY OF MAGNESIUM: CPT | Performed by: STUDENT IN AN ORGANIZED HEALTH CARE EDUCATION/TRAINING PROGRAM

## 2019-10-30 PROCEDURE — 25000125 ZZHC RX 250: Performed by: FAMILY MEDICINE

## 2019-10-30 RX ORDER — ACETAMINOPHEN 325 MG/1
975 TABLET ORAL ONCE
Status: DISCONTINUED | OUTPATIENT
Start: 2019-10-30 | End: 2019-10-31 | Stop reason: HOSPADM

## 2019-10-30 RX ORDER — ERTAPENEM 1 G/1
1 INJECTION, POWDER, LYOPHILIZED, FOR SOLUTION INTRAMUSCULAR; INTRAVENOUS
Status: COMPLETED | OUTPATIENT
Start: 2019-10-30 | End: 2019-10-31

## 2019-10-30 RX ADMIN — TOPICAL ANESTHETIC 1 ML: 200 SPRAY DENTAL; PERIODONTAL at 06:02

## 2019-10-30 RX ADMIN — HYDROMORPHONE HYDROCHLORIDE 1 MG: 1 INJECTION, SOLUTION INTRAMUSCULAR; INTRAVENOUS; SUBCUTANEOUS at 04:36

## 2019-10-30 RX ADMIN — HYDROMORPHONE HYDROCHLORIDE 1 MG: 1 INJECTION, SOLUTION INTRAMUSCULAR; INTRAVENOUS; SUBCUTANEOUS at 18:51

## 2019-10-30 RX ADMIN — HYDROMORPHONE HYDROCHLORIDE 1 MG: 1 INJECTION, SOLUTION INTRAMUSCULAR; INTRAVENOUS; SUBCUTANEOUS at 16:52

## 2019-10-30 RX ADMIN — HYDROMORPHONE HYDROCHLORIDE 1 MG: 1 INJECTION, SOLUTION INTRAMUSCULAR; INTRAVENOUS; SUBCUTANEOUS at 06:38

## 2019-10-30 RX ADMIN — HYDROMORPHONE HYDROCHLORIDE 1 MG: 1 INJECTION, SOLUTION INTRAMUSCULAR; INTRAVENOUS; SUBCUTANEOUS at 23:09

## 2019-10-30 RX ADMIN — TOPICAL ANESTHETIC 1 ML: 200 SPRAY DENTAL; PERIODONTAL at 02:22

## 2019-10-30 RX ADMIN — HYDROMORPHONE HYDROCHLORIDE 1 MG: 1 INJECTION, SOLUTION INTRAMUSCULAR; INTRAVENOUS; SUBCUTANEOUS at 08:48

## 2019-10-30 RX ADMIN — I.V. FAT EMULSION 250 ML: 20 EMULSION INTRAVENOUS at 22:06

## 2019-10-30 RX ADMIN — ASCORBIC ACID, VITAMIN A PALMITATE, CHOLECALCIFEROL, THIAMINE HYDROCHLORIDE, RIBOFLAVIN-5 PHOSPHATE SODIUM, PYRIDOXINE HYDROCHLORIDE, NIACINAMIDE, DEXPANTHENOL, ALPHA-TOCOPHEROL ACETATE, VITAMIN K1, FOLIC ACID, BIOTIN, CYANOCOBALAMIN: 200; 3300; 200; 6; 3.6; 6; 40; 15; 10; 150; 600; 60; 5 INJECTION, SOLUTION INTRAVENOUS at 21:54

## 2019-10-30 RX ADMIN — TOPICAL ANESTHETIC 1 ML: 200 SPRAY DENTAL; PERIODONTAL at 20:46

## 2019-10-30 RX ADMIN — HYDROMORPHONE HYDROCHLORIDE 1 MG: 1 INJECTION, SOLUTION INTRAMUSCULAR; INTRAVENOUS; SUBCUTANEOUS at 02:16

## 2019-10-30 RX ADMIN — HYDROMORPHONE HYDROCHLORIDE 1 MG: 1 INJECTION, SOLUTION INTRAMUSCULAR; INTRAVENOUS; SUBCUTANEOUS at 14:49

## 2019-10-30 RX ADMIN — HYDROMORPHONE HYDROCHLORIDE 1 MG: 1 INJECTION, SOLUTION INTRAMUSCULAR; INTRAVENOUS; SUBCUTANEOUS at 12:48

## 2019-10-30 RX ADMIN — POTASSIUM CHLORIDE AND SODIUM CHLORIDE: 450; 150 INJECTION, SOLUTION INTRAVENOUS at 02:28

## 2019-10-30 RX ADMIN — TOPICAL ANESTHETIC 0.5 ML: 200 SPRAY DENTAL; PERIODONTAL at 17:02

## 2019-10-30 RX ADMIN — HYDROMORPHONE HYDROCHLORIDE 1 MG: 1 INJECTION, SOLUTION INTRAMUSCULAR; INTRAVENOUS; SUBCUTANEOUS at 10:49

## 2019-10-30 RX ADMIN — TOPICAL ANESTHETIC 1 ML: 200 SPRAY DENTAL; PERIODONTAL at 08:48

## 2019-10-30 RX ADMIN — POTASSIUM CHLORIDE AND SODIUM CHLORIDE: 450; 150 INJECTION, SOLUTION INTRAVENOUS at 21:54

## 2019-10-30 RX ADMIN — TOPICAL ANESTHETIC 0.5 ML: 200 SPRAY DENTAL; PERIODONTAL at 17:04

## 2019-10-30 RX ADMIN — HYDROMORPHONE HYDROCHLORIDE 1 MG: 1 INJECTION, SOLUTION INTRAMUSCULAR; INTRAVENOUS; SUBCUTANEOUS at 20:46

## 2019-10-30 ASSESSMENT — ACTIVITIES OF DAILY LIVING (ADL)
ADLS_ACUITY_SCORE: 12

## 2019-10-30 ASSESSMENT — PAIN DESCRIPTION - DESCRIPTORS
DESCRIPTORS: ACHING;CONSTANT;SHARP
DESCRIPTORS: ACHING;CONSTANT;SORE;SHARP
DESCRIPTORS: ACHING;CONSTANT;JABBING;SHARP
DESCRIPTORS: ACHING;CONSTANT;CRAMPING

## 2019-10-30 NOTE — PLAN OF CARE
"[id number]:  Patient resting in bed this shift.  Ambulating independently in halls.  Reports abdominal pain feels like \"labor contractions,\" IV Dilaudid administered with minimal relief.  Also complaining of sore throat, benzocaine administered with reported decrease.  Voiding adequate amounts.  Passing flatus, and reports having diarrhea this morning.  NG to LIS with large amount of brown-tan drainage.  NPO.  PICC with TPN/Lipids and MIVF infusing.  "

## 2019-10-30 NOTE — PLAN OF CARE
OT 7C. Cancel/Hold. Per conversation with RN, pt is up independently walking around the unit and outside. Pt with planned procedure 10/31, will reschedule OT treatment and re-eval post-op.

## 2019-10-30 NOTE — PROGRESS NOTES
Surgery progress note    S: No acute events. No changes in bloating/pain/nausea. Continued concern from nursing staff that she is eating around her NG given very high NG output without changes in physical exam. Ambulating independently.    BP 97/65 (BP Location: Left arm)   Pulse 92   Temp 98.4  F (36.9  C) (Oral)   Resp 16   Wt 47 kg (103 lb 9.6 oz)   SpO2 96%   BMI 15.30 kg/m    Resting in bed, non toxic  Abdomen soft, diffuse tenderness without peritonitis  Breathing comfortably, no wheezing  NG tube in place, gastric contents in tubing  Appropriately oriented, cooperative    Labs reviewed: unremarkable    A/P: 43 year old woman with history of pelvic irradiation and prior SBR for obstruction who has been dealing with chronic partial small bowel obstruction for years, now with worsening symptoms. Will likely benefit from surgery, either bypass or resection of disease bowel, but severe malnutrition must be addressed first. Will continue TPN and NG tube decompression for now, plan for surgery later this week. Working to schedule it.  - May be eating around her NG, hence high NG output    Seen and discussed with chief resident and staff    Luiz Cortez MD, PhD, PGY-1  General Surgery

## 2019-10-30 NOTE — PLAN OF CARE
A&OX4. VSS on room air. Consistent abdominal pain controlled with PRN dilaudid and hurricane spray.Ambulating independently in halls. NPO, denies N/V. NG to LIS.Passing gas, BM X1 during shift. Voiding adequately.PICC with TPN/Lipids and MIVF infusing.BS hyperactive. Voiding spontaneously. Continue with plan of care.

## 2019-10-30 NOTE — PLAN OF CARE
Slightly hypotensive, otherwise VSS on RA. Pain controlled with IV Dilaudid Q2 hrs. TPN/Lipids infusing via PICC. NG to LIS with moderate yellow/brown output. Benzocaine spray administered for throat discomfort with decrease. Reports passing flatus and having a small BM this AM. Voiding with good output. Up ad dania. Plan for surgery once team is able to schedule.

## 2019-10-31 ENCOUNTER — ANESTHESIA (OUTPATIENT)
Dept: SURGERY | Facility: CLINIC | Age: 44
DRG: 329 | End: 2019-10-31
Payer: COMMERCIAL

## 2019-10-31 ENCOUNTER — ANESTHESIA EVENT (OUTPATIENT)
Dept: SURGERY | Facility: CLINIC | Age: 44
DRG: 329 | End: 2019-10-31
Payer: COMMERCIAL

## 2019-10-31 PROBLEM — K56.609 SBO (SMALL BOWEL OBSTRUCTION) (H): Status: ACTIVE | Noted: 2019-10-31

## 2019-10-31 LAB
ABO + RH BLD: NORMAL
ABO + RH BLD: NORMAL
ALBUMIN SERPL-MCNC: 2.7 G/DL (ref 3.4–5)
ALP SERPL-CCNC: 87 U/L (ref 40–150)
ALT SERPL W P-5'-P-CCNC: 21 U/L (ref 0–50)
ANION GAP SERPL CALCULATED.3IONS-SCNC: 4 MMOL/L (ref 3–14)
AST SERPL W P-5'-P-CCNC: 17 U/L (ref 0–45)
BILIRUB SERPL-MCNC: 0.4 MG/DL (ref 0.2–1.3)
BLD GP AB SCN SERPL QL: NORMAL
BLOOD BANK CMNT PATIENT-IMP: NORMAL
BUN SERPL-MCNC: 26 MG/DL (ref 7–30)
CALCIUM SERPL-MCNC: 8 MG/DL (ref 8.5–10.1)
CHLORIDE SERPL-SCNC: 102 MMOL/L (ref 94–109)
CO2 SERPL-SCNC: 32 MMOL/L (ref 20–32)
CREAT SERPL-MCNC: 0.45 MG/DL (ref 0.52–1.04)
CREAT SERPL-MCNC: 0.64 MG/DL (ref 0.52–1.04)
GFR SERPL CREATININE-BSD FRML MDRD: >90 ML/MIN/{1.73_M2}
GFR SERPL CREATININE-BSD FRML MDRD: >90 ML/MIN/{1.73_M2}
GLUCOSE BLDC GLUCOMTR-MCNC: 107 MG/DL (ref 70–99)
GLUCOSE BLDC GLUCOMTR-MCNC: 123 MG/DL (ref 70–99)
GLUCOSE BLDC GLUCOMTR-MCNC: 164 MG/DL (ref 70–99)
GLUCOSE SERPL-MCNC: 100 MG/DL (ref 70–99)
HCG SERPL QL: NEGATIVE
PLATELET # BLD AUTO: 189 10E9/L (ref 150–450)
POTASSIUM SERPL-SCNC: 4 MMOL/L (ref 3.4–5.3)
PROT SERPL-MCNC: 5.4 G/DL (ref 6.8–8.8)
SODIUM SERPL-SCNC: 137 MMOL/L (ref 133–144)
SPECIMEN EXP DATE BLD: NORMAL

## 2019-10-31 PROCEDURE — 27210794 ZZH OR GENERAL SUPPLY STERILE: Performed by: SURGERY

## 2019-10-31 PROCEDURE — 00000146 ZZHCL STATISTIC GLUCOSE BY METER IP

## 2019-10-31 PROCEDURE — 0DN80ZZ RELEASE SMALL INTESTINE, OPEN APPROACH: ICD-10-PCS | Performed by: SURGERY

## 2019-10-31 PROCEDURE — 25000125 ZZHC RX 250: Performed by: SURGERY

## 2019-10-31 PROCEDURE — 84703 CHORIONIC GONADOTROPIN ASSAY: CPT | Performed by: ANESTHESIOLOGY

## 2019-10-31 PROCEDURE — 0D1B0ZH BYPASS ILEUM TO CECUM, OPEN APPROACH: ICD-10-PCS | Performed by: SURGERY

## 2019-10-31 PROCEDURE — 25000132 ZZH RX MED GY IP 250 OP 250 PS 637: Performed by: ANESTHESIOLOGY

## 2019-10-31 PROCEDURE — 25000128 H RX IP 250 OP 636: Performed by: STUDENT IN AN ORGANIZED HEALTH CARE EDUCATION/TRAINING PROGRAM

## 2019-10-31 PROCEDURE — 37000009 ZZH ANESTHESIA TECHNICAL FEE, EACH ADDTL 15 MIN: Performed by: SURGERY

## 2019-10-31 PROCEDURE — 25000125 ZZHC RX 250: Performed by: FAMILY MEDICINE

## 2019-10-31 PROCEDURE — 25000125 ZZHC RX 250: Performed by: STUDENT IN AN ORGANIZED HEALTH CARE EDUCATION/TRAINING PROGRAM

## 2019-10-31 PROCEDURE — 36000059 ZZH SURGERY LEVEL 3 EA 15 ADDTL MIN UMMC: Performed by: SURGERY

## 2019-10-31 PROCEDURE — 25000128 H RX IP 250 OP 636: Performed by: SURGERY

## 2019-10-31 PROCEDURE — 82565 ASSAY OF CREATININE: CPT | Performed by: SURGERY

## 2019-10-31 PROCEDURE — C9290 INJ, BUPIVACAINE LIPOSOME: HCPCS | Performed by: STUDENT IN AN ORGANIZED HEALTH CARE EDUCATION/TRAINING PROGRAM

## 2019-10-31 PROCEDURE — 86900 BLOOD TYPING SEROLOGIC ABO: CPT | Performed by: ANESTHESIOLOGY

## 2019-10-31 PROCEDURE — 88305 TISSUE EXAM BY PATHOLOGIST: CPT | Performed by: SURGERY

## 2019-10-31 PROCEDURE — 80053 COMPREHEN METABOLIC PANEL: CPT | Performed by: STUDENT IN AN ORGANIZED HEALTH CARE EDUCATION/TRAINING PROGRAM

## 2019-10-31 PROCEDURE — 3E0436Z INTRODUCTION OF NUTRITIONAL SUBSTANCE INTO CENTRAL VEIN, PERCUTANEOUS APPROACH: ICD-10-PCS | Performed by: SURGERY

## 2019-10-31 PROCEDURE — 0DBB0ZZ EXCISION OF ILEUM, OPEN APPROACH: ICD-10-PCS | Performed by: SURGERY

## 2019-10-31 PROCEDURE — 25800030 ZZH RX IP 258 OP 636: Performed by: STUDENT IN AN ORGANIZED HEALTH CARE EDUCATION/TRAINING PROGRAM

## 2019-10-31 PROCEDURE — 36000057 ZZH SURGERY LEVEL 3 1ST 30 MIN - UMMC: Performed by: SURGERY

## 2019-10-31 PROCEDURE — 36415 COLL VENOUS BLD VENIPUNCTURE: CPT | Performed by: ANESTHESIOLOGY

## 2019-10-31 PROCEDURE — 37000008 ZZH ANESTHESIA TECHNICAL FEE, 1ST 30 MIN: Performed by: SURGERY

## 2019-10-31 PROCEDURE — 86901 BLOOD TYPING SEROLOGIC RH(D): CPT | Performed by: ANESTHESIOLOGY

## 2019-10-31 PROCEDURE — 71000014 ZZH RECOVERY PHASE 1 LEVEL 2 FIRST HR: Performed by: SURGERY

## 2019-10-31 PROCEDURE — 88307 TISSUE EXAM BY PATHOLOGIST: CPT | Performed by: SURGERY

## 2019-10-31 PROCEDURE — 40000170 ZZH STATISTIC PRE-PROCEDURE ASSESSMENT II: Performed by: SURGERY

## 2019-10-31 PROCEDURE — 86850 RBC ANTIBODY SCREEN: CPT | Performed by: ANESTHESIOLOGY

## 2019-10-31 PROCEDURE — 25000565 ZZH ISOFLURANE, EA 15 MIN: Performed by: SURGERY

## 2019-10-31 PROCEDURE — 25800030 ZZH RX IP 258 OP 636: Performed by: SURGERY

## 2019-10-31 PROCEDURE — 85049 AUTOMATED PLATELET COUNT: CPT | Performed by: SURGERY

## 2019-10-31 PROCEDURE — 36592 COLLECT BLOOD FROM PICC: CPT | Performed by: SURGERY

## 2019-10-31 PROCEDURE — 0DBU0ZZ EXCISION OF OMENTUM, OPEN APPROACH: ICD-10-PCS | Performed by: SURGERY

## 2019-10-31 PROCEDURE — 12000001 ZZH R&B MED SURG/OB UMMC

## 2019-10-31 PROCEDURE — 71000015 ZZH RECOVERY PHASE 1 LEVEL 2 EA ADDTL HR: Performed by: SURGERY

## 2019-10-31 RX ORDER — ESMOLOL HYDROCHLORIDE 10 MG/ML
INJECTION INTRAVENOUS PRN
Status: DISCONTINUED | OUTPATIENT
Start: 2019-10-31 | End: 2019-10-31

## 2019-10-31 RX ORDER — SODIUM CHLORIDE, SODIUM LACTATE, POTASSIUM CHLORIDE, CALCIUM CHLORIDE 600; 310; 30; 20 MG/100ML; MG/100ML; MG/100ML; MG/100ML
INJECTION, SOLUTION INTRAVENOUS CONTINUOUS
Status: DISCONTINUED | OUTPATIENT
Start: 2019-10-31 | End: 2019-10-31 | Stop reason: HOSPADM

## 2019-10-31 RX ORDER — ACETAMINOPHEN 650 MG/1
650 SUPPOSITORY RECTAL ONCE
Status: COMPLETED | OUTPATIENT
Start: 2019-10-31 | End: 2019-10-31

## 2019-10-31 RX ORDER — LIDOCAINE 40 MG/G
CREAM TOPICAL
Status: DISCONTINUED | OUTPATIENT
Start: 2019-10-31 | End: 2019-10-31

## 2019-10-31 RX ORDER — EPHEDRINE SULFATE 50 MG/ML
INJECTION, SOLUTION INTRAMUSCULAR; INTRAVENOUS; SUBCUTANEOUS PRN
Status: DISCONTINUED | OUTPATIENT
Start: 2019-10-31 | End: 2019-10-31

## 2019-10-31 RX ORDER — LIDOCAINE HYDROCHLORIDE 20 MG/ML
INJECTION, SOLUTION INFILTRATION; PERINEURAL PRN
Status: DISCONTINUED | OUTPATIENT
Start: 2019-10-31 | End: 2019-10-31

## 2019-10-31 RX ORDER — SODIUM CHLORIDE, SODIUM LACTATE, POTASSIUM CHLORIDE, CALCIUM CHLORIDE 600; 310; 30; 20 MG/100ML; MG/100ML; MG/100ML; MG/100ML
INJECTION, SOLUTION INTRAVENOUS CONTINUOUS PRN
Status: DISCONTINUED | OUTPATIENT
Start: 2019-10-31 | End: 2019-10-31

## 2019-10-31 RX ORDER — FENTANYL CITRATE 50 UG/ML
25-50 INJECTION, SOLUTION INTRAMUSCULAR; INTRAVENOUS
Status: DISCONTINUED | OUTPATIENT
Start: 2019-10-31 | End: 2019-10-31 | Stop reason: HOSPADM

## 2019-10-31 RX ORDER — FENTANYL CITRATE 50 UG/ML
INJECTION, SOLUTION INTRAMUSCULAR; INTRAVENOUS PRN
Status: DISCONTINUED | OUTPATIENT
Start: 2019-10-31 | End: 2019-10-31

## 2019-10-31 RX ORDER — ONDANSETRON 2 MG/ML
INJECTION INTRAMUSCULAR; INTRAVENOUS PRN
Status: DISCONTINUED | OUTPATIENT
Start: 2019-10-31 | End: 2019-10-31

## 2019-10-31 RX ORDER — ONDANSETRON 2 MG/ML
4 INJECTION INTRAMUSCULAR; INTRAVENOUS EVERY 30 MIN PRN
Status: DISCONTINUED | OUTPATIENT
Start: 2019-10-31 | End: 2019-10-31 | Stop reason: HOSPADM

## 2019-10-31 RX ORDER — SODIUM CHLORIDE, SODIUM LACTATE, POTASSIUM CHLORIDE, CALCIUM CHLORIDE 600; 310; 30; 20 MG/100ML; MG/100ML; MG/100ML; MG/100ML
INJECTION, SOLUTION INTRAVENOUS CONTINUOUS
Status: DISCONTINUED | OUTPATIENT
Start: 2019-10-31 | End: 2019-11-11

## 2019-10-31 RX ORDER — KETAMINE HYDROCHLORIDE 10 MG/ML
INJECTION, SOLUTION INTRAMUSCULAR; INTRAVENOUS PRN
Status: DISCONTINUED | OUTPATIENT
Start: 2019-10-31 | End: 2019-10-31

## 2019-10-31 RX ORDER — KETAMINE HCL IN 0.9 % NACL 50 MG/5 ML
10 SYRINGE (ML) INTRAVENOUS
Status: DISCONTINUED | OUTPATIENT
Start: 2019-10-31 | End: 2019-10-31 | Stop reason: HOSPADM

## 2019-10-31 RX ORDER — PROPOFOL 10 MG/ML
INJECTION, EMULSION INTRAVENOUS PRN
Status: DISCONTINUED | OUTPATIENT
Start: 2019-10-31 | End: 2019-10-31

## 2019-10-31 RX ORDER — HYDROMORPHONE HYDROCHLORIDE 1 MG/ML
.3-.5 INJECTION, SOLUTION INTRAMUSCULAR; INTRAVENOUS; SUBCUTANEOUS EVERY 5 MIN PRN
Status: DISCONTINUED | OUTPATIENT
Start: 2019-10-31 | End: 2019-10-31 | Stop reason: HOSPADM

## 2019-10-31 RX ORDER — BUPIVACAINE HYDROCHLORIDE 2.5 MG/ML
INJECTION, SOLUTION EPIDURAL; INFILTRATION; INTRACAUDAL PRN
Status: DISCONTINUED | OUTPATIENT
Start: 2019-10-31 | End: 2019-10-31

## 2019-10-31 RX ORDER — DEXAMETHASONE SODIUM PHOSPHATE 4 MG/ML
INJECTION, SOLUTION INTRA-ARTICULAR; INTRALESIONAL; INTRAMUSCULAR; INTRAVENOUS; SOFT TISSUE PRN
Status: DISCONTINUED | OUTPATIENT
Start: 2019-10-31 | End: 2019-10-31

## 2019-10-31 RX ORDER — NALOXONE HYDROCHLORIDE 0.4 MG/ML
.1-.4 INJECTION, SOLUTION INTRAMUSCULAR; INTRAVENOUS; SUBCUTANEOUS
Status: ACTIVE | OUTPATIENT
Start: 2019-10-31 | End: 2019-11-01

## 2019-10-31 RX ORDER — FLUMAZENIL 0.1 MG/ML
0.2 INJECTION, SOLUTION INTRAVENOUS
Status: DISCONTINUED | OUTPATIENT
Start: 2019-10-31 | End: 2019-10-31 | Stop reason: HOSPADM

## 2019-10-31 RX ORDER — NALOXONE HYDROCHLORIDE 0.4 MG/ML
.1-.4 INJECTION, SOLUTION INTRAMUSCULAR; INTRAVENOUS; SUBCUTANEOUS
Status: DISCONTINUED | OUTPATIENT
Start: 2019-10-31 | End: 2019-11-17 | Stop reason: HOSPADM

## 2019-10-31 RX ORDER — NALOXONE HYDROCHLORIDE 0.4 MG/ML
.1-.4 INJECTION, SOLUTION INTRAMUSCULAR; INTRAVENOUS; SUBCUTANEOUS
Status: DISCONTINUED | OUTPATIENT
Start: 2019-10-31 | End: 2019-10-31 | Stop reason: HOSPADM

## 2019-10-31 RX ORDER — ONDANSETRON 4 MG/1
4 TABLET, ORALLY DISINTEGRATING ORAL EVERY 30 MIN PRN
Status: DISCONTINUED | OUTPATIENT
Start: 2019-10-31 | End: 2019-10-31 | Stop reason: HOSPADM

## 2019-10-31 RX ADMIN — DEXAMETHASONE SODIUM PHOSPHATE 8 MG: 4 INJECTION, SOLUTION INTRA-ARTICULAR; INTRALESIONAL; INTRAMUSCULAR; INTRAVENOUS; SOFT TISSUE at 09:45

## 2019-10-31 RX ADMIN — HYDROMORPHONE HYDROCHLORIDE 1 MG: 1 INJECTION, SOLUTION INTRAMUSCULAR; INTRAVENOUS; SUBCUTANEOUS at 03:20

## 2019-10-31 RX ADMIN — HYDROMORPHONE HYDROCHLORIDE 0.5 MG: 1 INJECTION, SOLUTION INTRAMUSCULAR; INTRAVENOUS; SUBCUTANEOUS at 13:40

## 2019-10-31 RX ADMIN — SODIUM CHLORIDE, POTASSIUM CHLORIDE, SODIUM LACTATE AND CALCIUM CHLORIDE: 600; 310; 30; 20 INJECTION, SOLUTION INTRAVENOUS at 09:25

## 2019-10-31 RX ADMIN — I.V. FAT EMULSION 250 ML: 20 EMULSION INTRAVENOUS at 20:49

## 2019-10-31 RX ADMIN — HYDROMORPHONE HYDROCHLORIDE 0.5 MG: 1 INJECTION, SOLUTION INTRAMUSCULAR; INTRAVENOUS; SUBCUTANEOUS at 13:28

## 2019-10-31 RX ADMIN — SUGAMMADEX 100 MG: 100 INJECTION, SOLUTION INTRAVENOUS at 11:51

## 2019-10-31 RX ADMIN — TOPICAL ANESTHETIC 1 ML: 200 SPRAY DENTAL; PERIODONTAL at 05:48

## 2019-10-31 RX ADMIN — Medication 60 MG: at 09:36

## 2019-10-31 RX ADMIN — BUPIVACAINE HYDROCHLORIDE 20 ML: 2.5 INJECTION, SOLUTION EPIDURAL; INFILTRATION; INTRACAUDAL; PERINEURAL at 09:43

## 2019-10-31 RX ADMIN — HYDROMORPHONE HYDROCHLORIDE 1 MG: 1 INJECTION, SOLUTION INTRAMUSCULAR; INTRAVENOUS; SUBCUTANEOUS at 05:44

## 2019-10-31 RX ADMIN — HYDROMORPHONE HYDROCHLORIDE 0.5 MG: 1 INJECTION, SOLUTION INTRAMUSCULAR; INTRAVENOUS; SUBCUTANEOUS at 11:48

## 2019-10-31 RX ADMIN — ROCURONIUM BROMIDE 10 MG: 10 INJECTION INTRAVENOUS at 10:03

## 2019-10-31 RX ADMIN — ESMOLOL HYDROCHLORIDE 20 MG: 10 INJECTION, SOLUTION INTRAVENOUS at 10:27

## 2019-10-31 RX ADMIN — Medication: at 13:48

## 2019-10-31 RX ADMIN — MIDAZOLAM 2 MG: 1 INJECTION INTRAMUSCULAR; INTRAVENOUS at 09:20

## 2019-10-31 RX ADMIN — FENTANYL CITRATE 50 MCG: 50 INJECTION, SOLUTION INTRAMUSCULAR; INTRAVENOUS at 10:07

## 2019-10-31 RX ADMIN — TOPICAL ANESTHETIC 1 ML: 200 SPRAY DENTAL; PERIODONTAL at 01:13

## 2019-10-31 RX ADMIN — ERTAPENEM SODIUM 1 G: 1 INJECTION, POWDER, LYOPHILIZED, FOR SOLUTION INTRAMUSCULAR; INTRAVENOUS at 09:50

## 2019-10-31 RX ADMIN — FENTANYL CITRATE 50 MCG: 50 INJECTION, SOLUTION INTRAMUSCULAR; INTRAVENOUS at 12:50

## 2019-10-31 RX ADMIN — KETAMINE HYDROCHLORIDE 15 MG: 10 INJECTION, SOLUTION INTRAMUSCULAR; INTRAVENOUS at 10:43

## 2019-10-31 RX ADMIN — PHENYLEPHRINE HYDROCHLORIDE 150 MCG: 10 INJECTION INTRAVENOUS at 09:54

## 2019-10-31 RX ADMIN — HYDROMORPHONE HYDROCHLORIDE 0.5 MG: 1 INJECTION, SOLUTION INTRAMUSCULAR; INTRAVENOUS; SUBCUTANEOUS at 10:18

## 2019-10-31 RX ADMIN — ROCURONIUM BROMIDE 15 MG: 10 INJECTION INTRAVENOUS at 10:39

## 2019-10-31 RX ADMIN — ESMOLOL HYDROCHLORIDE 30 MG: 10 INJECTION, SOLUTION INTRAVENOUS at 10:42

## 2019-10-31 RX ADMIN — SODIUM CHLORIDE, POTASSIUM CHLORIDE, SODIUM LACTATE AND CALCIUM CHLORIDE: 600; 310; 30; 20 INJECTION, SOLUTION INTRAVENOUS at 21:11

## 2019-10-31 RX ADMIN — PHENYLEPHRINE HYDROCHLORIDE 100 MCG: 10 INJECTION INTRAVENOUS at 10:12

## 2019-10-31 RX ADMIN — ACETAMINOPHEN 650 MG: 650 SUPPOSITORY RECTAL at 13:25

## 2019-10-31 RX ADMIN — HYDROMORPHONE HYDROCHLORIDE 1 MG: 1 INJECTION, SOLUTION INTRAMUSCULAR; INTRAVENOUS; SUBCUTANEOUS at 01:12

## 2019-10-31 RX ADMIN — FENTANYL CITRATE 50 MCG: 50 INJECTION, SOLUTION INTRAMUSCULAR; INTRAVENOUS at 12:40

## 2019-10-31 RX ADMIN — ESMOLOL HYDROCHLORIDE 30 MG: 10 INJECTION, SOLUTION INTRAVENOUS at 11:17

## 2019-10-31 RX ADMIN — KETAMINE HYDROCHLORIDE 5 MG: 10 INJECTION, SOLUTION INTRAMUSCULAR; INTRAVENOUS at 11:29

## 2019-10-31 RX ADMIN — PROPOFOL 100 MG: 10 INJECTION, EMULSION INTRAVENOUS at 09:36

## 2019-10-31 RX ADMIN — HYDROMORPHONE HYDROCHLORIDE 0.5 MG: 1 INJECTION, SOLUTION INTRAMUSCULAR; INTRAVENOUS; SUBCUTANEOUS at 13:18

## 2019-10-31 RX ADMIN — Medication 10 MG: at 13:34

## 2019-10-31 RX ADMIN — FENTANYL CITRATE 50 MCG: 50 INJECTION, SOLUTION INTRAMUSCULAR; INTRAVENOUS at 10:21

## 2019-10-31 RX ADMIN — PHENYLEPHRINE HYDROCHLORIDE 100 MCG: 10 INJECTION INTRAVENOUS at 09:50

## 2019-10-31 RX ADMIN — ONDANSETRON 4 MG: 2 INJECTION INTRAMUSCULAR; INTRAVENOUS at 11:30

## 2019-10-31 RX ADMIN — Medication 10 MG: at 09:59

## 2019-10-31 RX ADMIN — FENTANYL CITRATE 50 MCG: 50 INJECTION, SOLUTION INTRAMUSCULAR; INTRAVENOUS at 10:43

## 2019-10-31 RX ADMIN — ROCURONIUM BROMIDE 40 MG: 10 INJECTION INTRAVENOUS at 09:42

## 2019-10-31 RX ADMIN — HYDROMORPHONE HYDROCHLORIDE 0.5 MG: 1 INJECTION, SOLUTION INTRAMUSCULAR; INTRAVENOUS; SUBCUTANEOUS at 13:10

## 2019-10-31 RX ADMIN — PHENYLEPHRINE HYDROCHLORIDE 50 MCG: 10 INJECTION INTRAVENOUS at 09:42

## 2019-10-31 RX ADMIN — LIDOCAINE HYDROCHLORIDE 60 MG: 20 INJECTION, SOLUTION INFILTRATION; PERINEURAL at 09:36

## 2019-10-31 RX ADMIN — BUPIVACAINE 20 ML: 13.3 INJECTION, SUSPENSION, LIPOSOMAL INFILTRATION at 09:43

## 2019-10-31 RX ADMIN — KETAMINE HYDROCHLORIDE 30 MG: 10 INJECTION, SOLUTION INTRAMUSCULAR; INTRAVENOUS at 09:45

## 2019-10-31 RX ADMIN — KETAMINE HYDROCHLORIDE 10 MG: 10 INJECTION, SOLUTION INTRAMUSCULAR; INTRAVENOUS at 11:42

## 2019-10-31 RX ADMIN — FENTANYL CITRATE 100 MCG: 50 INJECTION, SOLUTION INTRAMUSCULAR; INTRAVENOUS at 09:36

## 2019-10-31 RX ADMIN — HYDROMORPHONE HYDROCHLORIDE 1 MG: 1 INJECTION, SOLUTION INTRAMUSCULAR; INTRAVENOUS; SUBCUTANEOUS at 08:21

## 2019-10-31 RX ADMIN — FENTANYL CITRATE 25 MCG: 50 INJECTION, SOLUTION INTRAMUSCULAR; INTRAVENOUS at 11:53

## 2019-10-31 RX ADMIN — SODIUM CHLORIDE, POTASSIUM CHLORIDE, SODIUM LACTATE AND CALCIUM CHLORIDE: 600; 310; 30; 20 INJECTION, SOLUTION INTRAVENOUS at 12:45

## 2019-10-31 ASSESSMENT — PAIN DESCRIPTION - DESCRIPTORS
DESCRIPTORS: CONSTANT
DESCRIPTORS: CONSTANT

## 2019-10-31 ASSESSMENT — ACTIVITIES OF DAILY LIVING (ADL)
ADLS_ACUITY_SCORE: 14
ADLS_ACUITY_SCORE: 12

## 2019-10-31 ASSESSMENT — LIFESTYLE VARIABLES: TOBACCO_USE: 1

## 2019-10-31 NOTE — PROGRESS NOTES
CLINICAL NUTRITION SERVICES - REASSESSMENT NOTE     Nutrition Prescription    RECOMMENDATIONS FOR MDs/PROVIDERS TO ORDER:  Alert RD or Pharmacist if pt requires changes to PN fluid volume    Malnutrition Status:    at least Non-severe malnutrition in the context of acute on chronic illness    Recommendations already ordered by Registered Dietitian (RD):  1. Updated dosing weight 47 kg, discussed with Pharmacist  2. TG at noon tomorrow to help assess ongoing TPN tolerance    Future/Additional Recommendations:  If pt expected to discharge with TPN, please alert RD or Pharmacist so TPN can be cycled before discharge     EVALUATION OF THE PROGRESS TOWARD GOALS   Diet: NPO    Nutrition Support: CPN, Clinimix at 65 mL/hr (1560 mL/day) + 250 mL of 20% IV lipids 6 days/week to provide 1537 kcals (33 kcal/kg), 78 g protein (1.7 g/kg), 234 g CHO (GIR 3.5), and 28% of kcals from fat on average daily per new dosing weight 47 kg    Intake: mostly NPO since 10/19.    10/25 - 10/27: PPN, peripheral clinimix @ 65 mL/hr + 250 mL 20% IV lipids 5 days/week to provide 886 kcal and 66 g protein daily  -  10/27 - 10/28: CPN, initial Clinimix @ 30 mL/hr + lipids 6 days/week to provide 726 kcal and 36 g protein daily  -  10/28 - 10/29: CPN, Clinimix @ 45 mL/hr + lipids 6 days/week to provide 1196 kcal and 54 g protein daily  -  10/29 - ___: goal/current CPN    7-day avg nutrition intake since 10/24 = 966 kcal and 54 g protein which meets 69% kcal needs and 76% protein needs     NEW FINDINGS   Weight: Lowest wts this admit ~47 kg (lowest 46.9 kg on 10/26), overall 12% wt loss since 3/15/19.  Will update dosing weight to 47 kg and reassess estimated nutrition needs below:    ASSESSED NUTRITION NEEDS  Estimated Energy Needs: 5360-2254 kcals/day (30 - 35 kcals/kg)  Justification: Underweight  Estimated Protein Needs: 71-94 grams protein/day (1.5 - 2 grams of pro/kg)  Justification: Hypercatabolism with acute illness and repletion  Estimated  "Fluid Needs: 0689-0666 mL/day (1 mL/kcal)   Justification: Maintenance, or other per provider pending fluid status    Labs:   - Alk Phos, ALT, AST, and TBili WNL  - K+ WNL today; K+, Mg++, Phos WNL yesterday  - TG WNL on 10/25    GI: NG to LIS.  In OR today, per provider note yesterday for either \"bypass or resection of disease bowel\"    MALNUTRITION  % Intake: < 75% for > 7 days (non-severe)  % Weight Loss: > 10% in 6 months (severe)  Subcutaneous Fat Loss: Unable to assess  Muscle Loss: Unable to assess  Fluid Accumulation/Edema: None noted per chart review  Malnutrition Diagnosis: at least Non-severe malnutrition in the context of acute on chronic illness    Previous Goals   Diet adv v nutrition support within 1-2 days  Evaluation: Met    Previous Nutrition Diagnosis  Inadequate oral intake related to NPO 2/2 SBO as evidenced by mostly NPO x 6 days   Evaluation: No change, updated    CURRENT NUTRITION DIAGNOSIS  Increased nutrient needs (protein-energy) related to increased needs for acute illness and underweight status as evidenced by Estimated Energy Needs: 2522-7654 kcals/day (30 - 35 kcals/kg) and Estimated Protein Needs: 71-94 grams protein/day (1.5 - 2 grams of pro/kg)    INTERVENTIONS  Implementation  Collaboration with other providers: discussed pt in unit rounds.  Discussed dosing weight change with Pharmacist.  Obtained information from chart review, pt in OR today    Goals  Total avg nutritional intake to meet a minimum of 30 kcal/kg and 1.5 g PRO/kg daily (per dosing wt 47 kg).    Monitoring/Evaluation  Progress toward goals will be monitored and evaluated per protocol.     Mylene Duvall, RD, LD  7C RD pager: [phone]   "

## 2019-10-31 NOTE — ANESTHESIA PREPROCEDURE EVALUATION
Anesthesia Pre-Procedure Evaluation    Patient: Rachel A Gerhardt   MRN:     [id number] Gender:   female   Age:    43 year old :      1975        Preoperative Diagnosis: Small bowel obstruction (H) [K56.609]   Procedure(s):  Laparotomy possible bowel resection possible intestinal bypass     Past Medical History:   Diagnosis Date     Asthma      Cervical cancer (H)      Other chronic pain      Ovarian cancer (H)      Partial small bowel obstruction (H) 2018     SBO (small bowel obstruction) (H) 2016     Small bowel obstruction (H) 2017     Small intestine obstruction (H) 2017     Substance abuse (H)     Outside records indicate past history of narcotics abuse or dependence, but patient denies.      Past Surgical History:   Procedure Laterality Date     COLONOSCOPY N/A 5/3/2018    Procedure: COLONOSCOPY;  sigmoidoscopy;  Surgeon: Omero Vigil MD;  Location: [facility] OR     COLONOSCOPY WITH CO2 INSUFFLATION N/A 2018    Procedure: COLONOSCOPY WITH CO2 INSUFFLATION;  Colonoscopy;  Surgeon: Omero Vigil MD;  Location: [facility]     COMBINED CYSTOSCOPY, INSERT STENT URETER(S) Bilateral 2017    Procedure: COMBINED CYSTOSCOPY, INSERT STENT URETER(S);  Cystoscopy with Bilateral Stent,;  Surgeon: Rene Calero MD;  Location: [facility] OR     ENT SURGERY  2009    mastoid, sinus     ENTEROSCOPY SMALL BOWEL N/A 2018    Procedure: ENTEROSCOPY SMALL BOWEL;  Lower bowel Retrograde Enteroscopy with Balloon Dilation .;  Surgeon: Omero Vigil MD;  Location: [facility] OR     EXAM UNDER ANESTHESIA, INSERT ALEX SLEEVE, UTERINE PLACEMENT OF TANDEM AND RING FOR RAD, ULTRASOUND N/A 2015    Procedure: EXAM UNDER ANESTHESIA, INSERT ALEX SLEEVE, UTERINE PLACEMENT OF TANDEM AND RING FOR RADIATION, ULTRASOUND GUIDED;  Surgeon: Abby Tony MD;  Location: [facility] OR     INSERT TANDEM AND CESIUM APPLICATOR CERVIX, ULTRASOUND GUIDED N/A 2015    Procedure: INSERT TANDEM AND CESIUM  APPLICATOR CERVIX, ULTRASOUND GUIDED;  Surgeon: Kika Wood MD;  Location: [facility] OR     KNEE SURGERY       LAPAROTOMY EXPLORATORY N/A 5/18/2017    Procedure: LAPAROTOMY EXPLORATORY;   Exploratry Laparotomy, Small Bowel Resection with anastomosis, Flexible Sigmoidoscopy;  Surgeon: Jennifer Goodwin MD;  Location: [facility]     PICC INSERTION Right 04/29/2017    4fr SL BioFlo PICC, 37cm (3cm external) in the R basilic vein w/ tip in the mid SVC.     PICC INSERTION Right 03/29/2018    4Fr - 40cm (4cm external), R lateral brachial vein, Low SVC     RESECT SMALL BOWEL WITHOUT OSTOMY N/A 5/18/2017    Procedure: RESECT SMALL BOWEL WITHOUT OSTOMY;;  Surgeon: Jenniefr Goodwin MD;  Location: [facility]     SIGMOIDOSCOPY FLEXIBLE N/A 5/18/2017    Procedure: SIGMOIDOSCOPY FLEXIBLE;;  Surgeon: Jennifer Goodwin MD;  Location: [facility]          Anesthesia Evaluation     . Pt has had prior anesthetic. Type: General    No history of anesthetic complications          ROS/MED HX    ENT/Pulmonary:     (+)tobacco use, Current use Intermittent asthma , . .    Neurologic:  - neg neurologic ROS     Cardiovascular:         METS/Exercise Tolerance:     Hematologic:  - neg hematologic  ROS       Musculoskeletal:  - neg musculoskeletal ROS       GI/Hepatic: Comment: Small bowel obstruction         Renal/Genitourinary:  - ROS Renal section negative       Endo:  - neg endo ROS       Psychiatric: Comment: H/o polysubstance abuse        Infectious Disease:  - neg infectious disease ROS       Malignancy:   (+)   Ovarian and cervical cancer        Other:    (+) No chance of pregnancy C-spine cleared: No, H/O Chronic Pain,H/O chronic opiod use , no other significant disability                        PHYSICAL EXAM:   Mental Status/Neuro:    Airway:   Mallampati: II  Mouth/Opening: Full  TM distance: > 6 cm  Neck ROM: Full   Respiratory: Auscultation: CTAB     Resp. Rate: Normal     Resp. Effort: Normal      CV: Rhythm: Regular  Rate: Age  "appropriate  Heart: Normal Sounds  Edema: None   Comments:                      LABS:  CBC:   Lab Results   Component Value Date    WBC 7.9 10/21/2019    WBC 8.9 10/20/2019    HGB 11.8 10/21/2019    HGB 12.0 10/20/2019    HCT 37.4 10/21/2019    HCT 38.3 10/20/2019     10/21/2019     10/20/2019     BMP:   Lab Results   Component Value Date     10/30/2019     10/29/2019    POTASSIUM 4.4 10/30/2019    POTASSIUM 4.0 10/29/2019    CHLORIDE 100 10/30/2019    CHLORIDE 101 10/29/2019    CO2 31 10/30/2019    CO2 30 10/29/2019    BUN 26 10/30/2019    BUN 29 10/29/2019    CR 0.66 10/30/2019    CR 0.60 10/29/2019    GLC 88 10/30/2019    GLC 89 10/29/2019     COAGS:   Lab Results   Component Value Date    PTT 29 06/23/2019    INR 1.23 (H) 10/28/2019     POC:   Lab Results   Component Value Date     (H) 10/30/2019    HCG Negative 06/23/2019    HCGS Negative 07/17/2018     OTHER:   Lab Results   Component Value Date    LACT 1.2 10/18/2019    JONATAN 8.4 (L) 10/30/2019    PHOS 3.3 10/30/2019    MAG 1.9 10/30/2019    ALBUMIN 2.6 (L) 10/28/2019    PROTTOTAL 5.3 (L) 10/28/2019    ALT 15 10/28/2019    AST 12 10/28/2019    ALKPHOS 77 10/28/2019    BILITOTAL 0.4 10/28/2019    LIPASE 99 06/23/2019    TSH 1.02 06/23/2019    TSH 1.02 06/23/2019    CRP 41.0 (H) 05/18/2018    SED 18 02/13/2018        Preop Vitals    BP Readings from Last 3 Encounters:   10/31/19 99/71   07/09/19 106/81   06/29/19 109/72    Pulse Readings from Last 3 Encounters:   10/30/19 102   07/09/19 82   06/29/19 87      Resp Readings from Last 3 Encounters:   10/31/19 18   07/09/19 18   06/29/19 16    SpO2 Readings from Last 3 Encounters:   10/31/19 98%   07/09/19 100%   06/29/19 97%      Temp Readings from Last 1 Encounters:   10/31/19 36.2  C (97.1  F) (Oral)    Ht Readings from Last 1 Encounters:   06/23/19 1.753 m (5' 9\")      Wt Readings from Last 1 Encounters:   10/28/19 47 kg (103 lb 9.6 oz)    Estimated body mass index is 15.3 kg/m  " "as calculated from the following:    Height as of 6/23/19: 1.753 m (5' 9\").    Weight as of this encounter: 47 kg (103 lb 9.6 oz).     LDA:  Peripheral IV 10/26/19 Right;Posterior Lower forearm (Active)   Site Assessment WD 10/31/2019  1:00 AM   Line Status Saline locked 10/31/2019  1:00 AM   Phlebitis Scale 0-->no symptoms 10/31/2019  1:00 AM   Infiltration Scale 0 10/31/2019  1:00 AM   Infiltration Site Treatment Method  None 10/31/2019  1:00 AM   Extravasation? No 10/31/2019  1:00 AM   Dressing Intervention New dressing  10/26/2019  6:00 PM   Number of days: 5       PICC Double Lumen 10/26/19 Right Brachial vein medial (Active)   Site Assessment WD 10/31/2019  1:00 AM   External Cath Length (cm) 3 cm 10/26/2019  4:00 PM   Extremity Circumference (cm) 18 cm 10/26/2019  4:00 PM   Dressing Intervention Transparent;Chlorhexidine patch 10/27/2019  4:00 PM   Dressing Change Due 11/02/19 10/27/2019  1:08 PM   PICC Comment SecurACath 10/27/2019  1:08 PM   Lumen A - Color GRAY 10/31/2019  1:00 AM   Lumen A - Status infusing 10/31/2019  1:00 AM   Lumen A - Cap Change Due 11/01/19 10/31/2019  1:00 AM   Lumen B - Color PURPLE 10/31/2019  1:00 AM   Lumen B - Status blood return noted 10/31/2019  1:00 AM   Lumen B - Cap Change Due 11/01/19 10/31/2019  1:00 AM   Line Necessity Yes, meets criteria 10/31/2019  1:00 AM   Number of days: 5       NG/OG Tube Nasogastric 16 fr Left nostril (Active)   Site Description UTV 10/31/2019  1:00 AM   Status Suction-low intermittent 10/31/2019  1:00 AM   Drainage Appearance Brown;Thin 10/31/2019  1:00 AM   Placement Confirmation Aspiration of gastric content 10/31/2019  1:00 AM   Container Amount 0 mL 10/31/2019  6:20 AM   Output (ml) 0 ml 10/31/2019  6:20 AM   Number of days: 9        Assessment:   ASA SCORE: 3    H&P: History and physical reviewed and following examination; no interval change.   Smoking Status:  Active Smoker   NPO Status: ELEVATED Aspiration Risk/Unknown     Plan:   Anes. " Type:  General; Peripheral Nerve Block     Block Details: TAP   Pre-Medication: Midazolam   Induction:  IV (RSI)   Airway: ETT; Oral   Access/Monitoring: PIV; 2nd PIV   Maintenance: Balanced     Postop Plan:   Postop Pain: Opioids; Ketamine  Postop Sedation/Airway: Not planned  Disposition: Inpatient/Admit     PONV Management:   Adult Risk Factors: Female, Postop Opioids   Prevention: Ondansetron, Dexamethasone     CONSENT: Direct conversation   Plan and risks discussed with: Patient          Comments for Plan/Consent:  43F with h/o cervical and ovarian cancer and recurrent SBO here for ex lap.  Chronic pain and chronic opiate use.  ASA 3.  Plan: GETA, RSI, multimodal analgesia including TAP blocks and ketamine.                 Scar Villeda, DO

## 2019-10-31 NOTE — PLAN OF CARE
1930-2330:  Up ad dania, ambulating long distance in halls independently.  Continues to report abdominal pain, reports partial relief with PRN IV Dilaudid.  Benzocaine spray for sore throat with reported relief.  Voiding adequate amounts.  Reports passing flatus and having loose stools.  NG to LIS with large amount of thin, tan output.  NPO.  Reinforced patient is to be NPO after midnight for possible surgery tomorrow.  She is an add-on case with no scheduled time yet, patient is aware.  Pre-op shower and linen change completed before bed.  Declined to shampoo hair this evening.  PICC with TPN/Lipids and IVF infusing.  PIV saline-locked.    Patient needs pre-op checklist completed and also needs to take another shower with shampoo tomorrow AM.  Continue to reinforce NPO status.

## 2019-10-31 NOTE — PLAN OF CARE
A&OX4. VSS on room air. Consistent abdominal pain controlled with PRN dilaudid and hurricane spray.Ambulating independently in halls. NPO, denies N/V. NG to LIS yellow tan output. Passing gas, BM X1 during shift. Voiding adequately.PICC with TPN/Lipids and MIVF infusing.BS hyperactive. Voiding spontaneously. Plan for surgery today. Second pre op shower done. Continue with plan of care.

## 2019-10-31 NOTE — ANESTHESIA POSTPROCEDURE EVALUATION
Anesthesia POST Procedure Evaluation    Patient: Rachel A Gerhardt   MRN:     [id number] Gender:   female   Age:    43 year old :      1975        Preoperative Diagnosis: Small bowel obstruction (H) [K56.609]   Procedure(s):  Laparotomy,  bowel resection , lysis of adhesions, partial omentecomy   Postop Comments: No value filed.       Anesthesia Type:  Not documented  General    Reportable Event: NO     PAIN: Uncomplicated   Sign Out status: Pain at preoperative BASELINE     PONV: No PONV   Sign Out status:  No Nausea or Vomiting     Neuro/Psych: Uneventful perioperative course   Sign Out Status: Preoperative baseline; Age appropriate mentation     Airway/Resp.: Uneventful perioperative course   Sign Out Status: Non labored breathing, age appropriate RR; Resp. Status within EXPECTED Parameters     CV: Uneventful perioperative course   Sign Out status: Appropriate BP and perfusion indices; Appropriate HR/Rhythm     Disposition:   Sign Out in:  PACU  Disposition:  Floor  Recovery Course: Uneventful  Follow-Up: Not required           Last Anesthesia Record Vitals:  CRNA VITALS  10/31/2019 1142 - 10/31/2019 1242      10/31/2019             Resp Rate (observed):  22          Last PACU Vitals:  Vitals Value Taken Time   /90 10/31/2019  2:50 PM   Temp 36.3  C (97.3  F) 10/31/2019 12:45 PM   Pulse 68 10/31/2019  2:50 PM   Resp 12 10/31/2019  2:00 PM   SpO2 99 % 10/31/2019  2:53 PM   Temp src     NIBP     Pulse     SpO2     Resp     Temp     Ht Rate     Temp 2     Vitals shown include unvalidated device data.      Electronically Signed By: Phuong Brown MD, 2019, 2:54 PM

## 2019-10-31 NOTE — ANESTHESIA CARE TRANSFER NOTE
Patient: Rachel A Gerhardt    Procedure(s):  Laparotomy,  bowel resection , lysis of adhesions, partial omentecomy    Diagnosis: Small bowel obstruction (H) [K56.609]  Diagnosis Additional Information: No value filed.    Anesthesia Type:   General     Note:  Airway :Face Mask  Patient transferred to:PACU  Handoff Report: Identifed the Patient, Identified the Reponsible Provider, Reviewed the pertinent medical history, Discussed the surgical course, Reviewed Intra-OP anesthesia mangement and issues during anesthesia, Set expectations for post-procedure period and Allowed opportunity for questions and acknowledgement of understanding      Vitals: (Last set prior to Anesthesia Care Transfer)    CRNA VITALS  10/31/2019 1142 - 10/31/2019 1223      10/31/2019             Resp Rate (observed):  22                Electronically Signed By: CECY Brown CRNA  October 31, 2019  12:23 PM

## 2019-10-31 NOTE — BRIEF OP NOTE
[facility], [place]    Brief Operative Note    Pre-operative diagnosis: Small bowel obstruction (H) [K56.609]  Post-operative diagnosis Same as above    Procedure: Procedure(s):  Laparotomy,  bowel resection , lysis of adhesions, partial omentecomy  Surgeon: Surgeon(s) and Role:     * Dada Trevizo MD - Primary     * Jerome Patino MD - Assisting  Anesthesia: General   Estimated blood loss: 20 mL  Drains: None  Specimens:   ID Type Source Tests Collected by Time Destination   A : nodule Tissue Abdomen SURGICAL PATHOLOGY EXAM Dada Trevizo MD 10/31/2019 10:18 AM    B :  Tissue Small Intestine, Ileum SURGICAL PATHOLOGY EXAM Dada Trevizo MD 10/31/2019 10:46 AM      Findings:   Distended small bowel proximally, chronically strictured mid ileum with normal appearing terminal ileum.  Complications: None.  Implants: None

## 2019-10-31 NOTE — ANESTHESIA PROCEDURE NOTES
Peripheral Nerve Block Procedure Note  Date/Time: 10/31/2019 9:50 AM    Staff:     Anesthesiologist:  Duane Mcmahan MD    Resident/CRNA:  Moises Willett MD    Block performed by resident/CRNA in the presence of a teaching physician    Location: OR AFTER induction  Procedure Start/Stop TImes:      10/31/2019 9:40 AM     10/31/2019 9:50 AM    patient identified, IV checked, site marked, risks and benefits discussed, informed consent, monitors and equipment checked, pre-op evaluation, at physician/surgeon's request and post-op pain management      Correct Patient: Yes      Correct Position: Yes      Correct Site: Yes      Correct Procedure: Yes      Correct Laterality:  Yes    Site Marked:  Yes  Procedure details:     Procedure:  TAP    ASA:  3    Laterality:  Bilateral    Position:  Sitting    Sterile Prep: chloraprep, mask and sterile gloves      Needle:  Short bevel    Needle gauge:  21    Needle length (mm):  110    Ultrasound: Yes      Ultrasound used to identify targeted nerve, plexus, or vascular structure and placed a needle adjacent to it      Permanent Image entered into patiient's record      Abnormal pain on injection: No      Blood Aspirated: No      Paresthesias:  No    Bleeding at site: No      Bolus via:  Needle    Infusion Method:  Single Shot    Complications:  None  Assessment/Narrative:     Injection made incrementally with aspirations every (mL):  5

## 2019-10-31 NOTE — PLAN OF CARE
Patient arrived on unit @1510 hours.   A&O, hover mat removed from  Underneath patient. Pt has PICC line, PCA pump in place. Call bell within reach  Family member at bedside    No

## 2019-11-01 LAB
ANION GAP SERPL CALCULATED.3IONS-SCNC: 4 MMOL/L (ref 3–14)
BUN SERPL-MCNC: 13 MG/DL (ref 7–30)
CALCIUM SERPL-MCNC: 8.1 MG/DL (ref 8.5–10.1)
CHLORIDE SERPL-SCNC: 103 MMOL/L (ref 94–109)
CO2 SERPL-SCNC: 27 MMOL/L (ref 20–32)
CREAT SERPL-MCNC: 0.4 MG/DL (ref 0.52–1.04)
ERYTHROCYTE [DISTWIDTH] IN BLOOD BY AUTOMATED COUNT: 12.2 % (ref 10–15)
GFR SERPL CREATININE-BSD FRML MDRD: >90 ML/MIN/{1.73_M2}
GLUCOSE SERPL-MCNC: 118 MG/DL (ref 70–99)
HCT VFR BLD AUTO: 35.1 % (ref 35–47)
HGB BLD-MCNC: 11.2 G/DL (ref 11.7–15.7)
LACTATE BLD-SCNC: 0.5 MMOL/L (ref 0.7–2)
MCH RBC QN AUTO: 30.8 PG (ref 26.5–33)
MCHC RBC AUTO-ENTMCNC: 31.9 G/DL (ref 31.5–36.5)
MCV RBC AUTO: 96 FL (ref 78–100)
PLATELET # BLD AUTO: 203 10E9/L (ref 150–450)
POTASSIUM SERPL-SCNC: 4 MMOL/L (ref 3.4–5.3)
RBC # BLD AUTO: 3.64 10E12/L (ref 3.8–5.2)
SODIUM SERPL-SCNC: 135 MMOL/L (ref 133–144)
TRIGL SERPL-MCNC: 33 MG/DL
WBC # BLD AUTO: 13.5 10E9/L (ref 4–11)

## 2019-11-01 PROCEDURE — 85027 COMPLETE CBC AUTOMATED: CPT | Performed by: STUDENT IN AN ORGANIZED HEALTH CARE EDUCATION/TRAINING PROGRAM

## 2019-11-01 PROCEDURE — 25000128 H RX IP 250 OP 636: Performed by: STUDENT IN AN ORGANIZED HEALTH CARE EDUCATION/TRAINING PROGRAM

## 2019-11-01 PROCEDURE — 25000125 ZZHC RX 250: Performed by: SURGERY

## 2019-11-01 PROCEDURE — 36592 COLLECT BLOOD FROM PICC: CPT | Performed by: SURGERY

## 2019-11-01 PROCEDURE — 25000128 H RX IP 250 OP 636: Performed by: SURGERY

## 2019-11-01 PROCEDURE — 36592 COLLECT BLOOD FROM PICC: CPT | Performed by: STUDENT IN AN ORGANIZED HEALTH CARE EDUCATION/TRAINING PROGRAM

## 2019-11-01 PROCEDURE — 12000001 ZZH R&B MED SURG/OB UMMC

## 2019-11-01 PROCEDURE — 83605 ASSAY OF LACTIC ACID: CPT

## 2019-11-01 PROCEDURE — 84478 ASSAY OF TRIGLYCERIDES: CPT | Performed by: STUDENT IN AN ORGANIZED HEALTH CARE EDUCATION/TRAINING PROGRAM

## 2019-11-01 PROCEDURE — 36592 COLLECT BLOOD FROM PICC: CPT | Performed by: FAMILY MEDICINE

## 2019-11-01 PROCEDURE — 83605 ASSAY OF LACTIC ACID: CPT | Performed by: FAMILY MEDICINE

## 2019-11-01 PROCEDURE — 80048 BASIC METABOLIC PNL TOTAL CA: CPT | Performed by: SURGERY

## 2019-11-01 PROCEDURE — 25800030 ZZH RX IP 258 OP 636: Performed by: SURGERY

## 2019-11-01 RX ORDER — HYDROXYZINE HYDROCHLORIDE 50 MG/ML
50 INJECTION, SOLUTION INTRAMUSCULAR EVERY 6 HOURS PRN
Status: DISCONTINUED | OUTPATIENT
Start: 2019-11-01 | End: 2019-11-13

## 2019-11-01 RX ADMIN — ASCORBIC ACID, VITAMIN A PALMITATE, CHOLECALCIFEROL, THIAMINE HYDROCHLORIDE, RIBOFLAVIN-5 PHOSPHATE SODIUM, PYRIDOXINE HYDROCHLORIDE, NIACINAMIDE, DEXPANTHENOL, ALPHA-TOCOPHEROL ACETATE, VITAMIN K1, FOLIC ACID, BIOTIN, CYANOCOBALAMIN: 200; 3300; 200; 6; 3.6; 6; 40; 15; 10; 150; 600; 60; 5 INJECTION, SOLUTION INTRAVENOUS at 05:28

## 2019-11-01 RX ADMIN — I.V. FAT EMULSION 250 ML: 20 EMULSION INTRAVENOUS at 20:07

## 2019-11-01 RX ADMIN — ASCORBIC ACID, VITAMIN A PALMITATE, CHOLECALCIFEROL, THIAMINE HYDROCHLORIDE, RIBOFLAVIN-5 PHOSPHATE SODIUM, PYRIDOXINE HYDROCHLORIDE, NIACINAMIDE, DEXPANTHENOL, ALPHA-TOCOPHEROL ACETATE, VITAMIN K1, FOLIC ACID, BIOTIN, CYANOCOBALAMIN: 200; 3300; 200; 6; 3.6; 6; 40; 15; 10; 150; 600; 60; 5 INJECTION, SOLUTION INTRAVENOUS at 20:07

## 2019-11-01 RX ADMIN — Medication: at 01:14

## 2019-11-01 RX ADMIN — Medication: at 12:15

## 2019-11-01 RX ADMIN — SODIUM CHLORIDE, POTASSIUM CHLORIDE, SODIUM LACTATE AND CALCIUM CHLORIDE: 600; 310; 30; 20 INJECTION, SOLUTION INTRAVENOUS at 23:31

## 2019-11-01 RX ADMIN — ENOXAPARIN SODIUM 40 MG: 40 INJECTION SUBCUTANEOUS at 08:54

## 2019-11-01 RX ADMIN — Medication: at 23:02

## 2019-11-01 ASSESSMENT — PAIN DESCRIPTION - DESCRIPTORS
DESCRIPTORS: CONSTANT

## 2019-11-01 ASSESSMENT — ACTIVITIES OF DAILY LIVING (ADL)
ADLS_ACUITY_SCORE: 14

## 2019-11-01 NOTE — PLAN OF CARE
HD14 admitted with SBO. POD0 of a laparotomy, partial omentectomy, small bowel resection. A&Ox4, VSS on 2 LPM NC, Capno WNL, discontinue at 1500 (11/1). NG to LIS with moderate output. Godwin has adequate output. Refused to dangle. Pain is controlled with PCA of dilaudid. IVM running at 75/hr, TPN and lipids running in PICC line. Midline incision is KILEY and CDI, LS diminished, Continue with post op care.

## 2019-11-01 NOTE — PROGRESS NOTES
"Surgery progress note    S: No acute events. POD 1. States that pain control is \"borderline\". No nausea. Upset that we placed an NG during surgery instead of gastrostomy.    BP (!) 137/90 (BP Location: Left arm)   Pulse 80   Temp 98.8  F (37.1  C) (Oral)   Resp 12   Wt 47 kg (103 lb 9.6 oz)   SpO2 94%   BMI 15.30 kg/m    Resting in bed, non toxic  Abdomen soft, appropriately tender post-op  Breathing comfortably, no wheezing  NG tube in place, gastric contents in tubing  Appropriately oriented, cooperative    Labs reviewed: WBC 13.5    A/P: 43 year old woman with history of pelvic irradiation and prior SBR for obstruction who has been dealing with chronic partial small bowel obstruction for years, now with worsening symptoms. Now s/p exploratory laparotomy with small bowel resection and primary anastomosis 10/31.  - NG to gravity  - Discontinue kemp  - Continue PCA  - LR 75/hr, TPN until tolerating PO    Seen and discussed with chief resident and staff    Luiz Cortez MD, PhD, PGY-1  General Surgery    "

## 2019-11-01 NOTE — PROGRESS NOTES
SPIRITUAL HEALTH SERVICES  SPIRITUAL ASSESSMENT Progress Note  [facility] ([place]) 7C     REFERRAL SOURCE: Length of stay    I met with Jenni Rodrigueshardt to introduce myself and explain the role of spiritual health services. She declines a  visit today as she wants to sleep. She is aware of how to request a  visit if her needs change.    PLAN: No follow up is anticipated.    Christin Skelton  Oncology   Pager [phone]    [facility] remains available 24/7 for emergent requests/referrals, either by having the switchboard page the on-call  or by entering an ASAP/STAT consult in Epic (this will also page the on-call ).

## 2019-11-01 NOTE — PLAN OF CARE
Alert & Orientated.   NPO. Vitals stable. Pain managed with PCA dilaudid pump. Dilaudid cartridge changed this shift. Declined to take scheduled tylenol. Resting comfortably between cares. Not motivated to ambulate in hallways, however ambulated to bathroom and voiding adequate amount of urine, but not saving. NG tube to gravity to leg bag with no output. Denies feelings of nausea and vomiting. Double lumen PICC infusing continuous TPN. 1 PIV infusing MIVF & pan pump. Please continue to monitor NG output, pain and encourage ambulation.

## 2019-11-01 NOTE — PLAN OF CARE
A&OX4. VSS on 2L of NC. POD 1 of Laparotomy,  bowel resection , lysis of adhesions, partial omentecomy. CAPNO on. Pain somewhat controlled with PCA dilaudid. NG to LIS. BS hyperactive. Denies passing gas, No BM during shift. Patient dangled, denies dizziness. Adequate output from kemp. PICC infusing TPN and lipids. Midline incision with liquid bandage open to air clean dry and intact. Continue with post op care.

## 2019-11-02 LAB
ANION GAP SERPL CALCULATED.3IONS-SCNC: 4 MMOL/L (ref 3–14)
BUN SERPL-MCNC: 12 MG/DL (ref 7–30)
CALCIUM SERPL-MCNC: 8.5 MG/DL (ref 8.5–10.1)
CHLORIDE SERPL-SCNC: 102 MMOL/L (ref 94–109)
CO2 SERPL-SCNC: 28 MMOL/L (ref 20–32)
CREAT SERPL-MCNC: 0.44 MG/DL (ref 0.52–1.04)
GFR SERPL CREATININE-BSD FRML MDRD: >90 ML/MIN/{1.73_M2}
GLUCOSE SERPL-MCNC: 99 MG/DL (ref 70–99)
LACTATE BLD-SCNC: 0.6 MMOL/L (ref 0.7–2)
POTASSIUM SERPL-SCNC: 4 MMOL/L (ref 3.4–5.3)
SODIUM SERPL-SCNC: 135 MMOL/L (ref 133–144)

## 2019-11-02 PROCEDURE — 83605 ASSAY OF LACTIC ACID: CPT

## 2019-11-02 PROCEDURE — 80048 BASIC METABOLIC PNL TOTAL CA: CPT | Performed by: SURGERY

## 2019-11-02 PROCEDURE — 12000001 ZZH R&B MED SURG/OB UMMC

## 2019-11-02 PROCEDURE — 25800030 ZZH RX IP 258 OP 636: Performed by: STUDENT IN AN ORGANIZED HEALTH CARE EDUCATION/TRAINING PROGRAM

## 2019-11-02 PROCEDURE — 25000128 H RX IP 250 OP 636: Performed by: SURGERY

## 2019-11-02 PROCEDURE — 36592 COLLECT BLOOD FROM PICC: CPT | Performed by: SURGERY

## 2019-11-02 PROCEDURE — 25000125 ZZHC RX 250: Performed by: SURGERY

## 2019-11-02 PROCEDURE — 40000141 ZZH STATISTIC PERIPHERAL IV START W/O US GUIDANCE

## 2019-11-02 RX ADMIN — Medication: at 11:52

## 2019-11-02 RX ADMIN — SODIUM CHLORIDE, POTASSIUM CHLORIDE, SODIUM LACTATE AND CALCIUM CHLORIDE 1000 ML: 600; 310; 30; 20 INJECTION, SOLUTION INTRAVENOUS at 16:28

## 2019-11-02 RX ADMIN — ENOXAPARIN SODIUM 40 MG: 40 INJECTION SUBCUTANEOUS at 09:29

## 2019-11-02 RX ADMIN — ASCORBIC ACID, VITAMIN A PALMITATE, CHOLECALCIFEROL, THIAMINE HYDROCHLORIDE, RIBOFLAVIN-5 PHOSPHATE SODIUM, PYRIDOXINE HYDROCHLORIDE, NIACINAMIDE, DEXPANTHENOL, ALPHA-TOCOPHEROL ACETATE, VITAMIN K1, FOLIC ACID, BIOTIN, CYANOCOBALAMIN: 200; 3300; 200; 6; 3.6; 6; 40; 15; 10; 150; 600; 60; 5 INJECTION, SOLUTION INTRAVENOUS at 20:55

## 2019-11-02 RX ADMIN — I.V. FAT EMULSION 250 ML: 20 EMULSION INTRAVENOUS at 20:54

## 2019-11-02 RX ADMIN — Medication: at 23:22

## 2019-11-02 ASSESSMENT — ACTIVITIES OF DAILY LIVING (ADL)
ADLS_ACUITY_SCORE: 12
ADLS_ACUITY_SCORE: 14
ADLS_ACUITY_SCORE: 12

## 2019-11-02 ASSESSMENT — PAIN DESCRIPTION - DESCRIPTORS
DESCRIPTORS: ACHING;SORE
DESCRIPTORS: ACHING;CONSTANT
DESCRIPTORS: ACHING;CONSTANT
DESCRIPTORS: STABBING;SHARP
DESCRIPTORS: ACHING;SORE

## 2019-11-02 NOTE — PLAN OF CARE
HD 15 admitted with SBO. POD1 of a laparotomy, partial omentectomy, small bowel resection. A&Ox4, tachy at times, on room air, pain is controlled with PCA of dilaudid. IVM running at 75/hr, TPN and lipids running, Up SBA, ambulated the halls once, NG to gravity. Midline incision is KILEY and CDI, adequate UO, Pt weight show 2.91 lb loss. Passing gas, no BM since surgery. Continue to monitor.

## 2019-11-02 NOTE — PROGRESS NOTES
Pt triggered sepsis protocol at 1530. Lab was drawn at 1730.  called the result to this RN around 1800, the result was 0.5. Results did not show up in epic as of 2023. Called Main lab to inquire where the result is.

## 2019-11-02 NOTE — PROGRESS NOTES
Surgery progress note    S: No acute events. POD 2. States that pain control is acceptable, no nausea. No bowel movement yet but some flatus.      BP (!) 83/68 (BP Location: Left arm)   Pulse 119   Temp 98.9  F (37.2  C) (Oral)   Resp 18   Wt 45.7 kg (100 lb 11.2 oz)   SpO2 95%   BMI 14.87 kg/m    Resting in bed, non toxic  Abdomen soft, appropriately tender post-op  Breathing comfortably, no wheezing  NG tube in place, gastric contents in tubing  Appropriately oriented, cooperative    Labs reviewed: CBC within normal limits    A/P: 43 year old woman with history of pelvic irradiation and prior SBR for obstruction who has been dealing with chronic partial small bowel obstruction for years, now with worsening symptoms. Now s/p exploratory laparotomy with small bowel resection and primary anastomosis 10/31.  - NG out  - NPO  - Continue PCA  - LR 75/hr, TPN until tolerating PO    Seen and discussed with chief resident and staff    Luiz Cortez MD, PhD, PGY-1  General Surgery

## 2019-11-02 NOTE — PLAN OF CARE
Tachycardic, otherwise VSS (team aware). Reports good pain control with dilaudid PCA. TPN/lipids infusing via PICC, NPO. NG removed per MD order. Reports passing flatus x 1 since surgery, no BM. Voiding with good output. Abd soft, non-distended, incision c/d/I. Up ad dania in halls. Continue with POC.

## 2019-11-02 NOTE — PLAN OF CARE
Tachycardic, otherwise VSS. NPO. TPN/lipids via PICC. NG to gravity. Pain managed with PCA Dilaudid. Abdominal incision dermabond. Bowel sounds hypoactive, passing some gas, no BM yet. Voiding spontaneously. Up ad dania. Continue with plan of care.

## 2019-11-03 LAB
ANION GAP SERPL CALCULATED.3IONS-SCNC: 4 MMOL/L (ref 3–14)
BUN SERPL-MCNC: 14 MG/DL (ref 7–30)
CALCIUM SERPL-MCNC: 8.2 MG/DL (ref 8.5–10.1)
CHLORIDE SERPL-SCNC: 105 MMOL/L (ref 94–109)
CO2 SERPL-SCNC: 29 MMOL/L (ref 20–32)
CREAT SERPL-MCNC: 0.41 MG/DL (ref 0.52–1.04)
GFR SERPL CREATININE-BSD FRML MDRD: >90 ML/MIN/{1.73_M2}
GLUCOSE SERPL-MCNC: 95 MG/DL (ref 70–99)
LACTATE BLD-SCNC: 0.6 MMOL/L (ref 0.7–2)
PLATELET # BLD AUTO: 190 10E9/L (ref 150–450)
POTASSIUM SERPL-SCNC: 4.1 MMOL/L (ref 3.4–5.3)
SODIUM SERPL-SCNC: 138 MMOL/L (ref 133–144)

## 2019-11-03 PROCEDURE — 85049 AUTOMATED PLATELET COUNT: CPT | Performed by: SURGERY

## 2019-11-03 PROCEDURE — 25800030 ZZH RX IP 258 OP 636: Performed by: SURGERY

## 2019-11-03 PROCEDURE — 25000132 ZZH RX MED GY IP 250 OP 250 PS 637: Performed by: STUDENT IN AN ORGANIZED HEALTH CARE EDUCATION/TRAINING PROGRAM

## 2019-11-03 PROCEDURE — 25000128 H RX IP 250 OP 636: Performed by: STUDENT IN AN ORGANIZED HEALTH CARE EDUCATION/TRAINING PROGRAM

## 2019-11-03 PROCEDURE — 36592 COLLECT BLOOD FROM PICC: CPT | Performed by: SURGERY

## 2019-11-03 PROCEDURE — 83605 ASSAY OF LACTIC ACID: CPT | Performed by: SURGERY

## 2019-11-03 PROCEDURE — 80048 BASIC METABOLIC PNL TOTAL CA: CPT | Performed by: SURGERY

## 2019-11-03 PROCEDURE — 25000125 ZZHC RX 250: Performed by: SURGERY

## 2019-11-03 PROCEDURE — 12000001 ZZH R&B MED SURG/OB UMMC

## 2019-11-03 PROCEDURE — 25000128 H RX IP 250 OP 636: Performed by: SURGERY

## 2019-11-03 PROCEDURE — 25800030 ZZH RX IP 258 OP 636: Performed by: STUDENT IN AN ORGANIZED HEALTH CARE EDUCATION/TRAINING PROGRAM

## 2019-11-03 RX ORDER — ACETAMINOPHEN 325 MG/1
650 TABLET ORAL EVERY 4 HOURS PRN
Status: DISCONTINUED | OUTPATIENT
Start: 2019-11-03 | End: 2019-11-03

## 2019-11-03 RX ORDER — METHOCARBAMOL 500 MG/1
500 TABLET, FILM COATED ORAL EVERY 6 HOURS PRN
Status: DISCONTINUED | OUTPATIENT
Start: 2019-11-03 | End: 2019-11-17 | Stop reason: HOSPADM

## 2019-11-03 RX ORDER — ACETAMINOPHEN 325 MG/1
975 TABLET ORAL EVERY 8 HOURS
Status: DISCONTINUED | OUTPATIENT
Start: 2019-11-03 | End: 2019-11-17 | Stop reason: HOSPADM

## 2019-11-03 RX ADMIN — METHOCARBAMOL 500 MG: 500 TABLET, FILM COATED ORAL at 08:55

## 2019-11-03 RX ADMIN — Medication: at 10:27

## 2019-11-03 RX ADMIN — SODIUM CHLORIDE, POTASSIUM CHLORIDE, SODIUM LACTATE AND CALCIUM CHLORIDE: 600; 310; 30; 20 INJECTION, SOLUTION INTRAVENOUS at 16:31

## 2019-11-03 RX ADMIN — METHOCARBAMOL 500 MG: 500 TABLET, FILM COATED ORAL at 23:15

## 2019-11-03 RX ADMIN — SODIUM CHLORIDE, POTASSIUM CHLORIDE, SODIUM LACTATE AND CALCIUM CHLORIDE 500 ML: 600; 310; 30; 20 INJECTION, SOLUTION INTRAVENOUS at 23:45

## 2019-11-03 RX ADMIN — SODIUM CHLORIDE, POTASSIUM CHLORIDE, SODIUM LACTATE AND CALCIUM CHLORIDE: 600; 310; 30; 20 INJECTION, SOLUTION INTRAVENOUS at 06:05

## 2019-11-03 RX ADMIN — ASCORBIC ACID, VITAMIN A PALMITATE, CHOLECALCIFEROL, THIAMINE HYDROCHLORIDE, RIBOFLAVIN-5 PHOSPHATE SODIUM, PYRIDOXINE HYDROCHLORIDE, NIACINAMIDE, DEXPANTHENOL, ALPHA-TOCOPHEROL ACETATE, VITAMIN K1, FOLIC ACID, BIOTIN, CYANOCOBALAMIN: 200; 3300; 200; 6; 3.6; 6; 40; 15; 10; 150; 600; 60; 5 INJECTION, SOLUTION INTRAVENOUS at 21:02

## 2019-11-03 RX ADMIN — ACETAMINOPHEN 975 MG: 325 TABLET, FILM COATED ORAL at 19:42

## 2019-11-03 RX ADMIN — ENOXAPARIN SODIUM 40 MG: 40 INJECTION SUBCUTANEOUS at 08:56

## 2019-11-03 RX ADMIN — ACETAMINOPHEN 975 MG: 325 TABLET, FILM COATED ORAL at 11:33

## 2019-11-03 RX ADMIN — Medication: at 20:34

## 2019-11-03 ASSESSMENT — PAIN DESCRIPTION - DESCRIPTORS
DESCRIPTORS: SHARP
DESCRIPTORS: ACHING;CONSTANT
DESCRIPTORS: ACHING;CONSTANT;SHARP
DESCRIPTORS: ACHING;CONSTANT;SHARP

## 2019-11-03 ASSESSMENT — ACTIVITIES OF DAILY LIVING (ADL)
ADLS_ACUITY_SCORE: 12

## 2019-11-03 NOTE — PROGRESS NOTES
Surgery progress note    S: No acute events. POD 3. Not passing flatus. Having more pain than yesterday.      BP (!) 86/52 (BP Location: Left arm)   Pulse 119   Temp 98  F (36.7  C) (Oral)   Resp 16   Wt 45.7 kg (100 lb 11.2 oz)   SpO2 94%   BMI 14.87 kg/m    Resting in bed, looks uncomfortable  Abdomen soft, moderately tender  Breathing comfortably, no wheezing  Appropriately oriented, cooperative    Labs reviewed: CBC within normal limits    A/P: 43 year old woman with history of pelvic irradiation and prior SBR for obstruction who has been dealing with chronic partial small bowel obstruction for years, now with worsening symptoms. Now s/p exploratory laparotomy with small bowel resection and primary anastomosis 10/31.  - PRN robaxin  - NPO  - Continue PCA  - LR 75/hr, TPN until tolerating PO    Seen and discussed with chief resident and staff    Luiz Cortez MD, PhD, PGY-1  General Surgery

## 2019-11-03 NOTE — PLAN OF CARE
UAL, ambulating independently x 2. Pain managed with PCA 0.5 every 15 min, 0.2 mg/hr continuous. Voiding adequately. Hypotensive this am, asymptomatic, provider aware. Please see provider's note. Improved to 107/65, otherwise the rest of the VSS, afebrile. Midline surgical incision with dermabon, CDI/KILEY. Hypo bowel sounds all quadrant. + gas (small amount per report from patient), no BM. TPN and MIVF via double PICC line. NPO with ice chips and sips for water. PLAN: Continue with post op cares. Pain management, VS and return of bowel function.

## 2019-11-03 NOTE — PLAN OF CARE
HD16 admitted with SBO, POD2 of a laparotomy, partial omentectomy and small bowel resection. A&Ox4, VSS on room air, pain is controlled with a PCA of dilaudid. IVM running in PICC as well as TPN. NPO, Midline incision is KILEY and CDI. Ambulated in the halls x2. LSC, BS+ LBM prior to surgery. Continue with plan of care.

## 2019-11-03 NOTE — PROVIDER NOTIFICATION
Notified team of persistent low BP 80/58 MAP of 65.3. Pt is asymptomatic. Requested a change in notify parameters.

## 2019-11-03 NOTE — PROVIDER NOTIFICATION
Notified Resident  Dr. Luiz Cortez at 09:20 AM regarding BP 86/52.  Pt is asymptomatic.     Spoke with: the same provider via phone    Orders provider was aware of the BP. Instructed this RN to continue monitor pt and upfate provider if pt status change or become symptomatic. .    Comments: Will continue to monitor pt's status, BP.

## 2019-11-03 NOTE — PLAN OF CARE
T max 99.9. recheck 98.8. AOVSS. Abdominal pain managed with PCA continuous 0.2mg/hr + bolus doses 0.5mg q 15 minutes. TPN + lipids infusing. LR infusing at 75ml/hr. Double lumen PICC. Midline incision KILEY/CDI. + gas - BM. Per pt request paged team to switch liquid medications to pill form. Stated day shift team would address. Pt slept comfortably for duration of shift.     Plan of care: AROBF.

## 2019-11-04 LAB
ANION GAP SERPL CALCULATED.3IONS-SCNC: 4 MMOL/L (ref 3–14)
BUN SERPL-MCNC: 18 MG/DL (ref 7–30)
CALCIUM SERPL-MCNC: 8.2 MG/DL (ref 8.5–10.1)
CHLORIDE SERPL-SCNC: 110 MMOL/L (ref 94–109)
CO2 SERPL-SCNC: 26 MMOL/L (ref 20–32)
CREAT SERPL-MCNC: 0.4 MG/DL (ref 0.52–1.04)
ERYTHROCYTE [DISTWIDTH] IN BLOOD BY AUTOMATED COUNT: 12.4 % (ref 10–15)
GFR SERPL CREATININE-BSD FRML MDRD: >90 ML/MIN/{1.73_M2}
GLUCOSE SERPL-MCNC: 93 MG/DL (ref 70–99)
HCT VFR BLD AUTO: 30 % (ref 35–47)
HGB BLD-MCNC: 9.2 G/DL (ref 11.7–15.7)
INR PPP: 1.12 (ref 0.86–1.14)
LACTATE BLD-SCNC: 0.7 MMOL/L (ref 0.7–2)
MCH RBC QN AUTO: 30.5 PG (ref 26.5–33)
MCHC RBC AUTO-ENTMCNC: 30.7 G/DL (ref 31.5–36.5)
MCV RBC AUTO: 99 FL (ref 78–100)
PLATELET # BLD AUTO: 215 10E9/L (ref 150–450)
POTASSIUM SERPL-SCNC: 4.3 MMOL/L (ref 3.4–5.3)
PREALB SERPL IA-MCNC: 7 MG/DL (ref 15–45)
RBC # BLD AUTO: 3.02 10E12/L (ref 3.8–5.2)
SODIUM SERPL-SCNC: 140 MMOL/L (ref 133–144)
WBC # BLD AUTO: 6.6 10E9/L (ref 4–11)

## 2019-11-04 PROCEDURE — 12000001 ZZH R&B MED SURG/OB UMMC

## 2019-11-04 PROCEDURE — 25800030 ZZH RX IP 258 OP 636: Performed by: SURGERY

## 2019-11-04 PROCEDURE — 83605 ASSAY OF LACTIC ACID: CPT | Performed by: STUDENT IN AN ORGANIZED HEALTH CARE EDUCATION/TRAINING PROGRAM

## 2019-11-04 PROCEDURE — 36592 COLLECT BLOOD FROM PICC: CPT | Performed by: STUDENT IN AN ORGANIZED HEALTH CARE EDUCATION/TRAINING PROGRAM

## 2019-11-04 PROCEDURE — 25000128 H RX IP 250 OP 636: Performed by: SURGERY

## 2019-11-04 PROCEDURE — 25000125 ZZHC RX 250: Performed by: SURGERY

## 2019-11-04 PROCEDURE — 80048 BASIC METABOLIC PNL TOTAL CA: CPT | Performed by: SURGERY

## 2019-11-04 PROCEDURE — 85027 COMPLETE CBC AUTOMATED: CPT | Performed by: STUDENT IN AN ORGANIZED HEALTH CARE EDUCATION/TRAINING PROGRAM

## 2019-11-04 PROCEDURE — 25800030 ZZH RX IP 258 OP 636: Performed by: STUDENT IN AN ORGANIZED HEALTH CARE EDUCATION/TRAINING PROGRAM

## 2019-11-04 PROCEDURE — 84134 ASSAY OF PREALBUMIN: CPT | Performed by: SURGERY

## 2019-11-04 PROCEDURE — 25000132 ZZH RX MED GY IP 250 OP 250 PS 637: Performed by: STUDENT IN AN ORGANIZED HEALTH CARE EDUCATION/TRAINING PROGRAM

## 2019-11-04 PROCEDURE — 36592 COLLECT BLOOD FROM PICC: CPT | Performed by: SURGERY

## 2019-11-04 PROCEDURE — 25000128 H RX IP 250 OP 636: Performed by: STUDENT IN AN ORGANIZED HEALTH CARE EDUCATION/TRAINING PROGRAM

## 2019-11-04 PROCEDURE — 85610 PROTHROMBIN TIME: CPT | Performed by: SURGERY

## 2019-11-04 RX ORDER — OXYCODONE HYDROCHLORIDE 5 MG/1
5-10 TABLET ORAL EVERY 4 HOURS PRN
Status: DISCONTINUED | OUTPATIENT
Start: 2019-11-04 | End: 2019-11-08

## 2019-11-04 RX ORDER — AMOXICILLIN 250 MG
1 CAPSULE ORAL 2 TIMES DAILY
Status: DISCONTINUED | OUTPATIENT
Start: 2019-11-04 | End: 2019-11-09

## 2019-11-04 RX ORDER — BISACODYL 10 MG
10 SUPPOSITORY, RECTAL RECTAL DAILY PRN
Status: DISCONTINUED | OUTPATIENT
Start: 2019-11-04 | End: 2019-11-17 | Stop reason: HOSPADM

## 2019-11-04 RX ORDER — POLYETHYLENE GLYCOL 3350 17 G/17G
17 POWDER, FOR SOLUTION ORAL DAILY
Status: DISCONTINUED | OUTPATIENT
Start: 2019-11-04 | End: 2019-11-04

## 2019-11-04 RX ORDER — OXYCODONE HYDROCHLORIDE 5 MG/1
5 TABLET ORAL EVERY 4 HOURS PRN
Status: DISCONTINUED | OUTPATIENT
Start: 2019-11-04 | End: 2019-11-04

## 2019-11-04 RX ORDER — HYDROMORPHONE HYDROCHLORIDE 1 MG/ML
.3-.5 INJECTION, SOLUTION INTRAMUSCULAR; INTRAVENOUS; SUBCUTANEOUS
Status: DISCONTINUED | OUTPATIENT
Start: 2019-11-04 | End: 2019-11-06

## 2019-11-04 RX ADMIN — METHOCARBAMOL 500 MG: 500 TABLET, FILM COATED ORAL at 06:09

## 2019-11-04 RX ADMIN — SODIUM CHLORIDE, POTASSIUM CHLORIDE, SODIUM LACTATE AND CALCIUM CHLORIDE 500 ML: 600; 310; 30; 20 INJECTION, SOLUTION INTRAVENOUS at 18:48

## 2019-11-04 RX ADMIN — HYDROMORPHONE HYDROCHLORIDE 0.5 MG: 1 INJECTION, SOLUTION INTRAMUSCULAR; INTRAVENOUS; SUBCUTANEOUS at 10:29

## 2019-11-04 RX ADMIN — ASCORBIC ACID, VITAMIN A PALMITATE, CHOLECALCIFEROL, THIAMINE HYDROCHLORIDE, RIBOFLAVIN-5 PHOSPHATE SODIUM, PYRIDOXINE HYDROCHLORIDE, NIACINAMIDE, DEXPANTHENOL, ALPHA-TOCOPHEROL ACETATE, VITAMIN K1, FOLIC ACID, BIOTIN, CYANOCOBALAMIN: 200; 3300; 200; 6; 3.6; 6; 40; 15; 10; 150; 600; 60; 5 INJECTION, SOLUTION INTRAVENOUS at 20:10

## 2019-11-04 RX ADMIN — ACETAMINOPHEN 975 MG: 325 TABLET, FILM COATED ORAL at 14:30

## 2019-11-04 RX ADMIN — OXYCODONE HYDROCHLORIDE 5 MG: 5 TABLET ORAL at 18:55

## 2019-11-04 RX ADMIN — ACETAMINOPHEN 975 MG: 325 TABLET, FILM COATED ORAL at 06:10

## 2019-11-04 RX ADMIN — OXYCODONE HYDROCHLORIDE 10 MG: 5 TABLET ORAL at 23:01

## 2019-11-04 RX ADMIN — ENOXAPARIN SODIUM 40 MG: 40 INJECTION SUBCUTANEOUS at 10:28

## 2019-11-04 RX ADMIN — SODIUM CHLORIDE, POTASSIUM CHLORIDE, SODIUM LACTATE AND CALCIUM CHLORIDE: 600; 310; 30; 20 INJECTION, SOLUTION INTRAVENOUS at 06:14

## 2019-11-04 RX ADMIN — I.V. FAT EMULSION 250 ML: 20 EMULSION INTRAVENOUS at 20:10

## 2019-11-04 RX ADMIN — OXYCODONE HYDROCHLORIDE 5 MG: 5 TABLET ORAL at 09:08

## 2019-11-04 RX ADMIN — HYDROMORPHONE HYDROCHLORIDE 0.5 MG: 1 INJECTION, SOLUTION INTRAMUSCULAR; INTRAVENOUS; SUBCUTANEOUS at 16:54

## 2019-11-04 RX ADMIN — SODIUM CHLORIDE, POTASSIUM CHLORIDE, SODIUM LACTATE AND CALCIUM CHLORIDE: 600; 310; 30; 20 INJECTION, SOLUTION INTRAVENOUS at 20:16

## 2019-11-04 RX ADMIN — SENNOSIDES AND DOCUSATE SODIUM 1 TABLET: 8.6; 5 TABLET ORAL at 09:05

## 2019-11-04 RX ADMIN — HYDROMORPHONE HYDROCHLORIDE 0.5 MG: 1 INJECTION, SOLUTION INTRAMUSCULAR; INTRAVENOUS; SUBCUTANEOUS at 20:23

## 2019-11-04 RX ADMIN — OXYCODONE HYDROCHLORIDE 5 MG: 5 TABLET ORAL at 14:30

## 2019-11-04 RX ADMIN — SODIUM CHLORIDE, POTASSIUM CHLORIDE, SODIUM LACTATE AND CALCIUM CHLORIDE 500 ML: 600; 310; 30; 20 INJECTION, SOLUTION INTRAVENOUS at 01:37

## 2019-11-04 RX ADMIN — METHOCARBAMOL 500 MG: 500 TABLET, FILM COATED ORAL at 14:30

## 2019-11-04 RX ADMIN — OXYCODONE HYDROCHLORIDE 5 MG: 5 TABLET ORAL at 18:31

## 2019-11-04 ASSESSMENT — ACTIVITIES OF DAILY LIVING (ADL)
ADLS_ACUITY_SCORE: 12

## 2019-11-04 ASSESSMENT — PAIN DESCRIPTION - DESCRIPTORS
DESCRIPTORS: ACHING;CONSTANT
DESCRIPTORS: ACHING

## 2019-11-04 NOTE — OP NOTE
Procedure Date: 10/31/2019      POSTOPERATIVE DIAGNOSIS:  Small bowel obstruction.        POSTOPERATIVE DIAGNOSIS:  Small bowel obstruction.      PROCEDURES PERFORMED:  Exploratory laparotomy, extensive lysis of adhesions, partial omentectomy, small bowel resection with enteroenterostomy, and repair of enterotomy x 2.      ATTENDING SURGEON:  Dada Trevizo MD      RESIDENT SURGEON:  Jerome Patino MD       ESTIMATED BLOOD LOSS:  20 mL.      COMPLICATIONS:  None.      DRAINS:  None.      INDICATIONS FOR PROCEDURE:  The patient is a 43-year-old woman who had an ileal resection in the past after developing obstruction due to damage from radiation therapy for cervical cancer.  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  She is reluctant to have had surgery in the past though it was recommended.  She has been placed on TPN for a week for preoperative nutritional optimization or improvement.        FINDINGS:  Stricture distal to the anastomosis.      PROCEDURE IN DETAIL:  The patient was taken to the operating room.  Her abdomen was prepped and draped.  An incision was made through the midline.  In the incision, 2 enterotomies were unavoidable performed.  These were full thickness, but small and repaired with Lembert sutures of 3-0 Vicryl.  An extensive lysis of adhesions was performed to free the small bowel from the ligament of Treitz to the terminal ileum, including the previous anastomotic site.  This lysis of adhesions took at least an hour and a half.        With the previous anastomosis identified, there appeared to be a stricture distal.  There were several arms of the omentum that were adhesed in the area that may have been causing a mechanical component of the obstruction.  Nevertheless, it appeared that the area was scarred and densely adhesed and resection was performed.  A side-to-side functional end-to-end anastomosis was performed using the ELMER stapler.  The staple lines were oversewn with Lembert  sutures of 3-0 Vicryl.      The abdomen was irrigated.  NG tube was palpated in the stomach.  The fascia was closed with 0 looped PDS.  The skin was closed with a suture.  The patient was taken to recovery room in stable condition.         DEBBIE NETTLES MD             D: 2019   T: 2019   MT: LEOLA      Name:     GERHARDT, RACHEL   MRN:      [id number]        Account:        [id number]   :      1975           Procedure Date: 10/31/2019      Document: [id number]

## 2019-11-04 NOTE — PLAN OF CARE
Rachel A Gerhardt is a 43year old female with a history of peliv irradiation and SBR who has had chronic SBO for years with recent worsening of symptoms and is POD4 s/p exploratory laparotomy with SBR and primary anastomosis on 10/31.  (MD note)    A&O. Blood pressures soft.Up at dania. Voiding adequate amounts but not measuring. 1 formed loose bm this shift as per pt. PCA dilaudid pump successfully discontinued. Pain now managed with prn oxycodone q 4 hours, scheduled tylenol, prn robaxin and IV dilaudid for breakthrough pain. On CLD but appetite poor. Just been taking sips.  Denies N&V. Double lumen PICC WDL infusing MIVF and TPN.

## 2019-11-04 NOTE — PLAN OF CARE
"Pt VSS except BP remains hypotensive. MD at  to see pt initially. LR 500cc bolus begun with labs drawn. MD paged following results with no change in BP. Remained hypotensive with BP in the 80's systolically. Lactic= 0.7 Hgb=9.2 MD aware and reordered a 2nd 500cc LR bolus. Pt with BP in upper 80's following. Pt by this time was becoming extremely agitated and tearful stating, \" I don't know why they are backing off on my pain med. The old rate was working just fine and it was under control.\" \"I can't stay awake pressing my button every 15 min because then I won't get any sleep.\" MD at bs again and decision made to change dilaudid pca from 0.3mg q 15 min bolus/ 0.2mg cont rate to 0.5mg q 15 min bolus/0.2mg cont rate. Pt settled down after that. Pt UAL, gait steady. IVF at 75/hr via picc. Voiding good amts. Lungs clear, dim in bases. Abd flat, +bs, +gas, midline inc dermabond D/I. Cont to monitor bowel status and maintain pain control.    Addendum: Pt stated having better pain control with change in pca dose. Pt tonite had a total of 6.62mg of dilaudid. Pt took a total of 11 bumps. Pt also had a small soft brown BM. Pt in better spirits at this time.  "

## 2019-11-04 NOTE — PROGRESS NOTES
S:  POD 4. Continues to have abdominal pain. No nausea. Had 2 BM this morning, mostly solid. Breathing well on room air, ambulating frequently. Good urine output.      O:  Temp: 96.2  F (35.7  C) Temp src: Oral BP: (!) 86/58 Pulse: 91 Heart Rate: 87 Resp: 16 SpO2: 97 % O2 Device: None (Room air)     Tmax:      Exam  Alert, oriented, no apparent distress  Abdomen soft, nondistended, moderately tender  Incision c/d/i with glue in place    Lab  CBC   Lab Test 11/04/19  0000 11/01/19  0639   WBC 6.6 13.5*   HGB 9.2* 11.2*   MCV 99 96    203     BMP- 11/04/19  Sodium 138    Potassium 4.1    Chloride 105    Carbon Dioxide 29    Anion Gap 4    Glucose 95    Urea Nitrogen 14    Creatinine 0.41    GFR Estimate >90        A/P  Rachel A Gerhardt is a 43year old female with a history of peliv irradiation and SBR who has had chronic SBO for years with recent worsening of symptoms and is POD4 s/p exploratory laparotomy with SBR and primary anastomosis on 10/31.     - PRN robaxin   - Discontinue PCA, switch to IV dilaudid  - Clear liquid diet  - Calorie counts  - tylenol 975mg Q8hr PO  - LR 75cc/hr via picc, TPN until tolerating PO intake     Patient seen and discussed with chief resident and staff    Luiz Cortez MD, PhD, PGY-1  General Surgery

## 2019-11-04 NOTE — PLAN OF CARE
OT 7C: cancel, pt off floor ambulating in halls earlier in day then with low BP this PM, will reschedule.

## 2019-11-04 NOTE — PLAN OF CARE
HD17 POD3 of laparotomy, partial omentectomy and small bowel resection. A&Ox4, hypotensive (80s/50s) MAP at 60. Notified team, will reduce dilaudid. LSC, IS at 2000, BS+ passed gas the day of surgery, none since. No BM since surgery. NPO. Up independent. Ambulating the halls. Left the floor, paged the pt to return. TPN and IVM running, pain is controlled with PCA of dilaudid. Adequate UO. Midline incision is KILEY and CDI. Continue with POC.

## 2019-11-04 NOTE — PROGRESS NOTES
SURGERY CROSS COVER NOTE     Patient with BPs in 80s/50-60s despite decrease in PCA dose. Patient reports increased abdominal pain today. +gas this evening, no bowel movement. No nausea or vomiting. No lightheadedness or dizziness.      Exam:  Temp: 96.2  F (35.7  C) Temp src: Oral BP: 98/65 Pulse: 90 Heart Rate: 87 Resp: 16 SpO2: 98 % O2 Device: None (Room air) Oxygen Delivery: 2 LPM   Weight: 47.2 kg (104 lb)  Lying on side, uncomfortable appearing  RRR  Non-labored breathing on RA  abd soft, non-distended, tender to percussion and light palpation, midline incision C/D/I     A/P:   Rachel A Gerhardt is a 43 year old female POD4 s/p laparotomy, bowel resection, JUANA, partial omentectomy now with hypotension. Patient also with significant abdominal tenderness, though patient states exam is similar to prior. Prior to getting labs drawn/receiving fluids, patient went for a walk. Reassuring that patient was walking around despite pain, no lightheadedness.    CBC  Lactate- 0.7  500cc LR  PRN Robaxin as ordered  Will hold off on increasing dilaudid PCA until BP improves     Kathleen Figueroa  Surgery Resident, PGY1  Pager: [phone]    ADDENDUM  Given another 500cc bolus LR with BPs still in 80s/60s. Looking through chart, patient has had soft BPs past few days. Patient has had no lightheadedness or nausea. Has continued abdominal pain and is frustrated about lower PCA dose. Increased back to original dosage.

## 2019-11-04 NOTE — PROVIDER NOTIFICATION
Notified team of low BP 80's/50's pt was asymptomatic, MAPs are just at 60+. she left the floor. I paged her back to the floor. Requested MD to see pt at bedside.     Response: PCA dose changed. Continue to monitor.

## 2019-11-04 NOTE — PROVIDER NOTIFICATION
11/04/19 1430 11/04/19 1436   Vitals   BP (!) 85/58 (!) 82/58  (notified MD)     Notified MD [[id number]] via text page re: low blood pressures. Awaiting orders.

## 2019-11-05 ENCOUNTER — APPOINTMENT (OUTPATIENT)
Dept: GENERAL RADIOLOGY | Facility: CLINIC | Age: 44
DRG: 329 | End: 2019-11-05
Payer: COMMERCIAL

## 2019-11-05 LAB
ANION GAP SERPL CALCULATED.3IONS-SCNC: 4 MMOL/L (ref 3–14)
BUN SERPL-MCNC: 16 MG/DL (ref 7–30)
CALCIUM SERPL-MCNC: 8.2 MG/DL (ref 8.5–10.1)
CHLORIDE SERPL-SCNC: 108 MMOL/L (ref 94–109)
CO2 SERPL-SCNC: 25 MMOL/L (ref 20–32)
COPATH REPORT: NORMAL
CREAT SERPL-MCNC: 0.39 MG/DL (ref 0.52–1.04)
GFR SERPL CREATININE-BSD FRML MDRD: >90 ML/MIN/{1.73_M2}
GLUCOSE SERPL-MCNC: 92 MG/DL (ref 70–99)
POTASSIUM SERPL-SCNC: 3.9 MMOL/L (ref 3.4–5.3)
SODIUM SERPL-SCNC: 138 MMOL/L (ref 133–144)

## 2019-11-05 PROCEDURE — 80048 BASIC METABOLIC PNL TOTAL CA: CPT | Performed by: SURGERY

## 2019-11-05 PROCEDURE — 25000132 ZZH RX MED GY IP 250 OP 250 PS 637: Performed by: STUDENT IN AN ORGANIZED HEALTH CARE EDUCATION/TRAINING PROGRAM

## 2019-11-05 PROCEDURE — 99233 SBSQ HOSP IP/OBS HIGH 50: CPT | Performed by: PHYSICIAN ASSISTANT

## 2019-11-05 PROCEDURE — 25000125 ZZHC RX 250: Performed by: SURGERY

## 2019-11-05 PROCEDURE — 12000001 ZZH R&B MED SURG/OB UMMC

## 2019-11-05 PROCEDURE — 40000802 ZZH SITE CHECK

## 2019-11-05 PROCEDURE — 74019 RADEX ABDOMEN 2 VIEWS: CPT

## 2019-11-05 PROCEDURE — 36592 COLLECT BLOOD FROM PICC: CPT | Performed by: SURGERY

## 2019-11-05 PROCEDURE — 25000128 H RX IP 250 OP 636: Performed by: STUDENT IN AN ORGANIZED HEALTH CARE EDUCATION/TRAINING PROGRAM

## 2019-11-05 PROCEDURE — 25800030 ZZH RX IP 258 OP 636: Performed by: SURGERY

## 2019-11-05 PROCEDURE — 25000128 H RX IP 250 OP 636: Performed by: SURGERY

## 2019-11-05 RX ORDER — LIDOCAINE 4 G/G
1 PATCH TOPICAL
Status: DISCONTINUED | OUTPATIENT
Start: 2019-11-05 | End: 2019-11-09

## 2019-11-05 RX ORDER — HYDROMORPHONE HYDROCHLORIDE 1 MG/ML
0.5 INJECTION, SOLUTION INTRAMUSCULAR; INTRAVENOUS; SUBCUTANEOUS ONCE
Status: COMPLETED | OUTPATIENT
Start: 2019-11-05 | End: 2019-11-05

## 2019-11-05 RX ORDER — SIMETHICONE 80 MG
80 TABLET,CHEWABLE ORAL 4 TIMES DAILY
Status: DISCONTINUED | OUTPATIENT
Start: 2019-11-05 | End: 2019-11-09

## 2019-11-05 RX ADMIN — OXYCODONE HYDROCHLORIDE 10 MG: 5 TABLET ORAL at 03:30

## 2019-11-05 RX ADMIN — HYDROMORPHONE HYDROCHLORIDE 0.5 MG: 1 INJECTION, SOLUTION INTRAMUSCULAR; INTRAVENOUS; SUBCUTANEOUS at 00:07

## 2019-11-05 RX ADMIN — HYDROMORPHONE HYDROCHLORIDE 0.5 MG: 1 INJECTION, SOLUTION INTRAMUSCULAR; INTRAVENOUS; SUBCUTANEOUS at 08:35

## 2019-11-05 RX ADMIN — HYDROMORPHONE HYDROCHLORIDE 0.5 MG: 1 INJECTION, SOLUTION INTRAMUSCULAR; INTRAVENOUS; SUBCUTANEOUS at 16:40

## 2019-11-05 RX ADMIN — HYDROMORPHONE HYDROCHLORIDE 0.5 MG: 1 INJECTION, SOLUTION INTRAMUSCULAR; INTRAVENOUS; SUBCUTANEOUS at 06:49

## 2019-11-05 RX ADMIN — HYDROMORPHONE HYDROCHLORIDE 0.5 MG: 1 INJECTION, SOLUTION INTRAMUSCULAR; INTRAVENOUS; SUBCUTANEOUS at 11:36

## 2019-11-05 RX ADMIN — HYDROMORPHONE HYDROCHLORIDE 0.5 MG: 1 INJECTION, SOLUTION INTRAMUSCULAR; INTRAVENOUS; SUBCUTANEOUS at 14:40

## 2019-11-05 RX ADMIN — OXYCODONE HYDROCHLORIDE 10 MG: 5 TABLET ORAL at 09:41

## 2019-11-05 RX ADMIN — HYDROMORPHONE HYDROCHLORIDE 0.5 MG: 1 INJECTION, SOLUTION INTRAMUSCULAR; INTRAVENOUS; SUBCUTANEOUS at 01:39

## 2019-11-05 RX ADMIN — I.V. FAT EMULSION 250 ML: 20 EMULSION INTRAVENOUS at 20:08

## 2019-11-05 RX ADMIN — METHOCARBAMOL 500 MG: 500 TABLET, FILM COATED ORAL at 01:14

## 2019-11-05 RX ADMIN — ENOXAPARIN SODIUM 40 MG: 40 INJECTION SUBCUTANEOUS at 08:34

## 2019-11-05 RX ADMIN — HYDROMORPHONE HYDROCHLORIDE 0.5 MG: 1 INJECTION, SOLUTION INTRAMUSCULAR; INTRAVENOUS; SUBCUTANEOUS at 04:43

## 2019-11-05 RX ADMIN — METHOCARBAMOL 500 MG: 500 TABLET, FILM COATED ORAL at 09:41

## 2019-11-05 RX ADMIN — SODIUM CHLORIDE, POTASSIUM CHLORIDE, SODIUM LACTATE AND CALCIUM CHLORIDE: 600; 310; 30; 20 INJECTION, SOLUTION INTRAVENOUS at 09:41

## 2019-11-05 RX ADMIN — ASCORBIC ACID, VITAMIN A PALMITATE, CHOLECALCIFEROL, THIAMINE HYDROCHLORIDE, RIBOFLAVIN-5 PHOSPHATE SODIUM, PYRIDOXINE HYDROCHLORIDE, NIACINAMIDE, DEXPANTHENOL, ALPHA-TOCOPHEROL ACETATE, VITAMIN K1, FOLIC ACID, BIOTIN, CYANOCOBALAMIN: 200; 3300; 200; 6; 3.6; 6; 40; 15; 10; 150; 600; 60; 5 INJECTION, SOLUTION INTRAVENOUS at 20:08

## 2019-11-05 RX ADMIN — OXYCODONE HYDROCHLORIDE 10 MG: 5 TABLET ORAL at 13:50

## 2019-11-05 RX ADMIN — ACETAMINOPHEN 975 MG: 325 TABLET, FILM COATED ORAL at 11:32

## 2019-11-05 RX ADMIN — HYDROMORPHONE HYDROCHLORIDE 0.5 MG: 1 INJECTION, SOLUTION INTRAMUSCULAR; INTRAVENOUS; SUBCUTANEOUS at 18:57

## 2019-11-05 RX ADMIN — METHOCARBAMOL 500 MG: 500 TABLET, FILM COATED ORAL at 16:40

## 2019-11-05 RX ADMIN — HYDROMORPHONE HYDROCHLORIDE 0.5 MG: 1 INJECTION, SOLUTION INTRAMUSCULAR; INTRAVENOUS; SUBCUTANEOUS at 21:10

## 2019-11-05 RX ADMIN — ACETAMINOPHEN 975 MG: 325 TABLET, FILM COATED ORAL at 20:02

## 2019-11-05 RX ADMIN — ACETAMINOPHEN 975 MG: 325 TABLET, FILM COATED ORAL at 03:30

## 2019-11-05 RX ADMIN — OXYCODONE HYDROCHLORIDE 10 MG: 5 TABLET ORAL at 20:02

## 2019-11-05 ASSESSMENT — ACTIVITIES OF DAILY LIVING (ADL)
ADLS_ACUITY_SCORE: 12

## 2019-11-05 ASSESSMENT — PAIN DESCRIPTION - DESCRIPTORS
DESCRIPTORS: ACHING

## 2019-11-05 NOTE — PLAN OF CARE
HD19 admitted with SBO, POD5 of a laparotomy, partial omentectomy and small bowel resection. A&Ox4, VSS  hypotensive at times (see previous provider notifications) on room air, pain is not well controlled, on a CLD only taking in minimal PO intake. Significant stool output, adequate UO. Up independent, ambulating the halls. LSC, BS+, midline incision is KILEY and CDI. Continue to monitor.

## 2019-11-05 NOTE — PROGRESS NOTES
"CLINICAL NUTRITION SERVICES - BRIEF NOTE  *Received consult: \"Chronically poor nutrition\" Previous consult discontinued \"peipheral parenteral nutrition possiblity, recs for this vs TPN, prolonged NPO for unresolving SBO since 10/19\".  TPN consult still active.  *See RD note on 10/31 for last nutrition reassessment details      RD already following patient will continue with POC.  Pt is currently on clear liquid diet, can advance as tolerated to low fat.  Calorie counts ordered 11/5-11/7.  Per RN note, pt only taking sips of clears.  Pt not in room during attempts x 2 to visit today.      Remains on TPN for 100% estimated kcal/protein needs: Clinimix @ 65 mL/hr (1560 mL/day) + 250 mL of 20% IV lipids 6 days/week to provide 1537 kcals (31 kcal/kg), 78 g protein (1.6 g/kg), 234 g CHO (GIR 3.3), and 28% of kcals from fat on average daily    RECOMMENDATIONS FOR MDs/PROVIDERS TO ORDER:  Advance diet to solid food as soon as medically appropriate.  Recommend patient be able to consistently consume at least 950 kcal and 50g protein daily (~60% estimated kcal and protein needs) before stop nutrition support.      Monitoring/Evaluation  Progress toward goals will be monitored and evaluated per protocol.     Mylene Duvall RD, LD  7C RD pager: [phone]     "

## 2019-11-05 NOTE — SAFE
Patient has been educated on potential risks of choosing to leave the unit and that the responsibility for patient well-being will belong to the patient. Pt has been informed that admission to hospital is due to need for medical treatment. Education given to the patient on some of the potential risks included but are not limited to:      - lack of access to nursing intervention      - possible missed appointments with MD, therapies, tests      - possible missed medications, antibiotics, management of IV's    Patient Response: Patient aware and wrote cell phone number on white board in her room so staff can contact her when needed.

## 2019-11-05 NOTE — PROGRESS NOTES
Surgery Progress Note    S:   Her was not well controlled over night but was ambulating fine.  She describes stabbing crampy abdominal pain that can be unbearable at times.  She has been stooling and voiding well with multiple liquidy bowel movements overnight.  She denies nausea or vomiting.      O:   /72 (BP Location: Left arm)   Pulse 84   Temp 98.1  F (36.7  C) (Oral)   Resp 16   Wt 50.1 kg (110 lb 8 oz)   SpO2 97%   BMI 16.32 kg/m         Intake/Output Summary (Last 24 hours) at 11/5/2019 0536  Last data filed at 11/5/2019 0520  Gross per 24 hour   Intake 4093.59 ml   Output 5550 ml   Net -1456.41 ml        UOP: 1.33 ml/kg/hr     General: Well appearing:   Cardio: Extremities are warm and well perfused   Resp: No increased work of breathing  Abdomen: Soft, tender to palpation diffusely.   Neuro: Alert and oriented. Moves all extremities symmetrically.            Assessment and Plan:     ASSESSMENT: Rachel A Gerhardt is a 43 year old female with a PMH of pelvic radiation, cervical and ovarian cancer and SBR in 2017 who was admitted for recurrent SBO and worsening symptoms and is  POD # 5 from small bowel resection and primary anastomosis.  Main goals of care are to continue to increase diet as tolerated and to better control her pain.    PLAN:   - LR 75cc/hr via picc, TPN until tolerating PO intake  - advance diet as tolerated  - oxycodone 5-10mg PRN  - IV dilaudid 0.5mg Q2H  - tylenol 975 Q8hr  - Appreciate recs from pain service  - calorie counts, supplements  - AXR  - PRN robaxin      Seen and discussed with chief resident and staff    Krissy Johnson MS III    Luiz Cortez MD, PhD, PGY-1  General Surgery

## 2019-11-05 NOTE — PROVIDER NOTIFICATION
Notified team of BP of 78/56 MAP of 63.     Response: Continue to monitor. If pt becomes symptomatic, then notify.

## 2019-11-05 NOTE — PLAN OF CARE
HD 18 admitted with SBO, POD 4 of a laparotomy, partial omentectomy and small bowel resection. A&Ox4, hypotensive, 78-79/50's MAPs just below 60. Notified team. This is a new normal for her, notify if symptomatic. Loose BMs ~2400 this shift. 500 ml bolus given. PICC is infusing IVM and TPN and lipids. Pain was not controlled with 5mg of oxycodone. Increased to 5-10mg Q4H. IV dilaudid given also. Midline incision is KILEY and CDI. On a CLD with poor intake. Continue to monitor.

## 2019-11-05 NOTE — PROVIDER NOTIFICATION
Notified team of uncontrolled pain. All IV and oral pain meds are on board and she does not have control of her pain. It is cramping, intermittent unbearable pain.     Team to assess pt at bedside
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
## 2019-11-05 NOTE — PLAN OF CARE
Rachel A Gerhardt is a 43year old female with a history of peliv irradiation and SBR who has had chronic SBO for years with recent worsening of symptoms and is POD5 s/p exploratory laparotomy with SBR and primary anastomosis on 10/31.     A&O. Blood pressures soft but within defined limits. Up at dania.  Ambulating outside and in hallways. Voiding adequate amounts but not measuring. Patient states that pain is not well managed. Prn Oxycodone, robaxin, IV dilaudid and scheduled tylenol given for pain. Declined prn simethicone, icy hot patches and lidocaine patch. On CLD but diet can be advanced as tolerated. Appetite poor- pt has Just been taking sips.  Denies N&V. Double lumen PICC WDL infusing MIVF and TPN.

## 2019-11-05 NOTE — PROVIDER NOTIFICATION
Notified team of very high stool output. 2400 of stool and urine since 1500, most of which is liquid stool. Also pain is not controlled.     Response: Increase PRN oxy to 5-10mg Q4H and ordered a bolus of 500 of LR.

## 2019-11-06 ENCOUNTER — APPOINTMENT (OUTPATIENT)
Dept: CT IMAGING | Facility: CLINIC | Age: 44
DRG: 329 | End: 2019-11-06
Payer: COMMERCIAL

## 2019-11-06 LAB
ANION GAP SERPL CALCULATED.3IONS-SCNC: 5 MMOL/L (ref 3–14)
ANION GAP SERPL CALCULATED.3IONS-SCNC: NORMAL MMOL/L (ref 6–17)
BUN SERPL-MCNC: 15 MG/DL (ref 7–30)
BUN SERPL-MCNC: NORMAL MG/DL (ref 7–30)
CALCIUM SERPL-MCNC: 8.4 MG/DL (ref 8.5–10.1)
CALCIUM SERPL-MCNC: NORMAL MG/DL (ref 8.5–10.1)
CHLORIDE SERPL-SCNC: 108 MMOL/L (ref 94–109)
CHLORIDE SERPL-SCNC: NORMAL MMOL/L (ref 94–109)
CO2 SERPL-SCNC: 25 MMOL/L (ref 20–32)
CO2 SERPL-SCNC: NORMAL MMOL/L (ref 20–32)
CREAT SERPL-MCNC: 0.39 MG/DL (ref 0.52–1.04)
CREAT SERPL-MCNC: NORMAL MG/DL (ref 0.52–1.04)
GFR SERPL CREATININE-BSD FRML MDRD: >90 ML/MIN/{1.73_M2}
GFR SERPL CREATININE-BSD FRML MDRD: NORMAL ML/MIN/{1.73_M2}
GLUCOSE SERPL-MCNC: 108 MG/DL (ref 70–99)
GLUCOSE SERPL-MCNC: NORMAL MG/DL (ref 70–99)
PLATELET # BLD AUTO: 249 10E9/L (ref 150–450)
POTASSIUM SERPL-SCNC: 4 MMOL/L (ref 3.4–5.3)
POTASSIUM SERPL-SCNC: NORMAL MMOL/L (ref 3.4–5.3)
SODIUM SERPL-SCNC: 138 MMOL/L (ref 133–144)
SODIUM SERPL-SCNC: NORMAL MMOL/L (ref 133–144)

## 2019-11-06 PROCEDURE — 25000132 ZZH RX MED GY IP 250 OP 250 PS 637: Performed by: STUDENT IN AN ORGANIZED HEALTH CARE EDUCATION/TRAINING PROGRAM

## 2019-11-06 PROCEDURE — 12000001 ZZH R&B MED SURG/OB UMMC

## 2019-11-06 PROCEDURE — 80048 BASIC METABOLIC PNL TOTAL CA: CPT | Performed by: SURGERY

## 2019-11-06 PROCEDURE — 70486 CT MAXILLOFACIAL W/O DYE: CPT

## 2019-11-06 PROCEDURE — 25800030 ZZH RX IP 258 OP 636: Performed by: SURGERY

## 2019-11-06 PROCEDURE — 36592 COLLECT BLOOD FROM PICC: CPT | Performed by: SURGERY

## 2019-11-06 PROCEDURE — 25000128 H RX IP 250 OP 636: Performed by: STUDENT IN AN ORGANIZED HEALTH CARE EDUCATION/TRAINING PROGRAM

## 2019-11-06 PROCEDURE — 25000125 ZZHC RX 250: Performed by: STUDENT IN AN ORGANIZED HEALTH CARE EDUCATION/TRAINING PROGRAM

## 2019-11-06 PROCEDURE — 25000125 ZZHC RX 250: Performed by: SURGERY

## 2019-11-06 PROCEDURE — 25000128 H RX IP 250 OP 636: Performed by: SURGERY

## 2019-11-06 PROCEDURE — 36415 COLL VENOUS BLD VENIPUNCTURE: CPT | Performed by: SURGERY

## 2019-11-06 PROCEDURE — 85049 AUTOMATED PLATELET COUNT: CPT | Performed by: SURGERY

## 2019-11-06 RX ORDER — HYDROMORPHONE HYDROCHLORIDE 1 MG/ML
.3-.5 INJECTION, SOLUTION INTRAMUSCULAR; INTRAVENOUS; SUBCUTANEOUS
Status: DISCONTINUED | OUTPATIENT
Start: 2019-11-06 | End: 2019-11-07

## 2019-11-06 RX ADMIN — ACETAMINOPHEN 975 MG: 325 TABLET, FILM COATED ORAL at 04:52

## 2019-11-06 RX ADMIN — OXYCODONE HYDROCHLORIDE 10 MG: 5 TABLET ORAL at 10:28

## 2019-11-06 RX ADMIN — HYDROMORPHONE HYDROCHLORIDE 0.5 MG: 1 INJECTION, SOLUTION INTRAMUSCULAR; INTRAVENOUS; SUBCUTANEOUS at 23:36

## 2019-11-06 RX ADMIN — HYDROMORPHONE HYDROCHLORIDE 0.5 MG: 1 INJECTION, SOLUTION INTRAMUSCULAR; INTRAVENOUS; SUBCUTANEOUS at 11:31

## 2019-11-06 RX ADMIN — I.V. FAT EMULSION 250 ML: 20 EMULSION INTRAVENOUS at 20:11

## 2019-11-06 RX ADMIN — OXYCODONE HYDROCHLORIDE 10 MG: 5 TABLET ORAL at 16:20

## 2019-11-06 RX ADMIN — HYDROMORPHONE HYDROCHLORIDE 0.5 MG: 1 INJECTION, SOLUTION INTRAMUSCULAR; INTRAVENOUS; SUBCUTANEOUS at 20:01

## 2019-11-06 RX ADMIN — HYDROMORPHONE HYDROCHLORIDE 0.5 MG: 1 INJECTION, SOLUTION INTRAMUSCULAR; INTRAVENOUS; SUBCUTANEOUS at 09:09

## 2019-11-06 RX ADMIN — TOPICAL ANESTHETIC 1 ML: 200 SPRAY DENTAL; PERIODONTAL at 18:36

## 2019-11-06 RX ADMIN — SODIUM CHLORIDE, POTASSIUM CHLORIDE, SODIUM LACTATE AND CALCIUM CHLORIDE: 600; 310; 30; 20 INJECTION, SOLUTION INTRAVENOUS at 07:09

## 2019-11-06 RX ADMIN — ACETAMINOPHEN 975 MG: 325 TABLET, FILM COATED ORAL at 11:31

## 2019-11-06 RX ADMIN — HYDROMORPHONE HYDROCHLORIDE 0.5 MG: 1 INJECTION, SOLUTION INTRAMUSCULAR; INTRAVENOUS; SUBCUTANEOUS at 04:48

## 2019-11-06 RX ADMIN — HYDROMORPHONE HYDROCHLORIDE 0.5 MG: 1 INJECTION, SOLUTION INTRAMUSCULAR; INTRAVENOUS; SUBCUTANEOUS at 00:00

## 2019-11-06 RX ADMIN — HYDROMORPHONE HYDROCHLORIDE 0.5 MG: 1 INJECTION, SOLUTION INTRAMUSCULAR; INTRAVENOUS; SUBCUTANEOUS at 16:59

## 2019-11-06 RX ADMIN — OXYCODONE HYDROCHLORIDE 10 MG: 5 TABLET ORAL at 01:20

## 2019-11-06 RX ADMIN — TOPICAL ANESTHETIC 0.5 ML: 200 SPRAY DENTAL; PERIODONTAL at 21:47

## 2019-11-06 RX ADMIN — ACETAMINOPHEN 975 MG: 325 TABLET, FILM COATED ORAL at 20:01

## 2019-11-06 RX ADMIN — HYDROMORPHONE HYDROCHLORIDE 0.5 MG: 1 INJECTION, SOLUTION INTRAMUSCULAR; INTRAVENOUS; SUBCUTANEOUS at 13:55

## 2019-11-06 RX ADMIN — SODIUM CHLORIDE, POTASSIUM CHLORIDE, SODIUM LACTATE AND CALCIUM CHLORIDE: 600; 310; 30; 20 INJECTION, SOLUTION INTRAVENOUS at 23:11

## 2019-11-06 RX ADMIN — Medication: at 13:55

## 2019-11-06 RX ADMIN — OXYCODONE HYDROCHLORIDE 10 MG: 5 TABLET ORAL at 06:02

## 2019-11-06 RX ADMIN — OXYCODONE HYDROCHLORIDE 10 MG: 5 TABLET ORAL at 20:52

## 2019-11-06 RX ADMIN — ENOXAPARIN SODIUM 40 MG: 40 INJECTION SUBCUTANEOUS at 09:09

## 2019-11-06 RX ADMIN — ASCORBIC ACID, VITAMIN A PALMITATE, CHOLECALCIFEROL, THIAMINE HYDROCHLORIDE, RIBOFLAVIN-5 PHOSPHATE SODIUM, PYRIDOXINE HYDROCHLORIDE, NIACINAMIDE, DEXPANTHENOL, ALPHA-TOCOPHEROL ACETATE, VITAMIN K1, FOLIC ACID, BIOTIN, CYANOCOBALAMIN: 200; 3300; 200; 6; 3.6; 6; 40; 15; 10; 150; 600; 60; 5 INJECTION, SOLUTION INTRAVENOUS at 20:11

## 2019-11-06 RX ADMIN — HYDROMORPHONE HYDROCHLORIDE 0.5 MG: 1 INJECTION, SOLUTION INTRAMUSCULAR; INTRAVENOUS; SUBCUTANEOUS at 02:27

## 2019-11-06 RX ADMIN — HYDROMORPHONE HYDROCHLORIDE 0.5 MG: 1 INJECTION, SOLUTION INTRAMUSCULAR; INTRAVENOUS; SUBCUTANEOUS at 07:04

## 2019-11-06 ASSESSMENT — PAIN DESCRIPTION - DESCRIPTORS
DESCRIPTORS: CRAMPING;ACHING
DESCRIPTORS: CRAMPING;SHARP
DESCRIPTORS: ACHING;CRAMPING
DESCRIPTORS: CRAMPING;SHARP
DESCRIPTORS: ACHING;CRAMPING
DESCRIPTORS: CRAMPING
DESCRIPTORS: ACHING
DESCRIPTORS: ACHING
DESCRIPTORS: CRAMPING
DESCRIPTORS: ACHING;CRAMPING
DESCRIPTORS: CRAMPING;ACHING
DESCRIPTORS: ACHING

## 2019-11-06 ASSESSMENT — ACTIVITIES OF DAILY LIVING (ADL)
ADLS_ACUITY_SCORE: 12
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
## 2019-11-06 NOTE — PLAN OF CARE
AOx4, soft BP's at times but within order parameters. OVSS on ra. UAL. BMx2 on shift, +flatus. Denies N/V. Clear liq diet. Double lumen PICC infusing with TPN at a continuous rate of 65mL/hr and IVMF. [facility] liq bandage, KILEY. Pain service working with pt, pt says her pain is tolerable but not controlled. Pt received PRN IV Dilaudid, PRN Oxycodone, PRN Robaxin, and scheduled Tylenol for pain. Pt refused Icy hot patch, refused Lidocaine patch, and refused Simethicone. Cont POC.

## 2019-11-06 NOTE — PLAN OF CARE
AVSS. Abdominal cramping pain managed with Tylenol, Oxycodone and IV Dilaudid. Patient c/o mouth ache and left cheek swollen - MD notified and ordered a dental consult. Dental CT done. Orajel given with some relief. On a clear liquid diet, appetite is poor. TPN/lipids running via PICC. Abdominal incisions with dermabond. Voiding, didn't save. Patient having large amounts of loose stool, MDs aware. Continue with plan of care.    CAPD support with 1 Liter 1.5% Dianeal fluid instilled to dwell for 2 hours. No peritoneal effluent drained upon initiation.

## 2019-11-06 NOTE — PROGRESS NOTES
Calorie Count  Intake recorded for: 11/5  Total Kcals: 0 Total Protein: 0g  Kcals from Hospital Food: 0   Protein: 0g  Kcals from Outside Food (average):0 Protein: 0g  # Meals Recorded: no meals ordered from kitchen, no intake recorded.   # Supplements Recorded: no intake recorded.
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
## 2019-11-06 NOTE — CONSULTS
"Dental Service Consultation        Rachel A Gerhardt MRN# [id number]   YOB: 1975 Age: 43 year old   Date of Admission: 10/18/2019     Reason for consult: I was asked by [facility] to evaluate this patient for cracked tooth and poor dentition.           Assessment and Plan:   Assessment:   Mandibular left first molar non-restorable due to lingual fracture, gross caries. Painful to palpation, percussion. No swelling palpable, left side angle of mandible painful to palpation. Dental CT confirms lingual cusp fracture with no associated periapical lucencies or swellings. Generalized gross dental caries including mandibular left first molar.    Plan:  Recommend patient can be seen by [facility] [facility] at [facility] for extraction of mandibular left first molar #19 due to unrestorable fracture, gross caries.       Chief Complaint:   \"I feel so swollen on my left side and it hurts when I don't have the pain medicine they just gave me.\"    History is obtained from the patient         History of Present Illness:   This patient is a 43 year old female who is admitted to [facility] with bowel obstruction, left side dental pain.    Patient says pain radiates to below ear from left side of face.              Past Medical History:     Past Medical History:   Diagnosis Date     Asthma      Cervical cancer (H)      Other chronic pain      Ovarian cancer (H)      Partial small bowel obstruction (H) 11/2/2018     SBO (small bowel obstruction) (H) 12/27/2016     Small bowel obstruction (H) 5/17/2017     Small intestine obstruction (H) 4/29/2017     Substance abuse (H)     Outside records indicate past history of narcotics abuse or dependence, but patient denies.             Past Surgical History:     Past Surgical History:   Procedure Laterality Date     COLONOSCOPY N/A 5/3/2018    Procedure: COLONOSCOPY;  sigmoidoscopy;  Surgeon: Omero Vigil " MD Lauro;  Location: [facility] OR     COLONOSCOPY WITH CO2 INSUFFLATION N/A 4/30/2018    Procedure: COLONOSCOPY WITH CO2 INSUFFLATION;  Colonoscopy;  Surgeon: Omero Vigil MD;  Location: [facility] OR     COMBINED CYSTOSCOPY, INSERT STENT URETER(S) Bilateral 5/18/2017    Procedure: COMBINED CYSTOSCOPY, INSERT STENT URETER(S);  Cystoscopy with Bilateral Stent,;  Surgeon: Rene Calero MD;  Location: [facility] OR     ENT SURGERY  2009    mastoid, sinus     ENTEROSCOPY SMALL BOWEL N/A 6/18/2018    Procedure: ENTEROSCOPY SMALL BOWEL;  Lower bowel Retrograde Enteroscopy with Balloon Dilation .;  Surgeon: Omero Vigil MD;  Location: [facility] OR     EXAM UNDER ANESTHESIA, INSERT ALEX SLEEVE, UTERINE PLACEMENT OF TANDEM AND RING FOR RAD, ULTRASOUND N/A 12/14/2015    Procedure: EXAM UNDER ANESTHESIA, INSERT ALEX SLEEVE, UTERINE PLACEMENT OF TANDEM AND RING FOR RADIATION, ULTRASOUND GUIDED;  Surgeon: Abby Tony MD;  Location: [facility] OR     INSERT TANDEM AND CESIUM APPLICATOR CERVIX, ULTRASOUND GUIDED N/A 12/17/2015    Procedure: INSERT TANDEM AND CESIUM APPLICATOR CERVIX, ULTRASOUND GUIDED;  Surgeon: Kika Wood MD;  Location: [facility] OR     KNEE SURGERY       LAPAROTOMY EXPLORATORY N/A 5/18/2017    Procedure: LAPAROTOMY EXPLORATORY;   Exploratry Laparotomy, Small Bowel Resection with anastomosis, Flexible Sigmoidoscopy;  Surgeon: Jennifer Goodwin MD;  Location: [facility] OR     LAPAROTOMY EXPLORATORY N/A 10/31/2019    Procedure: Laparotomy,  bowel resection , lysis of adhesions, partial omentecomy;  Surgeon: Dada Trevizo MD;  Location: [facility]     PICC INSERTION Right 04/29/2017    4fr SL BioFlo PICC, 37cm (3cm external) in the R basilic vein w/ tip in the mid SVC.     PICC INSERTION Right 03/29/2018    4Fr - 40cm (4cm external), R lateral brachial vein, Low SVC     RESECT SMALL BOWEL WITHOUT OSTOMY N/A 5/18/2017    Procedure: RESECT SMALL BOWEL WITHOUT OSTOMY;;  Surgeon: Jennifer Goodwin MD;  Location: [facility]      SIGMOIDOSCOPY FLEXIBLE N/A 5/18/2017    Procedure: SIGMOIDOSCOPY FLEXIBLE;;  Surgeon: Jennifer Goodwin MD;  Location: [facility]               Social History:     Social History     Tobacco Use     Smoking status: Light Tobacco Smoker     Years: 12.00     Types: Cigarettes     Smokeless tobacco: Never Used     Tobacco comment: pt smoking about 4 cigs per week   Substance Use Topics     Alcohol use: No     Alcohol/week: 0.0 standard drinks     Comment: None per pt             Family History:     Family History   Problem Relation Age of Onset     Diabetes Mother      Ovarian Cancer No family hx of      Uterine Cancer No family hx of      Cervical Cancer No family hx of      Breast Cancer No family hx of              Immunizations:     Immunization History   Administered Date(s) Administered     Influenza (IIV3) PF 10/08/2015     Tdap (Adacel,Boostrix) 01/01/2008             Allergies:     Allergies   Allergen Reactions     Sulfa Drugs Hives     Ibuprofen Nausea and Vomiting and Hives     Unknown if reaction hives or N/V     No Clinical Screening - See Comments Other (See Comments) and Diarrhea     headache  Carrots cause gastric upset, cramping and diarrhea.     Ciprofloxacin Hives and Nausea     Quinolones      Tramadol Hives, Diarrhea, Nausea and Nausea and Vomiting     Amoxicillin Nausea and Vomiting     Augmentin Nausea, Hives and GI Disturbance     Patient tolerated course of zosyn in 5/2018     Avelox [Moxifloxacin] Nausea and Vomiting     Codeine Nausea and Vomiting     Daucus Carota      Other reaction(s): GI Upset  Other reaction(s): Abdominal pain, Diarrhea  Carrots cause gastric upset, cramping and diarrhea.             Medications:     Current Facility-Administered Medications Ordered in Epic   Medication Dose Route Frequency Last Rate Last Dose     acetaminophen (TYLENOL) tablet 975 mg  975 mg Oral Q8H   975 mg at 11/06/19 1131     benzocaine (ORAJEL MAXIMUM STRENGTH) 20 % gel   Mouth/Throat Q3H PRN          bisacodyl (DULCOLAX) Suppository 10 mg  10 mg Rectal Daily PRN         calcium carbonate (TUMS) chewable tablet 500 mg  500 mg Oral Daily PRN   500 mg at 10/25/19 1528     dextrose 10 % 1,000 mL infusion   Intravenous Continuous PRN         diphenhydrAMINE (BENADRYL) injection 25 mg  25 mg Intravenous Q6H PRN   25 mg at 10/20/19 0826     enoxaparin ANTICOAGULANT (LOVENOX) injection 40 mg  40 mg Subcutaneous Q24H   40 mg at 11/06/19 0909     glycerin (ADULT) Suppository 1 suppository  1 suppository Rectal Daily PRN         heparin lock flush 10 UNIT/ML injection 2-5 mL  2-5 mL Intracatheter Once PRN         HYDROmorphone (PF) (DILAUDID) injection 0.3-0.5 mg  0.3-0.5 mg Intravenous Q3H PRN         hydrOXYzine (VISTARIL) injection 50 mg  50 mg Intramuscular Q6H PRN         lactated ringers infusion   Intravenous Continuous 75 mL/hr at 11/06/19 0709       Lidocaine (LIDOCARE) 4 % Patch 1 patch  1 patch Transdermal Q24h        And     lidocaine patch REMOVAL   Transdermal Q24H        And     lidocaine patch in PLACE   Transdermal Q8H         lidocaine (LMX4) cream   Topical Q1H PRN         lidocaine (XYLOCAINE) 2 % external gel   Topical Q4H PRN         lidocaine (XYLOCAINE) 4 % solution 5 mL  5 mL Topical Once PRN         lidocaine 1 % 0.1-1 mL  0.1-1 mL Other Q1H PRN   5 mL at 10/26/19 1454     lipids (INTRALIPID) 20 % infusion 250 mL  250 mL Intravenous Once per day on Mon Tue Wed Thu Fri Sat 20.8 mL/hr at 11/05/19 2008 250 mL at 11/05/19 2008     magnesium sulfate 4 g in 100 mL sterile water (premade)  4 g Intravenous Q4H PRN   4 g at 10/19/19 0458     melatonin tablet 1 mg  1 mg Oral At Bedtime PRN         menthol (ICY HOT) 5 % patch 1 patch  1 patch Topical Q8H PRN        And     menthol (ICY HOT) Patch in Place   Transdermal Q8H        And     menthol (ICY HOT) patch REMOVAL   Transdermal Q8H PRN         methocarbamol (ROBAXIN) tablet 500 mg  500 mg Oral Q6H PRN   500 mg at 11/05/19 1640     naloxone (NARCAN)  injection 0.1-0.4 mg  0.1-0.4 mg Intravenous Q2 Min PRN         ondansetron (ZOFRAN-ODT) ODT tab 4 mg  4 mg Oral Q6H PRN        Or     ondansetron (ZOFRAN) injection 4 mg  4 mg Intravenous Q6H PRN   4 mg at 10/27/19 0816     oxyCODONE (ROXICODONE) tablet 5-10 mg  5-10 mg Oral Q4H PRN   10 mg at 11/06/19 1028     oxymetazoline (AFRIN) 0.05 % spray 2 spray  2 spray Nasal Once PRN         parenteral nutrition - Clinimix E   CENTRAL LINE IV Continuous 65 mL/hr at 11/05/19 2008       phenol (CHLORASEPTIC) 1.4 % spray 1 mL  1 spray Mouth/Throat Q1H PRN   1 mL at 10/27/19 1646     potassium chloride (KLOR-CON) Packet 20-40 mEq  20-40 mEq Oral or Feeding Tube Q2H PRN         potassium chloride 10 mEq in 100 mL intermittent infusion with 10 mg lidocaine  10 mEq Intravenous Q1H PRN   10 mEq at 10/19/19 1427     potassium chloride 10 mEq in 100 mL sterile water intermittent infusion (premix)  10 mEq Intravenous Q1H PRN         potassium chloride 20 mEq in 50 mL intermittent infusion  20 mEq Intravenous Q1H PRN         potassium chloride ER (K-DUR/KLOR-CON M) CR tablet 20-40 mEq  20-40 mEq Oral Q2H PRN         senna-docusate (SENOKOT-S/PERICOLACE) 8.6-50 MG per tablet 1 tablet  1 tablet Oral BID   1 tablet at 11/04/19 0905     simethicone (MYLICON) chewable tablet 80 mg  80 mg Oral 4x Daily         sodium chloride (PF) 0.9% PF flush 3 mL  3 mL Intracatheter q1 min prn   20 mL at 11/06/19 0718     sodium chloride (PF) 0.9% PF flush 3 mL  3 mL Intracatheter Q8H   3 mL at 10/31/19 1619     sodium phosphate (FLEET ENEMA) 1 enema  1 enema Rectal Daily PRN         No current Epic-ordered outpatient medications on file.             Review of Systems:   The 10 point Review of Systems is negative other than noted in the HPI            Physical Exam:   Vitals were reviewed  Temp: 98.5  F (36.9  C) Temp src: Oral BP: 108/74 Pulse: 90 Heart Rate: 89 Resp: 16 SpO2: 99 % O2 Device: None (Room air)      Head and neck exam:  Extraoral head and  neck exam shows no swelling, lesions, or masses. No pain on palation of bilateral frontal sinuses, right maxillary sinus, right submandibular region. Pain on palpation of left submandibular region.    Intraoral exam:  Mandibular left first molar lingual cuspal fracture and gross dental caries.  Generalized gross dental caries.  No soft tissue swellings, lesions, or masses. Pain on palpation of periodontium buccal to mandibular left first molar.       Data:   Radiographic interpretation: Request Dental CT w/o contrast 11/6/19  Osseous pathology: none detected  Pulpal Pathology: none detected  Periodontal Pathology: none detected  Caries: Generalized dental caries including mandibular left first molar.  Odontogenic pathology: none detected    The patient was discussed with:   YUKI Esteban DDS  PGY1  Pager: [phone]

## 2019-11-06 NOTE — PLAN OF CARE
Continues to have soft BP's with BP around 90/60, otherwise AVSS. Pt is withdrawn. Pt reports severe abdominal pain, given 10 mg of oxycodone q 4 hours, 0.5 mg of IV Dilaudid q 2 hours, and scheduled tylenol with some relief. Pt has been refusing lidocaine/icy hot patches and simethicone. Denies nausea, on a clear liquid diet and has a poor appetite. Pt has only been taking sips of apple juice. TPN and lipids infusing. Pt is pale with ashen skin color. Pt is very thin. Continues to have a large amount of loose stool, notified surgery cross cover [place]. No new orders obtained at this time. Voiding spontaneously. Abdominal incision is CDI. Up ab dania in room. Continue to monitor pain, bowel function and nutrition status.

## 2019-11-07 LAB
ALP SERPL-CCNC: 68 U/L (ref 40–150)
ALT SERPL W P-5'-P-CCNC: 28 U/L (ref 0–50)
ANION GAP SERPL CALCULATED.3IONS-SCNC: 4 MMOL/L (ref 3–14)
AST SERPL W P-5'-P-CCNC: 22 U/L (ref 0–45)
BILIRUB SERPL-MCNC: 0.2 MG/DL (ref 0.2–1.3)
BUN SERPL-MCNC: 17 MG/DL (ref 7–30)
CALCIUM SERPL-MCNC: 8.4 MG/DL (ref 8.5–10.1)
CHLORIDE SERPL-SCNC: 106 MMOL/L (ref 94–109)
CO2 SERPL-SCNC: 27 MMOL/L (ref 20–32)
CREAT SERPL-MCNC: 0.43 MG/DL (ref 0.52–1.04)
GFR SERPL CREATININE-BSD FRML MDRD: >90 ML/MIN/{1.73_M2}
GLUCOSE SERPL-MCNC: 99 MG/DL (ref 70–99)
POTASSIUM SERPL-SCNC: 4.1 MMOL/L (ref 3.4–5.3)
SODIUM SERPL-SCNC: 137 MMOL/L (ref 133–144)

## 2019-11-07 PROCEDURE — 84460 ALANINE AMINO (ALT) (SGPT): CPT | Performed by: SURGERY

## 2019-11-07 PROCEDURE — 25000125 ZZHC RX 250: Performed by: STUDENT IN AN ORGANIZED HEALTH CARE EDUCATION/TRAINING PROGRAM

## 2019-11-07 PROCEDURE — 25000125 ZZHC RX 250: Performed by: SURGERY

## 2019-11-07 PROCEDURE — 84075 ASSAY ALKALINE PHOSPHATASE: CPT | Performed by: SURGERY

## 2019-11-07 PROCEDURE — 25000132 ZZH RX MED GY IP 250 OP 250 PS 637: Performed by: STUDENT IN AN ORGANIZED HEALTH CARE EDUCATION/TRAINING PROGRAM

## 2019-11-07 PROCEDURE — 25000128 H RX IP 250 OP 636: Performed by: STUDENT IN AN ORGANIZED HEALTH CARE EDUCATION/TRAINING PROGRAM

## 2019-11-07 PROCEDURE — 84450 TRANSFERASE (AST) (SGOT): CPT | Performed by: SURGERY

## 2019-11-07 PROCEDURE — 36592 COLLECT BLOOD FROM PICC: CPT | Performed by: SURGERY

## 2019-11-07 PROCEDURE — 25800030 ZZH RX IP 258 OP 636: Performed by: STUDENT IN AN ORGANIZED HEALTH CARE EDUCATION/TRAINING PROGRAM

## 2019-11-07 PROCEDURE — 25000128 H RX IP 250 OP 636: Performed by: SURGERY

## 2019-11-07 PROCEDURE — 12000001 ZZH R&B MED SURG/OB UMMC

## 2019-11-07 PROCEDURE — 80048 BASIC METABOLIC PNL TOTAL CA: CPT | Performed by: SURGERY

## 2019-11-07 PROCEDURE — 82247 BILIRUBIN TOTAL: CPT | Performed by: SURGERY

## 2019-11-07 RX ORDER — HYDROMORPHONE HYDROCHLORIDE 1 MG/ML
.3-.5 INJECTION, SOLUTION INTRAMUSCULAR; INTRAVENOUS; SUBCUTANEOUS EVERY 4 HOURS PRN
Status: DISCONTINUED | OUTPATIENT
Start: 2019-11-07 | End: 2019-11-08

## 2019-11-07 RX ADMIN — TOPICAL ANESTHETIC 0.5 ML: 200 SPRAY DENTAL; PERIODONTAL at 04:59

## 2019-11-07 RX ADMIN — HYDROMORPHONE HYDROCHLORIDE 0.5 MG: 1 INJECTION, SOLUTION INTRAMUSCULAR; INTRAVENOUS; SUBCUTANEOUS at 13:35

## 2019-11-07 RX ADMIN — HYDROMORPHONE HYDROCHLORIDE 0.5 MG: 1 INJECTION, SOLUTION INTRAMUSCULAR; INTRAVENOUS; SUBCUTANEOUS at 06:00

## 2019-11-07 RX ADMIN — OXYCODONE HYDROCHLORIDE 10 MG: 5 TABLET ORAL at 15:07

## 2019-11-07 RX ADMIN — TOPICAL ANESTHETIC 0.5 ML: 200 SPRAY DENTAL; PERIODONTAL at 19:20

## 2019-11-07 RX ADMIN — ACETAMINOPHEN 975 MG: 325 TABLET, FILM COATED ORAL at 20:38

## 2019-11-07 RX ADMIN — ENOXAPARIN SODIUM 40 MG: 40 INJECTION SUBCUTANEOUS at 08:39

## 2019-11-07 RX ADMIN — SODIUM CHLORIDE, POTASSIUM CHLORIDE, SODIUM LACTATE AND CALCIUM CHLORIDE: 600; 310; 30; 20 INJECTION, SOLUTION INTRAVENOUS at 15:16

## 2019-11-07 RX ADMIN — TOPICAL ANESTHETIC 0.5 ML: 200 SPRAY DENTAL; PERIODONTAL at 01:50

## 2019-11-07 RX ADMIN — HYDROMORPHONE HYDROCHLORIDE 0.5 MG: 1 INJECTION, SOLUTION INTRAMUSCULAR; INTRAVENOUS; SUBCUTANEOUS at 02:32

## 2019-11-07 RX ADMIN — ASCORBIC ACID, VITAMIN A PALMITATE, CHOLECALCIFEROL, THIAMINE HYDROCHLORIDE, RIBOFLAVIN-5 PHOSPHATE SODIUM, PYRIDOXINE HYDROCHLORIDE, NIACINAMIDE, DEXPANTHENOL, ALPHA-TOCOPHEROL ACETATE, VITAMIN K1, FOLIC ACID, BIOTIN, CYANOCOBALAMIN: 200; 3300; 200; 6; 3.6; 6; 40; 15; 10; 150; 600; 60; 5 INJECTION, SOLUTION INTRAVENOUS at 20:45

## 2019-11-07 RX ADMIN — ACETAMINOPHEN 975 MG: 325 TABLET, FILM COATED ORAL at 04:59

## 2019-11-07 RX ADMIN — ACETAMINOPHEN 975 MG: 325 TABLET, FILM COATED ORAL at 13:35

## 2019-11-07 RX ADMIN — HYDROMORPHONE HYDROCHLORIDE 0.5 MG: 1 INJECTION, SOLUTION INTRAMUSCULAR; INTRAVENOUS; SUBCUTANEOUS at 09:19

## 2019-11-07 RX ADMIN — TOPICAL ANESTHETIC 0.5 ML: 200 SPRAY DENTAL; PERIODONTAL at 13:43

## 2019-11-07 RX ADMIN — I.V. FAT EMULSION 250 ML: 20 EMULSION INTRAVENOUS at 20:46

## 2019-11-07 RX ADMIN — OXYCODONE HYDROCHLORIDE 10 MG: 5 TABLET ORAL at 01:00

## 2019-11-07 RX ADMIN — TOPICAL ANESTHETIC 0.5 ML: 200 SPRAY DENTAL; PERIODONTAL at 01:00

## 2019-11-07 RX ADMIN — HYDROMORPHONE HYDROCHLORIDE 0.5 MG: 1 INJECTION, SOLUTION INTRAMUSCULAR; INTRAVENOUS; SUBCUTANEOUS at 21:30

## 2019-11-07 RX ADMIN — OXYCODONE HYDROCHLORIDE 10 MG: 5 TABLET ORAL at 10:18

## 2019-11-07 RX ADMIN — TOPICAL ANESTHETIC 0.5 ML: 200 SPRAY DENTAL; PERIODONTAL at 21:34

## 2019-11-07 RX ADMIN — OXYCODONE HYDROCHLORIDE 10 MG: 5 TABLET ORAL at 04:59

## 2019-11-07 RX ADMIN — OXYCODONE HYDROCHLORIDE 10 MG: 5 TABLET ORAL at 19:20

## 2019-11-07 RX ADMIN — TOPICAL ANESTHETIC 0.5 ML: 200 SPRAY DENTAL; PERIODONTAL at 16:04

## 2019-11-07 RX ADMIN — HYDROMORPHONE HYDROCHLORIDE 0.5 MG: 1 INJECTION, SOLUTION INTRAMUSCULAR; INTRAVENOUS; SUBCUTANEOUS at 17:32

## 2019-11-07 RX ADMIN — TOPICAL ANESTHETIC 0.5 ML: 200 SPRAY DENTAL; PERIODONTAL at 09:19

## 2019-11-07 RX ADMIN — TOPICAL ANESTHETIC 0.5 ML: 200 SPRAY DENTAL; PERIODONTAL at 08:39

## 2019-11-07 ASSESSMENT — PAIN DESCRIPTION - DESCRIPTORS
DESCRIPTORS: ACHING;CRAMPING

## 2019-11-07 ASSESSMENT — ACTIVITIES OF DAILY LIVING (ADL)
ADLS_ACUITY_SCORE: 12

## 2019-11-07 NOTE — PLAN OF CARE
AVSS. Abdominal pain managed with Tylenol, Oxycodone and IV Dilaudid. Mouth pain managed with Hurricane spray. Left mouth/face continues to be swollen. TPN/lipids via PICC. On a regular diet, patient encouraged to eat soft foods. Calorie counts in place. Abdominal incision with dermabond. Passing gas, no BM this shift. Voiding spontaneously. Up independently. Continue with plan of care.

## 2019-11-07 NOTE — PROGRESS NOTES
Calorie Count  Intake recorded for: 11/6  Total Kcals: 352 Total Protein: 17g  Kcals from Hospital Food: 352  Protein: 17g  Kcals from Outside Food (average):0 Protein: 0g  # Meals Recorded: 100% chocolate milk, mashed potatoes w/ gravy, side mac & cheese  # Supplements Recorded: 0

## 2019-11-07 NOTE — PROGRESS NOTES
S:   Continues to have abdominal pain, but ate at least 350 calories yesterday.    O:   BP 97/63 (BP Location: Left arm)   Pulse 92   Temp 98.3  F (36.8  C) (Oral)   Resp 18   Wt 50.1 kg (110 lb 8 oz)   SpO2 97%   BMI 16.32 kg/m         Intake/Output Summary (Last 24 hours) at 11/6/2019 0538  Last data filed at 11/6/2019 0126  Gross per 24 hour   Intake 3705.68 ml   Output 3950 ml   Net -244.32 ml        General: Well appearing:   Cardio: Extremities are warm and well perfused   Resp: No increased work of breathing  Abdomen: Soft, moderately, tender to palpation throughout  Incision c/d/i  Neuro: Alert and oriented. Moves all extremities symmetrically.       Imaging:   XR ABDOMEN 2VW 11/05/2019  Impression:   1. Multiple loops of moderately gas distended large and small bowel  likely represent ileus rather than obstruction given postoperative  status. Radiographic follow-up as clinically warranted.  2. Pneumoperitoneum, likely secondary to recent laparotomy.           Assessment and Plan:     ASSESSMENT:Rachel A Gerhardt is a 43 year old female with a PMH of pelvic radiation, cervical and ovarian cancer and SBR in 2017 who was admitted for recurrent SBO and worsening symptoms and is  POD # 6 from small bowel resection and primary anastomosis. Pain team consulted and working with patient, recs below in plan.     PLAN:   - LR 75cc/hr via picc, TPN until tolerating PO intake but likely TPN dependent due to very poor PO nutrition  - advance diet as tolerated  - pain    - oxycodone 5-10mg PRN as first line therapy   - IV dilaudid 0.3- 0.5mg Q4H PRN   - tylenol 975 Q8hr   - hyoscyamine 0.125mg Q4hr PRN   - simethicone 80mg Q4hr PRN    - Appreciate recs from pain service  - calorie counts, supplements  - AXR  - PRN robaxin  - Discharge to hopefully TCU; hopefully will be discharged by Monday    Seen and discussed with chief resident and staff    Luiz Cortez MD, PhD, PGY-1  General Surgery

## 2019-11-07 NOTE — PROGRESS NOTES
CLINICAL NUTRITION SERVICES - REASSESSMENT NOTE     Nutrition Prescription    RECOMMENDATIONS FOR MDs/PROVIDERS TO ORDER:  1. Recommend patient be able to consistently consume at least 950 kcal and 50g protein daily (~60% estimated kcal and protein needs) before stop nutrition support    2. If pt expected to discharge with TPN, please alert RD or Pharmacist so TPN can be cycled prior to discharge.  Alert RD or Pharmacist if pt requires changes to PN fluid volume    Malnutrition Status:    Severe malnutrition in the context of acute on chronic illness    Recommendations already ordered by Registered Dietitian (RD):  1. Magic Cups (chocolate) TID.  Discontinue Boost Plus (pt does not care for this)  2. Extend calorie counts 3 days (11/8-11/10)  3. TPN tolerance lab monitoring: Alk Phos, ALT, AST, and Tbili (Add-ons).  TG @ noon tomorrow.       EVALUATION OF THE PROGRESS TOWARD GOALS   Diet: Regular + Boost Plus between meals + Faustino counts 11/5-11/7    Nutrition Support: CPN, Clinimix @ 65 mL/hr (1560 mL/day) + 250 mL of 20% IV lipids 6 days/week (Mon-Sat) to provide 1537 kcals (31 kcal/kg), 78 g protein (1.6 g/kg), 234 g CHO (GIR 3.3), and 28% of kcals from fat on average daily    PO Intake: Pt was NPO 10/21-11/4, clear liquids 11/4-11/6, and advanced to regular diet 11/6 afternoon.  Pt reports she ate a little bit yesterday and tolerated per RN notes yesterday (per faustino counts, chocolate milk, mashed potatoes + gravity, and mac and cheese), but pt says this morning that she is now having more abdominal pain today.  Pt also c/o tooth pain that is making eating difficult (Dentistry consulted for poor dentition, possible cracked tooth).  Pt does not like Boost, but enjoys chocolate Magic Cups and would like these ordered instead.  Planning to try some cream of wheat and yogurt this morning.    Calorie Counts  11/6: 352 kcal and 17 g protein (25% kcal needs and 24% protein needs)  11/5: no food intake recorded    PN  Intake: Per I/Os, 7-day avg PN intake = 1411 Clinimix bag + 227 mL/day IV lipids to provide ~1401 kcal and 70 g protein (99% kcal and protein needs).  Per I/Os minimal PN intake noted on 10/31, pt went to OR that for procedure.     NEW FINDINGS   Weight: fluctuating since admit, mostly ~47-50 kg; although, lowest wt this admit was 45.7 kg on 11/1, it is possible it could be actual lowest wt as pt did not reach goal TPN until 10/29 and was mostly NPO 10/19-10/29, but could also be fluid related/scale error as all other weights have been ~47 kg or higher.  Will keep dosing weight 47 kg at this time.    Labs: TG WNL on 11/1; Alk Phos, ALT, AST, and TBili WNL on 10/31    Meds: LR @ 50 mL/hr    GI: ongoing abdominal pain reported by patient.   - 10/31: laparotomy, bowel resection, JUANA, partial omentectomy, and NG placed.    - 11/2: NG removed    MALNUTRITION  % Intake: No decreased intake noted (on TPN/lipids)  % Weight Loss: difficult to assess since admit; PTA >10% in 6 months (severe)  Subcutaneous Fat Loss: Facial region: mild in areas (but some swelling in jaw likely masking more losses), Upper arm and Lower arm:  severe  Muscle Loss: Temporal: mild/moderate, Upper arm (bicep, tricep), and Lower arm  (forearm): severe  Fluid Accumulation/Edema: None noted per chart review  Malnutrition Diagnosis: Severe malnutrition in the context of acute on chronic illness    Previous Goals   Total avg nutritional intake to meet a minimum of 30 kcal/kg and 1.5 g PRO/kg daily (per dosing wt 47 kg).  Evaluation: Not met over 7 day avg, but met 6 out of the past 7 days    Previous Nutrition Diagnosis  Increased nutrient needs (protein-energy) related to increased needs for acute illness and underweight status as evidenced by Estimated Energy Needs: 9744-2920 kcals/day (30 - 35 kcals/kg) and Estimated Protein Needs: 71-94 grams protein/day (1.5 - 2 grams of pro/kg)  Evaluation: No change    CURRENT NUTRITION DIAGNOSIS  Increased  nutrient needs (protein-energy) related to increased needs for acute illness and underweight status as evidenced by Estimated Energy Needs: 5520-8516 kcals/day (30 - 35 kcals/kg) and Estimated Protein Needs: 71-94 grams protein/day (1.5 - 2 grams of pro/kg)     INTERVENTIONS  Implementation  Collaboration with other providers: discussed pt in interdisciplinary team rounds  Medical food supplement therapy: see above  Nutrition education: encouraged soft foods; small, frequent meals; supplements to help increase kcals/protein especially while tooth pain persists.  Discussed ongoing andi count monitoring.    Goals  Total avg nutritional intake to meet a minimum of 30 kcal/kg and 1.5 g PRO/kg daily (per dosing wt 47 kg).    Monitoring/Evaluation  Progress toward goals will be monitored and evaluated per protocol.    Mylene Duvall RD, LD  [address]C RD pager: [phone]

## 2019-11-07 NOTE — PLAN OF CARE
VSS ex tachy at times with soft B/P. Pt c/o pain not managed tonight with PO oxy, IV dilaudid, tylenol and hurricane spray. Pt reports that swelling and pain to left cheek/tooth area getting worse- day team to address. Pt tolerating regular diet; denies c/o N/V. No BM this shift; +flatus. UOP adequate. Up ad dania. PICC running LR and other lumen has TPN/lipids. PLAN: continue POC

## 2019-11-07 NOTE — PLAN OF CARE
VSS on room air. Abdominal and tooth pain partially controlled with tylenol, oxycodone, IV dilaudid, and Hurricane spray. Tolerated small amount of regular diet. TPN/lipids infusing through PICC. Voiding with adequate urine output. Continues to have loose stools. Midline incision CDI. Up independently.

## 2019-11-08 LAB
ANION GAP SERPL CALCULATED.3IONS-SCNC: 7 MMOL/L (ref 3–14)
BUN SERPL-MCNC: 20 MG/DL (ref 7–30)
CALCIUM SERPL-MCNC: 8.5 MG/DL (ref 8.5–10.1)
CHLORIDE SERPL-SCNC: 104 MMOL/L (ref 94–109)
CO2 SERPL-SCNC: 27 MMOL/L (ref 20–32)
CREAT SERPL-MCNC: 0.45 MG/DL (ref 0.52–1.04)
GFR SERPL CREATININE-BSD FRML MDRD: >90 ML/MIN/{1.73_M2}
GLUCOSE SERPL-MCNC: 90 MG/DL (ref 70–99)
POTASSIUM SERPL-SCNC: 4 MMOL/L (ref 3.4–5.3)
SODIUM SERPL-SCNC: 138 MMOL/L (ref 133–144)
TRIGL SERPL-MCNC: 66 MG/DL

## 2019-11-08 PROCEDURE — 80048 BASIC METABOLIC PNL TOTAL CA: CPT | Performed by: SURGERY

## 2019-11-08 PROCEDURE — 25800030 ZZH RX IP 258 OP 636: Performed by: STUDENT IN AN ORGANIZED HEALTH CARE EDUCATION/TRAINING PROGRAM

## 2019-11-08 PROCEDURE — 25000125 ZZHC RX 250: Performed by: SURGERY

## 2019-11-08 PROCEDURE — 25000132 ZZH RX MED GY IP 250 OP 250 PS 637: Performed by: SURGERY

## 2019-11-08 PROCEDURE — 36592 COLLECT BLOOD FROM PICC: CPT | Performed by: SURGERY

## 2019-11-08 PROCEDURE — 25000132 ZZH RX MED GY IP 250 OP 250 PS 637: Performed by: STUDENT IN AN ORGANIZED HEALTH CARE EDUCATION/TRAINING PROGRAM

## 2019-11-08 PROCEDURE — 36592 COLLECT BLOOD FROM PICC: CPT | Performed by: STUDENT IN AN ORGANIZED HEALTH CARE EDUCATION/TRAINING PROGRAM

## 2019-11-08 PROCEDURE — 84478 ASSAY OF TRIGLYCERIDES: CPT | Performed by: STUDENT IN AN ORGANIZED HEALTH CARE EDUCATION/TRAINING PROGRAM

## 2019-11-08 PROCEDURE — 25000128 H RX IP 250 OP 636: Performed by: SURGERY

## 2019-11-08 PROCEDURE — 99207 ZZC NO CHARGE FOLLOW UP PS: CPT

## 2019-11-08 PROCEDURE — 12000001 ZZH R&B MED SURG/OB UMMC

## 2019-11-08 PROCEDURE — 25000128 H RX IP 250 OP 636: Performed by: STUDENT IN AN ORGANIZED HEALTH CARE EDUCATION/TRAINING PROGRAM

## 2019-11-08 PROCEDURE — 25000125 ZZHC RX 250: Performed by: STUDENT IN AN ORGANIZED HEALTH CARE EDUCATION/TRAINING PROGRAM

## 2019-11-08 RX ORDER — HYDROMORPHONE HYDROCHLORIDE 1 MG/ML
.3-.5 INJECTION, SOLUTION INTRAMUSCULAR; INTRAVENOUS; SUBCUTANEOUS EVERY 6 HOURS PRN
Status: DISCONTINUED | OUTPATIENT
Start: 2019-11-08 | End: 2019-11-09

## 2019-11-08 RX ORDER — OXYCODONE HYDROCHLORIDE 5 MG/1
5-15 TABLET ORAL EVERY 4 HOURS PRN
Status: DISCONTINUED | OUTPATIENT
Start: 2019-11-08 | End: 2019-11-08 | Stop reason: DRUGHIGH

## 2019-11-08 RX ORDER — OXYCODONE HYDROCHLORIDE 5 MG/1
10-15 TABLET ORAL EVERY 4 HOURS PRN
Status: DISCONTINUED | OUTPATIENT
Start: 2019-11-08 | End: 2019-11-10

## 2019-11-08 RX ORDER — OXYCODONE HYDROCHLORIDE 10 MG/1
10 TABLET ORAL EVERY 4 HOURS PRN
Status: DISCONTINUED | OUTPATIENT
Start: 2019-11-08 | End: 2019-11-08 | Stop reason: DRUGHIGH

## 2019-11-08 RX ADMIN — OXYCODONE HYDROCHLORIDE 15 MG: 5 TABLET ORAL at 13:06

## 2019-11-08 RX ADMIN — Medication: at 06:57

## 2019-11-08 RX ADMIN — OXYCODONE HYDROCHLORIDE 10 MG: 5 TABLET ORAL at 05:14

## 2019-11-08 RX ADMIN — ENOXAPARIN SODIUM 40 MG: 40 INJECTION SUBCUTANEOUS at 07:59

## 2019-11-08 RX ADMIN — OXYCODONE HYDROCHLORIDE 15 MG: 5 TABLET ORAL at 09:03

## 2019-11-08 RX ADMIN — ASCORBIC ACID, VITAMIN A PALMITATE, CHOLECALCIFEROL, THIAMINE HYDROCHLORIDE, RIBOFLAVIN-5 PHOSPHATE SODIUM, PYRIDOXINE HYDROCHLORIDE, NIACINAMIDE, DEXPANTHENOL, ALPHA-TOCOPHEROL ACETATE, VITAMIN K1, FOLIC ACID, BIOTIN, CYANOCOBALAMIN: 200; 3300; 200; 6; 3.6; 6; 40; 15; 10; 150; 600; 60; 5 INJECTION, SOLUTION INTRAVENOUS at 20:16

## 2019-11-08 RX ADMIN — HYDROMORPHONE HYDROCHLORIDE 0.5 MG: 1 INJECTION, SOLUTION INTRAMUSCULAR; INTRAVENOUS; SUBCUTANEOUS at 07:59

## 2019-11-08 RX ADMIN — HYDROMORPHONE HYDROCHLORIDE 0.5 MG: 1 INJECTION, SOLUTION INTRAMUSCULAR; INTRAVENOUS; SUBCUTANEOUS at 14:01

## 2019-11-08 RX ADMIN — ACETAMINOPHEN 975 MG: 325 TABLET, FILM COATED ORAL at 04:37

## 2019-11-08 RX ADMIN — ACETAMINOPHEN 975 MG: 325 TABLET, FILM COATED ORAL at 20:13

## 2019-11-08 RX ADMIN — I.V. FAT EMULSION 250 ML: 20 EMULSION INTRAVENOUS at 20:14

## 2019-11-08 RX ADMIN — HYDROMORPHONE HYDROCHLORIDE 0.5 MG: 1 INJECTION, SOLUTION INTRAMUSCULAR; INTRAVENOUS; SUBCUTANEOUS at 20:12

## 2019-11-08 RX ADMIN — SODIUM CHLORIDE, POTASSIUM CHLORIDE, SODIUM LACTATE AND CALCIUM CHLORIDE: 600; 310; 30; 20 INJECTION, SOLUTION INTRAVENOUS at 17:13

## 2019-11-08 RX ADMIN — TOPICAL ANESTHETIC 0.5 ML: 200 SPRAY DENTAL; PERIODONTAL at 21:18

## 2019-11-08 RX ADMIN — ACETAMINOPHEN 975 MG: 325 TABLET, FILM COATED ORAL at 12:51

## 2019-11-08 RX ADMIN — OXYCODONE HYDROCHLORIDE 10 MG: 5 TABLET ORAL at 00:59

## 2019-11-08 RX ADMIN — OXYCODONE HYDROCHLORIDE 15 MG: 5 TABLET ORAL at 21:17

## 2019-11-08 RX ADMIN — OXYCODONE HYDROCHLORIDE 15 MG: 5 TABLET ORAL at 17:06

## 2019-11-08 RX ADMIN — HYDROMORPHONE HYDROCHLORIDE 0.5 MG: 1 INJECTION, SOLUTION INTRAMUSCULAR; INTRAVENOUS; SUBCUTANEOUS at 02:05

## 2019-11-08 ASSESSMENT — PAIN DESCRIPTION - DESCRIPTORS
DESCRIPTORS: ACHING
DESCRIPTORS: ACHING;CONSTANT
DESCRIPTORS: ACHING
DESCRIPTORS: ACHING;CONSTANT
DESCRIPTORS: ACHING;CRAMPING
DESCRIPTORS: ACHING;CONSTANT
DESCRIPTORS: ACHING;CONSTANT

## 2019-11-08 ASSESSMENT — ACTIVITIES OF DAILY LIVING (ADL)
ADLS_ACUITY_SCORE: 12

## 2019-11-08 NOTE — PROGRESS NOTES
Patient: Rachel A Gerhardt  Date of Service: November 8, 2019 Admission Date:10/18/2019   8 Days Post-Op     Chief Pain Endorsement: deep abdominal cramping s/p small bowel resection on 10/31/19 due to SBO    Recommendations were discussed and relayed to  Dr. Yocasta Rodrigues (Surgery)  Plan was reviewed by the Inpatient Pain Service and staff attending, Dr. Santiago.  Patient not seen today, taper recommendations requested.      1. Primary team increased oxycodone to 10-15mg po q4h prn to cover decreasing IV hydromorphone.  Use oral opioids first.    On Sunday, 11/10/19, decrease oxycodone to 5-10mg po q4h prn moderate to severe pain.    When discharged, resume home dosing of oxycodone 5mg tabs, 6 tabs per day.  2. Continue hydromorphone 0.3-0.5mg IV q6h prn severe pain not controlled by oral opioids.    Tomorrow (11/9/19) decrease IV hydromorphone to 0.3-0.5mg IV bid prn severe pain.    Discontinue IV hydromorphone on Sunday, 11/10/19.  3. Continue acetaminophen 975mg po q8h scheduled.  4. Continue Lidocaine 4% patches, 1 patch(es) transdermal every 24 hours (12 hours on and 12 hours off)    If this product is helpful can obtain OTC when discharged.  5. Continue Menthol 5% patches, 1 patch(es) transdermal every 8 hours as needed when Lidocaine 4% patches are off.    If this product is helpful can obtain OTC when discharged.  6. Continue methocarbamol 500mg po every 6 hours as needed for muscle spasm.    When discharged, continue as needed dosing x 2-5 days then discontinue.  7. Continue simethicone 80mg po 4 times a day.    When discharged can change to as needed dosing.  8. Bowel regimen per primary team to prevent opioid induced constipation.    Pain Service will Sign Off at this time.     Thank you for consulting the Inpatient Pain Management Service. The above recommendations are to be acted upon at the primary team s discretion.     To reach us:  Mon - Friday 8 AM - 3 PM: Pager [phone] (Text Page)  After  "hours, weekends and holidays: Primary service should call [phone] for the on-call pain specialist        1. Abdominal pain status post small bowel resection due to small bowel obstruction on October 31, 2019, challenging postop pain management requiring continued IV opioid usage. Felt that surgery relieved her abdominal distension and \"feeling of being backed up.\" Patient continues to complain of deep abdominal cramping with oral intake.  Feels that oral intake (water to take oral medications, drinking fluids to avoid dehydration) has increased in the last 24 hours and therefore pain has increased.  2. Chronic abdominal pain on chronic opioid management.  PTA, she states she takes oxycodone 5mg every 4 hours (30mg/day).  3. H/o opioid use disorder.  Per chart review, patient reported h/o abusing prescription opioids including buying them off the street. See chem dep note from 7/14/12.  She was started on Suboxone at the time.    4. H/o Opioid induced constipation.      -- Outpatient opioid requirements prior to admission: oxycodone 5mg, 6 tablets/day.      reviewed.      October 8, 2019 oxycodone 5 mg tablet #1 daily for 30 days  September 10, 2019 oxycodone 5 mg tablet #180 for 30 days     Primary Care Provider: Reji Avery  Chronic Pain Provider:  none at this time.  (note that in past she was managed at [facility] and [facility]) social work consult    -- Inpatient Medications Related to Pain Management:   Medications related to Pain Management (From now, onward)    Start     Dose/Rate Route Frequency Ordered Stop    11/08/19 0750  oxyCODONE (ROXICODONE) tablet 10-15 mg      10-15 mg Oral EVERY 4 HOURS PRN 11/08/19 0752      11/08/19 0615  HYDROmorphone (PF) (DILAUDID) injection 0.3-0.5 mg      0.3-0.5 mg Intravenous EVERY 6 HOURS PRN 11/08/19 0607      11/05/19 2000  lidocaine patch in PLACE       Transdermal EVERY 8 HOURS 11/05/19 1048      11/05/19 1200  simethicone " (MYLICON) chewable tablet 80 mg      80 mg Oral 4 TIMES DAILY 11/05/19 0824      11/05/19 1100  Lidocaine (LIDOCARE) 4 % Patch 1 patch      1 patch  over 12 Hours Transdermal EVERY 24 HOURS 2000 11/05/19 1048      11/04/19 0815  senna-docusate (SENOKOT-S/PERICOLACE) 8.6-50 MG per tablet 1 tablet      1 tablet Oral 2 TIMES DAILY 11/04/19 0805      11/04/19 0805  bisacodyl (DULCOLAX) Suppository 10 mg      10 mg Rectal DAILY PRN 11/04/19 0805      11/03/19 1030  acetaminophen (TYLENOL) tablet 975 mg      975 mg Oral EVERY 8 HOURS 11/03/19 1019      11/03/19 0819  methocarbamol (ROBAXIN) tablet 500 mg      500 mg Oral EVERY 6 HOURS PRN 11/03/19 0819      11/01/19 0726  hydrOXYzine (VISTARIL) injection 50 mg      50 mg Intramuscular EVERY 6 HOURS PRN 11/01/19 0726      10/23/19 1922  lidocaine (XYLOCAINE) 2 % external gel       Topical EVERY 4 HOURS PRN 10/23/19 1922      10/20/19 0012  diphenhydrAMINE (BENADRYL) injection 25 mg      25 mg Intravenous EVERY 6 HOURS PRN 10/20/19 0012      10/19/19 1343  sodium phosphate (FLEET ENEMA) 1 enema      1 enema Rectal DAILY PRN 10/19/19 1343      10/19/19 1343  glycerin (ADULT) Suppository 1 suppository      1 suppository Rectal DAILY PRN 10/19/19 1343      10/19/19 0330  lidocaine 1 % 0.1-1 mL      0.1-1 mL Other EVERY 1 HOUR PRN 10/19/19 0330      10/19/19 0330  lidocaine (LMX4) cream       Topical EVERY 1 HOUR PRN 10/19/19 0330      10/19/19 0107  lidocaine (XYLOCAINE) 4 % solution 5 mL      5 mL Topical ONCE PRN 10/19/19 0107            LAB DATA:  Recent Labs   Lab 11/07/19  0709 11/06/19  0827 11/06/19  0725  11/04/19  0000   CR 0.43* 0.39* Canceled, Test credited   < >  --    WBC  --   --   --   --  6.6   HGB  --   --   --   --  9.2*   AST 22  --   --   --   --    ALT 28  --   --   --   --     < > = values in this interval not displayed.         ----------------------------------------------------------------------------------  Ariadna Heath PharmD, MS  Inpatient Pain  Service     To reach us:  Mon - Friday 8 AM - 3 PM: Pager [phone] (Text Page)  After hours, weekends and holidays: Primary service should call [phone] for the on-call pain specialist    Helpful Resources:  Getting Rid of Unwanted Medications (printable PDF for patients)   Opioid Overdose Prevention Toolkit (printable PDF for patients)   Prescription Opioids: What You Need To Know (printable PDF for patients)

## 2019-11-08 NOTE — PROGRESS NOTES
Calorie Count    Intake recorded for: 11/7/2019  Total Kcals: 104 Total Protein: 9g    Kcals from Hospital Food: 104  Protein: 9g    Kcals from Outside Food (average):0 Protein: 0g    # Meals Recorded: 2 meals ordered; 1 recorded (50% mashed potatoes w/ extra gravy, less than 25% roast turkey w/ extra gravy)    # Supplements Recorded: no intake recorded

## 2019-11-08 NOTE — PLAN OF CARE
VSS on room air. Intermittently tachycardic. Abdominal and tooth pain not well controlled with tylenol, oxycodone, IV dilaudid, and Hurricane spray. Patient feels like tooth pain is getting worse and reports that it has become more difficult to swallow and that a hard lump has formed under her gums-MD notified. Tolerated a few bites of regular diet. TPN/lipids infusing through PICC. Voiding with adequate urine output. Had a bowel movement early this morning. Midline incision CDI. Up independently.

## 2019-11-08 NOTE — PROGRESS NOTES
CLINICAL NUTRITION SERVICES - BRIEF NOTE  *See RD note on 11/7 for last nutrition reassessment details/recs     Nutrition Prescription    Recommendations already ordered by Registered Dietitian (RD):  Discussed with Pharmacist, begin cycling TPN (1560 mL/day Clinimix) x 18 hours     Continues on regular diet.  Poor PO intake continues due to abdominal and tooth pain per chart review    On continuous TPN, Clinimix @ 65 mL/hr (1560 mL/day + 250 mL 20% IV lipids 6 days/week    INTERVENTIONS  Implementation  Collaboration with other providers: spoke with primary team, will plan to start cycling TPN in case pt will need TPN after discharge  Parenteral Nutrition/IV Fluids - Modify (see above)    Monitoring/Evaluation  Progress toward goals will be monitored and evaluated per protocol.     Mylene Duvall RD, LD  7C RD pager: [phone]

## 2019-11-08 NOTE — PLAN OF CARE
VSS. Alert and oriented x4. Pain was controlled with scheduled Tylenol,PRN oxycodone and IV dilaudid. Heat packs applied to her jaw.Had a large loose BM this shift. Voids spont,not saving. Tolerating regular diet.States no appetite. Denies nausea. TPN& lactated ringer infusing through PICC. Tender in the abdomen, passing flatus. Incision open to air, no drainage. Up and independent. Continue POC.

## 2019-11-08 NOTE — PROGRESS NOTES
S:   Still having abdominal pain and reports that tooth pain has gotten worse and how feels like a hard ball in her gums.  Is up and walking independently, eating small bites of food and sips of water.  Having bowel movements and flatus.  No nausea or vomiting.      O:   /66 (BP Location: Left arm)   Pulse 92   Temp 98.7  F (37.1  C) (Oral)   Resp 15   Wt 50.1 kg (110 lb 8 oz)   SpO2 97%   BMI 16.32 kg/m         Intake/Output Summary (Last 24 hours) at 11/8/2019 0532  Last data filed at 11/8/2019 0500  Gross per 24 hour   Intake 3377.47 ml   Output 1600 ml   Net 1777.47 ml        General: Well appearing:   Cardio: Extremities are warm and well perfused   Resp: No increased work of breathing  Abdomen: Soft and diffusely tender to palpation, no rebound tenderness, not distended  Incision: C/d/I  Neuro: Alert and oriented. Moves all extremities symmetrically.   Lines/drians: PICC line double lumen R arm    Labs:   BMP ordered           Assessment and Plan:     ASSESSMENT: Rachel A Gerhardt is a 43 year old female with a PMH of pelvic radiation, cervical and ovarian cancer and SBR in 2017 who was admitted for recurrent SBO and worsening symptoms and is  POD # 6 from small bowel resection and primary anastomosis. Pain team consulted and working with patient, recs below in plan.  Social work helping to find placement for discharge.    PLAN:   - LR 75cc/hr via picc, TPN until tolerating PO intake but likely TPN dependent due to very poor PO nutrition   - appreciate nutrition recs   - recommended to consume 950kcal and 50g protein daily before stopping TPN  - advance diet as tolerated  - pain               - oxycodone 5-10mg PRN as first line therapy              - IV dilaudid 0.3- 0.5mg Q4H PRN              - tylenol 975 Q8hr              - hyoscyamine 0.125mg Q4hr PRN              - simethicone 80mg Q4hr PRN              - Appreciate recs from pain service  - calorie counts, supplements  - AXR  - PRN  robaxin  - Discharge to hopefully TCU; hopefully will be discharged by Monday    Krissy Johnson MS III

## 2019-11-08 NOTE — PLAN OF CARE
A&OX4.VSS on room air. Abdominal and tooth pain controlled with PRN oxy, tylenol and IV dilaudid for breakthrough pain. On reg diet, denies N/V. Passing flatus, no BM this shift. Adequate urine output.TPN/Lipids running through PICC. Abdominal incision clean dry and intact. Up independently. Continue with plan of care.

## 2019-11-08 NOTE — PLAN OF CARE
OT 7C: pt off unit and walking around hallways independently this AM, unable to check back in PM with schedule demands. Pt seems to be ambulating regularly and is aware of post-surgical precautions from previous surgeries. May need TCU stay for medical management of TPN/other nutritional needs only.

## 2019-11-09 LAB
ANION GAP SERPL CALCULATED.3IONS-SCNC: 5 MMOL/L (ref 3–14)
ANION GAP SERPL CALCULATED.3IONS-SCNC: NORMAL MMOL/L (ref 6–17)
BUN SERPL-MCNC: 21 MG/DL (ref 7–30)
BUN SERPL-MCNC: NORMAL MG/DL (ref 7–30)
CALCIUM SERPL-MCNC: 8.7 MG/DL (ref 8.5–10.1)
CALCIUM SERPL-MCNC: NORMAL MG/DL (ref 8.5–10.1)
CHLORIDE SERPL-SCNC: 105 MMOL/L (ref 94–109)
CHLORIDE SERPL-SCNC: NORMAL MMOL/L (ref 94–109)
CO2 SERPL-SCNC: 24 MMOL/L (ref 20–32)
CO2 SERPL-SCNC: NORMAL MMOL/L (ref 20–32)
CREAT SERPL-MCNC: 0.41 MG/DL (ref 0.52–1.04)
CREAT SERPL-MCNC: NORMAL MG/DL (ref 0.52–1.04)
GFR SERPL CREATININE-BSD FRML MDRD: >90 ML/MIN/{1.73_M2}
GFR SERPL CREATININE-BSD FRML MDRD: NORMAL ML/MIN/{1.73_M2}
GLUCOSE SERPL-MCNC: 109 MG/DL (ref 70–99)
GLUCOSE SERPL-MCNC: NORMAL MG/DL (ref 70–99)
LACTATE BLD-SCNC: 1.1 MMOL/L (ref 0.7–2)
PLATELET # BLD AUTO: 398 10E9/L (ref 150–450)
POTASSIUM SERPL-SCNC: 4.1 MMOL/L (ref 3.4–5.3)
POTASSIUM SERPL-SCNC: NORMAL MMOL/L (ref 3.4–5.3)
SODIUM SERPL-SCNC: 134 MMOL/L (ref 133–144)
SODIUM SERPL-SCNC: NORMAL MMOL/L (ref 133–144)

## 2019-11-09 PROCEDURE — 85049 AUTOMATED PLATELET COUNT: CPT | Performed by: SURGERY

## 2019-11-09 PROCEDURE — 25000125 ZZHC RX 250: Performed by: SURGERY

## 2019-11-09 PROCEDURE — 25000132 ZZH RX MED GY IP 250 OP 250 PS 637: Performed by: SURGERY

## 2019-11-09 PROCEDURE — 25800030 ZZH RX IP 258 OP 636: Performed by: STUDENT IN AN ORGANIZED HEALTH CARE EDUCATION/TRAINING PROGRAM

## 2019-11-09 PROCEDURE — 12000001 ZZH R&B MED SURG/OB UMMC

## 2019-11-09 PROCEDURE — 83605 ASSAY OF LACTIC ACID: CPT

## 2019-11-09 PROCEDURE — 40000802 ZZH SITE CHECK

## 2019-11-09 PROCEDURE — 36592 COLLECT BLOOD FROM PICC: CPT | Performed by: SURGERY

## 2019-11-09 PROCEDURE — 25000128 H RX IP 250 OP 636: Performed by: STUDENT IN AN ORGANIZED HEALTH CARE EDUCATION/TRAINING PROGRAM

## 2019-11-09 PROCEDURE — 36415 COLL VENOUS BLD VENIPUNCTURE: CPT | Performed by: SURGERY

## 2019-11-09 PROCEDURE — 25000132 ZZH RX MED GY IP 250 OP 250 PS 637: Performed by: STUDENT IN AN ORGANIZED HEALTH CARE EDUCATION/TRAINING PROGRAM

## 2019-11-09 PROCEDURE — 25000128 H RX IP 250 OP 636: Performed by: SURGERY

## 2019-11-09 PROCEDURE — 40000558 ZZH STATISTIC CVC DRESSING CHANGE

## 2019-11-09 PROCEDURE — 80048 BASIC METABOLIC PNL TOTAL CA: CPT | Performed by: SURGERY

## 2019-11-09 RX ORDER — LIDOCAINE 4 G/G
1 PATCH TOPICAL
Status: DISCONTINUED | OUTPATIENT
Start: 2019-11-09 | End: 2019-11-17 | Stop reason: HOSPADM

## 2019-11-09 RX ORDER — AMOXICILLIN 250 MG
1 CAPSULE ORAL 2 TIMES DAILY
Status: DISCONTINUED | OUTPATIENT
Start: 2019-11-09 | End: 2019-11-14

## 2019-11-09 RX ORDER — SIMETHICONE 80 MG
80 TABLET,CHEWABLE ORAL 4 TIMES DAILY PRN
Status: DISCONTINUED | OUTPATIENT
Start: 2019-11-09 | End: 2019-11-17 | Stop reason: HOSPADM

## 2019-11-09 RX ORDER — HYDROMORPHONE HYDROCHLORIDE 1 MG/ML
.3-.5 INJECTION, SOLUTION INTRAMUSCULAR; INTRAVENOUS; SUBCUTANEOUS 2 TIMES DAILY PRN
Status: DISCONTINUED | OUTPATIENT
Start: 2019-11-09 | End: 2019-11-10

## 2019-11-09 RX ADMIN — ACETAMINOPHEN 975 MG: 325 TABLET, FILM COATED ORAL at 05:32

## 2019-11-09 RX ADMIN — OXYCODONE HYDROCHLORIDE 15 MG: 5 TABLET ORAL at 21:53

## 2019-11-09 RX ADMIN — HYDROMORPHONE HYDROCHLORIDE 0.5 MG: 1 INJECTION, SOLUTION INTRAMUSCULAR; INTRAVENOUS; SUBCUTANEOUS at 02:56

## 2019-11-09 RX ADMIN — OXYCODONE HYDROCHLORIDE 15 MG: 5 TABLET ORAL at 13:29

## 2019-11-09 RX ADMIN — ASCORBIC ACID, VITAMIN A PALMITATE, CHOLECALCIFEROL, THIAMINE HYDROCHLORIDE, RIBOFLAVIN-5 PHOSPHATE SODIUM, PYRIDOXINE HYDROCHLORIDE, NIACINAMIDE, DEXPANTHENOL, ALPHA-TOCOPHEROL ACETATE, VITAMIN K1, FOLIC ACID, BIOTIN, CYANOCOBALAMIN: 200; 3300; 200; 6; 3.6; 6; 40; 15; 10; 150; 600; 60; 5 INJECTION, SOLUTION INTRAVENOUS at 21:24

## 2019-11-09 RX ADMIN — OXYCODONE HYDROCHLORIDE 15 MG: 5 TABLET ORAL at 09:34

## 2019-11-09 RX ADMIN — HYDROMORPHONE HYDROCHLORIDE 0.5 MG: 1 INJECTION, SOLUTION INTRAMUSCULAR; INTRAVENOUS; SUBCUTANEOUS at 08:57

## 2019-11-09 RX ADMIN — OXYCODONE HYDROCHLORIDE 15 MG: 5 TABLET ORAL at 05:32

## 2019-11-09 RX ADMIN — OXYCODONE HYDROCHLORIDE 15 MG: 5 TABLET ORAL at 17:40

## 2019-11-09 RX ADMIN — I.V. FAT EMULSION 250 ML: 20 EMULSION INTRAVENOUS at 21:28

## 2019-11-09 RX ADMIN — ENOXAPARIN SODIUM 40 MG: 40 INJECTION SUBCUTANEOUS at 08:57

## 2019-11-09 RX ADMIN — METHOCARBAMOL 500 MG: 500 TABLET, FILM COATED ORAL at 17:39

## 2019-11-09 RX ADMIN — ACETAMINOPHEN 975 MG: 325 TABLET, FILM COATED ORAL at 13:29

## 2019-11-09 RX ADMIN — ACETAMINOPHEN 975 MG: 325 TABLET, FILM COATED ORAL at 21:53

## 2019-11-09 RX ADMIN — HYDROMORPHONE HYDROCHLORIDE 0.5 MG: 1 INJECTION, SOLUTION INTRAMUSCULAR; INTRAVENOUS; SUBCUTANEOUS at 15:16

## 2019-11-09 RX ADMIN — OXYCODONE HYDROCHLORIDE 15 MG: 5 TABLET ORAL at 01:17

## 2019-11-09 RX ADMIN — SODIUM CHLORIDE, POTASSIUM CHLORIDE, SODIUM LACTATE AND CALCIUM CHLORIDE: 600; 310; 30; 20 INJECTION, SOLUTION INTRAVENOUS at 15:16

## 2019-11-09 ASSESSMENT — PAIN DESCRIPTION - DESCRIPTORS
DESCRIPTORS: ACHING
DESCRIPTORS: ACHING
DESCRIPTORS: ACHING;CONSTANT
DESCRIPTORS: ACHING
DESCRIPTORS: ACHING;CONSTANT

## 2019-11-09 ASSESSMENT — ACTIVITIES OF DAILY LIVING (ADL)
ADLS_ACUITY_SCORE: 12

## 2019-11-09 NOTE — PLAN OF CARE
BP 99/69 (BP Location: Left arm)   Pulse 92   Temp 98.2  F (36.8  C) (Oral)   Resp 18   Wt 50.1 kg (110 lb 8 oz)   SpO2 98%   BMI 16.32 kg/m      VSS ex tachy at times with soft BP on RA, up ad dania. C/o pain in jaw and abdomen, managed partially with PRN po oxy, IV dilaudid, and schedule tylenol, as well as heat pack on abdomen. Tolerating regular diet, denies n/v. No BM overnight but had loose stool PM, passing flatus. Adequate UOP. PICC infusing LR, other lumen has TPN/lipids infusing. Continue POC.

## 2019-11-09 NOTE — PROGRESS NOTES
Calorie Count  Intake recorded for: 11/8  Total Kcals: 206 Total Protein: 11g  Kcals from Hospital Food: 206  Protein: 11g  Kcals from Outside Food (average):0 Protein: 0g  # Meals Recorded: 100% raisin bran w/ milk  # Supplements Recorded: 0

## 2019-11-09 NOTE — PROGRESS NOTES
S:   Continued abdominal and jaw pain. Pt had regular diet yesterday evening and tolerated with some pain. No nausea or vomiting. Passing gas and having loose stools. Walking yesterday.    O:   BP 97/66 (BP Location: Left arm)   Pulse 88   Temp 96.6  F (35.9  C) (Oral)   Resp 16   Wt 50.1 kg (110 lb 8 oz)   SpO2 96%   BMI 16.32 kg/m         Intake/Output Summary (Last 24 hours) at 11/6/2019 0538  Last data filed at 11/6/2019 0126  Gross per 24 hour   Intake 3705.68 ml   Output 3950 ml   Net -244.32 ml        General: Well appearing:   HEENT: No obvious swelling noted to face  Cardio: Extremities are warm and well perfused   Resp: No increased work of breathing  Abdomen: Soft, non-distended, tender to palpation throughout  Incision c/d/i  Neuro: Alert and oriented. Moves all extremities symmetrically.              Assessment and Plan:     ASSESSMENT:Rachel A Gerhardt is a 43 year old female with a PMH of pelvic radiation, cervical and ovarian cancer and SBR in 2017 who was admitted for recurrent SBO and worsening symptoms, she is s/p small bowel resection and primary anastomosis on 10/31. Pain team consulted and working with patient, recs below in plan. Dentistry was consulted and worked with patient.      PLAN:   - LR 75cc/hr via picc, TPN until tolerating PO intake but likely TPN dependent due to very poor PO nutrition  - pain    - oxycodone 10 - 15 mg PRN as first line therapy today; On Sunday, 11/10/19, decrease oxycodone to 5-10mg po q4h prn moderate to severe pain.   - IV dilaudid 0.3- 0.5mg Q6H PRN decreased to IV hydromorphone to 0.3-0.5mg IV bid prn severe pain.   - tylenol 975 Q8hr   - hyoscyamine 0.125mg Q4hr PRN   - simethicone 80mg Q4hr PRN    - Appreciate recs from pain service  - calorie counts, supplements  - PRN robaxin  - Discharge to hopefully TCU; hopefully will be discharged by Monday    Seen and discussed with chief resident and staff    Yocasta Rodrigues MD  Family Medicine PGY 1 with  General Surgery

## 2019-11-09 NOTE — PLAN OF CARE
Assumed care of pt at 1900 on 11/8/19. VSS ex tachy at times with soft B/P. Pt c/o pain somewhat managed tonight with PO oxy, IV dilaudid, tylenol and hurricane spray. Pt reports that swelling and pain to left cheek/tooth area continues. Pt tolerating regular diet; denies c/o N/V. + Loose BM this shift; +flatus. UOP adequate. Up ad dania. PICC running LR and other lumen has TPN/lipids. PLAN: continue POC

## 2019-11-10 LAB
ANION GAP SERPL CALCULATED.3IONS-SCNC: 4 MMOL/L (ref 3–14)
BUN SERPL-MCNC: 23 MG/DL (ref 7–30)
CALCIUM SERPL-MCNC: 8.4 MG/DL (ref 8.5–10.1)
CHLORIDE SERPL-SCNC: 109 MMOL/L (ref 94–109)
CO2 SERPL-SCNC: 26 MMOL/L (ref 20–32)
CREAT SERPL-MCNC: 0.45 MG/DL (ref 0.52–1.04)
GFR SERPL CREATININE-BSD FRML MDRD: >90 ML/MIN/{1.73_M2}
GLUCOSE SERPL-MCNC: 89 MG/DL (ref 70–99)
POTASSIUM SERPL-SCNC: 4 MMOL/L (ref 3.4–5.3)
SODIUM SERPL-SCNC: 139 MMOL/L (ref 133–144)

## 2019-11-10 PROCEDURE — 25000128 H RX IP 250 OP 636: Performed by: SURGERY

## 2019-11-10 PROCEDURE — 25000132 ZZH RX MED GY IP 250 OP 250 PS 637: Performed by: STUDENT IN AN ORGANIZED HEALTH CARE EDUCATION/TRAINING PROGRAM

## 2019-11-10 PROCEDURE — 36592 COLLECT BLOOD FROM PICC: CPT | Performed by: SURGERY

## 2019-11-10 PROCEDURE — 25000128 H RX IP 250 OP 636: Performed by: STUDENT IN AN ORGANIZED HEALTH CARE EDUCATION/TRAINING PROGRAM

## 2019-11-10 PROCEDURE — 25000132 ZZH RX MED GY IP 250 OP 250 PS 637: Performed by: SURGERY

## 2019-11-10 PROCEDURE — 12000001 ZZH R&B MED SURG/OB UMMC

## 2019-11-10 PROCEDURE — 80048 BASIC METABOLIC PNL TOTAL CA: CPT | Performed by: SURGERY

## 2019-11-10 RX ORDER — PANTOPRAZOLE SODIUM 40 MG/1
40 TABLET, DELAYED RELEASE ORAL
Status: DISCONTINUED | OUTPATIENT
Start: 2019-11-11 | End: 2019-11-11

## 2019-11-10 RX ORDER — OXYCODONE HYDROCHLORIDE 5 MG/1
5-10 TABLET ORAL EVERY 4 HOURS PRN
Status: DISCONTINUED | OUTPATIENT
Start: 2019-11-10 | End: 2019-11-12

## 2019-11-10 RX ORDER — HYOSCYAMINE SULFATE 0.125 MG
125 TABLET ORAL EVERY 4 HOURS PRN
Status: DISCONTINUED | OUTPATIENT
Start: 2019-11-10 | End: 2019-11-17 | Stop reason: HOSPADM

## 2019-11-10 RX ORDER — HYDROMORPHONE HYDROCHLORIDE 1 MG/ML
0.3 INJECTION, SOLUTION INTRAMUSCULAR; INTRAVENOUS; SUBCUTANEOUS ONCE
Status: COMPLETED | OUTPATIENT
Start: 2019-11-10 | End: 2019-11-10

## 2019-11-10 RX ADMIN — OXYCODONE HYDROCHLORIDE 10 MG: 5 TABLET ORAL at 21:13

## 2019-11-10 RX ADMIN — OXYCODONE HYDROCHLORIDE 10 MG: 5 TABLET ORAL at 10:27

## 2019-11-10 RX ADMIN — OXYCODONE HYDROCHLORIDE 15 MG: 5 TABLET ORAL at 06:17

## 2019-11-10 RX ADMIN — ACETAMINOPHEN 975 MG: 325 TABLET, FILM COATED ORAL at 21:13

## 2019-11-10 RX ADMIN — HYOSCYAMINE SULFATE 125 MCG: 0.12 TABLET ORAL at 17:02

## 2019-11-10 RX ADMIN — HYDROMORPHONE HYDROCHLORIDE 0.5 MG: 1 INJECTION, SOLUTION INTRAMUSCULAR; INTRAVENOUS; SUBCUTANEOUS at 05:32

## 2019-11-10 RX ADMIN — ACETAMINOPHEN 975 MG: 325 TABLET, FILM COATED ORAL at 13:58

## 2019-11-10 RX ADMIN — OXYCODONE HYDROCHLORIDE 15 MG: 5 TABLET ORAL at 01:55

## 2019-11-10 RX ADMIN — OXYCODONE HYDROCHLORIDE 10 MG: 5 TABLET ORAL at 17:02

## 2019-11-10 RX ADMIN — ACETAMINOPHEN 975 MG: 325 TABLET, FILM COATED ORAL at 06:17

## 2019-11-10 RX ADMIN — METHOCARBAMOL 500 MG: 500 TABLET, FILM COATED ORAL at 17:02

## 2019-11-10 RX ADMIN — ENOXAPARIN SODIUM 40 MG: 40 INJECTION SUBCUTANEOUS at 10:27

## 2019-11-10 RX ADMIN — HYDROMORPHONE HYDROCHLORIDE 0.3 MG: 1 INJECTION, SOLUTION INTRAMUSCULAR; INTRAVENOUS; SUBCUTANEOUS at 13:59

## 2019-11-10 ASSESSMENT — PAIN DESCRIPTION - DESCRIPTORS
DESCRIPTORS: ACHING
DESCRIPTORS: ACHING;CRAMPING
DESCRIPTORS: ACHING
DESCRIPTORS: ACHING

## 2019-11-10 ASSESSMENT — ACTIVITIES OF DAILY LIVING (ADL)
ADLS_ACUITY_SCORE: 12

## 2019-11-10 NOTE — PLAN OF CARE
1930-2330:  Patient resting in bed between cares this shift.  Ambulates in halls independently.  Complains of pain, mostly after eating related to digestion.  Taking PRN oxycodone every 4 hours around the clock, along with scheduled Tylenol and PRN Robaxin.  IV Dilaudid switched to BID, no dose available until after midnight.  Voiding, not saving urine.  Passing large amounts of flatus, reports having loose BM earlier today.  Regular diet, on calorie counts.  PICC infusing with LR and cycled TPN/Lipids.

## 2019-11-10 NOTE — PROGRESS NOTES
S:   Pt has minimal abd pain, but complains of feel pus drain in her mouth from her tooth. Pt had regular diet yesterday evening and tolerated with no pain, but states paying for it now. No nausea or vomiting. Passing gas and having loose stools. Walking.    O:   BP 93/69 (BP Location: Left arm)   Pulse 88   Temp 98.3  F (36.8  C) (Oral)   Resp 18   Wt 50.1 kg (110 lb 8 oz)   SpO2 97%   BMI 16.32 kg/m         Intake/Output Summary (Last 24 hours) at 11/6/2019 0538  Last data filed at 11/6/2019 0126  Gross per 24 hour   Intake 3705.68 ml   Output 3950 ml   Net -244.32 ml        General: Well appearing: Sitting up in bed, doing crosswords.   HEENT: No obvious swelling noted to face  Cardio: Extremities are warm and well perfused   Resp: No increased work of breathing  Abdomen: Soft, non-distended, no TTP  Neuro: Alert and oriented. Moves all extremities symmetrically.              Assessment and Plan:     ASSESSMENT:Rachel A Gerhardt is a 43 year old female with a PMH of pelvic radiation, cervical and ovarian cancer and SBR in 2017 who was admitted for recurrent SBO and worsening symptoms, she is s/p small bowel resection and primary anastomosis on 10/31. Pain team consulted and working with patient, recs below in plan. Dentistry was consulted and worked with patient.      PLAN:   - LR 75cc/hr via picc,   - discharge TPN as tolerating regular diet now  - pain    - oxycodone 10 - 15 mg PRN now decreased to oxycodone to 5-10mg po q4h prn moderate to severe pain per pain recs   - IV hydromorphone to 0.3-0.5mg IV bid prn severe pain discontinued per pain recs   - tylenol 975 Q8hr   - hyoscyamine 0.125mg Q4hr PRN   - simethicone 80mg Q4hr PRN    - Appreciate recs from pain service  - calorie counts, supplements to cont  - Will have dentistry come by Monday to reassess or have her go to their clinic  - PRN robaxin  - Discharge to hopefully TCU; hopefully will be discharged by Monday    Seen and discussed with chief  resident and staff    Yocasta Rodrigues MD  Family Medicine PGY 1 with General Surgery

## 2019-11-10 NOTE — PROGRESS NOTES
Calorie Count  Intake recorded for: 11/09  Total Kcals: 896 Total Protein: 51g  Kcals from Hospital Food: 896  Protein: 51g  Kcals from Outside Food (average):0 Protein: 0g  # Meals Recorded: 2 meals (First - 100% 2 chocolate milk, 50% turkey roast, mashed potatoes)      (Second - 100% grilled cheese, cornbread muffin w/butter, diet marcelo)   # Supplements Recorded: no intake recorded.

## 2019-11-10 NOTE — PLAN OF CARE
BP 94/62 (BP Location: Right arm)   Pulse 88   Temp 97  F (36.1  C) (Oral)   Resp 16   Wt 50.1 kg (110 lb 8 oz)   SpO2 97%   BMI 16.32 kg/m      VSS ex tachy at times with soft BP on RA, up ad dania. C/o adb pain, managed partially with PRN po oxy, IV dilaudid, and schedule tylenol, as well as heat pack on abdomen. IV dilaudid frequency changed to x2 per 24 hours, pt stated understanding. Tolerating regular diet, calorie counts, denies n/v. No BM overnight but had loose stool PM, passing flatus. Voids spontaneously, not saving. PICC x2 lumen infusing LR and TPN/lipids. Continue POC.

## 2019-11-10 NOTE — PLAN OF CARE
Occasionally hypotensive/tachy, otherwise VSS on RA. Cycled TPN/Lipids via PICC. Continues to have abdominal pain with food intake, moderately controlled with PO Tylenol, Oxycodone, Robaxin and IV Dilaudid. Tolerating regular diet in small amounts other than reported abdominal discomfort. Reports passing flatus, last BM 11/8. Voiding with good output, not always saving. Up ad dania. Midline incision c/d/I. Plan to wean off narcotics per pain management team recs, discharge to TCU once placement secured. Continue with POC.

## 2019-11-11 ENCOUNTER — APPOINTMENT (OUTPATIENT)
Dept: GENERAL RADIOLOGY | Facility: CLINIC | Age: 44
DRG: 329 | End: 2019-11-11
Attending: STUDENT IN AN ORGANIZED HEALTH CARE EDUCATION/TRAINING PROGRAM
Payer: COMMERCIAL

## 2019-11-11 LAB
ANION GAP SERPL CALCULATED.3IONS-SCNC: 4 MMOL/L (ref 3–14)
BUN SERPL-MCNC: 16 MG/DL (ref 7–30)
CALCIUM SERPL-MCNC: 8.7 MG/DL (ref 8.5–10.1)
CHLORIDE SERPL-SCNC: 108 MMOL/L (ref 94–109)
CO2 SERPL-SCNC: 24 MMOL/L (ref 20–32)
CREAT SERPL-MCNC: 0.41 MG/DL (ref 0.52–1.04)
GFR SERPL CREATININE-BSD FRML MDRD: >90 ML/MIN/{1.73_M2}
GLUCOSE SERPL-MCNC: 97 MG/DL (ref 70–99)
INR PPP: 1.08 (ref 0.86–1.14)
POTASSIUM SERPL-SCNC: 3.7 MMOL/L (ref 3.4–5.3)
PREALB SERPL IA-MCNC: 23 MG/DL (ref 15–45)
SODIUM SERPL-SCNC: 136 MMOL/L (ref 133–144)

## 2019-11-11 PROCEDURE — 25000132 ZZH RX MED GY IP 250 OP 250 PS 637: Performed by: STUDENT IN AN ORGANIZED HEALTH CARE EDUCATION/TRAINING PROGRAM

## 2019-11-11 PROCEDURE — 25000128 H RX IP 250 OP 636: Performed by: SURGERY

## 2019-11-11 PROCEDURE — 25800030 ZZH RX IP 258 OP 636: Performed by: STUDENT IN AN ORGANIZED HEALTH CARE EDUCATION/TRAINING PROGRAM

## 2019-11-11 PROCEDURE — 36592 COLLECT BLOOD FROM PICC: CPT | Performed by: SURGERY

## 2019-11-11 PROCEDURE — 74019 RADEX ABDOMEN 2 VIEWS: CPT

## 2019-11-11 PROCEDURE — 80048 BASIC METABOLIC PNL TOTAL CA: CPT | Performed by: SURGERY

## 2019-11-11 PROCEDURE — 84134 ASSAY OF PREALBUMIN: CPT | Performed by: SURGERY

## 2019-11-11 PROCEDURE — 12000001 ZZH R&B MED SURG/OB UMMC

## 2019-11-11 PROCEDURE — 85610 PROTHROMBIN TIME: CPT | Performed by: SURGERY

## 2019-11-11 RX ORDER — PANTOPRAZOLE SODIUM 40 MG/1
40 TABLET, DELAYED RELEASE ORAL
Status: DISCONTINUED | OUTPATIENT
Start: 2019-11-11 | End: 2019-11-17 | Stop reason: HOSPADM

## 2019-11-11 RX ORDER — SODIUM CHLORIDE, SODIUM LACTATE, POTASSIUM CHLORIDE, CALCIUM CHLORIDE 600; 310; 30; 20 MG/100ML; MG/100ML; MG/100ML; MG/100ML
INJECTION, SOLUTION INTRAVENOUS CONTINUOUS
Status: DISCONTINUED | OUTPATIENT
Start: 2019-11-11 | End: 2019-11-12

## 2019-11-11 RX ADMIN — OXYCODONE HYDROCHLORIDE 10 MG: 5 TABLET ORAL at 10:53

## 2019-11-11 RX ADMIN — OXYCODONE HYDROCHLORIDE 10 MG: 5 TABLET ORAL at 14:53

## 2019-11-11 RX ADMIN — HYOSCYAMINE SULFATE 125 MCG: 0.12 TABLET ORAL at 14:43

## 2019-11-11 RX ADMIN — SENNOSIDES AND DOCUSATE SODIUM 1 TABLET: 8.6; 5 TABLET ORAL at 08:14

## 2019-11-11 RX ADMIN — PANTOPRAZOLE SODIUM 40 MG: 40 TABLET, DELAYED RELEASE ORAL at 08:14

## 2019-11-11 RX ADMIN — ENOXAPARIN SODIUM 40 MG: 40 INJECTION SUBCUTANEOUS at 10:29

## 2019-11-11 RX ADMIN — OXYCODONE HYDROCHLORIDE 10 MG: 5 TABLET ORAL at 23:05

## 2019-11-11 RX ADMIN — OXYCODONE HYDROCHLORIDE 10 MG: 5 TABLET ORAL at 00:54

## 2019-11-11 RX ADMIN — SODIUM CHLORIDE, POTASSIUM CHLORIDE, SODIUM LACTATE AND CALCIUM CHLORIDE: 600; 310; 30; 20 INJECTION, SOLUTION INTRAVENOUS at 17:47

## 2019-11-11 RX ADMIN — OXYCODONE HYDROCHLORIDE 10 MG: 5 TABLET ORAL at 06:43

## 2019-11-11 RX ADMIN — ACETAMINOPHEN 975 MG: 325 TABLET, FILM COATED ORAL at 06:43

## 2019-11-11 RX ADMIN — PANTOPRAZOLE SODIUM 40 MG: 40 TABLET, DELAYED RELEASE ORAL at 16:43

## 2019-11-11 RX ADMIN — ACETAMINOPHEN 975 MG: 325 TABLET, FILM COATED ORAL at 14:22

## 2019-11-11 RX ADMIN — ACETAMINOPHEN 975 MG: 325 TABLET, FILM COATED ORAL at 23:05

## 2019-11-11 RX ADMIN — METHOCARBAMOL 500 MG: 500 TABLET, FILM COATED ORAL at 14:22

## 2019-11-11 RX ADMIN — OXYCODONE HYDROCHLORIDE 10 MG: 5 TABLET ORAL at 18:58

## 2019-11-11 ASSESSMENT — PAIN DESCRIPTION - DESCRIPTORS
DESCRIPTORS: CRAMPING;STABBING
DESCRIPTORS: STABBING

## 2019-11-11 ASSESSMENT — ACTIVITIES OF DAILY LIVING (ADL)
ADLS_ACUITY_SCORE: 12

## 2019-11-11 NOTE — PROVIDER NOTIFICATION
Text paged Dr. Cortez that patient is having severe pain after eating and is requesting a one time dose of IV dilaudid.    Have not yet received a call back.

## 2019-11-11 NOTE — PROGRESS NOTES
Calorie Count  Intake recorded for: 11/10  Total Kcals: 94 Total Protein: 8g  Kcals from Hospital Food: 94   Protein: 8g  Kcals from Outside Food (average):0 Protein: 0g  # Meals Recorded: 50% scrambled eggs  # Supplements Recorded: 0

## 2019-11-11 NOTE — PLAN OF CARE
Assumed care of pt at 1900 on 11/10/19. VSS ex soft B/P's- pt refused VS this shift. Pt c/o pain somewhat managed with PO oxy and scheduled tylenol. Pt reports that swelling and pain to left cheek/tooth area continues but is not as bad as last week. Pt tolerating regular diet; denies c/o N/V. No BM this shift; +flatus. UOP adequate. Up ad dania. PICC SL.  PLAN: continue POC

## 2019-11-11 NOTE — PLAN OF CARE
A x O. Pain somewhat managed w/ oxycodone and tyelnol. Denies n/v. Regular diet, tolerating. +Flatus, no BM. Voiding spontaneously, not saving. Up ad dania. Continue POC.

## 2019-11-11 NOTE — PLAN OF CARE
VS at baseline, declined check this afternoon. PRN oxycodone Q4 hrs, Robaxin, Tylenol and hyosciamine administered for abdominal pain (after food intake). MD also ordered Protonix to start tomorrow AM. Last dose of IV Dilaudid administered today per pain team recommendations and patient aware. Cycled TPN/Lipids and MIVF via PICC. Tolerating food without nausea/vomiting. Reports passing flatus and last BM overnight. Abdomen soft/non-distended. Voiding, not saving output. Up ad dania. Continue with POC.

## 2019-11-11 NOTE — PLAN OF CARE
VSS on room air. SBPs remain in the 90s. Pain partially controlled with tylenol, oxycodone, and robaxin. Avoiding food because it causes pain. Did try eating a small amount and had severe pain afterwards-MD notified. Tried hyoscyamine for cramping but has not noticed any relief yet. Voiding but not saving urine. Had a bowel movement today. Midline incision CDI. Up independently.

## 2019-11-11 NOTE — PROGRESS NOTES
"CLINICAL NUTRITION SERVICES     Nutrition Prescription    RECOMMENDATIONS FOR MDs/PROVIDERS TO ORDER:  Recommend resume nutrition support (EN vs PN pending GI status) given calorie count data showing  4-day average of 189 kcals and 11 g protein daily, meeting 13% low end estimated energy needs and 15% low end estimated protein needs. Pt reports stomach pain with any PO intake, which is inhibiting her ability to meet her nutrition needs PO.   -->If/when nutrition support becomes nutrition POC, please consult RD \"Pharmacy/Nutrition to Start and Manage PN\" -or- \"Registered Dietitian to Assess and Order TF per Medical Nutrition Therapy Protocol\".     Future/Additional Recommendations:  If Peripheral Parenteral Nutrition becomes nutrition POC, recommend:   Goal: Clinimix (D5/AA4.25) @ 65 mL/hr (1560 mL/day) + 250 mL of 20% IV lipids 6 days/week to provide 1537 kcals (31 kcal/kg), 78 g protein (1.6 g/kg), 234 g CHO (GIR 3.3), and 28% of kcals from fat on average daily    If Enteral Nutrition becomes nutrition POC, recommend:   Nutren 1.5 @ 45 mL/hr to provide 1620 kcals (34 kcal/kg/day), 73 g PRO (1.6 g/kg/day), 821 mL H2O, 190 g CHO and no Fiber daily.  - Initiate @ 15 mL/hr and advance by 10 mL q8hr as tolerated  - Do not start or advance until lytes (Mg++/K+) WNL and phos>1.9   - 30 mL q4hr fluid flushes for tube patency. Additional fluids and/or adjustments per MD.        Following up on nutrition POC. Please see 11/7/19 RD note for full assessment details.     NEW FINDINGS   Diet: Regular, Supplements: Magic Cup between meals    Intake:   Kcal counts:  11/10   94 kcals and 8 g protein (1 meal/s and 0 supplement/s recorded)  11/09   No data  11/08   206 kcals and 11 g protein (1 meal/s and 0 supplement/s recorded)  11/07   104 kcals and 9 g protein (1 meal/s and 0 supplement/s recorded)  11/06   352 kcals and 17 g protein (1 meal/s and 0 supplement/s recorded)  * Pt consumed a 4-day average of 189 kcals and 11 g " "protein daily, meeting 13% low end estimated energy needs and 15% low end estimated protein needs.      Met with pt today who reports that she is hungry and that she wants to eat, however she is unable to eat d/t stomach pain. Pt reports that even water intake upsets her stomach. Pt reports that once her stomach pain resolves, she believes that she will be able to meet her nutrition needs. Pt reports that she would like to have supplemental nutrition support at this time.     Nutrition Support: Previously on PPN:   - Access: Peripheral  - Goal Regimen: Clinimix @ 65 mL/hr (1560 mL/day) + 250 mL of 20% IV lipids 6 days/week to provide 1537 kcals (31 kcal/kg), 78 g protein (1.6 g/kg), 234 g CHO (GIR 3.3), and 28% of kcals from fat on average daily    Intake:   Initiated on 10/25 and advanced to goal rate on 10/29. Per chart review, PPN was discontinued by team 11/10. Per 11/10 surgery team note, \"discharge TPN as tolerating regular diet now\".     GI:   Per surgery team note, pt passing gas and having loose stools. Per intake/output, pt with 2x BM on 11/8.     Labs:   K+ 3.7  Glucose 97   BUN 16, Cr 0.41 (L)    Meds:   Bowel regimen    Interventions  Enteral Nutrition - recommendations above  Parenteral Nutrition/IV Fluids - - recommendations above  Nutrition education - discussed nutrition POC with patient today.     Jesus Velazquez, MS, RD, LD  7C floor pgr: [phone]      "

## 2019-11-12 LAB
ANION GAP SERPL CALCULATED.3IONS-SCNC: 7 MMOL/L (ref 3–14)
BUN SERPL-MCNC: 19 MG/DL (ref 7–30)
CALCIUM SERPL-MCNC: 8.1 MG/DL (ref 8.5–10.1)
CHLORIDE SERPL-SCNC: 110 MMOL/L (ref 94–109)
CO2 SERPL-SCNC: 22 MMOL/L (ref 20–32)
CREAT SERPL-MCNC: 0.46 MG/DL (ref 0.52–1.04)
GFR SERPL CREATININE-BSD FRML MDRD: >90 ML/MIN/{1.73_M2}
GLUCOSE SERPL-MCNC: 172 MG/DL (ref 70–99)
PLATELET # BLD AUTO: 505 10E9/L (ref 150–450)
POTASSIUM SERPL-SCNC: 3.2 MMOL/L (ref 3.4–5.3)
SODIUM SERPL-SCNC: 139 MMOL/L (ref 133–144)

## 2019-11-12 PROCEDURE — 12000001 ZZH R&B MED SURG/OB UMMC

## 2019-11-12 PROCEDURE — 25000132 ZZH RX MED GY IP 250 OP 250 PS 637: Performed by: STUDENT IN AN ORGANIZED HEALTH CARE EDUCATION/TRAINING PROGRAM

## 2019-11-12 PROCEDURE — 25000128 H RX IP 250 OP 636: Performed by: SURGERY

## 2019-11-12 PROCEDURE — 84132 ASSAY OF SERUM POTASSIUM: CPT | Performed by: SURGERY

## 2019-11-12 PROCEDURE — 36592 COLLECT BLOOD FROM PICC: CPT | Performed by: SURGERY

## 2019-11-12 PROCEDURE — 80048 BASIC METABOLIC PNL TOTAL CA: CPT | Performed by: SURGERY

## 2019-11-12 PROCEDURE — 85049 AUTOMATED PLATELET COUNT: CPT | Performed by: SURGERY

## 2019-11-12 PROCEDURE — 40000802 ZZH SITE CHECK

## 2019-11-12 RX ORDER — OXYCODONE HYDROCHLORIDE 10 MG/1
10 TABLET ORAL EVERY 6 HOURS PRN
Status: DISCONTINUED | OUTPATIENT
Start: 2019-11-12 | End: 2019-11-13

## 2019-11-12 RX ORDER — NORTRIPTYLINE HCL 10 MG
10 CAPSULE ORAL AT BEDTIME
Status: DISCONTINUED | OUTPATIENT
Start: 2019-11-12 | End: 2019-11-17 | Stop reason: HOSPADM

## 2019-11-12 RX ADMIN — NORTRIPTYLINE HYDROCHLORIDE 10 MG: 10 CAPSULE ORAL at 21:10

## 2019-11-12 RX ADMIN — ACETAMINOPHEN 975 MG: 325 TABLET, FILM COATED ORAL at 21:10

## 2019-11-12 RX ADMIN — Medication 10 MEQ: at 19:26

## 2019-11-12 RX ADMIN — PANTOPRAZOLE SODIUM 40 MG: 40 TABLET, DELAYED RELEASE ORAL at 08:44

## 2019-11-12 RX ADMIN — Medication 10 MEQ: at 17:13

## 2019-11-12 RX ADMIN — Medication 5 ML: at 15:31

## 2019-11-12 RX ADMIN — OXYCODONE HYDROCHLORIDE 10 MG: 10 TABLET ORAL at 15:11

## 2019-11-12 RX ADMIN — PANTOPRAZOLE SODIUM 40 MG: 40 TABLET, DELAYED RELEASE ORAL at 16:14

## 2019-11-12 RX ADMIN — Medication 10 MEQ: at 18:17

## 2019-11-12 RX ADMIN — ACETAMINOPHEN 975 MG: 325 TABLET, FILM COATED ORAL at 14:33

## 2019-11-12 RX ADMIN — ENOXAPARIN SODIUM 40 MG: 40 INJECTION SUBCUTANEOUS at 13:00

## 2019-11-12 RX ADMIN — OXYCODONE HYDROCHLORIDE 10 MG: 10 TABLET ORAL at 21:10

## 2019-11-12 RX ADMIN — Medication 10 MEQ: at 20:56

## 2019-11-12 RX ADMIN — ACETAMINOPHEN 975 MG: 325 TABLET, FILM COATED ORAL at 06:22

## 2019-11-12 RX ADMIN — OXYCODONE HYDROCHLORIDE 10 MG: 5 TABLET ORAL at 04:09

## 2019-11-12 RX ADMIN — OXYCODONE HYDROCHLORIDE 10 MG: 5 TABLET ORAL at 08:44

## 2019-11-12 ASSESSMENT — ACTIVITIES OF DAILY LIVING (ADL)
ADLS_ACUITY_SCORE: 12

## 2019-11-12 ASSESSMENT — PAIN DESCRIPTION - DESCRIPTORS
DESCRIPTORS: CONSTANT
DESCRIPTORS: STABBING
DESCRIPTORS: ACHING

## 2019-11-12 NOTE — PROGRESS NOTES
Surgery Progress Note    S:   Reports pain after every meal. Nauseous this morning, reports that she does not plan to eat today.    O:   BP 90/54 (BP Location: Left arm)   Pulse 84   Temp 98.3  F (36.8  C) (Oral)   Resp 16   Wt 50.1 kg (110 lb 6.4 oz)   SpO2 99%   BMI 16.30 kg/m         Intake/Output Summary (Last 24 hours) at 11/6/2019 0538  Last data filed at 11/6/2019 0126  Gross per 24 hour   Intake 3705.68 ml   Output 3950 ml   Net -244.32 ml        General: Well appearing: Sitting up in bed, doing crosswords.   HEENT: No obvious swelling noted to face  Cardio: Extremities are warm and well perfused   Resp: No increased work of breathing  Abdomen: Soft, non-distended, incision c/d/i, tender to palpation throughout  Neuro: Alert and oriented. Moves all extremities symmetrically.              Assessment and Plan:     ASSESSMENT:Rachel A Gerhardt is a 43 year old female with a PMH of pelvic radiation, cervical and ovarian cancer and SBR in 2017 who was admitted for recurrent SBO and worsening symptoms, she is s/p small bowel resection and primary anastomosis on 10/31. Pain team consulted and working with patient, recs below in plan. Dentistry was consulted and worked with patient.      PLAN:   - Appreciate recs from pain service  - Tapering all narcotics as able  - GI consult for upper endoscopy to evaluate for etiology of pain with eating, would like to place NJT for continued enteral nutrition  - Dentistry will see her inpatient if possible, otherwise she can follow up outpatient  - Dispo home in 1-2 days, no TCU needs at this time    Seen and discussed with staff    Luiz Cortez MD  [facility] Service, PGY1

## 2019-11-12 NOTE — PLAN OF CARE
VSS on room air. SBPs remain in the 90s. Pain partially controlled with tylenol and oxycodone. NPO. GI consulted for pain after eating. Voiding but not saving urine. Continues to have loose stools. Midline incision CDI. Up independently.

## 2019-11-12 NOTE — PLAN OF CARE
Soft BP's at times per pt baseline, not within notify parameters. Other vitals stable. Pt c/o increased severe pain after eating earlier this afternoon. Team aware and saw patient. Abdominal xray ordered and GI consult placed. Pt denies nausea. States she has had diarrhea x3 today. Voiding. Taking PRN oxycodone/Robaxin for pain. Has not been eating tonight d/t increased pain, tolerating sips of liquids. IVF infusing into PICC @ 50 mL/hr. NPO @ midnight. Continue with POC.

## 2019-11-12 NOTE — CONSULTS
"Gastroenterology Consultation  Rachel A Gerhardt [id number] 43 year old 1975 11/12/2019        Date of Admission: 10/18/2019  Reason for consult: Abdominal Pain  Requesting physician: Dr. Trevizo, Dada WALLS MD       Reason for Consultation:   Abdominal Pain         HPI:   Rachel A Gerhardt is a 43 year old female with a PMHx of cervical cancer and ovarian cancer s/p chemo/rad, h/o opioid use disorder, chronic abdominal pain (in opioids PTA), recurrent SBOs s/p exlap small bowel resection with primary anastamosis in 2017, ileo-ileal anastamosis dilation in June 2018, and multiple medically managed recurrent SBOs and is admitted for small bowel obstruction.     The patient underwent an ex-lap on 10/31/2019, notable for distended small bowel proximally, chronically strictured mid ileum with normal appearing terminal ileum. Per the operative note, s/p exploratory laparotomy with small bowel resection, lysis of adhesions, partial omentecomy and primary anastomosis 10/31.    Since the procedure, the patient notes that she has been having continued abdominal discomfort, mostly in the area surrounding her surgical scar. She feels as though she will eat and then 30 minutes after eating she will develop a contractile and sharp abdominal pain that lasts for several hours and then resolves spontaneously. Because of the pain associated with eating she has been limiting her eating. She reports on 11/10 evening, she did not experience pain after eating so she \"took advantage\" of this \"window\" in symptoms and ate a fair amount. She is concerned giving how persistent her symptoms are. She denies any nausea, emesis, abdominal distension or bloating. She is having several bowel movements per day, liquid in nature, brown in color; denies melena or hematochezia.          Past Medical History:     Past Medical History:   Diagnosis Date     Asthma      Cervical cancer (H)      Other chronic pain      Ovarian cancer (H)      " Partial small bowel obstruction (H) 11/2/2018     SBO (small bowel obstruction) (H) 12/27/2016     Small bowel obstruction (H) 5/17/2017     Small intestine obstruction (H) 4/29/2017     Substance abuse (H)     Outside records indicate past history of narcotics abuse or dependence, but patient denies.          Past Surgical History:     Past Surgical History:   Procedure Laterality Date     COLONOSCOPY N/A 5/3/2018    Procedure: COLONOSCOPY;  sigmoidoscopy;  Surgeon: Omero Vigil MD;  Location: [facility] OR     COLONOSCOPY WITH CO2 INSUFFLATION N/A 4/30/2018    Procedure: COLONOSCOPY WITH CO2 INSUFFLATION;  Colonoscopy;  Surgeon: Omero Vigil MD;  Location: [facility]     COMBINED CYSTOSCOPY, INSERT STENT URETER(S) Bilateral 5/18/2017    Procedure: COMBINED CYSTOSCOPY, INSERT STENT URETER(S);  Cystoscopy with Bilateral Stent,;  Surgeon: Rene Calero MD;  Location: [facility] OR     ENT SURGERY  2009    mastoid, sinus     ENTEROSCOPY SMALL BOWEL N/A 6/18/2018    Procedure: ENTEROSCOPY SMALL BOWEL;  Lower bowel Retrograde Enteroscopy with Balloon Dilation .;  Surgeon: Omero Vigil MD;  Location: [facility]     EXAM UNDER ANESTHESIA, INSERT ALEX SLEEVE, UTERINE PLACEMENT OF TANDEM AND RING FOR RAD, ULTRASOUND N/A 12/14/2015    Procedure: EXAM UNDER ANESTHESIA, INSERT ALEX SLEEVE, UTERINE PLACEMENT OF TANDEM AND RING FOR RADIATION, ULTRASOUND GUIDED;  Surgeon: Abby Tony MD;  Location: [facility]     INSERT TANDEM AND CESIUM APPLICATOR CERVIX, ULTRASOUND GUIDED N/A 12/17/2015    Procedure: INSERT TANDEM AND CESIUM APPLICATOR CERVIX, ULTRASOUND GUIDED;  Surgeon: Kika Wood MD;  Location: [facility] OR     KNEE SURGERY       LAPAROTOMY EXPLORATORY N/A 5/18/2017    Procedure: LAPAROTOMY EXPLORATORY;   Exploratry Laparotomy, Small Bowel Resection with anastomosis, Flexible Sigmoidoscopy;  Surgeon: Jennifer Goodwin MD;  Location: [facility]     LAPAROTOMY EXPLORATORY N/A 10/31/2019    Procedure: Laparotomy,   bowel resection , lysis of adhesions, partial omentecomy;  Surgeon: Dada Trevizo MD;  Location: [facility]     PICC INSERTION Right 04/29/2017    4fr SL BioFlo PICC, 37cm (3cm external) in the R basilic vein w/ tip in the mid SVC.     PICC INSERTION Right 03/29/2018    4Fr - 40cm (4cm external), R lateral brachial vein, Low SVC     RESECT SMALL BOWEL WITHOUT OSTOMY N/A 5/18/2017    Procedure: RESECT SMALL BOWEL WITHOUT OSTOMY;;  Surgeon: Jennifer Goodwin MD;  Location: [facility]     SIGMOIDOSCOPY FLEXIBLE N/A 5/18/2017    Procedure: SIGMOIDOSCOPY FLEXIBLE;;  Surgeon: Jennifer Goodwin MD;  Location: [facility]          Medications:     Current Facility-Administered Medications   Medication     acetaminophen (TYLENOL) tablet 975 mg     benzocaine (ORAJEL MAXIMUM STRENGTH) 20 % gel     benzocaine 20% (HURRICAINE/TOPEX) 20 % spray 0.5-1 mL     bisacodyl (DULCOLAX) Suppository 10 mg     calcium carbonate (TUMS) chewable tablet 500 mg     dextrose 10 % 1,000 mL infusion     diphenhydrAMINE (BENADRYL) injection 25 mg     enoxaparin ANTICOAGULANT (LOVENOX) injection 40 mg     glycerin (ADULT) Suppository 1 suppository     heparin lock flush 10 UNIT/ML injection 2-5 mL     hydrOXYzine (VISTARIL) injection 50 mg     hyoscyamine (ANASPAZ/LEVSIN) tablet 125 mcg     Lidocaine (LIDOCARE) 4 % Patch 1 patch    And     lidocaine patch REMOVAL    And     lidocaine patch in PLACE     lidocaine (LMX4) cream     lidocaine (XYLOCAINE) 2 % external gel     lidocaine (XYLOCAINE) 4 % solution 5 mL     lidocaine 1 % 0.1-1 mL     magnesium sulfate 4 g in 100 mL sterile water (premade)     melatonin tablet 1 mg     menthol (ICY HOT) 5 % patch 1 patch    And     menthol (ICY HOT) Patch in Place    And     menthol (ICY HOT) patch REMOVAL     methocarbamol (ROBAXIN) tablet 500 mg     naloxone (NARCAN) injection 0.1-0.4 mg     ondansetron (ZOFRAN-ODT) ODT tab 4 mg    Or     ondansetron (ZOFRAN) injection 4 mg     oxyCODONE (ROXICODONE) tablet 5-10 mg      oxymetazoline (AFRIN) 0.05 % spray 2 spray     pantoprazole (PROTONIX) EC tablet 40 mg     phenol (CHLORASEPTIC) 1.4 % spray 1 mL     potassium chloride (KLOR-CON) Packet 20-40 mEq     potassium chloride 10 mEq in 100 mL intermittent infusion with 10 mg lidocaine     potassium chloride 10 mEq in 100 mL sterile water intermittent infusion (premix)     potassium chloride 20 mEq in 50 mL intermittent infusion     potassium chloride ER (K-DUR/KLOR-CON M) CR tablet 20-40 mEq     senna-docusate (SENOKOT-S/PERICOLACE) 8.6-50 MG per tablet 1 tablet     simethicone (MYLICON) chewable tablet 80 mg     sodium chloride (PF) 0.9% PF flush 3 mL     sodium chloride (PF) 0.9% PF flush 3 mL     sodium phosphate (FLEET ENEMA) 1 enema          Allergies     Allergies   Allergen Reactions     Sulfa Drugs Hives     Ibuprofen Nausea and Vomiting and Hives     Unknown if reaction hives or N/V     No Clinical Screening - See Comments Other (See Comments) and Diarrhea     headache  Carrots cause gastric upset, cramping and diarrhea.     Ciprofloxacin Hives and Nausea     Quinolones      Tramadol Hives, Diarrhea, Nausea and Nausea and Vomiting     Amoxicillin Nausea and Vomiting     Augmentin Nausea, Hives and GI Disturbance     Patient tolerated course of zosyn in 5/2018     Avelox [Moxifloxacin] Nausea and Vomiting     Codeine Nausea and Vomiting     Daucus Carota      Other reaction(s): GI Upset  Other reaction(s): Abdominal pain, Diarrhea  Carrots cause gastric upset, cramping and diarrhea.          Social History:   Reviewed and edited as appropriate  Social History     Socioeconomic History     Marital status:      Spouse name: Not on file     Number of children: Not on file     Years of education: Not on file     Highest education level: Not on file   Occupational History     Not on file   Social Needs     Financial resource strain: Not on file     Food insecurity:     Worry: Not on file     Inability: Not on file      Transportation needs:     Medical: Not on file     Non-medical: Not on file   Tobacco Use     Smoking status: Light Tobacco Smoker     Years: 12.00     Types: Cigarettes     Smokeless tobacco: Never Used     Tobacco comment: pt smoking about 4 cigs per week   Substance and Sexual Activity     Alcohol use: No     Alcohol/week: 0.0 standard drinks     Comment: None per pt     Drug use: No     Comment: Patient denies.  Outside records indicate possible Opiate abuse.     Sexual activity: Yes     Partners: Male     Birth control/protection: None   Lifestyle     Physical activity:     Days per week: Not on file     Minutes per session: Not on file     Stress: Not on file   Relationships     Social connections:     Talks on phone: Not on file     Gets together: Not on file     Attends Rastafari service: Not on file     Active member of club or organization: Not on file     Attends meetings of clubs or organizations: Not on file     Relationship status: Not on file     Intimate partner violence:     Fear of current or ex partner: Not on file     Emotionally abused: Not on file     Physically abused: Not on file     Forced sexual activity: Not on file   Other Topics Concern     Parent/sibling w/ CABG, MI or angioplasty before 65F 55M? No   Social History Narrative    Lives alone         Family History:   Reviewed and edited as appropriate  Family History   Problem Relation Age of Onset     Diabetes Mother      Ovarian Cancer No family hx of      Uterine Cancer No family hx of      Cervical Cancer No family hx of      Breast Cancer No family hx of           Review of Systems:   A complete 10 point review of systems was obtained.  Please see the HPI for pertinent positives and negatives.  All other systems were reviewed and were found to be negative.          Physical Exam:   VS:  BP 91/57 (BP Location: Left arm)   Pulse 84   Temp 97.2  F (36.2  C) (Oral)   Resp 18   Wt 50.1 kg (110 lb 6.4 oz)   SpO2 96%   BMI 16.30  kg/m      Wt:   Wt Readings from Last 2 Encounters:   11/11/19 50.1 kg (110 lb 6.4 oz)   07/09/19 49 kg (108 lb 1.6 oz)      Constitutional: cooperative, pleasant, no acute distress  Eyes: Sclera anicteric/injected  HEENT:  Mucus membranes moist  CV: RRR, no m/r/g  Respiratory: CTAB  Abd: Surgical scar c/d/i, midline. Nondistended, mildly decreased BS, TTP around umbilicus, no rebound or guarding   Skin: warm, perfused  Neuro: AAO x 3         Laboratory:   BMP  Recent Labs   Lab 11/11/19  1047 11/10/19  0920 11/09/19  1044 11/09/19  0933    139 134 Canceled, Test credited   POTASSIUM 3.7 4.0 4.1 Canceled, Test credited   CHLORIDE 108 109 105 Canceled, Test credited   JONATAN 8.7 8.4* 8.7 Canceled, Test credited   CO2 24 26 24 Canceled, Test credited   BUN 16 23 21 Canceled, Test credited   CR 0.41* 0.45* 0.41* Canceled, Test credited   GLC 97 89 109* Canceled, Test credited     CBC  Recent Labs   Lab 11/09/19  0933 11/06/19  0725    249     INR  Recent Labs   Lab 11/11/19  1047   INR 1.08     LFTs  Recent Labs   Lab 11/07/19  0709   ALKPHOS 68   AST 22   ALT 28   BILITOTAL 0.2      Labs above was independently reviewed and interpreted         Imaging/Procedures/Studies:   Abdominal X-ray 11/11/2019:  Moderate amount of stool in the colon. Increased gaseous distention of  the colon which appears to wax and wane over time.    Lower Device-Assisted Enteroscopy with Fluoroscopy 6/2018  Impression:          - Benign stenosis appreciated at the site of likely to                        be the ileoileal anastomosis status post uncomplicated                        dilation to 18mm   Recommendation:      - Standard inpatient general anesthesia recovery with                        return to the floor when appropriate                        - Diet may resume slowly as tolerated                        - This represents maximal endoscopic therapy: with                        durable effect repeat dilation would be  reasonable,                        again only after NG tube bowel preparation; with no                        effect repeat endoscopy is not reasonable and colorectal                        surgery should again be consulted for an opinion                        - The findings and recommendations were discussed with                        the patient and their family     Flexible Sigmoidoscopy 5/2018:  Impression:          - Inadequate bowel preparation to allow safe advancement                        of the enteroscope beyond the sigmoid   Recommendation:      - Standard general anesthesia recovery with return to                        the floor when appropriate                        - This represents the third prematurely aborted attempt                        at enteroscopy due to poor bowel preparation; it remains                        unclear why an NG tube was not utilized when the patient                        became nauseous with bowel preparation; our options                        include repeat bowel preparation at a slow rate per NG                        tube as an inpatient or consideration for surgical                        manipulation                        - Repeat attempt at enterosocpy will not occur until                        there is documented clear output per anus                        - The findings and recommendations were discussed with                        the patient and their family          Assessment and Plan:   Rachel A Gerhardt is a 43 year old female with a PMHx of cervical cancer and ovarian cancer s/p chemo/rad, h/o opioid use disorder, chronic abdominal pain (in opioids PTA), recurrent SBOs s/p exlap small bowel resection with primary anastamosis in 2017, ileo-ileal anastamosis dilation in June 2018, and multiple medically managed recurrent SBOs and is admitted for small bowel obstruction.     #. Post-prandial Abdominal Pain, acute on chronic: Patient with a history of  chronic abdominal pain (on opioids PTA) in the setting of h/o cervical and ovarian CA. Patient presented with a SBO and underwent exploratory laparotomy with small bowel resection, lysis of adhesions, partial omentecomy and primary anastomosis on 10/31/2019. Patient no longer has any nausea, emesis or abdominal bloating, but has pain after eating. No evidence of obstruction post surgery on abdominal x-ray 11/11/2019. No abdominal pain concerning for cholecystitis or pancreatitis. No abdominal pain out of proportion of exam. Etiology of continued abdominal pain likely multifactorial in the setting of h/o opioid induced constipation, h/o opioid use disorder w/potential hyperalgesia and recent surgical procedure.   - Consider non-narcotic pain regimen, such as nortriptyine 10mg qdaily (at night) for one month    * Reduce opioids as tolerated  - Continue pantoprazole 40mg BID  - Recommend bowel regimen, including miralax, given h/o opioid induced constipation and moderate stool burden on abdominal x-ray  - Recommend smoking cessation, counseling per primary team  - At this time, no indication for EGD.     It has been a pleasure to participate in the care of this patient.  Patient discussed with GI staff, Dr. Ambriz.  Please do not hesitate to contact the GI service with any questions or concerns.     Gastroenterology Inpatient Sign Off Note    Inpatient GI consults service will sign off. No further recommendations at this time. If primary team has addition questions, please page the consult fellow listed in José.    Current GI Consult Staff: Katina     Follow up recommendations:   No outpatient GI clinic follow-up indicated. Follow-up with primary care provider at timing determined by discharge physician.    If outpatient GI follow-up is felt indicated by primary care, they may coordinate this by placing new GI referral and contacting general GI clinic - ([phone]).    Umm Beckett (Lizzie)  Gastroenterology  Fellow  Pager [phone]

## 2019-11-12 NOTE — PLAN OF CARE
Patient was lethargic and aroused to voice. Alert and oriented x 4. Complained of pain in the abdomen. Oxycodone 10 mg given x 1. Abdominal incision clean, dry and intact. Passing flatus. Up independently to the BSC. IV infusing at 50 ml/hr.

## 2019-11-12 NOTE — PLAN OF CARE
OT 7C: cancel, pt OOR ambulating around hospital upon AM attempts, multiple cancels for therapy 2/2 being OOR and ambulating independently around hospital. Pt is aware of post-surgical precautions from previous surgeries and does not currently require any assistance with ADLs or functional mobility, OK to discharge home from functional standpoint and will complete orders.       Occupational Therapy Discharge Summary    Reason for therapy discharge:    Multiple attempts and pt OOR independently ambulating without difficulty and completing functional ADLs within precautions, all mobility and ADL goals met.     Progress towards therapy goal(s). See goals on Care Plan in [facility] electronic health record for goal details.  Goals met    Therapy recommendation(s):    No further therapy is recommended.

## 2019-11-13 LAB
ANION GAP SERPL CALCULATED.3IONS-SCNC: 5 MMOL/L (ref 3–14)
BUN SERPL-MCNC: 18 MG/DL (ref 7–30)
CALCIUM SERPL-MCNC: 8.2 MG/DL (ref 8.5–10.1)
CHLORIDE SERPL-SCNC: 111 MMOL/L (ref 94–109)
CO2 SERPL-SCNC: 26 MMOL/L (ref 20–32)
CREAT SERPL-MCNC: 0.49 MG/DL (ref 0.52–1.04)
GFR SERPL CREATININE-BSD FRML MDRD: >90 ML/MIN/{1.73_M2}
GLUCOSE SERPL-MCNC: 91 MG/DL (ref 70–99)
POTASSIUM SERPL-SCNC: 3.6 MMOL/L (ref 3.4–5.3)
POTASSIUM SERPL-SCNC: 4 MMOL/L (ref 3.4–5.3)
SODIUM SERPL-SCNC: 142 MMOL/L (ref 133–144)

## 2019-11-13 PROCEDURE — 25000132 ZZH RX MED GY IP 250 OP 250 PS 637: Performed by: STUDENT IN AN ORGANIZED HEALTH CARE EDUCATION/TRAINING PROGRAM

## 2019-11-13 PROCEDURE — 12000001 ZZH R&B MED SURG/OB UMMC

## 2019-11-13 PROCEDURE — 36592 COLLECT BLOOD FROM PICC: CPT | Performed by: SURGERY

## 2019-11-13 PROCEDURE — 80048 BASIC METABOLIC PNL TOTAL CA: CPT | Performed by: SURGERY

## 2019-11-13 PROCEDURE — 25000128 H RX IP 250 OP 636: Performed by: SURGERY

## 2019-11-13 PROCEDURE — 25000128 H RX IP 250 OP 636: Performed by: STUDENT IN AN ORGANIZED HEALTH CARE EDUCATION/TRAINING PROGRAM

## 2019-11-13 RX ORDER — OXYCODONE HYDROCHLORIDE 10 MG/1
10 TABLET ORAL EVERY 8 HOURS PRN
Status: DISCONTINUED | OUTPATIENT
Start: 2019-11-13 | End: 2019-11-13

## 2019-11-13 RX ORDER — OXYCODONE HYDROCHLORIDE 5 MG/1
5 TABLET ORAL ONCE
Status: COMPLETED | OUTPATIENT
Start: 2019-11-13 | End: 2019-11-13

## 2019-11-13 RX ORDER — HYDROXYZINE HYDROCHLORIDE 25 MG/1
25 TABLET, FILM COATED ORAL 3 TIMES DAILY PRN
Status: DISCONTINUED | OUTPATIENT
Start: 2019-11-13 | End: 2019-11-17 | Stop reason: HOSPADM

## 2019-11-13 RX ORDER — DEXTROSE, SODIUM CHLORIDE, SODIUM LACTATE, POTASSIUM CHLORIDE, AND CALCIUM CHLORIDE 5; .6; .31; .03; .02 G/100ML; G/100ML; G/100ML; G/100ML; G/100ML
500 INJECTION, SOLUTION INTRAVENOUS ONCE
Status: COMPLETED | OUTPATIENT
Start: 2019-11-13 | End: 2019-11-13

## 2019-11-13 RX ORDER — OXYCODONE HYDROCHLORIDE 5 MG/1
5 TABLET ORAL EVERY 4 HOURS PRN
Status: DISCONTINUED | OUTPATIENT
Start: 2019-11-13 | End: 2019-11-14

## 2019-11-13 RX ADMIN — OXYCODONE HYDROCHLORIDE 5 MG: 5 TABLET ORAL at 16:54

## 2019-11-13 RX ADMIN — SENNOSIDES AND DOCUSATE SODIUM 1 TABLET: 8.6; 5 TABLET ORAL at 07:37

## 2019-11-13 RX ADMIN — PANTOPRAZOLE SODIUM 40 MG: 40 TABLET, DELAYED RELEASE ORAL at 07:37

## 2019-11-13 RX ADMIN — NORTRIPTYLINE HYDROCHLORIDE 10 MG: 10 CAPSULE ORAL at 21:30

## 2019-11-13 RX ADMIN — OXYCODONE HYDROCHLORIDE 5 MG: 5 TABLET ORAL at 11:25

## 2019-11-13 RX ADMIN — ACETAMINOPHEN 975 MG: 325 TABLET, FILM COATED ORAL at 21:30

## 2019-11-13 RX ADMIN — SODIUM CHLORIDE, SODIUM LACTATE, POTASSIUM CHLORIDE, CALCIUM CHLORIDE AND DEXTROSE MONOHYDRATE 500 ML: 5; 600; 310; 30; 20 INJECTION, SOLUTION INTRAVENOUS at 10:08

## 2019-11-13 RX ADMIN — ACETAMINOPHEN 975 MG: 325 TABLET, FILM COATED ORAL at 14:46

## 2019-11-13 RX ADMIN — OXYCODONE HYDROCHLORIDE 5 MG: 5 TABLET ORAL at 21:30

## 2019-11-13 RX ADMIN — ENOXAPARIN SODIUM 40 MG: 40 INJECTION SUBCUTANEOUS at 14:46

## 2019-11-13 RX ADMIN — ACETAMINOPHEN 975 MG: 325 TABLET, FILM COATED ORAL at 06:41

## 2019-11-13 RX ADMIN — OXYCODONE HYDROCHLORIDE 10 MG: 10 TABLET ORAL at 06:41

## 2019-11-13 RX ADMIN — OXYCODONE HYDROCHLORIDE 5 MG: 5 TABLET ORAL at 11:26

## 2019-11-13 RX ADMIN — PANTOPRAZOLE SODIUM 40 MG: 40 TABLET, DELAYED RELEASE ORAL at 16:53

## 2019-11-13 ASSESSMENT — PAIN DESCRIPTION - DESCRIPTORS
DESCRIPTORS: CRAMPING;SQUEEZING
DESCRIPTORS: CRAMPING;SQUEEZING
DESCRIPTORS: CRAMPING;SQUEEZING;STABBING

## 2019-11-13 ASSESSMENT — ACTIVITIES OF DAILY LIVING (ADL)
ADLS_ACUITY_SCORE: 12

## 2019-11-13 NOTE — PROGRESS NOTES
Surgery Progress Note    S:   Reports pain after every meal. Nauseous this morning, reports that she does not plan to eat today.    O:   BP (!) 85/58 (BP Location: Left arm)   Pulse 84   Temp 98  F (36.7  C) (Oral)   Resp 16   Wt 50.1 kg (110 lb 6.4 oz)   SpO2 99%   BMI 16.30 kg/m         Intake/Output Summary (Last 24 hours) at 11/6/2019 0538  Last data filed at 11/6/2019 0126  Gross per 24 hour   Intake 3705.68 ml   Output 3950 ml   Net -244.32 ml        General: Well appearing: Sitting up in bed, doing crosswords.   HEENT: No obvious swelling noted to face  Cardio: Extremities are warm and well perfused   Resp: No increased work of breathing  Abdomen: Soft, non-distended, incision c/d/i, tender to palpation throughout  Neuro: Alert and oriented. Moves all extremities symmetrically.              Assessment and Plan:     ASSESSMENT:Rachel A Gerhardt is a 43 year old female with a PMH of pelvic radiation, cervical and ovarian cancer and SBR in 2017 who was admitted for recurrent SBO and worsening symptoms, she is s/p small bowel resection and primary anastomosis on 10/31. Pain team consulted and working with patient, recs below in plan. Dentistry was consulted and worked with patient.      PLAN:   - Appreciate recs from pain service  - Tapering all narcotics as able  - GI consult for upper endoscopy to evaluate for etiology of pain with eating, would like to place NJT for continued enteral nutrition  - Dentistry will see her inpatient if possible, otherwise she can follow up outpatient  - Dispo home in 1-2 days, no TCU needs at this time    Seen and discussed with staff    Pepper Pichardo

## 2019-11-13 NOTE — CONSULTS
Gastroenterology Progress Note         Subjective/Objective:   - Patient was NPO yesterday and didn't have food ordered until after 6:30, so didn't eat yesterday  - This AM she tried eating banana bread and eggs, but had just finished eating food prior to the interview. After eating the patient had some bowel sounds, but hadn't had a bowel movement yet  - Had a bowel movement in the last 24 hours, had to strain to pass a bowel movement  - Denies any emesis overnight  - Denies fevers or chills         Medications:     Current Facility-Administered Medications   Medication     acetaminophen (TYLENOL) tablet 975 mg     bisacodyl (DULCOLAX) Suppository 10 mg     calcium carbonate (TUMS) chewable tablet 500 mg     dextrose 10 % 1,000 mL infusion     enoxaparin ANTICOAGULANT (LOVENOX) injection 40 mg     glycerin (ADULT) Suppository 1 suppository     hydrOXYzine (ATARAX) tablet 25 mg     hyoscyamine (ANASPAZ/LEVSIN) tablet 125 mcg     Lidocaine (LIDOCARE) 4 % Patch 1 patch    And     lidocaine patch REMOVAL    And     lidocaine patch in PLACE     lidocaine (LMX4) cream     lidocaine (XYLOCAINE) 2 % external gel     lidocaine (XYLOCAINE) 4 % solution 5 mL     lidocaine 1 % 0.1-1 mL     magnesium sulfate 4 g in 100 mL sterile water (premade)     melatonin tablet 1 mg     menthol (ICY HOT) 5 % patch 1 patch    And     menthol (ICY HOT) Patch in Place    And     menthol (ICY HOT) patch REMOVAL     methocarbamol (ROBAXIN) tablet 500 mg     naloxone (NARCAN) injection 0.1-0.4 mg     nortriptyline (PAMELOR) capsule 10 mg     ondansetron (ZOFRAN-ODT) ODT tab 4 mg     oxyCODONE (ROXICODONE) tablet 5 mg     oxymetazoline (AFRIN) 0.05 % spray 2 spray     pantoprazole (PROTONIX) EC tablet 40 mg     phenol (CHLORASEPTIC) 1.4 % spray 1 mL     potassium chloride (KLOR-CON) Packet 20-40 mEq     potassium chloride 10 mEq in 100 mL intermittent infusion with 10 mg lidocaine     potassium chloride 10 mEq in 100 mL sterile water intermittent  infusion (premix)     potassium chloride 20 mEq in 50 mL intermittent infusion     potassium chloride ER (K-DUR/KLOR-CON M) CR tablet 20-40 mEq     senna-docusate (SENOKOT-S/PERICOLACE) 8.6-50 MG per tablet 1 tablet     simethicone (MYLICON) chewable tablet 80 mg     sodium chloride (PF) 0.9% PF flush 3 mL     sodium chloride (PF) 0.9% PF flush 3 mL     sodium phosphate (FLEET ENEMA) 1 enema            Physical Exam:   VS:  BP 91/64 (BP Location: Left arm)   Pulse 83   Temp 96.9  F (36.1  C) (Oral)   Resp 16   Wt 50.1 kg (110 lb 6.4 oz)   SpO2 100%   BMI 16.30 kg/m      Wt:   Wt Readings from Last 2 Encounters:   11/11/19 50.1 kg (110 lb 6.4 oz)   07/09/19 49 kg (108 lb 1.6 oz)      Constitutional: cooperative, pleasant, no acute distress  Eyes: Sclera anicteric/injected  HEENT:  Mucus membranes moist  CV: RRR, no m/r/g  Respiratory: CTAB  Abd: Surgical scar c/d/i, midline. Nondistended, TTP around umbilicus, no rebound or guarding   Skin: warm, perfused  Neuro: AAO x 3         Laboratory:   BMP  Recent Labs   Lab 11/13/19  0730 11/12/19  2334 11/12/19  1535 11/11/19  1047 11/10/19  0920     --  139 136 139   POTASSIUM 4.0 3.6 3.2* 3.7 4.0   CHLORIDE 111*  --  110* 108 109   JONATAN 8.2*  --  8.1* 8.7 8.4*   CO2 26  --  22 24 26   BUN 18  --  19 16 23   CR 0.49*  --  0.46* 0.41* 0.45*   GLC 91  --  172* 97 89     CBC  Recent Labs   Lab 11/12/19  1535 11/09/19  0933   * 398     INR  Recent Labs   Lab 11/11/19  1047   INR 1.08     LFTs  Recent Labs   Lab 11/07/19  0709   ALKPHOS 68   AST 22   ALT 28   BILITOTAL 0.2      Labs above was independently reviewed and interpreted         Imaging/Procedures/Studies:   Abdominal X-ray 11/11/2019:  Moderate amount of stool in the colon. Increased gaseous distention of  the colon which appears to wax and wane over time.    Lower Device-Assisted Enteroscopy with Fluoroscopy 6/2018  Impression:          - Benign stenosis appreciated at the site of likely to                         be the ileoileal anastomosis status post uncomplicated                        dilation to 18mm   Recommendation:      - Standard inpatient general anesthesia recovery with                        return to the floor when appropriate                        - Diet may resume slowly as tolerated                        - This represents maximal endoscopic therapy: with                        durable effect repeat dilation would be reasonable,                        again only after NG tube bowel preparation; with no                        effect repeat endoscopy is not reasonable and colorectal                        surgery should again be consulted for an opinion                        - The findings and recommendations were discussed with                        the patient and their family     Flexible Sigmoidoscopy 5/2018:  Impression:          - Inadequate bowel preparation to allow safe advancement                        of the enteroscope beyond the sigmoid   Recommendation:      - Standard general anesthesia recovery with return to                        the floor when appropriate                        - This represents the third prematurely aborted attempt                        at enteroscopy due to poor bowel preparation; it remains                        unclear why an NG tube was not utilized when the patient                        became nauseous with bowel preparation; our options                        include repeat bowel preparation at a slow rate per NG                        tube as an inpatient or consideration for surgical                        manipulation                        - Repeat attempt at enterosocpy will not occur until                        there is documented clear output per anus                        - The findings and recommendations were discussed with                        the patient and their family          Assessment and Plan:   Rachel A Gerhardt is a 43  year old female with a PMHx of cervical cancer and ovarian cancer s/p chemo/rad, h/o opioid use disorder, chronic abdominal pain (in opioids PTA), recurrent SBOs s/p exlap small bowel resection with primary anastamosis in 2017, ileo-ileal anastamosis dilation in June 2018, and multiple medically managed recurrent SBOs and is admitted for small bowel obstruction.     #. Post-prandial Abdominal Pain, acute on chronic: Patient with a history of chronic abdominal pain (on opioids PTA) in the setting of h/o cervical and ovarian CA. Patient presented with a SBO and underwent exploratory laparotomy with small bowel resection, lysis of adhesions, partial omentecomy and primary anastomosis on 10/31/2019. Patient no longer has any nausea, emesis or abdominal bloating, but has pain after eating. No evidence of obstruction post surgery on abdominal x-ray 11/11/2019. No abdominal pain concerning for cholecystitis or pancreatitis. No abdominal pain out of proportion of exam. Etiology of continued abdominal pain likely multifactorial in the setting of h/o opioid induced constipation, h/o opioid use disorder w/potential hyperalgesia and recent surgical procedure.   - Continue nortriptyine 10mg qdaily (at night) for one month  - Given fear of eating given it's association with abdominal pain, can consider ativan 0.5mg sublingual after eating with an additional dose of 0.5mg in 20 minutes to promote eating.    - Reduce opioids as tolerated  - Continue pantoprazole 40mg BID  - Recommend increasing bowel regimen, including miralax, given h/o opioid induced constipation and moderate stool burden on abdominal x-ray and straining with bowel movements.   - Recommend smoking cessation, counseling per primary team  - At this time, no indication for EGD.     It has been a pleasure to participate in the care of this patient.  Patient discussed with GI staff, Dr. Ambriz.  Please do not hesitate to contact the GI service with any questions or  eusebio Beckett (Lizzie)  Gastroenterology Fellow  Pager [phone]

## 2019-11-13 NOTE — PLAN OF CARE
BP (!) 85/58 (BP Location: Left arm)   Pulse 84   Temp 98  F (36.7  C) (Oral)   Resp 16   Wt 50.1 kg (110 lb 6.4 oz)   SpO2 99%   BMI 16.30 kg/m   RA.Pt refusing anyting now and wants to sleep.Right PICC saline locked and not wanting anything for pain at this time.Has some tooth pain but ok at this time. Had oxycodone before bed and will continue to monitor.

## 2019-11-13 NOTE — PROGRESS NOTES
Surgery Progress Note    S:   Reports pain after every meal. Reports that she does not plan to eat today. Started nortriptyline yesterday per GI recs, she continues to complain of pain and is wondering if they have a backup plan.    O:   BP 91/64 (BP Location: Left arm)   Pulse 83   Temp 96.9  F (36.1  C) (Oral)   Resp 16   Wt 50.1 kg (110 lb 6.4 oz)   SpO2 100%   BMI 16.30 kg/m         Intake/Output Summary (Last 24 hours) at 11/6/2019 0538  Last data filed at 11/6/2019 0126  Gross per 24 hour   Intake 3705.68 ml   Output 3950 ml   Net -244.32 ml       General: Thin appearing: Sitting up in bed, doing crosswords.   HEENT: No obvious swelling noted to face  Cardio: Extremities are warm and well perfused   Resp: No increased work of breathing  Abdomen: Soft, non-distended, incision c/d/i, tender to palpation throughout  Neuro: Alert and oriented. Moves all extremities symmetrically.              Assessment and Plan:     ASSESSMENT:Rachel A Gerhardt is a 43 year old female with a PMH of pelvic radiation, cervical and ovarian cancer and SBR in 2017 who was admitted for recurrent SBO and worsening symptoms, she is s/p small bowel resection and primary anastomosis on 10/31. Pain team consulted and working with patient, recs below in plan. Dentistry was consulted and worked with patient.     PLAN:   - Appreciate recs from pain service  - Tapering all narcotics  - GI consulted, appreciate recommendations  - Will consider placing tube for tube feeds  - Dentistry says she can follow up outpatient  - Dispo home in 1-2 days, no TCU needs      Seen and discussed with chief resident and staff    Luiz Cortez MD  EGS Service, PGY1

## 2019-11-13 NOTE — PLAN OF CARE
BP 98/67 (BP Location: Left arm)   Pulse 84   Temp 96.4  F (35.8  C) (Oral)   Resp 16   Wt 50.1 kg (110 lb 6.4 oz)   SpO2 100%   BMI 16.30 kg/m      VSS on RA. Pain controlled with 10mg oxy Q6h, scheduled tylenol and scheduled gabapentin. UAL. Voids spont. Unable to tolerate diet. No N/V. Freq loose stools and passing flatus. K+ replaced. Recheck scheduled for 2300. PICC infusing K+. Midline incision with dermabond c/d/intact. Pt goes on freq walks in halls. Resting between cares. Continue POC.

## 2019-11-13 NOTE — PLAN OF CARE
SBPs in the 70s-80s this morning. 500 ml bolus given. Other VSS on room air. Patient attempted to eat breakfast this morning but experienced severe pain afterwards again. NJ tube placement was ordered but patient was unwilling to have tube place without some sort of antianxiety medication, and MD was unwilling to order anything. Pain controlled with oxycodone as long as patient is not eating. Voiding with adequate urine output. Had a bowel movement this morning. Midline incision CDI.

## 2019-11-13 NOTE — PROGRESS NOTES
CLINICAL NUTRITION SERVICES - BRIEF NOTE  *See RD note on 11/7 for last nutrition reassessment details    INTERVENTIONS  Implementation  Collaboration with other nutrition professionals: per guero, pt does not want Magic Cups sent any more  Medical food supplement therapy: change supplements to PRN      Monitoring/Evaluation  Progress toward goals will be monitored and evaluated per protocol.     Mylene Duvall RD, LD  [address]C RD pager: [phone]

## 2019-11-14 LAB
ANION GAP SERPL CALCULATED.3IONS-SCNC: 6 MMOL/L (ref 3–14)
BUN SERPL-MCNC: 14 MG/DL (ref 7–30)
CALCIUM SERPL-MCNC: 8.5 MG/DL (ref 8.5–10.1)
CHLORIDE SERPL-SCNC: 109 MMOL/L (ref 94–109)
CO2 SERPL-SCNC: 25 MMOL/L (ref 20–32)
CREAT SERPL-MCNC: 0.44 MG/DL (ref 0.52–1.04)
GFR SERPL CREATININE-BSD FRML MDRD: >90 ML/MIN/{1.73_M2}
GLUCOSE SERPL-MCNC: 89 MG/DL (ref 70–99)
POTASSIUM SERPL-SCNC: 4.1 MMOL/L (ref 3.4–5.3)
SODIUM SERPL-SCNC: 139 MMOL/L (ref 133–144)

## 2019-11-14 PROCEDURE — 36592 COLLECT BLOOD FROM PICC: CPT | Performed by: SURGERY

## 2019-11-14 PROCEDURE — 80048 BASIC METABOLIC PNL TOTAL CA: CPT | Performed by: SURGERY

## 2019-11-14 PROCEDURE — 25000132 ZZH RX MED GY IP 250 OP 250 PS 637: Performed by: STUDENT IN AN ORGANIZED HEALTH CARE EDUCATION/TRAINING PROGRAM

## 2019-11-14 PROCEDURE — 12000001 ZZH R&B MED SURG/OB UMMC

## 2019-11-14 PROCEDURE — 25000128 H RX IP 250 OP 636: Performed by: SURGERY

## 2019-11-14 PROCEDURE — 25000125 ZZHC RX 250: Performed by: RADIOLOGY

## 2019-11-14 RX ORDER — LORAZEPAM 0.5 MG/1
1 TABLET ORAL
Status: COMPLETED | OUTPATIENT
Start: 2019-11-14 | End: 2019-11-14

## 2019-11-14 RX ORDER — OXYCODONE HYDROCHLORIDE 5 MG/1
5 TABLET ORAL EVERY 4 HOURS PRN
Status: DISCONTINUED | OUTPATIENT
Start: 2019-11-14 | End: 2019-11-17 | Stop reason: HOSPADM

## 2019-11-14 RX ORDER — LORAZEPAM 0.5 MG/1
0.5 TABLET ORAL
Status: DISCONTINUED | OUTPATIENT
Start: 2019-11-14 | End: 2019-11-14

## 2019-11-14 RX ORDER — OXYCODONE HYDROCHLORIDE 5 MG/1
5 TABLET ORAL EVERY 6 HOURS PRN
Status: DISCONTINUED | OUTPATIENT
Start: 2019-11-14 | End: 2019-11-14

## 2019-11-14 RX ORDER — LIDOCAINE HYDROCHLORIDE 20 MG/ML
15 SOLUTION OROPHARYNGEAL ONCE
Status: COMPLETED | OUTPATIENT
Start: 2019-11-14 | End: 2019-11-14

## 2019-11-14 RX ORDER — POLYETHYLENE GLYCOL 3350 17 G/17G
17 POWDER, FOR SOLUTION ORAL 2 TIMES DAILY
Status: DISCONTINUED | OUTPATIENT
Start: 2019-11-14 | End: 2019-11-17 | Stop reason: HOSPADM

## 2019-11-14 RX ORDER — POLYETHYLENE GLYCOL 3350 17 G/17G
17 POWDER, FOR SOLUTION ORAL DAILY
Status: DISCONTINUED | OUTPATIENT
Start: 2019-11-14 | End: 2019-11-14

## 2019-11-14 RX ADMIN — OXYCODONE HYDROCHLORIDE 5 MG: 5 TABLET ORAL at 08:06

## 2019-11-14 RX ADMIN — ACETAMINOPHEN 975 MG: 325 TABLET, FILM COATED ORAL at 14:46

## 2019-11-14 RX ADMIN — PANTOPRAZOLE SODIUM 40 MG: 40 TABLET, DELAYED RELEASE ORAL at 16:46

## 2019-11-14 RX ADMIN — LORAZEPAM 1 MG: 0.5 TABLET ORAL at 12:33

## 2019-11-14 RX ADMIN — ACETAMINOPHEN 975 MG: 325 TABLET, FILM COATED ORAL at 22:34

## 2019-11-14 RX ADMIN — ACETAMINOPHEN 975 MG: 325 TABLET, FILM COATED ORAL at 06:40

## 2019-11-14 RX ADMIN — ENOXAPARIN SODIUM 40 MG: 40 INJECTION SUBCUTANEOUS at 14:46

## 2019-11-14 RX ADMIN — OXYCODONE HYDROCHLORIDE 5 MG: 5 TABLET ORAL at 16:46

## 2019-11-14 RX ADMIN — OXYCODONE HYDROCHLORIDE 5 MG: 5 TABLET ORAL at 12:42

## 2019-11-14 RX ADMIN — OXYCODONE HYDROCHLORIDE 5 MG: 5 TABLET ORAL at 20:47

## 2019-11-14 RX ADMIN — OXYCODONE HYDROCHLORIDE 5 MG: 5 TABLET ORAL at 02:15

## 2019-11-14 RX ADMIN — PANTOPRAZOLE SODIUM 40 MG: 40 TABLET, DELAYED RELEASE ORAL at 07:57

## 2019-11-14 RX ADMIN — NORTRIPTYLINE HYDROCHLORIDE 10 MG: 10 CAPSULE ORAL at 22:34

## 2019-11-14 RX ADMIN — LIDOCAINE HYDROCHLORIDE 5 ML: 20 SOLUTION ORAL; TOPICAL at 13:51

## 2019-11-14 ASSESSMENT — PAIN DESCRIPTION - DESCRIPTORS
DESCRIPTORS: BURNING;SQUEEZING
DESCRIPTORS: CONSTANT;CRAMPING
DESCRIPTORS: CONSTANT
DESCRIPTORS: BURNING
DESCRIPTORS: ACHING;CRAMPING

## 2019-11-14 ASSESSMENT — ACTIVITIES OF DAILY LIVING (ADL)
ADLS_ACUITY_SCORE: 12

## 2019-11-14 NOTE — PROGRESS NOTES
Surgery Progress Note    S:   Reports pain after every meal. Reports that she does not plan to eat today. Started nortriptyline yesterday per GI recs, she continues to complain of pain but it may take a few days to work.    O:   /73 (BP Location: Left arm)   Pulse 98   Temp 96.9  F (36.1  C) (Oral)   Resp 18   Wt 50.1 kg (110 lb 6.4 oz)   SpO2 100%   BMI 16.30 kg/m         Intake/Output Summary (Last 24 hours) at 11/6/2019 0538  Last data filed at 11/6/2019 0126  Gross per 24 hour   Intake 3705.68 ml   Output 3950 ml   Net -244.32 ml       General: Thin appearing: Sitting up in bed, NAD.   HEENT: No obvious swelling noted to face  Cardio: Extremities are warm and well perfused   Resp: No increased work of breathing  Abdomen: Soft, non-distended, incision c/d/i, tender to palpation throughout  Neuro: Alert and oriented. Moves all extremities symmetrically.              Assessment and Plan:     ASSESSMENT:Rachel A Gerhardt is a 43 year old female with a PMH of pelvic radiation, cervical and ovarian cancer and SBR in 2017 who was admitted for recurrent SBO and worsening symptoms, she is s/p small bowel resection and primary anastomosis on 10/31. Pain team consulted and working with patient, recs below in plan. Dentistry was consulted and worked with patient.     PLAN:   - Appreciate recs from pain service  - Tapering all narcotics- currently on pre-admission dose  - GI consulted, appreciate recommendations, will hold off on alex-meal ativan  - NJ placement for TFs, may be able to discharge home with TFs  - Dentistry says she can follow up outpatient  - Dispo home in 1-2 days, no TCU needs      Seen and discussed with chief resident and staff    Luiz Cortez MD  EGS Service, PGY1

## 2019-11-14 NOTE — PROGRESS NOTES
CLINICAL NUTRITION SERVICES - REASSESSMENT NOTE     Nutrition Prescription    Malnutrition Status:    Severe malnutrition in the context of acute on chronic illness    Recommendations already ordered by Registered Dietitian (RD):  Changed diet to High kcal/High protein (allows pt to order more than 1 entree at each meal).  Continue PRN supplements (provided list at bedside)    Future/Additional Recommendations:  If FT placed, recommend the following TF:   Start Nutren 1.5 (fiber free) @ 10 mL/hr. Advance by 10 mL Q8H as tolerated and if K+/Mg++ >/= nrml and phos > 1.9 to goal rate 45 mL/hr   -- 60 mL Q4H free water flushes for FT patency   -- K+, Mg++, Phos daily while advance to goal TF rate to watch for signs of refeeding  -- Goal: Nutren 1.5 @ 45 mL/h + 2 pkts Benepro to provide 1670 kcals (36 kcal/kg/day), 85 g pro (1.8 g/kg/day), 821 mL H2O, 190 g CHO, and no fiber daily.      If pt to receive all hydration via TF/FT, recommend free water flushes of 100 mL Q4H or 50 mL Q2H daily pending pt abdominal comfort.        EVALUATION OF THE PROGRESS TOWARD GOALS   Diet: Regular + snacks/supps PRN     Nutrition Support: TPN stopped on 11/10  - Pt received TPN 10/25 - 11/10, reached goal 10/29   - goal: Clinimix @ 65 mL/hr (1560 mL/day) + 250 mL of 20% IV lipids 6 days/week to provide 1537 kcals (31 kcal/kg), 78 g protein (1.6 g/kg), 234 g CHO (GIR 3.3), and 28% of kcals from fat on average daily     Intake:   - on 11/12, pt reporting pain with every meal and reported she did not plan to eat. Unable to tolerate diet per RN.   - 11/13 pt attempted to eat breakfast (sausage vinod + banana bread + scrambled eggs per [employer]) but experienced severe pain afterwards again.   - Pt reports pain with eating is worse with liquids and cold foods, which is why she discontinued her Magic cup order. She also states that she has noticed that the duration of her pain correlates with the amount of food she eats, saying a cup of water  causes pain for ~1 hour whereas half a sandwich will cause pain for 4 h. Pt reports she has kept a food journal in the past, eaten small quantities 5 - 6 times per day, avoided hot/cold foods, and cut out certain allergens such as dairy without successfully ridding herself from pain after eating.  - Per pt, she would like to be placed on a high calorie, high protein diet order so she can order larger quantities of protein foods that she has found to be better tolerated, such as fish and scrambled eggs. Additionally, pt confirms she will try to add Beneprotein to well-tolerated foods and give other high adni/high pro nutrition supplements a try.     NEW FINDINGS   - Pt was sent for NJ tube placement 11/14, however did not follow through with procedure. Per pt, she experienced a lot of anxiety regarding feeding tube placement and wants to try food again. Pt was receiving a meal of mashed potatoes + blueberry muffin.     MALNUTRITION  % Intake: </= 50% for >/= 5 days (severe)  % Weight Loss: None noted  Subcutaneous Fat Loss: Facial region:  severe, Upper arm:  severe and Lower arm:  severe  Muscle Loss: Temporal:  severe, Facial & jaw region:  severe, Upper arm (bicep, tricep):  severe, Lower arm  (forearm):  severe, Dorsal hand:  severe, Patellar region:  severe and Posterior calf:  severe  Fluid Accumulation/Edema: None noted per chart  Malnutrition Diagnosis: Severe malnutrition in the context of acute on chronic illness    Previous Goals   Total avg nutritional intake to meet a minimum of 30 kcal/kg and 1.5 g PRO/kg daily (per dosing wt 47 kg).  Evaluation: Not met    Previous Nutrition Diagnosis  Increased nutrient needs (protein-energy) related to increased needs for acute illness and underweight status as evidenced by Estimated Energy Needs: 0220-4041 kcals/day (30 - 35 kcals/kg) and Estimated Protein Needs: 71-94 grams protein/day (1.5 - 2 grams of pro/kg)  Evaluation: No change- updated dx below     CURRENT  NUTRITION DIAGNOSIS  Malnutrition related to increased nutrient needs (protein/energy) due to acute illness, inadequate oral intake, and underweight status as evidenced by intake of </= 50% for >/= 5 days and severe generalized muscle and fat loss.     INTERVENTIONS  Implementation  Medical food supplement therapy - continue snacks/supplements PRN   Modify composition of meals/snacks - change pt diet to high kcal/high protein  Nutrition Education - supplements, encouraged PO, provided supplement menu list    Goals  Patient to consume % of nutritionally adequate meal trays TID, or the equivalent with supplements/snacks    Monitoring/Evaluation  Progress toward goals will be monitored and evaluated per protocol.    Jelly Jauregui  Dietetic Intern    I have read and agree with the above nutrition note, recs, and interventions  Mylene Duvall RD, LD  [address]C RD pager: [phone]

## 2019-11-14 NOTE — PLAN OF CARE
UAL.  VSS.  Voiding dark ella urine.  Reports loose brown stool x 5, not seen by this RN.  Declined ordered miralax.  Verbalizing frustration regarding plan of care between both primary service and GI.  Conversations with both services with plan for NJ placement with pre-medication of oral ativan.  Patient agreeable to plan.  Returned from xray without tube, was unable to tolerate placement.  Text paged surgical service regarding this. Plans to order some food and assess ability to tolerate it.  Continue POC.

## 2019-11-14 NOTE — PLAN OF CARE
Pt refused VS this shift. Pain inadequately controlled with 5mg oxy Q4h. UAL. Voids spont. Refusing PO intake due to extreme pain after eating. Would like NJ tube to be placed for TF, but requesting Ativan due to extreme anxiety about procedure. Passing freq loose stools. PICC SL. Incision with dermabond c/d/intact. Pt resting between cares. Continue POC.

## 2019-11-14 NOTE — PROGRESS NOTES
GASTROENTEROLOGY PROGRESS NOTE       Patient Summary   Rachel A Gerhardt is a 43 year old female with a past medical history of cervical cancer and ovarian cancer s/p chemoradiation, h/o opioid use disorder, chronic abdominal pain (in opioids PTA), recurrent SBOs s/p exlap small bowel resection with primary anastamosis in 2017, ileo-ileal anastamosis dilation in June 2018, and multiple medically managed recurrent SBOs and is admitted for small bowel obstruction.        ASSESSMENT AND RECOMMENDATIONS:   #Post-prandial Abdominal Pain, acute on chronic: Patient with a history of chronic abdominal pain (on opioids PTA) in the setting of h/o cervical and ovarian CA. Patient presented with a SBO and underwent exploratory laparotomy with small bowel resection, lysis of adhesions, partial omentecomy and primary anastomosis on 10/31/2019. Patient no longer has any nausea, emesis or abdominal bloating, but has pain after eating. No evidence of bowel obstruction on abdominal x-ray from 11/11/2019. No abdominal pain concerning for cholecystitis or pancreatitis. No abdominal pain out of proportion of exam. Etiology of continued abdominal pain likely multifactorial in the setting of h/o opioid induced constipation, h/o opioid use disorder w/potential hyperalgesia and recent surgical procedure.     - Continue nortriptyine 10mg qdaily (at night) for one month, in 7 days it can be increased if patient tolerating medication   - Given fear of eating given it's association with abdominal pain, can consider ativan 0.5mg sublingual after eating with an additional dose of 0.5mg in 20 minutes x2 days to promote eating.    - Reduce opioids as tolerated with the goal off weaning off in the future as patient's symptoms might be from narcotic bowel syndromw   - Continue pantoprazole 40mg BID  - Recommend increasing bowel regimen, including miralax, given h/o opioid induced constipation and moderate stool burden on abdominal x-ray and  straining with bowel movements.   - Recommend smoking cessation, counseling per primary team  - Hold off NJ tube placement to give a chance for oral intake  - No EGD at this time    Pt care plan discussed with Dr. Ambriz, GI staff physician. Thank you for involving us in this patient's care. Please do not hesitate to contact the GI service with any questions or concerns.    CECY Melendez [facility]  Department of Gastroenterology   P: [phone]  Subjective\events within the 24 hours:   Patient is in tears because she feels services are not communicating. Patient would like to have an EGD as her symptoms are not getting better    4 point ROS performed and negative unless noted above.           Medications:     Current Facility-Administered Medications   Medication     acetaminophen (TYLENOL) tablet 975 mg     barium sulfate (EZ PAQUE) oral suspension 96% 20 mL     bisacodyl (DULCOLAX) Suppository 10 mg     calcium carbonate (TUMS) chewable tablet 500 mg     dextrose 10 % 1,000 mL infusion     enoxaparin ANTICOAGULANT (LOVENOX) injection 40 mg     glycerin (ADULT) Suppository 1 suppository     hydrOXYzine (ATARAX) tablet 25 mg     hyoscyamine (ANASPAZ/LEVSIN) tablet 125 mcg     Lidocaine (LIDOCARE) 4 % Patch 1 patch    And     lidocaine patch REMOVAL    And     lidocaine patch in PLACE     lidocaine (LMX4) cream     lidocaine (XYLOCAINE) 2 % external gel     lidocaine (XYLOCAINE) 4 % solution 5 mL     lidocaine 1 % 0.1-1 mL     magnesium sulfate 4 g in 100 mL sterile water (premade)     melatonin tablet 1 mg     menthol (ICY HOT) 5 % patch 1 patch    And     menthol (ICY HOT) Patch in Place    And     menthol (ICY HOT) patch REMOVAL     methocarbamol (ROBAXIN) tablet 500 mg     naloxone (NARCAN) injection 0.1-0.4 mg     nortriptyline (PAMELOR) capsule 10 mg     ondansetron (ZOFRAN-ODT) ODT tab 4 mg     oxyCODONE (ROXICODONE) tablet 5 mg     oxymetazoline (AFRIN) 0.05 % spray 2 spray     pantoprazole (PROTONIX)  EC tablet 40 mg     phenol (CHLORASEPTIC) 1.4 % spray 1 mL     polyethylene glycol (MIRALAX/GLYCOLAX) Packet 17 g     potassium chloride (KLOR-CON) Packet 20-40 mEq     potassium chloride 10 mEq in 100 mL intermittent infusion with 10 mg lidocaine     potassium chloride 10 mEq in 100 mL sterile water intermittent infusion (premix)     potassium chloride 20 mEq in 50 mL intermittent infusion     potassium chloride ER (K-DUR/KLOR-CON M) CR tablet 20-40 mEq     simethicone (MYLICON) chewable tablet 80 mg     sodium chloride (PF) 0.9% PF flush 3 mL     sodium chloride (PF) 0.9% PF flush 3 mL     sodium phosphate (FLEET ENEMA) 1 enema        Physical Exam   Blood pressure 90/61, pulse 98, temperature 98.6  F (37  C), temperature source Oral, resp. rate 16, weight 53.3 kg (117 lb 9.6 oz), SpO2 97 %, not currently breastfeeding.    Constitutional: cooperative, pleasant, no acute distress  Eyes: Sclera anicteric/injected  HEENT:  Mucus membranes moist  CV: RRR, no m/r/g  Respiratory: CTAB  Abd: Surgical scar c/d/i, midline. Nondistended, TTP around umbilicus, no rebound or guarding   Skin: warm, perfused  Neuro: AAO x 3    Data   Current Labs  CBC  Recent Labs   Lab 11/12/19  1535 11/09/19  0933   * 398     BMP  Recent Labs   Lab 11/14/19  0736 11/13/19  0730 11/12/19  2334 11/12/19  1535 11/11/19  1047    142  --  139 136   POTASSIUM 4.1 4.0 3.6 3.2* 3.7   CHLORIDE 109 111*  --  110* 108   CO2 25 26  --  22 24   ANIONGAP 6 5  --  7 4   GLC 89 91  --  172* 97   BUN 14 18  --  19 16   CR 0.44* 0.49*  --  0.46* 0.41*   GFRESTIMATED >90 >90  --  >90 >90   GFRESTBLACK >90 >90  --  >90 >90   JONATAN 8.5 8.2*  --  8.1* 8.7      INR  Recent Labs   Lab 11/11/19  1047   INR 1.08     Liver panelNo lab results found in last 7 days.          HPI:   Rachel A Gerhardt is a 43 year old female with a PMHx of cervical cancer and ovarian cancer s/p chemo/rad, h/o opioid use disorder, chronic abdominal pain (in opioids PTA),  "recurrent SBOs s/p exlap small bowel resection with primary anastamosis in 2017, ileo-ileal anastamosis dilation in June 2018, and multiple medically managed recurrent SBOs and is admitted for small bowel obstruction.      The patient underwent an ex-lap on 10/31/2019, notable for distended small bowel proximally, chronically strictured mid ileum with normal appearing terminal ileum. Per the operative note, s/p exploratory laparotomy with small bowel resection, lysis of adhesions, partial omentecomy and primary anastomosis 10/31.     Since the procedure, the patient notes that she has been having continued abdominal discomfort, mostly in the area surrounding her surgical scar. She feels as though she will eat and then 30 minutes after eating she will develop a contractile and sharp abdominal pain that lasts for several hours and then resolves spontaneously. Because of the pain associated with eating she has been limiting her eating. She reports on 11/10 evening, she did not experience pain after eating so she \"took advantage\" of this \"window\" in symptoms and ate a fair amount. She is concerned giving how persistent her symptoms are. She denies any nausea, emesis, abdominal distension or bloating. She is having several bowel movements per day, liquid in nature, brown in color; denies melena or hematochezia.         "

## 2019-11-15 ENCOUNTER — APPOINTMENT (OUTPATIENT)
Dept: GENERAL RADIOLOGY | Facility: CLINIC | Age: 44
DRG: 329 | End: 2019-11-15
Payer: COMMERCIAL

## 2019-11-15 LAB
ANION GAP SERPL CALCULATED.3IONS-SCNC: 8 MMOL/L (ref 3–14)
BUN SERPL-MCNC: 13 MG/DL (ref 7–30)
CALCIUM SERPL-MCNC: 8.3 MG/DL (ref 8.5–10.1)
CHLORIDE SERPL-SCNC: 108 MMOL/L (ref 94–109)
CO2 SERPL-SCNC: 25 MMOL/L (ref 20–32)
CREAT SERPL-MCNC: 0.52 MG/DL (ref 0.52–1.04)
GFR SERPL CREATININE-BSD FRML MDRD: >90 ML/MIN/{1.73_M2}
GLUCOSE SERPL-MCNC: 96 MG/DL (ref 70–99)
PLATELET # BLD AUTO: 496 10E9/L (ref 150–450)
POTASSIUM SERPL-SCNC: 3.9 MMOL/L (ref 3.4–5.3)
SODIUM SERPL-SCNC: 141 MMOL/L (ref 133–144)

## 2019-11-15 PROCEDURE — 25000132 ZZH RX MED GY IP 250 OP 250 PS 637: Performed by: STUDENT IN AN ORGANIZED HEALTH CARE EDUCATION/TRAINING PROGRAM

## 2019-11-15 PROCEDURE — 36592 COLLECT BLOOD FROM PICC: CPT | Performed by: SURGERY

## 2019-11-15 PROCEDURE — 74247: CPT

## 2019-11-15 PROCEDURE — 80048 BASIC METABOLIC PNL TOTAL CA: CPT | Performed by: SURGERY

## 2019-11-15 PROCEDURE — 25000132 ZZH RX MED GY IP 250 OP 250 PS 637: Performed by: NURSE PRACTITIONER

## 2019-11-15 PROCEDURE — 12000001 ZZH R&B MED SURG/OB UMMC

## 2019-11-15 PROCEDURE — 25500045 ZZH RX 255: Performed by: RADIOLOGY

## 2019-11-15 PROCEDURE — 85049 AUTOMATED PLATELET COUNT: CPT | Performed by: SURGERY

## 2019-11-15 PROCEDURE — 25000128 H RX IP 250 OP 636: Performed by: SURGERY

## 2019-11-15 RX ORDER — MIRTAZAPINE 15 MG/1
15 TABLET, ORALLY DISINTEGRATING ORAL AT BEDTIME
Status: DISCONTINUED | OUTPATIENT
Start: 2019-11-15 | End: 2019-11-17 | Stop reason: HOSPADM

## 2019-11-15 RX ORDER — MAGNESIUM CARB/ALUMINUM HYDROX 105-160MG
296 TABLET,CHEWABLE ORAL ONCE
Status: DISCONTINUED | OUTPATIENT
Start: 2019-11-15 | End: 2019-11-17 | Stop reason: HOSPADM

## 2019-11-15 RX ADMIN — ACETAMINOPHEN 975 MG: 325 TABLET, FILM COATED ORAL at 07:01

## 2019-11-15 RX ADMIN — OXYCODONE HYDROCHLORIDE 5 MG: 5 TABLET ORAL at 05:54

## 2019-11-15 RX ADMIN — PANTOPRAZOLE SODIUM 40 MG: 40 TABLET, DELAYED RELEASE ORAL at 08:43

## 2019-11-15 RX ADMIN — OXYCODONE HYDROCHLORIDE 5 MG: 5 TABLET ORAL at 01:11

## 2019-11-15 RX ADMIN — ANTACID/ANTIFLATULENT 4 G: 380; 550; 10; 10 GRANULE, EFFERVESCENT ORAL at 14:42

## 2019-11-15 RX ADMIN — ACETAMINOPHEN 975 MG: 325 TABLET, FILM COATED ORAL at 17:05

## 2019-11-15 RX ADMIN — ENOXAPARIN SODIUM 40 MG: 40 INJECTION SUBCUTANEOUS at 17:06

## 2019-11-15 RX ADMIN — POLYETHYLENE GLYCOL 3350 17 G: 17 POWDER, FOR SOLUTION ORAL at 08:43

## 2019-11-15 RX ADMIN — NORTRIPTYLINE HYDROCHLORIDE 10 MG: 10 CAPSULE ORAL at 21:43

## 2019-11-15 RX ADMIN — OXYCODONE HYDROCHLORIDE 5 MG: 5 TABLET ORAL at 21:43

## 2019-11-15 RX ADMIN — OXYCODONE HYDROCHLORIDE 5 MG: 5 TABLET ORAL at 11:38

## 2019-11-15 RX ADMIN — PANTOPRAZOLE SODIUM 40 MG: 40 TABLET, DELAYED RELEASE ORAL at 17:05

## 2019-11-15 RX ADMIN — OXYCODONE HYDROCHLORIDE 5 MG: 5 TABLET ORAL at 17:06

## 2019-11-15 ASSESSMENT — ACTIVITIES OF DAILY LIVING (ADL)
ADLS_ACUITY_SCORE: 12

## 2019-11-15 ASSESSMENT — PAIN DESCRIPTION - DESCRIPTORS
DESCRIPTORS: ACHING

## 2019-11-15 NOTE — PLAN OF CARE
BP 96/72 (BP Location: Left arm)   Pulse 96   Temp 99.2  F (37.3  C) (Oral)   Resp 18   Wt 53.3 kg (117 lb 9.6 oz)   SpO2 97%   BMI 17.37 kg/m      VSS on RA. Pain inadequately controlled with oxy 5mg Q4h. UAL. Voids spont, not saving. Eating small amounts of reg diet with cramping abdominal pain. Pt describes waves of sharp, stabbing cramping pain which starts within a half hour of eating food and will last at least an hour afterwards, the more she eats the longer the pain will last. Passing flatus and reports having more than 10 loose stools today. MD paged and asked if stool screening appropriate. Awaiting feedback. Pt resting between cares. Continue POC.

## 2019-11-15 NOTE — CONSULTS
[facility], [place]   Initial Psychiatric Consult   Consult date: November 15, 2019         Reason for Consult, requesting source:    Anxiety adding to eating problem?   Requesting source: Dada Trevizo        HPI:   This 43 year old female was admitted on 10/18/2019 with symptoms of SBO. 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 On 10/31 she underwent lysis of adhesions and repair of obstruction. Since then she has has some decrease in difficulty with eating, but this is still very difficult for her since she will still really struggle with pain after she eats. Is extremely fearful of having feeding tubes, has failed successful placement may times, including yesterday.   She denies history, or current symptoms of depression, no treatment for eating disorder. Is quite upset by current weight; would like to weigh a lot more. Is anxious about her current medical condition.    It was difficult for me to conduct the interview, since I had to leave her alone several times for her to go to the bathroom (for extensive periods of time). Actually, on several occasions she left her room when she new I was waiting outside her room (but I did not see her since I was working on computer).         Past Psychiatric History:   No prior use of antidepressants or anxiety medications, no psychotherapy.         Substance Use and History:   No use of drugs or alcohol, but does smoke cigarettes.   However, I see that she was seen by Dr Jones in 2012 for detox regarding problematic use of opioids, but was found to be 30 weeks pregnant, and was discharged on Subutex (see 7/17/12 note). Not sure where she followed after this, and I was unable to ask her since  she went into the bathroom prior to me clarifying this.        Past Medical History:   PAST MEDICAL HISTORY:   Past Medical History:   Diagnosis Date     Asthma      Cervical cancer (H)      Other chronic pain      Ovarian cancer (H)      Partial small bowel obstruction (H) 11/2/2018     SBO (small bowel obstruction) (H) 12/27/2016     Small bowel obstruction (H) 5/17/2017     Small intestine obstruction (H) 4/29/2017     Substance abuse (H)     Outside records indicate past history of narcotics abuse or dependence, but patient denies.       PAST SURGICAL HISTORY:   Past Surgical History:   Procedure Laterality Date     COLONOSCOPY N/A 5/3/2018    Procedure: COLONOSCOPY;  sigmoidoscopy;  Surgeon: Omero Vigil MD;  Location: [facility] OR     COLONOSCOPY WITH CO2 INSUFFLATION N/A 4/30/2018    Procedure: COLONOSCOPY WITH CO2 INSUFFLATION;  Colonoscopy;  Surgeon: Omero Vigil MD;  Location: [facility]     COMBINED CYSTOSCOPY, INSERT STENT URETER(S) Bilateral 5/18/2017    Procedure: COMBINED CYSTOSCOPY, INSERT STENT URETER(S);  Cystoscopy with Bilateral Stent,;  Surgeon: Rene Calero MD;  Location: [facility] OR     ENT SURGERY  2009    mastoid, sinus     ENTEROSCOPY SMALL BOWEL N/A 6/18/2018    Procedure: ENTEROSCOPY SMALL BOWEL;  Lower bowel Retrograde Enteroscopy with Balloon Dilation .;  Surgeon: Omero Vigil MD;  Location: [facility] OR     EXAM UNDER ANESTHESIA, INSERT ALEX SLEEVE, UTERINE PLACEMENT OF TANDEM AND RING FOR RAD, ULTRASOUND N/A 12/14/2015    Procedure: EXAM UNDER ANESTHESIA, INSERT ALEX SLEEVE, UTERINE PLACEMENT OF TANDEM AND RING FOR RADIATION, ULTRASOUND GUIDED;  Surgeon: Abby Tony MD;  Location: [facility]     INSERT TANDEM AND CESIUM APPLICATOR CERVIX, ULTRASOUND GUIDED N/A 12/17/2015    Procedure: INSERT TANDEM AND CESIUM APPLICATOR CERVIX, ULTRASOUND GUIDED;  Surgeon: Kika Wood MD;  Location: [facility] OR     KNEE SURGERY       LAPAROTOMY EXPLORATORY N/A 5/18/2017    Procedure:  LAPAROTOMY EXPLORATORY;   Exploratry Laparotomy, Small Bowel Resection with anastomosis, Flexible Sigmoidoscopy;  Surgeon: Jennifer Goodwin MD;  Location: [facility]     LAPAROTOMY EXPLORATORY N/A 10/31/2019    Procedure: Laparotomy,  bowel resection , lysis of adhesions, partial omentecomy;  Surgeon: Dada Trevizo MD;  Location: [facility]     PICC INSERTION Right 04/29/2017    4fr SL BioFlo PICC, 37cm (3cm external) in the R basilic vein w/ tip in the mid SVC.     PICC INSERTION Right 03/29/2018    4Fr - 40cm (4cm external), R lateral brachial vein, Low SVC     RESECT SMALL BOWEL WITHOUT OSTOMY N/A 5/18/2017    Procedure: RESECT SMALL BOWEL WITHOUT OSTOMY;;  Surgeon: Jennifer Goodwin MD;  Location: [facility]     SIGMOIDOSCOPY FLEXIBLE N/A 5/18/2017    Procedure: SIGMOIDOSCOPY FLEXIBLE;;  Surgeon: Jennifer Goodwin MD;  Location: [facility]           Family History:   FAMILY HISTORY:   Family History   Problem Relation Age of Onset     Diabetes Mother      Ovarian Cancer No family hx of      Uterine Cancer No family hx of      Cervical Cancer No family hx of      Breast Cancer No family hx of            Social History:   SOCIAL HISTORY:   Social History     Tobacco Use     Smoking status: Light Tobacco Smoker     Years: 12.00     Types: Cigarettes     Smokeless tobacco: Never Used     Tobacco comment: pt smoking about 4 cigs per week   Substance Use Topics     Alcohol use: No     Alcohol/week: 0.0 standard drinks     Comment: None per pt     Lives in [place] with her  and children ages 9 and 19 (although per our records she was 30 weeks pregnant July 2012)         Physical ROS:   The patient endorsed post-prandial pain. The remainder of 10-point review of systems was negative except as noted in HPI.         PTA Medications:     Medications Prior to Admission   Medication Sig Dispense Refill Last Dose     ondansetron (ZOFRAN-ODT) 4 MG ODT tab Take 1 tablet (4 mg) by mouth every 6 hours as needed for nausea or  vomiting 90 tablet 0 10/18/2019 at 1300     oxyCODONE-acetaminophen (PERCOCET) 5-325 MG tablet Take 1 tablet by mouth every 4 hours as needed for severe pain 12 tablet 0 10/18/2019 at 1700     Simethicone 125 MG TABS Take 125 mg by mouth daily    10/18/2019 at 1300     ondansetron (ZOFRAN-ODT) 4 MG ODT tab Take 1 tablet (4 mg) by mouth every 6 hours as needed for nausea or vomiting 20 tablet 0 Unknown at Unknown time     ondansetron (ZOFRAN-ODT) 4 MG ODT tab Take 1 tablet (4 mg) by mouth every 6 hours as needed for nausea or vomiting 18 tablet 0 Unknown at Unknown time          Allergies:     Allergies   Allergen Reactions     Sulfa Drugs Hives     Ibuprofen Nausea and Vomiting and Hives     Unknown if reaction hives or N/V     No Clinical Screening - See Comments Other (See Comments) and Diarrhea     headache  Carrots cause gastric upset, cramping and diarrhea.     Ciprofloxacin Hives and Nausea     Quinolones      Tramadol Hives, Diarrhea, Nausea and Nausea and Vomiting     Amoxicillin Nausea and Vomiting     Augmentin Nausea, Hives and GI Disturbance     Patient tolerated course of zosyn in 5/2018     Avelox [Moxifloxacin] Nausea and Vomiting     Codeine Nausea and Vomiting     Daucus Carota      Other reaction(s): GI Upset  Other reaction(s): Abdominal pain, Diarrhea  Carrots cause gastric upset, cramping and diarrhea.          Labs:     Recent Results (from the past 48 hour(s))   Basic metabolic panel    Collection Time: 11/14/19  7:36 AM   Result Value Ref Range    Sodium 139 133 - 144 mmol/L    Potassium 4.1 3.4 - 5.3 mmol/L    Chloride 109 94 - 109 mmol/L    Carbon Dioxide 25 20 - 32 mmol/L    Anion Gap 6 3 - 14 mmol/L    Glucose 89 70 - 99 mg/dL    Urea Nitrogen 14 7 - 30 mg/dL    Creatinine 0.44 (L) 0.52 - 1.04 mg/dL    GFR Estimate >90 >60 mL/min/[1.73_m2]    GFR Estimate If Black >90 >60 mL/min/[1.73_m2]    Calcium 8.5 8.5 - 10.1 mg/dL   Platelet count    Collection Time: 11/15/19  7:38 AM   Result Value  "Ref Range    Platelet Count 496 (H) 150 - 450 10e9/L   Basic metabolic panel    Collection Time: 11/15/19  7:38 AM   Result Value Ref Range    Sodium 141 133 - 144 mmol/L    Potassium 3.9 3.4 - 5.3 mmol/L    Chloride 108 94 - 109 mmol/L    Carbon Dioxide 25 20 - 32 mmol/L    Anion Gap 8 3 - 14 mmol/L    Glucose 96 70 - 99 mg/dL    Urea Nitrogen 13 7 - 30 mg/dL    Creatinine 0.52 0.52 - 1.04 mg/dL    GFR Estimate >90 >60 mL/min/[1.73_m2]    GFR Estimate If Black >90 >60 mL/min/[1.73_m2]    Calcium 8.3 (L) 8.5 - 10.1 mg/dL          Physical and Psychiatric Examination:     BP 90/57 (BP Location: Left arm)   Pulse 96   Temp 97.8  F (36.6  C) (Oral)   Resp 18   Wt 53.3 kg (117 lb 9.6 oz)   SpO2 100%   BMI 17.37 kg/m    Weight is 117 lbs 9.6 oz  Body mass index is 17.37 kg/m .    Physical Exam:  I have reviewed the physical exam as documented by by the medical team and agree with findings and assessment and have no additional findings to add at this time.    Mental Status Exam:  Ambulates without difficulty. She is well groomed, pleasant, cooperative. Speech fluent.  Associations tight.  Mood is \"OK.\"  Affect slightly restricted.  Thought process logical, linear.  Thought content negative for suicidal thoughts or delusions.  Oriented x3.  Recent and remote memory, attention span and concentration are intact.  Fund of knowledge, use of language appropriate.  Insight and judgment fair to poor.              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 Fortunately, she does think that things are slowly improving since the most recent surgery.   It is quite interesting that she went to considerable effort to avoid having me " "conclude my interview when I brought up her opioid use problems, and age of her younger child.   If nausea is her primary problem with eating (I was unable to clarify this), then the best option is Zyprexa short term (although I'm not sure she would take it) vs Remeron          Summary of Recommendations:   Consider a trial of Zyprexa 5 mg HS vs Remeron 15 mg HS    Re-consult psychiatry as needed.         \"This dictation was performed with voice recognition software and may contain errors,  omissions and inadvertent word substitution.\"           "

## 2019-11-15 NOTE — PROGRESS NOTES
CLINICAL NUTRITION SERVICES - BRIEF NOTE  *See RD note on 11/14 for last nutrition reassessment details/recs     Nutrition Prescription    RECOMMENDATIONS FOR MDs/PROVIDERS TO ORDER:  Recommend patient be able to consistently consume at least 950 kcal and 50g protein daily (~60% estimated kcal and protein needs) vs need to start nutrition support    Recommendations already ordered by Registered Dietitian (RD):  Calorie Counts x 3 days    Future/Additional Recommendations:  If plan to place FT, see brief note on 11/14 for TF recs.  If unable to place FT but PO intake remains poor over the next few days, consider restarting TPN if appropriate     Pt unable to tolerate NJ tube placement yesterday.  TPN weaned off 11/10 (goal was Clinimix @ 65 mL/hr + lipids 6 days/week).  Remains on high kcal/high protein diet + PRN supplements    INTERVENTIONS  Implementation  Collaboration with other providers: discussed pt in unit rounds    Monitoring/Evaluation  Progress toward goals will be monitored and evaluated per protocol.     Mylene Duvall RD, LD  7C RD pager: [phone]

## 2019-11-15 NOTE — PROGRESS NOTES
GASTROENTEROLOGY PROGRESS NOTE       Patient Summary   Rachel A Gerhardt is a 43 year old female with a past medical history of cervical cancer and ovarian cancer s/p chemoradiation, h/o opioid use disorder, chronic abdominal pain (in opioids PTA), recurrent SBOs s/p exlap small bowel resection with primary anastamosis in 2017, ileo-ileal anastamosis dilation in June 2018, and multiple medically managed recurrent SBOs and is admitted for small bowel obstruction.        ASSESSMENT AND RECOMMENDATIONS:   #Post-prandial Abdominal Pain, acute on chronic: Patient with a history of chronic abdominal pain (on opioids PTA) in the setting of h/o cervical and ovarian CA. Patient presented with a SBO and underwent exploratory laparotomy with small bowel resection, lysis of adhesions, partial omentecomy and primary anastomosis on 10/31/2019. Patient no longer has any nausea, emesis or abdominal bloating, but has pain after eating. No evidence of bowel obstruction on abdominal x-ray from 11/11/2019. No abdominal pain concerning for cholecystitis or pancreatitis. No abdominal pain out of proportion of exam. Etiology of continued abdominal pain likely multifactorial in the setting of h/o opioid induced constipation, h/o opioid use disorder w/potential hyperalgesia and recent surgical procedure.     - Small meals more frequently   - Continue Nortriptyine 10mg qdaily (at night) for one month, in 7 days it can be increased if patient tolerating medication   - Given fear of eating given it's association with abdominal pain, can consider ativan 0.5mg sublingual after eating x2 days to promote eating.    - Reduce opioids as tolerated with the goal off weaning off in the future as patient's symptoms might be from narcotic bowel syndromw   - Continue pantoprazole 40mg BID  - Recommend increasing bowel regimen, including miralax, given h/o opioid induced constipation and moderate stool burden on abdominal x-ray and straining with bowel  movements.   - x1 dose of magnesium citrate   - Recommend smoking cessation  - Hold off NJ tube placement to give a chance for oral intake  - No EGD at this time    Pt care plan discussed with Dr. Echeverria, GI staff physician. Thank you for involving us in this patient's care. Please do not hesitate to contact the GI service with any questions or concerns.    CECY Melendez [facility]  Department of Gastroenterology   P: [phone]  Subjective\events within the 24 hours:   Patient tolerated diet last night with x1 dose of Ativan that she received for NJ tube placement, but she did not tolerate placement. Today she feels her bowels are full of stool and she is bloated. I had a long discussion about the importance of bowel aggressive regimen.     4 point ROS performed and negative unless noted above.           Medications:     Current Facility-Administered Medications   Medication     acetaminophen (TYLENOL) tablet 975 mg     barium sulfate (EZ PAQUE) oral suspension 96% 20 mL     bisacodyl (DULCOLAX) Suppository 10 mg     calcium carbonate (TUMS) chewable tablet 500 mg     dextrose 10 % 1,000 mL infusion     enoxaparin ANTICOAGULANT (LOVENOX) injection 40 mg     glycerin (ADULT) Suppository 1 suppository     glycerin (ADULT) Suppository 1 suppository     hydrOXYzine (ATARAX) tablet 25 mg     hyoscyamine (ANASPAZ/LEVSIN) tablet 125 mcg     Lidocaine (LIDOCARE) 4 % Patch 1 patch    And     lidocaine patch REMOVAL    And     lidocaine patch in PLACE     lidocaine (LMX4) cream     lidocaine (XYLOCAINE) 2 % external gel     lidocaine (XYLOCAINE) 4 % solution 5 mL     lidocaine 1 % 0.1-1 mL     magnesium sulfate 4 g in 100 mL sterile water (premade)     melatonin tablet 1 mg     menthol (ICY HOT) 5 % patch 1 patch    And     menthol (ICY HOT) Patch in Place    And     menthol (ICY HOT) patch REMOVAL     methocarbamol (ROBAXIN) tablet 500 mg     naloxone (NARCAN) injection 0.1-0.4 mg     nortriptyline (PAMELOR) capsule  10 mg     ondansetron (ZOFRAN-ODT) ODT tab 4 mg     oxyCODONE (ROXICODONE) tablet 5 mg     oxymetazoline (AFRIN) 0.05 % spray 2 spray     pantoprazole (PROTONIX) EC tablet 40 mg     phenol (CHLORASEPTIC) 1.4 % spray 1 mL     polyethylene glycol (MIRALAX/GLYCOLAX) Packet 17 g     potassium chloride (KLOR-CON) Packet 20-40 mEq     potassium chloride 10 mEq in 100 mL intermittent infusion with 10 mg lidocaine     potassium chloride 10 mEq in 100 mL sterile water intermittent infusion (premix)     potassium chloride 20 mEq in 50 mL intermittent infusion     potassium chloride ER (K-DUR/KLOR-CON M) CR tablet 20-40 mEq     simethicone (MYLICON) chewable tablet 80 mg     sodium chloride (PF) 0.9% PF flush 3 mL     sodium chloride (PF) 0.9% PF flush 3 mL     sodium phosphate (FLEET ENEMA) 1 enema        Physical Exam   Blood pressure 108/66, pulse 96, temperature 97  F (36.1  C), temperature source Oral, resp. rate 18, weight 53.3 kg (117 lb 9.6 oz), SpO2 100 %, not currently breastfeeding.    Constitutional: cooperative, pleasant, no acute distress  Eyes: Sclera anicteric/injected  HEENT:  Mucus membranes moist  CV: RRR, no m/r/g  Respiratory: CTAB  Abd: Surgical scar c/d/i, midline. Nondistended, TTP around umbilicus, no rebound or guarding   Skin: warm, perfused  Neuro: AAO x 3    Data   Current Labs  CBC  Recent Labs   Lab 11/15/19  0738 11/12/19  1535 11/09/19  0933   * 505* 398     BMP  Recent Labs   Lab 11/15/19  0738 11/14/19  0736 11/13/19  0730 11/12/19  2334 11/12/19  1535    139 142  --  139   POTASSIUM 3.9 4.1 4.0 3.6 3.2*   CHLORIDE 108 109 111*  --  110*   CO2 25 25 26  --  22   ANIONGAP 8 6 5  --  7   GLC 96 89 91  --  172*   BUN 13 14 18  --  19   CR 0.52 0.44* 0.49*  --  0.46*   GFRESTIMATED >90 >90 >90  --  >90   GFRESTBLACK >90 >90 >90  --  >90   JONATAN 8.3* 8.5 8.2*  --  8.1*      INR  Recent Labs   Lab 11/11/19  1047   INR 1.08     Liver panelNo lab results found in last 7 days.          HPI:  "  Rachel A Gerhardt is a 43 year old female with a PMHx of cervical cancer and ovarian cancer s/p chemo/rad, h/o opioid use disorder, chronic abdominal pain (in opioids PTA), recurrent SBOs s/p exlap small bowel resection with primary anastamosis in 2017, ileo-ileal anastamosis dilation in June 2018, and multiple medically managed recurrent SBOs and is admitted for small bowel obstruction.      The patient underwent an ex-lap on 10/31/2019, notable for distended small bowel proximally, chronically strictured mid ileum with normal appearing terminal ileum. Per the operative note, s/p exploratory laparotomy with small bowel resection, lysis of adhesions, partial omentecomy and primary anastomosis 10/31.     Since the procedure, the patient notes that she has been having continued abdominal discomfort, mostly in the area surrounding her surgical scar. She feels as though she will eat and then 30 minutes after eating she will develop a contractile and sharp abdominal pain that lasts for several hours and then resolves spontaneously. Because of the pain associated with eating she has been limiting her eating. She reports on 11/10 evening, she did not experience pain after eating so she \"took advantage\" of this \"window\" in symptoms and ate a fair amount. She is concerned giving how persistent her symptoms are. She denies any nausea, emesis, abdominal distension or bloating. She is having several bowel movements per day, liquid in nature, brown in color; denies melena or hematochezia.         "

## 2019-11-15 NOTE — PLAN OF CARE
"This morning Patient had been reporting very frequent liquid stools past 24 hrs, but now hasn't been able to have stools and \"feels like she's full of poop that won't come out\".  Discussed with her that her last xray showed large amount of stool and that people can have liquid stool pass around stool burden.  Consented to taking the miralax and now has passed large soft yellow/tan stool (oatmeal consistency, filled the hat). Has declined food but will try once she feels that she has emptied her bowels.  UAL for long walks.  Drinking fluids well.    "

## 2019-11-16 LAB
ANION GAP SERPL CALCULATED.3IONS-SCNC: 6 MMOL/L (ref 3–14)
BUN SERPL-MCNC: 22 MG/DL (ref 7–30)
CALCIUM SERPL-MCNC: 8.3 MG/DL (ref 8.5–10.1)
CHLORIDE SERPL-SCNC: 103 MMOL/L (ref 94–109)
CO2 SERPL-SCNC: 29 MMOL/L (ref 20–32)
CREAT SERPL-MCNC: 0.48 MG/DL (ref 0.52–1.04)
GFR SERPL CREATININE-BSD FRML MDRD: >90 ML/MIN/{1.73_M2}
GLUCOSE SERPL-MCNC: 97 MG/DL (ref 70–99)
POTASSIUM SERPL-SCNC: 3.9 MMOL/L (ref 3.4–5.3)
SODIUM SERPL-SCNC: 138 MMOL/L (ref 133–144)

## 2019-11-16 PROCEDURE — 36592 COLLECT BLOOD FROM PICC: CPT | Performed by: SURGERY

## 2019-11-16 PROCEDURE — 80048 BASIC METABOLIC PNL TOTAL CA: CPT | Performed by: SURGERY

## 2019-11-16 PROCEDURE — 12000001 ZZH R&B MED SURG/OB UMMC

## 2019-11-16 PROCEDURE — 25000132 ZZH RX MED GY IP 250 OP 250 PS 637: Performed by: STUDENT IN AN ORGANIZED HEALTH CARE EDUCATION/TRAINING PROGRAM

## 2019-11-16 PROCEDURE — 40000558 ZZH STATISTIC CVC DRESSING CHANGE

## 2019-11-16 RX ADMIN — OXYCODONE HYDROCHLORIDE 5 MG: 5 TABLET ORAL at 06:22

## 2019-11-16 RX ADMIN — OXYCODONE HYDROCHLORIDE 5 MG: 5 TABLET ORAL at 19:37

## 2019-11-16 RX ADMIN — NORTRIPTYLINE HYDROCHLORIDE 10 MG: 10 CAPSULE ORAL at 21:56

## 2019-11-16 RX ADMIN — PANTOPRAZOLE SODIUM 40 MG: 40 TABLET, DELAYED RELEASE ORAL at 16:48

## 2019-11-16 RX ADMIN — ACETAMINOPHEN 975 MG: 325 TABLET, FILM COATED ORAL at 16:48

## 2019-11-16 RX ADMIN — ACETAMINOPHEN 975 MG: 325 TABLET, FILM COATED ORAL at 08:59

## 2019-11-16 RX ADMIN — OXYCODONE HYDROCHLORIDE 5 MG: 5 TABLET ORAL at 01:46

## 2019-11-16 RX ADMIN — PANTOPRAZOLE SODIUM 40 MG: 40 TABLET, DELAYED RELEASE ORAL at 08:04

## 2019-11-16 RX ADMIN — OXYCODONE HYDROCHLORIDE 5 MG: 5 TABLET ORAL at 14:04

## 2019-11-16 RX ADMIN — ACETAMINOPHEN 975 MG: 325 TABLET, FILM COATED ORAL at 01:46

## 2019-11-16 ASSESSMENT — ACTIVITIES OF DAILY LIVING (ADL)
ADLS_ACUITY_SCORE: 12

## 2019-11-16 ASSESSMENT — PAIN DESCRIPTION - DESCRIPTORS
DESCRIPTORS: ACHING

## 2019-11-16 NOTE — PLAN OF CARE
VSS. Pain managed with tylenol and PRN oxycodone. Denies nausea. +passing gas. +BM. Abdominal incision CDI. Voiding spontaneously, adequate urine volumes. Up ad dania, walking in halls. Continue POC.

## 2019-11-16 NOTE — PROGRESS NOTES
Patient has been up and walking around.  Was able to order own meals, ate good breakfast, on calorie counts now

## 2019-11-16 NOTE — PROGRESS NOTES
General Surgery Staff:    I reviewed the patient's medical record and the progress notes with the members of the General Surgery team today.  I agree with the clinical assessment and the surgical care plan that I formulated with the General Surgery  team members today.  I agree with the documentation in the associated General Surgery team member progress note today.  Plan for upper GI today.      Negro Mead MD, PhD

## 2019-11-16 NOTE — PLAN OF CARE
AOx4. UAL. Hypotensive but within order parameters, OVSS on ra. Tolerating diet. x2 BM on shift. +flatus. AUO. Midline inc KILEY, liq bandage. Pain managed with PRN Oxycodone and scheduled Tylenol. PICC SL, caps changed on shift; PICC drsg change due tomorrow. Cont POC.

## 2019-11-16 NOTE — PROGRESS NOTES
Patient stated having  loose stool and has an odor of c-didff, so she is in isolation.  Appetite is fair, voids in BR, oxycodone and tylenol given for abdominal pain.  [place] abdominal incision CDI.  PICC is saline locked.  Stated that she was told that she will be discharging tomorrow, refused 2pm lovenox, due to the fact that she walks around a lot.  Continue to monitor.

## 2019-11-17 ENCOUNTER — DOCUMENTATION ONLY (OUTPATIENT)
Dept: CARE COORDINATION | Facility: CLINIC | Age: 44
End: 2019-11-17

## 2019-11-17 VITALS
WEIGHT: 117.6 LBS | DIASTOLIC BLOOD PRESSURE: 60 MMHG | OXYGEN SATURATION: 98 % | BODY MASS INDEX: 17.37 KG/M2 | TEMPERATURE: 97.2 F | RESPIRATION RATE: 18 BRPM | HEART RATE: 96 BPM | SYSTOLIC BLOOD PRESSURE: 85 MMHG

## 2019-11-17 PROCEDURE — 80048 BASIC METABOLIC PNL TOTAL CA: CPT | Performed by: SURGERY

## 2019-11-17 PROCEDURE — 25000132 ZZH RX MED GY IP 250 OP 250 PS 637: Performed by: STUDENT IN AN ORGANIZED HEALTH CARE EDUCATION/TRAINING PROGRAM

## 2019-11-17 RX ORDER — HYOSCYAMINE SULFATE 0.125 MG
125 TABLET ORAL EVERY 4 HOURS PRN
Qty: 20 TABLET | Refills: 0 | Status: SHIPPED | OUTPATIENT
Start: 2019-11-17

## 2019-11-17 RX ORDER — LIDOCAINE 4 G/G
1 PATCH TOPICAL EVERY 24 HOURS
Qty: 20 PATCH | Refills: 0 | Status: SHIPPED | OUTPATIENT
Start: 2019-11-17

## 2019-11-17 RX ORDER — NORTRIPTYLINE HCL 10 MG
10 CAPSULE ORAL AT BEDTIME
Qty: 30 CAPSULE | Refills: 0 | Status: SHIPPED | OUTPATIENT
Start: 2019-11-17

## 2019-11-17 RX ORDER — MIRTAZAPINE 15 MG/1
15 TABLET, ORALLY DISINTEGRATING ORAL AT BEDTIME
Qty: 30 TABLET | Refills: 0 | Status: SHIPPED | OUTPATIENT
Start: 2019-11-17

## 2019-11-17 RX ORDER — METHOCARBAMOL 500 MG/1
500 TABLET, FILM COATED ORAL EVERY 6 HOURS PRN
Qty: 20 TABLET | Refills: 0 | Status: SHIPPED | OUTPATIENT
Start: 2019-11-17

## 2019-11-17 RX ORDER — ACETAMINOPHEN 325 MG/1
975 TABLET ORAL EVERY 8 HOURS
Refills: 0 | COMMUNITY
Start: 2019-11-17

## 2019-11-17 RX ORDER — HYDROXYZINE HYDROCHLORIDE 25 MG/1
25 TABLET, FILM COATED ORAL 3 TIMES DAILY PRN
Qty: 30 TABLET | Refills: 0 | Status: SHIPPED | OUTPATIENT
Start: 2019-11-17

## 2019-11-17 RX ORDER — PANTOPRAZOLE SODIUM 40 MG/1
40 TABLET, DELAYED RELEASE ORAL
Qty: 60 TABLET | Refills: 0 | Status: SHIPPED | OUTPATIENT
Start: 2019-11-17

## 2019-11-17 RX ADMIN — ACETAMINOPHEN 975 MG: 325 TABLET, FILM COATED ORAL at 08:58

## 2019-11-17 RX ADMIN — PANTOPRAZOLE SODIUM 40 MG: 40 TABLET, DELAYED RELEASE ORAL at 06:57

## 2019-11-17 RX ADMIN — OXYCODONE HYDROCHLORIDE 5 MG: 5 TABLET ORAL at 00:21

## 2019-11-17 RX ADMIN — OXYCODONE HYDROCHLORIDE 5 MG: 5 TABLET ORAL at 06:57

## 2019-11-17 RX ADMIN — OXYCODONE HYDROCHLORIDE 5 MG: 5 TABLET ORAL at 12:54

## 2019-11-17 RX ADMIN — ACETAMINOPHEN 975 MG: 325 TABLET, FILM COATED ORAL at 00:21

## 2019-11-17 ASSESSMENT — ACTIVITIES OF DAILY LIVING (ADL)
ADLS_ACUITY_SCORE: 12

## 2019-11-17 ASSESSMENT — PAIN DESCRIPTION - DESCRIPTORS
DESCRIPTORS: ACHING

## 2019-11-17 NOTE — DISCHARGE SUMMARY
General Surgery Discharge Summary    Rachel A Gerhardt MRN# [id number]   YOB: 1975 Age: 43 year old     Date of Admission:  10/18/2019  Date of Discharge::  11/17/2019  Admitting Physician:  Dada Trevizo MD  Discharge Physician:  Gurpreet Wood MD  Primary Care Physician:        Reji Avery          Admission Diagnoses:   SBO (small bowel obstruction) (H) [K56.609]          Discharge Diagnosis:   Same as admission         Procedures:   Small bowel resection with anastamosis by Dr. Trevizo        Non-operative procedures:   PICC line placement          Consultations:   SURGERY GENERAL ADULT IP CONSULT  SOCIAL WORK IP CONSULT  VASCULAR ACCESS CARE ADULT IP CONSULT  VASCULAR ACCESS CARE ADULT IP CONSULT  VASCULAR ACCESS CARE ADULT IP CONSULT  VASCULAR ACCESS CARE ADULT IP CONSULT  OCCUPATIONAL THERAPY ADULT IP CONSULT  PAIN MANAGEMENT ADULT IP CONSULT  VASCULAR ACCESS CARE ADULT IP CONSULT  WOUND OSTOMY CONTINENCE NURSE  IP CONSULT  PHARMACY/NUTRITION TO START AND MANAGE TPN  VASCULAR ACCESS CARE ADULT IP CONSULT  PHARMACY IP CONSULT  VASCULAR ACCESS CARE ADULT IP CONSULT  VASCULAR ACCESS ADULT IP CONSULT  PHARMACY/NUTRITION TO START AND MANAGE TPN  NUTRITION SERVICES ADULT IP CONSULT  SOCIAL WORK IP CONSULT  DENTISTRY ADULT IP CONSULT  GI LUMINAL ADULT IP CONSULT  PSYCHIATRY IP CONSULT           Medications Prior to Admission:     Medications Prior to Admission   Medication Sig Dispense Refill Last Dose     ondansetron (ZOFRAN-ODT) 4 MG ODT tab Take 1 tablet (4 mg) by mouth every 6 hours as needed for nausea or vomiting 90 tablet 0 10/18/2019 at 1300     oxyCODONE-acetaminophen (PERCOCET) 5-325 MG tablet Take 1 tablet by mouth every 4 hours as needed for severe pain 12 tablet 0 10/18/2019 at 1700     Simethicone 125 MG TABS Take 125 mg by mouth daily    10/18/2019 at 1300     ondansetron (ZOFRAN-ODT) 4 MG ODT tab Take 1 tablet (4 mg) by mouth every 6 hours as needed for nausea or vomiting  20 tablet 0 Unknown at Unknown time     ondansetron (ZOFRAN-ODT) 4 MG ODT tab Take 1 tablet (4 mg) by mouth every 6 hours as needed for nausea or vomiting 18 tablet 0 Unknown at Unknown time            Discharge Medications:      Gerhardt, Rachel A   Home Medication Instructions KRISTINA:[id number]    Printed on:11/17/19 0952   Medication Information                      ondansetron (ZOFRAN-ODT) 4 MG ODT tab  Take 1 tablet (4 mg) by mouth every 6 hours as needed for nausea or vomiting             ondansetron (ZOFRAN-ODT) 4 MG ODT tab  Take 1 tablet (4 mg) by mouth every 6 hours as needed for nausea or vomiting             ondansetron (ZOFRAN-ODT) 4 MG ODT tab  Take 1 tablet (4 mg) by mouth every 6 hours as needed for nausea or vomiting             oxyCODONE-acetaminophen (PERCOCET) 5-325 MG tablet  Take 1 tablet by mouth every 4 hours as needed for severe pain             Simethicone 125 MG TABS  Take 125 mg by mouth daily                        Day of Discharge Exam   BP (!) 85/60 (BP Location: Left leg)   Pulse 96   Temp 97.2  F (36.2  C) (Oral)   Resp 18   Wt 53.3 kg (117 lb 9.6 oz)   SpO2 98%   BMI 17.37 kg/m      General:  A&Ox3, NAD  Cardio:   RRR  Chest:   Non labored breathing on RA  Abd:   Soft, non-distended, appropriately TTP, incision c/d/i  Ext:   WWP          Brief History of Illness:   Rachel A Gerhardt is a 43 year old female admitted on 10/18/2019. 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           Hospital Course:   The patient was admitted and underwent the above procedure. The patient tolerated the procedure well. Postoperatively the patient was transferred to the general floor for further care. The patient's diet was slowly advanced as bowel function  returned. She continued to have significant pain after the surgery especially with food. TPN was used for a while to provide nutrition. Gastroenterology and psychiatry were consulted and provided recommendations regarding pain/anxiety with meals in an attempt to provide nutrition before discharge, with a final suggestion to try nortriptyline for pain. A feeding was attempted to be placed but she did not tolerate it well, and eventually did not need it. Narcotics were weaned and avoided as able due to the patient's history of opioid dependence. By discharge, she was eating adequately and having normal bowel function. The patient received appropriate education post operatively. On POD 13 the patient was discharged to home with appropriate instructions and follow up. The patient acknowledged understanding and were in agreement with the plan.         Antibiotics Prescribed at Discharge:   None prescribed         Imaging Studies:   No studies require specific follow-up  Results for orders placed or performed during the hospital encounter of 10/18/19   CT Abdomen Pelvis w Contrast     Value    Radiologist flags Small bowel obstruction (Urgent)    Narrative    EXAMINATION: CT ABDOMEN PELVIS W CONTRAST  10/19/2019 12:25 AM      CLINICAL HISTORY: abd pain r/o obstruction    COMPARISON: MR 6/26/2019. CT 5/18/2019.    PROCEDURE COMMENTS: CT of the abdomen was performed with iopamidol  (ISOVUE-370) solution 64 mL    intravenous and oral contrast. Coronal  and sagittal reformatted images were obtained.    FINDINGS:  Multiple dilated loops of small bowel with air-fluid levels are again  noted and similar in distribution to prior exams though with increased  luminal distention largest measuring up to 7.3 cm in the right lower  quadrant (series 5 image 287), previously luminal caliber measuring up  to 5.8 cm on CT 5/18/2019. Transition point is noted in the mid  abdomen near patient's known ileoileal anastomosis with  decompressed  bowel distally (series 5 images 223-243 and series 4 image 53). No  definite pneumatosis, portal venous gas, or free air. Bowel wall shows  expected mucosal enhancement. The downstream decompressed small bowel  appears to have diffuse mucosal hyperenhancement. There is moderate  stool burden in the colon which is normal caliber.    The liver, gallbladder, spleen, and adrenal glands are normal.  Prominence of the main pancreatic duct measuring 5 mm in the  pancreatic head, similar to prior studies. Probable focal fatty  infiltration of the anterior pancreatic head. In the right lower renal  pole, there is a 10 mm slightly heterogeneous, partially cystic lesion  (series 2 image 70). In the left mid pole, there is a subcentimeter  hypodensity which is too small to characterize. No hydronephrosis. The  bladder is partially distended. No pelvic masses. Abdominal aorta is  normal caliber. Abdominal vasculature is patent. Multiple prominent  mesenteric lymph nodes are noted and relatively stable. Unchanged mild  mesenteric edema. No significant free fluid.     Lower thorax: Visualized lung bases are clear. Heart size is normal  without pericardial effusion.    Bones and soft tissues: No suspicious abnormality.      Impression    IMPRESSION:  1. Chronic small bowel obstruction with the transition point in the  mid abdomen at the ileoileal anastomotic stricture. Luminal distention  proximal to the anastomosis measures up to 7.3 cm, which has increased  from prior exams. No pneumatosis or other CT findings of ischemia.  2. The downstream decompressed small bowel appears to have mucosal  hyperenhancement which could be due to incomplete distention or  represent a superimposed enteritis.  3. Moderate colonic stool burden.  4. Multiple mildly enlarged mesenteric lymph nodes, presumably  reactive.  5. Unchanged prominence of the main pancreatic duct in the head  measuring 5 mm in diameter.    [Urgent Result: Small  bowel obstruction]    Finding was identified on 10/19/2019 12:52 AM.     Dr. Craft was contacted by Dr. Esteban at 10/19/2019 1:06 AM and  verbalized understanding of the urgent finding.     I have personally reviewed the examination and initial interpretation  and I agree with the findings.    AMY FLOREZ MD   XR Abdomen 2 Views    Narrative    Exam: XR ABDOMEN 2 VW, 10/21/2019 4:41 PM    Indication: SBO    Comparison: CT abdomen 10/18/2017. Radiograph 6/24/2019.    Findings:   Upright and supine views of the abdomen. Multiple dilated loops of  small bowel with air-fluid levels are again noted and measure up to  5.9 cm luminal caliber, similar in appearance to CT 10/18/2019. No  pneumatosis or free air. Moderate stool in the left hemicolon. The  lung bases are clear.      Impression    Impression:   1. Small bowel obstruction with multiple dilated loops of bowel with  air-fluid levels, better appreciated on CT 10/18/2019. No pneumatosis  or free air.  2. Moderate stool in the left hemicolon.    I have personally reviewed the examination and initial interpretation  and I agree with the findings.    CORTNEY XAVIER MD   XR Abdomen Port 1 View    Narrative    Exam: XR ABDOMEN PORT 1 VW, 10/22/2019 8:24 PM    Indication: NG tube placement    Comparison: 10/21/2019    Findings:   Supine frontal view of the abdomen. The sidehole of NG tube projects  over the stomach. Multiple gaseous dilated loops of bowel. Visualized  lung is clear.      Impression    Impression:   1. The sidehole of NG tube projects over the stomach fundus.  2. Partial visualization of multiple dilated loops of small bowel  consistent with history of small bowel obstruction versus an adynamic  ileus.    I have personally reviewed the examination and initial interpretation  and I agree with the findings.    CARLIN ALCALA MD   XR Abdomen 2 Views    Narrative    Exam: XR ABDOMEN 2 VW, 10/24/2019 7:56 PM    Indication: SBO    Comparison: Radiograph on  10/18/2019    Findings:   Supine and upright frontal views of the abdomen. The sidehole of NG  tube projects over the stomach. Multiple dilated loops of bowel. No  free air in the abdomen. No pneumatosis. No acute osseous abnormality.  Visualized lower chest is clear.      Impression    Impression:   1. The sidehole of NG tube projects over the stomach.  2. Multiple dilated loops of bowel similar likely due to small bowel  obstruction versus adynamic ileus.    I have personally reviewed the examination and initial interpretation  and I agree with the findings.    DINO MC MD   XR Abdomen Port 1 View    Narrative    EXAMINATION:  XR ABDOMEN PORT 1 VW 10/25/2019 1:48 AM     INDICATION: feeding tube pulled, was re-advanced    COMPARISON: Abdominal radiograph 10/24/2019    FINDINGS: Single AP supine view of the abdomen.     Enteric tip and side-port project over an air-filled dilated stomach.  There is redemonstration of multiple dilated loops of bowel, however  this is mildly improved from prior radiograph. No portal venous gas.  No pneumatosis. No appreciable free air. Lung bases are unremarkable..        Impression    IMPRESSION:  1. Enteric tube with tip and side port projecting over the air-filled  dilated stomach.  2. Redemonstration of multiple dilated loops of bowel however this is  mildly improved from prior radiograph.    I have personally reviewed the examination and initial interpretation  and I agree with the findings.    MARY ANN VELA MD   XR Chest Port 1 View    Narrative    Exam:  Chest X-ray 10/26/2019 3:53 PM    History: PICC placement    Comparison: X-ray 5/18/2019.    Findings: The right upper extremity PICC tip projects at the low SVC.  Gastric tube courses below the level of the diaphragm with the tip out  of the field of view. The trachea is midline. The cardiomediastinal  silhouette is within normal limits. No pleural effusion. The pulmonary  vasculature is distinct. No focal  pulmonary opacity. No pneumothorax.      Impression    Impression: Right upper extremity PICC with the tip projecting over  the low SVC.    I have personally reviewed the examination and initial interpretation  and I agree with the findings.    SHERRI ORTIZ MD   XR Abdomen 2 Views    Narrative    Exam: XR ABDOMEN 2 VW, 11/5/2019 8:16 AM    Indication: Post op ileus    Additional information from the health record: Exploratory laparotomy  with small bowel resection, lysis of adhesions and partial  meniscectomy for small bowel obstruction on 11/4/2019.    Comparison: Abdominal radiographs dated 10/25/2019    Findings:   Upright and supine AP views of the abdomen. Partially visualized right  approach peripheral line with the tip projecting at the level of the  lower thoracic SVC. Multiple loops of moderately gas distended large  and small bowel. A few air-fluid levels are noted on upright views.  Questionable wall thickening involving some of the central small bowel  loops may represent edema/reactive postsurgical change.  Pneumoperitoneum, likely related to recent laparotomy. No pneumatosis  or portal venous gas. No focal consolidation in the visualized portion  of the lower lungs.      Impression    Impression:   1. Multiple loops of moderately gas distended large and small bowel  likely represent ileus rather than obstruction given postoperative  status. Radiographic follow-up as clinically warranted.  2. Pneumoperitoneum, likely secondary to recent laparotomy.    I have personally reviewed the examination and initial interpretation  and I agree with the findings.    JOANNE BARNETT MD   CT Dental wo Contrast    Narrative    CT DENTAL WO CONTRAST 11/6/2019 1:15 PM    History:  Dental CT for initial workup, cracked tooth    Comparison:  None.      TECHNIQUE: 3-D reconstruction by the technologists, with curved  multiplanar reformat of thin section imaging through the mandible and  maxilla obtained without  intravenous contrast.    FINDINGS:  Fracture of the lingual cusp of the left first mandibular molar  (series 3, image 68 and series 5, image 46). Carious dentition with  multiple lucencies/enamel erosions.    Dental caries in the mandibular dentition are present on the right in  the first and second premolars and second and third molars, and on the  left in the first premolar, and first and second molars. The right  first mandibular molar is absent.    Dental caries in the maxillary dentition are present and the right  central incisor, left lateral incisor, and an incompletely erupted  left maxillary third molar.    No significant soft tissue swelling or mass lesion of the mandible or  maxilla. No loculated fluid collection or evidence of abscess. Facial  bone alignment is normal. Postoperative changes of bilateral  ethmoidectomies and maxillary antrostomies. Paranasal sinuses and  mastoid air cells are clear. Orbits are unremarkable.      Impression    IMPRESSION:   1. Fractured lingual cusp of a carious left first mandibular molar.  2. Carious dentition as described above. No associated dental abscess.    I have personally reviewed the examination and initial interpretation  and I agree with the findings.    RAULITO NUÑEZ MD   XR Abdomen 2 Views    Narrative    2 views of the abdomen    Comparisons: 11/5/2019    HISTORY: Evaluate for stool burden    FINDINGS: Dilated gas-filled colon with slightly greater distention  than on the prior. Moderate amount of stool in the ascending  colon/hepatic flexure. No dilated loop of small bowel is identified.  There is no free intraperitoneal gas.      Impression    IMPRESSION:  Moderate amount of stool in the colon. Increased gaseous distention of  the colon which appears to wax and wane over time.    SHERRI ORTIZ MD   XR UGI Air Contrast with KUB    Narrative    Double Contrast Upper GI, 11/15/2019     Comparison: CT abdomen/pelvis 10/18/2019, abdominal  radiograph  11/11/2019    History: 43-year-old female with a history of cervical and ovarian  cancer and recurrent small bowel obstructions, who is s/p small bowel  resection and primary anastomosis on 10/31/2019. Now with postprandial  pain, concerns for ulcer.    Fluoroscopy time: 3.3 minutes.    Technique: The patient ingested effervescent granules, followed by  thick and thin barium. Multiple spot films were taken in the upright  and recumbent positions.    Findings:   The  film shows a nonobstructive bowel gas pattern, however the  bowel is only partially visualized. The visualized lungs are  unremarkable. The patient's peripheral line is seen with the tip  projecting over the low SVC. No suspicious osseous lesions.    The esophagus is free of filling defects. The fold pattern of the  esophagus is normal with no radiographic evidence of esophagitis.  Esophageal motility is unremarkable. No spontaneous reflux or hiatal  hernia was seen.    There appears to be some debris in the stomach with slight difficulty  in coating. The rugal pattern within the stomach is grossly  unremarkable allowing for coating with no definite large ulcer  identified. No mass or filling defect seen within the stomach. There  is little transit of contrast from the stomach to the duodenum during  the study, limiting the evaluation of the duodenum. This results in  single contrast images of the distal stomach which limits evaluation  for mucosal lesion.      Impression    Impression:   1. No definite ulcer is seen within the stomach allowing for mucosal  coating.  2. There is little contrast entering the duodenum on this study, which  limits the evaluation of the distal stomach and duodenum.    I have personally reviewed the examination and initial interpretation  and I agree with the findings.    JOANNE BARNETT MD            Final Pathology Result:   No pathology submitted         Discharge Instructions:     - No lifting greater  than 15 pounds for 8 weeks after surgery.   - You may shower and get incisions wet starting 48 hrs after surgery but do not scrub incisions or submerge wounds (aka, bath, pool, hot tub, ect.) for 2 weeks. Remove outer dressing (if placed) after 48 hrs from surgery. If dermabond was used, avoid applying any lotions or ointments. If steri-strips were used, they will fall off on their own. You may leave open to air or cover with dry gauze if needed for comfort.  - No driving while taking narcotic pain medications.   - If you develop constipation, take stool softeners such as colace or miralax but stop if you develop diarrhea. Wean yourself off of narcotics.   - Call your primary provider to touch base with them regarding your recent admission.   - If you develop any fever/chills, worsening pain, redness, swelling, or drainage from your wound please call (Clinic [phone]).          Follow-Up:     - Follow up with gastroenterology in clinic in 2-3 week(s) after discharge. You should be called to make an appointment within 3 business days.        Home Health Care:     Not needed           Discharge Disposition:     Discharged to home      Condition at discharge: Stable

## 2019-11-17 NOTE — DISCHARGE SUMMARY
Nursing discharge summary    AVSS on room air. A&Ox4. Up ad dania. Voiding and not saving. Pt denies having a BM this shift. Midline incision KILEY. PICC line removed this shift. All discharge education completed. Pt picked up prescriptions from outpatient pharmacy. Pt independently left unit at 1420.     Christin Roman, student nurse

## 2019-11-17 NOTE — PROGRESS NOTES
Calorie Count  Intake recorded for: 11/16  Total Kcals: 1358 Total Protein: 75g  Kcals from Hospital Food: 1358  Protein: 75g  Kcals from Outside Food (average):0 Protein: 0g  # Meals Recorded: 2 meals (First - 100% 2 scrambled eggs, 2 sausage vinod, 2 strip valentino, cream cheese)      (Second - 100% crumb cod, banana bread)  # Supplements Recorded: 0

## 2019-11-17 NOTE — PLAN OF CARE
"AOx4. UAL. Hypotensive but within order parameters. OVSS on ra. Pt  ate 1/2 of lunch and per pt \"will nibble on dinner through out the night\"; pt reports no appetite. Hyperactive BS, +flatus, x1BM on shift, needs to collect stool sample to rule out C-diff. AUO. Midline inc liq bandage, KILEY. Abd pain managed with scheduled Tylenol and PRN Oxycodone. PICC SL. Cont POC.  "

## 2019-11-17 NOTE — PLAN OF CARE
AVSS. Abdominal pain managed with oxycodone q 4hrs and scheduled Tylenol. Denies nausea, but pt has little appetite and has not eaten much. Pt denies having any stools overnight - reinforced need to collect stool sample if pt does have BM. Pt seems to be removing collection container when using bathroom. Voiding adequate amounts. Midline inc KILEY. PICC saline locked. Up independently in room. Continue to monitor.

## 2019-11-17 NOTE — PROGRESS NOTES
Surgery Progress Note    S:   Pt has been up and walking around most of the morning. Oral intake improving. Nortriptyline slowly starting to work.    O:   BP (!) 85/60 (BP Location: Left leg)   Pulse 96   Temp 97.2  F (36.2  C) (Oral)   Resp 18   Wt 53.3 kg (117 lb 9.6 oz)   SpO2 98%   BMI 17.37 kg/m         Intake/Output Summary (Last 24 hours) at 11/6/2019 0538  Last data filed at 11/6/2019 0126  Gross per 24 hour   Intake 3705.68 ml   Output 3950 ml   Net -244.32 ml       General: Thin appearing: Sitting up in bed, NAD.   HEENT: No obvious swelling noted to face  Cardio: Extremities are warm and well perfused   Resp: No increased work of breathing  Abdomen: Soft, non-distended, incision c/d/i, mild tender to palpation throughout  Neuro: Alert and oriented. Moves all extremities symmetrically.              Assessment and Plan:     ASSESSMENT:Rachel A Gerhardt is a 43 year old female with a PMH of pelvic radiation, cervical and ovarian cancer and SBR in 2017 who was admitted for recurrent SBO and worsening symptoms, she is s/p small bowel resection and primary anastomosis on 10/31. Pain team consulted and working with patient, recs below in plan. Dentistry was consulted and worked with patient.     PLAN:   - Appreciate recs from pain service  - GI consulted, appreciate recommendations  - Dentistry says she can follow up outpatient  - C-diff ordered   - Dispo today, no acute needs keeping her here      Seen and discussed with chief resident and staff    Luiz Cortez MD  [facility] Service, PGY1

## 2019-11-18 ENCOUNTER — PATIENT OUTREACH (OUTPATIENT)
Dept: SURGERY | Facility: CLINIC | Age: 44
End: 2019-11-18

## 2019-11-18 NOTE — PROGRESS NOTES
Patient has clinic visit within 24-48 hours of discharge so no post DC follow up call is needed.    Name: Gerhardt, Rachel A MRN: [id number]   Date: 11/18/2019 Status: [facility]   Time: 1:30 PM Length: 30   Visit Type: UMP POST-OP [[id number]] KRISTINA:     Provider: Dada Trevizo MD Department:  GENERAL SURGERY

## 2019-11-18 NOTE — PROGRESS NOTES
Rachel A Gerhardt is a patient of Dr. Trevizo's.  She was discharged from the hospital yesterday.  She reports that she did not return to her mothers home as they had a disagreement prior to her admission and the patient called the Police Department to intervene.  The patient slept in her vehicle last night.  Patient reports that she was not discharged with Oxycodone but rather the Pharmacist asked her to use what she had on hand at home.  She also states that the Elavil is causing EPS.      Patient had a previous appointment scheduled today- will keep that appointment with Dr. Trevizo to review her symptoms.

## 2019-11-25 LAB — INTERPRETATION ECG - MUSE: NORMAL

## 2019-12-24 NOTE — IP AVS SNAPSHOT
"    [address] [facility]: [phone]                                              INTERAGENCY TRANSFER FORM - PHYSICIAN ORDERS   3/28/2018                    Hospital Admission Date: 3/28/2018  RACHEL A GERHARDT   : 1975  Sex: Female        Attending Provider: Herman Moore MD     Allergies:  No Clinical Screening - See Comments, Sulfa Drugs, Amoxicillin, Amoxicillin-pot Clavulanate, Augmentin, Avelox [Moxifloxacin], Ciprofloxacin, Codeine, Ibuprofen, Ibuprofen Sodium, Quinolones, Tramadol, Daucus [place]    Infection:  None   Service:  COLORECTAL S    Ht:  1.753 m (5' 9\")   Wt:  60.3 kg (132 lb 14.4 oz)   Admission Wt:  56.7 kg (125 lb)    BMI:  19.63 kg/m 2   BSA:  1.71 m 2            Patient PCP Information     Provider PCP Type    Reji Avery MD General      ED Clinical Impression     Diagnosis Description Comment Added By Time Added    Abdominal pain, generalized [R10.84] Abdominal pain, generalized [R10.84]  Brit Brown MD 3/28/2018  9:52 PM      Hospital Problems as of 2018              Priority Class Noted POA    Failure to thrive in adult Medium  3/29/2018 Yes      Non-Hospital Problems as of 2018              Priority Class Noted    (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 Medium  2012    CARDIOVASCULAR SCREENING; LDL GOAL LESS THAN 160 Medium  2012    Pregnancy complicated by chemical dependency, antepartum (H) Medium  2012    Opiate dependence (H) Medium  2013    Abdominal pain Medium  9/15/2015    History of polysubstance abuse, +cocaine UDS in  Medium  2015    Cervix cancer (H) Medium  2015    Encounter for long-term current use of medication Medium  2015    SBO (small bowel obstruction) Medium  2016    Small bowel obstruction, partial Medium  4/3/2017    Small intestine obstruction Medium  2017    Small bowel obstruction Medium  2017    Postoperative pain Medium  2017      Code Status " "12/24 Remains sedated and intubated, though propofol is currently off for EEG.  No seizure activity on EEG as per Dr. Arjun Pinzon. ID attendance noted and appreciated. Tmax 103 F. Labs personally reviewed: leukocytosis has resolved.Has developed hypokalemia (will be covered). Lactate = 3.8. Telemetry personally reviewed = sinus  12/25 Extubated. Mother and caregiver state patient is back to baseline. Has been apyrexic since yesterday. Patient denies pain. Is hungry. Will watch in ICU through-out day. If remains apyrexic and no further seizure, will transfer out of ICU and hopefully discharge in AM if no further fever/seizure. Labs personally reviewed: leukocytosis persisting; hypokalemia resolved. Telemetry personally reviewed: sinus.  12/26 100.6 temp last PM. Discussed patient personally with Dr. Lopez, to keep til apyrexic =  24 hrs. Discussed patient personally with Dr. Arjun Pinzon: is cleared from Neurology for discharge. Patient has yet to work with PT. Mother and caregiver at bedside. No new complaints. Indicates he feels "OK". Labs personally reviewed leukocytosis has resolved. Has mild hypokalemia, which is being covered. Telemetry personally reviewed = sinus. Will discontinue Rice and transfer to med/jl without telemetry; continue PT  12/27 Apyrexic for > 24 hours. No further seizure activity. Is stable for discharge with outpatient follow-up  VSS  Lungs: clear  Heart: S1S2  Abdo: soft    " History     Date Active Date Inactive Code Status Order ID Comments User Context    3/30/2018  1:48 PM  Full Code [id number]  Julieta Rob PA-C Outpatient    3/29/2018  1:02 AM 3/30/2018  1:48 PM Full Code [id number]  Luiz Tolliver MD Inpatient    5/24/2017 12:30 PM 3/29/2018  1:02 AM Full Code [id number]  Julieta Rob PA-C Outpatient    5/18/2017  1:57 PM 5/24/2017 12:30 PM Full Code [id number]  David Reid MD Inpatient    5/18/2017  1:57 PM 5/18/2017  1:57 PM Full Code [id number]  Horace Maloney MD Inpatient    5/1/2017 12:59 PM 5/18/2017  1:57 PM Full Code [id number]  Julieta Rob PA-C Outpatient    5/1/2017 12:29 PM 5/1/2017 12:59 PM Full Code [id number]  Julieta Rob PA-C Outpatient    4/29/2017  9:32 AM 5/1/2017 12:29 PM Full Code [id number]  Jennifer Goodwin MD Inpatient    4/3/2017 10:28 AM 4/3/2017 11:31 PM Full Code [id number]  Luiz Gordon MD Inpatient    12/29/2016  9:09 AM 4/3/2017 10:28 AM Full Code [id number]  Iris Dias APRN CNP Outpatient    12/27/2016 10:17 PM 12/29/2016  9:09 AM Full Code [id number]  Myah Morejon MD Inpatient    9/16/2015 10:07 AM 12/27/2016 10:17 PM Full Code [id number]  Melyssa Ram MD Outpatient    9/15/2015 10:18 PM 9/16/2015 10:07 AM Full Code [id number]  Laquita Miranda MD Inpatient    7/14/2012  6:21 PM 7/17/2012  5:03 PM Full Code [id number]  Fabricio Florence RN Inpatient         Medication Review      START taking        Dose / Directions Comments    hydrOXYzine 25 MG tablet   Commonly known as:  ATARAX   Used for:  Abdominal pain, generalized        Dose:  25 mg   Take 1 tablet (25 mg) by mouth every 6 hours as needed for other (adjuvant pain)   Quantity:  8 tablet   Refills:  0        oxyCODONE IR 5 MG tablet   Commonly known as:  ROXICODONE   Used for:  Abdominal pain, generalized        Dose:  5-10 mg   Take 1-2 tablets (5-10 mg) by mouth every 4 hours as needed for moderate to  severe pain   Quantity:  20 tablet   Refills:  0    Patient will be sent home with a short-term supply of pain medication until she follows up with her pain provider ([facility]) on 4/2/2018 (tomorrow).       parenteral nutrition - PTA/DISCHARGE ORDER        The TPN formula will print on the After Visit Summary Report.   Quantity:  1 each   Refills:  0          CONTINUE these medications which may have CHANGED, or have new prescriptions. If we are uncertain of the size of tablets/capsules you have at home, strength may be listed as something that might have changed.        Dose / Directions Comments    acetaminophen 500 MG tablet   Commonly known as:  TYLENOL   This may have changed:    - when to take this  - reasons to take this   Used for:  Small intestine obstruction        Dose:  1000 mg   Take 2 tablets (1,000 mg) by mouth 4 times daily   Quantity:  80 tablet   Refills:  0        vitamin D 65631 UNIT capsule   Commonly known as:  ERGOCALCIFEROL   This may have changed:  additional instructions   Used for:  Vitamin D deficiency        Dose:  55203 Units   Take 1 capsule (50,000 Units) by mouth once a week Needs labs checked prior to additional refills   Quantity:  8 capsule   Refills:  0          CONTINUE these medications which have NOT CHANGED        Dose / Directions Comments    albuterol 108 (90 BASE) MCG/ACT Inhaler   Commonly known as:  PROAIR HFA/PROVENTIL HFA/VENTOLIN HFA   Used for:  Wheezing        Dose:  2 puff   Inhale 2 puffs into the lungs every 6 hours as needed   Quantity:  1 Inhaler   Refills:  0        ondansetron 4 MG tablet   Commonly known as:  ZOFRAN   Used for:  Small bowel obstruction, Nocturnal diarrhea, Abdominal pain, generalized        Dose:  4-8 mg   Take 1-2 tablets (4-8 mg) by mouth every 8 hours as needed for nausea   Quantity:  30 tablet   Refills:  0          STOP taking     HYDROcodone-acetaminophen 5-325 MG per tablet   Commonly known as:  NORCO                    Summary of Visit     Reason for your hospital stay       You were admitted for abdominal pain and malnutrition.  You were started on TPN.             After Care     Activity       Your activity upon discharge:   -Activity as tolerated  -No driving while on narcotic analgesics (i.e. Percocet, oxycodone, Vicodin)       Diet       Follow this diet upon discharge:  -Liquid diet as tolerated  -TPN as main source of nutrition  -We recommend eating slowly, chewing thoroughly, eating small frequent meals throughout the day  -Stay well hydrated.             Referrals     Home infusion referral       Your provider has referred you to:  [facility]  Phone  [phone]  Fax  [phone]  ______________________________      Local Address (if different from home address):    Patient will be discharging to:  [address] [place] [id number]  Mothers phone #: [phone]  Patients cell: [phone]    Skilled home care RN for initial home safety evaluation and to assist with management and education reinforcement with home TPN via picc line.  Picc line cares per home care agency routine.  TPN labs per home care agency routine.      Skilled home cared RN to evaluate medication management, nutrition and hydration evaluation, endurance evaluation, and general status evaluation after discharge from the acute care hospital setting.      Anticipated Length of Therapy: Per MD orders    Access Device Management:  IV Access Type: PICC  Flush with Heparin and Normal Saline IVP PRN and routine site care (per agency protocol) to maintain access device? Yes              MD face to face encounter       Documentation of Face to Face and Certification for Home Health Services    I certify that patient: Rachel A Gerhardt is under my care and that I, or a nurse practitioner or physician's assistant working with me, had a face-to-face encounter that meets the physician face-to-face encounter requirements with this  patient on: 3/29/2018.    This encounter with the patient was in whole, or in part, for the following medical condition, which is the primary reason for home health care: s/p acute hospital stay for anamostic stricture.    I certify that, based on my findings, the following services are medically necessary home health services: Nursing.    My clinical findings support the need for the above services because: Nurse is needed: To provide assessment and oversight required in the home to assure adherence to the medical plan due to: need for TPN..    Further, I certify that my clinical findings support that this patient is homebound (i.e. absences from home require considerable and taxing effort and are for medical reasons or Mu-ism services or infrequently or of short duration when for other reasons) because: Leaving home is medically contraindicated for the following reason(s): Infection risk / immunocompromised state where it is safer for them to receive services in the home...    Based on the above findings. I certify that this patient is confined to the home and needs intermittent skilled nursing care, physical therapy and/or speech therapy.  The patient is under my care, and I have initiated the establishment of the plan of care.  This patient will be followed by a physician who will periodically review the plan of care.  Physician/Provider to provide follow up care: Reji Avery    Attending hospital physician (the Medicare certified [place] provider): Herman Moore MD  Physician Signature: See electronic signature associated with these discharge orders.  Date: 3/29/2018                  Your next 10 appointments already scheduled     Apr 03, 2018 12:00 PM CDT   (Arrive by 11:45 AM)   Return Visit with Herman Moore MD   [facility] ([facility])    [address] [place] [address] [id number] [phone]              Follow-Up Appointment  Instructions     Future Labs/Procedures    Adult [facility] Follow-up and recommended labs and tests     Comments:    DIET  -Liquid diet as tolerated  -TPN as main source of nutrition  -We recommend eating slowly, chewing thoroughly, eating small frequent meals throughout the day  -Stay well hydrated.      ACTIVITY  -Activity as tolerated  -No driving while on narcotic analgesics (i.e. Percocet, oxycodone, Vicodin)    NOTIFY  Please contact Marianela Diaz LPN, Maria L Sidhu RN or Joellen Parish RN at [phone] for problems after discharge such as:  -Temperature > 101F, chills, rigors, dizziness  -Inability to tolerate diet, nausea or vomiting  -You stop passing gas, develop significant bloating, abdominal pain  -Have blood in stools/vomit  -Have severe diarrhea/constipation  -Any other questions or concerns.  - At nights (after 4:30pm), on weekends, or if urgent, call [phone] and ask the  to speak with the on-call Colorectal Surgery resident or fellow      Medication Instructions  Some of your medications may have changed. Please take only prescribed and resumed medications     FOLLOW-UP  1.  You can follow up with CRS Staff: Dr. Moore in 3-4 weeks after.  Please contact our clinic scheduler Bianca Pavon (phone # [phone]) if you have not heard from our clinic in 3 business days afer discharge to schedule a follow-up appointment.     2.  Follow up with your primary care provider in 1-2 weeks after discharge from the hospital to review this hospitalization.     3.  Continue TPN blood work as per Home Infusion.       Appointments on [place] and/or [facility] (with [facility] or [facility] provider or service). Call [phone] if you haven't heard regarding these appointments within 7 days of discharge.      Follow-Up Appointment Instructions     Adult [facility] Follow-up and recommended labs and tests       DIET  -Liquid diet as tolerated  -TPN as main source of nutrition  -We recommend eating slowly,  chewing thoroughly, eating small frequent meals throughout the day  -Stay well hydrated.      ACTIVITY  -Activity as tolerated  -No driving while on narcotic analgesics (i.e. Percocet, oxycodone, Vicodin)    NOTIFY  Please contact Marianela Diaz LPN, Maria L Sidhu, RN or Joellen Parish RN at [phone] for problems after discharge such as:  -Temperature > 101F, chills, rigors, dizziness  -Inability to tolerate diet, nausea or vomiting  -You stop passing gas, develop significant bloating, abdominal pain  -Have blood in stools/vomit  -Have severe diarrhea/constipation  -Any other questions or concerns.  - At nights (after 4:30pm), on weekends, or if urgent, call [phone] and ask the  to speak with the on-call Colorectal Surgery resident or fellow      Medication Instructions  Some of your medications may have changed. Please take only prescribed and resumed medications     FOLLOW-UP  1.  You can follow up with CRS Staff: Dr. Moore in 3-4 weeks after.  Please contact our clinic scheduler Bianca Pavon (phone # [phone]) if you have not heard from our clinic in 3 business days afer discharge to schedule a follow-up appointment.     2.  Follow up with your primary care provider in 1-2 weeks after discharge from the hospital to review this hospitalization.     3.  Continue TPN blood work as per Home Infusion.       Appointments on [place] and/or [facility] (with [facility] or [facility] provider or service). Call [phone] if you haven't heard regarding these appointments within 7 days of discharge.             Statement of Approval     Ordered          04/01/18 0851  I have reviewed and agree with all the recommendations and orders detailed in this document.  EFFECTIVE NOW     Approved and electronically signed by:  Maryam Hernandez MD

## 2020-07-10 NOTE — PROGRESS NOTES
Have not seen patient in over a year. Saw her only while in hospital for SBO. Deleted therapy plan for IV hydration/antimetics. Future plans per her PCP.     Ced Taylor MD

## 2020-11-07 ENCOUNTER — HEALTH MAINTENANCE LETTER (OUTPATIENT)
Age: 45
End: 2020-11-07

## 2020-12-22 NOTE — PLAN OF CARE
Patient slept soundly through the night. Pain managed with prn oxycodone and scheduled tylenol. No lidocaine or icy hot patch in place. Double lumen PICC WDL S/L. Up ad dania.  On regular diet. Voiding but not saving. Abdominal midline incision KILEY with dermabond. As per pt she had 3 loose stools overnight.      Update: Patient said she drank 10 apple juice's ( hospital supply)  overnight and still feels thirsty and dehydrated.   139.9

## 2021-01-30 ENCOUNTER — HEALTH MAINTENANCE LETTER (OUTPATIENT)
Age: 46
End: 2021-01-30

## 2021-03-05 ENCOUNTER — AMBULATORY - HEALTHEAST (OUTPATIENT)
Dept: NURSING | Facility: CLINIC | Age: 46
End: 2021-03-05

## 2021-03-29 ENCOUNTER — NURSE TRIAGE (OUTPATIENT)
Dept: NURSING | Facility: CLINIC | Age: 46
End: 2021-03-29

## 2021-03-29 NOTE — TELEPHONE ENCOUNTER
Patient calling to schedule shot #2 covid.  Transferred to scheduling.    Melyssa Callahan RN  [facility] Nurse Advisor

## 2021-05-12 NOTE — PROVIDER NOTIFICATION
Notification received from [facility] Notified provider regarding pt BP recheck of 89/67; awaiting response.     Addendum 0624: Provider responded, no interventions at this time, cont to monitor.

## 2021-05-29 ENCOUNTER — RECORDS - HEALTHEAST (OUTPATIENT)
Dept: ADMINISTRATIVE | Facility: CLINIC | Age: 46
End: 2021-05-29

## 2021-05-30 ENCOUNTER — RECORDS - HEALTHEAST (OUTPATIENT)
Dept: ADMINISTRATIVE | Facility: CLINIC | Age: 46
End: 2021-05-30

## 2021-05-31 ENCOUNTER — RECORDS - HEALTHEAST (OUTPATIENT)
Dept: ADMINISTRATIVE | Facility: CLINIC | Age: 46
End: 2021-05-31

## 2021-10-03 NOTE — PLAN OF CARE
Date of Service:  10/3/2021    Patient:  Sher Alcazar    Chief Complaint:  Fall, Rib Injury, Knee Pain, and Skin Problem       HPI:  Sher Alcazar is a 60 y.o.  female who presents for evaluation of multiple complaints. Patient with multiple falls (happens a lot) here for complaints of left toe 4 and 5 pain from fall, thinks they are broken. Also right knee pain with ability to ambulate. Left rib pain with pain with breathing. Pain 8/10. Also has a wound in the underwear line, midline from heating pad. Had exudate coming out if it, healing. Past Medical History:   Diagnosis Date    ADD (attention deficit disorder)     Anemia     Anxiety     Autoimmune disease ([facility]     fibromyalgia    Burn     Shoulder, back and buttocks  from use of heating pad.     CAD (coronary artery disease)     MI in 1994    Cancer [facility])     Chronic pain     Followed by Dr. Jonas Keyes Chronic pain     Fibromyalgia    Colon cancer ([facility]     Dementia ([facility]     Dysthymic disorder 11/9/2012    PTSD, memory loss short term /  dementia depression    Fibromyalgia     GERD (gastroesophageal reflux disease)     rarely    Herniated cervical disc     Ill-defined condition     lymphedema in legs    Ill-defined condition     dementia    Kidney stone     hx frequently    Lumbago     Lymphedema     uses compression stockings in daytime, flexitouch pump at night    Lymphedema     Memory loss 1/17/2011    Memory loss, short term     MVP (mitral valve prolapse)     Nausea & vomiting     after general anesthesia    PCOS (polycystic ovarian syndrome)     PTSD (post-traumatic stress disorder)     Pulmonary embolism ([facility] X2    Associated with surgery X2    Scoliosis     Sinus infection     saw PCP 3/15/19  started treatment 3/18/18    Stroke [facility])     april 2014    Sun-damaged skin     Sunburn, blistering     Tanning bed exposure     TIA on medication     TIA L hemiparesis - Was on diet pills 2015    TMJ PCA decreased due to hypotension. Other VSS on room air. Pain semi-tolerable with PCA. NPO. TPN infusing through PICC. Not passing gas or stool. Voiding with adequate urine output. Midline incision CDI and KILEY. Up independently.   (dislocation of temporomandibular joint)     Vitamin B 12 deficiency     resolved    Vitamin D deficiency        Past Surgical History:   Procedure Laterality Date    COLONOSCOPY N/A 3/15/2019    COLONOSCOPY-no info to  performed by Jamar Ji MD at [address] [facility] HX BREAST AUGMENTATION Bilateral    Dossie Jackelyn HX [address]    , PCOS    HX DILATION AND CURETTAGE  2019    H/S, D+C with myosure--normal path with endometrial polyp    HX GASTRIC BYPASS  1999    [facility]: open gastric bypass, naseem,  umb hernia repair, tr. vagotomy    HX ORTHOPAEDIC  [id number],    bunion surgery, heel spur surgery    HX TONSILLECTOMY      As a child    [address]    catheterization - heart attack, mitral valve prolapse         Family History:   Problem Relation Age of Onset    Hypertension Mother     Ovarian Cancer Mother         Spread to Throat and [id number] Mother 61        unknown type    Diabetes Father     Stroke Father     Hypertension Father     Breast Cancer Sister     Breast Cancer Maternal Grandmother         Passed away from COPD - Former smoker     COPD Maternal Grandmother     Cancer Maternal Grandmother 79        colon       Social History     Socioeconomic History    Marital status:      Spouse name: Not on file    Number of children: Not on file    Years of education: Not on file    Highest education level: Not on file   Occupational History    Not on file   Tobacco Use    Smoking status: Never Smoker    Smokeless tobacco: Never Used    Tobacco comment: Never used vapor or e-cigs    Substance and Sexual Activity    Alcohol use: Never     Alcohol/week: 0.0 standard drinks    Drug use: No    Sexual activity: Not Currently     Partners: Male     Birth control/protection: None     Comment:    Other Topics Concern     Service No    Blood Transfusions Yes     Comment: unknown    Caffeine Concern No  Occupational Exposure No    Hobby Hazards No    Sleep Concern Yes    Stress Concern Yes    Weight Concern Yes    Special Diet No    Back Care No    Exercise No    Bike Helmet No    Seat Belt Yes    Self-Exams Yes   Social History Narrative    Not on file     Social Determinants of Health     Financial Resource Strain:     Difficulty of Paying Living Expenses:    Food Insecurity:     Worried About Running Out of Food in the Last Year:     Ran Out of Food in the Last Year:    Transportation Needs:     Lack of Transportation (Medical):  Lack of Transportation (Non-Medical):    Physical Activity:     Days of Exercise per Week:     Minutes of Exercise per Session:    Stress:     Feeling of Stress :    Social Connections:     Frequency of Communication with Friends and Family:     Frequency of Social Gatherings with Friends and Family:     Attends [employer] Services:     Active Member of Clubs or Organizations:     Attends Club or Organization Meetings:     Marital Status:    Intimate Partner Violence:     Fear of Current or Ex-Partner:     Emotionally Abused:     Physically Abused:     Sexually Abused: ALLERGIES: Bee sting [sting, bee]; Morphine; and Vicodin [hydrocodone-acetaminophen]    Review of Systems   Constitutional: Negative for fever. HENT: Negative for hearing loss. Eyes: Negative for visual disturbance. Respiratory: Negative for shortness of breath. Cardiovascular: Negative for chest pain. Gastrointestinal: Negative for abdominal pain. Genitourinary: Negative for flank pain. Musculoskeletal: Negative for back pain. Chest wall, right knee and left foot pain   Skin: Positive for wound. Negative for rash. Neurological: Negative for dizziness and light-headedness.        Vitals:    10/03/21 1346   BP: (!) 171/88   Pulse: 74   Resp: 16   Temp: 98.4 °F (36.9 °C)   SpO2: 98%   Weight: 100.1 kg (220 lb 10.9 oz)   Height: 5' 4\" (1.626 m) Physical Exam  Vitals and nursing note reviewed. Constitutional:       Appearance: Normal appearance. HENT:      Head: Normocephalic and atraumatic. Nose: Nose normal.   Eyes:      General: No scleral icterus. Cardiovascular:      Rate and Rhythm: Normal rate. Pulses: Normal pulses. Pulmonary:      Effort: Pulmonary effort is normal. No respiratory distress. Breath sounds: Normal breath sounds. No wheezing. Musculoskeletal:         General: Tenderness (right knee, no swelling; left foot, no swelling; left chest wall) present. Cervical back: Normal range of motion. Skin:     General: Skin is warm. Capillary Refill: Capillary refill takes less than 2 seconds. Findings: No bruising. Neurological:      Mental Status: She is alert. Psychiatric:         Mood and Affect: Mood normal.          MDM  Number of Diagnoses or Management Options        VITAL SIGNS:  Patient Vitals for the past 4 hrs:   Temp Pulse Resp BP SpO2   10/03/21 1346 98.4 °F (36.9 °C) 74 16 (!) 171/88 98 %         LABS:  No results found for this or any previous visit (from the past 6 hour(s)). IMAGING:  XR FOOT LT MIN 3 V   Final Result   No fracture or dislocation. XR KNEE RT 3 V   Final Result   No fracture or joint effusion. .      XR RIBS LT W PA CXR MIN 3 V   Final Result   No acute fracture or dislocation. Medications During Visit:  Medications - No data to display      DECISION MAKING:  Erin Gilman is a 60 y.o. female who comes in as above. Diagnostics unremarkable for any acute fracture. As for her abdomen she has a slight wound most likely related to the heating pad use, burn is not small no cellulitis. Wound center follow-up of Keflex. Follow-up with PCP and return as needed      IMPRESSION:  1. Fall, initial encounter    2. Multiple contusions    3.  Burn        DISPOSITION:  Discharged      Current Discharge Medication List      START taking these medications    Details cephALEXin (Keflex) 500 mg capsule Take 1 Capsule by mouth four (4) times daily for 7 days. Qty: 28 Capsule, Refills: 0  Start date: 10/3/2021, End date: 10/10/2021              Follow-up Information     Follow up With Specialties Details Why Contact Info    Zulay Mosquera MD Family Medicine   [address] [facility] [address] [id number] [phone]      [facility] Schedule an appointment as soon as possible for a visit   [address] [place] [address]  [phone]            The patient is asked to follow-up with their primary care provider in the next several days. They are to call tomorrow for an appointment. The patient is asked to return promptly for any increased concerns or worsening of symptoms. They can return to this emergency department or any other emergency department.       Procedures

## 2021-12-26 ENCOUNTER — HEALTH MAINTENANCE LETTER (OUTPATIENT)
Age: 46
End: 2021-12-26

## 2021-12-29 NOTE — OR NURSING
Dr. Brown at the bedside to discuss pain management. See new orders. Paged Dr. Patino regarding pain and PCA order.     mild

## 2022-01-16 NOTE — CONSULTS
[facility] Surgery Consultation    Rachel A Gerhardt MRN# [id number]   Age: 41 year old YOB: 1975     Date of Admission: 7/16/2017    Date of Consult:  07/16/17    Reason for consult: Abdominal pain       Requesting service: ED                   Assessment and Plan:   Assessment:   41 year old woman who is 2 months out from ex-lap and small bowel resection for recurrent SBO due to radiation changes in the pelvis after treatment of cervical cancer. She has a history of chronic abdominal pain and narcotic use. She does not have clinical or radiographic evidence of bowel obstruction at this time. She is afebrile and her labs are re-assuring.       Plan:   -no indication for hospital admission from colorectal standpoint  -the patient can and should call the colorectal clinic tomorrow morning to try and re-schedule her follow up appointment for this week if she so desires.   -disposition and pain management at the discretion of staff ED physician    Discussed with the colorectal fellow.            Chief Complaint:   Abdominal pain         History of Present Illness:   41 year old woman with a history of cervical cancer s/p chemotherapy and radiation who had recurrent small bowel obstructions thought to be due to radiation induced changes in the pelvis, now s/p ex lap and lysis of adhesions with partial small bowel resection with Dr. Seo on 5/18/17. She also has a history of chronic abdominal pain and opioid use/dependence and follows with a pain clinic as an outpatient. Last week she was seen in our emergency department for nausea, vomiting, and abdominal pain. Workup with CT abdomen demonstrated dilated loops of bowel thought to be consistent with small bowel obstruction and hospital admission was recommended at that time but the patient declined. She then took an increased dose of miralax for the next week which gave her diarrhea but she states her nausea and vomiting did resolve and she  has since been tolerating a regular diet with normal bowel movements. She presents today with persistent abdominal pain. It hurts mostly when she is having a bowel movement. She denies fevers or chills, nausea, vomiting, or constipation at this time. States that she called the surgery clinic, her primary care physician, and her pain clinic this week asking about pain medication, but no one would prescribe for her.           Past Medical History:     Past Medical History:   Diagnosis Date     Asthma      Cancer (H)     Per patient OBGYN, cerivical cancer     Cervical cancer (H)      Other chronic pain      Ovarian cancer (H)      Substance abuse     Outside records indicate past history of narcotics abuse or dependence, but patient denies.             Past Surgical History:     Past Surgical History:   Procedure Laterality Date     COMBINED CYSTOSCOPY, INSERT STENT URETER(S) Bilateral 5/18/2017    Procedure: COMBINED CYSTOSCOPY, INSERT STENT URETER(S);  Cystoscopy with Bilateral Stent,;  Surgeon: Rene Calero MD;  Location: [facility] OR     ENT SURGERY  2009    mastoid, sinus     EXAM UNDER ANESTHESIA, INSERT ALEX SLEEVE, UTERINE PLACEMENT OF TANDEM AND RING FOR RAD, ULTRASOUND N/A 12/14/2015    Procedure: EXAM UNDER ANESTHESIA, INSERT ALEX SLEEVE, UTERINE PLACEMENT OF TANDEM AND RING FOR RADIATION, ULTRASOUND GUIDED;  Surgeon: Abby Tony MD;  Location: [facility] OR     INSERT TANDEM AND CESIUM APPLICATOR CERVIX, ULTRASOUND GUIDED N/A 12/17/2015    Procedure: INSERT TANDEM AND CESIUM APPLICATOR CERVIX, ULTRASOUND GUIDED;  Surgeon: Kika Wood MD;  Location: [facility] OR     KNEE SURGERY       LAPAROTOMY EXPLORATORY N/A 5/18/2017    Procedure: LAPAROTOMY EXPLORATORY;   Exploratry Laparotomy, Small Bowel Resection with anastomosis, Flexible Sigmoidoscopy;  Surgeon: Jennifer Goodwin MD;  Location: [facility] OR     PICC INSERTION Right 04/29/2017    4fr SL BioFlo PICC, 37cm (3cm external) in the R basilic vein w/ tip in  the mid SVC.     RESECT SMALL BOWEL WITHOUT OSTOMY N/A 5/18/2017    Procedure: RESECT SMALL BOWEL WITHOUT OSTOMY;;  Surgeon: Jennifer Goodwin MD;  Location: [facility] OR     SIGMOIDOSCOPY FLEXIBLE N/A 5/18/2017    Procedure: SIGMOIDOSCOPY FLEXIBLE;;  Surgeon: Jennifer Goodwin MD;  Location: [facility] OR             Social History:   Tobacco: current smoker 3-4 cigarettes per day  EtOH: denies use          Family History:     Family History   Problem Relation Age of Onset     DIABETES Mother      Ovarian Cancer No family hx of      Uterine Cancer No family hx of      Cervical Cancer No family hx of      Breast Cancer No family hx of              Allergies:        Allergies   Allergen Reactions     No Clinical Screening - See Comments Other (See Comments) and Diarrhea     headache  Carrots cause gastric upset, cramping and diarrhea.     Sulfa Drugs Hives     hives     Amoxicillin Unknown and Other (See Comments)     vomiting  vomiting     Amoxicillin-Pot Clavulanate Other (See Comments) and Nausea     vomiting     Augmentin GI Disturbance, Nausea and Hives     Avelox [Moxifloxacin] Nausea and Vomiting, Unknown and Nausea     Ciprofloxacin Hives and Nausea     Codeine Nausea and Vomiting and Nausea     Ibuprofen Nausea and Vomiting     Other reaction(s): Nausea And Vomiting     Ibuprofen Sodium Hives and GI Disturbance     Quinolones      Tramadol Hives, Diarrhea, Nausea and Nausea and Vomiting     Daucus Carota      Other reaction(s): GI Upset  Other reaction(s): Abdominal pain, Diarrhea  Carrots cause gastric upset, cramping and diarrhea.             Medications:     No current facility-administered medications for this encounter.      Current Outpatient Prescriptions   Medication Sig     cholecalciferol 1000 UNITS TABS Take 1,000 Units by mouth daily     vitamin D (ERGOCALCIFEROL) 02983 UNIT capsule Take 1 capsule (50,000 Units) by mouth once a week Needs labs checked prior to additional refills     albuterol (PROAIR  HFA/PROVENTIL HFA/VENTOLIN HFA) 108 (90 BASE) MCG/ACT Inhaler Inhale 2 puffs into the lungs every 6 hours as needed     acetaminophen (TYLENOL) 500 MG tablet Take 2 tablets (1,000 mg) by mouth 4 times daily     saccharomyces boulardii (FLORASTOR) 250 MG capsule Take 1 capsule (250 mg) by mouth 2 times daily     ondansetron (ZOFRAN) 4 MG tablet Take 1-2 tablets (4-8 mg) by mouth every 8 hours as needed for nausea     polyethylene glycol (MIRALAX/GLYCOLAX) Packet Take 17 g by mouth daily     calcium carbonate (TUMS) 500 MG chewable tablet Take 500 mg by mouth Reported on 5/8/2017     simethicone (MYLICON) 80 MG chewable tablet Take 80 mg by mouth Reported on 5/8/2017     hyoscyamine (ANASPAZ/LEVSIN) 0.125 MG tablet Take 1-2 tablets (125-250 mcg) by mouth every 4 hours as needed for cramping             Review of Systems:   No recent weight loss, fevers, or chills  No jaundice  No headaches or light-headedness, no dizziness  No cough, chest pain, shortness of breath  No pain or burning with urination  No numbness or tingling of the extremities          Physical Exam:   All vitals have been reviewed  Temp:  [98.5  F (36.9  C)] 98.5  F (36.9  C)  Pulse:  [94-99] 99  Resp:  [16-18] 18  BP: ()/(68-88) 100/68  SpO2:  [96 %-98 %] 96 %  No intake or output data in the 24 hours ending 07/16/17 1515  Physical Exam:  General: awake, alert, NAD  HEENT: no neck tenderness or adenopathy  CV: RRR  Pulm: non-labored breathing on room air  Abd: very soft, non-distended, but pain greatly out of proportion to appearance or objective exam findings  Neuro: moving all four extremities, speech clear  Extremities: no lower extremity edema  Vascular: palpable peripheral pulses  Skin: no rashes or jaundice       Data:   All laboratory data reviewed    Results:  BMP  Recent Labs  Lab 07/14/17  1250 07/10/17  1143    138   POTASSIUM 4.2 3.3*   CHLORIDE 101 101   CO2 31 30   BUN 7 10   CR 0.68 0.74   GLC 88 94     CBC  Recent  120 Labs  Lab 07/16/17  0842 07/14/17  1252 07/10/17  1143   WBC 8.3 8.9 7.6   HGB 12.6 14.1 13.1    428 401     LFT  Recent Labs  Lab 07/14/17  1252 07/10/17  1143   AST  --  10   ALT  --  12   ALKPHOS  --  74   BILITOTAL  --  0.4   ALBUMIN  --  3.2*   INR 1.06  --        Recent Labs  Lab 07/14/17  1250 07/10/17  1143   GLC 88 94       Imaging: AXR from today is improved from last week, no air fluid levels, single dilated loop of bowel and normal bowel gas pattern    Peter Salas MD      Staff Addendum:  Agree with the consultation H&P as documented by the housestaff. I was personally involved with the recommendations made by our service for this patient.  Renetta Seo MD  Colon and Rectal Surgery Staff  [facility]

## 2022-02-20 ENCOUNTER — HEALTH MAINTENANCE LETTER (OUTPATIENT)
Age: 47
End: 2022-02-20

## 2022-04-22 NOTE — PLAN OF CARE
Pt A&Ox4. VSS. Up independently. Loose stools. PIV infusing NS 100mL/hr. Pt reports pain in abdomen is not adequately controlled with previous q3h IV dilaudid or new order of q4 oral dilaudid. Heat pack, aromatherapy, and massage offered. Team was paged for further pain control evaluation. CT w/ contrast was done today. Pt ate some breakfast but found that it later upset her stomach too much. Continue with plan of care.       No

## 2022-09-04 NOTE — ED NOTES
BIBA for abd pain. Abd surgery in may for GI cancer. Worsening pain last 3 days. PIV started.   
Called [facility] to find out status of bed for patient and possibly give report, charge nurse will call back.   
Called lab re lactic acid  
Patient is signing out against medical advice. Admitting service and ED MD aware.  
0

## 2022-10-15 ENCOUNTER — HOSPITAL ENCOUNTER (EMERGENCY)
Facility: HOSPITAL | Age: 47
Discharge: HOME | End: 2022-10-15
Payer: COMMERCIAL

## 2022-10-15 VITALS
OXYGEN SATURATION: 97 % | WEIGHT: 140 LBS | RESPIRATION RATE: 12 BRPM | BODY MASS INDEX: 20.73 KG/M2 | HEART RATE: 103 BPM | SYSTOLIC BLOOD PRESSURE: 99 MMHG | HEIGHT: 69 IN | TEMPERATURE: 97.6 F | DIASTOLIC BLOOD PRESSURE: 77 MMHG

## 2022-10-15 NOTE — ED TRIAGE NOTES
Pt has had lower right tooth pain for a month or more. For the last 2 days the pain has gotten intense, her right jaw has gotten swollen. Pain in tooth is 8/10.took 2 percocet at 0800 today but it is not helping her.

## 2022-10-15 NOTE — ED NOTES
Provider tried calling patient at 12:50 and 1315 without response. Attempted 3rd time at 1443 without response. Will remove patient from track board.

## 2023-04-02 ENCOUNTER — HEALTH MAINTENANCE LETTER (OUTPATIENT)
Age: 48
End: 2023-04-02

## 2023-06-24 ENCOUNTER — HOSPITAL ENCOUNTER (EMERGENCY)
Facility: CLINIC | Age: 48
Discharge: HOME | End: 2023-06-24
Attending: EMERGENCY MEDICINE | Admitting: EMERGENCY MEDICINE
Payer: COMMERCIAL

## 2023-06-24 ENCOUNTER — APPOINTMENT (OUTPATIENT)
Dept: CT IMAGING | Facility: CLINIC | Age: 48
End: 2023-06-24
Attending: EMERGENCY MEDICINE
Payer: COMMERCIAL

## 2023-06-24 VITALS
OXYGEN SATURATION: 98 % | TEMPERATURE: 97.7 F | SYSTOLIC BLOOD PRESSURE: 144 MMHG | DIASTOLIC BLOOD PRESSURE: 81 MMHG | HEART RATE: 92 BPM

## 2023-06-24 DIAGNOSIS — R10.84 GENERALIZED ABDOMINAL PAIN: ICD-10-CM

## 2023-06-24 LAB
ALBUMIN SERPL BCG-MCNC: 4.5 G/DL (ref 3.5–5.2)
ALBUMIN UR-MCNC: NEGATIVE MG/DL
ALP SERPL-CCNC: 96 U/L (ref 35–104)
ALT SERPL W P-5'-P-CCNC: 11 U/L (ref 0–50)
ANION GAP SERPL CALCULATED.3IONS-SCNC: 13 MMOL/L (ref 7–15)
APPEARANCE UR: ABNORMAL
AST SERPL W P-5'-P-CCNC: 29 U/L (ref 0–45)
BACTERIA #/AREA URNS HPF: ABNORMAL /HPF
BASOPHILS # BLD AUTO: 0.1 10E3/UL (ref 0–0.2)
BASOPHILS NFR BLD AUTO: 1 %
BILIRUB SERPL-MCNC: 0.3 MG/DL
BILIRUB UR QL STRIP: NEGATIVE
BUN SERPL-MCNC: 7.7 MG/DL (ref 6–20)
CALCIUM SERPL-MCNC: 9.7 MG/DL (ref 8.6–10)
CHLORIDE SERPL-SCNC: 101 MMOL/L (ref 98–107)
COLOR UR AUTO: ABNORMAL
CREAT SERPL-MCNC: 0.7 MG/DL (ref 0.51–0.95)
DEPRECATED HCO3 PLAS-SCNC: 24 MMOL/L (ref 22–29)
EOSINOPHIL # BLD AUTO: 0.1 10E3/UL (ref 0–0.7)
EOSINOPHIL NFR BLD AUTO: 1 %
ERYTHROCYTE [DISTWIDTH] IN BLOOD BY AUTOMATED COUNT: 13.1 % (ref 10–15)
GFR SERPL CREATININE-BSD FRML MDRD: >90 ML/MIN/1.73M2
GLUCOSE SERPL-MCNC: 106 MG/DL (ref 70–99)
GLUCOSE UR STRIP-MCNC: NEGATIVE MG/DL
HCT VFR BLD AUTO: 48.1 % (ref 35–47)
HGB BLD-MCNC: 15.4 G/DL (ref 11.7–15.7)
HGB UR QL STRIP: NEGATIVE
HOLD SPECIMEN: NORMAL
IMM GRANULOCYTES # BLD: 0 10E3/UL
IMM GRANULOCYTES NFR BLD: 0 %
KETONES UR STRIP-MCNC: NEGATIVE MG/DL
LACTATE SERPL-SCNC: 1.4 MMOL/L (ref 0.7–2)
LEUKOCYTE ESTERASE UR QL STRIP: ABNORMAL
LIPASE SERPL-CCNC: 20 U/L (ref 13–60)
LYMPHOCYTES # BLD AUTO: 2.3 10E3/UL (ref 0.8–5.3)
LYMPHOCYTES NFR BLD AUTO: 25 %
MCH RBC QN AUTO: 33.8 PG (ref 26.5–33)
MCHC RBC AUTO-ENTMCNC: 32 G/DL (ref 31.5–36.5)
MCV RBC AUTO: 106 FL (ref 78–100)
MONOCYTES # BLD AUTO: 0.5 10E3/UL (ref 0–1.3)
MONOCYTES NFR BLD AUTO: 5 %
MUCOUS THREADS #/AREA URNS LPF: PRESENT /LPF
NEUTROPHILS # BLD AUTO: 6.4 10E3/UL (ref 1.6–8.3)
NEUTROPHILS NFR BLD AUTO: 68 %
NITRATE UR QL: NEGATIVE
NRBC # BLD AUTO: 0 10E3/UL
NRBC BLD AUTO-RTO: 0 /100
PH UR STRIP: 6.5 [PH] (ref 5–7)
PLATELET # BLD AUTO: 269 10E3/UL (ref 150–450)
POTASSIUM SERPL-SCNC: 4.2 MMOL/L (ref 3.4–5.3)
POTASSIUM SERPL-SCNC: 5.7 MMOL/L (ref 3.4–5.3)
PROT SERPL-MCNC: 7.7 G/DL (ref 6.4–8.3)
RBC # BLD AUTO: 4.56 10E6/UL (ref 3.8–5.2)
RBC URINE: 0 /HPF
SODIUM SERPL-SCNC: 138 MMOL/L (ref 136–145)
SP GR UR STRIP: 1.01 (ref 1–1.03)
SQUAMOUS EPITHELIAL: 3 /HPF
UROBILINOGEN UR STRIP-MCNC: NORMAL MG/DL
WBC # BLD AUTO: 9.3 10E3/UL (ref 4–11)
WBC URINE: 10 /HPF

## 2023-06-24 PROCEDURE — 83605 ASSAY OF LACTIC ACID: CPT | Performed by: EMERGENCY MEDICINE

## 2023-06-24 PROCEDURE — 80053 COMPREHEN METABOLIC PANEL: CPT | Performed by: EMERGENCY MEDICINE

## 2023-06-24 PROCEDURE — 84132 ASSAY OF SERUM POTASSIUM: CPT | Mod: 91 | Performed by: EMERGENCY MEDICINE

## 2023-06-24 PROCEDURE — 96361 HYDRATE IV INFUSION ADD-ON: CPT | Performed by: EMERGENCY MEDICINE

## 2023-06-24 PROCEDURE — 81001 URINALYSIS AUTO W/SCOPE: CPT | Performed by: EMERGENCY MEDICINE

## 2023-06-24 PROCEDURE — 74177 CT ABD & PELVIS W/CONTRAST: CPT | Mod: 26 | Performed by: RADIOLOGY

## 2023-06-24 PROCEDURE — 96375 TX/PRO/DX INJ NEW DRUG ADDON: CPT | Performed by: EMERGENCY MEDICINE

## 2023-06-24 PROCEDURE — 250N000011 HC RX IP 250 OP 636: Performed by: EMERGENCY MEDICINE

## 2023-06-24 PROCEDURE — 96376 TX/PRO/DX INJ SAME DRUG ADON: CPT | Performed by: EMERGENCY MEDICINE

## 2023-06-24 PROCEDURE — 83690 ASSAY OF LIPASE: CPT | Performed by: EMERGENCY MEDICINE

## 2023-06-24 PROCEDURE — 74177 CT ABD & PELVIS W/CONTRAST: CPT

## 2023-06-24 PROCEDURE — 99284 EMERGENCY DEPT VISIT MOD MDM: CPT | Performed by: EMERGENCY MEDICINE

## 2023-06-24 PROCEDURE — 258N000003 HC RX IP 258 OP 636: Performed by: EMERGENCY MEDICINE

## 2023-06-24 PROCEDURE — 36415 COLL VENOUS BLD VENIPUNCTURE: CPT | Performed by: EMERGENCY MEDICINE

## 2023-06-24 PROCEDURE — 99285 EMERGENCY DEPT VISIT HI MDM: CPT | Mod: 25 | Performed by: EMERGENCY MEDICINE

## 2023-06-24 PROCEDURE — 96374 THER/PROPH/DIAG INJ IV PUSH: CPT | Mod: 59 | Performed by: EMERGENCY MEDICINE

## 2023-06-24 PROCEDURE — 85025 COMPLETE CBC W/AUTO DIFF WBC: CPT | Performed by: EMERGENCY MEDICINE

## 2023-06-24 RX ORDER — IOPAMIDOL 755 MG/ML
86 INJECTION, SOLUTION INTRAVASCULAR ONCE
Status: COMPLETED | OUTPATIENT
Start: 2023-06-24 | End: 2023-06-24

## 2023-06-24 RX ORDER — ONDANSETRON 2 MG/ML
4 INJECTION INTRAMUSCULAR; INTRAVENOUS ONCE
Status: COMPLETED | OUTPATIENT
Start: 2023-06-24 | End: 2023-06-24

## 2023-06-24 RX ORDER — HYDROMORPHONE HYDROCHLORIDE 1 MG/ML
0.5 INJECTION, SOLUTION INTRAMUSCULAR; INTRAVENOUS; SUBCUTANEOUS ONCE
Status: COMPLETED | OUTPATIENT
Start: 2023-06-24 | End: 2023-06-24

## 2023-06-24 RX ADMIN — HYDROMORPHONE HYDROCHLORIDE 0.5 MG: 1 INJECTION, SOLUTION INTRAMUSCULAR; INTRAVENOUS; SUBCUTANEOUS at 10:24

## 2023-06-24 RX ADMIN — ONDANSETRON 4 MG: 2 INJECTION INTRAMUSCULAR; INTRAVENOUS at 09:38

## 2023-06-24 RX ADMIN — HYDROMORPHONE HYDROCHLORIDE 0.5 MG: 1 INJECTION, SOLUTION INTRAMUSCULAR; INTRAVENOUS; SUBCUTANEOUS at 09:37

## 2023-06-24 RX ADMIN — IOPAMIDOL 86 ML: 755 INJECTION, SOLUTION INTRAVENOUS at 10:29

## 2023-06-24 RX ADMIN — SODIUM CHLORIDE 1000 ML: 9 INJECTION, SOLUTION INTRAVENOUS at 09:37

## 2023-06-24 RX ADMIN — HYDROMORPHONE HYDROCHLORIDE 0.5 MG: 1 INJECTION, SOLUTION INTRAMUSCULAR; INTRAVENOUS; SUBCUTANEOUS at 11:36

## 2023-06-24 ASSESSMENT — ACTIVITIES OF DAILY LIVING (ADL): ADLS_ACUITY_SCORE: 37

## 2023-06-24 NOTE — ED PROVIDER NOTES
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 She states that she has had abdominal pain for a while, but she tends ignore things until it goes away and now the pain made her come to the ED. She states that the abdominal pain is located below her navel and above her pelvic bone. She states that the pain has been really bad for the last 3 days. She states that she vomited last night. She states that she has not been eating or drinking because she has no desire to. She states that her last bowel movement was this morning. She denies fever or shortness of breath. She states that she is not active on chemotherapy and has not had cancer for a long time. She describes her pain as like being in labor, and that it comes in waves. She states that she can feel her intestines moving around and hurts, makes noises, and feels like it is \"spazzing out\".     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 She has had successful release of the strictures, per pt report there were 3 areas of blockage, on 6/22/18 at [place],The patient underwent an ex-lap on 10/31/2019, notable for distended small bowel proximally, chronically strictured mid ileum with normal appearing terminal ileum. Per the operative note, s/p " exploratory laparotomy with small bowel resection, lysis of adhesions, partial omentecomy and primary anastomosis 10/31.              I have reviewed the Medications, Allergies, Past Medical and Surgical History, and Social History in the [facility] system.  Past Medical History:   Diagnosis Date     Asthma      Cervical cancer (H)      Other chronic pain      Ovarian cancer (H)      Partial small bowel obstruction (H) 11/2/2018     SBO (small bowel obstruction) (H) 12/27/2016     Small bowel obstruction (H) 5/17/2017     Small intestine obstruction (H) 4/29/2017     Substance abuse (H)     Outside records indicate past history of narcotics abuse or dependence, but patient denies.       Past Surgical History:   Procedure Laterality Date     ARTHROSCOPY KNEE Left      COLONOSCOPY N/A 5/3/2018    Procedure: COLONOSCOPY;  sigmoidoscopy;  Surgeon: Omero Vigil MD;  Location: [facility] OR     COLONOSCOPY WITH CO2 INSUFFLATION N/A 4/30/2018    Procedure: COLONOSCOPY WITH CO2 INSUFFLATION;  Colonoscopy;  Surgeon: Omero Vigil MD;  Location: [facility] OR     COMBINED CYSTOSCOPY, INSERT STENT URETER(S) Bilateral 5/18/2017    Procedure: COMBINED CYSTOSCOPY, INSERT STENT URETER(S);  Cystoscopy with Bilateral Stent,;  Surgeon: Rene Calero MD;  Location: [facility] OR     ENT SURGERY  2009    mastoid, sinus     ENTEROSCOPY SMALL BOWEL N/A 6/18/2018    Procedure: ENTEROSCOPY SMALL BOWEL;  Lower bowel Retrograde Enteroscopy with Balloon Dilation .;  Surgeon: Omero Vigil MD;  Location: [facility] OR     EXAM UNDER ANESTHESIA, INSERT ALEX SLEEVE, UTERINE PLACEMENT OF TANDEM AND RING FOR RAD, ULTRASOUND N/A 12/14/2015    Procedure: EXAM UNDER ANESTHESIA, INSERT ALEX SLEEVE, UTERINE PLACEMENT OF TANDEM AND RING FOR RADIATION, ULTRASOUND GUIDED;  Surgeon: Abby Tony MD;  Location: [facility] OR     INSERT TANDEM AND CESIUM APPLICATOR CERVIX, ULTRASOUND GUIDED N/A 12/17/2015    Procedure: INSERT TANDEM AND CESIUM APPLICATOR CERVIX,  ULTRASOUND GUIDED;  Surgeon: Kika Wood MD;  Location: [facility] OR     KNEE SURGERY       LAPAROTOMY EXPLORATORY N/A 5/18/2017    Procedure: LAPAROTOMY EXPLORATORY;   Exploratry Laparotomy, Small Bowel Resection with anastomosis, Flexible Sigmoidoscopy;  Surgeon: Jennifer Goodwin MD;  Location: [facility] OR     LAPAROTOMY EXPLORATORY N/A 10/31/2019    Procedure: Laparotomy,  bowel resection , lysis of adhesions, partial omentecomy;  Surgeon: Dada Trevizo MD;  Location: [facility]     PICC INSERTION Right 04/29/2017    4fr SL BioFlo PICC, 37cm (3cm external) in the R basilic vein w/ tip in the mid SVC.     PICC INSERTION Right 03/29/2018    4Fr - 40cm (4cm external), R lateral brachial vein, Low SVC     RESECT SMALL BOWEL WITHOUT OSTOMY N/A 5/18/2017    Procedure: RESECT SMALL BOWEL WITHOUT OSTOMY;;  Surgeon: Jennifer Goodwin MD;  Location: [facility]     SIGMOIDOSCOPY FLEXIBLE N/A 5/18/2017    Procedure: SIGMOIDOSCOPY FLEXIBLE;;  Surgeon: Jennifer Goodwin MD;  Location: [facility]     SINUS SURGERY       SMALL INTESTINE SURGERY  05/17/2017    60 cm removed.        Past medical history, past surgical history, medications, and allergies were reviewed with the patient. Additional pertinent items: None    Family History   Problem Relation Age of Onset     Diabetes Mother      Ovarian Cancer No family hx of      Uterine Cancer No family hx of      Cervical Cancer No family hx of      Breast Cancer No family hx of        Social History     Tobacco Use     Smoking status: Light Smoker     Years: 12.00     Types: Cigarettes     Smokeless tobacco: Never     Tobacco comments:     pt smoking about 4 cigs per week   Substance Use Topics     Alcohol use: No     Alcohol/week: 0.0 standard drinks of alcohol     Comment: None per pt        Social history was reviewed with the patient. Additional pertinent items: None            Physical Exam   BP: (!) 144/81  Pulse: 92  Temp: 97.7  F (36.5  C)  SpO2: 98 %    Physical Exam    Physical Exam    Constitutional:   well nourished, well developed, ill-appearing  HENT:   Head: Normocephalic and atraumatic.   Eyes: Conjunctivae are normal. Pupils are equal, round, and reactive to light.   pharynx has no erythema or exudate, mucous membranes are dry  Neck:   no adenopathy, no bony tenderness  Cardiovascular: regular rate and rhythm without murmurs or gallops  Pulmonary/Chest: Clear to auscultation bilaterally, with no wheezes or retractions. No respiratory distress.  GI: Good bowel sounds, patient does not want her abdomen touched.  Back:  No bony or CVA tenderness   Musculoskeletal:  no edema or clubbing   Skin: Skin is warm and dry. No rash noted.   Neurological: alert and oriented to person, place, and time. Nonfocal exam  Psychiatric:  normal mood and affect.     ED Course        Procedures              Results for orders placed or performed during the hospital encounter of 06/24/23 (from the past 24 hour(s))   Ernul Draw    Narrative    The following orders were created for panel order Ernul Draw.  Procedure                               Abnormality         Status                     ---------                               -----------         ------                     Extra Blue Top Tube[[id number]]                              Final result               Extra Red Top Tube[[id number]]                               Final result               Extra Green Top (Lithium...[[id number]]                      Final result               Extra Purple Top Tube[[id number]]                            Final result                 Please view results for these tests on the individual orders.   Extra Blue Top Tube   Result Value Ref Range    Hold Specimen JIC    Extra Red Top Tube   Result Value Ref Range    Hold Specimen JIC    Extra Green Top (Lithium Heparin) Tube   Result Value Ref Range    Hold Specimen JIC    Extra Purple Top Tube   Result Value Ref Range    Hold Specimen JIC    CBC with platelets differential     Narrative    The following orders were created for panel order CBC with platelets differential.  Procedure                               Abnormality         Status                     ---------                               -----------         ------                     CBC with platelets and d...[[id number]]  Abnormal            Final result                 Please view results for these tests on the individual orders.   Comprehensive metabolic panel   Result Value Ref Range    Sodium 138 136 - 145 mmol/L    Potassium 5.7 (H) 3.4 - 5.3 mmol/L    Chloride 101 98 - 107 mmol/L    Carbon Dioxide (CO2) 24 22 - 29 mmol/L    Anion Gap 13 7 - 15 mmol/L    Urea Nitrogen 7.7 6.0 - 20.0 mg/dL    Creatinine 0.70 0.51 - 0.95 mg/dL    Calcium 9.7 8.6 - 10.0 mg/dL    Glucose 106 (H) 70 - 99 mg/dL    Alkaline Phosphatase 96 35 - 104 U/L    AST 29 0 - 45 U/L    ALT 11 0 - 50 U/L    Protein Total 7.7 6.4 - 8.3 g/dL    Albumin 4.5 3.5 - 5.2 g/dL    Bilirubin Total 0.3 <=1.2 mg/dL    GFR Estimate >90 >60 mL/min/1.73m2   Lipase   Result Value Ref Range    Lipase 20 13 - 60 U/L   CBC with platelets and differential   Result Value Ref Range    WBC Count 9.3 4.0 - 11.0 10e3/uL    RBC Count 4.56 3.80 - 5.20 10e6/uL    Hemoglobin 15.4 11.7 - 15.7 g/dL    Hematocrit 48.1 (H) 35.0 - 47.0 %     (H) 78 - 100 fL    MCH 33.8 (H) 26.5 - 33.0 pg    MCHC 32.0 31.5 - 36.5 g/dL    RDW 13.1 10.0 - 15.0 %    Platelet Count 269 150 - 450 10e3/uL    % Neutrophils 68 %    % Lymphocytes 25 %    % Monocytes 5 %    % Eosinophils 1 %    % Basophils 1 %    % Immature Granulocytes 0 %    NRBCs per 100 WBC 0 <1 /100    Absolute Neutrophils 6.4 1.6 - 8.3 10e3/uL    Absolute Lymphocytes 2.3 0.8 - 5.3 10e3/uL    Absolute Monocytes 0.5 0.0 - 1.3 10e3/uL    Absolute Eosinophils 0.1 0.0 - 0.7 10e3/uL    Absolute Basophils 0.1 0.0 - 0.2 10e3/uL    Absolute Immature Granulocytes 0.0 <=0.4 10e3/uL    Absolute NRBCs 0.0 10e3/uL   UA with Microscopic reflex to Culture     Specimen: Urine, Clean Catch   Result Value Ref Range    Color Urine Light Yellow Colorless, Straw, Light Yellow, Yellow    Appearance Urine Slightly Cloudy (A) Clear    Glucose Urine Negative Negative mg/dL    Bilirubin Urine Negative Negative    Ketones Urine Negative Negative mg/dL    Specific Gravity Urine 1.008 1.003 - 1.035    Blood Urine Negative Negative    pH Urine 6.5 5.0 - 7.0    Protein Albumin Urine Negative Negative mg/dL    Urobilinogen Urine Normal Normal, 2.0 mg/dL    Nitrite Urine Negative Negative    Leukocyte Esterase Urine Small (A) Negative    Bacteria Urine Moderate (A) None Seen /HPF    Mucus Urine Present (A) None Seen /LPF    RBC Urine 0 <=2 /HPF    WBC Urine 10 (H) <=5 /HPF    Squamous Epithelials Urine 3 (H) <=1 /HPF    Narrative    Urine Culture not indicated   Lactic acid whole blood   Result Value Ref Range    Lactic Acid 1.4 0.7 - 2.0 mmol/L   Potassium   Result Value Ref Range    Potassium 4.2 3.4 - 5.3 mmol/L   CT Abdomen Pelvis w Contrast    Narrative    EXAMINATION: CT ABDOMEN PELVIS W CONTRAST, 6/24/2023 10:44 AM    TECHNIQUE:  Helical CT images from the lung bases through the  symphysis pubis were obtained with contrast.  Coronal and sagittal  reformatted images were generated at a workstation for further  assessment.    CONTRAST:  86 ml Isovue 370.    COMPARISON: CT abdomen and pelvis 10/18/2019    HISTORY: diffuse lower abdominal pain, vomiting,, history of ovarian  and cervical cancer as well as intestinal strictures    FINDINGS:    Lower thorax: No focal consolidation or pleural effusion. Calcified  granuloma in left base. Heart size is within normal limits. No  pericardial effusion.    Liver: No suspicious lesions. Portal veins appear patent.  Gallbladder: No cholelithiasis or evidence of acute cholecystitis. No  intra- or extra-hepatic biliary ductal dilatation.  Spleen: Unremarkable.  Pancreas: Similar focal fatty infiltration of the pancreatic head. No  new focal  lesion.  Adrenal glands: No discrete nodules.  Kidneys: No hydronephrosis. No nephrolithiasis. No suspicious mass.  Benign renal cysts.  Bladder / Pelvic organs: Bladder is largely distended. No pelvic mass.  Bowel: No evidence of obstruction. Liquid stool in the colon. Small  bowel resection and anastomosis in the central pelvis.  Lymph nodes: No suspicious lymphadenopathy.  Peritoneum / Retroperitoneum: No pneumoperitoneum. No organized fluid  collections. Partial omentectomy.  Abdominal vasculature: Major vascular structures of the abdomen are  patent and normal in caliber.    Bones and soft tissues: Degenerative changes of the visualized spine.  No acute osseous abnormalities or suspicious bony lesions.      Impression    IMPRESSION:   1. Liquid stool in the colon, compatible with reported diarrhea. No  evidence for acute pathology in the abdomen/pelvis.   2. Stable additional incidental/chronic findings.    I have personally reviewed the examination and initial interpretation  and I agree with the findings.    JONATAN HARVEY MD         SYSTEM ID:  [id number]     Medications   HYDROmorphone (PF) (DILAUDID) injection 0.5 mg (0.5 mg Intravenous $Given 6/24/23 0937)   ondansetron (ZOFRAN) injection 4 mg (4 mg Intravenous $Given 6/24/23 0938)   0.9% sodium chloride BOLUS (0 mLs Intravenous Stopped 6/24/23 1134)   iopamidol (ISOVUE-370) solution 86 mL (86 mLs Intravenous $Given 6/24/23 1029)   sodium chloride (PF) 0.9% PF flush 72 mL (72 mLs Intravenous $Given 6/24/23 1030)   HYDROmorphone (PF) (DILAUDID) injection 0.5 mg (0.5 mg Intravenous $Given 6/24/23 1024)   HYDROmorphone (PF) (DILAUDID) injection 0.5 mg (0.5 mg Intravenous $Given 6/24/23 1136)              Critical care was not performed.     Medical Decision Making  The patient's presentation was of moderate complexity (an acute illness with systemic symptoms).    The patient's evaluation involved:  review of external note(s) from 3+ sources (see separate area  of note for details)  ordering and/or review of 3+ test(s) in this encounter (see separate area of note for details)    The patient's management necessitated high risk (a parenteral controlled substance).       Assessments & Plan (with Medical Decision Making)       I have reviewed the nursing notes.  EMERGENCY DEPARTMENT COURSE: Patient was seen and examined at 0918 am in [facility].     She appears quite uncomfortable.  I treated her with Dilaudid IV, Zofran IV, and normal saline bolus IV.  The Dilaudid helped with her pain for a while and then I had to repeat a dose at approximately 10:20 AM.  She needed a third dose for continued abdominal pain.    Laboratory studies show an essentially unremarkable comprehensive metabolic panel apart from hyperkalemia.  This was a hemolyzed specimen and has been repeated and returns normal at 4.2.  Lipase is normal at 20.  CBC shows a normal WBC of 9.3.  MCV is elevated at 106.  Lactate is normal at 1.4.  UA is contaminated with squamous epithelial cells and does not appear infected.    CT of the abdomen and pelvis shows  IMPRESSION:   1. Liquid stool in the colon, compatible with reported diarrhea. No  evidence for acute pathology in the abdomen/pelvis.   2. Stable additional incidental/chronic findings.     I had reevaluated the patient's several times while she was in the ED.  She received a total of 3 doses of Dilaudid IV.  However, at approximately 12 noon, when I went to discuss the results of the patient's CT with her but she was not in her hallway bed.  We searched the emergency department but were unable to find this patient.  She did not tell the nurse or me hat she was leaving.  I suspect she eloped from the ED.     Rachel A Gerhardt is a 47 year old female with a history of abdominal strictures and small bowel obstructions who presents with diffuse abdominal pain and one episode of vomiting.  Laboratory and radiographic studies were essentially unremarkable apart from an  elevated MCV.  CT showed no acute pathology and showed liquid stool in the colon.  Patient eloped prior to my discussing these results with her.  I have reviewed the findings, diagnosis, plan and need for follow up with the patient.    Discharge Medication List as of 6/24/2023 12:41 PM          Final diagnoses:   Generalized abdominal pain   ELOPED  MIRTHA MARCANO, am serving as a trained medical scribe to document services personally performed by Thi Laurent MD based on the provider's statements to me on June 24, 2023.  This document has been checked and approved by the attending provider.    IThi MD, was physically present and have reviewed and verified the accuracy of this note documented by MIRTHA PATTON medical scribe.       This note was created in part by the use of Dragon voice recognition dictation system. Inadvertent grammatical errors and typographical errors may still exist.    Thi Laurent MD      6/24/2023   [facility] EMERGENCY DEPARTMENT     Thi Laurent MD  06/24/23 3917

## 2023-06-24 NOTE — ED TRIAGE NOTES
Pt arrives with abdominal pain for the past few days. Describes it as labor pains. She has had this pain before. She has a hx of cancer. She vomited yesterday. She is having diarrhea x3-4 times a day for a long time, months. No chest pain or shortness of breath     Triage Assessment     Row Name 06/24/23 0906       Triage Assessment (Adult)    Airway WDL WDL

## 2024-04-30 NOTE — PROGRESS NOTES
PROVIDER:[TOKEN:[[id number]],FOLLOWUP:[1 week],ESTABLISHEDPATIENT:[T]] S:   Reports pain after every meal. Nauseous this morning, reports that she does not plan to eat today.    O:   /68 (BP Location: Left arm)   Pulse 93   Temp 97.9  F (36.6  C) (Oral)   Resp 16   Wt 50.1 kg (110 lb 8 oz)   SpO2 97%   BMI 16.32 kg/m         Intake/Output Summary (Last 24 hours) at 11/6/2019 0538  Last data filed at 11/6/2019 0126  Gross per 24 hour   Intake 3705.68 ml   Output 3950 ml   Net -244.32 ml        General: Well appearing: Sitting up in bed, doing crosswords.   HEENT: No obvious swelling noted to face  Cardio: Extremities are warm and well perfused   Resp: No increased work of breathing  Abdomen: Soft, non-distended, no TTP  Neuro: Alert and oriented. Moves all extremities symmetrically.              Assessment and Plan:     ASSESSMENT:Rachel A Gerhardt is a 43 year old female with a PMH of pelvic radiation, cervical and ovarian cancer and SBR in 2017 who was admitted for recurrent SBO and worsening symptoms, she is s/p small bowel resection and primary anastomosis on 10/31. Pain team consulted and working with patient, recs below in plan. Dentistry was consulted and worked with patient.      PLAN:   - Appreciate recs from pain service  - GI consult for upper endoscopy to evaluate for etiology of pain with eating, would like to place NJT for continued enteral nutrition  - Will have dentistry come by Monday to reassess or have her go to their clinic  - Dispo home in 1-2 days    Seen and discussed with staff    Luiz Leon MD  EGS Service, PGY2

## 2024-06-08 ENCOUNTER — HEALTH MAINTENANCE LETTER (OUTPATIENT)
Age: 49
End: 2024-06-08

## 2024-07-26 ENCOUNTER — APPOINTMENT (OUTPATIENT)
Dept: GENERAL RADIOLOGY | Facility: CLINIC | Age: 49
End: 2024-07-26
Attending: STUDENT IN AN ORGANIZED HEALTH CARE EDUCATION/TRAINING PROGRAM

## 2024-07-26 ENCOUNTER — HOSPITAL ENCOUNTER (EMERGENCY)
Facility: CLINIC | Age: 49
Discharge: HOME | End: 2024-07-27
Attending: STUDENT IN AN ORGANIZED HEALTH CARE EDUCATION/TRAINING PROGRAM | Admitting: STUDENT IN AN ORGANIZED HEALTH CARE EDUCATION/TRAINING PROGRAM

## 2024-07-26 VITALS
DIASTOLIC BLOOD PRESSURE: 72 MMHG | HEART RATE: 68 BPM | SYSTOLIC BLOOD PRESSURE: 106 MMHG | OXYGEN SATURATION: 99 % | RESPIRATION RATE: 16 BRPM | TEMPERATURE: 98.3 F

## 2024-07-26 DIAGNOSIS — M79.10 MYALGIA: ICD-10-CM

## 2024-07-26 LAB
ALBUMIN SERPL BCG-MCNC: 3.8 G/DL (ref 3.5–5.2)
ALP SERPL-CCNC: 73 U/L (ref 40–150)
ALT SERPL W P-5'-P-CCNC: 15 U/L (ref 0–50)
ANION GAP SERPL CALCULATED.3IONS-SCNC: 11 MMOL/L (ref 7–15)
AST SERPL W P-5'-P-CCNC: 39 U/L (ref 0–45)
BASOPHILS # BLD AUTO: 0 10E3/UL (ref 0–0.2)
BASOPHILS NFR BLD AUTO: 1 %
BILIRUB SERPL-MCNC: 0.3 MG/DL
BUN SERPL-MCNC: 10.9 MG/DL (ref 6–20)
CALCIUM SERPL-MCNC: 8.7 MG/DL (ref 8.8–10.4)
CHLORIDE SERPL-SCNC: 105 MMOL/L (ref 98–107)
CK SERPL-CCNC: 846 U/L (ref 26–192)
CREAT SERPL-MCNC: 0.82 MG/DL (ref 0.51–0.95)
EGFRCR SERPLBLD CKD-EPI 2021: 87 ML/MIN/1.73M2
EOSINOPHIL # BLD AUTO: 0.2 10E3/UL (ref 0–0.7)
EOSINOPHIL NFR BLD AUTO: 2 %
ERYTHROCYTE [DISTWIDTH] IN BLOOD BY AUTOMATED COUNT: 13.2 % (ref 10–15)
FLUAV RNA SPEC QL NAA+PROBE: NEGATIVE
FLUBV RNA RESP QL NAA+PROBE: NEGATIVE
GLUCOSE SERPL-MCNC: 98 MG/DL (ref 70–99)
HCO3 SERPL-SCNC: 24 MMOL/L (ref 22–29)
HCT VFR BLD AUTO: 36.4 % (ref 35–47)
HGB BLD-MCNC: 12 G/DL (ref 11.7–15.7)
IMM GRANULOCYTES # BLD: 0.1 10E3/UL
IMM GRANULOCYTES NFR BLD: 1 %
LIPASE SERPL-CCNC: 15 U/L (ref 13–60)
LYMPHOCYTES # BLD AUTO: 3 10E3/UL (ref 0.8–5.3)
LYMPHOCYTES NFR BLD AUTO: 34 %
MCH RBC QN AUTO: 33.3 PG (ref 26.5–33)
MCHC RBC AUTO-ENTMCNC: 33 G/DL (ref 31.5–36.5)
MCV RBC AUTO: 101 FL (ref 78–100)
MONOCYTES # BLD AUTO: 0.7 10E3/UL (ref 0–1.3)
MONOCYTES NFR BLD AUTO: 8 %
NEUTROPHILS # BLD AUTO: 4.9 10E3/UL (ref 1.6–8.3)
NEUTROPHILS NFR BLD AUTO: 54 %
NRBC # BLD AUTO: 0 10E3/UL
NRBC BLD AUTO-RTO: 0 /100
PLATELET # BLD AUTO: 254 10E3/UL (ref 150–450)
POTASSIUM SERPL-SCNC: 3.4 MMOL/L (ref 3.4–5.3)
PROT SERPL-MCNC: 6.1 G/DL (ref 6.4–8.3)
RBC # BLD AUTO: 3.6 10E6/UL (ref 3.8–5.2)
RSV RNA SPEC NAA+PROBE: NEGATIVE
SARS-COV-2 RNA RESP QL NAA+PROBE: NEGATIVE
SODIUM SERPL-SCNC: 140 MMOL/L (ref 135–145)
WBC # BLD AUTO: 8.8 10E3/UL (ref 4–11)

## 2024-07-26 PROCEDURE — 36415 COLL VENOUS BLD VENIPUNCTURE: CPT | Performed by: STUDENT IN AN ORGANIZED HEALTH CARE EDUCATION/TRAINING PROGRAM

## 2024-07-26 PROCEDURE — 99284 EMERGENCY DEPT VISIT MOD MDM: CPT | Mod: 25 | Performed by: STUDENT IN AN ORGANIZED HEALTH CARE EDUCATION/TRAINING PROGRAM

## 2024-07-26 PROCEDURE — 250N000011 HC RX IP 250 OP 636: Performed by: STUDENT IN AN ORGANIZED HEALTH CARE EDUCATION/TRAINING PROGRAM

## 2024-07-26 PROCEDURE — 71046 X-RAY EXAM CHEST 2 VIEWS: CPT | Mod: 26 | Performed by: RADIOLOGY

## 2024-07-26 PROCEDURE — 96374 THER/PROPH/DIAG INJ IV PUSH: CPT | Performed by: STUDENT IN AN ORGANIZED HEALTH CARE EDUCATION/TRAINING PROGRAM

## 2024-07-26 PROCEDURE — 71046 X-RAY EXAM CHEST 2 VIEWS: CPT

## 2024-07-26 PROCEDURE — 99284 EMERGENCY DEPT VISIT MOD MDM: CPT | Performed by: STUDENT IN AN ORGANIZED HEALTH CARE EDUCATION/TRAINING PROGRAM

## 2024-07-26 PROCEDURE — 96361 HYDRATE IV INFUSION ADD-ON: CPT | Performed by: STUDENT IN AN ORGANIZED HEALTH CARE EDUCATION/TRAINING PROGRAM

## 2024-07-26 PROCEDURE — 87637 SARSCOV2&INF A&B&RSV AMP PRB: CPT | Performed by: STUDENT IN AN ORGANIZED HEALTH CARE EDUCATION/TRAINING PROGRAM

## 2024-07-26 PROCEDURE — 82550 ASSAY OF CK (CPK): CPT | Performed by: STUDENT IN AN ORGANIZED HEALTH CARE EDUCATION/TRAINING PROGRAM

## 2024-07-26 PROCEDURE — 85025 COMPLETE CBC W/AUTO DIFF WBC: CPT | Performed by: STUDENT IN AN ORGANIZED HEALTH CARE EDUCATION/TRAINING PROGRAM

## 2024-07-26 PROCEDURE — 96375 TX/PRO/DX INJ NEW DRUG ADDON: CPT | Performed by: STUDENT IN AN ORGANIZED HEALTH CARE EDUCATION/TRAINING PROGRAM

## 2024-07-26 PROCEDURE — 83690 ASSAY OF LIPASE: CPT | Performed by: STUDENT IN AN ORGANIZED HEALTH CARE EDUCATION/TRAINING PROGRAM

## 2024-07-26 PROCEDURE — 258N000003 HC RX IP 258 OP 636: Performed by: STUDENT IN AN ORGANIZED HEALTH CARE EDUCATION/TRAINING PROGRAM

## 2024-07-26 PROCEDURE — 82040 ASSAY OF SERUM ALBUMIN: CPT | Performed by: STUDENT IN AN ORGANIZED HEALTH CARE EDUCATION/TRAINING PROGRAM

## 2024-07-26 RX ORDER — HYDROMORPHONE HYDROCHLORIDE 1 MG/ML
0.5 INJECTION, SOLUTION INTRAMUSCULAR; INTRAVENOUS; SUBCUTANEOUS ONCE
Status: COMPLETED | OUTPATIENT
Start: 2024-07-26 | End: 2024-07-26

## 2024-07-26 RX ORDER — ONDANSETRON 2 MG/ML
4 INJECTION INTRAMUSCULAR; INTRAVENOUS EVERY 30 MIN PRN
Status: DISCONTINUED | OUTPATIENT
Start: 2024-07-26 | End: 2024-07-27 | Stop reason: HOSPADM

## 2024-07-26 RX ORDER — ACETAMINOPHEN 325 MG/1
975 TABLET ORAL ONCE
Status: COMPLETED | OUTPATIENT
Start: 2024-07-26 | End: 2024-07-26

## 2024-07-26 RX ADMIN — ONDANSETRON 4 MG: 2 INJECTION INTRAMUSCULAR; INTRAVENOUS at 17:18

## 2024-07-26 RX ADMIN — SODIUM CHLORIDE, POTASSIUM CHLORIDE, SODIUM LACTATE AND CALCIUM CHLORIDE 1000 ML: 600; 310; 30; 20 INJECTION, SOLUTION INTRAVENOUS at 17:46

## 2024-07-26 RX ADMIN — HYDROMORPHONE HYDROCHLORIDE 0.5 MG: 1 INJECTION, SOLUTION INTRAMUSCULAR; INTRAVENOUS; SUBCUTANEOUS at 17:44

## 2024-07-26 ASSESSMENT — ACTIVITIES OF DAILY LIVING (ADL)
ADLS_ACUITY_SCORE: 37
ADLS_ACUITY_SCORE: 35
ADLS_ACUITY_SCORE: 37
ADLS_ACUITY_SCORE: 37

## 2024-07-26 ASSESSMENT — COLUMBIA-SUICIDE SEVERITY RATING SCALE - C-SSRS
1. IN THE PAST MONTH, HAVE YOU WISHED YOU WERE DEAD OR WISHED YOU COULD GO TO SLEEP AND NOT WAKE UP?: NO
2. HAVE YOU ACTUALLY HAD ANY THOUGHTS OF KILLING YOURSELF IN THE PAST MONTH?: NO
6. HAVE YOU EVER DONE ANYTHING, STARTED TO DO ANYTHING, OR PREPARED TO DO ANYTHING TO END YOUR LIFE?: NO

## 2024-07-26 NOTE — ED TRIAGE NOTES
Triage Assessment (Adult)       Row Name 07/26/24 1543          Triage Assessment    Airway WDL WDL        Respiratory WDL    Respiratory WDL WDL        Skin Circulation/Temperature WDL    Skin Circulation/Temperature WDL X  mouth blister, bug bites        Cardiac WDL    Cardiac WDL WDL        Peripheral/Neurovascular WDL    Peripheral Neurovascular WDL WDL        Cognitive/Neuro/Behavioral WDL    Cognitive/Neuro/Behavioral WDL WDL

## 2024-07-26 NOTE — ED PROVIDER NOTES
[facility]ERGENC[facility] DEPARTMENT ([facility])    7/26/24       ED PROVIDER NOTE    History     Chief Complaint   Patient presents with    Nausea & Vomiting    Joint Pain    Headache     HPI  Rachel A Gerhardt is a 48 year old female with locally advanced cervical and ovarian cancer (dx 2015) s/p chemotherapy and radiation, radiation enteritis, recurrent small bowel obstructions s/p ex-lap with 20-30cm small bowel resection with primary anastomosis (2017), ileo-ileal anastomosis dilation (2018), and small bowel resection with anastomosis (2019), substance use disorder, and chronic abdominal pain managed with opiate who presents to the ED via EMS for evaluation of 2 to 3 days of nausea, vomiting, whole body pain, myalgias, lower extremity swelling, chills, and overwhelming fatigue.    She notes that she is been having a nonproductive cough for about 3 to 4 days.  She then developed severe pain throughout her body and especially in her knees, legs, her upper body.  She feels as though any movement is significantly painful.  She initially had an endorses abdominal pain but states that it is more nausea and whole body pain rather than her abdomen itself.  Denies any chest pain, diarrhea, constipation.  Last bowel movement this morning.  Does not feel as though this is similar to previous bowel obstruction she has had in the past.    Past Medical History  Past Medical History:   Diagnosis Date    Asthma     Cervical cancer (H)     Other chronic pain     Ovarian cancer (H)     Partial small bowel obstruction (H) 11/2/2018    SBO (small bowel obstruction) (H) 12/27/2016    Small bowel obstruction (H) 5/17/2017    Small intestine obstruction (H) 4/29/2017    Substance abuse (H)     Outside records indicate past history of narcotics abuse or dependence, but patient denies.     Past Surgical History:   Procedure Laterality Date    ARTHROSCOPY KNEE Left     COLONOSCOPY N/A 5/3/2018    Procedure: COLONOSCOPY;   sigmoidoscopy;  Surgeon: Omero Vigil MD;  Location: [facility] OR    COLONOSCOPY WITH CO2 INSUFFLATION N/A 4/30/2018    Procedure: COLONOSCOPY WITH CO2 INSUFFLATION;  Colonoscopy;  Surgeon: Omero Vigil MD;  Location: [facility] OR    COMBINED CYSTOSCOPY, INSERT STENT URETER(S) Bilateral 5/18/2017    Procedure: COMBINED CYSTOSCOPY, INSERT STENT URETER(S);  Cystoscopy with Bilateral Stent,;  Surgeon: Rene Calero MD;  Location: [facility] OR    ENT SURGERY  2009    mastoid, sinus    ENTEROSCOPY SMALL BOWEL N/A 6/18/2018    Procedure: ENTEROSCOPY SMALL BOWEL;  Lower bowel Retrograde Enteroscopy with Balloon Dilation .;  Surgeon: Omero Vigil MD;  Location: [facility]    EXAM UNDER ANESTHESIA, INSERT ALEX SLEEVE, UTERINE PLACEMENT OF TANDEM AND RING FOR RAD, ULTRASOUND N/A 12/14/2015    Procedure: EXAM UNDER ANESTHESIA, INSERT ALEX SLEEVE, UTERINE PLACEMENT OF TANDEM AND RING FOR RADIATION, ULTRASOUND GUIDED;  Surgeon: Abby Tony MD;  Location: [facility] OR    INSERT TANDEM AND CESIUM APPLICATOR CERVIX, ULTRASOUND GUIDED N/A 12/17/2015    Procedure: INSERT TANDEM AND CESIUM APPLICATOR CERVIX, ULTRASOUND GUIDED;  Surgeon: Kika Wood MD;  Location: [facility] OR    KNEE SURGERY      LAPAROTOMY EXPLORATORY N/A 5/18/2017    Procedure: LAPAROTOMY EXPLORATORY;   Exploratry Laparotomy, Small Bowel Resection with anastomosis, Flexible Sigmoidoscopy;  Surgeon: Jennifer Goodwin MD;  Location: [facility]    LAPAROTOMY EXPLORATORY N/A 10/31/2019    Procedure: Laparotomy,  bowel resection , lysis of adhesions, partial omentecomy;  Surgeon: Dada Trevizo MD;  Location: [facility]    PICC INSERTION Right 04/29/2017    4fr SL BioFlo PICC, 37cm (3cm external) in the R basilic vein w/ tip in the mid SVC.    PICC INSERTION Right 03/29/2018    4Fr - 40cm (4cm external), R lateral brachial vein, Low SVC    RESECT SMALL BOWEL WITHOUT OSTOMY N/A 5/18/2017    Procedure: RESECT SMALL BOWEL WITHOUT OSTOMY;;  Surgeon: Jennifer Goodwin  MD;  Location: [facility]    SIGMOIDOSCOPY FLEXIBLE N/A 5/18/2017    Procedure: SIGMOIDOSCOPY FLEXIBLE;;  Surgeon: Jennifer Goodwin MD;  Location: [facility]    SINUS SURGERY      SMALL INTESTINE SURGERY  05/17/2017    60 cm removed.      acetaminophen (TYLENOL) 325 MG tablet  hydrOXYzine (ATARAX) 25 MG tablet  hyoscyamine (ANASPAZ/LEVSIN) 0.125 MG tablet  Lidocaine (LIDOCARE) 4 % Patch  menthol (ICY HOT) 5 % PTCH  methocarbamol (ROBAXIN) 500 MG tablet  mirtazapine (REMERON SOL-TAB) 15 MG ODT  nortriptyline (PAMELOR) 10 MG capsule  ondansetron (ZOFRAN-ODT) 4 MG ODT tab  ondansetron (ZOFRAN-ODT) 4 MG ODT tab  ondansetron (ZOFRAN-ODT) 4 MG ODT tab  oxyCODONE-acetaminophen (PERCOCET) 5-325 MG tablet  pantoprazole (PROTONIX) 40 MG EC tablet  Simethicone 125 MG TABS      Allergies   Allergen Reactions    Other (Do Not Use) Anaphylaxis     Rats- breathing issues- severe    Sulfa Antibiotics Hives    Ibuprofen Nausea and Vomiting and Hives     Unknown if reaction hives or N/V    No Clinical Screening - See Comments Other (See Comments) and Diarrhea     headache  Carrots cause gastric upset, cramping and diarrhea.    Ciprofloxacin Hives and Nausea    Clavulanic Acid     Melon     Quinolones     Sulfasalazine      Other reaction(s): Hives  hives    Tramadol Hives, Diarrhea, Nausea and Nausea and Vomiting    Amoxicillin Nausea and Vomiting    Amoxicillin-Pot Clavulanate Nausea, Hives and GI Disturbance     Patient tolerated course of zosyn in 5/2018    Avelox [Moxifloxacin] Nausea and Vomiting    Codeine Nausea and Vomiting    Daucus Carota      Other reaction(s): GI Upset  Other reaction(s): Abdominal pain, Diarrhea  Carrots cause gastric upset, cramping and diarrhea.     Family History  Family History   Problem Relation Age of Onset    Diabetes Mother     Ovarian Cancer No family hx of     Uterine Cancer No family hx of     Cervical Cancer No family hx of     Breast Cancer No family hx of      Social History   Social History      Tobacco Use    Smoking status: Light Smoker     Types: Cigarettes    Smokeless tobacco: Never    Tobacco comments:     pt smoking about 4 cigs per week   Substance Use Topics    Alcohol use: No     Alcohol/week: 0.0 standard drinks of alcohol     Comment: None per pt    Drug use: No     Comment: Patient denies.  Outside records indicate possible Opiate abuse.      Past medical history, past surgical history, medications, allergies, family history, and social history were reviewed with the patient. No additional pertinent items.     A medically appropriate review of systems was performed with pertinent positives and negatives noted in the HPI, and all other systems negative.    Physical Exam   BP: 106/72  Pulse: 68  Temp: 98.3  F (36.8  C)  Resp: 16  SpO2: 99 %  Physical Exam  GEN: Uncomfortable but nontoxic-appearing  HEENT: normocephalic and atraumatic, PERRLA, EOMI, no posterior pharyngeal erythema, mild rhinorrhea bilaterally, no meningismus  CV: well-perfused, normal skin color for ethnicity, regular rate and rhythm  PULM: breathing comfortably, in no respiratory distress clear to auscultation upper and lower lung fields  ABD: nondistended, soft, nontender, nonperitonitic negative Ray, negative McBurney point tenderness  EXT: Full range of motion.  Mild bilateral lower extremity edema, significant tenderness to palpation on even just gentle touching of her bilateral lower extremities, and muscles throughout her body, however able to generally range most of her joints without any limitations although she does have pain with movement and pretty much all extremities and her core  NEURO: awake, conversant, grossly normal bilateral upper and lower extremity strength & ROM   SKIN: No rashes, ecchymosis, or lacerations  PSYCH: Calm and cooperative, interactive    ED Course, Procedures, & Data      Procedures          Results for orders placed or performed during the hospital encounter of 07/26/24   XR Chest 2  Views     Status: None    Narrative    Exam: XR CHEST 2 VIEWS, 7/26/2024 5:35 PM    Indication: cough, body aches    Comparison: Chest radiograph 10/26/2019, CT chest abdomen pelvis  5/19/2019    Findings:   Portable AP and lateral views of the chest were obtained. Normal  cardiomediastinal silhouette. Clear costophrenic angles. No  pneumothorax. Distinct pulmonary vasculature. Costochondral  calcifications visualized projecting over the left lower hemithorax.  No focal consolidation. Questionable micronodular opacities in the  lower lung fields. Apical predominant emphysematous changes.      Impression    Impression: Possible micronodular opacities in the lung bases, which  may represent atelectasis/inflammatory process. No focal  consolidation.    I have personally reviewed the examination and initial interpretation  and I agree with the findings.    JONATAN HARVEY MD         SYSTEM ID:  [id number]   Comprehensive metabolic panel     Status: Abnormal   Result Value Ref Range    Sodium 140 135 - 145 mmol/L    Potassium 3.4 3.4 - 5.3 mmol/L    Carbon Dioxide (CO2) 24 22 - 29 mmol/L    Anion Gap 11 7 - 15 mmol/L    Urea Nitrogen 10.9 6.0 - 20.0 mg/dL    Creatinine 0.82 0.51 - 0.95 mg/dL    GFR Estimate 87 >60 mL/min/1.73m2    Calcium 8.7 (L) 8.8 - 10.4 mg/dL    Chloride 105 98 - 107 mmol/L    Glucose 98 70 - 99 mg/dL    Alkaline Phosphatase 73 40 - 150 U/L    AST 39 0 - 45 U/L    ALT 15 0 - 50 U/L    Protein Total 6.1 (L) 6.4 - 8.3 g/dL    Albumin 3.8 3.5 - 5.2 g/dL    Bilirubin Total 0.3 <=1.2 mg/dL   Lipase     Status: Normal   Result Value Ref Range    Lipase 15 13 - 60 U/L   CK total     Status: Abnormal   Result Value Ref Range     (H) 26 - 192 U/L   Symptomatic Influenza A/B, RSV, & SARS-CoV2 PCR (COVID-19) Nose     Status: Normal    Specimen: Nose; Swab   Result Value Ref Range    Influenza A PCR Negative Negative    Influenza B PCR Negative Negative    RSV PCR Negative Negative    SARS CoV2 PCR Negative  Negative    Narrative    Testing was performed using the Xpert Xpress CoV2/Flu/RSV Assay on the [employer] GeneXpert Instrument. This test should be ordered for the detection of SARS-CoV2, influenza, and RSV viruses in individuals with signs and symptoms of respiratory tract infection. This test is for in vitro diagnostic use under the [place] FDA for laboratories certified under CLIA to perform high or moderate complexity testing. This test has been [place] FDA cleared. A negative result does not rule out the presence of PCR inhibitors in the specimen or target RNA in concentration below the limit of detection for the assay. If only one viral target is positive but coinfection with multiple targets is suspected, the sample should be re-tested with another FDA cleared, approved, or authorized test, if coninfection would change clinical management. This test was validated by the [facility]. These laboratories are certified under the Clinical Laboratory Improvement Amendments of 1988 (CLIA-88) as qualified to perfom high complexity laboratory testing.   CBC with platelets and differential     Status: Abnormal   Result Value Ref Range    WBC Count 8.8 4.0 - 11.0 10e3/uL    RBC Count 3.60 (L) 3.80 - 5.20 10e6/uL    Hemoglobin 12.0 11.7 - 15.7 g/dL    Hematocrit 36.4 35.0 - 47.0 %     (H) 78 - 100 fL    MCH 33.3 (H) 26.5 - 33.0 pg    MCHC 33.0 31.5 - 36.5 g/dL    RDW 13.2 10.0 - 15.0 %    Platelet Count 254 150 - 450 10e3/uL    % Neutrophils 54 %    % Lymphocytes 34 %    % Monocytes 8 %    % Eosinophils 2 %    % Basophils 1 %    % Immature Granulocytes 1 %    NRBCs per 100 WBC 0 <1 /100    Absolute Neutrophils 4.9 1.6 - 8.3 10e3/uL    Absolute Lymphocytes 3.0 0.8 - 5.3 10e3/uL    Absolute Monocytes 0.7 0.0 - 1.3 10e3/uL    Absolute Eosinophils 0.2 0.0 - 0.7 10e3/uL    Absolute Basophils 0.0 0.0 - 0.2 10e3/uL    Absolute Immature Granulocytes 0.1 <=0.4 10e3/uL    Absolute NRBCs 0.0 10e3/uL   CBC with  platelets differential     Status: Abnormal    Narrative    The following orders were created for panel order CBC with platelets differential.  Procedure                               Abnormality         Status                     ---------                               -----------         ------                     CBC with platelets and d...[[id number]]  Abnormal            Final result                 Please view results for these tests on the individual orders.     Medications   ondansetron (ZOFRAN) injection 4 mg (4 mg Intravenous $Given 7/26/24 1718)   lactated ringers BOLUS 1,000 mL (0 mLs Intravenous Stopped 7/26/24 1936)   acetaminophen (TYLENOL) tablet 975 mg (975 mg Oral Not Given 7/26/24 1746)   HYDROmorphone (PF) (DILAUDID) injection 0.5 mg (0.5 mg Intravenous $Given 7/26/24 1744)     Labs Ordered and Resulted from Time of ED Arrival to Time of ED Departure   COMPREHENSIVE METABOLIC PANEL - Abnormal       Result Value    Sodium 140      Potassium 3.4      Carbon Dioxide (CO2) 24      Anion Gap 11      Urea Nitrogen 10.9      Creatinine 0.82      GFR Estimate 87      Calcium 8.7 (*)     Chloride 105      Glucose 98      Alkaline Phosphatase 73      AST 39      ALT 15      Protein Total 6.1 (*)     Albumin 3.8      Bilirubin Total 0.3     CK TOTAL - Abnormal     (*)    CBC WITH PLATELETS AND DIFFERENTIAL - Abnormal    WBC Count 8.8      RBC Count 3.60 (*)     Hemoglobin 12.0      Hematocrit 36.4       (*)     MCH 33.3 (*)     MCHC 33.0      RDW 13.2      Platelet Count 254      % Neutrophils 54      % Lymphocytes 34      % Monocytes 8      % Eosinophils 2      % Basophils 1      % Immature Granulocytes 1      NRBCs per 100 WBC 0      Absolute Neutrophils 4.9      Absolute Lymphocytes 3.0      Absolute Monocytes 0.7      Absolute Eosinophils 0.2      Absolute Basophils 0.0      Absolute Immature Granulocytes 0.1      Absolute NRBCs 0.0     LIPASE - Normal    Lipase 15     INFLUENZA A/B, RSV, &  SARS-COV2 PCR - Normal    Influenza A PCR Negative      Influenza B PCR Negative      RSV PCR Negative      SARS CoV2 PCR Negative     ROUTINE UA WITH MICROSCOPIC REFLEX TO CULTURE     XR Chest 2 Views   Final Result   Impression: Possible micronodular opacities in the lung bases, which   may represent atelectasis/inflammatory process. No focal   consolidation.      I have personally reviewed the examination and initial interpretation   and I agree with the findings.      JONATAN HARVEY MD            SYSTEM ID:  [id number]             Critical care was not performed.     Medical Decision Making  The patient's presentation was of moderate complexity (an undiagnosed new problem with uncertain prognosis).    The patient's evaluation involved:  review of external note(s) from 3+ sources (see separate area of note for details)  review of 3+ test result(s) ordered prior to this encounter (see separate area of note for details)  ordering and/or review of 3+ test(s) in this encounter (see separate area of note for details)    The patient's management necessitated high risk (a decision regarding hospitalization).    Assessment & Plan    48-year-old female with a past medical history of locally advanced cancer status post radiation with radiation enteritis, recurrent small bowel obstructions and complex intra-abdominal history, polysubstance abuse history manage currently with opiates outpatient presenting to the emergency department due to 2 days of whole body pain, joint pain, myalgias, nonproductive cough, and fatigue noted to be otherwise hemodynamically stable with reassuring abdominal exam, mild rhinorrhea, and general body pain noted to be uncomfortable, but otherwise in no acute distress    Ddx is broad and including viral progress, flu/covid/RSV,rhabdomyolysis, opiate withdrawal, pneumonia, CHF, electrolyte abnormalities.    Did have a conversation about her with her abdominal pain abdominal discomfort and nausea and  vomiting with her history of SBO's.  At this point in time clinically this is not consistent with that and she does not believe this feels similar to previous bowel obstruction she has had in the past.  Did seriously consider CT of her abdomen but will hold for now pending labs, reevaluation.  Will give IV fluids, antiemetics    11:31 PM workup reassuring. Was attempting to  bring her back for reevaluation.  Unfortunately, she was not found in the waiting room which is where she had been evaluated at that time.  It seems as though she had left AMA.  Unfortunately she did have EMS IV in place.  The police were called to go and evaluate the patient.  They did attempt to go to her residence located in the chart, but were unable to locate her as it seems as though she has never lived there.    I have reviewed the nursing notes. I have reviewed the findings, diagnosis, plan and need for follow up with the patient.    New Prescriptions    No medications on file       Final diagnoses:   Myalgia       Dafne Eddy MD  [facility] EMERGENCY DEPARTMENT  7/26/2024     Dafne Eddy MD  07/27/24 0006

## 2024-07-26 NOTE — ED TRIAGE NOTES
BIBA for increase n/v, mouth blisters and joint pain. Symptoms started 2 days ago. Denies any recent travel or exposure to recent illnesses. Denies any recent tick bites, endorses many bug bites.  VSS en route. 500ml of fluids and 4mg IV zofran for nauseas and 15mg toradol IV for joint pain in EMS, BG 99. Temp 99.8.       Hx: ovarian cancer, cervical cancer, asthma    VS:   /72  T 98.3  RR 16 unlabored  O2: 99% on RA   HR 71

## 2024-07-27 NOTE — ED NOTES
Left AMA with EMS IV still in. Dafne Eddy MD requested PD go to patients address for welfare check to request pt return to ED to have IV removed. PD called and will call back with disposition.

## 2024-07-27 NOTE — ED NOTES
PD called back with update. States that the homeowner at the address on file said that she has never lived at that address and was previously  to an individual at that address but has been  for a while. PD tried calling patients number and was unsuccessful

## 2024-08-15 ENCOUNTER — HOSPITAL ENCOUNTER (INPATIENT)
Facility: CLINIC | Age: 49
LOS: 2 days | Discharge: LEFT BEFORE DISCHARGE | DRG: 392 | End: 2024-08-18
Attending: STUDENT IN AN ORGANIZED HEALTH CARE EDUCATION/TRAINING PROGRAM | Admitting: STUDENT IN AN ORGANIZED HEALTH CARE EDUCATION/TRAINING PROGRAM

## 2024-08-15 DIAGNOSIS — R10.9 ABDOMINAL PAIN, UNSPECIFIED ABDOMINAL LOCATION: ICD-10-CM

## 2024-08-15 DIAGNOSIS — Z85.43 PERSONAL HISTORY OF MALIGNANT NEOPLASM OF OVARY: ICD-10-CM

## 2024-08-15 DIAGNOSIS — F17.210 CIGARETTE SMOKER: Primary | ICD-10-CM

## 2024-08-15 DIAGNOSIS — Z90.49: ICD-10-CM

## 2024-08-15 DIAGNOSIS — K56.600 PARTIAL SMALL BOWEL OBSTRUCTION (H): ICD-10-CM

## 2024-08-15 DIAGNOSIS — N39.0 ACUTE UTI: ICD-10-CM

## 2024-08-15 LAB
ALBUMIN SERPL BCG-MCNC: 4.2 G/DL (ref 3.5–5.2)
ALBUMIN UR-MCNC: 10 MG/DL
ALP SERPL-CCNC: 87 U/L (ref 40–150)
ALT SERPL W P-5'-P-CCNC: 9 U/L (ref 0–50)
ANION GAP SERPL CALCULATED.3IONS-SCNC: 9 MMOL/L (ref 7–15)
APPEARANCE UR: ABNORMAL
AST SERPL W P-5'-P-CCNC: 18 U/L (ref 0–45)
BASOPHILS # BLD AUTO: 0 10E3/UL (ref 0–0.2)
BASOPHILS NFR BLD AUTO: 0 %
BILIRUB SERPL-MCNC: 0.3 MG/DL
BILIRUB UR QL STRIP: NEGATIVE
BUN SERPL-MCNC: 19.3 MG/DL (ref 6–20)
CALCIUM SERPL-MCNC: 8.8 MG/DL (ref 8.8–10.4)
CHLORIDE SERPL-SCNC: 100 MMOL/L (ref 98–107)
CK SERPL-CCNC: 58 U/L (ref 26–192)
COLOR UR AUTO: ABNORMAL
CREAT SERPL-MCNC: 0.82 MG/DL (ref 0.51–0.95)
CRP SERPL-MCNC: 6.26 MG/L
EGFRCR SERPLBLD CKD-EPI 2021: 87 ML/MIN/1.73M2
EOSINOPHIL # BLD AUTO: 0.2 10E3/UL (ref 0–0.7)
EOSINOPHIL NFR BLD AUTO: 2 %
ERYTHROCYTE [DISTWIDTH] IN BLOOD BY AUTOMATED COUNT: 13.2 % (ref 10–15)
ERYTHROCYTE [SEDIMENTATION RATE] IN BLOOD BY WESTERGREN METHOD: 7 MM/HR (ref 0–20)
GLUCOSE SERPL-MCNC: 87 MG/DL (ref 70–99)
GLUCOSE UR STRIP-MCNC: NEGATIVE MG/DL
HCG SERPL QL: NEGATIVE
HCO3 SERPL-SCNC: 27 MMOL/L (ref 22–29)
HCT VFR BLD AUTO: 44.6 % (ref 35–47)
HGB BLD-MCNC: 14 G/DL (ref 11.7–15.7)
HGB UR QL STRIP: ABNORMAL
IMM GRANULOCYTES # BLD: 0.1 10E3/UL
IMM GRANULOCYTES NFR BLD: 1 %
INR PPP: 1.02 (ref 0.85–1.15)
KETONES UR STRIP-MCNC: NEGATIVE MG/DL
LACTATE SERPL-SCNC: 0.9 MMOL/L (ref 0.7–2)
LEUKOCYTE ESTERASE UR QL STRIP: ABNORMAL
LYMPHOCYTES # BLD AUTO: 3.2 10E3/UL (ref 0.8–5.3)
LYMPHOCYTES NFR BLD AUTO: 34 %
MCH RBC QN AUTO: 32.7 PG (ref 26.5–33)
MCHC RBC AUTO-ENTMCNC: 31.4 G/DL (ref 31.5–36.5)
MCV RBC AUTO: 104 FL (ref 78–100)
MONOCYTES # BLD AUTO: 0.8 10E3/UL (ref 0–1.3)
MONOCYTES NFR BLD AUTO: 8 %
MUCOUS THREADS #/AREA URNS LPF: PRESENT /LPF
NEUTROPHILS # BLD AUTO: 5.2 10E3/UL (ref 1.6–8.3)
NEUTROPHILS NFR BLD AUTO: 55 %
NITRATE UR QL: NEGATIVE
NRBC # BLD AUTO: 0 10E3/UL
NRBC BLD AUTO-RTO: 0 /100
PH UR STRIP: 5.5 [PH] (ref 5–7)
PLATELET # BLD AUTO: 259 10E3/UL (ref 150–450)
POTASSIUM SERPL-SCNC: 4.2 MMOL/L (ref 3.4–5.3)
PROT SERPL-MCNC: 6.6 G/DL (ref 6.4–8.3)
RBC # BLD AUTO: 4.28 10E6/UL (ref 3.8–5.2)
RBC URINE: 6 /HPF
SODIUM SERPL-SCNC: 136 MMOL/L (ref 135–145)
SP GR UR STRIP: 1.04 (ref 1–1.03)
SQUAMOUS EPITHELIAL: 6 /HPF
UROBILINOGEN UR STRIP-MCNC: NORMAL MG/DL
WBC # BLD AUTO: 9.4 10E3/UL (ref 4–11)
WBC URINE: 18 /HPF

## 2024-08-15 PROCEDURE — 85652 RBC SED RATE AUTOMATED: CPT | Performed by: STUDENT IN AN ORGANIZED HEALTH CARE EDUCATION/TRAINING PROGRAM

## 2024-08-15 PROCEDURE — 87086 URINE CULTURE/COLONY COUNT: CPT | Performed by: STUDENT IN AN ORGANIZED HEALTH CARE EDUCATION/TRAINING PROGRAM

## 2024-08-15 PROCEDURE — 81001 URINALYSIS AUTO W/SCOPE: CPT | Performed by: STUDENT IN AN ORGANIZED HEALTH CARE EDUCATION/TRAINING PROGRAM

## 2024-08-15 PROCEDURE — 99285 EMERGENCY DEPT VISIT HI MDM: CPT | Performed by: STUDENT IN AN ORGANIZED HEALTH CARE EDUCATION/TRAINING PROGRAM

## 2024-08-15 PROCEDURE — 83605 ASSAY OF LACTIC ACID: CPT | Performed by: STUDENT IN AN ORGANIZED HEALTH CARE EDUCATION/TRAINING PROGRAM

## 2024-08-15 PROCEDURE — 86140 C-REACTIVE PROTEIN: CPT | Performed by: STUDENT IN AN ORGANIZED HEALTH CARE EDUCATION/TRAINING PROGRAM

## 2024-08-15 PROCEDURE — 82550 ASSAY OF CK (CPK): CPT | Performed by: STUDENT IN AN ORGANIZED HEALTH CARE EDUCATION/TRAINING PROGRAM

## 2024-08-15 PROCEDURE — 96375 TX/PRO/DX INJ NEW DRUG ADDON: CPT | Performed by: STUDENT IN AN ORGANIZED HEALTH CARE EDUCATION/TRAINING PROGRAM

## 2024-08-15 PROCEDURE — 85610 PROTHROMBIN TIME: CPT | Performed by: STUDENT IN AN ORGANIZED HEALTH CARE EDUCATION/TRAINING PROGRAM

## 2024-08-15 PROCEDURE — 250N000011 HC RX IP 250 OP 636: Performed by: STUDENT IN AN ORGANIZED HEALTH CARE EDUCATION/TRAINING PROGRAM

## 2024-08-15 PROCEDURE — 36415 COLL VENOUS BLD VENIPUNCTURE: CPT | Performed by: STUDENT IN AN ORGANIZED HEALTH CARE EDUCATION/TRAINING PROGRAM

## 2024-08-15 PROCEDURE — 80053 COMPREHEN METABOLIC PANEL: CPT | Performed by: STUDENT IN AN ORGANIZED HEALTH CARE EDUCATION/TRAINING PROGRAM

## 2024-08-15 PROCEDURE — 258N000003 HC RX IP 258 OP 636: Performed by: STUDENT IN AN ORGANIZED HEALTH CARE EDUCATION/TRAINING PROGRAM

## 2024-08-15 PROCEDURE — 99285 EMERGENCY DEPT VISIT HI MDM: CPT | Mod: 25 | Performed by: STUDENT IN AN ORGANIZED HEALTH CARE EDUCATION/TRAINING PROGRAM

## 2024-08-15 PROCEDURE — 96361 HYDRATE IV INFUSION ADD-ON: CPT | Performed by: STUDENT IN AN ORGANIZED HEALTH CARE EDUCATION/TRAINING PROGRAM

## 2024-08-15 PROCEDURE — 96374 THER/PROPH/DIAG INJ IV PUSH: CPT | Mod: 59 | Performed by: STUDENT IN AN ORGANIZED HEALTH CARE EDUCATION/TRAINING PROGRAM

## 2024-08-15 PROCEDURE — 85025 COMPLETE CBC W/AUTO DIFF WBC: CPT | Performed by: STUDENT IN AN ORGANIZED HEALTH CARE EDUCATION/TRAINING PROGRAM

## 2024-08-15 PROCEDURE — 84703 CHORIONIC GONADOTROPIN ASSAY: CPT | Performed by: STUDENT IN AN ORGANIZED HEALTH CARE EDUCATION/TRAINING PROGRAM

## 2024-08-15 RX ORDER — MORPHINE SULFATE 4 MG/ML
4 INJECTION, SOLUTION INTRAMUSCULAR; INTRAVENOUS
Status: COMPLETED | OUTPATIENT
Start: 2024-08-15 | End: 2024-08-15

## 2024-08-15 RX ORDER — ONDANSETRON 2 MG/ML
4 INJECTION INTRAMUSCULAR; INTRAVENOUS EVERY 30 MIN PRN
Status: DISCONTINUED | OUTPATIENT
Start: 2024-08-15 | End: 2024-08-18 | Stop reason: HOSPADM

## 2024-08-15 RX ADMIN — ONDANSETRON 4 MG: 2 INJECTION INTRAMUSCULAR; INTRAVENOUS at 22:31

## 2024-08-15 RX ADMIN — SODIUM CHLORIDE 1000 ML: 9 INJECTION, SOLUTION INTRAVENOUS at 22:31

## 2024-08-15 RX ADMIN — MORPHINE SULFATE 4 MG: 4 INJECTION INTRAVENOUS at 22:32

## 2024-08-15 ASSESSMENT — ACTIVITIES OF DAILY LIVING (ADL)
ADLS_ACUITY_SCORE: 37
ADLS_ACUITY_SCORE: 35

## 2024-08-15 ASSESSMENT — COLUMBIA-SUICIDE SEVERITY RATING SCALE - C-SSRS
1. IN THE PAST MONTH, HAVE YOU WISHED YOU WERE DEAD OR WISHED YOU COULD GO TO SLEEP AND NOT WAKE UP?: NO
6. HAVE YOU EVER DONE ANYTHING, STARTED TO DO ANYTHING, OR PREPARED TO DO ANYTHING TO END YOUR LIFE?: NO
2. HAVE YOU ACTUALLY HAD ANY THOUGHTS OF KILLING YOURSELF IN THE PAST MONTH?: NO

## 2024-08-16 ENCOUNTER — APPOINTMENT (OUTPATIENT)
Dept: GENERAL RADIOLOGY | Facility: CLINIC | Age: 49
DRG: 392 | End: 2024-08-16

## 2024-08-16 ENCOUNTER — APPOINTMENT (OUTPATIENT)
Dept: CT IMAGING | Facility: CLINIC | Age: 49
DRG: 392 | End: 2024-08-16
Attending: STUDENT IN AN ORGANIZED HEALTH CARE EDUCATION/TRAINING PROGRAM

## 2024-08-16 PROBLEM — R10.9 ABDOMINAL PAIN, UNSPECIFIED ABDOMINAL LOCATION: Status: ACTIVE | Noted: 2024-08-16

## 2024-08-16 PROBLEM — K56.600 PARTIAL SMALL BOWEL OBSTRUCTION (H): Status: ACTIVE | Noted: 2018-11-02

## 2024-08-16 PROBLEM — N39.0 ACUTE UTI: Status: ACTIVE | Noted: 2024-08-16

## 2024-08-16 LAB
ALBUMIN SERPL BCG-MCNC: 4 G/DL (ref 3.5–5.2)
ALP SERPL-CCNC: 90 U/L (ref 40–150)
ALT SERPL W P-5'-P-CCNC: 6 U/L (ref 0–50)
ANION GAP SERPL CALCULATED.3IONS-SCNC: 7 MMOL/L (ref 7–15)
AST SERPL W P-5'-P-CCNC: 16 U/L (ref 0–45)
BASOPHILS # BLD AUTO: 0.1 10E3/UL (ref 0–0.2)
BASOPHILS NFR BLD AUTO: 1 %
BILIRUB SERPL-MCNC: 0.5 MG/DL
BUN SERPL-MCNC: 12.5 MG/DL (ref 6–20)
CALCIUM SERPL-MCNC: 8.8 MG/DL (ref 8.8–10.4)
CHLORIDE SERPL-SCNC: 103 MMOL/L (ref 98–107)
CREAT SERPL-MCNC: 0.65 MG/DL (ref 0.51–0.95)
EGFRCR SERPLBLD CKD-EPI 2021: >90 ML/MIN/1.73M2
EOSINOPHIL # BLD AUTO: 0.2 10E3/UL (ref 0–0.7)
EOSINOPHIL NFR BLD AUTO: 2 %
ERYTHROCYTE [DISTWIDTH] IN BLOOD BY AUTOMATED COUNT: 13.2 % (ref 10–15)
GLUCOSE SERPL-MCNC: 92 MG/DL (ref 70–99)
HCO3 SERPL-SCNC: 27 MMOL/L (ref 22–29)
HCT VFR BLD AUTO: 44.1 % (ref 35–47)
HGB BLD-MCNC: 13.9 G/DL (ref 11.7–15.7)
IMM GRANULOCYTES # BLD: 0.1 10E3/UL
IMM GRANULOCYTES NFR BLD: 1 %
LYMPHOCYTES # BLD AUTO: 3.5 10E3/UL (ref 0.8–5.3)
LYMPHOCYTES NFR BLD AUTO: 40 %
MCH RBC QN AUTO: 32.8 PG (ref 26.5–33)
MCHC RBC AUTO-ENTMCNC: 31.5 G/DL (ref 31.5–36.5)
MCV RBC AUTO: 104 FL (ref 78–100)
MONOCYTES # BLD AUTO: 0.7 10E3/UL (ref 0–1.3)
MONOCYTES NFR BLD AUTO: 8 %
NEUTROPHILS # BLD AUTO: 4.3 10E3/UL (ref 1.6–8.3)
NEUTROPHILS NFR BLD AUTO: 48 %
NRBC # BLD AUTO: 0 10E3/UL
NRBC BLD AUTO-RTO: 0 /100
PLATELET # BLD AUTO: 255 10E3/UL (ref 150–450)
POTASSIUM SERPL-SCNC: 4.2 MMOL/L (ref 3.4–5.3)
PROT SERPL-MCNC: 6.6 G/DL (ref 6.4–8.3)
RBC # BLD AUTO: 4.24 10E6/UL (ref 3.8–5.2)
SODIUM SERPL-SCNC: 137 MMOL/L (ref 135–145)
WBC # BLD AUTO: 8.7 10E3/UL (ref 4–11)

## 2024-08-16 PROCEDURE — 74018 RADEX ABDOMEN 1 VIEW: CPT

## 2024-08-16 PROCEDURE — 250N000011 HC RX IP 250 OP 636: Performed by: STUDENT IN AN ORGANIZED HEALTH CARE EDUCATION/TRAINING PROGRAM

## 2024-08-16 PROCEDURE — 99222 1ST HOSP IP/OBS MODERATE 55: CPT | Mod: GC | Performed by: STUDENT IN AN ORGANIZED HEALTH CARE EDUCATION/TRAINING PROGRAM

## 2024-08-16 PROCEDURE — 85025 COMPLETE CBC W/AUTO DIFF WBC: CPT

## 2024-08-16 PROCEDURE — 999N000248 HC STATISTIC IV INSERT WITH US BY RN

## 2024-08-16 PROCEDURE — 74018 RADEX ABDOMEN 1 VIEW: CPT | Mod: 26 | Performed by: STUDENT IN AN ORGANIZED HEALTH CARE EDUCATION/TRAINING PROGRAM

## 2024-08-16 PROCEDURE — 96376 TX/PRO/DX INJ SAME DRUG ADON: CPT | Performed by: STUDENT IN AN ORGANIZED HEALTH CARE EDUCATION/TRAINING PROGRAM

## 2024-08-16 PROCEDURE — 80053 COMPREHEN METABOLIC PANEL: CPT

## 2024-08-16 PROCEDURE — 74177 CT ABD & PELVIS W/CONTRAST: CPT | Mod: 26 | Performed by: RADIOLOGY

## 2024-08-16 PROCEDURE — 74177 CT ABD & PELVIS W/CONTRAST: CPT

## 2024-08-16 PROCEDURE — 250N000013 HC RX MED GY IP 250 OP 250 PS 637

## 2024-08-16 PROCEDURE — 36415 COLL VENOUS BLD VENIPUNCTURE: CPT

## 2024-08-16 PROCEDURE — 99223 1ST HOSP IP/OBS HIGH 75: CPT | Mod: GC | Performed by: SURGERY

## 2024-08-16 PROCEDURE — 258N000003 HC RX IP 258 OP 636

## 2024-08-16 PROCEDURE — 250N000011 HC RX IP 250 OP 636: Performed by: EMERGENCY MEDICINE

## 2024-08-16 PROCEDURE — 96361 HYDRATE IV INFUSION ADD-ON: CPT | Performed by: STUDENT IN AN ORGANIZED HEALTH CARE EDUCATION/TRAINING PROGRAM

## 2024-08-16 PROCEDURE — 120N000002 HC R&B MED SURG/OB UMMC

## 2024-08-16 PROCEDURE — 250N000009 HC RX 250: Performed by: STUDENT IN AN ORGANIZED HEALTH CARE EDUCATION/TRAINING PROGRAM

## 2024-08-16 PROCEDURE — 250N000013 HC RX MED GY IP 250 OP 250 PS 637: Performed by: STUDENT IN AN ORGANIZED HEALTH CARE EDUCATION/TRAINING PROGRAM

## 2024-08-16 PROCEDURE — 250N000011 HC RX IP 250 OP 636

## 2024-08-16 RX ORDER — NITROFURANTOIN 25; 75 MG/1; MG/1
100 CAPSULE ORAL 2 TIMES DAILY
Qty: 14 CAPSULE | Refills: 0 | Status: SHIPPED | OUTPATIENT
Start: 2024-08-16 | End: 2024-08-17

## 2024-08-16 RX ORDER — OXYCODONE AND ACETAMINOPHEN 5; 325 MG/1; MG/1
1 TABLET ORAL EVERY 4 HOURS PRN
Status: DISCONTINUED | OUTPATIENT
Start: 2024-08-16 | End: 2024-08-17

## 2024-08-16 RX ORDER — OXYCODONE HYDROCHLORIDE 5 MG/1
5 TABLET ORAL EVERY 4 HOURS PRN
Status: DISCONTINUED | OUTPATIENT
Start: 2024-08-16 | End: 2024-08-16

## 2024-08-16 RX ORDER — HYDROMORPHONE HCL IN WATER/PF 6 MG/30 ML
0.2 PATIENT CONTROLLED ANALGESIA SYRINGE INTRAVENOUS EVERY 4 HOURS PRN
Status: DISCONTINUED | OUTPATIENT
Start: 2024-08-16 | End: 2024-08-16

## 2024-08-16 RX ORDER — MORPHINE SULFATE 4 MG/ML
4 INJECTION, SOLUTION INTRAMUSCULAR; INTRAVENOUS ONCE
Status: COMPLETED | OUTPATIENT
Start: 2024-08-16 | End: 2024-08-16

## 2024-08-16 RX ORDER — ACETAMINOPHEN 325 MG/1
650 TABLET ORAL EVERY 4 HOURS PRN
Status: DISCONTINUED | OUTPATIENT
Start: 2024-08-16 | End: 2024-08-17

## 2024-08-16 RX ORDER — ACETAMINOPHEN 650 MG/1
650 SUPPOSITORY RECTAL EVERY 4 HOURS PRN
Status: DISCONTINUED | OUTPATIENT
Start: 2024-08-16 | End: 2024-08-17

## 2024-08-16 RX ORDER — IOPAMIDOL 755 MG/ML
86 INJECTION, SOLUTION INTRAVASCULAR ONCE
Status: COMPLETED | OUTPATIENT
Start: 2024-08-16 | End: 2024-08-16

## 2024-08-16 RX ORDER — HYDROMORPHONE HYDROCHLORIDE 1 MG/ML
0.3 INJECTION, SOLUTION INTRAMUSCULAR; INTRAVENOUS; SUBCUTANEOUS EVERY 8 HOURS PRN
Status: DISPENSED | OUTPATIENT
Start: 2024-08-16 | End: 2024-08-17

## 2024-08-16 RX ORDER — AMOXICILLIN 250 MG
1 CAPSULE ORAL 2 TIMES DAILY PRN
Status: DISCONTINUED | OUTPATIENT
Start: 2024-08-16 | End: 2024-08-18 | Stop reason: HOSPADM

## 2024-08-16 RX ORDER — HYDROMORPHONE HCL IN WATER/PF 6 MG/30 ML
0.2 PATIENT CONTROLLED ANALGESIA SYRINGE INTRAVENOUS ONCE
Status: COMPLETED | OUTPATIENT
Start: 2024-08-16 | End: 2024-08-16

## 2024-08-16 RX ORDER — LIDOCAINE 40 MG/G
CREAM TOPICAL
Status: DISCONTINUED | OUTPATIENT
Start: 2024-08-16 | End: 2024-08-18 | Stop reason: HOSPADM

## 2024-08-16 RX ORDER — AMOXICILLIN 250 MG
2 CAPSULE ORAL 2 TIMES DAILY PRN
Status: DISCONTINUED | OUTPATIENT
Start: 2024-08-16 | End: 2024-08-18 | Stop reason: HOSPADM

## 2024-08-16 RX ORDER — KETOROLAC TROMETHAMINE 30 MG/ML
30 INJECTION, SOLUTION INTRAMUSCULAR; INTRAVENOUS ONCE
Status: COMPLETED | OUTPATIENT
Start: 2024-08-16 | End: 2024-08-16

## 2024-08-16 RX ORDER — NITROFURANTOIN 25; 75 MG/1; MG/1
100 CAPSULE ORAL EVERY 12 HOURS SCHEDULED
Status: DISCONTINUED | OUTPATIENT
Start: 2024-08-16 | End: 2024-08-16

## 2024-08-16 RX ORDER — CALCIUM CARBONATE 500 MG/1
1000 TABLET, CHEWABLE ORAL 4 TIMES DAILY PRN
Status: DISCONTINUED | OUTPATIENT
Start: 2024-08-16 | End: 2024-08-18 | Stop reason: HOSPADM

## 2024-08-16 RX ORDER — SODIUM CHLORIDE, SODIUM LACTATE, POTASSIUM CHLORIDE, CALCIUM CHLORIDE 600; 310; 30; 20 MG/100ML; MG/100ML; MG/100ML; MG/100ML
INJECTION, SOLUTION INTRAVENOUS CONTINUOUS
Status: DISCONTINUED | OUTPATIENT
Start: 2024-08-16 | End: 2024-08-17

## 2024-08-16 RX ADMIN — OXYCODONE HYDROCHLORIDE AND ACETAMINOPHEN 1 TABLET: 5; 325 TABLET ORAL at 22:39

## 2024-08-16 RX ADMIN — SODIUM CHLORIDE, POTASSIUM CHLORIDE, SODIUM LACTATE AND CALCIUM CHLORIDE: 600; 310; 30; 20 INJECTION, SOLUTION INTRAVENOUS at 23:52

## 2024-08-16 RX ADMIN — OXYCODONE HYDROCHLORIDE AND ACETAMINOPHEN 1 TABLET: 5; 325 TABLET ORAL at 05:55

## 2024-08-16 RX ADMIN — MORPHINE SULFATE 4 MG: 4 INJECTION INTRAVENOUS at 01:37

## 2024-08-16 RX ADMIN — OXYCODONE HYDROCHLORIDE AND ACETAMINOPHEN 1 TABLET: 5; 325 TABLET ORAL at 12:54

## 2024-08-16 RX ADMIN — OXYCODONE HYDROCHLORIDE AND ACETAMINOPHEN 1 TABLET: 5; 325 TABLET ORAL at 17:46

## 2024-08-16 RX ADMIN — NITROFURANTOIN (MONOHYDRATE/MACROCRYSTALS) 100 MG: 75; 25 CAPSULE ORAL at 00:43

## 2024-08-16 RX ADMIN — SODIUM CHLORIDE, PRESERVATIVE FREE 72 ML: 5 INJECTION INTRAVENOUS at 00:36

## 2024-08-16 RX ADMIN — SODIUM CHLORIDE, POTASSIUM CHLORIDE, SODIUM LACTATE AND CALCIUM CHLORIDE: 600; 310; 30; 20 INJECTION, SOLUTION INTRAVENOUS at 19:45

## 2024-08-16 RX ADMIN — HYDROMORPHONE HYDROCHLORIDE 0.3 MG: 1 INJECTION, SOLUTION INTRAMUSCULAR; INTRAVENOUS; SUBCUTANEOUS at 18:56

## 2024-08-16 RX ADMIN — IOPAMIDOL 86 ML: 755 INJECTION, SOLUTION INTRAVENOUS at 00:36

## 2024-08-16 RX ADMIN — NITROFURANTOIN (MONOHYDRATE/MACROCRYSTALS) 100 MG: 75; 25 CAPSULE ORAL at 08:36

## 2024-08-16 RX ADMIN — DIATRIZOATE MEGLUMINE AND DIATRIZOATE SODIUM 120 ML: 660; 100 SOLUTION ORAL; RECTAL at 08:45

## 2024-08-16 RX ADMIN — HYDROMORPHONE HYDROCHLORIDE 0.2 MG: 0.2 INJECTION, SOLUTION INTRAMUSCULAR; INTRAVENOUS; SUBCUTANEOUS at 06:32

## 2024-08-16 RX ADMIN — HYDROMORPHONE HYDROCHLORIDE 0.3 MG: 1 INJECTION, SOLUTION INTRAMUSCULAR; INTRAVENOUS; SUBCUTANEOUS at 09:56

## 2024-08-16 RX ADMIN — SODIUM CHLORIDE, POTASSIUM CHLORIDE, SODIUM LACTATE AND CALCIUM CHLORIDE: 600; 310; 30; 20 INJECTION, SOLUTION INTRAVENOUS at 05:57

## 2024-08-16 RX ADMIN — KETOROLAC TROMETHAMINE 30 MG: 30 INJECTION, SOLUTION INTRAMUSCULAR at 15:35

## 2024-08-16 ASSESSMENT — ACTIVITIES OF DAILY LIVING (ADL)
ADLS_ACUITY_SCORE: 37

## 2024-08-16 NOTE — DISCHARGE INSTRUCTIONS
You were seen in the emergency room for abdominal pain and your labs were reassuring but we did discover that you have a urinary tract infection  We are discharging home on oral antibiotics and I encourage you to take them as prescribed  If you have any new or worsening symptoms including but not limited to abdominal pain, vomiting, nausea, fevers, chills please return immediately to the emergency room  Otherwise please follow-up with your primary care doctor in the next 2 to 3 days

## 2024-08-16 NOTE — H&P
[facility]    History and Physical - Medicine Service, [facility] TEAM        Date of Admission:  8/15/2024    Assessment & Plan      Rachel A Gerhardt is a 48 year old female admitted on 8/15/2024. 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    #Partial Small Bowel Obstruction  The patient reports having a 3 day history of cramping abdominal pain that is similar to the pain she has had with previous small bowel obstructions. She has been able to pass gas and her last bowel movement was on 8/15/24. In the ED vital signs were stable. CT abdomen revealed wall thickening of multiple small bowel loops in the lower abdomen and pelvis with few of these loops mildly dilated and wall thickening of the rectosigmoid colon consistent with either a partial small bowel obstruction or nonspecific colitis. General surgery saw the patient while in the ED and recommended medical management, but will continue to follow.   - NPO  - IV fluids - LR at 100mL/hr   - PTA Percocet every 4 hours   - General surgery following, appreciate recommendations    #UTI   The patient denies any urinary symptoms. UA in the ED with small blood, moderate leukocyte esterase, and 18WBC. Urine culture pending. She was started on Macrobid while in the ED.  - Continue Macrobid every 12 hours            Diet: NPO for Medical/Clinical Reasons Except for: Meds    DVT Prophylaxis: Pneumatic Compression Devices  Godwin Catheter: Not present  Fluids: LR  100mL/hr  Lines: None     Cardiac Monitoring: None  Code Status: Full Code      Clinically Significant Risk Factors Present on Admission                                           Disposition Plan      Expected Discharge Date: 08/18/2024                The patient's care will be formally staffed in AM      Janiya Lane MD  Medicine Service, [facility]  Securely message with [employer] (more info)  Text page via [facility] Paging/Directory   See signed in provider for up to date coverage information  ______________________________________________________________________    Chief Complaint   Abdominal pain    History is obtained from the patient    History of Present Illness   Rachel A Gerhardt is a 48 year old female who has a history of advanced cervical and ovarian cancer (dx 2015) s/p chemotherapy and radiation, radiation enteritis, recurrent small bowel obstructions secondary to adhesions, s/p ex-lap with 20-30cm small bowel resection with primary anastomosis (2017), ileo-ileal anastomosis dilation (2018), and small bowel resection with anastomosis (2019), substance use disorder, and chronic abdominal pain managed with chronic opiate use (percocet 4 times daily) and is admitted for partial small bowel obstruction.    The patient reports that 3 days prior to admission she began having worsening generalized abdominal pain that felt similar to her small bowel obstructions in the past which prompted her to present to the ED. The patient notes the abdominal pain is diffuse throughout her abdomen and she describe the pain as a cramping, 6/10 in severity pain that worsens with eating. The pain is constant but worsens in waves throughout the day. She has tried taking her home percocet but reports that it has not been able to relieve her pain. She has been able to pass gas. Her last bowel movement was on 8/15/24 with diarrhea which she has chronically.     ED course as  follows: On admission, temperature was 98.6F, heart rate was 95, respiratory rate was 18, and blood pressure was 116/79. Labs were significant for elevated CRP at 6.26 and urinalysis with orange slightly hazy urine with small blood, moderate leukocyte esterase, and 18 WBC, so the patient was started on Macrobid. CT abdomen with wall thickening of multiple small bowel loops in the lower abdomen and pelvis with few of these loops mildly dilated, consistent with enteritis or inflammatory bowel disease and partial small bowel obstruction and wall thickening of the rectosigmoid colon consistent with a nonspecific colitis with no colonic obstruction. General surgery was consulted and recommended to admit to medicine.       Past Medical History    Past Medical History:   Diagnosis Date    Asthma     Cervical cancer (H)     Other chronic pain     Ovarian cancer (H)     Partial small bowel obstruction (H) 11/2/2018    SBO (small bowel obstruction) (H) 12/27/2016    Small bowel obstruction (H) 5/17/2017    Small intestine obstruction (H) 4/29/2017    Substance abuse (H)     Outside records indicate past history of narcotics abuse or dependence, but patient denies.       Past Surgical History   Past Surgical History:   Procedure Laterality Date    ARTHROSCOPY KNEE Left     COLONOSCOPY N/A 5/3/2018    Procedure: COLONOSCOPY;  sigmoidoscopy;  Surgeon: Omero Vigil MD;  Location: [facility]    COLONOSCOPY WITH CO2 INSUFFLATION N/A 4/30/2018    Procedure: COLONOSCOPY WITH CO2 INSUFFLATION;  Colonoscopy;  Surgeon: Omero Vigil MD;  [facility]    COMBINED CYSTOSCOPY, INSERT STENT URETER(S) Bilateral 5/18/2017    Procedure: COMBINED CYSTOSCOPY, INSERT STENT URETER(S);  Cystoscopy with Bilateral Stent,;  Surgeon: Rene Calero MD;  Location: [facility] OR    ENT SURGERY  2009    mastoid, sinus    ENTEROSCOPY SMALL BOWEL N/A 6/18/2018    Procedure: ENTEROSCOPY SMALL BOWEL;  Lower bowel Retrograde Enteroscopy with  Balloon Dilation .;  Surgeon: Omero Vigil MD;  [facility]    EXAM UNDER ANESTHESIA, INSERT ALEX SLEEVE, UTERINE PLACEMENT OF TANDEM AND RING FOR RAD, ULTRASOUND N/A 12/14/2015    Procedure: EXAM UNDER ANESTHESIA, INSERT ALEX SLEEVE, UTERINE PLACEMENT OF TANDEM AND RING FOR RADIATION, ULTRASOUND GUIDED;  Surgeon: Abby Tony MD;  [facility]    INSERT TANDEM AND CESIUM APPLICATOR CERVIX, ULTRASOUND GUIDED N/A 12/17/2015    Procedure: INSERT TANDEM AND CESIUM APPLICATOR CERVIX, ULTRASOUND GUIDED;  Surgeon: Kika Wood MD;  Location: [facility]    KNEE SURGERY      LAPAROTOMY EXPLORATORY N/A 5/18/2017    Procedure: LAPAROTOMY EXPLORATORY;   Exploratry Laparotomy, Small Bowel Resection with anastomosis, Flexible Sigmoidoscopy;  Surgeon: Jennifer Goodwin MD;  [facility]    LAPAROTOMY EXPLORATORY N/A 10/31/2019    Procedure: Laparotomy,  bowel resection , lysis of adhesions, partial omentecomy;  Surgeon: Dada Trevizo MD;  [facility]    PICC INSERTION Right 04/29/2017    4fr SL BioFlo PICC, 37cm (3cm external) in the R basilic vein w/ tip in the mid SVC.    PICC INSERTION Right 03/29/2018    4Fr - 40cm (4cm external), R lateral brachial vein, Low SVC    RESECT SMALL BOWEL WITHOUT OSTOMY N/A 5/18/2017    Procedure: RESECT SMALL BOWEL WITHOUT OSTOMY;;  Surgeon: Jennifer Goodwin MD;  [facility]    SIGMOIDOSCOPY FLEXIBLE N/A 5/18/2017    Procedure: SIGMOIDOSCOPY FLEXIBLE;;  Surgeon: Jennifer Goodwin MD;  [facility]    SINUS SURGERY      SMALL INTESTINE SURGERY  05/17/2017    60 cm removed.        Prior to Admission Medications   Prior to Admission Medications   Prescriptions Last Dose Informant Patient Reported? Taking?   Lidocaine (LIDOCARE) 4 % Patch   No No   Sig: Place 1 patch onto the skin every 24 hours To prevent lidocaine toxicity, patient should be patch free for 12 hrs daily.   Simethicone 125 MG TABS   Yes No   Sig: Take 125 mg by mouth daily     acetaminophen (TYLENOL) 325 MG tablet   No No   Sig: Take 3 tablets (975 mg) by mouth every 8 hours   hydrOXYzine (ATARAX) 25 MG tablet   No No   Sig: Take 1 tablet (25 mg) by mouth 3 times daily as needed (pain)   hyoscyamine (ANASPAZ/LEVSIN) 0.125 MG tablet   No No   Sig: Take 1 tablet (125 mcg) by mouth every 4 hours as needed for cramping   menthol (ICY HOT) 5 % PTCH   No No   Sig: Apply 1 patch topically every 8 hours as needed for muscle soreness   methocarbamol (ROBAXIN) 500 MG tablet   No No   Sig: Take 1 tablet (500 mg) by mouth every 6 hours as needed for muscle spasms   mirtazapine (REMERON SOL-TAB) 15 MG ODT   No No   Si tablet (15 mg) by Orally disintegrating tablet route At Bedtime   nortriptyline (PAMELOR) 10 MG capsule   No No   Sig: Take 1 capsule (10 mg) by mouth At Bedtime   ondansetron (ZOFRAN-ODT) 4 MG ODT tab   No No   Sig: Take 1 tablet (4 mg) by mouth every 6 hours as needed for nausea or vomiting   ondansetron (ZOFRAN-ODT) 4 MG ODT tab   No No   Sig: Take 1 tablet (4 mg) by mouth every 6 hours as needed for nausea or vomiting   ondansetron (ZOFRAN-ODT) 4 MG ODT tab   No No   Sig: Take 1 tablet (4 mg) by mouth every 6 hours as needed for nausea or vomiting   oxyCODONE-acetaminophen (PERCOCET) 5-325 MG tablet   No No   Sig: Take 1 tablet by mouth every 4 hours as needed for severe pain   pantoprazole (PROTONIX) 40 MG EC tablet   No No   Sig: Take 1 tablet (40 mg) by mouth 2 times daily (before meals)      Facility-Administered Medications: None        Review of Systems    The 10 point Review of Systems is negative other than noted in the HPI or here.    Social History   I have reviewed this patient's social history and updated it with pertinent information if needed.  Social History     Tobacco Use    Smoking status: Light Smoker     Types: Cigarettes    Smokeless tobacco: Never    Tobacco comments:     pt smoking about 4 cigs per week   Substance Use Topics    Alcohol use: No      Alcohol/week: 0.0 standard drinks of alcohol     Comment: None per pt    Drug use: No     Comment: Patient denies.  Outside records indicate possible Opiate abuse.         Family History   I have reviewed this patient's family history and updated it with pertinent information if needed.  Family History   Problem Relation Age of Onset    Diabetes Mother     Ovarian Cancer No family hx of     Uterine Cancer No family hx of     Cervical Cancer No family hx of     Breast Cancer No family hx of          Allergies   Allergies   Allergen Reactions    Other (Do Not Use) Anaphylaxis     Rats- breathing issues- severe    Sulfa Antibiotics Hives    Ibuprofen Nausea and Vomiting and Hives     Unknown if reaction hives or N/V    No Clinical Screening - See Comments Other (See Comments) and Diarrhea     headache  Carrots cause gastric upset, cramping and diarrhea.    Ciprofloxacin Hives and Nausea    Clavulanic Acid     Melon     Quinolones     Sulfasalazine      Other reaction(s): Hives  hives    Tramadol Hives, Diarrhea, Nausea and Nausea and Vomiting    Amoxicillin Nausea and Vomiting    Amoxicillin-Pot Clavulanate Nausea, Hives and GI Disturbance     Patient tolerated course of zosyn in 5/2018    Avelox [Moxifloxacin] Nausea and Vomiting    Codeine Nausea and Vomiting    Daucus Carota      Other reaction(s): GI Upset  Other reaction(s): Abdominal pain, Diarrhea  Carrots cause gastric upset, cramping and diarrhea.        Physical Exam   Vital Signs: Temp: 98.5  F (36.9  C) Temp src: Oral BP: 95/73 Pulse: 75   Resp: 16 SpO2: 99 % O2 Device: None (Room air)    Weight: 0 lbs 0 oz    Constitutional: awake, alert, cooperative, in mild distress, and appears stated age  ENT: Mucus membranes moist   Respiratory: No increased work of breathing, good air exchange, clear to auscultation bilaterally, no crackles or wheezing  Cardiovascular: Normal apical impulse, regular rate and rhythm, normal S1 and S2, no S3 or S4, and no murmur  noted  GI: Multiple well healed scars present from previous surgical procedures present, soft, nondistended, bowel sounds hypoactive, diffuse generalized tenderness to palpation with voluntary guarding.   Musculoskeletal: no lower extremity pitting edema present  Neurologic: No focal deficits noted    Medical Decision Making       Please see A&P for additional details of medical decision making.      Data     I have personally reviewed the following data over the past 24 hrs:    8.7  \   13.9   / 255     136 100 19.3 /  87   4.2 27 0.82 \     ALT: 9 AST: 18 AP: 87 TBILI: 0.3   ALB: 4.2 TOT PROTEIN: 6.6 LIPASE: N/A     Procal: N/A CRP: 6.26 (H) Lactic Acid: 0.9       INR:  1.02 PTT:  N/A   D-dimer:  N/A Fibrinogen:  N/A       Imaging results reviewed over the past 24 hrs:   Recent Results (from the past 24 hour(s))   CT Abdomen Pelvis w Contrast    Narrative    EXAM: CT ABDOMEN PELVIS W CONTRAST  LOCATION: [facility]  DATE: 8/16/2024    INDICATION: Abdominal pain.  COMPARISON: 6/24/2023.  TECHNIQUE: CT scan of the abdomen and pelvis was performed following injection of IV contrast. Multiplanar reformats were obtained. Dose reduction techniques were used.  CONTRAST: 86ml isovue 370    FINDINGS:   LOWER CHEST: Normal.    HEPATOBILIARY: The gallbladder is distended but otherwise appears normal. No calcified gallstone.    PANCREAS: Normal.    SPLEEN: Normal.    ADRENAL GLANDS: Normal.    KIDNEYS/BLADDER: There is no hydronephrosis. Few small renal cysts.    BOWEL: There are multiple thick-walled small bowel loops in the lower abdomen and pelvis. Few of these small bowel loops are mildly dilated. There is also wall thickening of the rectosigmoid colon. There is edema in the small bowel mesentery.   Postoperative change in the right pelvis. There is no free intraperitoneal gas. Small amount of ascites.    LYMPH NODES: Normal.    VASCULATURE: Normal.    PELVIC ORGANS:  Normal.    MUSCULOSKELETAL: Degenerative disease at the lumbosacral junction.      Impression    IMPRESSION:   1.  Wall thickening of multiple small bowel loops in the lower abdomen and pelvis with few of these loops mildly dilated, consistent with enteritis or inflammatory bowel disease and partial small bowel obstruction.  2.  Wall thickening of the rectosigmoid colon consistent with a nonspecific colitis. No colonic obstruction.

## 2024-08-16 NOTE — ED PROVIDER NOTES
The patient was excepted at shift change signout with a plan for me to follow-up on CT scan, likely surgery evaluation.  CT showed:    IMPRESSION:   1.  Wall thickening of multiple small bowel loops in the lower abdomen and pelvis with few of these loops mildly dilated, consistent with enteritis or inflammatory bowel disease and partial small bowel obstruction.  2.  Wall thickening of the rectosigmoid colon consistent with a nonspecific colitis. No colonic obstruction.    Surgery was consulted.  They do recommend medical admission.  They do not feel that she has surgical indication at this time.  She will be admitted to medical service for further management.    Dictation Disclaimer: Some of this Note has been completed with voice-recognition dictation software. Although errors are generally corrected real-time, there is the potential for a rare error to be present in the completed chart.       Franny Jordan MD  08/16/24 [id number]

## 2024-08-16 NOTE — CONSULTS
[facility]    Consult Note - general surgery Service  Date of Admission:  8/15/2024  Consult Requested by: ED  Reason for Consult: Partial small bowel obstruction    Assessment & Plan: Surgery     Rachel A Gerhardt is a 48 year old female with a past medical history of advanced cervical and ovarian cancer (dx 2015) s/p chemotherapy and radiation, radiation enteritis, recurrent small bowel obstructions secondary to adhesions with multiple small bowel resections (latest surgery in 2019), chronic diarrhea, substance use disorder, and chronic abdominal pain managed with chronic opiate use, presents to the ED with symptoms/imaging consistent with partial bowel obstruction; unclear etiology of enteritis/ colitis detected on CT scan.    Plan-  Admit to medicine for further workup of abdominal pain  Bowel rest overnight with NPO, IVF  General Surgery will continue to follow     Drains: None     Code Status:  full    Clinically Significant Risk Factors Present on Admission                                   The patient's care was discussed with the Attending Physician, Dr. Mckeon .    Jessica Hanna MD  [facility]  Non-urgent messages: Securely message with [employer] (more info)  Text page via [employer] Paging/Directory     ______________________________________________________________________    Chief Complaint   Abdominal pain    History is obtained from the patient    History of Present Illness   Rachel A Gerhardt is a 48 year old female with a past medical history of advanced cervical and ovarian cancer (dx 2015) s/p chemotherapy and radiation, radiation enteritis, recurrent small bowel obstructions secondary to adhesions, s/p ex-lap with 20-30cm small bowel resection with primary anastomosis (2017), ileo-ileal anastomosis dilation (2018), and small bowel resection with anastomosis (2019), substance use disorder,, chronic diarrhea, and  chronic abdominal pain managed with chronic opiate use (percocet 4 times daily), presents to the ED with 4 days of abdominal pain, reduced volume of stools, ongoing nausea and 2 episodes of vomiting for the last 2 days.    Per patient, abdominal pain has been persistent all over the abdomen but also compounded by waves of crampy abdominal pain around the umbilicus.  Pain is not associated with food intake.  Patient also complains of chronic diarrhea that is 4 times bowel movements daily, but notices that frequency of bowel movements is still the same but volume has substantially declined.  She has been having ongoing nausea and 2 episodes of nonbilious vomiting since the last 2 days.  She has not not been complaining of burping or bloating, or intolerance to oral intake, or reduced appetite, but acknowledges hesitancy to eat given her current symptoms.    She has not had fever/chills/night sweats sweats/chest pain/shortness of breath/blood in bowel movements/blackish, tarry stools/cough/URI symptoms.  She lives with a friend and does not report any sick contacts or diarrhea reported in her friend.    Past Medical History    Past Medical History:   Diagnosis Date    Asthma     Cervical cancer (H)     Other chronic pain     Ovarian cancer (H)     Partial small bowel obstruction (H) 11/2/2018    SBO (small bowel obstruction) (H) 12/27/2016    Small bowel obstruction (H) 5/17/2017    Small intestine obstruction (H) 4/29/2017    Substance abuse (H)     Outside records indicate past history of narcotics abuse or dependence, but patient denies.       Past Surgical History   Past Surgical History:   Procedure Laterality Date    ARTHROSCOPY KNEE Left     COLONOSCOPY N/A 5/3/2018    Procedure: COLONOSCOPY;  sigmoidoscopy;  Surgeon: Omero Vigil MD;  [facility]    COLONOSCOPY WITH CO2 INSUFFLATION N/A 4/30/2018    Procedure: COLONOSCOPY WITH CO2 INSUFFLATION;  Colonoscopy;  Surgeon: Omero Vigil MD;   [facility]    COMBINED CYSTOSCOPY, INSERT STENT URETER(S) Bilateral 5/18/2017    Procedure: COMBINED CYSTOSCOPY, INSERT STENT URETER(S);  Cystoscopy with Bilateral Stent,;  Surgeon: Rene Calero MD;  [facility]    ENT SURGERY  2009    mastoid, sinus    ENTEROSCOPY SMALL BOWEL N/A 6/18/2018    Procedure: ENTEROSCOPY SMALL BOWEL;  Lower bowel Retrograde Enteroscopy with Balloon Dilation .;  Surgeon: Omero Vigil MD;  [facility]    EXAM UNDER ANESTHESIA, INSERT ALEX SLEEVE, UTERINE PLACEMENT OF TANDEM AND RING FOR RAD, ULTRASOUND N/A 12/14/2015    Procedure: EXAM UNDER ANESTHESIA, INSERT ALEX SLEEVE, UTERINE PLACEMENT OF TANDEM AND RING FOR RADIATION, ULTRASOUND GUIDED;  Surgeon: Abby Tony MD;  [facility]    INSERT TANDEM AND CESIUM APPLICATOR CERVIX, ULTRASOUND GUIDED N/A 12/17/2015    Procedure: INSERT TANDEM AND CESIUM APPLICATOR CERVIX, ULTRASOUND GUIDED;  Surgeon: Kika Wood MD;  [facility]    KNEE SURGERY      LAPAROTOMY EXPLORATORY N/A 5/18/2017    Procedure: LAPAROTOMY EXPLORATORY;   Exploratry Laparotomy, Small Bowel Resection with anastomosis, Flexible Sigmoidoscopy;  Surgeon: Jennifer Goodwin MD;  [facility]    LAPAROTOMY EXPLORATORY N/A 10/31/2019    Procedure: Laparotomy,  bowel resection , lysis of adhesions, partial omentecomy;  Surgeon: Dada Trevizo MD;  [facility]    PICC INSERTION Right 04/29/2017    4fr SL BioFlo PICC, 37cm (3cm external) in the R basilic vein w/ tip in the mid SVC.    PICC INSERTION Right 03/29/2018    4Fr - 40cm (4cm external), R lateral brachial vein, Low SVC    RESECT SMALL BOWEL WITHOUT OSTOMY N/A 5/18/2017    Procedure: RESECT SMALL BOWEL WITHOUT OSTOMY;;  Surgeon: Jennifer Goodwin MD;  [facility]    SIGMOIDOSCOPY FLEXIBLE N/A 5/18/2017    Procedure: SIGMOIDOSCOPY FLEXIBLE;;  Surgeon: Jennifer Goodwin MD;  [facility]    SINUS SURGERY      SMALL INTESTINE SURGERY  05/17/2017    60 cm  removed.        Physical Exam   Vital Signs: Temp: 98.6  F (37  C) Temp src: Oral BP: 107/75 Pulse: 75   Resp: 16 SpO2: 96 % O2 Device: None (Room air)    Weight: 0 lbs 0 ozNo intake or output data in the 24 hours ending 08/16/24 0150  General Appearance: Alert and oriented x 3, mildly disheveled look, halitosis and cigarette odor  Respiratory: Nonlabored breathing on room air  Cardiovascular: Regular rate and rhythm by radial pulse  Abdomen: Soft mild nonspecific tenderness all over abdomen, voluntary guarding present, no involuntary guarding or rigidity      Data     I have personally reviewed the following data over the past 24 hrs:    9.4  \   14.0   / 259     136 100 19.3 /  87   4.2 27 0.82 \     ALT: 9 AST: 18 AP: 87 TBILI: 0.3   ALB: 4.2 TOT PROTEIN: 6.6 LIPASE: N/A     Procal: N/A CRP: 6.26 (H) Lactic Acid: 0.9       INR:  1.02 PTT:  N/A   D-dimer:  N/A Fibrinogen:  N/A       Imaging results reviewed over the past 24 hrs:   Recent Results (from the past 24 hour(s))   CT Abdomen Pelvis w Contrast    Narrative    EXAM: CT ABDOMEN PELVIS W CONTRAST  LOCATION: [facility] [place]  DATE: 8/16/2024    INDICATION: Abdominal pain.  COMPARISON: 6/24/2023.  TECHNIQUE: CT scan of the abdomen and pelvis was performed following injection of IV contrast. Multiplanar reformats were obtained. Dose reduction techniques were used.  CONTRAST: 86ml isovue 370    FINDINGS:   LOWER CHEST: Normal.    HEPATOBILIARY: The gallbladder is distended but otherwise appears normal. No calcified gallstone.    PANCREAS: Normal.    SPLEEN: Normal.    ADRENAL GLANDS: Normal.    KIDNEYS/BLADDER: There is no hydronephrosis. Few small renal cysts.    BOWEL: There are multiple thick-walled small bowel loops in the lower abdomen and pelvis. Few of these small bowel loops are mildly dilated. There is also wall thickening of the rectosigmoid colon. There is edema in the small bowel mesentery.   Postoperative  change in the right pelvis. There is no free intraperitoneal gas. Small amount of ascites.    LYMPH NODES: Normal.    VASCULATURE: Normal.    PELVIC ORGANS: Normal.    MUSCULOSKELETAL: Degenerative disease at the lumbosacral junction.      Impression    IMPRESSION:   1.  Wall thickening of multiple small bowel loops in the lower abdomen and pelvis with few of these loops mildly dilated, consistent with enteritis or inflammatory bowel disease and partial small bowel obstruction.  2.  Wall thickening of the rectosigmoid colon consistent with a nonspecific colitis. No colonic obstruction.

## 2024-08-16 NOTE — PROGRESS NOTES
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     Patient without nausea nor episodes of vomiting overnight, states long history with intolerance of NG tube placement. Will trial oral gastrografin challenge this AM.   -- Oral GGC this AM   -- Further surgical interventions pending results  -- Continue NPO, mIVF  -- Multimodal pain management   -- EGS will continue to follow      Diet: NPO for Medical/Clinical Reasons Except for: Meds    DVT Prophylaxis: Pneumatic Compression Devices  Godwin Catheter: Not present  Lines: None     Drains: None     Cardiac Monitoring: None  Code Status: Full Code      Clinically Significant Risk Factors Present on Admission                       Disposition Plan      Expected Discharge Date: 08/18/2024              The patient's care was discussed with the chief resident and staff    Matthew Victoria MD  [facility]  Non-urgent messages: Securely message with [employer] (more info)  Text page via [email or web address] Paging/Directory     ______________________________________________________________________    Interval History   No acute events overnight, afebrile, on room air. Still endorsing abdominal pain, states have some diarrhea earlier this morning, denies fevers or  chills.     Physical Exam   Vital Signs: Temp: 98.7  F (37.1  C) Temp src: Oral BP: (!) 83/63 (Patient lying flat.) Pulse: 76   Resp: 16 SpO2: 97 % O2 Device: None (Room air)    Weight: 0 lbs 0 ozNo intake or output data in the 24 hours ending 08/16/24 0832    Constitutional: laying in bed comfortably, mildly uncomfortable   Respiratory: nonlabored breathing on room air   Cardiovascular: regular rate and rhythm by radial pulse  GI: soft, moderately tender in lower quadrants of abdomen, no guarding or peritonitis  Musculoskeletal: warm and well perfused    Data     I have personally reviewed the following data over the past 24 hrs:    8.7  \   13.9   / 255     137 103 12.5 /  92   4.2 27 0.65 \     ALT: 6 AST: 16 AP: 90 TBILI: 0.5   ALB: 4.0 TOT PROTEIN: 6.6 LIPASE: N/A     Procal: N/A CRP: 6.26 (H) Lactic Acid: 0.9       INR:  1.02 PTT:  N/A   D-dimer:  N/A Fibrinogen:  N/A       Imaging results reviewed over the past 24 hrs:   Recent Results (from the past 24 hour(s))   CT Abdomen Pelvis w Contrast    Narrative    EXAM: CT ABDOMEN PELVIS W CONTRAST  LOCATION: [facility]  DATE: 8/16/2024    INDICATION: Abdominal pain.  COMPARISON: 6/24/2023.  TECHNIQUE: CT scan of the abdomen and pelvis was performed following injection of IV contrast. Multiplanar reformats were obtained. Dose reduction techniques were used.  CONTRAST: 86ml isovue 370    FINDINGS:   LOWER CHEST: Normal.    HEPATOBILIARY: The gallbladder is distended but otherwise appears normal. No calcified gallstone.    PANCREAS: Normal.    SPLEEN: Normal.    ADRENAL GLANDS: Normal.    KIDNEYS/BLADDER: There is no hydronephrosis. Few small renal cysts.    BOWEL: There are multiple thick-walled small bowel loops in the lower abdomen and pelvis. Few of these small bowel loops are mildly dilated. There is also wall thickening of the rectosigmoid colon. There is edema in the small bowel mesentery.   Postoperative change in  the right pelvis. There is no free intraperitoneal gas. Small amount of ascites.    LYMPH NODES: Normal.    VASCULATURE: Normal.    PELVIC ORGANS: Normal.    MUSCULOSKELETAL: Degenerative disease at the lumbosacral junction.      Impression    IMPRESSION:   1.  Wall thickening of multiple small bowel loops in the lower abdomen and pelvis with few of these loops mildly dilated, consistent with enteritis or inflammatory bowel disease and partial small bowel obstruction.  2.  Wall thickening of the rectosigmoid colon consistent with a nonspecific colitis. No colonic obstruction.

## 2024-08-16 NOTE — ED PROVIDER NOTES
[facility] EMERGENCY DEPARTMENT ([facility])    8/15/24       ED PROVIDER NOTE    History     Chief Complaint   Patient presents with    Abdominal Pain     Pt was in 2 weeks ago and left before care, abnormal labs MD told her to come in, abdominal pain 6/10, joint pain 6/10, previous intestinal Cx pt, last surgery 2019     HPI  Rachel A Gerhardt is a 48 year old female with a past medical history of advanced cervical and ovarian cancer (dx 2015) s/p chemotherapy and radiation, radiation enteritis, recurrent small bowel obstructions s/p ex-lap with 20-30cm small bowel resection with primary anastomosis (2017), ileo-ileal anastomosis dilation (2018), and small bowel resection with anastomosis (2019), substance use disorder, and chronic abdominal pain managed with opiate, who presents to the ED for evaluation of abnormal labs. The patient reports she was here 2 weeks ago for joint pain but due to the volume of patients in the waiting room, she was left in the lobby for 8 hours. Patient states she left after getting labs taken. Patient reports she then saw her primary care physician and was told to return to the ED. Patient reports constant abdominal pain, joint pain, vomiting, diarrhea, and swelling. Denies fever. Anish history of similar pain with symptoms.     Past Medical History  Past Medical History:   Diagnosis Date    Asthma     Cervical cancer (H)     Other chronic pain     Ovarian cancer (H)     Partial small bowel obstruction (H) 11/2/2018    SBO (small bowel obstruction) (H) 12/27/2016    Small bowel obstruction (H) 5/17/2017    Small intestine obstruction (H) 4/29/2017    Substance abuse (H)     Outside records indicate past history of narcotics abuse or dependence, but patient denies.     Past Surgical History:   Procedure Laterality Date    ARTHROSCOPY KNEE Left     COLONOSCOPY N/A 5/3/2018    Procedure: COLONOSCOPY;  sigmoidoscopy;  Surgeon: Omero Vigil MD;  Location:  OR     COLONOSCOPY WITH CO2 INSUFFLATION N/A 4/30/2018    Procedure: COLONOSCOPY WITH CO2 INSUFFLATION;  Colonoscopy;  Surgeon: Omero Vigil MD;  Location: [facility] OR    COMBINED CYSTOSCOPY, INSERT STENT URETER(S) Bilateral 5/18/2017    Procedure: COMBINED CYSTOSCOPY, INSERT STENT URETER(S);  Cystoscopy with Bilateral Stent,;  Surgeon: Rene Calero MD;  Location: [facility] OR    ENT SURGERY  2009    mastoid, sinus    ENTEROSCOPY SMALL BOWEL N/A 6/18/2018    Procedure: ENTEROSCOPY SMALL BOWEL;  Lower bowel Retrograde Enteroscopy with Balloon Dilation .;  Surgeon: Omero Vigil MD;  Location: [facility] OR    EXAM UNDER ANESTHESIA, INSERT ALEX SLEEVE, UTERINE PLACEMENT OF TANDEM AND RING FOR RAD, ULTRASOUND N/A 12/14/2015    Procedure: EXAM UNDER ANESTHESIA, INSERT ALEX SLEEVE, UTERINE PLACEMENT OF TANDEM AND RING FOR RADIATION, ULTRASOUND GUIDED;  Surgeon: Abby Tony MD;  Location: [facility] OR    INSERT TANDEM AND CESIUM APPLICATOR CERVIX, ULTRASOUND GUIDED N/A 12/17/2015    Procedure: INSERT TANDEM AND CESIUM APPLICATOR CERVIX, ULTRASOUND GUIDED;  Surgeon: Kika Wood MD;  Location: [facility] OR    KNEE SURGERY      LAPAROTOMY EXPLORATORY N/A 5/18/2017    Procedure: LAPAROTOMY EXPLORATORY;   Exploratry Laparotomy, Small Bowel Resection with anastomosis, Flexible Sigmoidoscopy;  Surgeon: Jennifer Goodwin MD;  Location: [facility] OR    LAPAROTOMY EXPLORATORY N/A 10/31/2019    Procedure: Laparotomy,  bowel resection , lysis of adhesions, partial omentecomy;  Surgeon: Dada Trevizo MD;  Location: [facility] OR    PICC INSERTION Right 04/29/2017    4fr SL BioFlo PICC, 37cm (3cm external) in the R basilic vein w/ tip in the mid SVC.    PICC INSERTION Right 03/29/2018    4Fr - 40cm (4cm external), R lateral brachial vein, Low SVC    RESECT SMALL BOWEL WITHOUT OSTOMY N/A 5/18/2017    Procedure: RESECT SMALL BOWEL WITHOUT OSTOMY;;  Surgeon: Jennifer Goodwin MD;  Location: [facility]    SIGMOIDOSCOPY FLEXIBLE N/A 5/18/2017     Procedure: SIGMOIDOSCOPY FLEXIBLE;;  Surgeon: Jennifer Goodwin MD;  Location: [facility]    SINUS SURGERY      SMALL INTESTINE SURGERY  05/17/2017    60 cm removed.      acetaminophen (TYLENOL) 325 MG tablet  hydrOXYzine (ATARAX) 25 MG tablet  hyoscyamine (ANASPAZ/LEVSIN) 0.125 MG tablet  Lidocaine (LIDOCARE) 4 % Patch  menthol (ICY HOT) 5 % PTCH  methocarbamol (ROBAXIN) 500 MG tablet  mirtazapine (REMERON SOL-TAB) 15 MG ODT  nortriptyline (PAMELOR) 10 MG capsule  ondansetron (ZOFRAN-ODT) 4 MG ODT tab  ondansetron (ZOFRAN-ODT) 4 MG ODT tab  ondansetron (ZOFRAN-ODT) 4 MG ODT tab  oxyCODONE-acetaminophen (PERCOCET) 5-325 MG tablet  pantoprazole (PROTONIX) 40 MG EC tablet  Simethicone 125 MG TABS      Allergies   Allergen Reactions    Other (Do Not Use) Anaphylaxis     Rats- breathing issues- severe    Sulfa Antibiotics Hives    Ibuprofen Nausea and Vomiting and Hives     Unknown if reaction hives or N/V    No Clinical Screening - See Comments Other (See Comments) and Diarrhea     headache  Carrots cause gastric upset, cramping and diarrhea.    Ciprofloxacin Hives and Nausea    Clavulanic Acid     Melon     Quinolones     Sulfasalazine      Other reaction(s): Hives  hives    Tramadol Hives, Diarrhea, Nausea and Nausea and Vomiting    Amoxicillin Nausea and Vomiting    Amoxicillin-Pot Clavulanate Nausea, Hives and GI Disturbance     Patient tolerated course of zosyn in 5/2018    Avelox [Moxifloxacin] Nausea and Vomiting    Codeine Nausea and Vomiting    Daucus Carota      Other reaction(s): GI Upset  Other reaction(s): Abdominal pain, Diarrhea  Carrots cause gastric upset, cramping and diarrhea.     Family History  Family History   Problem Relation Age of Onset    Diabetes Mother     Ovarian Cancer No family hx of     Uterine Cancer No family hx of     Cervical Cancer No family hx of     Breast Cancer No family hx of      Social History   Social History     Tobacco Use    Smoking status: Light Smoker     Types: Cigarettes     Smokeless tobacco: Never    Tobacco comments:     pt smoking about 4 cigs per week   Substance Use Topics    Alcohol use: No     Alcohol/week: 0.0 standard drinks of alcohol     Comment: None per pt    Drug use: No     Comment: Patient denies.  Outside records indicate possible Opiate abuse.      A complete review of systems was performed with pertinent positives and negatives noted in the HPI, and all other systems negative.    Physical Exam   BP: 116/79  Pulse: 95  Temp: 98.6  F (37  C)  Resp: 18  SpO2: 96 %  Physical Exam  Constitutional:       General: She is not in acute distress.     Appearance: Normal appearance. She is not diaphoretic.   HENT:      Head: Atraumatic.      Mouth/Throat:      Mouth: Mucous membranes are moist.   Eyes:      General: No scleral icterus.     Conjunctiva/sclera: Conjunctivae normal.   Cardiovascular:      Rate and Rhythm: Normal rate.      Heart sounds: Normal heart sounds.   Pulmonary:      Effort: No respiratory distress.      Breath sounds: Normal breath sounds.   Abdominal:      General: Abdomen is flat.      Tenderness: There is no right CVA tenderness or left CVA tenderness.      Comments: Abdomen soft and nondistended with mild diffuse abdominal tenderness to palpation   Musculoskeletal:      Cervical back: Neck supple.   Skin:     General: Skin is warm.      Findings: No rash.   Neurological:      Mental Status: She is alert.           ED Course, Procedures, & Data     ED Course as of 08/16/24 0016   Fri Aug 16, 2024   0008 Labs as reviewed and interpreted by me significant for likely urinary tract infection     Procedures                Results for orders placed or performed during the hospital encounter of 08/15/24   INR     Status: Normal   Result Value Ref Range    INR 1.02 0.85 - 1.15   Comprehensive metabolic panel     Status: Normal   Result Value Ref Range    Sodium 136 135 - 145 mmol/L    Potassium 4.2 3.4 - 5.3 mmol/L    Carbon Dioxide (CO2) 27 22 - 29 mmol/L     Anion Gap 9 7 - 15 mmol/L    Urea Nitrogen 19.3 6.0 - 20.0 mg/dL    Creatinine 0.82 0.51 - 0.95 mg/dL    GFR Estimate 87 >60 mL/min/1.73m2    Calcium 8.8 8.8 - 10.4 mg/dL    Chloride 100 98 - 107 mmol/L    Glucose 87 70 - 99 mg/dL    Alkaline Phosphatase 87 40 - 150 U/L    AST 18 0 - 45 U/L    ALT 9 0 - 50 U/L    Protein Total 6.6 6.4 - 8.3 g/dL    Albumin 4.2 3.5 - 5.2 g/dL    Bilirubin Total 0.3 <=1.2 mg/dL   Lactic acid whole blood with 1x repeat in 2 hr when >2     Status: Normal   Result Value Ref Range    Lactic Acid, Initial 0.9 0.7 - 2.0 mmol/L   Erythrocyte sedimentation rate auto     Status: Normal   Result Value Ref Range    Erythrocyte Sedimentation Rate 7 0 - 20 mm/hr   CRP inflammation     Status: Abnormal   Result Value Ref Range    CRP Inflammation 6.26 (H) <5.00 mg/L   HCG qualitative Blood     Status: Normal   Result Value Ref Range    hCG Serum Qualitative Negative Negative   UA with Microscopic reflex to Culture     Status: Abnormal    Specimen: Urine, Midstream   Result Value Ref Range    Color Urine Orange (A) Colorless, Straw, Light Yellow, Yellow    Appearance Urine Slightly Cloudy (A) Clear    Glucose Urine Negative Negative mg/dL    Bilirubin Urine Negative Negative    Ketones Urine Negative Negative mg/dL    Specific Gravity Urine 1.037 (H) 1.003 - 1.035    Blood Urine Small (A) Negative    pH Urine 5.5 5.0 - 7.0    Protein Albumin Urine 10 (A) Negative mg/dL    Urobilinogen Urine Normal Normal, 2.0 mg/dL    Nitrite Urine Negative Negative    Leukocyte Esterase Urine Moderate (A) Negative    Mucus Urine Present (A) None Seen /LPF    RBC Urine 6 (H) <=2 /HPF    WBC Urine 18 (H) <=5 /HPF    Squamous Epithelials Urine 6 (H) <=1 /HPF    Narrative    Urine Culture ordered based on laboratory criteria   CK total     Status: Normal   Result Value Ref Range    CK 58 26 - 192 U/L   CBC with platelets and differential     Status: Abnormal   Result Value Ref Range    WBC Count 9.4 4.0 - 11.0  10e3/uL    RBC Count 4.28 3.80 - 5.20 10e6/uL    Hemoglobin 14.0 11.7 - 15.7 g/dL    Hematocrit 44.6 35.0 - 47.0 %     (H) 78 - 100 fL    MCH 32.7 26.5 - 33.0 pg    MCHC 31.4 (L) 31.5 - 36.5 g/dL    RDW 13.2 10.0 - 15.0 %    Platelet Count 259 150 - 450 10e3/uL    % Neutrophils 55 %    % Lymphocytes 34 %    % Monocytes 8 %    % Eosinophils 2 %    % Basophils 0 %    % Immature Granulocytes 1 %    NRBCs per 100 WBC 0 <1 /100    Absolute Neutrophils 5.2 1.6 - 8.3 10e3/uL    Absolute Lymphocytes 3.2 0.8 - 5.3 10e3/uL    Absolute Monocytes 0.8 0.0 - 1.3 10e3/uL    Absolute Eosinophils 0.2 0.0 - 0.7 10e3/uL    Absolute Basophils 0.0 0.0 - 0.2 10e3/uL    Absolute Immature Granulocytes 0.1 <=0.4 10e3/uL    Absolute NRBCs 0.0 10e3/uL   CBC with platelets differential     Status: Abnormal    Narrative    The following orders were created for panel order CBC with platelets differential.  Procedure                               Abnormality         Status                     ---------                               -----------         ------                     CBC with platelets and d...[[id number]]  Abnormal            Final result                 Please view results for these tests on the individual orders.     Medications   ondansetron (ZOFRAN) injection 4 mg (4 mg Intravenous $Given 8/15/24 2231)   nitroFURantoin macrocrystal-monohydrate (MACROBID) capsule 100 mg (has no administration in time range)   iopamidol (ISOVUE-370) solution 86 mL (has no administration in time range)   sodium chloride 0.9 % bag 500mL for CT scan flush use (has no administration in time range)   sodium chloride 0.9% BOLUS 1,000 mL (1,000 mLs Intravenous $New Bag 8/15/24 2231)   morphine (PF) injection 4 mg (4 mg Intravenous $Given 8/15/24 2232)     Labs Ordered and Resulted from Time of ED Arrival to Time of ED Departure   CRP INFLAMMATION - Abnormal       Result Value    CRP Inflammation 6.26 (*)    ROUTINE UA WITH MICROSCOPIC REFLEX TO  CULTURE - Abnormal    Color Urine Orange (*)     Appearance Urine Slightly Cloudy (*)     Glucose Urine Negative      Bilirubin Urine Negative      Ketones Urine Negative      Specific Gravity Urine 1.037 (*)     Blood Urine Small (*)     pH Urine 5.5      Protein Albumin Urine 10 (*)     Urobilinogen Urine Normal      Nitrite Urine Negative      Leukocyte Esterase Urine Moderate (*)     Mucus Urine Present (*)     RBC Urine 6 (*)     WBC Urine 18 (*)     Squamous Epithelials Urine 6 (*)    CBC WITH PLATELETS AND DIFFERENTIAL - Abnormal    WBC Count 9.4      RBC Count 4.28      Hemoglobin 14.0      Hematocrit 44.6       (*)     MCH 32.7      MCHC 31.4 (*)     RDW 13.2      Platelet Count 259      % Neutrophils 55      % Lymphocytes 34      % Monocytes 8      % Eosinophils 2      % Basophils 0      % Immature Granulocytes 1      NRBCs per 100 WBC 0      Absolute Neutrophils 5.2      Absolute Lymphocytes 3.2      Absolute Monocytes 0.8      Absolute Eosinophils 0.2      Absolute Basophils 0.0      Absolute Immature Granulocytes 0.1      Absolute NRBCs 0.0     INR - Normal    INR 1.02     COMPREHENSIVE METABOLIC PANEL - Normal    Sodium 136      Potassium 4.2      Carbon Dioxide (CO2) 27      Anion Gap 9      Urea Nitrogen 19.3      Creatinine 0.82      GFR Estimate 87      Calcium 8.8      Chloride 100      Glucose 87      Alkaline Phosphatase 87      AST 18      ALT 9      Protein Total 6.6      Albumin 4.2      Bilirubin Total 0.3     LACTIC ACID WHOLE BLOOD WITH 1X REPEAT IN 2 HR WHEN >2 - Normal    Lactic Acid, Initial 0.9     ERYTHROCYTE SEDIMENTATION RATE AUTO - Normal    Erythrocyte Sedimentation Rate 7     HCG QUALITATIVE PREGNANCY - Normal    hCG Serum Qualitative Negative     CK TOTAL - Normal    CK 58     URINE CULTURE     CT Abdomen Pelvis w Contrast    (Results Pending)          Critical care was not performed.     Medical Decision Making  The patient's presentation was of high complexity (an acute  health issue posing potential threat to life or bodily function).    The patient's evaluation involved:  review of external note(s) from 3+ sources (see separate area of note for details)  review of 3+ test result(s) ordered prior to this encounter (see separate area of note for details)  ordering and/or review of 3+ test(s) in this encounter (see separate area of note for details)    The patient's management necessitated further care after sign-out to Dr. Kamara (see their note for further management).    Assessment & Plan    Labs as reviewed and interpreted by me significant for mild elevation in CRP to 6.26 and a urinary tract infection, otherwise reassuring reassuring CBC, CMP, CK, sed rate, lactate.  Pregnancy negative  Given labs and clinical exam patient more likely with a urinary tract infection so treated with oral Macrobid here in the emergency room.  At time of signout, patient pending CT scan and if negative or reassuring patient can discharge home with oral antibiotics that were already ordered          I have reviewed the nursing notes. I have reviewed the findings, diagnosis, plan and need for follow up with the patient.    New Prescriptions    No medications on file       Final diagnoses:   Abdominal pain, unspecified abdominal location   Acute UTI       I, Ang Tena, am serving as a trained medical scribe to document services personally performed by Eduardo Rosario MD based on the provider's statements to me on August 15, 2024.  This document has been checked and approved by the attending provider.    IEduardo MD, was physically present and have reviewed and verified the accuracy of this note documented by Ang Tena medical scribe.      Eduardo Rosario MD  [facility] EMERGENCY DEPARTMENT  8/15/2024     Eduardo Rosario MD  08/16/24 0107

## 2024-08-16 NOTE — ED TRIAGE NOTES
Pt was in 2 weeks ago and left before care, abnormal labs MD told her to come in, abdominal pain 6/10, joint pain 6/10, previous intestinal Cx pt, last surgery 2019   Triage Assessment (Adult)       Row Name 08/15/24 2766          Triage Assessment    Airway WDL WDL        Respiratory WDL    Respiratory WDL WDL        Skin Circulation/Temperature WDL    Skin Circulation/Temperature WDL WDL        Cardiac WDL    Cardiac WDL WDL        Peripheral/Neurovascular WDL    Peripheral Neurovascular WDL WDL        Cognitive/Neuro/Behavioral WDL    Cognitive/Neuro/Behavioral WDL WDL

## 2024-08-16 NOTE — ED NOTES
[facility] team          Pt took PRN percocet but is asking for another pain medication. States that her home meds were not working for her abd pain    Update: MD call back with PRN pain med order.

## 2024-08-16 NOTE — PROGRESS NOTES
Brief progress note general surgery - full consult note to follow    Likely acute on chronic partial SBO. Also possible enteritis with colitis.    Admit to medicine for further workup of abdominal pain  Bowel rest overnight with NPO, IVF  General Surgery will continue to follow

## 2024-08-17 VITALS
RESPIRATION RATE: 16 BRPM | HEART RATE: 75 BPM | SYSTOLIC BLOOD PRESSURE: 115 MMHG | TEMPERATURE: 98.2 F | OXYGEN SATURATION: 99 % | DIASTOLIC BLOOD PRESSURE: 76 MMHG

## 2024-08-17 LAB — BACTERIA UR CULT: NORMAL

## 2024-08-17 PROCEDURE — 250N000013 HC RX MED GY IP 250 OP 250 PS 637

## 2024-08-17 PROCEDURE — 250N000013 HC RX MED GY IP 250 OP 250 PS 637: Performed by: STUDENT IN AN ORGANIZED HEALTH CARE EDUCATION/TRAINING PROGRAM

## 2024-08-17 PROCEDURE — 120N000002 HC R&B MED SURG/OB UMMC

## 2024-08-17 PROCEDURE — 258N000003 HC RX IP 258 OP 636

## 2024-08-17 PROCEDURE — 250N000011 HC RX IP 250 OP 636: Performed by: STUDENT IN AN ORGANIZED HEALTH CARE EDUCATION/TRAINING PROGRAM

## 2024-08-17 PROCEDURE — 99232 SBSQ HOSP IP/OBS MODERATE 35: CPT | Performed by: STUDENT IN AN ORGANIZED HEALTH CARE EDUCATION/TRAINING PROGRAM

## 2024-08-17 RX ORDER — KETOROLAC TROMETHAMINE 30 MG/ML
30 INJECTION, SOLUTION INTRAMUSCULAR; INTRAVENOUS EVERY 8 HOURS
Status: DISCONTINUED | OUTPATIENT
Start: 2024-08-17 | End: 2024-08-18 | Stop reason: HOSPADM

## 2024-08-17 RX ORDER — HYDROMORPHONE HYDROCHLORIDE 1 MG/ML
0.3 INJECTION, SOLUTION INTRAMUSCULAR; INTRAVENOUS; SUBCUTANEOUS EVERY 6 HOURS PRN
Status: DISCONTINUED | OUTPATIENT
Start: 2024-08-17 | End: 2024-08-18 | Stop reason: HOSPADM

## 2024-08-17 RX ORDER — SIMETHICONE 80 MG
80 TABLET,CHEWABLE ORAL EVERY 6 HOURS PRN
Status: DISCONTINUED | OUTPATIENT
Start: 2024-08-17 | End: 2024-08-18 | Stop reason: HOSPADM

## 2024-08-17 RX ORDER — HYDROXYZINE HYDROCHLORIDE 25 MG/1
25 TABLET, FILM COATED ORAL EVERY 6 HOURS PRN
Status: DISCONTINUED | OUTPATIENT
Start: 2024-08-17 | End: 2024-08-18 | Stop reason: HOSPADM

## 2024-08-17 RX ORDER — PANTOPRAZOLE SODIUM 40 MG/1
40 TABLET, DELAYED RELEASE ORAL
Status: DISCONTINUED | OUTPATIENT
Start: 2024-08-18 | End: 2024-08-18 | Stop reason: HOSPADM

## 2024-08-17 RX ORDER — HYOSCYAMINE SULFATE 0.125 MG
125 TABLET ORAL EVERY 4 HOURS PRN
Status: DISCONTINUED | OUTPATIENT
Start: 2024-08-17 | End: 2024-08-18 | Stop reason: HOSPADM

## 2024-08-17 RX ORDER — METHOCARBAMOL 500 MG/1
500 TABLET, FILM COATED ORAL EVERY 6 HOURS PRN
Status: DISCONTINUED | OUTPATIENT
Start: 2024-08-17 | End: 2024-08-18 | Stop reason: HOSPADM

## 2024-08-17 RX ORDER — OXYCODONE HYDROCHLORIDE 5 MG/1
5 TABLET ORAL EVERY 4 HOURS PRN
Status: DISCONTINUED | OUTPATIENT
Start: 2024-08-17 | End: 2024-08-18 | Stop reason: HOSPADM

## 2024-08-17 RX ORDER — MIRTAZAPINE 15 MG/1
15 TABLET, ORALLY DISINTEGRATING ORAL AT BEDTIME
Status: DISCONTINUED | OUTPATIENT
Start: 2024-08-17 | End: 2024-08-18 | Stop reason: HOSPADM

## 2024-08-17 RX ORDER — ACETAMINOPHEN 325 MG/1
650 TABLET ORAL EVERY 6 HOURS SCHEDULED
Status: DISCONTINUED | OUTPATIENT
Start: 2024-08-17 | End: 2024-08-18 | Stop reason: HOSPADM

## 2024-08-17 RX ADMIN — HYDROMORPHONE HYDROCHLORIDE 0.3 MG: 1 INJECTION, SOLUTION INTRAMUSCULAR; INTRAVENOUS; SUBCUTANEOUS at 18:38

## 2024-08-17 RX ADMIN — OXYCODONE HYDROCHLORIDE 5 MG: 5 TABLET ORAL at 13:20

## 2024-08-17 RX ADMIN — HYDROMORPHONE HYDROCHLORIDE 0.3 MG: 1 INJECTION, SOLUTION INTRAMUSCULAR; INTRAVENOUS; SUBCUTANEOUS at 12:35

## 2024-08-17 RX ADMIN — OXYCODONE HYDROCHLORIDE 5 MG: 5 TABLET ORAL at 17:20

## 2024-08-17 RX ADMIN — ACETAMINOPHEN 650 MG: 325 TABLET ORAL at 17:21

## 2024-08-17 RX ADMIN — OXYCODONE HYDROCHLORIDE AND ACETAMINOPHEN 1 TABLET: 5; 325 TABLET ORAL at 07:47

## 2024-08-17 RX ADMIN — SODIUM CHLORIDE, POTASSIUM CHLORIDE, SODIUM LACTATE AND CALCIUM CHLORIDE: 600; 310; 30; 20 INJECTION, SOLUTION INTRAVENOUS at 07:37

## 2024-08-17 RX ADMIN — METHOCARBAMOL 500 MG: 500 TABLET ORAL at 16:20

## 2024-08-17 RX ADMIN — HYDROXYZINE HYDROCHLORIDE 25 MG: 25 TABLET ORAL at 16:24

## 2024-08-17 RX ADMIN — HYDROMORPHONE HYDROCHLORIDE 0.3 MG: 1 INJECTION, SOLUTION INTRAMUSCULAR; INTRAVENOUS; SUBCUTANEOUS at 05:28

## 2024-08-17 RX ADMIN — ACETAMINOPHEN 650 MG: 325 TABLET ORAL at 12:40

## 2024-08-17 RX ADMIN — SIMETHICONE 80 MG: 80 TABLET, CHEWABLE ORAL at 16:23

## 2024-08-17 RX ADMIN — KETOROLAC TROMETHAMINE 30 MG: 30 INJECTION, SOLUTION INTRAMUSCULAR at 12:42

## 2024-08-17 ASSESSMENT — ACTIVITIES OF DAILY LIVING (ADL)
ADLS_ACUITY_SCORE: 37

## 2024-08-17 NOTE — PROGRESS NOTES
Pt resting peacefully, not on any monitoring machines but she is breathing adequately on writers observation. Requested not to be woken up overnight.

## 2024-08-17 NOTE — PROGRESS NOTES
Shift: [id number]    Blood pressure 113/68, pulse 77, temperature 98.3  F (36.8  C), temperature source Oral, resp. rate 16, SpO2 97%, not currently breastfeeding.    Neuro: A&Ox4.   Cardiac: SR. VSS.   Respiratory:  Pt 02 Sat 97%. Pt on RA.  GI/: Adequate urine output. BM X1  Diet/appetite: Clear liquid  Activity:  Independent  Pain: At acceptable level on current regimen. PRN Oxycodone and Dilaudid  Skin: PIV infiltrated and removed. Cold ice applied and elevated.  LDA's: PIV infusing  ml/hr  .....   Plan: Continue with POC.

## 2024-08-17 NOTE — PLAN OF CARE
Neuro: A&Ox4.   Cardiac: SR. VSS.   Respiratory: Sating 100 on RA.  GI/: Adequate urine output.   Diet/appetite: Not tolerating full liquid diet. Pt ordered soup and did not tolerate it well. Pt stated that eating increases abdominal pain and discomfort.   Activity: Independent. Pt left unit multiple times throughout the shift without notifying staff. Pt was educated on several occasions that leaving the ED is not allowed. Staff 1:1 with pt due to flight risk. Pt states that walking helps with abdominal pain.   Pain: Pain ranging from 6/10 - 8/10 throughout shift. Medication given to reduce pain throughout the shift.   Skin: BUE edematous due to previous infiltration sights.   LDA's: L PIV.    Plan: Continue with POC. Notify primary team with changes. Anticipate discharge in 1-2 days upon pt's ability to tolerate diet.       Problem: Pain Acute  Goal: Optimal Pain Control and Function  Intervention: Prevent or Manage Pain  Recent Flowsheet Documentation  Taken 8/17/2024 2522 by Heidi Mejia, RN  Medication Review/Management: medications reviewed     Problem: Adult Inpatient Plan of Care  Goal: Plan of Care Review  Description: The Plan of Care Review/Shift note should be completed every shift.  The Outcome Evaluation is a brief statement about your assessment that the patient is improving, declining, or no change.  This information will be displayed automatically on your shift  note.  Outcome: Progressing  Flowsheets (Taken 8/17/2024 0969)  Plan of Care Reviewed With: patient  Overall Patient Progress: no change   Goal Outcome Evaluation:      Plan of Care Reviewed With: patient    Overall Patient Progress: no change

## 2024-08-17 NOTE — PROGRESS NOTES
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     Gastrografin challenge completed yesterday showing contrast opacification of the large bowel and rectum. No significant dilated loops of bowel, pneumatosis, or portal venous gas. Patient able to advance diet as tolerated, EGS will sign off at this time.  -- No surgical intervention indicated  -- ADAT  -- EGS will sign off at this time   -- Please call with questions or concerns    Diet: Clear Liquid Diet    DVT Prophylaxis: Pneumatic Compression Devices  Godwin Catheter: Not present  Lines: None     Drains: None     Cardiac Monitoring: None  Code Status: Full Code      Clinically Significant Risk Factors                    Disposition Plan     Expected Discharge Date: 08/18/2024                 The patient's care was discussed with the chief resident and staff    Matthew Victoria MD  [facility]  Text page via [facility] Paging/Directory     ______________________________________________________________________    Interval History   No acute events overnight, afebrile, normotensive, resting comfortably in bed on examination this morning. Continues to endorse lower abdominal pain, however Gastrografin challenge completed yesterday showing  contrast throughout the large bowel and colon. Patient states feeling hungry this morning. Passing gas and had a bowel movement.    Physical Exam   Vital Signs: Temp: 98.3  F (36.8  C) Temp src: Oral BP: 106/69 Pulse: 73   Resp: 16 SpO2: 97 % O2 Device: None (Room air)    Weight: 0 lbs 0 oz  Intake/Output Summary (Last 24 hours) at 8/17/2024 1039  Last data filed at 8/16/2024 2235  Gross per 24 hour   Intake 758.33 ml   Output --   Net 758.33 ml     Constitutional: Resting in bed comfortably no apparent distress  Respiratory: Saturating well on room air, appropriate chest rise with inspiration  Cardiovascular: Regular rate and rhythm by radial pulse  GI: Soft, moderately tender to palpation in the lower quadrants, no rigidity guarding or peritonitis  Musculoskeletal: Warm and well-perfused    Data         Imaging results reviewed over the past 24 hrs:   Recent Results (from the past 24 hour(s))   XR Gastrografin Challenge    Narrative    EXAMINATION:  XR GASTROGRAFIN CHALLENGE 8/16/2024 5:39 PM     COMPARISON: CT abdomen and pelvis 8/15/2024.    HISTORY: Small Bowel Obstruction    TECHNIQUE: Frontal view of the abdomen 8 hours after administration of  oral contrast.    FINDINGS: Contrast opacification of the large bowel and rectum. No  significantly dilated air-filled bowel loop. No pneumatosis or portal  venous gas       Impression    IMPRESSION:     On this delayed film post Gastrografin administration, contrast  opacification of large bowel and rectum consistent with absence of  complete/high-grade bowel obstruction.    I have personally reviewed the examination and initial interpretation  and I agree with the findings.    KARINA SANDS MD         SYSTEM ID:  [id number]

## 2024-08-18 ASSESSMENT — ACTIVITIES OF DAILY LIVING (ADL): ADLS_ACUITY_SCORE: 37

## 2024-08-18 NOTE — DISCHARGE SUMMARY
[facility]  Hospitalist Discharge Summary      Date of Admission:  8/15/2024  Date of Discharge:  8/17/2024  7:10 PM  Discharging Provider: Kalli Pavon MD  Discharge Service: Hospitalist Service, GOLD TEAM 9    Discharge Diagnoses   Partial small bowel obstruction  Hx of cervical and ovarian CA c/b radiation enteritis  Hx of recurrent small bowel obstructions  Pyuria    Clinically Significant Risk Factors          Follow-ups Needed After Discharge       Unresulted Labs Ordered in the Past 30 Days of this Admission       No orders found from 7/16/2024 to 8/16/2024.            Discharge Disposition   Left AMA to home  Condition at discharge: [facility] Course   Rachel A Gerhardt is a 48 year old female admitted on 8/15/2024. 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    #Partial Small Bowel Obstruction  The patient reports having a 3 day history of cramping abdominal pain that is similar to the pain she has had with previous small bowel obstructions. She has been able to pass gas and her last bowel movement was on 8/15/24. In the ED vital signs were stable. CT abdomen revealed wall thickening of multiple small bowel loops in the lower abdomen and pelvis with few of these loops mildly dilated and wall thickening of the rectosigmoid colon consistent with either a partial small bowel obstruction or nonspecific colitis. General surgery saw the patient recommended a gastrografin challenge which showed  movement of contrast to the colon and signed off. Patient left AMA after tolerating a clear liquid diet.    #pyuria  The patient denies any urinary symptoms. UA in the ED with small blood, moderate leukocyte esterase, and 18WBC. Urine culture with <10,000 urogenital hank. Antibiotics discontinued after 2 doses.        Consultations This Hospital Stay   SURGERY GENERAL ADULT IP CONSULT  NURSING TO CONSULT FOR VASCULAR ACCESS CARE IP CONSULT  NURSING TO CONSULT FOR VASCULAR ACCESS CARE IP CONSULT  NURSING TO CONSULT FOR VASCULAR ACCESS CARE IP CONSULT    Code Status   Prior    Time Spent on this Encounter   I, Kalli Pavon MD, discharged this patient today but I did not personally see the patient today and will not be billing for the patient's discharge.       Kalli Pavon MD  [facility] EMERGENCY DEPARTMENT  [address] ([place]) [address]  Phone: [phone]  ______________________________________________________________________    Physical Exam   Vital Signs: Temp: 98.2  F (36.8  C) Temp src: Oral BP: 115/76 Pulse: 75   Resp: 16 SpO2: 99 % O2 Device: None (Room air)    Weight: 0 lbs 0 oz  See progress note from 8/17       Primary Care Physician   Reji Avery    Discharge Orders   No discharge procedures on file.    Significant Results and Procedures   Most Recent 3 CBC's:  Recent Labs   Lab Test 08/16/24  0641 08/15/24  2223 07/26/24  1741   WBC 8.7 9.4 8.8   HGB 13.9 14.0 12.0   * 104* 101*    259 254     Most Recent 3 BMP's:  Recent Labs   Lab Test 08/16/24  0641 08/15/24  2223 07/26/24  1741    136 140   POTASSIUM 4.2 4.2 3.4   CHLORIDE 103 100 105   CO2 27 27 24   BUN 12.5 19.3 10.9   CR 0.65 0.82 0.82   ANIONGAP 7 9 11   JONATAN 8.8 8.8 8.7*   GLC 92 87 98     Most Recent 2 LFT's:  Recent Labs   Lab Test 08/16/24  0641 08/15/24  2223   AST 16 18   ALT 6 9   ALKPHOS 90 87   BILITOTAL 0.5 0.3   ,   Results for orders placed or performed during the hospital  encounter of 08/15/24   CT Abdomen Pelvis w Contrast    Narrative    EXAM: CT ABDOMEN PELVIS W CONTRAST  LOCATION: [facility]  DATE: 8/16/2024    INDICATION: Abdominal pain.  COMPARISON: 6/24/2023.  TECHNIQUE: CT scan of the abdomen and pelvis was performed following injection of IV contrast. Multiplanar reformats were obtained. Dose reduction techniques were used.  CONTRAST: 86ml isovue 370    FINDINGS:   LOWER CHEST: Normal.    HEPATOBILIARY: The gallbladder is distended but otherwise appears normal. No calcified gallstone.    PANCREAS: Normal.    SPLEEN: Normal.    ADRENAL GLANDS: Normal.    KIDNEYS/BLADDER: There is no hydronephrosis. Few small renal cysts.    BOWEL: There are multiple thick-walled small bowel loops in the lower abdomen and pelvis. Few of these small bowel loops are mildly dilated. There is also wall thickening of the rectosigmoid colon. There is edema in the small bowel mesentery.   Postoperative change in the right pelvis. There is no free intraperitoneal gas. Small amount of ascites.    LYMPH NODES: Normal.    VASCULATURE: Normal.    PELVIC ORGANS: Normal.    MUSCULOSKELETAL: Degenerative disease at the lumbosacral junction.      Impression    IMPRESSION:   1.  Wall thickening of multiple small bowel loops in the lower abdomen and pelvis with few of these loops mildly dilated, consistent with enteritis or inflammatory bowel disease and partial small bowel obstruction.  2.  Wall thickening of the rectosigmoid colon consistent with a nonspecific colitis. No colonic obstruction.   XR Gastrografin Challenge    Narrative    EXAMINATION:  XR GASTROGRAFIN CHALLENGE 8/16/2024 5:39 PM     COMPARISON: CT abdomen and pelvis 8/15/2024.    HISTORY: Small Bowel Obstruction    TECHNIQUE: Frontal view of the abdomen 8 hours after administration of  oral contrast.    FINDINGS: Contrast opacification of the large bowel and rectum. No  significantly dilated  air-filled bowel loop. No pneumatosis or portal  venous gas       Impression    IMPRESSION:     On this delayed film post Gastrografin administration, contrast  opacification of large bowel and rectum consistent with absence of  complete/high-grade bowel obstruction.    I have personally reviewed the examination and initial interpretation  and I agree with the findings.    KARINA SANDS MD         SYSTEM ID:  [id number]       Discharge Medications   Discharge Medication List as of 8/18/2024  1:50 AM        CONTINUE these medications which have NOT CHANGED    Details   acetaminophen (TYLENOL) 325 MG tablet Take 3 tablets (975 mg) by mouth every 8 hours, R-0, OTC      hydrOXYzine (ATARAX) 25 MG tablet Take 1 tablet (25 mg) by mouth 3 times daily as needed (pain), Disp-30 tablet, R-0, E-Prescribe      hyoscyamine (ANASPAZ/LEVSIN) 0.125 MG tablet Take 1 tablet (125 mcg) by mouth every 4 hours as needed for cramping, Disp-20 tablet, R-0, E-Prescribe      Lidocaine (LIDOCARE) 4 % Patch Place 1 patch onto the skin every 24 hours To prevent lidocaine toxicity, patient should be patch free for 12 hrs daily.Disp-20 patch, [id number]      menthol (ICY HOT) 5 % PTCH Apply 1 patch topically every 8 hours as needed for muscle soreness, Disp-20 each, R-0, E-Prescribe      methocarbamol (ROBAXIN) 500 MG tablet Take 1 tablet (500 mg) by mouth every 6 hours as needed for muscle spasms, Disp-20 tablet, R-0, E-Prescribe      mirtazapine (REMERON SOL-TAB) 15 MG ODT 1 tablet (15 mg) by Orally disintegrating tablet route At Bedtime, Disp-30 tablet, R-0, E-Prescribe      nortriptyline (PAMELOR) 10 MG capsule Take 1 capsule (10 mg) by mouth At Bedtime, Disp-30 capsule, R-0, E-Prescribe      !! ondansetron (ZOFRAN-ODT) 4 MG ODT tab Take 1 tablet (4 mg) by mouth every 6 hours as needed for nausea or vomiting, Disp-20 tablet, R-0, E-Prescribe      !! ondansetron (ZOFRAN-ODT) 4 MG ODT tab Take 1 tablet (4 mg) by mouth every 6 hours as  needed for nausea or vomiting, Disp-18 tablet, R-0, E-Prescribe      !! ondansetron (ZOFRAN-ODT) 4 MG ODT tab Take 1 tablet (4 mg) by mouth every 6 hours as needed for nausea or vomiting, Disp-90 tablet, R-0, E-Prescribe      oxyCODONE-acetaminophen (PERCOCET) 5-325 MG tablet Take 1 tablet by mouth every 4 hours as needed for severe pain, Disp-12 tablet, R-0, Local Print      pantoprazole (PROTONIX) 40 MG EC tablet Take 1 tablet (40 mg) by mouth 2 times daily (before meals), Disp-60 tablet, R-0, E-Prescribe      Simethicone 125 MG TABS Take 125 mg by mouth daily , Historical       !! - Potential duplicate medications found. Please discuss with provider.        Allergies   Allergies   Allergen Reactions    Other (Do Not Use) Anaphylaxis     Rats- breathing issues- severe    Sulfa Antibiotics Hives    Ibuprofen Nausea and Vomiting and Hives     Unknown if reaction hives or N/V    No Clinical Screening - See Comments Other (See Comments) and Diarrhea     headache  Carrots cause gastric upset, cramping and diarrhea.    Ciprofloxacin Hives and Nausea    Clavulanic Acid     Melon     Quinolones     Sulfasalazine      Other reaction(s): Hives  hives    Tramadol Hives, Diarrhea, Nausea and Nausea and Vomiting    Amoxicillin Nausea and Vomiting    Amoxicillin-Pot Clavulanate Nausea, Hives and GI Disturbance     Patient tolerated course of zosyn in 5/2018    Avelox [Moxifloxacin] Nausea and Vomiting    Codeine Nausea and Vomiting    Daucus Carota      Other reaction(s): GI Upset  Other reaction(s): Abdominal pain, Diarrhea  Carrots cause gastric upset, cramping and diarrhea.

## 2024-08-20 ENCOUNTER — PATIENT OUTREACH (OUTPATIENT)
Dept: CARE COORDINATION | Facility: CLINIC | Age: 49
End: 2024-08-20

## 2024-08-20 NOTE — PROGRESS NOTES
Connected Care Resource Center:   [facility] Care Resource Center Contact  [facility]/Voicemail     Clinical Data: Post-Discharge Outreach     Outreach attempted x 2.  Left message on patient's voicemail, providing [facility]'s central phone number of [phone] ([phone]) for questions/concerns and/or to schedule an appt with an [facility] provider, if they do not have a PCP.      Plan:  [facility] will do no further outreaches at this time.       WILL Smith  Connected Care Resource Center, [facility]    *Connected Care Resource Team does NOT follow patient ongoing. Referrals are identified based on internal discharge reports and the outreach is to ensure patient has an understanding of their discharge instructions.

## 2025-03-18 NOTE — CONSULTS
Colon and Rectal Surgery Consultation Note  [facility]    Rachel A Gerhardt MRN# [id number]   YOB: 1975 Age: 41 year old      Date of Admission:  7/10/2017     Chief Complaint:   Rachel A Gerhardt is a 41 year old female with a past medical history significant for cervical cancer s/p chemo/rad, recurrent SBO, s/p ex-lap w/ SBR 5/18/17, and hx opioid dependence who presents with 2 d lower abdominal pain and vomiting.       History of Present Illness:   Rachel A Gerhardt is a 41 year old female with a past medical history significant for cervical cancer s/p chemo/rad, recurrent SBO, s/p ex-lap w/ SBR 5/18/17, and hx opioid dependence who presents with 2 d lower abdominal pain and vomiting.  She had pain increased from her baseline starting Saturday night in her lower abdomen most concentrated below her infraumbilical surgical scar.  She then had nausea and several episodes of vomiting beginning Sunday night.  She has not been able to eat since this began, she has had normal bowel function with her last BM this morning.  She denied blood in the stool or vomitus.  Nothing has helped the pain.  She takes one percocet nightly for chronic pain and this has not helped.  She denies fever or chills.       Past Medical History:     Past Medical History:   Diagnosis Date     Asthma      Cancer (H)     Per patient OBGYN, cerivical cancer     Cervical cancer (H)      Other chronic pain      Ovarian cancer (H)      Substance abuse     Outside records indicate past history of narcotics abuse or dependence, but patient denies.          Past Surgical History:     Past Surgical History:   Procedure Laterality Date     COMBINED CYSTOSCOPY, INSERT STENT URETER(S) Bilateral 5/18/2017    Procedure: COMBINED CYSTOSCOPY, INSERT STENT URETER(S);  Cystoscopy with Bilateral Stent,;  Surgeon: Rene Calero MD;  Location: [facility] OR     ENT SURGERY  2009    mastoid, sinus     EXAM UNDER ANESTHESIA, INSERT  ALEX SLEEVE, UTERINE PLACEMENT OF TANDEM AND RING FOR RAD, ULTRASOUND N/A 12/14/2015    Procedure: EXAM UNDER ANESTHESIA, INSERT ALEX SLEEVE, UTERINE PLACEMENT OF TANDEM AND RING FOR RADIATION, ULTRASOUND GUIDED;  Surgeon: Abby Tony MD;  Location: [facility] OR     INSERT TANDEM AND CESIUM APPLICATOR CERVIX, ULTRASOUND GUIDED N/A 12/17/2015    Procedure: INSERT TANDEM AND CESIUM APPLICATOR CERVIX, ULTRASOUND GUIDED;  Surgeon: Kika Wood MD;  Location: [facility] OR     KNEE SURGERY       LAPAROTOMY EXPLORATORY N/A 5/18/2017    Procedure: LAPAROTOMY EXPLORATORY;   Exploratry Laparotomy, Small Bowel Resection with anastomosis, Flexible Sigmoidoscopy;  Surgeon: Jennifer Goodwin MD;  Location: [facility] OR     PICC INSERTION Right 04/29/2017    4fr SL BioFlo PICC, 37cm (3cm external) in the R basilic vein w/ tip in the mid SVC.     RESECT SMALL BOWEL WITHOUT OSTOMY N/A 5/18/2017    Procedure: RESECT SMALL BOWEL WITHOUT OSTOMY;;  Surgeon: Jennifer Goodwin MD;  Location: [facility] OR     SIGMOIDOSCOPY FLEXIBLE N/A 5/18/2017    Procedure: SIGMOIDOSCOPY FLEXIBLE;;  Surgeon: Jennifer Goodwin MD;  Location: [facility] OR          Allergies:      Allergies   Allergen Reactions     No Clinical Screening - See Comments Other (See Comments) and Diarrhea     headache  Carrots cause gastric upset, cramping and diarrhea.     Sulfa Drugs Hives     hives     Amoxicillin Unknown and Other (See Comments)     vomiting  vomiting     Amoxicillin-Pot Clavulanate Other (See Comments) and Nausea     vomiting     Augmentin GI Disturbance, Nausea and Hives     Avelox [Moxifloxacin] Nausea and Vomiting, Unknown and Nausea     Ciprofloxacin Hives and Nausea     Codeine Nausea and Vomiting and Nausea     Ibuprofen Nausea and Vomiting     Other reaction(s): Nausea And Vomiting     Ibuprofen Sodium Hives and GI Disturbance     Quinolones      Tramadol Hives, Diarrhea, Nausea and Nausea and Vomiting     Daucus Carota      Other reaction(s): GI Upset  Other  reaction(s): Abdominal pain, Diarrhea  Carrots cause gastric upset, cramping and diarrhea.          Medications:   Reviewed in epic.       No current facility-administered medications on file prior to encounter.   Current Outpatient Prescriptions on File Prior to Encounter:  albuterol (PROAIR HFA/PROVENTIL HFA/VENTOLIN HFA) 108 (90 BASE) MCG/ACT Inhaler Inhale 2 puffs into the lungs every 6 hours as needed   oxyCODONE (ROXICODONE) 5 MG IR tablet Take 1 tablet (5 mg) by mouth daily as needed for moderate to severe pain   acetaminophen (TYLENOL) 500 MG tablet Take 2 tablets (1,000 mg) by mouth 4 times daily   saccharomyces boulardii (FLORASTOR) 250 MG capsule Take 1 capsule (250 mg) by mouth 2 times daily   ondansetron (ZOFRAN) 4 MG tablet Take 1-2 tablets (4-8 mg) by mouth every 8 hours as needed for nausea   polyethylene glycol (MIRALAX/GLYCOLAX) Packet Take 17 g by mouth daily   cholecalciferol 1000 UNITS TABS Take 1,000 Units by mouth daily   calcium carbonate (TUMS) 500 MG chewable tablet Take 500 mg by mouth Reported on 5/8/2017   simethicone (MYLICON) 80 MG chewable tablet Take 80 mg by mouth Reported on 5/8/2017   vitamin D (ERGOCALCIFEROL) 05493 UNIT capsule Take 1 capsule (50,000 Units) by mouth once a week   hyoscyamine (ANASPAZ/LEVSIN) 0.125 MG tablet Take 1-2 tablets (125-250 mcg) by mouth every 4 hours as needed for cramping (Patient not taking: Reported on 6/22/2017)          Social History:     Social History     Social History     Marital status:      Spouse name: N/A     Number of children: N/A     Years of education: N/A     Occupational History     Not on file.     Social History Main Topics     Smoking status: Light Tobacco Smoker     Packs/day: 0.25     Years: 12.00     Types: Cigarettes     Smokeless tobacco: Never Used      Comment: 2/22/16 pt smoking daily 1 cig     Alcohol use No      Comment: None per pt     Drug use: No      Comment: Patient denies.  Outside records indicate possible  Opiate abuse.     Sexual activity: Yes     Partners: Male     Birth control/ protection: None     Other Topics Concern     Parent/Sibling W/ Cabg, Mi Or Angioplasty Before 65f 55m? No     Social History Narrative    Lives alone          Family History:     Family History   Problem Relation Age of Onset     DIABETES Mother      Ovarian Cancer No family hx of      Uterine Cancer No family hx of      Cervical Cancer No family hx of      Breast Cancer No family hx of           Review of systems:   Negative except for above     Exam:   /75  Pulse 85  Temp 98.6  F (37  C) (Oral)  Resp 16  SpO2 98%    General: Thin female Alert, interactive, lying on her side in apparent distress  Resp: NLB on RA  Abdomen: Soft, nondistended.  Tender to palpation throughout with voluntary guarding but no rebound or involuntary guarding.  Extremities: WWP. No LE edema.   Neuro: Alert & oriented x 3, moves all extremities equally.        Labs:   Labs        BMP  Recent Labs  Lab 07/10/17  1143      POTASSIUM 3.3*   CHLORIDE 101   JONATAN 9.0   CO2 30   BUN 10   CR 0.74   GLC 94     CBC  Recent Labs  Lab 07/10/17  1143   WBC 7.6   RBC 4.32   HGB 13.1   HCT 41.1   MCV 95   MCH 30.3   MCHC 31.9   RDW 13.8        LFT:  Recent Labs   Lab Test  07/10/17   1143   AST  10   ALT  12   ALKPHOS  74   ALBUMIN  3.2*   PROTTOTAL  6.8   BILITOTAL  0.4      Lac 0.8    Lipase: [unfilled]  INRNo lab results found in last 7 days.     Arterial Blood GasNo lab results found in last 7 days.       Imaging:        Recent Results (from the past 24 hour(s))   CT Abdomen Pelvis w Contrast    Narrative    EXAMINATION: CT ABDOMEN PELVIS W CONTRAST, 7/10/2017 2:17 PM    TECHNIQUE:  Helical CT images from the lung bases through the  symphysis pubis were obtained with IV contrast. Contrast dose:  iopamidol (ISOVUE-370) solution 95 mL    COMPARISON: PET/CT 5/12/2017.     HISTORY: abd pain. Cervical cancer, status post pelvic radiation.  Small bowel  obstruction status post resection with anastomosis on  5/18/2017.    FINDINGS:    Abdomen and pelvis:   New postoperative changes of small bowel resection and re-anastomosis  in the left lower quadrant of the abdomen. There is a short segment of  distal jejunum/proximal ileum in the midabdomen (series 5 image 245),  which demonstrates moderate wall thickening. Distal to this, there are  a few mildly distended loops leading up to the left lower quadrant  small bowel anastomosis. Distal to the anastomosis, ileum is  decompressed, although appears to demonstrate mild wall thickening and  hyperenhancement. No pneumatosis, portal venous gas or free air in the  abdomen.    The liver, gallbladder, pancreas, spleen, and adrenals are within  normal limits. Small hypoattenuating focus in the left kidney is too  small to characterize. No hydronephrosis or hydroureter. Mild wall  thickening of the urinary bladder is not significantly changed,  presumably related to prior pelvic radiation.  No adnexal mass. The  colon is within normal limits. Mild layering free fluid in the abdomen  and pelvis. Major vasculature is patent. Aorta is normal in caliber.    Lung bases: Clear.    Bones and soft tissues: No acute fracture or suspicious bone lesion.      Impression    IMPRESSION:   1. Postoperative changes of small bowel resection with anastomosis.  Nonspecific segment of distal jejunum/proximal ileum with moderate  wall may represent infectious or inflammatory etiology, although bowel  ischemia is in the differential. Of note, the major mesenteric  vasculature appears patent.  2. Mildly distended small bowel loops immediately upstream from the  anastomosis without wall thickening may represent partial small bowel  obstruction. Distal small bowel loops are decompressed.   3. No extraluminal air or portal venous gas.  4. Mild free fluid in the abdomen and pelvis.    I have personally reviewed the examination and initial  interpretation  and I agree with the findings.    VIRGILIO BECKETT MD          Assessment and Plan:     Rachel A Gerhardt is a 41 year old female who presents with low abdominal pain and n/v and hx of recent ex-lap/ SBR and CT that could not rule out bowel ischemia or pSBO.  WBC 7.6, afebrile, Lac 0.8, having normal bowel function and nondistended.      Adm to Obs    IVF    IV tylenol plus home pain meds    Serial exams    Ericka Harris - General Surgery Resident - Pager: [phone]    Pt reviewed with Dr. Gooden.    Staff Addendum:  Agree with the consultation H&P as documented by the housestaff. I was personally involved with the recommendations made by our service for this patient.  Renetta Seo MD  Colon and Rectal Surgery Staff  [facility]                Duration Of Freeze Thaw-Cycle (Seconds): 0 Post-Care Instructions: I reviewed with the patient in detail post-care instructions. Patient is to wear sunprotection, and avoid picking at any of the treated lesions. Pt may apply Vaseline to crusted or scabbing areas. Render Note In Bullet Format When Appropriate: No Show Aperture Variable?: Yes Consent: The patient's consent was obtained including but not limited to risks of crusting, scabbing, blistering, scarring, darker or lighter pigmentary change, recurrence, incomplete removal and infection. Detail Level: Detailed

## 2025-04-13 ENCOUNTER — HEALTH MAINTENANCE LETTER (OUTPATIENT)
Age: 50
End: 2025-04-13

## 2025-06-15 ENCOUNTER — HEALTH MAINTENANCE LETTER (OUTPATIENT)
Age: 50
End: 2025-06-15

## (undated) DEVICE — ENDO TUBING CO2 SMARTCAP STERILE DISP 100145CO2EXT

## (undated) DEVICE — LINEN TOWEL PACK X5 5464

## (undated) DEVICE — ENDO TANK RESERVOIR FOR SPLINTING TUBE MAJ-1727

## (undated) DEVICE — SU SILK 4-0 TIE 12X30" A303H

## (undated) DEVICE — ENDO TUBE SPLINTING ST-SB1

## (undated) DEVICE — SU PDS II 1 TP-1 48" Z880G

## (undated) DEVICE — SUCTION MANIFOLD DORNOCH ULTRA CART UL-CL500

## (undated) DEVICE — DRSG MEDIPORE 3 1/2X13 3/4" 3573

## (undated) DEVICE — ADH SKIN CLOSURE PREMIERPRO EXOFIN 1.0ML 3470

## (undated) DEVICE — Device

## (undated) DEVICE — LINEN TOWEL PACK X6 WHITE 5487

## (undated) DEVICE — SU SILK 0 TIE 6X30" A306H

## (undated) DEVICE — SU VICRYL 3-0 SH CR 8X18" J774

## (undated) DEVICE — SU VICRYL 3-0 TIE 12X18" J904T

## (undated) DEVICE — KIT CONNECTOR FOR OLYMPUS ENDOSCOPES DEFENDO 100310

## (undated) DEVICE — WIPE PREMOIST CLEANSING WASHCLOTHS 7988

## (undated) DEVICE — CATH TRAY FOLEY 16FR W/URINE METER STATLOCK 303316A

## (undated) DEVICE — APPLICATOR COTTON TIP 6"X2 STERILE LF 6012

## (undated) DEVICE — PAD CHUX UNDERPAD 23X24" 7136

## (undated) DEVICE — SYR 30ML LL W/O NDL 302832

## (undated) DEVICE — ENDO BITE BLOCK ADULT OMNI-BLOC

## (undated) DEVICE — SOL NACL 0.9% IRRIG 1000ML BOTTLE 2F7124

## (undated) DEVICE — SU PDS II 1 CTX 36" Z371T

## (undated) DEVICE — PREP CHLORAPREP 26ML TINTED ORANGE  260815

## (undated) DEVICE — PACK ENDOSCOPY GI CUSTOM UMMC

## (undated) DEVICE — SU VICRYL 3-0 SH 27" UND J416H

## (undated) DEVICE — SOL WATER IRRIG 1000ML BOTTLE 2F7114

## (undated) DEVICE — ENDO CAP AND TUBING STERILE FOR ENDOGATOR  100130

## (undated) DEVICE — GLOVE PROTEXIS POWDER FREE SMT 8.0  2D72PT80X

## (undated) DEVICE — KIT ENDO FIRST STEP DISINFECTANT 200ML W/POUCH EP-4

## (undated) DEVICE — WIPES FOLEY CARE SURESTEP PROVON DFC100

## (undated) DEVICE — DRSG STERI STRIP 1/2X4" R1547

## (undated) DEVICE — LINEN GOWN XLG 5407

## (undated) DEVICE — PACK AB HYST II

## (undated) DEVICE — LINEN TOWEL PACK X30 5481

## (undated) DEVICE — STPL LINEAR 60 X 3.5MM TA6035S

## (undated) DEVICE — SYR 10ML LL W/O NDL

## (undated) DEVICE — SU SILK 2-0 TIE 12X30" A305H

## (undated) DEVICE — JELLY LUBRICATING SURGILUBE 2OZ TUBE

## (undated) DEVICE — DRAPE LEGGINGS CLEAR 8430

## (undated) DEVICE — SU SILK 3-0 TIE 12X30" A304H

## (undated) DEVICE — IMPLANTABLE DEVICE
Type: IMPLANTABLE DEVICE | Site: URETER | Status: NON-FUNCTIONAL
Removed: 2017-05-18

## (undated) DEVICE — TUBING SUCTION 10'X3/16" N510

## (undated) DEVICE — STPL 80 X 3.8MM GIA8038S

## (undated) DEVICE — ENDO DEVICE LOCKING AND BIOPSY CAP M00545261

## (undated) DEVICE — WIRE GUIDE TRACER METRO DIRECT .035"X600CM ANG TIP

## (undated) DEVICE — TAPE CLOTH 3" CARDINAL 3TRCL03

## (undated) DEVICE — SU VICRYL 3-0 SH 8X18" UND J864D

## (undated) DEVICE — SPONGE KITTNER 30-101

## (undated) DEVICE — BLADE CLIPPER SGL USE 9680

## (undated) DEVICE — SU DERMABOND ADVANCED .7ML DNX12

## (undated) DEVICE — SOL ADH LIQUID BENZOIN SWAB 0.6ML C1544

## (undated) DEVICE — LIGHT HANDLE X1 31140133

## (undated) DEVICE — CATH BALLOON ELATION ESOPH/PYL/COL 15-16.5-18MMX240CM EPCB15

## (undated) DEVICE — ENDO CATH BALLOON 13MM DL OLYMPUS

## (undated) DEVICE — SU MONOCRYL 4-0 PS-2 27" UND Y426H

## (undated) DEVICE — SU SILK 3-0 SH CR 8X18" C013D

## (undated) DEVICE — INFLATION DEVICE BIG 60 ENDO-AN6012

## (undated) DEVICE — CATH URETERAL OPEN END 05FR 70CM 020015

## (undated) DEVICE — STPL LINEAR CUT 55MM TLC55

## (undated) DEVICE — PACK CYSTO UMMC CUSTOM

## (undated) DEVICE — GUIDEWIRE SENSOR DUAL FLEX STR 0.035"X150CM M0066703080

## (undated) DEVICE — DRAPE SHEET REV FOLD 3/4 9349

## (undated) DEVICE — CLIP HORIZON MED BLUE 002200

## (undated) DEVICE — STPL RELOAD LINEAR CUT 55MM TCR55

## (undated) DEVICE — DRAPE IOBAN INCISE 23X17" 6650EZ

## (undated) DEVICE — SU VICRYL 2-0 TIE 54" J615H

## (undated) DEVICE — ESU PENCIL SMOKE EVAC W/ROCKER SWITCH 0703-047-000

## (undated) DEVICE — BLADE KNIFE SURG 15 371115

## (undated) DEVICE — SU VICRYL 3-0 SH 27" J316H

## (undated) DEVICE — ENDO ADPT CATH ROTATABLE SIDE ARM G22677

## (undated) DEVICE — ENDO SYSTEM WATER BOTTLE & TUBING W/CO2 FILTER 00711549

## (undated) DEVICE — ESU PENCIL W/COATED BLADE E2450H

## (undated) RX ORDER — ONDANSETRON 2 MG/ML
INJECTION INTRAMUSCULAR; INTRAVENOUS
Status: DISPENSED
Start: 2018-06-18

## (undated) RX ORDER — ACETAMINOPHEN 325 MG/1
TABLET ORAL
Status: DISPENSED
Start: 2017-05-18

## (undated) RX ORDER — KETAMINE HCL IN 0.9 % NACL 50 MG/5 ML
SYRINGE (ML) INTRAVENOUS
Status: DISPENSED
Start: 2019-10-31

## (undated) RX ORDER — LIDOCAINE HYDROCHLORIDE 10 MG/ML
INJECTION, SOLUTION EPIDURAL; INFILTRATION; INTRACAUDAL; PERINEURAL
Status: DISPENSED
Start: 2018-03-29

## (undated) RX ORDER — LIDOCAINE HYDROCHLORIDE 10 MG/ML
INJECTION, SOLUTION EPIDURAL; INFILTRATION; INTRACAUDAL; PERINEURAL
Status: DISPENSED
Start: 2019-10-26

## (undated) RX ORDER — LIDOCAINE HYDROCHLORIDE 20 MG/ML
INJECTION, SOLUTION EPIDURAL; INFILTRATION; INTRACAUDAL; PERINEURAL
Status: DISPENSED
Start: 2019-10-31

## (undated) RX ORDER — HYDROMORPHONE HYDROCHLORIDE 1 MG/ML
INJECTION, SOLUTION INTRAMUSCULAR; INTRAVENOUS; SUBCUTANEOUS
Status: DISPENSED
Start: 2019-10-31

## (undated) RX ORDER — GLYCOPYRROLATE 0.2 MG/ML
INJECTION, SOLUTION INTRAMUSCULAR; INTRAVENOUS
Status: DISPENSED
Start: 2018-06-18

## (undated) RX ORDER — PROPOFOL 10 MG/ML
INJECTION, EMULSION INTRAVENOUS
Status: DISPENSED
Start: 2018-04-24

## (undated) RX ORDER — ESMOLOL HYDROCHLORIDE 10 MG/ML
INJECTION INTRAVENOUS
Status: DISPENSED
Start: 2019-10-31

## (undated) RX ORDER — ROCURONIUM BROMIDE 50 MG/5 ML
SYRINGE (ML) INTRAVENOUS
Status: DISPENSED
Start: 2018-05-03

## (undated) RX ORDER — FENTANYL CITRATE 50 UG/ML
INJECTION, SOLUTION INTRAMUSCULAR; INTRAVENOUS
Status: DISPENSED
Start: 2017-05-18

## (undated) RX ORDER — FENTANYL CITRATE 50 UG/ML
INJECTION, SOLUTION INTRAMUSCULAR; INTRAVENOUS
Status: DISPENSED
Start: 2018-06-18

## (undated) RX ORDER — PROPOFOL 10 MG/ML
INJECTION, EMULSION INTRAVENOUS
Status: DISPENSED
Start: 2018-04-30

## (undated) RX ORDER — SODIUM CHLORIDE, SODIUM LACTATE, POTASSIUM CHLORIDE, CALCIUM CHLORIDE 600; 310; 30; 20 MG/100ML; MG/100ML; MG/100ML; MG/100ML
INJECTION, SOLUTION INTRAVENOUS
Status: DISPENSED
Start: 2017-05-18

## (undated) RX ORDER — HYDROMORPHONE HYDROCHLORIDE 1 MG/ML
INJECTION, SOLUTION INTRAMUSCULAR; INTRAVENOUS; SUBCUTANEOUS
Status: DISPENSED
Start: 2018-05-03

## (undated) RX ORDER — FENTANYL CITRATE 50 UG/ML
INJECTION, SOLUTION INTRAMUSCULAR; INTRAVENOUS
Status: DISPENSED
Start: 2019-10-31

## (undated) RX ORDER — FENTANYL CITRATE 50 UG/ML
INJECTION, SOLUTION INTRAMUSCULAR; INTRAVENOUS
Status: DISPENSED
Start: 2018-05-03

## (undated) RX ORDER — DEXAMETHASONE SODIUM PHOSPHATE 4 MG/ML
INJECTION, SOLUTION INTRA-ARTICULAR; INTRALESIONAL; INTRAMUSCULAR; INTRAVENOUS; SOFT TISSUE
Status: DISPENSED
Start: 2019-10-31

## (undated) RX ORDER — SCOLOPAMINE TRANSDERMAL SYSTEM 1 MG/1
PATCH, EXTENDED RELEASE TRANSDERMAL
Status: DISPENSED
Start: 2018-05-03

## (undated) RX ORDER — ERTAPENEM 1 G/1
INJECTION, POWDER, LYOPHILIZED, FOR SOLUTION INTRAMUSCULAR; INTRAVENOUS
Status: DISPENSED
Start: 2019-10-31

## (undated) RX ORDER — EPHEDRINE SULFATE 50 MG/ML
INJECTION, SOLUTION INTRAMUSCULAR; INTRAVENOUS; SUBCUTANEOUS
Status: DISPENSED
Start: 2018-04-24

## (undated) RX ORDER — PROPOFOL 10 MG/ML
INJECTION, EMULSION INTRAVENOUS
Status: DISPENSED
Start: 2018-05-03

## (undated) RX ORDER — FENTANYL CITRATE 50 UG/ML
INJECTION, SOLUTION INTRAMUSCULAR; INTRAVENOUS
Status: DISPENSED
Start: 2018-04-24

## (undated) RX ORDER — PHENYLEPHRINE HCL IN 0.9% NACL 1 MG/10 ML
SYRINGE (ML) INTRAVENOUS
Status: DISPENSED
Start: 2018-04-24

## (undated) RX ORDER — PROPOFOL 10 MG/ML
INJECTION, EMULSION INTRAVENOUS
Status: DISPENSED
Start: 2018-06-18

## (undated) RX ORDER — LIDOCAINE HYDROCHLORIDE 20 MG/ML
INJECTION, SOLUTION EPIDURAL; INFILTRATION; INTRACAUDAL; PERINEURAL
Status: DISPENSED
Start: 2018-04-24

## (undated) RX ORDER — ACETAMINOPHEN 325 MG/1
TABLET ORAL
Status: DISPENSED
Start: 2018-04-30

## (undated) RX ORDER — SCOLOPAMINE TRANSDERMAL SYSTEM 1 MG/1
PATCH, EXTENDED RELEASE TRANSDERMAL
Status: DISPENSED
Start: 2018-04-30

## (undated) RX ORDER — ACETAMINOPHEN 650 MG/1
SUPPOSITORY RECTAL
Status: DISPENSED
Start: 2019-10-31

## (undated) RX ORDER — ONDANSETRON 2 MG/ML
INJECTION INTRAMUSCULAR; INTRAVENOUS
Status: DISPENSED
Start: 2019-10-31

## (undated) RX ORDER — LIDOCAINE HYDROCHLORIDE 20 MG/ML
JELLY TOPICAL
Status: DISPENSED
Start: 2019-06-24

## (undated) RX ORDER — FENTANYL CITRATE 50 UG/ML
INJECTION, SOLUTION INTRAMUSCULAR; INTRAVENOUS
Status: DISPENSED
Start: 2018-04-30

## (undated) RX ORDER — GABAPENTIN 300 MG/1
CAPSULE ORAL
Status: DISPENSED
Start: 2018-04-30

## (undated) RX ORDER — LIDOCAINE HYDROCHLORIDE 10 MG/ML
INJECTION, SOLUTION EPIDURAL; INFILTRATION; INTRACAUDAL; PERINEURAL
Status: DISPENSED
Start: 2018-11-05

## (undated) RX ORDER — LIDOCAINE HYDROCHLORIDE 20 MG/ML
SOLUTION OROPHARYNGEAL
Status: DISPENSED
Start: 2019-11-13

## (undated) RX ORDER — ONDANSETRON 2 MG/ML
INJECTION INTRAMUSCULAR; INTRAVENOUS
Status: DISPENSED
Start: 2018-05-03

## (undated) RX ORDER — LIDOCAINE HYDROCHLORIDE 20 MG/ML
SOLUTION OROPHARYNGEAL
Status: DISPENSED
Start: 2019-11-14

## (undated) RX ORDER — PROPOFOL 10 MG/ML
INJECTION, EMULSION INTRAVENOUS
Status: DISPENSED
Start: 2019-10-31

## (undated) RX ORDER — HYDROMORPHONE HYDROCHLORIDE 1 MG/ML
INJECTION, SOLUTION INTRAMUSCULAR; INTRAVENOUS; SUBCUTANEOUS
Status: DISPENSED
Start: 2018-06-18

## (undated) RX ORDER — LIDOCAINE HYDROCHLORIDE 20 MG/ML
JELLY TOPICAL
Status: DISPENSED
Start: 2019-06-25

## (undated) RX ORDER — GRANISETRON HYDROCHLORIDE 1 MG/ML
INJECTION INTRAVENOUS
Status: DISPENSED
Start: 2017-05-18